# Patient Record
Sex: FEMALE | Race: OTHER | NOT HISPANIC OR LATINO | Employment: OTHER | URBAN - METROPOLITAN AREA
[De-identification: names, ages, dates, MRNs, and addresses within clinical notes are randomized per-mention and may not be internally consistent; named-entity substitution may affect disease eponyms.]

---

## 2017-01-06 ENCOUNTER — HOSPITAL ENCOUNTER (INPATIENT)
Facility: MEDICAL CENTER | Age: 62
LOS: 2 days | DRG: 811 | End: 2017-01-08
Attending: EMERGENCY MEDICINE | Admitting: INTERNAL MEDICINE
Payer: MEDICARE

## 2017-01-06 ENCOUNTER — RESOLUTE PROFESSIONAL BILLING HOSPITAL PROF FEE (OUTPATIENT)
Dept: HOSPITALIST | Facility: MEDICAL CENTER | Age: 62
End: 2017-01-06
Payer: MEDICARE

## 2017-01-06 DIAGNOSIS — R07.9 CHEST PAIN, UNSPECIFIED TYPE: ICD-10-CM

## 2017-01-06 DIAGNOSIS — D64.9 SEVERE ANEMIA: ICD-10-CM

## 2017-01-06 LAB
ABO GROUP BLD: NORMAL
ALBUMIN SERPL BCP-MCNC: 3.9 G/DL (ref 3.2–4.9)
ALBUMIN/GLOB SERPL: 1.2 G/DL
ALP SERPL-CCNC: 71 U/L (ref 30–99)
ALT SERPL-CCNC: 8 U/L (ref 2–50)
ANION GAP SERPL CALC-SCNC: 14 MMOL/L (ref 0–11.9)
AST SERPL-CCNC: 13 U/L (ref 12–45)
BARCODED ABORH UBTYP: 8400
BARCODED PRD CODE UBPRD: NORMAL
BARCODED UNIT NUM UBUNT: NORMAL
BASOPHILS # BLD AUTO: 0.5 % (ref 0–1.8)
BASOPHILS # BLD: 0.03 K/UL (ref 0–0.12)
BILIRUB SERPL-MCNC: 0.3 MG/DL (ref 0.1–1.5)
BLD GP AB SCN SERPL QL: NORMAL
BUN SERPL-MCNC: 68 MG/DL (ref 8–22)
CALCIUM SERPL-MCNC: 8.2 MG/DL (ref 8.5–10.5)
CHLORIDE SERPL-SCNC: 101 MMOL/L (ref 96–112)
CO2 SERPL-SCNC: 24 MMOL/L (ref 20–33)
COMPONENT R 8504R: NORMAL
CREAT SERPL-MCNC: 5.96 MG/DL (ref 0.5–1.4)
EOSINOPHIL # BLD AUTO: 0.33 K/UL (ref 0–0.51)
EOSINOPHIL NFR BLD: 5.3 % (ref 0–6.9)
ERYTHROCYTE [DISTWIDTH] IN BLOOD BY AUTOMATED COUNT: 58.4 FL (ref 35.9–50)
GFR SERPL CREATININE-BSD FRML MDRD: 7 ML/MIN/1.73 M 2
GLOBULIN SER CALC-MCNC: 3.3 G/DL (ref 1.9–3.5)
GLUCOSE SERPL-MCNC: 122 MG/DL (ref 65–99)
HCT VFR BLD AUTO: 19.5 % (ref 37–47)
HGB BLD-MCNC: 6.3 G/DL (ref 12–16)
IMM GRANULOCYTES # BLD AUTO: 0.03 K/UL (ref 0–0.11)
IMM GRANULOCYTES NFR BLD AUTO: 0.5 % (ref 0–0.9)
LYMPHOCYTES # BLD AUTO: 2.21 K/UL (ref 1–4.8)
LYMPHOCYTES NFR BLD: 35.3 % (ref 22–41)
MCH RBC QN AUTO: 31.2 PG (ref 27–33)
MCHC RBC AUTO-ENTMCNC: 32.3 G/DL (ref 33.6–35)
MCV RBC AUTO: 96.5 FL (ref 81.4–97.8)
MONOCYTES # BLD AUTO: 0.45 K/UL (ref 0–0.85)
MONOCYTES NFR BLD AUTO: 7.2 % (ref 0–13.4)
NEUTROPHILS # BLD AUTO: 3.21 K/UL (ref 2–7.15)
NEUTROPHILS NFR BLD: 51.2 % (ref 44–72)
NRBC # BLD AUTO: 0 K/UL
NRBC BLD AUTO-RTO: 0 /100 WBC
PLATELET # BLD AUTO: 430 K/UL (ref 164–446)
PMV BLD AUTO: 12.2 FL (ref 9–12.9)
POTASSIUM SERPL-SCNC: 5 MMOL/L (ref 3.6–5.5)
PRODUCT TYPE UPROD: NORMAL
PROT SERPL-MCNC: 7.2 G/DL (ref 6–8.2)
RBC # BLD AUTO: 2.02 M/UL (ref 4.2–5.4)
RH BLD: NORMAL
SODIUM SERPL-SCNC: 139 MMOL/L (ref 135–145)
UNIT STATUS USTAT: NORMAL
WBC # BLD AUTO: 6.3 K/UL (ref 4.8–10.8)

## 2017-01-06 PROCEDURE — 700105 HCHG RX REV CODE 258: Performed by: EMERGENCY MEDICINE

## 2017-01-06 PROCEDURE — 770006 HCHG ROOM/CARE - MED/SURG/GYN SEMI*

## 2017-01-06 PROCEDURE — 85025 COMPLETE CBC W/AUTO DIFF WBC: CPT

## 2017-01-06 PROCEDURE — G0378 HOSPITAL OBSERVATION PER HR: HCPCS

## 2017-01-06 PROCEDURE — 86901 BLOOD TYPING SEROLOGIC RH(D): CPT

## 2017-01-06 PROCEDURE — 30233N1 TRANSFUSION OF NONAUTOLOGOUS RED BLOOD CELLS INTO PERIPHERAL VEIN, PERCUTANEOUS APPROACH: ICD-10-PCS | Performed by: INTERNAL MEDICINE

## 2017-01-06 PROCEDURE — 96361 HYDRATE IV INFUSION ADD-ON: CPT

## 2017-01-06 PROCEDURE — 99222 1ST HOSP IP/OBS MODERATE 55: CPT | Mod: AI | Performed by: INTERNAL MEDICINE

## 2017-01-06 PROCEDURE — 96360 HYDRATION IV INFUSION INIT: CPT

## 2017-01-06 PROCEDURE — P9016 RBC LEUKOCYTES REDUCED: HCPCS

## 2017-01-06 PROCEDURE — 36415 COLL VENOUS BLD VENIPUNCTURE: CPT

## 2017-01-06 PROCEDURE — 80053 COMPREHEN METABOLIC PANEL: CPT

## 2017-01-06 PROCEDURE — 86923 COMPATIBILITY TEST ELECTRIC: CPT

## 2017-01-06 PROCEDURE — 99285 EMERGENCY DEPT VISIT HI MDM: CPT

## 2017-01-06 PROCEDURE — 36430 TRANSFUSION BLD/BLD COMPNT: CPT

## 2017-01-06 PROCEDURE — 86900 BLOOD TYPING SEROLOGIC ABO: CPT

## 2017-01-06 PROCEDURE — 86850 RBC ANTIBODY SCREEN: CPT

## 2017-01-06 PROCEDURE — 94760 N-INVAS EAR/PLS OXIMETRY 1: CPT

## 2017-01-06 RX ORDER — PREGABALIN 25 MG/1
50 CAPSULE ORAL 2 TIMES DAILY
Status: DISCONTINUED | OUTPATIENT
Start: 2017-01-06 | End: 2017-01-07

## 2017-01-06 RX ORDER — POLYETHYLENE GLYCOL 3350 17 G/17G
1 POWDER, FOR SOLUTION ORAL DAILY
Status: DISCONTINUED | OUTPATIENT
Start: 2017-01-07 | End: 2017-01-08 | Stop reason: HOSPADM

## 2017-01-06 RX ORDER — SODIUM CHLORIDE 9 MG/ML
INJECTION, SOLUTION INTRAVENOUS CONTINUOUS
Status: DISCONTINUED | OUTPATIENT
Start: 2017-01-06 | End: 2017-01-07

## 2017-01-06 RX ORDER — AMLODIPINE BESYLATE 10 MG/1
10 TABLET ORAL DAILY
Status: DISCONTINUED | OUTPATIENT
Start: 2017-01-07 | End: 2017-01-08 | Stop reason: HOSPADM

## 2017-01-06 RX ORDER — ACETAMINOPHEN 325 MG/1
650 TABLET ORAL EVERY 6 HOURS PRN
Status: DISCONTINUED | OUTPATIENT
Start: 2017-01-06 | End: 2017-01-07

## 2017-01-06 RX ORDER — ONDANSETRON 2 MG/ML
4 INJECTION INTRAMUSCULAR; INTRAVENOUS EVERY 4 HOURS PRN
Status: DISCONTINUED | OUTPATIENT
Start: 2017-01-06 | End: 2017-01-08 | Stop reason: HOSPADM

## 2017-01-06 RX ORDER — DEXTROSE MONOHYDRATE 25 G/50ML
25 INJECTION, SOLUTION INTRAVENOUS
Status: DISCONTINUED | OUTPATIENT
Start: 2017-01-06 | End: 2017-01-08 | Stop reason: HOSPADM

## 2017-01-06 RX ORDER — LATANOPROST 50 UG/ML
1 SOLUTION/ DROPS OPHTHALMIC EVERY EVENING
Status: DISCONTINUED | OUTPATIENT
Start: 2017-01-06 | End: 2017-01-08 | Stop reason: HOSPADM

## 2017-01-06 RX ORDER — HEPARIN SODIUM 5000 [USP'U]/ML
5000 INJECTION, SOLUTION INTRAVENOUS; SUBCUTANEOUS EVERY 8 HOURS
Status: DISCONTINUED | OUTPATIENT
Start: 2017-01-07 | End: 2017-01-08 | Stop reason: HOSPADM

## 2017-01-06 RX ORDER — ONDANSETRON 4 MG/1
4 TABLET, ORALLY DISINTEGRATING ORAL EVERY 4 HOURS PRN
Status: DISCONTINUED | OUTPATIENT
Start: 2017-01-06 | End: 2017-01-08 | Stop reason: HOSPADM

## 2017-01-06 RX ORDER — CARVEDILOL 25 MG/1
25 TABLET ORAL 2 TIMES DAILY WITH MEALS
Status: DISCONTINUED | OUTPATIENT
Start: 2017-01-07 | End: 2017-01-08 | Stop reason: HOSPADM

## 2017-01-06 RX ORDER — BIMATOPROST 0.3 MG/ML
1 SOLUTION/ DROPS OPHTHALMIC EVERY EVENING
Status: DISCONTINUED | OUTPATIENT
Start: 2017-01-06 | End: 2017-01-06

## 2017-01-06 RX ADMIN — SODIUM CHLORIDE 1000 ML: 9 INJECTION, SOLUTION INTRAVENOUS at 22:13

## 2017-01-06 ASSESSMENT — ENCOUNTER SYMPTOMS
SENSORY CHANGE: 1
BACK PAIN: 1
DIARRHEA: 0
NAUSEA: 1
TINGLING: 1
BLOOD IN STOOL: 0
FLANK PAIN: 1

## 2017-01-06 ASSESSMENT — PAIN SCALES - GENERAL: PAINLEVEL_OUTOF10: 7

## 2017-01-06 ASSESSMENT — LIFESTYLE VARIABLES: DO YOU DRINK ALCOHOL: NO

## 2017-01-06 NOTE — IP AVS SNAPSHOT
" After Visit Summary                                                                                                                  Name:Vielka Briscoe  Medical Record Number:[id number]  CSN: [id number]    YOB: 1955   Age: 61 y.o.  Sex: female  HT:1.473 m (4' 10\") WT: 61.8 kg (136 lb 3.9 oz)          Admit Date: 1/6/2017     Discharge Date:   Today's Date: 1/8/2017  Attending Doctor:  Justin Willis M.D.                  Allergies:  Actos; Darvocet; Demerol; Glucophage; Morphine; Oxycodone; Pcn; Requip; Simvastatin; Sulfa drugs; Tramadol; Trazodone; Dilaudid; Diphenhydramine; Iron; Multivitamin; and Other drug            Discharge Instructions       Discharge Instructions    Discharged to home by taxi with self. Discharged via wheelchair, hospital escort: Refused.  Special equipment needed: Not Applicable    Be sure to schedule a follow-up appointment with your primary care doctor or any specialists as instructed.     Discharge Plan:   Diet Plan: Discussed  Activity Level: Discussed  Confirmed Follow up Appointment: Patient to Call and Schedule Appointment  Confirmed Symptoms Management: Discussed  Medication Reconciliation Updated: Yes  Influenza Vaccine Indication: Patient Refuses    I understand that a diet low in cholesterol, fat, and sodium is recommended for good health. Unless I have been given specific instructions below for another diet, I accept this instruction as my diet prescription.   Other diet: renal/diabetic    Special Instructions: None    · Is patient discharged on Warfarin / Coumadin?   No     · Is patient Post Blood Transfusion?  No    Depression / Suicide Risk    As you are discharged from this [facility] facility, it is important to learn how to keep safe from harming yourself.    Recognize the warning signs:  · Abrupt changes in personality, positive or negative- including increase in energy   · Giving away possessions  · Change in eating patterns- significant weight " changes-  positive or negative  · Change in sleeping patterns- unable to sleep or sleeping all the time   · Unwillingness or inability to communicate  · Depression  · Unusual sadness, discouragement and loneliness  · Talk of wanting to die  · Neglect of personal appearance   · Rebelliousness- reckless behavior  · Withdrawal from people/activities they love  · Confusion- inability to concentrate     If you or a loved one observes any of these behaviors or has concerns about self-harm, here's what you can do:  · Talk about it- your feelings and reasons for harming yourself  · Remove any means that you might use to hurt yourself (examples: pills, rope, extension cords, firearm)  · Get professional help from the community (Mental Health, Substance Abuse, psychological counseling)  · Do not be alone:Call your Safe Contact- someone whom you trust who will be there for you.  · Call your local CRISIS HOTLINE 420-2091 or 156-781-5351  · Call your local Children's Mobile Crisis Response Team [place] (266) 644-9247 or www.[employer]  · Call the toll free National Suicide Prevention Hotlines   · National Suicide Prevention Lifeline 088-463-INXU (3955)  · National Hope Line Network 800-SUICIDE (481-0746)           Discharge Medication Instructions:    Below are the medications your physician expects you to take upon discharge:    Review all your home medications and newly ordered medications with your doctor and/or pharmacist. Follow medication instructions as directed by your doctor and/or pharmacist.    Please keep your medication list with you and share with your physician.               Medication List      START taking these medications        Instructions    acetaminophen 325 MG Tabs   Last time this was given:  650 mg on 1/7/2017 10:26 PM   Commonly known as:  TYLENOL    Take 2 Tabs by mouth every 6 hours as needed for Fever or Moderate Pain (Temp >101.5F).   Dose:  650 mg       hydrALAZINE 25 MG Tabs   Last  time this was given:  37.5 mg on 1/8/2017  3:01 PM   Commonly known as:  APRESOLINE    Take 1.5 Tabs by mouth every 8 hours.   Dose:  37.5 mg       insulin lispro 100 UNIT/ML Soln   Last time this was given:  1 Units on 1/8/2017 10:56 AM   Commonly known as:  HUMALOG    Inject 1-6 Units as instructed 4 Times a Day,Before Meals and at Bedtime.   Dose:  1-6 Units       isosorbide dinitrate 20 MG Tabs   Last time this was given:  20 mg on 1/8/2017  3:02 PM   Commonly known as:  ISORDIL    Take 1 Tab by mouth 3 times a day.   Dose:  20 mg         CHANGE how you take these medications        Instructions    hydrocodone-acetaminophen 5-325 MG Tabs per tablet   What changed:    - when to take this  - reasons to take this   Commonly known as:  NORCO    Take 1 Tab by mouth every 8 hours as needed (severe pain).   Dose:  1 Tab         CONTINUE taking these medications        Instructions    amlodipine 10 MG Tabs   Last time this was given:  10 mg on 1/8/2017  9:36 AM   Commonly known as:  NORVASC    Take 10 mg by mouth every day.   Dose:  10 mg       carvedilol 25 MG Tabs   Last time this was given:  25 mg on 1/8/2017  9:36 AM   Commonly known as:  COREG    Take 25 mg by mouth 2 times a day, with meals.   Dose:  25 mg       famotidine 20 MG Tabs   Commonly known as:  PEPCID    Take 1 Tab by mouth 2 times a day.   Dose:  20 mg       LUMIGAN 0.03 % Soln   Generic drug:  bimatoprost    Place 1 Drop in both eyes every evening.   Dose:  1 Drop       pregabalin 50 MG capsule   Last time this was given:  50 mg on 1/8/2017  9:36 AM   Commonly known as:  LYRICA    Take 1 Cap by mouth 2 times a day.   Dose:  50 mg       VOLTAREN 1 % Gel   Generic drug:  Diclofenac Sodium    Apply 1 Application to skin as directed 2 times a day as needed. Legs and back   Dose:  1 Application               Instructions           Diet / Nutrition:    Follow any diet instructions given to you by your doctor or the dietician, including how much salt  (sodium) you are allowed each day.    If you are overweight, talk to your doctor about a weight reduction plan.    Activity:    Remain physically active following your doctor's instructions about exercise and activity.    Rest often.     Any time you become even a little tired or short of breath, SIT DOWN and rest.    Worsening Symptoms:    Report any of the following signs and symptoms to the doctor's office immediately:    *Pain of jaw, arm, or neck  *Chest pain not relieved by medication                               *Dizziness or loss of consciousness  *Difficulty breathing even when at rest   *More tired than usual                                       *Bleeding drainage or swelling of surgical site  *Swelling of feet, ankles, legs or stomach                 *Fever (>100ºF)  *Pink or blood tinged sputum  *Weight gain (3lbs/day or 5lbs /week)           *Shock from internal defibrillator (if applicable)  *Palpitations or irregular heartbeats                *Cool and/or numb extremities    Stroke Awareness    Common Risk Factors for Stroke include:    Age  Atrial Fibrillation  Carotid Artery Stenosis  Diabetes Mellitus  Excessive alcohol consumption  High blood pressure  Overweight   Physical inactivity  Smoking    Warning signs and symptoms of a stroke include:    *Sudden numbness or weakness of the face, arm or leg (especially on one side of the body).  *Sudden confusion, trouble speaking or understanding.  *Sudden trouble seeing in one or both eyes.  *Sudden trouble walking, dizziness, loss of balance or coordination.Sudden severe headache with no known cause.    It is very important to get treatment quickly when a stroke occurs. If you experience any of the above warning signs, call 911 immediately.                   Disclaimer         Quit Smoking / Tobacco Use:    I understand the use of any tobacco products increases my chance of suffering from future heart disease or stroke and could cause other illnesses  which may shorten my life. Quitting the use of tobacco products is the single most important thing I can do to improve my health. For further information on smoking / tobacco cessation call a Toll Free Quit Line at 1-502.377.8758 (*National Cancer [place]) or 1-804.479.4306 (American Lung Association) or you can access the web based program at www.lungusa.org.    [place] Tobacco Users Help Line:  (621) 951-9350       Toll Free: 1-594.474.9187  Quit Tobacco Program [facility] (375)875-7338    Crisis Hotline:    [place] Crisis Hotline:  4-262-XDHTJSX or 1-675.829.5065    [place] Crisis Hotline:    1-118.608.5054 or 626-113-9466    Discharge Survey:   Thank you for choosing [facility]. We hope we did everything we could to make your hospital stay a pleasant one. You may be receiving a phone survey and we would appreciate your time and participation in answering the questions. Your input is very valuable to us in our efforts to improve our service to our patients and their families.        My signature on this form indicates that:    1. I have reviewed and understand the above information.  2. My questions regarding this information have been answered to my satisfaction.  3. I have formulated a plan with my discharge nurse to obtain my prescribed medications for home.                  Disclaimer         __________________________________                     __________       ________                       Patient Signature                                                 Date                    Time

## 2017-01-06 NOTE — IP AVS SNAPSHOT
1/8/2017          Vielka [facility]io [facility]  [address]    [place] [address]    Dear Vielka:    [facility] wants to ensure your discharge home is safe and you or your loved ones have had all your questions answered regarding your care after you leave the hospital.    You may receive a telephone call within two days of your discharge.  This call is to make certain you understand your discharge instructions as well as ensure we provided you with the best care possible during your stay with us.     The call will only last approximately 3-5 minutes and will be done by a nurse.    Once again, we want to ensure your discharge home is safe and that you have a clear understanding of any next steps in your care.  If you have any questions or concerns, please do not hesitate to contact us, we are here for you.  Thank you for choosing [facility] for your healthcare needs.    Sincerely,    Amol Bunn    [facility]

## 2017-01-06 NOTE — ED NOTES
Pt amb to triage.  Chief Complaint   Patient presents with   • Abnormal Labs     Low HGB   • Sent by MD     for blood transfusion, missed dialysis today     Pt asked to wait in lobby. Advised of wait time and triage process. Advised to alert RN w/ any changes in condition. Thanked for patience.

## 2017-01-06 NOTE — IP AVS SNAPSHOT
HumanAPI Access Code: [email or web address]  Expires: 1/9/2017  8:07 AM    HumanAPI  A secure, online tool to manage your health information     [employer]’s HumanAPI® is a secure, online tool that connects you to your personalized health information from the privacy of your home -- day or night - making it very easy for you to manage your healthcare. Once the activation process is completed, you can even access your medical information using the HumanAPI sundar, which is available for free in the Apple Sundar store or Google Play store.     HumanAPI provides the following levels of access (as shown below):   My Chart Features   [facility] Primary Care Doctor [facility]  Specialists [facility]  Urgent  Care Non-[facility]  Primary Care  Doctor   Email your healthcare team securely and privately 24/7 X X X X   Manage appointments: schedule your next appointment; view details of past/upcoming appointments X      Request prescription refills. X      View recent personal medical records, including lab and immunizations X X X X   View health record, including health history, allergies, medications X X X X   Read reports about your outpatient visits, procedures, consult and ER notes X X X X   See your discharge summary, which is a recap of your hospital and/or ER visit that includes your diagnosis, lab results, and care plan. X X       How to register for HumanAPI:  1. Go to  [email or web address].  2. Click on the Sign Up Now box, which takes you to the New Member Sign Up page. You will need to provide the following information:  a. Enter your HumanAPI Access Code exactly as it appears at the top of this page. (You will not need to use this code after you’ve completed the sign-up process. If you do not sign up before the expiration date, you must request a new code.)   b. Enter your date of birth.   c. Enter your home email address.   d. Click Submit, and follow the next screen’s instructions.  3. Create a HumanAPI ID. This will be your HumanAPI  login ID and cannot be changed, so think of one that is secure and easy to remember.  4. Create a [employer] password. You can change your password at any time.  5. Enter your Password Reset Question and Answer. This can be used at a later time if you forget your password.   6. Enter your e-mail address. This allows you to receive e-mail notifications when new information is available in Sh[employer]able.  7. Click Sign Up. You can now view your health information.    For assistance activating your [employer] account, call [phone]

## 2017-01-07 ENCOUNTER — APPOINTMENT (OUTPATIENT)
Dept: RADIOLOGY | Facility: MEDICAL CENTER | Age: 62
DRG: 811 | End: 2017-01-07
Attending: HOSPITALIST
Payer: MEDICARE

## 2017-01-07 LAB
EKG IMPRESSION: NORMAL
ERYTHROCYTE [DISTWIDTH] IN BLOOD BY AUTOMATED COUNT: 50.4 FL (ref 35.9–50)
GLUCOSE BLD-MCNC: 130 MG/DL (ref 65–99)
GLUCOSE BLD-MCNC: 143 MG/DL (ref 65–99)
GLUCOSE BLD-MCNC: 185 MG/DL (ref 65–99)
GLUCOSE BLD-MCNC: 258 MG/DL (ref 65–99)
HCT VFR BLD AUTO: 22.4 % (ref 37–47)
HGB BLD-MCNC: 7.2 G/DL (ref 12–16)
MCH RBC QN AUTO: 30.4 PG (ref 27–33)
MCHC RBC AUTO-ENTMCNC: 32.1 G/DL (ref 33.6–35)
MCV RBC AUTO: 94.5 FL (ref 81.4–97.8)
PLATELET # BLD AUTO: 372 K/UL (ref 164–446)
PMV BLD AUTO: 12.5 FL (ref 9–12.9)
RBC # BLD AUTO: 2.37 M/UL (ref 4.2–5.4)
TROPONIN I SERPL-MCNC: <0.01 NG/ML (ref 0–0.04)
WBC # BLD AUTO: 5.6 K/UL (ref 4.8–10.8)

## 2017-01-07 PROCEDURE — 700102 HCHG RX REV CODE 250 W/ 637 OVERRIDE(OP): Performed by: PHARMACIST

## 2017-01-07 PROCEDURE — 700111 HCHG RX REV CODE 636 W/ 250 OVERRIDE (IP): Performed by: INTERNAL MEDICINE

## 2017-01-07 PROCEDURE — 93005 ELECTROCARDIOGRAM TRACING: CPT | Performed by: INTERNAL MEDICINE

## 2017-01-07 PROCEDURE — 700102 HCHG RX REV CODE 250 W/ 637 OVERRIDE(OP): Performed by: INTERNAL MEDICINE

## 2017-01-07 PROCEDURE — 700102 HCHG RX REV CODE 250 W/ 637 OVERRIDE(OP): Performed by: HOSPITALIST

## 2017-01-07 PROCEDURE — A9270 NON-COVERED ITEM OR SERVICE: HCPCS | Performed by: INTERNAL MEDICINE

## 2017-01-07 PROCEDURE — 700111 HCHG RX REV CODE 636 W/ 250 OVERRIDE (IP): Performed by: NURSE PRACTITIONER

## 2017-01-07 PROCEDURE — 99233 SBSQ HOSP IP/OBS HIGH 50: CPT | Performed by: HOSPITALIST

## 2017-01-07 PROCEDURE — 84484 ASSAY OF TROPONIN QUANT: CPT

## 2017-01-07 PROCEDURE — P9016 RBC LEUKOCYTES REDUCED: HCPCS

## 2017-01-07 PROCEDURE — A9270 NON-COVERED ITEM OR SERVICE: HCPCS | Performed by: HOSPITALIST

## 2017-01-07 PROCEDURE — 82962 GLUCOSE BLOOD TEST: CPT

## 2017-01-07 PROCEDURE — 36415 COLL VENOUS BLD VENIPUNCTURE: CPT

## 2017-01-07 PROCEDURE — 93010 ELECTROCARDIOGRAM REPORT: CPT | Performed by: INTERNAL MEDICINE

## 2017-01-07 PROCEDURE — 85027 COMPLETE CBC AUTOMATED: CPT

## 2017-01-07 PROCEDURE — 5A1D00Z PERFORMANCE OF URINARY FILTRATION, SINGLE: ICD-10-PCS | Performed by: INTERNAL MEDICINE

## 2017-01-07 PROCEDURE — 86923 COMPATIBILITY TEST ELECTRIC: CPT | Mod: 91

## 2017-01-07 PROCEDURE — 36430 TRANSFUSION BLD/BLD COMPNT: CPT

## 2017-01-07 PROCEDURE — A9270 NON-COVERED ITEM OR SERVICE: HCPCS | Performed by: PHARMACIST

## 2017-01-07 PROCEDURE — 90935 HEMODIALYSIS ONE EVALUATION: CPT

## 2017-01-07 PROCEDURE — 71010 DX-CHEST-PORTABLE (1 VIEW): CPT

## 2017-01-07 PROCEDURE — 770006 HCHG ROOM/CARE - MED/SURG/GYN SEMI*

## 2017-01-07 RX ORDER — ISOSORBIDE DINITRATE 20 MG/1
10 TABLET ORAL 3 TIMES DAILY
Status: DISCONTINUED | OUTPATIENT
Start: 2017-01-07 | End: 2017-01-08

## 2017-01-07 RX ORDER — HYDROCODONE BITARTRATE AND ACETAMINOPHEN 5; 325 MG/1; MG/1
1 TABLET ORAL EVERY 6 HOURS PRN
Status: ON HOLD | COMMUNITY
End: 2017-01-08

## 2017-01-07 RX ORDER — PREGABALIN 25 MG/1
50 CAPSULE ORAL DAILY
Status: DISCONTINUED | OUTPATIENT
Start: 2017-01-08 | End: 2017-01-08 | Stop reason: HOSPADM

## 2017-01-07 RX ORDER — HYDRALAZINE HYDROCHLORIDE 50 MG/1
25 TABLET, FILM COATED ORAL EVERY 8 HOURS
Status: DISCONTINUED | OUTPATIENT
Start: 2017-01-07 | End: 2017-01-08

## 2017-01-07 RX ORDER — ENALAPRILAT 1.25 MG/ML
1.25 INJECTION INTRAVENOUS EVERY 6 HOURS PRN
Status: DISCONTINUED | OUTPATIENT
Start: 2017-01-07 | End: 2017-01-08 | Stop reason: HOSPADM

## 2017-01-07 RX ORDER — ACETAMINOPHEN 325 MG/1
650 TABLET ORAL EVERY 6 HOURS PRN
Status: DISCONTINUED | OUTPATIENT
Start: 2017-01-07 | End: 2017-01-08 | Stop reason: HOSPADM

## 2017-01-07 RX ADMIN — HEPARIN SODIUM 5000 UNITS: 5000 INJECTION INTRAVENOUS; SUBCUTANEOUS at 13:56

## 2017-01-07 RX ADMIN — HYDRALAZINE HYDROCHLORIDE 25 MG: 50 TABLET ORAL at 13:56

## 2017-01-07 RX ADMIN — CARVEDILOL 25 MG: 25 TABLET, FILM COATED ORAL at 17:47

## 2017-01-07 RX ADMIN — ISOSORBIDE DINITRATE 10 MG: 20 TABLET ORAL at 22:16

## 2017-01-07 RX ADMIN — ACETAMINOPHEN 650 MG: 325 TABLET, FILM COATED ORAL at 22:26

## 2017-01-07 RX ADMIN — PREGABALIN 50 MG: 25 CAPSULE ORAL at 01:13

## 2017-01-07 RX ADMIN — HEPARIN SODIUM 5000 UNITS: 5000 INJECTION INTRAVENOUS; SUBCUTANEOUS at 05:52

## 2017-01-07 RX ADMIN — HEPARIN SODIUM 5000 UNITS: 5000 INJECTION INTRAVENOUS; SUBCUTANEOUS at 22:16

## 2017-01-07 RX ADMIN — ISOSORBIDE DINITRATE 10 MG: 20 TABLET ORAL at 09:31

## 2017-01-07 RX ADMIN — INSULIN LISPRO 3 UNITS: 100 INJECTION, SOLUTION INTRAVENOUS; SUBCUTANEOUS at 17:47

## 2017-01-07 RX ADMIN — INSULIN LISPRO 1 UNITS: 100 INJECTION, SOLUTION INTRAVENOUS; SUBCUTANEOUS at 05:53

## 2017-01-07 RX ADMIN — ENALAPRILAT 1.25 MG: 1.25 INJECTION INTRAVENOUS at 03:39

## 2017-01-07 RX ADMIN — ENALAPRILAT 1.25 MG: 1.25 INJECTION INTRAVENOUS at 14:08

## 2017-01-07 RX ADMIN — HYDRALAZINE HYDROCHLORIDE 25 MG: 50 TABLET ORAL at 22:17

## 2017-01-07 RX ADMIN — LATANOPROST 1 DROP: 50 SOLUTION OPHTHALMIC at 01:47

## 2017-01-07 RX ADMIN — LATANOPROST 1 DROP: 50 SOLUTION OPHTHALMIC at 22:17

## 2017-01-07 RX ADMIN — ISOSORBIDE DINITRATE 10 MG: 20 TABLET ORAL at 13:56

## 2017-01-07 RX ADMIN — PREGABALIN 50 MG: 25 CAPSULE ORAL at 09:31

## 2017-01-07 ASSESSMENT — ENCOUNTER SYMPTOMS
PALPITATIONS: 0
VOMITING: 0
NAUSEA: 0
FEVER: 0
COUGH: 0
WEAKNESS: 1
SHORTNESS OF BREATH: 0

## 2017-01-07 ASSESSMENT — PAIN SCALES - GENERAL
PAINLEVEL_OUTOF10: 0
PAINLEVEL_OUTOF10: 4
PAINLEVEL_OUTOF10: 2

## 2017-01-07 ASSESSMENT — LIFESTYLE VARIABLES
ALCOHOL_USE: NO
EVER_SMOKED: NEVER

## 2017-01-07 NOTE — PROGRESS NOTES
Two RN head to toe skin assessment performed. Skin is dry throughout, no redness or open areas noted. Scar over inner right thigh and left forearm noted.

## 2017-01-07 NOTE — CONSULTS
[facility] Nephrology Consult Note    Patient seen and examined, please see my dictated consult note for full details  61yoF with PMH significant for ESRD on HD MWF via L upper arm AVF, HTN, DM II, anemia secondary to myelodysplastic syndrome and CKD, PVD, Atrial Fibrillation, renal osteodystrophy, admitted with generalized weakness and symptomatic anemia with Hgb 6.4.   Assessment/Plan:  1. ESRD--HD MWF via L arm AVF, missed HD yesterday  --HD today and will transfuse 2 units PRBC's with HD today  --Fluid removal with HD as tolerated  --Dose adjust all meds/abx for decreased GFR  2. Anemia--secondary to combination of myelodysplastic syndrome and CKD, goal Hgb 8.5-9.0 per heme/onc recs   --Transfused 1 unit PRBCs last night with Hgb 6.3--->7.2 after transfusion   --Will transfuse another 2 units PRBC's with HD today  --Follow up with heme/onc as outpatient  3. HTN  4. DM II--management per primary svc  5. Renal Osteodystrophy  6. Atrial Fibrillation  7. PVD  8. Chest Pain--atypical and intermittent, Trop negative and had negative stress test 2 weeks ago  --Monitor    Report Confirmation #[id number]

## 2017-01-07 NOTE — ED NOTES
PRBC started with 2 RN verify. /63/ HR 68/ TEMP 97.8F/ RR 20/ O2 100 on 2L O2. Will continue to monitor.

## 2017-01-07 NOTE — ED NOTES
Dialysis pt here referred here by MD because hgb was 6.4 per pt. Pt missed dialysis on Monday and today. Pt on monitor. ERP to see.

## 2017-01-07 NOTE — ED PROVIDER NOTES
ED Provider Note    Scribed for Chester Avila M.D. by Tatum Ann. 1/6/2017, 8:32 PM.    Primary care provider: Nyla Nava M.D.  Means of arrival: walk-in  History obtained from: patient  History limited by: None    CHIEF COMPLAINT  Chief Complaint   Patient presents with   • Abnormal Labs     Low HGB   • Sent by MD     for blood transfusion, missed dialysis today       MARQUEZ Briscoe is a 61 y.o. female who presented to the Emergency Department after being sent by an MD for a low hemoglobin count of 6.4 two days ago. The patient reports that she missed her dialysis sessions today and four days ago on Monday due to a time conflict with her blood transfusion. She also complains of associated chest pain in the waiting room today, and tingling and change of sensation in her upper extremities as well. She states that she currently feels nauseated and vomited on two days ago on Wednesday. She also says that she has been experiencing general aching in her back and flank. The patient denies melena or diarrhea.    REVIEW OF SYSTEMS  Review of Systems   Cardiovascular: Positive for chest pain.   Gastrointestinal: Positive for nausea. Negative for diarrhea, blood in stool and melena.   Genitourinary: Positive for flank pain.   Musculoskeletal: Positive for back pain.   Neurological: Positive for tingling (upper extremities.) and sensory change.   All other systems reviewed and are negative.      PAST MEDICAL HISTORY   has a past medical history of Hypertension; Chronic anemia; Chronic obstructive pulmonary disease (HCC); Blood transfusion without reported diagnosis; Anemia; Breath shortness; Glaucoma; Diabetes; Dental disorder; Supplemental oxygen dependent; Cataract; Renal disorder; Pain; Chronic kidney disease; Arthritis; Dialysis patient; MDS (myelodysplastic syndrome) (10/2016); Congestive heart failure (HCC); Atrial fibrillation (HCC); and Stroke (HCC) (03/2015).    SURGICAL HISTORY   has past surgical  history that includes gyn surgery; gyn surgery; gyn surgery; other cardiac surgery; other; retinal detachment repair (Right); av fistula creation (Left, 2/8/2016); other abdominal surgery; gastroscopy (12/17/2015); colonoscopy (12/17/2015); and vein ligation (Left, 11/10/2016).    SOCIAL HISTORY  Social History   Substance Use Topics   • Smoking status: Never Smoker    • Smokeless tobacco: Never Used   • Alcohol Use: No      History   Drug Use No       FAMILY HISTORY  Family History   Problem Relation Age of Onset   • Hypertension Father    • Hypertension Mother        CURRENT MEDICATIONS    No current facility-administered medications on file prior to encounter.     Current Outpatient Prescriptions on File Prior to Encounter   Medication Sig Dispense Refill   • amlodipine (NORVASC) 10 MG Tab Take 10 mg by mouth every day.     • bimatoprost (LUMIGAN) 0.03 % Solution Place 1 Drop in both eyes every evening.     • Diclofenac Sodium (VOLTAREN) 1 % Gel Apply 1 Application to skin as directed 2 times a day as needed. Legs and back     • pregabalin (LYRICA) 50 MG capsule Take 1 Cap by mouth 2 times a day. 30 Cap 0   • carvedilol (COREG) 25 MG Tab Take 25 mg by mouth 2 times a day, with meals.           ALLERGIES  Allergies   Allergen Reactions   • Actos [Pioglitazone Hydrochloride] Unspecified     Cause blindness   WTJ=7597   • Darvocet [Propoxyphene N-Apap] Vomiting     GSU=6554   • Demerol Vomiting     KQQ=4968   • Glucophage [Metformin Hydrochloride] Vomiting     PMW=9088   • Morphine Vomiting     ACX=8120   • Oxycodone Vomiting     RXN=1/2016   • Pcn [Penicillins] Vomiting     MCX=7078     • Requip Vomiting     RXN=12/2015   • Simvastatin Unspecified     Leg cramps  YMQ=8112   • Sulfa Drugs Rash     RXN=>10 years   • Tramadol Vomiting     KIV=9771   • Trazodone Vomiting     DSX=9912   • Dilaudid [Hydromorphone] Vomiting     Unknown    • Diphenhydramine Vomiting   • Iron      vomiting   • Multivitamin      itching   •  "Other Drug      Any binders that remove phosphorus from the body such as tums       PHYSICAL EXAM  VITAL SIGNS: /52 mmHg  Pulse 86  Temp(Src) 36.1 °C (97 °F) (Temporal)  Resp 18  Ht 1.473 m (4' 10\")  Wt 61 kg (134 lb 7.7 oz)  BMI 28.11 kg/m2  SpO2 96%  LMP 01/01/1995    Constitutional:   No acute distress  HENT:  Moist mucous membranes  Eyes:  No conjunctivitis or icterus  Neck:  trachea is midline, no palpable thyroid  Lymphatic:  No cervical lymphadenopathy  Cardiovascular:  Regular rate and rhythm, no murmurs  Thorax & Lungs:  Normal breath sounds, no rhonchi  Abdomen:  Soft, Non-tender  Skin:.  no rash  Back:  Non-tender, no CVA tenderness  Extremities:   no edema  Vascular:  symmetric radial pulse  Neurologic:  Normal gross motor  Fistula left uppre extremity, good thrill    LABS  Labs Reviewed   CBC WITH DIFFERENTIAL - Abnormal; Notable for the following:     RBC 2.02 (*)     Hemoglobin 6.3 (*)     Hematocrit 19.5 (*)     MCHC 32.3 (*)     RDW 58.4 (*)     All other components within normal limits   COMP METABOLIC PANEL - Abnormal; Notable for the following:     Anion Gap 14.0 (*)     Glucose 122 (*)     Bun 68 (*)     Creatinine 5.96 (*)     Calcium 8.2 (*)     All other components within normal limits   ESTIMATED GFR - Abnormal; Notable for the following:     GFR If  9 (*)     GFR If Non  7 (*)     All other components within normal limits   COD (ADULT)    Narrative:     Does Physician's order indicate patient to be  transfused?->Yes   RELEASE RED BLOOD CELLS   TRANSFUSE RED BLOOD CELLS-NURSING COMMUNICATION     All labs reviewed by me.      I (Chester Avila) certify that I have explained to the patient or the patient’s legal representative, the following:    (i)     Explained the Blood Transfusion procedure to be undertaken;  (ii)    Explained alternative treatments and their general nature and risks; and  (iii)   Explained the risks, and extent of risk, to " the Blood Transfusion procedure.  Risks included, but are not limited to the following:  mild allergic reactions, hemolytic reaction, and possible exposure to infectious agents such as hepatitis, cytomegalovirus, infectious mononucleosis, and acquired immune deficiency syndrome (AIDS)    I have explained all of the above and have answered all patient questions arising regarding the procedure to be taken, and I believe that the patient understands what I have explained.    Date 1/6/2017  Time 9:30 PM  This form is electronically signed by Chester Avila      COURSE & MEDICAL DECISION MAKING  Pertinent Labs & Imaging studies reviewed. (See chart for details)    8:32 PM - Patient seen and examined at bedside. Ordered CBC, CMP, COD to evaluate her symptoms. The differential diagnoses include but are not limited to: anemia requiring tansfusion     9:30 PM - Consult with Dr. Le for [place] .     9:44 PM - I discussed the patient's case and the above findings with Dr. Holman (hospitalist) who will see and admit the patient. Care is transferred at this time.    DISPOSITION:  Patient will be admitted to  in critical condition.    CRITICAL CARE  The very real possibilty of a deterioration of this patient's condition required the highest level of my preparedness for sudden, emergent intervention.  I provided critical care services, which included medication orders, frequent reevaluations of the patient's condition and response to treatment, ordering and reviewing test results, and discussing the case with various consultants.  The critical care time associated with the care of the patient was 35 minutes. Review chart for interventions. This time is exclusive of any other billable procedures.       Medical Decision Making:  Patient has low hemoglobin. Chest symptoms today of chest pain consistent with symptomatic severe anemia. She's missed dialysis. Contacted her nephrologist. Also discussed the case with the  hospitalist. Patient is being transfused 1 unit of blood. She is admitted in critical condition      FINAL IMPRESSION  1. Chest pain, unspecified type    2. Severe anemia          Tatum KENNY (Ervin), am scribing for, and in the presence of, Chester Avila M.D..    Electronically signed by: Tatum Ann (Ervin), 1/6/2017    IChester M.D. personally performed the services described in this documentation, as scribed by Tatum Ann in my presence, and it is both accurate and complete.    The note accurately reflects work and decisions made by me.  Chester Avila  1/6/2017  10:35 PM

## 2017-01-07 NOTE — CONSULTS
DATE OF SERVICE:  01/07/2017    REQUESTING PHYSICIAN:  ER physician.    REASON FOR CONSULTATION:  Evaluation and management of end-stage renal disease   and anemia.    HISTORY OF PRESENT ILLNESS:  This is a 61-year-old  female with past   medical history significant for end-stage renal disease, on chronic   hemodialysis Monday, Wednesday, Fridays via a left upper arm AV fistula under   the care of [facility], hypertension, diabetes mellitus type 2,   anemia secondary to myelodysplastic syndrome and anemia of chronic kidney   disease, peripheral vascular disease, atrial fibrillation, renal   osteodystrophy, who is admitted with complaints of generalized weakness and   symptomatic anemia with hemoglobin of 6.4.  The patient reports that she was   heading to her outpatient dialysis appointment yesterday; however, her   outpatient clinic called and notified her that her hemoglobin was 6.4 and that   she should go to the emergency room for transfusion, so she presented to the   emergency room for evaluation.  Patient has a history of myelodysplastic   syndrome and follows with Dr Avila, hematology/oncology.  She reports that   she recently saw him and that she is to start a new medication for this.    However, she has not yet started it as she is waiting for authorization from   her insurance.  She has periodic admissions for blood transfusions and per   hematology/oncology's recommendations they prefer to transfuse to a goal   hemoglobin of about 8.5 to 9.0.  In the emergency room last night, patient was   given 1 unit of packed red blood cells and she tolerated this fine.  Her   hemoglobin improved from 6.3-7.2 today after the transfusion.  She reports   that when she is anemic, she feels very fatigued and tired.  She does have   some shortness of breath associated with this, she did present with those   symptoms in the emergency room and these have improved now.  She also reports   some chest pain  off and on intermittently over the past few weeks.  She did   have a recent negative cardiac stress test about 2 weeks ago on 2016.    Her troponin in the emergency room was negative.  Her chest pain is felt to be   atypical.    REVIEW OF SYSTEMS:  Significant for weakness and fatigue.  Patient denies any   fevers or chills.  She does have some shortness of breath, that is now   improved.  She denies any lower extremity edema.  No nausea, vomiting or   diarrhea.  She denies any bloody stools or hemetemesis.  She has been having   some atypical chest pain intermittently over the past few weeks and the rest   of the 12-point review of systems is negative.    PAST MEDICAL HISTORY:  1.  End-stage renal disease.  The patient is on chronic hemodialysis Monday,   Wednesday,  via a left upper arm AV fistula under the care of [facility].  2.  Hypertension.  3.  Diabetes mellitus type 2.  4.  Anemia secondary to combination of chronic kidney disease as well as   myelodysplastic syndrome and she is seen by hematology/oncology as an   outpatient.  She does require periodic outpatient transfusions.  5.  Peripheral vascular disease.  The patient has undergone revascularization   procedure in the past.  6.  Renal osteodystrophy.  7.  Atrial fibrillation.    PAST SURGICAL HISTORY:  1.  Left upper arm AV fistula creation and revision by Dr. Ranulfo Jolly.  2.  Dialysis catheter placement and removal.  3.  Hysterectomy.  4.   x2.  5.  D and C x2.  6.  Retinal detachment repair.  7.  Possible vascular stent placement in the left leg.    ALLERGIES:  THE PATIENT IS ALLERGIC TO ACTOS.  SHE IS ALSO ALLERGIC TO   DARVOCET, DEMEROL, GLUCOPHAGE, MORPHINE, OXYCODONE, PENICILLIN, REQUIP,   SIMVASTATIN, TRAMADOL, DILAUDID, BENADRYL, ALL PHOSPHORUS BINDERS AND IRON AND   TRAZODONE, AND SULFA DRUGS.    SOCIAL HISTORY:  The patient denies any smoking, alcohol or illicit drug use.    FAMILY HISTORY:  There is no  family history of kidney disease or relatives on   dialysis.    OUTPATIENT MEDICATIONS:  1.  Norvasc 10 mg daily.  2.  Lumigan eyedrops nightly.  3.  Carvedilol 25 mg twice daily.  4.  Diclofenac topical gel.  5.  Lyrica 50 mg twice daily.    PHYSICAL EXAMINATION:  VITAL SIGNS:  Temperature is 36.2 degrees Celsius, pulse 64, respirations 18,   blood pressure 154/66, satting 100% on 2 liters nasal cannula.  GENERAL:  The patient is alert and oriented x3 and in no acute distress.  She   does appear chronically ill and older than her stated age.  HEENT:  Pupils are equal, round and reactive to light.  Extraocular muscles   are intact.  Mucous membranes appear moist.  NECK:  Exam reveals no JVD.  Trachea is midline.  There is no thyromegaly.    CARDIOVASCULAR:  Heart is regular rate and rhythm.  No murmurs, rubs or   gallops.  RESPIRATORY:  Lungs are generally clear to auscultation bilaterally.  No   wheezes, rales or rhonchi.  There is no tachypnea and there is no increased   work of breathing.  GASTROINTESTINAL:  Soft, nontender, nondistended.  Bowel sounds are present.  EXTREMITIES:  Reveals no clubbing, cyanosis or edema.  There is a left upper   arm AV fistula in place with a palpable thrill and audible bruit.  SKIN:  Reveals no rashes or ulcerations.  There is normal skin turgor.  NEUROLOGIC:  Reveals no focal motor deficits.  Sensation is grossly intact to   light touch.  PSYCHIATRIC:  The patient is alert and oriented x3.  She has appropriate mood   and affect.  LYMPHATIC:  Reveals no cervical lymphadenopathy, no axillary lymphadenopathy.    LABORATORY DATA:  Labs reveal a white blood cell count of 5.6, hemoglobin 7.2,   platelet count is 372, and differential reveals 51% neutrophils, 35%   lymphocytes.  Chemistries reveal a sodium of 139, potassium of 5.0,  chloride   is 101, bicarbonate is 24, BUN is 68, creatinine is 5.96, calcium is 8.2, AST   is 13, ALT is 8, alkaline phosphatase is 71, albumin is 3.9.   Troponin is   negative.    IMAGING:  Nuclear medicine stress test on 12/22/2016 reveals a small infarct   distal to the inferolateral LV wall extending to the apex.  There is better   perfusion at rest.  There is no ischemia, normal LV function and wall motion   abnormality.    ASSESSMENT AND PLAN:  1.  End-stage renal disease, hemodialysis on Monday, Wednesday, and Fridays as   an outpatient via a left arm AV fistula.  She did miss her hemodialysis   yesterday.  We will plan on hemodialysis today and will transfuse 2 units of   packed red blood cells with hemodialysis today.  We will remove fluid with   hemodialysis as tolerated and recommend dose adjusting all of her medications   for her decreased glomerular filtration rate.  2.  Anemia.  This is secondary to combination of myelodysplastic syndrome as   well as CKD related anemia.  Goal hemoglobin is 8.5-9.0 per   hematology/oncology recommendations.  She was transfused 1 unit of packed red   blood cells last night with improvement in her hemoglobin of 6.3-7.2.    However, we will transfuse another 2 units of packed red blood cells with   hemodialysis today to bring her up to hematology/oncology's goal, recommend   following up with hematology/oncology as an outpatient.  3.  Hypertension.  Blood pressure is elevated this morning.  We will plan on   fluid removal with hemodialysis.  Continue home blood pressure medications and   monitor.  4.  Diabetes mellitus type 2.  Management per the primary service.  5.  Renal osteodystrophy.  The patient reports that she is intolerant of has   been intolerant of fall, phosphorus binders due to GI side effects.  Recommend   placing this patient on a low phosphorus diet.  6.  Atrial fibrillation.  Continue medical management.  7.  Peripheral vascular disease.  The patient is status post revascularization   procedure in the past.  Continue supportive care.  8.  Chest pain, which is atypical and intermittent.  Her troponin is  negative.    Her stress test from 2 weeks ago showed a distal small inferolateral infarct   of the LV wall extending to the apex, but there was no ischemia noted and she   had a normal EF and wall motion.  Recommend continuing to monitor and   continue supportive care.    Thank you for this very interesting consult and thank you for involving me in   the care of this very pleasant patient.  If you have any questions or need any   further information, please do not hesitate to contact me.       ____________________________________     MD ABY MYLES / RAMONE    DD:  01/07/2017 10:59:38  DT:  01/07/2017 13:39:08    D#:  [id number]  Job#:  [id number]

## 2017-01-07 NOTE — H&P
CHIEF COMPLAINT:  Generalized weakness and chest pain.    HISTORY OF PRESENT ILLNESS:  This is a 61-year-old female with history of   end-stage renal disease on hemodialysis Monday, Wednesday and Friday, diabetes   type 2, vitamin D deficiency, myelodysplastic syndrome, where she is   requiring a blood transfusion at least every month.  She was here about a   month ago and was transfused with 2 units of packed RBCs.  She also had a   chest pain at that time where she had a stress test, which came back normal.    She came to the hospital today after she missed her dialysis because she was   told by Dr. Le who is the patient's nephrologist, that her hemoglobin   was down to 6.4.  She was told about 3 days ago that her hemoglobin was   down to 7.7.  There were no signs of GI bleeding but she has been feeling weak   over the past 3 days, but got worse today.  Along with that, she had some   chest pain, which flared up yesterday, which lasted for about 5 minutes.    Today, while in the waiting room, she had another chest pain, which was more   substernal type of pain, but when I saw the patient she has tenderness on the   right chest area.  She will be admitted for symptomatic anemia and blood transfusion.    PAST MEDICAL HISTORY: End-stage renal disease on hemodialysis, diabetes type   2, vitamin D deficiency, iron overload, diabetic neuropathy, myelodysplastic   syndrome and hypertension.    PAST SURGICAL HISTORY:   x2, appendectomy and fistula over the left   arm.    SOCIAL HISTORY:  Denies smoking, alcohol or illicit drug use.    FAMILY HISTORY:  No cancer and no diabetes in the family.    ALLERGIES:  TO ACTOS, DARVOCET, DEMEROL, GLUCOPHAGE, MORPHINE, OXYCODONE,   PENICILLINS, SIMVASTATIN, SULFA DRUGS, REQUIP, TRAMADOL, TRAZODONE, DILAUDID,   DIPHENHYDRAMINE, MULTIVITAMIN AND IRON.    HOME MEDICATIONS:  She takes amlodipine 10 mg daily, Lumigan 0.03% one drop in   both eyes every evening, carvedilol  25 mg b.i.d., Voltaren 1% gel application   to the skin directed twice a day as needed and Lyrica 50 mg b.i.d.    REVIEW OF SYSTEMS:  Patient denies any nausea.  No vomiting or diarrhea.  No   fever.  Denies any liver problem.  All other systems reviewed were negative.    PHYSICAL EXAMINATION:  VITAL SIGNS:  Blood pressure is 171/50, heart rate of 67, oxygen is 100%,   respiratory rate 20 and temperature of 36.6.  GENERAL:  Patient is lying in bed comfortably, not in distress.  HEENT:  Head, normocephalic and atraumatic.  Eyes, pupils are reactive to   light.  Anicteric sclerae.  Slightly pale palpebral conjunctivae.  Oral   mucosa, no oral lesions noted, dry mucosa.  NECK:  No JVD, no lymphadenopathy and no thyromegaly.  CHEST AND LUNGS:  Equal chest expansion.  Clear to auscultation bilaterally.    No crackles and no wheezing.  Positive tenderness on the right side of chest   on palpation.  CARDIOVASCUALR:  Regular rate and rhythm.  S1, S2 heard.  No murmurs noted.  GASTROINTESTINAL:  Positive bowel sounds.  No tenderness.  No   hepatosplenomegaly.  EXTREMITIES:  Pulses palpable in both upper and lower extremities.  No edema   noted.  NEUROLOGIC:  Cranial nerves II-XII intact.  Alert and oriented x3.  Motor exam   is 5/5 in both upper and lower extremities.  SKIN:  No cyanosis.  Capillary refill time normal.  No rash.    LABORATORY DATA:  WBC is 6.3, hemoglobin of 6.3, hematocrit 19.5 and platelet   count of 430.  Sodium 139, potassium 5, chloride 101, CO2 of 24, anion gap of   14, glucose of 122, BUN of 60 and creatinine 5.96.    ASSESSMENT AND PLAN:  1.  Normocytic anemia.  Patient is symptomatic, this could be secondary to her   myelodysplastic syndrome.  One unit of packed RBCs has been ordered.  Dr. Le was already consulted.  Possible dialysis in the morning.  We will   recheck CBC in the morning.  2.  As far as chest pain is concerned, this could be secondary to   costochondritis.  I will trend her  troponin, but I doubt that she needs   another stress test as she had one last month, which came back normal.  3.  Diabetes type 2.  We will put her on low sliding scale insulin.    Accu-Cheks q.a.c. and at bedtime.  4.  Hypertension.  Continue carvedilol and Norvasc.  5.  Deep vein thrombosis prophylaxis.  We will put her on heparin 5000 units   subcutaneously q. 8 hours.  6.  Diabetic neuropathy.  Continue Lyrica 50 mg b.i.d.  7.  Generalized weakness.  We will consult physical therapy and occupational   therapy.    MEDICAL DECISION MAKING:  More than 2 midnights.       ____________________________________     MD JOSE LUIS Esteban / RAMONE    DD:  01/06/2017 22:33:47  DT:  01/07/2017 00:28:56    D#:  [id number]  Job#:  [id number]

## 2017-01-07 NOTE — PROGRESS NOTES
[facility] Progress Note    Patient seen and examined on hemodialysis  VSS--see dialysis treatment sheet for full details  Labs reviewed  Will transfuse 2 units PRBC's with dialysis today

## 2017-01-07 NOTE — PROGRESS NOTES
Hemodialysis ordered by Dr. Le. Treatment started at 0952 and ended at 1302 d/t clotted dialyzer, unable to return blood. New setting place. Dr. Le notified and aware. Treatment restarted. Pt stable, vss, no c/o post tx. See flow sheets for details. Net UF 2.5 liters. Report to AKIKO Avila RN.

## 2017-01-07 NOTE — PROGRESS NOTES
Pt to S612-2 via [employer]. Pt A&Ox4, ambulated to bed with a cane and standby assist. C/o 4/10 BLE pain, states this is her baseline, scheduled lyrica administered. No other s/s of discomfort at this time. Oriented to room, call light system and policies. Admit profile complete. POC discussed, all questions/concerns addressed. Bed in low position, call light within reach, hourly rounding in place.

## 2017-01-07 NOTE — PROGRESS NOTES
Hospital Medicine Progress Note, Adult, Complex               Author: Justin Willis Date & Time created: 1/7/2017  3:46 PM     Interval History:  Admitted for weakness and CP.  Found to have sig anemia.  Denies SOB.    Review of Systems:  Review of Systems   Constitutional: Negative for fever.   Respiratory: Negative for cough and shortness of breath.    Cardiovascular: Negative for palpitations.   Gastrointestinal: Negative for nausea and vomiting.   Neurological: Positive for weakness.   All other systems reviewed and are negative.      Physical Exam:  Physical Exam  Nursing note and vitals reviewed.  Constitutional: She is oriented to person, place, and time. She appears well-developed and well-nourished.   HENT:   Head: Normocephalic and atraumatic.   Right Ear: External ear normal.   Left Ear: External ear normal.   Nose: Nose normal.   Mouth/Throat: Oropharynx is small with Mallanpati score of 2-3.  Mucosa is clear and moist.   Eyes: Conjunctivae and extraocular motions are normal. Pupils are equal, round, and reactive to light.   Neck: Normal range of motion. Neck supple.   Cardiovascular: Normal rate, regular rhythm, normal heart sounds and intact distal pulses.    Pulmonary/Chest: Effort normal and breath sounds normal.   Abdominal: Soft. Bowel sounds are normal.   Musculoskeletal: Normal range of motion.   Neurological: She is alert and oriented to person, place, and time.   Skin: Skin is warm and dry.       Labs:        Invalid input(s): [id number]  Recent Labs      01/07/17   0627   TROPONINI  <0.01     Recent Labs      01/06/17 2032   SODIUM  139   POTASSIUM  5.0   CHLORIDE  101   CO2  24   BUN  68*   CREATININE  5.96*   CALCIUM  8.2*     Recent Labs      01/06/17 2032   ALTSGPT  8   ASTSGOT  13   ALKPHOSPHAT  71   TBILIRUBIN  0.3   GLUCOSE  122*     Recent Labs      01/06/17 2032 01/07/17   0248   RBC  2.02*  2.37*   HEMOGLOBIN  6.3*  7.2*   HEMATOCRIT  19.5*  22.4*   PLATELETCT  430  372      Recent Labs      17   0248   WBC  6.3  5.6   NEUTSPOLYS  51.20   --    LYMPHOCYTES  35.30   --    MONOCYTES  7.20   --    EOSINOPHILS  5.30   --    BASOPHILS  0.50   --    ASTSGOT  13   --    ALTSGPT  8   --    ALKPHOSPHAT  71   --    TBILIRUBIN  0.3   --            Hemodynamics:  Temp (24hrs), Av.4 °C (97.6 °F), Min:35.6 °C (96.1 °F), Max:36.9 °C (98.5 °F)  Temperature: 35.9 °C (96.6 °F)  Pulse  Av  Min: 64  Max: 86Heart Rate (Monitored): 67  Blood Pressure: (!) 178/74 mmHg, NIBP: (!) 167/64 mmHg     Respiratory:    Respiration: 18, Pulse Oximetry: 100 %           Fluids:    Intake/Output Summary (Last 24 hours) at 17 1546  Last data filed at 17 1302   Gross per 24 hour   Intake   1160 ml   Output   3328 ml   Net  -2168 ml     Weight: 61.8 kg (136 lb 3.9 oz)  GI/Nutrition:  Orders Placed This Encounter   Procedures   • Diet Order     Standing Status: Standing      Number of Occurrences: 1      Standing Expiration Date:      Order Specific Question:  Diet:     Answer:  Renal [8]     Order Specific Question:  Diet:     Answer:  Diabetic [3]     Medical Decision Making, by Problem:  Active Hospital Problems    Diagnosis   • *Anemia [D64.9] - has h/o RBC xfusions.  Transfuse if Hgb <7.  Check Fe, B12, folate and FOB.   • Chest pain [R07.9] - troponin neg x 1.  Recent P-thal neg for ischemia  Hold off on ASA in light anemia req RBC xfusions.  Check FLP.    ESRD/HD - neph consult.  Fe excess - maybe due to multiple RBC xfusions.  Follow.  MDS - outpatient oncology follow up.  HTN - start hydralazine and Isordil.  DM 2 - uncontrolled.  Home regimen and cover c ISS.  Stable issues - med hx (chronic resp failure, recent CP),  Preventives - IS, Vax, stool soft, DVTP.  Dispo - complex/guarded.          EKG reviewed, Medications reviewed and Labs reviewed  Bauer catheter: No Bauer      DVT Prophylaxis: Heparin    Ulcer prophylaxis: Not indicated    Assessed for rehab: Patient was  assess for and/or received rehabilitation services during this hospitalization

## 2017-01-07 NOTE — CARE PLAN
Problem: Communication  Goal: The ability to communicate needs accurately and effectively will improve  Outcome: PROGRESSING AS EXPECTED  Pt oriented to room, call light system and hospital policies. POC discussed, questions/concerns addressed.    Problem: Safety  Goal: Will remain free from injury  Outcome: PROGRESSING AS EXPECTED  Treaded socks in use. Bed in low position, alarm on. Call light and belongings within reach. Hourly rounding in place.

## 2017-01-08 VITALS
HEIGHT: 58 IN | RESPIRATION RATE: 18 BRPM | WEIGHT: 136.24 LBS | TEMPERATURE: 98.8 F | OXYGEN SATURATION: 95 % | DIASTOLIC BLOOD PRESSURE: 52 MMHG | BODY MASS INDEX: 28.6 KG/M2 | HEART RATE: 63 BPM | SYSTOLIC BLOOD PRESSURE: 133 MMHG

## 2017-01-08 LAB
CHOLEST SERPL-MCNC: 163 MG/DL (ref 100–199)
GLUCOSE BLD-MCNC: 120 MG/DL (ref 65–99)
GLUCOSE BLD-MCNC: 193 MG/DL (ref 65–99)
HDLC SERPL-MCNC: 41 MG/DL
LDLC SERPL CALC-MCNC: 93 MG/DL
TRIGL SERPL-MCNC: 143 MG/DL (ref 0–149)
TROPONIN I SERPL-MCNC: <0.01 NG/ML (ref 0–0.04)

## 2017-01-08 PROCEDURE — 82962 GLUCOSE BLOOD TEST: CPT | Mod: 91

## 2017-01-08 PROCEDURE — 700111 HCHG RX REV CODE 636 W/ 250 OVERRIDE (IP): Performed by: INTERNAL MEDICINE

## 2017-01-08 PROCEDURE — 36415 COLL VENOUS BLD VENIPUNCTURE: CPT

## 2017-01-08 PROCEDURE — 700102 HCHG RX REV CODE 250 W/ 637 OVERRIDE(OP): Performed by: INTERNAL MEDICINE

## 2017-01-08 PROCEDURE — 80061 LIPID PANEL: CPT

## 2017-01-08 PROCEDURE — A9270 NON-COVERED ITEM OR SERVICE: HCPCS | Performed by: HOSPITALIST

## 2017-01-08 PROCEDURE — A9270 NON-COVERED ITEM OR SERVICE: HCPCS | Performed by: INTERNAL MEDICINE

## 2017-01-08 PROCEDURE — 700102 HCHG RX REV CODE 250 W/ 637 OVERRIDE(OP): Performed by: HOSPITALIST

## 2017-01-08 PROCEDURE — 99239 HOSP IP/OBS DSCHRG MGMT >30: CPT | Performed by: HOSPITALIST

## 2017-01-08 PROCEDURE — 84484 ASSAY OF TROPONIN QUANT: CPT

## 2017-01-08 RX ORDER — FAMOTIDINE 20 MG/1
20 TABLET, FILM COATED ORAL 2 TIMES DAILY
Qty: 60 TAB | COMMUNITY
Start: 2017-01-08 | End: 2017-03-19

## 2017-01-08 RX ORDER — ISOSORBIDE DINITRATE 20 MG/1
20 TABLET ORAL 3 TIMES DAILY
Qty: 90 TAB | Refills: 1 | Status: SHIPPED | OUTPATIENT
Start: 2017-01-08 | End: 2017-01-30

## 2017-01-08 RX ORDER — HYDRALAZINE HYDROCHLORIDE 25 MG/1
37.5 TABLET, FILM COATED ORAL EVERY 8 HOURS
Qty: 135 TAB | Refills: 1 | Status: SHIPPED | OUTPATIENT
Start: 2017-01-08 | End: 2017-01-30

## 2017-01-08 RX ORDER — ACETAMINOPHEN 325 MG/1
650 TABLET ORAL EVERY 6 HOURS PRN
Qty: 30 TAB | Refills: 0 | COMMUNITY
Start: 2017-01-08 | End: 2017-03-19

## 2017-01-08 RX ORDER — HYDRALAZINE HYDROCHLORIDE 25 MG/1
37.5 TABLET, FILM COATED ORAL EVERY 8 HOURS
Status: DISCONTINUED | OUTPATIENT
Start: 2017-01-08 | End: 2017-01-08 | Stop reason: HOSPADM

## 2017-01-08 RX ORDER — ISOSORBIDE DINITRATE 20 MG/1
20 TABLET ORAL 3 TIMES DAILY
Status: DISCONTINUED | OUTPATIENT
Start: 2017-01-08 | End: 2017-01-08 | Stop reason: HOSPADM

## 2017-01-08 RX ORDER — HYDROCODONE BITARTRATE AND ACETAMINOPHEN 5; 325 MG/1; MG/1
1 TABLET ORAL EVERY 8 HOURS PRN
Qty: 1 TAB | Refills: 0
Start: 2017-01-08 | End: 2017-03-19

## 2017-01-08 RX ADMIN — HEPARIN SODIUM 5000 UNITS: 5000 INJECTION INTRAVENOUS; SUBCUTANEOUS at 05:59

## 2017-01-08 RX ADMIN — INSULIN LISPRO 1 UNITS: 100 INJECTION, SOLUTION INTRAVENOUS; SUBCUTANEOUS at 10:56

## 2017-01-08 RX ADMIN — ISOSORBIDE DINITRATE 20 MG: 20 TABLET ORAL at 09:35

## 2017-01-08 RX ADMIN — HEPARIN SODIUM 5000 UNITS: 5000 INJECTION INTRAVENOUS; SUBCUTANEOUS at 15:02

## 2017-01-08 RX ADMIN — AMLODIPINE BESYLATE 10 MG: 10 TABLET ORAL at 09:36

## 2017-01-08 RX ADMIN — CARVEDILOL 25 MG: 25 TABLET, FILM COATED ORAL at 09:36

## 2017-01-08 RX ADMIN — PREGABALIN 50 MG: 25 CAPSULE ORAL at 09:36

## 2017-01-08 RX ADMIN — HYDRALAZINE HYDROCHLORIDE 37.5 MG: 25 TABLET ORAL at 15:01

## 2017-01-08 RX ADMIN — HYDRALAZINE HYDROCHLORIDE 25 MG: 50 TABLET ORAL at 06:00

## 2017-01-08 RX ADMIN — ISOSORBIDE DINITRATE 20 MG: 20 TABLET ORAL at 15:02

## 2017-01-08 ASSESSMENT — LIFESTYLE VARIABLES: EVER_SMOKED: NEVER

## 2017-01-08 ASSESSMENT — ENCOUNTER SYMPTOMS
DIZZINESS: 0
FOCAL WEAKNESS: 0
CONSTITUTIONAL NEGATIVE: 1
LOSS OF CONSCIOUSNESS: 0
PSYCHIATRIC NEGATIVE: 1
NEUROLOGICAL NEGATIVE: 1
EYES NEGATIVE: 1
MUSCULOSKELETAL NEGATIVE: 1
GASTROINTESTINAL NEGATIVE: 1
ORTHOPNEA: 0
RESPIRATORY NEGATIVE: 1

## 2017-01-08 ASSESSMENT — PAIN SCALES - GENERAL: PAINLEVEL_OUTOF10: 0

## 2017-01-08 NOTE — CARE PLAN
Problem: Safety  Goal: Will remain free from injury  Discussed with Pt the need to use the call light for any needs.  Pt up with cane and legally blind.  The Pt verbalized understanding.

## 2017-01-08 NOTE — PROGRESS NOTES
Medicated per MAR except Pt did not want BP meds prior to dialysis. Accessed PIV which is patent. Will continue hourly rounding throughout the day shift.

## 2017-01-08 NOTE — PROGRESS NOTES
Pt AOx4, denies pain/discomfort. Pt fatigued, resting quietly in bed, needs met. No s/s of dyspnea or respiratory distress. Call light within reach, personal belongings available, bed in lowest position, treaded socks on, and bed alarm on. Hourly rounding in place.

## 2017-01-08 NOTE — PROGRESS NOTES
Assumed pt care at Shift change, received bedside report from NOC shift RN.  Pt in bed, AOx4.  Pt has no C/O pain, at this time.  POC discussed for Pt to go to dialysis and Pt verbalized understanding. Bed in low position and locked, pt belongings and call light within reach. Bed alarm is on. Pt instructed to call with any needs and before getting out of bed.  Pt verbalized understanding.

## 2017-01-08 NOTE — PROGRESS NOTES
[facility] Progress Note               Author: Renetta Oroscojoseph Date & Time created: 1/8/2017  8:33 AM     Interval History:  This is a 61-year-old  female with past medical history significant for end-stage renal disease, on chronic hemodialysis Monday, Wednesday, Fridays via a left upper arm AV fistula under the care of [facility], hypertension, diabetes mellitus type 2, anemia secondary to myelodysplastic syndrome and anemia of chronic kidney disease, peripheral vascular disease, atrial fibrillation, renal osteodystrophy, who is admitted with complaints of generalized weakness and    symptomatic anemia with hemoglobin of 6.4.  The patient reports that she was heading to her outpatient dialysis appointment yesterday; however, her outpatient clinic called and notified her that her hemoglobin was 6.4 and that she should go to the emergency room for transfusion, so she presented to the emergency room for evaluation.  Patient has a history of myelodysplastic syndrome and follows with Dr Avila, hematology/oncology.  She reports that she recently saw him and that she is to start a new medication for this. However, she has not yet started it as she is waiting for authorization from    her insurance.  She has periodic admissions for blood transfusions and per hematology/oncology's recommendations they prefer to transfuse to a goal hemoglobin of about 8.5 to 9.0.  In the emergency room last night, patient was given 1 unit of packed red blood cells and she tolerated this fine.  Her    hemoglobin improved from 6.3-7.2 today after the transfusion.  She reports that when she is anemic, she feels very fatigued and tired.  She does have some shortness of breath associated with this, she did present with those symptoms in the emergency room and these have improved now.  She also reports some chest pain off and on intermittently over the past few weeks.  She did have a recent negative cardiac  stress test about 2 weeks ago on 12/22/2016. Her troponin in the emergency room was negative.  Her chest pain is felt to be atypical.    DAILY NEPHROLOGY SUMMARY  1/08/17--No events, transfused 2 units PRBC's with HD yesterday, no new labs today, BP elevated this am, feels ok, still with intermittent chest tightness, no new complaints.     Review of Systems:  Review of Systems   Constitutional: Negative.    HENT: Negative.    Eyes: Negative.    Respiratory: Negative.    Cardiovascular: Positive for chest pain (intermittent). Negative for orthopnea and leg swelling.   Gastrointestinal: Negative.    Genitourinary: Negative.    Musculoskeletal: Negative.    Skin: Negative.    Neurological: Negative.  Negative for dizziness, focal weakness and loss of consciousness.   Endo/Heme/Allergies: Negative.    Psychiatric/Behavioral: Negative.        Physical Exam:  Physical Exam   Constitutional: She is oriented to person, place, and time. She appears well-developed and well-nourished. No distress.   HENT:   Head: Normocephalic and atraumatic.   Mouth/Throat: No oropharyngeal exudate.   Eyes: Conjunctivae and EOM are normal. Pupils are equal, round, and reactive to light. No scleral icterus.   Neck: Normal range of motion. Neck supple. No tracheal deviation present. No thyromegaly present.   Cardiovascular: Normal rate and regular rhythm.    No murmur heard.  Pulmonary/Chest: Effort normal and breath sounds normal. No respiratory distress. She has no wheezes.   Abdominal: Soft. Bowel sounds are normal. She exhibits no distension. There is no tenderness.   Musculoskeletal: Normal range of motion. She exhibits no edema or tenderness.   +L FA AVF with thrill/bruit   Neurological: She is alert and oriented to person, place, and time. She exhibits normal muscle tone.   Skin: Skin is warm and dry. No erythema.   Psychiatric: She has a normal mood and affect. Her behavior is normal.   Nursing note and vitals reviewed.      Labs:         Invalid input(s): [id number]  Recent Labs      17   0627  17   0259   TROPONINI  <0.01  <0.01     Recent Labs      17   SODIUM  139   POTASSIUM  5.0   CHLORIDE  101   CO2  24   BUN  68*   CREATININE  5.96*   CALCIUM  8.2*     Recent Labs      17   ALTSGPT  8   ASTSGOT  13   ALKPHOSPHAT  71   TBILIRUBIN  0.3   GLUCOSE  122*     Recent Labs      17   0248   RBC  2.02*  2.37*   HEMOGLOBIN  6.3*  7.2*   HEMATOCRIT  19.5*  22.4*   PLATELETCT  430  372     Recent Labs      17   0248   WBC  6.3  5.6   NEUTSPOLYS  51.20   --    LYMPHOCYTES  35.30   --    MONOCYTES  7.20   --    EOSINOPHILS  5.30   --    BASOPHILS  0.50   --    ASTSGOT  13   --    ALTSGPT  8   --    ALKPHOSPHAT  71   --    TBILIRUBIN  0.3   --            Hemodynamics:  Temp (24hrs), Av.3 °C (97.3 °F), Min:35.6 °C (96.1 °F), Max:36.9 °C (98.4 °F)  Temperature: 36.9 °C (98.4 °F)  Pulse  Av.7  Min: 60  Max: 86   Blood Pressure: (!) 171/64 mmHg (nurse aware)     Respiratory:    Respiration: 18, Pulse Oximetry: 100 %           Fluids:    Intake/Output Summary (Last 24 hours) at 17 0833  Last data filed at 17 0831   Gross per 24 hour   Intake   1480 ml   Output   3328 ml   Net  -1848 ml        GI/Nutrition:  Orders Placed This Encounter   Procedures   • Diet Order     Standing Status: Standing      Number of Occurrences: 1      Standing Expiration Date:      Order Specific Question:  Diet:     Answer:  Renal [8]     Order Specific Question:  Diet:     Answer:  Diabetic [3]     Medical Decision Making, by Problem:  Active Hospital Problems    Diagnosis   • *Anemia [D64.9]   • Chest pain [R07.9]     Assessment/Plan:  1. ESRD--HD MWF via L arm AVF, missed HD , dialyzed yesterday  --No HD today, continue qMWF dialysis schedule  --Fluid removal with HD as tolerated  --Dose adjust all meds/abx for decreased GFR  2. Anemia--secondary to combination of  myelodysplastic syndrome and CKD, goal Hgb 8.5-9.0 per heme/onc recs    --Transfused 1 unit PRBCs 1/6 and given 2 additional units PRBC's with HD yesterday 1/7  --Monitor  --Follow up with heme/onc as outpatient  3. HTN--sub-optimal control  --Continue home BP meds  --Hydralazine increased this am  --Can increase hydralazine further if BP remains elevated  4. DM II--management per primary svc  5. Renal Osteodystrophy--intolerant to all phos binders  --Low phos diet  6. Atrial Fibrillation--medical management  7. PVD  8. Chest Pain--atypical and intermittent, Trop negative and had negative stress test 2 weeks ago that was negative for ischemia  --Monitor    **OK to discharge to home from renal standpoint if primary svc feels this is appropriate, pt to continue qMWF outpatient dialysis schedule    Medications reviewed, Labs reviewed and Radiology images reviewed

## 2017-01-08 NOTE — CARE PLAN
Problem: Safety  Goal: Will remain free from falls  Pt is legally blind. Safety precautions in place. Bed in low position, treaded socks on, personal possessions in reach, call light in reach and pt using it to call for assistance. Bed alarm on.    Problem: Venous Thromboembolism (VTW)/Deep Vein Thrombosis (DVT) Prevention:  Goal: Patient will participate in Venous Thrombosis (VTE)/Deep Vein Thrombosis (DVT)Prevention Measures  Pt receiving heparin injections.

## 2017-01-08 NOTE — DISCHARGE PLANNING
Medical Social Work    Cab voucher #[id number] given to RN Hospitalist. Pt to d/c to [facility], weather barrier for pts family.

## 2017-01-08 NOTE — PROGRESS NOTES
Patient discharged home today via taxi. Patient was given discharge instructions, written and verbal.  Patient verbalized an understanding of the instructions. Patient was taken to exit by escort via wheelchair.

## 2017-01-08 NOTE — CARE PLAN
Problem: Infection  Goal: Will remain free from infection  Intervention: Assess signs and symptoms of infection  Discussed infection control tactics for in and out of the hospital.  Pt verbalized understanding.

## 2017-01-08 NOTE — DISCHARGE INSTRUCTIONS
Discharge Instructions    Discharged to home by taxi with self. Discharged via wheelchair, hospital escort: Refused.  Special equipment needed: Not Applicable    Be sure to schedule a follow-up appointment with your primary care doctor or any specialists as instructed.     Discharge Plan:   Diet Plan: Discussed  Activity Level: Discussed  Confirmed Follow up Appointment: Patient to Call and Schedule Appointment  Confirmed Symptoms Management: Discussed  Medication Reconciliation Updated: Yes  Influenza Vaccine Indication: Patient Refuses    I understand that a diet low in cholesterol, fat, and sodium is recommended for good health. Unless I have been given specific instructions below for another diet, I accept this instruction as my diet prescription.   Other diet: renal/diabetic    Special Instructions: None    · Is patient discharged on Warfarin / Coumadin?   No     · Is patient Post Blood Transfusion?  No    Depression / Suicide Risk    As you are discharged from this [facility] facility, it is important to learn how to keep safe from harming yourself.    Recognize the warning signs:  · Abrupt changes in personality, positive or negative- including increase in energy   · Giving away possessions  · Change in eating patterns- significant weight changes-  positive or negative  · Change in sleeping patterns- unable to sleep or sleeping all the time   · Unwillingness or inability to communicate  · Depression  · Unusual sadness, discouragement and loneliness  · Talk of wanting to die  · Neglect of personal appearance   · Rebelliousness- reckless behavior  · Withdrawal from people/activities they love  · Confusion- inability to concentrate     If you or a loved one observes any of these behaviors or has concerns about self-harm, here's what you can do:  · Talk about it- your feelings and reasons for harming yourself  · Remove any means that you might use to hurt yourself (examples: pills, rope, extension cords,  firearm)  · Get professional help from the community (Mental Health, Substance Abuse, psychological counseling)  · Do not be alone:Call your Safe Contact- someone whom you trust who will be there for you.  · Call your local CRISIS HOTLINE 491-0296 or 822-562-7243  · Call your local Children's Mobile Crisis Response Team [place] (577) 279-4535 or www.momondo  · Call the toll free National Suicide Prevention Hotlines   · National Suicide Prevention Lifeline 855-058-XHTC (8154)  · National Hope Line Network 800-SUICIDE (560-7339)

## 2017-01-09 NOTE — DISCHARGE SUMMARY
DISCHARGE DIAGNOSES:  1.  Weakness and recurrent chest pain.  2.  Anemia requiring blood transfusion.  3.  Uncontrolled diabetes.    OTHER DIAGNOSES:  1.  Recent hospitalization for chest pain with negative workup.  2.  End-stage renal disease, on hemodialysis.  3.  Myelodysplastic syndrome.  4.  Diabetes type 2 with neuropathy.  5.  Iron excess.  6.  Chronic respiratory failure.  7.  Anemia of chronic disease requiring blood transfusion.    ADMISSION HISTORY:  Please refer to the admission note of 01/06/2017 for   details.    HOSPITAL COURSE:  The patient is a 61-year-old female with history of multiple   medical problems including end-stage renal disease on hemodialysis,   myelodysplastic syndrome requiring frequent blood transfusions.  Patient again   presents with generalized weakness and chest pain.  Patient was recently   worked up for chest pain with negative findings.  Upon admission, the patient   was evaluated with EKG with negative findings.  Chest x-ray showed vascular   congestion, but no acute process otherwise.  Patient received blood   transfusion with hemodialysis.  Nephrology consultation was obtained for the   continued management of the hemodialysis.  For the uncontrolled diabetes,   patient was continued on a home regimen and covered with insulin sliding   scale.  With these measures, the patient's hemoglobin remains stable and   patient was feeling better.  At the time of this dictation, patient was   feeling better, eating and drinking well, having good bowel movements, and was   hemodynamically stable.    CONSULTATIONS OBTAINED:  Dr. Renetta Le of nephrology.    PROCEDURES:  Chest x-ray 01/07/2017.    DISCHARGE MEDICATIONS:  1.  Acetaminophen 650 mg q. 6 hours p.r.n. for moderate pain.  2.  Amlodipine 10 mg daily.  3.  Bimatoprost ophthalmic 1 drop to both eyes at bedtime.  4.  Carvedilol 25 mg b.i.d.  5.  Diclofenac sodium 1 application to skin t.i.d. as needed.  6.  Famotidine 20 mg  b.i.d.  7.  Hydralazine 37.5 mg q. 8 hours.  8.  Norco 5/325 one tab q. 8 hours p.r.n. for severe pain.  9.  Insulin sliding scale.  10.  Isosorbide dinitrate 20 mg t.i.d.  11.  Pregabalin 50 mg b.i.d.    DISCHARGE DIET:  Renal diet.    DISCHARGE ACTIVITY:  Gradual increase in activity.    DISCHARGE FOLLOWUP:  1.  With DrRobbie ____, the patient's primary care provider in approximately 2-3   weeks.  2.  With Dr. Renetta Le of nephrology and hemodialysis as scheduled.    TOTAL DISCHARGE TIME PROCESS:  Thirty-three minutes.       ____________________________________     MD ALEXUS CHRISTIE / RAMONE    DD:  01/08/2017 14:25:17  DT:  01/08/2017 19:16:39    D#:  [id number]  Job#:  [id number]    cc: _____ _____

## 2017-01-30 ENCOUNTER — RESOLUTE PROFESSIONAL BILLING HOSPITAL PROF FEE (OUTPATIENT)
Dept: HOSPITALIST | Facility: MEDICAL CENTER | Age: 62
End: 2017-01-30
Payer: MEDICARE

## 2017-01-30 ENCOUNTER — APPOINTMENT (OUTPATIENT)
Dept: RADIOLOGY | Facility: MEDICAL CENTER | Age: 62
End: 2017-01-30
Payer: MEDICARE

## 2017-01-30 ENCOUNTER — HOSPITAL ENCOUNTER (OUTPATIENT)
Facility: MEDICAL CENTER | Age: 62
End: 2017-02-01
Attending: EMERGENCY MEDICINE | Admitting: INTERNAL MEDICINE
Payer: MEDICARE

## 2017-01-30 DIAGNOSIS — R73.9 HYPERGLYCEMIA: ICD-10-CM

## 2017-01-30 DIAGNOSIS — N18.5 CRF (CHRONIC RENAL FAILURE), STAGE 5 (HCC): ICD-10-CM

## 2017-01-30 DIAGNOSIS — R07.9 CHEST PAIN AT REST: ICD-10-CM

## 2017-01-30 DIAGNOSIS — E83.39 HYPERPHOSPHATEMIA: ICD-10-CM

## 2017-01-30 DIAGNOSIS — D64.9 ANEMIA, UNSPECIFIED TYPE: ICD-10-CM

## 2017-01-30 LAB
ABO GROUP BLD: NORMAL
ABO GROUP BLD: NORMAL
ALBUMIN SERPL BCP-MCNC: 3.5 G/DL (ref 3.2–4.9)
ALBUMIN/GLOB SERPL: 1.1 G/DL
ALP SERPL-CCNC: 62 U/L (ref 30–99)
ALT SERPL-CCNC: 9 U/L (ref 2–50)
ANION GAP SERPL CALC-SCNC: 16 MMOL/L (ref 0–11.9)
APTT PPP: 30.2 SEC (ref 24.7–36)
AST SERPL-CCNC: 10 U/L (ref 12–45)
BASOPHILS # BLD AUTO: 0.7 % (ref 0–1.8)
BASOPHILS # BLD: 0.04 K/UL (ref 0–0.12)
BILIRUB SERPL-MCNC: 0.2 MG/DL (ref 0.1–1.5)
BLD GP AB SCN SERPL QL: NORMAL
BNP SERPL-MCNC: 247 PG/ML (ref 0–100)
BUN SERPL-MCNC: 101 MG/DL (ref 8–22)
CALCIUM SERPL-MCNC: 7.8 MG/DL (ref 8.5–10.5)
CHLORIDE SERPL-SCNC: 102 MMOL/L (ref 96–112)
CO2 SERPL-SCNC: 16 MMOL/L (ref 20–33)
CREAT SERPL-MCNC: 7.61 MG/DL (ref 0.5–1.4)
EKG IMPRESSION: NORMAL
EOSINOPHIL # BLD AUTO: 0.4 K/UL (ref 0–0.51)
EOSINOPHIL NFR BLD: 7.2 % (ref 0–6.9)
ERYTHROCYTE [DISTWIDTH] IN BLOOD BY AUTOMATED COUNT: 55.2 FL (ref 35.9–50)
GFR SERPL CREATININE-BSD FRML MDRD: 5 ML/MIN/1.73 M 2
GLOBULIN SER CALC-MCNC: 3.3 G/DL (ref 1.9–3.5)
GLUCOSE BLD-MCNC: 173 MG/DL (ref 65–99)
GLUCOSE BLD-MCNC: 247 MG/DL (ref 65–99)
GLUCOSE BLD-MCNC: 89 MG/DL (ref 65–99)
GLUCOSE SERPL-MCNC: 250 MG/DL (ref 65–99)
HCT VFR BLD AUTO: 21 % (ref 37–47)
HGB BLD-MCNC: 6.6 G/DL (ref 12–16)
IMM GRANULOCYTES # BLD AUTO: 0.02 K/UL (ref 0–0.11)
IMM GRANULOCYTES NFR BLD AUTO: 0.4 % (ref 0–0.9)
INR PPP: 0.96 (ref 0.87–1.13)
LIPASE SERPL-CCNC: 124 U/L (ref 11–82)
LYMPHOCYTES # BLD AUTO: 1.74 K/UL (ref 1–4.8)
LYMPHOCYTES NFR BLD: 31.3 % (ref 22–41)
MAGNESIUM SERPL-MCNC: 2.2 MG/DL (ref 1.5–2.5)
MCH RBC QN AUTO: 30 PG (ref 27–33)
MCHC RBC AUTO-ENTMCNC: 31.4 G/DL (ref 33.6–35)
MCV RBC AUTO: 95.5 FL (ref 81.4–97.8)
MONOCYTES # BLD AUTO: 0.6 K/UL (ref 0–0.85)
MONOCYTES NFR BLD AUTO: 10.8 % (ref 0–13.4)
NEUTROPHILS # BLD AUTO: 2.76 K/UL (ref 2–7.15)
NEUTROPHILS NFR BLD: 49.6 % (ref 44–72)
NRBC # BLD AUTO: 0.02 K/UL
NRBC BLD AUTO-RTO: 0.4 /100 WBC
PHOSPHATE SERPL-MCNC: 6.8 MG/DL (ref 2.5–4.5)
PLATELET # BLD AUTO: 372 K/UL (ref 164–446)
PMV BLD AUTO: 12.7 FL (ref 9–12.9)
POTASSIUM SERPL-SCNC: 4.8 MMOL/L (ref 3.6–5.5)
PROT SERPL-MCNC: 6.8 G/DL (ref 6–8.2)
PROTHROMBIN TIME: 13.1 SEC (ref 12–14.6)
RBC # BLD AUTO: 2.2 M/UL (ref 4.2–5.4)
RH BLD: NORMAL
RH BLD: NORMAL
SODIUM SERPL-SCNC: 134 MMOL/L (ref 135–145)
TROPONIN I SERPL-MCNC: 0.04 NG/ML (ref 0–0.04)
TROPONIN I SERPL-MCNC: <0.01 NG/ML (ref 0–0.04)
WBC # BLD AUTO: 5.6 K/UL (ref 4.8–10.8)

## 2017-01-30 PROCEDURE — P9016 RBC LEUKOCYTES REDUCED: HCPCS

## 2017-01-30 PROCEDURE — 90935 HEMODIALYSIS ONE EVALUATION: CPT

## 2017-01-30 PROCEDURE — 83735 ASSAY OF MAGNESIUM: CPT

## 2017-01-30 PROCEDURE — 86850 RBC ANTIBODY SCREEN: CPT

## 2017-01-30 PROCEDURE — 86901 BLOOD TYPING SEROLOGIC RH(D): CPT

## 2017-01-30 PROCEDURE — 84100 ASSAY OF PHOSPHORUS: CPT

## 2017-01-30 PROCEDURE — 80053 COMPREHEN METABOLIC PANEL: CPT

## 2017-01-30 PROCEDURE — 71010 DX-CHEST-LIMITED (1 VIEW): CPT

## 2017-01-30 PROCEDURE — 36430 TRANSFUSION BLD/BLD COMPNT: CPT | Mod: XU

## 2017-01-30 PROCEDURE — 94760 N-INVAS EAR/PLS OXIMETRY 1: CPT

## 2017-01-30 PROCEDURE — 36415 COLL VENOUS BLD VENIPUNCTURE: CPT

## 2017-01-30 PROCEDURE — 99285 EMERGENCY DEPT VISIT HI MDM: CPT

## 2017-01-30 PROCEDURE — 83880 ASSAY OF NATRIURETIC PEPTIDE: CPT

## 2017-01-30 PROCEDURE — 99220 PR INITIAL OBSERVATION CARE,LEVL III: CPT | Performed by: INTERNAL MEDICINE

## 2017-01-30 PROCEDURE — 85730 THROMBOPLASTIN TIME PARTIAL: CPT

## 2017-01-30 PROCEDURE — 85025 COMPLETE CBC W/AUTO DIFF WBC: CPT

## 2017-01-30 PROCEDURE — 700102 HCHG RX REV CODE 250 W/ 637 OVERRIDE(OP): Performed by: INTERNAL MEDICINE

## 2017-01-30 PROCEDURE — 84484 ASSAY OF TROPONIN QUANT: CPT

## 2017-01-30 PROCEDURE — G0378 HOSPITAL OBSERVATION PER HR: HCPCS

## 2017-01-30 PROCEDURE — 86923 COMPATIBILITY TEST ELECTRIC: CPT

## 2017-01-30 PROCEDURE — 85610 PROTHROMBIN TIME: CPT

## 2017-01-30 PROCEDURE — 82962 GLUCOSE BLOOD TEST: CPT | Mod: 91

## 2017-01-30 PROCEDURE — A9270 NON-COVERED ITEM OR SERVICE: HCPCS

## 2017-01-30 PROCEDURE — A9270 NON-COVERED ITEM OR SERVICE: HCPCS | Performed by: INTERNAL MEDICINE

## 2017-01-30 PROCEDURE — 86900 BLOOD TYPING SEROLOGIC ABO: CPT

## 2017-01-30 PROCEDURE — 93005 ELECTROCARDIOGRAM TRACING: CPT

## 2017-01-30 PROCEDURE — 700102 HCHG RX REV CODE 250 W/ 637 OVERRIDE(OP)

## 2017-01-30 PROCEDURE — 83690 ASSAY OF LIPASE: CPT

## 2017-01-30 PROCEDURE — 700111 HCHG RX REV CODE 636 W/ 250 OVERRIDE (IP)

## 2017-01-30 RX ORDER — PROMETHAZINE HYDROCHLORIDE 25 MG/1
12.5-25 SUPPOSITORY RECTAL EVERY 4 HOURS PRN
Status: DISCONTINUED | OUTPATIENT
Start: 2017-01-30 | End: 2017-02-01 | Stop reason: HOSPADM

## 2017-01-30 RX ORDER — AMLODIPINE BESYLATE 10 MG/1
10 TABLET ORAL DAILY
Status: DISCONTINUED | OUTPATIENT
Start: 2017-01-30 | End: 2017-02-01 | Stop reason: HOSPADM

## 2017-01-30 RX ORDER — LATANOPROST 50 UG/ML
1 SOLUTION/ DROPS OPHTHALMIC EVERY EVENING
Status: DISCONTINUED | OUTPATIENT
Start: 2017-01-30 | End: 2017-02-01 | Stop reason: HOSPADM

## 2017-01-30 RX ORDER — PREGABALIN 25 MG/1
50 CAPSULE ORAL 2 TIMES DAILY
Status: DISCONTINUED | OUTPATIENT
Start: 2017-01-30 | End: 2017-02-01 | Stop reason: HOSPADM

## 2017-01-30 RX ORDER — ONDANSETRON 4 MG/1
4 TABLET, ORALLY DISINTEGRATING ORAL EVERY 4 HOURS PRN
Status: DISCONTINUED | OUTPATIENT
Start: 2017-01-30 | End: 2017-02-01 | Stop reason: HOSPADM

## 2017-01-30 RX ORDER — DEXTROSE MONOHYDRATE 25 G/50ML
25 INJECTION, SOLUTION INTRAVENOUS
Status: DISCONTINUED | OUTPATIENT
Start: 2017-01-30 | End: 2017-02-01 | Stop reason: HOSPADM

## 2017-01-30 RX ORDER — BIMATOPROST 0.3 MG/ML
1 SOLUTION/ DROPS OPHTHALMIC EVERY EVENING
Status: DISCONTINUED | OUTPATIENT
Start: 2017-01-30 | End: 2017-01-30

## 2017-01-30 RX ORDER — FAMOTIDINE 20 MG/1
20 TABLET, FILM COATED ORAL DAILY
Status: DISCONTINUED | OUTPATIENT
Start: 2017-01-30 | End: 2017-02-01 | Stop reason: HOSPADM

## 2017-01-30 RX ORDER — PROMETHAZINE HYDROCHLORIDE 25 MG/1
12.5-25 TABLET ORAL EVERY 4 HOURS PRN
Status: DISCONTINUED | OUTPATIENT
Start: 2017-01-30 | End: 2017-02-01 | Stop reason: HOSPADM

## 2017-01-30 RX ORDER — ACETAMINOPHEN 325 MG/1
650 TABLET ORAL EVERY 6 HOURS PRN
Status: DISCONTINUED | OUTPATIENT
Start: 2017-01-30 | End: 2017-02-01 | Stop reason: HOSPADM

## 2017-01-30 RX ORDER — ONDANSETRON 2 MG/ML
4 INJECTION INTRAMUSCULAR; INTRAVENOUS EVERY 4 HOURS PRN
Status: DISCONTINUED | OUTPATIENT
Start: 2017-01-30 | End: 2017-02-01 | Stop reason: HOSPADM

## 2017-01-30 RX ORDER — HYDROCODONE BITARTRATE AND ACETAMINOPHEN 5; 325 MG/1; MG/1
1 TABLET ORAL EVERY 8 HOURS PRN
Status: DISCONTINUED | OUTPATIENT
Start: 2017-01-30 | End: 2017-02-01 | Stop reason: HOSPADM

## 2017-01-30 RX ORDER — LENALIDOMIDE 5 MG/1
1 CAPSULE ORAL DAILY
COMMUNITY
Start: 2017-01-22 | End: 2017-03-19

## 2017-01-30 RX ORDER — CARVEDILOL 25 MG/1
25 TABLET ORAL 2 TIMES DAILY WITH MEALS
Status: DISCONTINUED | OUTPATIENT
Start: 2017-01-30 | End: 2017-02-01 | Stop reason: HOSPADM

## 2017-01-30 RX ADMIN — FAMOTIDINE 20 MG: 20 TABLET, FILM COATED ORAL at 12:02

## 2017-01-30 RX ADMIN — CARVEDILOL 25 MG: 25 TABLET, FILM COATED ORAL at 17:48

## 2017-01-30 RX ADMIN — ERYTHROPOIETIN 10000 UNITS: 10000 INJECTION, SOLUTION INTRAVENOUS; SUBCUTANEOUS at 16:45

## 2017-01-30 RX ADMIN — INSULIN LISPRO 2 UNITS: 100 INJECTION, SOLUTION INTRAVENOUS; SUBCUTANEOUS at 20:19

## 2017-01-30 RX ADMIN — HYDROCODONE BITARTRATE AND ACETAMINOPHEN 1 TABLET: 5; 325 TABLET ORAL at 21:41

## 2017-01-30 RX ADMIN — PREGABALIN 50 MG: 25 CAPSULE ORAL at 20:18

## 2017-01-30 RX ADMIN — LATANOPROST 1 DROP: 50 SOLUTION OPHTHALMIC at 20:18

## 2017-01-30 RX ADMIN — INSULIN LISPRO 1 UNITS: 100 INJECTION, SOLUTION INTRAVENOUS; SUBCUTANEOUS at 13:20

## 2017-01-30 RX ADMIN — PREGABALIN 50 MG: 25 CAPSULE ORAL at 12:02

## 2017-01-30 ASSESSMENT — PAIN SCALES - GENERAL
PAINLEVEL_OUTOF10: 8
PAINLEVEL_OUTOF10: 7
PAINLEVEL_OUTOF10: 0
PAINLEVEL_OUTOF10: 0

## 2017-01-30 ASSESSMENT — COPD QUESTIONNAIRES
COPD SCREENING SCORE: 5
DO YOU EVER COUGH UP ANY MUCUS OR PHLEGM?: NO/ONLY WITH OCCASIONAL COLDS OR INFECTIONS
HAVE YOU SMOKED AT LEAST 100 CIGARETTES IN YOUR ENTIRE LIFE: NO/DON'T KNOW
DURING THE PAST 4 WEEKS HOW MUCH DID YOU FEEL SHORT OF BREATH: MOST  OR ALL OF THE TIME

## 2017-01-30 ASSESSMENT — LIFESTYLE VARIABLES
ALCOHOL_USE: NO
EVER_SMOKED: NEVER
DO YOU DRINK ALCOHOL: NO
EVER_SMOKED: NEVER

## 2017-01-30 NOTE — IP AVS SNAPSHOT
" <p align=\"LEFT\"><IMG SRC=\"//[email or web address]\" alt=\"Image\" WIDTH=\"50%\" HEIGHT=\"200\" BORDER=\"\"></p>                   Name:Vielka Briscoe  Medical Record Number:[id number]  CSN: [id number]    YOB: 1955   Age: 61 y.o.  Sex: female  HT:1.473 m (4' 9.99\") WT: 59.7 kg (131 lb 9.8 oz)          Admit Date: 1/30/2017     Discharge Date:   Today's Date: 2/1/2017  Attending Doctor:  Sang Barajas D.O.                  Allergies:  Actos; Darvocet; Demerol; Glucophage; Morphine; Oxycodone; Pcn; Requip; Simvastatin; Sulfa drugs; Tramadol; Trazodone; Dilaudid; Diphenhydramine; Iron; Multivitamin; and Other drug          Follow-up Information     1. Follow up with Ranulfo Avila M.D. In 1 week.    Specialty:  Oncology    Contact information    [address] [place] [address] [address]  [facility] [address] [phone]          2. Follow up In 1 week.    Contact information    follow up with Dr. Otoole to discuss diabetic treatment.  Patient needs a blood glucose meter for a patient who is visually impaired.         Medication List      Take these Medications        Instructions    acetaminophen 325 MG Tabs   Commonly known as:  TYLENOL    Take 2 Tabs by mouth every 6 hours as needed for Fever or Moderate Pain (Temp >101.5F).   Dose:  650 mg       amlodipine 10 MG Tabs   Commonly known as:  NORVASC    Take 10 mg by mouth every day.   Dose:  10 mg       carvedilol 25 MG Tabs   Commonly known as:  COREG    Take 25 mg by mouth 2 times a day, with meals.   Dose:  25 mg       famotidine 20 MG Tabs   Commonly known as:  PEPCID    Take 1 Tab by mouth 2 times a day.   Dose:  20 mg       hydrocodone-acetaminophen 5-325 MG Tabs per tablet   Commonly known as:  NORCO    Take 1 Tab by mouth every 8 hours as needed (severe pain).   Dose:  1 Tab       LUMIGAN 0.03 % Soln   Generic drug:  bimatoprost    Place 1 Drop in both eyes every evening.   Dose:  1 Drop       pregabalin 50 MG capsule   Commonly known as:  LYRICA    Take 1 Cap by " mouth 2 times a day.   Dose:  50 mg       REVLIMID 5 MG Caps   Generic drug:  Lenalidomide    Take 1 Cap by mouth every day.   Dose:  1 Cap

## 2017-01-30 NOTE — DISCHARGE PLANNING
Pt has OP hemodialysis at [facility] MWF 1000.  Spoke to RODGER Domínguez to notify of hospitalization.

## 2017-01-30 NOTE — H&P
CHIEF COMPLAINT:  Generalized weakness and chest pain.    HISTORY OF PRESENT ILLNESS:  The patient is a 61-year-old female with history   of multiple medical problems including end-stage renal disease, recurrent   hospitalizations for chest pain rule out as well as MDS.  She was just at this   hospital from 2017 from 2017 for chest pain and generalized   weakness.  At that time, she required 1 unit of packed red blood cells and we   just trended on her troponins.  She had a negative nuclear medicine cardiac   stress test on 2016.  She also sees Dr. Avila for underlying MDS and   was started on Revlimid, but she did not tolerate after a few doses as the   patient had severe itching.  She denies any bleeding in her stool, no bleeding   in her urine, no bloody vomit.  She denies any bleeding in her gums.  She   says that she went to bed feeling okay last night and woke up this morning at   2:00 a.m. with chest pain that woke her up.  She said it was over the left   breast and has radiated to her back, but not radiated anywhere else.  No   associated nausea, vomiting, diaphoresis.  She describes this as a sharp,   pressure like pain.  She says she has had this pain before.  She says that she   did not take anything for the pain, was therefore brought 2 hours, called   911, they came here, now she has no chest pain at all.  She just generally   feels weak over the last week ago though.  No diarrhea.  She does produce   urination with no blood in urine.    REVIEW OF SYSTEMS:  All other systems reviewed and negative.    In ED, no intervention was done.    PAST MEDICAL HISTORY:  1.  End-stage renal disease, on dialysis.  2.  Diabetes mellitus type 2.  3.  Vitamin D deficiency.  4.  Iron overload.  5.  Diabetic neuropathy.  6.  Myelodysplastic syndrome that is transfusion dependent.  7.  Hypertension.  8.  Recurrent chest pain.    PAST SURGICAL HISTORY:  1.   x2.  2.  Appendectomy.  3.  Fistula  at the left arm.    SOCIAL HISTORY:  No alcohol, tobacco, or drugs.    FAMILY HISTORY:  None.    ALLERGIES:  ACTOS, DARVOCET, DEMEROL, GLUCOPHAGE, MORPHINE, OXYCODONE,   PENICILLIN, REQUIP, SIMVASTATIN, SULFA, TRAMADOL, TRAZODONE, DILAUDID,   DIPHENHYDRAMINE, IRON, MULTIVITAMIN, AND ANY BINDERS WITH PHOSPHORUS.    MEDICATIONS PRIOR TO ADMISSION:  1.  Tylenol 325 mg every 6 hours as needed for fever , moderate pain.  2.  Amlodipine 10 mg tablets a day.  3.  Lumigan 0.03% solution 1 drop in both eyes every day.  4.  Coreg 25 mg b.i.d. with meals.  5.  Voltaren gel as needed.  6.  Famotidine 20 mg b.i.d.  7.  Norco 5/325 mg tabs 1 tablet every 8 hours as needed for severe pain.  8.  Lenalidomide 5 mg capsule every day, not taking because it makes her   itchy.  9.  Lyrica 50 mg b.i.d.    PHYSICAL EXAMINATION:  VITAL SIGNS:  Temperature is 37.2, heart rate 63, respiratory rate 18, oxygen   saturation 100% on 2 L nasal cannula, blood pressure 146/63.  GENERAL:  No acute distress, speaking in full sentences, A and O x4, pleasant,   cooperative.  HEENT:  Normocephalic, atraumatic.  Pupils are equal and reactive to light.    Extraocular muscles intact.  NECK:  Flat JVD.  CARDIOVASCULAR:  Regular rate and rhythm.  No murmurs, rubs, or gallops.    Nondisplaced PMI.  LUNGS:  Clear to auscultation bilaterally.  No use of accessory respiratory   muscles.  ABDOMEN:  Soft, nontender, nondistended.  Normoactive bowel sounds.  No   hepatosplenomegaly.  EXTREMITIES:  She has upper extremity fistula with good thrill.  No clubbing,   cyanosis, or edema.  Warm peripherally.  NEUROLOGIC:  Intact sensation to soft touch throughout, nonfocal.  MUSCULOSKELETAL:  Full range of motion of all extremities, 5/5 muscle strength   throughout.  SKIN:  No obvious rashes, lesions, or excoriations.  PSYCHOLOGICAL:  Appropriating affect, alert and oriented x4.    LABORATORY DATA AND IMAGING:  CBC remarkable for white blood cell count of   5.6, H and H  6.6 and 21.0, platelet count is 372.  CMP:  Sodium 134, potassium   4.8, anion gap 16.0, glucose 250, BUN and creatinine 101 and 7.61, AST and   ALT 10 and 9, phosphorus 6.8, magnesium 2.2, lipase 24.  Troponin less than   0.01.  .  PT/INR 32.1/1.96, PTT 30.2.    IMAGING:  Portable chest x-ray, no acute cardiopulmonary disease, stable   cardiomegaly.  EKG personally reviewed by me, normal sinus rhythm, heart rate   68, no evidence of acute ST segment change.    ASSESSMENT:  1.  Transfusion dependent myelodysplastic syndrome.  2.  Anemia, requiring transfusion.  3.  Recurrent chest pain.  4.  End-stage renal disease, on dialysis.  5.  Diabetes mellitus type 2.  6.  Chronic respiratory failure with hypoxia.    PLAN:  The patient will be admitted to the medical unit.  At this time, she   has what appears to be transfusion dependent MDS.  She follows up with Dr. Avila and cannot tolerate her lenalidomide, we will hold this.  We will do 1   unit packed red blood cells.  No evidence of obvious bleeding at this time.    Also, we consulted Dr. Bennett, we will provide dialysis today.  For her   chest pain, it appears to be either related to her anemia or is   musculoskeletal.  She had an extensive workup in the past with negative   nuclear medicine cardiac stress test in December 2016, we will not repeat   this.  We will continue to trend troponins and watch her on tele.    CODE STATUS:  Full code, full care.    DIET:  Cardiac, diabetic.    DEEP VENOUS THROMBOSIS PROPHYLAXIS:  Heparin 5000 units every 8 hours.       ____________________________________     MD NELL CORRAL / RAMONE    DD:  01/30/2017 10:02:47  DT:  01/30/2017 11:46:46    D#:  [id number]  Job#:  [id number]

## 2017-01-30 NOTE — IP AVS SNAPSHOT
Farehelper Access Code: [email or web address]  Expires: 2/10/2017  3:06 PM    Farehelper  A secure, online tool to manage your health information     [employer]’s Farehelper® is a secure, online tool that connects you to your personalized health information from the privacy of your home -- day or night - making it very easy for you to manage your healthcare. Once the activation process is completed, you can even access your medical information using the Farehelper sundar, which is available for free in the Apple Sundar store or Google Play store.     Farehelper provides the following levels of access (as shown below):   My Chart Features   [facility] Primary Care Doctor [facility]  Specialists [facility]  Urgent  Care Non-[facility]  Primary Care  Doctor   Email your healthcare team securely and privately 24/7 X X X X   Manage appointments: schedule your next appointment; view details of past/upcoming appointments X      Request prescription refills. X      View recent personal medical records, including lab and immunizations X X X X   View health record, including health history, allergies, medications X X X X   Read reports about your outpatient visits, procedures, consult and ER notes X X X X   See your discharge summary, which is a recap of your hospital and/or ER visit that includes your diagnosis, lab results, and care plan. X X       How to register for Farehelper:  1. Go to  [email or web address].  2. Click on the Sign Up Now box, which takes you to the New Member Sign Up page. You will need to provide the following information:  a. Enter your Farehelper Access Code exactly as it appears at the top of this page. (You will not need to use this code after you’ve completed the sign-up process. If you do not sign up before the expiration date, you must request a new code.)   b. Enter your date of birth.   c. Enter your home email address.   d. Click Submit, and follow the next screen’s instructions.  3. Create a Farehelper ID. This will be your [employer]  login ID and cannot be changed, so think of one that is secure and easy to remember.  4. Create a [employer] password. You can change your password at any time.  5. Enter your Password Reset Question and Answer. This can be used at a later time if you forget your password.   6. Enter your e-mail address. This allows you to receive e-mail notifications when new information is available in [employer].  7. Click Sign Up. You can now view your health information.    For assistance activating your [employer] account, call [phone]

## 2017-01-30 NOTE — IP AVS SNAPSHOT
" After Visit Summary                                                                                                                  Name:Vielka Briscoe  Medical Record Number:[id number]  CSN: [id number]    YOB: 1955   Age: 61 y.o.  Sex: female  HT:1.473 m (4' 9.99\") WT: 59.7 kg (131 lb 9.8 oz)          Admit Date: 1/30/2017     Discharge Date:   Today's Date: 2/1/2017  Attending Doctor:  Sang Barajas D.O.                  Allergies:  Actos; Darvocet; Demerol; Glucophage; Morphine; Oxycodone; Pcn; Requip; Simvastatin; Sulfa drugs; Tramadol; Trazodone; Dilaudid; Diphenhydramine; Iron; Multivitamin; and Other drug            Discharge Instructions       Discharge Instructions    Discharged to home by bus with self. Discharged via walking, hospital escort: Refused.  Special equipment needed: Cane (has personal cane with her)    Be sure to schedule a follow-up appointment with your primary care doctor or any specialists as instructed.     Discharge Plan:   Influenza Vaccine Indication: Patient Refuses    I understand that a diet low in cholesterol, fat, and sodium is recommended for good health. Unless I have been given specific instructions below for another diet, I accept this instruction as my diet prescription.   Other diet: diabetic    Special Instructions: None    · Is patient discharged on Warfarin / Coumadin?   No     · Is patient Post Blood Transfusion?  Yes  POST BLOOD TRANSFUSION INFORMATION (ADULT)    The purpose of blood transfusion is to correct anemia, low levels of blood clotting factors or to correct acute blood loss.   Blood transfusion is very safe but occasionally unexpected adverse reactions do occur. Most adverse reactions occur during transfusion, within one to two days following transfusion or one to two weeks following transfusion. Some adverse reactions can occur one to six months after transfusion and some even years later.             If any of the symptoms listed below " happen to you during your transfusion,                                 please notify your nurse immediately.   · Fever or Chills  · Flushing of the Face  · Hives, rash or itching  · Difficulty in breathing or shortness of breath  · Pain or oozing of blood from the IV needle site  · Low back pain  · Nausea or vomiting  · Weakness or fainting  · Chest pain  · Blood in the urine  · Decreased frequency of urination    The above symptoms may also occur within 24 to 48 after transfusion; if so, notify your physician.     · Yellowing of the skin    This can happen one to six months after transfusion; if so, notify your physician    Depression / Suicide Risk    As you are discharged from this [facility] facility, it is important to learn how to keep safe from harming yourself.    Recognize the warning signs:  · Abrupt changes in personality, positive or negative- including increase in energy   · Giving away possessions  · Change in eating patterns- significant weight changes-  positive or negative  · Change in sleeping patterns- unable to sleep or sleeping all the time   · Unwillingness or inability to communicate  · Depression  · Unusual sadness, discouragement and loneliness  · Talk of wanting to die  · Neglect of personal appearance   · Rebelliousness- reckless behavior  · Withdrawal from people/activities they love  · Confusion- inability to concentrate     If you or a loved one observes any of these behaviors or has concerns about self-harm, here's what you can do:  · Talk about it- your feelings and reasons for harming yourself  · Remove any means that you might use to hurt yourself (examples: pills, rope, extension cords, firearm)  · Get professional help from the community (Mental Health, Substance Abuse, psychological counseling)  · Do not be alone:Call your Safe Contact- someone whom you trust who will be there for you.  · Call your local CRISIS HOTLINE 513-4516 or 362-925-6676  · Call your local Children's  Mobile Crisis Response Team [place] (494) 747-3077 or www.Spreedly  · Call the toll free National Suicide Prevention Hotlines   · National Suicide Prevention Lifeline 206-471-OSWQ (7247)  · National Hope Line Network 800-SUICIDE (566-3514)        Follow-up Information     1. Follow up with Ranulfo Avila M.D. In 1 week.    Specialty:  Oncology    Contact information    [address]  [address]  [place] [address] [phone]          2. Follow up In 1 week.    Contact information    follow up with Dr. Otoole to discuss diabetic treatment.  Patient needs a blood glucose meter for a patient who is visually impaired.         Discharge Medication Instructions:    Below are the medications your physician expects you to take upon discharge:    Review all your home medications and newly ordered medications with your doctor and/or pharmacist. Follow medication instructions as directed by your doctor and/or pharmacist.    Please keep your medication list with you and share with your physician.               Medication List      CONTINUE taking these medications        Instructions    acetaminophen 325 MG Tabs   Last time this was given:  650 mg on 1/31/2017  5:20 AM   Commonly known as:  TYLENOL    Take 2 Tabs by mouth every 6 hours as needed for Fever or Moderate Pain (Temp >101.5F).   Dose:  650 mg       amlodipine 10 MG Tabs   Last time this was given:  10 mg on 1/31/2017  9:20 AM   Commonly known as:  NORVASC    Take 10 mg by mouth every day.   Dose:  10 mg       carvedilol 25 MG Tabs   Last time this was given:  25 mg on 1/31/2017  4:43 PM   Commonly known as:  COREG    Take 25 mg by mouth 2 times a day, with meals.   Dose:  25 mg       famotidine 20 MG Tabs   Last time this was given:  20 mg on 1/31/2017  9:19 AM   Commonly known as:  PEPCID    Take 1 Tab by mouth 2 times a day.   Dose:  20 mg       hydrocodone-acetaminophen 5-325 MG Tabs per tablet   Last time this was given:  1 Tab on 1/31/2017  8:01 PM     Commonly known as:  NORCO    Take 1 Tab by mouth every 8 hours as needed (severe pain).   Dose:  1 Tab       LUMIGAN 0.03 % Soln   Generic drug:  bimatoprost    Place 1 Drop in both eyes every evening.   Dose:  1 Drop       pregabalin 50 MG capsule   Last time this was given:  50 mg on 2/1/2017 12:14 PM   Commonly known as:  LYRICA    Take 1 Cap by mouth 2 times a day.   Dose:  50 mg       REVLIMID 5 MG Caps   Generic drug:  Lenalidomide    Take 1 Cap by mouth every day.   Dose:  1 Cap         STOP taking these medications     VOLTAREN 1 % Gel   Generic drug:  Diclofenac Sodium               Instructions           Diet / Nutrition:    Follow any diet instructions given to you by your doctor or the dietician, including how much salt (sodium) you are allowed each day.    If you are overweight, talk to your doctor about a weight reduction plan.    Activity:    Remain physically active following your doctor's instructions about exercise and activity.    Rest often.     Any time you become even a little tired or short of breath, SIT DOWN and rest.    Worsening Symptoms:    Report any of the following signs and symptoms to the doctor's office immediately:    *Pain of jaw, arm, or neck  *Chest pain not relieved by medication                               *Dizziness or loss of consciousness  *Difficulty breathing even when at rest   *More tired than usual                                       *Bleeding drainage or swelling of surgical site  *Swelling of feet, ankles, legs or stomach                 *Fever (>100ºF)  *Pink or blood tinged sputum  *Weight gain (3lbs/day or 5lbs /week)           *Shock from internal defibrillator (if applicable)  *Palpitations or irregular heartbeats                *Cool and/or numb extremities    Stroke Awareness    Common Risk Factors for Stroke include:    Age  Atrial Fibrillation  Carotid Artery Stenosis  Diabetes Mellitus  Excessive alcohol consumption  High blood pressure  Overweight    Physical inactivity  Smoking    Warning signs and symptoms of a stroke include:    *Sudden numbness or weakness of the face, arm or leg (especially on one side of the body).  *Sudden confusion, trouble speaking or understanding.  *Sudden trouble seeing in one or both eyes.  *Sudden trouble walking, dizziness, loss of balance or coordination.Sudden severe headache with no known cause.    It is very important to get treatment quickly when a stroke occurs. If you experience any of the above warning signs, call 911 immediately.                   Disclaimer         Quit Smoking / Tobacco Use:    I understand the use of any tobacco products increases my chance of suffering from future heart disease or stroke and could cause other illnesses which may shorten my life. Quitting the use of tobacco products is the single most important thing I can do to improve my health. For further information on smoking / tobacco cessation call a Toll Free Quit Line at 1-945.472.8228 (*National Cancer [place]) or 1-100.630.7092 (American Lung Association) or you can access the web based program at www.[employer].org.    [place] Tobacco Users Help Line:  (743) 403-2591       Toll Free: 1-575.189.9616  Quit Tobacco Program [facility] (652)043-9588    Crisis Hotline:    [place] Crisis Hotline:  9-244-FWWZHVJ or 1-792.625.4304    [place] Crisis Hotline:    1-508.950.9703 or 429-578-7255    Discharge Survey:   Thank you for choosing [facility]. We hope we did everything we could to make your hospital stay a pleasant one. You may be receiving a phone survey and we would appreciate your time and participation in answering the questions. Your input is very valuable to us in our efforts to improve our service to our patients and their families.        My signature on this form indicates that:    1. I have reviewed and understand the above information.  2. My questions regarding this information have been answered to my  satisfaction.  3. I have formulated a plan with my discharge nurse to obtain my prescribed medications for home.                  Disclaimer         __________________________________                     __________       ________                       Patient Signature                                                 Date                    Time

## 2017-01-30 NOTE — IP AVS SNAPSHOT
2/1/2017          Vielka Lucio [facility]  [address]    [place] [address]    Dear Vielka:    [facility] wants to ensure your discharge home is safe and you or your loved ones have had all your questions answered regarding your care after you leave the hospital.    You may receive a telephone call within two days of your discharge.  This call is to make certain you understand your discharge instructions as well as ensure we provided you with the best care possible during your stay with us.     The call will only last approximately 3-5 minutes and will be done by a nurse.    Once again, we want to ensure your discharge home is safe and that you have a clear understanding of any next steps in your care.  If you have any questions or concerns, please do not hesitate to contact us, we are here for you.  Thank you for choosing [facility] for your healthcare needs.    Sincerely,    Amol Bunn    [facility]

## 2017-01-30 NOTE — ED NOTES
"Patient bib EMS from home:  Chief Complaint   Patient presents with   • Chest Pain     woke up suddenly at 0200 with CP.   • Weakness     generalized x1 week     PTA PIV placed, FSBS 332.    Patient report hx of DBM, HTN, anemia, recently admitted to [facility] [place] [facility] and received\" blood transfusion\".   On 2 L home O2.    Pt A&O, continuous monitoring, blood drawn, patient changed into gown.  Chart up for ERP.  "

## 2017-01-30 NOTE — ED NOTES
"Med rec updated and complete  Allergies reviewed  Pt states \"I have not taking my HUMALOG, ISOSORBIDE DINITRATE 20MG or HYDRALAZINE 25MG, pt takes 1.5tab 37.5MG every 8 hours\".  \"Not able to pick some of them up\".  Pt was on LEVLIMID 2.5MG for 4 day made her itch, pt stopped this medications on 1/25/2017.  Pt states \"No antibiotics in the last 30 days\".  Pt states \"No vitamins\".    "

## 2017-01-30 NOTE — ED PROVIDER NOTES
ED Provider Note    CHIEF COMPLAINT  Chief Complaint   Patient presents with   • Chest Pain     woke up suddenly at 0200 with CP.   • Weakness     generalized x1 week       HPI  Vielka Briscoe is a 61 y.o. female who presents for evaluation of chest pain and weakness. The patient has a history of anemia and renal failure. She was admitted approximately 2 weeks ago for these exact same symptoms and required transfusion at that time. She receives dialysis Mondays , Wednesdays, and Fridays. 3 days ago, Friday, she missed dialysis because she did not have transportation. At 2 AM this morning she awoke with chest pain. She's been having generalized weakness over the past week. She has no chest pain currently. She denies any hematemesis or melenic stool. She's had no fevers.    REVIEW OF SYSTEMS  See HPI for further details. All other systems are negative.     PAST MEDICAL HISTORY  Past Medical History   Diagnosis Date   • Hypertension    • Chronic anemia    • Chronic obstructive pulmonary disease (CMS-HCC)    • Blood transfusion without reported diagnosis    • Anemia    • Breath shortness    • Glaucoma    • Diabetes      Diet controlled   • Dental disorder      full dentures   • Supplemental oxygen dependent    • Cataract      bilat IOL   • Renal disorder      CKF   • Pain      General pain 8/10   • Chronic kidney disease      Chronic renal failure   • Arthritis      hands    • Dialysis patient      [facility] F ,   [facility] [place]   • MDS (myelodysplastic syndrome) 10/2016     bone marrow biopsy   • Congestive heart failure (CMS-[facility])    • Atrial fibrillation (CMS-[facility])      HX   • Stroke (CMS-[facility]) 03/2015     No residual weakness/problems       FAMILY HISTORY  Family History   Problem Relation Age of Onset   • Hypertension Father    • Hypertension Mother        SOCIAL HISTORY  Social History     Social History   • Marital Status:      Spouse Name: N/A   • Number of Children: N/A   • Years of Education: N/A      Social History Main Topics   • Smoking status: Never Smoker    • Smokeless tobacco: Never Used   • Alcohol Use: No   • Drug Use: No   • Sexual Activity: Not Asked     Other Topics Concern   • None     Social History Narrative       SURGICAL HISTORY  Past Surgical History   Procedure Laterality Date   • Gyn surgery       hysterectomy   • Gyn surgery        x 2   • Gyn surgery       d&C x2   • Other cardiac surgery       cardiac angiogram, angioplasty   • Other       angioplasty/ stents bilat LE   • Retinal detachment repair Right    • Av fistula creation Left 2016     Procedure: AV FISTULA CREATION UPPER EXTREMITY;  Surgeon: Ranulfo Jolly M.D.;  Location: SURGERY [facility];  Service:    • Other abdominal surgery       appendectomy   • Gastroscopy  2015     Procedure: ESOPHAGOGASTRODUODENOSCOPY WITH BIOPSY;  Surgeon: Wing Álvarez M.D.;  Location: SURGERY [facility];  Service:    • Colonoscopy  2015     Procedure: COLONOSCOPY;  Surgeon: Wing Álvarez M.D.;  Location: SURGERY [facility];  Service:    • Vein ligation Left 11/10/2016     Procedure: VEIN LIGATION FOR DISTAL REVASCULARIZATION AND INTERVAL LIGATION OF LEFT ARM DIALYISIS ACCESS (DRIL PROCEDURE);  Surgeon: Ranulfo oJlly M.D.;  Location: SURGERY [facility];  Service:        CURRENT MEDICATIONS  Home Medications     **Home medications have not yet been reviewed for this encounter**          ALLERGIES  Allergies   Allergen Reactions   • Actos [Pioglitazone Hydrochloride] Unspecified     Cause blindness   MPD=4959   • Darvocet [Propoxyphene N-Apap] Vomiting     ZZH=4217   • Demerol Vomiting     TAK=3923   • Glucophage [Metformin Hydrochloride] Vomiting     VKI=0286   • Morphine Vomiting     DWC=9759   • Oxycodone Vomiting     RXN=2016   • Pcn [Penicillins] Vomiting     SBS=7259     • Requip Vomiting     RXN=2015   • Simvastatin Unspecified     Leg cramps  NBT=1680   • Sulfa Drugs Rash     RXN=>10 years  "  • Tramadol Vomiting     FCC=2073   • Trazodone Vomiting     VJD=5508   • Dilaudid [Hydromorphone] Vomiting     Unknown    • Diphenhydramine Vomiting   • Iron      vomiting   • Multivitamin      itching   • Other Drug      Any binders that remove phosphorus from the body such as tums       PHYSICAL EXAM  VITAL SIGNS: /60 mmHg  Pulse 68  Temp(Src) 37.2 °C (98.9 °F)  Resp 17  Ht 1.473 m (4' 9.99\")  Wt 59.7 kg (131 lb 9.8 oz)  BMI 27.51 kg/m2  SpO2 100%  LMP 01/01/1995    Constitutional: Well developed, Well nourished, No acute distress, Non-toxic appearance.   HENT: Normocephalic, Atraumatic.   Eyes:  EOMI, PERRL conjunctiva.   Cardiovascular: Normal heart rate, Normal rhythm, No murmurs, No rubs, No gallops.   Thorax & Lungs: Lungs clear to auscultation bilaterally without wheezes, rales or rhonchi. No respiratory distress.    Abdomen: Bowel sounds normal, Soft, No tenderness, No masses, No pulsatile masses. No guarding and no rebound.  Skin: Warm, Dry.   Musculoskeletal: Good range of motion in all major joints.  Neurologic: Awake alert.         RADIOLOGY/PROCEDURES  DX-CHEST-LIMITED (1 VIEW)   Final Result      1.  No acute cardiopulmonary disease.   2.  Stable cardiomegaly.            COURSE & MEDICAL DECISION MAKING  Pertinent Labs & Imaging studies reviewed. (See chart for details)  This is a 61-year-old here for evaluation of chest pain and weakness. Reviewing her medical records she was admitted approximately 2 weeks ago for similar symptoms and required transfusion at that time. She missed her dialysis 3 days ago and is scheduled for dialysis today. An IV is established and laboratories are obtained. These included chemistries significant for a glucose of 215 and known diabetic. She is slightly acidemic with a bicarb of 16. BUN of 101 and a creatinine of 7.61. Phosphorus is elevated at 6.8. Potassium is normal. BNP is elevated at 247. Most significantly she has a CBC that shows a hemoglobin of " 6.6. Chest x-ray shows no acute cardiopulmonary processes. EKG shows no acute ischemic changes. I discussed results of the tests with the patient. She understands the plan for admission. I discussed the case with Dr. Bennett her nephrologist. He knows the patient well and he will schedule dialysis. She will be transfused during dialysis. I discussed the case with Dr. aCrter and he will be the primary admitting physician.    FINAL IMPRESSION  1. Chest pain  2. Anemia requiring acute transfusion  3. Chronic renal failure stage V  4. Hyperglycemia in a nondiabetic  5. Patient required 35 minutes of critical care time      Electronically signed by: Hany Catherine, 1/30/2017 6:28 AM

## 2017-01-31 ENCOUNTER — APPOINTMENT (OUTPATIENT)
Dept: RADIOLOGY | Facility: MEDICAL CENTER | Age: 62
End: 2017-01-31
Attending: INTERNAL MEDICINE
Payer: MEDICARE

## 2017-01-31 LAB
ALBUMIN SERPL BCP-MCNC: 3.3 G/DL (ref 3.2–4.9)
ALP SERPL-CCNC: 62 U/L (ref 30–99)
ALT SERPL-CCNC: 8 U/L (ref 2–50)
ANION GAP SERPL CALC-SCNC: 8 MMOL/L (ref 0–11.9)
AST SERPL-CCNC: 13 U/L (ref 12–45)
BASOPHILS # BLD AUTO: 0.9 % (ref 0–1.8)
BASOPHILS # BLD: 0.04 K/UL (ref 0–0.12)
BILIRUB CONJ SERPL-MCNC: <0.1 MG/DL (ref 0.1–0.5)
BILIRUB INDIRECT SERPL-MCNC: NORMAL MG/DL (ref 0–1)
BILIRUB SERPL-MCNC: 0.3 MG/DL (ref 0.1–1.5)
BUN SERPL-MCNC: 39 MG/DL (ref 8–22)
CALCIUM SERPL-MCNC: 8.2 MG/DL (ref 8.5–10.5)
CHLORIDE SERPL-SCNC: 99 MMOL/L (ref 96–112)
CO2 SERPL-SCNC: 28 MMOL/L (ref 20–33)
CREAT SERPL-MCNC: 4.41 MG/DL (ref 0.5–1.4)
EOSINOPHIL # BLD AUTO: 0.32 K/UL (ref 0–0.51)
EOSINOPHIL NFR BLD: 7.1 % (ref 0–6.9)
ERYTHROCYTE [DISTWIDTH] IN BLOOD BY AUTOMATED COUNT: 49.1 FL (ref 35.9–50)
ERYTHROCYTE [DISTWIDTH] IN BLOOD BY AUTOMATED COUNT: 49.8 FL (ref 35.9–50)
GFR SERPL CREATININE-BSD FRML MDRD: 10 ML/MIN/1.73 M 2
GLUCOSE BLD-MCNC: 114 MG/DL (ref 65–99)
GLUCOSE BLD-MCNC: 183 MG/DL (ref 65–99)
GLUCOSE BLD-MCNC: 195 MG/DL (ref 65–99)
GLUCOSE BLD-MCNC: 237 MG/DL (ref 65–99)
GLUCOSE BLD-MCNC: 249 MG/DL (ref 65–99)
GLUCOSE SERPL-MCNC: 145 MG/DL (ref 65–99)
HCT VFR BLD AUTO: 22.6 % (ref 37–47)
HCT VFR BLD AUTO: 23.7 % (ref 37–47)
HGB BLD-MCNC: 7.5 G/DL (ref 12–16)
HGB BLD-MCNC: 7.6 G/DL (ref 12–16)
IMM GRANULOCYTES # BLD AUTO: 0.03 K/UL (ref 0–0.11)
IMM GRANULOCYTES NFR BLD AUTO: 0.7 % (ref 0–0.9)
LIPASE SERPL-CCNC: 56 U/L (ref 11–82)
LYMPHOCYTES # BLD AUTO: 1.52 K/UL (ref 1–4.8)
LYMPHOCYTES NFR BLD: 33.6 % (ref 22–41)
MCH RBC QN AUTO: 30.2 PG (ref 27–33)
MCH RBC QN AUTO: 30.6 PG (ref 27–33)
MCHC RBC AUTO-ENTMCNC: 32.1 G/DL (ref 33.6–35)
MCHC RBC AUTO-ENTMCNC: 33.2 G/DL (ref 33.6–35)
MCV RBC AUTO: 92.2 FL (ref 81.4–97.8)
MCV RBC AUTO: 94 FL (ref 81.4–97.8)
MONOCYTES # BLD AUTO: 0.57 K/UL (ref 0–0.85)
MONOCYTES NFR BLD AUTO: 12.6 % (ref 0–13.4)
NEUTROPHILS # BLD AUTO: 2.04 K/UL (ref 2–7.15)
NEUTROPHILS NFR BLD: 45.1 % (ref 44–72)
NRBC # BLD AUTO: 0 K/UL
NRBC BLD AUTO-RTO: 0 /100 WBC
PLATELET # BLD AUTO: 291 K/UL (ref 164–446)
PLATELET # BLD AUTO: 298 K/UL (ref 164–446)
PMV BLD AUTO: 12.5 FL (ref 9–12.9)
PMV BLD AUTO: 12.8 FL (ref 9–12.9)
POTASSIUM SERPL-SCNC: 4.2 MMOL/L (ref 3.6–5.5)
PROT SERPL-MCNC: 6.3 G/DL (ref 6–8.2)
RBC # BLD AUTO: 2.45 M/UL (ref 4.2–5.4)
RBC # BLD AUTO: 2.52 M/UL (ref 4.2–5.4)
SODIUM SERPL-SCNC: 135 MMOL/L (ref 135–145)
TROPONIN I SERPL-MCNC: 0.03 NG/ML (ref 0–0.04)
TROPONIN I SERPL-MCNC: 0.03 NG/ML (ref 0–0.04)
WBC # BLD AUTO: 4.3 K/UL (ref 4.8–10.8)
WBC # BLD AUTO: 4.5 K/UL (ref 4.8–10.8)

## 2017-01-31 PROCEDURE — G0378 HOSPITAL OBSERVATION PER HR: HCPCS

## 2017-01-31 PROCEDURE — 99225 PR SUBSEQUENT OBSERVATION CARE,LEVEL II: CPT | Performed by: INTERNAL MEDICINE

## 2017-01-31 PROCEDURE — 83690 ASSAY OF LIPASE: CPT

## 2017-01-31 PROCEDURE — 36415 COLL VENOUS BLD VENIPUNCTURE: CPT

## 2017-01-31 PROCEDURE — 80076 HEPATIC FUNCTION PANEL: CPT

## 2017-01-31 PROCEDURE — 84484 ASSAY OF TROPONIN QUANT: CPT

## 2017-01-31 PROCEDURE — 80048 BASIC METABOLIC PNL TOTAL CA: CPT

## 2017-01-31 PROCEDURE — A9270 NON-COVERED ITEM OR SERVICE: HCPCS | Performed by: INTERNAL MEDICINE

## 2017-01-31 PROCEDURE — 700102 HCHG RX REV CODE 250 W/ 637 OVERRIDE(OP): Performed by: INTERNAL MEDICINE

## 2017-01-31 PROCEDURE — 85025 COMPLETE CBC W/AUTO DIFF WBC: CPT

## 2017-01-31 PROCEDURE — 82962 GLUCOSE BLOOD TEST: CPT

## 2017-01-31 PROCEDURE — 85027 COMPLETE CBC AUTOMATED: CPT

## 2017-01-31 RX ADMIN — AMLODIPINE BESYLATE 10 MG: 10 TABLET ORAL at 09:20

## 2017-01-31 RX ADMIN — FAMOTIDINE 20 MG: 20 TABLET, FILM COATED ORAL at 09:19

## 2017-01-31 RX ADMIN — INSULIN LISPRO 1 UNITS: 100 INJECTION, SOLUTION INTRAVENOUS; SUBCUTANEOUS at 16:44

## 2017-01-31 RX ADMIN — CARVEDILOL 25 MG: 25 TABLET, FILM COATED ORAL at 16:43

## 2017-01-31 RX ADMIN — INSULIN LISPRO 2 UNITS: 100 INJECTION, SOLUTION INTRAVENOUS; SUBCUTANEOUS at 20:06

## 2017-01-31 RX ADMIN — LATANOPROST 1 DROP: 50 SOLUTION OPHTHALMIC at 20:02

## 2017-01-31 RX ADMIN — INSULIN LISPRO 1 UNITS: 100 INJECTION, SOLUTION INTRAVENOUS; SUBCUTANEOUS at 11:54

## 2017-01-31 RX ADMIN — HYDROCODONE BITARTRATE AND ACETAMINOPHEN 1 TABLET: 5; 325 TABLET ORAL at 20:01

## 2017-01-31 RX ADMIN — PREGABALIN 50 MG: 25 CAPSULE ORAL at 20:01

## 2017-01-31 RX ADMIN — CARVEDILOL 25 MG: 25 TABLET, FILM COATED ORAL at 09:20

## 2017-01-31 RX ADMIN — ACETAMINOPHEN 650 MG: 325 TABLET, FILM COATED ORAL at 05:20

## 2017-01-31 RX ADMIN — PREGABALIN 50 MG: 25 CAPSULE ORAL at 09:19

## 2017-01-31 ASSESSMENT — ENCOUNTER SYMPTOMS
FOCAL WEAKNESS: 0
COUGH: 0
ORTHOPNEA: 0
NAUSEA: 0
BACK PAIN: 0
SPUTUM PRODUCTION: 0
CONSTIPATION: 0
NECK PAIN: 0
DIARRHEA: 0
SPEECH CHANGE: 0
ABDOMINAL PAIN: 0
CLAUDICATION: 0
PALPITATIONS: 0
WEIGHT LOSS: 0
HALLUCINATIONS: 0
DIAPHORESIS: 0
NERVOUS/ANXIOUS: 0
HEMOPTYSIS: 0
DEPRESSION: 0
EYE PAIN: 0
SHORTNESS OF BREATH: 0
SENSORY CHANGE: 0
CHILLS: 0
FEVER: 0
STRIDOR: 0
HEARTBURN: 0
ABDOMINAL PAIN: 1
VOMITING: 0
WEAKNESS: 1
PND: 0
WHEEZING: 0

## 2017-01-31 ASSESSMENT — PAIN SCALES - GENERAL
PAINLEVEL_OUTOF10: 7
PAINLEVEL_OUTOF10: 6
PAINLEVEL_OUTOF10: 4
PAINLEVEL_OUTOF10: 7
PAINLEVEL_OUTOF10: 3

## 2017-01-31 NOTE — PROGRESS NOTES
Report received from ED RN.  Assumed Care.  Pt in bed AOx4, responds appropriately.  Denies pain, SOB. Pts only complaint is that she is very tired.  Plan of care discussed for Pt to receive dialysis and 1 unit of Blood during.  Explained importance of calling before getting OOB and pt verbalizes understanding.  Call light and belongings within reach, treaded slipper socks on, bed alarm in use, bed in lowest position.  Will monitor frequently.

## 2017-01-31 NOTE — PROGRESS NOTES
Medicated per MAR except waiting on Insulin from Pharmacy. PIV in Rt hand is not patent.  Will place another. Will continue hourly rounding throughout the day shift.

## 2017-01-31 NOTE — DIETARY
Nutrition Services:    60y/o F admit with generalized weakness and chest pain with known past medical hx significant for ESRD on HD, DM-type II, vit D deficiency, iron overload, and diabetic neuropathy per H&P. Pt with inadequate diet prior to admit, noted on diabetic diet with consistent adequate PO of >75% consistently. Otherwise, pt appears well nourished at this time with good PO, skin intact, BMI and albumin WNL (27.5 & 3.3 respectively). No nutrition intervention warranted at this time. RD available PRN.

## 2017-01-31 NOTE — PROGRESS NOTES
Hemodialysis ordered by Dr Bennett for 3hrs on 3k acid bath.  Treatment initiated at 1356 and ended at 1657.  1unit PRBCwas given with tx.  During tx, pt c/o nausea and cramping; vomited; supported with NS bolus. Please see flowsheet for details.  Report given to staff RNAKIKO.    ALIVIA AVF positive for bruit and thrill. Manual pressure held for 10mins, after needles were removed; then covered with dressing, cdi.

## 2017-01-31 NOTE — CARE PLAN
Problem: Safety  Goal: Will remain free from injury  Discussed with Pt the need to use the call light for any needs and for assistance with ambulation.  Pt uses cane.  The Pt verbalized understanding.    Problem: Infection  Goal: Will remain free from infection  Discussed infection control tactics for in and out of the hospital.  Pt verbalized understanding.

## 2017-01-31 NOTE — PROGRESS NOTES
Lab came to draw scheduled Trop.  Pt in dialysis for another 1 or 2.  Will have lab take when returns to the floor.  First Trop was in the normal range.  Spoke with Hospitalist RN.

## 2017-01-31 NOTE — CARE PLAN
Problem: Safety  Goal: Will remain free from injury  Built in bed alarm in place    Problem: Knowledge Deficit  Goal: Knowledge of disease process/condition, treatment plan, diagnostic tests, and medications will improve  Education provided about blood levels and blood transfusions if needed based on labs

## 2017-01-31 NOTE — PROGRESS NOTES
[facility] Nephrology Progress Note, Adult, Complex               Author: Lion Bennett Date & Time created: 1/31/2017  11:59 AM     Interval History:  61-year-old lady with end-stage renal disease,    on chronic hemodialysis Monday, Wednesday, and Friday.  The patient has a    myelodysplastic syndrome followed by Dr. Avila and she requires frequent    transfusions because of severe symptomatic anemia.  She presented to the    emergency room on January 30th complaining of chest pain that woke her up in    the middle of the night.  The pain was felt at the left breast that radiated    to the back and was sharp.  She felt weak, but had no worsening shortness of    breath.  She had some nausea, but no vomiting.  She had  a    hemoglobin of 6.6 and she is admitted for inpatient evaluation and therapy.    DAILY NEPHROLOGY SUMMARY:  01/30/17 - Consult done.Dialysis ordered.  01/31/17 - Had HD yesterday.Transfused on dislysis.Still weak.RUQ discomfort.    Review of Systems:  Review of Systems   Constitutional: Positive for malaise/fatigue. Negative for fever, chills, weight loss and diaphoresis.   HENT: Negative for congestion.    Eyes: Negative for pain.   Respiratory: Negative for cough, hemoptysis, sputum production, shortness of breath, wheezing and stridor.         Home O2   Cardiovascular: Negative for chest pain, palpitations, orthopnea, claudication, leg swelling and PND.   Gastrointestinal: Negative for heartburn, nausea, vomiting, abdominal pain, diarrhea and constipation.   Genitourinary: Negative for dysuria and urgency.   Musculoskeletal: Negative for back pain and neck pain.   Skin: Negative for itching and rash.   Neurological: Positive for weakness. Negative for sensory change, speech change and focal weakness.   Psychiatric/Behavioral: Negative for depression and hallucinations. The patient is not nervous/anxious.        Physical Exam:  Physical Exam   Constitutional: She is oriented to person,  place, and time. She appears well-developed and well-nourished. No distress.   HENT:   Head: Normocephalic and atraumatic.   Nose: Nose normal.   Eyes: EOM are normal. Pupils are equal, round, and reactive to light. No scleral icterus.   Neck: Normal range of motion. Neck supple. No thyromegaly present.   Cardiovascular: Normal rate and regular rhythm.  Exam reveals no friction rub.    No murmur heard.  Pulmonary/Chest: Effort normal and breath sounds normal. She has no wheezes. She has no rales.   Abdominal: Soft. Bowel sounds are normal. She exhibits no distension. There is no tenderness. There is no rebound.   RUQ discomfort ? muskuloskeletal   Musculoskeletal: She exhibits no edema.   LUE AVF   Lymphadenopathy:     She has no cervical adenopathy.   Neurological: She is alert and oriented to person, place, and time.   Skin: Skin is warm and dry. No rash noted. No erythema.   Psychiatric: She has a normal mood and affect. Her behavior is normal. Judgment and thought content normal.   Nursing note and vitals reviewed.      Labs:        Invalid input(s): [id number]  Recent Labs      01/30/17   0545  01/30/17   1755  01/31/17   0943   TROPONINI  <0.01  0.04  0.03   BNPBTYPENAT  247*   --    --      Recent Labs      01/30/17   0545  01/31/17   0130   SODIUM  134*  135   POTASSIUM  4.8  4.2   CHLORIDE  102  99   CO2  16*  28   BUN  101*  39*   CREATININE  7.61*  4.41*   MAGNESIUM  2.2   --    PHOSPHORUS  6.8*   --    CALCIUM  7.8*  8.2*     Recent Labs      01/30/17   0545  01/31/17   0130  01/31/17   1103   ALTSGPT  9   --   8   ASTSGOT  10*   --   13   ALKPHOSPHAT  62   --   62   TBILIRUBIN  0.2   --   0.3   DBILIRUBIN   --    --   <0.1   LIPASE  124*   --   56   GLUCOSE  250*  145*   --      Recent Labs      01/30/17   0545  01/31/17   0130  01/31/17   1102   RBC  2.20*  2.45*  2.52*   HEMOGLOBIN  6.6*  7.5*  7.6*   HEMATOCRIT  21.0*  22.6*  23.7*   PLATELETCT  372  291  298   PROTHROMBTM  13.1   --    --     APTT  30.2   --    --    INR  0.96   --    --      Recent Labs      17   0545  17   0130  17   1102  17   1103   WBC  5.6  4.3*  4.5*   --    NEUTSPOLYS  49.60   --   45.10   --    LYMPHOCYTES  31.30   --   33.60   --    MONOCYTES  10.80   --   12.60   --    EOSINOPHILS  7.20*   --   7.10*   --    BASOPHILS  0.70   --   0.90   --    ASTSGOT  10*   --    --   13   ALTSGPT  9   --    --   8   ALKPHOSPHAT  62   --    --   62   TBILIRUBIN  0.2   --    --   0.3           Hemodynamics:  Temp (24hrs), Av.5 °C (97.7 °F), Min:36.2 °C (97.1 °F), Max:36.8 °C (98.2 °F)  Temperature: 36.7 °C (98 °F)  Pulse  Av  Min: 60  Max: 86   Blood Pressure: 143/51 mmHg     Respiratory:    Respiration: 16, Pulse Oximetry: 100 %           Fluids:    Intake/Output Summary (Last 24 hours) at 17 1159  Last data filed at 17 0900   Gross per 24 hour   Intake   1200 ml   Output   2500 ml   Net  -1300 ml        GI/Nutrition:  Orders Placed This Encounter   Procedures   • Diet Order     Standing Status: Standing      Number of Occurrences: 1      Standing Expiration Date:      Order Specific Question:  Diet:     Answer:  Diabetic [3]     Medical Decision Making, by Problem:  Active Hospital Problems    Diagnosis   • ESRD (end stage renal disease) on dialysis (CMS-HCC) [N18.6, Z99.2]   • MDS (myelodysplastic syndrome) (CMS-HCC) [D46.9]   • Anemia requiring transfusions [D64.9]       PM//SH reviewed    IMPRESSION:  1.  End-stage renal disease, on chronic hemodialysis Monday, Wednesday, and    Friday.  2.  Severe symptomatic anemia secondary to myelodysplastic syndrome.  3.  Chest pain secondary to anemia.  She had a recent negative myocardial    perfusion study.  4.  Diabetes mellitus, currently on no medicines.  5.  Hypertension, control suboptimal.  6.  Vitamin D deficiency, on replacement.  7.  History of atrial fibrillation.  8.  Peripheral vascular disease with history of stent placements of the  lower    extremities.  9.  Peripheral neuropathy.  10.  Chronic obstructive pulmonary disease.  11.  Diastolic congestive heart failure.Stable  12.  Home oxygen dependency.  13.  History of cerebrovascular accident with no residual.  14.  Legally blind.  15.  Functioning left arm arteriovenous fistula.    PLAN:  - HD MWF  - ? Transfuse one more unit of PRBC's on HD tomorrow and then D/C to home.  - She should be continued on her home medicines.  - We will continue her on Epogen.  - Transfuse PRN  - Follow labs  - She should be on no dietary protein restrictions.  - All of her medicines should be dosed for GFR less than 10.    Labs reviewed, Medications reviewed and Radiology images reviewed

## 2017-01-31 NOTE — PROGRESS NOTES
Alert and able to let her needs known,. C/o back pain-medicated as requested. Cooperative and compliant with care.cont plan of care , call light within reach and visual checks through the night

## 2017-01-31 NOTE — CONSULTS
DATE OF SERVICE:  2017    REQUESTING PHYSICIAN:  Dr. Carter.    CONSULTING PHYSICIAN:  Lion Bennett MD    REASON FOR CONSULTATION:  Patient with end-stage renal disease, severe anemia,   and chest pain.    HISTORY OF PRESENT ILLNESS:  A 61-year-old lady with end-stage renal disease,   on chronic hemodialysis Monday, Wednesday, and Friday.  The patient has a   myelodysplastic syndrome followed by Dr. Avila and she requires frequent   transfusions because of severe symptomatic anemia.  She presented to the   emergency room on  complaining of chest pain that woke her up in   the middle of the night.  The pain was felt at the left breast that radiated   to the back and was sharp.  She felt weak, but had no worsening shortness of   breath.  She had some nausea, but no vomiting.  She had  a   hemoglobin of 6.6 and she is admitted for inpatient evaluation and therapy.    PAST SURGICAL HISTORY:  1.   twice.  2.  Hysterectomy.  3.  D and C twice.  4.  Appendectomy.  5.  Left arm AV fistula by Dr. Jolly in 2016.  Apparently, she   required revision because of steal.  6.  History of stent placement to the lower extremities.  7.  Hemodialysis catheters.  8.  Retinal detachment.  9.  Bilateral cataracts.  10.  Coronary angiogram.  11.  Upper endoscopy.    MEDICAL ILLNESSES:  1.  End-stage renal disease, on chronic hemodialysis Monday, Wednesday, and   Friday.  2.  Myelodysplastic syndrome with refractory anemia.  She required   transfusions.  She is followed by Dr. Avila, but did not tolerate therapy.  3.  Recurrent chest pain associated with anemia.  She had a negative   myocardial perfusion study.  4.  Diabetes mellitus.  5.  Hypertension, on pharmacologic therapy.  6.  Vitamin D deficiency, on replacement.  7.  History of atrial fibrillation.  8.  Peripheral vascular disease.  9.  Peripheral neuropathy.  10.  Chronic obstructive pulmonary disease.  11.  Diastolic congestive  heart failure.  12.  Home oxygen dependency.  13.  History of cerebrovascular accident with no residual.  14.  Legally blind.  15.  History of steal syndrome secondary to AV fistula.    ALLERGIES:  ACTOS, DARVOCET, DEMEROL, GLUCOPHAGE, MORPHINE, OXYCODONE,   PENICILLIN, REQUIP, SIMVASTATIN, SULFA, TRAMADOL, TRAZODONE, DILAUDID,   BENADRYL, IRON, MULTIVITAMINS AND PHOSPHORUS BINDERS.    OUTPATIENT MEDICINES:  Tylenol p.r.n., amlodipine 10 mg a day, Lumigan 0.03%   solution to both eyes daily, Coreg 25 mg twice a day, Voltaren gel p.r.n.,   famotidine 20 mg a day, Norco p.r.n., lenalidomide 5 mg a day that she has not   taken because of itching and Lyrica 50 mg twice a day.    FAMILY HISTORY:  Negative for kidney disease.  Positive for hypertension.    SOCIAL HISTORY:  The patient is a .  She lives with her daughter.  She   has 2 children.  She never smoked.  She does not drink alcohol.  She is   disabled.  She used to work as a CNA.    REVIEW OF SYSTEMS:  Remarkable for the chest pain as above.  She has no   worsening shortness of breath.  She is on chronic oxygen.  She has had no   cough or sputum production, peripheral edema.  No fever or chills.  No   abdominal pain.  She had nausea last night, but none now.  She has had no   anorexia, change in bowel habit, diarrhea or constipation.  No skin rash or   itching, no focal neurologic symptoms.  She does have urine output with no   voiding dysfunction.    PHYSICAL EXAMINATION:  GENERAL:  Chronic ill-appearing lady who is in nonacute distress.  She is seen   on dialysis.  VITAL SIGNS:  Blood pressure predialysis of 160/53.  This has not changed on   dialysis.  SKIN:  With no generalized rash.  LYMPH NODES:  No cervical adenopathies.  HEENT:  Pupils are round.  Oral mucosa with no lesions.  Patient is   edentulous.  NECK:  With no bruits or masses.  LUNGS:  With decreased breath sounds in lower fields.  No crackles.  No   wheezes.  HEART:  Regular rhythm with no  pericardial rub present, no murmurs.  ABDOMEN:  Bowel sounds present, soft with mild diffuse tenderness.  No   palpable hepatosplenomegaly and no masses.  EXTREMITIES:  With full range of motion and no edema.  There is a functioning   left arm AV fistula.    LABORATORY DATA:  Sodium 134, potassium 4.8, chloride 102, CO2 of 16, ,   creatinine 7.61, glucose 250, SGOT 10, lipase 124.  Magnesium 2.2, phosphorus   6.8.  White count 5.6 thousand, hemoglobin 6.6, platelets 372,000.    Chest x-ray, no acute cardiopulmonary disease, stable cardiomegaly.    IMPRESSION:  1.  End-stage renal disease, on chronic hemodialysis Monday, Wednesday, and   Friday.  2.  Severe symptomatic anemia secondary to myelodysplastic syndrome.  3.  Chest pain secondary to anemia.  She had a recent negative myocardial   perfusion study.  4.  Diabetes mellitus, currently on no medicines.  5.  Hypertension, controlled suboptimal.  6.  Vitamin D deficiency, on replacement.  7.  History of atrial fibrillation.  8.  Peripheral vascular disease with history of stent placements of the lower   extremities.  9.  Peripheral neuropathy.  10.  Chronic obstructive pulmonary disease.  11.  Diastolic congestive heart failure.  12.  Home oxygen dependency.  13.  History of cerebrovascular accident with no residual.  14.  Legally blind.  15.  History of bilateral cataract surgery.  16.  History of retinal surgery.  17.  History of steal syndrome.  18.  History of , hysterectomy and D and C.  19.  History of appendectomy.  20.  Functioning left arm arteriovenous fistula.  21.  History of coronary angiogram.    PLAN:  1.  We are proceeding with hemodialysis today.  2.  She is getting transfusion on dialysis.  3.  She should be continued on her home medicines.  4.  We will continue her on Epogen.  5.  She should be on no dietary protein restrictions.  6.  All of her medicines should be dosed for GFR less than 10.    Thank you for this consultation.   We will follow with you.       ____________________________________     MD LUI COOK / RAMONE    DD:  01/30/2017 16:38:08  DT:  01/30/2017 21:15:03    D#:  [id number]  Job#:  [id number]

## 2017-01-31 NOTE — RESPIRATORY CARE
COPD EDUCATION by COPD CLINICAL EDUCATOR  1/31/2017 at 7:42 AM by Dilcia Guajardo     Patient reviewed by COPD education team. Patient does not qualify for COPD program.

## 2017-01-31 NOTE — PROGRESS NOTES
Two RN skin assessment completed with ARIEL Waldron.     Bilateral ears, elbows, shoulders, heals, knees are intact, blanching and pink.    Coccyx is blanching and intact.    No noted open areas, bruises or scabs.    Pt AOx4.

## 2017-02-01 ENCOUNTER — APPOINTMENT (OUTPATIENT)
Dept: RADIOLOGY | Facility: MEDICAL CENTER | Age: 62
End: 2017-02-01
Attending: INTERNAL MEDICINE
Payer: MEDICARE

## 2017-02-01 VITALS
BODY MASS INDEX: 27.63 KG/M2 | HEIGHT: 58 IN | HEART RATE: 73 BPM | TEMPERATURE: 98 F | DIASTOLIC BLOOD PRESSURE: 52 MMHG | WEIGHT: 131.61 LBS | RESPIRATION RATE: 18 BRPM | OXYGEN SATURATION: 92 % | SYSTOLIC BLOOD PRESSURE: 113 MMHG

## 2017-02-01 LAB
ALBUMIN SERPL BCP-MCNC: 3.1 G/DL (ref 3.2–4.9)
BARCODED ABORH UBTYP: 8400
BARCODED ABORH UBTYP: 8400
BARCODED PRD CODE UBPRD: NORMAL
BARCODED PRD CODE UBPRD: NORMAL
BARCODED UNIT NUM UBUNT: NORMAL
BARCODED UNIT NUM UBUNT: NORMAL
BASOPHILS # BLD AUTO: 0.7 % (ref 0–1.8)
BASOPHILS # BLD: 0.03 K/UL (ref 0–0.12)
BUN SERPL-MCNC: 73 MG/DL (ref 8–22)
CALCIUM SERPL-MCNC: 7.7 MG/DL (ref 8.5–10.5)
CHLORIDE SERPL-SCNC: 100 MMOL/L (ref 96–112)
CO2 SERPL-SCNC: 24 MMOL/L (ref 20–33)
COMPONENT R 8504R: NORMAL
CREAT SERPL-MCNC: 5.83 MG/DL (ref 0.5–1.4)
EOSINOPHIL # BLD AUTO: 0.37 K/UL (ref 0–0.51)
EOSINOPHIL NFR BLD: 8.3 % (ref 0–6.9)
ERYTHROCYTE [DISTWIDTH] IN BLOOD BY AUTOMATED COUNT: 49.5 FL (ref 35.9–50)
GFR SERPL CREATININE-BSD FRML MDRD: 7 ML/MIN/1.73 M 2
GLUCOSE BLD-MCNC: 125 MG/DL (ref 65–99)
GLUCOSE BLD-MCNC: 181 MG/DL (ref 65–99)
GLUCOSE SERPL-MCNC: 139 MG/DL (ref 65–99)
HCT VFR BLD AUTO: 21.4 % (ref 37–47)
HGB BLD-MCNC: 7 G/DL (ref 12–16)
IMM GRANULOCYTES # BLD AUTO: 0.02 K/UL (ref 0–0.11)
IMM GRANULOCYTES NFR BLD AUTO: 0.4 % (ref 0–0.9)
LYMPHOCYTES # BLD AUTO: 1.89 K/UL (ref 1–4.8)
LYMPHOCYTES NFR BLD: 42.4 % (ref 22–41)
MCH RBC QN AUTO: 30.7 PG (ref 27–33)
MCHC RBC AUTO-ENTMCNC: 32.7 G/DL (ref 33.6–35)
MCV RBC AUTO: 93.9 FL (ref 81.4–97.8)
MONOCYTES # BLD AUTO: 0.52 K/UL (ref 0–0.85)
MONOCYTES NFR BLD AUTO: 11.7 % (ref 0–13.4)
NEUTROPHILS # BLD AUTO: 1.63 K/UL (ref 2–7.15)
NEUTROPHILS NFR BLD: 36.5 % (ref 44–72)
NRBC # BLD AUTO: 0 K/UL
NRBC BLD AUTO-RTO: 0 /100 WBC
PHOSPHATE SERPL-MCNC: 7.7 MG/DL (ref 2.5–4.5)
PLATELET # BLD AUTO: 259 K/UL (ref 164–446)
PMV BLD AUTO: 12.1 FL (ref 9–12.9)
POTASSIUM SERPL-SCNC: 5.6 MMOL/L (ref 3.6–5.5)
PRODUCT TYPE UPROD: NORMAL
PRODUCT TYPE UPROD: NORMAL
RBC # BLD AUTO: 2.28 M/UL (ref 4.2–5.4)
SODIUM SERPL-SCNC: 135 MMOL/L (ref 135–145)
UNIT STATUS USTAT: NORMAL
UNIT STATUS USTAT: NORMAL
WBC # BLD AUTO: 4.5 K/UL (ref 4.8–10.8)

## 2017-02-01 PROCEDURE — 86923 COMPATIBILITY TEST ELECTRIC: CPT

## 2017-02-01 PROCEDURE — 99217 PR OBSERVATION CARE DISCHARGE: CPT | Performed by: INTERNAL MEDICINE

## 2017-02-01 PROCEDURE — 700102 HCHG RX REV CODE 250 W/ 637 OVERRIDE(OP): Performed by: INTERNAL MEDICINE

## 2017-02-01 PROCEDURE — G8978 MOBILITY CURRENT STATUS: HCPCS | Mod: CI

## 2017-02-01 PROCEDURE — 90935 HEMODIALYSIS ONE EVALUATION: CPT

## 2017-02-01 PROCEDURE — 85025 COMPLETE CBC W/AUTO DIFF WBC: CPT

## 2017-02-01 PROCEDURE — A9270 NON-COVERED ITEM OR SERVICE: HCPCS | Performed by: INTERNAL MEDICINE

## 2017-02-01 PROCEDURE — 36415 COLL VENOUS BLD VENIPUNCTURE: CPT

## 2017-02-01 PROCEDURE — 82962 GLUCOSE BLOOD TEST: CPT | Mod: 91

## 2017-02-01 PROCEDURE — P9016 RBC LEUKOCYTES REDUCED: HCPCS

## 2017-02-01 PROCEDURE — G8980 MOBILITY D/C STATUS: HCPCS | Mod: CI

## 2017-02-01 PROCEDURE — 97161 PT EVAL LOW COMPLEX 20 MIN: CPT

## 2017-02-01 PROCEDURE — 700111 HCHG RX REV CODE 636 W/ 250 OVERRIDE (IP): Performed by: INTERNAL MEDICINE

## 2017-02-01 PROCEDURE — 76700 US EXAM ABDOM COMPLETE: CPT

## 2017-02-01 PROCEDURE — 36430 TRANSFUSION BLD/BLD COMPNT: CPT | Mod: XU

## 2017-02-01 PROCEDURE — G8979 MOBILITY GOAL STATUS: HCPCS | Mod: CI

## 2017-02-01 PROCEDURE — G0378 HOSPITAL OBSERVATION PER HR: HCPCS

## 2017-02-01 PROCEDURE — 80069 RENAL FUNCTION PANEL: CPT

## 2017-02-01 RX ADMIN — INSULIN LISPRO 1 UNITS: 100 INJECTION, SOLUTION INTRAVENOUS; SUBCUTANEOUS at 12:15

## 2017-02-01 RX ADMIN — PREGABALIN 50 MG: 25 CAPSULE ORAL at 12:14

## 2017-02-01 RX ADMIN — ERYTHROPOIETIN 10000 UNITS: 10000 INJECTION, SOLUTION INTRAVENOUS; SUBCUTANEOUS at 10:09

## 2017-02-01 ASSESSMENT — PAIN SCALES - GENERAL: PAINLEVEL_OUTOF10: 6

## 2017-02-01 ASSESSMENT — ENCOUNTER SYMPTOMS
HEARTBURN: 0
DIARRHEA: 0
CONSTIPATION: 0
SENSORY CHANGE: 0
NERVOUS/ANXIOUS: 0
VOMITING: 0
DIAPHORESIS: 0
WEAKNESS: 1
ORTHOPNEA: 0
BACK PAIN: 0
FEVER: 0
ABDOMINAL PAIN: 0
PHOTOPHOBIA: 0
FOCAL WEAKNESS: 0
WHEEZING: 0
COUGH: 0
SHORTNESS OF BREATH: 0
CLAUDICATION: 0
CHILLS: 0
NAUSEA: 0
DEPRESSION: 0
PND: 0
HALLUCINATIONS: 0
SPEECH CHANGE: 0
NECK PAIN: 0
WEIGHT LOSS: 0
PALPITATIONS: 0
SPUTUM PRODUCTION: 0
STRIDOR: 0
EYE PAIN: 0
HEMOPTYSIS: 0

## 2017-02-01 ASSESSMENT — LIFESTYLE VARIABLES: EVER_SMOKED: NEVER

## 2017-02-01 ASSESSMENT — GAIT ASSESSMENTS
DEVIATION: DECREASED BASE OF SUPPORT
GAIT LEVEL OF ASSIST: CONTACT GUARD ASSIST
DISTANCE (FEET): 100
ASSISTIVE DEVICE: HAND HELD ASSIST;SINGLE POINT CANE

## 2017-02-01 NOTE — PROGRESS NOTES
Pt. Left for dialysis. A/ox4, placed on 2L NC for comfort. Sent Epogen wit pt. Report given to facundo, RN.

## 2017-02-01 NOTE — PROGRESS NOTES
Alert and able to let her needs known. Up self with a steady gait and uses her cane. C/o abd pain; medicate as requested and anticipation of abd US in the morning.cont plan of care, call light within reach and visual checks through the night

## 2017-02-01 NOTE — CARE PLAN
Problem: Safety  Goal: Will remain free from injury  Bed alarm on, assistance needed out of bed    Problem: Pain Management  Goal: Pain level will decrease to patient’s comfort goal  Medicate as requested for pain to his legs

## 2017-02-01 NOTE — PROGRESS NOTES
[facility] Nephrology Progress Note, Adult, Complex               Author: Lion Bennett Date & Time created: 2/1/2017  10:46 AM     Interval History:  61-year-old lady with end-stage renal disease,    on chronic hemodialysis Monday, Wednesday, and Friday.  The patient has a    myelodysplastic syndrome followed by Dr. Avila and she requires frequent    transfusions because of severe symptomatic anemia.  She presented to the    emergency room on January 30th complaining of chest pain that woke her up in    the middle of the night.  The pain was felt at the left breast that radiated    to the back and was sharp.  She felt weak, but had no worsening shortness of    breath.  She had some nausea, but no vomiting.  She had  a    hemoglobin of 6.6 and she is admitted for inpatient evaluation and therapy.    DAILY NEPHROLOGY SUMMARY:  01/30/17 - Consult done.Dialysis ordered.  01/31/17 - Had HD yesterday.Transfused on dislysis.Still weak.RUQ discomfort.  02/01/17 - Feels OK.Seen on dialysis.Abdominal US revealed fatty liver.    Review of Systems:  Review of Systems   Constitutional: Positive for malaise/fatigue. Negative for fever, chills, weight loss and diaphoresis.   HENT: Negative for congestion.    Eyes: Negative for photophobia and pain.   Respiratory: Negative for cough, hemoptysis, sputum production, shortness of breath, wheezing and stridor.         Home O2   Cardiovascular: Negative for chest pain, palpitations, orthopnea, claudication, leg swelling and PND.   Gastrointestinal: Negative for heartburn, nausea, vomiting, abdominal pain, diarrhea and constipation.   Genitourinary: Negative for dysuria and urgency.   Musculoskeletal: Negative for back pain and neck pain.   Skin: Negative for itching and rash.   Neurological: Positive for weakness. Negative for sensory change, speech change and focal weakness.   Psychiatric/Behavioral: Negative for depression and hallucinations. The patient is not  nervous/anxious.        Physical Exam:  Physical Exam   Constitutional: She is oriented to person, place, and time. She appears well-developed and well-nourished. No distress.   HENT:   Head: Normocephalic and atraumatic.   Nose: Nose normal.   Eyes: EOM are normal. Pupils are equal, round, and reactive to light. No scleral icterus.   Neck: Normal range of motion. Neck supple. No thyromegaly present.   Cardiovascular: Normal rate and regular rhythm.  Exam reveals no friction rub.    No murmur heard.  Pulmonary/Chest: Effort normal and breath sounds normal. She has no wheezes. She has no rales.   Abdominal: Soft. Bowel sounds are normal. She exhibits no distension. There is no tenderness. There is no rebound.   RUQ discomfort ? muskuloskeletal   Musculoskeletal: She exhibits no edema.   LUE AVF   Lymphadenopathy:     She has no cervical adenopathy.   Neurological: She is alert and oriented to person, place, and time.   Skin: Skin is warm and dry. No rash noted. No erythema.   Psychiatric: She has a normal mood and affect. Her behavior is normal. Judgment and thought content normal.   Nursing note and vitals reviewed.      Labs:        Invalid input(s): [id number]  Recent Labs      01/30/17   0545  01/30/17   1755  01/31/17   0943  01/31/17   1510   TROPONINI  <0.01  0.04  0.03  0.03   BNPBTYPENAT  247*   --    --    --      Recent Labs      01/30/17   0545  01/31/17   0130  02/01/17   0329   SODIUM  134*  135  135   POTASSIUM  4.8  4.2  5.6*   CHLORIDE  102  99  100   CO2  16*  28  24   BUN  101*  39*  73*   CREATININE  7.61*  4.41*  5.83*   MAGNESIUM  2.2   --    --    PHOSPHORUS  6.8*   --   7.7*   CALCIUM  7.8*  8.2*  7.7*     Recent Labs      01/30/17   0545  01/31/17   0130  01/31/17   1103  02/01/17   0329   ALTSGPT  9   --   8   --    ASTSGOT  10*   --   13   --    ALKPHOSPHAT  62   --   62   --    TBILIRUBIN  0.2   --   0.3   --    DBILIRUBIN   --    --   <0.1   --    LIPASE  124*   --   56   --     GLUCOSE  250*  145*   --   139*     Recent Labs      17   0545  17   0130  17   1102  17   0329   RBC  2.20*  2.45*  2.52*  2.28*   HEMOGLOBIN  6.6*  7.5*  7.6*  7.0*   HEMATOCRIT  21.0*  22.6*  23.7*  21.4*   PLATELETCT  372  291  298  259   PROTHROMBTM  13.1   --    --    --    APTT  30.2   --    --    --    INR  0.96   --    --    --      Recent Labs      17   0545  17   0130  17   1102  17   1103  02/01/17   0329   WBC  5.6  4.3*  4.5*   --   4.5*   NEUTSPOLYS  49.60   --   45.10   --   36.50*   LYMPHOCYTES  31.30   --   33.60   --   42.40*   MONOCYTES  10.80   --   12.60   --   11.70   EOSINOPHILS  7.20*   --   7.10*   --   8.30*   BASOPHILS  0.70   --   0.90   --   0.70   ASTSGOT  10*   --    --   13   --    ALTSGPT  9   --    --   8   --    ALKPHOSPHAT  62   --    --   62   --    TBILIRUBIN  0.2   --    --   0.3   --            Hemodynamics:  Temp (24hrs), Av.6 °C (97.8 °F), Min:36.2 °C (97.1 °F), Max:36.7 °C (98.1 °F)  Temperature: 36.7 °C (98 °F)  Pulse  Av.8  Min: 60  Max: 86   Blood Pressure: 113/52 mmHg     Respiratory:    Respiration: 18, Pulse Oximetry: 92 %        RUL Breath Sounds: Clear, RML Breath Sounds: Clear, RLL Breath Sounds: Clear, BLANK Breath Sounds: Clear, LLL Breath Sounds: Clear  Fluids:    Intake/Output Summary (Last 24 hours) at 17 1046  Last data filed at 17 1008   Gross per 24 hour   Intake   1040 ml   Output      0 ml   Net   1040 ml        GI/Nutrition:  Orders Placed This Encounter   Procedures   • Diet Order     Standing Status: Standing      Number of Occurrences: 1      Standing Expiration Date:      Order Specific Question:  Diet:     Answer:  Diabetic [3]     Medical Decision Making, by Problem:  Active Hospital Problems    Diagnosis   • ESRD (end stage renal disease) on dialysis (CMS-HCC) [N18.6, Z99.2]   • MDS (myelodysplastic syndrome) (CMS-HCC) [D46.9]   • Anemia requiring transfusions [D64.9]        PMH/FH/SH reviewed    IMPRESSION:  1.  End-stage renal disease, on chronic hemodialysis Monday, Wednesday, and    Friday.  2.  Severe symptomatic anemia secondary to myelodysplastic syndrome.  3.  Chest pain secondary to anemia.  She had a recent negative myocardial    perfusion study.  4.  Diabetes mellitus, currently on no medicines.  5.  Hypertension, control suboptimal.  6.  Vitamin D deficiency, on replacement.  7.  History of atrial fibrillation.  8.  Peripheral vascular disease with history of stent placements of the lower    extremities.  9.  Peripheral neuropathy.  10.  Chronic obstructive pulmonary disease.  11.  Diastolic congestive heart failure.Stable  12.  Home oxygen dependency.  13.  History of cerebrovascular accident with no residual.  14.  Legally blind.  15.  Functioning left arm arteriovenous fistula.  16.  Fatty liver    PLAN:  - HD MWF  - Transfuse one more unit of PRBC's on HD today and then D/C to home.  - Continue  her home medicines.  - Epogen.  - Follow labs  - She should be on no dietary protein restrictions.  - All of her medicines should be dosed for GFR less than 10.    Labs reviewed, Medications reviewed and Radiology images reviewed

## 2017-02-01 NOTE — PROGRESS NOTES
Pt is being discharged home. Pt. Has been escorted downstairs via wheelchair with RN. She is independent with her cane and walked to the bus from the curb. She wants to catch the bus home and declined a ride arrangement from us. Discharge instructions given to pt. PIV d/c'd. A/ox4, vss. Questions answered. Her caregiver has been notified of her status.

## 2017-02-01 NOTE — PROGRESS NOTES
3hr HD started @ 0739 and ended @ 1039,tolerated 2000ml net UF with 1 episode of leg cramps,drecreased UF and warm blanket provided with relief.1 unit RBC transfused w/o adversed rxn noted.VSS post HD,LUAAVF + bruit/thrill,cannulation sites covered w/ DD,CDI,report given to Daphnie Lau RN.

## 2017-02-01 NOTE — PROGRESS NOTES
Hospital Medicine Progress Note, Adult, Complex               Author: Sang Barajas Date & Time created: 1/31/2017  6:40 PM     Interval History:  61-year-old female with history of multiple medical problems including end-stage renal disease, recurrent hospitalizations for chest pain rule out as well as MDS.  Patient presents to hospital because of generalized weakness and chest pain.    1/31/17: chest pain resolved.  Patient complaints of right upper quadrant abdominal pain and lower abdominal pain     Review of Systems:  Review of Systems   Constitutional: Negative for fever and chills.   Respiratory: Negative for cough.    Cardiovascular: Negative for chest pain and palpitations.   Gastrointestinal: Positive for abdominal pain. Negative for heartburn, nausea and vomiting.   Neurological: Positive for weakness.       Physical Exam:  Physical Exam   Constitutional: She is oriented to person, place, and time. She appears well-developed. No distress.   HENT:   Head: Normocephalic.   Eyes: Conjunctivae are normal. Pupils are equal, round, and reactive to light. Right eye exhibits no discharge. Left eye exhibits discharge.   Neck: Normal range of motion. Neck supple. No JVD present.   Cardiovascular: Normal rate and regular rhythm.    No murmur heard.  Pulmonary/Chest: No respiratory distress. She has no wheezes.   Abdominal: Soft. Bowel sounds are normal. She exhibits no distension. There is tenderness.   Tenderness to palpation of right upper quadrant and lower abdomen.   Musculoskeletal: Normal range of motion. She exhibits no edema.   Neurological: She is alert and oriented to person, place, and time.   Skin: Skin is warm and dry. She is not diaphoretic.   Psychiatric: She has a normal mood and affect.       Labs:        Invalid input(s): [id number]  Recent Labs      01/30/17   0545  01/30/17   1755  01/31/17   0943  01/31/17   1510   TROPONINI  <0.01  0.04  0.03  0.03   BNPBTYPENAT  247*   --    --    --       Recent Labs      17   0130   SODIUM  134*  135   POTASSIUM  4.8  4.2   CHLORIDE  102  99   CO2  16*  28   BUN  101*  39*   CREATININE  7.61*  4.41*   MAGNESIUM  2.2   --    PHOSPHORUS  6.8*   --    CALCIUM  7.8*  8.2*     Recent Labs      17   0545  17   0130  17   1103   ALTSGPT  9   --   8   ASTSGOT  10*   --   13   ALKPHOSPHAT  62   --   62   TBILIRUBIN  0.2   --   0.3   DBILIRUBIN   --    --   <0.1   LIPASE  124*   --   56   GLUCOSE  250*  145*   --      Recent Labs      17   1102   RBC  2.20*  2.45*  2.52*   HEMOGLOBIN  6.6*  7.5*  7.6*   HEMATOCRIT  21.0*  22.6*  23.7*   PLATELETCT  372  291  298   PROTHROMBTM  13.1   --    --    APTT  30.2   --    --    INR  0.96   --    --      Recent Labs      17   1102  17   1103   WBC  5.6  4.3*  4.5*   --    NEUTSPOLYS  49.60   --   45.10   --    LYMPHOCYTES  31.30   --   33.60   --    MONOCYTES  10.80   --   12.60   --    EOSINOPHILS  7.20*   --   7.10*   --    BASOPHILS  0.70   --   0.90   --    ASTSGOT  10*   --    --   13   ALTSGPT  9   --    --   8   ALKPHOSPHAT  62   --    --   62   TBILIRUBIN  0.2   --    --   0.3           Hemodynamics:  Temp (24hrs), Av.6 °C (97.8 °F), Min:36.2 °C (97.1 °F), Max:36.8 °C (98.2 °F)  Temperature: 36.7 °C (98.1 °F)  Pulse  Av.8  Min: 60  Max: 86   Blood Pressure: 103/50 mmHg     Respiratory:    Respiration: 16, Pulse Oximetry: 96 %           Fluids:    Intake/Output Summary (Last 24 hours) at 17 1840  Last data filed at 17 1800   Gross per 24 hour   Intake   1100 ml   Output      0 ml   Net   1100 ml        GI/Nutrition:  Orders Placed This Encounter   Procedures   • Diet Order     Standing Status: Standing      Number of Occurrences: 1      Standing Expiration Date:      Order Specific Question:  Diet:     Answer:  Diabetic [3]     Medical Decision Making, by Problem:  Active  Hospital Problems    Diagnosis   • Chest pain [R07.9]  --- troponin 3 sets are within normal range.  Chest pain resolved.  Recent stress test shows no ischemia.   • Abdominal pain [R10.9]  -- no vomiting but patient still has gallbladder.  Thus, will check abdominal ultrasound.  Repeat liver function shows no elevation in liver enzyme   • ESRD (end stage renal disease) on dialysis (CMS-HCC) [N18.6, Z99.2]  -- continue hemodialysis per nephrologist recommendation.   • MDS (myelodysplastic syndrome) (CMS-HCC) [D46.9]   • Diabetes (CMS-HCC) [E11.9]  -- continue insulin therapy   • Diabetic neuropathy (CMS-HCC) [E11.40]  -- continue with lyrica.   • Anemia requiring transfusions [D64.9]  -- likely due to combination of ESRD and MDS  -- status post 1 unit of PRBC transfusion.  Per nephrologist's recommendation, will continue to transfuse one additional unit of PRBC tomorrow along with hemodialysis.  -- continue epoetin   • Chronic respiratory failure with hypoxia, 2L [J96.11]  -- continue oxygen therapy   MDS -- follow up with outpatient hematologist.            DVT Prophylaxis: Contraindicated - Anemia requiring blood transfusion  DVT prophylaxis - mechanical: SCDs

## 2017-02-01 NOTE — CARE PLAN
Problem: Communication  Goal: The ability to communicate needs accurately and effectively will improve  Outcome: PROGRESSING AS EXPECTED  Pt. Aware of plan of care at this time. Able to communicate needs affectively as needed. Questions answered and understanding/learning verified by teach back method. Pt. Is now resting comfortably in bed and understands to use bell to voice concerns/needs as they arise. Will continue to monitor closely.     Problem: Safety  Goal: Will remain free from injury  Outcome: PROGRESSING AS EXPECTED  Pt. Has not had a fall this shift, oriented to unit/surroundings, calls for assistance prior to ambulation, call bell/belongings with in reach. VSS. Will continue to monitor closely.

## 2017-02-01 NOTE — DISCHARGE INSTRUCTIONS
Discharge Instructions    Discharged to home by bus with self. Discharged via walking, hospital escort: Refused.  Special equipment needed: Cane (has personal cane with her)    Be sure to schedule a follow-up appointment with your primary care doctor or any specialists as instructed.     Discharge Plan:   Influenza Vaccine Indication: Patient Refuses    I understand that a diet low in cholesterol, fat, and sodium is recommended for good health. Unless I have been given specific instructions below for another diet, I accept this instruction as my diet prescription.   Other diet: diabetic    Special Instructions: None    · Is patient discharged on Warfarin / Coumadin?   No     · Is patient Post Blood Transfusion?  Yes  POST BLOOD TRANSFUSION INFORMATION (ADULT)    The purpose of blood transfusion is to correct anemia, low levels of blood clotting factors or to correct acute blood loss.   Blood transfusion is very safe but occasionally unexpected adverse reactions do occur. Most adverse reactions occur during transfusion, within one to two days following transfusion or one to two weeks following transfusion. Some adverse reactions can occur one to six months after transfusion and some even years later.             If any of the symptoms listed below happen to you during your transfusion,                                 please notify your nurse immediately.   · Fever or Chills  · Flushing of the Face  · Hives, rash or itching  · Difficulty in breathing or shortness of breath  · Pain or oozing of blood from the IV needle site  · Low back pain  · Nausea or vomiting  · Weakness or fainting  · Chest pain  · Blood in the urine  · Decreased frequency of urination    The above symptoms may also occur within 24 to 48 after transfusion; if so, notify your physician.     · Yellowing of the skin    This can happen one to six months after transfusion; if so, notify your physician    Depression / Suicide Risk    As you are  discharged from this [facility] facility, it is important to learn how to keep safe from harming yourself.    Recognize the warning signs:  · Abrupt changes in personality, positive or negative- including increase in energy   · Giving away possessions  · Change in eating patterns- significant weight changes-  positive or negative  · Change in sleeping patterns- unable to sleep or sleeping all the time   · Unwillingness or inability to communicate  · Depression  · Unusual sadness, discouragement and loneliness  · Talk of wanting to die  · Neglect of personal appearance   · Rebelliousness- reckless behavior  · Withdrawal from people/activities they love  · Confusion- inability to concentrate     If you or a loved one observes any of these behaviors or has concerns about self-harm, here's what you can do:  · Talk about it- your feelings and reasons for harming yourself  · Remove any means that you might use to hurt yourself (examples: pills, rope, extension cords, firearm)  · Get professional help from the community (Mental Health, Substance Abuse, psychological counseling)  · Do not be alone:Call your Safe Contact- someone whom you trust who will be there for you.  · Call your local CRISIS HOTLINE 262-0376 or 874-069-7146  · Call your local Children's Mobile Crisis Response Team [place] (100) 465-5319 or www.Stockpile  · Call the toll free National Suicide Prevention Hotlines   · National Suicide Prevention Lifeline 512-629-FJWV (2459)  · National Hope Line Network 800-SUICIDE (595-3569)

## 2017-02-01 NOTE — THERAPY
"Physical Therapy Evaluation completed.   Bed Mobility:  Supine to Sit: Supervised  Transfers: Sit to Stand: Supervised  Gait: Level Of Assist: Contact Guard Assist with Single Point Cane       Plan of Care: Pt is at her baseline.  Pt has no further acute skilled PT needs at this time.  Recommend home health PT/OT  Discharge Recommendations: Equipment: No Equipment Needed. Post-acute therapy recommended after discharged home.    Pt is a pleasant 62 yo F who has ESRD and MDS who had an episode of chest pain resulting in coming to [facility].  Pt presents upon PT assessment at her baseline for funcitonal mobility.  Pt does have a Hx of falls.  Pt normally uses SPC and furniture surfs.  PT edcuated Pt on using FWW in the home for improved balance.  Pt is fearful of using FWW on ramp as she has fallen before.  Recommended Pt use rail on ramp as well as SPC when descending ramp.  Pt has no further acute skilled PT needs at this time as Pt is at her baseline level of funcitonal mobility.    See \"Rehab Therapy-Acute\" Patient Summary Report for complete documentation.     "

## 2017-02-02 ENCOUNTER — PATIENT OUTREACH (OUTPATIENT)
Dept: HEALTH INFORMATION MANAGEMENT | Facility: OTHER | Age: 62
End: 2017-02-02

## 2017-02-02 NOTE — PROGRESS NOTES
02/02/2017 1127 - Discharge Outreach attempt -   02/02/2017 1241 - Discharge Outreach second attempt - received VM from patient, but did not answer when I called back. LM

## 2017-02-03 NOTE — DISCHARGE SUMMARY
DATE OF ADMISSION:  2017    DATE OF DISCHARGE:  2017    DISCHARGE DIAGNOSES:  At the time of discharge, the patient had the following   diagnoses includin.  Chest pain, resolved.  2.  Abdominal pain, resolved.  3.  End-stage renal disease, on hemodialysis.  4.  Myelodysplastic syndrome.  5.  Chronic anemia requiring packed red blood cell transfusions.  6.  Chronic respiratory failure with hypoxemia on 2 L per minute home oxygen   via nasal cannula at home.  7.   history of diabetes mellitus type 2 and diabetic neuropathy.    CONSULTANTS ON THE CASE:  Including nephrologist, Dr. Lion Bennett.    PERTINENT IMAGING AND PROCEDURES:  Here in hospital, patient had the following   done:  1.  The patient has a chest x-ray, 1-view, that was done on 2017 and the   chest x-ray findings were cardiac contours prominent stable, no focal   pulmonary consolidation, no pleural fluid collection or pneumothorax.  The   patient had degenerative changes of the thoracic spine, no acute   cardiopulmonary disease, stable cardiomegaly.  2.  The patient also had abdominal ultrasound that was performed and done   because the patient was complaining of right upper quadrant abdominal pain   with also lower abdominal pain.  Patient also reports some nauseous feeling.    This was performed on 2017, showing no evidence of any gallstone or   biliary ductal dilatation.  Liver is echogenic and consistent with fatty liver   changes,  and patient also had bilateral renal cysts.    SUMMARY OF HOSPITAL COURSE:  The patient is a 61-year-old female with a known   history of anemia with myelodysplastic syndrome as well as end-stage renal   disease.  The patient presented to the hospital because of patient's complain   is generalized weakness and chest pain and patient was found to be anemic and   actual hemoglobin is below 7.  Patient was given packed red blood cell   transfusion x1 unit which brought patient's hemoglobin  above 7.  The patient   also received hemodialysis; however, patient still complained of generalized   weakness.  Per nephrologist recommendation, the patient recommended additional   packed red blood cell transfusion the next day with allowing hemodialysis.    After 2 units of packed red blood cell transfusion, patient feels stronger and   ready to go home.  Please note that during hospital stay, patient also   reported some right upper quadrant abdominal pain as well as lower abdominal   pain, and patient does report some nausea.  Given the patient's history of   gallbladder, abdominal ultrasound was done, findings as mentioned above.    Patient also had liver function panel done that does not indicate any biliary   issues abnormality and does not have any elevation of liver enzymes, so   patient was deemed stable for discharge home.  Patient of note has   hyperglycemia, and patient does state that she is not on any medication for   her diabetes, and I have advised the patient that she needs to follow up with   her primary care provider within 1 week.  As the patient has visual   impairment, I have recommended to obtain a glucometer that reads the number   out loud.    DISCHARGE CONDITION:  Stable.    DISPOSITION:  Home.    ACTIVITY:  As tolerated.    DIET:  Cardiac diabetic diet.    DISCHARGE MEDICATIONS:  Include the following:  The patient is to resume her   home medications including carvedilol 25 mg 1 tablet by mouth twice daily,   Lyrica 50 mg 1 capsule by mouth twice daily.  Patient takes Lumigan eye drops   0.03% solution 1 drop to both eyes for her glaucoma, amlodipine 10 mg 1 tablet   by mouth q. daily, famotidine 20 mg 1 tablet by mouth b.i.d., and Norco 5/325 mg  1 tablet by mouth every 8 hours as needed for severe pain.   The patient was on Tylenol 325 mg 2 tablets by mouth as needed for   moderate pain, but recommended patient minimize this as the patient does have   findings of fatty liver.  Patient  also has a medication at home that she says   she is not currently taking, which is Revlimid 5 mg 1 capsule by mouth q.   daily.  I have recommended patient follow up with her hematologist to discuss   altered medications and follow up instructions.  The patient to follow up with   her primary care provider, Dr. Nyla Nava, in 1 week to discuss diabetic   treatments, and patient should also follow up with her hematologist for   further treatment and patient should continue her dialysis as scheduled.    Total discharge time was 32 minutes.       ____________________________________     DO DIANNE Galvan / RAMONE    DD:  02/01/2017 19:36:05  DT:  02/02/2017 21:07:16    D#:  [id number]  Job#:  [id number]    cc: Nyla Nava MD

## 2017-02-20 ENCOUNTER — RESOLUTE PROFESSIONAL BILLING HOSPITAL PROF FEE (OUTPATIENT)
Dept: HOSPITALIST | Facility: MEDICAL CENTER | Age: 62
End: 2017-02-20
Payer: MEDICARE

## 2017-02-20 ENCOUNTER — APPOINTMENT (OUTPATIENT)
Dept: RADIOLOGY | Facility: MEDICAL CENTER | Age: 62
End: 2017-02-20
Attending: EMERGENCY MEDICINE
Payer: MEDICARE

## 2017-02-20 ENCOUNTER — HOSPITAL ENCOUNTER (OUTPATIENT)
Facility: MEDICAL CENTER | Age: 62
End: 2017-02-22
Attending: EMERGENCY MEDICINE | Admitting: INTERNAL MEDICINE
Payer: MEDICARE

## 2017-02-20 DIAGNOSIS — D64.9 SYMPTOMATIC ANEMIA: ICD-10-CM

## 2017-02-20 PROBLEM — D46.9 MDS (MYELODYSPLASTIC SYNDROME) (HCC): Status: ACTIVE | Noted: 2017-02-20

## 2017-02-20 PROBLEM — I48.91 ATRIAL FIBRILLATION (HCC): Status: ACTIVE | Noted: 2017-02-20

## 2017-02-20 PROBLEM — I10 HTN (HYPERTENSION): Status: ACTIVE | Noted: 2017-02-20

## 2017-02-20 PROBLEM — R07.9 CHEST PAIN: Status: ACTIVE | Noted: 2017-02-20

## 2017-02-20 PROBLEM — R51.9 HEADACHE: Status: ACTIVE | Noted: 2017-02-20

## 2017-02-20 PROBLEM — E11.9 TYPE 2 DIABETES MELLITUS (HCC): Status: ACTIVE | Noted: 2017-02-20

## 2017-02-20 LAB
ABO GROUP BLD: NORMAL
ABO GROUP BLD: NORMAL
ALBUMIN SERPL BCP-MCNC: 3.3 G/DL (ref 3.2–4.9)
ALBUMIN/GLOB SERPL: 0.9 G/DL
ALP SERPL-CCNC: 70 U/L (ref 30–99)
ALT SERPL-CCNC: 10 U/L (ref 2–50)
ANION GAP SERPL CALC-SCNC: 15 MMOL/L (ref 0–11.9)
AST SERPL-CCNC: 12 U/L (ref 12–45)
BASOPHILS # BLD AUTO: 0.4 % (ref 0–1.8)
BASOPHILS # BLD: 0.02 K/UL (ref 0–0.12)
BILIRUB SERPL-MCNC: 0.3 MG/DL (ref 0.1–1.5)
BLD GP AB SCN SERPL QL: NORMAL
BNP SERPL-MCNC: 72 PG/ML (ref 0–100)
BUN SERPL-MCNC: 96 MG/DL (ref 8–22)
CALCIUM SERPL-MCNC: 7.2 MG/DL (ref 8.5–10.5)
CHLORIDE SERPL-SCNC: 101 MMOL/L (ref 96–112)
CO2 SERPL-SCNC: 20 MMOL/L (ref 20–33)
CREAT SERPL-MCNC: 7.36 MG/DL (ref 0.5–1.4)
EKG IMPRESSION: NORMAL
EOSINOPHIL # BLD AUTO: 0.18 K/UL (ref 0–0.51)
EOSINOPHIL NFR BLD: 3.3 % (ref 0–6.9)
ERYTHROCYTE [DISTWIDTH] IN BLOOD BY AUTOMATED COUNT: 49.3 FL (ref 35.9–50)
ERYTHROCYTE [DISTWIDTH] IN BLOOD BY AUTOMATED COUNT: 61 FL (ref 35.9–50)
ERYTHROCYTE [DISTWIDTH] IN BLOOD BY AUTOMATED COUNT: 62 FL (ref 35.9–50)
EST. AVERAGE GLUCOSE BLD GHB EST-MCNC: 194 MG/DL
FERRITIN SERPL-MCNC: 1236.3 NG/ML (ref 10–291)
GFR SERPL CREATININE-BSD FRML MDRD: 6 ML/MIN/1.73 M 2
GLOBULIN SER CALC-MCNC: 3.7 G/DL (ref 1.9–3.5)
GLUCOSE BLD-MCNC: 127 MG/DL (ref 65–99)
GLUCOSE BLD-MCNC: 307 MG/DL (ref 65–99)
GLUCOSE BLD-MCNC: 341 MG/DL (ref 65–99)
GLUCOSE SERPL-MCNC: 233 MG/DL (ref 65–99)
HBA1C MFR BLD: 8.4 % (ref 0–5.6)
HCT VFR BLD AUTO: 18.2 % (ref 37–47)
HCT VFR BLD AUTO: 19.4 % (ref 37–47)
HCT VFR BLD AUTO: 24.9 % (ref 37–47)
HGB BLD-MCNC: 6 G/DL (ref 12–16)
HGB BLD-MCNC: 6.6 G/DL (ref 12–16)
HGB BLD-MCNC: 8.7 G/DL (ref 12–16)
IMM GRANULOCYTES # BLD AUTO: 0.02 K/UL (ref 0–0.11)
IMM GRANULOCYTES NFR BLD AUTO: 0.4 % (ref 0–0.9)
IRON SATN MFR SERPL: 73 % (ref 15–55)
IRON SERPL-MCNC: 168 UG/DL (ref 40–170)
LYMPHOCYTES # BLD AUTO: 2.01 K/UL (ref 1–4.8)
LYMPHOCYTES NFR BLD: 36.5 % (ref 22–41)
MCH RBC QN AUTO: 32.1 PG (ref 27–33)
MCH RBC QN AUTO: 32.4 PG (ref 27–33)
MCH RBC QN AUTO: 33 PG (ref 27–33)
MCHC RBC AUTO-ENTMCNC: 33 G/DL (ref 33.6–35)
MCHC RBC AUTO-ENTMCNC: 34 G/DL (ref 33.6–35)
MCHC RBC AUTO-ENTMCNC: 34.9 G/DL (ref 33.6–35)
MCV RBC AUTO: 91.9 FL (ref 81.4–97.8)
MCV RBC AUTO: 97 FL (ref 81.4–97.8)
MCV RBC AUTO: 98.4 FL (ref 81.4–97.8)
MONOCYTES # BLD AUTO: 0.45 K/UL (ref 0–0.85)
MONOCYTES NFR BLD AUTO: 8.2 % (ref 0–13.4)
NEUTROPHILS # BLD AUTO: 2.83 K/UL (ref 2–7.15)
NEUTROPHILS NFR BLD: 51.2 % (ref 44–72)
NRBC # BLD AUTO: 0 K/UL
NRBC BLD AUTO-RTO: 0 /100 WBC
PLATELET # BLD AUTO: 296 K/UL (ref 164–446)
PLATELET # BLD AUTO: 301 K/UL (ref 164–446)
PLATELET # BLD AUTO: 322 K/UL (ref 164–446)
PMV BLD AUTO: 12.2 FL (ref 9–12.9)
PMV BLD AUTO: 12.9 FL (ref 9–12.9)
PMV BLD AUTO: 13 FL (ref 9–12.9)
POTASSIUM SERPL-SCNC: 5.3 MMOL/L (ref 3.6–5.5)
PROT SERPL-MCNC: 7 G/DL (ref 6–8.2)
RBC # BLD AUTO: 1.85 M/UL (ref 4.2–5.4)
RBC # BLD AUTO: 2 M/UL (ref 4.2–5.4)
RBC # BLD AUTO: 2.71 M/UL (ref 4.2–5.4)
RH BLD: NORMAL
SODIUM SERPL-SCNC: 136 MMOL/L (ref 135–145)
T4 FREE SERPL-MCNC: 0.79 NG/DL (ref 0.53–1.43)
TIBC SERPL-MCNC: 231 UG/DL (ref 250–450)
TROPONIN I SERPL-MCNC: <0.01 NG/ML (ref 0–0.04)
TSH SERPL DL<=0.005 MIU/L-ACNC: 4.05 UIU/ML (ref 0.3–3.7)
WBC # BLD AUTO: 3.9 K/UL (ref 4.8–10.8)
WBC # BLD AUTO: 5.2 K/UL (ref 4.8–10.8)
WBC # BLD AUTO: 5.5 K/UL (ref 4.8–10.8)

## 2017-02-20 PROCEDURE — 90935 HEMODIALYSIS ONE EVALUATION: CPT

## 2017-02-20 PROCEDURE — 700102 HCHG RX REV CODE 250 W/ 637 OVERRIDE(OP): Performed by: INTERNAL MEDICINE

## 2017-02-20 PROCEDURE — 304562 HCHG STAT O2 MASK/CANNULA

## 2017-02-20 PROCEDURE — 86850 RBC ANTIBODY SCREEN: CPT

## 2017-02-20 PROCEDURE — G0378 HOSPITAL OBSERVATION PER HR: HCPCS

## 2017-02-20 PROCEDURE — 83540 ASSAY OF IRON: CPT

## 2017-02-20 PROCEDURE — 36430 TRANSFUSION BLD/BLD COMPNT: CPT

## 2017-02-20 PROCEDURE — 83036 HEMOGLOBIN GLYCOSYLATED A1C: CPT

## 2017-02-20 PROCEDURE — A9270 NON-COVERED ITEM OR SERVICE: HCPCS | Performed by: INTERNAL MEDICINE

## 2017-02-20 PROCEDURE — 80053 COMPREHEN METABOLIC PANEL: CPT

## 2017-02-20 PROCEDURE — 85025 COMPLETE CBC W/AUTO DIFF WBC: CPT | Mod: 59

## 2017-02-20 PROCEDURE — 83550 IRON BINDING TEST: CPT

## 2017-02-20 PROCEDURE — P9016 RBC LEUKOCYTES REDUCED: HCPCS

## 2017-02-20 PROCEDURE — 84439 ASSAY OF FREE THYROXINE: CPT

## 2017-02-20 PROCEDURE — 86901 BLOOD TYPING SEROLOGIC RH(D): CPT

## 2017-02-20 PROCEDURE — 84484 ASSAY OF TROPONIN QUANT: CPT

## 2017-02-20 PROCEDURE — 93010 ELECTROCARDIOGRAM REPORT: CPT | Performed by: INTERNAL MEDICINE

## 2017-02-20 PROCEDURE — 82962 GLUCOSE BLOOD TEST: CPT

## 2017-02-20 PROCEDURE — 82728 ASSAY OF FERRITIN: CPT

## 2017-02-20 PROCEDURE — 93005 ELECTROCARDIOGRAM TRACING: CPT | Performed by: INTERNAL MEDICINE

## 2017-02-20 PROCEDURE — A9270 NON-COVERED ITEM OR SERVICE: HCPCS | Performed by: EMERGENCY MEDICINE

## 2017-02-20 PROCEDURE — 83880 ASSAY OF NATRIURETIC PEPTIDE: CPT

## 2017-02-20 PROCEDURE — 84443 ASSAY THYROID STIM HORMONE: CPT

## 2017-02-20 PROCEDURE — 86900 BLOOD TYPING SEROLOGIC ABO: CPT

## 2017-02-20 PROCEDURE — 36415 COLL VENOUS BLD VENIPUNCTURE: CPT

## 2017-02-20 PROCEDURE — 304561 HCHG STAT O2

## 2017-02-20 PROCEDURE — 85027 COMPLETE CBC AUTOMATED: CPT | Mod: 91

## 2017-02-20 PROCEDURE — 700102 HCHG RX REV CODE 250 W/ 637 OVERRIDE(OP): Performed by: EMERGENCY MEDICINE

## 2017-02-20 PROCEDURE — 70450 CT HEAD/BRAIN W/O DYE: CPT

## 2017-02-20 PROCEDURE — 99285 EMERGENCY DEPT VISIT HI MDM: CPT

## 2017-02-20 PROCEDURE — 86923 COMPATIBILITY TEST ELECTRIC: CPT

## 2017-02-20 RX ORDER — ONDANSETRON 2 MG/ML
4 INJECTION INTRAMUSCULAR; INTRAVENOUS EVERY 4 HOURS PRN
Status: DISCONTINUED | OUTPATIENT
Start: 2017-02-20 | End: 2017-02-22 | Stop reason: HOSPADM

## 2017-02-20 RX ORDER — ACETAMINOPHEN 650 MG/1
650 SUPPOSITORY RECTAL ONCE
Status: COMPLETED | OUTPATIENT
Start: 2017-02-20 | End: 2017-02-20

## 2017-02-20 RX ORDER — HEPARIN SODIUM 5000 [USP'U]/ML
5000 INJECTION, SOLUTION INTRAVENOUS; SUBCUTANEOUS EVERY 8 HOURS
Status: DISCONTINUED | OUTPATIENT
Start: 2017-02-20 | End: 2017-02-22 | Stop reason: HOSPADM

## 2017-02-20 RX ORDER — ACETAMINOPHEN 325 MG/1
650 TABLET ORAL EVERY 6 HOURS PRN
Status: DISCONTINUED | OUTPATIENT
Start: 2017-02-20 | End: 2017-02-22 | Stop reason: HOSPADM

## 2017-02-20 RX ORDER — ONDANSETRON 4 MG/1
4 TABLET, ORALLY DISINTEGRATING ORAL EVERY 4 HOURS PRN
Status: DISCONTINUED | OUTPATIENT
Start: 2017-02-20 | End: 2017-02-22 | Stop reason: HOSPADM

## 2017-02-20 RX ORDER — HYDROCODONE BITARTRATE AND ACETAMINOPHEN 7.5; 325 MG/1; MG/1
1-2 TABLET ORAL EVERY 6 HOURS PRN
Status: ON HOLD | COMMUNITY
End: 2017-03-20

## 2017-02-20 RX ORDER — CARVEDILOL 12.5 MG/1
12.5 TABLET ORAL 2 TIMES DAILY
Status: ON HOLD | COMMUNITY
End: 2017-11-16

## 2017-02-20 RX ORDER — PROMETHAZINE HYDROCHLORIDE 25 MG/1
12.5-25 SUPPOSITORY RECTAL EVERY 4 HOURS PRN
Status: DISCONTINUED | OUTPATIENT
Start: 2017-02-20 | End: 2017-02-22 | Stop reason: HOSPADM

## 2017-02-20 RX ORDER — SODIUM CHLORIDE 9 MG/ML
INJECTION, SOLUTION INTRAVENOUS
Status: ACTIVE
Start: 2017-02-20 | End: 2017-02-21

## 2017-02-20 RX ORDER — AMOXICILLIN 250 MG
1 CAPSULE ORAL
Status: DISCONTINUED | OUTPATIENT
Start: 2017-02-20 | End: 2017-02-22 | Stop reason: HOSPADM

## 2017-02-20 RX ORDER — AMOXICILLIN 250 MG
1 CAPSULE ORAL NIGHTLY
Status: DISCONTINUED | OUTPATIENT
Start: 2017-02-20 | End: 2017-02-22 | Stop reason: HOSPADM

## 2017-02-20 RX ORDER — PREGABALIN 150 MG/1
150 CAPSULE ORAL 4 TIMES DAILY
COMMUNITY

## 2017-02-20 RX ORDER — BIMATOPROST 0.3 MG/ML
1 SOLUTION/ DROPS OPHTHALMIC EVERY EVENING
COMMUNITY
End: 2017-03-19

## 2017-02-20 RX ORDER — DEXTROSE MONOHYDRATE 25 G/50ML
25 INJECTION, SOLUTION INTRAVENOUS
Status: DISCONTINUED | OUTPATIENT
Start: 2017-02-20 | End: 2017-02-22 | Stop reason: HOSPADM

## 2017-02-20 RX ORDER — PREGABALIN 100 MG/1
100 CAPSULE ORAL 2 TIMES DAILY
Status: DISCONTINUED | OUTPATIENT
Start: 2017-02-20 | End: 2017-02-22 | Stop reason: HOSPADM

## 2017-02-20 RX ORDER — AMLODIPINE BESYLATE 10 MG/1
10 TABLET ORAL DAILY
COMMUNITY
End: 2017-03-19

## 2017-02-20 RX ORDER — DOCUSATE SODIUM 100 MG/1
100 CAPSULE, LIQUID FILLED ORAL 2 TIMES DAILY
Status: DISCONTINUED | OUTPATIENT
Start: 2017-02-20 | End: 2017-02-22 | Stop reason: HOSPADM

## 2017-02-20 RX ORDER — BISACODYL 10 MG
10 SUPPOSITORY, RECTAL RECTAL
Status: DISCONTINUED | OUTPATIENT
Start: 2017-02-20 | End: 2017-02-22 | Stop reason: HOSPADM

## 2017-02-20 RX ORDER — PREGABALIN 100 MG/1
100 CAPSULE ORAL 2 TIMES DAILY
COMMUNITY
End: 2017-02-20

## 2017-02-20 RX ORDER — LACTULOSE 20 G/30ML
30 SOLUTION ORAL
Status: DISCONTINUED | OUTPATIENT
Start: 2017-02-20 | End: 2017-02-22 | Stop reason: HOSPADM

## 2017-02-20 RX ORDER — PROMETHAZINE HYDROCHLORIDE 25 MG/1
12.5-25 TABLET ORAL EVERY 4 HOURS PRN
Status: DISCONTINUED | OUTPATIENT
Start: 2017-02-20 | End: 2017-02-22 | Stop reason: HOSPADM

## 2017-02-20 RX ORDER — HYDROCODONE BITARTRATE AND ACETAMINOPHEN 7.5; 325 MG/1; MG/1
1-2 TABLET ORAL EVERY 6 HOURS PRN
Status: DISCONTINUED | OUTPATIENT
Start: 2017-02-20 | End: 2017-02-22 | Stop reason: HOSPADM

## 2017-02-20 RX ORDER — ENEMA 19; 7 G/133ML; G/133ML
1 ENEMA RECTAL
Status: CANCELLED | OUTPATIENT
Start: 2017-02-20

## 2017-02-20 RX ADMIN — PREGABALIN 100 MG: 100 CAPSULE ORAL at 10:07

## 2017-02-20 RX ADMIN — INSULIN LISPRO 4 UNITS: 100 INJECTION, SOLUTION INTRAVENOUS; SUBCUTANEOUS at 21:24

## 2017-02-20 RX ADMIN — ACETAMINOPHEN 650 MG: 650 SUPPOSITORY RECTAL at 03:55

## 2017-02-20 RX ADMIN — PREGABALIN 100 MG: 100 CAPSULE ORAL at 21:24

## 2017-02-20 ASSESSMENT — ENCOUNTER SYMPTOMS
LOSS OF CONSCIOUSNESS: 0
VOMITING: 0
SPEECH CHANGE: 0
WHEEZING: 0
BLOOD IN STOOL: 0
SHORTNESS OF BREATH: 1
PHOTOPHOBIA: 0
CONSTIPATION: 0
WEAKNESS: 1
WEIGHT LOSS: 0
FALLS: 1
FEVER: 0
HEADACHES: 1
HEARTBURN: 0
BLURRED VISION: 1
PALPITATIONS: 1
COUGH: 0
FALLS: 0
ABDOMINAL PAIN: 0
NERVOUS/ANXIOUS: 0
SEIZURES: 0
TINGLING: 1
DIZZINESS: 1
SORE THROAT: 0
SENSORY CHANGE: 1
CHILLS: 0
DEPRESSION: 0
EYE PAIN: 0
FOCAL WEAKNESS: 0
NAUSEA: 1
DIARRHEA: 0
TREMORS: 1
DIAPHORESIS: 0
PALPITATIONS: 0

## 2017-02-20 ASSESSMENT — LIFESTYLE VARIABLES
EVER_SMOKED: NEVER
ALCOHOL_USE: NO

## 2017-02-20 ASSESSMENT — PAIN SCALES - GENERAL
PAINLEVEL_OUTOF10: 0
PAINLEVEL_OUTOF10: 8
PAINLEVEL_OUTOF10: 5
PAINLEVEL_OUTOF10: 0
PAINLEVEL_OUTOF10: 0

## 2017-02-20 NOTE — ED NOTES
Pt sleeping in bed on lt side, resp are even and unlabored, no distress noted, vss, waiting on ct results

## 2017-02-20 NOTE — DISCHARGE PLANNING
Patient didn't sign up for health insurance at work. Was not eligible for medicaid last vist.  Told about [facility] and [facility].Care Transition Team Assessment    Information Source  Orientation : Oriented x 4  Information Given By: Patient  Who is responsible for making decisions for patient? : Patient    Readmission Evaluation  Is this a readmission?: Yes - unplanned readmission  Why do you think you were readmitted?: didnt have medicine  Was an appointment arranged for you prior to discharge?: Yes, did not attend appointment  Why didn't you go to your appointment?: Other (comment)  Were there new prescriptions you were supposed to fill after you were discharged?: Yes, prescriptions filled  Did you understand your discharge instructions?: Yes  Did you have enough support after your last discharge?: Yes    Elopement Risk  Legal Hold: No  Ambulatory or Self Mobile in Wheelchair: No-Not an Elopement Risk    Interdisciplinary Discharge Planning  Does Admitting Nurse Feel This Could be a Complex Discharge?: No  Primary Care Physician: none  Lives with - Patient's Self Care Capacity: Parents  Support Systems: Family Member(s)  Housing / Facility: 1 Story Apartment / Condo  Do You Take your Prescribed Medications Regularly: No  Reasons Why Not Taking Medications : Didn't Refill Prescriptions   Able to Return to Previous ADL's: Yes  Mobility Issues: No  Prior Services: None  Patient Expects to be Discharged to::  (home)  Assistance Needed: Unknown at this Time  Durable Medical Equipment: Not Applicable    Discharge Preparedness  What is your plan after discharge?: Uncertain - pending medical team collaboration  What are your discharge supports?: Parent  Prior Functional Level: Ambulatory  Difficulity with ADLs: None  Difficulity with IADLs: None    Functional Assesment  Prior Functional Level: Ambulatory    Finances  Financial Barriers to Discharge: Yes  Prescription Coverage: No  Prescription Coverage Comments: out  of all meds    Vision / Hearing Impairment  Vision Impairment : No  Hearing Impairment : No    Values / Beliefs / Concerns  Values / Beliefs Concerns : No    Advance Directive  Advance Directive?: None  Advance Directive offered?: AD Booklet refused    Domestic Abuse  Have you ever been the victim of abuse or violence?: No         Discharge Risks or Barriers  Discharge risks or barriers?: No PCP, Uninsured / underinsured    Anticipated Discharge Information  Anticipated discharge disposition: Home  Discharge Address: face sheet

## 2017-02-20 NOTE — IP AVS SNAPSHOT
[employer] Access Code: [email or web address]  Expires: 3/24/2017  7:54 PM    Your email address is not on file at [facility].  Email Addresses are required for you to sign up for [employer], please contact [phone] to verify your personal information and to provide your email address prior to attempting to register for [employer].    Vielka Rachna  [address] [place] [address]    [employer]  A secure, online tool to manage your health information     [facility]’s [employer]® is a secure, online tool that connects you to your personalized health information from the privacy of your home -- day or night - making it very easy for you to manage your healthcare. Once the activation process is completed, you can even access your medical information using the [employer] sundar, which is available for free in the Apple Sundar store or Google Play store.     To learn more about [employer], visit [email or web address]    There are two levels of access available (as shown below):   My Chart Features  [facility] Primary Care Doctor [facility]  Specialists [facility]  Urgent  Care Non-[facility] Primary Care Doctor   Email your healthcare team securely and privately 24/7 X X X    Manage appointments: schedule your next appointment; view details of past/upcoming appointments X      Request prescription refills. X      View recent personal medical records, including lab and immunizations X X X X   View health record, including health history, allergies, medications X X X X   Read reports about your outpatient visits, procedures, consult and ER notes X X X X   See your discharge summary, which is a recap of your hospital and/or ER visit that includes your diagnosis, lab results, and care plan X X  X     How to register for [employer]:  Once your e-mail address has been verified, follow the following steps to sign up for [employer].     1. Go to  [email or web address]  2. Click on the Sign Up Now box, which takes you to the New Member Sign Up page. You will  need to provide the following information:  a. Enter your [employer] Access Code exactly as it appears at the top of this page. (You will not need to use this code after you’ve completed the sign-up process. If you do not sign up before the expiration date, you must request a new code.)   b. Enter your date of birth.   c. Enter your home email address.   d. Click Submit, and follow the next screen’s instructions.  3. Create a Sur[employer] ID. This will be your [employer] login ID and cannot be changed, so think of one that is secure and easy to remember.  4. Create a [employer] password. You can change your password at any time.  5. Enter your Password Reset Question and Answer. This can be used at a later time if you forget your password.   6. Enter your e-mail address. This allows you to receive e-mail notifications when new information is available in [employer].  7. Click Sign Up. You can now view your health information.    For assistance activating your [employer] account, call [phone]

## 2017-02-20 NOTE — IP AVS SNAPSHOT
" Home Care Instructions                                                                                                                  Name:Vielka Briscoe  Medical Record Number:[id number]  CSN: [id number]    YOB: 1955   Age: 61 y.o.  Sex: female  HT:1.473 m (4' 9.99\") WT: 65.6 kg (144 lb 10 oz)          Admit Date: 2/20/2017     Discharge Date:   Today's Date: 2/22/2017  Attending Doctor:  Frieda Carr                  Allergies:  Demerol; Morphine; Naprosyn; and Sulfa drugs            Discharge Instructions       Discharge Instructions    Discharged to home by taxi with self. Discharged via wheelchair, hospital escort: Yes.  Special equipment needed: Not Applicable    Be sure to schedule a follow-up appointment with your primary care doctor or any specialists as instructed.     Discharge Plan:   Diet Plan: Discussed  Activity Level: Discussed  Confirmed Follow up Appointment: Appointment Scheduled  Confirmed Symptoms Management: Discussed  Medication Reconciliation Updated: Yes  Influenza Vaccine Indication: Not indicated: Previously immunized this influenza season and > 8 years of age    I understand that a diet low in cholesterol, fat, and sodium is recommended for good health. Unless I have been given specific instructions below for another diet, I accept this instruction as my diet prescription.   Other diet: Renal    Special Instructions: None    · Is patient discharged on Warfarin / Coumadin?   No     · Is patient Post Blood Transfusion?  Yes  POST BLOOD TRANSFUSION INFORMATION (ADULT)    The purpose of blood transfusion is to correct anemia, low levels of blood clotting factors or to correct acute blood loss.   Blood transfusion is very safe but occasionally unexpected adverse reactions do occur. Most adverse reactions occur during transfusion, within one to two days following transfusion or one to two weeks following transfusion. Some adverse reactions can occur one to six months " after transfusion and some even years later.             If any of the symptoms listed below happen to you during your transfusion,                                 please notify your nurse immediately.   · Fever or Chills  · Flushing of the Face  · Hives, rash or itching  · Difficulty in breathing or shortness of breath  · Pain or oozing of blood from the IV needle site  · Low back pain  · Nausea or vomiting  · Weakness or fainting  · Chest pain  · Blood in the urine  · Decreased frequency of urination    The above symptoms may also occur within 24 to 48 after transfusion; if so, notify your physician.     · Yellowing of the skin    This can happen one to six months after transfusion; if so, notify your physician    Depression / Suicide Risk    As you are discharged from this [facility] facility, it is important to learn how to keep safe from harming yourself.    Recognize the warning signs:  · Abrupt changes in personality, positive or negative- including increase in energy   · Giving away possessions  · Change in eating patterns- significant weight changes-  positive or negative  · Change in sleeping patterns- unable to sleep or sleeping all the time   · Unwillingness or inability to communicate  · Depression  · Unusual sadness, discouragement and loneliness  · Talk of wanting to die  · Neglect of personal appearance   · Rebelliousness- reckless behavior  · Withdrawal from people/activities they love  · Confusion- inability to concentrate     If you or a loved one observes any of these behaviors or has concerns about self-harm, here's what you can do:  · Talk about it- your feelings and reasons for harming yourself  · Remove any means that you might use to hurt yourself (examples: pills, rope, extension cords, firearm)  · Get professional help from the community (Mental Health, Substance Abuse, psychological counseling)  · Do not be alone:Call your Safe Contact- someone whom you trust who will be there for  you.  · Call your local CRISIS HOTLINE 949-6083 or 772-978-3262  · Call your local Children's Mobile Crisis Response Team [place] (882) 214-7740 or www.[employer]  · Call the toll free National Suicide Prevention Hotlines   · National Suicide Prevention Lifeline 235-914-UANZ (4746)  · [place] Hope Line Network 800-SUICIDE (562-0463)           Discharge Medication Instructions:    Below are the medications your physician expects you to take upon discharge:    Review all your home medications and newly ordered medications with your doctor and/or pharmacist. Follow medication instructions as directed by your doctor and/or pharmacist.    Please keep your medication list with you and share with your physician.               Medication List      CONTINUE taking these medications        Instructions    amlodipine 10 MG Tabs   Commonly known as:  NORVASC    Take 10 mg by mouth every day. Indications: High Blood Pressure   Dose:  10 mg       bimatoprost 0.03 % Soln   Commonly known as:  LUMIGAN    Place 1 Drop in both eyes every evening.   Dose:  1 Drop       carvedilol 12.5 MG Tabs   Last time this was given:  12.5 mg on 2/22/2017  5:32 PM   Commonly known as:  COREG    Take 12.5 mg by mouth 2 times a day, with meals.   Dose:  12.5 mg       hydrocodone-acetaminophen 7.5-325 MG per tablet   Last time this was given:  1 Tab on 2/21/2017  1:41 AM   Commonly known as:  NORCO    Take 1-2 Tabs by mouth every 6 hours as needed.   Dose:  1-2 Tab       LYRICA 50 MG capsule   Last time this was given:  100 mg on 2/22/2017  8:30 AM   Generic drug:  pregabalin    Take 50 mg by mouth 2 times a day.   Dose:  50 mg               Instructions           Diet / Nutrition:    Follow any diet instructions given to you by your doctor or the dietician, including how much salt (sodium) you are allowed each day.    If you are overweight, talk to your doctor about a weight reduction plan.    Activity:    Remain physically active  following your doctor's instructions about exercise and activity.    Rest often.     Any time you become even a little tired or short of breath, SIT DOWN and rest.    Worsening Symptoms:    Report any of the following signs and symptoms to the doctor's office immediately:    *Pain of jaw, arm, or neck  *Chest pain not relieved by medication                               *Dizziness or loss of consciousness  *Difficulty breathing even when at rest   *More tired than usual                                       *Bleeding drainage or swelling of surgical site  *Swelling of feet, ankles, legs or stomach                 *Fever (>100ºF)  *Pink or blood tinged sputum  *Weight gain (3lbs/day or 5lbs /week)           *Shock from internal defibrillator (if applicable)  *Palpitations or irregular heartbeats                *Cool and/or numb extremities    Stroke Awareness    Common Risk Factors for Stroke include:    Age  Atrial Fibrillation  Carotid Artery Stenosis  Diabetes Mellitus  Excessive alcohol consumption  High blood pressure  Overweight   Physical inactivity  Smoking    Warning signs and symptoms of a stroke include:    *Sudden numbness or weakness of the face, arm or leg (especially on one side of the body).  *Sudden confusion, trouble speaking or understanding.  *Sudden trouble seeing in one or both eyes.  *Sudden trouble walking, dizziness, loss of balance or coordination.Sudden severe headache with no known cause.    It is very important to get treatment quickly when a stroke occurs. If you experience any of the above warning signs, call 911 immediately.                   Disclaimer         Quit Smoking / Tobacco Use:    I understand the use of any tobacco products increases my chance of suffering from future heart disease or stroke and could cause other illnesses which may shorten my life. Quitting the use of tobacco products is the single most important thing I can do to improve my health. For further information  on smoking / tobacco cessation call a Toll Free Quit Line at 1-859.283.2305 (*[employer]) or 1-242.528.5916 (American [employer] Association) or you can access the web based program at www.lungusa.org.    [place] Tobacco Users Help Line:  (107) 611-3255       Toll Free: 1-232.427.1818  Quit Tobacco Program [facility] (482)[phone]    Crisis Hotline:    [place] Crisis Hotline:  4-013-IWEMVSF or 1-670.222.3891    [place] Crisis Hotline:    1-938.179.8623 or 172-966-8234    Discharge Survey:   Thank you for choosing [facility]. We hope we did everything we could to make your hospital stay a pleasant one. You may be receiving a phone survey and we would appreciate your time and participation in answering the questions. Your input is very valuable to us in our efforts to improve our service to our patients and their families.        My signature on this form indicates that:    1. I have reviewed and understand the above information.  2. My questions regarding this information have been answered to my satisfaction.  3. I have formulated a plan with my discharge nurse to obtain my prescribed medications for home.                  Disclaimer         __________________________________                     __________       ________                       Patient Signature                                                 Date                    Time

## 2017-02-20 NOTE — LETTER
[facility] ([facility]) - [id number]  EHR eReferral Notification Requirements    To be sent by secure email to [email or web address] or   by fax to [phone]       FIELDS ARE REQUIRED TO BE COMPLETED     Patient Name:  Vielka Briscoe  MRN:  [id number]   Account Number: [id number]                YOB: 1955    Date Roomed in ED:    2/20/2017   2:42 AM  Date First Observation Order Placed: 2/20/2017   7:41 AM  Date First Inpatient Order Placed:        Date of Previous Admission (Needed For Readmission Reviews Only):     Discharge Date and Time (if applicable): No discharge date for patient encounter.     PLEASE CHECK OFF TYPE OF REVIEW & CURRENT ADMISSION STATUS FROM LISTS BELOW  Type of Review:  Admission review    Dates to be Reviewed: 2/20/17        Current Admission Status:  Observation-Outpatient    / Contact Number for EHR outcome/recommendation call:    Soha Del Real RN  [phone]     Attending Physician/ Contact Number (if not the same as in electronic record):  Dr. Carr [phone]     Comments:  Chart to be faxed      Additional Information being Emailed or Faxed:  yes       Fax Handwritten Supporting Documents to EHR at [phone]      [address] [address]  [phone]  [email or web address]    Updated December 17, 2014

## 2017-02-20 NOTE — SENIOR ADMIT NOTE
Senior Note:    Patient is a 61 y.o woman with pmhx ESRD(MWF), COPD presented to the ED for generalize fatigue.   Symptoms started since Friday(4 days ago) after dialysis. Weakness. Lightheadedness . She been having headache since last night. She noticed some blurry vision as well. Feeling cold.  Feel symptoms are gradually worsening.    Left sided chest pain, sharp. She was resting at the time. Occur every 30 minutes. Lasted 4 hours. Intermittent. No change with exertion or breathing. Associated some dspnea. No episodes of vomiting of blood, no blood stools.     Nephrology: Dr. Berger   Oncology: Revlimid    MPI:  NUCLEAR IMAGING INTERPRETATION   Small infarct distal inferolateral LV wall extending to apex.  Better     perfusion at stress.   No ischemia.   LVE.   Normal left ventricular ejection fraction, and wall motion.    A/P  Symptomatic anemia. Most likely anemia of chronic disease from ESRD. Hx of MDS. Her hgb goal is 8.5-9 per oncology and nephrology. Last hgb feb 1st 2017 was 6.3-7.2. s/p 1 units prbc.   Myelodysplastic syndrome: transfusion dependent.  Atypical chest pain. Initial trop negative. Recent stress test 12/2016, which was negative.   ESRD. M/W/F. Left AVF.  Renal osteodystrophy.  Atrial fibrillation.   Peripheral vascular disease.    Hx HTN. BP low today  DMII.  -f/u tsh, cpk  -hold bp meds  -nephrology consult, needs dialysis today   -trend trop  -iron, b12, folate, tsh, normal last admission.

## 2017-02-20 NOTE — CONSULTS
DATE OF SERVICE:  02/20/2017    REASON FOR CONSULTATION:  Inpatient dialysis management.    CHIEF COMPLAINT:  The patient has been feeling shortness of breath and weak   for the past weeks.    HISTORY OF PRESENT ILLNESS:  The patient is a 61-year-old female PMH ERSD MWF   iHD, hypertension, type 2 diabetes mellitus, hyperlipidemia, anemia of CKD,   renal osteodystrophy, MDS, and history of stroke, who presents to the   emergency room with a 4-5 day history of worsening shortness of breath,   dizziness and generalized weakness.  She describes the dizziness as more of a   lightheadedness with sensation that she is loosing her balance with the   sensation that she is about to fall, however she has sustained any actual   falls today.  She states her symptoms significantly worsened overnight, when   she developed a right-sided headaches at 6 p.m. along with nausea and weakness   to the point where she was having difficulty ambulating just to the restroom.    She also had any episode of paresthesia on the right hand, which resolved by   the time she came to the ED.  She also has chronic right-sided chest pain and   neck pain from prior cardiac surgeries that have activated because of that.    She is on 2 liters of O2 at baseline at home and had to increase it recently   because of acute worsening.  She came to the ED for further evaluation.  She   states that she had to have a blood transfusion at the end of the January and   first week of February.  For similar type of symptoms were her hemoglobin was   found to be low.  In the ED, she was found to have a hemoglobin of 6 and so   she was admitted to the hospital for transfusion and further evaluation and   management.  She otherwise denies any fevers, chills, abdominal pain, melena,   hematochezia or hematemesis.  No change in the appetite or vision.    REVIEW OF SYSTEMS:  As per HPI, otherwise negative for AMA/CMS criteria.    PAST MEDICAL HISTORY:  1.  End stage renal  disease MWF iHD.  2.  Hypertension.  3.  Type 2 diabetes mellitus.  4.  Anemia of CKD.  5.  Renal osteodystrophy.  6.  Chronic obstructive pulmonary disease.  7.  Chronic right sided chest pain.  8.  CAD.  9.  Myelodysplastic syndrome.  10.  History of stroke.  11.  Hyperlipidemia.    PAST SURGICAL HISTORY:  1.  Cardiac surgery.  2.  Abdominal surgery.  3.  AV fistula placement.    SOCIAL HISTORY:  1.  Denies current or past tobacco use.  2.  Denies current Etoh use.  3.  Denies current illicit drug use.    FAMILY HISTORY:  Reviewed and noncontributory to her current illness.    HOME MEDICATIONS:  Reviewed and documented in chart.    ALLERGIES:  1.  DEMEROL, REACTION UNKNOWN.  2.  MORPHINE, REACTION UNKNOWN.  3.  NAPROSYN, REACTION UNKNOWN.  4.  SULFA, REACTION UNKNOWN.    PHYSICAL EXAMINATION:  VITAL SIGNS:  Blood pressure 141/54, pulse 67, respiratory rate 20,   temperature 98.2, pulse oximetry 100% on 2 liters nasal cannulae.  Height   147.3 cm, weight 60.4 kilograms.  BMI 27.84.  GENERAL:  The patient is a thin female, in no apparent distress.  HEENT:  Normocephalic, atraumatic.  No sclerae icterus.  Conjunctiva normal.    OP clear.  NECK:  Supple and nontender.  CARDIOVASCULAR:  Regular rate and rhythm.  No rubs or gallops.  LUNGS:  Clear to auscultation bilaterally.  No wheezes or rales.  ABDOMEN:  Soft and nontender.  Nondistended.  Positive bowel sounds.  EXTREMITIES:  No clubbing, cyanosis or edema.  SKIN:  Warm and dry.  NEUROLOGIC:  Alert and oriented, no focal deficits.  PSYCHIATRIC:  The patient is appropriate and cooperative.    DIAGNOSTIC IMAGING:  CT of the head without contrast showed no acute   abnormalities.    DIAGNOSTIC LABORATORY DATA:  WBC 5.2, hemoglobin 6.0, hematocrit 18.2,   platelet 296, sodium 136, potassium 5.3, chloride 101, CO2 20, BUN 96,   creatinine 7.4, glucose 233, calcium 7.2, total protein 7.0, albumin 3.3,   total bilirubin 0.3, AST 12, ALT 10, ALP 70, iron 168, TIBC 231,  T-sat 73%,   troponin I negative x2, BMP of 72, ferritin 1236, TSH 4.1, free T4 is 0.79.    IMPRESSION:  1.  End stage renal disease, etiology likely secondary to HTN/DM.  2.  Acute on chronic anemia.  3.  Hypertension.  4.  Type 2 diabetes mellitus.  5.  Renal osteodystrophy.  6.  Hyperlipidemia.  7.  CAD.  8.  COPD.    ASSESSMENT:  1.  GFR:  HP dependent.  2.  Urine:  oliguria.  3.  Volume:  Appears euvolemic.  4.  BP:  Goal less than 140/90, currently at goal.  5.  No significant acid based disturbance noted.  6.  Electrolytes:  Stable.  7.  Anemia:  Goal hemoglobin 10 to 11.  8.  MBD:  Calcium normal, PO4 unavailable.  9.  Dialysis access:  AV fistula, positive thrill/bruit.    PLAN:  1.  We will continue her hemodialysis today as part of her routine scheduled.  2.  We will continue MWF IHD during her inpatient stay.  3.  Agree with transfusion of at least 2 units PRBCs with the dialysis today.  4.  Iron panel.  5.  Obtain PTH, vitamin D, phosphorus, in the a.m.  6.  Dose all medications per ERSD guideline.  7.  We will follow the patient with you closely.    Thank you for this consultation.       ____________________________________     MD AZUL Rao / RAMONE    DD:  02/20/2017 11:50:59  DT:  02/20/2017 12:52:59    D#:  [id number]  Job#:  [id number]

## 2017-02-20 NOTE — PROGRESS NOTES
Patient up from ED, report received. Patient ambulated from [facility] to bed tolerated well. Patient placed on tele monitor, monitor room notified. Vital signs obtained, stable. Physical assessment performed. Patient is A&O X 4, declines pain at this time. Bed alarm on.  Patient oriented to room and unit routine. Admit profile complete. Patient declines further needs at this time. Will continue to monitor for safety and comfort.

## 2017-02-20 NOTE — PROGRESS NOTES
Patient to dialysis. Sent with 1 unit PRBCs at dialysis RN's request. Consent sent with patient. Monitor room notified.

## 2017-02-20 NOTE — ED PROVIDER NOTES
ED Provider Note    Scribed for Dori Corado M.D. by Radha Vail. 2/20/2017, 2:58 AM.    Primary care provider: Pcp Pt States None  Means of arrival: Ambulance  History obtained from: Patient  History limited by: None    CHIEF COMPLAINT  Chief Complaint   Patient presents with   • Weakness     pt with history of renal failure and states she feels weak and tired like when she has to get blood transfusions generalized weakness noted   • Headache     rt sided headache started at 6pm tonight   • Neck Pain       HPI  Vielka Merchant is a 61 y.o. female who presents to the Emergency Department after being brought in by ambulance for dizziness and weakness onset 4 days ago. The patient describes the dizziness as lightheaded and states she lost her balance several times with near ground level falls due to the weakness. She states symptoms significantly worsened last night when she suddenly developed right-sided headache at approximately 6PM with worsened weakness, nausea, and difficulty ambulating to the restroom. Patient states she had an episode of paraesthesia of right hand earlier tonight. She denies any fevers or vomiting. Patient reports chronic right-sided chest pains and right neck pain due to prior cardiac surgery and no relief from Tylenol. She also reports of chronic shortness of breath due to history of COPD and is on daily oxygen. She states she took her oxygen levels today due to worsened shortness of breath, which was 80%. The patient has past history of diabetes.     REVIEW OF SYSTEMS  Pertinent positives include dizziness, weakness, right hand paraesthesia, right-sided headache, nausea, chronic right-sided chest pains, and chronic shortness of breath. Pertinent negatives include no fever or vomiting.  All other systems reviewed and negative. C.     PAST MEDICAL HISTORY   has a past medical history of Heart Abnormalities; Angina; and Diabetes. COPD. Myelodysplastic syndrome    SURGICAL HISTORY   has past  "surgical history that includes other cardiac surgery and other abdominal surgery.    SOCIAL HISTORY  Social History   Substance Use Topics   • Smoking status: Never Smoker    • Alcohol Use: No      History   Drug Use No     FAMILY HISTORY  No family history noted    CURRENT MEDICATIONS  Home Medications     Reviewed by Eneida Hassan R.N. (Registered Nurse) on 02/20/17 at 0252  Med List Status: Partial    Medication Last Dose Status    amlodipine (NORVASC) 10 MG Tab 2/19/2017 Active    carvedilol (COREG) 12.5 MG Tab 2/19/2017 Active    hydrocodone-acetaminophen (NORCO) 7.5-325 MG per tablet 2/19/2017 Active    pregabalin (LYRICA) 100 MG Cap 2/19/2017 Active                ALLERGIES  Allergies   Allergen Reactions   • Demerol    • Morphine    • Naprosyn [Naproxen]    • Sulfa Drugs        PHYSICAL EXAM  Vital Signs: /52 mmHg  Pulse 77  Temp(Src) 37 °C (98.6 °F)  Resp 18  Ht 1.473 m (4' 9.99\")  Wt 60.4 kg (133 lb 2.5 oz)  BMI 27.84 kg/m2  Constitutional: Alert, no acute distress  HENT: Normocephalic, atraumatic, moist mucus membranes  Eyes: Pupils equal and reactive, normal conjunctiva, non-icteric  Neck: Supple, normal range of motion, no stridor  Cardiovascular: Regular rhythm, Normal peripheral perfusion, no cyanosis, Normal cardiac auscultation  Pulmonary: No respiratory distress, normal work of breathing, no accessory muscle usage, Clear to auscultation bilaterally, oxygenation 99% on 2 L nasal cannula  Abdomen: Soft, non tender, no peritoneal signs, bowel sounds are present.   Skin: Warm, dry, no rashes or lesions  Back: No pain with active range of motion  Musculoskeletal: Normal range of motion in all extremities, no swelling or deformity noted  Neurologic: Mild tremor to hands, CN II-XII intact, speech normal, muscle strength 5/5 in all four extremities, normal  strength bilaterally, sensation grossly intact, normal finger-to-nose, no pronator drift, 2+ brachial tendon reflexes present " and equal bilaterally.  Psychiatric: Normal and appropriate mood and affect    DIAGNOSTIC STUDIES/PROCEDURES:    LABS  Labs Reviewed   CBC WITH DIFFERENTIAL - Abnormal; Notable for the following:     RBC 2.00 (*)     Hemoglobin 6.6 (*)     Hematocrit 19.4 (*)     RDW 62.0 (*)     MPV 13.0 (*)     All other components within normal limits   COMP METABOLIC PANEL - Abnormal; Notable for the following:     Anion Gap 15.0 (*)     Glucose 233 (*)     Bun 96 (*)     Creatinine 7.36 (*)     Calcium 7.2 (*)     Globulin 3.7 (*)     All other components within normal limits   ESTIMATED GFR - Abnormal; Notable for the following:     GFR If  7 (*)     GFR If Non  6 (*)     All other components within normal limits   TROPONIN   BTYPE NATRIURETIC PEPTIDE   COD (ADULT)   TSH WITH REFLEX TO FT4   IRON/TOTAL IRON BIND   FERRITIN   TRANSFUSE RED BLOOD CELLS-NURSING COMMUNICATION   All labs reviewed by me.    Radiology results revealed:   CT-HEAD W/O   Final Result         NO ACUTE ABNORMALITIES ARE NOTED ON CT SCAN OF THE HEAD.      Findings are consistent with atrophy.  Decreased attenuation in the periventricular white matter likely indicates microvascular ischemic disease.         COURSE & MEDICAL DECISION MAKING  Pertinent Labs & Imaging studies reviewed. (See chart for details)    Review of old medical records for continuity of care. Patient does have medical records under different MRN. Records can also be found under [id number]. Patient admitted 1/30/17, discharged 2/1/17. Presented with generalized weakness and chest pain. Found to be anemic with hemoglobin below 7. Transfused 1 unit which brought her hemoglobin above 7. Received hemodialysis, still complained of generalized weakness. Per nephrologist recommendation additional transfusion of 2 units packed red blood cells were given causing the patient to feel improved and stable for discharge home.    Patient's nephrologist is Dr. Berger. She  received dialysis Monday, Wednesday, Friday, last dialysis was Friday    Differential diagnoses include but are not limited to: Symptomatic anemia, fluid overload, underlying infection, heart failure, pulmonary edema, electrolyte abnormality    3:29 AM - Patient seen and examined at bedside. Patient will be treated with Tylenol 650mg suppository. Ordered CT-head, BNP, CBC, CMP, and troponin to evaluate her symptoms.     Decision Making:  This is a 61 y.o. year old female who presents with history and physical exam as documented above. She does have a normal neurologic exam excepting a very mild tremor. No focal deficits. Normal cardiopulmonary and abdominal exam.    On laboratory evaluation she does have anemia with hemoglobin of 6.6. Potassium is within normal limits, no significant electrolyte abnormalities. Glucose is mildly elevated at 233 with anion gap of 15. Troponin undetectable, BNP is normal without evidence of heart failure. CT head ordered as the patient did say she had a headache with paresthesias, this was negative for acute process.    Plan at this time is for admission to hospitalist service for transfusion. She did require recent transfusion approximately one month ago. Case discussed with [facility] who kindly agreed to admit the patient. Additionally I did discuss the case with Dr. Perez, on call for Dr. Berger, who kindly agrees to evaluate the patient today for routine dialysis.    Admit to [facility] in guarded condition    FINAL IMPRESSION  1. Symptomatic anemia          Radha KENNY (Ervin), am scribing for, and in the presence of, Dori Corado M.D..    Electronically signed by: Radha Vail (Ervin), 2/20/2017    Dori KENNY M.D. personally performed the services described in this documentation, as scribed by Radha Vail in my presence, and it is both accurate and complete.    The note accurately reflects work and decisions made by me.  Dori Corado   2/20/2017  7:42 AM

## 2017-02-20 NOTE — H&P
[facility] Admitting History and Physical    Name Vielka Briscoe 1955   Age/Sex 61 y.o. female   MRN [id number]   Code Status Full     After 5PM or if no immediate response to page, please call for cross-coverage  Attending/Team: Dr. Carr/RAUDEL Blue Call [phone] to page   1st Call - Day Intern (R1):   Dr. Gillespie  2nd Call - Day Sr. Resident (R2/R3):   Dr. Rojo       Chief Complaint:  Increased weakness     HPI:  Pt is a 61 year old woman with past medical history of ESRD on MWF dialysis, MDS, COPD, DM type II, HTN, CAD, and anemia of CKD, who presented to the ER for symptoms that began on Friday () after her last dialysis session. She states that she has felt weak, dizzy, had increased cold intolerance, increased SOB, and CP since Friday, and has since been worsening. She describes the CP as intermittent, sharp and left sided. She has been admitted and had a cardio work up many times in the past for similar pain. She had a negative nuclear medicine cardiac stress test on 16. She also noticed a headache and vision changes which started last night. She states that she feels very similar to previous times when she has needed a transfusion. She was last admitted on  for similar symptoms. During the hospitalization she received 2 units of PRBCs due to a hgb of 6.6. She was discharged on . She denies any hematochezia, melena, hematochezia, or hematuria.     Review of Systems   Constitutional: Negative for fever and chills.   HENT: Negative for congestion and sore throat.    Eyes: Positive for blurred vision.   Respiratory: Positive for shortness of breath. Negative for cough.    Cardiovascular: Positive for chest pain and palpitations. Negative for leg swelling.   Gastrointestinal: Positive for nausea. Negative for vomiting, abdominal pain, diarrhea, constipation, blood in stool and melena.   Genitourinary: Negative for dysuria, frequency and hematuria.    Musculoskeletal: Positive for falls.   Neurological: Positive for dizziness, weakness and headaches. Negative for focal weakness and loss of consciousness.             Past Medical History:   Past Medical History   Diagnosis Date   • Heart Abnormalities    • Angina    • Diabetes        Past Surgical History:  Past Surgical History   Procedure Laterality Date   • Other cardiac surgery       Angioplasty  and    • Other abdominal surgery       appendectomy,  x 2, hysterectomy, D & C       Current Outpatient Medications:  Home Medications     Reviewed by Sun Pearson (Pharmacy Tech) on 17 at 0757  Med List Status: Complete    Medication Last Dose Status    amlodipine (NORVASC) 10 MG Tab 2017 Active    bimatoprost (LUMIGAN) 0.03 % Solution 2017 Active    carvedilol (COREG) 12.5 MG Tab 2017 Active    hydrocodone-acetaminophen (NORCO) 7.5-325 MG per tablet 2017 Active    pregabalin (LYRICA) 50 MG capsule 2017 Active                Medication Allergy/Sensitivities:  Allergies   Allergen Reactions   • Demerol    • Morphine    • Naprosyn [Naproxen]    • Sulfa Drugs          Family History:  No family history on file.    Social History:  Social History     Social History   • Marital Status:      Spouse Name: N/A   • Number of Children: N/A   • Years of Education: N/A     Occupational History   • Not on file.     Social History Main Topics   • Smoking status: Never Smoker    • Smokeless tobacco: Not on file   • Alcohol Use: No   • Drug Use: No   • Sexual Activity: Not on file     Other Topics Concern   • Not on file     Social History Narrative   • No narrative on file     Living situation: Lives with daughter and son in law      Physical Exam     Filed Vitals:    17 0946 17 1137 17 1320 17 1335   BP: 141/54 111/46 136/64 122/55   Pulse: 67 65 66 66   Temp: 36.8 °C (98.2 °F) 36.6 °C (97.8 °F) 36.6 °C (97.9 °F) 36.4 °C (97.5 °F)   Resp:  "20 20 20 18   Height:       Weight:       SpO2: 100% 100% 98% 97%     Body mass index is 27.84 kg/(m^2).  /55 mmHg  Pulse 66  Temp(Src) 36.4 °C (97.5 °F)  Resp 18  Ht 1.473 m (4' 9.99\")  Wt 60.4 kg (133 lb 2.5 oz)  BMI 27.84 kg/m2  SpO2 97%  Breastfeeding? No  O2 therapy: Pulse Oximetry: 97 %, O2 (LPM): 2, O2 Delivery: Nasal Cannula    Physical Exam   Constitutional: She is oriented to person, place, and time and well-developed, well-nourished, and in no distress. She does not have a sickly appearance.   HENT:   Head: Normocephalic and atraumatic.   Eyes: EOM are normal. Pupils are equal, round, and reactive to light. No scleral icterus.   Conjunctival pallor   Neck: No JVD present.   Cardiovascular: Normal rate, regular rhythm, S1 normal, S2 normal and intact distal pulses.  Exam reveals no gallop.    No murmur heard.  Pulmonary/Chest: Effort normal and breath sounds normal.   Abdominal: Soft. Bowel sounds are normal. She exhibits no distension. There is no tenderness. There is no rebound and no guarding.   Musculoskeletal: Normal range of motion. She exhibits no edema or tenderness.   UE fistula with good thrill   Neurological: She is alert and oriented to person, place, and time. No cranial nerve deficit.             Data Review       Old Records Request:   Completed  Current Records review and summary: Completed    Lab Data Review:  Recent Results (from the past 24 hour(s))   CBC WITH DIFFERENTIAL    Collection Time: 02/20/17  3:00 AM   Result Value Ref Range    WBC 5.5 4.8 - 10.8 K/uL    RBC 2.00 (L) 4.20 - 5.40 M/uL    Hemoglobin 6.6 (L) 12.0 - 16.0 g/dL    Hematocrit 19.4 (L) 37.0 - 47.0 %    MCV 97.0 81.4 - 97.8 fL    MCH 33.0 27.0 - 33.0 pg    MCHC 34.0 33.6 - 35.0 g/dL    RDW 62.0 (H) 35.9 - 50.0 fL    Platelet Count 322 164 - 446 K/uL    MPV 13.0 (H) 9.0 - 12.9 fL    Neutrophils-Polys 51.20 44.00 - 72.00 %    Lymphocytes 36.50 22.00 - 41.00 %    Monocytes 8.20 0.00 - 13.40 %    Eosinophils " 3.30 0.00 - 6.90 %    Basophils 0.40 0.00 - 1.80 %    Immature Granulocytes 0.40 0.00 - 0.90 %    Nucleated RBC 0.00 /100 WBC    Neutrophils (Absolute) 2.83 2.00 - 7.15 K/uL    Lymphs (Absolute) 2.01 1.00 - 4.80 K/uL    Monos (Absolute) 0.45 0.00 - 0.85 K/uL    Eos (Absolute) 0.18 0.00 - 0.51 K/uL    Baso (Absolute) 0.02 0.00 - 0.12 K/uL    Immature Granulocytes (abs) 0.02 0.00 - 0.11 K/uL    NRBC (Absolute) 0.00 K/uL   COMP METABOLIC PANEL    Collection Time: 02/20/17  3:00 AM   Result Value Ref Range    Sodium 136 135 - 145 mmol/L    Potassium 5.3 3.6 - 5.5 mmol/L    Chloride 101 96 - 112 mmol/L    Co2 20 20 - 33 mmol/L    Anion Gap 15.0 (H) 0.0 - 11.9    Glucose 233 (H) 65 - 99 mg/dL    Bun 96 (HH) 8 - 22 mg/dL    Creatinine 7.36 (HH) 0.50 - 1.40 mg/dL    Calcium 7.2 (L) 8.5 - 10.5 mg/dL    AST(SGOT) 12 12 - 45 U/L    ALT(SGPT) 10 2 - 50 U/L    Alkaline Phosphatase 70 30 - 99 U/L    Total Bilirubin 0.3 0.1 - 1.5 mg/dL    Albumin 3.3 3.2 - 4.9 g/dL    Total Protein 7.0 6.0 - 8.2 g/dL    Globulin 3.7 (H) 1.9 - 3.5 g/dL    A-G Ratio 0.9 g/dL   TROPONIN    Collection Time: 02/20/17  3:00 AM   Result Value Ref Range    Troponin I <0.01 0.00 - 0.04 ng/mL   BTYPE NATRIURETIC PEPTIDE    Collection Time: 02/20/17  3:00 AM   Result Value Ref Range    B Natriuretic Peptide 72 0 - 100 pg/mL   ESTIMATED GFR    Collection Time: 02/20/17  3:00 AM   Result Value Ref Range    GFR If  7 (A) >60 mL/min/1.73 m 2    GFR If Non African American 6 (A) >60 mL/min/1.73 m 2   TSH WITH REFLEX TO FT4    Collection Time: 02/20/17  3:00 AM   Result Value Ref Range    TSH 4.050 (H) 0.300 - 3.700 uIU/mL   FERRITIN    Collection Time: 02/20/17  3:00 AM   Result Value Ref Range    Ferritin 1236.3 (H) 10.0 - 291.0 ng/mL   FREE THYROXINE    Collection Time: 02/20/17  3:00 AM   Result Value Ref Range    Free T-4 0.79 0.53 - 1.43 ng/dL   ABO CONFIRMATION    Collection Time: 02/20/17  3:10 AM   Result Value Ref Range    Second ABO  Typing With Cod AB    HEMOGLOBIN A1C    Collection Time: 17  8:36 AM   Result Value Ref Range    Glycohemoglobin 8.4 (H) 0.0 - 5.6 %    Est Avg Glucose 194 mg/dL   TROPONIN    Collection Time: 17  8:36 AM   Result Value Ref Range    Troponin I <0.01 0.00 - 0.04 ng/mL   IRON/TOTAL IRON BIND    Collection Time: 17  8:36 AM   Result Value Ref Range    Iron 168 40 - 170 ug/dL    Total Iron Binding 231 (L) 250 - 450 ug/dL    % Saturation 73 (H) 15 - 55 %   CBC WITHOUT DIFFERENTIAL    Collection Time: 17  8:36 AM   Result Value Ref Range    WBC 5.2 4.8 - 10.8 K/uL    RBC 1.85 (L) 4.20 - 5.40 M/uL    Hemoglobin 6.0 (L) 12.0 - 16.0 g/dL    Hematocrit 18.2 (L) 37.0 - 47.0 %    MCV 98.4 (H) 81.4 - 97.8 fL    MCH 32.4 27.0 - 33.0 pg    MCHC 33.0 (L) 33.6 - 35.0 g/dL    RDW 61.0 (H) 35.9 - 50.0 fL    Platelet Count 296 164 - 446 K/uL    MPV 12.9 9.0 - 12.9 fL   EKG    Collection Time: 17  9:52 AM   Result Value Ref Range    Report       [facility]    Test Date:  2017  Pt Name:    JESUS SILVEIRA              Department: ER  MRN:        [id number]                      Room:       [facility]  Gender:     F                            Technician: OPHELIA  :        1955                   Requested By:ADDIE ROSALES  Order #:    [id number]                    Reading MD: Dana Beltran MD    Measurements  Intervals                                Axis  Rate:       66                           P:          3  NM:         176                          QRS:        -13  QRSD:       80                           T:          53  QT:         472  QTc:        495    Interpretive Statements  SINUS RHYTHM  No previous ECG available for comparison    Electronically Signed On 2017 10:18:28 PST by Dana Beltran MD     COD (ADULT)    Collection Time: 17 11:21 AM   Result Value Ref Range    ABO Grouping Only AB     Rh Grouping Only POS     Antibody Screen-Cod NEG    COMPONENT CELLULAR    Collection  Time: 02/20/17 11:21 AM   Result Value Ref Range    Component R       R                   Red Blood Cells     [id number]   issued       02/20/17   12:36      Product Type Red Blood Cells LR     Dispense Status Issued     Unit Number (Barcoded) [id number]     Product Code (Barcoded) [id number]     Blood Type (Barcoded) 8400    TROPONIN    Collection Time: 02/20/17 11:32 AM   Result Value Ref Range    Troponin I <0.01 0.00 - 0.04 ng/mL   ACCU-CHEK GLUCOSE    Collection Time: 02/20/17 12:11 PM   Result Value Ref Range    Glucose - Accu-Ck 307 (H) 65 - 99 mg/dL       Imaging/Procedures Review:    ndependant Imaging Review: Completed  CT-HEAD W/O   Final Result         NO ACUTE ABNORMALITIES ARE NOTED ON CT SCAN OF THE HEAD.      Findings are consistent with atrophy.  Decreased attenuation in the periventricular white matter likely indicates microvascular ischemic disease.           EKG:   EKG Independant Review: Completed  QTc:495, HR: 66, Normal Sinus Rhythm, no ST/T changes     Records reviewed and summarized in current documentation             Assessment/Plan     Atypical Chest pain   - frequent admission for similar pain   - trops <0.01 x2  - EKG: no acute changes   - 12/22/16: negative nuclear medicine cardiac stress test   PLAN  - trend trops and EKG   - CTM    Symptomatic normocytic anemia  Most likely anemia of chronic disease from ESRD  - symptoms of weakness, dizziness, cold intolerance  - On admission: /54, HR 67  - Hgb on admission 6.6 -> 6.0  - (2/1/17): hgb 6.3 -> 7.2 s/p 1 unit prbcs  - Hx of MDS   - Hgb goal of 8.5-9 per nephrology and oncology  - Head CT: no acute bleeding   - Stool guiac negative  - Iron 168, TIBC 231, %sat 73, Ferritin 1236.3  PLAN  - Transfuse 1 unit prbc  - Recheck H and H   - Hold BP meds   - Contact nephrology - needs dialysis today  - Check vitamin B12 and folate     HTN  - hold BP meds   - BP on admission 126/54    Type II diabetes   - ISS  - CTM    Diabetic  neuropathy   - continue home pregabalin 100mg BID     Elevated TSH   - TSH 4.05  - (12/21/16): 2.38  - No history of hypothyroidism   PLAN  - Order free T4    Anticipated Hospital stay:  >2 midnights        Quality Measures  EKG reviewed, Labs reviewed, Medications reviewed and Radiology images reviewed  Bauer catheter: No Bauer      DVT Prophylaxis: Heparin    Ulcer prophylaxis: No

## 2017-02-20 NOTE — IP AVS SNAPSHOT
" <p align=\"LEFT\"><IMG SRC=\"[email or web address]\" alt=\"Image\" WIDTH=\"50%\" HEIGHT=\"200\" BORDER=\"\"></p>                   Name:Vielka Briscoe  Medical Record Number:[id number]  CSN: [id number]    YOB: 1955   Age: 61 y.o.  Sex: female  HT:1.473 m (4' 9.99\") WT: 65.6 kg (144 lb 10 oz)          Admit Date: 2/20/2017     Discharge Date:   Today's Date: 2/22/2017  Attending Doctor:  Frieda Carr                  Allergies:  Demerol; Morphine; Naprosyn; and Sulfa drugs             Medication List      Take these Medications        Instructions    amlodipine 10 MG Tabs   Commonly known as:  NORVASC    Take 10 mg by mouth every day. Indications: High Blood Pressure   Dose:  10 mg       bimatoprost 0.03 % Soln   Commonly known as:  LUMIGAN    Place 1 Drop in both eyes every evening.   Dose:  1 Drop       carvedilol 12.5 MG Tabs   Commonly known as:  COREG    Take 12.5 mg by mouth 2 times a day, with meals.   Dose:  12.5 mg       hydrocodone-acetaminophen 7.5-325 MG per tablet   Commonly known as:  NORCO    Take 1-2 Tabs by mouth every 6 hours as needed.   Dose:  1-2 Tab       LYRICA 50 MG capsule   Generic drug:  pregabalin    Take 50 mg by mouth 2 times a day.   Dose:  50 mg         "

## 2017-02-20 NOTE — IP AVS SNAPSHOT
2/22/2017          Vielka Briscoe  [address]   [place] [address]    Dear Vielka:    [facility] wants to ensure your discharge home is safe and you or your loved ones have had all your questions answered regarding your care after you leave the hospital.    You may receive a telephone call within two days of your discharge.  This call is to make certain you understand your discharge instructions as well as ensure we provided you with the best care possible during your stay with us.     The call will only last approximately 3-5 minutes and will be done by a nurse.    Once again, we want to ensure your discharge home is safe and that you have a clear understanding of any next steps in your care.  If you have any questions or concerns, please do not hesitate to contact us, we are here for you.  Thank you for choosing [facility] for your healthcare needs.    Sincerely,    Amol Bunn    [facility]

## 2017-02-20 NOTE — ED NOTES
.  Chief Complaint   Patient presents with   • Weakness     pt with history of renal failure and states she feels weak and tired like when she has to get blood transfusions generalized weakness noted   • Headache     rt sided headache started at 6pm tonight   • Neck Pain

## 2017-02-21 LAB
25(OH)D3 SERPL-MCNC: 25 NG/ML (ref 30–100)
ALBUMIN SERPL BCP-MCNC: 3.2 G/DL (ref 3.2–4.9)
ALBUMIN/GLOB SERPL: 1 G/DL
ALP SERPL-CCNC: 68 U/L (ref 30–99)
ALT SERPL-CCNC: 10 U/L (ref 2–50)
ANION GAP SERPL CALC-SCNC: 11 MMOL/L (ref 0–11.9)
AST SERPL-CCNC: 13 U/L (ref 12–45)
BASOPHILS # BLD AUTO: 0.5 % (ref 0–1.8)
BASOPHILS # BLD: 0.02 K/UL (ref 0–0.12)
BILIRUB SERPL-MCNC: 0.4 MG/DL (ref 0.1–1.5)
BUN SERPL-MCNC: 48 MG/DL (ref 8–22)
CALCIUM SERPL-MCNC: 7.8 MG/DL (ref 8.5–10.5)
CHLORIDE SERPL-SCNC: 97 MMOL/L (ref 96–112)
CO2 SERPL-SCNC: 27 MMOL/L (ref 20–33)
CREAT SERPL-MCNC: 4.42 MG/DL (ref 0.5–1.4)
EKG IMPRESSION: NORMAL
EOSINOPHIL # BLD AUTO: 0.14 K/UL (ref 0–0.51)
EOSINOPHIL NFR BLD: 3.3 % (ref 0–6.9)
ERYTHROCYTE [DISTWIDTH] IN BLOOD BY AUTOMATED COUNT: 52.1 FL (ref 35.9–50)
ERYTHROCYTE [DISTWIDTH] IN BLOOD BY AUTOMATED COUNT: 52.3 FL (ref 35.9–50)
GFR SERPL CREATININE-BSD FRML MDRD: 10 ML/MIN/1.73 M 2
GLOBULIN SER CALC-MCNC: 3.2 G/DL (ref 1.9–3.5)
GLUCOSE BLD-MCNC: 114 MG/DL (ref 65–99)
GLUCOSE BLD-MCNC: 203 MG/DL (ref 65–99)
GLUCOSE BLD-MCNC: 205 MG/DL (ref 65–99)
GLUCOSE BLD-MCNC: 286 MG/DL (ref 65–99)
GLUCOSE SERPL-MCNC: 110 MG/DL (ref 65–99)
HCT VFR BLD AUTO: 23.4 % (ref 37–47)
HCT VFR BLD AUTO: 25 % (ref 37–47)
HGB BLD-MCNC: 8 G/DL (ref 12–16)
HGB BLD-MCNC: 8.3 G/DL (ref 12–16)
IMM GRANULOCYTES # BLD AUTO: 0.01 K/UL (ref 0–0.11)
IMM GRANULOCYTES NFR BLD AUTO: 0.2 % (ref 0–0.9)
LYMPHOCYTES # BLD AUTO: 1.7 K/UL (ref 1–4.8)
LYMPHOCYTES NFR BLD: 39.9 % (ref 22–41)
MAGNESIUM SERPL-MCNC: 1.9 MG/DL (ref 1.5–2.5)
MCH RBC QN AUTO: 31.3 PG (ref 27–33)
MCH RBC QN AUTO: 31.6 PG (ref 27–33)
MCHC RBC AUTO-ENTMCNC: 33.2 G/DL (ref 33.6–35)
MCHC RBC AUTO-ENTMCNC: 34.2 G/DL (ref 33.6–35)
MCV RBC AUTO: 92.5 FL (ref 81.4–97.8)
MCV RBC AUTO: 94.3 FL (ref 81.4–97.8)
MONOCYTES # BLD AUTO: 0.41 K/UL (ref 0–0.85)
MONOCYTES NFR BLD AUTO: 9.6 % (ref 0–13.4)
NEUTROPHILS # BLD AUTO: 1.98 K/UL (ref 2–7.15)
NEUTROPHILS NFR BLD: 46.5 % (ref 44–72)
NRBC # BLD AUTO: 0 K/UL
NRBC BLD AUTO-RTO: 0 /100 WBC
PHOSPHATE SERPL-MCNC: 6.2 MG/DL (ref 2.5–4.5)
PLATELET # BLD AUTO: 300 K/UL (ref 164–446)
PLATELET # BLD AUTO: 324 K/UL (ref 164–446)
PMV BLD AUTO: 12.5 FL (ref 9–12.9)
PMV BLD AUTO: 12.9 FL (ref 9–12.9)
POTASSIUM SERPL-SCNC: 4.2 MMOL/L (ref 3.6–5.5)
PROT SERPL-MCNC: 6.4 G/DL (ref 6–8.2)
PTH-INTACT SERPL-MCNC: 414.6 PG/ML (ref 14–72)
RBC # BLD AUTO: 2.53 M/UL (ref 4.2–5.4)
RBC # BLD AUTO: 2.65 M/UL (ref 4.2–5.4)
SODIUM SERPL-SCNC: 135 MMOL/L (ref 135–145)
WBC # BLD AUTO: 4.3 K/UL (ref 4.8–10.8)
WBC # BLD AUTO: 4.3 K/UL (ref 4.8–10.8)

## 2017-02-21 PROCEDURE — 82962 GLUCOSE BLOOD TEST: CPT

## 2017-02-21 PROCEDURE — 99220 PR INITIAL OBSERVATION CARE,LEVL III: CPT | Mod: GC | Performed by: INTERNAL MEDICINE

## 2017-02-21 PROCEDURE — A9270 NON-COVERED ITEM OR SERVICE: HCPCS | Performed by: INTERNAL MEDICINE

## 2017-02-21 PROCEDURE — 82306 VITAMIN D 25 HYDROXY: CPT

## 2017-02-21 PROCEDURE — 96374 THER/PROPH/DIAG INJ IV PUSH: CPT

## 2017-02-21 PROCEDURE — 83970 ASSAY OF PARATHORMONE: CPT

## 2017-02-21 PROCEDURE — 93010 ELECTROCARDIOGRAM REPORT: CPT | Performed by: INTERNAL MEDICINE

## 2017-02-21 PROCEDURE — 93005 ELECTROCARDIOGRAM TRACING: CPT | Performed by: HOSPITALIST

## 2017-02-21 PROCEDURE — 36415 COLL VENOUS BLD VENIPUNCTURE: CPT

## 2017-02-21 PROCEDURE — 85025 COMPLETE CBC W/AUTO DIFF WBC: CPT | Mod: 59

## 2017-02-21 PROCEDURE — 84100 ASSAY OF PHOSPHORUS: CPT

## 2017-02-21 PROCEDURE — 700111 HCHG RX REV CODE 636 W/ 250 OVERRIDE (IP): Performed by: INTERNAL MEDICINE

## 2017-02-21 PROCEDURE — A9270 NON-COVERED ITEM OR SERVICE: HCPCS | Performed by: HOSPITALIST

## 2017-02-21 PROCEDURE — 80053 COMPREHEN METABOLIC PANEL: CPT

## 2017-02-21 PROCEDURE — 700101 HCHG RX REV CODE 250: Performed by: HOSPITALIST

## 2017-02-21 PROCEDURE — 85027 COMPLETE CBC AUTOMATED: CPT | Mod: 59

## 2017-02-21 PROCEDURE — G0378 HOSPITAL OBSERVATION PER HR: HCPCS

## 2017-02-21 PROCEDURE — 700102 HCHG RX REV CODE 250 W/ 637 OVERRIDE(OP): Performed by: HOSPITALIST

## 2017-02-21 PROCEDURE — 83735 ASSAY OF MAGNESIUM: CPT

## 2017-02-21 PROCEDURE — 700102 HCHG RX REV CODE 250 W/ 637 OVERRIDE(OP): Performed by: INTERNAL MEDICINE

## 2017-02-21 RX ORDER — CARVEDILOL 12.5 MG/1
12.5 TABLET ORAL 2 TIMES DAILY WITH MEALS
Status: DISCONTINUED | OUTPATIENT
Start: 2017-02-21 | End: 2017-02-22 | Stop reason: HOSPADM

## 2017-02-21 RX ORDER — LABETALOL HYDROCHLORIDE 5 MG/ML
5 INJECTION, SOLUTION INTRAVENOUS
Status: COMPLETED | OUTPATIENT
Start: 2017-02-21 | End: 2017-02-21

## 2017-02-21 RX ORDER — AMLODIPINE BESYLATE 10 MG/1
10 TABLET ORAL
Status: DISCONTINUED | OUTPATIENT
Start: 2017-02-21 | End: 2017-02-22 | Stop reason: HOSPADM

## 2017-02-21 RX ADMIN — HEPARIN SODIUM 5000 UNITS: 5000 INJECTION, SOLUTION INTRAVENOUS; SUBCUTANEOUS at 21:01

## 2017-02-21 RX ADMIN — HYDROCODONE BITARTRATE AND ACETAMINOPHEN 1 TABLET: 7.5; 325 TABLET ORAL at 01:41

## 2017-02-21 RX ADMIN — LABETALOL HYDROCHLORIDE 5 MG: 5 INJECTION, SOLUTION INTRAVENOUS at 07:35

## 2017-02-21 RX ADMIN — PREGABALIN 100 MG: 100 CAPSULE ORAL at 07:37

## 2017-02-21 RX ADMIN — PREGABALIN 100 MG: 100 CAPSULE ORAL at 21:02

## 2017-02-21 RX ADMIN — INSULIN LISPRO 2 UNITS: 100 INJECTION, SOLUTION INTRAVENOUS; SUBCUTANEOUS at 17:51

## 2017-02-21 RX ADMIN — INSULIN LISPRO 2 UNITS: 100 INJECTION, SOLUTION INTRAVENOUS; SUBCUTANEOUS at 12:47

## 2017-02-21 RX ADMIN — HEPARIN SODIUM 5000 UNITS: 5000 INJECTION, SOLUTION INTRAVENOUS; SUBCUTANEOUS at 12:47

## 2017-02-21 RX ADMIN — CARVEDILOL 12.5 MG: 12.5 TABLET, FILM COATED ORAL at 17:52

## 2017-02-21 RX ADMIN — INSULIN LISPRO 3 UNITS: 100 INJECTION, SOLUTION INTRAVENOUS; SUBCUTANEOUS at 21:06

## 2017-02-21 ASSESSMENT — ENCOUNTER SYMPTOMS
HEADACHES: 0
DIZZINESS: 1
SHORTNESS OF BREATH: 1
ABDOMINAL PAIN: 0
FOCAL WEAKNESS: 0
SPEECH CHANGE: 0
CONSTIPATION: 0
SHORTNESS OF BREATH: 0
NAUSEA: 1
FEVER: 0
CARDIOVASCULAR NEGATIVE: 1
PSYCHIATRIC NEGATIVE: 1
CHILLS: 0
GASTROINTESTINAL NEGATIVE: 1
SEIZURES: 0
VOMITING: 1
SENSORY CHANGE: 0
PHOTOPHOBIA: 0
BLURRED VISION: 0
COUGH: 1
DIARRHEA: 0
LOSS OF CONSCIOUSNESS: 0
BLOOD IN STOOL: 0
EYES NEGATIVE: 1
SORE THROAT: 0
RESPIRATORY NEGATIVE: 1
MUSCULOSKELETAL NEGATIVE: 1
WEAKNESS: 1

## 2017-02-21 ASSESSMENT — PAIN SCALES - GENERAL
PAINLEVEL_OUTOF10: 10
PAINLEVEL_OUTOF10: 0
PAINLEVEL_OUTOF10: 0

## 2017-02-21 NOTE — ASSESSMENT & PLAN NOTE
- Expected drop in BP after dialysis  - Hold BP meds, and re-evaluate need after dialysis   - Amlodipine 10mg QDay   - Carvediolo 12.5mg BID   - Meds started next day

## 2017-02-21 NOTE — H&P
"       [facility] Admitting History and Physical    Name Vielka Briscoe       1955   Age/Sex 61 y.o. female   MRN [id number]   Code Status Full     After 5PM or if no immediate response to page, please call for cross-coverage  Attending/Team: Dr. Carr/Michi Call [phone] to page   1st Call - Day Intern (R1):   Dr. Gillespie 2nd Call - Day Sr. Resident (R2/R3):   Dr. Rojo       Chief Complaint:  Generalized weakness secondary to CKD complicated by anemia requiring transfusion    HPI:  60 y/o F w/ PMHx of ESRD on dialysis MWF (last dialysis 17), MDS, COPD on home O2 2LNC, DM type II, HTN, CAD, and anemia, comes to the ED secondary to CC stated above and was brought in by FRANCOISSA. Pt states she feels this way when she needs a blood transfusion. Her symptoms started 4 days ago with weakness to the point she can't use a knife to cut her food, progressively worsening dizziness, SOB. Pt also reports CP, but this is her baseline since her surgery on 2016.  Pain is intermittent, sharp on her left chest. Has had a cardio work up many times in the past for similar pain. She had a negative nuclear medicine cardiac stress test on 16. She also reports a HA which started last night located frontal and R side of head, and mild L lat neck pain. She denies any hematochezia, melena, or hematuria.  Pt has chronic leg weakness. No f/c/n/v  - Last admitted on  for similar symptoms. During the hospitalization she received 2 units of PRBCs due to a hgb of 6.6. She was discharged on .     Review of Systems   Constitutional: Negative for fever, chills, weight loss, malaise/fatigue and diaphoresis.   HENT: Negative for sore throat.         Frontal and R side of face.    Eyes: Negative for photophobia and pain.        Legally blind \"I can't even see your face right now\"   Respiratory: Positive for shortness of breath. Negative for cough and wheezing.    Cardiovascular: Positive for chest " pain. Negative for palpitations and leg swelling.   Gastrointestinal: Positive for nausea. Negative for heartburn, vomiting, diarrhea, constipation, blood in stool and melena.        Mild suprapubic abd pain   Genitourinary: Negative for dysuria, urgency and frequency.   Musculoskeletal: Negative for falls.        L lat neck pain   Skin: Negative for rash.   Neurological: Positive for dizziness, tingling, tremors, sensory change, weakness and headaches. Negative for speech change, focal weakness, seizures and loss of consciousness.   Psychiatric/Behavioral: Negative for depression and suicidal ideas. The patient is not nervous/anxious.              Past Medical History:   Past Medical History   Diagnosis Date   • Heart Abnormalities    • Angina    • Diabetes        Past Surgical History:  Past Surgical History   Procedure Laterality Date   • Other cardiac surgery       Angioplasty  and    • Other abdominal surgery       appendectomy,  x 2, hysterectomy, D & C       Current Outpatient Medications:  Home Medications     Reviewed by Sun Pearson (Pharmacy Tech) on 17 at 7097  Med List Status: Complete    Medication Last Dose Status    amlodipine (NORVASC) 10 MG Tab 2017 Active    bimatoprost (LUMIGAN) 0.03 % Solution 2017 Active    carvedilol (COREG) 12.5 MG Tab 2017 Active    hydrocodone-acetaminophen (NORCO) 7.5-325 MG per tablet 2017 Active    pregabalin (LYRICA) 50 MG capsule 2017 Active                Medication Allergy/Sensitivities:  Allergies   Allergen Reactions   • Demerol    • Morphine    • Naprosyn [Naproxen]    • Sulfa Drugs          Family History:  No family history on file.    Social History:  Social History     Social History   • Marital Status:      Spouse Name: N/A   • Number of Children: N/A   • Years of Education: N/A     Occupational History   • Not on file.     Social History Main Topics   • Smoking status: Never Smoker    •  "Smokeless tobacco: Not on file   • Alcohol Use: No   • Drug Use: No   • Sexual Activity: Not on file     Other Topics Concern   • Not on file     Social History Narrative   • No narrative on file     Living situation: unknown  PCP : Pcp Pt States None  Nephrology: Dr. Berger    Oncology: Revlimid      Physical Exam     Filed Vitals:    02/20/17 1137 02/20/17 1320 02/20/17 1335 02/20/17 1430   BP: 111/46 136/64 122/55 130/67   Pulse: 65 66 66 67   Temp: 36.6 °C (97.8 °F) 36.6 °C (97.9 °F) 36.4 °C (97.5 °F) 36.5 °C (97.7 °F)   Resp: 20 20 18 17   Height:       Weight:       SpO2: 100% 98% 97% 98%     Body mass index is 27.84 kg/(m^2).  /67 mmHg  Pulse 67  Temp(Src) 36.5 °C (97.7 °F)  Resp 17  Ht 1.473 m (4' 9.99\")  Wt 60.4 kg (133 lb 2.5 oz)  BMI 27.84 kg/m2  SpO2 98%  Breastfeeding? No  O2 therapy: Pulse Oximetry: 98 %, O2 (LPM): 2, O2 Delivery: Nasal Cannula    Physical Exam   Constitutional: She is oriented to person, place, and time and well-developed, well-nourished, and in no distress.   HENT:   Head: Normocephalic and atraumatic.   Eyes: Conjunctivae and EOM are normal. Pupils are equal, round, and reactive to light. No scleral icterus.   Neck: No JVD present. No tracheal deviation present.   Cardiovascular: Normal rate, regular rhythm and intact distal pulses.  Exam reveals no gallop and no friction rub.    No murmur heard.  Pulmonary/Chest: Effort normal and breath sounds normal. No respiratory distress. She has no wheezes. She has no rales.   Abdominal: Soft. Bowel sounds are normal. She exhibits no distension and no mass. There is tenderness. There is no rebound and no guarding.   Mild suprapubic tenderness   Musculoskeletal: She exhibits no edema or tenderness.   Neurological: She is alert and oriented to person, place, and time. She displays facial symmetry. She exhibits normal muscle tone. GCS score is 15.   CN II-XII grossly intact with the exception of mild sensation decrease over V1 R. "   Strength 4/5 bilat arms and legs.  4/5 b/l.   Gait not tested.    Skin: Skin is warm and dry. No rash noted. She is not diaphoretic. No erythema.             Data Review       Old Records Request:   Deferred  Current Records review and summary: Completed    Lab Data Review:  Recent Results (from the past 24 hour(s))   CBC WITH DIFFERENTIAL    Collection Time: 02/20/17  3:00 AM   Result Value Ref Range    WBC 5.5 4.8 - 10.8 K/uL    RBC 2.00 (L) 4.20 - 5.40 M/uL    Hemoglobin 6.6 (L) 12.0 - 16.0 g/dL    Hematocrit 19.4 (L) 37.0 - 47.0 %    MCV 97.0 81.4 - 97.8 fL    MCH 33.0 27.0 - 33.0 pg    MCHC 34.0 33.6 - 35.0 g/dL    RDW 62.0 (H) 35.9 - 50.0 fL    Platelet Count 322 164 - 446 K/uL    MPV 13.0 (H) 9.0 - 12.9 fL    Neutrophils-Polys 51.20 44.00 - 72.00 %    Lymphocytes 36.50 22.00 - 41.00 %    Monocytes 8.20 0.00 - 13.40 %    Eosinophils 3.30 0.00 - 6.90 %    Basophils 0.40 0.00 - 1.80 %    Immature Granulocytes 0.40 0.00 - 0.90 %    Nucleated RBC 0.00 /100 WBC    Neutrophils (Absolute) 2.83 2.00 - 7.15 K/uL    Lymphs (Absolute) 2.01 1.00 - 4.80 K/uL    Monos (Absolute) 0.45 0.00 - 0.85 K/uL    Eos (Absolute) 0.18 0.00 - 0.51 K/uL    Baso (Absolute) 0.02 0.00 - 0.12 K/uL    Immature Granulocytes (abs) 0.02 0.00 - 0.11 K/uL    NRBC (Absolute) 0.00 K/uL   COMP METABOLIC PANEL    Collection Time: 02/20/17  3:00 AM   Result Value Ref Range    Sodium 136 135 - 145 mmol/L    Potassium 5.3 3.6 - 5.5 mmol/L    Chloride 101 96 - 112 mmol/L    Co2 20 20 - 33 mmol/L    Anion Gap 15.0 (H) 0.0 - 11.9    Glucose 233 (H) 65 - 99 mg/dL    Bun 96 (HH) 8 - 22 mg/dL    Creatinine 7.36 (HH) 0.50 - 1.40 mg/dL    Calcium 7.2 (L) 8.5 - 10.5 mg/dL    AST(SGOT) 12 12 - 45 U/L    ALT(SGPT) 10 2 - 50 U/L    Alkaline Phosphatase 70 30 - 99 U/L    Total Bilirubin 0.3 0.1 - 1.5 mg/dL    Albumin 3.3 3.2 - 4.9 g/dL    Total Protein 7.0 6.0 - 8.2 g/dL    Globulin 3.7 (H) 1.9 - 3.5 g/dL    A-G Ratio 0.9 g/dL   TROPONIN    Collection Time:  02/20/17  3:00 AM   Result Value Ref Range    Troponin I <0.01 0.00 - 0.04 ng/mL   BTYPE NATRIURETIC PEPTIDE    Collection Time: 02/20/17  3:00 AM   Result Value Ref Range    B Natriuretic Peptide 72 0 - 100 pg/mL   ESTIMATED GFR    Collection Time: 02/20/17  3:00 AM   Result Value Ref Range    GFR If  7 (A) >60 mL/min/1.73 m 2    GFR If Non African American 6 (A) >60 mL/min/1.73 m 2   TSH WITH REFLEX TO FT4    Collection Time: 02/20/17  3:00 AM   Result Value Ref Range    TSH 4.050 (H) 0.300 - 3.700 uIU/mL   FERRITIN    Collection Time: 02/20/17  3:00 AM   Result Value Ref Range    Ferritin 1236.3 (H) 10.0 - 291.0 ng/mL   FREE THYROXINE    Collection Time: 02/20/17  3:00 AM   Result Value Ref Range    Free T-4 0.79 0.53 - 1.43 ng/dL   ABO CONFIRMATION    Collection Time: 02/20/17  3:10 AM   Result Value Ref Range    Second ABO Typing With Cod AB    HEMOGLOBIN A1C    Collection Time: 02/20/17  8:36 AM   Result Value Ref Range    Glycohemoglobin 8.4 (H) 0.0 - 5.6 %    Est Avg Glucose 194 mg/dL   TROPONIN    Collection Time: 02/20/17  8:36 AM   Result Value Ref Range    Troponin I <0.01 0.00 - 0.04 ng/mL   IRON/TOTAL IRON BIND    Collection Time: 02/20/17  8:36 AM   Result Value Ref Range    Iron 168 40 - 170 ug/dL    Total Iron Binding 231 (L) 250 - 450 ug/dL    % Saturation 73 (H) 15 - 55 %   CBC WITHOUT DIFFERENTIAL    Collection Time: 02/20/17  8:36 AM   Result Value Ref Range    WBC 5.2 4.8 - 10.8 K/uL    RBC 1.85 (L) 4.20 - 5.40 M/uL    Hemoglobin 6.0 (L) 12.0 - 16.0 g/dL    Hematocrit 18.2 (L) 37.0 - 47.0 %    MCV 98.4 (H) 81.4 - 97.8 fL    MCH 32.4 27.0 - 33.0 pg    MCHC 33.0 (L) 33.6 - 35.0 g/dL    RDW 61.0 (H) 35.9 - 50.0 fL    Platelet Count 296 164 - 446 K/uL    MPV 12.9 9.0 - 12.9 fL   EKG    Collection Time: 02/20/17  9:52 AM   Result Value Ref Range    Report       [facility]    Test Date:  2017-02-20  Pt Name:    JESUSSOLEDAD SILVEIRA              Department: ER  MRN:        [id number]                       Room:       [facility]  Gender:     F                            Technician: OPHELIA  :        1955                   Requested By:ADDIE ROSALES  Order #:    [id number]                    Reading MD: Dana Beltran MD    Measurements  Intervals                                Axis  Rate:       66                           P:          3  RI:         176                          QRS:        -13  QRSD:       80                           T:          53  QT:         472  QTc:        495    Interpretive Statements  SINUS RHYTHM  No previous ECG available for comparison    Electronically Signed On 2017 10:18:28 PST by Dana Beltran MD     COD (ADULT)    Collection Time: 17 11:21 AM   Result Value Ref Range    ABO Grouping Only AB     Rh Grouping Only POS     Antibody Screen-Cod NEG    COMPONENT CELLULAR    Collection Time: 17 11:21 AM   Result Value Ref Range    Component R       R                   Red Blood Cells     [id number]   transfused   17   12:36      Product Type Red Blood Cells LR     Dispense Status Transfused     Unit Number (Barcoded) [id number]     Product Code (Barcoded) [id number]     Blood Type (Barcoded) 8400    TROPONIN    Collection Time: 17 11:32 AM   Result Value Ref Range    Troponin I <0.01 0.00 - 0.04 ng/mL   ACCU-CHEK GLUCOSE    Collection Time: 17 12:11 PM   Result Value Ref Range    Glucose - Accu-Ck 307 (H) 65 - 99 mg/dL   CBC WITHOUT DIFFERENTIAL    Collection Time: 17  4:39 PM   Result Value Ref Range    WBC 3.9 (L) 4.8 - 10.8 K/uL    RBC 2.71 (L) 4.20 - 5.40 M/uL    Hemoglobin 8.7 (L) 12.0 - 16.0 g/dL    Hematocrit 24.9 (L) 37.0 - 47.0 %    MCV 91.9 81.4 - 97.8 fL    MCH 32.1 27.0 - 33.0 pg    MCHC 34.9 33.6 - 35.0 g/dL    RDW 49.3 35.9 - 50.0 fL    Platelet Count 301 164 - 446 K/uL    MPV 12.2 9.0 - 12.9 fL   ACCU-CHEK GLUCOSE    Collection Time: 17  4:39 PM   Result Value Ref Range    Glucose - Accu-Ck 127 (H) 65 -  99 mg/dL       Imaging/Procedures Review:    ndependant Imaging Review: Completed  CT-HEAD W/O   Final Result         NO ACUTE ABNORMALITIES ARE NOTED ON CT SCAN OF THE HEAD.      Findings are consistent with atrophy.  Decreased attenuation in the periventricular white matter likely indicates microvascular ischemic disease.          EKG:   EKG Independant Review: Completed  QTc:472, HR: 66, Normal Sinus Rhythm, no ST/T changes    (x) Records reviewed and summarized in current documentation              Imaging and other studies:    MPI:  NUCLEAR IMAGING INTERPRETATION   Small infarct distal inferolateral LV wall extending to apex.  Better     perfusion at stress.   No ischemia.   LVE.   Normal left ventricular ejection fraction, and wall motion.    Assessment/Plan     Anemia  Overview  Most likely secondary to ESRD  - Hgb on admission 6.6 -> 6.0  - 2/1/17: hgb 6.3 -> 7.2 s/p 1 unit prbcs  - Hgb goal of 8.5-9 per nephrology and oncology  - Iron 168, TIBC 231, %sat 73, Ferritin 1236.3      Assessment & Plan  - Stool guiac - neg  - 1u pRBC  - H&H repeat  - Hold BP meds (BP on admission 126/54)  - Consult nephrology for dialysis today      MDS (myelodysplastic syndrome) (CMS-HCC)  Overview  - Transfusion dependent    Headache  Overview  - Frontal, R face/head, and L neck pain  - legally blind  - nausea without vomiting  - weakness b/l leg - chronic  - R hand weakness - acute    Assessment & Plan  - currently pain free, does have V1 right side sensation distortion (not as sharp as L side).   - CT head - neg  - ctm      Chest pain  Overview  - Similar pain in the past with neg workup.  - trops <0.01 x2  - EKG: no acute changes    - 12/22/16: negative nuclear medicine cardiac stress test      Assessment & Plan  - Trend trop and ECG.  - ctm    HTN (hypertension)  Overview  - BP on admission 126/54      Assessment & Plan  - Expected drop in BP after dialysis  - Hold BP meds, and re-evaluate need after dialysis   - Amlodipine  10mg QDay   - Carvediolo 12.5mg BID   - ctm    Atrial fibrillation (CMS-HCC)  Overview  [id number] score - 4 for gender, DM, HTN, PVD    Assessment & Plan  - Not on anticoagulation. Re-evaluate risk/benefit.  - TSH 4.05 increased from 2.38 on 12/21/16  - FT4- pending    DM  2 complicated by peripheral neuropathy  Assessment & Plan  - Continue home pregabalin 100mg BID   - ISS  - hypoglycemia protocol  - ctm        Anticipated Hospital stay:  >2 midnights      Quality Measures  Labs reviewed, EKG reviewed, Medications reviewed and Radiology images reviewed  Bauer catheter: No Bauer      DVT Prophylaxis: Heparin  DVT prophylaxis - mechanical: SCDs

## 2017-02-21 NOTE — PROGRESS NOTES
[facility] [place] [facility] Interval Note    Name Vielka Briscoe 1955   Age/Sex 61 y.o. female   MRN [id number]   Code Status Full      After 5PM or if no immediate response to page, please call for cross-coverage  Attending/Team: Dr. Carr/RAUDEL Blue  Call [phone] to page   1st Call - Day Intern (R1):   Dr. Gillespie  2nd Call - Day Sr. Resident (R2/R3):   Dr. Rojo          Chief complaint/ reason for interval visit (Primary Diagnosis)   Generalized weakness secondary to CKD complicated by anemia requiring transfusion     Interval Problem Daily Status Update      Patient states that she feels much better today. Her weakness, dizziness, CP and HA have improved. She still feels a little weak and dizzy but it is much improved from yesterday. Her CP is still present but is more dull today. It is midsternal, does not radiate and she describes it as pressure. She had some nausea this morning with one episode of emesis which was clear with no blood.     Active Hospital Problems    Diagnosis   • Anemia [D64.9]   • MDS (myelodysplastic syndrome) (CMS-HCC) [D46.9]   • Headache [R51]   • Chest pain [R07.9]   • HTN (hypertension) [I10]   • Atrial fibrillation (CMS-[facility]) [I48.91]   • DM  2 complicated by peripheral neuropathy [E11.9]       Review of Systems   Constitutional: Negative for fever and chills.   Eyes: Negative for blurred vision.   Respiratory: Positive for cough and shortness of breath.    Cardiovascular: Positive for chest pain. Negative for leg swelling.   Gastrointestinal: Positive for nausea and vomiting. Negative for abdominal pain, diarrhea, constipation and blood in stool.   Genitourinary: Negative for dysuria and frequency.   Skin: Negative for rash.   Neurological: Positive for dizziness and weakness. Negative for speech change, focal weakness, loss of consciousness and headaches.       Consultants/Specialty  None     Disposition  Inpatient     Quality  Measures  Labs reviewed, EKG reviewed and Medications reviewed  Bauer catheter: No Bauer      DVT Prophylaxis: Heparin    Ulcer prophylaxis: No              Physical Exam       Filed Vitals:    02/21/17 0000 02/21/17 0400 02/21/17 0716 02/21/17 0822   BP: 127/66 137/69 183/85 120/57   Pulse: 69 66 71    Temp: 36.4 °C (97.5 °F) 36.4 °C (97.5 °F) 36 °C (96.8 °F)    Resp: 16 16 16    Height:       Weight:       SpO2: 94% 98% 100%      Body mass index is 27.84 kg/(m^2).    Oxygen Therapy:  Pulse Oximetry: 100 %, O2 (LPM): 0, O2 Delivery: None (Room Air)    Physical Exam   Constitutional: She is oriented to person, place, and time and well-developed, well-nourished, and in no distress.   HENT:   Head: Normocephalic and atraumatic.   Eyes: Pupils are equal, round, and reactive to light. No scleral icterus.   Cardiovascular: Normal rate, regular rhythm, normal heart sounds and intact distal pulses.  Exam reveals no gallop and no friction rub.    No murmur heard.  Pulmonary/Chest: Effort normal and breath sounds normal.   Abdominal: Soft. Bowel sounds are normal. She exhibits no distension. There is no tenderness. There is no rebound and no guarding.   Musculoskeletal: She exhibits no edema or tenderness.   Neurological: She is alert and oriented to person, place, and time. No cranial nerve deficit.         Lab Data Review:      2/21/2017  12:39 PM    Recent Labs      02/20/17   0300  02/21/17   0215   SODIUM  136  135   POTASSIUM  5.3  4.2   CHLORIDE  101  97   CO2  20  27   BUN  96*  48*   CREATININE  7.36*  4.42*   MAGNESIUM   --   1.9   PHOSPHORUS   --   6.2*   CALCIUM  7.2*  7.8*       Recent Labs      02/20/17   0300  02/21/17   0215   ALTSGPT  10  10   ASTSGOT  12  13   ALKPHOSPHAT  70  68   TBILIRUBIN  0.3  0.4   GLUCOSE  233*  110*       Recent Labs      02/20/17   0300  02/20/17   0836  02/20/17   1639  02/21/17   0215   RBC  2.00*  1.85*  2.71*  2.53*   HEMOGLOBIN  6.6*  6.0*  8.7*  8.0*   HEMATOCRIT  19.4*   18.2*  24.9*  23.4*   PLATELETCT  322  296  301  300   IRON   --   168   --    --    FERRITIN  1236.3*   --    --    --    TOTIRONBC   --   231*   --    --        Recent Labs      02/20/17   0300  02/20/17   0836  02/20/17   1639  02/21/17   0215   WBC  5.5  5.2  3.9*  4.3*   NEUTSPOLYS  51.20   --    --   46.50   LYMPHOCYTES  36.50   --    --   39.90   MONOCYTES  8.20   --    --   9.60   EOSINOPHILS  3.30   --    --   3.30   BASOPHILS  0.40   --    --   0.50   ASTSGOT  12   --    --   13   ALTSGPT  10   --    --   10   ALKPHOSPHAT  70   --    --   68   TBILIRUBIN  0.3   --    --   0.4         Assessment/Plan     Neruo/psych:   - History of diabetic neuropathy - takes pregabalin 100mg BID   - Presented with HA   - Headache has resolved.   - Head CT: no acute changes    Msk:   - Presented with generalized weakness   - weak bilateral legs - chronic problem   - She states that she is still having generalized weakness but it is improved from yesterday    CVS:   - Pmhx: HTN, CAD   - Presented with sharp left sided chest pain   - Trops: <0.01 x2   - EKG: no ischemic changes   - BNP 72  - (12/22/16) negative nuclear medicine cardiac stress test   - BP medications held on admission due to low BP   - BP up to 160/75 last night   - consider restarting meds today (amlodipine and carvedilol)    Pulm:   - Pmhx: COPD   - denies any smoking   - Not on home O2   - Lungs are CTAB     GI:   - some nausea with one episode of clear emesis this morning   - exam: soft, nontender, nondistended, +bs   - stool guiac negative   - antiemetic PRN     Renal:  - Pmhx: ESRD on MWF dialysis,   - Na 135, K 4.2, Cl 97, CO2 27, BUN 48, Cr 4.42  - controlled by nephrology   - determine if patient is on EPO    Heme/onc:   - Pmhx: Anemia of chronic disease, transfusion dependent MDS  - Received 2 units prbcs on 2/1  - Sees Dr. Avila   - Iron 168, TIBC 231, %sat 73  - vitamin D 25  - Hgb: 6.6 -> 6 -> 8.7 -> 8  - received 1 unit prbcs yesterday   -  transfer to oncology unit today    Endo:   - Pmhx: type II DM  - TSH: 4.05  - 12/21/16 TSH: 2.38  - Free T4: 0.79    ID:   - No acute problems   - Afebrile, not tachycardic, WBC 4.3

## 2017-02-21 NOTE — ASSESSMENT & PLAN NOTE
- Stool guiac - neg  - 1u pRBC  - H&H repeat  - Hold BP meds (BP on admission 126/54)  - Consult nephrology - during hospitalization received two dialysis treatments.   -  2u blood given after second treatment as recommended by her oncologist, Dr. Avila.

## 2017-02-21 NOTE — CARE PLAN
Problem: Communication  Goal: The ability to communicate needs accurately and effectively will improve  Intervention: Use communication aids and/or /Language Line as appropriate  White board updated with POC and care team information during bedside report.      Problem: Safety  Goal: Will remain free from falls  Fall precautions in place. Bed in lowest position. Non-skid socks in place. Personal possessions within reach. Mobility sign on door. Bed-alarm on. Call light within reach. Pt educated regarding fall prevention and states understanding.

## 2017-02-21 NOTE — PROGRESS NOTES
Bedside report received from ARIEL Wang. POC discussed with pt; all questions answered at this time. White board updated. Appropriate functioning of tele monitor confirmed. All needs met at this time.

## 2017-02-21 NOTE — ASSESSMENT & PLAN NOTE
- currently pain free, does have V1 right side sensation distortion (not as sharp as L side).   - CT head - neg  - Symptoms returned back to baseline during hospitalization

## 2017-02-21 NOTE — PROGRESS NOTES
2 RN skin check with Lynn GEORGE. Bilateral ears red, blanching, will place pads to off load. Old, healed scar to right inner thigh. Skin is otherwise intact.

## 2017-02-21 NOTE — PROGRESS NOTES
Dialysis Tx completed at 1608.  Net UF  1.76 L.   Pt had cramping during tx which was resolved w/ UF turned off. Asymptomatic after NS rinseback.  Hemostasis achieved 10 mins after needles d/yogi.  See dialysis flow sheet for details.  Report called to DEVYN Hines RN.

## 2017-02-21 NOTE — PROGRESS NOTES
Received report from nightshift RN, assumed care of patient. Patient is A&O x 4, bed alarm on. Patient educated on importance of calling if in need of assistance. Verbalizes understanding. Patient declines pain at this time, but does have nausea. Blood pressure elevated, MD aware, new orders received. Patient updated on plan of care, voices no concerns at this time. Will continue to monitor for safety and comfort.

## 2017-02-21 NOTE — PROGRESS NOTES
[facility] -  PROGRESS NOTE               Author: Chao Perez M.D. Date & Time: 2/21/2017  10:42 AM     HPI:  The patient is a 61-year-old female PMH ERSD MWF iHD, hypertension, type 2 diabetes mellitus, hyperlipidemia, anemia of CKD, renal osteodystrophy, MDS, and history of stroke, who presents to the emergency room with a 4-5 day history of worsening shortness of breath, dizziness and generalized weakness.  She describes the dizziness as more of a lightheadedness with sensation that she is loosing her balance with the sensation that she is about to fall, however she has sustained any actual falls today.  She states her symptoms significantly worsened overnight, when she developed a right-sided headaches at 6 p.m. along with nausea and weakness to the point where she was having difficulty ambulating just to the restroom.  She also had any episode of paresthesia on the right hand, which resolved by the time she came to the ED.  She also has chronic right-sided chest pain and neck pain from prior cardiac surgeries that have activated because of that.  She is on 2 liters of O2 at baseline at home and had to increase it recently because of acute worsening.  She came to the ED for further evaluation.  She states that she had to have a blood transfusion at the end of the January and first week of February.  For similar type of symptoms were her hemoglobin was found to be low.  In the ED, she was found to have a hemoglobin of 6 and so she was admitted to the hospital for transfusion and further evaluation and management.  She otherwise denies any fevers, chills, abdominal pain, melena, hematochezia or hematemesis.  No change in the appetite or vision.      DAILY NEPHROLOGY SUMMARY:  2/20/17 - Consult done  2/21/17 - EMMANUEL, no complaints, states she feels better but may need an extra unit of blood so that she doesn't need another transfusion anytime soon      Review of Systems    Constitutional: Positive for malaise/fatigue.   HENT: Negative.    Eyes: Negative.    Respiratory: Negative.    Cardiovascular: Negative.    Gastrointestinal: Negative.    Musculoskeletal: Negative.    Skin: Negative.    Neurological: Positive for weakness.   Psychiatric/Behavioral: Negative.    All other systems reviewed and are negative.      Physical Exam   Constitutional: She is oriented to person, place, and time. She appears well-developed and well-nourished.   HENT:   Head: Normocephalic and atraumatic.   Eyes: Conjunctivae are normal. No scleral icterus.   Neck: Neck supple. No muscular tenderness present.   Cardiovascular: Normal rate and regular rhythm.    Pulmonary/Chest: Effort normal and breath sounds normal.   Abdominal: Soft. Bowel sounds are normal.   Musculoskeletal: She exhibits no edema or tenderness.   Neurological: She is alert and oriented to person, place, and time.   Skin: Skin is warm and dry.   Psychiatric: She has a normal mood and affect. Her behavior is normal.       PAST FAMILY HISTORY: Reviewed and Unchanged  SOCIAL HISTORY: Reviewed and Unchanged  CURRENT MEDICATIONS: Reviewed  LABORATORY RESULTS: Reviewed  IMAGING STUDIES: Reviewed      Fluids:       IMPRESSION:  - ESRD    * Etiology likely 2/2 HTN/DM  - Acute on chronic anemia  - HTN  - type 2 DM  - Renal Osteodystrophy  - HLD  - CAD  - COPD    ASSESSMENT:  - GFR: HD dependent  - Urine: oligoanuric  - BP: Goal < 140/90  - Volume: euvolemic  - Acid/Base: no sig. acid base disturbance  - Electrolytes: stable  - Anemia: Goal Hgb 10-11  - MBD: Ca normal, PO4 elevated  - HD Access: LAVF (+thrill/bruit)    PLAN:  - Continue MWF iHD during stay  - Epo likely to not be beneficial until she can get therapy for her MDS by Heme if there is an option for her  - FU Heme as outpatient  - Dose all meds per ESRD guidelines  - Will follow patient closely with you

## 2017-02-22 VITALS
WEIGHT: 150.35 LBS | OXYGEN SATURATION: 93 % | HEIGHT: 58 IN | SYSTOLIC BLOOD PRESSURE: 125 MMHG | RESPIRATION RATE: 16 BRPM | TEMPERATURE: 97 F | HEART RATE: 72 BPM | DIASTOLIC BLOOD PRESSURE: 62 MMHG | BODY MASS INDEX: 31.56 KG/M2

## 2017-02-22 LAB
ALBUMIN SERPL BCP-MCNC: 3 G/DL (ref 3.2–4.9)
ALBUMIN/GLOB SERPL: 1 G/DL
ALP SERPL-CCNC: 67 U/L (ref 30–99)
ALT SERPL-CCNC: 8 U/L (ref 2–50)
ANION GAP SERPL CALC-SCNC: 13 MMOL/L (ref 0–11.9)
AST SERPL-CCNC: 11 U/L (ref 12–45)
BARCODED ABORH UBTYP: 6200
BARCODED ABORH UBTYP: 8400
BARCODED ABORH UBTYP: 8400
BARCODED PRD CODE UBPRD: NORMAL
BARCODED UNIT NUM UBUNT: NORMAL
BASOPHILS # BLD AUTO: 0.4 % (ref 0–1.8)
BASOPHILS # BLD: 0.02 K/UL (ref 0–0.12)
BILIRUB SERPL-MCNC: 0.2 MG/DL (ref 0.1–1.5)
BUN SERPL-MCNC: 77 MG/DL (ref 8–22)
CALCIUM SERPL-MCNC: 7.4 MG/DL (ref 8.5–10.5)
CHLORIDE SERPL-SCNC: 92 MMOL/L (ref 96–112)
CO2 SERPL-SCNC: 24 MMOL/L (ref 20–33)
COMPONENT R 8504R: NORMAL
CREAT SERPL-MCNC: 6.34 MG/DL (ref 0.5–1.4)
EOSINOPHIL # BLD AUTO: 0.18 K/UL (ref 0–0.51)
EOSINOPHIL NFR BLD: 3.6 % (ref 0–6.9)
ERYTHROCYTE [DISTWIDTH] IN BLOOD BY AUTOMATED COUNT: 56.5 FL (ref 35.9–50)
FOLATE SERPL-MCNC: 9.9 NG/ML
GFR SERPL CREATININE-BSD FRML MDRD: 7 ML/MIN/1.73 M 2
GLOBULIN SER CALC-MCNC: 3 G/DL (ref 1.9–3.5)
GLUCOSE BLD-MCNC: 147 MG/DL (ref 65–99)
GLUCOSE BLD-MCNC: 324 MG/DL (ref 65–99)
GLUCOSE BLD-MCNC: 352 MG/DL (ref 65–99)
GLUCOSE SERPL-MCNC: 173 MG/DL (ref 65–99)
HCT VFR BLD AUTO: 23.3 % (ref 37–47)
HGB BLD-MCNC: 7.6 G/DL (ref 12–16)
IMM GRANULOCYTES # BLD AUTO: 0.01 K/UL (ref 0–0.11)
IMM GRANULOCYTES NFR BLD AUTO: 0.2 % (ref 0–0.9)
LYMPHOCYTES # BLD AUTO: 2.01 K/UL (ref 1–4.8)
LYMPHOCYTES NFR BLD: 40.7 % (ref 22–41)
MAGNESIUM SERPL-MCNC: 2 MG/DL (ref 1.5–2.5)
MCH RBC QN AUTO: 31.8 PG (ref 27–33)
MCHC RBC AUTO-ENTMCNC: 32.6 G/DL (ref 33.6–35)
MCV RBC AUTO: 97.5 FL (ref 81.4–97.8)
MONOCYTES # BLD AUTO: 0.57 K/UL (ref 0–0.85)
MONOCYTES NFR BLD AUTO: 11.5 % (ref 0–13.4)
NEUTROPHILS # BLD AUTO: 2.15 K/UL (ref 2–7.15)
NEUTROPHILS NFR BLD: 43.6 % (ref 44–72)
NRBC # BLD AUTO: 0 K/UL
NRBC BLD AUTO-RTO: 0 /100 WBC
PLATELET # BLD AUTO: 285 K/UL (ref 164–446)
PMV BLD AUTO: 12.7 FL (ref 9–12.9)
POTASSIUM SERPL-SCNC: 4.7 MMOL/L (ref 3.6–5.5)
PRODUCT TYPE UPROD: NORMAL
PROT SERPL-MCNC: 6 G/DL (ref 6–8.2)
RBC # BLD AUTO: 2.39 M/UL (ref 4.2–5.4)
SODIUM SERPL-SCNC: 129 MMOL/L (ref 135–145)
UNIT STATUS USTAT: NORMAL
VIT B12 SERPL-MCNC: 671 PG/ML (ref 211–911)
WBC # BLD AUTO: 4.9 K/UL (ref 4.8–10.8)

## 2017-02-22 PROCEDURE — 83735 ASSAY OF MAGNESIUM: CPT

## 2017-02-22 PROCEDURE — 36415 COLL VENOUS BLD VENIPUNCTURE: CPT

## 2017-02-22 PROCEDURE — A9270 NON-COVERED ITEM OR SERVICE: HCPCS | Performed by: HOSPITALIST

## 2017-02-22 PROCEDURE — 700102 HCHG RX REV CODE 250 W/ 637 OVERRIDE(OP): Performed by: HOSPITALIST

## 2017-02-22 PROCEDURE — 85025 COMPLETE CBC W/AUTO DIFF WBC: CPT

## 2017-02-22 PROCEDURE — 80053 COMPREHEN METABOLIC PANEL: CPT

## 2017-02-22 PROCEDURE — 82607 VITAMIN B-12: CPT

## 2017-02-22 PROCEDURE — 700105 HCHG RX REV CODE 258

## 2017-02-22 PROCEDURE — 82962 GLUCOSE BLOOD TEST: CPT | Mod: 91

## 2017-02-22 PROCEDURE — G0378 HOSPITAL OBSERVATION PER HR: HCPCS

## 2017-02-22 PROCEDURE — 90935 HEMODIALYSIS ONE EVALUATION: CPT

## 2017-02-22 PROCEDURE — A9270 NON-COVERED ITEM OR SERVICE: HCPCS | Performed by: INTERNAL MEDICINE

## 2017-02-22 PROCEDURE — 82746 ASSAY OF FOLIC ACID SERUM: CPT

## 2017-02-22 PROCEDURE — 700102 HCHG RX REV CODE 250 W/ 637 OVERRIDE(OP): Performed by: INTERNAL MEDICINE

## 2017-02-22 PROCEDURE — P9016 RBC LEUKOCYTES REDUCED: HCPCS

## 2017-02-22 PROCEDURE — 86923 COMPATIBILITY TEST ELECTRIC: CPT

## 2017-02-22 PROCEDURE — 36430 TRANSFUSION BLD/BLD COMPNT: CPT

## 2017-02-22 PROCEDURE — 700111 HCHG RX REV CODE 636 W/ 250 OVERRIDE (IP): Performed by: INTERNAL MEDICINE

## 2017-02-22 PROCEDURE — 99217 PR OBSERVATION CARE DISCHARGE: CPT | Mod: GC | Performed by: INTERNAL MEDICINE

## 2017-02-22 RX ORDER — SODIUM CHLORIDE 9 MG/ML
INJECTION, SOLUTION INTRAVENOUS
Status: COMPLETED
Start: 2017-02-22 | End: 2017-02-22

## 2017-02-22 RX ADMIN — PREGABALIN 100 MG: 100 CAPSULE ORAL at 20:13

## 2017-02-22 RX ADMIN — CARVEDILOL 12.5 MG: 12.5 TABLET, FILM COATED ORAL at 17:32

## 2017-02-22 RX ADMIN — SODIUM CHLORIDE 500 ML: 9 INJECTION, SOLUTION INTRAVENOUS at 14:26

## 2017-02-22 RX ADMIN — INSULIN LISPRO 4 UNITS: 100 INJECTION, SOLUTION INTRAVENOUS; SUBCUTANEOUS at 17:32

## 2017-02-22 RX ADMIN — HEPARIN SODIUM 5000 UNITS: 5000 INJECTION, SOLUTION INTRAVENOUS; SUBCUTANEOUS at 14:56

## 2017-02-22 RX ADMIN — PREGABALIN 100 MG: 100 CAPSULE ORAL at 08:30

## 2017-02-22 RX ADMIN — HEPARIN SODIUM 5000 UNITS: 5000 INJECTION, SOLUTION INTRAVENOUS; SUBCUTANEOUS at 06:34

## 2017-02-22 RX ADMIN — INSULIN LISPRO 5 UNITS: 100 INJECTION, SOLUTION INTRAVENOUS; SUBCUTANEOUS at 20:13

## 2017-02-22 ASSESSMENT — ENCOUNTER SYMPTOMS
DIZZINESS: 0
GASTROINTESTINAL NEGATIVE: 1
PSYCHIATRIC NEGATIVE: 1
CHILLS: 0
COUGH: 0
CARDIOVASCULAR NEGATIVE: 1
HEARTBURN: 0
WEAKNESS: 1
ORTHOPNEA: 0
EYES NEGATIVE: 1
HEADACHES: 0
FEVER: 0
MUSCULOSKELETAL NEGATIVE: 1
BLURRED VISION: 0
SHORTNESS OF BREATH: 0
DIARRHEA: 0
PALPITATIONS: 0
NAUSEA: 0
CONSTIPATION: 0
VOMITING: 0
RESPIRATORY NEGATIVE: 1
SENSORY CHANGE: 0
BLOOD IN STOOL: 0
ABDOMINAL PAIN: 0

## 2017-02-22 ASSESSMENT — PAIN SCALES - GENERAL
PAINLEVEL_OUTOF10: 0

## 2017-02-22 NOTE — PROGRESS NOTES
[facility] Medicine Interval Note    Name Vielak Briscoe       1955   Age/Sex 61 y.o. female   MRN [id number]   Code Status Full      After 5PM or if no immediate response to page, please call for cross-coverage  Attending/Team: Dr. Carr/RAUDEL Blue  Call [phone] to page   1st Call - Day Intern (R1):   Dr. Gillespie  2nd Call - Day Sr. Resident (R2/R3):   Dr. Rojo          Chief complaint/ reason for interval visit (Primary Diagnosis)   Generalized weakness secondary to CKD complicated by anemia requiring transfusion     Interval Problem Daily Status Update      Developed n/v x1 non-bloody this am. Felt better after. BP at the time 183/85. BP meds have been held post dialysis.  ECG stat ordered - stable    Active Hospital Problems    Diagnosis   • Anemia [D64.9] Anemia of chronic disease   • MDS (myelodysplastic syndrome) (CMS-HCC) [D46.9]   • Headache [R51]   • Chest pain [R07.9]    • HTN (hypertension) [I10] started on home meds- stable   • Atrial fibrillation (CMS-HCC) [I48.91]   • DM  2 complicated by peripheral neuropathy [E11.9]       Review of Systems   Constitutional: Negative for fever and chills.   HENT: Negative for sore throat.    Eyes: Negative for blurred vision and photophobia.   Respiratory: Positive for cough. Negative for shortness of breath.    Cardiovascular: Positive for chest pain. Negative for leg swelling.   Gastrointestinal: Positive for nausea and vomiting. Negative for abdominal pain, diarrhea, constipation and blood in stool.   Genitourinary: Negative for dysuria and frequency.   Skin: Negative for rash.   Neurological: Positive for dizziness and weakness. Negative for sensory change, speech change, focal weakness, seizures, loss of consciousness and headaches.       Consultants/Specialty  Cardiology (Dr. Daigle)    Disposition  Inpatient     Quality Measures  Labs reviewed, Medications reviewed and EKG reviewed  Bauer catheter: No  Juancarlos      DVT Prophylaxis: Heparin    Ulcer prophylaxis: No              Physical Exam       Filed Vitals:    02/21/17 0400 02/21/17 0716 02/21/17 0822 02/21/17 1538   BP: 137/69 183/85 120/57 117/63   Pulse: 66 71  66   Temp: 36.4 °C (97.5 °F) 36 °C (96.8 °F)  36.8 °C (98.2 °F)   Resp: 16 16  20   Height:       Weight:       SpO2: 98% 100%  100%     Body mass index is 27.84 kg/(m^2).    Oxygen Therapy:  Pulse Oximetry: 100 %, O2 (LPM): 0, O2 Delivery: None (Room Air)    Physical Exam   Constitutional: She is oriented to person, place, and time. No distress.   HENT:   Head: Normocephalic and atraumatic.   Eyes: Pupils are equal, round, and reactive to light. No scleral icterus.   Neck: No tracheal deviation present.   Cardiovascular: Normal rate, regular rhythm, normal heart sounds and intact distal pulses.  Exam reveals no gallop and no friction rub.    No murmur heard.  Pulmonary/Chest: Effort normal and breath sounds normal.   Abdominal: Soft. Bowel sounds are normal. She exhibits no distension. There is no tenderness. There is no rebound and no guarding.   Musculoskeletal: She exhibits no edema or tenderness.   Neurological: She is alert and oriented to person, place, and time. No cranial nerve deficit. GCS score is 15.   Skin: Skin is warm and dry. She is not diaphoretic. No erythema.         Lab Data Review:      2/21/2017  12:39 PM    Recent Labs      02/20/17   0300  02/21/17   0215   SODIUM  136  135   POTASSIUM  5.3  4.2   CHLORIDE  101  97   CO2  20  27   BUN  96*  48*   CREATININE  7.36*  4.42*   MAGNESIUM   --   1.9   PHOSPHORUS   --   6.2*   CALCIUM  7.2*  7.8*       Recent Labs      02/20/17   0300  02/21/17   0215   ALTSGPT  10  10   ASTSGOT  12  13   ALKPHOSPHAT  70  68   TBILIRUBIN  0.3  0.4   GLUCOSE  233*  110*       Recent Labs      02/20/17   0300  02/20/17   0836  02/20/17   1639  02/21/17   0215  02/21/17   1841   RBC  2.00*  1.85*  2.71*  2.53*  2.65*   HEMOGLOBIN  6.6*  6.0*  8.7*  8.0*   8.3*   HEMATOCRIT  19.4*  18.2*  24.9*  23.4*  25.0*   PLATELETCT  322  296  301  300  324   IRON   --   168   --    --    --    FERRITIN  1236.3*   --    --    --    --    TOTIRONBC   --   231*   --    --    --        Recent Labs      02/20/17   0300   02/20/17   1639  02/21/17   0215  02/21/17   1841   WBC  5.5   < >  3.9*  4.3*  4.3*   NEUTSPOLYS  51.20   --    --   46.50   --    LYMPHOCYTES  36.50   --    --   39.90   --    MONOCYTES  8.20   --    --   9.60   --    EOSINOPHILS  3.30   --    --   3.30   --    BASOPHILS  0.40   --    --   0.50   --    ASTSGOT  12   --    --   13   --    ALTSGPT  10   --    --   10   --    ALKPHOSPHAT  70   --    --   68   --    TBILIRUBIN  0.3   --    --   0.4   --     < > = values in this interval not displayed.         Assessment/Plan     # Anemia  - Most likely secondary to ESRD  - Hgb on admission 6.6 -> 6.0  - 2/1/17: hgb 6.3 -> 7.2 s/p 1 unit prbcs  - Hgb goal of 8.5-9 per nephrology and oncology  - Iron 168, TIBC 231, %sat 73, Ferritin 1236.3    ASSESSMENT AND PLAN  - Stool guiac - neg  - 1u pRBC  - H&H repeat with plan to transfuse if Hgb <8  - Review nephrology and oncology notes for rec's on epo use    # MDS (Myelodysplastic syndrome)  - Transfusion dependent  - Supplement substrates    # Headache  - Frontal, R face/head, and L neck pain  - legally blind  - nausea without vomiting  - weakness b/l leg - chronic  - R hand weakness - acute    ASSESSMENT AND PLAN  - Pain free, does have V1 right side sensation distortion (not as sharp as L side).   - CT head - neg  - ctm    # Chest pain  - On admission - similar pain in the past with neg workup.  - trops <0.01 x2  - EKG: no acute changes    - 12/22/16: negative nuclear medicine cardiac stress test      ASSESSMENT AND PLAN  - Trop <0.01   - ECG stable without ST-T wave shantel  - d/c tele  - transfer to onc floor  - ctm    # Atrial fibrilation  [id number] score - 4 for gender, DM, HTN, PVD    ASSESSMENT AND PLAN  - Not on  anticoagulation. Re-evaluate risk/benefit.  - TSH 4.05 increased from 2.38 on 12/21/16  - FT4- pending    # DM2 complicated by peripheral neuropathy  ASSESSMENT AND PLAN  - Continue home pregabalin 100mg BID   - ISS  - hypoglycemia protocol  - ctm    # HTN  - Home meds held on admission secondary to /54 prior to dialysis  - This am /85  - Labetalol one time dose 5mg IV   - restart home meds later today   - Amlodipine 10mg Qday   - Carvedilol 12.5mg BID  - BP back to baseline 116/54

## 2017-02-22 NOTE — PROGRESS NOTES
Spoke with deborah Martinez to hold blood transfusion until dialysis today due to pt risk for fluid overload. Pt asymptomatic with hgb 7.6

## 2017-02-22 NOTE — PROGRESS NOTES
Bedside report received from ARIEL Wang. POC discussed with pt; all questions answered at this time. White board updated. No telemetry required. Pt has medical orders. All needs met at this time.

## 2017-02-22 NOTE — PROGRESS NOTES
Hemodialysis ordered by Dr Perez for today. Treatment started at 1016 and ended at 1346. UF net - 2000  ml.  Left upper arm AVF (+) for thrill and bruit pre and post dialysis.  Accessed with gauge 15 sharp needles without difficulty. Access site bleeding controlled in 10 minutes. Gauze dressing applied. RN instructed to monitor site for bleeding and to apply pressure until bleeding stops if breakthrough bleeding occurs. 1 units PRBC given during dialysis. No transfusion reaction observed. Report given to primary RN rick Sheehan.

## 2017-02-22 NOTE — PROGRESS NOTES
[facility] -  PROGRESS NOTE               Author: Chao Perez M.D. Date & Time: 2/22/2017  11:51 AM     HPI:  The patient is a 61-year-old female PMH ERSD MWF iHD, hypertension, type 2 diabetes mellitus, hyperlipidemia, anemia of CKD, renal osteodystrophy, MDS, and history of stroke, who presents to the emergency room with a 4-5 day history of worsening shortness of breath, dizziness and generalized weakness.  She describes the dizziness as more of a lightheadedness with sensation that she is loosing her balance with the sensation that she is about to fall, however she has sustained any actual falls today.  She states her symptoms significantly worsened overnight, when she developed a right-sided headaches at 6 p.m. along with nausea and weakness to the point where she was having difficulty ambulating just to the restroom.  She also had any episode of paresthesia on the right hand, which resolved by the time she came to the ED.  She also has chronic right-sided chest pain and neck pain from prior cardiac surgeries that have activated because of that.  She is on 2 liters of O2 at baseline at home and had to increase it recently because of acute worsening.  She came to the ED for further evaluation.  She states that she had to have a blood transfusion at the end of the January and first week of February.  For similar type of symptoms were her hemoglobin was found to be low.  In the ED, she was found to have a hemoglobin of 6 and so she was admitted to the hospital for transfusion and further evaluation and management.  She otherwise denies any fevers, chills, abdominal pain, melena, hematochezia or hematemesis.  No change in the appetite or vision.      DAILY NEPHROLOGY SUMMARY:  2/20/17 - Consult done  2/21/17 - EMMANUEL, no complaints, states she feels better but may need an extra unit of blood so that she doesn't need another transfusion anytime soon  2/22/17 - EMMANUEL, feels about same as  yesterday, but better than admit, sleepy this AM      Review of Systems   Constitutional: Positive for malaise/fatigue.   HENT: Negative.    Eyes: Negative.    Respiratory: Negative.    Cardiovascular: Negative.    Gastrointestinal: Negative.    Musculoskeletal: Negative.    Skin: Negative.    Neurological: Positive for weakness.   Psychiatric/Behavioral: Negative.    All other systems reviewed and are negative.      Physical Exam   Constitutional: She is oriented to person, place, and time. She appears well-developed and well-nourished.   HENT:   Head: Normocephalic and atraumatic.   Eyes: Conjunctivae are normal. No scleral icterus.   Neck: Neck supple. No muscular tenderness present.   Cardiovascular: Normal rate and regular rhythm.    Pulmonary/Chest: Effort normal and breath sounds normal.   Abdominal: Soft. Bowel sounds are normal.   Musculoskeletal: She exhibits no edema or tenderness.   Neurological: She is alert and oriented to person, place, and time.   Skin: Skin is warm and dry.   Psychiatric: She has a normal mood and affect. Her behavior is normal.   Nursing note and vitals reviewed.      PAST FAMILY HISTORY: Reviewed and Unchanged  SOCIAL HISTORY: Reviewed and Unchanged  CURRENT MEDICATIONS: Reviewed  LABORATORY RESULTS: Reviewed  IMAGING STUDIES: Reviewed      Fluids:       IMPRESSION:  - ESRD    * Etiology likely 2/2 HTN/DM  - Acute on chronic anemia  - HTN  - type 2 DM  - Renal Osteodystrophy  - HLD  - CAD  - COPD    ASSESSMENT:  - GFR: HD dependent  - Urine: oligoanuric  - BP: Goal < 140/90  - Volume: euvolemic  - Acid/Base: no sig. acid base disturbance  - Electrolytes: stable  - Anemia: Goal Hgb 10-11  - MBD: Ca normal, PO4 elevated  - HD Access: LAVF (+thrill/bruit)    PLAN:  - Continue MWF iHD during stay  - Transfuse with iHD today as H/H mildly dropped    - Epo likely to not be beneficial until she can get therapy for her MDS by Heme if there is an option for her  - FU Heme as outpatient  -  Dose all meds per ESRD guidelines  - Will follow patient closely with you

## 2017-02-22 NOTE — PROGRESS NOTES
[facility] [place]    Name Vielka Briscoe 1955   Age/Sex 61 y.o. female   MRN [id number]   Code Status Full      After 5PM or if no immediate response to page, please call for cross-coverage  Attending/Team: Dr. Carr/RAUDEL Blue  Call [phone] to page   1st Call - Day Intern (R1):   Dr. Gillespie  2nd Call - Day Sr. Resident (R2/R3):   Dr. Rojo          Chief complaint/ reason for interval visit (Primary Diagnosis)   Generalized weakness secondary to CKD complicated by anemia requiring transfusion     Interval Problem Daily Status Update      No acute overnight events. The patient states that she feel better today. She still feels week, but is improved from yesterday. She is also still having some dull chest pain, but it is less and more of a chronic problem for her. She also states that she has been having some cramping in the morning which is also a chronic problem. She denies any other symptoms. She states that she is ready to go home.     Active Hospital Problems    Diagnosis   • Anemia [D64.9]   • MDS (myelodysplastic syndrome) (CMS-HCC) [D46.9]   • Headache [R51]   • Chest pain [R07.9]   • HTN (hypertension) [I10]   • Atrial fibrillation (CMS-[facility]) [I48.91]   • DM  2 complicated by peripheral neuropathy [E11.9]       Review of Systems   Constitutional: Negative for fever and chills.   Eyes: Negative for blurred vision.   Respiratory: Negative for cough and shortness of breath.    Cardiovascular: Positive for chest pain. Negative for palpitations, orthopnea and leg swelling.   Gastrointestinal: Negative for heartburn, nausea, vomiting, abdominal pain, diarrhea, constipation and blood in stool.   Genitourinary: Negative for dysuria and frequency.   Skin: Negative for rash.   Neurological: Positive for weakness. Negative for dizziness, sensory change and headaches.       Consultants/Specialty  Cardiology     Disposition  Inpatient     Quality  Measures  Labs reviewed and Medications reviewed  Bauer catheter: No Bauer      DVT Prophylaxis: Heparin    Ulcer prophylaxis: No              Physical Exam       Filed Vitals:    02/22/17 0400 02/22/17 0803 02/22/17 1100 02/22/17 1115   BP: 118/54 120/58 132/63 110/52   Pulse: 65 61 69 73   Temp: 36.2 °C (97.2 °F) 36.3 °C (97.3 °F) 36.2 °C (97.1 °F) 36.2 °C (97.1 °F)   Resp: 16 16 17 17   Height:       Weight:       SpO2: 98% 99% 98% 98%     Body mass index is 30.23 kg/(m^2). Weight: 65.6 kg (144 lb 10 oz)  Oxygen Therapy:  Pulse Oximetry: 98 %, O2 (LPM): 0, O2 Delivery: Silicone Nasal Cannula    Physical Exam   Constitutional: She is oriented to person, place, and time and well-developed, well-nourished, and in no distress.   HENT:   Head: Normocephalic and atraumatic.   Eyes: Pupils are equal, round, and reactive to light. No scleral icterus.   Cardiovascular: Normal rate, regular rhythm and intact distal pulses.  Exam reveals no gallop and no friction rub.    No murmur heard.  Pulmonary/Chest: Effort normal and breath sounds normal.   Abdominal: Soft. Bowel sounds are normal. She exhibits no distension. There is no tenderness. There is no rebound and no guarding.   Musculoskeletal: Normal range of motion. She exhibits no edema or tenderness.   Neurological: She is alert and oriented to person, place, and time. No cranial nerve deficit.         Lab Data Review:      2/22/2017  11:55 AM    Recent Labs      02/20/17   0300  02/21/17 0215 02/22/17   0204   SODIUM  136  135  129*   POTASSIUM  5.3  4.2  4.7   CHLORIDE  101  97  92*   CO2  20  27  24   BUN  96*  48*  77*   CREATININE  7.36*  4.42*  6.34*   MAGNESIUM   --   1.9  2.0   PHOSPHORUS   --   6.2*   --    CALCIUM  7.2*  7.8*  7.4*       Recent Labs      02/20/17   0300  02/21/17   0215  02/22/17   0204   ALTSGPT  10  10  8   ASTSGOT  12  13  11*   ALKPHOSPHAT  70  68  67   TBILIRUBIN  0.3  0.4  0.2   GLUCOSE  233*  110*  173*       Recent Labs       02/20/17 0300 02/20/17   0836   02/21/17 0215 02/21/17 1841 02/22/17   0204   RBC  2.00*  1.85*   < >  2.53*  2.65*  2.39*   HEMOGLOBIN  6.6*  6.0*   < >  8.0*  8.3*  7.6*   HEMATOCRIT  19.4*  18.2*   < >  23.4*  25.0*  23.3*   PLATELETCT  322  296   < >  300  324  285   IRON   --   168   --    --    --    --    FERRITIN  1236.3*   --    --    --    --    --    TOTIRONBC   --   231*   --    --    --    --     < > = values in this interval not displayed.       Recent Labs      02/20/17 0300 02/21/17 0215 02/21/17 1841 02/22/17   0204   WBC  5.5   < >  4.3*  4.3*  4.9   NEUTSPOLYS  51.20   --   46.50   --   43.60*   LYMPHOCYTES  36.50   --   39.90   --   40.70   MONOCYTES  8.20   --   9.60   --   11.50   EOSINOPHILS  3.30   --   3.30   --   3.60   BASOPHILS  0.40   --   0.50   --   0.40   ASTSGOT  12   --   13   --   11*   ALTSGPT  10   --   10   --   8   ALKPHOSPHAT  70   --   68   --   67   TBILIRUBIN  0.3   --   0.4   --   0.2    < > = values in this interval not displayed.           Assessment/Plan     Neruo/psych:    - History of diabetic neuropathy - takes pregabalin 100mg BID    - Presented with headache - resolved  - Head CT: no acute changes    Msk:    - Presented with generalized weakness    - weak bilateral legs - chronic problem   - She states that she is still having generalized weakness but it is improved from yesterday    CVS:    - Pmhx: HTN, CAD    - Presented with sharp left sided chest pain    - Trops: <0.01 x2    - EKG: no ischemic changes    - BNP 72  - (12/22/16) negative nuclear medicine cardiac stress test    - BP medications held on admission due to low BP - restarted yesterday (amlodipine and carvedilol)    Pulm:    - Pmhx: COPD    - denies any smoking    - uses 2L O2 at home    - Lungs are CTAB     GI:    - denies any n/v, abdominal pain   - exam: soft, nontender, nondistended, +bs    - stool guiac negative    - antiemetic PRN     Renal:  - Pmhx: ESRD on MWF dialysis,    -  Na 129, K 4.7, Cl 92, CO2 24, BUN 77, Cr 6.34  - controlled by nephrology    - determine if patient is on EPO    Heme/onc:    - Pmhx: Anemia of chronic disease, transfusion dependent MDS  - Received 2 units prbcs on 2/1  - Sees Dr. Avila - contact to determine plan for treatment of MDS  - Iron 168, TIBC 231, %sat 73  - vitamin D 25  - Hgb: 6.6 -> 6 -> 8.7 -> 8 -> 8.3 -> 7.6  - received 1 unit prbcs on admission      Endo:    - Pmhx: type II DM - ISS  - TSH: 4.05  - 12/21/16 TSH: 2.38  - Free T4: 0.79  - PTH: 414.6  - Ca 7.4    ID:    - No acute problems    - Afebrile, not tachycardic, WBC 4.3

## 2017-02-22 NOTE — CARE PLAN
Problem: Communication  Goal: The ability to communicate needs accurately and effectively will improve  Intervention: Use communication aids and/or /Language Line as appropriate  White board updated with POC and care team information during bedside report.      Problem: Pain Management  Goal: Pain level will decrease to patient’s comfort goal  Heat applied for comfort. Pt declining medicinal intervention

## 2017-02-23 NOTE — DISCHARGE PLANNING
Medical Social Work    This  received report that pt is ready to d/c but has no ride.  This  placed a call to [facility] and pt had no transportation benefits.  This  then tried to call emergency contact however the person that answered informed this  that it was the wrong number.  Per RN pt has no way to get back to [place] as her car has broken down.  This  provided pt a cab voucher (#[id number]) to get pt to her residence at [address] [place] [address].  Cab voucher was tubed up to tube station #[id number] at RN's request.

## 2017-02-23 NOTE — DISCHARGE INSTRUCTIONS
Discharge Instructions    Discharged to home by taxi with self. Discharged via wheelchair, hospital escort: Yes.  Special equipment needed: Not Applicable    Be sure to schedule a follow-up appointment with your primary care doctor or any specialists as instructed.     Discharge Plan:   Diet Plan: Discussed  Activity Level: Discussed  Confirmed Follow up Appointment: Appointment Scheduled  Confirmed Symptoms Management: Discussed  Medication Reconciliation Updated: Yes  Influenza Vaccine Indication: Not indicated: Previously immunized this influenza season and > 8 years of age    I understand that a diet low in cholesterol, fat, and sodium is recommended for good health. Unless I have been given specific instructions below for another diet, I accept this instruction as my diet prescription.   Other diet: Renal    Special Instructions: None    · Is patient discharged on Warfarin / Coumadin?   No     · Is patient Post Blood Transfusion?  Yes  POST BLOOD TRANSFUSION INFORMATION (ADULT)    The purpose of blood transfusion is to correct anemia, low levels of blood clotting factors or to correct acute blood loss.   Blood transfusion is very safe but occasionally unexpected adverse reactions do occur. Most adverse reactions occur during transfusion, within one to two days following transfusion or one to two weeks following transfusion. Some adverse reactions can occur one to six months after transfusion and some even years later.             If any of the symptoms listed below happen to you during your transfusion,                                 please notify your nurse immediately.   · Fever or Chills  · Flushing of the Face  · Hives, rash or itching  · Difficulty in breathing or shortness of breath  · Pain or oozing of blood from the IV needle site  · Low back pain  · Nausea or vomiting  · Weakness or fainting  · Chest pain  · Blood in the urine  · Decreased frequency of urination    The above symptoms may also occur  within 24 to 48 after transfusion; if so, notify your physician.     · Yellowing of the skin    This can happen one to six months after transfusion; if so, notify your physician    Depression / Suicide Risk    As you are discharged from this [facility] facility, it is important to learn how to keep safe from harming yourself.    Recognize the warning signs:  · Abrupt changes in personality, positive or negative- including increase in energy   · Giving away possessions  · Change in eating patterns- significant weight changes-  positive or negative  · Change in sleeping patterns- unable to sleep or sleeping all the time   · Unwillingness or inability to communicate  · Depression  · Unusual sadness, discouragement and loneliness  · Talk of wanting to die  · Neglect of personal appearance   · Rebelliousness- reckless behavior  · Withdrawal from people/activities they love  · Confusion- inability to concentrate     If you or a loved one observes any of these behaviors or has concerns about self-harm, here's what you can do:  · Talk about it- your feelings and reasons for harming yourself  · Remove any means that you might use to hurt yourself (examples: pills, rope, extension cords, firearm)  · Get professional help from the community (Mental Health, Substance Abuse, psychological counseling)  · Do not be alone:Call your Safe Contact- someone whom you trust who will be there for you.  · Call your local CRISIS HOTLINE 379-0441 or 460-982-8618  · Call your local Children's Mobile Crisis Response Team [place] (473) 035-7570 or www.[employer]  · Call the toll free National Suicide Prevention Hotlines   · National Suicide Prevention Lifeline 468-324-ECPI (0975)  · National Hope Line Network 800-SUICIDE (779-6876)

## 2017-02-23 NOTE — PROGRESS NOTES
Report received from ARIEL Flynn at 1900. Pt notified of pending discharge. Social work contacted for cab voucher.

## 2017-02-23 NOTE — PROGRESS NOTES
Pt dc'd home via cab. IV and armband removed. Pt left unit via wheelchair with RN. Personal belongings with pt when leaving unit. Pt given discharge instructions prior to leaving unit including when to visit with physician. Verbalizes understanding. Copy of discharge instructions with pt and in the chart. No new prescriptions given by physician.

## 2017-02-23 NOTE — DISCHARGE SUMMARY
[facility] Discharge Summary      Admit Date:  2/20/2017       Discharge Date:   02/22/17    Service:   [facility]  Attending Physician(s):   Dr. Carr  Senior Resident(s):   Dr. Rojo  Gal Resident(s):   Dr. Gillespie      Primary Diagnosis:   Anemia of chronic disease secondary to ESRD complicated by MDS    Secondary Diagnoses:                Active Hospital Problems    Diagnosis   • Anemia [D64.9]   • MDS (myelodysplastic syndrome) (CMS-HCC) [D46.9]   • Headache [R51]   • Chest pain [R07.9]   • HTN (hypertension) [I10]   • Atrial fibrillation (CMS-HCC) [I48.91]   • DM  2 complicated by peripheral neuropathy [E11.9]       Hospital Summary (Brief Narrative):       Pleaseant 60 y/o F comes to the hospital secondary generalized weakness that she experiences when her Hgb drops. Her hgb goal is 8.5-9 per oncology and nephrology. Pt reported experiencing some neurological symptoms such as facial sensation distortion (V1 area) as well as R arm weakness. CT head - neg. Pt is on MWF dialysis.  On admission Hgb 6.0. Received 1u PRBc in the ED. Dialysis scheduled with an additional unit of blood. Pt had one episode of vomiting in the am after first dialysis. States she normally takes coffee first thing in the morning and that keeps her from vomiting, and today she didn't get her coffee yet.  Her home BP meds were held initially secondary to expected drop after her dialysis. Meds started next day. Remained stable.  Her oncologist, Dr. Ranulfo Avila MD, was contacted, and kindly explained that pt has an appointment with him on 03/02/17.   She is scheduled to start tx w/ Vidaza for MDS in hopes of improving her response to post dialysis Epo and therefor improving her anemia. Dr. Avila recommended to give her two units of blood and discharge her. Recommendations followed, post dialysis pt received 2u pRBCs. Pt was monitored for 2 hrs after infusion for any signs of volume  overload or allergic reactions. Pt remained stable, stating she is ready to go home.    Patient /Hospital Summary (Details -- Problem Oriented) :          Anemia  Overview  Most likely secondary to ESRD  - Hgb on admission 6.6 -> 6.0  - 2/1/17: hgb 6.3 -> 7.2 s/p 1 unit prbcs  - Hgb goal of 8.5-9 per nephrology and oncology  - Iron 168, TIBC 231, %sat 73, Ferritin 1236.3      Assessment & Plan  - Stool guiac - neg  - 1u pRBC  - H&H repeat  - Hold BP meds (BP on admission 126/54)  - Consult nephrology - during hospitalization received two dialysis treatments.   -  2u blood given after second treatment as recommended by her oncologist, Dr. Avila.      MDS (myelodysplastic syndrome) (CMS-HCC)  Overview  - Transfusion dependent    Headache  Overview  - Frontal, R face/head, and L neck pain  - legally blind  - nausea without vomiting  - weakness b/l leg - chronic  - R hand weakness - acute    Assessment & Plan  - currently pain free, does have V1 right side sensation distortion (not as sharp as L side).   - CT head - neg  - Symptoms returned back to baseline during hospitalization    Chest pain  Overview  - Similar pain in the past with neg workup.  - trops <0.01 x2  - EKG: no acute changes    - 12/22/16: negative nuclear medicine cardiac stress test      Assessment & Plan  - Trend trop and ECG.  - remained stable     HTN (hypertension)  Overview  - BP on admission 126/54      Assessment & Plan  - Expected drop in BP after dialysis  - Hold BP meds, and re-evaluate need after dialysis   - Amlodipine 10mg QDay   - Carvediolo 12.5mg BID   - Meds started next day    Atrial fibrillation (CMS-HCC)  Overview  [id number] score - 4 for gender, DM, HTN, PVD    Assessment & Plan  - Not on anticoagulation.   - TSH 4.05 increased from 2.38 on 12/21/16  - FT4- pending    DM  2 complicated by peripheral neuropathy  Assessment & Plan  - Continue home pregabalin 100mg BID   - discharge her on her home meds only        Consultants:      Phone call with Ranulfo Avila M.D., pt's oncologist  Inpatient dialysis management with Chao Perez MD    Procedures:        HD    Imaging/ Testing:      CT- head    Discharge Medications:         Medication Reconciliation:   Completed      Medication List      CONTINUE taking these medications       Instructions    amlodipine 10 MG Tabs   Commonly known as:  NORVASC    Take 10 mg by mouth every day. Indications: High Blood Pressure   Dose:  10 mg       bimatoprost 0.03 % Soln   Commonly known as:  LUMIGAN    Place 1 Drop in both eyes every evening.   Dose:  1 Drop       carvedilol 12.5 MG Tabs   Last time this was given:  12.5 mg on 2/22/2017  5:32 PM   Commonly known as:  COREG    Take 12.5 mg by mouth 2 times a day, with meals.   Dose:  12.5 mg       hydrocodone-acetaminophen 7.5-325 MG per tablet   Last time this was given:  1 Tab on 2/21/2017  1:41 AM   Commonly known as:  NORCO    Take 1-2 Tabs by mouth every 6 hours as needed.   Dose:  1-2 Tab       LYRICA 50 MG capsule   Last time this was given:  100 mg on 2/22/2017  8:30 AM   Generic drug:  pregabalin    Take 50 mg by mouth 2 times a day.   Dose:  50 mg             Disposition: Stable for discharge    Diet: Renal, as tolerated. Pt should follow the guidance of her PCP and Oncologist    Activity:  As tolerated    Instructions:      If you experience any weakness, severe/intractable HA, CP/palpitations/SOB, weakness, numbness or any other symptoms please call 911 or come to the ED.    The patient was instructed to return to the ER in the event of worsening symptoms. I have counseled the patient on the importance of compliance and the patient has agreed to proceed with all medical recommendations and follow up plan indicated above.   The patient understands that all medications come with benefits and risks. Risks may include permanent injury or death and these risks can be minimized with close reassessment and monitoring.        Primary Care Provider:    Pcp Pt States None    Discharge summary faxed to primary care provider:  Deferred  Copy of discharge summary given to the patient: Deferred    Follow up appointment details :      Oncology Dr. Avila 03/02/17    Pending Studies:        None    Time spent on discharge day patient visit, preparing discharge paperwork and arranging for patient follow up.    Summary of follow up issues:   None    Discharge Time (Minutes) :    45      Condition on Discharge    ______________________________________________________________________    Interval history/exam for day of discharge:        Filed Vitals:    02/22/17 1348 02/22/17 1442 02/22/17 1457 02/22/17 1820   BP: 119/56 154/66 129/75 127/73   Pulse: 63 65 70 73   Temp: 36.3 °C (97.4 °F) 35.2 °C (95.4 °F) 35.4 °C (95.7 °F) 35.8 °C (96.4 °F)   Resp: 17 18 18 18   Height:       Weight:       SpO2:  99% 97% 99%     Weight/BMI: Body mass index is 30.23 kg/(m^2).  Pulse Oximetry: 99 %, O2 (LPM): 0, O2 Delivery: Silicone Nasal Cannula    General: Pleasant 62 y/o F in no acute distress  CVS: nl S1/s2, rrr, no r/g,  1/6 ejection systolic murmur heard on aortic and pulmonic area  PULM: CTAB, equal chest rise and fall. Good effort. No rales, rhonchi, or wheezing.  LE: No edema.   NEURO: no focal deficit, back to baseline per pt. Stable gate.  PSYCH: stable in a good mood. States she is ready to go home    Most Recent Labs:    Lab Results   Component Value Date/Time    WBC 4.9 02/22/2017 02:04 AM    RBC 2.39* 02/22/2017 02:04 AM    HEMOGLOBIN 7.6* 02/22/2017 02:04 AM    HEMATOCRIT 23.3* 02/22/2017 02:04 AM    MCV 97.5 02/22/2017 02:04 AM    MCH 31.8 02/22/2017 02:04 AM    MCHC 32.6* 02/22/2017 02:04 AM    MPV 12.7 02/22/2017 02:04 AM    NEUTROPHILS-POLYS 43.60* 02/22/2017 02:04 AM    LYMPHOCYTES 40.70 02/22/2017 02:04 AM    MONOCYTES 11.50 02/22/2017 02:04 AM    EOSINOPHILS 3.60 02/22/2017 02:04 AM    BASOPHILS 0.40 02/22/2017 02:04 AM      Lab Results   Component Value Date/Time     SODIUM 129* 02/22/2017 02:04 AM    POTASSIUM 4.7 02/22/2017 02:04 AM    CHLORIDE 92* 02/22/2017 02:04 AM    CO2 24 02/22/2017 02:04 AM    GLUCOSE 173* 02/22/2017 02:04 AM    BUN 77* 02/22/2017 02:04 AM    CREATININE 6.34* 02/22/2017 02:04 AM      Lab Results   Component Value Date/Time    ALT(SGPT) 8 02/22/2017 02:04 AM    AST(SGOT) 11* 02/22/2017 02:04 AM    ALKALINE PHOSPHATASE 67 02/22/2017 02:04 AM    TOTAL BILIRUBIN 0.2 02/22/2017 02:04 AM    ALBUMIN 3.0* 02/22/2017 02:04 AM    GLOBULIN 3.0 02/22/2017 02:04 AM     No results found for: PROTHROMBTM, INR

## 2017-02-25 NOTE — PROGRESS NOTES
"Referred to patient by CCS-N, Dr. Avila, as well as the CM.  Patient is to start 5 consecutive days of Vidaza beginning 3/2/17 for newly diagnosed MDS.  This has been scheduled at [facility].  Patient lives in [place] and does not have a way to get to treatment.  She has a car, but it recently broke down and she had to have it towed to her home.  She tells me her alternator is broken, although she has not had a  look at this.  She is leagaly blind and can only really only see \"shapes\" due to hx of diabetes and glaucoma. She cannot complete any paperwork without assistance.  She has a hx of CRF and gets dialysis M, W, F thru [facility].  She takes the [facility] bus service for this, with last  time at 2 pm.  Her treatment is from 10-1 pm.  She tells me the bus is not available for trips to [place] Tuesday and Thursday.  She reports that the SW at [facility] has submitted an application for senior housing, but has not been told what is happening with that process.  She receives $931.00 a month in SS death benefits ( spouse).  She gets $16.00 a month in food stamps, which she spends on bottled water as tap water gives her stomach problems.  Her daughter, disabled son-in-law, and their 3 children live with her.  They do not have an income.  They receive $274.00 in food stamps.  Her son-in-law has been disabled for 7 years, but does not receive SSD; application is maximilianlty under review.  Her daughter has been employed in the past, but does not work as she is the primary care giver for the entire family. Her name is Shannon Mccollum and her number is [phone].  Patient's mortgage is $344, electricity $280, gas $300, phone $30, car payment $400, insurance $172 and copay for medication between $7.75-$14.00.  She tells me she cannot afford food and eats whatever the other family members bring home and cook.  She has a grown son in [place].  Today completed a RCF application.  Will meet with the  SW to explore " resources available to help patient get to and from her treatments.  Provided patient with my contact information.  She will let her daughter know that I am her ONN.  Will call patient next week with update regarding assistance.  Continue to follow.

## 2017-02-27 ENCOUNTER — TELEPHONE (OUTPATIENT)
Dept: OTHER | Facility: MEDICAL CENTER | Age: 62
End: 2017-02-27

## 2017-02-27 NOTE — PROGRESS NOTES
On February 27, 2017,  Citlalli Arce met with Oncology Nurse Navigator Piedad Henderson to discuss pt. and referral for assistance from MARYELLEN Arce as pt. lives in [place], [place] and needs transportation Thursday through Monday for treatment.  Pt.'s car broke down and they do not have the money to get it fixed.      MARYELLEN Arce contacted [employer] at [phone] and spoke with [place] who informed MARYELLEN Arce they were unable to come to home to inspect and possibly fix car but was given telephone number to contact [employer].  MARYELLEN Arce contacted [employer] at [phone] and was informed they only worked on big rigs and tractors and were unable to come to the home to inspect and fix the car.  MARYELLEN Arce was referred to [facility] or [employer] [facility] for  work once the car is able to get towed to either of these two places.      MARYELLEN Arce contacted [facility]  Renea Stanford to inquire as to whether [facility] could assist.  RCF Director Hien instructed MARYELLEN Arce to find out quote on cost to tow car to  and also once car has been looked at to get an estimate of the cost to fix car.

## 2017-02-28 ENCOUNTER — TELEPHONE (OUTPATIENT)
Dept: OTHER | Facility: MEDICAL CENTER | Age: 62
End: 2017-02-28

## 2017-02-28 NOTE — PROGRESS NOTES
[facility] has agreed to pay for tow to [facility], [address], [place]  [address], [phone], to obtain an estimate in  Hopes of getting the patient's car fixed. MARYELLEN Arce has found a [employer] company that will take the car from [facility] to [facility] for $105.00;  [facility] [employer], [phone]. Called patient and spoke with her and her son-in-law, gave them all this information.  She will call the [employer] company today.  Await estimate to fix the car.  If within reason (?$1,000 or less) will move forward with getting it fixed. Will also need to reschedule treatment start date if able to get car fixed.  If not able to get fixed, will have to have it towed back to her house.  Explained all to patient, who verbalized understanding.

## 2017-03-01 ENCOUNTER — TELEPHONE (OUTPATIENT)
Dept: OTHER | Facility: MEDICAL CENTER | Age: 62
End: 2017-03-01

## 2017-03-01 NOTE — PROGRESS NOTES
Called daughter Vielka Briscoe at [phone] who lives with patient.  The car was towed today at noon and delivered to [employer] in [place], [phone].  She was told they will not be able to look at the car until Friday.  Will cancel patient's appointments in  as she has no transportation at this point to get there.  Continue to follow.

## 2017-03-02 ENCOUNTER — APPOINTMENT (OUTPATIENT)
Dept: ONCOLOGY | Facility: MEDICAL CENTER | Age: 62
End: 2017-03-02
Attending: INTERNAL MEDICINE
Payer: MEDICARE

## 2017-03-09 ENCOUNTER — APPOINTMENT (OUTPATIENT)
Dept: ONCOLOGY | Facility: MEDICAL CENTER | Age: 62
End: 2017-03-09
Attending: NURSE PRACTITIONER
Payer: MEDICARE

## 2017-03-10 ENCOUNTER — APPOINTMENT (OUTPATIENT)
Dept: ONCOLOGY | Facility: MEDICAL CENTER | Age: 62
End: 2017-03-10
Attending: INTERNAL MEDICINE
Payer: MEDICARE

## 2017-03-11 ENCOUNTER — APPOINTMENT (OUTPATIENT)
Dept: ONCOLOGY | Facility: MEDICAL CENTER | Age: 62
End: 2017-03-11
Attending: INTERNAL MEDICINE
Payer: MEDICARE

## 2017-03-12 ENCOUNTER — APPOINTMENT (OUTPATIENT)
Dept: ONCOLOGY | Facility: MEDICAL CENTER | Age: 62
End: 2017-03-12
Attending: INTERNAL MEDICINE
Payer: MEDICARE

## 2017-03-13 ENCOUNTER — APPOINTMENT (OUTPATIENT)
Dept: ONCOLOGY | Facility: MEDICAL CENTER | Age: 62
End: 2017-03-13
Attending: INTERNAL MEDICINE
Payer: MEDICARE

## 2017-03-19 ENCOUNTER — HOSPITAL ENCOUNTER (OUTPATIENT)
Facility: MEDICAL CENTER | Age: 62
End: 2017-03-21
Attending: EMERGENCY MEDICINE
Payer: MEDICARE

## 2017-03-19 ENCOUNTER — RESOLUTE PROFESSIONAL BILLING HOSPITAL PROF FEE (OUTPATIENT)
Dept: HOSPITALIST | Facility: MEDICAL CENTER | Age: 62
End: 2017-03-19
Payer: MEDICARE

## 2017-03-19 ENCOUNTER — APPOINTMENT (OUTPATIENT)
Dept: RADIOLOGY | Facility: MEDICAL CENTER | Age: 62
End: 2017-03-19
Attending: EMERGENCY MEDICINE
Payer: MEDICARE

## 2017-03-19 DIAGNOSIS — N18.6 ESRD (END STAGE RENAL DISEASE) ON DIALYSIS (HCC): ICD-10-CM

## 2017-03-19 DIAGNOSIS — Z99.2 ESRD (END STAGE RENAL DISEASE) ON DIALYSIS (HCC): ICD-10-CM

## 2017-03-19 DIAGNOSIS — D46.9 MDS (MYELODYSPLASTIC SYNDROME) (HCC): ICD-10-CM

## 2017-03-19 DIAGNOSIS — D64.9 ANEMIA, UNSPECIFIED TYPE: ICD-10-CM

## 2017-03-19 LAB
ABO GROUP BLD: NORMAL
ALBUMIN SERPL BCP-MCNC: 3.5 G/DL (ref 3.2–4.9)
ALBUMIN/GLOB SERPL: 1.1 G/DL
ALP SERPL-CCNC: 65 U/L (ref 30–99)
ALT SERPL-CCNC: 13 U/L (ref 2–50)
ANION GAP SERPL CALC-SCNC: 14 MMOL/L (ref 0–11.9)
ANISOCYTOSIS BLD QL SMEAR: ABNORMAL
AST SERPL-CCNC: 11 U/L (ref 12–45)
BARCODED ABORH UBTYP: 8400
BARCODED ABORH UBTYP: 8400
BARCODED PRD CODE UBPRD: NORMAL
BARCODED PRD CODE UBPRD: NORMAL
BARCODED UNIT NUM UBUNT: NORMAL
BARCODED UNIT NUM UBUNT: NORMAL
BASOPHILS # BLD AUTO: 3.5 % (ref 0–1.8)
BASOPHILS # BLD: 0.2 K/UL (ref 0–0.12)
BILIRUB SERPL-MCNC: 0.4 MG/DL (ref 0.1–1.5)
BLD GP AB SCN SERPL QL: NORMAL
BNP SERPL-MCNC: 107 PG/ML (ref 0–100)
BUN SERPL-MCNC: 67 MG/DL (ref 8–22)
CALCIUM SERPL-MCNC: 7.8 MG/DL (ref 8.5–10.5)
CHLORIDE SERPL-SCNC: 101 MMOL/L (ref 96–112)
CO2 SERPL-SCNC: 23 MMOL/L (ref 20–33)
COMPONENT R 8504R: NORMAL
COMPONENT R 8504R: NORMAL
CREAT SERPL-MCNC: 6.35 MG/DL (ref 0.5–1.4)
EOSINOPHIL # BLD AUTO: 0.26 K/UL (ref 0–0.51)
EOSINOPHIL NFR BLD: 4.4 % (ref 0–6.9)
ERYTHROCYTE [DISTWIDTH] IN BLOOD BY AUTOMATED COUNT: 66.8 FL (ref 35.9–50)
GFR SERPL CREATININE-BSD FRML MDRD: 7 ML/MIN/1.73 M 2
GLOBULIN SER CALC-MCNC: 3.2 G/DL (ref 1.9–3.5)
GLUCOSE SERPL-MCNC: 156 MG/DL (ref 65–99)
HCT VFR BLD AUTO: 21.4 % (ref 37–47)
HGB BLD-MCNC: 7 G/DL (ref 12–16)
LG PLATELETS BLD QL SMEAR: NORMAL
LYMPHOCYTES # BLD AUTO: 1.17 K/UL (ref 1–4.8)
LYMPHOCYTES NFR BLD: 20.2 % (ref 22–41)
MACROCYTES BLD QL SMEAR: ABNORMAL
MAGNESIUM SERPL-MCNC: 2.4 MG/DL (ref 1.5–2.5)
MANUAL DIFF BLD: NORMAL
MCH RBC QN AUTO: 30.8 PG (ref 27–33)
MCHC RBC AUTO-ENTMCNC: 32.7 G/DL (ref 33.6–35)
MCV RBC AUTO: 94.3 FL (ref 81.4–97.8)
MICROCYTES BLD QL SMEAR: ABNORMAL
MONOCYTES # BLD AUTO: 0.26 K/UL (ref 0–0.85)
MONOCYTES NFR BLD AUTO: 4.4 % (ref 0–13.4)
MORPHOLOGY BLD-IMP: NORMAL
NEUTROPHILS # BLD AUTO: 3.92 K/UL (ref 2–7.15)
NEUTROPHILS NFR BLD: 67.5 % (ref 44–72)
NRBC # BLD AUTO: 0 K/UL
NRBC BLD AUTO-RTO: 0 /100 WBC
PHOSPHATE SERPL-MCNC: 7.9 MG/DL (ref 2.5–4.5)
PLATELET # BLD AUTO: 406 K/UL (ref 164–446)
PLATELET BLD QL SMEAR: NORMAL
PMV BLD AUTO: 12.6 FL (ref 9–12.9)
POIKILOCYTOSIS BLD QL SMEAR: NORMAL
POTASSIUM SERPL-SCNC: 4.2 MMOL/L (ref 3.6–5.5)
PRODUCT TYPE UPROD: NORMAL
PRODUCT TYPE UPROD: NORMAL
PROT SERPL-MCNC: 6.7 G/DL (ref 6–8.2)
RBC # BLD AUTO: 2.27 M/UL (ref 4.2–5.4)
RBC BLD AUTO: PRESENT
RH BLD: NORMAL
SCHISTOCYTES BLD QL SMEAR: NORMAL
SODIUM SERPL-SCNC: 138 MMOL/L (ref 135–145)
TOXIC GRANULES BLD QL SMEAR: SLIGHT
TROPONIN I SERPL-MCNC: <0.01 NG/ML (ref 0–0.04)
UNIT STATUS USTAT: NORMAL
UNIT STATUS USTAT: NORMAL
WBC # BLD AUTO: 5.8 K/UL (ref 4.8–10.8)

## 2017-03-19 PROCEDURE — 84484 ASSAY OF TROPONIN QUANT: CPT

## 2017-03-19 PROCEDURE — 99285 EMERGENCY DEPT VISIT HI MDM: CPT

## 2017-03-19 PROCEDURE — 85007 BL SMEAR W/DIFF WBC COUNT: CPT

## 2017-03-19 PROCEDURE — 83735 ASSAY OF MAGNESIUM: CPT

## 2017-03-19 PROCEDURE — 93005 ELECTROCARDIOGRAM TRACING: CPT | Performed by: EMERGENCY MEDICINE

## 2017-03-19 PROCEDURE — 83880 ASSAY OF NATRIURETIC PEPTIDE: CPT

## 2017-03-19 PROCEDURE — 36430 TRANSFUSION BLD/BLD COMPNT: CPT

## 2017-03-19 PROCEDURE — 86850 RBC ANTIBODY SCREEN: CPT

## 2017-03-19 PROCEDURE — P9016 RBC LEUKOCYTES REDUCED: HCPCS

## 2017-03-19 PROCEDURE — 99219 PR INITIAL OBSERVATION CARE,LEVL II: CPT | Mod: GC | Performed by: HOSPITALIST

## 2017-03-19 PROCEDURE — 36415 COLL VENOUS BLD VENIPUNCTURE: CPT

## 2017-03-19 PROCEDURE — 86923 COMPATIBILITY TEST ELECTRIC: CPT

## 2017-03-19 PROCEDURE — 71010 DX-CHEST-PORTABLE (1 VIEW): CPT

## 2017-03-19 PROCEDURE — 86901 BLOOD TYPING SEROLOGIC RH(D): CPT

## 2017-03-19 PROCEDURE — 86900 BLOOD TYPING SEROLOGIC ABO: CPT

## 2017-03-19 PROCEDURE — 85027 COMPLETE CBC AUTOMATED: CPT

## 2017-03-19 PROCEDURE — 84100 ASSAY OF PHOSPHORUS: CPT

## 2017-03-19 PROCEDURE — 80053 COMPREHEN METABOLIC PANEL: CPT

## 2017-03-19 RX ORDER — SODIUM CHLORIDE 9 MG/ML
INJECTION, SOLUTION INTRAVENOUS CONTINUOUS
Status: DISCONTINUED | OUTPATIENT
Start: 2017-03-19 | End: 2017-03-20

## 2017-03-19 ASSESSMENT — PAIN SCALES - GENERAL: PAINLEVEL_OUTOF10: 0

## 2017-03-19 NOTE — IP AVS SNAPSHOT
[employer] Access Code: [email or web address]  Expires: 3/24/2017  8:54 PM    Your email address is not on file at [facility].  Email Addresses are required for you to sign up for [employer], please contact [phone] to verify your personal information and to provide your email address prior to attempting to register for [employer].    [facility]  [address] [place] [address]    [employer]  A secure, online tool to manage your health information     [facility] [facility]’s [employer]® is a secure, online tool that connects you to your personalized health information from the privacy of your home -- day or night - making it very easy for you to manage your healthcare. Once the activation process is completed, you can even access your medical information using the [employer] sundar, which is available for free in the Apple Sundar store or Google Play store.     To learn more about [employer], visit [email or web address]    There are two levels of access available (as shown below):   My Chart Features  [facility] Primary Care Doctor [facility]  Specialists [facility]  Urgent  Care Non-[facility] Primary Care Doctor   Email your healthcare team securely and privately 24/7 X X X    Manage appointments: schedule your next appointment; view details of past/upcoming appointments X      Request prescription refills. X      View recent personal medical records, including lab and immunizations X X X X   View health record, including health history, allergies, medications X X X X   Read reports about your outpatient visits, procedures, consult and ER notes X X X X   See your discharge summary, which is a recap of your hospital and/or ER visit that includes your diagnosis, lab results, and care plan X X  X     How to register for [employer]:  Once your e-mail address has been verified, follow the following steps to sign up for [employer].     1. Go to  [email or web address]  2. Click on the Sign Up Now box, which takes you to the New Member Sign Up page.  You will need to provide the following information:  a. Enter your [employer] Access Code exactly as it appears at the top of this page. (You will not need to use this code after you’ve completed the sign-up process. If you do not sign up before the expiration date, you must request a new code.)   b. Enter your date of birth.   c. Enter your home email address.   d. Click Submit, and follow the next screen’s instructions.  3. Create a Ansible[employer] ID. This will be your [employer] login ID and cannot be changed, so think of one that is secure and easy to remember.  4. Create a [employer] password. You can change your password at any time.  5. Enter your Password Reset Question and Answer. This can be used at a later time if you forget your password.   6. Enter your e-mail address. This allows you to receive e-mail notifications when new information is available in [employer].  7. Click Sign Up. You can now view your health information.    For assistance activating your [employer] account, call [phone]

## 2017-03-19 NOTE — IP AVS SNAPSHOT
" Home Care Instructions                                                                                                                  Name:Vielka Briscoe  Medical Record Number:[id number]  CSN: [id number]    YOB: 1955   Age: 61 y.o.  Sex: female  HT:1.473 m (4' 10\") WT: 63 kg (138 lb 14.2 oz)          Admit Date: 3/19/2017     Discharge Date:   Today's Date: 3/21/2017  Attending Doctor:  Unr [facility] [place] Calliste*                  Allergies:  Actos; Darvocet; Demerol; Glucophage; Morphine; Oxycodone; Pcn; Requip; Simvastatin; Sulfa drugs; Tramadol; Trazodone; Demerol; Dilaudid; Diphenhydramine; Iron; Lenalidomide; Morphine; Multivitamin; Naprosyn; Other drug; and Sulfa drugs            Discharge Instructions       Discharge Instructions    Discharged to home by [facility] with escort. Discharged via wheelchair, hospital escort: Yes.  Special equipment needed: Oxygen    Be sure to schedule a follow-up appointment with your primary care doctor or any specialists as instructed.     Discharge Plan:   Influenza Vaccine Indication: Patient Refuses    I understand that a diet low in cholesterol, fat, and sodium is recommended for good health. Unless I have been given specific instructions below for another diet, I accept this instruction as my diet prescription.   Other diet: renal, diabetic    Special Instructions: None    · Is patient discharged on Warfarin / Coumadin?   No     · Is patient Post Blood Transfusion?  No    Depression / Suicide Risk    As you are discharged from this [facility], it is important to learn how to keep safe from harming yourself.    Recognize the warning signs:  · Abrupt changes in personality, positive or negative- including increase in energy   · Giving away possessions  · Change in eating patterns- significant weight changes-  positive or negative  · Change in sleeping patterns- unable to sleep or sleeping all the time   · Unwillingness or inability to " communicate  · Depression  · Unusual sadness, discouragement and loneliness  · Talk of wanting to die  · Neglect of personal appearance   · Rebelliousness- reckless behavior  · Withdrawal from people/activities they love  · Confusion- inability to concentrate     If you or a loved one observes any of these behaviors or has concerns about self-harm, here's what you can do:  · Talk about it- your feelings and reasons for harming yourself  · Remove any means that you might use to hurt yourself (examples: pills, rope, extension cords, firearm)  · Get professional help from the community (Mental Health, Substance Abuse, psychological counseling)  · Do not be alone:Call your Safe Contact- someone whom you trust who will be there for you.  · Call your local CRISIS HOTLINE 868-4631 or 860-091-9656  · Call your local Children's Mobile Crisis Response Team [place] (714) 748-7585 or www.[employer]  · Call the toll free National Suicide Prevention Hotlines   · National Suicide Prevention Lifeline 801-134-BIPG (7631)  · [employer] 800-SUICIDE (269-7603)    Anemia, Frequently Asked Questions  WHAT ARE THE SYMPTOMS OF ANEMIA?  · Headache.   · Difficulty thinking.   · Fatigue.   · Shortness of breath.   · Weakness.   · Rapid heartbeat.   AT WHAT POINT ARE PEOPLE CONSIDERED ANEMIC?   This varies with gender and age.   · Both hemoglobin (Hgb) and hematocrit values are used to define anemia. These lab values are obtained from a complete blood count (CBC) test. This is performed at a caregiver's office.   · The normal range of hemoglobin values for adult men is 14.0 g/dL to 17.4 g/dL. For nonpregnant women, values are 12.3 g/dL to 15.3 g/dL.   · The World Health Organization defines anemia as less than 12 g/dL for nonpregnant women and less than 13 g/dL for men.   · For adult males, the average normal hematocrit is 46%, and the range is 40% to 52%.   · For adult females, the average normal hematocrit is 41%,  and the range is 35% to 47%.   · Values that fall below the lower limits can be a sign of anemia and should have further checking (evaluation).   GROUPS OF PEOPLE WHO ARE AT RISK FOR DEVELOPING ANEMIA INCLUDE:   · Infants who are  or taking a formula that is not fortified with iron.   · Children going through a rapid growth spurt. The iron available can not keep up with the needs for a red cell mass which must grow with the child.   · Women in childbearing years. They need iron because of blood loss during menstruation.   · Pregnant women. The growing fetus creates a high demand for iron.   · People with ongoing gastrointestinal blood loss are at risk of developing iron deficiency.   · Individuals with leukemia or cancer who must receive chemotherapy or radiation to treat their disease. The drugs or radiation used to treat these diseases often decreases the bone marrow's ability to make cells of all classes. This includes red blood cells, white blood cells, and platelets.   · Individuals with chronic inflammatory conditions such as rheumatoid arthritis or chronic infections.   · The elderly.   ARE SOME TYPES OF ANEMIA INHERITED?   · Yes, some types of anemia are due to inherited or genetic defects.   · Sickle cell anemia. This occurs most often in people of , , and Mediterranean descent.   · Thalassemia (or Groves's anemia). This type is found in people of Mediterranean and Southeast  descent. These types of anemia are common.   · Fanconi. This is rare.   CAN CERTAIN MEDICATIONS CAUSE A PERSON TO BECOME ANEMIC?   Yes. For example, drugs to fight cancer (chemotherapeutic agents) often cause anemia. These drugs can slow the bone marrow's ability to make red blood cells. If there are not enough red blood cells, the body does not get enough oxygen.  WHAT HEMATOCRIT LEVEL IS REQUIRED TO DONATE BLOOD?   The lower limit of an acceptable hematocrit for blood donors is 38%. If you have a  low hematocrit value, you should schedule an appointment with your caregiver.  ARE BLOOD TRANSFUSIONS COMMONLY USED TO CORRECT ANEMIA, AND ARE THEY DANGEROUS?   They are used to treat anemia as a last resort. Your caregiver will find the cause of the anemia and correct it if possible. Most blood transfusions are given because of excessive bleeding at the time of surgery, with trauma, or because of bone marrow suppression in patients with cancer or leukemia on chemotherapy. Blood transfusions are safer than ever before. We also know that blood transfusions affect the immune system and may increase certain risks. There is also a concern for human error. In 1/16,000 transfusions, a patient receives a transfusion of blood that is not matched with his or her blood type.   WHAT IS IRON DEFICIENCY ANEMIA AND CAN I CORRECT IT BY CHANGING MY DIET?   Iron is an essential part of hemoglobin. Without enough hemoglobin, anemia develops and the body does not get the right amount of oxygen. Iron deficiency anemia develops after the body has had a low level of iron for a long time. This is either caused by blood loss, not taking in or absorbing enough iron, or increased demands for iron (like pregnancy or rapid growth).   Foods from animal origin such as beef, chicken, and pork, are good sources of iron. Be sure to have one of these foods at each meal. Vitamin C helps your body absorb iron. Foods rich in Vitamin C include citrus, bell pepper, strawberries, spinach and cantaloupe. In some cases, iron supplements may be needed in order to correct the iron deficiency. In the case of poor absorption, extra iron may have to be given directly into the vein through a needle (intravenously).  I HAVE BEEN DIAGNOSED WITH IRON DEFICIENCY ANEMIA AND MY CAREGIVER PRESCRIBED IRON SUPPLEMENTS. HOW LONG WILL IT TAKE FOR MY BLOOD TO BECOME NORMAL?   It depends on the degree of anemia at the beginning of treatment. Most people with mild to moderate  iron deficiency, anemia will correct the anemia over a period of 2 to 3 months. But after the anemia is corrected, the iron stored by the body is still low. Caregivers often suggest an additional 6 months of oral iron therapy once the anemia has been reversed. This will help prevent the iron deficiency anemia from quickly happening again. Non-anemic adult males should take iron supplements only under the direction of a doctor, too much iron can cause liver damage.   MY HEMOGLOBIN IS 9 G/DL AND I AM SCHEDULED FOR SURGERY. SHOULD I POSTPONE THE SURGERY?   If you have Hgb of 9, you should discuss this with your caregiver right away. Many patients with similar hemoglobin levels have had surgery without problems. If minimal blood loss is expected for a minor procedure, no treatment may be necessary.   If a greater blood loss is expected for more extensive procedures, you should ask your caregiver about being treated with erythropoietin and iron. This is to accelerate the recovery of your hemoglobin to a normal level before surgery. An anemic patient who undergoes high-blood-loss surgery has a greater risk of surgical complications and need for a blood transfusion, which also carries some risk.   I HAVE BEEN TOLD THAT HEAVY MENSTRUAL PERIODS CAUSE ANEMIA. IS THERE ANYTHING I CAN DO TO PREVENT THE ANEMIA?   Anemia that results from heavy periods is usually due to iron deficiency. You can try to meet the increased demands for iron caused by the heavy monthly blood loss by increasing the intake of iron-rich foods. Iron supplements may be required. Discuss your concerns with your caregiver.  WHAT CAUSES ANEMIA DURING PREGNANCY?   Pregnancy places major demands on the body. The mother must meet the needs of both her body and her growing baby. The body needs enough iron and folate to make the right amount of red blood cells. To prevent anemia while pregnant, the mother should stay in close contact with her caregiver.   Be sure  to eat a diet that has foods rich in iron and folate like liver and dark green leafy vegetables. Folate plays an important role in the normal development of a baby's spinal cord. Folate can help prevent serious disorders like spina bifida. If your diet does not provide adequate nutrients, you may want to talk with your caregiver about nutritional supplements.   WHAT IS THE RELATIONSHIP BETWEEN FIBROID TUMORS AND ANEMIA IN WOMEN?   The relationship is usually caused by the increased menstrual blood loss caused by fibroids. Good iron intake may be required to prevent iron deficiency anemia from developing.   Document Released: 07/26/2005 Document Revised: 03/11/2013 Document Reviewed: 01/10/2012  [employer]® Patient Information ©2013 [employer].    Kidney Disease, Adult  The kidneys are two organs that lie on either side of the spine between the middle of the back and the front of the abdomen. The kidneys:   · Remove wastes and extra water from the blood.    · Produce important hormones. These regulate blood pressure, help keep bones strong, and help create red blood cells.    · Balance the fluids and chemicals in the blood and tissues.  Kidney disease occurs when the kidneys are damaged. Kidney damage may be sudden (acute) or develop over a long period (chronic). A small amount of damage may not cause problems, but a large amount of damage may make it difficult or impossible for the kidneys to work the way they should. Early detection and treatment of kidney disease may prevent kidney damage from becoming permanent or getting worse. Some kidney diseases are curable, but most are not. Many people with kidney disease are able to control the disease and live a normal life.   TYPES OF KIDNEY DISEASE  · Acute kidney injury. Acute kidney injury occurs when there is sudden damage to the kidneys.  · Chronic kidney disease. Chronic kidney disease occurs when the kidneys are damaged over a long period.  · End-stage kidney  disease. End-stage kidney disease occurs when the kidneys are so damaged that they stop working. In end-stage kidney disease, the kidneys cannot get better.  CAUSES  Any condition, disease, or event that damages the kidneys may cause kidney disease.  Acute kidney injury.  · A problem with blood flow to the kidneys. This may be caused by:    · Blood loss.    · Heart disease.    · Severe burns.    · Liver disease.  · Direct damage to the kidneys. This may be caused by:  · Some medicines.    · A kidney infection.    · Poisoning or consuming toxic substances.    · A surgical wound.    · A blow to the kidney area.    · A problem with urine flow. This may be caused by:    · Cancer.    · Kidney stones.    · An enlarged prostate.  Chronic kidney disease. The most common causes of chronic kidney disease are diabetes and high blood pressure (hypertension). Chronic kidney disease may also be caused by:   · Diseases that cause the filtering units of the kidneys to become inflamed.    · Diseases that affect the immune system.    · Genetic diseases.    · Medicines that damage the kidneys, such as anti-inflammatory medicines.    · Poisoning or exposure to toxic substances.    · A reoccurring kidney or urinary infection.    · A problem with urine flow. This may be caused by:  · Cancer.    · Kidney stones.    · An enlarged prostate in males.  End-stage kidney disease. This kidney disease usually occurs when a chronic kidney disease gets worse. It may also occur after acute kidney injury.   SYMPTOMS   · Swelling (edema) of the legs, ankles, or feet.    · Tiredness (lethargy).    · Nausea or vomiting.    · Confusion.    · Problems with urination, such as:    · Painful or burning feeling during urination.    · Decreased urine production.  · Bloody urine.    · Frequent urination, especially at night.  · Hypertension.   · Muscle twitches and cramps.    · Shortness of breath.    · Persistent itchiness.    · Loss of appetite.  · Metallic  taste in the mouth.    · Weakness.    · Seizures.    · Chest pain or pressure.    · Trouble sleeping.    · Headaches.    · Abnormally dark or light skin.    · Numbness in the hands or feet.    · Easy bruising.    · Frequent hiccups.    · Menstruation stops.  Sometimes, no symptoms are present.    DIAGNOSIS   Kidney disease may be detected and diagnosed by tests, including blood, urine, imaging, or kidney biopsy tests.   TREATMENT   Acute kidney injury. Treatment of acute kidney injury varies depending on the cause and severity of the kidney damage. In mild cases, no treatment may be needed. The kidneys may heal on their own. If acute kidney injury is more severe, your caregiver will treat the cause of the kidney damage, help the kidneys heal, and prevent complications from occurring. Severe cases may require a procedure to remove toxic wastes from the body (dialysis) or surgery to repair kidney damage. Surgery may involve:   · Repair of a torn kidney.    · Removal of an obstruction.    Most of the time, you will need to stay overnight at the hospital.   Chronic kidney disease. Most chronic kidney diseases cannot be cured. Treatment usually involves relieving symptoms and preventing or slowing the progression of the disease. Treatment may include:   · A special diet. You may need to avoid alcohol and foods that:    · Have added salt.    · Are high in potassium.    · Are high in protein.    · Medicines. These may:    · Lower blood pressure.    · Relieve anemia.    · Relieve swelling.    · Protect the bones.    End-stage kidney disease. End-stage kidney disease is life-threatening and must be treated immediately. There are two treatments for end-stage kidney disease:   · Dialysis.    · Receiving a new kidney (kidney transplant).  Both of these treatments have serious risks and consequences. In addition to having dialysis or a kidney transplant, you may need to take medicines to control hypertension and cholesterol and  to decrease phosphorus levels in your blood.  LENGTH OF ILLNESS  · Acute kidney injury. The length of this disease varies greatly from person to person. Exactly how long it lasts depends on the cause of the kidney damage. Acute kidney injury may develop into chronic kidney disease or end-stage kidney disease.  · Chronic kidney disease. This disease usually lasts a lifetime. Chronic kidney disease may worsen over time to become end-stage kidney disease. The time it takes for end-stage kidney disease to develop varies from person to person.  · End-stage kidney disease. This disease lasts until a kidney transplant is performed.  PREVENTION   Kidney disease can sometimes be prevented. If you have diabetes, hypertension, or any other condition that may lead to kidney disease, you should try to prevent kidney disease with:   · An appropriate diet.  · Medicine.  · Lifestyle changes.  FOR MORE INFORMATION   American Association of Kidney Patients: www.aakp.org   National Kidney Foundation: www.kidney.org   American Kidney Fund: www.akfinc.org   Life Options Rehabilitation Program: www.lifeoptions.org and www.kidneyschool.org   Document Released: 12/18/2006 Document Revised: 12/04/2013 Document Reviewed: 08/16/2013  ExitCare® Patient Information ©2014 [facility].         Discharge Medication Instructions:    Below are the medications your physician expects you to take upon discharge:    Review all your home medications and newly ordered medications with your doctor and/or pharmacist. Follow medication instructions as directed by your doctor and/or pharmacist.    Please keep your medication list with you and share with your physician.               Medication List      CONTINUE taking these medications        Instructions    amlodipine 10 MG Tabs   Last time this was given:  10 mg on 3/21/2017  9:08 AM   Commonly known as:  NORVASC    Take 10 mg by mouth every day.   Dose:  10 mg       carvedilol 12.5 MG Tabs   Last time  this was given:  12.5 mg on 3/21/2017  7:22 AM   Commonly known as:  COREG    Take 12.5 mg by mouth 2 times a day, with meals.   Dose:  12.5 mg       hydrocodone-acetaminophen 5-325 MG Tabs per tablet   Last time this was given:  1 Tab on 3/21/2017  1:11 AM   Commonly known as:  NORCO    Take 1-2 Tabs by mouth every 6 hours as needed.   Dose:  1-2 Tab       LUMIGAN 0.03 % Soln   Generic drug:  bimatoprost    Place 1 Drop in both eyes every evening.   Dose:  1 Drop       LYRICA 50 MG capsule   Last time this was given:  50 mg on 3/21/2017  9:07 AM   Generic drug:  pregabalin    Take 50 mg by mouth 2 times a day.   Dose:  50 mg               Instructions           Diet / Nutrition:    Follow any diet instructions given to you by your doctor or the dietician, including how much salt (sodium) you are allowed each day.    If you are overweight, talk to your doctor about a weight reduction plan.    Activity:    Remain physically active following your doctor's instructions about exercise and activity.    Rest often.     Any time you become even a little tired or short of breath, SIT DOWN and rest.    Worsening Symptoms:    Report any of the following signs and symptoms to the doctor's office immediately:    *Pain of jaw, arm, or neck  *Chest pain not relieved by medication                               *Dizziness or loss of consciousness  *Difficulty breathing even when at rest   *More tired than usual                                       *Bleeding drainage or swelling of surgical site  *Swelling of feet, ankles, legs or stomach                 *Fever (>100ºF)  *Pink or blood tinged sputum  *Weight gain (3lbs/day or 5lbs /week)           *Shock from internal defibrillator (if applicable)  *Palpitations or irregular heartbeats                *Cool and/or numb extremities    Stroke Awareness    Common Risk Factors for Stroke include:    Age  Atrial Fibrillation  Carotid Artery Stenosis  Diabetes Mellitus  Excessive alcohol  consumption  High blood pressure  Overweight   Physical inactivity  Smoking    Warning signs and symptoms of a stroke include:    *Sudden numbness or weakness of the face, arm or leg (especially on one side of the body).  *Sudden confusion, trouble speaking or understanding.  *Sudden trouble seeing in one or both eyes.  *Sudden trouble walking, dizziness, loss of balance or coordination.Sudden severe headache with no known cause.    It is very important to get treatment quickly when a stroke occurs. If you experience any of the above warning signs, call 911 immediately.                   Disclaimer         Quit Smoking / Tobacco Use:    I understand the use of any tobacco products increases my chance of suffering from future heart disease or stroke and could cause other illnesses which may shorten my life. Quitting the use of tobacco products is the single most important thing I can do to improve my health. For further information on smoking / tobacco cessation call a Toll Free Quit Line at 1-275.370.4887 (*National Cancer [place]) or 1-150.570.6324 (American Lung Association) or you can access the web based program at www.lungIngenuity Systems.org.    [place] Tobacco Users Help Line:  (789) 113-6813       Toll Free: 1-186.882.2314  Quit Tobacco Program [facility] (519)455-5644    Crisis Hotline:    [employer] Crisis Hotline:  8-432-BVYNPNR or 1-430.679.6968    [place] Crisis Hotline:    1-252.155.1883 or 163-767-4229    Discharge Survey:   Thank you for choosing [facility]. We hope we did everything we could to make your hospital stay a pleasant one. You may be receiving a phone survey and we would appreciate your time and participation in answering the questions. Your input is very valuable to us in our efforts to improve our service to our patients and their families.        My signature on this form indicates that:    1. I have reviewed and understand the above information.  2. My questions regarding  this information have been answered to my satisfaction.  3. I have formulated a plan with my discharge nurse to obtain my prescribed medications for home.                  Disclaimer         __________________________________                     __________       ________                       Patient Signature                                                 Date                    Time

## 2017-03-19 NOTE — IP AVS SNAPSHOT
3/21/2017          Vielka Lucio [facility]  [address]   [place] [address]    Dear Vielka:    [facility] wants to ensure your discharge home is safe and you or your loved ones have had all your questions answered regarding your care after you leave the hospital.    You may receive a telephone call within two days of your discharge.  This call is to make certain you understand your discharge instructions as well as ensure we provided you with the best care possible during your stay with us.     The call will only last approximately 3-5 minutes and will be done by a nurse.    Once again, we want to ensure your discharge home is safe and that you have a clear understanding of any next steps in your care.  If you have any questions or concerns, please do not hesitate to contact us, we are here for you.  Thank you for choosing [facility] [place] [facility] for your healthcare needs.    Sincerely,    Amol Bunn    [facility]

## 2017-03-20 PROBLEM — J44.9 COPD (CHRONIC OBSTRUCTIVE PULMONARY DISEASE) (HCC): Status: ACTIVE | Noted: 2017-03-20

## 2017-03-20 PROBLEM — E11.8 DIABETES MELLITUS WITH COMPLICATION (HCC): Status: ACTIVE | Noted: 2017-03-20

## 2017-03-20 PROBLEM — D46.9 MYELODYSPLASTIC SYNDROME (HCC): Status: ACTIVE | Noted: 2017-03-20

## 2017-03-20 PROBLEM — E23.2 DIABETES INSIPIDUS (HCC): Status: ACTIVE | Noted: 2017-03-20

## 2017-03-20 LAB
ALBUMIN SERPL BCP-MCNC: 3.2 G/DL (ref 3.2–4.9)
ALBUMIN/GLOB SERPL: 1.1 G/DL
ALP SERPL-CCNC: 60 U/L (ref 30–99)
ALT SERPL-CCNC: 9 U/L (ref 2–50)
ANION GAP SERPL CALC-SCNC: 14 MMOL/L (ref 0–11.9)
APPEARANCE UR: CLEAR
AST SERPL-CCNC: 9 U/L (ref 12–45)
BACTERIA #/AREA URNS HPF: ABNORMAL /HPF
BASOPHILS # BLD AUTO: 0.6 % (ref 0–1.8)
BASOPHILS # BLD: 0.03 K/UL (ref 0–0.12)
BILIRUB SERPL-MCNC: 0.5 MG/DL (ref 0.1–1.5)
BILIRUB UR QL STRIP.AUTO: NEGATIVE
BUN SERPL-MCNC: 72 MG/DL (ref 8–22)
CALCIUM SERPL-MCNC: 7.3 MG/DL (ref 8.5–10.5)
CHLORIDE SERPL-SCNC: 102 MMOL/L (ref 96–112)
CO2 SERPL-SCNC: 21 MMOL/L (ref 20–33)
COLOR UR: ABNORMAL
CREAT SERPL-MCNC: 6.63 MG/DL (ref 0.5–1.4)
EKG IMPRESSION: NORMAL
EKG IMPRESSION: NORMAL
EOSINOPHIL # BLD AUTO: 0.23 K/UL (ref 0–0.51)
EOSINOPHIL NFR BLD: 4.3 % (ref 0–6.9)
EPI CELLS #/AREA URNS HPF: ABNORMAL /HPF
ERYTHROCYTE [DISTWIDTH] IN BLOOD BY AUTOMATED COUNT: 58.8 FL (ref 35.9–50)
FERRITIN SERPL-MCNC: 1032.4 NG/ML (ref 10–291)
FOLATE SERPL-MCNC: 12.1 NG/ML
GFR SERPL CREATININE-BSD FRML MDRD: 6 ML/MIN/1.73 M 2
GLOBULIN SER CALC-MCNC: 2.9 G/DL (ref 1.9–3.5)
GLUCOSE BLD-MCNC: 175 MG/DL (ref 65–99)
GLUCOSE BLD-MCNC: 191 MG/DL (ref 65–99)
GLUCOSE BLD-MCNC: 218 MG/DL (ref 65–99)
GLUCOSE BLD-MCNC: 224 MG/DL (ref 65–99)
GLUCOSE SERPL-MCNC: 212 MG/DL (ref 65–99)
GLUCOSE UR STRIP.AUTO-MCNC: 300 MG/DL
HCT VFR BLD AUTO: 24.6 % (ref 37–47)
HGB BLD-MCNC: 8.2 G/DL (ref 12–16)
HYALINE CASTS #/AREA URNS LPF: ABNORMAL /LPF
IMM GRANULOCYTES # BLD AUTO: 0.02 K/UL (ref 0–0.11)
IMM GRANULOCYTES NFR BLD AUTO: 0.4 % (ref 0–0.9)
IRON SATN MFR SERPL: 73 % (ref 15–55)
IRON SERPL-MCNC: 163 UG/DL (ref 40–170)
KETONES UR STRIP.AUTO-MCNC: NEGATIVE MG/DL
LEUKOCYTE ESTERASE UR QL STRIP.AUTO: ABNORMAL
LV EJECT FRACT  99904: 60
LV EJECT FRACT MOD 2C 99903: 60.81
LV EJECT FRACT MOD 4C 99902: 66.44
LV EJECT FRACT MOD BP 99901: 64.23
LYMPHOCYTES # BLD AUTO: 1.87 K/UL (ref 1–4.8)
LYMPHOCYTES NFR BLD: 35 % (ref 22–41)
MCH RBC QN AUTO: 30.7 PG (ref 27–33)
MCHC RBC AUTO-ENTMCNC: 33.3 G/DL (ref 33.6–35)
MCV RBC AUTO: 92.1 FL (ref 81.4–97.8)
MICRO URNS: ABNORMAL
MONOCYTES # BLD AUTO: 0.4 K/UL (ref 0–0.85)
MONOCYTES NFR BLD AUTO: 7.5 % (ref 0–13.4)
NEUTROPHILS # BLD AUTO: 2.8 K/UL (ref 2–7.15)
NEUTROPHILS NFR BLD: 52.2 % (ref 44–72)
NITRITE UR QL STRIP.AUTO: NEGATIVE
NRBC # BLD AUTO: 0 K/UL
NRBC BLD AUTO-RTO: 0 /100 WBC
PH UR STRIP.AUTO: 7 [PH]
PLATELET # BLD AUTO: 361 K/UL (ref 164–446)
PMV BLD AUTO: 12.6 FL (ref 9–12.9)
POTASSIUM SERPL-SCNC: 3.9 MMOL/L (ref 3.6–5.5)
PROT SERPL-MCNC: 6.1 G/DL (ref 6–8.2)
PROT UR QL STRIP: 200 MG/DL
RBC # BLD AUTO: 2.67 M/UL (ref 4.2–5.4)
RBC # URNS HPF: ABNORMAL /HPF
RBC UR QL AUTO: ABNORMAL
SODIUM SERPL-SCNC: 137 MMOL/L (ref 135–145)
SP GR UR STRIP.AUTO: 1.01
TIBC SERPL-MCNC: 223 UG/DL (ref 250–450)
TROPONIN I SERPL-MCNC: <0.01 NG/ML (ref 0–0.04)
VIT B12 SERPL-MCNC: 672 PG/ML (ref 211–911)
WBC # BLD AUTO: 5.4 K/UL (ref 4.8–10.8)
WBC #/AREA URNS HPF: ABNORMAL /HPF

## 2017-03-20 PROCEDURE — 36415 COLL VENOUS BLD VENIPUNCTURE: CPT

## 2017-03-20 PROCEDURE — G0378 HOSPITAL OBSERVATION PER HR: HCPCS

## 2017-03-20 PROCEDURE — 82607 VITAMIN B-12: CPT

## 2017-03-20 PROCEDURE — 82728 ASSAY OF FERRITIN: CPT

## 2017-03-20 PROCEDURE — P9016 RBC LEUKOCYTES REDUCED: HCPCS

## 2017-03-20 PROCEDURE — 83550 IRON BINDING TEST: CPT

## 2017-03-20 PROCEDURE — 94760 N-INVAS EAR/PLS OXIMETRY 1: CPT

## 2017-03-20 PROCEDURE — A9270 NON-COVERED ITEM OR SERVICE: HCPCS | Performed by: INTERNAL MEDICINE

## 2017-03-20 PROCEDURE — 81001 URINALYSIS AUTO W/SCOPE: CPT

## 2017-03-20 PROCEDURE — 93010 ELECTROCARDIOGRAM REPORT: CPT | Performed by: INTERNAL MEDICINE

## 2017-03-20 PROCEDURE — 99225 PR SUBSEQUENT OBSERVATION CARE,LEVEL II: CPT | Mod: GC | Performed by: HOSPITALIST

## 2017-03-20 PROCEDURE — 700102 HCHG RX REV CODE 250 W/ 637 OVERRIDE(OP): Performed by: INTERNAL MEDICINE

## 2017-03-20 PROCEDURE — 93306 TTE W/DOPPLER COMPLETE: CPT | Mod: 26 | Performed by: INTERNAL MEDICINE

## 2017-03-20 PROCEDURE — 82746 ASSAY OF FOLIC ACID SERUM: CPT

## 2017-03-20 PROCEDURE — 700111 HCHG RX REV CODE 636 W/ 250 OVERRIDE (IP)

## 2017-03-20 PROCEDURE — 85025 COMPLETE CBC W/AUTO DIFF WBC: CPT

## 2017-03-20 PROCEDURE — 700102 HCHG RX REV CODE 250 W/ 637 OVERRIDE(OP): Performed by: STUDENT IN AN ORGANIZED HEALTH CARE EDUCATION/TRAINING PROGRAM

## 2017-03-20 PROCEDURE — 93005 ELECTROCARDIOGRAM TRACING: CPT | Performed by: INTERNAL MEDICINE

## 2017-03-20 PROCEDURE — A9270 NON-COVERED ITEM OR SERVICE: HCPCS | Performed by: STUDENT IN AN ORGANIZED HEALTH CARE EDUCATION/TRAINING PROGRAM

## 2017-03-20 PROCEDURE — 86923 COMPATIBILITY TEST ELECTRIC: CPT

## 2017-03-20 PROCEDURE — 90935 HEMODIALYSIS ONE EVALUATION: CPT

## 2017-03-20 PROCEDURE — 82962 GLUCOSE BLOOD TEST: CPT

## 2017-03-20 PROCEDURE — 84484 ASSAY OF TROPONIN QUANT: CPT

## 2017-03-20 PROCEDURE — 83540 ASSAY OF IRON: CPT

## 2017-03-20 PROCEDURE — 36430 TRANSFUSION BLD/BLD COMPNT: CPT | Mod: XU

## 2017-03-20 PROCEDURE — 80053 COMPREHEN METABOLIC PANEL: CPT

## 2017-03-20 PROCEDURE — 93306 TTE W/DOPPLER COMPLETE: CPT

## 2017-03-20 RX ORDER — POLYETHYLENE GLYCOL 3350 17 G/17G
1 POWDER, FOR SOLUTION ORAL
Status: DISCONTINUED | OUTPATIENT
Start: 2017-03-20 | End: 2017-03-21 | Stop reason: HOSPADM

## 2017-03-20 RX ORDER — AMLODIPINE BESYLATE 10 MG/1
10 TABLET ORAL DAILY
Status: DISCONTINUED | OUTPATIENT
Start: 2017-03-20 | End: 2017-03-21 | Stop reason: HOSPADM

## 2017-03-20 RX ORDER — HYDROCODONE BITARTRATE AND ACETAMINOPHEN 5; 325 MG/1; MG/1
1 TABLET ORAL
Status: DISCONTINUED | OUTPATIENT
Start: 2017-03-20 | End: 2017-03-21 | Stop reason: HOSPADM

## 2017-03-20 RX ORDER — LATANOPROST 50 UG/ML
1 SOLUTION/ DROPS OPHTHALMIC EVERY EVENING
Status: DISCONTINUED | OUTPATIENT
Start: 2017-03-20 | End: 2017-03-21 | Stop reason: HOSPADM

## 2017-03-20 RX ORDER — CALCIUM ACETATE 667 MG/1
667 TABLET ORAL
Status: DISCONTINUED | OUTPATIENT
Start: 2017-03-20 | End: 2017-03-21 | Stop reason: HOSPADM

## 2017-03-20 RX ORDER — CARVEDILOL 12.5 MG/1
12.5 TABLET ORAL 2 TIMES DAILY WITH MEALS
Status: DISCONTINUED | OUTPATIENT
Start: 2017-03-20 | End: 2017-03-21 | Stop reason: HOSPADM

## 2017-03-20 RX ORDER — AMOXICILLIN 250 MG
2 CAPSULE ORAL 2 TIMES DAILY
Status: DISCONTINUED | OUTPATIENT
Start: 2017-03-20 | End: 2017-03-21 | Stop reason: HOSPADM

## 2017-03-20 RX ORDER — DEXTROSE MONOHYDRATE 25 G/50ML
25 INJECTION, SOLUTION INTRAVENOUS
Status: DISCONTINUED | OUTPATIENT
Start: 2017-03-20 | End: 2017-03-21 | Stop reason: HOSPADM

## 2017-03-20 RX ORDER — HYDROCODONE BITARTRATE AND ACETAMINOPHEN 5; 325 MG/1; MG/1
1-2 TABLET ORAL EVERY 6 HOURS PRN
COMMUNITY
End: 2017-09-14

## 2017-03-20 RX ORDER — BIMATOPROST 0.3 MG/ML
1 SOLUTION/ DROPS OPHTHALMIC EVERY EVENING
Status: DISCONTINUED | OUTPATIENT
Start: 2017-03-20 | End: 2017-03-20

## 2017-03-20 RX ORDER — BISACODYL 10 MG
10 SUPPOSITORY, RECTAL RECTAL
Status: DISCONTINUED | OUTPATIENT
Start: 2017-03-20 | End: 2017-03-21 | Stop reason: HOSPADM

## 2017-03-20 RX ORDER — HEPARIN SODIUM 1000 [USP'U]/ML
1700 INJECTION, SOLUTION INTRAVENOUS; SUBCUTANEOUS PRN
Status: DISCONTINUED | OUTPATIENT
Start: 2017-03-20 | End: 2017-03-21 | Stop reason: HOSPADM

## 2017-03-20 RX ORDER — PREGABALIN 25 MG/1
50 CAPSULE ORAL 2 TIMES DAILY
Status: DISCONTINUED | OUTPATIENT
Start: 2017-03-20 | End: 2017-03-21 | Stop reason: HOSPADM

## 2017-03-20 RX ORDER — HEPARIN SODIUM 1000 [USP'U]/ML
INJECTION, SOLUTION INTRAVENOUS; SUBCUTANEOUS
Status: COMPLETED
Start: 2017-03-20 | End: 2017-03-20

## 2017-03-20 RX ORDER — LABETALOL HYDROCHLORIDE 5 MG/ML
10 INJECTION, SOLUTION INTRAVENOUS EVERY 4 HOURS PRN
Status: DISCONTINUED | OUTPATIENT
Start: 2017-03-20 | End: 2017-03-21 | Stop reason: HOSPADM

## 2017-03-20 RX ADMIN — INSULIN LISPRO 2 UNITS: 100 INJECTION, SOLUTION INTRAVENOUS; SUBCUTANEOUS at 17:25

## 2017-03-20 RX ADMIN — CARVEDILOL 12.5 MG: 12.5 TABLET, FILM COATED ORAL at 17:27

## 2017-03-20 RX ADMIN — PREGABALIN 50 MG: 25 CAPSULE ORAL at 21:35

## 2017-03-20 RX ADMIN — INSULIN LISPRO 2 UNITS: 100 INJECTION, SOLUTION INTRAVENOUS; SUBCUTANEOUS at 06:20

## 2017-03-20 RX ADMIN — PREGABALIN 50 MG: 25 CAPSULE ORAL at 07:50

## 2017-03-20 RX ADMIN — HEPARIN SODIUM 1700 UNITS: 1000 INJECTION, SOLUTION INTRAVENOUS; SUBCUTANEOUS at 12:12

## 2017-03-20 RX ADMIN — HYDROCODONE BITARTRATE AND ACETAMINOPHEN 1 TABLET: 5; 325 TABLET ORAL at 11:47

## 2017-03-20 RX ADMIN — INSULIN LISPRO 1 UNITS: 100 INJECTION, SOLUTION INTRAVENOUS; SUBCUTANEOUS at 21:40

## 2017-03-20 RX ADMIN — INSULIN LISPRO 1 UNITS: 100 INJECTION, SOLUTION INTRAVENOUS; SUBCUTANEOUS at 11:47

## 2017-03-20 RX ADMIN — LATANOPROST 1 DROP: 50 SOLUTION OPHTHALMIC at 21:34

## 2017-03-20 RX ADMIN — LATANOPROST 1 DROP: 50 SOLUTION OPHTHALMIC at 03:21

## 2017-03-20 ASSESSMENT — ENCOUNTER SYMPTOMS
SEIZURES: 0
SHORTNESS OF BREATH: 1
BACK PAIN: 0
COUGH: 0
CONSTIPATION: 0
HEARTBURN: 0
DIARRHEA: 0
TINGLING: 1
BLURRED VISION: 0
NAUSEA: 0
FOCAL WEAKNESS: 0
DIAPHORESIS: 0
ORTHOPNEA: 0
SPUTUM PRODUCTION: 0
TREMORS: 0
DIARRHEA: 1
STRIDOR: 0
CHILLS: 0
DEPRESSION: 0
NAUSEA: 1
BRUISES/BLEEDS EASILY: 0
ORTHOPNEA: 1
WHEEZING: 0
DOUBLE VISION: 0
PALPITATIONS: 0
VOMITING: 0
FEVER: 0
DIZZINESS: 1
PND: 1
HEADACHES: 0
NERVOUS/ANXIOUS: 0
CLAUDICATION: 0
HALLUCINATIONS: 0
WEAKNESS: 1
FALLS: 0
SORE THROAT: 0
HEMOPTYSIS: 0
LOSS OF CONSCIOUSNESS: 0
BLOOD IN STOOL: 0
TINGLING: 0
ABDOMINAL PAIN: 0
MYALGIAS: 0
SENSORY CHANGE: 1

## 2017-03-20 ASSESSMENT — COPD QUESTIONNAIRES
DURING THE PAST 4 WEEKS HOW MUCH DID YOU FEEL SHORT OF BREATH: SOME OF THE TIME
DO YOU EVER COUGH UP ANY MUCUS OR PHLEGM?: YES, A FEW DAYS A WEEK OR MONTH
HAVE YOU SMOKED AT LEAST 100 CIGARETTES IN YOUR ENTIRE LIFE: NO/DON'T KNOW
COPD SCREENING SCORE: 4

## 2017-03-20 ASSESSMENT — PAIN SCALES - GENERAL
PAINLEVEL_OUTOF10: 7
PAINLEVEL_OUTOF10: 5
PAINLEVEL_OUTOF10: 7
PAINLEVEL_OUTOF10: 5
PAINLEVEL_OUTOF10: 0
PAINLEVEL_OUTOF10: 4
PAINLEVEL_OUTOF10: 7

## 2017-03-20 ASSESSMENT — LIFESTYLE VARIABLES
EVER_SMOKED: NEVER
EVER_SMOKED: NEVER
ALCOHOL_USE: NO

## 2017-03-20 NOTE — RESPIRATORY CARE
COPD EDUCATION by COPD CLINICAL EDUCATOR  3/20/2017 at 6:01 AM by Dilcia Guajardo     Patient reviewed by COPD education team. Patient does not qualify for COPD program.

## 2017-03-20 NOTE — ED NOTES
Pt to ER per squad. Pt c/o gen weakness since dialysis on Friday. Pt is MWF hemodialysis with fistula in L upper arm.

## 2017-03-20 NOTE — H&P
[facility] Admitting History and Physical    Name Vielka Briscoe     1955   Age/Sex 61 y.o. female   MRN [id number]   Code Status Full code     After 5PM or if no immediate response to page, please call for cross-coverage  Attending/Team: Dr. Luana WISE Call [phone] to page   1st Call - Day Intern (R1):    Dr. Prudence WISE 2nd Call - Day Sr. Resident (R2/R3):   Dr. Joseph WISE       Chief Complaint:    Dizziness and generalized weakness for one week    HPI:    61 Y old lady with pmhx of ESRD on regular HD 3 times/w, hx of myelodysplastic syndrome, hx of anemia, hx of COPD, hx of Afib, presented with hx of generalized weakness, fatigue and dizziness for the last one week. Today she felt dizzy while she was in the shower. She denies any lightheadedness or presyncope, no loss of consciousness, no fall. she has hx of anemia and she receives epoietin during dialysis. Last HD session was last Friday.    She reports SOB that occurs at rest and during walking, reports also orthopnea and PND. In the last week she got increase in his SOB above her baseline. She has hx of COPD due to second hand smoking and she is on O2, 2 LPM , does not need to increase O2.   Her appetite is poor and she has nausea. Denies any cough, no palpitation, no vomiting, no change in bowel habits, no abdominal pain, no bleeding, no blood in her stool.    She reports hx of chest pain which is chronic for many months as she said, comes and goes, like pressure, central and peripheral, all over the chest.    She was admitted in [facility] in 2017 for the same reason related to her anemia, received blood transfusion and discharged. she was supposed to have an appointment with Dr. Ranulfo Avila on 3/2/2017 to start her on Vidaza (Azacitidine), she couldn't come to her appointment due to transportation issue    Admitted to ED and found that she has anemia, hb 7, received one unit of blood.    Review of Systems    Constitutional: Positive for malaise/fatigue. Negative for fever, chills and diaphoresis.   HENT: Negative for congestion, hearing loss, nosebleeds and sore throat.    Eyes: Negative for blurred vision and double vision.   Respiratory: Positive for shortness of breath. Negative for cough, hemoptysis, sputum production, wheezing and stridor.    Cardiovascular: Positive for chest pain, orthopnea and PND. Negative for palpitations, claudication and leg swelling.   Gastrointestinal: Positive for nausea. Negative for heartburn, vomiting, abdominal pain, diarrhea, constipation and blood in stool.   Genitourinary: Negative for dysuria, urgency, frequency and hematuria.   Musculoskeletal: Negative for myalgias, back pain and falls.   Skin: Negative for itching and rash.   Neurological: Positive for dizziness, sensory change and weakness. Negative for tingling, tremors, focal weakness, seizures, loss of consciousness and headaches.        She has pain in her feet, diagnosed as peripheral neuropathy, the pain responds to pregabalin   Endo/Heme/Allergies: Does not bruise/bleed easily.   Psychiatric/Behavioral: Negative for depression and hallucinations. The patient is not nervous/anxious.              Past Medical History:   Past Medical History   Diagnosis Date   • Heart abnormalities    • Angina    • Diabetes    • Hypertension    • Chronic anemia    • Chronic obstructive pulmonary disease (CMS-HCC)    • Blood transfusion without reported diagnosis    • Anemia    • Breath shortness    • Glaucoma    • Diabetes      Diet controlled   • Dental disorder      full dentures   • Supplemental oxygen dependent    • Cataract      bilat IOL   • Renal disorder      CKF   • Pain      General pain 8/10   • Chronic kidney disease      Chronic renal failure   • Arthritis      hands    • Dialysis patient      [facility] in [place]   • MDS (myelodysplastic syndrome) 10/2016     bone marrow biopsy   • Congestive heart failure (CMS-HCC)     • Atrial fibrillation (CMS-[facility])      HX   • Stroke (CMS-HCC) 2015     No residual weakness/problems       Past Surgical History:  Past Surgical History   Procedure Laterality Date   • Other cardiac surgery       Angioplasty  and    • Other abdominal surgery       appendectomy,  x 2, hysterectomy, D & C   • Gyn surgery       hysterectomy   • Gyn surgery        x 2   • Gyn surgery       d&C x2   • Other cardiac surgery       cardiac angiogram, angioplasty   • Other       angioplasty/ stents bilat LE   • Retinal detachment repair Right    • Av fistula creation Left 2016     Procedure: AV FISTULA CREATION UPPER EXTREMITY;  Surgeon: Ranulfo Jolly M.D.;  Location: SURGERY [facility];  Service:    • Other abdominal surgery       appendectomy   • Gastroscopy  2015     Procedure: ESOPHAGOGASTRODUODENOSCOPY WITH BIOPSY;  Surgeon: Wing Álvarez M.D.;  Location: SURGERY [facility];  Service:    • Colonoscopy  2015     Procedure: COLONOSCOPY;  Surgeon: Wing Álvarez M.D.;  Location: SURGERY [facility];  Service:    • Vein ligation Left 11/10/2016     Procedure: VEIN LIGATION FOR DISTAL REVASCULARIZATION AND INTERVAL LIGATION OF LEFT ARM DIALYISIS ACCESS (DRIL PROCEDURE);  Surgeon: Ranulfo Jolyl M.D.;  Location: SURGERY [facility];  Service:        Current Outpatient Medications:  Home Medications     Reviewed by Sun Lanier (Pharmacy Tech) on 17 at 2227  Med List Status: Complete    Medication Last Dose Status    amlodipine (NORVASC) 10 MG Tab 2017 Active    bimatoprost (LUMIGAN) 0.03 % Solution 3/18/2017 Active    carvedilol (COREG) 12.5 MG Tab 3/19/2017 Active    hydrocodone-acetaminophen (NORCO) 7.5-325 MG per tablet 3/18/2017 Active    pregabalin (LYRICA) 50 MG capsule 3/19/2017 Active                Medication Allergy/Sensitivities:  Allergies   Allergen Reactions   • Actos [Pioglitazone Hydrochloride] Unspecified     Cause blindness  "  OGI=8849   • Darvocet [Propoxyphene N-Apap] Vomiting     TAX=9462   • Demerol Vomiting     FVA=8527   • Glucophage [Metformin Hydrochloride] Vomiting     VPX=3826   • Morphine Vomiting     QGP=9639   • Oxycodone Vomiting     RXN=1/2016   • Pcn [Penicillins] Vomiting     YCT=1044     • Requip Vomiting     RXN=12/2015   • Simvastatin Unspecified     Leg cramps  IIQ=6765   • Sulfa Drugs Rash     RXN=>10 years   • Tramadol Vomiting     NOB=4824   • Trazodone Vomiting     SHO=0017   • Demerol    • Dilaudid [Hydromorphone] Vomiting     Unknown    • Diphenhydramine Vomiting   • Iron      vomiting   • Lenalidomide Vomiting   • Morphine    • Multivitamin      itching   • Naprosyn [Naproxen]    • Other Drug      Any binders that remove phosphorus from the body such as tums   • Sulfa Drugs          Family History:  Family History   Problem Relation Age of Onset   • Hypertension Father    • Hypertension Mother        Social History:  Social History     Social History   • Marital Status:      Spouse Name: N/A   • Number of Children: N/A   • Years of Education: N/A     Occupational History   • Not on file.     Social History Main Topics   • Smoking status: Never Smoker    • Smokeless tobacco: Never Used   • Alcohol Use: No   • Drug Use: No   • Sexual Activity: Not on file     Other Topics Concern   • Not on file     Social History Narrative    ** Merged History Encounter **          Lives with her daughter and grandchildren, no smoking , no ETOH, no illicit drugs.      Physical Exam     Filed Vitals:    03/19/17 2346 03/20/17 0000 03/20/17 0016 03/20/17 0045   BP:       Pulse: 68 76 72 72   Temp: 36.3 °C (97.4 °F)  36.7 °C (98.1 °F) 36.4 °C (97.5 °F)   Resp: 12 20 18 18   Height:       Weight:       SpO2: 98% 100% 100% 100%     Body mass index is 30.31 kg/(m^2).  /47 mmHg  Pulse 72  Temp(Src) 36.4 °C (97.5 °F)  Resp 18  Ht 1.473 m (4' 10\")  Wt 65.772 kg (145 lb)  BMI 30.31 kg/m2  SpO2 100%  LMP " 01/01/1995  O2 therapy: Pulse Oximetry: 100 %    Physical Exam   Constitutional: She is oriented to person, place, and time. No distress.   Lying comfortable in bed, not in acute distress, earthy color   HENT:   Head: Normocephalic and atraumatic.   Mouth/Throat: Oropharynx is clear and moist. No oropharyngeal exudate.   Eyes: Conjunctivae and EOM are normal. Pupils are equal, round, and reactive to light. No scleral icterus.   Neck: Normal range of motion. Neck supple. JVD present. No tracheal deviation present. No thyromegaly present.   Cardiovascular: Normal rate, regular rhythm and intact distal pulses.  Exam reveals no gallop and no friction rub.    Murmur heard.  Ejection systolic murmur with maximal intensity at tricuspid area. No radiation   Pulmonary/Chest: Effort normal and breath sounds normal. No stridor. No respiratory distress. She has no wheezes. She has no rales. She exhibits no tenderness.   Abdominal: Soft. Bowel sounds are normal. She exhibits no distension and no mass. There is no tenderness. There is no rebound and no guarding.   Musculoskeletal: She exhibits no edema or tenderness.   Lymphadenopathy:     She has no cervical adenopathy.   Neurological: She is alert and oriented to person, place, and time. A cranial nerve deficit is present. She exhibits normal muscle tone. GCS score is 15.   Skin: No rash noted. She is not diaphoretic.   Psychiatric: Affect and judgment normal.             Data Review       Old Records Request:   Deferred  Current Records review and summary: Completed    Lab Data Review:  Recent Results (from the past 24 hour(s))   CBC WITH DIFFERENTIAL    Collection Time: 03/19/17  8:10 PM   Result Value Ref Range    WBC 5.8 4.8 - 10.8 K/uL    RBC 2.27 (L) 4.20 - 5.40 M/uL    Hemoglobin 7.0 (L) 12.0 - 16.0 g/dL    Hematocrit 21.4 (L) 37.0 - 47.0 %    MCV 94.3 81.4 - 97.8 fL    MCH 30.8 27.0 - 33.0 pg    MCHC 32.7 (L) 33.6 - 35.0 g/dL    RDW 66.8 (H) 35.9 - 50.0 fL    Platelet  Count 406 164 - 446 K/uL    MPV 12.6 9.0 - 12.9 fL    Nucleated RBC 0.00 /100 WBC    NRBC (Absolute) 0.00 K/uL    Neutrophils-Polys 67.50 44.00 - 72.00 %    Lymphocytes 20.20 (L) 22.00 - 41.00 %    Monocytes 4.40 0.00 - 13.40 %    Eosinophils 4.40 0.00 - 6.90 %    Basophils 3.50 (H) 0.00 - 1.80 %    Neutrophils (Absolute) 3.92 2.00 - 7.15 K/uL    Lymphs (Absolute) 1.17 1.00 - 4.80 K/uL    Monos (Absolute) 0.26 0.00 - 0.85 K/uL    Eos (Absolute) 0.26 0.00 - 0.51 K/uL    Baso (Absolute) 0.20 (H) 0.00 - 0.12 K/uL    Anisocytosis 2+     Macrocytosis 1+     Microcytosis 1+    COMP METABOLIC PANEL    Collection Time: 03/19/17  8:10 PM   Result Value Ref Range    Sodium 138 135 - 145 mmol/L    Potassium 4.2 3.6 - 5.5 mmol/L    Chloride 101 96 - 112 mmol/L    Co2 23 20 - 33 mmol/L    Anion Gap 14.0 (H) 0.0 - 11.9    Glucose 156 (H) 65 - 99 mg/dL    Bun 67 (H) 8 - 22 mg/dL    Creatinine 6.35 (HH) 0.50 - 1.40 mg/dL    Calcium 7.8 (L) 8.5 - 10.5 mg/dL    AST(SGOT) 11 (L) 12 - 45 U/L    ALT(SGPT) 13 2 - 50 U/L    Alkaline Phosphatase 65 30 - 99 U/L    Total Bilirubin 0.4 0.1 - 1.5 mg/dL    Albumin 3.5 3.2 - 4.9 g/dL    Total Protein 6.7 6.0 - 8.2 g/dL    Globulin 3.2 1.9 - 3.5 g/dL    A-G Ratio 1.1 g/dL   MAGNESIUM    Collection Time: 03/19/17  8:10 PM   Result Value Ref Range    Magnesium 2.4 1.5 - 2.5 mg/dL   PHOSPHORUS    Collection Time: 03/19/17  8:10 PM   Result Value Ref Range    Phosphorus 7.9 (H) 2.5 - 4.5 mg/dL   TROPONIN    Collection Time: 03/19/17  8:10 PM   Result Value Ref Range    Troponin I <0.01 0.00 - 0.04 ng/mL   BTYPE NATRIURETIC PEPTIDE    Collection Time: 03/19/17  8:10 PM   Result Value Ref Range    B Natriuretic Peptide 107 (H) 0 - 100 pg/mL   ESTIMATED GFR    Collection Time: 03/19/17  8:10 PM   Result Value Ref Range    GFR If  8 (A) >60 mL/min/1.73 m 2    GFR If Non African American 7 (A) >60 mL/min/1.73 m 2   DIFFERENTIAL MANUAL    Collection Time: 03/19/17  8:10 PM   Result Value  Ref Range    Manual Diff Status PERFORMED    PERIPHERAL SMEAR REVIEW    Collection Time: 17  8:10 PM   Result Value Ref Range    Peripheral Smear Review see below    PLATELET ESTIMATE    Collection Time: 17  8:10 PM   Result Value Ref Range    Plt Estimation Increased    MORPHOLOGY    Collection Time: 17  8:10 PM   Result Value Ref Range    RBC Morphology Present     Large Platelets 3+     Poikilocytosis 1+     Schistocytes 1+     Toxic Gran Slight    COD (Adult) - Type and Crossmatch only order if transfusing RBC'S    Collection Time: 17  8:10 PM   Result Value Ref Range    ABO Grouping Only AB     Rh Grouping Only POS     Antibody Screen-Cod NEG     Component R       R3                  Red Blood Cells3    [id number]   transfused   17   23:07      Product Type Red Blood Cells LR Pheresis     Dispense Status Transfused     Unit Number (Barcoded) [id number]     Product Code (Barcoded) M0386P87     Blood Type (Barcoded) 8400    EKG (NOW)    Collection Time: 17  8:58 PM   Result Value Ref Range    Report       [facility]    Test Date:  2017  Pt Name:    JESUS SILVEIRA              Department: ER  MRN:        [id number]                      Room:       [facility]  Gender:     F                            Technician: [id number]  :        1955                   Requested By:JADE VILCHIS  Order #:    [id number]                    Reading MD:    Measurements  Intervals                                Axis  Rate:       72                           P:          23  IN:         180                          QRS:        -9  QRSD:       90                           T:          42  QT:         452  QTc:        495    Interpretive Statements  SINUS RHYTHM  ABNRM R PROG, CONSIDER ASMI OR LEAD PLACEMENT  BORDERLINE PROLONGED QT INTERVAL  BASELINE WANDER IN LEAD(S) V6  Compared to ECG 2017 07:38:56  Myocardial infarct finding now present  T-wave  abnormality no longer present         Imaging/Procedures Review:    ndependant Imaging Review: Completed  DX-CHEST-PORTABLE (1 VIEW)   Final Result      Stable cardiomegaly.      ECHOCARDIOGRAM COMP W/O CONT    (Results Pending)            EKG:   EKG Independant Review: Completed      Borderline QT interval, 495 ms, sinus rythm         Assessment/Plan     No new assessment & plan notes have been filed under this hospital service since the last note was generated.  Service: MEDICAL    # symptomatic anemia    - HB 7, normochromic normocytic  - presented with dizziness and SOB  - iron saturation 73 % one month ago  - received one unit of packed RBCs in ED  - possibly due anemia of chronic disease, ESRD combined with MDS .    Plan  - follow up her HB tomorrow   - sent for new iron panel, ferritin and B12 / folate level  - to contact the oncology specialist about starting treatment for MDS  - to continue on her epoeitin during HD    # ESRD on HD    -  Creatinine 7.36  - she is on HD, 3 times/w  - no indication for urgent HD today  - to contact nephrology at morning, he HD session is supposed to be tomorrow.    # MDS  - she is transfusion dependent  - for follow up with oncology    # chest pain  - atypical chest pain, chronic  - troponin negative  - EKG negative, only showed borderline QT interval, 495 ms  - unlikely coronary in origin    Plan :  - to follow up with another troponin and EKG  - to continue on her carvidelol         # DM2 complicated by peripheral neuropathy  - hx of DM on diet only  - B.sugar 156  - hba1c 8.4 one month ago, poorly controlled  - bl feet pain     ASSESSMENT AND PLAN    - to continue pregabalin 50 mg BID   - ISS  - hypoglycemia protocol      # HTN  - /56  - to continue on BP medication, amlodipine 10 mg and carvedilol 12.5 mg BID      # copd  - to continue on O2  - RT according to protocol.    # CHF    - her echocardiogram in 2015 showed   Left  ventricular ejection fraction is  55%.  Moderate eccentric mitral regurgitation.  Right ventricular systolic pressure is estimated to be 55 mmHg.  Small pericardial effusion .   - Presented with increase in her SOB, no leg edema, mildly elevated JVP  - possibly right sided HF  - sent for new echocardiogram  - to consult cardiology at morning    Anticipated Hospital stay:  >2 midnights        Quality Measures  EKG reviewed, Labs reviewed, Medications reviewed and Radiology images reviewed  Bauer catheter: No Bauer        DVT prophylaxis - mechanical: SCDs

## 2017-03-20 NOTE — ED NOTES
Pt to floor at this time. PRBC continue to infuse to floor. Pt shows no signs of transfusion rxn. Pt resp even unlabored, skin pink warm dry. Denies any symptoms. Denies further needs.

## 2017-03-20 NOTE — ED NOTES
PRBC started at this time. Pt educated on transfusion rxn and instructed to report any s/s. Pt verbalizes she understands
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
## 2017-03-20 NOTE — ED PROVIDER NOTES
"ED Provider Note    Scribed for Nyla Whipple M.D. by Radha Garzon. 3/19/2017, 8:41 PM.    Primary care provider: Nyla Nava M.D.  Means of arrival: ambulance  History obtained from: patient  History limited by: none    CHIEF COMPLAINT  Chief Complaint   Patient presents with   • Weakness       HPI  Vielka Briscoe is a 61 y.o. female with a history of anemia who presents to the Emergency Department for generalized weakness onset earlier tonight with associated dizziness. Patient reports she always feels weak, however, tonight it worsened significantly. She attempted to take a shower tonight and almost collapsed secondary to the weakness and sudden onset dizziness. Patient associates her current symptoms to her symptoms in February described below. As for the new medication her Oncologist was supposed to prescribe her for the anemia, it was never prescribed because she missed her appointment. Patient's next appointment is the 29th of this month.   Patient is currently investigating assisted adult living    Patient is also describing right sided chest pain that has been ongoing for some time and an over all chest pressure described as \"and elephant on my chest\" that has been ongoing for several weeks.  No complaints of trouble breathing.    REVIEW OF SYSTEMS  Pertinent positives include weakness, dizziness. Pertinent negatives include no trouble breathing.  All other systems reviewed and negative.    PAST MEDICAL HISTORY   has a past medical history of Heart abnormalities; Angina; Diabetes; Hypertension; Chronic anemia; Chronic obstructive pulmonary disease (CMS-[facility]); Blood transfusion without reported diagnosis; Anemia; Breath shortness; Glaucoma; Diabetes; Dental disorder; Supplemental oxygen dependent; Cataract; Renal disorder; Pain; Chronic kidney disease; Arthritis; Dialysis patient; MDS (myelodysplastic syndrome) (10/2016); Congestive heart failure (CMS-[facility]); Atrial fibrillation (CMS-HCC); and " Stroke (CMS-HCC) (03/2015).    SURGICAL HISTORY   has past surgical history that includes other cardiac surgery; other abdominal surgery; gyn surgery; gyn surgery; gyn surgery; other cardiac surgery; other; retinal detachment repair (Right); av fistula creation (Left, 2/8/2016); other abdominal surgery; gastroscopy (12/17/2015); colonoscopy (12/17/2015); and vein ligation (Left, 11/10/2016).    SOCIAL HISTORY  Social History   Substance Use Topics   • Smoking status: Never Smoker    • Smokeless tobacco: Never Used   • Alcohol Use: No      History   Drug Use No       FAMILY HISTORY  Family History   Problem Relation Age of Onset   • Hypertension Father    • Hypertension Mother        CURRENT MEDICATIONS  Home Medications     Reviewed by Sun Lanier (Pharmacy Tech) on 03/19/17 at 2227  Med List Status: Complete    Medication Last Dose Status    amlodipine (NORVASC) 10 MG Tab 1/29/2017 Active    bimatoprost (LUMIGAN) 0.03 % Solution 3/18/2017 Active    carvedilol (COREG) 12.5 MG Tab 3/19/2017 Active    hydrocodone-acetaminophen (NORCO) 7.5-325 MG per tablet 3/18/2017 Active    pregabalin (LYRICA) 50 MG capsule 3/19/2017 Active                ALLERGIES  Allergies   Allergen Reactions   • Actos [Pioglitazone Hydrochloride] Unspecified     Cause blindness   AUK=0450   • Darvocet [Propoxyphene N-Apap] Vomiting     GSQ=7690   • Demerol Vomiting     YHU=2069   • Glucophage [Metformin Hydrochloride] Vomiting     DZF=0544   • Morphine Vomiting     MCD=4745   • Oxycodone Vomiting     RXN=1/2016   • Pcn [Penicillins] Vomiting     GAI=6768     • Requip Vomiting     RXN=12/2015   • Simvastatin Unspecified     Leg cramps  ILM=4633   • Sulfa Drugs Rash     RXN=>10 years   • Tramadol Vomiting     GTW=7410   • Trazodone Vomiting     ONT=5029   • Demerol    • Dilaudid [Hydromorphone] Vomiting     Unknown    • Diphenhydramine Vomiting   • Iron      vomiting   • Lenalidomide Vomiting   • Morphine    • Multivitamin      itching   •  "Naprosyn [Naproxen]    • Other Drug      Any binders that remove phosphorus from the body such as tums   • Sulfa Drugs        PHYSICAL EXAM  VITAL SIGNS: /55 mmHg  Pulse 76  Temp(Src) 36.5 °C (97.7 °F)  Resp 19  Ht 1.473 m (4' 10\")  Wt 65.772 kg (145 lb)  BMI 30.31 kg/m2  SpO2 98%  LMP 01/01/1995    Constitutional: Alert in no apparent distress.  HENT: No signs of trauma, Bilateral external ears normal, Nose normal.   Eyes: Pupils are equal and reactive, Conjunctiva normal, Non-icteric.   Neck: Normal range of motion, No tenderness, Supple, No stridor.   Cardiovascular: Regular rate and rhythm, no murmurs.   Thorax & Lungs: Normal breath sounds, No respiratory distress, No wheezing, No chest tenderness.   Abdomen: Bowel sounds normal, Soft, No tenderness, No masses, No peritoneal signs.  Skin: Warm, Dry, No erythema, No rash.   Back: No bony tenderness, No CVA tenderness.   Musculoskeletal:  No major deformities noted.   Neurologic: Alert, moving all extremities without difficulty, no focal deficits.    LABS  Results for orders placed or performed during the hospital encounter of 03/19/17   CBC WITH DIFFERENTIAL   Result Value Ref Range    WBC 5.8 4.8 - 10.8 K/uL    RBC 2.27 (L) 4.20 - 5.40 M/uL    Hemoglobin 7.0 (L) 12.0 - 16.0 g/dL    Hematocrit 21.4 (L) 37.0 - 47.0 %    MCV 94.3 81.4 - 97.8 fL    MCH 30.8 27.0 - 33.0 pg    MCHC 32.7 (L) 33.6 - 35.0 g/dL    RDW 66.8 (H) 35.9 - 50.0 fL    Platelet Count 406 164 - 446 K/uL    MPV 12.6 9.0 - 12.9 fL    Nucleated RBC 0.00 /100 WBC    NRBC (Absolute) 0.00 K/uL    Neutrophils-Polys 67.50 44.00 - 72.00 %    Lymphocytes 20.20 (L) 22.00 - 41.00 %    Monocytes 4.40 0.00 - 13.40 %    Eosinophils 4.40 0.00 - 6.90 %    Basophils 3.50 (H) 0.00 - 1.80 %    Neutrophils (Absolute) 3.92 2.00 - 7.15 K/uL    Lymphs (Absolute) 1.17 1.00 - 4.80 K/uL    Monos (Absolute) 0.26 0.00 - 0.85 K/uL    Eos (Absolute) 0.26 0.00 - 0.51 K/uL    Baso (Absolute) 0.20 (H) 0.00 - 0.12 " K/uL    Anisocytosis 2+     Macrocytosis 1+     Microcytosis 1+    COMP METABOLIC PANEL   Result Value Ref Range    Sodium 138 135 - 145 mmol/L    Potassium 4.2 3.6 - 5.5 mmol/L    Chloride 101 96 - 112 mmol/L    Co2 23 20 - 33 mmol/L    Anion Gap 14.0 (H) 0.0 - 11.9    Glucose 156 (H) 65 - 99 mg/dL    Bun 67 (H) 8 - 22 mg/dL    Creatinine 6.35 (HH) 0.50 - 1.40 mg/dL    Calcium 7.8 (L) 8.5 - 10.5 mg/dL    AST(SGOT) 11 (L) 12 - 45 U/L    ALT(SGPT) 13 2 - 50 U/L    Alkaline Phosphatase 65 30 - 99 U/L    Total Bilirubin 0.4 0.1 - 1.5 mg/dL    Albumin 3.5 3.2 - 4.9 g/dL    Total Protein 6.7 6.0 - 8.2 g/dL    Globulin 3.2 1.9 - 3.5 g/dL    A-G Ratio 1.1 g/dL   MAGNESIUM   Result Value Ref Range    Magnesium 2.4 1.5 - 2.5 mg/dL   PHOSPHORUS   Result Value Ref Range    Phosphorus 7.9 (H) 2.5 - 4.5 mg/dL   TROPONIN   Result Value Ref Range    Troponin I <0.01 0.00 - 0.04 ng/mL   BTYPE NATRIURETIC PEPTIDE   Result Value Ref Range    B Natriuretic Peptide 107 (H) 0 - 100 pg/mL   ESTIMATED GFR   Result Value Ref Range    GFR If  8 (A) >60 mL/min/1.73 m 2    GFR If Non African American 7 (A) >60 mL/min/1.73 m 2   DIFFERENTIAL MANUAL   Result Value Ref Range    Manual Diff Status PERFORMED    PERIPHERAL SMEAR REVIEW   Result Value Ref Range    Peripheral Smear Review see below    PLATELET ESTIMATE   Result Value Ref Range    Plt Estimation Increased    MORPHOLOGY   Result Value Ref Range    RBC Morphology Present     Large Platelets 3+     Poikilocytosis 1+     Schistocytes 1+     Toxic Gran Slight    EKG (NOW)   Result Value Ref Range    Report       [facility] Emergency Dept.    Test Date:  2017  Pt Name:    JESUS SILVEIRA              Department: ER  MRN:        [id number]                      Room:       [facility]  Gender:     F                            Technician: [id number]  :        [id number]                   Requested By:JADE VILCHIS  Order #:    [id number]                     Reading MD:    Measurements  Intervals                                Axis  Rate:       72                           P:          23  WY:         180                          QRS:        -9  QRSD:       90                           T:          42  QT:         452  QTc:        495    Interpretive Statements  SINUS RHYTHM  ABNRM R PROG, CONSIDER ASMI OR LEAD PLACEMENT  BORDERLINE PROLONGED QT INTERVAL  BASELINE WANDER IN LEAD(S) V6  Compared to ECG 02/21/2017 07:38:56  Myocardial infarct finding now present  T-wave abnormality no longer present         All labs reviewed by me.    EKG  12 Lead EKG interpreted by me to show:  Normal sinus rhythm  Rate 72  Axis: Normal  Intervals: Normal  Normal T waves  Normal ST segments  Poor R wave progression across precordium  My impression of this EKG: Does not indicate ischemia or arrhythmia at this time.    RADIOLOGY  DX-CHEST-PORTABLE (1 VIEW)   Final Result      Stable cardiomegaly.        The radiologist's interpretation of all radiological studies have been reviewed by me.    COURSE & MEDICAL DECISION MAKING  Pertinent Labs & Imaging studies reviewed. (See chart for details)    I reviewed the patient's past medical history which shows DC on 2/22/17 after a hospital stay from 2/20-2/22 at [facility] for treatment of anemia. Patient had presented with weakness and anemia was diagnosed. Patient has a history of renal failure and mild dysplastic syndrome. She was supposed to follow up with her Oncologist for a new medication to help treat the anemia.    Differential diagnoses include but are not limited to: anemia, electrolyte abnormality.    8:41 PM - Patient seen and examined at bedside. Patient will be treated with IV NS. Ordered chest xray, COD, estimated GFR, differential manual, peripheral smear review, platelet estimate, morphology, CBC, CMP, Magnesium, Phosphorus, Troponin, BTYPE Natriuretic Peptide, UA, and EKG to evaluate her symptoms. I informed the patient that I would  evaluate her anemia to determine if the is the origin for her symptoms.    9:46 PM Paged [facility]    9:58 PM I re-evaluated patient at bedside and informed her that she will receive a blood transfusion here in the ER and admitted to [facility] for further evaluation and treatment. Patient agrees with treatment plan.    10:06 PM Spoke with [facility] about the patient's condition. Agrees to admit. Care is transferred at this time.    Decision Making:  This is a 61 y.o. year old female who presents with generalized weakness. The patient has a history of myelodysplastic syndrome as well as renal failure and recurrent anemia. She was unable to make her most recent oncology appointment for the new medication that will hopefully help her respond to  Patient's prior admission and/or primary care physician's referral pattern was reviewed and the patient was referred to and therefore improve her anemia. Labs show hemoglobin of 7. Given her orthostasis and chest pain she will be transfused and admitted to the internal medicine service. She will need dialysis tomorrow.    DISPOSITION:  Patient will be admitted to [facility] in guarded condition.      FINAL IMPRESSION  1. Anemia, unspecified type    2. ESRD (end stage renal disease) on dialysis (CMS-[facility])    3. MDS (myelodysplastic syndrome) (CMS-HCC)          This dictation has been created using voice recognition software and/or scribes. The accuracy of the dictation is limited by the abilities of the software and the expertise of the scribes. I expect there may be some errors of grammar and possibly content. I made every attempt to manually correct the errors within my dictation. However, errors related to voice recognition software and/or scribes may still exist and should be interpreted within the appropriate context.     I, Radha Garzon (Scribe), am scribing for, and in the presence of, Nyla Whipple M.D..    Electronically signed by: Radha  SOL Garzon (Scribe), 3/19/2017    INyla M.D. personally performed the services described in this documentation, as scribed by Radha Garzon in my presence, and it is both accurate and complete.    The note accurately reflects work and decisions made by me.  Nyla Whipple  3/19/2017  10:47 PM

## 2017-03-20 NOTE — PROGRESS NOTES
Pt arrived to unit at 0230 via gurney.  VSS.  Assessment complete. Pt still has blood running.  No signs of distress noted at this time.  Pt reports 7/10 bilateral shoulder and back pain, pt denies pain medication at this time. 2 RN skin check done. Pt provided snacks, water and a warm blanket. Fall precautions and appropriate signs in place.  Pt educated on fall precautions, aware of risks,bed alarm in place.  Pt educated on call light use, phone use and ext numbers provided to pt.  Pt denies any additional needs at this time.  Call light within reach. Will continue to monitor.

## 2017-03-20 NOTE — PROGRESS NOTES
Assumed care at 0700. Patient assessment as charted. Labs reviewed. Discussed plan of care including need for bed alarm. Hourly rounding in place. Patient alert and orientedx4. Calls appropriately. WIll continue to monitor.

## 2017-03-20 NOTE — ED NOTES
Pt resting on cart, shows no signs of transfusion reaction. Pt resp even unlabored, skin pink warm dry.

## 2017-03-20 NOTE — ED NOTES
Med rec updated and complete.  Allergies reviewed.  Pt denies taking antibiotics in last 30 days.  Pt took all am doses.

## 2017-03-20 NOTE — CARE PLAN
Problem: Safety  Goal: Will remain free from falls  Intervention: Implement fall precautions    03/20/17 0250   OTHER   Environmental Precautions Treaded Slipper Socks on Patient;Personal Belongings, Wastebasket, Call Bell etc. in Easy Reach;Transferred to Stronger Side;Report Given to Other Health Care Providers Regarding Fall Risk;Bed in Low Position;Communication Sign for Patients & Families;Mobility Assessed & Appropriate Sign Placed   IV Pole on Same Side of Bed as Bathroom (SL)   Bedrails Bedrails Closest to Bathroom Down   Chair/Bed Strip Alarm Yes - Alarm On   Bed Alarm (Built in - for ICU ONLY) Alarm Not On           Problem: Psychosocial needs  Goal: Anxiety reduction  Intervention: Stimuli reduction, calming techniques  Lights turned low, noise minimized to relieve pt anxiety and promote rest. Pt able to rest comfortably.

## 2017-03-20 NOTE — CARE PLAN
Problem: Bowel/Gastric:  Goal: Will not experience complications related to bowel motility  Outcome: PROGRESSING SLOWER THAN EXPECTED  Patient having some loose stools. MD aware.     Problem: Fluid Volume:  Goal: Will maintain balanced intake and output  Outcome: PROGRESSING AS EXPECTED  Patient anuric due to dialysis MWF. In dialysis currently.

## 2017-03-20 NOTE — ED NOTES
PT sleeping on cart, no signs of distress. Resp even unlabored, skin pink warm dry. Pt provided warm blanket

## 2017-03-20 NOTE — ED NOTES
Pt resting on cart, aware of blood transfusion. Pt resp even unlabored, skin pink warm dry. Denies needs.

## 2017-03-20 NOTE — ED NOTES
IV started, labs drawn and sent. Pt resting on cart showing no signs of distress. Resp even unlabored, skin pink warm dry.

## 2017-03-21 ENCOUNTER — TELEPHONE (OUTPATIENT)
Dept: OTHER | Facility: MEDICAL CENTER | Age: 62
End: 2017-03-21

## 2017-03-21 VITALS
SYSTOLIC BLOOD PRESSURE: 124 MMHG | TEMPERATURE: 96.9 F | OXYGEN SATURATION: 92 % | HEIGHT: 58 IN | DIASTOLIC BLOOD PRESSURE: 60 MMHG | BODY MASS INDEX: 29.15 KG/M2 | WEIGHT: 138.89 LBS | HEART RATE: 66 BPM | RESPIRATION RATE: 18 BRPM

## 2017-03-21 LAB
ALBUMIN SERPL BCP-MCNC: 3.8 G/DL (ref 3.2–4.9)
ANION GAP SERPL CALC-SCNC: 12 MMOL/L (ref 0–11.9)
BASOPHILS # BLD AUTO: 0.8 % (ref 0–1.8)
BASOPHILS # BLD: 0.04 K/UL (ref 0–0.12)
BUN SERPL-MCNC: 42 MG/DL (ref 8–22)
CALCIUM SERPL-MCNC: 8.6 MG/DL (ref 8.5–10.5)
CHLORIDE SERPL-SCNC: 96 MMOL/L (ref 96–112)
CO2 SERPL-SCNC: 27 MMOL/L (ref 20–33)
CREAT SERPL-MCNC: 4.03 MG/DL (ref 0.5–1.4)
EOSINOPHIL # BLD AUTO: 0.26 K/UL (ref 0–0.51)
EOSINOPHIL NFR BLD: 5 % (ref 0–6.9)
ERYTHROCYTE [DISTWIDTH] IN BLOOD BY AUTOMATED COUNT: 50.3 FL (ref 35.9–50)
GFR SERPL CREATININE-BSD FRML MDRD: 11 ML/MIN/1.73 M 2
GLUCOSE BLD-MCNC: 118 MG/DL (ref 65–99)
GLUCOSE BLD-MCNC: 245 MG/DL (ref 65–99)
GLUCOSE SERPL-MCNC: 126 MG/DL (ref 65–99)
HCT VFR BLD AUTO: 34 % (ref 37–47)
HGB BLD-MCNC: 11.5 G/DL (ref 12–16)
IMM GRANULOCYTES # BLD AUTO: 0.02 K/UL (ref 0–0.11)
IMM GRANULOCYTES NFR BLD AUTO: 0.4 % (ref 0–0.9)
LYMPHOCYTES # BLD AUTO: 1.84 K/UL (ref 1–4.8)
LYMPHOCYTES NFR BLD: 35.2 % (ref 22–41)
MCH RBC QN AUTO: 30.2 PG (ref 27–33)
MCHC RBC AUTO-ENTMCNC: 33.8 G/DL (ref 33.6–35)
MCV RBC AUTO: 89.2 FL (ref 81.4–97.8)
MONOCYTES # BLD AUTO: 0.47 K/UL (ref 0–0.85)
MONOCYTES NFR BLD AUTO: 9 % (ref 0–13.4)
NEUTROPHILS # BLD AUTO: 2.59 K/UL (ref 2–7.15)
NEUTROPHILS NFR BLD: 49.6 % (ref 44–72)
NRBC # BLD AUTO: 0 K/UL
NRBC BLD AUTO-RTO: 0 /100 WBC
PHOSPHATE SERPL-MCNC: 5 MG/DL (ref 2.5–4.5)
PLATELET # BLD AUTO: 416 K/UL (ref 164–446)
PMV BLD AUTO: 11.8 FL (ref 9–12.9)
POTASSIUM SERPL-SCNC: 4 MMOL/L (ref 3.6–5.5)
RBC # BLD AUTO: 3.81 M/UL (ref 4.2–5.4)
SODIUM SERPL-SCNC: 135 MMOL/L (ref 135–145)
WBC # BLD AUTO: 5.2 K/UL (ref 4.8–10.8)

## 2017-03-21 PROCEDURE — 85025 COMPLETE CBC W/AUTO DIFF WBC: CPT

## 2017-03-21 PROCEDURE — A9270 NON-COVERED ITEM OR SERVICE: HCPCS | Performed by: INTERNAL MEDICINE

## 2017-03-21 PROCEDURE — 82962 GLUCOSE BLOOD TEST: CPT

## 2017-03-21 PROCEDURE — 84100 ASSAY OF PHOSPHORUS: CPT

## 2017-03-21 PROCEDURE — 80048 BASIC METABOLIC PNL TOTAL CA: CPT

## 2017-03-21 PROCEDURE — G0378 HOSPITAL OBSERVATION PER HR: HCPCS

## 2017-03-21 PROCEDURE — 700102 HCHG RX REV CODE 250 W/ 637 OVERRIDE(OP): Performed by: INTERNAL MEDICINE

## 2017-03-21 PROCEDURE — 82040 ASSAY OF SERUM ALBUMIN: CPT

## 2017-03-21 PROCEDURE — 36415 COLL VENOUS BLD VENIPUNCTURE: CPT

## 2017-03-21 PROCEDURE — 99217 PR OBSERVATION CARE DISCHARGE: CPT | Mod: GC | Performed by: HOSPITALIST

## 2017-03-21 RX ORDER — HYDROCODONE BITARTRATE AND ACETAMINOPHEN 5; 325 MG/1; MG/1
1 TABLET ORAL ONCE
Status: COMPLETED | OUTPATIENT
Start: 2017-03-21 | End: 2017-03-21

## 2017-03-21 RX ADMIN — HYDROCODONE BITARTRATE AND ACETAMINOPHEN 1 TABLET: 5; 325 TABLET ORAL at 01:11

## 2017-03-21 RX ADMIN — AMLODIPINE BESYLATE 10 MG: 10 TABLET ORAL at 09:08

## 2017-03-21 RX ADMIN — Medication 667 MG: at 11:35

## 2017-03-21 RX ADMIN — CARVEDILOL 12.5 MG: 12.5 TABLET, FILM COATED ORAL at 07:22

## 2017-03-21 RX ADMIN — Medication 667 MG: at 06:01

## 2017-03-21 RX ADMIN — PREGABALIN 50 MG: 25 CAPSULE ORAL at 09:07

## 2017-03-21 RX ADMIN — INSULIN LISPRO 2 UNITS: 100 INJECTION, SOLUTION INTRAVENOUS; SUBCUTANEOUS at 11:05

## 2017-03-21 ASSESSMENT — PAIN SCALES - GENERAL
PAINLEVEL_OUTOF10: 6
PAINLEVEL_OUTOF10: 6
PAINLEVEL_OUTOF10: 10

## 2017-03-21 NOTE — CONSULTS
"DATE OF SERVICE:  03/20/2017    [facility] NEPHROLOGY CONSULTATION    REASON FOR CONSULTATION:  Inpatient dialysis management.    CHIEF COMPLAINT:  \"I am short of breath and dizzy.\"    HISTORY OF PRESENT ILLNESS:  A 61-year-old female with PMH, ESRD, MWF - IHD;   hypertension; type 2 diabetes mellitus; hyperlipidemia; anemia; CKD; renal   osteodystrophy and MDS, who presents to the ED with a chief complaint as   described above.  She states that symptoms have been ongoing for approximately   a week and that while she was in the shower yesterday she felt very dizzy,   but denied any lightheadedness, presyncope or loss of consciousness and   decided to come into the ED for further evaluation.  She denies any missed   dialysis sessions recently and has been receiving her Epogen in a timely   manner.  She describes the shortness of breath that occurs both at rest and   during walking and also reports some orthopnea and PND.  She states she is   chronically on O2 at home and has not had to increase that from her baseline   of 2 liters.  Her appetite is poor.  She does admit to some nausea; however,   denies any cough, fevers, chills, abdominal pain, hematochezia, melena, or   hematemesis.  She also complained of some intermittent chest pain that comes   and goes.  She feels like a pressure all over her chest, but this is a chronic   issue for her.  She has been worked up in the past multiple times with   negative results.  She was supposed to have seen Dr. Avila with hematology   to start on Vidaza for her myelodysplastic syndrome; however, she had   transportation issues and was unable to make that and has not been able to see   him since then.  She felt that her hemoglobin may have dropped again and that   she gets symptomatic, even had a hemoglobin of 7 and that was another reason   why she wanted to get checked out in the ED.  Upon arrival to the ED, her   hemoglobin was at 7, which is a drop from her usual, " which is somewhere   between 8 and 9.  She was recently hospitalized last month for similar   complaints and issues, was transfused 2 units and felt better.  She states she   does have an appointment with Dr. Avila at the end of this month, but was   asking if she could get some help with transportation issues as that is the   main reason why missed her most of her outpatient physician appointments.    REVIEW OF SYSTEMS:  As per HPI, otherwise negative per AMA/CMS criteria.    PAST MEDICAL HISTORY:  1.  ESRD, MWF - IHD.  2.  Hypertension.  3.  Type 2 diabetes mellitus.  4.  Anemia.  5.  CKD.  6.  Renal osteodystrophy.  7.  MDS.  8.  COPD.  9.  Chronic chest pain.  10.  CAD.  11.  Hyperlipidemia.  12.  History of CVA.    PAST SURGICAL HISTORY:  1.  Cardiac surgery.  2.  Abdominal surgery.  3.  AV fistula placement.    FAMILY HISTORY:  Reviewed and noncontributory to her current illness.    SOCIAL HISTORY:  1.  Denies current or past tobacco use.  2.  Denies current or past ETOH use.  3.  Denies current or past illicit drug use.    HOME MEDICATIONS:  Reviewed and documented in chart.    ALLERGIES:  1.  DEMEROL, reaction unknown.  2.  MORPHINE, reaction unknown.  3.  NAPROSYN, reaction unknown.  4.  SULFA, reaction unknown.    PHYSICAL EXAMINATION:  VITAL SIGNS:  Blood pressure 151/49, pulse of 72, respiratory rate 16,   temperature 96.8, pulse ox 97% on 2 liters nasal cannula.  Weight 65.77   kilograms, height 147.3 cm, BMI 30.31.  GENERAL:  Well-developed and well-nourished thin female, in no apparent   distress.  HEENT:  Normocephalic, atraumatic.  No scleral icterus.  NECK:  Supple, nontender.  CARDIOVASCULAR:  Regular rate and rhythm.  No rubs or gallops.  Positive II/VI   systolic murmur audible throughout the precordium.  LUNGS:  Clear to auscultation bilaterally.  No wheezes or rales.  ABDOMEN:  Soft, nontender, nondistended.  No rebound or guarding.  EXTREMITIES:  No clubbing, cyanosis or edema.  NEUROLOGIC:   Alert and oriented.  No focal deficits.  SKIN:  Warm and dry.  PSYCHIATRIC:  The patient is cooperative and pleasant.    LABORATORY DATA:  Labs on arrival, WBC 5.8, hemoglobin 7.0, hematocrit 21.4,   platelets 406.  Differential within normal limits.  Labs after 1 unit of PRBC   in the ED showed a hemoglobin of 8.2, hematocrit of 24.6.  Sodium was 137,   potassium 3.9, chloride 102, CO2 of 21, BUN 72, creatinine 6.6, glucose 212,   calcium 7.3, total protein 6.1, albumin 3.2, total bilirubin 0.5, AST 9, ALT   9, ALP 60, phosphorus 7.9, magnesium 2.4, iron 163, TIBC 223.  T sat 73%.    Troponins x2 negative.  Ferritin of 10,000.    IMPRESSION:  1.  End-stage renal disease.  Etiology likely secondary to   hypertension/diabetes mellitus.  2.  Acute on chronic anemia.  Etiology secondary to myelodysplastic syndrome   superimposed with chronic kidney disease.  3.  Hypertension.  4.  Type 2 diabetes mellitus.  5.  Renal osteodystrophy.  6.  Hyperlipidemia.  7.  Nonadherence with outpatient medical followup.  8.  Coronary artery disease.  9.  Chronic obstructive pulmonary disease.    ASSESSMENT:  1.  Glomerular filtration rate, hemodialysis-dependent.  2.  Urine, oligoanuric.  3.  Volume, euvolemic.  4.  BP goal less than 140/90.  5.  Acid base.  No significant acid base disturbance noted.  6.  Electrolytes, stable.  7.  Anemia.  Goal hemoglobin 10-11, T sat greater than 25%.  8.  Metaboilic bone disease.  Calcium normal.  PO4 elevated.  9.  Dialysis access, AV fistula with positive thrill/bruit.    PLAN:  1.  Continue MWF - IHD during her inpatient stay.  2.  We will UF as tolerated by hemodynamics.  3.  We will transfuse 1 more unit with her dialysis today as she had some   bleeding early today and lost a good amount of blood.  4.  Advise the patient to discuss with the patient coordinator and social   worker to help her with arrangements for transportation to help her with her   outpatient appointments.  5.  Advised  her to continue to keep her appointment with Dr. Avila to get   started on the chemotherapy for her myelodysplastic syndrome.  6.  PhosLo 667 mg p.o. t.i.d. with meals for phosphate binder.  7.  Dose all medications per ESRD guidelines.  8.  Transfuse as needed to maintain hemoglobin greater than 7 along with any   time, she is symptomatic.  9.  We will follow the patient with you very closely.    Thank you for this consultation.       ____________________________________     MD AZUL Rao / NTS    DD:  03/20/2017 16:00:43  DT:  03/20/2017 20:10:18    D#:  [id number]  Job#:  [id number]

## 2017-03-21 NOTE — DISCHARGE SUMMARY
[facility] Discharge Summary      Admit Date:  3/19/2017       Discharge Date:   3/21/2017    Service:   [facility] [place] Team  Attending Physician(s):   Luana       Senior Resident(s):   Joseph  Gal Resident(s):   Prudence      Primary Diagnosis:   Symptomatic anemia secondary to end stage renal disease and myelodysplastic syndrome    Secondary Diagnoses:                Active Hospital Problems    Diagnosis   • Anemia [D64.9]   • ESRD (end stage renal disease) on dialysis (CMS-HCC) [N18.6, Z99.2]   • Myelodysplastic syndrome (CMS-HCC) [D46.9]   • Diabetes mellitus with complication ([facility]) [E11.8]   • COPD (chronic obstructive pulmonary disease) (CMS-HCC) [J44.9]       Hospital Summary (Brief Narrative):       Ms. Briscoe is a 60 yo female who presented with complaint of increasing fatigue, weakness, and dizziness. Found to have hgb of 7.0 and admitted for symptomatic anemia.  Has ESRD and MDS. Transfused 2 units and dialyzed today. MDS to be managed as outpatient by hem/onc (has appointment in 9 days).    Consultants:     Nephrology (Chris)    Procedures:        Dialysis    Imaging/ Testing:      CXR 3/19/17  Stable cardiomegaly.    Echo 3/20/17  Normal left ventricular size and systolic function. Mild concentric left ventricular hypertrophy. Left ventricular ejection fraction is visually estimated to be 60%. Normal regional wall motion. Grade I   diastolic dysfunction. Mild mitral regurgitation. Compared to the report of the study done 12/2015 - there has been a slight reduction in MR.     Discharge Medications:         Medication Reconciliation: Completed     Medication List      CONTINUE taking these medications       Instructions    amlodipine 10 MG Tabs   Commonly known as:  NORVASC    Take 10 mg by mouth every day.   Dose:  10 mg       carvedilol 12.5 MG Tabs   Last time this was given:  12.5 mg on 3/21/2017  7:22 AM   Commonly known as:  COREG    Take 12.5 mg by mouth 2  times a day, with meals.   Dose:  12.5 mg       hydrocodone-acetaminophen 5-325 MG Tabs per tablet   Last time this was given:  1 Tab on 3/21/2017  1:11 AM   Commonly known as:  NORCO    Take 1-2 Tabs by mouth every 6 hours as needed.   Dose:  1-2 Tab       LUMIGAN 0.03 % Soln   Generic drug:  bimatoprost    Place 1 Drop in both eyes every evening.   Dose:  1 Drop       LYRICA 50 MG capsule   Last time this was given:  50 mg on 3/20/2017  9:35 PM   Generic drug:  pregabalin    Take 50 mg by mouth 2 times a day.   Dose:  50 mg           Disposition:  Discharge to home    Diet:   Diabetic    Activity:   As tolerated    Instructions:      Follow up with Dr. Avila of hem/onc on 3/29/17   The patient was instructed to return to the ER in the event of worsening symptoms. I have counseled the patient on the importance of compliance and the patient has agreed to proceed with all medical recommendations and follow up plan indicated above.   The patient understands that all medications come with benefits and risks. Risks may include permanent injury or death and these risks can be minimized with close reassessment and monitoring.        Primary Care Provider:     Discharge summary faxed to primary care provider:  Deferred  Copy of discharge summary given to the patient: Deferred    Follow up appointment details :      Hem/Onc ([facility]) on 3/29/17    Pending Studies:        none    Time spent on discharge day patient visit, preparing discharge paperwork and arranging for patient follow up.    Summary of follow up issues:   Needs management of MDS    Discharge Time (Minutes) :    ***    Condition on Discharge    ______________________________________________________________________    Interval history/exam for day of discharge:    Examined on day of discharge and found to be in stable condition    Filed Vitals:    03/20/17 1545 03/20/17 2000 03/21/17 0400 03/21/17 0801   BP: 156/57 145/48 156/65 124/60   Pulse: 71 74 70 66      Temp: 36.1 °C (97 °F) 36.4 °C (97.6 °F) 35.3 °C (95.6 °F) 36.1 °C (96.9 °F)   Resp: 18 18 18 18   Height:       Weight:    63 kg (138 lb 14.2 oz)   SpO2:  100% 100% 92%     Weight/BMI: Body mass index is 29.04 kg/(m^2).  Pulse Oximetry: 92 %, O2 (LPM): 0, O2 Delivery: None (Room Air)    General: alert, orientedx4, in no distress  CVS: RRR, systolic murmur  PULM: CTAB  Ext: nonpitting LE edema    Most Recent Labs:    Lab Results   Component Value Date/Time    WBC 5.2 03/21/2017 06:35 AM    RBC 3.81* 03/21/2017 06:35 AM    HEMOGLOBIN 11.5* 03/21/2017 06:35 AM    HEMATOCRIT 34.0* 03/21/2017 06:35 AM    MCV 89.2 03/21/2017 06:35 AM    MCH 30.2 03/21/2017 06:35 AM    MCHC 33.8 03/21/2017 06:35 AM    MPV 11.8 03/21/2017 06:35 AM    NEUTROPHILS-POLYS 49.60 03/21/2017 06:35 AM    LYMPHOCYTES 35.20 03/21/2017 06:35 AM    MONOCYTES 9.00 03/21/2017 06:35 AM    EOSINOPHILS 5.00 03/21/2017 06:35 AM    BASOPHILS 0.80 03/21/2017 06:35 AM    HYPOCHROMIA 1+ 07/11/2016 11:55 AM    ANISOCYTOSIS 2+ 03/19/2017 08:10 PM      Lab Results   Component Value Date/Time    SODIUM 135 03/21/2017 06:35 AM    POTASSIUM 4.0 03/21/2017 06:35 AM    CHLORIDE 96 03/21/2017 06:35 AM    CO2 27 03/21/2017 06:35 AM    GLUCOSE 126* 03/21/2017 06:35 AM    BUN 42* 03/21/2017 06:35 AM    CREATININE 4.03* 03/21/2017 06:35 AM      Lab Results   Component Value Date/Time    ALT(SGPT) 9 03/20/2017 06:52 AM    AST(SGOT) 9* 03/20/2017 06:52 AM    ALKALINE PHOSPHATASE 60 03/20/2017 06:52 AM    TOTAL BILIRUBIN 0.5 03/20/2017 06:52 AM    DIRECT BILIRUBIN <0.1 01/31/2017 11:03 AM    LIPASE 56 01/31/2017 11:03 AM    ALBUMIN 3.8 03/21/2017 06:35 AM    GLOBULIN 2.9 03/20/2017 06:52 AM    INR 0.96 01/30/2017 05:45 AM    MACROCYTOSIS 1+ 03/19/2017 08:10 PM     Lab Results   Component Value Date/Time    PT 13.1 01/30/2017 05:45 AM    INR 0.96 01/30/2017 05:45 AM

## 2017-03-21 NOTE — PROGRESS NOTES
[facility] Services Progress Note    Nephrologist ordered hemodialysis 3.5 hour tx at 1212 and terminated at 1545. 60 min HD RN wait time charged for delay of transport back to pt room post HD.    Net UF 2000 mL.    Positive bruit/thrill post tx. No bleeding noted at access site. Staff RN instructed to monitor AVF for breakthrough bleeding post AVF needle removal. Should breakthrough bleeding occur, staff RN instructed to apply pressure to access sites until bleeding resolved. Notify Dialysis and Nephrologist for follow up.    Report given to primary RN,    See flow sheet for details.

## 2017-03-21 NOTE — DISCHARGE INSTRUCTIONS
Discharge Instructions    Discharged to home by [facility] with escort. Discharged via wheelchair, hospital escort: Yes.  Special equipment needed: Oxygen    Be sure to schedule a follow-up appointment with your primary care doctor or any specialists as instructed.     Discharge Plan:   Influenza Vaccine Indication: Patient Refuses    I understand that a diet low in cholesterol, fat, and sodium is recommended for good health. Unless I have been given specific instructions below for another diet, I accept this instruction as my diet prescription.   Other diet: renal, diabetic    Special Instructions: None    · Is patient discharged on Warfarin / Coumadin?   No     · Is patient Post Blood Transfusion?  No    Depression / Suicide Risk    As you are discharged from this [facility], it is important to learn how to keep safe from harming yourself.    Recognize the warning signs:  · Abrupt changes in personality, positive or negative- including increase in energy   · Giving away possessions  · Change in eating patterns- significant weight changes-  positive or negative  · Change in sleeping patterns- unable to sleep or sleeping all the time   · Unwillingness or inability to communicate  · Depression  · Unusual sadness, discouragement and loneliness  · Talk of wanting to die  · Neglect of personal appearance   · Rebelliousness- reckless behavior  · Withdrawal from people/activities they love  · Confusion- inability to concentrate     If you or a loved one observes any of these behaviors or has concerns about self-harm, here's what you can do:  · Talk about it- your feelings and reasons for harming yourself  · Remove any means that you might use to hurt yourself (examples: pills, rope, extension cords, firearm)  · Get professional help from the community (Mental Health, Substance Abuse, psychological counseling)  · Do not be alone:Call your Safe Contact- someone whom you trust who will be there for you.  · Call  your local CRISIS HOTLINE 435-3238 or 232-088-0987  · Call your local Children's Mobile Crisis Response Team [place] (982) 357-6930 or www.Banksnob  · Call the toll free National Suicide Prevention Hotlines   · National Suicide Prevention Lifeline 465-093-BHHV (3777)  · National Hope Line Network 800-SUICIDE (859-2796)    Anemia, Frequently Asked Questions  WHAT ARE THE SYMPTOMS OF ANEMIA?  · Headache.   · Difficulty thinking.   · Fatigue.   · Shortness of breath.   · Weakness.   · Rapid heartbeat.   AT WHAT POINT ARE PEOPLE CONSIDERED ANEMIC?   This varies with gender and age.   · Both hemoglobin (Hgb) and hematocrit values are used to define anemia. These lab values are obtained from a complete blood count (CBC) test. This is performed at a caregiver's office.   · The normal range of hemoglobin values for adult men is 14.0 g/dL to 17.4 g/dL. For nonpregnant women, values are 12.3 g/dL to 15.3 g/dL.   · The World Health Organization defines anemia as less than 12 g/dL for nonpregnant women and less than 13 g/dL for men.   · For adult males, the average normal hematocrit is 46%, and the range is 40% to 52%.   · For adult females, the average normal hematocrit is 41%, and the range is 35% to 47%.   · Values that fall below the lower limits can be a sign of anemia and should have further checking (evaluation).   GROUPS OF PEOPLE WHO ARE AT RISK FOR DEVELOPING ANEMIA INCLUDE:   · Infants who are  or taking a formula that is not fortified with iron.   · Children going through a rapid growth spurt. The iron available can not keep up with the needs for a red cell mass which must grow with the child.   · Women in childbearing years. They need iron because of blood loss during menstruation.   · Pregnant women. The growing fetus creates a high demand for iron.   · People with ongoing gastrointestinal blood loss are at risk of developing iron deficiency.   · Individuals with leukemia or cancer who must  receive chemotherapy or radiation to treat their disease. The drugs or radiation used to treat these diseases often decreases the bone marrow's ability to make cells of all classes. This includes red blood cells, white blood cells, and platelets.   · Individuals with chronic inflammatory conditions such as rheumatoid arthritis or chronic infections.   · The elderly.   ARE SOME TYPES OF ANEMIA INHERITED?   · Yes, some types of anemia are due to inherited or genetic defects.   · Sickle cell anemia. This occurs most often in people of , , and Mediterranean descent.   · Thalassemia (or Groves's anemia). This type is found in people of Mediterranean and Southeast  descent. These types of anemia are common.   · Fanconi. This is rare.   CAN CERTAIN MEDICATIONS CAUSE A PERSON TO BECOME ANEMIC?   Yes. For example, drugs to fight cancer (chemotherapeutic agents) often cause anemia. These drugs can slow the bone marrow's ability to make red blood cells. If there are not enough red blood cells, the body does not get enough oxygen.  WHAT HEMATOCRIT LEVEL IS REQUIRED TO DONATE BLOOD?   The lower limit of an acceptable hematocrit for blood donors is 38%. If you have a low hematocrit value, you should schedule an appointment with your caregiver.  ARE BLOOD TRANSFUSIONS COMMONLY USED TO CORRECT ANEMIA, AND ARE THEY DANGEROUS?   They are used to treat anemia as a last resort. Your caregiver will find the cause of the anemia and correct it if possible. Most blood transfusions are given because of excessive bleeding at the time of surgery, with trauma, or because of bone marrow suppression in patients with cancer or leukemia on chemotherapy. Blood transfusions are safer than ever before. We also know that blood transfusions affect the immune system and may increase certain risks. There is also a concern for human error. In 1/16,000 transfusions, a patient receives a transfusion of blood that is not matched  with his or her blood type.   WHAT IS IRON DEFICIENCY ANEMIA AND CAN I CORRECT IT BY CHANGING MY DIET?   Iron is an essential part of hemoglobin. Without enough hemoglobin, anemia develops and the body does not get the right amount of oxygen. Iron deficiency anemia develops after the body has had a low level of iron for a long time. This is either caused by blood loss, not taking in or absorbing enough iron, or increased demands for iron (like pregnancy or rapid growth).   Foods from animal origin such as beef, chicken, and pork, are good sources of iron. Be sure to have one of these foods at each meal. Vitamin C helps your body absorb iron. Foods rich in Vitamin C include citrus, bell pepper, strawberries, spinach and cantaloupe. In some cases, iron supplements may be needed in order to correct the iron deficiency. In the case of poor absorption, extra iron may have to be given directly into the vein through a needle (intravenously).  I HAVE BEEN DIAGNOSED WITH IRON DEFICIENCY ANEMIA AND MY CAREGIVER PRESCRIBED IRON SUPPLEMENTS. HOW LONG WILL IT TAKE FOR MY BLOOD TO BECOME NORMAL?   It depends on the degree of anemia at the beginning of treatment. Most people with mild to moderate iron deficiency, anemia will correct the anemia over a period of 2 to 3 months. But after the anemia is corrected, the iron stored by the body is still low. Caregivers often suggest an additional 6 months of oral iron therapy once the anemia has been reversed. This will help prevent the iron deficiency anemia from quickly happening again. Non-anemic adult males should take iron supplements only under the direction of a doctor, too much iron can cause liver damage.   MY HEMOGLOBIN IS 9 G/DL AND I AM SCHEDULED FOR SURGERY. SHOULD I POSTPONE THE SURGERY?   If you have Hgb of 9, you should discuss this with your caregiver right away. Many patients with similar hemoglobin levels have had surgery without problems. If minimal blood loss is  expected for a minor procedure, no treatment may be necessary.   If a greater blood loss is expected for more extensive procedures, you should ask your caregiver about being treated with erythropoietin and iron. This is to accelerate the recovery of your hemoglobin to a normal level before surgery. An anemic patient who undergoes high-blood-loss surgery has a greater risk of surgical complications and need for a blood transfusion, which also carries some risk.   I HAVE BEEN TOLD THAT HEAVY MENSTRUAL PERIODS CAUSE ANEMIA. IS THERE ANYTHING I CAN DO TO PREVENT THE ANEMIA?   Anemia that results from heavy periods is usually due to iron deficiency. You can try to meet the increased demands for iron caused by the heavy monthly blood loss by increasing the intake of iron-rich foods. Iron supplements may be required. Discuss your concerns with your caregiver.  WHAT CAUSES ANEMIA DURING PREGNANCY?   Pregnancy places major demands on the body. The mother must meet the needs of both her body and her growing baby. The body needs enough iron and folate to make the right amount of red blood cells. To prevent anemia while pregnant, the mother should stay in close contact with her caregiver.   Be sure to eat a diet that has foods rich in iron and folate like liver and dark green leafy vegetables. Folate plays an important role in the normal development of a baby's spinal cord. Folate can help prevent serious disorders like spina bifida. If your diet does not provide adequate nutrients, you may want to talk with your caregiver about nutritional supplements.   WHAT IS THE RELATIONSHIP BETWEEN FIBROID TUMORS AND ANEMIA IN WOMEN?   The relationship is usually caused by the increased menstrual blood loss caused by fibroids. Good iron intake may be required to prevent iron deficiency anemia from developing.   Document Released: 07/26/2005 Document Revised: 03/11/2013 Document Reviewed: 01/10/2012  ExitCare® Patient Information ©2013  [employer].    Kidney Disease, Adult  The kidneys are two organs that lie on either side of the spine between the middle of the back and the front of the abdomen. The kidneys:   · Remove wastes and extra water from the blood.    · Produce important hormones. These regulate blood pressure, help keep bones strong, and help create red blood cells.    · Balance the fluids and chemicals in the blood and tissues.  Kidney disease occurs when the kidneys are damaged. Kidney damage may be sudden (acute) or develop over a long period (chronic). A small amount of damage may not cause problems, but a large amount of damage may make it difficult or impossible for the kidneys to work the way they should. Early detection and treatment of kidney disease may prevent kidney damage from becoming permanent or getting worse. Some kidney diseases are curable, but most are not. Many people with kidney disease are able to control the disease and live a normal life.   TYPES OF KIDNEY DISEASE  · Acute kidney injury. Acute kidney injury occurs when there is sudden damage to the kidneys.  · Chronic kidney disease. Chronic kidney disease occurs when the kidneys are damaged over a long period.  · End-stage kidney disease. End-stage kidney disease occurs when the kidneys are so damaged that they stop working. In end-stage kidney disease, the kidneys cannot get better.  CAUSES  Any condition, disease, or event that damages the kidneys may cause kidney disease.  Acute kidney injury.  · A problem with blood flow to the kidneys. This may be caused by:    · Blood loss.    · Heart disease.    · Severe burns.    · Liver disease.  · Direct damage to the kidneys. This may be caused by:  · Some medicines.    · A kidney infection.    · Poisoning or consuming toxic substances.    · A surgical wound.    · A blow to the kidney area.    · A problem with urine flow. This may be caused by:    · Cancer.    · Kidney stones.    · An enlarged prostate.  Chronic  kidney disease. The most common causes of chronic kidney disease are diabetes and high blood pressure (hypertension). Chronic kidney disease may also be caused by:   · Diseases that cause the filtering units of the kidneys to become inflamed.    · Diseases that affect the immune system.    · Genetic diseases.    · Medicines that damage the kidneys, such as anti-inflammatory medicines.    · Poisoning or exposure to toxic substances.    · A reoccurring kidney or urinary infection.    · A problem with urine flow. This may be caused by:  · Cancer.    · Kidney stones.    · An enlarged prostate in males.  End-stage kidney disease. This kidney disease usually occurs when a chronic kidney disease gets worse. It may also occur after acute kidney injury.   SYMPTOMS   · Swelling (edema) of the legs, ankles, or feet.    · Tiredness (lethargy).    · Nausea or vomiting.    · Confusion.    · Problems with urination, such as:    · Painful or burning feeling during urination.    · Decreased urine production.  · Bloody urine.    · Frequent urination, especially at night.  · Hypertension.   · Muscle twitches and cramps.    · Shortness of breath.    · Persistent itchiness.    · Loss of appetite.  · Metallic taste in the mouth.    · Weakness.    · Seizures.    · Chest pain or pressure.    · Trouble sleeping.    · Headaches.    · Abnormally dark or light skin.    · Numbness in the hands or feet.    · Easy bruising.    · Frequent hiccups.    · Menstruation stops.  Sometimes, no symptoms are present.    DIAGNOSIS   Kidney disease may be detected and diagnosed by tests, including blood, urine, imaging, or kidney biopsy tests.   TREATMENT   Acute kidney injury. Treatment of acute kidney injury varies depending on the cause and severity of the kidney damage. In mild cases, no treatment may be needed. The kidneys may heal on their own. If acute kidney injury is more severe, your caregiver will treat the cause of the kidney damage, help the  kidneys heal, and prevent complications from occurring. Severe cases may require a procedure to remove toxic wastes from the body (dialysis) or surgery to repair kidney damage. Surgery may involve:   · Repair of a torn kidney.    · Removal of an obstruction.    Most of the time, you will need to stay overnight at the hospital.   Chronic kidney disease. Most chronic kidney diseases cannot be cured. Treatment usually involves relieving symptoms and preventing or slowing the progression of the disease. Treatment may include:   · A special diet. You may need to avoid alcohol and foods that:    · Have added salt.    · Are high in potassium.    · Are high in protein.    · Medicines. These may:    · Lower blood pressure.    · Relieve anemia.    · Relieve swelling.    · Protect the bones.    End-stage kidney disease. End-stage kidney disease is life-threatening and must be treated immediately. There are two treatments for end-stage kidney disease:   · Dialysis.    · Receiving a new kidney (kidney transplant).  Both of these treatments have serious risks and consequences. In addition to having dialysis or a kidney transplant, you may need to take medicines to control hypertension and cholesterol and to decrease phosphorus levels in your blood.  LENGTH OF ILLNESS  · Acute kidney injury. The length of this disease varies greatly from person to person. Exactly how long it lasts depends on the cause of the kidney damage. Acute kidney injury may develop into chronic kidney disease or end-stage kidney disease.  · Chronic kidney disease. This disease usually lasts a lifetime. Chronic kidney disease may worsen over time to become end-stage kidney disease. The time it takes for end-stage kidney disease to develop varies from person to person.  · End-stage kidney disease. This disease lasts until a kidney transplant is performed.  PREVENTION   Kidney disease can sometimes be prevented. If you have diabetes, hypertension, or any other  condition that may lead to kidney disease, you should try to prevent kidney disease with:   · An appropriate diet.  · Medicine.  · Lifestyle changes.  FOR MORE INFORMATION   American Association of Kidney Patients: www.aakp.org   National Kidney Foundation: www.kidney.org   American Kidney Fund: www.akfinc.org   [employer] [facility]: www.lifeoptions.org and www.kidneyschool.org   Document Released: 12/18/2006 Document Revised: 12/04/2013 Document Reviewed: 08/16/2013  ExitCare® Patient Information ©2014 [employer].

## 2017-03-21 NOTE — DISCHARGE SUMMARY
[facility] Medicine Discharge Summary      Admit Date:  3/19/2017       Discharge Date:   3/21/2017     Service:   R Internal Medicine [place] Team  Attending Physician(s):   Dr Craft       Senior Resident(s):   Dr Ruiz  Gal Resident(s):   Dr Watts      Primary Diagnosis:   Anemia due to End stage renal disease and Myelodysplastic syndrome      Secondary Diagnoses:                Active Hospital Problems    Diagnosis   • Anemia [D64.9]   • ESRD (end stage renal disease) on dialysis (CMS-HCC) [N18.6, Z99.2]   • Myelodysplastic syndrome (CMS-HCC) [D46.9]   • Diabetes mellitus with complication ([facility]) [E11.8]   • COPD (chronic obstructive pulmonary disease) (CMS-HCC) [J44.9]       Hospital Summary (Brief Narrative):       61-year-old female with PMH of myelodysplastic syndrome, ESRD on HD MWF, DM 2, HTN, CHF, CVA, atrial fibrillation, COPD On 2L, who presents to ED with generalized weakness, lightheadedness and chronic chest pain. Her hemoglobin in the ED was 7 and she was given one unit of blood transfusion in the ED. She was admitted on 2/20/2016 for same exact symptoms, she was transfused 2 units of blood and discharged with follow-up with oncologist  Dr. Ranulfo Avila on 3/2/2017 to start her on Vidaza (Azacitidine) for MDS (+Bone marrow biopsy), however she did not make it to her appointment due to transportation related issues. Her labs showed troponin 0.01, , EKG showed prolonged , NSR, Q wave in lead III and V1, no acute ischemia. CXR showed cardiomegaly and aortic calcification. Patient did not have any evidence of obvious GI bleeding. Her underlying anemia attributed to ESRD and MDS. Was transfused additional one unit during HD. H/H trended up to 11.5 on the day of discharge. Informed patient of the importance of following up with Oncology after discharge and continued compliace with hemodialysis. Please refer to H&P and daily progress notes for complete  details.    Patient /Hospital Summary (Details -- Problem Oriented) :          Symptomatic anemia  Myelodysplastic syndrome  - Presented with complaints of weakness, dizziness, and SOB  - Likely multifactorial from ESRD, MDS, and anemia of chronic disease; on epo during dialysis  - Hgb 7.0 on admit, received 1 unit PRBCs and hgb increased to 8.2; additional unit transfused during dialysis today. HB 11.5 at time of discharge.  - Iron studies: iron 163, TIBC 223 (H), % sat 73 (H), ferritin 1032.4 (H)  - Had appointment with hem/onc ([facility]) but missed it, has upcoming appt on 3/29; will defer management of MDS to hematology    ESRD, dialysis dependent  Renal osteodystrophy  - Secondary to long-standing HTN and DM  - On admit Cr/BUN 6.35/67  - On MWF dialysis, been on dialysis since 09/2015  - Nephrology (Chris) consulted and ordered dialysis    MDS  - Appears to becoming transfusion dependent  - Follow up with hem/onc as outpatient at scheduled appointment    Chronic chest pain - chronic  - Atypical chest pain, chronic; has been worked up in past  - Troponin <0.01  - EKG showed only borderline prolonged QTc at 495 ms    Pulmonary hypertension  - Old echo showed RVSP of 55 mmHg with normal RV and RA  - Repeating echo    Type 2 DM with peripheral neuropathy  - Managed by diet  - Poorly controlled with A1c of 8.4 in 02/2017    - Neuropathy with bilateral feet pain  - Continue pregabalin 50 mg BID    - Start ISS, hypoglycemia protocol    HTN  - Continue home amlodipine 10 mg and carvedilol 12.5 mg BID      COPD  - On 2L NC at baseline    Glaucoma  - Continue latanoprost    Consultants:     Nephrology    Procedures:        None    Imaging/ Testing:        ECHO:  Normal left ventricular size and systolic function. Mild concentric   left ventricular hypertrophy. Left ventricular ejection fraction is   visually estimated to be 60%. Normal regional wall motion. Grade I   diastolic dysfunction.  Mild mitral  regurgitation.  Compared to the report of the study done 12/2015 - there has been a   slight reduction in MR.    ECHOCARDIOGRAM COMP W/O CONT   Final Result      DX-CHEST-PORTABLE (1 VIEW)   Final Result      Stable cardiomegaly.            Discharge Medications:         Medication Reconciliation: Completed     Medication List      CONTINUE taking these medications       Instructions    amlodipine 10 MG Tabs   Last time this was given:  10 mg on 3/21/2017  9:08 AM   Commonly known as:  NORVASC    Take 10 mg by mouth every day.   Dose:  10 mg       carvedilol 12.5 MG Tabs   Last time this was given:  12.5 mg on 3/21/2017  7:22 AM   Commonly known as:  COREG    Take 12.5 mg by mouth 2 times a day, with meals.   Dose:  12.5 mg       hydrocodone-acetaminophen 5-325 MG Tabs per tablet   Last time this was given:  1 Tab on 3/21/2017  1:11 AM   Commonly known as:  NORCO    Take 1-2 Tabs by mouth every 6 hours as needed.   Dose:  1-2 Tab       LUMIGAN 0.03 % Soln   Generic drug:  bimatoprost    Place 1 Drop in both eyes every evening.   Dose:  1 Drop       LYRICA 50 MG capsule   Last time this was given:  50 mg on 3/21/2017  9:07 AM   Generic drug:  pregabalin    Take 50 mg by mouth 2 times a day.   Dose:  50 mg                 Disposition:   DC to home    Diet:   As tolerated    Activity:   As tolerated    Instructions:      F/u Heme/Onc   F/u Nephrology for HD   The patient was instructed to return to the ER in the event of worsening symptoms. I have counseled the patient on the importance of compliance and the patient has agreed to proceed with all medical recommendations and follow up plan indicated above.   The patient understands that all medications come with benefits and risks. Risks may include permanent injury or death and these risks can be minimized with close reassessment and monitoring.        Primary Care Provider:    Nyla Nava M.D.    Discharge summary faxed to primary care provider:  Deferred  Copy of  discharge summary given to the patient: Deferred    Follow up appointment details :      F/u Heme/Onc for mgmt of MDS  F/u Nephrology for HD     Pending Studies:        None    Time spent on discharge day patient visit, preparing discharge paperwork and arranging for patient follow up.    Discharge Time (Minutes) :    50mins      Condition on Discharge    ______________________________________________________________________    Interval history/exam for day of discharge:    No acute events overnight. Tolerated HD/transfusion yesterday. Reports feeling much better, stronger when compared to time of admission. In good spirits. Voiced understanding with plan of care, need to f/u Heme/onc for treatment of MDS. Denies any HA, CP, Sob, Abd pain, Bloody or black stools, fevers/chills.    Filed Vitals:    03/20/17 1545 03/20/17 2000 03/21/17 0400 03/21/17 0801   BP: 156/57 145/48 156/65 124/60   Pulse: 71 74 70 66   Temp: 36.1 °C (97 °F) 36.4 °C (97.6 °F) 35.3 °C (95.6 °F) 36.1 °C (96.9 °F)   Resp: 18 18 18 18   Height:       Weight:    63 kg (138 lb 14.2 oz)   SpO2:  100% 100% 92%     Weight/BMI: Body mass index is 29.04 kg/(m^2).  Pulse Oximetry: 92 %, O2 (LPM): 0, O2 Delivery: None (Room Air)    Physical Exam   Constitutional: She is oriented to person, place, and time and well-developed, well-nourished, and in no distress.   HENT:    Head: Normocephalic and atraumatic.    Mouth/Throat: No oropharyngeal exudate.   Eyes: Pupils are equal, round, and reactive to light. No scleral icterus.   Neck: No JVD present.   Cardiovascular: Normal rate and regular rhythm.  Exam reveals gallop and S4.     Murmur heard. Systolic murmur is present with a grade of 3/6   Pulmonary/Chest: Effort normal and breath sounds normal.   Abdominal: Soft. Bowel sounds are normal. She exhibits no distension. There is no tenderness. There is no guarding.   Musculoskeletal: She exhibits edema. She exhibits no tenderness.   Lymphadenopathy:     She has  no cervical adenopathy.   Neurological: She is alert and oriented to person, place, and time. GCS score is 15.   Skin: Skin is warm and dry.   Psychiatric: Affect normal.     Most Recent Labs:    Lab Results   Component Value Date/Time    WBC 5.2 03/21/2017 06:35 AM    RBC 3.81* 03/21/2017 06:35 AM    HEMOGLOBIN 11.5* 03/21/2017 06:35 AM    HEMATOCRIT 34.0* 03/21/2017 06:35 AM    MCV 89.2 03/21/2017 06:35 AM    MCH 30.2 03/21/2017 06:35 AM    MCHC 33.8 03/21/2017 06:35 AM    MPV 11.8 03/21/2017 06:35 AM    NEUTROPHILS-POLYS 49.60 03/21/2017 06:35 AM    LYMPHOCYTES 35.20 03/21/2017 06:35 AM    MONOCYTES 9.00 03/21/2017 06:35 AM    EOSINOPHILS 5.00 03/21/2017 06:35 AM    BASOPHILS 0.80 03/21/2017 06:35 AM    HYPOCHROMIA 1+ 07/11/2016 11:55 AM    ANISOCYTOSIS 2+ 03/19/2017 08:10 PM      Lab Results   Component Value Date/Time    SODIUM 135 03/21/2017 06:35 AM    POTASSIUM 4.0 03/21/2017 06:35 AM    CHLORIDE 96 03/21/2017 06:35 AM    CO2 27 03/21/2017 06:35 AM    GLUCOSE 126* 03/21/2017 06:35 AM    BUN 42* 03/21/2017 06:35 AM    CREATININE 4.03* 03/21/2017 06:35 AM      Lab Results   Component Value Date/Time    ALT(SGPT) 9 03/20/2017 06:52 AM    AST(SGOT) 9* 03/20/2017 06:52 AM    ALKALINE PHOSPHATASE 60 03/20/2017 06:52 AM    TOTAL BILIRUBIN 0.5 03/20/2017 06:52 AM    DIRECT BILIRUBIN <0.1 01/31/2017 11:03 AM    LIPASE 56 01/31/2017 11:03 AM    ALBUMIN 3.8 03/21/2017 06:35 AM    GLOBULIN 2.9 03/20/2017 06:52 AM    INR 0.96 01/30/2017 05:45 AM    MACROCYTOSIS 1+ 03/19/2017 08:10 PM     Lab Results   Component Value Date/Time    PT 13.1 01/30/2017 05:45 AM    INR 0.96 01/30/2017 05:45 AM

## 2017-03-21 NOTE — PROGRESS NOTES
Assumed pt care after receiving bedside report, pt resting in bed AAOx4, pt medicated per MAR, pt is standby assist w/ cane. Fall measures in place. Call light within reach, personal belongings close-by, bed in lowest position, IV pole on same side of bathroom, upper bed rails up, hourly rounding in place. Will continue to monitor pt for safety.

## 2017-03-21 NOTE — PROGRESS NOTES
Spoke with patient at discharge today.  Confirmed she has an appointment with Dr. Avila on 3/29 at 11 am to discuss her chemotherapy treatment and POC.  Informed her again that there are no resources for transportation to [place] 5 days consecutively for her treatments and she may need to be admitted to the hospital for treatment.  Did reenforce that she will have local transportation from [facility] for rides to the store and local appointments.  Pt. Verbalized understanding.

## 2017-03-21 NOTE — PROGRESS NOTES
Assumed patient care at 0700. Received report from night shift. Assessment completed. POC discussed with pt, verbalizes understanding. A&Ox 4. Pt states generalized pain 6/10, numbness and tingling in both lower and upper extremities, bilaterally. Pt is stand by assist with cane. Fall precautions in place, Pt intructed to call for assistance before getting out of bed. Personal possessions and call light placed within reach.

## 2017-03-21 NOTE — CARE PLAN
Problem: Infection  Goal: Will remain free from infection  Intervention: Assess signs and symptoms of infection  Labs and vitals reviewed. Free of signs and symptoms of infection.       Problem: Mobility  Goal: Risk for activity intolerance will decrease  Intervention: Assess and monitor signs of activity intolerance  Pt ambulates with cane.   Intervention: Encourage patient to increase activity level in collaboration with Interdisciplinary Team  Pt utilizes cane for ambulation. Up to edge of bed for meals.

## 2017-03-21 NOTE — CARE PLAN
Problem: Safety  Goal: Will remain free from falls  Intervention: Assess risk factors for falls  Fall measures in place. Call light within reach, personal belongings close-by, bed in lowest position, IV pole on same side of bathroom, upper bed rails up, hourly rounding in place. Will continue to monitor pt for safety.       Problem: Pain Management  Goal: Pain level will decrease to patient’s comfort goal  Intervention: Follow pain managment plan developed in collaboration with patient and Interdisciplinary Team  Pt medicated for pain per MAR.

## 2017-03-21 NOTE — PROGRESS NOTES
[facility] Interval Note    Name Vielka Briscoe     1955   Age/Sex 61 y.o. female   MRN [id number]   Code Status FULL     After 5PM or if no immediate response to page, please call for cross-coverage  Attending/Team: Luana/William Call [phone] to page   1st Call - Day Intern (R1):   Prudence 2nd Call - Day Sr. Resident (R2/R3):   Joseph         Chief complaint/ reason for interval visit (Primary Diagnosis)   Dizzy/weak/SOB    Interval Problem Daily Status Update    Active Hospital Problems    Diagnosis   • Anemia [D64.9]   • ESRD (end stage renal disease) on dialysis (CMS-HCC) [N18.6, Z99.2]   • Myelodysplastic syndrome (CMS-HCC) [D46.9]   • Diabetes mellitus with complication ([facility]) [E11.8]   • COPD (chronic obstructive pulmonary disease) (CMS-HCC) [J44.9]     Admitted for symptomatic anemia. Presented with complaint of progressive dizziness, weakness, and SOB; found to have hgb of 7.0. Has ESRD and MDS. Transfused 2 units and dialyzed today. MDS to be managed as outpatient by hem/onc (has appointment in 9 days).    Evaluated at bedside. She is feeling much improved after transfusion. No new complaints. Would like norco before going to dialysis as she tends to get painful cramps and typically takes norco before dialysis.    Review of Systems   Constitutional: Negative for fever and chills.   HENT: Negative for hearing loss.    Eyes:        Poor vision from glaucoma  No vision changes   Respiratory: Positive for shortness of breath. Negative for cough, sputum production and wheezing.    Cardiovascular: Positive for chest pain and leg swelling. Negative for palpitations and orthopnea.   Gastrointestinal: Positive for diarrhea. Negative for nausea, vomiting, abdominal pain and constipation.   Genitourinary: Negative for dysuria and hematuria.   Musculoskeletal: Negative for myalgias and joint pain.        Bilateral foot pain   Skin: Negative for rash.    Neurological: Positive for dizziness, tingling, sensory change and weakness. Negative for focal weakness, loss of consciousness and headaches.   Endo/Heme/Allergies: Does not bruise/bleed easily.   Psychiatric/Behavioral: Negative for depression. The patient is not nervous/anxious.        Consultants/Specialty  Nephrology (Chris)    Disposition  Inpatient for symptomatic anemia requiring transfusion, anticipate discharge to home    Quality Measures  EKG reviewed, Labs reviewed and Medications reviewed  Bauer catheter: No Bauer      DVT Prophylaxis: Contraindicated - Anemia requiring blood transfusion  DVT prophylaxis - mechanical: SCDs  Ulcer prophylaxis: Not indicated        Physical Exam       Filed Vitals:    03/20/17 1350 03/20/17 1405 03/20/17 1420 03/20/17 1435   BP: 158/80 146/69 159/83 151/49   Pulse: 72 70 84 72   Temp: 35.8 °C (96.5 °F) 35.9 °C (96.7 °F) 36.1 °C (97 °F) 36 °C (96.8 °F)   Resp: 16 16 16 16   Height:       Weight:       SpO2: 97% 97% 97% 97%     Body mass index is 30.31 kg/(m^2). Weight: 65.772 kg (145 lb)  Oxygen Therapy:  Pulse Oximetry: 97 %, O2 (LPM): 2, O2 Delivery: Silicone Nasal Cannula    Physical Exam   Constitutional: She is oriented to person, place, and time and well-developed, well-nourished, and in no distress.   HENT:   Head: Normocephalic and atraumatic.   Mouth/Throat: No oropharyngeal exudate.   Eyes: Pupils are equal, round, and reactive to light. No scleral icterus.   Neck: No JVD present.   Cardiovascular: Normal rate and regular rhythm.  Exam reveals gallop and S4.    Murmur heard.   Systolic murmur is present with a grade of 3/6   Pulmonary/Chest: Effort normal and breath sounds normal.   Abdominal: Soft. Bowel sounds are normal. She exhibits no distension. There is no tenderness. There is no guarding.   Musculoskeletal: She exhibits edema. She exhibits no tenderness.   Lymphadenopathy:     She has no cervical adenopathy.   Neurological: She is alert and oriented to  person, place, and time. GCS score is 15.   Skin: Skin is warm and dry.   Psychiatric: Affect normal.         Lab Data Review:      3/20/2017  5:10 PM    Recent Labs      03/19/17 2010 03/20/17   0652   SODIUM  138  137   POTASSIUM  4.2  3.9   CHLORIDE  101  102   CO2  23  21   BUN  67*  72*   CREATININE  6.35*  6.63*   MAGNESIUM  2.4   --    PHOSPHORUS  7.9*   --    CALCIUM  7.8*  7.3*       Recent Labs      03/19/17 2010 03/20/17   0652   ALTSGPT  13  9   ASTSGOT  11*  9*   ALKPHOSPHAT  65  60   TBILIRUBIN  0.4  0.5   GLUCOSE  156*  212*       Recent Labs      03/19/17 2010 03/20/17   0456  03/20/17   0652   RBC  2.27*   --   2.67*   HEMOGLOBIN  7.0*   --   8.2*   HEMATOCRIT  21.4*   --   24.6*   PLATELETCT  406   --   361   IRON   --   163   --    FERRITIN   --   1032.4*   --    TOTIRONBC   --   223*   --        Recent Labs      03/19/17 2010 03/20/17   0652   WBC  5.8  5.4   NEUTSPOLYS  67.50  52.20   LYMPHOCYTES  20.20*  35.00   MONOCYTES  4.40  7.50   EOSINOPHILS  4.40  4.30   BASOPHILS  3.50*  0.60   ASTSGOT  11*  9*   ALTSGPT  13  9   ALKPHOSPHAT  65  60   TBILIRUBIN  0.4  0.5           Assessment/Plan   Symptomatic anemia  Myelodysplastic syndrome  - Presented with complaints of weakness, dizziness, and SOB  - Likely multifactorial from ESRD, MDS, and anemia of chronic disease; on epo during dialysis  - Hgb 7.0 on admit, received 1 unit PRBCs and hgb increased to 8.2; additional unit transfused during dialysis today  - Iron studies: iron 163, TIBC 223 (H), % sat 73 (H), ferritin 1032.4 (H)  - Had appointment with hem/onc ([facility]) but missed it, has upcoming appt on 3/29; will defer management of MDS to hematology    ESRD, dialysis dependent  Renal osteodystrophy  - Secondary to long-standing HTN and DM  - On admit Cr/BUN 6.35/67  - On MWF dialysis, been on dialysis since 09/2015  - Nephrology (Chris) consulted and ordered dialysis  - Continue pregabalin for nephropathy    MDS  - Appears to  becoming transfusion dependent  - Follow up with hem/onc as outpatient at scheduled appointment    Chronic chest pain - chronic  - Atypical chest pain, chronic; has been worked up in past  - Troponin <0.01  - EKG showed only borderline prolonged QTc at 495 ms    Pulmonary hypertension  - Old echo showed RVSP of 55 mmHg with normal RV and RA  - Repeating echo    Type 2 DM with peripheral neuropathy  - Managed by diet  - Poorly controlled with A1c of 8.4 in 02/2017   - Neuropathy with bilateral feet pain  - Continue pregabalin 50 mg BID    - Start ISS, hypoglycemia protocol    HTN  - Continue home amlodipine 10 mg and carvedilol 12.5 mg BID      COPD  - On 2L NC at baseline  - RT per protocol    Glaucoma  - Continue latanoprost

## 2017-03-21 NOTE — PROGRESS NOTES
PT discharged home via Taxi service. PIV DC'ed. Prescriptions given. Discharge instructions given specifically involving kidney disease, diabetes management. Patient verbalized understanding, 2nd copy of AVS signed and placed in patient chart.

## 2017-03-22 ENCOUNTER — PATIENT OUTREACH (OUTPATIENT)
Dept: HEALTH INFORMATION MANAGEMENT | Facility: OTHER | Age: 62
End: 2017-03-22

## 2017-03-22 NOTE — PROGRESS NOTES
03/22/2017 1527 - Discharge Outreach attempt - LM  03/23/2017 1456 - Discharge Outreach attempt - LM

## 2017-04-17 ENCOUNTER — OUTPATIENT INFUSION SERVICES (OUTPATIENT)
Dept: ONCOLOGY | Facility: MEDICAL CENTER | Age: 62
End: 2017-04-17
Attending: INTERNAL MEDICINE
Payer: MEDICARE

## 2017-04-17 VITALS
TEMPERATURE: 97.2 F | RESPIRATION RATE: 18 BRPM | SYSTOLIC BLOOD PRESSURE: 101 MMHG | HEART RATE: 86 BPM | WEIGHT: 137.13 LBS | BODY MASS INDEX: 29.58 KG/M2 | DIASTOLIC BLOOD PRESSURE: 67 MMHG | OXYGEN SATURATION: 99 % | HEIGHT: 57 IN

## 2017-04-17 DIAGNOSIS — D46.9 MDS (MYELODYSPLASTIC SYNDROME) (HCC): ICD-10-CM

## 2017-04-17 LAB
ABO GROUP BLD: NORMAL
ALBUMIN SERPL BCP-MCNC: 3.5 G/DL (ref 3.2–4.9)
ALBUMIN/GLOB SERPL: 1 G/DL
ALP SERPL-CCNC: 85 U/L (ref 30–99)
ALT SERPL-CCNC: 12 U/L (ref 2–50)
ANION GAP SERPL CALC-SCNC: 8 MMOL/L (ref 0–11.9)
AST SERPL-CCNC: 13 U/L (ref 12–45)
BARCODED ABORH UBTYP: 8400
BARCODED ABORH UBTYP: 8400
BARCODED PRD CODE UBPRD: NORMAL
BARCODED PRD CODE UBPRD: NORMAL
BARCODED UNIT NUM UBUNT: NORMAL
BARCODED UNIT NUM UBUNT: NORMAL
BASOPHILS # BLD AUTO: 0.6 % (ref 0–1.8)
BASOPHILS # BLD: 0.04 K/UL (ref 0–0.12)
BILIRUB SERPL-MCNC: 0.3 MG/DL (ref 0.1–1.5)
BLD GP AB SCN SERPL QL: NORMAL
BUN SERPL-MCNC: 24 MG/DL (ref 8–22)
CALCIUM SERPL-MCNC: 8.3 MG/DL (ref 8.5–10.5)
CHLORIDE SERPL-SCNC: 96 MMOL/L (ref 96–112)
CO2 SERPL-SCNC: 31 MMOL/L (ref 20–33)
COMPONENT R 8504R: NORMAL
COMPONENT R 8504R: NORMAL
CREAT SERPL-MCNC: 3.1 MG/DL (ref 0.5–1.4)
EOSINOPHIL # BLD AUTO: 0.22 K/UL (ref 0–0.51)
EOSINOPHIL NFR BLD: 3.4 % (ref 0–6.9)
ERYTHROCYTE [DISTWIDTH] IN BLOOD BY AUTOMATED COUNT: 64.4 FL (ref 35.9–50)
GFR SERPL CREATININE-BSD FRML MDRD: 15 ML/MIN/1.73 M 2
GLOBULIN SER CALC-MCNC: 3.4 G/DL (ref 1.9–3.5)
GLUCOSE SERPL-MCNC: 272 MG/DL (ref 65–99)
HCT VFR BLD AUTO: 21.9 % (ref 37–47)
HGB BLD-MCNC: 7.5 G/DL (ref 12–16)
IMM GRANULOCYTES # BLD AUTO: 0.02 K/UL (ref 0–0.11)
IMM GRANULOCYTES NFR BLD AUTO: 0.3 % (ref 0–0.9)
LYMPHOCYTES # BLD AUTO: 1.18 K/UL (ref 1–4.8)
LYMPHOCYTES NFR BLD: 18.5 % (ref 22–41)
MCH RBC QN AUTO: 32.1 PG (ref 27–33)
MCHC RBC AUTO-ENTMCNC: 34.2 G/DL (ref 33.6–35)
MCV RBC AUTO: 93.6 FL (ref 81.4–97.8)
MONOCYTES # BLD AUTO: 0.68 K/UL (ref 0–0.85)
MONOCYTES NFR BLD AUTO: 10.6 % (ref 0–13.4)
NEUTROPHILS # BLD AUTO: 4.25 K/UL (ref 2–7.15)
NEUTROPHILS NFR BLD: 66.6 % (ref 44–72)
NRBC # BLD AUTO: 0 K/UL
NRBC BLD AUTO-RTO: 0 /100 WBC
PLATELET # BLD AUTO: 335 K/UL (ref 164–446)
PMV BLD AUTO: 12.8 FL (ref 9–12.9)
POTASSIUM SERPL-SCNC: 3.5 MMOL/L (ref 3.6–5.5)
PRODUCT TYPE UPROD: NORMAL
PRODUCT TYPE UPROD: NORMAL
PROT SERPL-MCNC: 6.9 G/DL (ref 6–8.2)
RBC # BLD AUTO: 2.34 M/UL (ref 4.2–5.4)
RH BLD: NORMAL
SODIUM SERPL-SCNC: 135 MMOL/L (ref 135–145)
UNIT STATUS USTAT: NORMAL
UNIT STATUS USTAT: NORMAL
WBC # BLD AUTO: 6.4 K/UL (ref 4.8–10.8)

## 2017-04-17 PROCEDURE — 80053 COMPREHEN METABOLIC PANEL: CPT

## 2017-04-17 PROCEDURE — 85025 COMPLETE CBC W/AUTO DIFF WBC: CPT

## 2017-04-17 PROCEDURE — 86850 RBC ANTIBODY SCREEN: CPT

## 2017-04-17 PROCEDURE — 86900 BLOOD TYPING SEROLOGIC ABO: CPT

## 2017-04-17 PROCEDURE — 86901 BLOOD TYPING SEROLOGIC RH(D): CPT

## 2017-04-17 PROCEDURE — 36592 COLLECT BLOOD FROM PICC: CPT

## 2017-04-17 ASSESSMENT — PAIN SCALES - GENERAL: PAINLEVEL: 3=SLIGHT PAIN

## 2017-04-17 NOTE — PROGRESS NOTES
"Pharmacy Chemotherapy Verification    **CHEMOTHERAPY DEFERRED DUE TO TRANSPORTATION ISSUES**    Patient Name: Vielka Briscoe  Dx: MDS   (Previous treatment = NA)  Regimen and Dosing Reference  Azacitadine 75 mg/m2 IV over 10 minutes or Subcutaneously daily on days 1-7 - MD dosing on days 1-5  28 day cycle until disease progression or unacceptable toxicity for a minimum of 4-6 cycles  NCCN Guidelines for Myelodysplastic Syndromes V.2.2017  Fennick P, et al. Lancet Oncol. 2009;10(3):223-32  Yokasta RM, et al. J Clin Oncol. 2009;27(11)-8    Allergies:Actos; Darvocet; Demerol; Glucophage; Morphine; Oxycodone; Pcn; Requip; Simvastatin; Sulfa drugs;  Tramadol; Trazodone; Demerol; Dilaudid; Diphenhydramine; Iron; Lenalidomide; Morphine; Multivitamin; Naprosyn; Other drug; and Sulfa drugs  /67 mmHg  Pulse 86  Temp(Src) 36.2 °C (97.2 °F)  Resp 18  Ht 1.448 m (4' 9\")  Wt 62.2 kg (137 lb 2 oz)  BMI 29.67 kg/m2  SpO2 99%  LMP 01/01/1995  Body surface area is 1.58 meters squared.    Labs 4/17/17  ANC ~ 4250     Plt = 335 k     Hgb = 7.5  SCr = 3.1      CrCl ~18.3 ml/min    AST/ALT/AP/TBili = 13/12/85/0.3          Minerva Arcos, PharmD    "

## 2017-04-18 ENCOUNTER — OUTPATIENT INFUSION SERVICES (OUTPATIENT)
Dept: ONCOLOGY | Facility: MEDICAL CENTER | Age: 62
End: 2017-04-18
Attending: INTERNAL MEDICINE
Payer: MEDICARE

## 2017-04-18 VITALS
WEIGHT: 138.67 LBS | OXYGEN SATURATION: 100 % | HEART RATE: 69 BPM | SYSTOLIC BLOOD PRESSURE: 111 MMHG | RESPIRATION RATE: 18 BRPM | BODY MASS INDEX: 29.11 KG/M2 | HEIGHT: 58 IN | DIASTOLIC BLOOD PRESSURE: 52 MMHG | TEMPERATURE: 98 F

## 2017-04-18 DIAGNOSIS — D46.9 MDS (MYELODYSPLASTIC SYNDROME) (HCC): ICD-10-CM

## 2017-04-18 PROCEDURE — 86923 COMPATIBILITY TEST ELECTRIC: CPT

## 2017-04-18 PROCEDURE — 306780 HCHG STAT FOR TRANSFUSION PER CASE

## 2017-04-18 PROCEDURE — P9016 RBC LEUKOCYTES REDUCED: HCPCS

## 2017-04-18 PROCEDURE — 36430 TRANSFUSION BLD/BLD COMPNT: CPT

## 2017-04-18 RX ORDER — ACETAMINOPHEN 325 MG/1
650 TABLET ORAL ONCE
Status: DISCONTINUED | OUTPATIENT
Start: 2017-04-18 | End: 2017-04-18 | Stop reason: HOSPADM

## 2017-04-18 ASSESSMENT — PAIN SCALES - GENERAL: PAINLEVEL: 3=SLIGHT PAIN

## 2017-04-18 NOTE — PROGRESS NOTES
Pt arrived to infusion for 2 units of blood. Pt refusing premeds. PIV access est, brisk blood return. Blood infused per order. Pt tolerated well. Pt hypotensive throughout stay, took bp meds this am. Pt bp 100/52 post infusion. PIV flushed and removed. Pt dc'd home self care in no acute distress. Future appointment confirmed.

## 2017-04-18 NOTE — PROGRESS NOTES
Pt arrived to infusion for C1D1 Vidaza. Labs drawn, nurse percy in to see pt. Pt expressed that she may not be able to make it to future appointments as she does not have a ride. Pt provided outpt social workers number, NN called to arrange ride. Lab results populated and pt hgb 7.5. Call placed to MD. Md will place order in treatment plan for 2 units of blood tomorrow. Ok per MD to change date on treatment plan for 5/1. Discussed POC with pt, pt verbalized understanding. Pt dc'd to hotel with friend in no acute distress. Future appointment confirmed.

## 2017-04-19 ENCOUNTER — APPOINTMENT (OUTPATIENT)
Dept: ONCOLOGY | Facility: MEDICAL CENTER | Age: 62
End: 2017-04-19
Attending: INTERNAL MEDICINE
Payer: MEDICARE

## 2017-04-19 ENCOUNTER — AMB ONCOLOGY NURSE NAVIGATOR ENCOUNTER (OUTPATIENT)
Dept: OTHER | Facility: MEDICAL CENTER | Age: 62
End: 2017-04-19

## 2017-04-19 NOTE — PROGRESS NOTES
"Met with patient is IS.  States she took a cab from [facility] to her appointment because \"Jacques could not get up to take me\".  She also reports calling her daughter for a ride, but her daughter did not come either and per patient her daughter \"did not understand what I needed\".  Her daughter did come later to visit with her as she was in town, but this writer missed her.  She tells me her daughter will take her to her chemo appointments if Vielka \"pays for the gas\" (her daughter is not currently employed).  I questioned if she is a reliable ride, as she did not pick her up this AM.  Will follow-up with her SW manager Terry and call patient with outcome. Encouraged to call [facility] for rides in local community.  Patient has my contact information.    "
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
## 2017-04-20 ENCOUNTER — APPOINTMENT (OUTPATIENT)
Dept: ONCOLOGY | Facility: MEDICAL CENTER | Age: 62
End: 2017-04-20
Attending: INTERNAL MEDICINE
Payer: MEDICARE

## 2017-04-21 ENCOUNTER — APPOINTMENT (OUTPATIENT)
Dept: ONCOLOGY | Facility: MEDICAL CENTER | Age: 62
End: 2017-04-21
Attending: INTERNAL MEDICINE
Payer: MEDICARE

## 2017-04-27 ENCOUNTER — TELEPHONE (OUTPATIENT)
Dept: HEALTH INFORMATION MANAGEMENT | Facility: OTHER | Age: 62
End: 2017-04-27

## 2017-05-01 ENCOUNTER — OUTPATIENT INFUSION SERVICES (OUTPATIENT)
Dept: ONCOLOGY | Facility: MEDICAL CENTER | Age: 62
End: 2017-05-01
Attending: INTERNAL MEDICINE
Payer: MEDICARE

## 2017-05-01 VITALS
HEIGHT: 57 IN | TEMPERATURE: 97.5 F | OXYGEN SATURATION: 100 % | WEIGHT: 138.45 LBS | HEART RATE: 76 BPM | DIASTOLIC BLOOD PRESSURE: 68 MMHG | BODY MASS INDEX: 29.87 KG/M2 | RESPIRATION RATE: 18 BRPM | SYSTOLIC BLOOD PRESSURE: 104 MMHG

## 2017-05-01 DIAGNOSIS — D46.9 MDS (MYELODYSPLASTIC SYNDROME) (HCC): ICD-10-CM

## 2017-05-01 LAB
ALBUMIN SERPL BCP-MCNC: 3.6 G/DL (ref 3.2–4.9)
ALBUMIN/GLOB SERPL: 1.1 G/DL
ALP SERPL-CCNC: 86 U/L (ref 30–99)
ALT SERPL-CCNC: 12 U/L (ref 2–50)
ANION GAP SERPL CALC-SCNC: 8 MMOL/L (ref 0–11.9)
AST SERPL-CCNC: 15 U/L (ref 12–45)
BASOPHILS # BLD AUTO: 0.3 % (ref 0–1.8)
BASOPHILS # BLD: 0.02 K/UL (ref 0–0.12)
BILIRUB SERPL-MCNC: 0.4 MG/DL (ref 0.1–1.5)
BUN SERPL-MCNC: 22 MG/DL (ref 8–22)
CALCIUM SERPL-MCNC: 8.4 MG/DL (ref 8.5–10.5)
CHLORIDE SERPL-SCNC: 96 MMOL/L (ref 96–112)
CO2 SERPL-SCNC: 30 MMOL/L (ref 20–33)
CREAT SERPL-MCNC: 2.62 MG/DL (ref 0.5–1.4)
EOSINOPHIL # BLD AUTO: 0.25 K/UL (ref 0–0.51)
EOSINOPHIL NFR BLD: 4.2 % (ref 0–6.9)
ERYTHROCYTE [DISTWIDTH] IN BLOOD BY AUTOMATED COUNT: 61.1 FL (ref 35.9–50)
GFR SERPL CREATININE-BSD FRML MDRD: 18 ML/MIN/1.73 M 2
GLOBULIN SER CALC-MCNC: 3.2 G/DL (ref 1.9–3.5)
GLUCOSE SERPL-MCNC: 251 MG/DL (ref 65–99)
HCT VFR BLD AUTO: 23.5 % (ref 37–47)
HGB BLD-MCNC: 8 G/DL (ref 12–16)
IMM GRANULOCYTES # BLD AUTO: 0.04 K/UL (ref 0–0.11)
IMM GRANULOCYTES NFR BLD AUTO: 0.7 % (ref 0–0.9)
LYMPHOCYTES # BLD AUTO: 1.16 K/UL (ref 1–4.8)
LYMPHOCYTES NFR BLD: 19.6 % (ref 22–41)
MCH RBC QN AUTO: 31.5 PG (ref 27–33)
MCHC RBC AUTO-ENTMCNC: 34 G/DL (ref 33.6–35)
MCV RBC AUTO: 92.5 FL (ref 81.4–97.8)
MONOCYTES # BLD AUTO: 0.48 K/UL (ref 0–0.85)
MONOCYTES NFR BLD AUTO: 8.1 % (ref 0–13.4)
NEUTROPHILS # BLD AUTO: 3.97 K/UL (ref 2–7.15)
NEUTROPHILS NFR BLD: 67.1 % (ref 44–72)
NRBC # BLD AUTO: 0 K/UL
NRBC BLD AUTO-RTO: 0 /100 WBC
PLATELET # BLD AUTO: 392 K/UL (ref 164–446)
PMV BLD AUTO: 12.5 FL (ref 9–12.9)
POTASSIUM SERPL-SCNC: 3.6 MMOL/L (ref 3.6–5.5)
PROT SERPL-MCNC: 6.8 G/DL (ref 6–8.2)
RBC # BLD AUTO: 2.54 M/UL (ref 4.2–5.4)
SODIUM SERPL-SCNC: 134 MMOL/L (ref 135–145)
WBC # BLD AUTO: 5.9 K/UL (ref 4.8–10.8)

## 2017-05-01 PROCEDURE — 96401 CHEMO ANTI-NEOPL SQ/IM: CPT

## 2017-05-01 PROCEDURE — 85025 COMPLETE CBC W/AUTO DIFF WBC: CPT

## 2017-05-01 PROCEDURE — 700111 HCHG RX REV CODE 636 W/ 250 OVERRIDE (IP): Mod: JW | Performed by: INTERNAL MEDICINE

## 2017-05-01 PROCEDURE — 80053 COMPREHEN METABOLIC PANEL: CPT

## 2017-05-01 PROCEDURE — 306780 HCHG STAT FOR TRANSFUSION PER CASE

## 2017-05-01 RX ORDER — ONDANSETRON 8 MG/1
8 TABLET, ORALLY DISINTEGRATING ORAL ONCE
Status: COMPLETED | OUTPATIENT
Start: 2017-05-01 | End: 2017-05-01

## 2017-05-01 RX ADMIN — AZACITIDINE 60 MG: 100 INJECTION, POWDER, LYOPHILIZED, FOR SOLUTION INTRAVENOUS; SUBCUTANEOUS at 16:41

## 2017-05-01 ASSESSMENT — PAIN SCALES - GENERAL: PAINLEVEL: NO PAIN

## 2017-05-01 NOTE — PROGRESS NOTES
"Pharmacy Chemotherapy Verification    Patient Name: Vielka Briscoe   Dx: MDS  Protocol: Azacitadine     *Dosing Reference*  Azacitadine 75 mg/m2 IV over 10 minutes or Subcutaneously daily on days 1-7 - MD dosing on days 1-5  28 day cycle until disease progression or unacceptable toxicity for a minimum of 4-6 cycles  NCCN Guidelines for Myelodysplastic Syndromes V.2.2017  Lacy P, et al. Lancet Oncol. 2009;10(3):223-32  Yokasta BOLTON, et al. J Clin Oncol. 2009;27(11)-3    Allergies:  Actos; Darvocet; Demerol; Glucophage; Morphine; Oxycodone; Pcn; Requip; Simvastatin; Sulfa drugs; Tramadol; Trazodone; Demerol; Dilaudid; Diphenhydramine; Iron; Lenalidomide; Morphine; Multivitamin; Naprosyn; Other drug; and Sulfa drugs     /68 mmHg  Pulse 76  Temp(Src) 36.4 °C (97.5 °F)  Resp 18  Ht 1.46 m (4' 9.48\")  Wt 62.8 kg (138 lb 7.2 oz)  BMI 29.46 kg/m2  SpO2 100%  LMP 01/01/1995 Body surface area is 1.60 meters squared.    Labs 5/1/17  ANC ~ 3970     Plt = 392 k     Hgb = 8  SCr = 2.62      CrCl ~21.8 ml/min    AST/ALT/AP/TBili = 15/12/86/0.4    Drug Order   (Drug name, dose, route, IV Fluid & volume, frequency, number of doses) Cycle: 1      Previous treatment: NA     Medication = Azacitadine  Base Dose= 75 mg/m2  Calc Dose: Base Dose x 1.6 m2 = 120 mg  Final Dose = 120 mg  Route = IV  Fluid & Volume = 25 mg/mL;  4.8 mL in 2 syringes, 2.4 mL (60 mg) each  Admin Duration = Subcutaneous injection           <5% difference, OK to treat with final dose         By my signature below, I confirm this process was performed independently with the BSA and all final chemotherapy dosing calculations congruent. I have reviewed the above chemotherapy order and that my calculation of the final dose and BSA (when applicable) corroborate those calculations of the  pharmacist. Discrepancies of 5% or greater in the written dose have been addressed and documented within the EPIC Progress notes.    Signature: " Minerva Arcos, PharmD

## 2017-05-01 NOTE — PROGRESS NOTES
"Pharmacy Chemotherapy Verification    Patient Name: Vielka Briscoe  Dx: MDS  Cycle: 1, Days 1 to 5 (Previous treatment = NA)  Regimen and Dosing Reference  Azacitadine 75 mg/m2 IV over 10 minutes or Subcutaneously daily on days 1-7 - MD dosing on days 1 to 5  28 day cycle until disease progression or unacceptable toxicity for a minimum of 4-6 cycles  NCCN Guidelines for Myelodysplastic Syndromes V.2.2017  Fennick P, et al. Lancet Oncol. 2009;10(3):223-32  Yokasta BOLTON, et al. J Clin Oncol. 2009;27(00)1567-6    Allergies:Actos; Darvocet; Demerol; Glucophage; Morphine; Oxycodone; Pcn; Requip; Simvastatin; Sulfa drugs;  Tramadol; Trazodone; Demerol; Dilaudid; Diphenhydramine; Iron; Lenalidomide; Morphine; Multivitamin; Naprosyn; Other drug; and Sulfa drugs  /68 mmHg  Pulse 76  Temp(Src) 36.4 °C (97.5 °F)  Resp 18  Ht 1.46 m (4' 9.48\")  Wt 62.8 kg (138 lb 7.2 oz)  BMI 29.46 kg/m2  SpO2 100%  LMP 01/01/1995  Body surface area is 1.60 meters squared.    Labs 5/1/17  ANC ~ 3970     Plt = 392 k     Hgb = 8.0  SCr = 2.62      CrCl = HD       AST/ALT/AP/TBili = WNL    Azacitadine 75 mg/m2 x 1.60 m2 = 120 mg   <5% difference, OK to treat with final dose = 120 mg SC    split between 2 syringes of 60 mg each     Michael GlynnD    "

## 2017-05-01 NOTE — PROGRESS NOTES
Chemotherapy Verification - PRIMARY RN      Height = 146 cm  Weight = 62.8 kg  BSA = 1.6 m2       Medication: Vidaza  Dose: 37.5 mg/m2  Calculated Dose: 60 mg                              (In mg/m2, AUC, mg/kg)     Medication: Vidaza  Dose: 37.5 mg/m2  Calculated Dose: 60 mg                             (In mg/m2, AUC, mg/kg)      I confirm this process was performed independently with the BSA and all final chemotherapy dosing calculations congruent.  Any discrepancies of 5% or greater have been addressed with the chemotherapy pharmacist. The resolution of the discrepancy has been documented in the EPIC progress notes.

## 2017-05-01 NOTE — PROGRESS NOTES
Pt here for first dose Vidaza.  Lab drawn. Confirmed with MD office regarding pt creatinine levels.  Pt is getting dialysis M,W,F.  Handout on Vidaza given, medication reviewed.  Shots given to R arm, more than 2 inches apart (cannot use L arm d/t dialysis site).  bandaids applied.  Pt left IC, no s/s of distress.  Tomorrow's apt confirmed.

## 2017-05-01 NOTE — PROGRESS NOTES
Chemotherapy Verification - SECONDARY RN       Height = 146 cm  Weight = 62.8 kg  BSA = 1.6 m2       Medication: Azacitadine (Vidaza)  Dose: 37.5 mg/m2  Calculated Dose: 60 mg                                 Medication: Azacitidine (Vidaza)  Dose: 37.5 mg/m2  Calculated Dose: 60 mg      I confirm that this process was performed independently.

## 2017-05-02 ENCOUNTER — OUTPATIENT INFUSION SERVICES (OUTPATIENT)
Dept: ONCOLOGY | Facility: MEDICAL CENTER | Age: 62
End: 2017-05-02
Attending: INTERNAL MEDICINE
Payer: MEDICARE

## 2017-05-02 VITALS
SYSTOLIC BLOOD PRESSURE: 134 MMHG | BODY MASS INDEX: 29.2 KG/M2 | TEMPERATURE: 98.7 F | HEART RATE: 84 BPM | RESPIRATION RATE: 18 BRPM | HEIGHT: 57 IN | DIASTOLIC BLOOD PRESSURE: 64 MMHG | OXYGEN SATURATION: 96 % | WEIGHT: 135.36 LBS

## 2017-05-02 DIAGNOSIS — D46.9 MDS (MYELODYSPLASTIC SYNDROME) (HCC): ICD-10-CM

## 2017-05-02 PROCEDURE — A9270 NON-COVERED ITEM OR SERVICE: HCPCS | Performed by: INTERNAL MEDICINE

## 2017-05-02 PROCEDURE — 700111 HCHG RX REV CODE 636 W/ 250 OVERRIDE (IP): Performed by: INTERNAL MEDICINE

## 2017-05-02 PROCEDURE — 700102 HCHG RX REV CODE 250 W/ 637 OVERRIDE(OP): Performed by: INTERNAL MEDICINE

## 2017-05-02 PROCEDURE — 96401 CHEMO ANTI-NEOPL SQ/IM: CPT

## 2017-05-02 PROCEDURE — 306780 HCHG STAT FOR TRANSFUSION PER CASE

## 2017-05-02 RX ORDER — LORAZEPAM 1 MG/1
0.5 TABLET ORAL ONCE
Status: COMPLETED | OUTPATIENT
Start: 2017-05-02 | End: 2017-05-02

## 2017-05-02 RX ADMIN — LORAZEPAM 0.5 MG: 1 TABLET ORAL at 15:35

## 2017-05-02 RX ADMIN — AZACITIDINE 59.3 MG: 100 INJECTION, POWDER, LYOPHILIZED, FOR SOLUTION INTRAVENOUS; SUBCUTANEOUS at 15:53

## 2017-05-02 ASSESSMENT — PAIN SCALES - GENERAL: PAINLEVEL: NO PAIN

## 2017-05-02 NOTE — PROGRESS NOTES
Pt here for C1 D2 Vidaza.  Pt reports vomiting and diarrhea throughout the night last night.  Notified Dr Avila of update.  Ok to treat but to try Lorazepam as a premed (pt does not tolerate PO zofran).  Shots given to abdomen.  Pt left IC, no s/s of distress.  Next apt confirmed.

## 2017-05-02 NOTE — PROGRESS NOTES
Chemotherapy Verification - SECONDARY RN       Height = 146 cm  Weight = 61.4 kg  BSA = 1.57 m2       Medication: Vidaza   Dose: 75 mg/m2  Calculated Dose: 117.75 mg (in 2 injections)                            (In mg/m2, AUC, mg/kg)     I confirm that this process was performed independently.

## 2017-05-02 NOTE — PROGRESS NOTES
Chemotherapy Verification - PRIMARY RN      Height = 146 cm  Weight = 61.4 kg  BSA = 1.58 m2       Medication: Vidaza  Dose: 75 mg/m2  Calculated Dose: 118.5 mg in 2 doses 59.25 mg each                             (In mg/m2, AUC, mg/kg)         I confirm this process was performed independently with the BSA and all final chemotherapy dosing calculations congruent.  Any discrepancies of 5% or greater have been addressed with the chemotherapy pharmacist. The resolution of the discrepancy has been documented in the EPIC progress notes.

## 2017-05-02 NOTE — PROGRESS NOTES
"Pharmacy Chemotherapy Verification    Patient Name: Vielka Briscoe   Dx: MDS  Protocol: Azacitadine     *Dosing Reference*  Azacitadine 75 mg/m2 IV over 10 minutes or Subcutaneously daily on days 1-7 - MD dosing on days 1-5  28 day cycle until disease progression or unacceptable toxicity for a minimum of 4-6 cycles  NCCN Guidelines for Myelodysplastic Syndromes V.2.2017  Lacy P, et al. Lancet Oncol. 2009;10(3):223-32  Yokasta BOLTON, et al. J Clin Oncol. 2009;27(11)-9    Allergies:  Actos; Darvocet; Demerol; Glucophage; Morphine; Oxycodone; Pcn; Requip; Simvastatin; Sulfa drugs; Tramadol; Trazodone; Demerol; Dilaudid; Diphenhydramine; Iron; Lenalidomide; Morphine; Multivitamin; Naprosyn; Other drug; and Sulfa drugs     /64 mmHg  Pulse 84  Temp(Src) 37.1 °C (98.7 °F)  Resp 18  Ht 1.46 m (4' 9.48\")  Wt 61.4 kg (135 lb 5.8 oz)  BMI 28.80 kg/m2  SpO2 96%  LMP 01/01/1995 Body surface area is 1.58 meters squared.    Labs 5/1/17  ANC ~ 3970     Plt = 392 k     Hgb = 8  SCr = 2.62      CrCl ~21.8 ml/min    AST/ALT/AP/TBili = 15/12/86/0.4  **Ok to treat today with reported emesis and diarrhea last night.  MD is aware. Lorazepam 0.5mg po x1 ordered for today.     Drug Order   (Drug name, dose, route, IV Fluid & volume, frequency, number of doses) Cycle: 1 Day 2 of 5  Previous treatment: NA     Medication = Azacitadine  Base Dose= 75 mg/m2  Calc Dose: Base Dose x 1.58 m2 = 118.5 mg  Final Dose = 118.6 mg  Route = IV  Fluid & Volume = 25 mg/mL;  4.74 mL in 2 syringes, 2.37 mL (59.3 mg) each  Admin Duration = Subcutaneous injection           <5% difference, OK to treat with final dose         By my signature below, I confirm this process was performed independently with the BSA and all final chemotherapy dosing calculations congruent. I have reviewed the above chemotherapy order and that my calculation of the final dose and BSA (when applicable) corroborate those calculations of the  " pharmacist. Discrepancies of 5% or greater in the written dose have been addressed and documented within the EPIC Progress notes.    Signature: Lili Roe, MichaelD

## 2017-05-03 ENCOUNTER — OUTPATIENT INFUSION SERVICES (OUTPATIENT)
Dept: ONCOLOGY | Facility: MEDICAL CENTER | Age: 62
End: 2017-05-03
Attending: INTERNAL MEDICINE
Payer: MEDICARE

## 2017-05-03 VITALS
RESPIRATION RATE: 18 BRPM | DIASTOLIC BLOOD PRESSURE: 55 MMHG | TEMPERATURE: 98.2 F | OXYGEN SATURATION: 95 % | SYSTOLIC BLOOD PRESSURE: 154 MMHG | WEIGHT: 138.67 LBS | BODY MASS INDEX: 29.92 KG/M2 | HEART RATE: 91 BPM | HEIGHT: 57 IN

## 2017-05-03 DIAGNOSIS — D46.9 MDS (MYELODYSPLASTIC SYNDROME) (HCC): ICD-10-CM

## 2017-05-03 PROCEDURE — 96401 CHEMO ANTI-NEOPL SQ/IM: CPT

## 2017-05-03 PROCEDURE — 700111 HCHG RX REV CODE 636 W/ 250 OVERRIDE (IP): Performed by: INTERNAL MEDICINE

## 2017-05-03 RX ORDER — ONDANSETRON 8 MG/1
8 TABLET, ORALLY DISINTEGRATING ORAL ONCE
Status: DISCONTINUED | OUTPATIENT
Start: 2017-05-03 | End: 2017-05-03 | Stop reason: HOSPADM

## 2017-05-03 RX ORDER — PROCHLORPERAZINE MALEATE 10 MG
10 TABLET ORAL EVERY 6 HOURS PRN
Status: ON HOLD | COMMUNITY
End: 2017-09-20

## 2017-05-03 RX ADMIN — AZACITIDINE 60 MG: 100 INJECTION, POWDER, LYOPHILIZED, FOR SOLUTION INTRAVENOUS; SUBCUTANEOUS at 15:29

## 2017-05-03 RX ADMIN — AZACITIDINE 60 MG: 100 INJECTION, POWDER, LYOPHILIZED, FOR SOLUTION INTRAVENOUS; SUBCUTANEOUS at 15:28

## 2017-05-03 ASSESSMENT — PAIN SCALES - GENERAL: PAINLEVEL: NO PAIN

## 2017-05-03 NOTE — PROGRESS NOTES
Chemotherapy Verification - SECONDARY RN       Height = 146 cm  Weight = 62.9 kg  BSA = 1.597 m2       Medication: Vidaza  Dose: 75 mg/m2  Calculated Dose: 119.7 mg                             (In mg/m2, AUC, mg/kg)     I confirm that this process was performed independently.

## 2017-05-03 NOTE — PROGRESS NOTES
Chemotherapy Verification - PRIMARY RN      Height = 146cm  Weight = 62.9kg  BSA = 1.6m2       Medication: Vidaza  Dose: 75mg/m2  Calculated Dose: 120mg  (divided into two doses of 60mg injections)                            (In mg/m2, AUC, mg/kg)       I confirm this process was performed independently with the BSA and all final chemotherapy dosing calculations congruent.  Any discrepancies of 5% or greater have been addressed with the chemotherapy pharmacist. The resolution of the discrepancy has been documented in the EPIC progress notes.

## 2017-05-03 NOTE — PROGRESS NOTES
Patient arrived to Infusion for Day 3 of 5 Vidaza injection; pt reported feeling much better after treatment, still had an episode of vomiting last night around 9pm, and reported 3 times she had diarrhea, but has since resolved.  Pt reported getting little sleep last night and was feeling tired this morning so cancelled dialysis for today.  Pt took 10mg compazine as ordered by Dr. Avila to take as a pre-medication prior to chemo.  Pt refused other pre-meds at this time. Vidaza injected into patients abdomen per pt request.  Confirmed tomorrows appt; pt left in good spirits under no apparent distress.

## 2017-05-03 NOTE — PROGRESS NOTES
"Pharmacy Chemotherapy Verification    Patient Name: Vielka Briscoe   Dx: MDS  Protocol: Azacitadine     *Dosing Reference*  Azacitadine 75 mg/m2 IV over 10 minutes or Subcutaneously daily on days 1-7 - MD dosing on days 1-5  28 day cycle until disease progression or unacceptable toxicity for a minimum of 4-6 cycles  NCCN Guidelines for Myelodysplastic Syndromes V.2.2017  Lacy P, et al. Lancet Oncol. 2009;10(3):223-32  Yokasta BOLTON, et al. J Clin Oncol. 2009;27(11)-8    Allergies:  Actos; Darvocet; Demerol; Glucophage; Morphine; Oxycodone; Pcn; Requip; Simvastatin; Sulfa drugs; Tramadol; Trazodone; Demerol; Dilaudid; Diphenhydramine; Iron; Lenalidomide; Morphine; Multivitamin; Naprosyn; Other drug; and Sulfa drugs     /55 mmHg  Pulse 91  Temp(Src) 36.8 °C (98.2 °F)  Resp 18  Ht 1.46 m (4' 9.48\")  Wt 62.9 kg (138 lb 10.7 oz)  BMI 29.51 kg/m2  SpO2 95%  LMP 01/01/1995 Body surface area is 1.60 meters squared.    Labs 5/1/17  ANC ~ 3970     Plt = 392 k     Hgb = 8  SCr = 2.62      CrCl ~21.8 ml/min    AST/ALT/AP/TBili = 15/12/86/0.4  **Pt took Compazine 10 mg po prior to arrival as premedication today as Zofran makes patient vomit. Pt refused ondansetron.     Drug Order   (Drug name, dose, route, IV Fluid & volume, frequency, number of doses) Cycle: 1 Day 3 of 5  Previous treatment: NA     Medication = Azacitadine  Base Dose= 75 mg/m2  Calc Dose: Base Dose x 1.6 m2 = 120 mg  Final Dose = 120 mg  Route = IV  Fluid & Volume = 25 mg/mL;  4.8 mL in 2 syringes, 2.4 mL (60 mg) each  Admin Duration = Subcutaneous injection           <5% difference, OK to treat with final dose         By my signature below, I confirm this process was performed independently with the BSA and all final chemotherapy dosing calculations congruent. I have reviewed the above chemotherapy order and that my calculation of the final dose and BSA (when applicable) corroborate those calculations of the  pharmacist. " Discrepancies of 5% or greater in the written dose have been addressed and documented within the EPIC Progress notes.    Signature: Lili Roe, MichaelD

## 2017-05-04 ENCOUNTER — TELEPHONE (OUTPATIENT)
Dept: HEMATOLOGY ONCOLOGY | Facility: MEDICAL CENTER | Age: 62
End: 2017-05-04

## 2017-05-04 ENCOUNTER — OUTPATIENT INFUSION SERVICES (OUTPATIENT)
Dept: ONCOLOGY | Facility: MEDICAL CENTER | Age: 62
End: 2017-05-04
Attending: INTERNAL MEDICINE
Payer: MEDICARE

## 2017-05-04 VITALS
WEIGHT: 139.11 LBS | RESPIRATION RATE: 18 BRPM | BODY MASS INDEX: 30.01 KG/M2 | OXYGEN SATURATION: 92 % | HEART RATE: 91 BPM | TEMPERATURE: 97.6 F | HEIGHT: 57 IN | DIASTOLIC BLOOD PRESSURE: 70 MMHG | SYSTOLIC BLOOD PRESSURE: 143 MMHG

## 2017-05-04 DIAGNOSIS — D46.9 MDS (MYELODYSPLASTIC SYNDROME) (HCC): ICD-10-CM

## 2017-05-04 PROCEDURE — 306780 HCHG STAT FOR TRANSFUSION PER CASE

## 2017-05-04 RX ORDER — ONDANSETRON 8 MG/1
8 TABLET, ORALLY DISINTEGRATING ORAL ONCE
Status: DISCONTINUED | OUTPATIENT
Start: 2017-05-04 | End: 2017-05-04 | Stop reason: HOSPADM

## 2017-05-04 ASSESSMENT — PAIN SCALES - GENERAL: PAINLEVEL: 2=MINIMAL-SLIGHT

## 2017-05-04 NOTE — PROGRESS NOTES
"Pharmacy Chemotherapy Verification    Patient Name: Vielka Briscoe   Dx: MDS    HOLD Vidaza today.  Patient missed her dialysis session yesterday, presented with more fatigue and nausea.  Dr. Avila notified, hold Vidaza today as drug active metabolite is excreted renally.  Patient will have dialysis tomorrow, repeat CBC and may proceed with dose #4 (day 5/5), cancel dose #5.       Azacitadine 75 mg/m2 IV over 10 minutes or Subcutaneously daily on days 1-7 - MD dosing on days 1-5  Q28 day cycle until disease progression or unacceptable toxicity for a minimum of 4-6 cycles  NCCN Guidelines for Myelodysplastic Syndromes V.2.2017  Lacy P, et al. Lancet Oncol. 2009;10(3):223-32  Yokasta BOLTON, et al. J Clin Oncol. 2009;27(11)-4    Allergies:  Actos; Darvocet; Demerol; Glucophage; Morphine; Oxycodone; Pcn; Requip; Simvastatin; Sulfa drugs; Tramadol; Trazodone; Demerol; Dilaudid; Diphenhydramine; Iron; Lenalidomide; Morphine; Multivitamin; Naprosyn; Other drug; and Sulfa drugs       /70 mmHg  Pulse 91  Temp(Src) 36.4 °C (97.6 °F)  Resp 18  Ht 1.44 m (4' 8.69\")  Wt 63.1 kg (139 lb 1.8 oz)  BMI 30.43 kg/m2  SpO2 92%  LMP 01/01/1995 Body surface area is 1.59 meters squared.    Labs 5/1/17  ANC ~ 4000          Plt = 392 k     Hgb = 8 SCr = 2.62 mg/dL      CrCl ~22 ml/min    AST/ALT/AP/TBili = 15/12/86/0.4    **Pt took Compazine 10 mg po prior to arrival as premedication today as Zofran makes patient vomit. Pt refused ondansetron **    Drug Order   (Drug name, dose, route, IV Fluid & volume, frequency, number of doses)   Previous treatment: NA     Medication =   Base Dose= 75 mg/m2  Calc Dose: Base Dose x 1.6 m2 = 120 mg  Final Dose = 120 mg  Route = IV  Fluid & Volume = 25 mg/mL;  4.8 mL in 2 syringes, 2.4 mL (60 mg) each  Admin Duration = Subcutaneous injection           <5% difference, OK to treat with final dose         By my signature below, I confirm this process was performed independently " with the BSA and all final chemotherapy dosing calculations congruent. I have reviewed the above chemotherapy order and that my calculation of the final dose and BSA (when applicable) corroborate those calculations of the  pharmacist. Discrepancies of 5% or greater in the written dose have been addressed and documented within the EPIC Progress notes.    Mackenzie Apodaca, PharmD

## 2017-05-04 NOTE — TELEPHONE ENCOUNTER
RD attempted to call patient via telephone to set up nutrition consult.  Patient is currently unavailable - voice mailbox is currently full, unable to leave message.  RD will re-attempt to call patient again on Monday 5/8.  Please have patient call dietitian at [phone].

## 2017-05-04 NOTE — PROGRESS NOTES
Chemotherapy Verification - SECONDARY RN   NOT TREATED TODAY     Height = 56.69in  Weight = 63.1kg  BSA = 1.59m2       Medication: Vidaza  Dose: 37.5mg/m2 x2   Calculated Dose: 119.25mg                             (In mg/m2, AUC, mg/kg)                 I confirm that this process was performed independently.

## 2017-05-05 ENCOUNTER — OUTPATIENT INFUSION SERVICES (OUTPATIENT)
Dept: ONCOLOGY | Facility: MEDICAL CENTER | Age: 62
End: 2017-05-05
Attending: INTERNAL MEDICINE
Payer: MEDICARE

## 2017-05-05 VITALS
WEIGHT: 134.04 LBS | DIASTOLIC BLOOD PRESSURE: 77 MMHG | SYSTOLIC BLOOD PRESSURE: 150 MMHG | HEART RATE: 90 BPM | HEIGHT: 57 IN | RESPIRATION RATE: 18 BRPM | OXYGEN SATURATION: 90 % | TEMPERATURE: 97.5 F | BODY MASS INDEX: 28.92 KG/M2

## 2017-05-05 DIAGNOSIS — D46.9 MDS (MYELODYSPLASTIC SYNDROME) (HCC): ICD-10-CM

## 2017-05-05 LAB
ANISOCYTOSIS BLD QL SMEAR: ABNORMAL
BASOPHILS # BLD AUTO: 0.5 % (ref 0–1.8)
BASOPHILS # BLD: 0.03 K/UL (ref 0–0.12)
COMMENT 1642: NORMAL
EOSINOPHIL # BLD AUTO: 0.31 K/UL (ref 0–0.51)
EOSINOPHIL NFR BLD: 5.3 % (ref 0–6.9)
ERYTHROCYTE [DISTWIDTH] IN BLOOD BY AUTOMATED COUNT: 65.4 FL (ref 35.9–50)
HCT VFR BLD AUTO: 24.1 % (ref 37–47)
HGB BLD-MCNC: 8 G/DL (ref 12–16)
IMM GRANULOCYTES # BLD AUTO: 0.04 K/UL (ref 0–0.11)
IMM GRANULOCYTES NFR BLD AUTO: 0.7 % (ref 0–0.9)
LG PLATELETS BLD QL SMEAR: NORMAL
LYMPHOCYTES # BLD AUTO: 0.97 K/UL (ref 1–4.8)
LYMPHOCYTES NFR BLD: 16.5 % (ref 22–41)
MACROCYTES BLD QL SMEAR: ABNORMAL
MCH RBC QN AUTO: 30.9 PG (ref 27–33)
MCHC RBC AUTO-ENTMCNC: 33.2 G/DL (ref 33.6–35)
MCV RBC AUTO: 93.1 FL (ref 81.4–97.8)
MICROCYTES BLD QL SMEAR: ABNORMAL
MONOCYTES # BLD AUTO: 0.35 K/UL (ref 0–0.85)
MONOCYTES NFR BLD AUTO: 6 % (ref 0–13.4)
MORPHOLOGY BLD-IMP: NORMAL
NEUTROPHILS # BLD AUTO: 4.17 K/UL (ref 2–7.15)
NEUTROPHILS NFR BLD: 71 % (ref 44–72)
NRBC # BLD AUTO: 0 K/UL
NRBC BLD AUTO-RTO: 0 /100 WBC
PLATELET # BLD AUTO: 417 K/UL (ref 164–446)
PLATELET BLD QL SMEAR: NORMAL
PMV BLD AUTO: 12.2 FL (ref 9–12.9)
RBC # BLD AUTO: 2.59 M/UL (ref 4.2–5.4)
RBC BLD AUTO: PRESENT
WBC # BLD AUTO: 5.9 K/UL (ref 4.8–10.8)

## 2017-05-05 PROCEDURE — 96401 CHEMO ANTI-NEOPL SQ/IM: CPT

## 2017-05-05 PROCEDURE — 85025 COMPLETE CBC W/AUTO DIFF WBC: CPT

## 2017-05-05 PROCEDURE — 700111 HCHG RX REV CODE 636 W/ 250 OVERRIDE (IP): Performed by: INTERNAL MEDICINE

## 2017-05-05 RX ORDER — ONDANSETRON 8 MG/1
8 TABLET, ORALLY DISINTEGRATING ORAL ONCE
Status: DISCONTINUED | OUTPATIENT
Start: 2017-05-05 | End: 2017-05-05 | Stop reason: HOSPADM

## 2017-05-05 RX ORDER — ONDANSETRON 2 MG/ML
4 INJECTION INTRAMUSCULAR; INTRAVENOUS
Status: DISCONTINUED | OUTPATIENT
Start: 2017-05-05 | End: 2017-05-05 | Stop reason: HOSPADM

## 2017-05-05 RX ADMIN — AZACITIDINE 58.5 MG: 100 INJECTION, POWDER, LYOPHILIZED, FOR SOLUTION INTRAVENOUS; SUBCUTANEOUS at 16:58

## 2017-05-05 ASSESSMENT — PAIN SCALES - GENERAL: PAINLEVEL: 5=MODERATE PAIN

## 2017-05-05 NOTE — PROGRESS NOTES
Chemotherapy Verification - SECONDARY RN       Height = 144 cm  Weight = 60.8 kg  BSA = 1.559 m2       Medication: Vidaza  Dose: 75 mg/m2  Calculated Dose: 116.96 mg                             (In mg/m2, AUC, mg/kg)     I confirm that this process was performed independently.

## 2017-05-05 NOTE — PROGRESS NOTES
"Pharmacy Chemotherapy Verification    Patient Name: Vielka Briscoe   Dx: MDS    Protocol: Azacitadine     Azacitadine 75 mg/m2 IV over 10 minutes or Subcutaneously daily on days 1-7 - MD dosing on days 1-5  Q28 day cycle until disease progression or unacceptable toxicity for a minimum of 4-6 cycles  NCCN Guidelines for Myelodysplastic Syndromes V.2.2017  Lacy P, et al. Lancet Oncol. 2009;10(3):223-32  Yokasta BOLTON, et al. J Clin Oncol. 2009;27(11)-9    Allergies:  Actos; Darvocet; Demerol; Glucophage; Morphine; Oxycodone; Pcn; Requip; Simvastatin; Sulfa drugs; Tramadol; Trazodone; Demerol; Dilaudid; Diphenhydramine; Iron; Lenalidomide; Morphine; Multivitamin; Naprosyn; Other drug; and Sulfa drugs       /77 mmHg  Pulse 90  Temp(Src) 36.4 °C (97.5 °F)  Resp 18  Ht 1.44 m (4' 8.69\")  Wt 60.8 kg (134 lb 0.6 oz)  BMI 29.32 kg/m2  SpO2 90%  LMP 01/01/1995 Body surface area is 1.56 meters squared.    05/05/17 ANC ~4100           Plt = 417k         Hgb = 8   05/01/17 SCr = 2.62 mg/dL    CrCl ~22 ml/min (renal impairment at baseline: no dosage adjustment necessary for cycle 1; azacitidine and its metabolites are excreted renally, monitor closely for toxicity) AST/ALT/AP/TBili = 15/12/86/0.4      Drug Order   (Drug name, dose, route, IV Fluid & volume, frequency, number of doses) Cycle 1, Day 5 of 5 (day 4 held d/t severe fatigue and nausea 2/2 missed dialysis)  Previous treatment: N/A     Medication = Azacitadine (Vidaza)  Base Dose= 75 mg/m2  Calc Dose: Base Dose x 1.56 m2 = 117 mg  Final Dose = 117 mg  Route = IV  Fluid & Volume = 25 mg/mL;  4.68 mL in 2 syringes, 2.34 mL (58.5mg) each  Admin Duration = Subcutaneous injection           <5% difference, OK to treat with final dose         By my signature below, I confirm this process was performed independently with the BSA and all final chemotherapy dosing calculations congruent. I have reviewed the above chemotherapy order and that my calculation " of the final dose and BSA (when applicable) corroborate those calculations of the  pharmacist. Discrepancies of 5% or greater in the written dose have been addressed and documented within the EPIC Progress notes.    Mackenzie Apodaca, PharmD

## 2017-05-05 NOTE — PROGRESS NOTES
Chemotherapy Verification - PRIMARY RN      Height = 144 cm  Weight = 60.8 kg  BSA = 1.56 m2      Medication: Vidaza Dose: 37.5 mg/m2 Calculated Dose: 58.5 mg    Medication: Vidaza Dose: 37.5 mg/m2  Calculated Dose: 58.5 mg                             Hb.0; Plt: 417; ANC: 4,187    I confirm this process was performed independently with the BSA and all final chemotherapy dosing calculations congruent.  Any discrepancies of 5% or greater have been addressed with the chemotherapy pharmacist. The resolution of the discrepancy has been documented in the EPIC progress notes.

## 2017-05-05 NOTE — PROGRESS NOTES
"LATE ENTRY:    Pt ambulated into department by self using cane for Vidaza Day 4/Cycle 1 for MDS. Pt told RN that she was fatigued, continued to have a lack of appetite and nausea, and had 4 bouts of diarrhea today. Pt attributed diarrhea to her dinner last night, but then told RN she had been having intermittent diarrhea all week. RN asked Pt if she had actually missed dialysis on Wednesday, and Pt confirmed that she had because she was \"too tired.\" RN called Dr. Avila and discussed situation with MD. Dr. Avila ordered for Pt's Vidaza to be held today, and that Pt needs to have dialysis before she gets her chemo tomorrow. MD also wanted Pt to have a repeat CBC before treatment, and wanted Day 5 to be cancelled. POC explained to Pt, who acknowledged understanding. RN discussed anti-emetic and anti-diarrheal management, and when to call MD's office. RN called dietician, and asked her to come and speak Pt. Dietician Maricarmen was unable to come to Pt's appointment today, and said she will call Pt tomorrow to discuss dietary recommendations. Future appointments confirmed with Pt prior to leaving, Pt left department by self appearing in NAD.  "

## 2017-05-06 NOTE — PROGRESS NOTES
Pt ambulated into department using cane for Cycle 1/ Day 4 of Vidaza for MDS. Pt appeared very fatigued, reported that she had just come from dialysis. Stated that her diarrhea self resolved, but she continues to feel nauseous. RN asked Pt if she took her PO Compazine today, Pt declined and took home dose as prescribed. 25 G butterfly needle used to draw CBC, specimen sent to lab for analysis. Pt's labs w/n parameters to treat, pharmacy made aware. Pt given Vidaza as prescribed in his left and right lower quadrant as prescribed, tolerated well. Future appointments confirmed with Pt prior to leaving, left department with daughter appearing in NAD.

## 2017-05-08 ENCOUNTER — TELEPHONE (OUTPATIENT)
Dept: HEMATOLOGY ONCOLOGY | Facility: MEDICAL CENTER | Age: 62
End: 2017-05-08

## 2017-05-08 NOTE — TELEPHONE ENCOUNTER
Nutrition Services:     Followed-up with pt to set up nutrition consultation; pt would like to meet on the 29th of May when she has her infusion appointment. RD to meet with pt then.

## 2017-05-09 ENCOUNTER — TELEPHONE (OUTPATIENT)
Dept: OTHER | Facility: MEDICAL CENTER | Age: 62
End: 2017-05-09

## 2017-05-09 NOTE — PROGRESS NOTES
Called patient to check on how she is doing after her chemotherapy last week.  She reported  nausea, vomiting and diarrhea and her MD was called.  She took Compazine with good results.  She was told to start OTC Imodium, but she states she cannot take this as it makes her vomit.  She has a very sensitive stomach and cannot tolerate many foods and is also restricted by her renal diet.  She tried to keep rice down, but reports best results were crackers.  She states she cannot tolerate boost or any type similar drink due to renal diet. She states symptoms have resolved now, but no appetite. She has had her next cycle of chemotherapy moved to week of June 5 as she did not have transportation thru [facility] on 5/29.I will fax her appointment calendar to MARYELLEN Logan with the [facility]  ([phone], ext. [phone]) who is now following Vielka and helping to arrange transportation.  I will call her oncology office and make sure there is an appointment for her before her next cycle, and perhaps get an Rx for lomotil.  She reports today that she feels she will need a blood transfusion as she is tired and weak.  I will make a referral to the dietician for help with recommended foods now and during her next treatment.  Continue to follow as needed.

## 2017-05-10 ENCOUNTER — HOSPITAL ENCOUNTER (INPATIENT)
Facility: MEDICAL CENTER | Age: 62
LOS: 1 days | DRG: 811 | End: 2017-05-11
Attending: EMERGENCY MEDICINE | Admitting: INTERNAL MEDICINE
Payer: MEDICARE

## 2017-05-10 ENCOUNTER — RESOLUTE PROFESSIONAL BILLING HOSPITAL PROF FEE (OUTPATIENT)
Dept: HOSPITALIST | Facility: MEDICAL CENTER | Age: 62
End: 2017-05-10
Payer: MEDICARE

## 2017-05-10 ENCOUNTER — APPOINTMENT (OUTPATIENT)
Dept: RADIOLOGY | Facility: MEDICAL CENTER | Age: 62
DRG: 811 | End: 2017-05-10
Attending: EMERGENCY MEDICINE
Payer: MEDICARE

## 2017-05-10 DIAGNOSIS — N18.9 CHRONIC KIDNEY DISEASE, UNSPECIFIED STAGE: ICD-10-CM

## 2017-05-10 DIAGNOSIS — D64.9 SYMPTOMATIC ANEMIA: ICD-10-CM

## 2017-05-10 DIAGNOSIS — D46.9 MYELODYSPLASTIC SYNDROME (HCC): ICD-10-CM

## 2017-05-10 DIAGNOSIS — R79.89 ELEVATED TROPONIN: ICD-10-CM

## 2017-05-10 LAB
ABO GROUP BLD: NORMAL
ALBUMIN SERPL BCP-MCNC: 3.4 G/DL (ref 3.2–4.9)
ALBUMIN/GLOB SERPL: 1.2 G/DL
ALP SERPL-CCNC: 79 U/L (ref 30–99)
ALT SERPL-CCNC: 25 U/L (ref 2–50)
ANION GAP SERPL CALC-SCNC: 11 MMOL/L (ref 0–11.9)
AST SERPL-CCNC: 21 U/L (ref 12–45)
BARCODED ABORH UBTYP: 7300
BARCODED ABORH UBTYP: 8400
BARCODED ABORH UBTYP: 8400
BARCODED PRD CODE UBPRD: NORMAL
BARCODED UNIT NUM UBUNT: NORMAL
BASOPHILS # BLD AUTO: 0.5 % (ref 0–1.8)
BASOPHILS # BLD: 0.03 K/UL (ref 0–0.12)
BILIRUB SERPL-MCNC: 0.3 MG/DL (ref 0.1–1.5)
BLD GP AB SCN SERPL QL: NORMAL
BUN SERPL-MCNC: 49 MG/DL (ref 8–22)
CALCIUM SERPL-MCNC: 7.1 MG/DL (ref 8.5–10.5)
CHLORIDE SERPL-SCNC: 96 MMOL/L (ref 96–112)
CO2 SERPL-SCNC: 26 MMOL/L (ref 20–33)
COMPONENT R 8504R: NORMAL
CREAT SERPL-MCNC: 5.94 MG/DL (ref 0.5–1.4)
EKG IMPRESSION: NORMAL
EOSINOPHIL # BLD AUTO: 0.28 K/UL (ref 0–0.51)
EOSINOPHIL NFR BLD: 4.9 % (ref 0–6.9)
ERYTHROCYTE [DISTWIDTH] IN BLOOD BY AUTOMATED COUNT: 48.3 FL (ref 35.9–50)
ERYTHROCYTE [DISTWIDTH] IN BLOOD BY AUTOMATED COUNT: 58.6 FL (ref 35.9–50)
ERYTHROCYTE [DISTWIDTH] IN BLOOD BY AUTOMATED COUNT: 61.2 FL (ref 35.9–50)
EST. AVERAGE GLUCOSE BLD GHB EST-MCNC: 235 MG/DL
GFR SERPL CREATININE-BSD FRML MDRD: 7 ML/MIN/1.73 M 2
GLOBULIN SER CALC-MCNC: 2.8 G/DL (ref 1.9–3.5)
GLUCOSE BLD-MCNC: 196 MG/DL (ref 65–99)
GLUCOSE BLD-MCNC: 215 MG/DL (ref 65–99)
GLUCOSE SERPL-MCNC: 370 MG/DL (ref 65–99)
HBA1C MFR BLD: 9.8 % (ref 0–5.6)
HCT VFR BLD AUTO: 18.3 % (ref 37–47)
HCT VFR BLD AUTO: 19.5 % (ref 37–47)
HCT VFR BLD AUTO: 30.9 % (ref 37–47)
HGB BLD-MCNC: 10.6 G/DL (ref 12–16)
HGB BLD-MCNC: 6.1 G/DL (ref 12–16)
HGB BLD-MCNC: 6.4 G/DL (ref 12–16)
IMM GRANULOCYTES # BLD AUTO: 0.05 K/UL (ref 0–0.11)
IMM GRANULOCYTES NFR BLD AUTO: 0.9 % (ref 0–0.9)
LIPASE SERPL-CCNC: 44 U/L (ref 11–82)
LYMPHOCYTES # BLD AUTO: 1.99 K/UL (ref 1–4.8)
LYMPHOCYTES NFR BLD: 34.5 % (ref 22–41)
MAGNESIUM SERPL-MCNC: 1.9 MG/DL (ref 1.5–2.5)
MCH RBC QN AUTO: 30 PG (ref 27–33)
MCH RBC QN AUTO: 30.6 PG (ref 27–33)
MCH RBC QN AUTO: 31 PG (ref 27–33)
MCHC RBC AUTO-ENTMCNC: 32.4 G/DL (ref 33.6–35)
MCHC RBC AUTO-ENTMCNC: 32.8 G/DL (ref 33.6–35)
MCHC RBC AUTO-ENTMCNC: 34.3 G/DL (ref 33.6–35)
MCV RBC AUTO: 90.4 FL (ref 81.4–97.8)
MCV RBC AUTO: 92.5 FL (ref 81.4–97.8)
MCV RBC AUTO: 93.3 FL (ref 81.4–97.8)
MONOCYTES # BLD AUTO: 0.43 K/UL (ref 0–0.85)
MONOCYTES NFR BLD AUTO: 7.5 % (ref 0–13.4)
NEUTROPHILS # BLD AUTO: 2.99 K/UL (ref 2–7.15)
NEUTROPHILS NFR BLD: 51.7 % (ref 44–72)
NRBC # BLD AUTO: 0 K/UL
NRBC BLD AUTO-RTO: 0 /100 WBC
PHOSPHATE SERPL-MCNC: 5.3 MG/DL (ref 2.5–4.5)
PLATELET # BLD AUTO: 196 K/UL (ref 164–446)
PLATELET # BLD AUTO: 211 K/UL (ref 164–446)
PLATELET # BLD AUTO: 251 K/UL (ref 164–446)
PMV BLD AUTO: 12.7 FL (ref 9–12.9)
PMV BLD AUTO: 13.2 FL (ref 9–12.9)
PMV BLD AUTO: 13.2 FL (ref 9–12.9)
POTASSIUM SERPL-SCNC: 3.6 MMOL/L (ref 3.6–5.5)
PRODUCT TYPE UPROD: NORMAL
PROT SERPL-MCNC: 6.2 G/DL (ref 6–8.2)
RBC # BLD AUTO: 2 M/UL (ref 4.2–5.4)
RBC # BLD AUTO: 2.09 M/UL (ref 4.2–5.4)
RBC # BLD AUTO: 3.42 M/UL (ref 4.2–5.4)
RH BLD: NORMAL
SODIUM SERPL-SCNC: 133 MMOL/L (ref 135–145)
TROPONIN I SERPL-MCNC: 0.07 NG/ML (ref 0–0.04)
TROPONIN I SERPL-MCNC: 0.07 NG/ML (ref 0–0.04)
UNIT STATUS USTAT: NORMAL
WBC # BLD AUTO: 4.5 K/UL (ref 4.8–10.8)
WBC # BLD AUTO: 4.9 K/UL (ref 4.8–10.8)
WBC # BLD AUTO: 5.8 K/UL (ref 4.8–10.8)

## 2017-05-10 PROCEDURE — 700102 HCHG RX REV CODE 250 W/ 637 OVERRIDE(OP): Performed by: INTERNAL MEDICINE

## 2017-05-10 PROCEDURE — 36415 COLL VENOUS BLD VENIPUNCTURE: CPT

## 2017-05-10 PROCEDURE — 80053 COMPREHEN METABOLIC PANEL: CPT

## 2017-05-10 PROCEDURE — 99285 EMERGENCY DEPT VISIT HI MDM: CPT

## 2017-05-10 PROCEDURE — 84484 ASSAY OF TROPONIN QUANT: CPT

## 2017-05-10 PROCEDURE — 96372 THER/PROPH/DIAG INJ SC/IM: CPT

## 2017-05-10 PROCEDURE — 86850 RBC ANTIBODY SCREEN: CPT

## 2017-05-10 PROCEDURE — 85027 COMPLETE CBC AUTOMATED: CPT

## 2017-05-10 PROCEDURE — 82962 GLUCOSE BLOOD TEST: CPT

## 2017-05-10 PROCEDURE — A9270 NON-COVERED ITEM OR SERVICE: HCPCS | Performed by: INTERNAL MEDICINE

## 2017-05-10 PROCEDURE — 86900 BLOOD TYPING SEROLOGIC ABO: CPT

## 2017-05-10 PROCEDURE — 90935 HEMODIALYSIS ONE EVALUATION: CPT

## 2017-05-10 PROCEDURE — 99222 1ST HOSP IP/OBS MODERATE 55: CPT | Mod: AI,GC | Performed by: INTERNAL MEDICINE

## 2017-05-10 PROCEDURE — 85025 COMPLETE CBC W/AUTO DIFF WBC: CPT

## 2017-05-10 PROCEDURE — P9016 RBC LEUKOCYTES REDUCED: HCPCS

## 2017-05-10 PROCEDURE — 86901 BLOOD TYPING SEROLOGIC RH(D): CPT

## 2017-05-10 PROCEDURE — 5A1D00Z PERFORMANCE OF URINARY FILTRATION, SINGLE: ICD-10-PCS | Performed by: INTERNAL MEDICINE

## 2017-05-10 PROCEDURE — 770020 HCHG ROOM/CARE - TELE (206)

## 2017-05-10 PROCEDURE — 83690 ASSAY OF LIPASE: CPT

## 2017-05-10 PROCEDURE — 83036 HEMOGLOBIN GLYCOSYLATED A1C: CPT

## 2017-05-10 PROCEDURE — 30233N1 TRANSFUSION OF NONAUTOLOGOUS RED BLOOD CELLS INTO PERIPHERAL VEIN, PERCUTANEOUS APPROACH: ICD-10-PCS | Performed by: INTERNAL MEDICINE

## 2017-05-10 PROCEDURE — 83735 ASSAY OF MAGNESIUM: CPT

## 2017-05-10 PROCEDURE — 93005 ELECTROCARDIOGRAM TRACING: CPT

## 2017-05-10 PROCEDURE — 700111 HCHG RX REV CODE 636 W/ 250 OVERRIDE (IP): Performed by: INTERNAL MEDICINE

## 2017-05-10 PROCEDURE — 71010 DX-CHEST-PORTABLE (1 VIEW): CPT

## 2017-05-10 PROCEDURE — 86923 COMPATIBILITY TEST ELECTRIC: CPT | Mod: 91

## 2017-05-10 PROCEDURE — 84100 ASSAY OF PHOSPHORUS: CPT

## 2017-05-10 PROCEDURE — 700111 HCHG RX REV CODE 636 W/ 250 OVERRIDE (IP)

## 2017-05-10 PROCEDURE — 36430 TRANSFUSION BLD/BLD COMPNT: CPT

## 2017-05-10 RX ORDER — LABETALOL HYDROCHLORIDE 5 MG/ML
10 INJECTION, SOLUTION INTRAVENOUS EVERY 4 HOURS PRN
Status: DISCONTINUED | OUTPATIENT
Start: 2017-05-10 | End: 2017-05-11 | Stop reason: HOSPADM

## 2017-05-10 RX ORDER — HEPARIN SODIUM 5000 [USP'U]/ML
5000 INJECTION, SOLUTION INTRAVENOUS; SUBCUTANEOUS EVERY 8 HOURS
Status: DISCONTINUED | OUTPATIENT
Start: 2017-05-10 | End: 2017-05-11 | Stop reason: HOSPADM

## 2017-05-10 RX ORDER — ACETAMINOPHEN 325 MG/1
650 TABLET ORAL EVERY 6 HOURS PRN
Status: DISCONTINUED | OUTPATIENT
Start: 2017-05-10 | End: 2017-05-11 | Stop reason: HOSPADM

## 2017-05-10 RX ORDER — AMOXICILLIN 250 MG
2 CAPSULE ORAL 2 TIMES DAILY
Status: DISCONTINUED | OUTPATIENT
Start: 2017-05-10 | End: 2017-05-11 | Stop reason: HOSPADM

## 2017-05-10 RX ORDER — HYDROCODONE BITARTRATE AND ACETAMINOPHEN 5; 325 MG/1; MG/1
1-2 TABLET ORAL EVERY 6 HOURS PRN
Status: DISCONTINUED | OUTPATIENT
Start: 2017-05-10 | End: 2017-05-11 | Stop reason: HOSPADM

## 2017-05-10 RX ORDER — PREGABALIN 25 MG/1
50 CAPSULE ORAL 2 TIMES DAILY
Status: DISCONTINUED | OUTPATIENT
Start: 2017-05-10 | End: 2017-05-11 | Stop reason: HOSPADM

## 2017-05-10 RX ORDER — AMLODIPINE BESYLATE 10 MG/1
10 TABLET ORAL DAILY
Status: DISCONTINUED | OUTPATIENT
Start: 2017-05-10 | End: 2017-05-11 | Stop reason: HOSPADM

## 2017-05-10 RX ORDER — PROCHLORPERAZINE MALEATE 10 MG
10 TABLET ORAL EVERY 6 HOURS PRN
Status: DISCONTINUED | OUTPATIENT
Start: 2017-05-10 | End: 2017-05-11 | Stop reason: HOSPADM

## 2017-05-10 RX ORDER — POLYETHYLENE GLYCOL 3350 17 G/17G
1 POWDER, FOR SOLUTION ORAL
Status: DISCONTINUED | OUTPATIENT
Start: 2017-05-10 | End: 2017-05-11 | Stop reason: HOSPADM

## 2017-05-10 RX ORDER — CARVEDILOL 12.5 MG/1
12.5 TABLET ORAL 2 TIMES DAILY WITH MEALS
Status: DISCONTINUED | OUTPATIENT
Start: 2017-05-10 | End: 2017-05-11 | Stop reason: HOSPADM

## 2017-05-10 RX ORDER — LATANOPROST 50 UG/ML
1 SOLUTION/ DROPS OPHTHALMIC EVERY EVENING
Status: DISCONTINUED | OUTPATIENT
Start: 2017-05-10 | End: 2017-05-11 | Stop reason: HOSPADM

## 2017-05-10 RX ORDER — DEXTROSE MONOHYDRATE 25 G/50ML
25 INJECTION, SOLUTION INTRAVENOUS
Status: DISCONTINUED | OUTPATIENT
Start: 2017-05-10 | End: 2017-05-11 | Stop reason: HOSPADM

## 2017-05-10 RX ORDER — BISACODYL 10 MG
10 SUPPOSITORY, RECTAL RECTAL
Status: DISCONTINUED | OUTPATIENT
Start: 2017-05-10 | End: 2017-05-11 | Stop reason: HOSPADM

## 2017-05-10 RX ORDER — BIMATOPROST 0.3 MG/ML
1 SOLUTION/ DROPS OPHTHALMIC EVERY EVENING
Status: DISCONTINUED | OUTPATIENT
Start: 2017-05-10 | End: 2017-05-10

## 2017-05-10 RX ORDER — LIDOCAINE HYDROCHLORIDE 10 MG/ML
1 INJECTION, SOLUTION INFILTRATION; PERINEURAL PRN
Status: DISCONTINUED | OUTPATIENT
Start: 2017-05-10 | End: 2017-05-11 | Stop reason: HOSPADM

## 2017-05-10 RX ORDER — SODIUM CHLORIDE 9 MG/ML
250 INJECTION, SOLUTION INTRAVENOUS
Status: DISCONTINUED | OUTPATIENT
Start: 2017-05-10 | End: 2017-05-11 | Stop reason: HOSPADM

## 2017-05-10 RX ADMIN — CARVEDILOL 12.5 MG: 12.5 TABLET, FILM COATED ORAL at 20:29

## 2017-05-10 RX ADMIN — AMLODIPINE BESYLATE 10 MG: 10 TABLET ORAL at 08:31

## 2017-05-10 RX ADMIN — ERYTHROPOIETIN: 10000 INJECTION, SOLUTION INTRAVENOUS; SUBCUTANEOUS at 18:45

## 2017-05-10 RX ADMIN — PREGABALIN 50 MG: 25 CAPSULE ORAL at 08:31

## 2017-05-10 RX ADMIN — CARVEDILOL 12.5 MG: 12.5 TABLET, FILM COATED ORAL at 08:32

## 2017-05-10 RX ADMIN — LATANOPROST 1 DROP: 50 SOLUTION OPHTHALMIC at 21:00

## 2017-05-10 RX ADMIN — INSULIN LISPRO 1 UNITS: 100 INJECTION, SOLUTION INTRAVENOUS; SUBCUTANEOUS at 12:30

## 2017-05-10 RX ADMIN — HEPARIN SODIUM 5000 UNITS: 5000 INJECTION, SOLUTION INTRAVENOUS; SUBCUTANEOUS at 20:30

## 2017-05-10 RX ADMIN — STANDARDIZED SENNA CONCENTRATE AND DOCUSATE SODIUM 2 TABLET: 8.6; 5 TABLET, FILM COATED ORAL at 20:28

## 2017-05-10 RX ADMIN — ACETAMINOPHEN 650 MG: 325 TABLET, FILM COATED ORAL at 18:43

## 2017-05-10 RX ADMIN — HEPARIN SODIUM 5000 UNITS: 5000 INJECTION, SOLUTION INTRAVENOUS; SUBCUTANEOUS at 06:38

## 2017-05-10 RX ADMIN — PREGABALIN 50 MG: 25 CAPSULE ORAL at 20:28

## 2017-05-10 ASSESSMENT — ENCOUNTER SYMPTOMS
PHOTOPHOBIA: 0
ABDOMINAL PAIN: 1
ORTHOPNEA: 0
MYALGIAS: 1
SORE THROAT: 0
DIZZINESS: 0
BLOOD IN STOOL: 0
FOCAL WEAKNESS: 0
PALPITATIONS: 0
BACK PAIN: 1
SHORTNESS OF BREATH: 1
FLANK PAIN: 0
BRUISES/BLEEDS EASILY: 0
CHILLS: 0
HEADACHES: 0
VOMITING: 0
WHEEZING: 0
ABDOMINAL PAIN: 0
DOUBLE VISION: 0
HEADACHES: 1
DIAPHORESIS: 0
PSYCHIATRIC NEGATIVE: 1
FEVER: 0
BLURRED VISION: 1
CONSTIPATION: 0
SHORTNESS OF BREATH: 0
SENSORY CHANGE: 0
WEAKNESS: 1
DIZZINESS: 1
EYE PAIN: 0
DIARRHEA: 0
COUGH: 0
HEARTBURN: 0
HEMOPTYSIS: 0
NECK PAIN: 0
LOSS OF CONSCIOUSNESS: 0
FALLS: 0
SEIZURES: 0
POLYDIPSIA: 0
NAUSEA: 1
PALPITATIONS: 1

## 2017-05-10 ASSESSMENT — COPD QUESTIONNAIRES
COPD SCREENING SCORE: 4
DURING THE PAST 4 WEEKS HOW MUCH DID YOU FEEL SHORT OF BREATH: SOME OF THE TIME
DO YOU EVER COUGH UP ANY MUCUS OR PHLEGM?: NO/ONLY WITH OCCASIONAL COLDS OR INFECTIONS
HAVE YOU SMOKED AT LEAST 100 CIGARETTES IN YOUR ENTIRE LIFE: NO/DON'T KNOW

## 2017-05-10 ASSESSMENT — PAIN SCALES - GENERAL
PAINLEVEL_OUTOF10: 0
PAINLEVEL_OUTOF10: 0

## 2017-05-10 ASSESSMENT — PATIENT HEALTH QUESTIONNAIRE - PHQ9
SUM OF ALL RESPONSES TO PHQ QUESTIONS 1-9: 0
1. LITTLE INTEREST OR PLEASURE IN DOING THINGS: NOT AT ALL
2. FEELING DOWN, DEPRESSED, IRRITABLE, OR HOPELESS: NOT AT ALL
SUM OF ALL RESPONSES TO PHQ9 QUESTIONS 1 AND 2: 0

## 2017-05-10 ASSESSMENT — LIFESTYLE VARIABLES
ALCOHOL_USE: NO
DO YOU DRINK ALCOHOL: NO
EVER_SMOKED: NEVER
EVER_SMOKED: NEVER

## 2017-05-10 NOTE — PROGRESS NOTES
[facility] [place]    Name Vielka Briscoe     1955   Age/Sex 61 y.o. female   MRN [id number]   Code Status FULL     After 5PM or if no immediate response to page, please call for cross-coverage  Attending/Team: [place] Call [phone] to page   1st Call - Day Intern (R1):   Ashwini 2nd Call - Day Sr. Resident (R2/R3):   Xiang         Chief complaint/ reason for interval visit (Primary Diagnosis)   Generalized fatigue/malaise    Interval Problem Daily Status Update      Subjective: Patient seen and examined at bedside with no acute events overnight. Resting, in mild generalized discomfort, though feeling much better after her 1U PRBC transfusion. Denies any new/worsening symptoms. Reports her current presentation is not a new occurrence, has been admitted multiple times for blood transfusions. She denies any overt bleeding per rectum/urine as well as dark tarry stools.    - Anemia >> Hgb 6.1 on admission, s/p 1U pRBC >> 6.4, will transfuse another 1U  - Myelodysplastic syndrome  >> per patient was diagnosed 2016, has required monthly RBC transfusions, will need to find records in chart  - End stage renal disease >> likely 2/2 to uncontrolled HTN and DMII, Nephro consulted for HD arrangements >> will transfuse 3rd unit during HD  - DM  2 >> Glucose on admission 370, A1c 8.4, patient has NOT been using insulin for 2 years, order DM education  - Neuropathy 2/2 DM >> cont Lyrica  - HTN >> elevated on admission 181/71 >> normalized now >> cont current Amlodipine and Coreg    Review of Systems   Constitutional: Positive for malaise/fatigue. Negative for fever, chills and diaphoresis.   HENT: Negative for congestion, ear pain and sore throat.    Eyes: Positive for blurred vision. Negative for double vision, photophobia and pain.   Respiratory: Negative for cough, shortness of breath and wheezing.    Cardiovascular: Negative for chest pain,  palpitations, orthopnea and leg swelling.   Gastrointestinal: Positive for nausea and abdominal pain. Negative for heartburn, vomiting, diarrhea, blood in stool and melena.   Genitourinary: Negative for dysuria, hematuria and flank pain.   Musculoskeletal: Positive for myalgias and back pain. Negative for joint pain, falls and neck pain.   Skin: Negative for itching and rash.   Neurological: Positive for weakness. Negative for dizziness, sensory change, seizures, loss of consciousness and headaches.   Endo/Heme/Allergies: Negative for environmental allergies and polydipsia. Does not bruise/bleed easily.   Psychiatric/Behavioral: Negative.        Consultants/Specialty  [facility] Nephrology    Disposition  Inpatient for anemia likely 2/2 myelodysplastic syndrome    Quality Measures  Core Measures  EKG reviewed: Yes  Bauer: No  DVT prophylaxis: Heparin  GI prophylaxis: None  Lines: PIV  Tubes: None      Physical Exam       Filed Vitals:    05/10/17 1216 05/10/17 1227 05/10/17 1230 05/10/17 1245   BP:  114/51     Pulse: 77 66 67 66   Temp:  36.5 °C (97.7 °F)     Resp:  16     Height:       Weight:       SpO2: 90% 100% 100% 100%     Body mass index is 28.39 kg/(m^2). Weight: 61.6 kg (135 lb 12.9 oz)  Oxygen Therapy:  Pulse Oximetry: 100 %, O2 (LPM): 2, O2 Delivery: Nasal Cannula    Physical Exam   Constitutional: She is oriented to person, place, and time and well-developed, well-nourished, and in no distress. No distress.   HENT:   Head: Normocephalic and atraumatic.   Eyes: EOM are normal. Pupils are equal, round, and reactive to light.   Neck: Normal range of motion.   Cardiovascular: Normal rate, regular rhythm and normal heart sounds.    No murmur heard.  Pulmonary/Chest: Effort normal and breath sounds normal. No respiratory distress. She has no wheezes. She exhibits tenderness.   Tenderness to palpation of chest wall   Abdominal: Soft. She exhibits no distension. There is no rebound and no guarding.   Tenderness to  palpation of upper quadrants   Musculoskeletal: Normal range of motion. She exhibits no edema or tenderness.   Neurological: She is alert and oriented to person, place, and time. She displays normal reflexes. No cranial nerve deficit.   Skin: Skin is warm and dry. No rash noted. No erythema.         Lab Data Review:      5/10/2017  1:34 PM    Recent Labs      05/10/17   0210   SODIUM  133*   POTASSIUM  3.6   CHLORIDE  96   CO2  26   BUN  49*   CREATININE  5.94*   MAGNESIUM  1.9   PHOSPHORUS  5.3*   CALCIUM  7.1*       Recent Labs      05/10/17   0210   ALTSGPT  25   ASTSGOT  21   ALKPHOSPHAT  79   TBILIRUBIN  0.3   LIPASE  44   GLUCOSE  370*       Recent Labs      05/10/17   0210  05/10/17   0922   RBC  2.00*  2.09*   HEMOGLOBIN  6.1*  6.4*   HEMATOCRIT  18.3*  19.5*   PLATELETCT  251  211       Recent Labs      05/10/17   0210  05/10/17   0922   WBC  5.8  4.5*   NEUTSPOLYS  51.70   --    LYMPHOCYTES  34.50   --    MONOCYTES  7.50   --    EOSINOPHILS  4.90   --    BASOPHILS  0.50   --    ASTSGOT  21   --    ALTSGPT  25   --    ALKPHOSPHAT  79   --    TBILIRUBIN  0.3   --            Assessment/Plan     Anemia  - Hgb 6.1 on admission, s/p 1U pRBC >> 6.4  - In setting of Myelodysplastic syndrome and ESRD, these are likely etiologies  - Denies any overt GI//Pulm bleeding >> normal bowel/urinary habits  - Per nephro, pt not responsive to Epgen 2/2 MDS >> will transfuse 3rd unit during HD  - Continue to monitor H/H and transfuse until Hgb >/= 9    Myelodysplastic syndrome    - Follows Dr. Nicholson; per pt was diagnosed 11/2016, has required monthly RBC transfusions  - Will need to find records in chart; no need for consulting Oncology at this time    End stage renal disease  - BUN/Crt on admission 49/5.94  - Likely 2/2 to uncontrolled HTN and DMII  - Nephro consulted for HD arrangements    HTN  - BP on admission 181/71  - Cont Malodipine 10mg QD, Coreg 12.5mg BID    DM  2 complicated by peripheral neuropathy  - Glucose  on admission 370, A1c 8.4, patient has NOT been using insulin for 2 years  - ISS + Hypoglycemic protocol, order DM education   - Cont with sliding scale for next 24hrs to calculate loing acting insulin needs  - Cont Lyrica

## 2017-05-10 NOTE — SENIOR ADMIT NOTE
"HPI:  61-year-old female with PMH of myelodysplastic syndrome, ESRD on HD MWF, DM 2, HTN, CHF, CVA, atrial fibrillation, COPD On 2L,  presents to ED with dizziness, weakness, fatigue and CP.Her hemoglobin in the ED was 6.1 and she was given one unit of blood transfusion in the ED today.   Pt has been admitted every mth since January for similar complaints gets transfused and is sent home.  On dialysis for ESRD MWF    Exam:  Genneral AAOx3  HEENT: grossly normal   Cardiovascular: Normal heart rate, Normal rhythm   Lungs: Respiratory effort is normal. Normal breath sounds  Abdomen: Bowel sounds normal, Soft, No tenderness  Skin: No erythema, No rash  Lower limbs: normal, no pitting edema   Neurologic: Alert & oriented x 3, Normal motor function, Normal sensory function, No focal deficits noted, cranial nerves II through XII are normal  PSY: stable mood  VS: ./71 mmHg  Pulse 78  Temp(Src) 36.7 °C (98.1 °F)  Resp 20  Ht 1.473 m (4' 10\")  Wt 61.6 kg (135 lb 12.9 oz)  BMI 28.39 kg/m2  SpO2 100%  LMP 01/01/1995  O2 therapy: Pulse Oximetry: 100 %, O2 (LPM): 2, O2 Delivery: Nasal Cannula    Labs: h/h 6.1/18.3, BUN/Cr 49/5.94, , ca 7.1  Imaging:CXR cardiomegaly    Assessment and plan:  Impression    Chronic anemia    secondary to MDS and ESRD  Transfusion dependent  F/u with oncologist  Transfused 1U, Hgb goal of 8.5-9 per nephrology and oncology  Trend H/H and transfuse if needed    ESRD  On MWF  Consulted ,please call nephrology    recurrent chest pain 2/2 demand ischemia due to anemia and probably chronic uremia  Elevated troponin likely ESRD, demand, will trend trop    DM 2 - last HbA1c 8.4 in 2/2017, on diet control, allergic to oral meds,  Pt legally blind can't check her sugars     COPD: RT, O2,   Hypertension  History of atrial fibrillation and a stroke    For further information please refer to intern H&P        "

## 2017-05-10 NOTE — PROGRESS NOTES
Transport taking pt to dialysis where she will receive another unit of blood. Monitor room notified.

## 2017-05-10 NOTE — IP AVS SNAPSHOT
[employer] Access Code: [email or web address]  Expires: 6/6/2017 11:42 AM    Your email address is not on file at [facility].  Email Addresses are required for you to sign up for [employer], please contact [phone] to verify your personal information and to provide your email address prior to attempting to register for [employer].    Vielka Lucio [facility]  [address] [place] [address]    [employer]  A secure, online tool to manage your health information     [facility]’s [employer]® is a secure, online tool that connects you to your personalized health information from the privacy of your home -- day or night - making it very easy for you to manage your healthcare. Once the activation process is completed, you can even access your medical information using the [employer] sundar, which is available for free in the Apple Sundar store or Google Play store.     To learn more about [employer], visit [email or web address]    There are two levels of access available (as shown below):   My Chart Features  [facility] Primary Care Doctor [facility]  Specialists [facility]  Urgent  Care Non-[facility] Primary Care Doctor   Email your healthcare team securely and privately 24/7 X X X    Manage appointments: schedule your next appointment; view details of past/upcoming appointments X      Request prescription refills. X      View recent personal medical records, including lab and immunizations X X X X   View health record, including health history, allergies, medications X X X X   Read reports about your outpatient visits, procedures, consult and ER notes X X X X   See your discharge summary, which is a recap of your hospital and/or ER visit that includes your diagnosis, lab results, and care plan X X  X     How to register for [employer]:  Once your e-mail address has been verified, follow the following steps to sign up for [employer].     1. Go to  [email or web address]  2. Click on the Sign Up Now box, which takes you to the New Member Sign Up page.  You will need to provide the following information:  a. Enter your [employer] Access Code exactly as it appears at the top of this page. (You will not need to use this code after you’ve completed the sign-up process. If you do not sign up before the expiration date, you must request a new code.)   b. Enter your date of birth.   c. Enter your home email address.   d. Click Submit, and follow the next screen’s instructions.  3. Create a [employer] ID. This will be your [employer] login ID and cannot be changed, so think of one that is secure and easy to remember.  4. Create a [employer] password. You can change your password at any time.  5. Enter your Password Reset Question and Answer. This can be used at a later time if you forget your password.   6. Enter your e-mail address. This allows you to receive e-mail notifications when new information is available in [employer].  7. Click Sign Up. You can now view your health information.    For assistance activating your [employer] account, call [phone]

## 2017-05-10 NOTE — IP AVS SNAPSHOT
" <p align=\"LEFT\"><IMG SRC=\"//[email or web address]\" alt=\"Image\" WIDTH=\"50%\" HEIGHT=\"200\" BORDER=\"\"></p>                   Name:Vielka Briscoe  Medical Record Number:[id number]  CSN: [id number]    YOB: 1955   Age: 61 y.o.  Sex: female  HT:1.473 m (4' 10\") WT: 67 kg (147 lb 11.3 oz)          Admit Date: 5/10/2017     Discharge Date:   Today's Date: 5/11/2017  Attending Doctor:  Frieda Downing,*                  Allergies:  Actos; Darvocet; Demerol; Glucophage; Morphine; Oxycodone; Pcn; Requip; Simvastatin; Sulfa drugs; Tramadol; Trazodone; Demerol; Dilaudid; Diphenhydramine; Iron; Lenalidomide; Morphine; Multivitamin; Naprosyn; Other drug; and Sulfa drugs          Your appointments     Jun 05, 2017  3:30 PM   Est Chemo 1 with RN 2   [facility] ([facility])    [address] [place] [id number] [phone]            Jun 06, 2017  2:30 PM   Est Chemo 1 with RN 5   [facility] ([facility])    [address] [place] [id number]   [phone]            Jun 07, 2017  2:30 PM   Est Chemo 1 with RN 5   [facility] ([facility])    [address] [place] [id number]   [phone]            Jun 08, 2017  2:30 PM   Est Chemo 1 with RN 5   [facility] ([facility])    [address] [id number]   [phone]            Jun 09, 2017  2:30 PM   Est Chemo 1 with RN 5   [facility] ([facility])    [address] [place] [id number]   [phone]                 Medication List      Take these Medications        Instructions    amlodipine 10 MG Tabs   Commonly known as:  NORVASC    Take 10 mg by mouth every day.   Dose:  10 mg       carvedilol 12.5 MG Tabs   Commonly known as:  COREG    Take 12.5 mg by mouth 2 times a day, with meals.   Dose:  12.5 mg       hydrocodone-acetaminophen 5-325 MG Tabs per tablet   Commonly known as:  NORCO    Take 1-2 Tabs by mouth every 6 hours as needed.   Dose:  1-2 Tab       LUMIGAN 0.03 " % Soln   Generic drug:  bimatoprost    Place 1 Drop in both eyes every evening.   Dose:  1 Drop       LYRICA 50 MG capsule   Generic drug:  pregabalin    Take 50 mg by mouth 2 times a day.   Dose:  50 mg       prochlorperazine 10 MG Tabs   Commonly known as:  COMPAZINE    Take 10 mg by mouth every 6 hours as needed.   Dose:  10 mg       sitagliptin 25 MG Tabs   Commonly known as:  JANUVIA    Take 1 Tab by mouth every morning.   Dose:  25 mg       vitamin D (Ergocalciferol) 65454 UNITS Caps capsule   Commonly known as:  DRISDOL    Take 1 Cap by mouth every 7 days.   Dose:  82954 Units       VOLTAREN 1 % Gel   Generic drug:  Diclofenac Sodium    APPLY 2 GRAMS TOPICALLY QID

## 2017-05-10 NOTE — ED NOTES
"Patient to ED states that she started feeling weak, dizzy, nauseated tonight approx 2 hours PTA and had some left chest tightness. States the chest tightness has subsided but continues with nausea and \"room spinning\". States that she dialyzed yesterday. Has history of renal failure and MDS and is on chemotherapy. Denies fever or cough or shortness of breath. No LOC. A/o x 4 on arrival. Calm. Ambulatory. Beathing normally.  "

## 2017-05-10 NOTE — H&P
[facility] Admitting History and Physical    Name Vielka Briscoe     1955   Age/Sex 61 y.o. female   MRN [id number]   Code Status Full Code     After 5PM or if no immediate response to page, please call for cross-coverage  Attending/Team: Dr. Downing/Aurelio Call [phone] to page   1st Call - Day Intern (R1):   Dr. Maradiaga 2nd Call - Day Sr. Resident (R2/R3):   Dr. Otoole       Chief Complaint:  Dizziness, weakness, fatigue    HPI:  61 year old female with a PMH of myelodysplastic syndrome with transfusion dependent anemia, ESRD on HD MWF, type II DM, HTN, A. Fib, COPD on 2L home 02 all day, and previous CVA presents with dizziness, weakness, fatigue and CP. Patient was admitted from -, -, - and 3/19-3/21 with similar complaints and on her most recent hospitalization received 2 units PRBC's before being discharged. She is transfusion dependent and has received multiple transfusions in the past. Patient complains of chronic chest pain, weakness, dizziness and fatigue. She experiencing symptoms like this often and this episode has occurred progressively over months, but became worse at 10pm last night so she called EMS to transport her to the ED. She states that her chest pain feels the same as it always does with no changes and has not missed any recent dialysis sessions. She has been on dialysis for the past year and is unable to explain why her kidney's failed. Patient is followed outpatient by hematologist Dr. Avila for myelodysplastic syndrome and was initiated on Vidaza and  and received a second dose on . She states that the plan is a 6 month course of chemo.    In the ED a Hgb came back at 6.1 and 1 U PRBC was ordered.    Review of Systems   Constitutional: Positive for malaise/fatigue. Negative for fever and chills.   HENT: Positive for ear pain and hearing loss. Negative for ear discharge and sore throat.    Eyes: Positive for blurred  vision.   Respiratory: Positive for shortness of breath. Negative for cough, hemoptysis and wheezing.    Cardiovascular: Positive for chest pain and palpitations. Negative for leg swelling.   Gastrointestinal: Positive for nausea. Negative for vomiting, abdominal pain, diarrhea, constipation and blood in stool.   Genitourinary: Negative for dysuria, urgency and flank pain.   Musculoskeletal: Positive for back pain.   Skin: Positive for itching. Negative for rash.   Neurological: Positive for dizziness, weakness and headaches. Negative for focal weakness and loss of consciousness.             Past Medical History:   Past Medical History   Diagnosis Date   • Heart abnormalities    • Angina    • Diabetes    • Hypertension    • Chronic anemia    • Chronic obstructive pulmonary disease (CMS-HCC)    • Blood transfusion without reported diagnosis    • Anemia    • Breath shortness    • Glaucoma    • Diabetes      Diet controlled   • Dental disorder      full dentures   • Supplemental oxygen dependent    • Cataract      bilat IOL   • Renal disorder      CKF   • Pain      General pain 8/10   • Chronic kidney disease      Chronic renal failure   • Arthritis      hands    • Dialysis patient      [facility] ,   [facility] [place]   • MDS (myelodysplastic syndrome) 10/2016     bone marrow biopsy   • Congestive heart failure (CMS-[facility])    • Atrial fibrillation (CMS-[facility])      HX   • Stroke (CMS-[facility]) 2015     No residual weakness/problems       Past Surgical History:  Past Surgical History   Procedure Laterality Date   • Other cardiac surgery       Angioplasty  and    • Other abdominal surgery       appendectomy,  x 2, hysterectomy, D & C   • Gyn surgery       hysterectomy   • Gyn surgery        x 2   • Gyn surgery       d&C x2   • Other cardiac surgery       cardiac angiogram, angioplasty   • Other       angioplasty/ stents bilat LE   • Retinal detachment repair Right    • Av fistula creation Left 2016      Procedure: AV FISTULA CREATION UPPER EXTREMITY;  Surgeon: Ranulfo Jolly M.D.;  Location: SURGERY [facility];  Service:    • Other abdominal surgery       appendectomy   • Gastroscopy  12/17/2015     Procedure: ESOPHAGOGASTRODUODENOSCOPY WITH BIOPSY;  Surgeon: Wing Álvarez M.D.;  Location: SURGERY [facility];  Service:    • Colonoscopy  12/17/2015     Procedure: COLONOSCOPY;  Surgeon: Wing Álvarez M.D.;  Location: SURGERY [facility];  Service:    • Vein ligation Left 11/10/2016     Procedure: VEIN LIGATION FOR DISTAL REVASCULARIZATION AND INTERVAL LIGATION OF LEFT ARM DIALYISIS ACCESS (DRIL PROCEDURE);  Surgeon: Ranulfo Jolly M.D.;  Location: SURGERY [facility];  Service:        Current Outpatient Medications:  Home Medications     **Home medications have not yet been reviewed for this encounter**          Medication Allergy/Sensitivities:  Allergies   Allergen Reactions   • Actos [Pioglitazone Hydrochloride] Unspecified     Cause blindness   FYJ=5160   • Darvocet [Propoxyphene N-Apap] Vomiting     KBH=0035   • Demerol Vomiting     QVR=5148   • Glucophage [Metformin Hydrochloride] Vomiting     EEX=5055   • Morphine Vomiting     VUZ=4132   • Oxycodone Vomiting     RXN=1/2016   • Pcn [Penicillins] Vomiting     ECM=7923     • Requip Vomiting     RXN=12/2015   • Simvastatin Unspecified     Leg cramps  TYH=2743   • Sulfa Drugs Rash     RXN=>10 years   • Tramadol Vomiting     CXY=4852   • Trazodone Vomiting     EHJ=7368   • Demerol    • Dilaudid [Hydromorphone] Vomiting     Unknown    • Diphenhydramine Vomiting   • Iron      vomiting   • Lenalidomide Vomiting   • Morphine    • Multivitamin      itching   • Naprosyn [Naproxen]    • Other Drug      Any binders that remove phosphorus from the body such as tums   • Sulfa Drugs          Family History:  Family History   Problem Relation Age of Onset   • Hypertension Father    • Hypertension Mother        Social History:  Social History     Social  "History   • Marital Status:      Spouse Name: N/A   • Number of Children: N/A   • Years of Education: N/A     Occupational History   • Not on file.     Social History Main Topics   • Smoking status: Never Smoker    • Smokeless tobacco: Never Used   • Alcohol Use: No   • Drug Use: No   • Sexual Activity: Not on file     Other Topics Concern   • Not on file     Social History Narrative    ** Merged History Encounter **          Living situation: Lives in [place] with her daughter  PCP : Nyla Nava M.D.      Physical Exam     Filed Vitals:    05/10/17 0131 05/10/17 0132 05/10/17 0230 05/10/17 0330   BP:  181/71     Pulse:  82 78 78   Temp:  36.7 °C (98.1 °F)     Resp:  20     Height: 1.473 m (4' 10\")      Weight: 61.6 kg (135 lb 12.9 oz)      SpO2:  100% 100% 100%     Body mass index is 28.39 kg/(m^2).  /71 mmHg  Pulse 78  Temp(Src) 36.7 °C (98.1 °F)  Resp 20  Ht 1.473 m (4' 10\")  Wt 61.6 kg (135 lb 12.9 oz)  BMI 28.39 kg/m2  SpO2 100%  LMP 01/01/1995  O2 therapy: Pulse Oximetry: 100 %, O2 (LPM): 2, O2 Delivery: Nasal Cannula    Physical Exam   Constitutional: She is oriented to person, place, and time and well-developed, well-nourished, and in no distress. No distress.   HENT:   Head: Normocephalic and atraumatic.   Right Ear: External ear normal.   Left Ear: External ear normal.   Nose: Nose normal.   Mouth/Throat: Oropharynx is clear and moist. No oropharyngeal exudate.   Eyes: Conjunctivae and EOM are normal. Pupils are equal, round, and reactive to light. Right eye exhibits no discharge. Left eye exhibits no discharge. No scleral icterus.   Neck: Normal range of motion. No JVD present.   Cardiovascular: Normal rate, regular rhythm and normal heart sounds.    No murmur heard.  Pulmonary/Chest: Effort normal. No respiratory distress. She has no wheezes.   Abdominal: Soft. She exhibits no distension. There is no tenderness. There is no rebound and no guarding.   Musculoskeletal: She exhibits no " edema or tenderness.   Lymphadenopathy:     She has no cervical adenopathy.   Neurological: She is alert and oriented to person, place, and time.   Skin: Skin is warm and dry. No rash noted. She is not diaphoretic. No erythema. No pallor.   Psychiatric: Mood, memory, affect and judgment normal.             Data Review       Old Records Request:   Completed  Current Records review and summary: Completed    Lab Data Review:  Recent Results (from the past 24 hour(s))   EKG (ER)    Collection Time: 05/10/17  1:29 AM   Result Value Ref Range    Report       [facility] Emergency Dept.    Test Date:  2017-05-10  Pt Name:    JESUS SILVEIRA              Department: ER  MRN:        [id number]                      Room:        19  Gender:     F                            Technician: [id number]  :        1955                   Requested By:ER TRIAGE PROTOCOL  Order #:    [id number]                    Reading MD:    Measurements  Intervals                                Axis  Rate:       81                           P:          -11  OK:         164                          QRS:        -5  QRSD:       86                           T:          65  QT:         436  QTc:        506    Interpretive Statements  SINUS RHYTHM  CONSIDER LEFT VENTRICULAR HYPERTROPHY  BORDERLINE PROLONGED QT INTERVAL  Compared to ECG 2017 06:29:11  Myocardial infarct finding no longer present     CBC WITH DIFFERENTIAL    Collection Time: 05/10/17  2:10 AM   Result Value Ref Range    WBC 5.8 4.8 - 10.8 K/uL    RBC 2.00 (L) 4.20 - 5.40 M/uL    Hemoglobin 6.1 (L) 12.0 - 16.0 g/dL    Hematocrit 18.3 (L) 37.0 - 47.0 %    MCV 92.5 81.4 - 97.8 fL    MCH 30.0 27.0 - 33.0 pg    MCHC 32.4 (L) 33.6 - 35.0 g/dL    RDW 61.2 (H) 35.9 - 50.0 fL    Platelet Count 251 164 - 446 K/uL    MPV 13.2 (H) 9.0 - 12.9 fL    Neutrophils-Polys 51.70 44.00 - 72.00 %    Lymphocytes 34.50 22.00 - 41.00 %    Monocytes 7.50 0.00 - 13.40 %    Eosinophils 4.90  0.00 - 6.90 %    Basophils 0.50 0.00 - 1.80 %    Immature Granulocytes 0.90 0.00 - 0.90 %    Nucleated RBC 0.00 /100 WBC    Neutrophils (Absolute) 2.99 2.00 - 7.15 K/uL    Lymphs (Absolute) 1.99 1.00 - 4.80 K/uL    Monos (Absolute) 0.43 0.00 - 0.85 K/uL    Eos (Absolute) 0.28 0.00 - 0.51 K/uL    Baso (Absolute) 0.03 0.00 - 0.12 K/uL    Immature Granulocytes (abs) 0.05 0.00 - 0.11 K/uL    NRBC (Absolute) 0.00 K/uL   COMP METABOLIC PANEL    Collection Time: 05/10/17  2:10 AM   Result Value Ref Range    Sodium 133 (L) 135 - 145 mmol/L    Potassium 3.6 3.6 - 5.5 mmol/L    Chloride 96 96 - 112 mmol/L    Co2 26 20 - 33 mmol/L    Anion Gap 11.0 0.0 - 11.9    Glucose 370 (H) 65 - 99 mg/dL    Bun 49 (H) 8 - 22 mg/dL    Creatinine 5.94 (HH) 0.50 - 1.40 mg/dL    Calcium 7.1 (L) 8.5 - 10.5 mg/dL    AST(SGOT) 21 12 - 45 U/L    ALT(SGPT) 25 2 - 50 U/L    Alkaline Phosphatase 79 30 - 99 U/L    Total Bilirubin 0.3 0.1 - 1.5 mg/dL    Albumin 3.4 3.2 - 4.9 g/dL    Total Protein 6.2 6.0 - 8.2 g/dL    Globulin 2.8 1.9 - 3.5 g/dL    A-G Ratio 1.2 g/dL   LIPASE    Collection Time: 05/10/17  2:10 AM   Result Value Ref Range    Lipase 44 11 - 82 U/L   MAGNESIUM    Collection Time: 05/10/17  2:10 AM   Result Value Ref Range    Magnesium 1.9 1.5 - 2.5 mg/dL   PHOSPHORUS    Collection Time: 05/10/17  2:10 AM   Result Value Ref Range    Phosphorus 5.3 (H) 2.5 - 4.5 mg/dL   TROPONIN    Collection Time: 05/10/17  2:10 AM   Result Value Ref Range    Troponin I 0.07 (H) 0.00 - 0.04 ng/mL   ESTIMATED GFR    Collection Time: 05/10/17  2:10 AM   Result Value Ref Range    GFR If  9 (A) >60 mL/min/1.73 m 2    GFR If Non African American 7 (A) >60 mL/min/1.73 m 2   COD (ADULT)    Collection Time: 05/10/17  2:10 AM   Result Value Ref Range    ABO Grouping Only AB     Rh Grouping Only POS     Antibody Screen-Cod NEG     Component R       R3                  Red Blood Cells3    [id number]   selected     05/10/17   04:04      Product Type  Red Blood Cells LR Pheresis     Dispense Status Selected     Unit Number (Barcoded) [id number]     Product Code (Barcoded) I5907S03     Blood Type (Barcoded) 8400        Imaging/Procedures Review:    ndependant Imaging Review: Completed  DX-CHEST-PORTABLE (1 VIEW)   Final Result      Cardiomegaly.               EKG:   Pending             Assessment/Plan     Assessment & plan notes cannot be loaded without a specified hospital service.    1) Chronic anemia  - 2/2 ESRD, myelodysplastic syndrome  - Hgb 6.1 on admission  - Hgb goal of 8.5-9 per nephrology and oncology  - On 3/20/17 Fe 163, TIBC 223, %sat 73, Ferritin 1032.4  - Transfuse 1 U PRBC  - Trend H+H    2) Myelodysplastic syndrome  - Transfusion dependent  - Initiated on Vidaza on 5/1 and has received 2 doses of a 6 month course  - Followed by Dr. Avila    3) ESRD on HD MWF  - Creatinine 5.94 on admission  - Last received dialysis on 5/8  - Will need dialysis today  - Consult nephrology per day team    4) Chronic chest pain  - Similar pain in the past with neg workup  - Trops <0.07, but patient on HD. Trend once more at 6am.  - EKG NSR, rate 81, QTc 506. No signs of acute ischemia  - 12/22/16: negative nuclear medicine cardiac stress test    - Tele  - Monitor    5) HTN (hypertension)  - /71 on admission  - Expected drop in BP after dialysis  - Continue home BP medications amlodipine, carvedilol  - IV labetalol PRN  - Monitor    6) Hx Atrial fibrillation  - Not currently in a fib   - EKG NSR, rate 81, QTc 506. No signs of acute ischemia  - [id number] score - 4 for gender, DM, HTN, PVD  - Not on anticoagulation due to bleeding risk  - Monitor on tele    7) DM II with peripheral neuropathy  -  in ED  - HbA1c on 2/20 8.4%. Redraw.  - Previously failed metformin and pioglitazone  - States she cannot see (legally blind) so she cant perform fingerstick glucose at home  - Continue home pregabalin 100mg BID    - ISS  - hypoglycemia protocol  -  Monitor    8) COPD with chronic respiratory failure  - On 2L home 02 all the time  - States she had a reaction to albuterol  - Continue 02 - Sp02 100% on 2L NC      Anticipated Hospital stay:  >2 midnights        Quality Measures          DVT Prophylaxis: Heparin

## 2017-05-10 NOTE — IP AVS SNAPSHOT
" Home Care Instructions                                                                                                                  Name:Vielka Briscoe  Medical Record Number:[id number]  CSN: [id number]    YOB: 1955   Age: 61 y.o.  Sex: female  HT:1.473 m (4' 10\") WT: 67 kg (147 lb 11.3 oz)          Admit Date: 5/10/2017     Discharge Date:   Today's Date: 5/11/2017  Attending Doctor:  Frieda Downing,*                  Allergies:  Actos; Darvocet; Demerol; Glucophage; Morphine; Oxycodone; Pcn; Requip; Simvastatin; Sulfa drugs; Tramadol; Trazodone; Demerol; Dilaudid; Diphenhydramine; Iron; Lenalidomide; Morphine; Multivitamin; Naprosyn; Other drug; and Sulfa drugs            Discharge Instructions       Discharge Instructions    Discharged to home by car with relative. Discharged via wheelchair, hospital escort: Yes.  Special equipment needed: Not Applicable    Be sure to schedule a follow-up appointment with your primary care doctor or any specialists as instructed.     Discharge Plan:   Diet Plan: Discussed  Activity Level: Discussed  Confirmed Follow up Appointment: Appointment Scheduled  Confirmed Symptoms Management: Discussed  Medication Reconciliation Updated: Yes  Influenza Vaccine Indication: Not indicated: Previously immunized this influenza season and > 8 years of age    I understand that a diet low in cholesterol, fat, and sodium is recommended for good health. Unless I have been given specific instructions below for another diet, I accept this instruction as my diet prescription.   Other diet: renal    Special Instructions: None    · Is patient discharged on Warfarin / Coumadin?   No     · Is patient Post Blood Transfusion?  No    Depression / Suicide Risk    As you are discharged from this [facility] facility, it is important to learn how to keep safe from harming yourself.    Recognize the warning signs:  · Abrupt changes in personality, positive or negative- " including increase in energy   · Giving away possessions  · Change in eating patterns- significant weight changes-  positive or negative  · Change in sleeping patterns- unable to sleep or sleeping all the time   · Unwillingness or inability to communicate  · Depression  · Unusual sadness, discouragement and loneliness  · Talk of wanting to die  · Neglect of personal appearance   · Rebelliousness- reckless behavior  · Withdrawal from people/activities they love  · Confusion- inability to concentrate     If you or a loved one observes any of these behaviors or has concerns about self-harm, here's what you can do:  · Talk about it- your feelings and reasons for harming yourself  · Remove any means that you might use to hurt yourself (examples: pills, rope, extension cords, firearm)  · Get professional help from the community (Mental Health, Substance Abuse, psychological counseling)  · Do not be alone:Call your Safe Contact- someone whom you trust who will be there for you.  · Call your local CRISIS HOTLINE 785-2260 or 615-405-3129  · Call your local Children's Mobile Crisis Response Team [place] (002) 512-2121 or [employer]  · Call the toll free National Suicide Prevention Hotlines   · National Suicide Prevention Lifeline 740-265-QBHF (8421)  · National Hope Line Network 800-SUICIDE (553-4195)      Anemia, Nonspecific  Anemia is a condition in which the concentration of red blood cells or hemoglobin in the blood is below normal. Hemoglobin is a substance in red blood cells that carries oxygen to the tissues of the body. Anemia results in not enough oxygen reaching these tissues.   CAUSES   Common causes of anemia include:   · Excessive bleeding. Bleeding may be internal or external. This includes excessive bleeding from periods (in women) or from the intestine.    · Poor nutrition.    · Chronic kidney, thyroid, and liver disease.   · Bone marrow disorders that decrease red blood cell  production.  · Cancer and treatments for cancer.  · HIV, AIDS, and their treatments.  · Spleen problems that increase red blood cell destruction.  · Blood disorders.  · Excess destruction of red blood cells due to infection, medicines, and autoimmune disorders.  SIGNS AND SYMPTOMS   · Minor weakness.    · Dizziness.    · Headache.  · Palpitations.    · Shortness of breath, especially with exercise.    · Paleness.  · Cold sensitivity.  · Indigestion.  · Nausea.  · Difficulty sleeping.  · Difficulty concentrating.  Symptoms may occur suddenly or they may develop slowly.   DIAGNOSIS   Additional blood tests are often needed. These help your health care provider determine the best treatment. Your health care provider will check your stool for blood and look for other causes of blood loss.   TREATMENT   Treatment varies depending on the cause of the anemia. Treatment can include:   · Supplements of iron, vitamin B12, or folic acid.    · Hormone medicines.    · A blood transfusion. This may be needed if blood loss is severe.    · Hospitalization. This may be needed if there is significant continual blood loss.    · Dietary changes.  · Spleen removal.  HOME CARE INSTRUCTIONS  Keep all follow-up appointments. It often takes many weeks to correct anemia, and having your health care provider check on your condition and your response to treatment is very important.  SEEK IMMEDIATE MEDICAL CARE IF:   · You develop extreme weakness, shortness of breath, or chest pain.    · You become dizzy or have trouble concentrating.  · You develop heavy vaginal bleeding.    · You develop a rash.    · You have bloody or black, tarry stools.    · You faint.    · You vomit up blood.    · You vomit repeatedly.    · You have abdominal pain.  · You have a fever or persistent symptoms for more than 2-3 days.    · You have a fever and your symptoms suddenly get worse.    · You are dehydrated.    MAKE SURE YOU:  · Understand these  instructions.  · Will watch your condition.  · Will get help right away if you are not doing well or get worse.     This information is not intended to replace advice given to you by your health care provider. Make sure you discuss any questions you have with your health care provider.     Document Released: 01/25/2006 Document Revised: 08/20/2014 Document Reviewed: 06/13/2014  [employer] Interactive Patient Education ©2016 [employer]          Your appointments     Jun 05, 2017  3:30 PM   Est Chemo 1 with RN 2   Infusion Services ([facility])    [address] [address]  [place] [address]   [phone]            Jun 06, 2017  2:30 PM   Est Chemo 1 with RN 5   Infusion [facility] ([facility])    [address] [address]  [place] [address]   [phone]            Jun 07, 2017  2:30 PM   Est Chemo 1 with RN 5   In[facility] ([facility])    [address] [place] [address]  [place] [address]   [phone]            Jun 08, 2017  2:30 PM   Est Chemo 1 with RN 5   In[facility] ([facility])    [address] [address]  [place] [address]   [phone]            Jun 09, 2017  2:30 PM   Est Chemo 1 with RN 5   In[facility] ([facility])    [address] [address]  [place] [address]   [phone]                 Discharge Medication Instructions:    Below are the medications your physician expects you to take upon discharge:    Review all your home medications and newly ordered medications with your doctor and/or pharmacist. Follow medication instructions as directed by your doctor and/or pharmacist.    Please keep your medication list with you and share with your physician.               Medication List      START taking these medications        Instructions    Morning Afternoon Evening Bedtime    sitagliptin 25 MG Tabs   Last time this was given:  25 mg on 5/11/2017  9:00 AM   Commonly known as:  JANUVIA        Take 1 Tab by mouth every morning.   Dose:  25 mg                        vitamin D (Ergocalciferol) 24993  UNITS Caps capsule   Last time this was given:  50,000 Units on 5/11/2017 11:47 AM   Commonly known as:  DRISDOL        Take 1 Cap by mouth every 7 days.   Dose:  79968 Units                          CONTINUE taking these medications        Instructions    Morning Afternoon Evening Bedtime    amlodipine 10 MG Tabs   Last time this was given:  10 mg on 5/11/2017  7:31 AM   Commonly known as:  NORVASC        Take 10 mg by mouth every day.   Dose:  10 mg                        carvedilol 12.5 MG Tabs   Last time this was given:  12.5 mg on 5/11/2017  7:32 AM   Commonly known as:  COREG        Take 12.5 mg by mouth 2 times a day, with meals.   Dose:  12.5 mg                        hydrocodone-acetaminophen 5-325 MG Tabs per tablet   Commonly known as:  NORCO        Take 1-2 Tabs by mouth every 6 hours as needed.   Dose:  1-2 Tab                        LUMIGAN 0.03 % Soln   Generic drug:  bimatoprost        Place 1 Drop in both eyes every evening.   Dose:  1 Drop                        LYRICA 50 MG capsule   Last time this was given:  50 mg on 5/11/2017  7:31 AM   Generic drug:  pregabalin        Take 50 mg by mouth 2 times a day.   Dose:  50 mg                        prochlorperazine 10 MG Tabs   Commonly known as:  COMPAZINE        Take 10 mg by mouth every 6 hours as needed.   Dose:  10 mg                        VOLTAREN 1 % Gel   Generic drug:  Diclofenac Sodium        APPLY 2 GRAMS TOPICALLY QID                             Where to Get Your Medications      Information about where to get these medications is not yet available     ! Ask your nurse or doctor about these medications    - sitagliptin 25 MG Tabs  - vitamin D (Ergocalciferol) 10544 UNITS Caps capsule            Instructions           Diet / Nutrition:    Follow any diet instructions given to you by your doctor or the dietician, including how much salt (sodium) you are allowed each day.    If you are overweight, talk to your doctor about a weight reduction  plan.    Activity:    Remain physically active following your doctor's instructions about exercise and activity.    Rest often.     Any time you become even a little tired or short of breath, SIT DOWN and rest.    Worsening Symptoms:    Report any of the following signs and symptoms to the doctor's office immediately:    *Pain of jaw, arm, or neck  *Chest pain not relieved by medication                               *Dizziness or loss of consciousness  *Difficulty breathing even when at rest   *More tired than usual                                       *Bleeding drainage or swelling of surgical site  *Swelling of feet, ankles, legs or stomach                 *Fever (>100ºF)  *Pink or blood tinged sputum  *Weight gain (3lbs/day or 5lbs /week)           *Shock from internal defibrillator (if applicable)  *Palpitations or irregular heartbeats                *Cool and/or numb extremities    Stroke Awareness    Common Risk Factors for Stroke include:    Age  Atrial Fibrillation  Carotid Artery Stenosis  Diabetes Mellitus  Excessive alcohol consumption  High blood pressure  Overweight   Physical inactivity  Smoking    Warning signs and symptoms of a stroke include:    *Sudden numbness or weakness of the face, arm or leg (especially on one side of the body).  *Sudden confusion, trouble speaking or understanding.  *Sudden trouble seeing in one or both eyes.  *Sudden trouble walking, dizziness, loss of balance or coordination.Sudden severe headache with no known cause.    It is very important to get treatment quickly when a stroke occurs. If you experience any of the above warning signs, call 911 immediately.                   Disclaimer         Quit Smoking / Tobacco Use:    I understand the use of any tobacco products increases my chance of suffering from future heart disease or stroke and could cause other illnesses which may shorten my life. Quitting the use of tobacco products is the single most important thing I can  do to improve my health. For further information on smoking / tobacco cessation call a Toll Free Quit Line at 1-697.753.5627 (*National Cancer [place]) or 1-329.935.9930 (American Lung Association) or you can access the web based program at www.lungusa.org.    [place] Tobacco Users Help Line:  (494) 162-5485       Toll Free: 1-234.561.4092  Quit Tobacco Program [facility] (605)[phone]    Crisis Hotline:    [place] Crisis Hotline:  4-564-KBNKUGL or 1-974.456.8507    [place] Crisis Hotline:    1-721.547.3360 or 462-693-4116    Discharge Survey:   [facility] We hope we did everything we could to make your hospital stay a pleasant one. You may be receiving a phone survey and we would appreciate your time and participation in answering the questions. Your input is very valuable to us in our efforts to improve our service to our patients and their families.        My signature on this form indicates that:    1. I have reviewed and understand the above information.  2. My questions regarding this information have been answered to my satisfaction.  3. I have formulated a plan with my discharge nurse to obtain my prescribed medications for home.                  Disclaimer         __________________________________                     __________       ________                       Patient Signature                                                 Date                    Time

## 2017-05-10 NOTE — ED PROVIDER NOTES
ED Provider Note    ER PROVIDER NOTE    Scribed for Ranulfo Beltran M.D.  by Raunlfo Beltran. 5/10/2017 at 2:04 AM.    Primary Care Provider: Nyla Nava M.D.  Means of Arrival:EMS  History obtained from: pt   History limited by: self     CHIEF COMPLAINT  Chief Complaint   Patient presents with   • Nausea   • Dizziness   • Chest Pain       HPI  Vielka Briscoe is a 61 y.o. female who presents to the emergency department complaining of lightheadedness as well as palpitations. Patient reports that over the past day has had some increased generalized weakness, this evening felt lightheaded and some palpitations as well. She denies any chest pain. She denies any difficulty breathing, cough, fever. Has had some nausea but no vomiting. No abdominal pain. She does have chronic anemia although occasionally requires transfusion as well. Denies any blood in her stool, hematemesis or dark tarry stool    History of myelodysplastic syndrome, chronic kidney disease on dialysis, last dialyzed yesterday. Sees Dr. Bennett  REVIEW OF SYSTEMS  Pertinent positives include lightheadedness. Pertinent negatives include no chest pain. See HPI for details. All other systems reviewed and are negative.    PAST MEDICAL HISTORY   has a past medical history of Heart abnormalities; Angina; Diabetes; Hypertension; Chronic anemia; Chronic obstructive pulmonary disease (CMS-[facility]); Blood transfusion without reported diagnosis; Anemia; Breath shortness; Glaucoma; Diabetes; Dental disorder; Supplemental oxygen dependent; Cataract; Renal disorder; Pain; Chronic kidney disease; Arthritis; Dialysis patient; MDS (myelodysplastic syndrome) (10/2016); Congestive heart failure (CMS-HCC); Atrial fibrillation (CMS-HCC); and Stroke (CMS-HCC) (03/2015).    SURGICAL HISTORY   has past surgical history that includes other cardiac surgery; other abdominal surgery; gyn surgery; gyn surgery; gyn surgery; other cardiac surgery; other; retinal detachment repair  "(Right); av fistula creation (Left, 2/8/2016); other abdominal surgery; gastroscopy (12/17/2015); colonoscopy (12/17/2015); and vein ligation (Left, 11/10/2016).    FAMILY HISTORY  Family History   Problem Relation Age of Onset   • Hypertension Father    • Hypertension Mother        SOCIAL HISTORY  Social History     Social History   • Marital Status:      Spouse Name: N/A   • Number of Children: N/A   • Years of Education: N/A     Social History Main Topics   • Smoking status: Never Smoker    • Smokeless tobacco: Never Used   • Alcohol Use: No   • Drug Use: No   • Sexual Activity: Not on file     Other Topics Concern   • Not on file     Social History Narrative    ** Merged History Encounter **           History   Drug Use No       CURRENT MEDICATIONS  Home Medications     **Home medications have not yet been reviewed for this encounter**          ALLERGIES  Allergies   Allergen Reactions   • Actos [Pioglitazone Hydrochloride] Unspecified     Cause blindness   MVZ=5013   • Darvocet [Propoxyphene N-Apap] Vomiting     UCJ=8791   • Demerol Vomiting     SYI=7038   • Glucophage [Metformin Hydrochloride] Vomiting     LHD=6014   • Morphine Vomiting     QZQ=3739   • Oxycodone Vomiting     RXN=1/2016   • Pcn [Penicillins] Vomiting     LFI=0361     • Requip Vomiting     RXN=12/2015   • Simvastatin Unspecified     Leg cramps  HMG=4968   • Sulfa Drugs Rash     RXN=>10 years   • Tramadol Vomiting     IQK=3394   • Trazodone Vomiting     BUF=9197   • Demerol    • Dilaudid [Hydromorphone] Vomiting     Unknown    • Diphenhydramine Vomiting   • Iron      vomiting   • Lenalidomide Vomiting   • Morphine    • Multivitamin      itching   • Naprosyn [Naproxen]    • Other Drug      Any binders that remove phosphorus from the body such as tums   • Sulfa Drugs        PHYSICAL EXAM  VITAL SIGNS: /71 mmHg  Pulse 78  Temp(Src) 36.7 °C (98.1 °F)  Resp 20  Ht 1.473 m (4' 10\")  Wt 61.6 kg (135 lb 12.9 oz)  BMI 28.39 kg/m2  " SpO2 100%  LMP 01/01/1995  Pulse ox interpretation: I interpret this pulse ox as normal.    Constitutional: Alert in no apparent distress.  HENT: No signs of trauma, Bilateral external ears normal, Nose normal.   Eyes: Pupils are equal and reactive, Conjunctiva normal, Non-icteric.   Neck: Normal range of motion, No tenderness, Supple, No stridor.   Lymphatic: No lymphadenopathy noted.   Cardiovascular: Regular rate and rhythm, no murmurs.   Thorax & Lungs: Normal breath sounds, No respiratory distress, No wheezing, No chest tenderness.   Abdomen: Bowel sounds normal, Soft, No tenderness, No masses, No pulsatile masses. No peritoneal signs.  Skin: Warm, Dry, No erythema, No rash.   Back: No bony tenderness, No CVA tenderness.   Extremities: Fistula left upper extremity, No edema, No tenderness, No cyanosis, Musculoskeletal: Good range of motion in all major joints. No tenderness to palpation or major deformities noted.   Neurologic: Alert , Normal motor function, Normal sensory function, No focal deficits noted.   Psychiatric: Affect normal, Judgment normal, Mood normal.     DIAGNOSTIC STUDIES / PROCEDURES    EKG  Interpreted by me    Rhythm:  Normal sinus rhythm   Rate: 81  Axis: normal  Intervals: QT 50 6  Ectopy: none  Conduction: normal  ST Segments: no acute change  T Waves: no acute change  Q Waves: none  Old EKG from 3/2017shows no significant changes    LABS  Results for orders placed or performed during the hospital encounter of 05/10/17   CBC WITH DIFFERENTIAL   Result Value Ref Range    WBC 5.8 4.8 - 10.8 K/uL    RBC 2.00 (L) 4.20 - 5.40 M/uL    Hemoglobin 6.1 (L) 12.0 - 16.0 g/dL    Hematocrit 18.3 (L) 37.0 - 47.0 %    MCV 92.5 81.4 - 97.8 fL    MCH 30.0 27.0 - 33.0 pg    MCHC 32.4 (L) 33.6 - 35.0 g/dL    RDW 61.2 (H) 35.9 - 50.0 fL    Platelet Count 251 164 - 446 K/uL    MPV 13.2 (H) 9.0 - 12.9 fL    Neutrophils-Polys 51.70 44.00 - 72.00 %    Lymphocytes 34.50 22.00 - 41.00 %    Monocytes 7.50 0.00 -  13.40 %    Eosinophils 4.90 0.00 - 6.90 %    Basophils 0.50 0.00 - 1.80 %    Immature Granulocytes 0.90 0.00 - 0.90 %    Nucleated RBC 0.00 /100 WBC    Neutrophils (Absolute) 2.99 2.00 - 7.15 K/uL    Lymphs (Absolute) 1.99 1.00 - 4.80 K/uL    Monos (Absolute) 0.43 0.00 - 0.85 K/uL    Eos (Absolute) 0.28 0.00 - 0.51 K/uL    Baso (Absolute) 0.03 0.00 - 0.12 K/uL    Immature Granulocytes (abs) 0.05 0.00 - 0.11 K/uL    NRBC (Absolute) 0.00 K/uL   COMP METABOLIC PANEL   Result Value Ref Range    Sodium 133 (L) 135 - 145 mmol/L    Potassium 3.6 3.6 - 5.5 mmol/L    Chloride 96 96 - 112 mmol/L    Co2 26 20 - 33 mmol/L    Anion Gap 11.0 0.0 - 11.9    Glucose 370 (H) 65 - 99 mg/dL    Bun 49 (H) 8 - 22 mg/dL    Creatinine 5.94 (HH) 0.50 - 1.40 mg/dL    Calcium 7.1 (L) 8.5 - 10.5 mg/dL    AST(SGOT) 21 12 - 45 U/L    ALT(SGPT) 25 2 - 50 U/L    Alkaline Phosphatase 79 30 - 99 U/L    Total Bilirubin 0.3 0.1 - 1.5 mg/dL    Albumin 3.4 3.2 - 4.9 g/dL    Total Protein 6.2 6.0 - 8.2 g/dL    Globulin 2.8 1.9 - 3.5 g/dL    A-G Ratio 1.2 g/dL   LIPASE   Result Value Ref Range    Lipase 44 11 - 82 U/L   MAGNESIUM   Result Value Ref Range    Magnesium 1.9 1.5 - 2.5 mg/dL   PHOSPHORUS   Result Value Ref Range    Phosphorus 5.3 (H) 2.5 - 4.5 mg/dL   TROPONIN   Result Value Ref Range    Troponin I 0.07 (H) 0.00 - 0.04 ng/mL   ESTIMATED GFR   Result Value Ref Range    GFR If  9 (A) >60 mL/min/1.73 m 2    GFR If Non African American 7 (A) >60 mL/min/1.73 m 2   EKG (ER)   Result Value Ref Range    Report       [facility] Emergency Dept.    Test Date:  2017-05-10  Pt Name:    JESUS SILVEIRA              Department: ER  MRN:        [id number]                      Room:        19  Gender:     F                            Technician: [id number]  :        1955                   Requested By:ER TRIAGE PROTOCOL  Order #:    [id number]                    Reading MD:    Measurements  Intervals                                 Axis  Rate:       81                           P:          -11  GA:         164                          QRS:        -5  QRSD:       86                           T:          65  QT:         436  QTc:        506    Interpretive Statements  SINUS RHYTHM  CONSIDER LEFT VENTRICULAR HYPERTROPHY  BORDERLINE PROLONGED QT INTERVAL  Compared to ECG 03/20/2017 06:29:11  Myocardial infarct finding no longer present         All labs reviewed by me.    RADIOLOGY  DX-CHEST-PORTABLE (1 VIEW)   Final Result      Cardiomegaly.        The radiologist's interpretation of all radiological studies have been reviewed by me.    COURSE & MEDICAL DECISION MAKING  Nursing notes, VS, PMSFHx reviewed in chart.    2:04 AM Patient seen and examined at bedside.     Patient reevaluated, updated result plan for admission and transfusion    Discussed case with [facility] who will admit the patient  Decision Making:  This is a 61 y.o. female presented with some generalized weakness and lightheadedness. She does have significant anemia with a hemoglobin of 6. This does appear to be secondary to her chronic disease both her chronic kidney disease and myelodysplastic syndrome. However at this time she is certainly symptomatic from we'll admit for transfusion. Slight elevation troponin I do think this is demand as she has no actual chest pain or ischemic findings are ECG but will need to follow this.  She has no significant emergent electrolyte disturbance at this time but will need her regular dialysis as an inpatient as well    Patient admitted in stable condition    FINAL IMPRESSION  1. Symptomatic anemia    2. Elevated troponin    3. Chronic kidney disease, unspecified stage    4. Myelodysplastic syndrome (CMS-HCC)             The note accurately reflects work and decisions made by me.  Ranulfo Beltran  5/10/2017  3:43 AM

## 2017-05-10 NOTE — IP AVS SNAPSHOT
5/11/2017    Vielka Lucio [facility]  [address]   [place] [address]    Dear Vielka:    [facility] wants to ensure your discharge home is safe and you or your loved ones have had all of your questions answered regarding your care after you leave the hospital.    Below is a list of resources and contact information should you have any questions regarding your hospital stay, follow-up instructions, or active medical symptoms.    Questions or Concerns Regarding… Contact   Medical Questions Related to Your Discharge  (7 days a week, 8am-5pm) Contact a Nurse Care Coordinator   [phone]   Medical Questions Not Related to Your Discharge  (24 hours a day / 7 days a week)  Contact the Nurse Health Line   [phone]    Medications or Discharge Instructions Refer to your discharge packet   or contact your [facility] Primary Care Provider   [phone]   Follow-up Appointment(s) Schedule your appointment via [employer]   or contact Scheduling [phone]   Billing Review your statement via [employer]  or contact Billing [phone]   Medical Records Review your records via [employer]   or contact Medical Records [phone]     You may receive a telephone call within two days of discharge. This call is to make certain you understand your discharge instructions and have the opportunity to have any questions answered. You can also easily access your medical information, test results and upcoming appointments via the [employer] free online health management tool. You can learn more and sign up at [employer]. For assistance setting up your [employer] account, please call [phone].    Once again, we want to ensure your discharge home is safe and that you have a clear understanding of any next steps in your care. If you have any questions or concerns, please do not hesitate to contact us, we are here for you. Thank you for choosing [facility] for your healthcare needs.    Sincerely,    Your [facility] Healthcare Team

## 2017-05-10 NOTE — ED NOTES
Talked to [facility] green team, hgb 6.4 after 1st unit rbcs. New orders for another unit RBCs and pt able to have food

## 2017-05-11 VITALS
TEMPERATURE: 97.5 F | SYSTOLIC BLOOD PRESSURE: 129 MMHG | RESPIRATION RATE: 18 BRPM | OXYGEN SATURATION: 97 % | WEIGHT: 147.71 LBS | BODY MASS INDEX: 31.01 KG/M2 | HEIGHT: 58 IN | HEART RATE: 69 BPM | DIASTOLIC BLOOD PRESSURE: 68 MMHG

## 2017-05-11 LAB
ALBUMIN SERPL BCP-MCNC: 3.3 G/DL (ref 3.2–4.9)
ALBUMIN/GLOB SERPL: 1 G/DL
ALP SERPL-CCNC: 71 U/L (ref 30–99)
ALT SERPL-CCNC: 23 U/L (ref 2–50)
ANION GAP SERPL CALC-SCNC: 9 MMOL/L (ref 0–11.9)
AST SERPL-CCNC: 18 U/L (ref 12–45)
BILIRUB SERPL-MCNC: 0.8 MG/DL (ref 0.1–1.5)
BUN SERPL-MCNC: 31 MG/DL (ref 8–22)
CALCIUM SERPL-MCNC: 8 MG/DL (ref 8.5–10.5)
CHLORIDE SERPL-SCNC: 100 MMOL/L (ref 96–112)
CO2 SERPL-SCNC: 26 MMOL/L (ref 20–33)
CREAT SERPL-MCNC: 3.13 MG/DL (ref 0.5–1.4)
ERYTHROCYTE [DISTWIDTH] IN BLOOD BY AUTOMATED COUNT: 49.5 FL (ref 35.9–50)
GFR SERPL CREATININE-BSD FRML MDRD: 15 ML/MIN/1.73 M 2
GLOBULIN SER CALC-MCNC: 3.2 G/DL (ref 1.9–3.5)
GLUCOSE BLD-MCNC: 133 MG/DL (ref 65–99)
GLUCOSE BLD-MCNC: 256 MG/DL (ref 65–99)
GLUCOSE SERPL-MCNC: 197 MG/DL (ref 65–99)
HCT VFR BLD AUTO: 29.8 % (ref 37–47)
HGB BLD-MCNC: 10.1 G/DL (ref 12–16)
MAGNESIUM SERPL-MCNC: 1.9 MG/DL (ref 1.5–2.5)
MCH RBC QN AUTO: 30.9 PG (ref 27–33)
MCHC RBC AUTO-ENTMCNC: 33.9 G/DL (ref 33.6–35)
MCV RBC AUTO: 91.1 FL (ref 81.4–97.8)
PLATELET # BLD AUTO: 200 K/UL (ref 164–446)
PMV BLD AUTO: 13.3 FL (ref 9–12.9)
POTASSIUM SERPL-SCNC: 4.1 MMOL/L (ref 3.6–5.5)
PROT SERPL-MCNC: 6.5 G/DL (ref 6–8.2)
RBC # BLD AUTO: 3.27 M/UL (ref 4.2–5.4)
SODIUM SERPL-SCNC: 135 MMOL/L (ref 135–145)
WBC # BLD AUTO: 4.5 K/UL (ref 4.8–10.8)

## 2017-05-11 PROCEDURE — 700102 HCHG RX REV CODE 250 W/ 637 OVERRIDE(OP): Performed by: INTERNAL MEDICINE

## 2017-05-11 PROCEDURE — 700111 HCHG RX REV CODE 636 W/ 250 OVERRIDE (IP): Performed by: INTERNAL MEDICINE

## 2017-05-11 PROCEDURE — 85027 COMPLETE CBC AUTOMATED: CPT

## 2017-05-11 PROCEDURE — 700102 HCHG RX REV CODE 250 W/ 637 OVERRIDE(OP): Performed by: STUDENT IN AN ORGANIZED HEALTH CARE EDUCATION/TRAINING PROGRAM

## 2017-05-11 PROCEDURE — A9270 NON-COVERED ITEM OR SERVICE: HCPCS | Performed by: INTERNAL MEDICINE

## 2017-05-11 PROCEDURE — 99239 HOSP IP/OBS DSCHRG MGMT >30: CPT | Mod: GC | Performed by: INTERNAL MEDICINE

## 2017-05-11 PROCEDURE — 36415 COLL VENOUS BLD VENIPUNCTURE: CPT

## 2017-05-11 PROCEDURE — 83735 ASSAY OF MAGNESIUM: CPT

## 2017-05-11 PROCEDURE — 80053 COMPREHEN METABOLIC PANEL: CPT

## 2017-05-11 PROCEDURE — 82962 GLUCOSE BLOOD TEST: CPT

## 2017-05-11 PROCEDURE — A9270 NON-COVERED ITEM OR SERVICE: HCPCS | Performed by: STUDENT IN AN ORGANIZED HEALTH CARE EDUCATION/TRAINING PROGRAM

## 2017-05-11 RX ORDER — ERGOCALCIFEROL 1.25 MG/1
50000 CAPSULE ORAL
Qty: 30 CAP | Refills: 3 | Status: SHIPPED | OUTPATIENT
Start: 2017-05-11 | End: 2017-06-05

## 2017-05-11 RX ORDER — ERGOCALCIFEROL 1.25 MG/1
50000 CAPSULE ORAL
Status: DISCONTINUED | OUTPATIENT
Start: 2017-05-11 | End: 2017-05-11 | Stop reason: HOSPADM

## 2017-05-11 RX ADMIN — HEPARIN SODIUM 5000 UNITS: 5000 INJECTION, SOLUTION INTRAVENOUS; SUBCUTANEOUS at 05:32

## 2017-05-11 RX ADMIN — INSULIN LISPRO 3 UNITS: 100 INJECTION, SOLUTION INTRAVENOUS; SUBCUTANEOUS at 11:13

## 2017-05-11 RX ADMIN — CARVEDILOL 12.5 MG: 12.5 TABLET, FILM COATED ORAL at 07:32

## 2017-05-11 RX ADMIN — INSULIN LISPRO 1 UNITS: 100 INJECTION, SOLUTION INTRAVENOUS; SUBCUTANEOUS at 05:33

## 2017-05-11 RX ADMIN — ERGOCALCIFEROL 50000 UNITS: 1.25 CAPSULE ORAL at 11:47

## 2017-05-11 RX ADMIN — PREGABALIN 50 MG: 25 CAPSULE ORAL at 07:31

## 2017-05-11 RX ADMIN — SITAGLIPTIN 25 MG: 25 TABLET, FILM COATED ORAL at 09:00

## 2017-05-11 RX ADMIN — AMLODIPINE BESYLATE 10 MG: 10 TABLET ORAL at 07:31

## 2017-05-11 ASSESSMENT — ENCOUNTER SYMPTOMS
DOUBLE VISION: 0
WHEEZING: 0
PALPITATIONS: 0
DIZZINESS: 0
HEMOPTYSIS: 0
VOMITING: 0
TINGLING: 0
NAUSEA: 0
HEADACHES: 0
BLURRED VISION: 1
CHILLS: 0
MYALGIAS: 0
SPUTUM PRODUCTION: 0
SORE THROAT: 0
FOCAL WEAKNESS: 0
HEARTBURN: 0
FEVER: 0
COUGH: 0
EYE PAIN: 0

## 2017-05-11 ASSESSMENT — PAIN SCALES - GENERAL: PAINLEVEL_OUTOF10: 0

## 2017-05-11 NOTE — CONSULTS
DATE OF SERVICE:  05/10/2017    DATE OF ADMISSION:  05/10/2017      DATE OF CONSULTATION:  05/10/2017      REQUESTING PHYSICIAN:  [facility] resident service.      REASON FOR CONSULTATION:  Evaluation and management of end-stage renal   disease.      HISTORY OF PRESENT ILLNESS:  This is a 61-year-old female with past medical   history significant for end-stage renal disease, on chronic hemodialysis   Monday, Wednesday, and Fridays via a left upper arm AV fistula, hypertension,   diabetes mellitus type 2, anemia secondary to myelodysplastic syndrome and   chronic kidney disease, peripheral vascular disease, atrial fibrillation,   renal osteodystrophy, who is admitted with complaints of generalized weakness,   lightheadedness and symptomatic anemia as well as chest pain and was found to   have a hemoglobin of 6.1 on admission.  The patient has a history of   myelodysplastic syndrome as well as anemia of chronic kidney disease, both of   which contribute to symptomatic anemia.  She has been requiring blood   transfusions pretty much on a monthly basis as an outpatient and she has   hyporesponsiveness to Epogen therapy.  The patient is followed by hematology,   Dr. Avila, as an outpatient for her myelodysplastic syndrome and she was   recently started on chemotherapy with Vidaza and she completed her first cycle   on 05/05/2017.  The patient reports that a few days ago, she started to   develop some generalized weakness and felt that her blood count was dropping.    She called the office to arrange for outpatient blood transfusion as she has   done in the past; however, the soon as they were able to schedule her for   outpatient blood transfusion at the infusion center was on Saturday,   05/14/2017.  The patient reports that her weakness and lightheadedness and   dizziness progressed.  She started to develop chest pain earlier today and   felt that her anemia was to the point where she would not be able to wait   until  Saturday to have an outpatient transfusion, so she presented to the   emergency room for further evaluation.  Labs in the emergency room revealed a   hemoglobin of 6.1.  She was transfused 2 units of packed red blood cells in   the ER, her chest pain has improved now, her hemoglobin after the first unit   of packed red blood cells improved to 6.4.  Patient has been seen on dialysis   now and she is receiving a third unit now with dialysis.  Goal hemoglobin per   hematology/oncology recommendations is between 8.5-9.0.  Patient denies any   lower extremity edema.  She denies any nausea or vomiting or any bloody   stools.  She reports compliance with her outpatient dialysis treatments and   she has been receiving Epogen with her outpatient dialysis.      REVIEW OF SYSTEMS:  Patient reports some generalized weakness as well as some   dizziness and extreme fatigue.  She denies any shortness of breath, although   she has been having some chest pain and some dyspnea with exertion.  She   denies any lower extremity edema.  She denies any nausea or vomiting or   hemetemesis.  Denies any bloody stools.  The rest of the 12-point review of   systems is negative.      PAST MEDICAL HISTORY:  1.  End-stage renal disease, on chronic hemodialysis Monday, Wednesday, and   Fridays.    2.  Anemia secondary to myelodysplastic syndrome as well as anemia secondary   to chronic kidney disease.  Patient follows with Dr. Avila,   hematology/oncology and has recently started Vidaza chemotherapy for her   myelodysplastic syndrome.  Her first cycle is completed on 05/05/2017.    3.  Chest pain in the past has been related to anemia.  She has had a recent   negative myocardial perfusion study.    4.  Hypertension.    5.  Diabetes mellitus type 2.    6.  History of atrial fibrillation.    7.  Vitamin D deficiency.    8.  Peripheral vascular disease.  Patient has a history of stent placements in   the lower extremities.    9.  COPD.  Patient is  on home oxygen.    10.  Diastolic CHF.    11.  History of CVA with no residual deficits.    12.  Legally blind.    13.  History of steal syndrome, required revision of her left arm AV fistula   by Dr. Ranulfo Jolly in 2016.      PAST SURGICAL HISTORY:    1.   x2.    2.  Hysterectomy.    3.  D and C x2.    4.  Appendectomy.    5.  Left arm AV fistula creation by Dr. Ranulfo Jolly.    6.  Revision of the left arm AV fistula by Dr. Ranulfo Jolly in 2016   secondary to steal syndrome.     7.  History of stent placement to the bilateral lower extremities.    8.  Hemodialysis catheter placements and removal.    9.  Retinal detachment surgery.    10.  Bilateral cataract surgery.    11.  Coronary angiogram.    12.  Upper EGD.      ALLERGIES:  PATIENT IS ALLERGIC TO ACTOS, DARVOCET, DEMEROL, GLUCOPHAGE,   MORPHINE, OXYCODONE, PENICILLIN, REQUIP, SIMVASTATIN, SULFA DRUGS, TRAMADOL,   TRAZODONE, DILAUDID, BENADRYL, IRON, MULTIVITAMINS AND PHOSPHORUS BINDERS.      CURRENT MEDICATIONS:    1.  Norvasc 10 mg daily.    2.  Coreg 12.5 mg twice daily.    3.  Heparin 5000 units subQ every 8 hours.    4.  Humalog insulin sliding scale.    5.  Xalatan eye drops.    6.  Lyrica 50 mg twice daily.    7.  Januvia 25 mg daily.    8.  Tylenol p.r.n.    9.  Norco p.r.n.    10.  Labetalol p.r.n.    11.  Compazine p.r.n.      SOCIAL HISTORY:  Patient lives with her daughter.  She denies any smoking,   alcohol or illicit drug use.  She is currently disabled.  She used to work as   a CNA.      FAMILY HISTORY:  There is no family history of kidney disease or relatives on   dialysis.      PHYSICAL EXAMINATION:    VITAL SIGNS:  Temperature is 36.2 degrees Celsius, pulse 71, respirations 18,   blood pressure 179/79 and satting 99% on 2 liters nasal cannula.    GENERAL:  The patient is alert and oriented x3 and in no acute distress.  She   does appear chronically ill and older than her stated age.    HEENT:  Pupils are equal, round  and reactive to light.  Extraocular muscles   are intact.  Mucous membranes appear dry.    NECK:  Exam reveals no JVD.  Trachea is midline.  There is no thyromegaly.    CARDIOVASCULAR:  Heart is regular rate and rhythm.  No murmurs, rubs or   gallops.    RESPIRATORY:  Lungs are generally clear to auscultation bilaterally.  No   wheezes, rales or rhonchi.  There is no tachypnea.  There is no increased work   of breathing.    GASTROINTESTINAL:  Abdomen is soft, nontender, nondistended.  Bowel sounds are   present.    EXTREMITIES:  Reveals no clubbing, cyanosis or edema.  There is a left arm AV   fistula in place with a palpable thrill and audible bruit.    SKIN:  Reveals no rashes or ulcerations.  There is normal skin turgor.    NEUROLOGIC:  Reveals no focal motor deficits.  Sensation is grossly intact to   light touch.    LYMPHATIC:  Exam reveals no cervical lymphadenopathy, no axillary   lymphadenopathy.    PSYCHIATRIC:  Patient is alert and oriented x3.  She has an appropriate mood   and affect.      LABORATORY DATA:  Labs reveal a white blood cell count of 5.8, hemoglobin 6.1   and platelet count is 251.  Differential reveals 52% neutrophils, 34%   lymphocytes.  Chemistries reveal a sodium of 133, potassium of 3.6, chloride   is 96, bicarbonate is 26, BUN is 49, creatinine is 5.94 and calcium is 7.1.    AST is 21, ALT is 25, alkaline phosphatase is 79, albumin is 3.4, phosphorus   is 5.3, magnesium is 1.9.  Lipase is 44.  Troponins x2 are negative.      IMAGING:  Chest x-ray on 05/10/2017 reveals no evidence of focal consolidation   or evidence of pulmonary edema.  There are no pleural effusions, heart is   enlarged.      ASSESSMENT AND PLAN:    1.  End-stage renal disease.  Patient dialyzes normally Monday, Wednesday, and   Fridays via a left arm AV fistula as an outpatient.  She did not have   hemodialysis today.  We will plan on hemodialysis today and we will transfuse   an additional 1 unit of packed red blood  cells with hemodialysis.  We will   remove fluid with hemodialysis as tolerated.  Recommend dose adjusting all of   her medications and antibiotics for decreased GFR.    2.  Anemia secondary to combination of myelodysplastic syndrome and chronic   kidney disease.  Goal hemoglobin is 8.5-9.0 per hematology/oncology   recommendations, patient was transfused 2 units of packed red blood cells in   the emergency room.  We will transfuse another 1 unit of packed red blood   cells with hemodialysis now.  Patient is to follow up with hematology/oncology   as an outpatient, as she is receiving chemotherapy Vidaza for treatment of   her myelodysplastic syndrome and her last dose was on 05/05/2017.    3.  Hypertension.  Blood pressure was elevated on admission, now improved a   little bit; however, elevated again right before dialysis was started.  We   will plan on fluid removal with hemodialysis.  Some of this may be volume   mediated.  Continue home blood pressure medications.    4.  Diabetes mellitus type 2.  Management per the primary service.    5.  Renal osteodystrophy.  Phosphorus levels within acceptable range.    Recommend a low phosphorus diet and continue to monitor.    6.  Chronic obstructive pulmonary disease.  Continue home oxygen.    7.  Peripheral vascular disease.  Continue supportive care.    8.  Atrial fibrillation.  Continue medical management.    9.  Chest pain.  This is now resolved, possibly related to her anemia, monitor   cardiac enzymes and transfusions as above.    10.  Hyponatremia, this is mild.  We will plan on hemodialysis today.    11.  Mild protein calorie malnutrition, recommend no dietary protein   restrictions at this time.      Thank you for this very interesting consult and thank you for involving me in   the care of this very pleasant patient.  If you have any questions or need any   further information, please do not hesitate to contact me.       ____________________________________      MD ABY MYLES / RAMONE    DD:  05/10/2017 18:51:54  DT:  05/10/2017 21:17:54    D#:  [id number]  Job#:  [id number]

## 2017-05-11 NOTE — PROGRESS NOTES
[facility] Nephrology Progress Note    Patient seen and examined on hemodialysis  VSS--see dialysis treatment sheet for full details  Labs reviewed  Pt received 2 units PRBC's while in ED  Transfused 3rd unit PRBC while on HD today

## 2017-05-11 NOTE — ASSESSMENT & PLAN NOTE
- Follows Dr. Nicholson; per pt was diagnosed 11/2016, has required monthly RBC transfusions  - On Vidaza, last dose 5/5/2017; Wll need to follow up with Dr. Avila for continued care

## 2017-05-11 NOTE — PROGRESS NOTES
[facility] - PROGRESS NOTE               Author: Za Liu Date & Time created: 5/11/2017  10:16 AM     HPI: 61 year old female with a PMH of myelodysplastic syndrome with transfusion dependent anemia, ESRD on HD MWF, type II DM, HTN, A. Fib, COPD on 2L home 02 all day, and previous CVA presents with dizziness, weakness, fatigue and CP. Patient was admitted from 1/6-1/8, 1/30-2/1, 2/20-2/22 and 3/19-3/21 with similar complaints and on her most recent hospitalization received 2 units PRBC's before being discharged. She is transfusion dependent and has received multiple transfusions in the past.The patient reports that a few days ago, she started to  develop some generalized weakness. She called the office to arrange for outpatient blood transfusion as she has  done in the past; however, the soon as they were able to schedule her for  outpatient blood transfusion at the infusion center was on Saturday,  05/14/2017.  The patient reports that her weakness and lightheadedness and  dizziness progressed.  She started to develop chest pain and felt that her anemia was to the point where she would not be able to wait until Saturday to have an outpatient transfusion, so she presented to the  emergency room for further evaluation. Patient is followed outpatient by hematologist Dr. Avila for myelodysplastic syndrome and was initiated on Vidaza and 5/1 and received a second dose on 5/5. She states that the plan is a 6 month course of chemo. In the emergency room, Patient received 2 units of pRBCs. Nephrology consulted for her ESRD and she was seen by Dr. Le who resumed dialysis at the regular schedule ans also gave 1 unit of PRBCs with dialysis.    DAILY NEPHROLOGY SUMMARY:  5/10/17 - Consult done  5/11/17 -  NAEO, patient feeling better. Weakness resolved. Had dialysis yesterday with 3 L out.       Review of Systems:  Review of Systems   Constitutional: Negative for fever and chills.   HENT: Negative for congestion and sore throat.    Eyes: Positive for blurred vision (legaly blind). Negative for double vision and pain.   Respiratory: Negative for cough, hemoptysis, sputum production and wheezing.    Cardiovascular: Negative for chest pain, palpitations and leg swelling.   Gastrointestinal: Negative for heartburn, nausea and vomiting.   Genitourinary: Negative for dysuria and urgency.   Musculoskeletal: Negative for myalgias.   Skin: Negative for rash.   Neurological: Negative for dizziness, tingling, focal weakness and headaches.       PAST FAMILY HISTORY: Reviewed and Unchanged  SOCIAL HISTORY: Reviewed and Unchanged  CURRENT MEDICATIONS: Reviewed  LABORATORY RESULTS: Reviewed  IMAGING STUDIES: Reviewed    Physical Exam:  Temp (24hrs), Av.2 °C (97.1 °F), Min:35.6 °C (96.1 °F), Max:36.8 °C (98.3 °F)  Temperature: 36.2 °C (97.2 °F)  Pulse  Av.6  Min: 61  Max: 82Heart Rate (Monitored): 71  Blood Pressure: 160/76 mmHg, NIBP: (!) 161/77 mmHg     Respiratory:    Respiration: 18, Pulse Oximetry: 100 %    Physical Exam   Constitutional: She is oriented to person, place, and time. She appears well-developed and well-nourished. No distress.   HENT:   Head: Normocephalic and atraumatic.   Eyes: Conjunctivae are normal.   Neck: Neck supple. No thyromegaly present.   Cardiovascular: Normal rate and regular rhythm.    No murmur heard.  Left arm fistula with palpable thrill and audible bruit   Pulmonary/Chest: Effort normal and breath sounds normal. No respiratory distress. She has no wheezes. She has no rales.   Abdominal: She exhibits no distension. There is no tenderness.   Musculoskeletal: She exhibits no edema or tenderness.   Neurological: She is alert and oriented to person, place, and time.          Fluids:    Intake/Output Summary (Last 24 hours) at 05/11/17 1016  Last data filed at 05/10/17 2100   Gross per 24 hour   Intake   1700 ml   Output   3916 ml   Net  -2216 ml     IMPRESSION:  1.  End-stage renal disease.  Etiology likely secondary to    hypertension/diabetes mellitus.  2.  Acute on chronic anemia.  Etiology secondary to myelodysplastic syndrome    superimposed with chronic kidney disease.  3.  Hypertension.  4.  Type 2 diabetes mellitus.  5.  Renal osteodystrophy.  6.  Hyperlipidemia.  7. PVD  8.  Coronary artery disease.  9.  Chronic obstructive pulmonary disease.  10. Protein calorie malnutrition.   11. Vit D def    ASSESSMENT:  1.  Glomerular filtration rate, hemodialysis-dependent.  2.  Urine, oligoanuric.  3.  Volume, euvolemic.  4.  BP goal less than 140/90.  5.  Acid base.  No significant acid base disturbance noted.  6.  Electrolytes, stable.  7.  Anemia.  Goal hemoglobin 10-11  8.  Metaboilic bone disease.  Calcium normal.  PO4 elevated, PTH at goal, Vit D low.  9.  Dialysis access, AV fistula with positive thrill/bruit.    PLAN:  1.  Continue MWF - IHD during her inpatient stay.  2.  UF as BP tolerates  3.  Dietery phosphate restriction.  4.  Recommend no dietary protein restrictions at this time.     5.  Dose all medications per GFR <10  6.  Transfuse as needed to maintain hemoglobin greater than 7 along with any    time, she is symptomatic. Watch for iron overload.  7.  Vitamin D supplements.  8.  We will follow the patient with you very closely while inpatient.  9.  Patient stable to be discharged from nephrology stand point to continue her outpatient dialysis as scheduled      Thank you for allowing us to participate in this patient's care        Core Measures

## 2017-05-11 NOTE — CONSULTS
[facility] Nephrology Consult Note    Patient seen and examined please see my dictated consult note for full details  61yoF with PMH significant for ESRD on HD MWF via L upper arm AVF, HTN, DM II, anemia secondary to myelodysplastic syndrome and CKD, PVD, Atrial Fibrillation, renal osteodystrophy, admitted with generalized weakness and symptomatic anemia with Hgb 6.1 and chest pain.    Assessment/Plan:  1. ESRD--HD MWF as outpatient, did not have HD today  --HD today and will transfuse additional 1 units PRBC with HD today  --Fluid removal with HD as tolerated  --Dose adjust all meds/abx for decreased GFR  2. Anemia--secondary to combination of myelodysplastic syndrome and CKD, goal Hgb 8.5-9.0 per heme/onc recs    --Transfused 2 units PRBCs in the ER    --Will transfuse another 1 units PRBC's with HD today  --Follow up with heme/onc as outpatient  --Pt receiving chemotherapy vidaza for treatment of MDS and last dose was 5/5/17  3. HTN--BP elevated on admission, now improved  --Fluid removal with HD  4. DM II--management per primary svc  5. Renal Osteodystrophy--low phos diet, monitor  6. COPD--on home O2  7. PVD--supportive care  8. Atrial Fibrillation--medical management  9. Chest Pain--now resolved, possibly related to anemia  --Monitor cardiac enxymes  --Transfuse as above  10. Hyponatremia--mild, HD today  11. Mild Protein-Calorie Malnutrition--no dietary protein restrictions    Report Confirmation #[id number]

## 2017-05-11 NOTE — PROGRESS NOTES
report performed with Kin. Pt is currently at dialysis. POC and doctors orders addressed as needed.

## 2017-05-11 NOTE — CARE PLAN
Problem: Safety  Goal: Will remain free from injury  Intervention: Provide assistance with mobility  Call light within reach, treaded socks in place, bed in lowest position and locked.  Hourly rounding in progress.       Problem: Respiratory:  Goal: Respiratory status will improve  No s/sx of respiratory distress.

## 2017-05-11 NOTE — RESPIRATORY CARE
COPD EDUCATION by COPD CLINICAL EDUCATOR  5/11/2017 at 7:25 AM by Ada Tamez     Patient reviewed by COPD education team. Patient does not qualify for COPD program.

## 2017-05-11 NOTE — ASSESSMENT & PLAN NOTE
- Glucose on admission 370, A1c 8.4, patient has NOT been using insulin for 2 years  - ISS + Hypoglycemic protocol, order DM education    - Cont with sliding scale for next 24hrs to calculate loing acting insulin needs  - Start Januvia 25mg QAM and Lyrica  - Patient counseled on risk for hypoglycemia and to check her blood sugar every morning prior to taking her new oral medication

## 2017-05-11 NOTE — ASSESSMENT & PLAN NOTE
- 2/2 frequent PRBC transfusions; per overview, Dr. Avila was notified  - Pt instructed to discuss initiation of iron chelator with her Oncologist

## 2017-05-11 NOTE — PROGRESS NOTES
Assumed pt care at 0715.  Received report from ludwig RN.  Assessment completed.  Pt AAOx4.  Pt has no comlaints of pain at this time.  No other s/s of discomfort or distress. Pt ambulates up self.  Bed in lowest position and locked.  Pt calls appropriately.  Treaded socks in place, call light within reach and staff numbers provided.  Pt needs met at this time.

## 2017-05-11 NOTE — PROGRESS NOTES
D/C instructions and upcoming appointments discussed with pt.  PIV removed. Pt discharged via wheelchair with escort to [facility] for taxi transportation.

## 2017-05-11 NOTE — DISCHARGE INSTRUCTIONS
Discharge Instructions    Discharged to home by car with relative. Discharged via wheelchair, hospital escort: Yes.  Special equipment needed: Not Applicable    Be sure to schedule a follow-up appointment with your primary care doctor or any specialists as instructed.     Discharge Plan:   Diet Plan: Discussed  Activity Level: Discussed  Confirmed Follow up Appointment: Appointment Scheduled  Confirmed Symptoms Management: Discussed  Medication Reconciliation Updated: Yes  Influenza Vaccine Indication: Not indicated: Previously immunized this influenza season and > 8 years of age    I understand that a diet low in cholesterol, fat, and sodium is recommended for good health. Unless I have been given specific instructions below for another diet, I accept this instruction as my diet prescription.   Other diet: renal    Special Instructions: None    · Is patient discharged on Warfarin / Coumadin?   No     · Is patient Post Blood Transfusion?  No    Depression / Suicide Risk    As you are discharged from this [facility] facility, it is important to learn how to keep safe from harming yourself.    Recognize the warning signs:  · Abrupt changes in personality, positive or negative- including increase in energy   · Giving away possessions  · Change in eating patterns- significant weight changes-  positive or negative  · Change in sleeping patterns- unable to sleep or sleeping all the time   · Unwillingness or inability to communicate  · Depression  · Unusual sadness, discouragement and loneliness  · Talk of wanting to die  · Neglect of personal appearance   · Rebelliousness- reckless behavior  · Withdrawal from people/activities they love  · Confusion- inability to concentrate     If you or a loved one observes any of these behaviors or has concerns about self-harm, here's what you can do:  · Talk about it- your feelings and reasons for harming yourself  · Remove any means that you might use to hurt yourself (examples:  pills, rope, extension cords, firearm)  · Get professional help from the community (Mental Health, Substance Abuse, psychological counseling)  · Do not be alone:Call your Safe Contact- someone whom you trust who will be there for you.  · Call your local CRISIS HOTLINE 565-2641 or 073-410-6876  · Call your local Children's Mobile Crisis Response Team [place] (882) 797-3872 or www.SeniorLiving.Net  · Call the toll free National Suicide Prevention Hotlines   · National Suicide Prevention Lifeline 705-937-WERO (1697)  · [place] Hope Line Network 800-SUICIDE (609-0156)      Anemia, Nonspecific  Anemia is a condition in which the concentration of red blood cells or hemoglobin in the blood is below normal. Hemoglobin is a substance in red blood cells that carries oxygen to the tissues of the body. Anemia results in not enough oxygen reaching these tissues.   CAUSES   Common causes of anemia include:   · Excessive bleeding. Bleeding may be internal or external. This includes excessive bleeding from periods (in women) or from the intestine.    · Poor nutrition.    · Chronic kidney, thyroid, and liver disease.   · Bone marrow disorders that decrease red blood cell production.  · Cancer and treatments for cancer.  · HIV, AIDS, and their treatments.  · Spleen problems that increase red blood cell destruction.  · Blood disorders.  · Excess destruction of red blood cells due to infection, medicines, and autoimmune disorders.  SIGNS AND SYMPTOMS   · Minor weakness.    · Dizziness.    · Headache.  · Palpitations.    · Shortness of breath, especially with exercise.    · Paleness.  · Cold sensitivity.  · Indigestion.  · Nausea.  · Difficulty sleeping.  · Difficulty concentrating.  Symptoms may occur suddenly or they may develop slowly.   DIAGNOSIS   Additional blood tests are often needed. These help your health care provider determine the best treatment. Your health care provider will check your stool for blood and look for  other causes of blood loss.   TREATMENT   Treatment varies depending on the cause of the anemia. Treatment can include:   · Supplements of iron, vitamin B12, or folic acid.    · Hormone medicines.    · A blood transfusion. This may be needed if blood loss is severe.    · Hospitalization. This may be needed if there is significant continual blood loss.    · Dietary changes.  · Spleen removal.  HOME CARE INSTRUCTIONS  Keep all follow-up appointments. It often takes many weeks to correct anemia, and having your health care provider check on your condition and your response to treatment is very important.  SEEK IMMEDIATE MEDICAL CARE IF:   · You develop extreme weakness, shortness of breath, or chest pain.    · You become dizzy or have trouble concentrating.  · You develop heavy vaginal bleeding.    · You develop a rash.    · You have bloody or black, tarry stools.    · You faint.    · You vomit up blood.    · You vomit repeatedly.    · You have abdominal pain.  · You have a fever or persistent symptoms for more than 2-3 days.    · You have a fever and your symptoms suddenly get worse.    · You are dehydrated.    MAKE SURE YOU:  · Understand these instructions.  · Will watch your condition.  · Will get help right away if you are not doing well or get worse.     This information is not intended to replace advice given to you by your health care provider. Make sure you discuss any questions you have with your health care provider.     Document Released: 01/25/2006 Document Revised: 08/20/2014 Document Reviewed: 06/13/2014  [employer] Interactive Patient Education ©2016 [employer]

## 2017-05-11 NOTE — DISCHARGE SUMMARY
[facility] [place]      Admit Date:  5/10/2017       Discharge Date:   5/11/2017    Service:   [facility] Internal Medicine Green Team  Attending Physician(s):   Dr. Downing       Senior Resident(s):   Dr. Otoole  Gal Resident(s):   Dr. Maradiaga      Primary Diagnosis:   Anemia secondary to myelodysplastic syndrome in setting of end stage renal disease on hemodialysis    Secondary Diagnoses:                  - Anemia >> likely 2/2 myelodysplastic syndrome >> has received multiple RBC transfusion >> Iron panel indicative of mild iron overload   - Myelodysplastic syndrome  >> per patient was diagnosed 11/2016, started on Vidaza last dose was on 05/05/2017 >> f/u with Dr. Avila  - End stage renal disease >> likely 2/2 to uncontrolled HTN and DMII, Nephro following >> continue MWF HD   - Diabetes Mellitus type II >> Glucose on admission 370, A1c 8.4, patient has NOT been using insulin for 2 years, order DM education, start Januvia 25mg QAM  - Neuropathy 2/2 DM >> cont Lyrica  - HTN >> elevated on admission 181/71 >> normalized now >> cont current Amlodipine 10mg QD and Coreg 12.5mg BID    Hospital Summary (Brief Narrative):       In brief, Ms. Briscoe was admitted on 5/10 for anemia after presenting to the ED with generalized malaise and weakness. This was not a new presentation and routinely visits the ED 2/2 anemia requiring PRBC infusions. She was found to have a  Hgb of 6.1, na 133, K 3.6, BUN 49, Crt 5.94, Phos 5.4, Glucose 370 on admission and VS of T 98.1, /71, RHR 78, RR 20, O2 98 on 2L. She received 2U of blood in the ED, Nephrology was consulted (Dr. eL) and she was scheduled to have her usual hemodialysis (receieved 3rd unit of blood with HD) upon admission. The patient was started on Vit D3 supplementation as well as Epogen. Concerning her uncontrolled blood sugars, the patient was started on Januvia 25mg QAM (she is unable to use insulin 2/2 poor vision and lack of  help from daughter). She had no acute events overnight. On hospital day #2, the patient reported feeling significantly better and back to her usual baseline. Hgb improved to 10.1 and BUn/Crt 31/3.13, and POC glucose 133-215. She was counseled on the importance of following up with her PCP regarding her future diabetes control and to monitor her blood sugar daily for evidence of hypoglycemia. Additionally, her labs were evident for iron overload 2/2 frequent transfusion for which she was instructed to bring to the attention of her Oncologist (Dr. Avila). She understood these concerns. She was discharged on Januvia 25mg QD as well as Vitamin D3 in a stable state, ambulatory, without complaints, at her usual baseline, AAOx4.    Patient /Hospital Summary (Details -- Problem Oriented) :          Anemia  - Hgb 6.1 on admission, s/p 1U pRBC >> 6.4  - In setting of Myelodysplastic syndrome and ESRD, these are likely etiologies  - Denies any overt GI//Pulm bleeding >> normal bowel/urinary habits  - Per nephro, pt not responsive to Epgen 2/2 MDS >> will transfuse 3rd unit during HD  - Started on Epogen with HD    Iron overload  - 2/2 frequent PRBC transfusions; per overview, Dr. Avila was notified  - Pt instructed to discuss initiation of iron chelator with her Oncologist      Myelodysplastic syndrome     - Follows Dr. Nicholson; per pt was diagnosed 11/2016, has required monthly RBC transfusions  - On Vidaza, last dose 5/5/2017; Wll need to follow up with Dr. Avila for continued care    End stage renal disease  - BUN/Crt on admission 49/5.94  - Likely 2/2 to uncontrolled HTN and DMII  - Nephro following >> continue MWF HD  - Started VitD replacement    DM  2 complicated by peripheral neuropathy  - Glucose on admission 370, A1c 8.4, patient has NOT been using insulin for 2 years  - ISS + Hypoglycemic protocol, order DM education    - Cont with sliding scale for next 24hrs to calculate loing acting insulin needs  -  Start Januvia 25mg QAM and Lyrica  - Patient counseled on risk for hypoglycemia and to check her blood sugar every morning prior to taking her new oral medication  - Will need to follow-up with PCP for closer monitoring    HTN  - BP on admission 181/71 >> normalized with home meds  - Cont Amlodipine 10mg QD, Coreg 12.5mg BID      Consultants:     [facility]    Procedures:        Hemodialysis    Imaging/ Testing:      DX-CHEST-PORTABLE (1 VIEW)   Final Result      Cardiomegaly.            Discharge Medications:         Medication Reconciliation: Completed     Medication List      START taking these medications       Instructions    sitagliptin 25 MG Tabs   Last time this was given:  25 mg on 5/11/2017  9:00 AM   Commonly known as:  JANUVIA    Take 1 Tab by mouth every morning.   Dose:  25 mg       vitamin D (Ergocalciferol) 83422 UNITS Caps capsule   Last time this was given:  50,000 Units on 5/11/2017 11:47 AM   Commonly known as:  DRISDOL    Take 1 Cap by mouth every 7 days.   Dose:  68756 Units         CONTINUE taking these medications       Instructions    amlodipine 10 MG Tabs   Last time this was given:  10 mg on 5/11/2017  7:31 AM   Commonly known as:  NORVASC    Take 10 mg by mouth every day.   Dose:  10 mg       carvedilol 12.5 MG Tabs   Last time this was given:  12.5 mg on 5/11/2017  7:32 AM   Commonly known as:  COREG    Take 12.5 mg by mouth 2 times a day, with meals.   Dose:  12.5 mg       hydrocodone-acetaminophen 5-325 MG Tabs per tablet   Commonly known as:  NORCO    Take 1-2 Tabs by mouth every 6 hours as needed.   Dose:  1-2 Tab       LUMIGAN 0.03 % Soln   Generic drug:  bimatoprost    Place 1 Drop in both eyes every evening.   Dose:  1 Drop       LYRICA 50 MG capsule   Last time this was given:  50 mg on 5/11/2017  7:31 AM   Generic drug:  pregabalin    Take 50 mg by mouth 2 times a day.   Dose:  50 mg       prochlorperazine 10 MG Tabs   Commonly known as:  COMPAZINE    Take 10 mg by  mouth every 6 hours as needed.   Dose:  10 mg       VOLTAREN 1 % Gel   Generic drug:  Diclofenac Sodium    APPLY 2 GRAMS TOPICALLY QID                 Disposition:   Home    Diet:   Consistent carb    Activity:   As tolerated    Instructions:         The patient was instructed to return to the ER in the event of worsening symptoms. I have counseled the patient on the importance of compliance and the patient has agreed to proceed with all medical recommendations and follow up plan indicated above.   The patient understands that all medications come with benefits and risks. Risks may include permanent injury or death and these risks can be minimized with close reassessment and monitoring.        Primary Care Provider:    Dr. Nyla Nava  Discharge summary faxed to primary care provider:  Deferred  Copy of discharge summary given to the patient: Completed    Follow up appointment details :      - Patient anticipated to follow-up with PCP on 5/15/2017    Pending Studies:        None    Time spent on discharge day patient visit, preparing discharge paperwork and arranging for patient follow up.    Summary of follow up issues:   - Patient needs to closely follow with her PCP regarding her diabetes control  - Patient needs to follow with her Oncologist concerning her frequent transfusion as she is in mild iron overload    Discharge Time (Minutes) :    30min      Condition on Discharge    ______________________________________________________________________    Interval history/exam for day of discharge:    Patient seen and examined at bedside with no acute events overnight. Patient reports significantly better this AM after her transfusions. She reports feeling ready for discharge. Denies fever/chills, SOB/CP, abd pain, n/v/d. Persistent chronic back pain, but otherwise no major complaints. She had a good night's sleep.    Filed Vitals:    05/11/17 0000 05/11/17 0400 05/11/17 0722 05/11/17 0754   BP: 136/69 117/65 160/76  160/76   Pulse: 65 61 64 64   Temp: 36.8 °C (98.3 °F) 36.1 °C (97 °F)  36.2 °C (97.2 °F)   Resp: 18 20  18   Height:       Weight:       SpO2: 96% 99%  100%     Weight/BMI: Body mass index is 30.88 kg/(m^2).  Pulse Oximetry: 100 %, O2 (LPM): 2, O2 Delivery: Silicone Nasal Cannula      Physical Exam   Constitutional: She is oriented to person, place, and time and well-developed, well-nourished, and in no distress. No distress.   Head: Normocephalic and atraumatic.   Eyes: EOM are normal. Pupils are equal, round, and reactive to light.   Neck: Normal range of motion.   Cardiovascular: Normal rate, regular rhythm and normal heart sounds.     No murmur heard.  Pulmonary/Chest: Effort normal and breath sounds normal. No respiratory distress. She has no wheezes. She exhibits tenderness.   Tenderness to palpation of chest wall   Abdominal: Soft. She exhibits no distension. There is no rebound and no guarding.   Musculoskeletal: Normal range of motion. She exhibits no edema or tenderness.   Neurological: She is alert and oriented to person, place, and time. She displays normal reflexes. No cranial nerve deficit.   Skin: Skin is warm and dry. No rash noted. No erythema.     Most Recent Labs:    Lab Results   Component Value Date/Time    WBC 4.5* 05/11/2017 02:30 AM    RBC 3.27* 05/11/2017 02:30 AM    HEMOGLOBIN 10.1* 05/11/2017 02:30 AM    HEMATOCRIT 29.8* 05/11/2017 02:30 AM    MCV 91.1 05/11/2017 02:30 AM    MCH 30.9 05/11/2017 02:30 AM    MCHC 33.9 05/11/2017 02:30 AM    MPV 13.3* 05/11/2017 02:30 AM    NEUTROPHILS-POLYS 51.70 05/10/2017 02:10 AM    LYMPHOCYTES 34.50 05/10/2017 02:10 AM    MONOCYTES 7.50 05/10/2017 02:10 AM    EOSINOPHILS 4.90 05/10/2017 02:10 AM    BASOPHILS 0.50 05/10/2017 02:10 AM    HYPOCHROMIA 1+ 07/11/2016 11:55 AM    ANISOCYTOSIS 1+ 05/05/2017 03:07 PM      Lab Results   Component Value Date/Time    SODIUM 135 05/11/2017 02:30 AM    POTASSIUM 4.1 05/11/2017 02:30 AM    CHLORIDE 100 05/11/2017 02:30  AM    CO2 26 05/11/2017 02:30 AM    GLUCOSE 197* 05/11/2017 02:30 AM    BUN 31* 05/11/2017 02:30 AM    CREATININE 3.13* 05/11/2017 02:30 AM      Lab Results   Component Value Date/Time    ALT(SGPT) 23 05/11/2017 02:30 AM    AST(SGOT) 18 05/11/2017 02:30 AM    ALKALINE PHOSPHATASE 71 05/11/2017 02:30 AM    TOTAL BILIRUBIN 0.8 05/11/2017 02:30 AM    DIRECT BILIRUBIN <0.1 01/31/2017 11:03 AM    LIPASE 44 05/10/2017 02:10 AM    ALBUMIN 3.3 05/11/2017 02:30 AM    GLOBULIN 3.2 05/11/2017 02:30 AM    INR 0.96 01/30/2017 05:45 AM    MACROCYTOSIS 1+ 05/05/2017 03:07 PM     Lab Results   Component Value Date/Time    PT 13.1 01/30/2017 05:45 AM    INR 0.96 01/30/2017 05:45 AM

## 2017-05-11 NOTE — ASSESSMENT & PLAN NOTE
- Hgb 6.1 on admission, s/p 1U pRBC >> 6.4  - In setting of Myelodysplastic syndrome and ESRD, these are likely etiologies  - Denies any overt GI//Pulm bleeding >> normal bowel/urinary habits  - Per nephro, pt not responsive to Epgen 2/2 MDS >> will transfuse 3rd unit during HD  - Started on Epogen

## 2017-05-11 NOTE — ASSESSMENT & PLAN NOTE
- BUN/Crt on admission 49/5.94  - Likely 2/2 to uncontrolled HTN and DMII  - Nephro following >> continue MWF HD  - Started VitD replacement

## 2017-05-11 NOTE — PROGRESS NOTES
Patient transferred from Dialysis via bed. Report received from Ruth.   Assumed care of patient.   Pt is a/o, safety check performed, all possessions and call bell within reach.   POC and doctors orders addressed as needed.

## 2017-05-12 NOTE — CONSULTS
"Diabetes education: Met with Vielka, before discharge. Pt has a hx of diabetes with multiple allergies to medications. Pt is also legally blind.  Pt was/is unable to take insulin secondary to not able to give insulin, nor do finger sticks because of sight limitations. Pt asked about a \"talking meter\" which does exist but will need PO from PCP ( per nephrology it is the PCP managing her diabetes even though she is having a tough time getting an appointment). Pt was shown a One touch Verio with a large screen which is covered by her secondary. Pt can see to use this machine and was taught to use ( will need assistance changing the needle) and meter set up with 30 strips.  Pt talked at length about her home situation to include \"some one called [employer] \". Pt states her daughter, her daughter's  (\"who is disabled\"), and grand children live with her but she pays all the bills \"because they don't have any money, not even for gas\". Daughter is at home, but cannot help her mother because she is \"afraid of needles and does not like blood\".  Pt will need a prescription for One touch verio test strips and delica lancets to test once a day rotating ac and hs. Pt can get this when she meets with her pcp at the end of the month as she has enough strips for now. Pt is only on Januvia for diabetes.  "

## 2017-05-13 ENCOUNTER — APPOINTMENT (OUTPATIENT)
Dept: ONCOLOGY | Facility: MEDICAL CENTER | Age: 62
End: 2017-05-13
Attending: INTERNAL MEDICINE
Payer: MEDICARE

## 2017-05-24 ENCOUNTER — HOSPITAL ENCOUNTER (OUTPATIENT)
Facility: MEDICAL CENTER | Age: 62
End: 2017-05-24
Attending: INTERNAL MEDICINE
Payer: MEDICARE

## 2017-05-24 LAB — IRON SERPL-MCNC: 188 UG/DL (ref 40–170)

## 2017-05-24 PROCEDURE — 83540 ASSAY OF IRON: CPT

## 2017-06-02 ENCOUNTER — APPOINTMENT (OUTPATIENT)
Dept: ONCOLOGY | Facility: MEDICAL CENTER | Age: 62
End: 2017-06-02
Attending: INTERNAL MEDICINE
Payer: MEDICARE

## 2017-06-05 ENCOUNTER — APPOINTMENT (OUTPATIENT)
Dept: ONCOLOGY | Facility: MEDICAL CENTER | Age: 62
End: 2017-06-05
Attending: INTERNAL MEDICINE
Payer: MEDICARE

## 2017-06-05 ENCOUNTER — APPOINTMENT (OUTPATIENT)
Dept: RADIOLOGY | Facility: MEDICAL CENTER | Age: 62
DRG: 811 | End: 2017-06-05
Attending: EMERGENCY MEDICINE
Payer: MEDICARE

## 2017-06-05 ENCOUNTER — RESOLUTE PROFESSIONAL BILLING HOSPITAL PROF FEE (OUTPATIENT)
Dept: HOSPITALIST | Facility: MEDICAL CENTER | Age: 62
End: 2017-06-05
Payer: MEDICARE

## 2017-06-05 ENCOUNTER — HOSPITAL ENCOUNTER (INPATIENT)
Facility: MEDICAL CENTER | Age: 62
LOS: 5 days | DRG: 811 | End: 2017-06-10
Attending: EMERGENCY MEDICINE | Admitting: INTERNAL MEDICINE
Payer: MEDICARE

## 2017-06-05 DIAGNOSIS — N18.6 ESRD (END STAGE RENAL DISEASE) ON DIALYSIS (HCC): ICD-10-CM

## 2017-06-05 DIAGNOSIS — Z99.2 ESRD (END STAGE RENAL DISEASE) ON DIALYSIS (HCC): ICD-10-CM

## 2017-06-05 DIAGNOSIS — D46.9 MYELODYSPLASTIC SYNDROME (HCC): ICD-10-CM

## 2017-06-05 DIAGNOSIS — N18.9 RENAL FAILURE (ARF), ACUTE ON CHRONIC (HCC): ICD-10-CM

## 2017-06-05 DIAGNOSIS — N17.9 RENAL FAILURE (ARF), ACUTE ON CHRONIC (HCC): ICD-10-CM

## 2017-06-05 DIAGNOSIS — D64.9 ANEMIA, UNSPECIFIED TYPE: ICD-10-CM

## 2017-06-05 DIAGNOSIS — D46.9 MDS (MYELODYSPLASTIC SYNDROME) (HCC): ICD-10-CM

## 2017-06-05 LAB
ABO GROUP BLD: NORMAL
ALBUMIN SERPL BCP-MCNC: 3.2 G/DL (ref 3.2–4.9)
ALBUMIN/GLOB SERPL: 1 G/DL
ALP SERPL-CCNC: 65 U/L (ref 30–99)
ALT SERPL-CCNC: 13 U/L (ref 2–50)
ANION GAP SERPL CALC-SCNC: 15 MMOL/L (ref 0–11.9)
APTT PPP: 28.9 SEC (ref 24.7–36)
AST SERPL-CCNC: 11 U/L (ref 12–45)
BARCODED ABORH UBTYP: 7300
BARCODED PRD CODE UBPRD: NORMAL
BARCODED UNIT NUM UBUNT: NORMAL
BASOPHILS # BLD AUTO: 0.4 % (ref 0–1.8)
BASOPHILS # BLD: 0.02 K/UL (ref 0–0.12)
BILIRUB SERPL-MCNC: 0.4 MG/DL (ref 0.1–1.5)
BLD GP AB SCN SERPL QL: NORMAL
BLOOD CULTURE HOLD CXBCH: NORMAL
BNP SERPL-MCNC: 110 PG/ML (ref 0–100)
BUN SERPL-MCNC: 88 MG/DL (ref 8–22)
CALCIUM SERPL-MCNC: 7.1 MG/DL (ref 8.5–10.5)
CHLORIDE SERPL-SCNC: 99 MMOL/L (ref 96–112)
CO2 SERPL-SCNC: 20 MMOL/L (ref 20–33)
COMPONENT R 8504R: NORMAL
CREAT SERPL-MCNC: 8.44 MG/DL (ref 0.5–1.4)
EOSINOPHIL # BLD AUTO: 0.24 K/UL (ref 0–0.51)
EOSINOPHIL NFR BLD: 5.2 % (ref 0–6.9)
ERYTHROCYTE [DISTWIDTH] IN BLOOD BY AUTOMATED COUNT: 64.8 FL (ref 35.9–50)
GFR SERPL CREATININE-BSD FRML MDRD: 5 ML/MIN/1.73 M 2
GLOBULIN SER CALC-MCNC: 3.2 G/DL (ref 1.9–3.5)
GLUCOSE SERPL-MCNC: 123 MG/DL (ref 65–99)
HCT VFR BLD AUTO: 20.1 % (ref 37–47)
HGB BLD-MCNC: 6.7 G/DL (ref 12–16)
IMM GRANULOCYTES # BLD AUTO: 0.01 K/UL (ref 0–0.11)
IMM GRANULOCYTES NFR BLD AUTO: 0.2 % (ref 0–0.9)
INR PPP: 0.92 (ref 0.87–1.13)
LYMPHOCYTES # BLD AUTO: 1.31 K/UL (ref 1–4.8)
LYMPHOCYTES NFR BLD: 28.5 % (ref 22–41)
MCH RBC QN AUTO: 32.5 PG (ref 27–33)
MCHC RBC AUTO-ENTMCNC: 33.3 G/DL (ref 33.6–35)
MCV RBC AUTO: 97.6 FL (ref 81.4–97.8)
MONOCYTES # BLD AUTO: 0.26 K/UL (ref 0–0.85)
MONOCYTES NFR BLD AUTO: 5.7 % (ref 0–13.4)
NEUTROPHILS # BLD AUTO: 2.76 K/UL (ref 2–7.15)
NEUTROPHILS NFR BLD: 60 % (ref 44–72)
NRBC # BLD AUTO: 0 K/UL
NRBC BLD AUTO-RTO: 0 /100 WBC
PLATELET # BLD AUTO: 445 K/UL (ref 164–446)
PMV BLD AUTO: 12.4 FL (ref 9–12.9)
POTASSIUM SERPL-SCNC: 6.4 MMOL/L (ref 3.6–5.5)
PRODUCT TYPE UPROD: NORMAL
PROT SERPL-MCNC: 6.4 G/DL (ref 6–8.2)
PROTHROMBIN TIME: 12.6 SEC (ref 12–14.6)
RBC # BLD AUTO: 2.06 M/UL (ref 4.2–5.4)
RH BLD: NORMAL
SODIUM SERPL-SCNC: 134 MMOL/L (ref 135–145)
TROPONIN I SERPL-MCNC: <0.01 NG/ML (ref 0–0.04)
UNIT STATUS USTAT: NORMAL
WBC # BLD AUTO: 4.6 K/UL (ref 4.8–10.8)

## 2017-06-05 PROCEDURE — 90935 HEMODIALYSIS ONE EVALUATION: CPT

## 2017-06-05 PROCEDURE — 80053 COMPREHEN METABOLIC PANEL: CPT

## 2017-06-05 PROCEDURE — P9016 RBC LEUKOCYTES REDUCED: HCPCS

## 2017-06-05 PROCEDURE — 83880 ASSAY OF NATRIURETIC PEPTIDE: CPT

## 2017-06-05 PROCEDURE — 770006 HCHG ROOM/CARE - MED/SURG/GYN SEMI*

## 2017-06-05 PROCEDURE — 99223 1ST HOSP IP/OBS HIGH 75: CPT | Mod: AI,GC | Performed by: INTERNAL MEDICINE

## 2017-06-05 PROCEDURE — 85610 PROTHROMBIN TIME: CPT

## 2017-06-05 PROCEDURE — 94760 N-INVAS EAR/PLS OXIMETRY 1: CPT

## 2017-06-05 PROCEDURE — 36415 COLL VENOUS BLD VENIPUNCTURE: CPT

## 2017-06-05 PROCEDURE — 86923 COMPATIBILITY TEST ELECTRIC: CPT

## 2017-06-05 PROCEDURE — 96374 THER/PROPH/DIAG INJ IV PUSH: CPT

## 2017-06-05 PROCEDURE — 71010 DX-CHEST-PORTABLE (1 VIEW): CPT

## 2017-06-05 PROCEDURE — 86850 RBC ANTIBODY SCREEN: CPT

## 2017-06-05 PROCEDURE — 93005 ELECTROCARDIOGRAM TRACING: CPT | Performed by: EMERGENCY MEDICINE

## 2017-06-05 PROCEDURE — 30233J1 TRANSFUSION OF NONAUTOLOGOUS SERUM ALBUMIN INTO PERIPHERAL VEIN, PERCUTANEOUS APPROACH: ICD-10-PCS | Performed by: EMERGENCY MEDICINE

## 2017-06-05 PROCEDURE — 86900 BLOOD TYPING SEROLOGIC ABO: CPT

## 2017-06-05 PROCEDURE — 99285 EMERGENCY DEPT VISIT HI MDM: CPT

## 2017-06-05 PROCEDURE — 85730 THROMBOPLASTIN TIME PARTIAL: CPT

## 2017-06-05 PROCEDURE — 700111 HCHG RX REV CODE 636 W/ 250 OVERRIDE (IP): Performed by: INTERNAL MEDICINE

## 2017-06-05 PROCEDURE — 36430 TRANSFUSION BLD/BLD COMPNT: CPT

## 2017-06-05 PROCEDURE — 84484 ASSAY OF TROPONIN QUANT: CPT

## 2017-06-05 PROCEDURE — 700102 HCHG RX REV CODE 250 W/ 637 OVERRIDE(OP): Performed by: INTERNAL MEDICINE

## 2017-06-05 PROCEDURE — 85025 COMPLETE CBC W/AUTO DIFF WBC: CPT

## 2017-06-05 PROCEDURE — 5A1D60Z PERFORMANCE OF URINARY FILTRATION, MULTIPLE: ICD-10-PCS | Performed by: INTERNAL MEDICINE

## 2017-06-05 PROCEDURE — 86901 BLOOD TYPING SEROLOGIC RH(D): CPT

## 2017-06-05 PROCEDURE — A9270 NON-COVERED ITEM OR SERVICE: HCPCS | Performed by: INTERNAL MEDICINE

## 2017-06-05 RX ORDER — ACETAMINOPHEN 325 MG/1
650 TABLET ORAL EVERY 6 HOURS PRN
Status: DISCONTINUED | OUTPATIENT
Start: 2017-06-05 | End: 2017-06-10 | Stop reason: HOSPADM

## 2017-06-05 RX ORDER — ONDANSETRON 2 MG/ML
4 INJECTION INTRAMUSCULAR; INTRAVENOUS EVERY 4 HOURS PRN
Status: DISCONTINUED | OUTPATIENT
Start: 2017-06-05 | End: 2017-06-10 | Stop reason: HOSPADM

## 2017-06-05 RX ORDER — PREGABALIN 25 MG/1
50 CAPSULE ORAL 2 TIMES DAILY
Status: DISCONTINUED | OUTPATIENT
Start: 2017-06-05 | End: 2017-06-10 | Stop reason: HOSPADM

## 2017-06-05 RX ORDER — AMOXICILLIN 250 MG
2 CAPSULE ORAL 2 TIMES DAILY
Status: DISCONTINUED | OUTPATIENT
Start: 2017-06-05 | End: 2017-06-10 | Stop reason: HOSPADM

## 2017-06-05 RX ORDER — PROMETHAZINE HYDROCHLORIDE 25 MG/1
12.5-25 SUPPOSITORY RECTAL EVERY 4 HOURS PRN
Status: DISCONTINUED | OUTPATIENT
Start: 2017-06-05 | End: 2017-06-10 | Stop reason: HOSPADM

## 2017-06-05 RX ORDER — HEPARIN SODIUM 1000 [USP'U]/ML
500 INJECTION, SOLUTION INTRAVENOUS; SUBCUTANEOUS
Status: DISCONTINUED | OUTPATIENT
Start: 2017-06-05 | End: 2017-06-10 | Stop reason: HOSPADM

## 2017-06-05 RX ORDER — BISACODYL 10 MG
10 SUPPOSITORY, RECTAL RECTAL
Status: DISCONTINUED | OUTPATIENT
Start: 2017-06-05 | End: 2017-06-10 | Stop reason: HOSPADM

## 2017-06-05 RX ORDER — POLYETHYLENE GLYCOL 3350 17 G/17G
1 POWDER, FOR SOLUTION ORAL
Status: DISCONTINUED | OUTPATIENT
Start: 2017-06-05 | End: 2017-06-10 | Stop reason: HOSPADM

## 2017-06-05 RX ORDER — CARVEDILOL 12.5 MG/1
12.5 TABLET ORAL 2 TIMES DAILY WITH MEALS
Status: DISCONTINUED | OUTPATIENT
Start: 2017-06-05 | End: 2017-06-06

## 2017-06-05 RX ORDER — HEPARIN SODIUM 5000 [USP'U]/ML
5000 INJECTION, SOLUTION INTRAVENOUS; SUBCUTANEOUS EVERY 8 HOURS
Status: DISCONTINUED | OUTPATIENT
Start: 2017-06-05 | End: 2017-06-05

## 2017-06-05 RX ORDER — AMLODIPINE BESYLATE 10 MG/1
10 TABLET ORAL DAILY
Status: DISCONTINUED | OUTPATIENT
Start: 2017-06-05 | End: 2017-06-10 | Stop reason: HOSPADM

## 2017-06-05 RX ORDER — DEXTROSE MONOHYDRATE 25 G/50ML
25 INJECTION, SOLUTION INTRAVENOUS
Status: DISCONTINUED | OUTPATIENT
Start: 2017-06-05 | End: 2017-06-10 | Stop reason: HOSPADM

## 2017-06-05 RX ADMIN — AMLODIPINE BESYLATE 10 MG: 10 TABLET ORAL at 22:23

## 2017-06-05 RX ADMIN — HEPARIN SODIUM 500 UNITS: 1000 INJECTION, SOLUTION INTRAVENOUS; SUBCUTANEOUS at 16:39

## 2017-06-05 RX ADMIN — PREGABALIN 50 MG: 25 CAPSULE ORAL at 22:23

## 2017-06-05 RX ADMIN — SENNOSIDES AND DOCUSATE SODIUM 2 TABLET: 8.6; 5 TABLET ORAL at 22:23

## 2017-06-05 ASSESSMENT — ENCOUNTER SYMPTOMS
HEADACHES: 0
VOMITING: 1
BLURRED VISION: 1
BACK PAIN: 1
SHORTNESS OF BREATH: 1
CONSTIPATION: 0
NAUSEA: 1
MYALGIAS: 1
ABDOMINAL PAIN: 1
DIZZINESS: 1
WHEEZING: 0
COUGH: 0
NECK PAIN: 1
CHILLS: 0
DIARRHEA: 1
WEAKNESS: 1
DIAPHORESIS: 1
ORTHOPNEA: 1
BLOOD IN STOOL: 0
FEVER: 0
PALPITATIONS: 0
DEPRESSION: 0

## 2017-06-05 ASSESSMENT — COGNITIVE AND FUNCTIONAL STATUS - GENERAL
SUGGESTED CMS G CODE MODIFIER MOBILITY: CH
MOBILITY SCORE: 24
DAILY ACTIVITIY SCORE: 24
SUGGESTED CMS G CODE MODIFIER DAILY ACTIVITY: CH

## 2017-06-05 ASSESSMENT — PATIENT HEALTH QUESTIONNAIRE - PHQ9
1. LITTLE INTEREST OR PLEASURE IN DOING THINGS: NOT AT ALL
SUM OF ALL RESPONSES TO PHQ9 QUESTIONS 1 AND 2: 0
SUM OF ALL RESPONSES TO PHQ QUESTIONS 1-9: 0
2. FEELING DOWN, DEPRESSED, IRRITABLE, OR HOPELESS: NOT AT ALL

## 2017-06-05 ASSESSMENT — LIFESTYLE VARIABLES
HAVE PEOPLE ANNOYED YOU BY CRITICIZING YOUR DRINKING: NO
ON A TYPICAL DAY WHEN YOU DRINK ALCOHOL HOW MANY DRINKS DO YOU HAVE: 0
TOTAL SCORE: 0
AVERAGE NUMBER OF DAYS PER WEEK YOU HAVE A DRINK CONTAINING ALCOHOL: 0
CONSUMPTION TOTAL: NEGATIVE
HOW MANY TIMES IN THE PAST YEAR HAVE YOU HAD 5 OR MORE DRINKS IN A DAY: 0
EVER_SMOKED: NEVER
EVER FELT BAD OR GUILTY ABOUT YOUR DRINKING: NO
TOTAL SCORE: 0
DO YOU DRINK ALCOHOL: NO
HAVE YOU EVER FELT YOU SHOULD CUT DOWN ON YOUR DRINKING: NO
TOTAL SCORE: 0
EVER HAD A DRINK FIRST THING IN THE MORNING TO STEADY YOUR NERVES TO GET RID OF A HANGOVER: NO

## 2017-06-05 ASSESSMENT — PAIN SCALES - GENERAL
PAINLEVEL_OUTOF10: 7
PAINLEVEL_OUTOF10: 0

## 2017-06-05 NOTE — ED PROVIDER NOTES
ED Provider Note    Scribed for Carline Ramos M.D. by Nahun River. 6/5/2017  12:08 PM    Primary care provider: Nyla Nava M.D.  Means of arrival: ambulance  History obtained from: patient  History limited by: none     CHIEF COMPLAINT  Chief Complaint   Patient presents with   • Weakness   • Abnormal Labs       HPI  Vielka Briscoe is a 61 y.o. Female, with a history of mild dysplastic syndrome, who presents to the Emergency Department complaining of worsening weakness, onset approximately 7 days ago. She is followed by Dr. Avila for her cancer and is scheduled to have dialysis today. She denies chest pain and bilateral leg swelling. She confirms intermittent headaches, nausea and vomiting in the last several days. She has not been eating secondary to her nausea and has also been coughing due to her vomiting. She denies hematuria,  Melena and hematochezia. She adds that she does not use alcohol or smoke. No fever, cough or sore throat. History of anemia.     REVIEW OF SYSTEMS  CARDIAC: no chest pain  PULMONARY: Positive cough   GI:  Positive nausea and vomiting, diarrhea, melena, hematochezia.    : no hematuria   Musculoskeletal: no bilateral leg swelling  Endocrine: no fevers  See history of present illness. All other systems are negative  C    PAST MEDICAL HISTORY   has a past medical history of Heart abnormalities; Angina; Diabetes; Hypertension; Chronic anemia; Chronic obstructive pulmonary disease (CMS-[facility]); Blood transfusion without reported diagnosis; Anemia; Breath shortness; Glaucoma; Diabetes; Dental disorder; Supplemental oxygen dependent; Cataract; Renal disorder; Pain; Chronic kidney disease; Arthritis; Dialysis patient; MDS (myelodysplastic syndrome) (10/2016); Congestive heart failure (CMS-HCC); Atrial fibrillation (CMS-HCC); Stroke (CMS-HCC) (03/2015); and Cancer ([facility]).    SURGICAL HISTORY   has past surgical history that includes other cardiac surgery; other abdominal surgery;  gyn surgery; gyn surgery; gyn surgery; other cardiac surgery; other; retinal detachment repair (Right); av fistula creation (Left, 2/8/2016); other abdominal surgery; gastroscopy (12/17/2015); colonoscopy (12/17/2015); and vein ligation (Left, 11/10/2016).    SOCIAL HISTORY  Social History   Substance Use Topics   • Smoking status: Never Smoker    • Smokeless tobacco: Never Used   • Alcohol Use: No      History   Drug Use No       FAMILY HISTORY  Family History   Problem Relation Age of Onset   • Hypertension Father    • Hypertension Mother        CURRENT MEDICATIONS  Home Medications     Reviewed by Sun Pearson (Pharmacy Tech) on 06/05/17 at 1427  Med List Status: Complete    Medication Last Dose Status    amlodipine (NORVASC) 10 MG Tab 6/4/2017 Active    bimatoprost (LUMIGAN) 0.03 % Solution 6/4/2017 Active    carvedilol (COREG) 12.5 MG Tab 6/4/2017 Active    hydrocodone-acetaminophen (NORCO) 5-325 MG Tab per tablet 6/4/2017 Active    pregabalin (LYRICA) 50 MG capsule 6/4/2017 Active    prochlorperazine (COMPAZINE) 10 MG Tab 6/4/2017 Active    sitagliptin (JANUVIA) 25 MG Tab 6/4/2017 Active    VOLTAREN 1 % Gel 6/4/2017 Active                ALLERGIES  Allergies   Allergen Reactions   • Actos [Pioglitazone Hydrochloride] Unspecified     Cause blindness   TAK=7546   • Darvocet [Propoxyphene N-Apap] Vomiting     OWI=3823   • Demerol Vomiting     OBT=5626   • Glucophage [Metformin Hydrochloride] Vomiting     RXP=7887   • Morphine Vomiting     KQB=6259   • Oxycodone Vomiting     RXN=1/2016   • Pcn [Penicillins] Vomiting     MKN=0778     • Requip Vomiting     RXN=12/2015   • Simvastatin Unspecified     Leg cramps  CMY=7593   • Sulfa Drugs Rash     RXN=>10 years   • Tramadol Vomiting     GEJ=5876   • Trazodone Vomiting     SXB=5543   • Demerol    • Dilaudid [Hydromorphone] Vomiting     Unknown    • Diphenhydramine Vomiting   • Iron      vomiting   • Lenalidomide Vomiting   • Morphine    • Multivitamin       "itching   • Naprosyn [Naproxen]    • Other Drug      Any binders that remove phosphorus from the body such as tums   • Sulfa Drugs        PHYSICAL EXAM  VITAL SIGNS: /57 mmHg  Pulse 66  Temp(Src) 36.8 °C (98.2 °F)  Resp 18  Ht 1.473 m (4' 9.99\")  Wt 61.2 kg (134 lb 14.7 oz)  BMI 28.21 kg/m2  SpO2 100%  LMP 01/01/1995    Constitutional: Weak.    HEENT: Normocephalic, Atraumatic,  external ears normal, pharynx pink,  Mucous  Membranes moist, No rhinorrhea or mucosal edema  Eyes: PERRL, EOMI, Conjunctiva normal, No discharge.   Neck: Normal range of motion, No tenderness, Supple, No stridor.   Lymphatic: No lymphadenopathy    Cardiovascular: Regular Rate and Rhythm, 2/6 systolic murmur, split S-2. No rubs, or gallops.   Thorax & Lungs: Lungs clear to auscultation bilaterally, No respiratory distress, No wheezes, rhales or rhonchi, No chest wall tenderness.   Abdomen: Mild epigastric tenderness. Bowel sounds normal, Soft, non distended,  No pulsatile masses., no rebound guarding or peritoneal signs.   Skin: Anemic. Warm, Dry, No erythema, No rash,   Back:  No CVA tenderness,  No spinal tenderness, bony crepitance, step offs, or instability.   Neurologic: Alert & oriented x 3, Normal motor function, Normal sensory function, No focal deficits noted. Normal reflexes. Normal Cranial Nerves.  Extremities: Equal, intact distal pulses, No cyanosis, clubbing or edema,  No tenderness.   Musculoskeletal: Good range of motion in all major joints. No tenderness to palpation or major deformities noted.     DIAGNOSTIC STUDIES / PROCEDURES    LABS  Results for orders placed or performed during the hospital encounter of 06/05/17   CBC WITH DIFFERENTIAL   Result Value Ref Range    WBC 4.6 (L) 4.8 - 10.8 K/uL    RBC 2.06 (L) 4.20 - 5.40 M/uL    Hemoglobin 6.7 (L) 12.0 - 16.0 g/dL    Hematocrit 20.1 (L) 37.0 - 47.0 %    MCV 97.6 81.4 - 97.8 fL    MCH 32.5 27.0 - 33.0 pg    MCHC 33.3 (L) 33.6 - 35.0 g/dL    RDW 64.8 (H) 35.9 " - 50.0 fL    Platelet Count 445 164 - 446 K/uL    MPV 12.4 9.0 - 12.9 fL    Neutrophils-Polys 60.00 44.00 - 72.00 %    Lymphocytes 28.50 22.00 - 41.00 %    Monocytes 5.70 0.00 - 13.40 %    Eosinophils 5.20 0.00 - 6.90 %    Basophils 0.40 0.00 - 1.80 %    Immature Granulocytes 0.20 0.00 - 0.90 %    Nucleated RBC 0.00 /100 WBC    Neutrophils (Absolute) 2.76 2.00 - 7.15 K/uL    Lymphs (Absolute) 1.31 1.00 - 4.80 K/uL    Monos (Absolute) 0.26 0.00 - 0.85 K/uL    Eos (Absolute) 0.24 0.00 - 0.51 K/uL    Baso (Absolute) 0.02 0.00 - 0.12 K/uL    Immature Granulocytes (abs) 0.01 0.00 - 0.11 K/uL    NRBC (Absolute) 0.00 K/uL   COMP METABOLIC PANEL   Result Value Ref Range    Sodium 134 (L) 135 - 145 mmol/L    Potassium 6.4 (H) 3.6 - 5.5 mmol/L    Chloride 99 96 - 112 mmol/L    Co2 20 20 - 33 mmol/L    Anion Gap 15.0 (H) 0.0 - 11.9    Glucose 123 (H) 65 - 99 mg/dL    Bun 88 (HH) 8 - 22 mg/dL    Creatinine 8.44 (HH) 0.50 - 1.40 mg/dL    Calcium 7.1 (L) 8.5 - 10.5 mg/dL    AST(SGOT) 11 (L) 12 - 45 U/L    ALT(SGPT) 13 2 - 50 U/L    Alkaline Phosphatase 65 30 - 99 U/L    Total Bilirubin 0.4 0.1 - 1.5 mg/dL    Albumin 3.2 3.2 - 4.9 g/dL    Total Protein 6.4 6.0 - 8.2 g/dL    Globulin 3.2 1.9 - 3.5 g/dL    A-G Ratio 1.0 g/dL   PROTHROMBIN TIME   Result Value Ref Range    PT 12.6 12.0 - 14.6 sec    INR 0.92 0.87 - 1.13   APTT   Result Value Ref Range    APTT 28.9 24.7 - 36.0 sec   TROPONIN   Result Value Ref Range    Troponin I <0.01 0.00 - 0.04 ng/mL   BTYPE NATRIURETIC PEPTIDE   Result Value Ref Range    B Natriuretic Peptide 110 (H) 0 - 100 pg/mL   COD (ADULT)   Result Value Ref Range    ABO Grouping Only AB     Rh Grouping Only POS     Antibody Screen-Cod NEG     Component R       R3                  Red Blood Cells3    [id number]   issued       06/05/17   15:14      Product Type Red Blood Cells LR Pheresis     Dispense Status Issued     Unit Number (Barcoded) [id number]     Product Code (Barcoded) V4862X77     Blood Type  (Barcoded) 7300    ESTIMATED GFR   Result Value Ref Range    GFR If  6 (A) >60 mL/min/1.73 m 2    GFR If Non African American 5 (A) >60 mL/min/1.73 m 2   EKG (NOW)   Result Value Ref Range    Report       [facility] Emergency Dept.    Test Date:  2017  Pt Name:    JESUS SILVEIRA              Department: ER  MRN:        [id number]                      Room:       [facility]  Gender:     F                            Technician: [id number]  :        1955                   Requested By:KIZZY SEARS  Order #:    [id number]                    Reading MD:    Measurements  Intervals                                Axis  Rate:       62                           P:          2  CO:         184                          QRS:        -27  QRSD:       94                           T:          52  QT:         488  QTc:        496    Interpretive Statements  SINUS RHYTHM  BORDERLINE LEFT AXIS DEVIATION  BORDERLINE R WAVE PROGRESSION, ANTERIOR LEADS  BORDERLINE PROLONGED QT INTERVAL  Compared to ECG 05/10/2017 01:29:45  No significant changes     All labs reviewed by me.    EKG  12 lead EKG; Interpreted by emergency department physician    Rhythm: Normal Sinus Rhythm   Rate: 62  Axis: Normal  R Wave: Normal R wave progression  Normal ST-T segments  ST Segments: Non specific ST changes  T waves: Normal  Q waves: None    Clinical Impression: Unchanged from 5/10/2017.     RADIOLOGY  DX-CHEST-PORTABLE (1 VIEW)   Final Result      No consolidation.      The radiologist's interpretation of all radiological studies have been reviewed by me.    COURSE & MEDICAL DECISION MAKING  Nursing notes, VS, PMSFHx reviewed in chart.    12:00 PM - Obtained and reviewed past medical records from May 10th, 2017 which indicated hemoglobin was 10.1 on May 11th.     12:08 PM - Patient seen and examined at bedside. Patient will be treated with blood. Ordered DX chest, COD, CBC with differential, CMP, PTT, APTT, Troponin,  BNP, EKG to evaluate her symptoms. The differential diagnoses include but are not limited to: Cardiac ischemia, electrolyte disturbance.     1:44 PM - Labs results indicate patient's hemoglobin is 6.7    1:58 PM Paged [facility].    2:12 PM - I discussed the patient's case and the above findings with [facility]  who agree to admit the patient.       I (Carline Ramos) certify that I have explained to the patient or the patient’s legal representative, the following:    (i)     Explained the Blood Transfusion procedure to be undertaken;  (ii)    Explained alternative treatments and their general nature and risks; and  (iii)   Explained the risks, and extent of risk, to the Blood Transfusion procedure.  Risks included, but are not limited to the following:  mild allergic reactions, hemolytic reaction, and possible exposure to infectious agents such as hepatitis, cytomegalovirus, infectious mononucleosis, and acquired immune deficiency syndrome (AIDS)    I have explained all of the above and have answered all patient questions arising regarding the procedure to be taken, and I believe that the patient understands what I have explained.    Date 6/5/2017  Time of Consent 12:39 PM  This form is electronically signed by Dr. Carline Ramos    DISPOSITION:  Patient will be admitted to [facility] in guarded condition.      FINAL IMPRESSION  1. Anemia, unspecified type    2. Myelodysplastic syndrome (CMS-HCC)    3. Renal failure (ARF), acute on chronic (CMS-HCC)    4. ESRD (end stage renal disease) on dialysis (CMS-[facility])          Nauhn KENNY (Ervin), am scribing for, and in the presence of, Carline Ramos M.D..    Electronically signed by: Nahun River (Ervin), 6/5/2017    Carline KENNY M.D. personally performed the services described in this documentation, as scribed by Nahun River in my presence, and it is both accurate and complete.    The note accurately reflects work and  decisions made by me.  Carline Ramos  6/5/2017  4:36 PM

## 2017-06-05 NOTE — IP AVS SNAPSHOT
Fracture Access Code: [email or web address]  Expires: 7/5/2017  3:57 AM    Fracture  A secure, online tool to manage your health information     [employer]’s Fracture® is a secure, online tool that connects you to your personalized health information from the privacy of your home -- day or night - making it very easy for you to manage your healthcare. Once the activation process is completed, you can even access your medical information using the Fracture sundar, which is available for free in the Apple Sundar store or Google Play store.     Fracture provides the following levels of access (as shown below):   My Chart Features   [facility] Primary Care Doctor [facility]  Specialists [facility]  Urgent  Care Non-[facility]  Primary Care  Doctor   Email your healthcare team securely and privately 24/7 X X X X   Manage appointments: schedule your next appointment; view details of past/upcoming appointments X      Request prescription refills. X      View recent personal medical records, including lab and immunizations X X X X   View health record, including health history, allergies, medications X X X X   Read reports about your outpatient visits, procedures, consult and ER notes X X X X   See your discharge summary, which is a recap of your hospital and/or ER visit that includes your diagnosis, lab results, and care plan. X X       How to register for Fracture:  1. Go to  [email or web address].  2. Click on the Sign Up Now box, which takes you to the New Member Sign Up page. You will need to provide the following information:  a. Enter your Fracture Access Code exactly as it appears at the top of this page. (You will not need to use this code after you’ve completed the sign-up process. If you do not sign up before the expiration date, you must request a new code.)   b. Enter your date of birth.   c. Enter your home email address.   d. Click Submit, and follow the next screen’s instructions.  3. Create a Fracture ID. This will be your Fracture  login ID and cannot be changed, so think of one that is secure and easy to remember.  4. Create a [employer] password. You can change your password at any time.  5. Enter your Password Reset Question and Answer. This can be used at a later time if you forget your password.   6. Enter your e-mail address. This allows you to receive e-mail notifications when new information is available in [employer].  7. Click Sign Up. You can now view your health information.    For assistance activating your [employer] account, call [phone]

## 2017-06-05 NOTE — IP AVS SNAPSHOT
" <p align=\"LEFT\"><IMG SRC=\"//[email or web address]$/[email or web address]\" alt=\"Image\" WIDTH=\"50%\" HEIGHT=\"200\" BORDER=\"\"></p>                   Name:Vielka Briscoe  Medical Record Number:[id number]  CSN: [id number]    YOB: 1955   Age: 61 y.o.  Sex: female  HT:1.473 m (4' 9.99\") WT: 60.4 kg (133 lb 2.5 oz)          Admit Date: 6/5/2017     Discharge Date:   Today's Date: 6/10/2017  Attending Doctor:  Shashank Luciano M.D.                  Allergies:  Actos; Darvocet; Demerol; Glucophage; Morphine; Oxycodone; Pcn; Requip; Simvastatin; Sulfa drugs; Tramadol; Trazodone; Demerol; Dilaudid; Diphenhydramine; Iron; Lenalidomide; Morphine; Multivitamin; Naprosyn; Other drug; and Sulfa drugs          Your appointments     Jun 20, 2017 11:00 AM   NEW PATIENT with [facility]lea MD,[facility]    [address]  [address]  [facility] [address] [phone]              Follow-up Information     1. Schedule an appointment as soon as possible for a visit with Ranulfo Avila M.D..    Specialty:  Oncology    Why:  for follow up    Contact information    [address]  [address]  [facility] [address] [phone]          2. Schedule an appointment as soon as possible for a visit with Chao Perez M.D..    Specialty:  Nephrology    Why:  for follow up    Contact information    [address]  [facility] [address] [phone]           Medication List      Take these Medications        Instructions    amlodipine 10 MG Tabs   Commonly known as:  NORVASC    Take 10 mg by mouth every day.   Dose:  10 mg       carvedilol 12.5 MG Tabs   Commonly known as:  COREG    Take 12.5 mg by mouth 2 times a day, with meals.   Dose:  12.5 mg       hydrocodone-acetaminophen 5-325 MG Tabs per tablet   Commonly known as:  NORCO    Take 1-2 Tabs by mouth every 6 hours as needed.   Dose:  1-2 Tab       LUMIGAN 0.03 % Soln   Generic drug:  bimatoprost    Place 1 Drop in both eyes " every evening.   Dose:  1 Drop       LYRICA 50 MG capsule   Generic drug:  pregabalin    Take 50 mg by mouth 2 times a day.   Dose:  50 mg       prochlorperazine 10 MG Tabs   Commonly known as:  COMPAZINE    Take 10 mg by mouth every 6 hours as needed.   Dose:  10 mg       sitagliptin 25 MG Tabs   Commonly known as:  JANUVIA    Take 1 Tab by mouth every morning.   Dose:  25 mg       VOLTAREN 1 % Gel   Generic drug:  Diclofenac Sodium    APPLY 2 GRAMS TOPICALLY QID

## 2017-06-05 NOTE — IP AVS SNAPSHOT
6/10/2017    Vielka Lucio [facility]  [address]   [place] [address]    Dear Vielka:    [facility] wants to ensure your discharge home is safe and you or your loved ones have had all of your questions answered regarding your care after you leave the hospital.    Below is a list of resources and contact information should you have any questions regarding your hospital stay, follow-up instructions, or active medical symptoms.    Questions or Concerns Regarding… Contact   Medical Questions Related to Your Discharge  (7 days a week, 8am-5pm) Contact a Nurse Care Coordinator   [phone]   Medical Questions Not Related to Your Discharge  (24 hours a day / 7 days a week)  Contact the Nurse Health Line   [phone]    Medications or Discharge Instructions Refer to your discharge packet   or contact your [facility] Primary Care Provider   [phone]   Follow-up Appointment(s) Schedule your appointment via Publicate   or contact Scheduling [phone]   Billing Review your statement via Publicate  or contact Billing [phone]   Medical Records Review your records via Public[employer]   or contact Medical Records [phone]     You may receive a telephone call within two days of discharge. This call is to make certain you understand your discharge instructions and have the opportunity to have any questions answered. You can also easily access your medical information, test results and upcoming appointments via the Publicate free online health management tool. You can learn more and sign up at Nextt/Publicate. For assistance setting up your Publicate account, please call [phone].    Once again, we want to ensure your discharge home is safe and that you have a clear understanding of any next steps in your care. If you have any questions or concerns, please do not hesitate to contact us, we are here for you. Thank you for choosing [facility] for your healthcare needs.    Sincerely,    Your [facility] Healthcare Team

## 2017-06-05 NOTE — H&P
[facility] [place] [facility] Admitting History and Physical    Name Vielka Briscoe     1955   Age/Sex 61 y.o. female   MRN [id number]   Code Status FULL     After 5PM or if no immediate response to page, please call for cross-coverage  Attending/Team: Mustapha/Michi Call [phone] to page   1st Call - Day Intern (R1):   Ashwini  2nd Call - Day Sr. Resident (R2/R3):   Herb       Chief Complaint:  Abnormal labs, weakness, nausea/vomiting      HPI:  Ms. Briscoe is a 62 y/o female with PMHx of ESRD on HD MWF, MDS, transfusion dependent anemia, HTN, and type 2 DM that presents after being told by her dialysis nurse to present to the ED due to abnormal labs. She also is complaining of a week long of history of nausea, vomiting and weakness.     She has admitted from -, -, -, 3/19-3/21 and 5/10- with similar complaints and on her most recent hospitalization received 3 units PRBC's before being discharged. Pt states that the above symptoms started about a week ago after she accidentally took oxycodone, which she is allergic to. She subsquently developed symptoms of nausea, vomiting, severe weakness and diarrhea. She missed her Wednesday dialysis appointment due to the severity of symptoms. She was able to make her Friday scheduled dialysis appointment, but missed her dialysis appointment today. She does not recall any blood in her vomit or diarrhea. She also does not endorse any bleeding from any other sites. She also complains of chronic chest pain, SOB with exertion, dizziness and fatigue.     Pt is followed outpatient by hematologist Dr. Avila for myelodysplastic syndrome and was started on Vidaza on . She is scheduled to have a 6 month course of chemo. Her next scheduled chemo was today.     In the ED, she had a Hb of 6.7 and was transfused 1 unit of PRBC. Nephrology was consulted and she will get dialysis today.     Review of Systems   Constitutional: Positive for  malaise/fatigue and diaphoresis. Negative for fever and chills.   Eyes: Positive for blurred vision.   Respiratory: Positive for shortness of breath. Negative for cough and wheezing.    Cardiovascular: Positive for chest pain and orthopnea. Negative for palpitations and leg swelling.   Gastrointestinal: Positive for nausea, vomiting, abdominal pain and diarrhea. Negative for constipation, blood in stool and melena.   Genitourinary: Negative for dysuria, urgency, frequency and hematuria.   Musculoskeletal: Positive for myalgias, back pain and neck pain.   Skin: Negative for rash.   Neurological: Positive for dizziness and weakness. Negative for headaches.   Psychiatric/Behavioral: Negative for depression.             Past Medical History:   Past Medical History   Diagnosis Date   • Heart abnormalities    • Angina    • Diabetes    • Hypertension    • Chronic anemia    • Chronic obstructive pulmonary disease (CMS-HCC)    • Blood transfusion without reported diagnosis    • Anemia    • Breath shortness    • Glaucoma    • Diabetes      Diet controlled   • Dental disorder      full dentures   • Supplemental oxygen dependent    • Cataract      bilat IOL   • Renal disorder      CKF   • Pain      General pain 8/10   • Chronic kidney disease      Chronic renal failure   • Arthritis      hands    • Dialysis patient      [facility] ,   [facility] in [place]   • MDS (myelodysplastic syndrome) 10/2016     bone marrow biopsy   • Congestive heart failure (CMS-HCC)    • Atrial fibrillation (CMS-HCC)      HX   • Stroke (CMS-HCC) 2015     No residual weakness/problems   • Cancer (CMS-HCC)      MDS in bones       Past Surgical History:  Past Surgical History   Procedure Laterality Date   • Other cardiac surgery       Angioplasty  and    • Other abdominal surgery       appendectomy,  x 2, hysterectomy, D & C   • Gyn surgery       hysterectomy   • Gyn surgery        x 2   • Gyn surgery       d&C x2   • Other cardiac  surgery       cardiac angiogram, angioplasty   • Other       angioplasty/ stents bilat LE   • Retinal detachment repair Right    • Av fistula creation Left 2/8/2016     Procedure: AV FISTULA CREATION UPPER EXTREMITY;  Surgeon: Ranulfo Jolly M.D.;  Location: SURGERY [facility];  Service:    • Other abdominal surgery       appendectomy   • Gastroscopy  12/17/2015     Procedure: ESOPHAGOGASTRODUODENOSCOPY WITH BIOPSY;  Surgeon: Wing Álvarez M.D.;  Location: SURGERY [facility];  Service:    • Colonoscopy  12/17/2015     Procedure: COLONOSCOPY;  Surgeon: Wing Álvarez M.D.;  Location: SURGERY [facility];  Service:    • Vein ligation Left 11/10/2016     Procedure: VEIN LIGATION FOR DISTAL REVASCULARIZATION AND INTERVAL LIGATION OF LEFT ARM DIALYISIS ACCESS (DRIL PROCEDURE);  Surgeon: Ranulfo Jolly M.D.;  Location: SURGERY [facility];  Service:        Current Outpatient Medications:  Home Medications     Reviewed by Sun Pearson (Pharmacy Tech) on 06/05/17 at 1427  Med List Status: Complete    Medication Last Dose Status    amlodipine (NORVASC) 10 MG Tab 6/4/2017 Active    bimatoprost (LUMIGAN) 0.03 % Solution 6/4/2017 Active    carvedilol (COREG) 12.5 MG Tab 6/4/2017 Active    hydrocodone-acetaminophen (NORCO) 5-325 MG Tab per tablet 6/4/2017 Active    pregabalin (LYRICA) 50 MG capsule 6/4/2017 Active    prochlorperazine (COMPAZINE) 10 MG Tab 6/4/2017 Active    sitagliptin (JANUVIA) 25 MG Tab 6/4/2017 Active    VOLTAREN 1 % Gel 6/4/2017 Active                Medication Allergy/Sensitivities:  Allergies   Allergen Reactions   • Actos [Pioglitazone Hydrochloride] Unspecified     Cause blindness   FYJ=2568   • Darvocet [Propoxyphene N-Apap] Vomiting     VID=8790   • Demerol Vomiting     TCL=9254   • Glucophage [Metformin Hydrochloride] Vomiting     XMT=7171   • Morphine Vomiting     YTS=0395   • Oxycodone Vomiting     RXN=1/2016   • Pcn [Penicillins] Vomiting     LHM=5866     • Requip  "Vomiting     RXN=12/2015   • Simvastatin Unspecified     Leg cramps  LRN=5731   • Sulfa Drugs Rash     RXN=>10 years   • Tramadol Vomiting     LNL=8568   • Trazodone Vomiting     ITW=8273   • Demerol    • Dilaudid [Hydromorphone] Vomiting     Unknown    • Diphenhydramine Vomiting   • Iron      vomiting   • Lenalidomide Vomiting   • Morphine    • Multivitamin      itching   • Naprosyn [Naproxen]    • Other Drug      Any binders that remove phosphorus from the body such as tums   • Sulfa Drugs          Family History:  Family History   Problem Relation Age of Onset   • Hypertension Father    • Hypertension Mother        Social History:  Social History     Social History   • Marital Status:      Spouse Name: N/A   • Number of Children: N/A   • Years of Education: N/A     Occupational History   • Not on file.     Social History Main Topics   • Smoking status: Never Smoker    • Smokeless tobacco: Never Used   • Alcohol Use: No   • Drug Use: No   • Sexual Activity: Not on file     Other Topics Concern   • Not on file     Social History Narrative    ** Merged History Encounter **          Living situation: Lives at home alone, does not have any assistance   PCP : Nyla Nava M.D.      Physical Exam     Filed Vitals:    06/05/17 1227 06/05/17 1315 06/05/17 1530 06/05/17 1545   BP:  145/57 160/56 176/45   Pulse:  66 69 70   Temp:   36.7 °C (98.1 °F) 36.4 °C (97.5 °F)   Resp:  18 20 20   Height: 1.473 m (4' 9.99\")      Weight: 61.2 kg (134 lb 14.7 oz)      SpO2:  100% 100% 100%     Body mass index is 28.21 kg/(m^2).  /45 mmHg  Pulse 70  Temp(Src) 36.4 °C (97.5 °F)  Resp 20  Ht 1.473 m (4' 9.99\")  Wt 61.2 kg (134 lb 14.7 oz)  BMI 28.21 kg/m2  SpO2 100%  LMP 01/01/1995  O2 therapy: Pulse Oximetry: 100 %, O2 (LPM): 2    Physical Exam   Constitutional: She is oriented to person, place, and time and well-developed, well-nourished, and in no distress.   HENT:   Head: Normocephalic and atraumatic.   Mouth/Throat: " Oropharynx is clear and moist.   Eyes: EOM are normal. Pupils are equal, round, and reactive to light. No scleral icterus.   Conjunctival pallor   Neck: Neck supple.   Cardiovascular: Normal rate and regular rhythm.  Exam reveals no gallop and no friction rub.    Murmur (1/6 systolic murmur) heard.  Pulmonary/Chest: Breath sounds normal. No respiratory distress. She has no wheezes. She has no rales.   Decreased effort   Abdominal: Soft. Bowel sounds are normal. She exhibits no distension. There is tenderness. There is no rebound and no guarding.   Musculoskeletal: She exhibits no edema.   Neurological: She is alert and oriented to person, place, and time.   Skin: Skin is warm and dry. She is not diaphoretic. There is pallor.   Psychiatric: Mood and affect normal.             Data Review       Old Records Request:   Deferred  Current Records review and summary: Completed    Lab Data Review:  Recent Results (from the past 24 hour(s))   COD (ADULT)    Collection Time: 06/05/17  1:20 PM   Result Value Ref Range    ABO Grouping Only AB     Rh Grouping Only POS     Antibody Screen-Cod NEG     Component R       R3                  Red Blood Cells3    [id number]   issued       06/05/17   15:14      Product Type Red Blood Cells LR Pheresis     Dispense Status Issued     Unit Number (Barcoded) [id number]     Product Code (Barcoded) Z9920B85     Blood Type (Barcoded) 7300    CBC WITH DIFFERENTIAL    Collection Time: 06/05/17  1:26 PM   Result Value Ref Range    WBC 4.6 (L) 4.8 - 10.8 K/uL    RBC 2.06 (L) 4.20 - 5.40 M/uL    Hemoglobin 6.7 (L) 12.0 - 16.0 g/dL    Hematocrit 20.1 (L) 37.0 - 47.0 %    MCV 97.6 81.4 - 97.8 fL    MCH 32.5 27.0 - 33.0 pg    MCHC 33.3 (L) 33.6 - 35.0 g/dL    RDW 64.8 (H) 35.9 - 50.0 fL    Platelet Count 445 164 - 446 K/uL    MPV 12.4 9.0 - 12.9 fL    Neutrophils-Polys 60.00 44.00 - 72.00 %    Lymphocytes 28.50 22.00 - 41.00 %    Monocytes 5.70 0.00 - 13.40 %    Eosinophils 5.20 0.00 - 6.90 %     Basophils 0.40 0.00 - 1.80 %    Immature Granulocytes 0.20 0.00 - 0.90 %    Nucleated RBC 0.00 /100 WBC    Neutrophils (Absolute) 2.76 2.00 - 7.15 K/uL    Lymphs (Absolute) 1.31 1.00 - 4.80 K/uL    Monos (Absolute) 0.26 0.00 - 0.85 K/uL    Eos (Absolute) 0.24 0.00 - 0.51 K/uL    Baso (Absolute) 0.02 0.00 - 0.12 K/uL    Immature Granulocytes (abs) 0.01 0.00 - 0.11 K/uL    NRBC (Absolute) 0.00 K/uL   COMP METABOLIC PANEL    Collection Time: 06/05/17  1:26 PM   Result Value Ref Range    Sodium 134 (L) 135 - 145 mmol/L    Potassium 6.4 (H) 3.6 - 5.5 mmol/L    Chloride 99 96 - 112 mmol/L    Co2 20 20 - 33 mmol/L    Anion Gap 15.0 (H) 0.0 - 11.9    Glucose 123 (H) 65 - 99 mg/dL    Bun 88 (HH) 8 - 22 mg/dL    Creatinine 8.44 (HH) 0.50 - 1.40 mg/dL    Calcium 7.1 (L) 8.5 - 10.5 mg/dL    AST(SGOT) 11 (L) 12 - 45 U/L    ALT(SGPT) 13 2 - 50 U/L    Alkaline Phosphatase 65 30 - 99 U/L    Total Bilirubin 0.4 0.1 - 1.5 mg/dL    Albumin 3.2 3.2 - 4.9 g/dL    Total Protein 6.4 6.0 - 8.2 g/dL    Globulin 3.2 1.9 - 3.5 g/dL    A-G Ratio 1.0 g/dL   PROTHROMBIN TIME    Collection Time: 06/05/17  1:26 PM   Result Value Ref Range    PT 12.6 12.0 - 14.6 sec    INR 0.92 0.87 - 1.13   APTT    Collection Time: 06/05/17  1:26 PM   Result Value Ref Range    APTT 28.9 24.7 - 36.0 sec   TROPONIN    Collection Time: 06/05/17  1:26 PM   Result Value Ref Range    Troponin I <0.01 0.00 - 0.04 ng/mL   BTYPE NATRIURETIC PEPTIDE    Collection Time: 06/05/17  1:26 PM   Result Value Ref Range    B Natriuretic Peptide 110 (H) 0 - 100 pg/mL   ESTIMATED GFR    Collection Time: 06/05/17  1:26 PM   Result Value Ref Range    GFR If  6 (A) >60 mL/min/1.73 m 2    GFR If Non African American 5 (A) >60 mL/min/1.73 m 2   EKG (NOW)    Collection Time: 06/05/17  1:28 PM   Result Value Ref Range    Report       [facility]    Test Date:  2017-06-05  Pt Name:    JESUS SILVEIRA              Department: ER  MRN:         [id number]                      Room:       [facility]  Gender:     F                            Technician: [id number]  :        1955                   Requested By:KIZZY SEARS  Order #:    [id number]                    Reading MD:    Measurements  Intervals                                Axis  Rate:       62                           P:          2  OK:         184                          QRS:        -27  QRSD:       94                           T:          52  QT:         488  QTc:        496    Interpretive Statements  SINUS RHYTHM  BORDERLINE LEFT AXIS DEVIATION  BORDERLINE R WAVE PROGRESSION, ANTERIOR LEADS  BORDERLINE PROLONGED QT INTERVAL  Compared to ECG 05/10/2017 01:29:45  No significant changes         Imaging/Procedures Review:    ndependant Imaging Review: Completed  DX-CHEST-PORTABLE (1 VIEW)   Final Result      No consolidation.               EKG:   EKG Independant Review: Completed     QTc: 496 , HR: 62, Normal Sinus Rhythm, no ST/T changes     ( Records reviewed and summarized in current documentation             Assessment/Plan     #Chronic anemia  - Pt has a history of transfusion dependent anemia and has had a number of hospitalizations in the past for symptoms secondary to it. Each time she was transfused 2-3 units and discharged home.   - Chronic anemia most likely 2/2 to MDS vs ESRD vs anemia of chronic disease or most likely a combination of all three.  - No signs of acute bleed  - Received 1 unit in ED. Will receive 2 other units with dialysis.    Plan:   -> Transfuse as needed; goal 8.5-9 per nephro and onc  -> repeat Fe studies as pt has history and is at risk of iron overload from frequent transfusion; consider possible chelation depending on results       #ESRD on HD MWF  Pt presents to ED with severe electrolyte abnormalities including hyperkalemia and BUN and Cr of 88 and 8.44 respectively, all of which are elevated from before. Last session was , however she missed 2 dialysis  sessions in the past week.     Plan:   Consulted  nephro, will have dialysis today     #Electrolyte abnormalities  Most likely 2/2 to missed dialysis session and prolonged vomiting and diarrhea. Could be contributing to her fatigue. Pt at high risk for arrythmias but asymptomatic though.    Plan:   Nephrology on consult   Dialysis today  Monitor electrolytes.       #Myelodysplastic syndrome  Stable, followed by Dr. Roberts outpatient. Currently treated with 6 month course of Vidaza. Scheduled for next dose of chemo today.     Plan:   per oncology will start vidaza tomorrow.  Monitor blood counts.     #Nausea/Vomitting   Pt has documented history of oxycodone allergy which causes her to vomit. It is likely that her accidental ingestion of oxycodone caused her initial n/v and then this was prolonged due to her missing numerous dialysis sessions.   Plan:  -> monitor for now, symptoms should resolve once dialysis resumes   -> zofran prn     #Chronic chest pain  Described as a pressure pain that has not gotten worse. Associated with SOB with exertion. Unclear etiology, possibly due to chronic anemia  - EKG negative, troponins negative, CXR unremarkable  - negative nuclear medicine cardiac stress test on 12/22/16  - Pt has prior admissions with similar symptoms with negative workup  - Pt has cardiology appointment on 6/20/17.      #HTN  Stable  Plan:   continue home meds  labetolol prn for severe HTN    #Type 2 DM  Blood glucose of 123 on admission, HbA1C of 9.8 on 5/10  Currently on januvia 25mg   Plan:   continue januvia 25mg   continue lyrica for neuropathy     #Chronic Respiratory Failure  On 2L at home 24/7  -> continue supplemental O2     #legal blindness  Stable  -> fall precautions  -> continue bimatoprost for glaucoma       Anticipated Hospital stay:  >2 midnights        Quality Measures  EKG reviewed, Labs reviewed, Medications reviewed and Radiology images reviewed  Bauer catheter: No Bauer  Central line in  place: Chemotherapy    DVT Prophylaxis: Heparin

## 2017-06-05 NOTE — ED NOTES
Patient transported to dialysis.  Blood hooked up to dialysis machine.  Continued infusion to dialysis

## 2017-06-05 NOTE — IP AVS SNAPSHOT
" Home Care Instructions                                                                                                                  Name:Vielka Briscoe  Medical Record Number:[id number]  CSN: [id number]    YOB: 1955   Age: 61 y.o.  Sex: female  HT:1.473 m (4' 9.99\") WT: 60.4 kg (133 lb 2.5 oz)          Admit Date: 6/5/2017     Discharge Date:   Today's Date: 6/10/2017  Attending Doctor:  Shashank Luciano M.D.                  Allergies:  Actos; Darvocet; Demerol; Glucophage; Morphine; Oxycodone; Pcn; Requip; Simvastatin; Sulfa drugs; Tramadol; Trazodone; Demerol; Dilaudid; Diphenhydramine; Iron; Lenalidomide; Morphine; Multivitamin; Naprosyn; Other drug; and Sulfa drugs            Discharge Instructions       Discharge Instructions    Discharged to home by car with relative. Discharged via wheelchair, hospital escort: Yes.  Special equipment needed: Oxygen    Be sure to schedule a follow-up appointment with your primary care doctor or any specialists as instructed.     Discharge Plan:   Diet Plan: Discussed  Activity Level: Discussed  Confirmed Follow up Appointment: Patient to Call and Schedule Appointment  Confirmed Symptoms Management: Discussed  Medication Reconciliation Updated: Yes    I understand that a diet low in cholesterol, fat, and sodium is recommended for good health. Unless I have been given specific instructions below for another diet, I accept this instruction as my diet prescription.   Other diet: none    Special Instructions: None    · Is patient discharged on Warfarin / Coumadin?   No     · Is patient Post Blood Transfusion?  No    Depression / Suicide Risk    As you are discharged from this [facility] facility, it is important to learn how to keep safe from harming yourself.    Recognize the warning signs:  · Abrupt changes in personality, positive or negative- including increase in energy   · Giving away possessions  · Change in eating patterns- significant weight " changes-  positive or negative  · Change in sleeping patterns- unable to sleep or sleeping all the time   · Unwillingness or inability to communicate  · Depression  · Unusual sadness, discouragement and loneliness  · Talk of wanting to die  · Neglect of personal appearance   · Rebelliousness- reckless behavior  · Withdrawal from people/activities they love  · Confusion- inability to concentrate     If you or a loved one observes any of these behaviors or has concerns about self-harm, here's what you can do:  · Talk about it- your feelings and reasons for harming yourself  · Remove any means that you might use to hurt yourself (examples: pills, rope, extension cords, firearm)  · Get professional help from the community (Mental Health, Substance Abuse, psychological counseling)  · Do not be alone:Call your Safe Contact- someone whom you trust who will be there for you.  · Call your local CRISIS HOTLINE 754-5237 or 675-346-0475  · Call your local Children's Mobile Crisis Response Team [place] (397) 863-1692 or www.NanoInk  · Call the toll free National Suicide Prevention Hotlines   · National Suicide Prevention Lifeline 836-934-KLGT (3383)  · National Hope Line Network 800-SUICIDE (626-0331)        Your appointments     Jun 20, 2017 11:00 AM   NEW PATIENT with Joselo [facility]arela MD,[facility]--[facility]    [address]  [address]  [facility] [address] [phone]              Follow-up Information     1. Schedule an appointment as soon as possible for a visit with Ranulfo Avila M.D..    Specialty:  Oncology    Why:  for follow up    Contact information    [address]  [address]  [place] [address] [phone]          2. Schedule an appointment as soon as possible for a visit with Chao Perez M.D..    Specialty:  Nephrology    Why:  for follow up    Contact information    [address] [place] [address] [phone]              Discharge Medication Instructions:    Below are the medications your physician expects you to take upon discharge:    Review all your home medications and newly ordered medications with your doctor and/or pharmacist. Follow medication instructions as directed by your doctor and/or pharmacist.    Please keep your medication list with you and share with your physician.               Medication List      CONTINUE taking these medications        Instructions    Morning Afternoon Evening Bedtime    amlodipine 10 MG Tabs   Last time this was given:  10 mg on 6/9/2017  9:53 PM   Commonly known as:  NORVASC        Take 10 mg by mouth every day.   Dose:  10 mg                        carvedilol 12.5 MG Tabs   Last time this was given:  12.5 mg on 6/10/2017  8:19 AM   Commonly known as:  COREG        Take 12.5 mg by mouth 2 times a day, with meals.   Dose:  12.5 mg                        hydrocodone-acetaminophen 5-325 MG Tabs per tablet   Last time this was given:  1 Tab on 6/10/2017  8:19 AM   Commonly known as:  NORCO        Take 1-2 Tabs by mouth every 6 hours as needed.   Dose:  1-2 Tab                        LUMIGAN 0.03 % Soln   Generic drug:  bimatoprost        Place 1 Drop in both eyes every evening.   Dose:  1 Drop                        LYRICA 50 MG capsule   Last time this was given:  50 mg on 6/10/2017  8:19 AM   Generic drug:  pregabalin        Take 50 mg by mouth 2 times a day.   Dose:  50 mg                        prochlorperazine 10 MG Tabs   Commonly known as:  COMPAZINE        Take 10 mg by mouth every 6 hours as needed.   Dose:  10 mg                        sitagliptin 25 MG Tabs   Last time this was given:  25 mg on 6/10/2017  8:23 AM   Commonly known as:  JANUVIA        Take 1 Tab by mouth every morning.   Dose:  25 mg                        VOLTAREN 1 % Gel   Generic drug:  Diclofenac Sodium        APPLY 2 GRAMS TOPICALLY QID                                Instructions           Diet /  Nutrition:    Follow any diet instructions given to you by your doctor or the dietician, including how much salt (sodium) you are allowed each day.    If you are overweight, talk to your doctor about a weight reduction plan.    Activity:    Remain physically active following your doctor's instructions about exercise and activity.    Rest often.     Any time you become even a little tired or short of breath, SIT DOWN and rest.    Worsening Symptoms:    Report any of the following signs and symptoms to the doctor's office immediately:    *Pain of jaw, arm, or neck  *Chest pain not relieved by medication                               *Dizziness or loss of consciousness  *Difficulty breathing even when at rest   *More tired than usual                                       *Bleeding drainage or swelling of surgical site  *Swelling of feet, ankles, legs or stomach                 *Fever (>100ºF)  *Pink or blood tinged sputum  *Weight gain (3lbs/day or 5lbs /week)           *Shock from internal defibrillator (if applicable)  *Palpitations or irregular heartbeats                *Cool and/or numb extremities    Stroke Awareness    Common Risk Factors for Stroke include:    Age  Atrial Fibrillation  Carotid Artery Stenosis  Diabetes Mellitus  Excessive alcohol consumption  High blood pressure  Overweight   Physical inactivity  Smoking    Warning signs and symptoms of a stroke include:    *Sudden numbness or weakness of the face, arm or leg (especially on one side of the body).  *Sudden confusion, trouble speaking or understanding.  *Sudden trouble seeing in one or both eyes.  *Sudden trouble walking, dizziness, loss of balance or coordination.Sudden severe headache with no known cause.    It is very important to get treatment quickly when a stroke occurs. If you experience any of the above warning signs, call 911 immediately.                   Disclaimer         Quit Smoking / Tobacco Use:    I understand the use of any  tobacco products increases my chance of suffering from future heart disease or stroke and could cause other illnesses which may shorten my life. Quitting the use of tobacco products is the single most important thing I can do to improve my health. For further information on smoking / tobacco cessation call a Toll Free Quit Line at 1-470.871.8064 (*National Cancer [place]) or 1-457.558.3595 (American Lung Association) or you can access the web based program at www.lungusa.org.    [place] Tobacco Users Help Line:  (526) 539-2840       Toll Free: 1-854.699.1457  Quit Tobacco Program [facility] [phone]    Crisis Hotline:    [place] Crisis Hotline:  2-368-DCKXORA or 1-367.894.3129    [place] Crisis Hotline:    1-643.445.5981 or 039-905-5406    Discharge Survey:   Thank you for choosing [facility]. We hope we did everything we could to make your hospital stay a pleasant one. You may be receiving a phone survey and we would appreciate your time and participation in answering the questions. Your input is very valuable to us in our efforts to improve our service to our patients and their families.        My signature on this form indicates that:    1. I have reviewed and understand the above information.  2. My questions regarding this information have been answered to my satisfaction.  3. I have formulated a plan with my discharge nurse to obtain my prescribed medications for home.                  Disclaimer         __________________________________                     __________       ________                       Patient Signature                                                 Date                    Time

## 2017-06-05 NOTE — ED NOTES
Patient to rm 35 with c/o weakness for the last week.  Patient had blood drawn at dialysis Friday and got results today and was told to come to ED with hgb 7.1.  States had vomiting a few days ago after oxycodone but has resolved since then.

## 2017-06-06 ENCOUNTER — APPOINTMENT (OUTPATIENT)
Dept: ONCOLOGY | Facility: MEDICAL CENTER | Age: 62
End: 2017-06-06
Attending: INTERNAL MEDICINE
Payer: MEDICARE

## 2017-06-06 LAB
ANION GAP SERPL CALC-SCNC: 12 MMOL/L (ref 0–11.9)
ANION GAP SERPL CALC-SCNC: 14 MMOL/L (ref 0–11.9)
BASOPHILS # BLD AUTO: 0.4 % (ref 0–1.8)
BASOPHILS # BLD: 0.02 K/UL (ref 0–0.12)
BUN SERPL-MCNC: 47 MG/DL (ref 8–22)
BUN SERPL-MCNC: 78 MG/DL (ref 8–22)
CALCIUM SERPL-MCNC: 7.5 MG/DL (ref 8.5–10.5)
CALCIUM SERPL-MCNC: 7.5 MG/DL (ref 8.5–10.5)
CHLORIDE SERPL-SCNC: 93 MMOL/L (ref 96–112)
CHLORIDE SERPL-SCNC: 93 MMOL/L (ref 96–112)
CO2 SERPL-SCNC: 23 MMOL/L (ref 20–33)
CO2 SERPL-SCNC: 28 MMOL/L (ref 20–33)
CREAT SERPL-MCNC: 4.66 MG/DL (ref 0.5–1.4)
CREAT SERPL-MCNC: 6.86 MG/DL (ref 0.5–1.4)
EOSINOPHIL # BLD AUTO: 0.2 K/UL (ref 0–0.51)
EOSINOPHIL NFR BLD: 3.9 % (ref 0–6.9)
ERYTHROCYTE [DISTWIDTH] IN BLOOD BY AUTOMATED COUNT: 46.3 FL (ref 35.9–50)
ERYTHROCYTE [DISTWIDTH] IN BLOOD BY AUTOMATED COUNT: 47.7 FL (ref 35.9–50)
FOLATE SERPL-MCNC: 7.5 NG/ML
GFR SERPL CREATININE-BSD FRML MDRD: 6 ML/MIN/1.73 M 2
GFR SERPL CREATININE-BSD FRML MDRD: 9 ML/MIN/1.73 M 2
GLUCOSE BLD-MCNC: 235 MG/DL (ref 65–99)
GLUCOSE SERPL-MCNC: 145 MG/DL (ref 65–99)
GLUCOSE SERPL-MCNC: 222 MG/DL (ref 65–99)
HCT VFR BLD AUTO: 33.2 % (ref 37–47)
HCT VFR BLD AUTO: 33.7 % (ref 37–47)
HGB BLD-MCNC: 11.8 G/DL (ref 12–16)
HGB BLD-MCNC: 11.9 G/DL (ref 12–16)
IMM GRANULOCYTES # BLD AUTO: 0.02 K/UL (ref 0–0.11)
IMM GRANULOCYTES NFR BLD AUTO: 0.4 % (ref 0–0.9)
IRON SATN MFR SERPL: 92 % (ref 15–55)
IRON SERPL-MCNC: 239 UG/DL (ref 40–170)
LYMPHOCYTES # BLD AUTO: 1.47 K/UL (ref 1–4.8)
LYMPHOCYTES NFR BLD: 28.8 % (ref 22–41)
MCH RBC QN AUTO: 31.4 PG (ref 27–33)
MCH RBC QN AUTO: 31.9 PG (ref 27–33)
MCHC RBC AUTO-ENTMCNC: 35 G/DL (ref 33.6–35)
MCHC RBC AUTO-ENTMCNC: 35.8 G/DL (ref 33.6–35)
MCV RBC AUTO: 89 FL (ref 81.4–97.8)
MCV RBC AUTO: 89.6 FL (ref 81.4–97.8)
MONOCYTES # BLD AUTO: 0.42 K/UL (ref 0–0.85)
MONOCYTES NFR BLD AUTO: 8.2 % (ref 0–13.4)
NEUTROPHILS # BLD AUTO: 2.97 K/UL (ref 2–7.15)
NEUTROPHILS NFR BLD: 58.3 % (ref 44–72)
NRBC # BLD AUTO: 0.02 K/UL
NRBC BLD AUTO-RTO: 0.4 /100 WBC
PLATELET # BLD AUTO: 351 K/UL (ref 164–446)
PLATELET # BLD AUTO: 362 K/UL (ref 164–446)
PMV BLD AUTO: 12.2 FL (ref 9–12.9)
PMV BLD AUTO: 12.3 FL (ref 9–12.9)
POTASSIUM SERPL-SCNC: 3.5 MMOL/L (ref 3.6–5.5)
POTASSIUM SERPL-SCNC: 5 MMOL/L (ref 3.6–5.5)
RBC # BLD AUTO: 3.73 M/UL (ref 4.2–5.4)
RBC # BLD AUTO: 3.76 M/UL (ref 4.2–5.4)
SODIUM SERPL-SCNC: 130 MMOL/L (ref 135–145)
SODIUM SERPL-SCNC: 133 MMOL/L (ref 135–145)
TIBC SERPL-MCNC: 260 UG/DL (ref 250–450)
VIT B12 SERPL-MCNC: 866 PG/ML (ref 211–911)
WBC # BLD AUTO: 5.1 K/UL (ref 4.8–10.8)
WBC # BLD AUTO: 5.1 K/UL (ref 4.8–10.8)

## 2017-06-06 PROCEDURE — A9270 NON-COVERED ITEM OR SERVICE: HCPCS | Performed by: STUDENT IN AN ORGANIZED HEALTH CARE EDUCATION/TRAINING PROGRAM

## 2017-06-06 PROCEDURE — 700102 HCHG RX REV CODE 250 W/ 637 OVERRIDE(OP): Performed by: STUDENT IN AN ORGANIZED HEALTH CARE EDUCATION/TRAINING PROGRAM

## 2017-06-06 PROCEDURE — 83540 ASSAY OF IRON: CPT

## 2017-06-06 PROCEDURE — 82607 VITAMIN B-12: CPT

## 2017-06-06 PROCEDURE — 83550 IRON BINDING TEST: CPT

## 2017-06-06 PROCEDURE — 83735 ASSAY OF MAGNESIUM: CPT

## 2017-06-06 PROCEDURE — 700102 HCHG RX REV CODE 250 W/ 637 OVERRIDE(OP): Performed by: INTERNAL MEDICINE

## 2017-06-06 PROCEDURE — 700111 HCHG RX REV CODE 636 W/ 250 OVERRIDE (IP): Performed by: INTERNAL MEDICINE

## 2017-06-06 PROCEDURE — 80048 BASIC METABOLIC PNL TOTAL CA: CPT | Mod: 91

## 2017-06-06 PROCEDURE — 82962 GLUCOSE BLOOD TEST: CPT

## 2017-06-06 PROCEDURE — 36415 COLL VENOUS BLD VENIPUNCTURE: CPT

## 2017-06-06 PROCEDURE — 85025 COMPLETE CBC W/AUTO DIFF WBC: CPT | Mod: 91

## 2017-06-06 PROCEDURE — 82746 ASSAY OF FOLIC ACID SERUM: CPT

## 2017-06-06 PROCEDURE — 85027 COMPLETE CBC AUTOMATED: CPT

## 2017-06-06 PROCEDURE — 99232 SBSQ HOSP IP/OBS MODERATE 35: CPT | Mod: GC | Performed by: INTERNAL MEDICINE

## 2017-06-06 PROCEDURE — 770009 HCHG ROOM/CARE - ONCOLOGY SEMI PRI*

## 2017-06-06 PROCEDURE — A9270 NON-COVERED ITEM OR SERVICE: HCPCS | Performed by: INTERNAL MEDICINE

## 2017-06-06 RX ORDER — CARVEDILOL 12.5 MG/1
12.5 TABLET ORAL 2 TIMES DAILY WITH MEALS
Status: DISCONTINUED | OUTPATIENT
Start: 2017-06-06 | End: 2017-06-10 | Stop reason: HOSPADM

## 2017-06-06 RX ORDER — HYDROCODONE BITARTRATE AND ACETAMINOPHEN 5; 325 MG/1; MG/1
1-2 TABLET ORAL EVERY 6 HOURS PRN
Status: DISCONTINUED | OUTPATIENT
Start: 2017-06-06 | End: 2017-06-10 | Stop reason: HOSPADM

## 2017-06-06 RX ORDER — ONDANSETRON 2 MG/ML
8 INJECTION INTRAMUSCULAR; INTRAVENOUS ONCE
Status: COMPLETED | OUTPATIENT
Start: 2017-06-06 | End: 2017-06-06

## 2017-06-06 RX ORDER — ONDANSETRON 8 MG/1
8 TABLET, ORALLY DISINTEGRATING ORAL ONCE
Status: DISCONTINUED | OUTPATIENT
Start: 2017-06-06 | End: 2017-06-06

## 2017-06-06 RX ADMIN — CARVEDILOL 12.5 MG: 12.5 TABLET, FILM COATED ORAL at 00:45

## 2017-06-06 RX ADMIN — PREGABALIN 50 MG: 25 CAPSULE ORAL at 20:44

## 2017-06-06 RX ADMIN — HYDROCODONE BITARTRATE AND ACETAMINOPHEN 1 TABLET: 5; 325 TABLET ORAL at 23:50

## 2017-06-06 RX ADMIN — SENNOSIDES AND DOCUSATE SODIUM 2 TABLET: 8.6; 5 TABLET ORAL at 08:07

## 2017-06-06 RX ADMIN — HYDROCODONE BITARTRATE AND ACETAMINOPHEN 1 TABLET: 5; 325 TABLET ORAL at 15:17

## 2017-06-06 RX ADMIN — CARVEDILOL 12.5 MG: 12.5 TABLET, FILM COATED ORAL at 08:07

## 2017-06-06 RX ADMIN — AMLODIPINE BESYLATE 10 MG: 10 TABLET ORAL at 20:44

## 2017-06-06 RX ADMIN — ONDANSETRON 8 MG: 2 INJECTION INTRAMUSCULAR; INTRAVENOUS at 17:04

## 2017-06-06 RX ADMIN — AZACITIDINE 59 MG: 100 INJECTION, POWDER, LYOPHILIZED, FOR SOLUTION INTRAVENOUS; SUBCUTANEOUS at 18:04

## 2017-06-06 RX ADMIN — SITAGLIPTIN 25 MG: 25 TABLET, FILM COATED ORAL at 09:07

## 2017-06-06 RX ADMIN — AZACITIDINE 59 MG: 100 INJECTION, POWDER, LYOPHILIZED, FOR SOLUTION INTRAVENOUS; SUBCUTANEOUS at 18:03

## 2017-06-06 RX ADMIN — PREGABALIN 50 MG: 25 CAPSULE ORAL at 08:07

## 2017-06-06 ASSESSMENT — ENCOUNTER SYMPTOMS
CARDIOVASCULAR NEGATIVE: 1
DIARRHEA: 0
COUGH: 0
GASTROINTESTINAL NEGATIVE: 1
CHILLS: 0
HEADACHES: 0
EYES NEGATIVE: 1
BACK PAIN: 1
WEAKNESS: 1
WEIGHT LOSS: 0
DIZZINESS: 1
SHORTNESS OF BREATH: 1
CONSTIPATION: 0
RESPIRATORY NEGATIVE: 1
BLURRED VISION: 1
PALPITATIONS: 0
EYE PAIN: 0
ABDOMINAL PAIN: 1
FEVER: 0

## 2017-06-06 ASSESSMENT — PAIN SCALES - GENERAL
PAINLEVEL_OUTOF10: 8
PAINLEVEL_OUTOF10: 0
PAINLEVEL_OUTOF10: 0
PAINLEVEL_OUTOF10: 8
PAINLEVEL_OUTOF10: 6
PAINLEVEL_OUTOF10: 7

## 2017-06-06 NOTE — PROGRESS NOTES
"Pharmacy Chemotherapy Verification    Patient Name: Vielka Briscoe  Dx: MDS  Cycle: 2, Days 1 to 5   Previous treatment = May 1-5, 2017    Regimen and Dosing Reference  Azacitadine 75 mg/m2 IV over 10 minutes or Subcutaneously daily on days 1-7 - MD dosing on days 1 to 5  28 day cycle until disease progression or unacceptable toxicity for a minimum of 4-6 cycles  NCCN Guidelines for Myelodysplastic Syndromes V.2.2017  Lacy P, et al. Lancet Oncol. 2009;10(3):223-32  Yokasta BOLTON, et al. J Clin Oncol. 2009;27(10)3682-    Allergies:Actos; Darvocet; Demerol; Glucophage; Morphine; Oxycodone; Pcn; Requip; Simvastatin; Sulfa drugs;  Tramadol; Trazodone; Demerol; Dilaudid; Diphenhydramine; Iron; Lenalidomide; Morphine; Multivitamin; Naprosyn; Other drug; and Sulfa drugs  /56 mmHg  Pulse 63  Temp(Src) 36.6 °C (97.8 °F)  Resp 18  Ht 1.473 m (4' 9.99\")  Wt 60.4 kg (133 lb 2.5 oz)  BMI 27.84 kg/m2  SpO2 100%  LMP 01/01/1995  Body surface area is 1.57 meters squared.    6/6/17:  ANC~ 2970 Plt = 362k   Hgb = 11.8     SCr = 4.66mg/dL CrCl ~ HD MWF     6/5/17:  LFT's = WNL TBili = 0.4       Azacitadine 75 mg/m2 x 1.57m2 = 117.8mg   <5% difference, OK to treat with final dose = 118mg SubCutaneous   split between 2 syringes of 59mg each     KOKO Delgado Pharm.D.      "

## 2017-06-06 NOTE — PROGRESS NOTES
Pt arrived to unit via wheelchair. Alert and oriented x 4. Complains of left sided abdominal pain of a 7/10. Complains also of dizziness. VSS upon arrival. L AV fistula noted as well as L EJ Iv. Pt up with SBA and tolerated well. States she is legally blind and needs assistance with ADLs. Showed her the call light and she is able to identify the assist button to call for her needs. Denies any other needs at this time. Bed frame alarm is on and sounding. Call light within reach. Hourly rounding in place.

## 2017-06-06 NOTE — RESPIRATORY CARE
COPD EDUCATION by COPD CLINICAL EDUCATOR  6/6/2017 at 6:41 AM by Ada Tamez     Patient reviewed by COPD education team. Patient does not qualify for COPD program.

## 2017-06-06 NOTE — PROGRESS NOTES
[facility] Internal Medicine Interval Note    Name Vielka Briscoe     1955   Age/Sex 61 y.o. female   MRN [id number]   Code Status FULL     After 5PM or if no immediate response to page, please call for cross-coverage  Attending/Team: Mustapha/Michi Call [phone] to page   1st Call - Day Intern (R1):   Ashwini 2nd Call - Day Sr. Resident (R2/R3):   Herb         Chief complaint/ reason for interval visit (Primary Diagnosis)   Abnormal labs, nausea/vomiting     Interval Problem Daily Status Update  JOSE ANTONIO overnight. Pt received 3 units of blood through dialysis yesterday. Pt states that she feels better and that her nausea and vomiting resolved. She also reports resolution of her diarrhea. She does complain of some dizziness which is unchanged from before.    Active Problems:  - Anemia: Hgb 6.7 on admission, s/p 3U PRBC, now 11.8, transfusion dependent 2/2 MDS and ESRD, f/u B12/Folate  - ESRD: Likely 2/2 uncontrolled DMII/HTN, followed by nephrology, HD MWF, will cont while inpatient  - Iron overload: Iron panel Fe 239, %sat 92, Ferritin 1000+. Will need Fe chelation, per Dr. Matamoros (Onc) to be started as outpt by Dr. Avila  - MDS: transfusion dependent, diagnosed 2016, followed by Dr. Avila, will need outpatient iron chelation, Onc following  - DMII with b/l LE neuropathy: Poorly controlled, HbA1C of 9.8, 2/2 poor social support and inability to use insulin, POC ~low 200's, cont Januvia and Lyrica  - HTN: Stable, cont Coreg 12.5mg BID, Amlodipine 10mg QD  - Chronic CP: Seem musculoskeletal, negative cardiac work-up, will be seen by cards   - Chronic pain: Stable, cont Norco      Review of Systems   Constitutional: Positive for malaise/fatigue. Negative for fever, chills and weight loss.   Eyes: Positive for blurred vision. Negative for pain.   Respiratory: Positive for shortness of breath. Negative for cough.    Cardiovascular: Positive for chest pain. Negative  for palpitations and leg swelling.   Gastrointestinal: Positive for abdominal pain. Negative for diarrhea and constipation.   Genitourinary: Negative for dysuria, urgency and frequency.   Musculoskeletal: Positive for back pain and joint pain.   Skin: Negative for itching and rash.   Neurological: Positive for dizziness and weakness. Negative for headaches.       Consultants/Specialty  Nephrology, Heme/onc     Disposition  Inpatient for 5 day course of chemotherapy (Vidaza) and continued HD    Quality Measures  Labs reviewed and Medications reviewed  Bauer catheter: No Bauer  Central line in place: Chemotherapy    DVT Prophylaxis: Heparin              Physical Exam       Filed Vitals:    06/06/17 0254 06/06/17 0733 06/06/17 1004 06/06/17 1103   BP: 149/66 110/58 102/54 101/56   Pulse: 63 60 62 63   Temp: 36.8 °C (98.3 °F) 36.1 °C (96.9 °F) 36.3 °C (97.4 °F) 36.6 °C (97.8 °F)   Resp: 18 16 18 18   Height:       Weight:       SpO2: 98% 100% 100% 100%     Body mass index is 27.84 kg/(m^2). Weight: 60.4 kg (133 lb 2.5 oz)  Oxygen Therapy:  Pulse Oximetry: 100 %, O2 (LPM): 3, O2 Delivery: Silicone Nasal Cannula    Physical Exam   Constitutional: She is oriented to person, place, and time and well-developed, well-nourished, and in no distress.   HENT:   Head: Normocephalic and atraumatic.   Eyes: EOM are normal.   Neck: Normal range of motion.   Cardiovascular: Normal rate, regular rhythm and intact distal pulses.  Exam reveals no gallop and no friction rub.    Murmur (1/6 systolic) heard.  Pulmonary/Chest: Effort normal and breath sounds normal. No respiratory distress. She has no wheezes. She has no rales.   Abdominal: Soft. Bowel sounds are normal. She exhibits no distension. There is tenderness (mild diffuse ). There is no rebound and no guarding.   Musculoskeletal: Normal range of motion. She exhibits tenderness. She exhibits no edema.   Neurological: She is alert and oriented to person, place, and time.   Skin: Skin  is warm and dry. She is not diaphoretic.   Psychiatric: Affect normal.         Lab Data Review:      6/6/2017  12:52 PM    Recent Labs      06/05/17   1326  06/06/17   0141   SODIUM  134*  133*   POTASSIUM  6.4*  3.5*   CHLORIDE  99  93*   CO2  20  28   BUN  88*  47*   CREATININE  8.44*  4.66*   CALCIUM  7.1*  7.5*       Recent Labs      06/05/17   1326  06/06/17   0141   ALTSGPT  13   --    ASTSGOT  11*   --    ALKPHOSPHAT  65   --    TBILIRUBIN  0.4   --    GLUCOSE  123*  222*       Recent Labs      06/05/17   1326  06/06/17   0141   RBC  2.06*  3.76*   HEMOGLOBIN  6.7*  11.8*   HEMATOCRIT  20.1*  33.7*   PLATELETCT  445  362   PROTHROMBTM  12.6   --    APTT  28.9   --    INR  0.92   --    IRON   --   239*   TOTIRONBC   --   260       Recent Labs      06/05/17   1326  06/06/17   0141   WBC  4.6*  5.1   NEUTSPOLYS  60.00  58.30   LYMPHOCYTES  28.50  28.80   MONOCYTES  5.70  8.20   EOSINOPHILS  5.20  3.90   BASOPHILS  0.40  0.40   ASTSGOT  11*   --    ALTSGPT  13   --    ALKPHOSPHAT  65   --    TBILIRUBIN  0.4   --            Assessment/Plan     #Chronic anemia  - Pt has a history of transfusion dependent anemia and has had a number of hospitalizations in the past for symptoms secondary to it. Each time she was transfused 2-3 units and discharged home.    - Chronic anemia most likely 2/2 to MDS vs ESRD vs anemia of chronic disease or most likely a combination of all three. No signs of acute bleed.   - Received total of 3 units via dialysis, H/H responded appropriately   Plan:    -> Transfuse as needed; goal 8.5-9 per nephro and onc  -> check B12 and folate levels, replace as necessary     #Iron overload  Likely 2/2 to frequent transfusions  Iron studies indicate an iron overload state, discussed with heme/onc and they stated that iron chelation will be done outpatient after her inpatient chemo therapy.   Plan:  -> f/u with heme/onc outpatient for iron chelation  -> monitor for now       #ESRD on HD MWF (likely 2/2  uncontrolled HTN/DMII)  Pt presents to ED with severe electrolyte abnormalities including hyperkalemia and BUN and Cr of 88 and 8.44 respectively, all of which are elevated from before. Last session was 6/2, however she missed 2 dialysis sessions in the past week.   - Received dialysis yesterday, electrolyte levels normalized.   Plan:    -> Consulted  nephro, appreciate recs     #Electrolyte abnormalities - improving  Most likely 2/2 to missed dialysis session and prolonged vomiting and diarrhea. Could be contributing to her fatigue. Pt at high risk for arrythmias but asymptomatic though.  - normalized after receiving dialysis   Plan:    Monitor electrolytes.       #Myelodysplastic syndrome  Stable, followed by Dr. Roberts outpatient. Currently treated with 6 month course of Vidaza.   - Pt to receive chemo while inpatient   Plan:  -> appreciate recs from heme/ onc  -> Monitor blood counts      #Nausea/Vomitting  - resolved   Pt has documented history of oxycodone allergy which causes her to vomit. It is likely that her accidental ingestion of oxycodone caused her initial n/v and then this was prolonged due to her missing numerous dialysis sessions.    Plan:  -> monitor for now   -> zofran prn     #Chronic chest pain  Described as a pressure pain that has not gotten worse. Associated with SOB with exertion. Unclear etiology, possibly due to chronic anemia  - EKG negative, troponins negative, CXR unremarkable  - negative nuclear medicine cardiac stress test on 12/22/16  - Pt has prior admissions with similar symptoms with negative workup  - Pt has cardiology appointment on 6/20/17.      #HTN  Stable  Plan:  -> continue home meds  -> labetolol prn for severe HTN    #Type 2 DM  Blood glucose of 123 on admission, HbA1C of 9.8 on 5/10. Currently on januvia 25mg    - On further questioning, pt stated that she is not able to check blood sugars at home due to her blindness.   - Blood glucose ranged from  222-256  Plan:   continue januvia 25mg   continue lyrica for neuropathy     #Chronic Respiratory Failure  On 2L at home 24/7  -> continue supplemental O2     #legal blindness  Stable  -> fall precautions  -> continue bimatoprost for glaucoma     #Social issues  Pt expressed concerns over transportation issues and care at home. She stated that is hard for her to make it to dialysis and the infusion center for her chemo therapy. She has missed dialysis sessions in the past due to transportation issues. She also expressed concern over her care at home. Though she does live with her daughter and son in law, they do not help care for her.   Plan:  -> consult social work

## 2017-06-06 NOTE — PROGRESS NOTES
HD treatment were ordered by Dr. Perez, pt was able to tolerate HD treatment without any difficulty and no reaction were noted during blood transfusion. Pt was able to pull 4.0 liters of fluids. Gave report to Jessica GEORGE, held pt AVF site for 10 min each. Jessica GEORGE verified that pt AVF site were clean dry and intact, no swelling or redness were noted post tx, +B/T pre and post tx. PT was stable post tx, denies any complaint of pain and SOB, no fever or any distress were noted post tx. Treatment started at 1644 and ended at 2016, see flow sheets for further details.

## 2017-06-06 NOTE — PROGRESS NOTES
[facility] Services Progress Note    1st unit of pRBCs given with HD now complete. Call placed to ER RN to input second transfuse order. Once transfuse order is in, Dialysis RNs will release and administer blood product.

## 2017-06-06 NOTE — PROGRESS NOTES
Admitted to  631-1 via bed from dialysis.  4L off during dialysis.  Skin surfaces intact.  Denies pain.  Bed alarm placed for safety

## 2017-06-06 NOTE — PROGRESS NOTES
Chemotherapy Verification - SECONDARY RN       Height = 147.3 cm  Weight = 60.4 kg  BSA = 1.57 m2       Medication: vidaza  Dose: 37.5 mg/m2  Calculated Dose: 58.87 mg ordered= 59 mg                             (In mg/m2, AUC, mg/kg)     Medication: vidaza  Dose: 37.5 mg/m2  Calculated Dose: 58.87 mg ordered= 59 mg                             (In mg/m2, AUC, mg/kg)       I confirm that this process was performed independently.

## 2017-06-06 NOTE — PROGRESS NOTES
[facility] Nephrology Progress Note               Author: Fabian Rico MD Date & Time created: 6/6/2017  11:09 AM     Interval History:  61-year-old female with PMH ESRD, MWF IHD, hypertension, type 2 diabetes mellitus, anemia, CKD, renal osteodystrophy, hyperlipidemia, and MDS, who presents to the ED with a chief complaint of shortness of breath.    She states that symptoms have been ongoing since the weekend and states that she had issues with her transportation and missed her dialysis on Friday.  Today, she woke up and was not feeling well, more tired  than usual and more short of breath than usual and so she came to the ED and missed her dialysis session on the date of admission as well.  She is well known to us due to her frequent hospitalizations for transfusions due to anemia from her MDS as well   as missed dialysis sessions in past as well.      She denies being noncompliant with her diet and fluid restriction and also denies any orthopnea or PND at this time.  She is lying comfortably in her bed during my encounter and lab  work done in the ED showed a hemoglobin of 6.7 and a potassium of 6.4 with a  BUN of 88.  She otherwise denies any recent fevers, chills, nausea, vomiting,  chest pain, abdominal pain, melena, hematochezia, or hematemesis.     Daily Nephrology Summary  6/5/17 - seen in consultation by Dr Perez.  Hd ordered.  6/6/17 - patient c/o abdominal pain.  Hgb better s/p PRBCs.  HD planned.    Review of Systems   Constitutional: Positive for malaise/fatigue.   Eyes: Negative.         Legally blind   Respiratory: Negative.    Cardiovascular: Negative.    Gastrointestinal: Negative.    Skin: Negative.    Neurological: Positive for weakness.       Physical Exam   Constitutional: She is oriented to person, place, and time. She appears well-developed and well-nourished.   HENT:   Head: Normocephalic and atraumatic.   Eyes: Conjunctivae are normal. No scleral icterus.   Neck: No tracheal deviation  present.   Cardiovascular: Normal rate.  Exam reveals no friction rub.    Pulmonary/Chest: Effort normal. No respiratory distress.   Abdominal: Soft. There is tenderness.   Musculoskeletal: She exhibits no edema.   Neurological: She is alert and oriented to person, place, and time.       Labs:        Invalid input(s): AAGEUD1INNOUBY  Recent Labs      17   1326   TROPONINI  <0.01   BNPBTYPENAT  110*     Recent Labs      17   1326  17   0141   SODIUM  134*  133*   POTASSIUM  6.4*  3.5*   CHLORIDE  99  93*   CO2  20  28   BUN  88*  47*   CREATININE  8.44*  4.66*   CALCIUM  7.1*  7.5*     Recent Labs      17   1326  17   0141   ALTSGPT  13   --    ASTSGOT  11*   --    ALKPHOSPHAT  65   --    TBILIRUBIN  0.4   --    GLUCOSE  123*  222*     Recent Labs      17   1326  17   0141   RBC  2.06*  3.76*   HEMOGLOBIN  6.7*  11.8*   HEMATOCRIT  20.1*  33.7*   PLATELETCT  445  362   PROTHROMBTM  12.6   --    APTT  28.9   --    INR  0.92   --    IRON   --   239*   TOTIRONBC   --   260     Recent Labs      17   1326  17   0141   WBC  4.6*  5.1   NEUTSPOLYS  60.00  58.30   LYMPHOCYTES  28.50  28.80   MONOCYTES  5.70  8.20   EOSINOPHILS  5.20  3.90   BASOPHILS  0.40  0.40   ASTSGOT  11*   --    ALTSGPT  13   --    ALKPHOSPHAT  65   --    TBILIRUBIN  0.4   --            Hemodynamics:  Temp (24hrs), Av.4 °C (97.5 °F), Min:36 °C (96.8 °F), Max:36.8 °C (98.3 °F)  Temperature: 36.6 °C (97.8 °F)  Pulse  Av.4  Min: 60  Max: 75Heart Rate (Monitored): 71  Blood Pressure: 101/56 mmHg, NIBP: (!) 163/62 mmHg     Respiratory:    Respiration: 18, Pulse Oximetry: 100 %           Fluids:    Intake/Output Summary (Last 24 hours) at 17 1109  Last data filed at 17   Gross per 24 hour   Intake   2000 ml   Output   4000 ml   Net  -2000 ml     Weight: 60.4 kg (133 lb 2.5 oz)  GI/Nutrition:  Orders Placed This Encounter   Procedures   • Diet Order     Standing Status: Standing       Number of Occurrences: 1      Standing Expiration Date:      Order Specific Question:  Diet:     Answer:  Diabetic [3]     Medical Decision Making, by Problem:  Active Hospital Problems    Diagnosis   • Myelodysplasia (myelodysplastic syndrome) (CMS-HCC) [D46.9]       IMPRESSION:  1.  End-stage renal disease, etiology likely secondary to HTN/DM.  2.  Acute on chronic anemia, etiology secondary to MDS, superimposed on  chronic kidney disease.  3.  Nonadherence to outpatient dialysis care.  4.  Hypertension.  5.  Type 2 diabetes mellitus.  6.  Renal osteodystrophy.  7.  Hyperlipidemia.  8.  Coronary artery disease.  9.  Peripheral vascular disease.     ASSESSMENT:  1.  GFR:  HD dependent.  2.  Urine, oligoanuric.  3.  Volume, euvolemic.  4.  Blood pressure, goal less than 140/90.  5.  Acid base, no significant acid base disturbance noted.  6.  Electrolytes, stable.  7.  Hyperkalemia with no ECG changes.  8.  Anemia, goal hemoglobin 10-11.  9.  MBD, calcium normal, p.o. for elevated.  10.  Dialysis access, left arteriovenous fistula with positive thrill and bruit.     PLAN:  1.  MWF IHD during her inpatient stay.  2.   A 3-1/2 hour session tomorrow.  3.  We will transfuse the remainder of her PRBCs once she is hooked up to dialysis as we can give it to her faster than through a peripheral line.  4.  Advised her to attempt to stay of compliant and adherent as possible to outpatient dialysis to help prevent electrolyte abnormalities such as today.  5.  Follow up in heme/onc for MDS.  6.  Continue PhosLo 667 mg t.i.d. with meals.  7.  Dose all medications per ESRD guidelines.                Core Measures

## 2017-06-06 NOTE — CARE PLAN
Problem: Safety  Goal: Will remain free from injury  Bed alarm placed, pt safety teaching completed

## 2017-06-06 NOTE — PROGRESS NOTES
"Pharmacy Chemotherapy Verification    Patient Name: Vielka Briscoe   Dx: MDS  Protocol: Azacitadine     *Dosing Reference*  Azacitadine 75 mg/m2 IV over 10 minutes or Subcutaneously daily on days 1-7 - MD dosing on days 1-5  28 day cycle until disease progression or unacceptable toxicity for a minimum of 4-6 cycles  NCCN Guidelines for Myelodysplastic Syndromes V.2.2017  Lacy P, et al. Lancet Oncol. 2009;10(3):223-32  Yokasta BOLTON, et al. J Clin Oncol. 2009;27(11)-2    Allergies:  Actos; Darvocet; Demerol; Glucophage; Morphine; Oxycodone; Pcn; Requip; Simvastatin; Sulfa drugs; Tramadol; Trazodone; Demerol; Dilaudid; Diphenhydramine; Iron; Lenalidomide; Morphine; Multivitamin; Naprosyn; Other drug; and Sulfa drugs     /56 mmHg  Pulse 63  Temp(Src) 36.6 °C (97.8 °F)  Resp 18  Ht 1.473 m (4' 9.99\")  Wt 60.4 kg (133 lb 2.5 oz)  BMI 27.84 kg/m2  SpO2 100%  LMP 01/01/1995 Body surface area is 1.57 meters squared.    Labs 5/6/17  ANC ~ 2970     Plt = 362 k     Hgb = 11.8  SCr = 4.66      CrCl ~11.8 ml/min      Labs 6/5/17  AST/ALT/AP/TBili = 11/13/65/0.4    Drug Order   (Drug name, dose, route, IV Fluid & volume, frequency, number of doses) Cycle: 2      Previous treatment: 5/1-5/5/17     Medication = Azacitadine  Base Dose= 75 mg/m2  Calc Dose: Base Dose x 1.57 m2 = 117.75 mg  Final Dose = 118 mg  Route = IV  Fluid & Volume = 25 mg/mL;  4.72 mL in 2 syringes, 2.36 mL (59 mg) each  Admin Duration = Subcutaneous injection           <5% difference, OK to treat with final dose         By my signature below, I confirm this process was performed independently with the BSA and all final chemotherapy dosing calculations congruent. I have reviewed the above chemotherapy order and that my calculation of the final dose and BSA (when applicable) corroborate those calculations of the  pharmacist. Discrepancies of 5% or greater in the written dose have been addressed and documented within the EPIC " Progress notes.    Signature: Michael OttoD

## 2017-06-06 NOTE — CONSULTS
DATE OF SERVICE:  06/06/2017    Consult requested by [facility] team.    REASON FOR CONSULTATION:  History of myelodysplastic syndrome, currently on   Vidaza.    HISTORY OF PRESENT ILLNESS:  The patient is a 61-year-old [place] lady with a   history of MDS, 5q deletion.  The patient was initially on a low-dose   Revlimid given she is on hemodialysis at 2.5 mg daily, but apparently, she did   not have a response.  Subsequently, the patient was started on Vidaza 75   mg/m2 for 5 days on 04/17/2017.    The patient this time is being admitted with a chief complaint of short of   breath and weakness.  She was found to have hemoglobin of 6.7, also potassium   of 6.4.  She was admitted to the hospital and she was started on hemodialysis   on 06/05/2017.  She does have a past medical history of end-stage renal   disease, on hemodialysis Monday, Wednesday, and Friday, hypertension, diabetes   type 2, anemia, CKD, renal osteodystrophy, hyperlipidemia, MDS.  She does   have a lot of transportation issues, which makes her noncompliant.  She also   has missed a dialysis session on Monday and on Wednesday of last week.  She is   known to have frequent hospitalizations for blood transfusions as well as for   the MDS.  During her hospital stay, she so far received 3 units of packed red   blood cells.  Currently, her hemoglobin had improved at 11.8.  Electrolytes   are better controlled after hemodialysis and given the fact that she has a lot   of transportation issues and she is on Vidaza for 5 days in a row, we were   consulted for consideration of resuming the Vidaza while inpatient.  At the   present time, she feels better.  She finished with hemodialysis yesterday.    She is scheduled again on Wednesday.  She continues to complain of dizziness,   the shortness of breath is gone, there is no chest pain.  She is ambulating.    She does not have any fever, chills.  She is under the care of [facility] team.    Otherwise, WBC is 5.1 and platelet  count 362.    REVIEW OF SYSTEMS:  A 14-point review of systems is negative except stated   above.    PAST MEDICAL HISTORY:  End-stage renal disease, Monday, Wednesday, Friday IHD,   hypertension, diabetes type 2, anemia of chronic disease, myelodysplastic   syndrome, renal osteodystrophy, chronic obstructive pulmonary disease, chronic   chest pain, coronary artery disease, hyperlipidemia, history of   cerebrovascular accident, history of steal syndrome, chronic diastolic heart   failure, peripheral vascular disease.    PAST SURGICAL HISTORY:  Cardiac surgery, abdominal pain, AV fistula placement,   bilateral cataract repair, retinal detachment surgery, coronary angiogram,   upper EGD, revision of left AV fistula for steal syndrome, appendectomy, D and   C x2,  x2, hysterectomy, multiple stents to bilateral lower   extremity, Perm-A-Cath placement and removal.    FAMILY HISTORY:  Noncontributory.    SOCIAL HISTORY:  She lives with her daughter.  She does have a lot of   transportation issues.  She denies tobacco, alcohol, or illicit drugs.    MEDICATIONS:  Per medication reconciliation note.    ALLERGIES:  Please refer to medication reconciliation note, multiple   allergies.    PHYSICAL EXAMINATION:  VITAL SIGNS:  Temperature 36.3, heart rate 62, respiratory rate 18, blood   pressure 102/54, 100% on 3 L of nasal cannula.  GENERAL:  The patient is in no distress, resting comfortably.  HEENT:  Mucous membranes are moist.  No lymphadenopathy.  LUNGS:  Clear to auscultation bilaterally.  No wheezing or crackles.  CARDIOVASCULAR:  _____ rate.  ABDOMEN:  Soft, nontender, nondistended.  EXTREMITIES:  There is no lower extremity swelling.  NEUROLOGIC:  A and O x3.  Cranial nerves II through XII grossly intact.    LABORATORY:  WBC 5.1, hemoglobin 11.8 after 6.7 during admission, hematocrit   33.7, platelets 362.    ASSESSMENT AND PLAN:  1.  She does have myelodysplastic syndrome with deletion of 5q.  The patient    did not respond well to low-dose Revlimid at 2.5 mg daily.  She was started on   Vidaza on 04/17/2017 at 75 mg/m2 for 5 days.  She is having    transportation issues.  Reason why we decided to continue on Vidaza subQ 75   mg/m2 for 5 days while inpatient.  The patient agreed.  2.  Anemia due to chronic kidney disease, myelodysplastic syndrome.  The   patient was transfused with 3 units of packed red blood cells during this   admission.  Currently, hemoglobin is 11.8.  3.  End-stage renal disease, on hemodialysis.  She is known to have missed   dialysis sessions due to noncompliance given transportation reasons.  She   already had hemodialysis yesterday.  She will continue on Wednesday and so on.    She is on Monday, Wednesday, Friday.  All other medical management to be   managed per primary team.  Check CBC daily.    I spent 30 minutes with patient.  All questions were answered for   satisfaction.       ____________________________________     CRISTEL Lindsay / RAMONE    DD:  06/06/2017 10:43:07  DT:  06/06/2017 12:18:26    D#:  [id number]  Job#:  [id number]

## 2017-06-06 NOTE — CONSULTS
DATE OF SERVICE:  06/05/2017    REASON FOR CONSULTATION:  Inpatient dialysis management and hyperkalemia.    CHIEF COMPLAINT:  I am short of breath and weak.    HISTORY OF PRESENT ILLNESS:  A 61-year-old female with PMH ESRD, MWF IHD,   hypertension, type 2 diabetes mellitus, anemia, CKD, renal osteodystrophy,   hyperlipidemia, and MDS, who presents to the ED with a chief complaint as   described above.  She states that symptoms have been ongoing since the weekend   and states that she had issues with her transportation and missed her   dialysis on Friday.  Today, she woke up and was not feeling well, more tired   than usual and more short of breath than usual and so she came to the ED and   missed her dialysis session today as well.  She is well known to us due to her   frequent hospitalizations for transfusions due to anemia from her MDS as well   as missed dialysis sessions in past as well.  She denies being noncompliant   with her diet and fluid restriction and also denies any orthopnea or PND at   this time.  She is lying comfortably in her bed during my encounter and lab   work done in the ED showed a hemoglobin of 6.7 and a potassium of 6.4 with a   BUN of 88.  She otherwise denies any recent fevers, chills, nausea, vomiting,   chest pain, abdominal pain, melena, hematochezia, or hematemesis.    REVIEW OF SYSTEMS:  As per HPI, otherwise negative per AMA/CMS criteria.    PAST MEDICAL HISTORY:  1.  ESRD MWF IHD.  2.  Hypertension.  3.  Type 2 diabetes mellitus.  4.  Anemia of chronic kidney disease.  5.  Renal osteodystrophy.  6.  MDS.  7.  COPD.  8.  Chronic chest pain.  9.  CAD.  10.  Hyperlipidemia.  11.  History of CVA.  12.  History of steal syndrome.  13.  Chronic diastolic heart failure.  14.  PVD.    PAST SURGICAL HISTORY:  1.  Cardiac surgery.  2.  Abdominal surgery.  3.  AV fistula placement.  4.  Bilateral cataract repair.  5.  Retinal detachment surgery.  6.  Coronary angiogram.  7.  Upper EGD.  8.   Revision of left AV fistula for steal syndrome.  9.  Appendectomy.  10.  D and C x2.  11.   x2.  12.  Hysterectomy.  13.  Multiple stents to bilateral lower extremities.  14.  PermCath placement and removal.    FAMILY HISTORY:  Reviewed and noncontributory to her current illness.    SOCIAL HISTORY:  1.  She lives with her daughter.  2.  She denies tobacco use.  3.  She denies ETOH use.  4.  She denies any illicit drug use.  5.  She used to work as a CNA.    HOME MEDICATIONS:  Reviewed and documented in chart.    ALLERGIES:  1.  ACTOS, REACTION UNKNOWN.  2.  DARVOCET, REACTION VOMITING.  3.  DEMEROL, REACTION VOMITING.  4.  GLUCOPHAGE, REACTION VOMITING.  5.  MORPHINE, REACTION VOMITING.  6.  OXYCODONE, REACTION VOMITING.  7.  PENICILLIN, REACTION VOMITING.  8.  REQUIP, REACTION VOMITING.  9.  SIMVASTATIN, REACTION UNKNOWN.  10.  SULFA, REACTION RASH.  11.  TRAMADOL, REACTION VOMITING.  12.  TRAZODONE, REACTION VOMITING.  13.  DILAUDID, REACTION VOMITING.  14.  BENADRYL, REACTION VOMITING.  15.  IRON, REACTION VOMITING.  16.  LENALIDOMIDE, REACTION VOMITING.  17.  NAPROXEN, REACTION UNKNOWN.    PHYSICAL EXAMINATION:  VITAL SIGNS:  /56, pulse of 69, respiratory rate 20, temperature 98.1,   and pulse ox 100% on 2 liters O2 nasal cannula.  Height 147.3 cm and weight   61.2 kg.  BMI 28.2.  GENERAL:  Chronically ill-appearing female who appears older than her stated   age, in no acute distress.  HEENT:  Normocephalic, atraumatic.  Mucous membranes are moist.  No scleral   icterus.  Conjunctivae normal.  NECK:  Supple, nontender.  Trachea is midline.  CARDIOVASCULAR:  Regular rate and rhythm.  No murmurs or rubs.  LUNGS:  Clear to auscultation bilaterally.  No wheezes or rales.  ABDOMEN:  Soft, nontender, and nondistended.  Positive bowel sounds.  EXTREMITIES:  No clubbing, cyanosis or edema.  SKIN:  Warm and dry.  No new rashes or lesions.  NEUROLOGIC:  Alert and oriented.  No gross focal  deficits.  PSYCHIATRIC:  She has appropriate mood and affect.    DIAGNOSTIC LABORATORY DATA:  WBC 4.6, hemoglobin 6.7, hematocrit 20.1,   platelets 445, neutrophils 60%, and lymphocytes 28.5%.  Sodium 134, potassium   6.4, chloride 99, CO2 20, BUN 88, creatinine 8.4, glucose 123, and calcium   7.1.  Total protein 6.4, albumin 3.2, total bilirubin 0.4, AST 11, ALT 13, and   ALP 65.  Troponin less than 0.01.  .  PT 12.6, INR 0.9, and PTT 28.9.    IMPRESSION:  1.  End-stage renal disease, etiology likely secondary to HTN/DM.  2.  Acute on chronic anemia, etiology secondary to MDS, superimposed on   chronic kidney disease.  3.  Nonadherence to outpatient dialysis care.  4.  Hypertension.  5.  Type 2 diabetes mellitus.  6.  Renal osteodystrophy.  7.  Hyperlipidemia.  8.  Coronary artery disease.  9.  Peripheral vascular disease.    ASSESSMENT:  1.  GFR:  HD dependent.  2.  Urine, oligoanuric.  3.  Volume, euvolemic.  4.  Blood pressure, goal less than 140/90.  5.  Acid base, no significant acid base disturbance noted.  6.  Electrolytes, stable.  7.  Hyperkalemia with no ECG changes.  8.  Anemia, goal hemoglobin 10-11.  9.  MBD, calcium normal, p.o. for elevated.  10.  Dialysis access, left arteriovenous fistula with positive thrill and   bruit.    PLAN:  1.  MWF IHD during her inpatient stay.  2.   A 3-1/2 hour session today on a 2 K bath with UF as tolerated.  3.  We will transfuse the remainder of her PRBCs once she is hooked up to   dialysis as we can give it to her faster than through a peripheral line.  4.  Advised her to attempt to stay of compliant and adherent as possible to   outpatient dialysis to help prevent electrolyte abnormalities such as today.  5.  Follow up in heme/onc for MDS.  6.  Continue PhosLo 667 mg t.i.d. with meals.  7.  Dose all medications per ESRD guidelines.    Thank you for this consultation.       ____________________________________     Chao Perez MD    JRP / NTS    DD:   06/05/2017 15:49:52  DT:  06/05/2017 19:55:07    D#:  [id number]  Job#:  [id number]

## 2017-06-06 NOTE — PROGRESS NOTES
Chemotherapy Verification - PRIMARY RN  Cycle #2 Day #1      Height = 147.3cm  Weight = 60.4kg  BSA = 1.57 m2       Medication: Azacitidine  Dose: 37.5mg/m2  Calculated Dose: 58.9 mg (ordered=59 mg)                             (In mg/m2, AUC, mg/kg)     Medication: Azacitidine  Dose: 37.5 mg/m2  Calculated Dose: 58.9 mg (ordered= 59 mg)                             (In mg/m2, AUC, mg/kg)    I confirm this process was performed independently with the BSA and all final chemotherapy dosing calculations congruent.  Any discrepancies of 5% or greater have been addressed with the chemotherapy pharmacist. The resolution of the discrepancy has been documented in the EPIC progress notes.

## 2017-06-07 ENCOUNTER — APPOINTMENT (OUTPATIENT)
Dept: ONCOLOGY | Facility: MEDICAL CENTER | Age: 62
End: 2017-06-07
Attending: INTERNAL MEDICINE
Payer: MEDICARE

## 2017-06-07 LAB
ANION GAP SERPL CALC-SCNC: 16 MMOL/L (ref 0–11.9)
BASOPHILS # BLD AUTO: 0.6 % (ref 0–1.8)
BASOPHILS # BLD: 0.03 K/UL (ref 0–0.12)
BUN SERPL-MCNC: 85 MG/DL (ref 8–22)
CALCIUM SERPL-MCNC: 7.4 MG/DL (ref 8.5–10.5)
CHLORIDE SERPL-SCNC: 92 MMOL/L (ref 96–112)
CO2 SERPL-SCNC: 23 MMOL/L (ref 20–33)
CREAT SERPL-MCNC: 6.89 MG/DL (ref 0.5–1.4)
EOSINOPHIL # BLD AUTO: 0.25 K/UL (ref 0–0.51)
EOSINOPHIL NFR BLD: 4.6 % (ref 0–6.9)
ERYTHROCYTE [DISTWIDTH] IN BLOOD BY AUTOMATED COUNT: 49.1 FL (ref 35.9–50)
GFR SERPL CREATININE-BSD FRML MDRD: 6 ML/MIN/1.73 M 2
GLUCOSE SERPL-MCNC: 135 MG/DL (ref 65–99)
HCT VFR BLD AUTO: 33.6 % (ref 37–47)
HGB BLD-MCNC: 11.8 G/DL (ref 12–16)
IMM GRANULOCYTES # BLD AUTO: 0.02 K/UL (ref 0–0.11)
IMM GRANULOCYTES NFR BLD AUTO: 0.4 % (ref 0–0.9)
LYMPHOCYTES # BLD AUTO: 1.87 K/UL (ref 1–4.8)
LYMPHOCYTES NFR BLD: 34.7 % (ref 22–41)
MAGNESIUM SERPL-MCNC: 2.1 MG/DL (ref 1.5–2.5)
MCH RBC QN AUTO: 31.9 PG (ref 27–33)
MCHC RBC AUTO-ENTMCNC: 35.1 G/DL (ref 33.6–35)
MCV RBC AUTO: 90.8 FL (ref 81.4–97.8)
MONOCYTES # BLD AUTO: 0.48 K/UL (ref 0–0.85)
MONOCYTES NFR BLD AUTO: 8.9 % (ref 0–13.4)
NEUTROPHILS # BLD AUTO: 2.74 K/UL (ref 2–7.15)
NEUTROPHILS NFR BLD: 50.8 % (ref 44–72)
NRBC # BLD AUTO: 0 K/UL
NRBC BLD AUTO-RTO: 0 /100 WBC
PLATELET # BLD AUTO: 363 K/UL (ref 164–446)
PMV BLD AUTO: 12.5 FL (ref 9–12.9)
POTASSIUM SERPL-SCNC: 4.2 MMOL/L (ref 3.6–5.5)
RBC # BLD AUTO: 3.7 M/UL (ref 4.2–5.4)
SODIUM SERPL-SCNC: 131 MMOL/L (ref 135–145)
WBC # BLD AUTO: 5.4 K/UL (ref 4.8–10.8)

## 2017-06-07 PROCEDURE — 99232 SBSQ HOSP IP/OBS MODERATE 35: CPT | Mod: GC | Performed by: INTERNAL MEDICINE

## 2017-06-07 PROCEDURE — A9270 NON-COVERED ITEM OR SERVICE: HCPCS | Performed by: INTERNAL MEDICINE

## 2017-06-07 PROCEDURE — 700102 HCHG RX REV CODE 250 W/ 637 OVERRIDE(OP): Performed by: STUDENT IN AN ORGANIZED HEALTH CARE EDUCATION/TRAINING PROGRAM

## 2017-06-07 PROCEDURE — 700111 HCHG RX REV CODE 636 W/ 250 OVERRIDE (IP)

## 2017-06-07 PROCEDURE — 700111 HCHG RX REV CODE 636 W/ 250 OVERRIDE (IP): Performed by: INTERNAL MEDICINE

## 2017-06-07 PROCEDURE — 700102 HCHG RX REV CODE 250 W/ 637 OVERRIDE(OP): Performed by: INTERNAL MEDICINE

## 2017-06-07 PROCEDURE — A9270 NON-COVERED ITEM OR SERVICE: HCPCS | Performed by: STUDENT IN AN ORGANIZED HEALTH CARE EDUCATION/TRAINING PROGRAM

## 2017-06-07 PROCEDURE — 90935 HEMODIALYSIS ONE EVALUATION: CPT

## 2017-06-07 PROCEDURE — 770009 HCHG ROOM/CARE - ONCOLOGY SEMI PRI*

## 2017-06-07 RX ORDER — ONDANSETRON 2 MG/ML
8 INJECTION INTRAMUSCULAR; INTRAVENOUS ONCE
Status: COMPLETED | OUTPATIENT
Start: 2017-06-07 | End: 2017-06-07

## 2017-06-07 RX ORDER — HEPARIN SODIUM 1000 [USP'U]/ML
INJECTION, SOLUTION INTRAVENOUS; SUBCUTANEOUS
Status: COMPLETED
Start: 2017-06-07 | End: 2017-06-07

## 2017-06-07 RX ADMIN — PREGABALIN 50 MG: 25 CAPSULE ORAL at 14:41

## 2017-06-07 RX ADMIN — HEPARIN SODIUM 500 UNITS: 1000 INJECTION, SOLUTION INTRAVENOUS; SUBCUTANEOUS at 10:46

## 2017-06-07 RX ADMIN — AZACITIDINE 59 MG: 100 INJECTION, POWDER, LYOPHILIZED, FOR SOLUTION INTRAVENOUS; SUBCUTANEOUS at 18:13

## 2017-06-07 RX ADMIN — ACETAMINOPHEN 325 MG: 325 TABLET, FILM COATED ORAL at 03:22

## 2017-06-07 RX ADMIN — HYDROCODONE BITARTRATE AND ACETAMINOPHEN 1 TABLET: 5; 325 TABLET ORAL at 20:58

## 2017-06-07 RX ADMIN — SITAGLIPTIN 25 MG: 25 TABLET, FILM COATED ORAL at 14:41

## 2017-06-07 RX ADMIN — AMLODIPINE BESYLATE 10 MG: 10 TABLET ORAL at 20:54

## 2017-06-07 RX ADMIN — CARVEDILOL 12.5 MG: 12.5 TABLET, FILM COATED ORAL at 17:20

## 2017-06-07 RX ADMIN — ONDANSETRON 8 MG: 2 INJECTION INTRAMUSCULAR; INTRAVENOUS at 17:20

## 2017-06-07 RX ADMIN — PREGABALIN 50 MG: 25 CAPSULE ORAL at 20:54

## 2017-06-07 ASSESSMENT — ENCOUNTER SYMPTOMS
CHILLS: 0
MYALGIAS: 1
WEAKNESS: 1
DIARRHEA: 0
SHORTNESS OF BREATH: 1
CARDIOVASCULAR NEGATIVE: 1
GASTROINTESTINAL NEGATIVE: 1
DIAPHORESIS: 0
BACK PAIN: 1
VOMITING: 0
NAUSEA: 0
HEADACHES: 1
ABDOMINAL PAIN: 1
DIZZINESS: 1
ORTHOPNEA: 0
CONSTIPATION: 0
EYES NEGATIVE: 1
PALPITATIONS: 0
BLURRED VISION: 1
FEVER: 0
RESPIRATORY NEGATIVE: 1
MUSCULOSKELETAL NEGATIVE: 1

## 2017-06-07 ASSESSMENT — PAIN SCALES - GENERAL
PAINLEVEL_OUTOF10: 7
PAINLEVEL_OUTOF10: 1

## 2017-06-07 NOTE — PROGRESS NOTES
[facility] [place] [facility] Medicine Interval Note    Name Vielka Briscoe     1955   Age/Sex 61 y.o. female   MRN [id number]   Code Status FULL     After 5PM or if no immediate response to page, please call for cross-coverage  Attending/Team: Mustapha/Michi Call [phone] to page   1st Call - Day Intern (R1):   Ashwini 2nd Call - Day Sr. Resident (R2/R3):   Herb         Chief complaint/ reason for interval visit (Primary Diagnosis)   Abnormal Labs, nausea/vomitting     Interval Problem Daily Status Update    JOSE ANTONIO overnight. Pt started 5 day course of Vidaza for myelodysplastic syndrome. Pt reports improved chest pain and SOB. She does complain of some headache and baseline dizziness.     Active Problems:  Anemia - Hb 6.7 on admission, s/p 3U PRBC, now 11.8, stable; B12 and folate wnl  ESRD - likely 2/2 to uncontrolled DM2/HTN, followed by nephro, on HD MWF  Iron overload - likely 2/2 to frequent transfusions; Iron panel Fe 239, %sat 92, Ferritin 1000+; will need chelation, will f/u outpatient with Dr. Avila  MDS - followed by Dr. Avila, currently treated with 6 month course of Vidaza  DM2 with bilateral neuropathy - poorly controlled, HbA1C of 9.8, POC glucose 200s; on januvia and lyrica  HTN - stable on coreg and amlodipine  Chronic chest pain - improving, unclear etiology, negative cardiac work up, will be seen by cardiology   Chronic pain - stable on norco and Tylenol PRN. Recently started complaining of tension like headache     Review of Systems   Constitutional: Positive for malaise/fatigue. Negative for fever, chills and diaphoresis.   Eyes: Positive for blurred vision.   Respiratory: Positive for shortness of breath.    Cardiovascular: Positive for chest pain. Negative for palpitations, orthopnea and leg swelling.   Gastrointestinal: Positive for abdominal pain. Negative for nausea, vomiting, diarrhea and constipation.   Musculoskeletal: Positive for myalgias and  back pain.   Skin: Positive for itching. Negative for rash.   Neurological: Positive for dizziness, weakness and headaches.       Consultants/Specialty  Nephrology  Heme/onc    Disposition  Inpatient    Quality Measures  EKG reviewed, Labs reviewed and Medications reviewed    Central line in place: Chemotherapy    DVT Prophylaxis: Heparin                  Physical Exam       Filed Vitals:    06/06/17 1103 06/06/17 1506 06/06/17 1944 06/07/17 0326   BP: 101/56 90/51 109/55 122/54   Pulse: 63 70 63 64   Temp: 36.6 °C (97.8 °F) 36.7 °C (98.1 °F) 36.4 °C (97.6 °F) 36.2 °C (97.2 °F)   Resp: 18 18 18 16   Height:       Weight:       SpO2: 100% 100% 100%      Body mass index is 27.84 kg/(m^2).    Oxygen Therapy:  Pulse Oximetry: 100 %, O2 (LPM): 97, FIO2%: 3, O2 Delivery: Silicone Nasal Cannula    Physical Exam   Constitutional: She is oriented to person, place, and time and well-developed, well-nourished, and in no distress.   HENT:   Head: Normocephalic and atraumatic.   Eyes: EOM are normal.   Neck: Normal range of motion.   Cardiovascular: Normal rate and regular rhythm.  Exam reveals no gallop and no friction rub.    Murmur (1/6 systolic) heard.  Pulmonary/Chest: Effort normal and breath sounds normal. No respiratory distress. She has no wheezes. She has no rales.   Abdominal: Soft. Bowel sounds are normal. She exhibits no distension. There is tenderness. There is no rebound and no guarding.   Musculoskeletal: Normal range of motion. She exhibits tenderness. She exhibits no edema.   Neurological: She is alert and oriented to person, place, and time.   Skin: Skin is warm and dry. No rash noted. No erythema.   Psychiatric: Affect normal.         Lab Data Review:      6/7/2017  1:04 PM    Recent Labs      06/06/17   0141  06/06/17   2033  06/06/17   2347   SODIUM  133*  130*  131*   POTASSIUM  3.5*  5.0  4.2   CHLORIDE  93*  93*  92*   CO2  28  23  23   BUN  47*  78*  85*   CREATININE  4.66*  6.86*  6.89*   MAGNESIUM    --    --   2.1   CALCIUM  7.5*  7.5*  7.4*       Recent Labs      06/05/17 1326 06/06/17 0141 06/06/17 2033 06/06/17   2347   ALTSGPT  13   --    --    --    ASTSGOT  11*   --    --    --    ALKPHOSPHAT  65   --    --    --    TBILIRUBIN  0.4   --    --    --    GLUCOSE  123*  222*  145*  135*       Recent Labs      06/05/17 1326 06/06/17 0141 06/06/17 2033 06/06/17   2347   RBC  2.06*  3.76*  3.73*  3.70*   HEMOGLOBIN  6.7*  11.8*  11.9*  11.8*   HEMATOCRIT  20.1*  33.7*  33.2*  33.6*   PLATELETCT  445  362  351  363   PROTHROMBTM  12.6   --    --    --    APTT  28.9   --    --    --    INR  0.92   --    --    --    IRON   --   239*   --    --    TOTIRONBC   --   260   --    --        Recent Labs      06/05/17 1326 06/06/17 0141 06/06/17 2033 06/06/17   2347   WBC  4.6*  5.1  5.1  5.4   NEUTSPOLYS  60.00  58.30   --   50.80   LYMPHOCYTES  28.50  28.80   --   34.70   MONOCYTES  5.70  8.20   --   8.90   EOSINOPHILS  5.20  3.90   --   4.60   BASOPHILS  0.40  0.40   --   0.60   ASTSGOT  11*   --    --    --    ALTSGPT  13   --    --    --    ALKPHOSPHAT  65   --    --    --    TBILIRUBIN  0.4   --    --    --            Assessment/Plan     #Chronic anemia  - Pt has a history of transfusion dependent anemia and has had a number of hospitalizations in the past for symptoms secondary to it. Each time she was transfused 2-3 units and discharged home.    - Chronic anemia most likely 2/2 to MDS vs ESRD vs anemia of chronic disease or most likely a combination of all three. No signs of acute bleed.    - Received total of 3 units via dialysis, H/H responded appropriately and is currently stable  - B12/folate wnl   Plan:    -> Transfuse as needed; goal 8.5-9 per nephro and onc  -> will monitor CBC closely, as side effect of chemo is anemia       #ESRD on HD MWF (likely 2/2 uncontrolled HTN/DMII)  Pt presents to ED with severe electrolyte abnormalities including hyperkalemia and BUN and Cr of 88 and  8.44 respectively, all of which are elevated from before. Last session was 6/2, however she missed 2 dialysis sessions in the past week.    - Received dialysis today.      Plan:    -> dialysis MWF       #Iron overload  Likely 2/2 to frequent transfusions  Iron studies indicate an iron overload state, discussed with heme/onc and they stated that iron chelation will be done outpatient after her inpatient chemo therapy.    Plan:  -> f/u with heme/onc outpatient for iron chelation  -> monitor for now       #Electrolyte abnormalities - improving  Most likely 2/2 to missed dialysis session and prolonged vomiting and diarrhea. Could be contributing to her fatigue. Pt at high risk for arrythmias but currently asymptomatic  - normalized after receiving dialysis    Plan:    Monitor electrolytes       #Myelodysplastic syndrome  Stable, followed by Dr. Roberts outpatient. Currently treated with 6 month course of Vidaza.    - Pt to receive chemo while inpatient    Plan:  -> appreciate recs from heme/ onc  -> Monitor CBC daily, as chemo can cause thrombocytopenia and anemia       #Nausea/Vomitting  - resolved    Pt has documented history of oxycodone allergy which causes her to vomit. It is likely that her accidental ingestion of oxycodone caused her initial n/v and then this was prolonged due to her missing numerous dialysis sessions.    Plan:  -> monitor for now    -> zofran prn       #Headache  Pt complaining of tension type headache. She denies any photophobia or phonophobia, and reports no auras.  Plan:  -> Tylenol prn  -> monitor for now      #Chronic chest pain  Described as a pressure pain that has not gotten worse. Associated with SOB with exertion. Unclear etiology, possibly due to chronic anemia  - EKG negative, troponins negative, CXR unremarkable  - negative nuclear medicine cardiac stress test on 12/22/16  - Pt has prior admissions with similar symptoms with negative workup  - Pt has cardiology appointment on  6/20/17.      #HTN  Stable  Plan:  -> continue home meds  -> labetolol prn for severe HTN      #Type 2 DM  Blood glucose of 123 on admission, HbA1C of 9.8 on 5/10. Currently on januvia 25mg    - On further questioning, pt stated that she is not able to check blood sugars at home due to her blindness.    - Blood glucose ranged from 222-256  Plan:  -> continue januvia 25mg  -> continue lyrica for neuropathy       #Chronic Respiratory Failure  On 2L at home 24/7  -> continue supplemental O2       #legal blindness   Stable  -> fall precautions  -> continue bimatoprost for glaucoma       #Social issues  Pt expressed concerns over transportation issues and care at home. She stated that is hard for her to make it to dialysis and the infusion center for her chemo therapy. She has missed dialysis sessions in the past due to transportation issues. She also expressed concern over her care at home. Though she does live with her daughter and son in law, they do not help care for her.    Plan:  -> consult social work

## 2017-06-07 NOTE — PROGRESS NOTES
Oncology/Hematology Progress Note               Author: Mehul Matamoros III Date & Time created: 6/7/2017  11:56 AM   -Patient with MDS, del 5q Receiving [place] vidaza 75mg/m2 for  5 days, in hospital mainly for transportation issues  Interval History:  No events overnight   On Hemodialysis MWF  Received 3 units of PRBC on 6/5  Vidaza day 2/5.  Review of Systems:  Review of Systems   Constitutional: Positive for malaise/fatigue.   HENT: Negative.    Eyes: Negative.    Respiratory: Negative.    Cardiovascular: Negative.    Gastrointestinal: Negative.    Genitourinary: Negative.    Musculoskeletal: Negative.    Skin: Negative.    Neurological: Positive for dizziness and weakness.       Physical Exam:  Physical Exam   Constitutional: She is oriented to person, place, and time. She appears well-developed and well-nourished.   HENT:   Head: Normocephalic.   Eyes: Conjunctivae are normal. Pupils are equal, round, and reactive to light.   Neck: Normal range of motion. Neck supple.   Cardiovascular: Normal rate and regular rhythm.    Pulmonary/Chest: Effort normal and breath sounds normal.   Abdominal: Soft. Bowel sounds are normal.   Musculoskeletal: Normal range of motion.   Neurological: She is alert and oriented to person, place, and time.       Labs:        Invalid input(s): [id number]  Recent Labs      06/05/17   1326   TROPONINI  <0.01   BNPBTYPENAT  110*     Recent Labs      06/06/17   0141  06/06/17 2033 06/06/17   2347   SODIUM  133*  130*  131*   POTASSIUM  3.5*  5.0  4.2   CHLORIDE  93*  93*  92*   CO2  28  23  23   BUN  47*  78*  85*   CREATININE  4.66*  6.86*  6.89*   MAGNESIUM   --    --   2.1   CALCIUM  7.5*  7.5*  7.4*     Recent Labs      06/05/17   1326  06/06/17   0141  06/06/17 2033 06/06/17   2347   ALTSGPT  13   --    --    --    ASTSGOT  11*   --    --    --    ALKPHOSPHAT  65   --    --    --    TBILIRUBIN  0.4   --    --    --    GLUCOSE  123*  222*  145*  135*     Recent Labs      06/05/17    17   2347   RBC  2.06*  3.76*  3.73*  3.70*   HEMOGLOBIN  6.7*  11.8*  11.9*  11.8*   HEMATOCRIT  20.1*  33.7*  33.2*  33.6*   PLATELETCT  445  362  351  363   PROTHROMBTM  12.6   --    --    --    APTT  28.9   --    --    --    INR  0.92   --    --    --    IRON   --   239*   --    --    TOTIRONBC   --   260   --    --      Recent Labs      17   2347   WBC  4.6*  5.1  5.1  5.4   NEUTSPOLYS  60.00  58.30   --   50.80   LYMPHOCYTES  28.50  28.80   --   34.70   MONOCYTES  5.70  8.20   --   8.90   EOSINOPHILS  5.20  3.90   --   4.60   BASOPHILS  0.40  0.40   --   0.60   ASTSGOT  11*   --    --    --    ALTSGPT  13   --    --    --    ALKPHOSPHAT  65   --    --    --    TBILIRUBIN  0.4   --    --    --      Recent Labs      17   2347   SODIUM  133*  130*  131*   POTASSIUM  3.5*  5.0  4.2   CHLORIDE  93*  93*  92*   CO2  28  23  23   GLUCOSE  222*  145*  135*   BUN  47*  78*  85*   CREATININE  4.66*  6.86*  6.89*   CALCIUM  7.5*  7.5*  7.4*     Hemodynamics:  Temp (24hrs), Av.4 °C (97.6 °F), Min:36.2 °C (97.2 °F), Max:36.7 °C (98.1 °F)  Temperature:  (Went to dialysis couldnt get vitals)  Pulse  Av.4  Min: 60  Max: 75   Blood Pressure: 122/54 mmHg     Respiratory:    Respiration: 16, Pulse Oximetry: 100 %        RLL Breath Sounds: Diminished, LLL Breath Sounds: Diminished  Fluids:    Intake/Output Summary (Last 24 hours) at 17 1156  Last data filed at 17 2270   Gross per 24 hour   Intake      0 ml   Output      0 ml   Net      0 ml        GI/Nutrition:  Orders Placed This Encounter   Procedures   • Diet Order     Standing Status: Standing      Number of Occurrences: 1      Standing Expiration Date:      Order Specific Question:  Diet:     Answer:  Diabetic [3]     Medical Decision Making, by Problem:  Active Hospital Problems    Diagnosis   • Anemia [D64.9]    • Myelodysplasia (myelodysplastic syndrome) (CMS-[facility]) [D46.9]   • Iron overload [E83.19]   • ESRD (end stage renal disease) (CMS-[facility]) [N18.6]       Plan:  1.  Myelodysplastic syndrome with deletion of 5q.  The patient    did not respond well to low-dose Revlimid at 2.5 mg daily.  -started on Vidaza on 04/17/2017 at 75 mg/m2 for 5 days.   -started on Vidaza subQ 75 x5 days, day 2 today. Was started here due to transportation issues  -s/p  3 units of packed red blood cells during this   admission.  Currently, hemoglobin is 11.6    2. Anemia secondary to MDS, ESRD  -On procrit weekly  -Vidaza    3.  End-stage renal disease, on hemodialysis.   -On HD MWF  Core Measures

## 2017-06-07 NOTE — PROGRESS NOTES
Received shift report from day shift RN. Pt is AO4 and reports mild cramping in her legs. Rest, repositioned, and medicated per MAR. Discussed POC. Assessed and administered evening medications. Bed alarm on, bed in lowest position, call light and personal belongings within reach, educated to call if in need of assistance, non skid socks. All needs met at this time.

## 2017-06-07 NOTE — CARE PLAN
Problem: Communication  Goal: The ability to communicate needs accurately and effectively will improve  Outcome: PROGRESSING AS EXPECTED  Pt involved in POC. Addressed questions/concerns    Problem: Pain Management  Goal: Pain level will decrease to patient’s comfort goal  Outcome: PROGRESSING AS EXPECTED  Rest, repositioned, and medicated per MAR

## 2017-06-07 NOTE — PROGRESS NOTES
Chemotherapy Verification - PRIMARY RN      Height = 147.3 cm  Weight = 60.4 kg  BSA = 1.57 m2       Medication: Azacitidine  Dose: 75 mg/m2 split in 2 syringes  Calculated Dose: 118 mg Ordered dose: 118 mg split in 2 syringes 59 mg each                             (In mg/m2, AUC, mg/kg)       I confirm this process was performed independently with the BSA and all final chemotherapy dosing calculations congruent.  Any discrepancies of 5% or greater have been addressed with the chemotherapy pharmacist. The resolution of the discrepancy has been documented in the EPIC progress notes.

## 2017-06-07 NOTE — PROGRESS NOTES
Hemodialysis ordered by Dr. Rico. Treatment started at 0756 and ended at 1136 d/t clotting dialyzer and lines. New setting placed. Treatment restarted. Dr. Rico notified and aware. Hypotension during tx. Lower UFG and NS bolus once, provided good result. Pt stable, vss, no c/o post tx. See flow sheets for details. Net  ml. Report to ANA Palacios RN. Lt ua avf dressing clean, dry, intact.

## 2017-06-07 NOTE — PROGRESS NOTES
Chemotherapy Verification - SECONDARY RN       Height = 147.3 cm  Weight = 60.4 kg  BSA = 1.57 m2       Medication: azacitidine  Dose: 75 mg/m2  Calculated Dose: 117 mg ordered 118 mg                             (In mg/m2, AUC, mg/kg)     I confirm that this process was performed independently.

## 2017-06-07 NOTE — PROGRESS NOTES
"Pharmacy Chemotherapy Verification    Patient Name: Vielka Briscoe   Dx: MDS  Protocol: Azacitadine     *Dosing Reference*  Azacitadine 75 mg/m2 IV over 10 minutes or Subcutaneously daily on days 1-7 - MD dosing on days 1-5  28 day cycle until disease progression or unacceptable toxicity for a minimum of 4-6 cycles  NCCN Guidelines for Myelodysplastic Syndromes V.2.2017  Lacy P, et al. Lancet Oncol. 2009;10(3):223-32  Yokasta BOLTON, et al. J Clin Oncol. 2009;27(11)-0    Allergies:  Actos; Darvocet; Demerol; Glucophage; Morphine; Oxycodone; Pcn; Requip; Simvastatin; Sulfa drugs; Tramadol; Trazodone; Demerol; Dilaudid; Diphenhydramine; Iron; Lenalidomide; Morphine; Multivitamin; Naprosyn; Other drug; and Sulfa drugs     /54 mmHg  Pulse 64  Temp(Src) 36.2 °C (97.2 °F)  Resp 16  Ht 1.473 m (4' 9.99\")  Wt 60.4 kg (133 lb 2.5 oz)  BMI 27.84 kg/m2  SpO2 100%  LMP 01/01/1995 Body surface area is 1.57 meters squared.    Labs 6/7/17  ANC ~ 2740     Plt = 363k     Hgb = 11.8  SCr = 6.89      CrCl = HD- MWF     Labs 6/5/17  AST/ALT/AP/TBili = 11/13/65/0.4    Drug Order   (Drug name, dose, route, IV Fluid & volume, frequency, number of doses) Cycle: 2  Day 2 of 5     Previous treatment: May 1-5, 2017     Medication = Azacitadine  Base Dose= 75 mg/m2  Calc Dose: Base Dose x 1.57 m2 = 117.75 mg  Final Dose = 118 mg  Route = IV  Fluid & Volume = 25 mg/mL;  4.72 mL in 2 syringes, 2.36 mL = 59 mg each  Admin Duration = Subcutaneous injection           <5% difference, OK to treat with final dose         By my signature below, I confirm this process was performed independently with the BSA and all final chemotherapy dosing calculations congruent. I have reviewed the above chemotherapy order and that my calculation of the final dose and BSA (when applicable) corroborate those calculations of the  pharmacist. Discrepancies of 5% or greater in the written dose have been addressed and documented within " the EPIC Progress notes.    KOKO Delgado Pharm.D.

## 2017-06-07 NOTE — PROGRESS NOTES
Pt back from dialysis. AA&o x4, upc sba to the bathroom. Upon VS assessment, pt noted HOTN, but asymptomatic otherwise. Medical team paged with updates. Awaiting orders

## 2017-06-07 NOTE — PROGRESS NOTES
Pt to dialysis with transport. Denies pain, needs met at this time. Breakfast tray to be delivered to dialysis room per pt request. Monitoring for pt return

## 2017-06-07 NOTE — PROGRESS NOTES
[facility] Nephrology Progress Note               Author: Tone Astorga, DSc, APN, NP-C  Collaborating Physician:  Fabian Rico MD Date & Time created: 6/7/2017  12:24 PM     Interval History:  61-year-old female with PMH ESRD, MWF IHD, hypertension, type 2 diabetes mellitus, anemia, CKD, renal osteodystrophy, hyperlipidemia, and MDS, who presents to the ED with a chief complaint of shortness of breath.    She states that symptoms have been ongoing since the weekend and states that she had issues with her transportation and missed her dialysis on Friday.  Today, she woke up and was not feeling well, more tired  than usual and more short of breath than usual and so she came to the ED and missed her dialysis session on the date of admission as well.  She is well known to us due to her frequent hospitalizations for transfusions due to anemia from her MDS as well   as missed dialysis sessions in past as well.      She denies being noncompliant with her diet and fluid restriction and also denies any orthopnea or PND at this time.  She is lying comfortably in her bed during my encounter and lab  work done in the ED showed a hemoglobin of 6.7 and a potassium of 6.4 with a  BUN of 88.  She otherwise denies any recent fevers, chills, nausea, vomiting,  chest pain, abdominal pain, melena, hematochezia, or hematemesis.     Daily Nephrology Summary  6/5/17 - seen in consultation by Dr Perez.  Hd ordered.  6/6/17 - patient c/o abdominal pain.  Hgb better s/p PRBCs.  HD planned.  6/07/17 - Seen during HD today; reports feeling better; no acute concerns. Net UF [id number].    Review of Systems   Constitutional: Positive for malaise/fatigue.   Eyes: Negative.         Legally blind   Respiratory: Negative.    Cardiovascular: Negative.    Gastrointestinal: Negative.    Skin: Negative.    Neurological: Positive for weakness.       Physical Exam   Constitutional: She is oriented to person, place, and time. She appears well-developed  and well-nourished.   HENT:   Head: Normocephalic and atraumatic.   Eyes: Conjunctivae are normal. No scleral icterus.   Neck: No tracheal deviation present.   Cardiovascular: Normal rate.  Exam reveals no friction rub.    Pulmonary/Chest: Effort normal. No respiratory distress.   Abdominal: Soft. There is tenderness.   Musculoskeletal: She exhibits no edema.   Neurological: She is alert and oriented to person, place, and time.       Labs:        Invalid input(s): [id number]  Recent Labs      06/05/17 1326   TROPONINI  <0.01   BNPBTYPENAT  110*     Recent Labs      06/06/17 0141 06/06/17 2033 06/06/17   2347   SODIUM  133*  130*  131*   POTASSIUM  3.5*  5.0  4.2   CHLORIDE  93*  93*  92*   CO2  28  23  23   BUN  47*  78*  85*   CREATININE  4.66*  6.86*  6.89*   MAGNESIUM   --    --   2.1   CALCIUM  7.5*  7.5*  7.4*     Recent Labs      06/05/17 1326 06/06/17 0141 06/06/17 2033 06/06/17   2347   ALTSGPT  13   --    --    --    ASTSGOT  11*   --    --    --    ALKPHOSPHAT  65   --    --    --    TBILIRUBIN  0.4   --    --    --    GLUCOSE  123*  222*  145*  135*     Recent Labs      06/05/17 1326 06/06/17 0141 06/06/17 2033 06/06/17   2347   RBC  2.06*  3.76*  3.73*  3.70*   HEMOGLOBIN  6.7*  11.8*  11.9*  11.8*   HEMATOCRIT  20.1*  33.7*  33.2*  33.6*   PLATELETCT  445  362  351  363   PROTHROMBTM  12.6   --    --    --    APTT  28.9   --    --    --    INR  0.92   --    --    --    IRON   --   239*   --    --    TOTIRONBC   --   260   --    --      Recent Labs      06/05/17 1326 06/06/17 0141 06/06/17 2033 06/06/17   2347   WBC  4.6*  5.1  5.1  5.4   NEUTSPOLYS  60.00  58.30   --   50.80   LYMPHOCYTES  28.50  28.80   --   34.70   MONOCYTES  5.70  8.20   --   8.90   EOSINOPHILS  5.20  3.90   --   4.60   BASOPHILS  0.40  0.40   --   0.60   ASTSGOT  11*   --    --    --    ALTSGPT  13   --    --    --    ALKPHOSPHAT  65   --    --    --    TBILIRUBIN  0.4   --    --    --             Hemodynamics:  Temp (24hrs), Av.4 °C (97.6 °F), Min:36.2 °C (97.2 °F), Max:36.7 °C (98.1 °F)  Temperature:  (Went to dialysis couldnt get vitals)  Pulse  Av.4  Min: 60  Max: 75   Blood Pressure: 122/54 mmHg     Respiratory:    Respiration: 16, Pulse Oximetry: 100 %        RLL Breath Sounds: Diminished, LLL Breath Sounds: Diminished  Fluids:    Intake/Output Summary (Last 24 hours) at 17 1224  Last data filed at 17 2345   Gross per 24 hour   Intake      0 ml   Output      0 ml   Net      0 ml        GI/Nutrition:  Orders Placed This Encounter   Procedures   • Diet Order     Standing Status: Standing      Number of Occurrences: 1      Standing Expiration Date:      Order Specific Question:  Diet:     Answer:  Diabetic [3]     Medical Decision Making, by Problem:  Active Hospital Problems    Diagnosis   • Myelodysplasia (myelodysplastic syndrome) (CMS-H[facility]) [D46.9]       IMPRESSION:  1.  End-stage renal disease, etiology likely secondary to HTN/DM.  2.  Acute on chronic anemia, etiology secondary to MDS, superimposed on  chronic kidney disease.  3.  Nonadherence to outpatient dialysis care.  4.  Hypertension.  5.  Type 2 diabetes mellitus.  6.  Renal osteodystrophy.  7.  Hyperlipidemia.  8.  Coronary artery disease.  9.  Peripheral vascular disease.     ASSESSMENT:  1.  GFR:  HD dependent.  2.  Urine, oligoanuric.  3.  Volume, euvolemic.  4.  Blood pressure, goal less than 140/90.  5.  Acid base, no significant acid base disturbance noted.  6.  Electrolytes, stable.  7.  Hyperkalemia with no ECG changes.  8.  Anemia, goal hemoglobin 10-11.  9.  MBD, calcium normal, p.o. for elevated.  10.  Dialysis access, left arteriovenous fistula with positive thrill and bruit.     PLAN:  1.  MWF IHD during her inpatient stay.  2.  A 3-1/2 hour session today (WED)  3.  Advised her to attempt to stay of compliant and adherent as possible to outpatient dialysis to help prevent electrolyte abnormalities  such as today.  4.  Follow up in heme/onc for MDS.  5.  Continue PhosLo 667 mg t.i.d. with meals.  6.  Dose all medications per ESRD guidelines.                Core Measures

## 2017-06-08 ENCOUNTER — APPOINTMENT (OUTPATIENT)
Dept: ONCOLOGY | Facility: MEDICAL CENTER | Age: 62
End: 2017-06-08
Attending: INTERNAL MEDICINE
Payer: MEDICARE

## 2017-06-08 PROBLEM — G89.29 CHRONIC PAIN: Status: ACTIVE | Noted: 2017-06-08

## 2017-06-08 LAB
ALBUMIN SERPL BCP-MCNC: 3 G/DL (ref 3.2–4.9)
ALBUMIN/GLOB SERPL: 1 G/DL
ALP SERPL-CCNC: 63 U/L (ref 30–99)
ALT SERPL-CCNC: 12 U/L (ref 2–50)
ANION GAP SERPL CALC-SCNC: 10 MMOL/L (ref 0–11.9)
AST SERPL-CCNC: 11 U/L (ref 12–45)
BASOPHILS # BLD AUTO: 0.5 % (ref 0–1.8)
BASOPHILS # BLD: 0.02 K/UL (ref 0–0.12)
BILIRUB SERPL-MCNC: 0.5 MG/DL (ref 0.1–1.5)
BUN SERPL-MCNC: 47 MG/DL (ref 8–22)
CALCIUM SERPL-MCNC: 7.8 MG/DL (ref 8.5–10.5)
CHLORIDE SERPL-SCNC: 94 MMOL/L (ref 96–112)
CO2 SERPL-SCNC: 26 MMOL/L (ref 20–33)
CREAT SERPL-MCNC: 4.33 MG/DL (ref 0.5–1.4)
EOSINOPHIL # BLD AUTO: 0.17 K/UL (ref 0–0.51)
EOSINOPHIL NFR BLD: 4.1 % (ref 0–6.9)
ERYTHROCYTE [DISTWIDTH] IN BLOOD BY AUTOMATED COUNT: 49.4 FL (ref 35.9–50)
GFR SERPL CREATININE-BSD FRML MDRD: 10 ML/MIN/1.73 M 2
GLOBULIN SER CALC-MCNC: 3 G/DL (ref 1.9–3.5)
GLUCOSE SERPL-MCNC: 167 MG/DL (ref 65–99)
HCT VFR BLD AUTO: 31.5 % (ref 37–47)
HGB BLD-MCNC: 10.7 G/DL (ref 12–16)
IMM GRANULOCYTES # BLD AUTO: 0.01 K/UL (ref 0–0.11)
IMM GRANULOCYTES NFR BLD AUTO: 0.2 % (ref 0–0.9)
LYMPHOCYTES # BLD AUTO: 1.06 K/UL (ref 1–4.8)
LYMPHOCYTES NFR BLD: 25.6 % (ref 22–41)
MCH RBC QN AUTO: 31.6 PG (ref 27–33)
MCHC RBC AUTO-ENTMCNC: 34 G/DL (ref 33.6–35)
MCV RBC AUTO: 92.9 FL (ref 81.4–97.8)
MONOCYTES # BLD AUTO: 0.41 K/UL (ref 0–0.85)
MONOCYTES NFR BLD AUTO: 9.9 % (ref 0–13.4)
NEUTROPHILS # BLD AUTO: 2.47 K/UL (ref 2–7.15)
NEUTROPHILS NFR BLD: 59.7 % (ref 44–72)
NRBC # BLD AUTO: 0 K/UL
NRBC BLD AUTO-RTO: 0 /100 WBC
PLATELET # BLD AUTO: 273 K/UL (ref 164–446)
PMV BLD AUTO: 12.3 FL (ref 9–12.9)
POTASSIUM SERPL-SCNC: 4.5 MMOL/L (ref 3.6–5.5)
PROT SERPL-MCNC: 6 G/DL (ref 6–8.2)
RBC # BLD AUTO: 3.39 M/UL (ref 4.2–5.4)
SODIUM SERPL-SCNC: 130 MMOL/L (ref 135–145)
WBC # BLD AUTO: 4.1 K/UL (ref 4.8–10.8)

## 2017-06-08 PROCEDURE — 80053 COMPREHEN METABOLIC PANEL: CPT

## 2017-06-08 PROCEDURE — A9270 NON-COVERED ITEM OR SERVICE: HCPCS | Performed by: STUDENT IN AN ORGANIZED HEALTH CARE EDUCATION/TRAINING PROGRAM

## 2017-06-08 PROCEDURE — 770009 HCHG ROOM/CARE - ONCOLOGY SEMI PRI*

## 2017-06-08 PROCEDURE — 99232 SBSQ HOSP IP/OBS MODERATE 35: CPT | Mod: GC | Performed by: INTERNAL MEDICINE

## 2017-06-08 PROCEDURE — 700102 HCHG RX REV CODE 250 W/ 637 OVERRIDE(OP): Performed by: STUDENT IN AN ORGANIZED HEALTH CARE EDUCATION/TRAINING PROGRAM

## 2017-06-08 PROCEDURE — 700111 HCHG RX REV CODE 636 W/ 250 OVERRIDE (IP): Performed by: INTERNAL MEDICINE

## 2017-06-08 PROCEDURE — 700102 HCHG RX REV CODE 250 W/ 637 OVERRIDE(OP): Performed by: INTERNAL MEDICINE

## 2017-06-08 PROCEDURE — 85025 COMPLETE CBC W/AUTO DIFF WBC: CPT | Mod: 91

## 2017-06-08 PROCEDURE — 82962 GLUCOSE BLOOD TEST: CPT

## 2017-06-08 PROCEDURE — 36415 COLL VENOUS BLD VENIPUNCTURE: CPT

## 2017-06-08 PROCEDURE — 80048 BASIC METABOLIC PNL TOTAL CA: CPT

## 2017-06-08 PROCEDURE — 83735 ASSAY OF MAGNESIUM: CPT

## 2017-06-08 PROCEDURE — A9270 NON-COVERED ITEM OR SERVICE: HCPCS | Performed by: INTERNAL MEDICINE

## 2017-06-08 RX ORDER — ONDANSETRON 2 MG/ML
8 INJECTION INTRAMUSCULAR; INTRAVENOUS ONCE
Status: COMPLETED | OUTPATIENT
Start: 2017-06-08 | End: 2017-06-08

## 2017-06-08 RX ADMIN — CARVEDILOL 12.5 MG: 12.5 TABLET, FILM COATED ORAL at 17:06

## 2017-06-08 RX ADMIN — SENNOSIDES AND DOCUSATE SODIUM 2 TABLET: 8.6; 5 TABLET ORAL at 08:45

## 2017-06-08 RX ADMIN — ONDANSETRON 8 MG: 2 INJECTION INTRAMUSCULAR; INTRAVENOUS at 17:06

## 2017-06-08 RX ADMIN — AZACITIDINE 59 MG: 100 INJECTION, POWDER, LYOPHILIZED, FOR SOLUTION INTRAVENOUS; SUBCUTANEOUS at 18:09

## 2017-06-08 RX ADMIN — SITAGLIPTIN 25 MG: 25 TABLET, FILM COATED ORAL at 08:48

## 2017-06-08 RX ADMIN — HYDROCODONE BITARTRATE AND ACETAMINOPHEN 1 TABLET: 5; 325 TABLET ORAL at 13:00

## 2017-06-08 RX ADMIN — ONDANSETRON 4 MG: 2 INJECTION INTRAMUSCULAR; INTRAVENOUS at 09:50

## 2017-06-08 RX ADMIN — PREGABALIN 50 MG: 25 CAPSULE ORAL at 20:45

## 2017-06-08 RX ADMIN — ONDANSETRON 4 MG: 2 INJECTION INTRAMUSCULAR; INTRAVENOUS at 23:33

## 2017-06-08 RX ADMIN — PREGABALIN 50 MG: 25 CAPSULE ORAL at 08:45

## 2017-06-08 RX ADMIN — CARVEDILOL 12.5 MG: 12.5 TABLET, FILM COATED ORAL at 08:45

## 2017-06-08 RX ADMIN — AMLODIPINE BESYLATE 10 MG: 10 TABLET ORAL at 20:45

## 2017-06-08 RX ADMIN — AZACITIDINE 59 MG: 100 INJECTION, POWDER, LYOPHILIZED, FOR SOLUTION INTRAVENOUS; SUBCUTANEOUS at 18:08

## 2017-06-08 ASSESSMENT — ENCOUNTER SYMPTOMS
EYE PAIN: 0
WEAKNESS: 0
PALPITATIONS: 0
CARDIOVASCULAR NEGATIVE: 1
DIZZINESS: 0
BRUISES/BLEEDS EASILY: 0
RESPIRATORY NEGATIVE: 1
HEARTBURN: 0
SEIZURES: 0
CLAUDICATION: 0
FEVER: 0
MYALGIAS: 1
BACK PAIN: 1
NAUSEA: 0
DIZZINESS: 1
SHORTNESS OF BREATH: 0
WHEEZING: 0
BLURRED VISION: 1
LOSS OF CONSCIOUSNESS: 0
TINGLING: 0
DIARRHEA: 0
MUSCULOSKELETAL NEGATIVE: 1
ABDOMINAL PAIN: 0
FALLS: 0
COUGH: 0
SORE THROAT: 0
WEAKNESS: 1
VOMITING: 0
PSYCHIATRIC NEGATIVE: 1
EYES NEGATIVE: 1
HEADACHES: 0
GASTROINTESTINAL NEGATIVE: 1
SPEECH CHANGE: 0

## 2017-06-08 ASSESSMENT — PAIN SCALES - GENERAL
PAINLEVEL_OUTOF10: 2
PAINLEVEL_OUTOF10: 10
PAINLEVEL_OUTOF10: 8

## 2017-06-08 NOTE — ASSESSMENT & PLAN NOTE
- Stable, followed by Dr. Roberts outpatient. Currently treated with 6 month course of Vidaza.    - Pt to receive x5 days of inpatient Vidaza chemo started 6/6, ending 6/10  - Heme/Onc following (Dr. Matamoros) >> follow recs  - Monitor CBC and clinical symptoms  - F/u with Dr. Avila 6/15/2017

## 2017-06-08 NOTE — PROGRESS NOTES
"Pharmacy Chemotherapy Verification    Patient Name: Vielka Briscoe   Dx: MDS    Protocol: Azacitadine       *Dosing Reference*  Azacitadine 75 mg/m2 IV over 10 minutes or Subcutaneously daily on days 1-7 - MD dosing on days 1-5  28 day cycle until disease progression or unacceptable toxicity for a minimum of 4-6 cycles  NCCN Guidelines for Myelodysplastic Syndromes V.2.2017  Lacy P, et al. Lancet Oncol. 2009;10(3):223-32  Yokasta BOLTON, et al. J Clin Oncol. 2009;27(73)-5    Allergies:  Actos; Darvocet; Demerol; Glucophage; Morphine; Oxycodone; Pcn; Requip; Simvastatin; Sulfa drugs; Tramadol; Trazodone; Demerol; Dilaudid; Diphenhydramine; Iron; Lenalidomide; Morphine; Multivitamin; Naprosyn; Other drug; and Sulfa drugs     /65 mmHg  Pulse 70  Temp(Src) 36.2 °C (97.2 °F)  Resp 16  Ht 1.473 m (4' 9.99\")  Wt 60.4 kg (133 lb 2.5 oz)  BMI 27.84 kg/m2  SpO2 96%  LMP 01/01/1995 Body surface area is 1.57 meters squared.    Labs 6/8/17  ANC ~ 2500     Plt = 273k     Hgb = 10.7 SCr = 4.33 mg/dL      CrCl = HD- MWF     AST/ALT/AP/TBili = 11/12/63/0.5    Drug Order   (Drug name, dose, route, IV Fluid & volume, frequency, number of doses) Cycle: 2  Day 3 of 5     Previous treatment: May 1-5, 2017     Medication = Azacitadine (Vidaza)  Base Dose= 75 mg/m2  Calc Dose: Base Dose x 1.57 m2 = 117.75 mg  Final Dose = 118 mg  Route = IV  Fluid & Volume = 25 mg/mL;  4.72 mL in 2 syringes, 2.36 mL = 59 mg each  Admin Duration = Subcutaneous injection           <5% difference, OK to treat with final dose         By my signature below, I confirm this process was performed independently with the BSA and all final chemotherapy dosing calculations congruent. I have reviewed the above chemotherapy order and that my calculation of the final dose and BSA (when applicable) corroborate those calculations of the  pharmacist. Discrepancies of 5% or greater in the written dose have been addressed and documented " within the EPIC Progress notes.    Mackenzie Apodaca, PharmD

## 2017-06-08 NOTE — ASSESSMENT & PLAN NOTE
- Pt has a hx of transfusion dependent anemia 2/2 known Myelodysplastic Syndrome  - Received total of 3 units via dialysis, H/H responded appropriately and is currently stable, B12/Folate WNL  - Transfuse if Hgb < 7; goal 8.5-9 per nephro and onc

## 2017-06-08 NOTE — ASSESSMENT & PLAN NOTE
- Iron panel Fe 239, %sat 92, Ferritin 1000+; 2/2 rapid PRBC transfusions  - Will need Fe chelation, per Dr. Matamoros (Onc) to be started as outpt by Dr. Avila

## 2017-06-08 NOTE — PROGRESS NOTES
Oncology/Hematology Progress Note               Author: Mehul Matamoros III Date & Time created: 6/8/2017  9:46 AM   -Patient with MDS, del 5q Receiving SC vidaza 75mg/m2 for  5 days, in hospital mainly for transportation issues  Interval History:  No events overnight   On Hemodialysis MWF  Received 3 units of PRBC on 6/5  Vidaza day 3/5.  Review of Systems:  Review of Systems   Constitutional: Positive for malaise/fatigue.   HENT: Negative.    Eyes: Negative.    Respiratory: Negative.    Cardiovascular: Negative.    Gastrointestinal: Negative.    Genitourinary: Negative.    Musculoskeletal: Negative.    Skin: Negative.    Neurological: Positive for dizziness and weakness.       Physical Exam:  Physical Exam   Constitutional: She is oriented to person, place, and time. She appears well-developed and well-nourished.   HENT:   Head: Normocephalic.   Eyes: Conjunctivae are normal. Pupils are equal, round, and reactive to light.   Neck: Normal range of motion. Neck supple.   Cardiovascular: Normal rate and regular rhythm.    Pulmonary/Chest: Effort normal and breath sounds normal.   Abdominal: Soft. Bowel sounds are normal.   Musculoskeletal: Normal range of motion.   Neurological: She is alert and oriented to person, place, and time.       Labs:        Invalid input(s): [id number]  Recent Labs      06/05/17   1326   TROPONINI  <0.01   BNPBTYPENAT  110*     Recent Labs      06/06/17 2033 06/06/17 2347 06/08/17   SODIUM  130*  131*  130*   POTASSIUM  5.0  4.2  4.5   CHLORIDE  93*  92*  94*   CO2  23  23  26   BUN  78*  85*  47*   CREATININE  6.86*  6.89*  4.33*   MAGNESIUM   --   2.1   --    CALCIUM  7.5*  7.4*  7.8*     Recent Labs      06/05/17   1326   06/06/17 2033 06/06/17 2347 06/08/17   ALTSGPT  13   --    --    --   12   ASTSGOT  11*   --    --    --   11*   ALKPHOSPHAT  65   --    --    --   63   TBILIRUBIN  0.4   --    --    --   0.5   GLUCOSE  123*   < >  145*  135*  167*    < > = values in this  interval not displayed.     Recent Labs      17   RBC  2.06*  3.76*  3.73*  3.70*  3.39*   HEMOGLOBIN  6.7*  11.8*  11.9*  11.8*  10.7*   HEMATOCRIT  20.1*  33.7*  33.2*  33.6*  31.5*   PLATELETCT  445  362  351  363  273   PROTHROMBTM  12.6   --    --    --    --    APTT  28.9   --    --    --    --    INR  0.92   --    --    --    --    IRON   --   239*   --    --    --    TOTIRONBC   --   260   --    --    --      Recent Labs      17   WBC  4.6*  5.1  5.1  5.4  4.1*   NEUTSPOLYS  60.00  58.30   --   50.80  59.70   LYMPHOCYTES  28.50  28.80   --   34.70  25.60   MONOCYTES  5.70  8.20   --   8.90  9.90   EOSINOPHILS  5.20  3.90   --   4.60  4.10   BASOPHILS  0.40  0.40   --   0.60  0.50   ASTSGOT  11*   --    --    --   11*   ALTSGPT  13   --    --    --   12   ALKPHOSPHAT  65   --    --    --   63   TBILIRUBIN  0.4   --    --    --   0.5     Recent Labs      17   SODIUM  130*  131*  130*   POTASSIUM  5.0  4.2  4.5   CHLORIDE  93*  92*  94*   CO2  23  23  26   GLUCOSE  145*  135*  167*   BUN  78*  85*  47*   CREATININE  6.86*  6.89*  4.33*   CALCIUM  7.5*  7.4*  7.8*     Hemodynamics:  Temp (24hrs), Av.4 °C (97.6 °F), Min:35.8 °C (96.4 °F), Max:37.4 °C (99.4 °F)  Temperature: 36.2 °C (97.2 °F)  Pulse  Av.9  Min: 60  Max: 75   Blood Pressure: 125/65 mmHg     Respiratory:    Respiration: 16, Pulse Oximetry: 96 %        RLL Breath Sounds: Diminished, LLL Breath Sounds: Diminished  Fluids:    Intake/Output Summary (Last 24 hours) at 17 1156  Last data filed at 17 2345   Gross per 24 hour   Intake      0 ml   Output      0 ml   Net      0 ml        GI/Nutrition:  Orders Placed This Encounter   Procedures   • Diet Order     Standing Status: Standing      Number of Occurrences: 1      Standing Expiration Date:       Order Specific Question:  Diet:     Answer:  Diabetic [3]     Order Specific Question:  Texture/Fiber modifications:     Answer:  Dysphagia 3(Mechanical Soft)specify fluid consistency(question 6) [3]      Comments:  ill fittung dentures     Order Specific Question:  Consistency/Fluid modifications:     Answer:  Thin Liquids [3]     Medical Decision Making, by Problem:  Active Hospital Problems    Diagnosis   • Anemia [D64.9]   • Myelodysplasia (myelodysplastic syndrome) (CMS-[facility]) [D46.9]   • Iron overload [E83.19]   • ESRD (end stage renal disease) (CMS-[facility]) [N18.6]       Plan:  1.  Myelodysplastic syndrome with deletion of 5q.  The patient    did not respond well to low-dose Revlimid at 2.5 mg daily.  -started on Vidaza on 04/17/2017 at 75 mg/m2 for 5 days.   -started on Vidaza subQ 75 x5 days, day 3 today. Was started here due to transportation issues  -s/p  3 units of packed red blood cells during this   admission.   -transfuse if hemoglobin <8 g/dl  -encourage to ambulate with assistance    2. Anemia secondary to MDS, ESRD  -On procrit weekly  -Vidaza    3.  End-stage renal disease, on hemodialysis.   -On HD MWF  Core Measures

## 2017-06-08 NOTE — PROGRESS NOTES
Chemotherapy Verification - SECONDARY RN   Cycle 2 Day 3    Height = 147.3 cm  Weight = 60.4 kg  BSA = 1.57 m2       Medication: Vidaza  Dose: 37.5 mg/m2  Calculated Dose: 58.875 mg (59 mg ordered)                            (In mg/m2, AUC, mg/kg)     Medication: Vidaza  Dose: 37.5 mg/m2  Calculated Dose: 58.875 mg (59 mg ordered)                             (In mg/m2, AUC, mg/kg)        I confirm that this process was performed independently.

## 2017-06-08 NOTE — PROGRESS NOTES
Chemotherapy Vidaza 2/5 administered as ordered. Labs, orders, calculation and medication reviewed with 2 RNs. Appropriate precautions in place. Continuing hourly rounding and assessing for additional needs

## 2017-06-08 NOTE — PROGRESS NOTES
[facility] Internal Medicine Interval Note    Name Vielka Briscoe     1955   Age/Sex 61 y.o. female   MRN [id number]   Code Status FULL     After 5PM or if no immediate response to page, please call for cross-coverage  Attending/Team: Dr. Luciano/Michi Call [phone] to page   1st Call - Day Intern (R1):   Dr. Maradiaga 2nd Call - Day Sr. Resident (R2/R3):   Dr. Estevez         Chief complaint/ reason for interval visit (Primary Diagnosis)   Significant anemia requiring transfusion in setting of Myelodysplastic Syndrome and HD-dependent ESRD    Interval Problem Daily Status Update    - Anemia: Hgb 6.7 on admission, s/p 3U PRBC, now 11.8, transfusion dependent 2/2 MDS and ESRD, B12/Folate WNL  - ESRD: Likely 2/2 uncontrolled DMII/HTN, followed by nephrology, HD MWF, will cont while inpatient  - Iron overload: Iron panel Fe 239, %sat 92, Ferritin 1000+. Will need Fe chelation, per Dr. Matamoros (Onc) to be started as outpt by Dr. Avila  - MDS: transfusion dependent, diagnosed 2016, followed by Dr. Avila, will need outpatient iron chelation, Onc following, started Vidaza 6/6 x5 days  - DMII with b/l LE neuropathy: Poorly controlled, HbA1C of 9.8, 2/2 poor social support and inability to use insulin, POC ~low 200's, cont Januvia and Lyrica  - HTN: Stable, cont Coreg 12.5mg BID, Amlodipine 10mg QD  - Chronic CP: Seem musculoskeletal, negative cardiac work-up, will be seen by cards   - Chronic pain: Stable, cont Norco    Review of Systems   Constitutional: Positive for malaise/fatigue. Negative for fever.   HENT: Negative for congestion and sore throat.    Eyes: Positive for blurred vision. Negative for pain.        Chronic poor vision   Respiratory: Negative for cough, shortness of breath and wheezing.    Cardiovascular: Positive for chest pain. Negative for palpitations and claudication.        Chronic non-cardiac CP   Gastrointestinal: Negative for heartburn, nausea,  vomiting, abdominal pain and diarrhea.   Genitourinary: Negative for dysuria, frequency and hematuria.   Musculoskeletal: Positive for myalgias and back pain. Negative for joint pain and falls.   Skin: Negative for rash.   Neurological: Negative for dizziness, tingling, speech change, seizures, loss of consciousness, weakness and headaches.   Endo/Heme/Allergies: Does not bruise/bleed easily.   Psychiatric/Behavioral: Negative.        Consultants/Specialty  Nephro (Dr. Rico)  Heme/Onc (Dr. Matamoros)    Disposition  Inpatient for x5 days of Vidaza (chemo) for known Myelodysplastic Syndrome    Quality Measures  Core Measures  Labs/EKG: reviewed  DVT prophylaxis: SCD  GI prophylaxis: None  Tubes: None  Lines: PIV  Bauer: None        Physical Exam       Filed Vitals:    06/07/17 2000 06/08/17 0400 06/08/17 0817 06/08/17 1600   BP: 122/55 122/61 125/65 128/67   Pulse: 74 66 70 68   Temp: 36.3 °C (97.3 °F) 36.3 °C (97.4 °F) 36.2 °C (97.2 °F) 36.8 °C (98.2 °F)   Resp: 16 16 16 16   Height:       Weight:       SpO2: 99% 98% 96% 96%     Body mass index is 27.84 kg/(m^2).    Oxygen Therapy:  Pulse Oximetry: 96 %, O2 (LPM): 2, O2 Delivery: Silicone Nasal Cannula    Physical Exam   Constitutional: She is oriented to person, place, and time and well-developed, well-nourished, and in no distress. No distress.   HENT:   Head: Normocephalic and atraumatic.   Eyes: EOM are normal.   Cardiovascular: Normal rate, regular rhythm and normal heart sounds.    No murmur heard.  Pulmonary/Chest: Effort normal and breath sounds normal. No respiratory distress. She has no wheezes. She exhibits no tenderness.   Abdominal: Soft. Bowel sounds are normal. She exhibits no distension. There is no tenderness. There is no rebound.   Musculoskeletal: Normal range of motion. She exhibits no edema.   Neurological: She is alert and oriented to person, place, and time. GCS score is 15.   Skin: Skin is warm and dry. She is not diaphoretic.         Lab Data  Review:      6/8/2017  2:35 PM    Recent Labs      06/06/17 2033 06/06/17 2347 06/08/17   SODIUM  130*  131*  130*   POTASSIUM  5.0  4.2  4.5   CHLORIDE  93*  92*  94*   CO2  23  23  26   BUN  78*  85*  47*   CREATININE  6.86*  6.89*  4.33*   MAGNESIUM   --   2.1   --    CALCIUM  7.5*  7.4*  7.8*       Recent Labs      06/06/17 2033 06/06/17 2347 06/08/17   ALTSGPT   --    --   12   ASTSGOT   --    --   11*   ALKPHOSPHAT   --    --   63   TBILIRUBIN   --    --   0.5   GLUCOSE  145*  135*  167*       Recent Labs      06/06/17 0141 06/06/17 2033 06/06/17 2347 06/08/17   RBC  3.76*  3.73*  3.70*  3.39*   HEMOGLOBIN  11.8*  11.9*  11.8*  10.7*   HEMATOCRIT  33.7*  33.2*  33.6*  31.5*   PLATELETCT  362  351  363  273   IRON  239*   --    --    --    TOTIRONBC  260   --    --    --        Recent Labs      06/06/17 0141 06/06/17 2033 06/06/17 2347 06/08/17   WBC  5.1  5.1  5.4  4.1*   NEUTSPOLYS  58.30   --   50.80  59.70   LYMPHOCYTES  28.80   --   34.70  25.60   MONOCYTES  8.20   --   8.90  9.90   EOSINOPHILS  3.90   --   4.60  4.10   BASOPHILS  0.40   --   0.60  0.50   ASTSGOT   --    --    --   11*   ALTSGPT   --    --    --   12   ALKPHOSPHAT   --    --    --   63   TBILIRUBIN   --    --    --   0.5           Assessment/Plan     * Myelodysplasia (myelodysplastic syndrome) (CMS-HCC) (present on admission)  Assessment & Plan  - Stable, followed by Dr. Roberts outpatient. Currently treated with 6 month course of Vidaza.    - Pt to receive x5 days of inpatient Vidaza chemo started 6/6, ending 6/10  - Heme/Onc following (Dr. Matamoros) >> follow recs  - Monitor CBC and clinical symptoms    Anemia (present on admission)  Assessment & Plan  - Pt has a hx of transfusion dependent anemia 2/2 known Myelodysplastic Syndrome  - Received total of 3 units via dialysis, H/H responded appropriately and is currently stable, B12/Folate WNL  - Transfuse if Hgb < 7; goal 8.5-9 per nephro and onc    DM  2  complicated by peripheral neuropathy (present on admission)  Assessment & Plan  - Poorly controlled, HbA1C of 9.8, 2/2 poor social support and inability to use insulin, POC ~low 200's  - Cont Januvia and Lyrica    ESRD (end stage renal disease) (CMS-HCC) (present on admission)  Assessment & Plan  - Likely 2/2 to uncontrolled HTN and DMII, on HD MWF  - Nephro consulted (Dr. Rico) for HD arrangements    HTN (hypertension) (present on admission)  Assessment & Plan  - BP on admission /45 mmHg   - Stable now, cont Coreg 12.5mg BID, Amlodipine 10mg QD    Iron overload (present on admission)  Assessment & Plan  - Iron panel Fe 239, %sat 92, Ferritin 1000+; 2/2 rapid PRBC transfusions  - Will need Fe chelation, per Dr. Matamoros (Onc) to be started as outpt by Dr. Avila    Chronic pain  Assessment & Plan  - Diffuse, but also chronic CP >> Seem musculoskeletal, negative cardiac work-up, will be seen by cards 6/20  - Stable, cont Norco

## 2017-06-08 NOTE — ASSESSMENT & PLAN NOTE
- Likely 2/2 to uncontrolled HTN and DMII, on HD MWF  - Nephro consulted (Dr. Rico) for HD arrangements  - Cont HD MWF

## 2017-06-08 NOTE — CARE PLAN
Problem: Safety  Goal: Will remain free from injury  Outcome: PROGRESSING AS EXPECTED  Bed locked and in lowest position. Call light and belongings within reach. Safety strip in place and sounding    Problem: Urinary Elimination:  Goal: Ability to reestablish a normal urinary elimination pattern will improve  Intervention: Assess and monitor for signs and symptoms of urinary retention  Pt up to bathroom and voiding without difficulty

## 2017-06-08 NOTE — ASSESSMENT & PLAN NOTE
- Diffuse, but also chronic CP >> Seem musculoskeletal, negative cardiac work-up, will be seen by cards 6/20  - Stable, cont Norco

## 2017-06-08 NOTE — PROGRESS NOTES
Received shift report from day shift RN. Pt is AO4 and reports generalized pain, rest, repositioned, and medicated per MAR. Discussed POC. Assessed and administered evening medications. Bed alarm on, bed in lowest position, call light and personal belongings within reach, educated to call if in need of assistance, non skid socks, all needs met at this time.

## 2017-06-08 NOTE — PROGRESS NOTES
[facility] Nephrology Progress Note               Author:   Fabian Rico MD Date & Time created: 6/8/2017  2:16 PM     Interval History:  61-year-old female with PMH ESRD, MWF IHD, hypertension, type 2 diabetes mellitus, anemia, CKD, renal osteodystrophy, hyperlipidemia, and MDS, who presents to the ED with a chief complaint of shortness of breath.    She states that symptoms have been ongoing since the weekend and states that she had issues with her transportation and missed her dialysis on Friday.  Today, she woke up and was not feeling well, more tired  than usual and more short of breath than usual and so she came to the ED and missed her dialysis session on the date of admission as well.  She is well known to us due to her frequent hospitalizations for transfusions due to anemia from her MDS as well   as missed dialysis sessions in past as well.      She denies being noncompliant with her diet and fluid restriction and also denies any orthopnea or PND at this time.  She is lying comfortably in her bed during my encounter and lab  work done in the ED showed a hemoglobin of 6.7 and a potassium of 6.4 with a  BUN of 88.  She otherwise denies any recent fevers, chills, nausea, vomiting,  chest pain, abdominal pain, melena, hematochezia, or hematemesis.     Daily Nephrology Summary  6/5/17 - seen in consultation by Dr Perez.  Hd ordered.  6/6/17 - patient c/o abdominal pain.  Hgb better s/p PRBCs.  HD planned.  6/07/17 - Seen during HD today; reports feeling better; no acute concerns. Net UF drry=1102.  6/8/17 - No new issues.  HD tomorrow planned    Review of Systems   Constitutional: Positive for malaise/fatigue.   Eyes: Negative.         Legally blind   Respiratory: Negative.    Cardiovascular: Negative.    Gastrointestinal: Negative.    Skin: Negative.    Neurological: Positive for weakness.       Physical Exam   Constitutional: She is oriented to person, place, and time. She appears well-developed and  well-nourished.   HENT:   Head: Normocephalic and atraumatic.   Eyes: Conjunctivae are normal. No scleral icterus.   Neck: No tracheal deviation present.   Cardiovascular: Normal rate.  Exam reveals no friction rub.    Pulmonary/Chest: Effort normal. No respiratory distress.   Abdominal: Soft. There is tenderness.   Musculoskeletal: She exhibits no edema.   Neurological: She is alert and oriented to person, place, and time.       Labs:        Invalid input(s): [id number]      Recent Labs      17   SODIUM  130*  131*  130*   POTASSIUM  5.0  4.2  4.5   CHLORIDE  93*  92*  94*   CO2  23  23  26   BUN  78*  85*  47*   CREATININE  6.86*  6.89*  4.33*   MAGNESIUM   --   2.1   --    CALCIUM  7.5*  7.4*  7.8*     Recent Labs      17   ALTSGPT   --    --   12   ASTSGOT   --    --   11*   ALKPHOSPHAT   --    --   63   TBILIRUBIN   --    --   0.5   GLUCOSE  145*  135*  167*     Recent Labs      17   RBC  3.76*  3.73*  3.70*  3.39*   HEMOGLOBIN  11.8*  11.9*  11.8*  10.7*   HEMATOCRIT  33.7*  33.2*  33.6*  31.5*   PLATELETCT  362  351  363  273   IRON  239*   --    --    --    TOTIRONBC  260   --    --    --      Recent Labs      17   WBC  5.1  5.1  5.4  4.1*   NEUTSPOLYS  58.30   --   50.80  59.70   LYMPHOCYTES  28.80   --   34.70  25.60   MONOCYTES  8.20   --   8.90  9.90   EOSINOPHILS  3.90   --   4.60  4.10   BASOPHILS  0.40   --   0.60  0.50   ASTSGOT   --    --    --   11*   ALTSGPT   --    --    --   12   ALKPHOSPHAT   --    --    --   63   TBILIRUBIN   --    --    --   0.5           Hemodynamics:  Temp (24hrs), Av.6 °C (97.8 °F), Min:36.2 °C (97.2 °F), Max:37.4 °C (99.4 °F)  Temperature: 36.2 °C (97.2 °F)  Pulse  Av.9  Min: 60  Max: 75   Blood Pressure: 125/65 mmHg     Respiratory:    Respiration: 16, Pulse Oximetry: 96  %        RLL Breath Sounds: Diminished, LLL Breath Sounds: Diminished  Fluids:    Intake/Output Summary (Last 24 hours) at 06/08/17 1416  Last data filed at 06/07/17 1600   Gross per 24 hour   Intake    500 ml   Output      0 ml   Net    500 ml        GI/Nutrition:  Orders Placed This Encounter   Procedures   • Diet Order     Standing Status: Standing      Number of Occurrences: 1      Standing Expiration Date:      Order Specific Question:  Diet:     Answer:  Diabetic [3]     Order Specific Question:  Texture/Fiber modifications:     Answer:  Dysphagia 3(Mechanical Soft)specify fluid consistency(question 6) [3]      Comments:  ill fittung dentures     Order Specific Question:  Consistency/Fluid modifications:     Answer:  Thin Liquids [3]     Medical Decision Making, by Problem:  Active Hospital Problems    Diagnosis   • Myelodysplasia (myelodysplastic syndrome) (CMS-HCC) [D46.9]       IMPRESSION:  1.  End-stage renal disease, etiology likely secondary to HTN/DM.  2.  Acute on chronic anemia, etiology secondary to MDS, superimposed on  chronic kidney disease.  3.  Nonadherence to outpatient dialysis care.  4.  Hypertension.  5.  Type 2 diabetes mellitus.  6.  Renal osteodystrophy.  7.  Hyperlipidemia.  8.  Coronary artery disease.  9.  Peripheral vascular disease.     ASSESSMENT:  1.  GFR:  HD dependent.  2.  Urine, oligoanuric.  3.  Volume, euvolemic.  4.  Blood pressure, goal less than 140/90.  5.  Acid base, no significant acid base disturbance noted.  6.  Electrolytes, stable.  7.  Hyperkalemia with no ECG changes.  8.  Anemia, goal hemoglobin 10-11.  9.  MBD, calcium normal, p.o. for elevated.  10.  Dialysis access, left arteriovenous fistula with positive thrill and bruit.     PLAN:  1.  MWF IHD during her inpatient stay.  2.  A 3-1/2 hour session today (WED)  3.  Advised her to attempt to stay of compliant and adherent as possible to outpatient dialysis to help prevent electrolyte abnormalities such as  today.  4.  Follow up in heme/onc for MDS.  5.  Continue PhosLo 667 mg t.i.d. with meals.  6.  Dose all medications per ESRD guidelines.                Core Measures

## 2017-06-08 NOTE — ASSESSMENT & PLAN NOTE
- Poorly controlled, HbA1C of 9.8, 2/2 poor social support and inability to use insulin, POC ~low 200's  - Cont Januvia and Lyrica

## 2017-06-08 NOTE — PROGRESS NOTES
Chemotherapy Verification - PRIMARY RN  Day 3/5     Height = 147.3 cm  Weight = 60.4 kg  BSA = 1.57 m2       Medication: Azacitadine  Dose: 75 mg/m2  Calculated Dose: 118 mg Ordered dose: 118 mg split in 2 x 59 mg syringes                            (In mg/m2, AUC, mg/kg)         I confirm this process was performed independently with the BSA and all final chemotherapy dosing calculations congruent.  Any discrepancies of 5% or greater have been addressed with the chemotherapy pharmacist. The resolution of the discrepancy has been documented in the EPIC progress notes.

## 2017-06-08 NOTE — CARE PLAN
Problem: Safety  Goal: Will remain free from injury  Outcome: PROGRESSING AS EXPECTED  Bed alarm on, bed in lowest position, call light and personal belongings within reach, educated to call if in need of assistance, non skid socks    Problem: Pain Management  Goal: Pain level will decrease to patient’s comfort goal  Outcome: PROGRESSING AS EXPECTED  Rest, repositioned, and medicated per MAR

## 2017-06-09 ENCOUNTER — APPOINTMENT (OUTPATIENT)
Dept: ONCOLOGY | Facility: MEDICAL CENTER | Age: 62
End: 2017-06-09
Attending: INTERNAL MEDICINE
Payer: MEDICARE

## 2017-06-09 LAB
ANION GAP SERPL CALC-SCNC: 15 MMOL/L (ref 0–11.9)
BASOPHILS # BLD AUTO: 0.5 % (ref 0–1.8)
BASOPHILS # BLD: 0.02 K/UL (ref 0–0.12)
BUN SERPL-MCNC: 74 MG/DL (ref 8–22)
CALCIUM SERPL-MCNC: 7.7 MG/DL (ref 8.5–10.5)
CHLORIDE SERPL-SCNC: 93 MMOL/L (ref 96–112)
CO2 SERPL-SCNC: 24 MMOL/L (ref 20–33)
CREAT SERPL-MCNC: 6.06 MG/DL (ref 0.5–1.4)
EOSINOPHIL # BLD AUTO: 0.19 K/UL (ref 0–0.51)
EOSINOPHIL NFR BLD: 4.4 % (ref 0–6.9)
ERYTHROCYTE [DISTWIDTH] IN BLOOD BY AUTOMATED COUNT: 48.5 FL (ref 35.9–50)
GFR SERPL CREATININE-BSD FRML MDRD: 7 ML/MIN/1.73 M 2
GLUCOSE BLD-MCNC: 156 MG/DL (ref 65–99)
GLUCOSE SERPL-MCNC: 171 MG/DL (ref 65–99)
HCT VFR BLD AUTO: 31.1 % (ref 37–47)
HGB BLD-MCNC: 10.4 G/DL (ref 12–16)
IMM GRANULOCYTES # BLD AUTO: 0.01 K/UL (ref 0–0.11)
IMM GRANULOCYTES NFR BLD AUTO: 0.2 % (ref 0–0.9)
LYMPHOCYTES # BLD AUTO: 1.52 K/UL (ref 1–4.8)
LYMPHOCYTES NFR BLD: 35.3 % (ref 22–41)
MAGNESIUM SERPL-MCNC: 2 MG/DL (ref 1.5–2.5)
MCH RBC QN AUTO: 31.4 PG (ref 27–33)
MCHC RBC AUTO-ENTMCNC: 33.4 G/DL (ref 33.6–35)
MCV RBC AUTO: 94 FL (ref 81.4–97.8)
MONOCYTES # BLD AUTO: 0.33 K/UL (ref 0–0.85)
MONOCYTES NFR BLD AUTO: 7.7 % (ref 0–13.4)
NEUTROPHILS # BLD AUTO: 2.23 K/UL (ref 2–7.15)
NEUTROPHILS NFR BLD: 51.9 % (ref 44–72)
NRBC # BLD AUTO: 0 K/UL
NRBC BLD AUTO-RTO: 0 /100 WBC
PLATELET # BLD AUTO: 257 K/UL (ref 164–446)
PMV BLD AUTO: 12.6 FL (ref 9–12.9)
POTASSIUM SERPL-SCNC: 4.5 MMOL/L (ref 3.6–5.5)
RBC # BLD AUTO: 3.31 M/UL (ref 4.2–5.4)
SODIUM SERPL-SCNC: 132 MMOL/L (ref 135–145)
WBC # BLD AUTO: 4.3 K/UL (ref 4.8–10.8)

## 2017-06-09 PROCEDURE — 700111 HCHG RX REV CODE 636 W/ 250 OVERRIDE (IP): Performed by: INTERNAL MEDICINE

## 2017-06-09 PROCEDURE — A9270 NON-COVERED ITEM OR SERVICE: HCPCS | Performed by: INTERNAL MEDICINE

## 2017-06-09 PROCEDURE — A9270 NON-COVERED ITEM OR SERVICE: HCPCS | Performed by: STUDENT IN AN ORGANIZED HEALTH CARE EDUCATION/TRAINING PROGRAM

## 2017-06-09 PROCEDURE — 99231 SBSQ HOSP IP/OBS SF/LOW 25: CPT | Mod: GC | Performed by: INTERNAL MEDICINE

## 2017-06-09 PROCEDURE — 90935 HEMODIALYSIS ONE EVALUATION: CPT

## 2017-06-09 PROCEDURE — 700102 HCHG RX REV CODE 250 W/ 637 OVERRIDE(OP): Performed by: STUDENT IN AN ORGANIZED HEALTH CARE EDUCATION/TRAINING PROGRAM

## 2017-06-09 PROCEDURE — 770009 HCHG ROOM/CARE - ONCOLOGY SEMI PRI*

## 2017-06-09 PROCEDURE — 700102 HCHG RX REV CODE 250 W/ 637 OVERRIDE(OP): Performed by: INTERNAL MEDICINE

## 2017-06-09 RX ORDER — ONDANSETRON 2 MG/ML
8 INJECTION INTRAMUSCULAR; INTRAVENOUS ONCE
Status: COMPLETED | OUTPATIENT
Start: 2017-06-09 | End: 2017-06-09

## 2017-06-09 RX ADMIN — AMLODIPINE BESYLATE 10 MG: 10 TABLET ORAL at 21:53

## 2017-06-09 RX ADMIN — ONDANSETRON 8 MG: 2 INJECTION INTRAMUSCULAR; INTRAVENOUS at 17:30

## 2017-06-09 RX ADMIN — AZACITIDINE 59 MG: 100 INJECTION, POWDER, LYOPHILIZED, FOR SOLUTION INTRAVENOUS; SUBCUTANEOUS at 18:11

## 2017-06-09 RX ADMIN — ONDANSETRON 4 MG: 2 INJECTION INTRAMUSCULAR; INTRAVENOUS at 21:52

## 2017-06-09 RX ADMIN — SITAGLIPTIN 25 MG: 25 TABLET, FILM COATED ORAL at 08:26

## 2017-06-09 RX ADMIN — HYDROCODONE BITARTRATE AND ACETAMINOPHEN 1 TABLET: 5; 325 TABLET ORAL at 23:51

## 2017-06-09 RX ADMIN — PREGABALIN 50 MG: 25 CAPSULE ORAL at 08:13

## 2017-06-09 RX ADMIN — PREGABALIN 50 MG: 25 CAPSULE ORAL at 21:53

## 2017-06-09 RX ADMIN — HYDROCODONE BITARTRATE AND ACETAMINOPHEN 1 TABLET: 5; 325 TABLET ORAL at 17:30

## 2017-06-09 RX ADMIN — CARVEDILOL 12.5 MG: 12.5 TABLET, FILM COATED ORAL at 17:30

## 2017-06-09 ASSESSMENT — ENCOUNTER SYMPTOMS
DIZZINESS: 1
BRUISES/BLEEDS EASILY: 0
EYE PAIN: 0
VOMITING: 0
SHORTNESS OF BREATH: 0
WHEEZING: 0
SEIZURES: 0
MYALGIAS: 1
BACK PAIN: 1
TINGLING: 0
LOSS OF CONSCIOUSNESS: 0
RESPIRATORY NEGATIVE: 1
NAUSEA: 0
HEARTBURN: 0
COUGH: 0
FEVER: 0
CARDIOVASCULAR NEGATIVE: 1
FALLS: 0
PALPITATIONS: 0
WEAKNESS: 1
WEAKNESS: 0
DIARRHEA: 0
SPEECH CHANGE: 0
PSYCHIATRIC NEGATIVE: 1
CLAUDICATION: 0
EYES NEGATIVE: 1
GASTROINTESTINAL NEGATIVE: 1
ABDOMINAL PAIN: 0
HEADACHES: 0
BLURRED VISION: 1
SORE THROAT: 0
DIZZINESS: 0
MUSCULOSKELETAL NEGATIVE: 1

## 2017-06-09 ASSESSMENT — PAIN SCALES - GENERAL
PAINLEVEL_OUTOF10: 5
PAINLEVEL_OUTOF10: 8
PAINLEVEL_OUTOF10: 0
PAINLEVEL_OUTOF10: 0
PAINLEVEL_OUTOF10: 8

## 2017-06-09 NOTE — PROGRESS NOTES
"Pharmacy Chemotherapy Verification    Patient Name: Vielka Briscoe   Dx: MDS    Protocol: Azacitadine       *Dosing Reference*  Azacitadine 75 mg/m2 IV over 10 minutes or Subcutaneously daily on days 1-7 - MD dosing on days 1-5  28 day cycle until disease progression or unacceptable toxicity for a minimum of 4-6 cycles  NCCN Guidelines for Myelodysplastic Syndromes V.2.2017  Lacy P, et al. Lancet Oncol. 2009;10(3):223-32  Yokasta BOLTON, et al. J Clin Oncol. 2009;27(11)-5    Allergies:  Actos; Darvocet; Demerol; Glucophage; Morphine; Oxycodone; Pcn; Requip; Simvastatin; Sulfa drugs; Tramadol; Trazodone; Demerol; Dilaudid; Diphenhydramine; Iron; Lenalidomide; Morphine; Multivitamin; Naprosyn; Other drug; and Sulfa drugs     /53 mmHg  Pulse 69  Temp(Src) 36.4 °C (97.5 °F)  Resp 16  Ht 1.473 m (4' 9.99\")  Wt 60.4 kg (133 lb 2.5 oz)  BMI 27.84 kg/m2  SpO2 99%  LMP 01/01/1995 Body surface area is 1.57 meters squared.    Labs 6/8/17  ANC ~ 2500     Plt = 273k     Hgb = 10.7 SCr = 4.33 mg/dL      CrCl = HD- MWF     AST/ALT/AP/TBili = 11/12/63/0.5  No new labs today, s/p dialysis today     Drug Order   (Drug name, dose, route, IV Fluid & volume, frequency, number of doses) Cycle: 2  Day 4 of 5     Previous treatment: May 1-5, 2017     Medication = Azacitadine (Vidaza)  Base Dose= 75 mg/m2  Calc Dose: Base Dose x 1.57 m2 = 117.75 mg  Final Dose = 118 mg  Route = IV  Fluid & Volume = 25 mg/mL;  4.72 mL in 2 syringes, 2.36 mL = 59 mg each  Admin Duration = Subcutaneous injection           <5% difference, OK to treat with final dose         By my signature below, I confirm this process was performed independently with the BSA and all final chemotherapy dosing calculations congruent. I have reviewed the above chemotherapy order and that my calculation of the final dose and BSA (when applicable) corroborate those calculations of the  pharmacist. Discrepancies of 5% or greater in the written " dose have been addressed and documented within the EPIC Progress notes.    Mackenzie Apodaca, PharmD

## 2017-06-09 NOTE — PROGRESS NOTES
Pt to Rtx with transport. All needs met. Anti- hypertensive held -per dialysis. Monitoring for pt return

## 2017-06-09 NOTE — PROGRESS NOTES
[facility] Internal Medicine Interval Note    Name Vielka Briscoe     1955   Age/Sex 61 y.o. female   MRN [id number]   Code Status FULL     After 5PM or if no immediate response to page, please call for cross-coverage  Attending/Team: Dr. Luciano/Michi Call [phone] to page   1st Call - Day Intern (R1):   Dr. Maradiaga 2nd Call - Day Sr. Resident (R2/R3):   Dr. Estevez         Chief complaint/ reason for interval visit (Primary Diagnosis)   Significant anemia requiring transfusion in setting of Myelodysplastic Syndrome and HD-dependent ESRD    Interval Problem Daily Status Update    - Anemia: Hgb 6.7 on admission, s/p 3U PRBC, B12/Folate WNL, Hgb remains stable @ ~10,  - ESRD: Likely 2/2 uncontrolled DMII/HTN, followed by nephrology, HD MWF, will cont while inpatient  - Iron overload: Iron panel Fe 239, %sat 92, Ferritin 1000+. Will need Fe chelation, per Dr. Matamoros (Onc) to be started as outpt by Dr. Avila  - MDS: Dx'd 2016, followed by Dr. Avila, will need outpatient iron chelation, Onc following, Vidaza started 6/6 x5 days ending 6/10  - DMII with b/l LE neuropathy: Poorly controlled, HbA1C of 9.8, 2/2 poor social support and inability to use insulin, POC ~low 200's, cont Januvia and Lyrica  - HTN: Stable, cont Coreg 12.5mg BID, Amlodipine 10mg QD  - Chronic CP: Seem musculoskeletal, negative cardiac work-up, will be seen by cards   - Chronic pain: Stable, cont Norco    Review of Systems   Constitutional: Positive for malaise/fatigue. Negative for fever.   HENT: Negative for congestion and sore throat.    Eyes: Positive for blurred vision. Negative for pain.        Chronic poor vision   Respiratory: Negative for cough, shortness of breath and wheezing.    Cardiovascular: Positive for chest pain. Negative for palpitations and claudication.        Chronic non-cardiac CP   Gastrointestinal: Negative for heartburn, nausea, vomiting, abdominal pain and diarrhea.    Genitourinary: Negative for dysuria, frequency and hematuria.   Musculoskeletal: Positive for myalgias and back pain. Negative for joint pain and falls.   Skin: Negative for rash.   Neurological: Negative for dizziness, tingling, speech change, seizures, loss of consciousness, weakness and headaches.   Endo/Heme/Allergies: Does not bruise/bleed easily.   Psychiatric/Behavioral: Negative.        Consultants/Specialty  Nephro (Dr. Rico)  Heme/Onc (Dr. Matamoros)    Disposition  Inpatient for x5 days of Vidaza (chemo) for known Myelodysplastic Syndrome    Quality Measures  Core Measures  Labs/EKG: reviewed  DVT prophylaxis: SCD  GI prophylaxis: None  Tubes: None  Lines: PIV  Bauer: None        Physical Exam       Filed Vitals:    06/08/17 2000 06/08/17 2338 06/09/17 0400 06/09/17 0740   BP: 121/49 131/45 126/49 128/51   Pulse: 67 71 69 65   Temp: 37 °C (98.6 °F) 37.1 °C (98.8 °F) 36.4 °C (97.6 °F) 36.2 °C (97.2 °F)   Resp: 20 20 20 18   Height:       Weight:       SpO2: 100% 99% 100% 99%     Body mass index is 27.84 kg/(m^2).    Oxygen Therapy:  Pulse Oximetry: 99 %, O2 (LPM): 2, O2 Delivery: Nasal Cannula    Physical Exam   Constitutional: She is oriented to person, place, and time and well-developed, well-nourished, and in no distress. No distress.   HENT:   Head: Normocephalic and atraumatic.   Eyes: EOM are normal.   Cardiovascular: Normal rate, regular rhythm and normal heart sounds.    No murmur heard.  Pulmonary/Chest: Effort normal and breath sounds normal. No respiratory distress. She has no wheezes. She exhibits no tenderness.   Abdominal: Soft. Bowel sounds are normal. She exhibits no distension. There is no tenderness. There is no rebound.   Musculoskeletal: Normal range of motion. She exhibits no edema.   Neurological: She is alert and oriented to person, place, and time. GCS score is 15.   Skin: Skin is warm and dry. She is not diaphoretic.         Lab Data Review:      6/8/2017  2:35 PM    Recent Labs       06/06/17 2347 06/08/17 06/08/17 2345   SODIUM  131*  130*  132*   POTASSIUM  4.2  4.5  4.5   CHLORIDE  92*  94*  93*   CO2  23  26  24   BUN  85*  47*  74*   CREATININE  6.89*  4.33*  6.06*   MAGNESIUM  2.1   --   2.0   CALCIUM  7.4*  7.8*  7.7*       Recent Labs      06/06/17 2347 06/08/17 06/08/17 2345   ALTSGPT   --   12   --    ASTSGOT   --   11*   --    ALKPHOSPHAT   --   63   --    TBILIRUBIN   --   0.5   --    GLUCOSE  135*  167*  171*       Recent Labs      06/06/17 2347 06/08/17 06/08/17 2345   RBC  3.70*  3.39*  3.31*   HEMOGLOBIN  11.8*  10.7*  10.4*   HEMATOCRIT  33.6*  31.5*  31.1*   PLATELETCT  363  273  257       Recent Labs      06/06/17 2347 06/08/17 06/08/17 2345   WBC  5.4  4.1*  4.3*   NEUTSPOLYS  50.80  59.70  51.90   LYMPHOCYTES  34.70  25.60  35.30   MONOCYTES  8.90  9.90  7.70   EOSINOPHILS  4.60  4.10  4.40   BASOPHILS  0.60  0.50  0.50   ASTSGOT   --   11*   --    ALTSGPT   --   12   --    ALKPHOSPHAT   --   63   --    TBILIRUBIN   --   0.5   --            Assessment/Plan     * Myelodysplasia (myelodysplastic syndrome) (CMS-HCC) (present on admission)  Assessment & Plan  - Stable, followed by Dr. Roberts outpatient. Currently treated with 6 month course of Vidaza.    - Pt to receive x5 days of inpatient Vidaza chemo started 6/6, ending 6/10  - Heme/Onc following (Dr. Matamoros) >> follow recs  - Monitor CBC and clinical symptoms    Anemia (present on admission)  Assessment & Plan  - Pt has a hx of transfusion dependent anemia 2/2 known Myelodysplastic Syndrome  - Received total of 3 units via dialysis, H/H responded appropriately and is currently stable, B12/Folate WNL  - Transfuse if Hgb < 7; goal 8.5-9 per nephro and onc    DM  2 complicated by peripheral neuropathy (present on admission)  Assessment & Plan  - Poorly controlled, HbA1C of 9.8, 2/2 poor social support and inability to use insulin, POC ~low 200's  - Cont Januvia and Lyrica    ESRD (end stage renal  disease) (CMS-HCC) (present on admission)  Assessment & Plan  - Likely 2/2 to uncontrolled HTN and DMII, on HD MWF  - Nephro consulted (Dr. Rico) for HD arrangements    HTN (hypertension) (present on admission)  Assessment & Plan  - BP on admission /45 mmHg   - Stable now, cont Coreg 12.5mg BID, Amlodipine 10mg QD    Iron overload (present on admission)  Assessment & Plan  - Iron panel Fe 239, %sat 92, Ferritin 1000+; 2/2 rapid PRBC transfusions  - Will need Fe chelation, per Dr. Matamoros (Onc) to be started as outpt by Dr. Avila    Chronic pain  Assessment & Plan  - Diffuse, but also chronic CP >> Seem musculoskeletal, negative cardiac work-up, will be seen by cards 6/20  - Stable, cont Norco

## 2017-06-09 NOTE — PROGRESS NOTES
Oncology/Hematology Progress Note               Author: Mehul Matamoros III Date & Time created: 6/9/2017  11:19 AM   -Patient with MDS, del 5q Receiving SC vidaza 75mg/m2 for  5 days, in hospital mainly for transportation issues  Interval History:  No events overnight   On Hemodialysis MWF  Received 3 units of PRBC on 6/5  Vidaza day 4/5.  No events overnight  Review of Systems:  Review of Systems   Constitutional: Positive for malaise/fatigue.   HENT: Negative.    Eyes: Negative.    Respiratory: Negative.    Cardiovascular: Negative.    Gastrointestinal: Negative.    Genitourinary: Negative.    Musculoskeletal: Negative.    Skin: Negative.    Neurological: Positive for dizziness and weakness.       Physical Exam:  Physical Exam   Constitutional: She is oriented to person, place, and time. She appears well-developed and well-nourished.   HENT:   Head: Normocephalic.   Eyes: Conjunctivae are normal. Pupils are equal, round, and reactive to light.   Neck: Normal range of motion. Neck supple.   Cardiovascular: Normal rate and regular rhythm.    Pulmonary/Chest: Effort normal and breath sounds normal.   Abdominal: Soft. Bowel sounds are normal.   Musculoskeletal: Normal range of motion.   Neurological: She is alert and oriented to person, place, and time.       Labs:        Invalid input(s): [id number]      Recent Labs      06/06/17 2347 06/08/17 06/08/17 2345   SODIUM  131*  130*  132*   POTASSIUM  4.2  4.5  4.5   CHLORIDE  92*  94*  93*   CO2  23  26  24   BUN  85*  47*  74*   CREATININE  6.89*  4.33*  6.06*   MAGNESIUM  2.1   --   2.0   CALCIUM  7.4*  7.8*  7.7*     Recent Labs      06/06/17 2347 06/08/17 06/08/17 2345   ALTSGPT   --   12   --    ASTSGOT   --   11*   --    ALKPHOSPHAT   --   63   --    TBILIRUBIN   --   0.5   --    GLUCOSE  135*  167*  171*     Recent Labs      06/06/17 2347 06/08/17 06/08/17 2345   RBC  3.70*  3.39*  3.31*   HEMOGLOBIN  11.8*  10.7*  10.4*   HEMATOCRIT  33.6*   31.5*  31.1*   PLATELETCT  363  273  257     Recent Labs      17   2347 17   2345   WBC  5.4  4.1*  4.3*   NEUTSPOLYS  50.80  59.70  51.90   LYMPHOCYTES  34.70  25.60  35.30   MONOCYTES  8.90  9.90  7.70   EOSINOPHILS  4.60  4.10  4.40   BASOPHILS  0.60  0.50  0.50   ASTSGOT   --   11*   --    ALTSGPT   --   12   --    ALKPHOSPHAT   --   63   --    TBILIRUBIN   --   0.5   --      Recent Labs      17   2347 17   2345   SODIUM  131*  130*  132*   POTASSIUM  4.2  4.5  4.5   CHLORIDE  92*  94*  93*   CO2  23  26  24   GLUCOSE  135*  167*  171*   BUN  85*  47*  74*   CREATININE  6.89*  4.33*  6.06*   CALCIUM  7.4*  7.8*  7.7*     Hemodynamics:  Temp (24hrs), Av.7 °C (98.1 °F), Min:36.2 °C (97.2 °F), Max:37.1 °C (98.8 °F)  Temperature: 36.2 °C (97.2 °F)  Pulse  Av  Min: 60  Max: 75   Blood Pressure: 128/51 mmHg     Respiratory:    Respiration: 18, Pulse Oximetry: 99 %        RUL Breath Sounds: Clear, RML Breath Sounds: Clear, RLL Breath Sounds: Diminished, BLANK Breath Sounds: Clear, LLL Breath Sounds: Diminished  Fluids:    Intake/Output Summary (Last 24 hours) at 17 1156  Last data filed at 17 2345   Gross per 24 hour   Intake      0 ml   Output      0 ml   Net      0 ml        GI/Nutrition:  Orders Placed This Encounter   Procedures   • Diet Order     Standing Status: Standing      Number of Occurrences: 1      Standing Expiration Date:      Order Specific Question:  Diet:     Answer:  Diabetic [3]     Order Specific Question:  Texture/Fiber modifications:     Answer:  Dysphagia 3(Mechanical Soft)specify fluid consistency(question 6) [3]      Comments:  ill fittung dentures     Order Specific Question:  Consistency/Fluid modifications:     Answer:  Thin Liquids [3]     Medical Decision Making, by Problem:  Active Hospital Problems    Diagnosis   • Anemia [D64.9]   • Myelodysplasia (myelodysplastic syndrome) (CMS-HCC) [D46.9]   • Iron overload [E83.19]   •  ESRD (end stage renal disease) (CMS-HCC) [N18.6]       Plan:  1.  Myelodysplastic syndrome with deletion of 5q.  The patient    did not respond well to low-dose Revlimid at 2.5 mg daily.  -started on Vidaza on 04/17/2017 at 75 mg/m2 for 5 days.   -started on Vidaza subQ 75 x5 days, day 4 today. Was started here due to transportation issues  -s/p  3 units of packed red blood cells during this   admission.   -transfuse if hemoglobin <8 g/dl  -encourage to ambulate with assistance  -Upon finishing 5 days of vidaza she can be discharged home and follow up with Dr Avila as outpatient    2. Anemia secondary to MDS, ESRD  -On procrit weekly  -Vidaza    3.  End-stage renal disease, on hemodialysis.   -On HD MWF  Core Measures

## 2017-06-09 NOTE — PROGRESS NOTES
Pt is A&Ox4, fatigued. Up to bathroom with standby assist using cane; shuffling but steady gait. Pain and Nausea addressed per MAR. Pt back to bed. Bed rails up, bed in lowest position. Call light within reach. Hourly rounding in place.

## 2017-06-09 NOTE — PROGRESS NOTES
[facility] Nephrology Progress Note               Author:   Fabian Rico MD Date & Time created: 6/9/2017  11:29 AM     Interval History:  61-year-old female with PMH ESRD, MWF IHD, hypertension, type 2 diabetes mellitus, anemia, CKD, renal osteodystrophy, hyperlipidemia, and MDS, who presents to the ED with a chief complaint of shortness of breath.    She states that symptoms have been ongoing since the weekend and states that she had issues with her transportation and missed her dialysis on Friday.  Today, she woke up and was not feeling well, more tired  than usual and more short of breath than usual and so she came to the ED and missed her dialysis session on the date of admission as well.  She is well known to us due to her frequent hospitalizations for transfusions due to anemia from her MDS as well   as missed dialysis sessions in past as well.      She denies being noncompliant with her diet and fluid restriction and also denies any orthopnea or PND at this time.  She is lying comfortably in her bed during my encounter and lab  work done in the ED showed a hemoglobin of 6.7 and a potassium of 6.4 with a  BUN of 88.  She otherwise denies any recent fevers, chills, nausea, vomiting,  chest pain, abdominal pain, melena, hematochezia, or hematemesis.     Daily Nephrology Summary  6/5/17 - seen in consultation by Dr Perez.  Hd ordered.  6/6/17 - patient c/o abdominal pain.  Hgb better s/p PRBCs.  HD planned.  6/07/17 - Seen during HD today; reports feeling better; no acute concerns. Net UF [id number].  6/8/17 - No new issues.  HD tomorrow planned  6/9/17 - HD today - no new renal problems, VSS.    Review of Systems   Constitutional: Positive for malaise/fatigue.   Eyes: Negative.         Legally blind   Respiratory: Negative.    Cardiovascular: Negative.    Gastrointestinal: Negative.    Skin: Negative.    Neurological: Positive for weakness.       Physical Exam   Constitutional: She is oriented to person,  place, and time. She appears well-developed and well-nourished.   HENT:   Head: Normocephalic and atraumatic.   Eyes: Conjunctivae are normal. No scleral icterus.   Neck: No tracheal deviation present.   Cardiovascular: Normal rate.  Exam reveals no friction rub.    Pulmonary/Chest: Effort normal. No respiratory distress.   Abdominal: Soft. There is tenderness.   Musculoskeletal: She exhibits no edema.   Neurological: She is alert and oriented to person, place, and time.       Labs:        Invalid input(s): [id number]      Recent Labs      17   SODIUM  131*  130*  132*   POTASSIUM  4.2  4.5  4.5   CHLORIDE  92*  94*  93*   CO2  23  26  24   BUN  85*  47*  74*   CREATININE  6.89*  4.33*  6.06*   MAGNESIUM  2.1   --   2.0   CALCIUM  7.4*  7.8*  7.7*     Recent Labs      17   ALTSGPT   --   12   --    ASTSGOT   --   11*   --    ALKPHOSPHAT   --   63   --    TBILIRUBIN   --   0.5   --    GLUCOSE  135*  167*  171*     Recent Labs      17   RBC  3.70*  3.39*  3.31*   HEMOGLOBIN  11.8*  10.7*  10.4*   HEMATOCRIT  33.6*  31.5*  31.1*   PLATELETCT  363  273  257     Recent Labs      17   WBC  5.4  4.1*  4.3*   NEUTSPOLYS  50.80  59.70  51.90   LYMPHOCYTES  34.70  25.60  35.30   MONOCYTES  8.90  9.90  7.70   EOSINOPHILS  4.60  4.10  4.40   BASOPHILS  0.60  0.50  0.50   ASTSGOT   --   11*   --    ALTSGPT   --   12   --    ALKPHOSPHAT   --   63   --    TBILIRUBIN   --   0.5   --            Hemodynamics:  Temp (24hrs), Av.7 °C (98.1 °F), Min:36.2 °C (97.2 °F), Max:37.1 °C (98.8 °F)  Temperature: 36.2 °C (97.2 °F)  Pulse  Av  Min: 60  Max: 75   Blood Pressure: 128/51 mmHg     Respiratory:    Respiration: 18, Pulse Oximetry: 99 %        RUL Breath Sounds: Clear, RML Breath Sounds: Clear, RLL Breath Sounds: Diminished, BLANK Breath Sounds: Clear, LLL Breath Sounds:  Diminished  Fluids:    Intake/Output Summary (Last 24 hours) at 06/09/17 1129  Last data filed at 06/09/17 0600   Gross per 24 hour   Intake    320 ml   Output      0 ml   Net    320 ml        GI/Nutrition:  Orders Placed This Encounter   Procedures   • Diet Order     Standing Status: Standing      Number of Occurrences: 1      Standing Expiration Date:      Order Specific Question:  Diet:     Answer:  Diabetic [3]     Order Specific Question:  Texture/Fiber modifications:     Answer:  Dysphagia 3(Mechanical Soft)specify fluid consistency(question 6) [3]      Comments:  ill fittung dentures     Order Specific Question:  Consistency/Fluid modifications:     Answer:  Thin Liquids [3]     Medical Decision Making, by Problem:  Active Hospital Problems    Diagnosis   • Myelodysplasia (myelodysplastic syndrome) (CMS-HCC) [D46.9]       IMPRESSION:  1.  End-stage renal disease, etiology likely secondary to HTN/DM.  2.  Acute on chronic anemia, etiology secondary to MDS, superimposed on  chronic kidney disease.  3.  Nonadherence to outpatient dialysis care.  4.  Hypertension.  5.  Type 2 diabetes mellitus.  6.  Renal osteodystrophy.  7.  Hyperlipidemia.  8.  Coronary artery disease.  9.  Peripheral vascular disease.     ASSESSMENT:  1.  GFR:  HD dependent.  2.  Urine, oligoanuric.  3.  Volume, euvolemic.  4.  Blood pressure, goal less than 140/90.  5.  Acid base, no significant acid base disturbance noted.  6.  Electrolytes, stable.  7.  Hyperkalemia resolved  8.  Anemia, goal hemoglobin 10-11.  9.  MBD, calcium normal, p.o. for elevated.  10.  Dialysis access, left arteriovenous fistula with positive thrill and bruit.     PLAN:  1.  MWF IHD during her inpatient stay.  2.  A 3-1/2 hour session today   3.  Advised her to attempt to stay of compliant and adherent as possible to outpatient dialysis to help prevent electrolyte abnormalities such as today.  4.  Follow up in heme/onc for MDS.  5.  Continue PhosLo 667 mg t.i.d.  with meals.  6.  Dose all medications per ESRD guidelines.                Core Measures

## 2017-06-09 NOTE — CARE PLAN
Problem: Bowel/Gastric:  Goal: Will not experience complications related to bowel motility  Outcome: PROGRESSING AS EXPECTED  Will have controlled nausea with current medication regimen        Problem: Pain Management  Goal: Pain level will decrease to patient’s comfort goal  Outcome: PROGRESSING AS EXPECTED  Will have controlled pain levels and increased comfort with current POC

## 2017-06-09 NOTE — PROGRESS NOTES
[facility] Services Progress Note    Hemodialysis treatment ordered today per Dr. Fabian Rico x 3.30 hours. Treatment initiated at 0945, ended at 1315.     Patient tolerated tx; see paper flow sheet for details.     Net UF 1150 mL.     Needles removed from access site. Dressings applied and sites held x 10 minutes; verified no bleeding. Positive bruit/thrill post tx. Staff RN to monitor AVF for breakthrough bleeding. Should breakthrough bleeding occur, staff RN to apply pressure to access sites until bleeding resolved. Notify Dialysis and Nephrologist for follow-up.    Report given to Primary RN.

## 2017-06-09 NOTE — PROGRESS NOTES
Chemotherapy Verification - SECONDARY RN       Height = 147.3cm  Weight = 60.4kg  BSA = 1.57m2      Medication: Vidaza  Dose: 75 mg/m2  Calculated Dose: 117.75 =118 divided into two doses = 58.875=59mg                             (In mg/m2, AUC, mg/kg)   Ordered: 2-- 59 mg syringes.            I confirm that this process was performed independently.

## 2017-06-09 NOTE — PROGRESS NOTES
Pt back from dialysis. Fistula with gauze dressing CDI. Pt denies pain; all needs met. Requesting to rest

## 2017-06-10 VITALS
HEIGHT: 58 IN | OXYGEN SATURATION: 95 % | TEMPERATURE: 97.9 F | HEART RATE: 70 BPM | WEIGHT: 133.16 LBS | SYSTOLIC BLOOD PRESSURE: 156 MMHG | DIASTOLIC BLOOD PRESSURE: 42 MMHG | RESPIRATION RATE: 18 BRPM | BODY MASS INDEX: 27.95 KG/M2

## 2017-06-10 LAB
ANION GAP SERPL CALC-SCNC: 11 MMOL/L (ref 0–11.9)
BASOPHILS # BLD AUTO: 0.5 % (ref 0–1.8)
BASOPHILS # BLD: 0.02 K/UL (ref 0–0.12)
BUN SERPL-MCNC: 43 MG/DL (ref 8–22)
CALCIUM SERPL-MCNC: 7.7 MG/DL (ref 8.5–10.5)
CHLORIDE SERPL-SCNC: 98 MMOL/L (ref 96–112)
CO2 SERPL-SCNC: 28 MMOL/L (ref 20–33)
CREAT SERPL-MCNC: 4.33 MG/DL (ref 0.5–1.4)
EOSINOPHIL # BLD AUTO: 0.15 K/UL (ref 0–0.51)
EOSINOPHIL NFR BLD: 3.8 % (ref 0–6.9)
ERYTHROCYTE [DISTWIDTH] IN BLOOD BY AUTOMATED COUNT: 47.7 FL (ref 35.9–50)
GFR SERPL CREATININE-BSD FRML MDRD: 10 ML/MIN/1.73 M 2
GLUCOSE BLD-MCNC: 232 MG/DL (ref 65–99)
GLUCOSE SERPL-MCNC: 199 MG/DL (ref 65–99)
HCT VFR BLD AUTO: 28.3 % (ref 37–47)
HGB BLD-MCNC: 9.6 G/DL (ref 12–16)
IMM GRANULOCYTES # BLD AUTO: 0.02 K/UL (ref 0–0.11)
IMM GRANULOCYTES NFR BLD AUTO: 0.5 % (ref 0–0.9)
LYMPHOCYTES # BLD AUTO: 1.36 K/UL (ref 1–4.8)
LYMPHOCYTES NFR BLD: 34.2 % (ref 22–41)
MAGNESIUM SERPL-MCNC: 1.9 MG/DL (ref 1.5–2.5)
MCH RBC QN AUTO: 32 PG (ref 27–33)
MCHC RBC AUTO-ENTMCNC: 33.9 G/DL (ref 33.6–35)
MCV RBC AUTO: 94.3 FL (ref 81.4–97.8)
MONOCYTES # BLD AUTO: 0.3 K/UL (ref 0–0.85)
MONOCYTES NFR BLD AUTO: 7.5 % (ref 0–13.4)
NEUTROPHILS # BLD AUTO: 2.13 K/UL (ref 2–7.15)
NEUTROPHILS NFR BLD: 53.5 % (ref 44–72)
NRBC # BLD AUTO: 0 K/UL
NRBC BLD AUTO-RTO: 0 /100 WBC
PLATELET # BLD AUTO: 218 K/UL (ref 164–446)
PMV BLD AUTO: 12.6 FL (ref 9–12.9)
POTASSIUM SERPL-SCNC: 4.7 MMOL/L (ref 3.6–5.5)
RBC # BLD AUTO: 3 M/UL (ref 4.2–5.4)
SODIUM SERPL-SCNC: 137 MMOL/L (ref 135–145)
WBC # BLD AUTO: 4 K/UL (ref 4.8–10.8)

## 2017-06-10 PROCEDURE — 80048 BASIC METABOLIC PNL TOTAL CA: CPT

## 2017-06-10 PROCEDURE — 82962 GLUCOSE BLOOD TEST: CPT

## 2017-06-10 PROCEDURE — 36415 COLL VENOUS BLD VENIPUNCTURE: CPT

## 2017-06-10 PROCEDURE — 700111 HCHG RX REV CODE 636 W/ 250 OVERRIDE (IP): Performed by: INTERNAL MEDICINE

## 2017-06-10 PROCEDURE — 700102 HCHG RX REV CODE 250 W/ 637 OVERRIDE(OP): Performed by: STUDENT IN AN ORGANIZED HEALTH CARE EDUCATION/TRAINING PROGRAM

## 2017-06-10 PROCEDURE — A9270 NON-COVERED ITEM OR SERVICE: HCPCS | Performed by: STUDENT IN AN ORGANIZED HEALTH CARE EDUCATION/TRAINING PROGRAM

## 2017-06-10 PROCEDURE — 94760 N-INVAS EAR/PLS OXIMETRY 1: CPT

## 2017-06-10 PROCEDURE — 85025 COMPLETE CBC W/AUTO DIFF WBC: CPT

## 2017-06-10 PROCEDURE — 700102 HCHG RX REV CODE 250 W/ 637 OVERRIDE(OP): Performed by: INTERNAL MEDICINE

## 2017-06-10 PROCEDURE — A9270 NON-COVERED ITEM OR SERVICE: HCPCS | Performed by: INTERNAL MEDICINE

## 2017-06-10 PROCEDURE — 83735 ASSAY OF MAGNESIUM: CPT

## 2017-06-10 PROCEDURE — 99239 HOSP IP/OBS DSCHRG MGMT >30: CPT | Mod: GC | Performed by: INTERNAL MEDICINE

## 2017-06-10 RX ORDER — ONDANSETRON 2 MG/ML
8 INJECTION INTRAMUSCULAR; INTRAVENOUS ONCE
Status: COMPLETED | OUTPATIENT
Start: 2017-06-10 | End: 2017-06-10

## 2017-06-10 RX ADMIN — PREGABALIN 50 MG: 25 CAPSULE ORAL at 08:19

## 2017-06-10 RX ADMIN — HYDROCODONE BITARTRATE AND ACETAMINOPHEN 1 TABLET: 5; 325 TABLET ORAL at 08:19

## 2017-06-10 RX ADMIN — SITAGLIPTIN 25 MG: 25 TABLET, FILM COATED ORAL at 08:23

## 2017-06-10 RX ADMIN — ONDANSETRON 8 MG: 2 INJECTION INTRAMUSCULAR; INTRAVENOUS at 14:40

## 2017-06-10 RX ADMIN — AZACITIDINE 59 MG: 100 INJECTION, POWDER, LYOPHILIZED, FOR SOLUTION INTRAVENOUS; SUBCUTANEOUS at 15:18

## 2017-06-10 RX ADMIN — ACETAMINOPHEN 650 MG: 325 TABLET, FILM COATED ORAL at 03:49

## 2017-06-10 RX ADMIN — CARVEDILOL 12.5 MG: 12.5 TABLET, FILM COATED ORAL at 08:19

## 2017-06-10 RX ADMIN — AZACITIDINE 59 MG: 100 INJECTION, POWDER, LYOPHILIZED, FOR SOLUTION INTRAVENOUS; SUBCUTANEOUS at 15:17

## 2017-06-10 ASSESSMENT — ENCOUNTER SYMPTOMS
EYES NEGATIVE: 1
WEAKNESS: 1
MUSCULOSKELETAL NEGATIVE: 1
RESPIRATORY NEGATIVE: 1
CARDIOVASCULAR NEGATIVE: 1
DIZZINESS: 1
GASTROINTESTINAL NEGATIVE: 1

## 2017-06-10 ASSESSMENT — PAIN SCALES - GENERAL
PAINLEVEL_OUTOF10: 10
PAINLEVEL_OUTOF10: 2
PAINLEVEL_OUTOF10: 3
PAINLEVEL_OUTOF10: 3
PAINLEVEL_OUTOF10: 8

## 2017-06-10 NOTE — DISCHARGE SUMMARY
[facility] [place] [facility] Discharge Summary      Admit Date:  6/5/2017       Discharge Date:   6/10/2017    Service:   [facility] Blue Team  Attending Physician(s):   Dr. Luciano       Senior Resident(s):   Dr. Estevez  Gal Resident(s):   Dr. 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 Will need Fe chelation, per Dr. Matamoros (Onc) to be started as outpt by Dr. Avila  - MDS: Dx'd 11/2016, followed by Dr. Avila, will need outpatient iron chelation, Onc following, Vidaza started 6/6 x5 days ending 6/10  - DMII with b/l LE neuropathy: Poorly controlled, HbA1C of 9.8, 2/2 poor social support and inability to use insulin, POC ~low 200's, cont Januvia and Lyrica  - HTN: Stable, cont Coreg 12.5mg BID, Amlodipine 10mg QD  - Chronic CP: Seem musculoskeletal, negative cardiac work-up, will be seen by cards 6/20  - Chronic pain: Stable, cont [facility] Summary (Brief Narrative):       In brief, Ms. Briscoe, with a known hx of transfusion dependent anemia 2/2 myelodysplastic syndrome was admitted on 6/5/2017 - 6/10/2017 for recurrent anemia needing PRBC transfusion. She was similarly admitted on 5/10 - 5/11 discharged after 3U PRBC and HD. On this admission, the patient complained of generalized malaise/fatigue, body aches, chronic chest pain, and abd discomfort with n/v/d. She was found to have a Hgb of 6.7, Na 134, K 6.4, BUN 88, Crt 8.44, Glucose 123. VS of T 97.5, /45, HR 70, RR 20, O2 98 on 2L. Chest pain was chronic and non-cardiac with trop <.01 x2 without concerning EKG changes. CXR without evidence of acute  infiltrate/consolidation. The patient receieved 1U PRBC in the ED and consults were placed for Nephro and Heme/Onc for further management. She was continued on hemodialysis and received another 2U of PRBC which increased her Hgb to 11.8 and remained ~10-11 through her hospitalization. Heme/Onc evaluated the patient and decided to initiate a 5 day course of Vidaza 2/2 outpatient transportation issues. She had no acute events during her hospital stay and did not require any further blood transfusions. She continued w/ HD and showed significant symptmatic improvement. There was concern over iron overload which was brought up during her prior hospitalization 2/2 her frequent transfusions, but per Heme/Onc (Dr. Matamoros), the patient would begin iron chelation as an outpatient with her primary Oncologist (Dr. Avila). She completed #5 days of Vidaza ending 6/10, and was discharged in a stable state at her usual baseline. She was ambulatory, without major complaints, AAOx4. She would follow-up with Dr. Avila on 6/15/2017.    Patient /Hospital Summary (Details -- Problem Oriented) :          * Myelodysplasia (myelodysplastic syndrome) (CMS-HCC)  Assessment & Plan  - Stable, followed by Dr. Roberts outpatient. Currently treated with 6 month course of Vidaza.    - Pt to receive x5 days of inpatient Vidaza chemo started 6/6, ending 6/10  - Heme/Onc following (Dr. Matamoros) >> follow recs  - Monitor CBC and clinical symptoms  - F/u with Dr. Avila 6/15/2017    Anemia  Assessment & Plan  - Pt has a hx of transfusion dependent anemia 2/2 known Myelodysplastic Syndrome  - Received total of 3 units via dialysis, H/H responded appropriately and is currently stable, B12/Folate WNL  - Transfuse if Hgb < 7; goal 8.5-9 per nephro and onc    DM  2 complicated by peripheral neuropathy  Assessment & Plan  - Poorly controlled, HbA1C of 9.8, 2/2 poor social support and inability to use insulin, POC ~low 200's  - Cont Januvia and  Lyrica    ESRD (end stage renal disease) (CMS-HCC)  Assessment & Plan  - Likely 2/2 to uncontrolled HTN and DMII, on HD MWF  - Nephro consulted (Dr. Rico) for HD arrangements  - Cont HD MWF    HTN (hypertension)  Assessment & Plan  - BP on admission /45 mmHg   - Stable now, cont Coreg 12.5mg BID, Amlodipine 10mg QD    Iron overload  Assessment & Plan  - Iron panel Fe 239, %sat 92, Ferritin 1000+; 2/2 rapid PRBC transfusions  - Will need Fe chelation, per Dr. Matamoros (Onc) to be started as outpt by Dr. Avila    Chronic pain  Assessment & Plan  - Diffuse, but also chronic CP >> Seem musculoskeletal, negative cardiac work-up, will be seen by cards 6/20  - Stable, cont [place]        Consultants:     Nephrology (Dr. Fabian Rico)  Hematology/Oncology (Dr. Mehul Matamoros III)      Procedures:        PRBC transfusion  Hemodialysis    Imaging/ Testing:      DX-CHEST-PORTABLE (1 VIEW)   Final Result      No consolidation.            Discharge Medications:         Medication Reconciliation: Completed     Medication List      CONTINUE taking these medications       Instructions    amlodipine 10 MG Tabs   Last time this was given:  10 mg on 6/9/2017  9:53 PM   Commonly known as:  NORVASC    Take 10 mg by mouth every day.   Dose:  10 mg       carvedilol 12.5 MG Tabs   Last time this was given:  12.5 mg on 6/10/2017  8:19 AM   Commonly known as:  COREG    Take 12.5 mg by mouth 2 times a day, with meals.   Dose:  12.5 mg       hydrocodone-acetaminophen 5-325 MG Tabs per tablet   Last time this was given:  1 Tab on 6/10/2017  8:19 AM   Commonly known as:  NORCO    Take 1-2 Tabs by mouth every 6 hours as needed.   Dose:  1-2 Tab       LUMIGAN 0.03 % Soln   Generic drug:  bimatoprost    Place 1 Drop in both eyes every evening.   Dose:  1 Drop       LYRICA 50 MG capsule   Last time this was given:  50 mg on 6/10/2017  8:19 AM   Generic drug:  pregabalin    Take 50 mg by mouth 2 times a day.   Dose:  50 mg        prochlorperazine 10 MG Tabs   Commonly known as:  COMPAZINE    Take 10 mg by mouth every 6 hours as needed.   Dose:  10 mg       sitagliptin 25 MG Tabs   Last time this was given:  25 mg on 6/10/2017  8:23 AM   Commonly known as:  JANUVIA    Take 1 Tab by mouth every morning.   Dose:  25 mg       VOLTAREN 1 % Gel   Generic drug:  Diclofenac Sodium    APPLY 2 GRAMS TOPICALLY QID               Disposition:   Home    Diet:   Diabetic    Activity:   As tolerated    Instructions:         The patient was instructed to return to the ER in the event of worsening symptoms. I have counseled the patient on the importance of compliance and the patient has agreed to proceed with all medical recommendations and follow up plan indicated above.   The patient understands that all medications come with benefits and risks. Risks may include permanent injury or death and these risks can be minimized with close reassessment and monitoring.        Primary Care Provider:    Dr. Nyla Nava  Discharge summary faxed to primary care provider:  Deferred  Copy of discharge summary given to the patient: Completed    Follow up appointment details :      - Dr. Avila (Oncology) Deidre 15, 2017    Pending Studies:        None    Time spent on discharge day patient visit, preparing discharge paperwork and arranging for patient follow up.    Summary of follow up issues:   - Iron overload: Will need Fe chelation; per Dr. Matamoros (Oncology) to be started as outpt by Dr. Avila    Discharge Time (Minutes) :    30min      Condition on Discharge    ______________________________________________________________________    Interval history/exam for day of discharge:    Patient seen and examined at bedside with no acute overnight events. Patient feels ready for discharge and denies any new/worsening symptoms. No fever/chills, CP/SOB/Cough, abd pain, n/v/d, or focal neuro deficits.     Filed Vitals:    06/09/17 1600 06/09/17 2000 06/10/17 0400 06/10/17 0800    BP: 115/37 121/39 133/50 156/42   Pulse: 77 70 68 69   Temp: 36.9 °C (98.4 °F) 36.7 °C (98.1 °F) 36.2 °C (97.1 °F) 36.6 °C (97.9 °F)   Resp: 16 18 18 18   Height:       Weight:       SpO2: 99% 100% 100% 99%     Weight/BMI: Body mass index is 27.84 kg/(m^2).  Pulse Oximetry: 99 %, O2 (LPM): 2, O2 Delivery: Silicone Nasal Cannula    Constitutional: She is oriented to person, place, and time and well-developed, well-nourished, and in no distress. No distress.    Head: Normocephalic and atraumatic.   Eyes: EOM are normal.   Cardiovascular: Normal rate, regular rhythm and normal heart sounds.     No murmur heard.  Pulmonary/Chest: Effort normal and breath sounds normal. No respiratory distress. She has no wheezes. She exhibits no tenderness.   Abdominal: Soft. Bowel sounds are normal. She exhibits no distension. There is no tenderness. There is no rebound.   Musculoskeletal: Normal range of motion. She exhibits no edema.   Neurological: She is alert and oriented to person, place, and time. GCS score is 15.   Skin: Skin is warm and dry. She is not diaphoretic.       Most Recent Labs:    Lab Results   Component Value Date/Time    WBC 4.0* 06/10/2017 12:47 AM    RBC 3.00* 06/10/2017 12:47 AM    HEMOGLOBIN 9.6* 06/10/2017 12:47 AM    HEMATOCRIT 28.3* 06/10/2017 12:47 AM    MCV 94.3 06/10/2017 12:47 AM    MCH 32.0 06/10/2017 12:47 AM    MCHC 33.9 06/10/2017 12:47 AM    MPV 12.6 06/10/2017 12:47 AM    NEUTROPHILS-POLYS 53.50 06/10/2017 12:47 AM    LYMPHOCYTES 34.20 06/10/2017 12:47 AM    MONOCYTES 7.50 06/10/2017 12:47 AM    EOSINOPHILS 3.80 06/10/2017 12:47 AM    BASOPHILS 0.50 06/10/2017 12:47 AM    HYPOCHROMIA 1+ 07/11/2016 11:55 AM    ANISOCYTOSIS 1+ 05/05/2017 03:07 PM      Lab Results   Component Value Date/Time    SODIUM 137 06/10/2017 12:47 AM    POTASSIUM 4.7 06/10/2017 12:47 AM    CHLORIDE 98 06/10/2017 12:47 AM    CO2 28 06/10/2017 12:47 AM    GLUCOSE 199* 06/10/2017 12:47 AM    BUN 43* 06/10/2017 12:47 AM     CREATININE 4.33* 06/10/2017 12:47 AM      Lab Results   Component Value Date/Time    ALT(SGPT) 12 06/08/2017 12:00 AM    AST(SGOT) 11* 06/08/2017 12:00 AM    ALKALINE PHOSPHATASE 63 06/08/2017 12:00 AM    TOTAL BILIRUBIN 0.5 06/08/2017 12:00 AM    DIRECT BILIRUBIN <0.1 01/31/2017 11:03 AM    LIPASE 44 05/10/2017 02:10 AM    ALBUMIN 3.0* 06/08/2017 12:00 AM    GLOBULIN 3.0 06/08/2017 12:00 AM    INR 0.92 06/05/2017 01:26 PM    MACROCYTOSIS 1+ 05/05/2017 03:07 PM     Lab Results   Component Value Date/Time    PT 12.6 06/05/2017 01:26 PM    INR 0.92 06/05/2017 01:26 PM

## 2017-06-10 NOTE — CARE PLAN
Problem: Safety  Goal: Will remain free from falls  Outcome: PROGRESSING AS EXPECTED  Fall precautions in place including pt wearing treaded slipper socks, personal possessions and call light w/n reach, bed in lowest position, ambulation assessed and assistance posted.  Pt educated on increased risk of falls in the hospital, pt states his/her understanding.        Problem: Pain Management  Goal: Pain level will decrease to patient’s comfort goal  Outcome: PROGRESSING AS EXPECTED  Pt pain assessed and meds given as ordered on the pt MAR.  Pt pain reassessed w/n 2 hours of intervention.  If pain meds unavailable, repositioning, emotional support, and rest are used to help manage pain.

## 2017-06-10 NOTE — PROGRESS NOTES
Assumed care of pt @ 0700.  POC discussed w/ night nurse and pt, pain control, finish chemotherapy this afternoon, d/c home after chemo completed; pt in agreement w/ goals.  Pt AAOx4, VSS, c/o 8/10 pain from generalized, pt given Norco for pain.  Pt's skin assessed LUE av-fistula, otherwise CDI.  Pt educated re: increased fall risk, use of call light, hourly rounding, and pain scale; pt verbalizes her understanding.  Personal possessions and call light w/n reach, bed in lowest position.  Bed strip alarm on, pt up SBA w/ her cane, calls appropriately.

## 2017-06-10 NOTE — PROGRESS NOTES
Pt calm and relaxed and in good spirits, claims moderate pain and nausea at this time. Pt tolerating diet and medications. Fistula positive for bruit and thrill in L arm. Bed alarm on for safety, bed in low/locked position, 2 side rails up, call light in reach and pt uses appropriately.

## 2017-06-10 NOTE — DISCHARGE INSTRUCTIONS
Discharge Instructions    Discharged to home by car with relative. Discharged via wheelchair, hospital escort: Yes.  Special equipment needed: Oxygen    Be sure to schedule a follow-up appointment with your primary care doctor or any specialists as instructed.     Discharge Plan:   Diet Plan: Discussed  Activity Level: Discussed  Confirmed Follow up Appointment: Patient to Call and Schedule Appointment  Confirmed Symptoms Management: Discussed  Medication Reconciliation Updated: Yes    I understand that a diet low in cholesterol, fat, and sodium is recommended for good health. Unless I have been given specific instructions below for another diet, I accept this instruction as my diet prescription.   Other diet: none    Special Instructions: None    · Is patient discharged on Warfarin / Coumadin?   No     · Is patient Post Blood Transfusion?  No    Depression / Suicide Risk    As you are discharged from this [facility] facility, it is important to learn how to keep safe from harming yourself.    Recognize the warning signs:  · Abrupt changes in personality, positive or negative- including increase in energy   · Giving away possessions  · Change in eating patterns- significant weight changes-  positive or negative  · Change in sleeping patterns- unable to sleep or sleeping all the time   · Unwillingness or inability to communicate  · Depression  · Unusual sadness, discouragement and loneliness  · Talk of wanting to die  · Neglect of personal appearance   · Rebelliousness- reckless behavior  · Withdrawal from people/activities they love  · Confusion- inability to concentrate     If you or a loved one observes any of these behaviors or has concerns about self-harm, here's what you can do:  · Talk about it- your feelings and reasons for harming yourself  · Remove any means that you might use to hurt yourself (examples: pills, rope, extension cords, firearm)  · Get professional help from the community (Mental Health,  Substance Abuse, psychological counseling)  · Do not be alone:Call your Safe Contact- someone whom you trust who will be there for you.  · Call your local CRISIS HOTLINE 027-9947 or 802-830-7909  · Call your local Children's Mobile Crisis Response Team [place] (941) 466-0164 or www.Beisen  · Call the toll free National Suicide Prevention Hotlines   · National Suicide Prevention Lifeline 656-242-WBPH (8323)  · National Hope Line Network 800-SUICIDE (457-6911)

## 2017-06-10 NOTE — PROGRESS NOTES
Chemotherapy Verification - PRIMARY RN      Height = 1.473 m  Weight = 60.4 kg  BSA = 1.57 m2       Medication: azacitidine (Vidaza)  Dose: 75 mg/m2  Calculated Dose: (117.75 m2)/2 = 58.875 mg    Ordered dose: 59mg * 2 injection                            (In mg/m2, AUC, mg/kg)         I confirm this process was performed independently with the BSA and all final chemotherapy dosing calculations congruent.  Any discrepancies of 5% or greater have been addressed with the chemotherapy pharmacist. The resolution of the discrepancy has been documented in the EPIC progress notes.

## 2017-06-10 NOTE — PROGRESS NOTES
Pt educated re: d/c instructions including f/u appts, medications, diet, activity.  Pt given copy of d/c instructions to take home.  Pt PIV EJ de-accessed before d/c and pt offered bathroom facilities prior to departure.  Pt going home, transport accepted.

## 2017-06-10 NOTE — PROGRESS NOTES
Chemotherapy Verification - SECONDARY RN       Height = 147.3 cm Weight = 60.4 kg  BSA = 1.6 m2      Medication: Azacutidine  Dose: 75 mg/m2,  (37.5 mg/m2 per each injection)   Calculated Dose: 60 mg Ordered Dose: 59mg x2 injections                            (In mg/m2, AUC, mg/kg)     I confirm that this process was performed independently.

## 2017-06-10 NOTE — PROGRESS NOTES
Oncology/Hematology Progress Note               Author: Mehul Matamoros III Date & Time created: 6/10/2017  11:36 AM   -Patient with MDS, del 5q Receiving SC vidaza 75mg/m2 for  5 days, in hospital mainly for transportation issues  Interval History:  No events overnight   On Hemodialysis MWF  Received 3 units of PRBC on 6/5  Vidaza day 5/5.  No events overnight  Review of Systems:  Review of Systems   Constitutional: Positive for malaise/fatigue.   HENT: Negative.    Eyes: Negative.    Respiratory: Negative.    Cardiovascular: Negative.    Gastrointestinal: Negative.    Genitourinary: Negative.    Musculoskeletal: Negative.    Skin: Negative.    Neurological: Positive for dizziness and weakness.       Physical Exam:  Physical Exam   Constitutional: She is oriented to person, place, and time. She appears well-developed and well-nourished.   HENT:   Head: Normocephalic.   Eyes: Conjunctivae are normal. Pupils are equal, round, and reactive to light.   Neck: Normal range of motion. Neck supple.   Cardiovascular: Normal rate and regular rhythm.    Pulmonary/Chest: Effort normal and breath sounds normal.   Abdominal: Soft. Bowel sounds are normal.   Musculoskeletal: Normal range of motion.   Neurological: She is alert and oriented to person, place, and time.       Labs:        Invalid input(s): [id number]      Recent Labs     06/08/17  06/08/17   2345  06/10/17   0047   SODIUM  130*  132*  137   POTASSIUM  4.5  4.5  4.7   CHLORIDE  94*  93*  98   CO2  26  24  28   BUN  47*  74*  43*   CREATININE  4.33*  6.06*  4.33*   MAGNESIUM   --   2.0  1.9   CALCIUM  7.8*  7.7*  7.7*     Recent Labs     06/08/17  06/08/17   2345  06/10/17   0047   ALTSGPT  12   --    --    ASTSGOT  11*   --    --    ALKPHOSPHAT  63   --    --    TBILIRUBIN  0.5   --    --    GLUCOSE  167*  171*  199*     Recent Labs     06/08/17  06/08/17   2345  06/10/17   0047   RBC  3.39*  3.31*  3.00*   HEMOGLOBIN  10.7*  10.4*  9.6*   HEMATOCRIT  31.5*  31.1*   28.3*   PLATELETCT  273  257  218     Recent Labs     17   2345  06/10/17   0047   WBC  4.1*  4.3*  4.0*   NEUTSPOLYS  59.70  51.90  53.50   LYMPHOCYTES  25.60  35.30  34.20   MONOCYTES  9.90  7.70  7.50   EOSINOPHILS  4.10  4.40  3.80   BASOPHILS  0.50  0.50  0.50   ASTSGOT  11*   --    --    ALTSGPT  12   --    --    ALKPHOSPHAT  63   --    --    TBILIRUBIN  0.5   --    --      Recent Labs     17   2345  06/10/17   0047   SODIUM  130*  132*  137   POTASSIUM  4.5  4.5  4.7   CHLORIDE  94*  93*  98   CO2  26  24  28   GLUCOSE  167*  171*  199*   BUN  47*  74*  43*   CREATININE  4.33*  6.06*  4.33*   CALCIUM  7.8*  7.7*  7.7*     Hemodynamics:  Temp (24hrs), Av.6 °C (97.8 °F), Min:36.2 °C (97.1 °F), Max:36.9 °C (98.4 °F)  Temperature: 36.6 °C (97.9 °F)  Pulse  Av.4  Min: 60  Max: 77   Blood Pressure: 156/42 mmHg     Respiratory:    Respiration: 18, Pulse Oximetry: 99 %        RUL Breath Sounds: Clear, RML Breath Sounds: Clear, RLL Breath Sounds: Diminished, BLANK Breath Sounds: Clear, LLL Breath Sounds: Diminished  Fluids:    Intake/Output Summary (Last 24 hours) at 17 1156  Last data filed at 17 2345   Gross per 24 hour   Intake      0 ml   Output      0 ml   Net      0 ml        GI/Nutrition:  Orders Placed This Encounter   Procedures   • Diet Order     Standing Status: Standing      Number of Occurrences: 1      Standing Expiration Date:      Order Specific Question:  Diet:     Answer:  Diabetic [3]     Order Specific Question:  Texture/Fiber modifications:     Answer:  Dysphagia 3(Mechanical Soft)specify fluid consistency(question 6) [3]      Comments:  ill fittung dentures     Order Specific Question:  Consistency/Fluid modifications:     Answer:  Thin Liquids [3]     Medical Decision Making, by Problem:  Active Hospital Problems    Diagnosis   • Anemia [D64.9]   • Myelodysplasia (myelodysplastic syndrome) (CMS-HCC) [D46.9]   • Iron overload [E83.19]   • ESRD  (end stage renal disease) (CMS-HCC) [N18.6]       Plan:  1.  Myelodysplastic syndrome with deletion of 5q.  The patient    did not respond well to low-dose Revlimid at 2.5 mg daily.  -started on Vidaza on 04/17/2017 at 75 mg/m2 for 5 days.   -started on Vidaza subQ 75 x5 days, day 5 today. Was started here due to transportation issues  -s/p  3 units of packed red blood cells during this   admission.   -encourage to ambulate with assistance  -will go home today and follow up with Dr Avila as outpatient on 6/15    2. Anemia secondary to MDS, ESRD  -On procrit weekly  -Vidaza    3.  End-stage renal disease, on hemodialysis.   -On HD MWF  Core Measures

## 2017-06-10 NOTE — PROGRESS NOTES
Chemotherapy Verification - PRIMARY RN      Height = 147.3 cm  Weight = 60.4 kg  BSA = 1.57 m2       Medication: Azacitidine  Dose: 75 mg/m2  Calculated Dose: 117 mg Ordered dose 118 mg divided in 2 syringes 59 mg each                            (In mg/m2, AUC, mg/kg)     < 5% difference    I confirm this process was performed independently with the BSA and all final chemotherapy dosing calculations congruent.  Any discrepancies of 5% or greater have been addressed with the chemotherapy pharmacist. The resolution of the discrepancy has been documented in the EPIC progress notes.

## 2017-06-10 NOTE — PROGRESS NOTES
"Pharmacy Chemotherapy Verification    Patient Name: Viekla Briscoe   Dx: MDS    Protocol: Azacitadine       *Dosing Reference*  Azacitadine 75 mg/m2 IV over 10 minutes or Subcutaneously daily on days 1-7 - MD dosing on days 1-5  28 day cycle until disease progression or unacceptable toxicity for a minimum of 4-6 cycles  NCCN Guidelines for Myelodysplastic Syndromes V.2.2017  Lacy P, et al. Lancet Oncol. 2009;10(3):223-32  Yokasta BOLTON, et al. J Clin Oncol. 2009;27(11)-1    Allergies:  Actos; Darvocet; Demerol; Glucophage; Morphine; Oxycodone; Pcn; Requip; Simvastatin; Sulfa drugs; Tramadol; Trazodone; Demerol; Dilaudid; Diphenhydramine; Iron; Lenalidomide; Morphine; Multivitamin; Naprosyn; Other drug; and Sulfa drugs     /42 mmHg  Pulse 69  Temp(Src) 36.6 °C (97.9 °F)  Resp 18  Ht 1.473 m (4' 9.99\")  Wt 60.4 kg (133 lb 2.5 oz)  BMI 27.84 kg/m2  SpO2 99%  LMP 01/01/1995 Body surface area is 1.57 meters squared.    06/10/17 ANC ~ 2100     Plt = 218k     Hgb = 9.7 SCr = 4.33 mg/dL      CrCl = HD- MWF     06/08/17 AST/ALT/AP/TBili = 11/12/63/0.5      Drug Order   (Drug name, dose, route, IV Fluid & volume, frequency, number of doses) Cycle: 2  Day 5 of 5     Previous treatment: May 1-5, 2017     Medication = Azacitadine (Vidaza)  Base Dose= 75 mg/m2  Calc Dose: Base Dose x 1.57 m2 = 117.75 mg  Final Dose = 118 mg  Route = IV  Fluid & Volume = 25 mg/mL;  4.72 mL in 2 syringes, 2.36 mL = 59 mg each  Admin Duration = Subcutaneous injection          <5% difference, OK to treat with final dose         By my signature below, I confirm this process was performed independently with the BSA and all final chemotherapy dosing calculations congruent. I have reviewed the above chemotherapy order and that my calculation of the final dose and BSA (when applicable) corroborate those calculations of the  pharmacist. Discrepancies of 5% or greater in the written dose have been addressed and " documented within the EPIC Progress notes.      Mackenzie Apodaca, PharmD

## 2017-06-10 NOTE — PROGRESS NOTES
[facility] Nephrology Progress Note               Author:   Jose A Starks MD Date & Time created: 6/10/2017  8:48 AM     Interval History:  61-year-old female with PMH ESRD, MWF IHD, hypertension, type 2 diabetes mellitus, anemia, CKD, renal osteodystrophy, hyperlipidemia, and MDS, who presents to the ED with a chief complaint of shortness of breath.    She states that symptoms have been ongoing since the weekend and states that she had issues with her transportation and missed her dialysis on Friday.  Today, she woke up and was not feeling well, more tired  than usual and more short of breath than usual and so she came to the ED and missed her dialysis session on the date of admission as well.  She is well known to us due to her frequent hospitalizations for transfusions due to anemia from her MDS as well   as missed dialysis sessions in past as well.      She denies being noncompliant with her diet and fluid restriction and also denies any orthopnea or PND at this time.  She is lying comfortably in her bed during my encounter and lab  work done in the ED showed a hemoglobin of 6.7 and a potassium of 6.4 with a  BUN of 88.  She otherwise denies any recent fevers, chills, nausea, vomiting,  chest pain, abdominal pain, melena, hematochezia, or hematemesis.     Daily Nephrology Summary  6/5/17 - seen in consultation by Dr Perez.  Hd ordered.  6/6/17 - patient c/o abdominal pain.  Hgb better s/p PRBCs.  HD planned.  6/07/17 - Seen during HD today; reports feeling better; no acute concerns. Net UF [id number]=[id number].  6/8/17 - No new issues.  HD tomorrow planned  6/9/17 - HD today - no new renal problems, VSS.  6/10/17 - No new overnight renal events. S/p HD yesterday with 1150 mL UF and tolerated well.    Review of Systems   Constitutional: Positive for malaise/fatigue.   Eyes: Negative.         Legally blind   Respiratory: Negative.    Cardiovascular: Negative.    Gastrointestinal: Negative.    Skin: Negative.     Neurological: Positive for weakness.       Physical Exam   Constitutional: She is oriented to person, place, and time. She appears well-developed and well-nourished.   HENT:   Head: Normocephalic and atraumatic.   Eyes: Conjunctivae are normal. No scleral icterus.   Neck: No tracheal deviation present.   Cardiovascular: Normal rate.  Exam reveals no friction rub.    Pulmonary/Chest: Effort normal. No respiratory distress.   Abdominal: Soft. There is tenderness.   Musculoskeletal: She exhibits no edema.   Neurological: She is alert and oriented to person, place, and time.       Labs:        Invalid input(s): [id number]      Recent Labs     06/08/17  06/08/17   2345  06/10/17   0047   SODIUM  130*  132*  137   POTASSIUM  4.5  4.5  4.7   CHLORIDE  94*  93*  98   CO2  26  24  28   BUN  47*  74*  43*   CREATININE  4.33*  6.06*  4.33*   MAGNESIUM   --   2.0  1.9   CALCIUM  7.8*  7.7*  7.7*     Recent Labs     06/08/17  06/08/17   2345  06/10/17   0047   ALTSGPT  12   --    --    ASTSGOT  11*   --    --    ALKPHOSPHAT  63   --    --    TBILIRUBIN  0.5   --    --    GLUCOSE  167*  171*  199*     Recent Labs     06/08/17  06/08/17   2345  06/10/17   0047   RBC  3.39*  3.31*  3.00*   HEMOGLOBIN  10.7*  10.4*  9.6*   HEMATOCRIT  31.5*  31.1*  28.3*   PLATELETCT  273  257  218     Recent Labs     06/08/17  06/08/17   2345  06/10/17   0047   WBC  4.1*  4.3*  4.0*   NEUTSPOLYS  59.70  51.90  53.50   LYMPHOCYTES  25.60  35.30  34.20   MONOCYTES  9.90  7.70  7.50   EOSINOPHILS  4.10  4.40  3.80   BASOPHILS  0.50  0.50  0.50   ASTSGOT  11*   --    --    ALTSGPT  12   --    --    ALKPHOSPHAT  63   --    --    TBILIRUBIN  0.5   --    --            Hemodynamics:  Temp (24hrs), Av.6 °C (97.8 °F), Min:36.2 °C (97.1 °F), Max:36.9 °C (98.4 °F)  Temperature: 36.6 °C (97.9 °F)  Pulse  Av.4  Min: 60  Max: 77   Blood Pressure: 156/42 mmHg     Respiratory:    Respiration: 18, Pulse Oximetry: 99 %        RUL Breath Sounds: Clear,  RML Breath Sounds: Clear, RLL Breath Sounds: Diminished, BLANK Breath Sounds: Clear, LLL Breath Sounds: Diminished  Fluids:    Intake/Output Summary (Last 24 hours) at 06/10/17 0848  Last data filed at 06/09/17 2145   Gross per 24 hour   Intake    700 ml   Output   2200 ml   Net  -1500 ml        GI/Nutrition:  Orders Placed This Encounter   Procedures   • Diet Order     Standing Status: Standing      Number of Occurrences: 1      Standing Expiration Date:      Order Specific Question:  Diet:     Answer:  Diabetic [3]     Order Specific Question:  Texture/Fiber modifications:     Answer:  Dysphagia 3(Mechanical Soft)specify fluid consistency(question 6) [3]      Comments:  ill fittung dentures     Order Specific Question:  Consistency/Fluid modifications:     Answer:  Thin Liquids [3]     Medical Decision Making, by Problem:  Active Hospital Problems    Diagnosis   • Myelodysplasia (myelodysplastic syndrome) (CMS-HCC) [D46.9]       IMPRESSION:  1.  End-stage renal disease, etiology likely secondary to HTN/DM.  2.  Acute on chronic anemia, etiology secondary to MDS, superimposed on  chronic kidney disease.  3.  Nonadherence to outpatient dialysis care.  4.  Hypertension.  5.  Type 2 diabetes mellitus.  6.  Renal osteodystrophy.  7.  Hyperlipidemia.  8.  Coronary artery disease.  9.  Peripheral vascular disease.     ASSESSMENT:  1.  GFR:  HD dependent.  2.  Urine, oligoanuric.  3.  Volume, euvolemic.  4.  Blood pressure, goal less than 140/90.  5.  Acid base, no significant acid base disturbance noted.  6.  Electrolytes, stable.  7.  Hyperkalemia resolved  8.  Anemia, goal hemoglobin 10-11.  9.  MBD, calcium normal, p.o. for elevated.  10.  Dialysis access, left arteriovenous fistula with positive thrill and bruit.     PLAN:  1.  MWF IHD during her inpatient stay.  2.  No plans for HD today  3.  Follow up in heme/onc for MDS.  4  Dose all medications per ESRD guidelines.                Core Measures

## 2017-06-11 ENCOUNTER — PATIENT OUTREACH (OUTPATIENT)
Dept: HEALTH INFORMATION MANAGEMENT | Facility: OTHER | Age: 62
End: 2017-06-11

## 2017-06-12 LAB — EKG IMPRESSION: NORMAL

## 2017-06-26 ENCOUNTER — RESOLUTE PROFESSIONAL BILLING HOSPITAL PROF FEE (OUTPATIENT)
Dept: HOSPITALIST | Facility: MEDICAL CENTER | Age: 62
End: 2017-06-26
Payer: MEDICARE

## 2017-06-27 ENCOUNTER — HOSPITAL ENCOUNTER (OUTPATIENT)
Facility: MEDICAL CENTER | Age: 62
End: 2017-06-27
Attending: EMERGENCY MEDICINE | Admitting: HOSPITALIST
Payer: MEDICARE

## 2017-06-27 ENCOUNTER — APPOINTMENT (OUTPATIENT)
Dept: ONCOLOGY | Facility: MEDICAL CENTER | Age: 62
End: 2017-06-27
Attending: NURSE PRACTITIONER
Payer: MEDICARE

## 2017-06-27 ENCOUNTER — APPOINTMENT (OUTPATIENT)
Dept: RADIOLOGY | Facility: MEDICAL CENTER | Age: 62
End: 2017-06-27
Attending: EMERGENCY MEDICINE
Payer: MEDICARE

## 2017-06-27 VITALS
SYSTOLIC BLOOD PRESSURE: 127 MMHG | RESPIRATION RATE: 18 BRPM | OXYGEN SATURATION: 100 % | WEIGHT: 137.35 LBS | HEIGHT: 58 IN | DIASTOLIC BLOOD PRESSURE: 62 MMHG | HEART RATE: 68 BPM | BODY MASS INDEX: 28.83 KG/M2 | TEMPERATURE: 98.1 F

## 2017-06-27 DIAGNOSIS — R42 LIGHTHEADEDNESS: ICD-10-CM

## 2017-06-27 DIAGNOSIS — R53.1 WEAKNESS: ICD-10-CM

## 2017-06-27 PROBLEM — D64.9 SEVERE ANEMIA: Status: RESOLVED | Noted: 2017-06-27 | Resolved: 2017-06-27

## 2017-06-27 PROBLEM — D64.9 SEVERE ANEMIA: Status: ACTIVE | Noted: 2017-06-27

## 2017-06-27 LAB
ABO GROUP BLD: NORMAL
ALBUMIN SERPL BCP-MCNC: 3.4 G/DL (ref 3.2–4.9)
ALBUMIN/GLOB SERPL: 1.2 G/DL
ALP SERPL-CCNC: 55 U/L (ref 30–99)
ALT SERPL-CCNC: 12 U/L (ref 2–50)
ANION GAP SERPL CALC-SCNC: 10 MMOL/L (ref 0–11.9)
ANISOCYTOSIS BLD QL SMEAR: ABNORMAL
AST SERPL-CCNC: 12 U/L (ref 12–45)
BARCODED ABORH UBTYP: 6200
BARCODED ABORH UBTYP: 8400
BARCODED PRD CODE UBPRD: NORMAL
BARCODED PRD CODE UBPRD: NORMAL
BARCODED UNIT NUM UBUNT: NORMAL
BARCODED UNIT NUM UBUNT: NORMAL
BASOPHILS # BLD AUTO: 0.4 % (ref 0–1.8)
BASOPHILS # BLD AUTO: 0.8 % (ref 0–1.8)
BASOPHILS # BLD AUTO: 2.3 % (ref 0–1.8)
BASOPHILS # BLD: 0.01 K/UL (ref 0–0.12)
BASOPHILS # BLD: 0.02 K/UL (ref 0–0.12)
BASOPHILS # BLD: 0.06 K/UL (ref 0–0.12)
BILIRUB SERPL-MCNC: 0.3 MG/DL (ref 0.1–1.5)
BLD GP AB SCN SERPL QL: NORMAL
BUN SERPL-MCNC: 42 MG/DL (ref 8–22)
CALCIUM SERPL-MCNC: 7.5 MG/DL (ref 8.5–10.5)
CHLORIDE SERPL-SCNC: 93 MMOL/L (ref 96–112)
CO2 SERPL-SCNC: 32 MMOL/L (ref 20–33)
COMPONENT R 8504R: NORMAL
COMPONENT R 8504R: NORMAL
CREAT SERPL-MCNC: 5.29 MG/DL (ref 0.5–1.4)
EKG IMPRESSION: NORMAL
EOSINOPHIL # BLD AUTO: 0 K/UL (ref 0–0.51)
EOSINOPHIL # BLD AUTO: 0.04 K/UL (ref 0–0.51)
EOSINOPHIL # BLD AUTO: 0.05 K/UL (ref 0–0.51)
EOSINOPHIL NFR BLD: 0 % (ref 0–6.9)
EOSINOPHIL NFR BLD: 1.6 % (ref 0–6.9)
EOSINOPHIL NFR BLD: 1.9 % (ref 0–6.9)
ERYTHROCYTE [DISTWIDTH] IN BLOOD BY AUTOMATED COUNT: 48.1 FL (ref 35.9–50)
ERYTHROCYTE [DISTWIDTH] IN BLOOD BY AUTOMATED COUNT: 48.7 FL (ref 35.9–50)
ERYTHROCYTE [DISTWIDTH] IN BLOOD BY AUTOMATED COUNT: 51.9 FL (ref 35.9–50)
GFR SERPL CREATININE-BSD FRML MDRD: 8 ML/MIN/1.73 M 2
GLOBULIN SER CALC-MCNC: 2.9 G/DL (ref 1.9–3.5)
GLUCOSE SERPL-MCNC: 214 MG/DL (ref 65–99)
HCT VFR BLD AUTO: 17.2 % (ref 37–47)
HCT VFR BLD AUTO: 22 % (ref 37–47)
HCT VFR BLD AUTO: 26.1 % (ref 37–47)
HGB BLD-MCNC: 5.8 G/DL (ref 12–16)
HGB BLD-MCNC: 7.6 G/DL (ref 12–16)
HGB BLD-MCNC: 9 G/DL (ref 12–16)
HYPOCHROMIA BLD QL SMEAR: ABNORMAL
IMM GRANULOCYTES # BLD AUTO: 0.02 K/UL (ref 0–0.11)
IMM GRANULOCYTES # BLD AUTO: 0.02 K/UL (ref 0–0.11)
IMM GRANULOCYTES NFR BLD AUTO: 0.8 % (ref 0–0.9)
IMM GRANULOCYTES NFR BLD AUTO: 0.8 % (ref 0–0.9)
INR PPP: 0.92 (ref 0.87–1.13)
LYMPHOCYTES # BLD AUTO: 0.99 K/UL (ref 1–4.8)
LYMPHOCYTES # BLD AUTO: 1.16 K/UL (ref 1–4.8)
LYMPHOCYTES # BLD AUTO: 1.25 K/UL (ref 1–4.8)
LYMPHOCYTES NFR BLD: 37.6 % (ref 22–41)
LYMPHOCYTES NFR BLD: 43 % (ref 22–41)
LYMPHOCYTES NFR BLD: 48.4 % (ref 22–41)
MANUAL DIFF BLD: NORMAL
MCH RBC QN AUTO: 31.7 PG (ref 27–33)
MCH RBC QN AUTO: 31.8 PG (ref 27–33)
MCH RBC QN AUTO: 32.8 PG (ref 27–33)
MCHC RBC AUTO-ENTMCNC: 34.3 G/DL (ref 33.6–35)
MCHC RBC AUTO-ENTMCNC: 34.5 G/DL (ref 33.6–35)
MCHC RBC AUTO-ENTMCNC: 34.7 G/DL (ref 33.6–35)
MCV RBC AUTO: 91.6 FL (ref 81.4–97.8)
MCV RBC AUTO: 91.9 FL (ref 81.4–97.8)
MCV RBC AUTO: 95.5 FL (ref 81.4–97.8)
MICROCYTES BLD QL SMEAR: ABNORMAL
MONOCYTES # BLD AUTO: 0.1 K/UL (ref 0–0.85)
MONOCYTES # BLD AUTO: 0.13 K/UL (ref 0–0.85)
MONOCYTES # BLD AUTO: 0.16 K/UL (ref 0–0.85)
MONOCYTES NFR BLD AUTO: 3.9 % (ref 0–13.4)
MONOCYTES NFR BLD AUTO: 4.7 % (ref 0–13.4)
MONOCYTES NFR BLD AUTO: 6.1 % (ref 0–13.4)
MORPHOLOGY BLD-IMP: NORMAL
NEUTROPHILS # BLD AUTO: 1.15 K/UL (ref 2–7.15)
NEUTROPHILS # BLD AUTO: 1.35 K/UL (ref 2–7.15)
NEUTROPHILS # BLD AUTO: 1.4 K/UL (ref 2–7.15)
NEUTROPHILS NFR BLD: 44.5 % (ref 44–72)
NEUTROPHILS NFR BLD: 50 % (ref 44–72)
NEUTROPHILS NFR BLD: 53.2 % (ref 44–72)
NRBC # BLD AUTO: 0 K/UL
NRBC # BLD AUTO: 0.02 K/UL
NRBC # BLD AUTO: 0.03 K/UL
NRBC BLD AUTO-RTO: 0 /100 WBC
NRBC BLD AUTO-RTO: 0.8 /100 WBC
NRBC BLD AUTO-RTO: 1.1 /100 WBC
PLATELET # BLD AUTO: 215 K/UL (ref 164–446)
PLATELET # BLD AUTO: 217 K/UL (ref 164–446)
PLATELET # BLD AUTO: 225 K/UL (ref 164–446)
PLATELET BLD QL SMEAR: NORMAL
PMV BLD AUTO: 12.2 FL (ref 9–12.9)
PMV BLD AUTO: 12.3 FL (ref 9–12.9)
PMV BLD AUTO: 12.6 FL (ref 9–12.9)
POTASSIUM SERPL-SCNC: 3.7 MMOL/L (ref 3.6–5.5)
PRODUCT TYPE UPROD: NORMAL
PRODUCT TYPE UPROD: NORMAL
PROT SERPL-MCNC: 6.3 G/DL (ref 6–8.2)
PROTHROMBIN TIME: 12.6 SEC (ref 12–14.6)
RBC # BLD AUTO: 1.77 M/UL (ref 4.2–5.4)
RBC # BLD AUTO: 2.39 M/UL (ref 4.2–5.4)
RBC # BLD AUTO: 2.84 M/UL (ref 4.2–5.4)
RBC BLD AUTO: PRESENT
RH BLD: NORMAL
SODIUM SERPL-SCNC: 135 MMOL/L (ref 135–145)
TROPONIN I SERPL-MCNC: <0.01 NG/ML (ref 0–0.04)
UNIT STATUS USTAT: NORMAL
UNIT STATUS USTAT: NORMAL
WBC # BLD AUTO: 2.6 K/UL (ref 4.8–10.8)
WBC # BLD AUTO: 2.6 K/UL (ref 4.8–10.8)
WBC # BLD AUTO: 2.7 K/UL (ref 4.8–10.8)

## 2017-06-27 PROCEDURE — 86850 RBC ANTIBODY SCREEN: CPT

## 2017-06-27 PROCEDURE — 99220 PR INITIAL OBSERVATION CARE,LEVL III: CPT | Performed by: INTERNAL MEDICINE

## 2017-06-27 PROCEDURE — 80053 COMPREHEN METABOLIC PANEL: CPT

## 2017-06-27 PROCEDURE — 86923 COMPATIBILITY TEST ELECTRIC: CPT

## 2017-06-27 PROCEDURE — 36430 TRANSFUSION BLD/BLD COMPNT: CPT

## 2017-06-27 PROCEDURE — 86901 BLOOD TYPING SEROLOGIC RH(D): CPT

## 2017-06-27 PROCEDURE — 99285 EMERGENCY DEPT VISIT HI MDM: CPT

## 2017-06-27 PROCEDURE — G0378 HOSPITAL OBSERVATION PER HR: HCPCS

## 2017-06-27 PROCEDURE — 85025 COMPLETE CBC W/AUTO DIFF WBC: CPT

## 2017-06-27 PROCEDURE — 85610 PROTHROMBIN TIME: CPT

## 2017-06-27 PROCEDURE — 71010 DX-CHEST-PORTABLE (1 VIEW): CPT

## 2017-06-27 PROCEDURE — 85027 COMPLETE CBC AUTOMATED: CPT | Mod: 91

## 2017-06-27 PROCEDURE — A9270 NON-COVERED ITEM OR SERVICE: HCPCS | Performed by: HOSPITALIST

## 2017-06-27 PROCEDURE — 84484 ASSAY OF TROPONIN QUANT: CPT

## 2017-06-27 PROCEDURE — 700102 HCHG RX REV CODE 250 W/ 637 OVERRIDE(OP): Performed by: HOSPITALIST

## 2017-06-27 PROCEDURE — P9016 RBC LEUKOCYTES REDUCED: HCPCS

## 2017-06-27 PROCEDURE — 93005 ELECTROCARDIOGRAM TRACING: CPT | Performed by: EMERGENCY MEDICINE

## 2017-06-27 PROCEDURE — 85007 BL SMEAR W/DIFF WBC COUNT: CPT

## 2017-06-27 PROCEDURE — 86900 BLOOD TYPING SEROLOGIC ABO: CPT

## 2017-06-27 RX ORDER — HYDROCODONE BITARTRATE AND ACETAMINOPHEN 5; 325 MG/1; MG/1
1-2 TABLET ORAL EVERY 6 HOURS PRN
Status: DISCONTINUED | OUTPATIENT
Start: 2017-06-27 | End: 2017-06-27 | Stop reason: HOSPADM

## 2017-06-27 RX ORDER — CARVEDILOL 6.25 MG/1
12.5 TABLET ORAL 2 TIMES DAILY WITH MEALS
Status: DISCONTINUED | OUTPATIENT
Start: 2017-06-27 | End: 2017-06-27 | Stop reason: HOSPADM

## 2017-06-27 RX ORDER — PREGABALIN 25 MG/1
50 CAPSULE ORAL 2 TIMES DAILY
Status: DISCONTINUED | OUTPATIENT
Start: 2017-06-27 | End: 2017-06-27 | Stop reason: HOSPADM

## 2017-06-27 RX ORDER — BIMATOPROST 0.3 MG/ML
1 SOLUTION/ DROPS OPHTHALMIC EVERY EVENING
Status: DISCONTINUED | OUTPATIENT
Start: 2017-06-27 | End: 2017-06-27

## 2017-06-27 RX ORDER — LATANOPROST 50 UG/ML
1 SOLUTION/ DROPS OPHTHALMIC EVERY EVENING
Status: DISCONTINUED | OUTPATIENT
Start: 2017-06-27 | End: 2017-06-27 | Stop reason: HOSPADM

## 2017-06-27 RX ORDER — AMOXICILLIN 250 MG
2 CAPSULE ORAL 2 TIMES DAILY
Status: DISCONTINUED | OUTPATIENT
Start: 2017-06-27 | End: 2017-06-27 | Stop reason: HOSPADM

## 2017-06-27 RX ORDER — AMLODIPINE BESYLATE 10 MG/1
10 TABLET ORAL DAILY
Status: DISCONTINUED | OUTPATIENT
Start: 2017-06-27 | End: 2017-06-27 | Stop reason: HOSPADM

## 2017-06-27 RX ORDER — POLYETHYLENE GLYCOL 3350 17 G/17G
1 POWDER, FOR SOLUTION ORAL
Status: DISCONTINUED | OUTPATIENT
Start: 2017-06-27 | End: 2017-06-27 | Stop reason: HOSPADM

## 2017-06-27 RX ORDER — BISACODYL 10 MG
10 SUPPOSITORY, RECTAL RECTAL
Status: DISCONTINUED | OUTPATIENT
Start: 2017-06-27 | End: 2017-06-27 | Stop reason: HOSPADM

## 2017-06-27 RX ADMIN — CARVEDILOL 12.5 MG: 6.25 TABLET, FILM COATED ORAL at 08:24

## 2017-06-27 RX ADMIN — SITAGLIPTIN 25 MG: 25 TABLET, FILM COATED ORAL at 11:53

## 2017-06-27 RX ADMIN — AMLODIPINE BESYLATE 10 MG: 10 TABLET ORAL at 08:24

## 2017-06-27 RX ADMIN — PREGABALIN 50 MG: 25 CAPSULE ORAL at 08:24

## 2017-06-27 ASSESSMENT — PAIN SCALES - GENERAL
PAINLEVEL_OUTOF10: 0

## 2017-06-27 ASSESSMENT — COPD QUESTIONNAIRES
COPD SCREENING SCORE: 2
DO YOU EVER COUGH UP ANY MUCUS OR PHLEGM?: NO/ONLY WITH OCCASIONAL COLDS OR INFECTIONS
DURING THE PAST 4 WEEKS HOW MUCH DID YOU FEEL SHORT OF BREATH: NONE/LITTLE OF THE TIME
DURING THE PAST 4 WEEKS HOW MUCH DID YOU FEEL SHORT OF BREATH: NONE/LITTLE OF THE TIME
DO YOU EVER COUGH UP ANY MUCUS OR PHLEGM?: NO/ONLY WITH OCCASIONAL COLDS OR INFECTIONS
HAVE YOU SMOKED AT LEAST 100 CIGARETTES IN YOUR ENTIRE LIFE: NO/DON'T KNOW
COPD SCREENING SCORE: 2
HAVE YOU SMOKED AT LEAST 100 CIGARETTES IN YOUR ENTIRE LIFE: NO/DON'T KNOW

## 2017-06-27 ASSESSMENT — LIFESTYLE VARIABLES
ALCOHOL_USE: NO
EVER_SMOKED: NEVER

## 2017-06-27 NOTE — PROGRESS NOTES
Just finished with blood transfusion with out adverse reaction. Patient sleepy right now, since she didn't sleep all night in the ER.

## 2017-06-27 NOTE — H&P
HOSPITAL MEDICINE HISTORY/ PHYSICAL    Date & Time note created:    2017   4:09 PM       Date & Time Patient was seen:   2017  5:45 AM    Patient ID:   Name: Vielka Briscoe. YOB: 1955. Age: 61 y.o. female. MRN: [id number]    Admitting Attending:  Juwan Lane     PCP : Nyla Nava M.D.        Chief Complaint:       weakness    History of Present Illness:    Rachna is a 61 y.o. female w/h/o ESRD on dialysis who presents with severe weakness and fatigue> her workup in ER shows anemia with hb 5.8 . No chest pain or sob. No fever or chills. No vomiting . No bloody stool or hematuria. No history of trauma    Review of Systems:      per HPI otherwise 14 points reviewed 12 systems neg per AMA/CMS criteria.        Past Medical/ Family / Social history (PFSH):   Past Medical History   Diagnosis Date   • Heart abnormalities    • Angina    • Diabetes    • Hypertension    • Chronic anemia    • Chronic obstructive pulmonary disease (CMS-HCC)    • Blood transfusion without reported diagnosis    • Anemia    • Breath shortness    • Glaucoma    • Diabetes      Diet controlled   • Dental disorder      full dentures   • Supplemental oxygen dependent    • Cataract      bilat IOL   • Renal disorder      CKF   • Pain      General pain 8/10   • Chronic kidney disease      Chronic renal failure   • Arthritis      hands    • Dialysis patient      [facility] ,   [facility] [place]   • MDS (myelodysplastic syndrome) 10/2016     bone marrow biopsy   • Congestive heart failure (CMS-HCC)    • Atrial fibrillation (CMS-HCC)      HX   • Stroke (CMS-HCC) 2015     No residual weakness/problems   • Cancer (CMS-HCC)      MDS in bones     Past Surgical History   Procedure Laterality Date   • Other cardiac surgery       Angioplasty  and    • Other abdominal surgery       appendectomy,  x 2, hysterectomy, D & C   • Gyn surgery       hysterectomy   • Gyn surgery        x 2   • Gyn surgery       d&C  x2   • Other cardiac surgery       cardiac angiogram, angioplasty   • Other       angioplasty/ stents bilat LE   • Retinal detachment repair Right    • Av fistula creation Left 2/8/2016     Procedure: AV FISTULA CREATION UPPER EXTREMITY;  Surgeon: Ranulfo Jolly M.D.;  Location: SURGERY [facility];  Service:    • Other abdominal surgery       appendectomy   • Gastroscopy  12/17/2015     Procedure: ESOPHAGOGASTRODUODENOSCOPY WITH BIOPSY;  Surgeon: Wing Álvarez M.D.;  Location: SURGERY [facility];  Service:    • Colonoscopy  12/17/2015     Procedure: COLONOSCOPY;  Surgeon: Wing Álvarez M.D.;  Location: SURGERY [facility];  Service:    • Vein ligation Left 11/10/2016     Procedure: VEIN LIGATION FOR DISTAL REVASCULARIZATION AND INTERVAL LIGATION OF LEFT ARM DIALYISIS ACCESS (DRIL PROCEDURE);  Surgeon: Ranulfo Jolly M.D.;  Location: [facility];  Service:      Current Outpatient Medications:  No current facility-administered medications on file prior to encounter.     Current Outpatient Prescriptions on File Prior to Encounter   Medication Sig Dispense Refill   • sitagliptin (JANUVIA) 25 MG Tab Take 1 Tab by mouth every morning. 60 Tab 3   • prochlorperazine (COMPAZINE) 10 MG Tab Take 10 mg by mouth every 6 hours as needed.     • VOLTAREN 1 % Gel APPLY 2 GRAMS TOPICALLY QID  2   • hydrocodone-acetaminophen (NORCO) 5-325 MG Tab per tablet Take 1-2 Tabs by mouth every 6 hours as needed.     • carvedilol (COREG) 12.5 MG Tab Take 12.5 mg by mouth 2 times a day, with meals.     • pregabalin (LYRICA) 50 MG capsule Take 50 mg by mouth 2 times a day.     • amlodipine (NORVASC) 10 MG Tab Take 10 mg by mouth every day.     • bimatoprost (LUMIGAN) 0.03 % Solution Place 1 Drop in both eyes every evening.       Medication Allergy/Sensitivities:  Allergies   Allergen Reactions   • Actos [Pioglitazone Hydrochloride] Unspecified     Cause blindness   NFI=8290   • Darvocet [Propoxyphene N-Apap] Vomiting  "    ACA=5962   • Demerol Vomiting     CZE=9905   • Glucophage [Metformin Hydrochloride] Vomiting     MTN=6745   • Morphine Vomiting     MXK=4928   • Oxycodone Vomiting     RXN=1/2016   • Pcn [Penicillins] Vomiting     KGQ=1322     • Requip Vomiting     RXN=12/2015   • Simvastatin Unspecified     Leg cramps  FRO=2014   • Sulfa Drugs Rash     RXN=>10 years   • Tramadol Vomiting     CYO=5127   • Trazodone Vomiting     MAN=6103   • Demerol    • Dilaudid [Hydromorphone] Vomiting     Unknown    • Diphenhydramine Vomiting   • Iron      vomiting   • Lenalidomide Vomiting   • Morphine    • Multivitamin      itching   • Naprosyn [Naproxen]    • Other Drug      Any binders that remove phosphorus from the body such as tums   • Sulfa Drugs      Family History:  Family History   Problem Relation Age of Onset   • Hypertension Father    • Hypertension Mother       reviewed and felt non pertinent to this encounter     Social History:  Social History   Substance Use Topics   • Smoking status: Never Smoker    • Smokeless tobacco: Never Used   • Alcohol Use: No     #################################################################  Physical Exam:   Vitals/ General Appearance:   Weight/BMI: Body mass index is 28.71 kg/(m^2).  Blood pressure 127/62, pulse 68, temperature 36.7 °C (98.1 °F), resp. rate 18, height 1.473 m (4' 10\"), weight 62.3 kg (137 lb 5.6 oz), last menstrual period 01/01/1995, SpO2 100 %, not currently breastfeeding.   Filed Vitals:    06/27/17 1114 06/27/17 1144 06/27/17 1224 06/27/17 1344   BP: 113/62  119/61 127/62   Pulse: 66 66 68    Temp:   36.2 °C (97.2 °F) 36.7 °C (98.1 °F)   Resp: 20  16 18   Height:       Weight:       SpO2: 100% 100% 100%     Oxygen Therapy:  Pulse Oximetry: 100 %, O2 (LPM): 2, O2 Delivery: Nasal Cannula    Constitutional:  Well developed  HENMT: Normocephalic, atraumatic, b/l ears normal, nose normal  Eyes:  EOMI, conjunctiva normal, no discharge  Neck: no tracheal deviation, " supple  Cardiovascular:  normal rhythm, no murmurs, no rubs or gallops; no cyanosis, clubbing or edema  Lungs: Respiratory effort is normal, normal breath sounds, breath sounds clear to auscultation b/l, no rales, rhonchi or wheezing  Abdomen: soft, non-tender, no guarding or rebound, active BS, no mass  Skin: warm, dry, no erythema, no rash  Neurologic: Alert and oriented, strength 5/5 IN ALL EXTREMITIES, no focal deficits, CN II-XII normal  Psychiatric: appropriate  Lymph node: No lymphadenopathy appreciated in the neck groin and axillary area.   Extremities: Bilateral lower extremities no pitting edema, bilateral pulses symmetric  #################################################################  Lab Data Review:    Objective  Recent Results (from the past 24 hour(s))   CBC WITH DIFFERENTIAL    Collection Time: 06/27/17  5:05 AM   Result Value Ref Range    WBC 2.6 (L) 4.8 - 10.8 K/uL    RBC 1.77 (L) 4.20 - 5.40 M/uL    Hemoglobin 5.8 (LL) 12.0 - 16.0 g/dL    Hematocrit 17.2 (LL) 37.0 - 47.0 %    MCV 95.5 81.4 - 97.8 fL    MCH 32.8 27.0 - 33.0 pg    MCHC 34.3 33.6 - 35.0 g/dL    RDW 51.9 (H) 35.9 - 50.0 fL    Platelet Count 225 164 - 446 K/uL    MPV 12.6 9.0 - 12.9 fL    Neutrophils-Polys 53.20 44.00 - 72.00 %    Lymphocytes 37.60 22.00 - 41.00 %    Monocytes 6.10 0.00 - 13.40 %    Eosinophils 1.90 0.00 - 6.90 %    Basophils 0.40 0.00 - 1.80 %    Immature Granulocytes 0.80 0.00 - 0.90 %    Nucleated RBC 1.10 /100 WBC    Neutrophils (Absolute) 1.40 (L) 2.00 - 7.15 K/uL    Lymphs (Absolute) 0.99 (L) 1.00 - 4.80 K/uL    Monos (Absolute) 0.16 0.00 - 0.85 K/uL    Eos (Absolute) 0.05 0.00 - 0.51 K/uL    Baso (Absolute) 0.01 0.00 - 0.12 K/uL    Immature Granulocytes (abs) 0.02 0.00 - 0.11 K/uL    NRBC (Absolute) 0.03 K/uL   COMP METABOLIC PANEL    Collection Time: 06/27/17  5:05 AM   Result Value Ref Range    Sodium 135 135 - 145 mmol/L    Potassium 3.7 3.6 - 5.5 mmol/L    Chloride 93 (L) 96 - 112 mmol/L    Co2 32 20 -  33 mmol/L    Anion Gap 10.0 0.0 - 11.9    Glucose 214 (H) 65 - 99 mg/dL    Bun 42 (H) 8 - 22 mg/dL    Creatinine 5.29 (HH) 0.50 - 1.40 mg/dL    Calcium 7.5 (L) 8.5 - 10.5 mg/dL    AST(SGOT) 12 12 - 45 U/L    ALT(SGPT) 12 2 - 50 U/L    Alkaline Phosphatase 55 30 - 99 U/L    Total Bilirubin 0.3 0.1 - 1.5 mg/dL    Albumin 3.4 3.2 - 4.9 g/dL    Total Protein 6.3 6.0 - 8.2 g/dL    Globulin 2.9 1.9 - 3.5 g/dL    A-G Ratio 1.2 g/dL   PROTHROMBIN TIME (INR)    Collection Time: 06/27/17  5:05 AM   Result Value Ref Range    PT 12.6 12.0 - 14.6 sec    INR 0.92 0.87 - 1.13   TROPONIN    Collection Time: 06/27/17  5:05 AM   Result Value Ref Range    Troponin I <0.01 0.00 - 0.04 ng/mL   ESTIMATED GFR    Collection Time: 06/27/17  5:05 AM   Result Value Ref Range    GFR If African American 10 (A) >60 mL/min/1.73 m 2    GFR If Non  8 (A) >60 mL/min/1.73 m 2   COD (ADULT)    Collection Time: 06/27/17  5:08 AM   Result Value Ref Range    Rh Grouping Only POS     Antibody Screen-Cod NEG     ABO Grouping Only AB     Component R       R3                  Red Blood Cells3    [id number]   issued       06/27/17   06:14      Product Type Red Blood Cells LR Pheresis     Dispense Status Issued     Unit Number (Barcoded) [id number]     Product Code (Barcoded) I3178D10     Blood Type (Barcoded) 8400     Component R       R3                  Red Blood Cells3    [id number]   issued       06/27/17   10:26      Product Type Red Blood Cells LR Pheresis     Dispense Status Issued     Unit Number (Barcoded) [id number]     Product Code (Barcoded) J0399D36     Blood Type (Barcoded) 6200    EKG (NOW)    Collection Time: 06/27/17  5:55 AM   Result Value Ref Range    Report       [facility] Emergency Dept.    Test Date:  2017-06-27  Pt Name:    JESUS SILVEIRA              Department:   MRN:        [id number]                      Room:       01  Gender:     F                            Technician:  [id number]  :        1955                   Requested By:VICENTE IVORY  Order #:    [id number]                    Reading MD: VICENTE IVORY MD    Measurements  Intervals                                Axis  Rate:       70                           P:          14  NH:         172                          QRS:        2  QRSD:       80                           T:          52  QT:         460  QTc:        497    Interpretive Statements  SINUS RHYTHM  BORDERLINE PROLONGED QT INTERVAL  Compared to ECG 2017 13:28:51  No significant changes    Electronically Signed On 2017 11:24:02 PDT by VICENTE IVORY MD     CBC WITH DIFFERENTIAL    Collection Time: 17  8:40 AM   Result Value Ref Range    WBC 2.6 (L) 4.8 - 10.8 K/uL    RBC 2.39 (L) 4.20 - 5.40 M/uL    Hemoglobin 7.6 (L) 12.0 - 16.0 g/dL    Hematocrit 22.0 (L) 37.0 - 47.0 %    MCV 91.6 81.4 - 97.8 fL    MCH 31.8 27.0 - 33.0 pg    MCHC 34.7 33.6 - 35.0 g/dL    RDW 48.1 35.9 - 50.0 fL    Platelet Count 215 164 - 446 K/uL    MPV 12.2 9.0 - 12.9 fL    Neutrophils-Polys 44.50 44.00 - 72.00 %    Lymphocytes 48.40 (H) 22.00 - 41.00 %    Monocytes 3.90 0.00 - 13.40 %    Eosinophils 1.60 0.00 - 6.90 %    Basophils 0.80 0.00 - 1.80 %    Immature Granulocytes 0.80 0.00 - 0.90 %    Nucleated RBC 0.80 /100 WBC    Neutrophils (Absolute) 1.15 (L) 2.00 - 7.15 K/uL    Lymphs (Absolute) 1.25 1.00 - 4.80 K/uL    Monos (Absolute) 0.10 0.00 - 0.85 K/uL    Eos (Absolute) 0.04 0.00 - 0.51 K/uL    Baso (Absolute) 0.02 0.00 - 0.12 K/uL    Immature Granulocytes (abs) 0.02 0.00 - 0.11 K/uL    NRBC (Absolute) 0.02 K/uL   CBC WITH DIFFERENTIAL    Collection Time: 17  2:10 PM   Result Value Ref Range    WBC 2.7 (L) 4.8 - 10.8 K/uL    RBC 2.84 (L) 4.20 - 5.40 M/uL    Hemoglobin 9.0 (L) 12.0 - 16.0 g/dL    Hematocrit 26.1 (L) 37.0 - 47.0 %    MCV 91.9 81.4 - 97.8 fL    MCH 31.7 27.0 - 33.0 pg    MCHC 34.5 33.6 - 35.0 g/dL    RDW 48.7 35.9 - 50.0 fL    Platelet Count  217 164 - 446 K/uL    MPV 12.3 9.0 - 12.9 fL    Nucleated RBC 0.00 /100 WBC    NRBC (Absolute) 0.00 K/uL    Neutrophils-Polys 50.00 44.00 - 72.00 %    Lymphocytes 43.00 (H) 22.00 - 41.00 %    Monocytes 4.70 0.00 - 13.40 %    Eosinophils 0.00 0.00 - 6.90 %    Basophils 2.30 (H) 0.00 - 1.80 %    Neutrophils (Absolute) 1.35 (L) 2.00 - 7.15 K/uL    Lymphs (Absolute) 1.16 1.00 - 4.80 K/uL    Monos (Absolute) 0.13 0.00 - 0.85 K/uL    Eos (Absolute) 0.00 0.00 - 0.51 K/uL    Baso (Absolute) 0.06 0.00 - 0.12 K/uL    Hypochromia 1+     Anisocytosis 1+     Microcytosis 1+    DIFFERENTIAL MANUAL    Collection Time: 06/27/17  2:10 PM   Result Value Ref Range    Manual Diff Status PERFORMED    PERIPHERAL SMEAR REVIEW    Collection Time: 06/27/17  2:10 PM   Result Value Ref Range    Peripheral Smear Review see below    PLATELET ESTIMATE    Collection Time: 06/27/17  2:10 PM   Result Value Ref Range    Plt Estimation Normal    MORPHOLOGY    Collection Time: 06/27/17  2:10 PM   Result Value Ref Range    RBC Morphology Present        (click the triangle to expand results)    Imaging/Procedures Review:    DX-CHEST-PORTABLE (1 VIEW)   Final Result         1.  No acute cardiopulmonary disease.   2.  Cardiomegaly   3.  Atherosclerosis        EKG:   per my independant read:  HR:70 Normal Sinus Rhythm, no ST/T changes    Assessment and Plan:      1. Severe ANEMIA    PLAN TO TRANSFUSE ONE UNIT prbc. CHECK HB AND IF STILL < 7 , WILL TRANSFUSE ANOTHER UNIT OF BLOOD    2. ESRD    RENAL md HAS BEEN CONSULTED FOR DIALYSIS , IF NEEDED    3. DIABETES TYPE  II WITH PERIPHERAL NEUROPATHY AND NEPHROPATHY    CONT JANUVIA. FS WITH SSI    CONT LYRICA    4. HTN    CONT NORVASC AND COREG     Prophylaxis: NOT INDICATED      1. Code: FULL per patient   2. Dispo: PATIENT will be admitted to cdu for management that is expected to take LESS than 2 midnights   I have discussed patient admission status with  in the ER.

## 2017-06-27 NOTE — DISCHARGE INSTRUCTIONS
Discharge Instructions    Discharged to home by car with escort. Discharged via ambulance, hospital escort: Yes.  Special equipment needed: Oxygen    Be sure to schedule a follow-up appointment with your primary care doctor or any specialists as instructed.     Discharge Plan:   Diet Plan: Discussed  Activity Level: Discussed  Confirmed Follow up Appointment: Appointment Scheduled  Confirmed Symptoms Management: Discussed  Medication Reconciliation Updated: Yes  Influenza Vaccine Indication: Not indicated: Previously immunized this influenza season and > 8 years of age    I understand that a diet low in cholesterol, fat, and sodium is recommended for good health. Unless I have been given specific instructions below for another diet, I accept this instruction as my diet prescription.   Other diet: Renal Diet    Special Instructions: None    · Is patient discharged on Warfarin / Coumadin?   No     · Is patient Post Blood Transfusion?  Yes  POST BLOOD TRANSFUSION INFORMATION (ADULT)    The purpose of blood transfusion is to correct anemia, low levels of blood clotting factors or to correct acute blood loss.   Blood transfusion is very safe but occasionally unexpected adverse reactions do occur. Most adverse reactions occur during transfusion, within one to two days following transfusion or one to two weeks following transfusion. Some adverse reactions can occur one to six months after transfusion and some even years later.             If any of the symptoms listed below happen to you during your transfusion,                                 please notify your nurse immediately.   · Fever or Chills  · Flushing of the Face  · Hives, rash or itching  · Difficulty in breathing or shortness of breath  · Pain or oozing of blood from the IV needle site  · Low back pain  · Nausea or vomiting  · Weakness or fainting  · Chest pain  · Blood in the urine  · Decreased frequency of urination    The above symptoms may also occur  within 24 to 48 after transfusion; if so, notify your physician.     · Yellowing of the skin    This can happen one to six months after transfusion; if so, notify your physician    Depression / Suicide Risk    As you are discharged from this [facility] facility, it is important to learn how to keep safe from harming yourself.    Recognize the warning signs:  · Abrupt changes in personality, positive or negative- including increase in energy   · Giving away possessions  · Change in eating patterns- significant weight changes-  positive or negative  · Change in sleeping patterns- unable to sleep or sleeping all the time   · Unwillingness or inability to communicate  · Depression  · Unusual sadness, discouragement and loneliness  · Talk of wanting to die  · Neglect of personal appearance   · Rebelliousness- reckless behavior  · Withdrawal from people/activities they love  · Confusion- inability to concentrate     If you or a loved one observes any of these behaviors or has concerns about self-harm, here's what you can do:  · Talk about it- your feelings and reasons for harming yourself  · Remove any means that you might use to hurt yourself (examples: pills, rope, extension cords, firearm)  · Get professional help from the community (Mental Health, Substance Abuse, psychological counseling)  · Do not be alone:Call your Safe Contact- someone whom you trust who will be there for you.  · Call your local CRISIS HOTLINE 807-0218 or 576-134-5838  · Call your local Children's Mobile Crisis Response Team [place] (682) 292-9077 or www.eReceipts  · Call the toll free National Suicide Prevention Hotlines   · National Suicide Prevention Lifeline 811-123-BBRU (4744)  · National Hope Line Network 800-SUICIDE (732-7841)

## 2017-06-27 NOTE — IP AVS SNAPSHOT
" Home Care Instructions                                                                                                                  Name:Vielka Briscoe  Medical Record Number:[id number]  CSN: [id number]    YOB: 1955   Age: 61 y.o.  Sex: female  HT:1.473 m (4' 10\") WT: 62.3 kg (137 lb 5.6 oz)          Admit Date: 6/27/2017     Discharge Date:   Today's Date: 6/27/2017  Attending Doctor:  Shola Gilman D.O.                  Allergies:  Actos; Darvocet; Demerol; Glucophage; Morphine; Oxycodone; Pcn; Requip; Simvastatin; Sulfa drugs; Tramadol; Trazodone; Demerol; Dilaudid; Diphenhydramine; Iron; Lenalidomide; Morphine; Multivitamin; Naprosyn; Other drug; and Sulfa drugs            Discharge Instructions       Discharge Instructions    Discharged to home by car with escort. Discharged via ambulance, hospital escort: Yes.  Special equipment needed: Oxygen    Be sure to schedule a follow-up appointment with your primary care doctor or any specialists as instructed.     Discharge Plan:   Diet Plan: Discussed  Activity Level: Discussed  Confirmed Follow up Appointment: Appointment Scheduled  Confirmed Symptoms Management: Discussed  Medication Reconciliation Updated: Yes  Influenza Vaccine Indication: Not indicated: Previously immunized this influenza season and > 8 years of age    I understand that a diet low in cholesterol, fat, and sodium is recommended for good health. Unless I have been given specific instructions below for another diet, I accept this instruction as my diet prescription.   Other diet: Renal Diet    Special Instructions: None    · Is patient discharged on Warfarin / Coumadin?   No     · Is patient Post Blood Transfusion?  Yes  POST BLOOD TRANSFUSION INFORMATION (ADULT)    The purpose of blood transfusion is to correct anemia, low levels of blood clotting factors or to correct acute blood loss.   Blood transfusion is very safe but occasionally unexpected adverse reactions do " occur. Most adverse reactions occur during transfusion, within one to two days following transfusion or one to two weeks following transfusion. Some adverse reactions can occur one to six months after transfusion and some even years later.             If any of the symptoms listed below happen to you during your transfusion,                                 please notify your nurse immediately.   · Fever or Chills  · Flushing of the Face  · Hives, rash or itching  · Difficulty in breathing or shortness of breath  · Pain or oozing of blood from the IV needle site  · Low back pain  · Nausea or vomiting  · Weakness or fainting  · Chest pain  · Blood in the urine  · Decreased frequency of urination    The above symptoms may also occur within 24 to 48 after transfusion; if so, notify your physician.     · Yellowing of the skin    This can happen one to six months after transfusion; if so, notify your physician    Depression / Suicide Risk    As you are discharged from this [facility] facility, it is important to learn how to keep safe from harming yourself.    Recognize the warning signs:  · Abrupt changes in personality, positive or negative- including increase in energy   · Giving away possessions  · Change in eating patterns- significant weight changes-  positive or negative  · Change in sleeping patterns- unable to sleep or sleeping all the time   · Unwillingness or inability to communicate  · Depression  · Unusual sadness, discouragement and loneliness  · Talk of wanting to die  · Neglect of personal appearance   · Rebelliousness- reckless behavior  · Withdrawal from people/activities they love  · Confusion- inability to concentrate     If you or a loved one observes any of these behaviors or has concerns about self-harm, here's what you can do:  · Talk about it- your feelings and reasons for harming yourself  · Remove any means that you might use to hurt yourself (examples: pills, rope, extension cords,  firearm)  · Get professional help from the community (Mental Health, Substance Abuse, psychological counseling)  · Do not be alone:Call your Safe Contact- someone whom you trust who will be there for you.  · Call your local CRISIS HOTLINE 712-6444 or 030-011-9020  · Call your local Children's Mobile Crisis Response Team [place] (404) 767-9481 or www.YourSports  · Call the toll free National Suicide Prevention Hotlines   · National Suicide Prevention Lifeline 523-309-PPLG (1179)  · [employer] Line Network 800-SUICIDE (435-9183)        Your appointments     Jul 10, 2017  2:30 PM   Est Chemo 1 with RN 5   Infusion Services ([facility])    [address] [id number] [phone]            Jul 11, 2017  2:30 PM   Est Chemo 1 with RN 5   Infusion Services ([facility])    [address] [id number] [phone]            Jul 12, 2017  2:30 PM   Est Chemo 1 with RN 5   Infusion Services ([facility])    [address] [address] [phone]            Jul 13, 2017  2:30 PM   Est Chemo 1 with RN 2   Infusion Services ([facility])    [address] [id number] [phone]            Jul 14, 2017  2:30 PM   Est Chemo 1 with RN 2   Infusion Services ([facility])    [address] [id number] [phone]              Follow-up Information     1. Schedule an appointment as soon as possible for a visit with Nyla Naav M.D..    Specialty:  Family Medicine    Contact information    [address] [facility] [address] [phone]           Discharge Medication Instructions:    Below are the medications your physician expects you to take upon discharge:    Review all your home medications and newly ordered medications with your doctor and/or pharmacist. Follow medication instructions as directed by your doctor and/or pharmacist.    Please keep your medication list with you and share with your physician.                Medication List      CONTINUE taking these medications        Instructions    Morning Afternoon Evening Bedtime    amlodipine 10 MG Tabs   Last time this was given:  10 mg on 6/27/2017  8:24 AM   Commonly known as:  NORVASC        Take 10 mg by mouth every day.   Dose:  10 mg                        carvedilol 12.5 MG Tabs   Last time this was given:  12.5 mg on 6/27/2017  8:24 AM   Commonly known as:  COREG        Take 12.5 mg by mouth 2 times a day, with meals.   Dose:  12.5 mg                        hydrocodone-acetaminophen 5-325 MG Tabs per tablet   Commonly known as:  NORCO        Take 1-2 Tabs by mouth every 6 hours as needed.   Dose:  1-2 Tab                        LUMIGAN 0.03 % Soln   Generic drug:  bimatoprost        Place 1 Drop in both eyes every evening.   Dose:  1 Drop                        LYRICA 50 MG capsule   Last time this was given:  50 mg on 6/27/2017  8:24 AM   Generic drug:  pregabalin        Take 50 mg by mouth 2 times a day.   Dose:  50 mg                        prochlorperazine 10 MG Tabs   Commonly known as:  COMPAZINE        Take 10 mg by mouth every 6 hours as needed.   Dose:  10 mg                        sitagliptin 25 MG Tabs   Last time this was given:  25 mg on 6/27/2017 11:53 AM   Commonly known as:  JANUVIA        Take 1 Tab by mouth every morning.   Dose:  25 mg                        VOLTAREN 1 % Gel   Generic drug:  Diclofenac Sodium        APPLY 2 GRAMS TOPICALLY QID                                Instructions           Diet / Nutrition:    Follow any diet instructions given to you by your doctor or the dietician, including how much salt (sodium) you are allowed each day.    If you are overweight, talk to your doctor about a weight reduction plan.    Activity:    Remain physically active following your doctor's instructions about exercise and activity.    Rest often.     Any time you become even a little tired or short of breath, SIT DOWN and rest.    Worsening  Symptoms:    Report any of the following signs and symptoms to the doctor's office immediately:    *Pain of jaw, arm, or neck  *Chest pain not relieved by medication                               *Dizziness or loss of consciousness  *Difficulty breathing even when at rest   *More tired than usual                                       *Bleeding drainage or swelling of surgical site  *Swelling of feet, ankles, legs or stomach                 *Fever (>100ºF)  *Pink or blood tinged sputum  *Weight gain (3lbs/day or 5lbs /week)           *Shock from internal defibrillator (if applicable)  *Palpitations or irregular heartbeats                *Cool and/or numb extremities    Stroke Awareness    Common Risk Factors for Stroke include:    Age  Atrial Fibrillation  Carotid Artery Stenosis  Diabetes Mellitus  Excessive alcohol consumption  High blood pressure  Overweight   Physical inactivity  Smoking    Warning signs and symptoms of a stroke include:    *Sudden numbness or weakness of the face, arm or leg (especially on one side of the body).  *Sudden confusion, trouble speaking or understanding.  *Sudden trouble seeing in one or both eyes.  *Sudden trouble walking, dizziness, loss of balance or coordination.Sudden severe headache with no known cause.    It is very important to get treatment quickly when a stroke occurs. If you experience any of the above warning signs, call 911 immediately.                   Disclaimer         Quit Smoking / Tobacco Use:    I understand the use of any tobacco products increases my chance of suffering from future heart disease or stroke and could cause other illnesses which may shorten my life. Quitting the use of tobacco products is the single most important thing I can do to improve my health. For further information on smoking / tobacco cessation call a Toll Free Quit Line at 1-174.554.6596 (*National Cancer [place]) or 1-487.801.6941 (American Lung Association) or you can access the web  based program at www.lungusa.org.    [place] Tobacco Users Help Line:  (422) 448-2869       Toll Free: 1-695.237.8388  Quit Tobacco Program [facility] (069)381-3974    Crisis Hotline:    [place] Crisis Hotline:  3-291-YMGRUNQ or 1-296.484.1403    [place] Crisis Hotline:    1-476.720.9911 or 786-412-2974    Discharge Survey:   Thank you for choosing [facility]. We hope we did everything we could to make your hospital stay a pleasant one. You may be receiving a phone survey and we would appreciate your time and participation in answering the questions. Your input is very valuable to us in our efforts to improve our service to our patients and their families.        My signature on this form indicates that:    1. I have reviewed and understand the above information.  2. My questions regarding this information have been answered to my satisfaction.  3. I have formulated a plan with my discharge nurse to obtain my prescribed medications for home.                  Disclaimer         __________________________________                     __________       ________                       Patient Signature                                                 Date                    Time

## 2017-06-27 NOTE — PROGRESS NOTES
Blood sample to Lab. For discharged at 3 pm. Transport is set up. Patient is aware, CW for dialysis talking with patient about her schedule for tomorrow is set up with a transportation.

## 2017-06-27 NOTE — ED NOTES
Vielka Angulomon  61 y.o.  female  Chief Complaint   Patient presents with   • Weakness   • Dizziness     Brought in by [facility] from home. Patient c/o weakness and dizziness tonight. Per patient off and on x 1 week. Last dialysis yesterday. On 2 liters of oxygen 24/7.

## 2017-06-27 NOTE — DISCHARGE SUMMARY
CHIEF COMPLAINT ON ADMISSION  Chief Complaint   Patient presents with   • Weakness   • Dizziness       CODE STATUS  Full Code    HPI & HOSPITAL COURSE  This is a 61 y.o. female here with history of ESRD (on HD M-W-F), angina, DM, HTN, chronic anemia, COPD requiring multiple transfusions admitted with weakness and dizziness. Hgb 5.8. After 1U PRBCs symptoms improved and Hgb 7.6. Will give another unit and discharge. Iron levels 6/6/17 239.    Therefore, she is discharged in good and stable condition with close outpatient follow-up.    SPECIFIC OUTPATIENT FOLLOW-UP  To continue HD and transfusions as scheduled    DISCHARGE PROBLEM LIST  Active Problems:    * No active hospital problems. *  Resolved Problems:    Severe anemia POA: Unknown      FOLLOW UP  Future Appointments  Date Time Provider Department Center   7/10/2017 2:30 PM RN 5 [facility]   7/11/2017 2:30 PM RN 5 [facility]   7/12/2017 2:30 PM RN 5 [facility]   7/13/2017 2:30 PM RN 2 [facility]   7/14/2017 2:30 PM RN 2 [facility]     No follow-up provider specified.    MEDICATIONS ON DISCHARGE   Vielka Briscoe   Home Medication Instructions [place]ENTON:[id number]    Printed on:06/27/17 1055   Medication Information                      amlodipine (NORVASC) 10 MG Tab  Take 10 mg by mouth every day.             bimatoprost (LUMIGAN) 0.03 % Solution  Place 1 Drop in both eyes every evening.             carvedilol (COREG) 12.5 MG Tab  Take 12.5 mg by mouth 2 times a day, with meals.             hydrocodone-acetaminophen (NORCO) 5-325 MG Tab per tablet  Take 1-2 Tabs by mouth every 6 hours as needed.             pregabalin (LYRICA) 50 MG capsule  Take 50 mg by mouth 2 times a day.             prochlorperazine (COMPAZINE) 10 MG Tab  Take 10 mg by mouth every 6 hours as needed.             sitagliptin (JANUVIA) 25 MG Tab  Take 1 Tab by mouth every morning.             VOLTAREN 1 % Gel  APPLY 2 GRAMS TOPICALLY QID                 DIET  Orders  Placed This Encounter   Procedures   • Diet Order     Standing Status: Standing      Number of Occurrences: 1      Standing Expiration Date:      Order Specific Question:  Diet:     Answer:  Renal [8]       ACTIVITY  As tolerated.    CONSULTATIONS  Nephrology    PROCEDURES  none    LABORATORY  Lab Results   Component Value Date/Time    SODIUM 135 06/27/2017 05:05 AM    POTASSIUM 3.7 06/27/2017 05:05 AM    CHLORIDE 93* 06/27/2017 05:05 AM    CO2 32 06/27/2017 05:05 AM    GLUCOSE 214* 06/27/2017 05:05 AM    BUN 42* 06/27/2017 05:05 AM    CREATININE 5.29* 06/27/2017 05:05 AM        Lab Results   Component Value Date/Time    WBC 2.6* 06/27/2017 08:40 AM    HEMOGLOBIN 7.6* 06/27/2017 08:40 AM    HEMATOCRIT 22.0* 06/27/2017 08:40 AM    PLATELET COUNT 215 06/27/2017 08:40 AM        Total time of the discharge process exceeds 32 minutes

## 2017-06-27 NOTE — PROGRESS NOTES
Patient up on the floor, finishing one PRBC . Patient is alert and oriented, pleasant. Familiar to patient with her previous admit on the floor.

## 2017-06-27 NOTE — CONSULTS
[facility] NEPHROLOGY CONSULTATION    Consulting MD: Fabian Rico     Reason for Consultation: ESRD management    HPI:  61-year-old female well known to us who has ESRD, currently on outpatient hemodialysis on a MWF schedule.  She had her last dialysis yesterday.    She came to [facility] ER feeling weak and lightheaded the last 4 days.  History of chronic anemia, feels like she may be anemic again.  She has a history of Myelodysplastic Syndrome and is fairly EPOGEN resistant.     No chest pain no abdominal pain no shortness of breath.  No nausea/vomiting or melena.  She did also complain of dyspnea with exertion.      In the ER, she was quite anemic, and was admitted to the hospital for evaluation and treatment of same.      Nephrology Consulted to manage ESRD    PAST MEDICAL HISTORY:  1.  ESRD - DM/HTN - on MWF dialysis  2.  Hypertension.  3.  Type 2 diabetes mellitus.  4.  Anemia of chronic kidney disease.  5.  Renal osteodystrophy.  6.  MDS.  7.  COPD.  8.  Chronic chest pain.  9.  CAD.  10.  Hyperlipidemia.  11.  History of CVA.  12.  History of steal syndrome.  13.  Chronic diastolic heart failure.  14.  PVD.    PAST SURGICAL HISTORY:  1.  Cardiac surgery.  2.  Abdominal surgery.  3.  AV fistula placement.  4.  Bilateral cataract repair.  5.  Retinal detachment surgery.  6.  Coronary angiogram.  7.  Upper EGD.  8.  Revision of left AV fistula for steal syndrome.  9.  Appendectomy.  10.  D and C x2.  11.   x2.  12.  Hysterectomy.  13.  Multiple stents to bilateral lower extremities.  14.  PermCath placement and removal.    REVIEW OF SYSTEMS:  As per HPI, otherwise negative per AMA/CMS criteria.    FAMILY HISTORY:  Reviewed and noncontributory to her current illness.    SOCIAL HISTORY:   She lives with her daughter. She denies tobacco/ETOH/illicit drug use. She used to work as a CNA.    HOME MEDICATIONS:  Reviewed and documented in chart.    ALLERGIES:  1.  ACTOS, REACTION UNKNOWN.  2.  DARVOCET,  REACTION VOMITING.  3.  DEMEROL, REACTION VOMITING.  4.  GLUCOPHAGE, REACTION VOMITING.  5.  MORPHINE, REACTION VOMITING.  6.  OXYCODONE, REACTION VOMITING.  7.  PENICILLIN, REACTION VOMITING.  8.  REQUIP, REACTION VOMITING.  9.  SIMVASTATIN, REACTION UNKNOWN.  10.  SULFA, REACTION RASH.  11.  TRAMADOL, REACTION VOMITING.  12.  TRAZODONE, REACTION VOMITING.  13.  DILAUDID, REACTION VOMITING.  14.  BENADRYL, REACTION VOMITING.  15.  IRON, REACTION VOMITING.  16.  LENALIDOMIDE, REACTION VOMITING.  17.  NAPROXEN, REACTION UNKNOWN.    PHYSICAL EXAMINATION:  VITAL SIGNS:  /80, P 70s  GENERAL:  Chronically ill-appearing female who appears older than her stated age, in no acute distress.  HEENT:  Normocephalic, atraumatic.  Mucous membranes are moist.  No scleral  icterus.  Conjunctivae normal.  NECK:  Supple, nontender.  Trachea is midline.  CARDIOVASCULAR:  Regular rate and rhythm.  No murmurs or rubs.  LUNGS:  Clear to auscultation bilaterally.  No wheezes or rales.  ABDOMEN:  Soft, nontender, and nondistended.  Positive bowel sounds.  EXTREMITIES:  No clubbing, cyanosis or edema. Dialysis access, left arteriovenous fistula with positive thrill and  bruit  SKIN:  Warm and dry.  No new rashes or lesions.  NEUROLOGIC:  Alert and oriented.  No gross focal deficits.      DIAGNOSTIC LABORATORY DATA:    See CPU    IMPRESSION:  1.  End-stage renal disease   - secondary to HTN/DM  -- azotemia reasonably controlled  -- no current fluid overload  -- next HD planned for tomorrow    2.  Acute on chronic anemia  -- etiology secondary to MDS, superimposed on chronic kidney disease.\    3.  HTN  -- chronic essential  -- resume preadmit medications      PLAN:  # Next HD tomorrow  # Renal diet w no protein restrictions  # Monitor CBC, CMP daily  # No BPs/IVs or labs on dialysis arm  # Resume preadmit meds  # Transfuse to Hgb <9

## 2017-06-27 NOTE — PROGRESS NOTES
CBC results is out, update APRN and got order one unit of PRBC. Patient did have a schedule today at Transfusion center for 2 units of PRBC.

## 2017-06-27 NOTE — PROGRESS NOTES
Called Family member Daughter and left a message. Patient for discharged today after transfusion.

## 2017-06-27 NOTE — ED PROVIDER NOTES
ED Provider Note    CHIEF COMPLAINT  Chief Complaint   Patient presents with   • Weakness   • Dizziness       HPI  Vielka Briscoe is a 61 y.o. female who presents with feeling weak and lightheaded, the last 4 days. No fever no chills nausea but no vomiting. History of chronic anemia, feels like she may be anemic again. No chest pain no abdominal pain no shortness of breath    REVIEW OF SYSTEMS  See HPI for further details. History of angina diabetes hypertension chronic anemia COPD anemia requiring blood transfusions, diabetes renal failure and arthritis dialysis patients, Monday Wednesday and Friday history of stroke and atrial fibrillation Denies other G.I., G.U.. endrocine, cardiovascular, respriatory or neurological problems. All other systems are negative.     PAST MEDICAL HISTORY  Past Medical History   Diagnosis Date   • Heart abnormalities    • Angina    • Diabetes    • Hypertension    • Chronic anemia    • Chronic obstructive pulmonary disease (CMS-HCC)    • Blood transfusion without reported diagnosis    • Anemia    • Breath shortness    • Glaucoma    • Diabetes      Diet controlled   • Dental disorder      full dentures   • Supplemental oxygen dependent    • Cataract      bilat IOL   • Renal disorder      CKF   • Pain      General pain 8/10   • Chronic kidney disease      Chronic renal failure   • Arthritis      hands    • Dialysis patient      [facility] ,   [place] in [place]   • MDS (myelodysplastic syndrome) 10/2016     bone marrow biopsy   • Congestive heart failure (CMS-HCC)    • Atrial fibrillation (CMS-HCC)      HX   • Stroke (CMS-HCC) 03/2015     No residual weakness/problems   • Cancer (CMS-HCC)      MDS in bones       FAMILY HISTORY  Family History   Problem Relation Age of Onset   • Hypertension Father    • Hypertension Mother        SOCIAL HISTORY  Social History     Social History   • Marital Status:      Spouse Name: N/A   • Number of Children: N/A   • Years of Education: N/A      Social History Main Topics   • Smoking status: Never Smoker    • Smokeless tobacco: Never Used   • Alcohol Use: No   • Drug Use: No   • Sexual Activity: Not on file     Other Topics Concern   • Not on file     Social History Narrative    ** Merged History Encounter **            SURGICAL HISTORY  Past Surgical History   Procedure Laterality Date   • Other cardiac surgery       Angioplasty  and    • Other abdominal surgery       appendectomy,  x 2, hysterectomy, D & C   • Gyn surgery       hysterectomy   • Gyn surgery        x 2   • Gyn surgery       d&C x2   • Other cardiac surgery       cardiac angiogram, angioplasty   • Other       angioplasty/ stents bilat LE   • Retinal detachment repair Right    • Av fistula creation Left 2016     Procedure: AV FISTULA CREATION UPPER EXTREMITY;  Surgeon: Ranulfo Jolly M.D.;  Location: SURGERY [facility];  Service:    • Other abdominal surgery       appendectomy   • Gastroscopy  2015     Procedure: ESOPHAGOGASTRODUODENOSCOPY WITH BIOPSY;  Surgeon: Wing Álvarez M.D.;  Location: SURGERY [facility];  Service:    • Colonoscopy  2015     Procedure: COLONOSCOPY;  Surgeon: Wing Álvarez M.D.;  Location: SURGERY [facility];  Service:    • Vein ligation Left 11/10/2016     Procedure: VEIN LIGATION FOR DISTAL REVASCULARIZATION AND INTERVAL LIGATION OF LEFT ARM DIALYISIS ACCESS (DRIL PROCEDURE);  Surgeon: Ranulfo Jolly M.D.;  Location: SURGERY [facility];  Service:        CURRENT MEDICATIONS  Home Medications     **Home medications have not yet been reviewed for this encounter**          ALLERGIES  Allergies   Allergen Reactions   • Actos [Pioglitazone Hydrochloride] Unspecified     Cause blindness   ZLR=8889   • Darvocet [Propoxyphene N-Apap] Vomiting     EPW=6071   • Demerol Vomiting     OAC=4734   • Glucophage [Metformin Hydrochloride] Vomiting     IOZ=8739   • Morphine Vomiting     QXW=9444   • Oxycodone Vomiting      RXN=1/2016   • Pcn [Penicillins] Vomiting     YLY=7666     • Requip Vomiting     RXN=12/2015   • Simvastatin Unspecified     Leg cramps  JKS=8036   • Sulfa Drugs Rash     RXN=>10 years   • Tramadol Vomiting     ZUU=5672   • Trazodone Vomiting     BYI=0812   • Demerol    • Dilaudid [Hydromorphone] Vomiting     Unknown    • Diphenhydramine Vomiting   • Iron      vomiting   • Lenalidomide Vomiting   • Morphine    • Multivitamin      itching   • Naprosyn [Naproxen]    • Other Drug      Any binders that remove phosphorus from the body such as tums   • Sulfa Drugs        PHYSICAL EXAM  VITAL SIGNS: Wt 64.4 kg (141 lb 15.6 oz)  LMP 01/01/1995  Constitutional: Appears to be chronically debilitated, No acute distress, Non-toxic appearance.   HENT: Normocephalic, Atraumatic, Bilateral external ears normal, Oropharynx moist, No oral exudates, Nose normal.   Eyes: PERRL, EOMI, Conjunctiva normal, No discharge.   Neck: Normal range of motion, No tenderness, Supple, No stridor.   Lymphatic: No lymphadenopathy noted.   Cardiovascular: Normal heart rate, Normal rhythm, No murmurs, No rubs, No gallops.   Thorax & Lungs: Normal breath sounds, No respiratory distress, No wheezing, No chest tenderness.   Abdomen:  No tenderness, no guarding no rigidity and the abdomen is soft.  No masses, No pulsatile masses.  Skin: Warm, Dry, No erythema, No rash.   Back: No tenderness, No CVA tenderness.   Extremities: Intact distal pulses, No edema, No tenderness, No cyanosis, No clubbing. Shunt, left arm  Musculoskeletal: Good range of motion in all major joints. No tenderness to palpation or major deformities noted.   Neurologic: Alert & oriented x 3, Normal motor function, Normal sensory function, No focal deficits noted.   Psychiatric: Affect normal, Judgment normal, Mood normal.   Rectal exam: Appeared stool is brown, Hemoccult negative    EKG Interpretation    Interpreted by me    Rhythm: normal sinus   Rate: normal  Axis: normal  Ectopy:  none  Conduction: normal  ST Segments: no acute change  T Waves: no acute change  Q Waves: none    Clinical Impression: I do not have an old EKG to compare to      RADIOLOGY/PROCEDURES  DX-CHEST-PORTABLE (1 VIEW)   Final Result         1.  No acute cardiopulmonary disease.   2.  Cardiomegaly   3.  Atherosclerosis            COURSE & MEDICAL DECISION MAKING  Pertinent Labs & Imaging studies reviewed. (See chart for details) electrolytes, unremarkable, BUN and creatinine both elevated secondary to renal failure, troponin normal white count low at 2.6 hematocrit 17.2 hemoglobin 5.8.      She has a history of renal failure, anemia, has required transfusions in the past, feels like she needs another transfusion tonight.  She is profoundly anemic, hemoglobin is 5.8 hematocrit 17. I have ordered transfusion for her. I will talk with her nephrologist, Dr. Meyers today. Hospitalist to admit.    FINAL IMPRESSION  1.   1. Weakness    2. Lightheadedness        2. , Anemia      3.     Disposition  She is given transfusion packed red cells. I have talked with the hospitalist about admission [facility] consulting. Last dialysis yesterday  Electronically signed by: Juancho Agarwal, 6/27/2017 4:59 AM

## 2017-06-27 NOTE — DISCHARGE PLANNING
Care Transition Team Assessment    Information Source  Information Given By: Patient         Elopement Risk  Legal Hold: No  Ambulatory or Self Mobile in Wheelchair: No-Not an Elopement Risk  Elopement Risk: Not at Risk for Elopement    Interdisciplinary Discharge Planning  Does Admitting Nurse Feel This Could be a Complex Discharge?: No  Primary Care Physician: EUSEBIA RICHARDSON  Lives with - Patient's Self Care Capacity: Adult Children  Support Systems: Family Member(s)  Housing / Facility: [address]  Do You Take your Prescribed Medications Regularly: Yes  Able to Return to Previous ADL's: Yes  Mobility Issues: No  Prior Services: None  Patient Expects to be Discharged to:: home  Assistance Needed: Unknown at this Time  Durable Medical Equipment: Home Oxygen  DME Provider / Phone: [employer]                   Vision / Hearing Impairment  Vision Impairment : Yes  Right Eye Vision: Impaired, Wears Glasses  Left Eye Vision: Impaired, Wears Glasses (glocoma)  Hearing Impairment : No    Values / Beliefs / Concerns  Values / Beliefs Concerns : No  Special Hospitalization Concerns: none         Domestic Abuse  Have you ever been the victim of abuse or violence?: No  Physical Abuse or Sexual Abuse: No  Verbal Abuse or Emotional Abuse: No  Possible Abuse Reported to:: Not Applicable

## 2017-07-10 ENCOUNTER — OUTPATIENT INFUSION SERVICES (OUTPATIENT)
Dept: ONCOLOGY | Facility: MEDICAL CENTER | Age: 62
End: 2017-07-10
Attending: NURSE PRACTITIONER
Payer: MEDICARE

## 2017-07-10 VITALS
RESPIRATION RATE: 18 BRPM | BODY MASS INDEX: 29.11 KG/M2 | WEIGHT: 134.92 LBS | HEIGHT: 57 IN | HEART RATE: 76 BPM | SYSTOLIC BLOOD PRESSURE: 124 MMHG | OXYGEN SATURATION: 98 % | TEMPERATURE: 97.9 F | DIASTOLIC BLOOD PRESSURE: 57 MMHG

## 2017-07-10 DIAGNOSIS — D46.9 MDS (MYELODYSPLASTIC SYNDROME) (HCC): ICD-10-CM

## 2017-07-10 LAB
ALBUMIN SERPL BCP-MCNC: 3.7 G/DL (ref 3.2–4.9)
ALBUMIN/GLOB SERPL: 1.1 G/DL
ALP SERPL-CCNC: 72 U/L (ref 30–99)
ALT SERPL-CCNC: 22 U/L (ref 2–50)
ANION GAP SERPL CALC-SCNC: 10 MMOL/L (ref 0–11.9)
AST SERPL-CCNC: 16 U/L (ref 12–45)
BASOPHILS # BLD AUTO: 0.7 % (ref 0–1.8)
BASOPHILS # BLD: 0.03 K/UL (ref 0–0.12)
BILIRUB SERPL-MCNC: 0.4 MG/DL (ref 0.1–1.5)
BUN SERPL-MCNC: 27 MG/DL (ref 8–22)
CALCIUM SERPL-MCNC: 8.4 MG/DL (ref 8.5–10.5)
CHLORIDE SERPL-SCNC: 93 MMOL/L (ref 96–112)
CO2 SERPL-SCNC: 33 MMOL/L (ref 20–33)
CREAT SERPL-MCNC: 3.37 MG/DL (ref 0.5–1.4)
EOSINOPHIL # BLD AUTO: 0.18 K/UL (ref 0–0.51)
EOSINOPHIL NFR BLD: 4.3 % (ref 0–6.9)
ERYTHROCYTE [DISTWIDTH] IN BLOOD BY AUTOMATED COUNT: 58.4 FL (ref 35.9–50)
GFR SERPL CREATININE-BSD FRML MDRD: 14 ML/MIN/1.73 M 2
GLOBULIN SER CALC-MCNC: 3.4 G/DL (ref 1.9–3.5)
GLUCOSE SERPL-MCNC: 238 MG/DL (ref 65–99)
HCT VFR BLD AUTO: 29.2 % (ref 37–47)
HGB BLD-MCNC: 9.9 G/DL (ref 12–16)
IMM GRANULOCYTES # BLD AUTO: 0.02 K/UL (ref 0–0.11)
IMM GRANULOCYTES NFR BLD AUTO: 0.5 % (ref 0–0.9)
LYMPHOCYTES # BLD AUTO: 0.94 K/UL (ref 1–4.8)
LYMPHOCYTES NFR BLD: 22.4 % (ref 22–41)
MCH RBC QN AUTO: 33 PG (ref 27–33)
MCHC RBC AUTO-ENTMCNC: 33.9 G/DL (ref 33.6–35)
MCV RBC AUTO: 97.3 FL (ref 81.4–97.8)
MONOCYTES # BLD AUTO: 0.32 K/UL (ref 0–0.85)
MONOCYTES NFR BLD AUTO: 7.6 % (ref 0–13.4)
NEUTROPHILS # BLD AUTO: 2.71 K/UL (ref 2–7.15)
NEUTROPHILS NFR BLD: 64.5 % (ref 44–72)
NRBC # BLD AUTO: 0.02 K/UL
NRBC BLD AUTO-RTO: 0.5 /100 WBC
PLATELET # BLD AUTO: 379 K/UL (ref 164–446)
PMV BLD AUTO: 12.4 FL (ref 9–12.9)
POTASSIUM SERPL-SCNC: 3.5 MMOL/L (ref 3.6–5.5)
PROT SERPL-MCNC: 7.1 G/DL (ref 6–8.2)
RBC # BLD AUTO: 3 M/UL (ref 4.2–5.4)
SODIUM SERPL-SCNC: 136 MMOL/L (ref 135–145)
WBC # BLD AUTO: 4.2 K/UL (ref 4.8–10.8)

## 2017-07-10 PROCEDURE — 306780 HCHG STAT FOR TRANSFUSION PER CASE

## 2017-07-10 PROCEDURE — 80053 COMPREHEN METABOLIC PANEL: CPT

## 2017-07-10 PROCEDURE — 96375 TX/PRO/DX INJ NEW DRUG ADDON: CPT

## 2017-07-10 PROCEDURE — 96401 CHEMO ANTI-NEOPL SQ/IM: CPT

## 2017-07-10 PROCEDURE — 96374 THER/PROPH/DIAG INJ IV PUSH: CPT

## 2017-07-10 PROCEDURE — 700111 HCHG RX REV CODE 636 W/ 250 OVERRIDE (IP): Performed by: INTERNAL MEDICINE

## 2017-07-10 PROCEDURE — 85025 COMPLETE CBC W/AUTO DIFF WBC: CPT

## 2017-07-10 RX ORDER — ONDANSETRON 2 MG/ML
8 INJECTION INTRAMUSCULAR; INTRAVENOUS ONCE
Status: COMPLETED | OUTPATIENT
Start: 2017-07-10 | End: 2017-07-10

## 2017-07-10 RX ADMIN — ONDANSETRON 8 MG: 2 INJECTION INTRAMUSCULAR; INTRAVENOUS at 15:29

## 2017-07-10 RX ADMIN — AZACITIDINE 58.5 MG: 100 INJECTION, POWDER, LYOPHILIZED, FOR SOLUTION INTRAVENOUS; SUBCUTANEOUS at 15:59

## 2017-07-10 RX ADMIN — AZACITIDINE 58.5 MG: 100 INJECTION, POWDER, LYOPHILIZED, FOR SOLUTION INTRAVENOUS; SUBCUTANEOUS at 16:00

## 2017-07-10 ASSESSMENT — PAIN SCALES - GENERAL: PAINLEVEL: NO PAIN

## 2017-07-10 NOTE — PROGRESS NOTES
"Pharmacy Chemotherapy Verification    Patient Name: Vielka Briscoe   Dx: MDS  Protocol: Azacitadine     *Dosing Reference*  Azacitadine 75 mg/m2 IV over 10 minutes or Subcutaneously daily on days 1-7 - MD dosing on days 1-5  28 day cycle until disease progression or unacceptable toxicity for a minimum of 4-6 cycles  NCCN Guidelines for Myelodysplastic Syndromes V.2.2017  Lacy P, et al. Lancet Oncol. 2009;10(3):223-32  Yokasta BOLTON, et al. J Clin Oncol. 2009;27(11)-5    Allergies:  Actos; Darvocet; Demerol; Glucophage; Morphine; Oxycodone; Pcn; Requip; Simvastatin; Sulfa drugs; Tramadol; Trazodone; Demerol; Dilaudid; Diphenhydramine; Iron; Lenalidomide; Morphine; Multivitamin; Naprosyn; Other drug; and Sulfa drugs     /57 mmHg  Pulse 76  Temp(Src) 36.6 °C (97.9 °F)  Resp 18  Ht 1.44 m (4' 8.69\")  Wt 61.2 kg (134 lb 14.7 oz)  BMI 29.51 kg/m2  SpO2 98%  LMP 01/01/1995 Body surface area is 1.56 meters squared.    Labs 7/10/17  ANC ~ 2710     Plt = 379 k     Hgb = 9.9  SCr = 3.37      on HD   -[facility] MD aware of chronic renal failure   AST/ALT/AP/TBili = WNL/0.4    Drug Order   (Drug name, dose, route, IV Fluid & volume, frequency, number of doses) Cycle: 3 Day 1 of 5      Previous treatment: June 6 to 10, 2017   Medication = Azacitadine  Base Dose= 75 mg/m2  Calc Dose: Base Dose x 1.56 m2 = 117 mg  Final Dose = 117 mg  Route = IV  Fluid & Volume = 25 mg/mL;  4.68 mL in 2 syringes, 2.34 mL (58.5 mg) each  Admin Duration = Subcutaneous injection          <5% difference, OK to treat with final dose         By my signature below, I confirm this process was performed independently with the BSA and all final chemotherapy dosing calculations congruent. I have reviewed the above chemotherapy order and that my calculation of the final dose and BSA (when applicable) corroborate those calculations of the  pharmacist. Discrepancies of 5% or greater in the written dose have been addressed and " documented within the EPIC Progress notes.    Shola Schulte PharmD

## 2017-07-10 NOTE — PROGRESS NOTES
"Pharmacy Chemotherapy Verification    Patient Name: Vielka Briscoe  Dx: MDS    Cycle: 3, Days 1 to 5  Previous treatment = [place] 6-10, 2017    Regimen: Vidaza  Azacitadine 75 mg/m2 IV over 10 minutes or Subcutaneously daily on days 1-7 - MD dosing on days 1 to 5  28 day cycle until disease progression or unacceptable toxicity for a minimum of 4-6 cycles  NCCN Guidelines for Myelodysplastic Syndromes V.2.2017  Lacy P, et al. Lancet Oncol. 2009;10(3):223-32  Yokasta BOLTON, et al. J Clin Oncol. 2009;27(68)1851-6    Allergies:Actos; Darvocet; Demerol; Glucophage; Morphine; Oxycodone; Pcn; Requip; Simvastatin; Sulfa drugs; Tramadol; Trazodone; Demerol; Dilaudid; Diphenhydramine; Iron; Lenalidomide; Morphine; Multivitamin; Naprosyn; Other drug; and Sulfa drugs  /57 mmHg  Pulse 76  Temp(Src) 36.6 °C (97.9 °F)  Resp 18  Ht 1.44 m (4' 8.69\")  Wt 61.2 kg (134 lb 14.7 oz)  BMI 29.51 kg/m2  SpO2 98%  LMP 01/01/1995  Body surface area is 1.56 meters squared.    ANC~ 2710 Plt = 379k   Hgb = 9.9     SCr = 3.37mg/dL CrCl ~ 17mL/min (on HD)  LFT's = WNL TBili = 0.4       Azacitadine (Vidaza) 75 mg/m2 x 1.56 m2 = 117 mg   <5% difference, OK to treat with final dose = 117 mg SubQ, divided        Alva Rod, PharmD         "

## 2017-07-10 NOTE — PROGRESS NOTES
Chemotherapy Verification - SECONDARY RN       Height = 144cm  Weight = 61.2kg  BSA = 1.56       Medication: vidaza  Dose: 2mg/m2  Calculated Dose: 117mg in two syringes=58.67mg each syringe    I confirm that this process was performed independently.

## 2017-07-10 NOTE — PROGRESS NOTES
Chemotherapy Verification - PRIMARY RN      Height = 144 cm  Weight = 61.2 kg  BSA = 1.56 m2       Medication: Vidaza  Dose: 75 mg/m2  Calculated Dose: 117 mg Divided into 2 syringes 58.5 mg per syringe                             (In mg/m2, AUC, mg/kg)     I confirm this process was performed independently with the BSA and all final chemotherapy dosing calculations congruent.  Any discrepancies of 5% or greater have been addressed with the chemotherapy pharmacist. The resolution of the discrepancy has been documented in the EPIC progress notes.

## 2017-07-11 ENCOUNTER — OUTPATIENT INFUSION SERVICES (OUTPATIENT)
Dept: ONCOLOGY | Facility: MEDICAL CENTER | Age: 62
End: 2017-07-11
Attending: NURSE PRACTITIONER
Payer: MEDICARE

## 2017-07-11 VITALS
WEIGHT: 134.92 LBS | HEART RATE: 81 BPM | HEIGHT: 57 IN | BODY MASS INDEX: 29.11 KG/M2 | OXYGEN SATURATION: 93 % | TEMPERATURE: 97.6 F | DIASTOLIC BLOOD PRESSURE: 50 MMHG | RESPIRATION RATE: 18 BRPM | SYSTOLIC BLOOD PRESSURE: 121 MMHG

## 2017-07-11 DIAGNOSIS — D46.9 MDS (MYELODYSPLASTIC SYNDROME) (HCC): ICD-10-CM

## 2017-07-11 PROCEDURE — 96375 TX/PRO/DX INJ NEW DRUG ADDON: CPT

## 2017-07-11 PROCEDURE — 700111 HCHG RX REV CODE 636 W/ 250 OVERRIDE (IP): Performed by: INTERNAL MEDICINE

## 2017-07-11 PROCEDURE — 96401 CHEMO ANTI-NEOPL SQ/IM: CPT

## 2017-07-11 RX ORDER — ONDANSETRON 2 MG/ML
8 INJECTION INTRAMUSCULAR; INTRAVENOUS ONCE
Status: COMPLETED | OUTPATIENT
Start: 2017-07-11 | End: 2017-07-11

## 2017-07-11 RX ADMIN — AZACITIDINE 58.5 MG: 100 INJECTION, POWDER, LYOPHILIZED, FOR SOLUTION INTRAVENOUS; SUBCUTANEOUS at 15:27

## 2017-07-11 RX ADMIN — AZACITIDINE 58.5 MG: 100 INJECTION, POWDER, LYOPHILIZED, FOR SOLUTION INTRAVENOUS; SUBCUTANEOUS at 15:31

## 2017-07-11 RX ADMIN — ONDANSETRON 8 MG: 2 INJECTION INTRAMUSCULAR; INTRAVENOUS at 14:44

## 2017-07-11 ASSESSMENT — PAIN SCALES - GENERAL: PAINLEVEL: 7=MODERATE-SEVERE PAIN

## 2017-07-11 NOTE — PROGRESS NOTES
Pt here for day 1/5 Vidaza.  PIV started, lab drawn.  Per pt she cannot tolerate PO zofran, but can take it as an IV premed (as ordered).  Labs drawn, pt within parameters to treat.  Vidaza shots given to abdomen, R and L.  PIV flushed and remains in place for tomorrow's premed.  Pt left IC, no s/s of distress.  Tomorrow's apt confirmed.

## 2017-07-11 NOTE — PROGRESS NOTES
Chemotherapy Verification - PRIMARY RN      Height = 144cm  Weight = 61.2kg  BSA = 1.56m2      Medication: Vidaza  Dose: 75mg/m2  Calculated Dose: 117mg divided into two syringes, 58.5mg per syringe                            (In mg/m2, AUC, mg/kg)       I confirm this process was performed independently with the BSA and all final chemotherapy dosing calculations congruent.  Any discrepancies of 5% or greater have been addressed with the chemotherapy pharmacist. The resolution of the discrepancy has been documented in the EPIC progress notes.

## 2017-07-11 NOTE — PROGRESS NOTES
"Pharmacy Chemotherapy Verification    Patient Name: Vielka Briscoe   Dx: MDS    Protocol: Azacitadine     Azacitadine 75 mg/m2 IV over 10 minutes or Subcutaneously daily on days 1-7 - MD dosing on days 1-5  28 day cycle until disease progression or unacceptable toxicity for a minimum of 4-6 cycles  NCCN Guidelines for Myelodysplastic Syndromes V.2.2017  Lacy P, et al. Lancet Oncol. 2009;10(3):223-32  Yokasta BOLTON, et al. J Clin Oncol. 2009;27(11)-3    Allergies:  Actos; Darvocet; Demerol; Glucophage; Morphine; Oxycodone; Pcn; Requip; Simvastatin; Sulfa drugs; Tramadol; Trazodone; Demerol; Dilaudid; Diphenhydramine; Iron; Lenalidomide; Morphine; Multivitamin; Naprosyn; Other drug; and Sulfa drugs     /50 mmHg  Pulse 81  Temp(Src) 36.4 °C (97.6 °F)  Resp 18  Ht 1.44 m (4' 8.69\")  Wt 61.2 kg (134 lb 14.7 oz)  BMI 29.51 kg/m2  SpO2 93%  LMP 01/01/1995 Body surface area is 1.56 meters squared.    Labs 7/10/17  ANC ~ 2710     Plt = 379 k     Hgb = 9.9  SCr = 3.37      on HD   -[facility] MD aware of chronic renal failure   AST/ALT/AP/TBili = WNL/0.4    Drug Order   (Drug name, dose, route, IV Fluid & volume, frequency, number of doses) Cycle: 3 Day 2 of 5      Previous treatment: June 6 to 10, 2017   Medication = Azacitadine  Base Dose= 75 mg/m2  Calc Dose: Base Dose x 1.56 m2 = 117 mg  Final Dose = 117 mg  Route = IV  Fluid & Volume = 25 mg/mL;  4.68 mL in 2 syringes, 2.34 mL (58.5 mg) each  Admin Duration = Subcutaneous injection          <5% difference, OK to treat with final dose         By my signature below, I confirm this process was performed independently with the BSA and all final chemotherapy dosing calculations congruent. I have reviewed the above chemotherapy order and that my calculation of the final dose and BSA (when applicable) corroborate those calculations of the  pharmacist. Discrepancies of 5% or greater in the written dose have been addressed and documented within " the EPIC Progress notes.    Alva Rod, PharmD

## 2017-07-11 NOTE — PROGRESS NOTES
Chemotherapy Verification - SECONDARY RN       Height = 56.69 inches Weight = 61.2 kg  BSA = 1.56 m2       Medication:  Azacitidine (Vidaza)  Dose: 37.5 mg/m2  Calculated Dose: 58.5 mg /syringe                            (In mg/m2, AUC, mg/kg)     Medication: Azacitidine (Vidaza)  Dose: 37.5 mg/m2  Calculated Dose: 58.5 mg /syringe                            (In mg/m2, AUC, mg/kg)      I confirm that this process was performed independently.

## 2017-07-12 ENCOUNTER — AMB ONCOLOGY NURSE NAVIGATOR ENCOUNTER (OUTPATIENT)
Dept: OTHER | Facility: MEDICAL CENTER | Age: 62
End: 2017-07-12

## 2017-07-12 ENCOUNTER — OUTPATIENT INFUSION SERVICES (OUTPATIENT)
Dept: ONCOLOGY | Facility: MEDICAL CENTER | Age: 62
End: 2017-07-12
Attending: NURSE PRACTITIONER
Payer: MEDICARE

## 2017-07-12 VITALS
DIASTOLIC BLOOD PRESSURE: 63 MMHG | HEIGHT: 57 IN | RESPIRATION RATE: 20 BRPM | WEIGHT: 134.26 LBS | SYSTOLIC BLOOD PRESSURE: 122 MMHG | HEART RATE: 86 BPM | BODY MASS INDEX: 28.97 KG/M2 | TEMPERATURE: 97.9 F | OXYGEN SATURATION: 100 %

## 2017-07-12 DIAGNOSIS — D46.9 MDS (MYELODYSPLASTIC SYNDROME) (HCC): ICD-10-CM

## 2017-07-12 PROCEDURE — 700111 HCHG RX REV CODE 636 W/ 250 OVERRIDE (IP): Performed by: INTERNAL MEDICINE

## 2017-07-12 PROCEDURE — 96401 CHEMO ANTI-NEOPL SQ/IM: CPT

## 2017-07-12 PROCEDURE — 96375 TX/PRO/DX INJ NEW DRUG ADDON: CPT

## 2017-07-12 RX ORDER — ONDANSETRON 2 MG/ML
8 INJECTION INTRAMUSCULAR; INTRAVENOUS ONCE
Status: COMPLETED | OUTPATIENT
Start: 2017-07-12 | End: 2017-07-12

## 2017-07-12 RX ADMIN — AZACITIDINE 58.5 MG: 100 INJECTION, POWDER, LYOPHILIZED, FOR SOLUTION INTRAVENOUS; SUBCUTANEOUS at 15:17

## 2017-07-12 RX ADMIN — AZACITIDINE 58.5 MG: 100 INJECTION, POWDER, LYOPHILIZED, FOR SOLUTION INTRAVENOUS; SUBCUTANEOUS at 15:20

## 2017-07-12 RX ADMIN — ONDANSETRON 8 MG: 2 INJECTION, SOLUTION INTRAMUSCULAR; INTRAVENOUS at 14:33

## 2017-07-12 ASSESSMENT — PAIN SCALES - GENERAL: PAINLEVEL: NO PAIN

## 2017-07-12 NOTE — PROGRESS NOTES
Chemotherapy Verification - SECONDARY RN       Height = 144 cm  Weight = 60.9 kg  BSA = 1.56 m2       Medication: Vidaza  Dose: 37.5 mg/m2  Calculated Dose: 58.5 mg                                 Medication: Vidaza  Dose: 37.5 mg/m2  Calculated Dose: 58.5 mg                                   I confirm that this process was performed independently.

## 2017-07-12 NOTE — PROGRESS NOTES
"Pharmacy Chemotherapy Verification    Patient Name: Vielka Briscoe   Dx: MDS    Protocol: Vidaza     Azacitadine (Vidaza) 75 mg/m2 IV over 10 minutes or Subcutaneously daily on days 1-7 - MD dosing on days 1-5  28 day cycle until disease progression or unacceptable toxicity for a minimum of 4-6 cycles  NCCN Guidelines for Myelodysplastic Syndromes V.2.2017  Lacy P, et al. Lancet Oncol. 2009;10(3):223-32  Yokasta BOLTON, et al. J Clin Oncol. 2009;27(11)-8    Allergies:  Actos; Darvocet; Demerol; Glucophage; Morphine; Oxycodone; Pcn; Requip; Simvastatin; Sulfa drugs; Tramadol; Trazodone; Demerol; Dilaudid; Diphenhydramine; Iron; Lenalidomide; Morphine; Multivitamin; Naprosyn; Other drug; and Sulfa drugs     /63 mmHg  Pulse 86  Temp(Src) 36.6 °C (97.9 °F)  Resp 20  Ht 1.44 m (4' 8.69\")  Wt 60.9 kg (134 lb 4.2 oz)  BMI 29.37 kg/m2  SpO2 100%  LMP 01/01/1995 Body surface area is 1.56 meters squared.    Labs 7/10/17  ANC ~ 2710     Plt = 379 k     Hgb = 9.9  SCr = 3.37      on HD   -[facility] MD aware of chronic renal failure   AST/ALT/AP/TBili = WNL/0.4    Drug Order   (Drug name, dose, route, IV Fluid & volume, frequency, number of doses) Cycle: 3 Day 3 of 5      Previous treatment: June 6 to 10, 2017   Medication = Azacitadine  Base Dose= 75 mg/m2  Calc Dose: Base Dose x 1.56 m2 = 117 mg  Final Dose = 117 mg  Route = IV  Fluid & Volume = 25 mg/mL;  4.68 mL in 2 syringes, 2.34 mL (58.5 mg) each  Admin Duration = Subcutaneous injection          <5% difference, OK to treat with final dose         By my signature below, I confirm this process was performed independently with the BSA and all final chemotherapy dosing calculations congruent. I have reviewed the above chemotherapy order and that my calculation of the final dose and BSA (when applicable) corroborate those calculations of the  pharmacist. Discrepancies of 5% or greater in the written dose have been addressed and documented " within the EPIC Progress notes.    Alva Rod, PharmD

## 2017-07-12 NOTE — PROGRESS NOTES
Chemotherapy Verification - PRIMARY RN      Height = 144 cm  Weight = 60.9 kg  BSA = 1.56 m2       Medication: Vidaza  Dose: 37.5 mg/m2  Calculated Dose: 58.5 mg                             (In mg/m2, AUC, mg/kg)     Medication: Vidaza  Dose: 37.5 mg/m2  Calculated Dose: 58.5 mg                             (In mg/m2, AUC, mg/kg)      I confirm this process was performed independently with the BSA and all final chemotherapy dosing calculations congruent.  Any discrepancies of 5% or greater have been addressed with the chemotherapy pharmacist. The resolution of the discrepancy has been documented in the EPIC progress notes.

## 2017-07-12 NOTE — PROGRESS NOTES
Late entry for 7/10/17, 1430:  Saw patient in [facility].  Provided her with a check for $300 from [facility] to help with cab fare to and from [facility], where she is staying for the week with a friend, as well as help with food.  Has 2 rides arranged thru the access to healthcare Free Rides program;  As she is staying as a guest at a casino and has a medical need she qualifies for rides to appointment, even though she does not live in [facility].  Called her  Rajani in [facility] with this information as well so her rides for August an be made in advance as she is on the schedule for her next cycle of chemotherapy beginning August 7.  Also faxed her schedule to her.  Continue to follow.

## 2017-07-12 NOTE — PROGRESS NOTES
Pt arrived to [facility], ambulatory, for Vidaza injections. Upon arrival, pts O2 saturation was noted to be 68%. Per pt, she was supposed to have O2 delivered to her hotel but has not received it. Call placed to O2 company, will deliver for patient tonight. Pt placed on 2L O2, O2 saturation noted at 100%. Pt states she is having nausea today. PIV (placed 7/10) flushed per policy, positive blood return noted. Pre-medication given with no s/sx of adverse reaction. Vidaza administered to the LLQ and RLQ abdomen with no s/sx of adverse reaction. Pt left IS with no s/sx of distress. Follow up appointment confirmed for tomorrow.

## 2017-07-12 NOTE — PROGRESS NOTES
Late entry for 7/11/17 due to system downtime:    Pt arrived for D2C3 Vidaza therapy. Reports mild nausea that is managed w/ home antiemetics. She is able to eat and drink without difficulty. Assessment complete. POC reviewed. Lab results reviewed. R PIV in place upon arrival is patent w/ brisk blood return. IV Zofran administered as premed. PIV flushed per policy, new swab caps applied, & secured w/ gauze & coban. Vidaza administered as ordered, bandaids applied. Future appt confirmed. Pt d/c'd in great spirits no distress observed.

## 2017-07-13 ENCOUNTER — OUTPATIENT INFUSION SERVICES (OUTPATIENT)
Dept: ONCOLOGY | Facility: MEDICAL CENTER | Age: 62
End: 2017-07-13
Attending: NURSE PRACTITIONER
Payer: MEDICARE

## 2017-07-13 VITALS
SYSTOLIC BLOOD PRESSURE: 126 MMHG | RESPIRATION RATE: 18 BRPM | TEMPERATURE: 97.6 F | OXYGEN SATURATION: 100 % | WEIGHT: 135.58 LBS | HEIGHT: 57 IN | DIASTOLIC BLOOD PRESSURE: 64 MMHG | BODY MASS INDEX: 29.25 KG/M2 | HEART RATE: 88 BPM

## 2017-07-13 DIAGNOSIS — D46.9 MDS (MYELODYSPLASTIC SYNDROME) (HCC): ICD-10-CM

## 2017-07-13 PROCEDURE — 96375 TX/PRO/DX INJ NEW DRUG ADDON: CPT

## 2017-07-13 PROCEDURE — 700111 HCHG RX REV CODE 636 W/ 250 OVERRIDE (IP): Performed by: INTERNAL MEDICINE

## 2017-07-13 PROCEDURE — 96401 CHEMO ANTI-NEOPL SQ/IM: CPT

## 2017-07-13 RX ORDER — ONDANSETRON 2 MG/ML
8 INJECTION INTRAMUSCULAR; INTRAVENOUS ONCE
Status: COMPLETED | OUTPATIENT
Start: 2017-07-13 | End: 2017-07-13

## 2017-07-13 RX ADMIN — AZACITIDINE 59 MG: 100 INJECTION, POWDER, LYOPHILIZED, FOR SOLUTION INTRAVENOUS; SUBCUTANEOUS at 15:10

## 2017-07-13 RX ADMIN — AZACITIDINE 59 MG: 100 INJECTION, POWDER, LYOPHILIZED, FOR SOLUTION INTRAVENOUS; SUBCUTANEOUS at 15:08

## 2017-07-13 RX ADMIN — ONDANSETRON 8 MG: 2 INJECTION, SOLUTION INTRAMUSCULAR; INTRAVENOUS at 14:40

## 2017-07-13 ASSESSMENT — PAIN SCALES - GENERAL: PAINLEVEL: NO PAIN

## 2017-07-13 NOTE — PROGRESS NOTES
Pt arrived to IS, ambulatory, for Vidaza injections. Pt voices no complaints. PIV (placed 7/10) flushed per policy, positive blood return noted. Pre-medication given with no s/sx of adverse reaction. Vidaza administered to the LLQ and RLQ abdomen with no s/sx of adverse reaction. Pt left IS with no s/sx of distress. Follow up appointment confirmed for tomorrow.

## 2017-07-13 NOTE — PROGRESS NOTES
"Pharmacy Chemotherapy Verification    Patient Name: Vielka Briscoe   Dx: MDS    Protocol: Vidaza     Azacitadine (Vidaza) 75 mg/m2 IV over 10 minutes or Subcutaneously daily on days 1-7 - MD dosing on days 1-5  28 day cycle until disease progression or unacceptable toxicity for a minimum of 4-6 cycles  NCCN Guidelines for Myelodysplastic Syndromes V.2.2017  Lacy P, et al. Lancet Oncol. 2009;10(3):223-32  Yokasta BOLTON, et al. J Clin Oncol. 2009;27(11)-7    Allergies:  Actos; Darvocet; Demerol; Glucophage; Morphine; Oxycodone; Pcn; Requip; Simvastatin; Sulfa drugs; Tramadol; Trazodone; Demerol; Dilaudid; Diphenhydramine; Iron; Lenalidomide; Morphine; Multivitamin; Naprosyn; Other drug; and Sulfa drugs     /64 mmHg  Pulse 88  Temp(Src) 36.4 °C (97.6 °F)  Resp 18  Ht 1.45 m (4' 9.09\")  Wt 61.5 kg (135 lb 9.3 oz)  BMI 29.25 kg/m2  SpO2 100%  LMP 01/01/1995 Body surface area is 1.57 meters squared.    Labs 7/10/17  ANC ~ 2710     Plt = 379 k     Hgb = 9.9 SCr = 3.37 mg/dL      on HD   -[facility] MD aware of chronic renal failure  AST/ALT/AP/TBili = WNL/0.4    Drug Order   (Drug name, dose, route, IV Fluid & volume, frequency, number of doses) Cycle 3, Day 4 of 5      Previous treatment: June 6 to 10, 2017   Medication = Azacitadine (Vidaza)  Base Dose= 75 mg/m2  Calc Dose: Base Dose x 1.57 m2 = 117.75 mg  Final Dose = 118 mg  Route = IV  Fluid & Volume = 25 mg/mL;  4.72 mL in 2 syringes, 2.36 mL (59 mg) each  Admin Duration = Subcutaneous injection          <5% difference, OK to treat with final dose         By my signature below, I confirm this process was performed independently with the BSA and all final chemotherapy dosing calculations congruent. I have reviewed the above chemotherapy order and that my calculation of the final dose and BSA (when applicable) corroborate those calculations of the  pharmacist. Discrepancies of 5% or greater in the written dose have been addressed and " documented within the EPIC Progress notes.    Mackenzie Apodaca, PharmD

## 2017-07-13 NOTE — PROGRESS NOTES
Chemotherapy Verification - PRIMARY RN      Height = 145 cm  Weight = 61.5 kg  BSA = 1.57 m2       Medication: Vidaza  Dose: 37.5 mg/m2  Calculated Dose: 58.9 mg                             (In mg/m2, AUC, mg/kg)     Medication: Vidaza  Dose: 37.5 mg/m2  Calculated Dose: 58.9 mg                             (In mg/m2, AUC, mg/kg)      I confirm this process was performed independently with the BSA and all final chemotherapy dosing calculations congruent.  Any discrepancies of 5% or greater have been addressed with the chemotherapy pharmacist. The resolution of the discrepancy has been documented in the EPIC progress notes.

## 2017-07-13 NOTE — PROGRESS NOTES
Chemotherapy Verification - SECONDARY RN       Height = 145 cm  Weight = 61.5 kg  BSA = 1.57 m2       Medication: Vidaza  Dose: 37.5 mg/m2  Calculated Dose: 58.9 mg                             (In mg/m2, AUC, mg/kg)     Medication: Vidaza  Dose: 3.5 mg/m2  Calculated Dose: 58.9 mg                             (In mg/m2, AUC, mg/kg)    I confirm that this process was performed independently.

## 2017-07-14 ENCOUNTER — OUTPATIENT INFUSION SERVICES (OUTPATIENT)
Dept: ONCOLOGY | Facility: MEDICAL CENTER | Age: 62
End: 2017-07-14
Attending: INTERNAL MEDICINE
Payer: MEDICARE

## 2017-07-14 VITALS
OXYGEN SATURATION: 100 % | RESPIRATION RATE: 18 BRPM | BODY MASS INDEX: 29.06 KG/M2 | WEIGHT: 134.7 LBS | TEMPERATURE: 97.4 F | DIASTOLIC BLOOD PRESSURE: 56 MMHG | SYSTOLIC BLOOD PRESSURE: 128 MMHG | HEART RATE: 98 BPM | HEIGHT: 57 IN

## 2017-07-14 DIAGNOSIS — D46.9 MDS (MYELODYSPLASTIC SYNDROME) (HCC): ICD-10-CM

## 2017-07-14 PROCEDURE — 96401 CHEMO ANTI-NEOPL SQ/IM: CPT

## 2017-07-14 PROCEDURE — 96375 TX/PRO/DX INJ NEW DRUG ADDON: CPT

## 2017-07-14 PROCEDURE — 700111 HCHG RX REV CODE 636 W/ 250 OVERRIDE (IP): Mod: JW | Performed by: INTERNAL MEDICINE

## 2017-07-14 RX ORDER — ONDANSETRON 2 MG/ML
8 INJECTION INTRAMUSCULAR; INTRAVENOUS ONCE
Status: COMPLETED | OUTPATIENT
Start: 2017-07-14 | End: 2017-07-14

## 2017-07-14 RX ADMIN — AZACITIDINE 59 MG: 100 INJECTION, POWDER, LYOPHILIZED, FOR SOLUTION INTRAVENOUS; SUBCUTANEOUS at 16:43

## 2017-07-14 RX ADMIN — AZACITIDINE 59 MG: 100 INJECTION, POWDER, LYOPHILIZED, FOR SOLUTION INTRAVENOUS; SUBCUTANEOUS at 16:50

## 2017-07-14 RX ADMIN — ONDANSETRON 8 MG: 2 INJECTION, SOLUTION INTRAMUSCULAR; INTRAVENOUS at 16:16

## 2017-07-14 ASSESSMENT — PAIN SCALES - GENERAL: PAINLEVEL: NO PAIN

## 2017-07-14 NOTE — PROGRESS NOTES
"Pharmacy Chemotherapy Verification    Patient Name: Vielka Briscoe   Dx: MDS    Protocol: Vidaza     Azacitadine (Vidaza) 75 mg/m2 IV over 10 minutes or Subcutaneously daily on days 1-7 - MD dosing on days 1-5  28 day cycle until disease progression or unacceptable toxicity for a minimum of 4-6 cycles  NCCN Guidelines for Myelodysplastic Syndromes V.2.2017  Lacy P, et al. Lancet Oncol. 2009;10(3):223-32  Yokasta BOLTON, et al. J Clin Oncol. 2009;27(11)-1    Allergies:  Actos; Darvocet; Demerol; Glucophage; Morphine; Oxycodone; Pcn; Requip; Simvastatin; Sulfa drugs; Tramadol; Trazodone; Demerol; Dilaudid; Diphenhydramine; Iron; Lenalidomide; Morphine; Multivitamin; Naprosyn; Other drug; and Sulfa drugs       /56 mmHg  Pulse 98  Temp(Src) 36.3 °C (97.4 °F)  Resp 18  Ht 1.45 m (4' 9.09\")  Wt 61.1 kg (134 lb 11.2 oz)  BMI 29.06 kg/m2  SpO2 100%  LMP 01/01/1995 Body surface area is 1.57 meters squared.    Labs 7/10/17  ANC ~ 2710     Plt = 379 k     Hgb = 9.9 SCr = 3.37 mg/dL      on HD   -[facility] MD aware of chronic renal failure  AST/ALT/AP/TBili = WNL/0.4    Drug Order   (Drug name, dose, route, IV Fluid & volume, frequency, number of doses) Cycle 3, Day 5 of 5      Previous treatment: June 6 to 10, 2017   Medication = Azacitadine (Vidaza)  Base Dose= 75 mg/m2  Calc Dose: Base Dose x 1.57 m2 = 117.75 mg  Final Dose = 118 mg  Route = IV  Fluid & Volume = 25 mg/mL;  4.72 mL in 2 syringes, 2.36 mL (59 mg) each  Admin Duration = Subcutaneous injection          <5% difference, OK to treat with final dose         By my signature below, I confirm this process was performed independently with the BSA and all final chemotherapy dosing calculations congruent. I have reviewed the above chemotherapy order and that my calculation of the final dose and BSA (when applicable) corroborate those calculations of the  pharmacist. Discrepancies of 5% or greater in the written dose have been addressed " and documented within the EPIC Progress notes.    Mackenzie Apodaca, PharmD

## 2017-07-14 NOTE — PROGRESS NOTES
Chemotherapy Verification - PRIMARY RN      Height = 145 cm  Weight = 61.1 kg  BSA = 1.57 m2       Medication: Vidaza  Dose: 37.5 mg/m2  Calculated Dose: 58.9 mg                             (In mg/m2, AUC, mg/kg)     Medication: Vidaza  Dose: 37.5 mg/m2  Calculated Dose: 58.9 mg                             (In mg/m2, AUC, mg/kg)      I confirm this process was performed independently with the BSA and all final chemotherapy dosing calculations congruent.  Any discrepancies of 5% or greater have been addressed with the chemotherapy pharmacist. The resolution of the discrepancy has been documented in the EPIC progress notes.

## 2017-07-14 NOTE — PROGRESS NOTES
"Chemotherapy Verification - SECONDARY RN       Height = 57.09\"  Weight = 61.1 kg  BSA = 1.57 m2       Medication: Vidaza  Dose: 37.5 mg/m2  Calculated Dose: 58.87 mg per each syringe                   Medication: Vidaza  Dose: 37.5 mg/m2  Calculated Dose: 58.87 mg per each syringe    Total 75 mg/m2 = 117.75 mg total                              I confirm that this process was performed independently.   "

## 2017-07-15 NOTE — PROGRESS NOTES
Pt arrived to [facility], ambulatory, for Vidaza injections. Pt voices no complaints. PIV (placed 7/10) flushed per policy, positive blood return noted. Pre-medication given with no s/sx of adverse reaction. Vidaza administered to the LLQ and RLQ abdomen with no s/sx of adverse reaction. PIV flushed and removed. Pt left IS with no s/sx of distress. Follow up appointment confirmed.

## 2017-07-20 ENCOUNTER — APPOINTMENT (OUTPATIENT)
Dept: RADIOLOGY | Facility: MEDICAL CENTER | Age: 62
DRG: 811 | End: 2017-07-20
Payer: MEDICARE

## 2017-07-20 ENCOUNTER — RESOLUTE PROFESSIONAL BILLING HOSPITAL PROF FEE (OUTPATIENT)
Dept: HOSPITALIST | Facility: MEDICAL CENTER | Age: 62
End: 2017-07-20
Payer: MEDICARE

## 2017-07-20 ENCOUNTER — APPOINTMENT (OUTPATIENT)
Dept: RADIOLOGY | Facility: MEDICAL CENTER | Age: 62
DRG: 811 | End: 2017-07-20
Attending: EMERGENCY MEDICINE
Payer: MEDICARE

## 2017-07-20 ENCOUNTER — HOSPITAL ENCOUNTER (INPATIENT)
Facility: MEDICAL CENTER | Age: 62
LOS: 3 days | DRG: 811 | End: 2017-07-23
Attending: EMERGENCY MEDICINE | Admitting: INTERNAL MEDICINE
Payer: MEDICARE

## 2017-07-20 DIAGNOSIS — R07.9 CHEST PAIN, UNSPECIFIED TYPE: ICD-10-CM

## 2017-07-20 PROBLEM — N18.9 ACUTE ON CHRONIC RENAL INSUFFICIENCY: Status: ACTIVE | Noted: 2017-07-20

## 2017-07-20 PROBLEM — N28.9 ACUTE ON CHRONIC RENAL INSUFFICIENCY: Status: ACTIVE | Noted: 2017-07-20

## 2017-07-20 PROBLEM — D61.818 PANCYTOPENIA (HCC): Status: ACTIVE | Noted: 2017-07-20

## 2017-07-20 LAB
ABO GROUP BLD: NORMAL
ALBUMIN SERPL BCP-MCNC: 3.3 G/DL (ref 3.2–4.9)
ALBUMIN/GLOB SERPL: 1.2 G/DL
ALP SERPL-CCNC: 52 U/L (ref 30–99)
ALT SERPL-CCNC: 28 U/L (ref 2–50)
ANION GAP SERPL CALC-SCNC: 7 MMOL/L (ref 0–11.9)
APTT PPP: 28.7 SEC (ref 24.7–36)
AST SERPL-CCNC: 17 U/L (ref 12–45)
BARCODED ABORH UBTYP: 8400
BARCODED ABORH UBTYP: 8400
BARCODED PRD CODE UBPRD: NORMAL
BARCODED PRD CODE UBPRD: NORMAL
BARCODED UNIT NUM UBUNT: NORMAL
BARCODED UNIT NUM UBUNT: NORMAL
BASOPHILS # BLD AUTO: 0.3 % (ref 0–1.8)
BASOPHILS # BLD: 0.01 K/UL (ref 0–0.12)
BILIRUB SERPL-MCNC: 0.3 MG/DL (ref 0.1–1.5)
BLD GP AB SCN SERPL QL: NORMAL
BNP SERPL-MCNC: 40 PG/ML (ref 0–100)
BUN SERPL-MCNC: 44 MG/DL (ref 8–22)
CALCIUM SERPL-MCNC: 7.6 MG/DL (ref 8.5–10.5)
CHLORIDE SERPL-SCNC: 90 MMOL/L (ref 96–112)
CO2 SERPL-SCNC: 33 MMOL/L (ref 20–33)
COMPONENT R 8504R: NORMAL
COMPONENT R 8504R: NORMAL
CREAT SERPL-MCNC: 4.54 MG/DL (ref 0.5–1.4)
EKG IMPRESSION: NORMAL
EKG IMPRESSION: NORMAL
EOSINOPHIL # BLD AUTO: 0.1 K/UL (ref 0–0.51)
EOSINOPHIL NFR BLD: 2.7 % (ref 0–6.9)
ERYTHROCYTE [DISTWIDTH] IN BLOOD BY AUTOMATED COUNT: 60.8 FL (ref 35.9–50)
GFR SERPL CREATININE-BSD FRML MDRD: 10 ML/MIN/1.73 M 2
GLOBULIN SER CALC-MCNC: 2.8 G/DL (ref 1.9–3.5)
GLUCOSE BLD-MCNC: 172 MG/DL (ref 65–99)
GLUCOSE BLD-MCNC: 179 MG/DL (ref 65–99)
GLUCOSE BLD-MCNC: 213 MG/DL (ref 65–99)
GLUCOSE BLD-MCNC: 216 MG/DL (ref 65–99)
GLUCOSE SERPL-MCNC: 209 MG/DL (ref 65–99)
HCT VFR BLD AUTO: 16.7 % (ref 37–47)
HCT VFR BLD AUTO: 21.9 % (ref 37–47)
HGB BLD-MCNC: 5.7 G/DL (ref 12–16)
HGB BLD-MCNC: 5.9 G/DL (ref 12–16)
HGB BLD-MCNC: 6.3 G/DL (ref 12–16)
HGB BLD-MCNC: 7.6 G/DL (ref 12–16)
IMM GRANULOCYTES # BLD AUTO: 0.03 K/UL (ref 0–0.11)
IMM GRANULOCYTES NFR BLD AUTO: 0.8 % (ref 0–0.9)
INR PPP: 0.86 (ref 0.87–1.13)
LIPASE SERPL-CCNC: 53 U/L (ref 11–82)
LYMPHOCYTES # BLD AUTO: 1.69 K/UL (ref 1–4.8)
LYMPHOCYTES NFR BLD: 46.4 % (ref 22–41)
MCH RBC QN AUTO: 33.1 PG (ref 27–33)
MCHC RBC AUTO-ENTMCNC: 34.1 G/DL (ref 33.6–35)
MCV RBC AUTO: 97.1 FL (ref 81.4–97.8)
MONOCYTES # BLD AUTO: 0.28 K/UL (ref 0–0.85)
MONOCYTES NFR BLD AUTO: 7.7 % (ref 0–13.4)
NEUTROPHILS # BLD AUTO: 1.53 K/UL (ref 2–7.15)
NEUTROPHILS NFR BLD: 42.1 % (ref 44–72)
NRBC # BLD AUTO: 0 K/UL
NRBC BLD AUTO-RTO: 0 /100 WBC
PLATELET # BLD AUTO: 130 K/UL (ref 164–446)
PMV BLD AUTO: 12.8 FL (ref 9–12.9)
POTASSIUM SERPL-SCNC: 3.6 MMOL/L (ref 3.6–5.5)
PRODUCT TYPE UPROD: NORMAL
PRODUCT TYPE UPROD: NORMAL
PROT SERPL-MCNC: 6.1 G/DL (ref 6–8.2)
PROTHROMBIN TIME: 12 SEC (ref 12–14.6)
RBC # BLD AUTO: 1.72 M/UL (ref 4.2–5.4)
RH BLD: NORMAL
SODIUM SERPL-SCNC: 130 MMOL/L (ref 135–145)
TROPONIN I SERPL-MCNC: <0.01 NG/ML (ref 0–0.04)
TROPONIN I SERPL-MCNC: <0.01 NG/ML (ref 0–0.04)
UNIT STATUS USTAT: NORMAL
UNIT STATUS USTAT: NORMAL
WBC # BLD AUTO: 3.6 K/UL (ref 4.8–10.8)

## 2017-07-20 PROCEDURE — 71010 DX-CHEST-LIMITED (1 VIEW): CPT

## 2017-07-20 PROCEDURE — 80053 COMPREHEN METABOLIC PANEL: CPT

## 2017-07-20 PROCEDURE — 700102 HCHG RX REV CODE 250 W/ 637 OVERRIDE(OP): Performed by: INTERNAL MEDICINE

## 2017-07-20 PROCEDURE — 96361 HYDRATE IV INFUSION ADD-ON: CPT

## 2017-07-20 PROCEDURE — 86923 COMPATIBILITY TEST ELECTRIC: CPT

## 2017-07-20 PROCEDURE — 30233N1 TRANSFUSION OF NONAUTOLOGOUS RED BLOOD CELLS INTO PERIPHERAL VEIN, PERCUTANEOUS APPROACH: ICD-10-PCS | Performed by: HOSPITALIST

## 2017-07-20 PROCEDURE — 700102 HCHG RX REV CODE 250 W/ 637 OVERRIDE(OP): Performed by: NURSE PRACTITIONER

## 2017-07-20 PROCEDURE — A9270 NON-COVERED ITEM OR SERVICE: HCPCS | Performed by: HOSPITALIST

## 2017-07-20 PROCEDURE — 83880 ASSAY OF NATRIURETIC PEPTIDE: CPT

## 2017-07-20 PROCEDURE — 99223 1ST HOSP IP/OBS HIGH 75: CPT | Mod: AI | Performed by: HOSPITALIST

## 2017-07-20 PROCEDURE — 85025 COMPLETE CBC W/AUTO DIFF WBC: CPT

## 2017-07-20 PROCEDURE — A9270 NON-COVERED ITEM OR SERVICE: HCPCS | Performed by: NURSE PRACTITIONER

## 2017-07-20 PROCEDURE — 93005 ELECTROCARDIOGRAM TRACING: CPT | Performed by: EMERGENCY MEDICINE

## 2017-07-20 PROCEDURE — A9270 NON-COVERED ITEM OR SERVICE: HCPCS | Performed by: INTERNAL MEDICINE

## 2017-07-20 PROCEDURE — 83690 ASSAY OF LIPASE: CPT

## 2017-07-20 PROCEDURE — 85018 HEMOGLOBIN: CPT | Mod: 91

## 2017-07-20 PROCEDURE — 36430 TRANSFUSION BLD/BLD COMPNT: CPT

## 2017-07-20 PROCEDURE — 36415 COLL VENOUS BLD VENIPUNCTURE: CPT

## 2017-07-20 PROCEDURE — 82962 GLUCOSE BLOOD TEST: CPT

## 2017-07-20 PROCEDURE — 770006 HCHG ROOM/CARE - MED/SURG/GYN SEMI*

## 2017-07-20 PROCEDURE — A9270 NON-COVERED ITEM OR SERVICE: HCPCS | Performed by: EMERGENCY MEDICINE

## 2017-07-20 PROCEDURE — 86900 BLOOD TYPING SEROLOGIC ABO: CPT

## 2017-07-20 PROCEDURE — 99285 EMERGENCY DEPT VISIT HI MDM: CPT

## 2017-07-20 PROCEDURE — 93005 ELECTROCARDIOGRAM TRACING: CPT | Performed by: HOSPITALIST

## 2017-07-20 PROCEDURE — 96360 HYDRATION IV INFUSION INIT: CPT

## 2017-07-20 PROCEDURE — 85610 PROTHROMBIN TIME: CPT

## 2017-07-20 PROCEDURE — 84484 ASSAY OF TROPONIN QUANT: CPT

## 2017-07-20 PROCEDURE — 700102 HCHG RX REV CODE 250 W/ 637 OVERRIDE(OP): Performed by: HOSPITALIST

## 2017-07-20 PROCEDURE — 85014 HEMATOCRIT: CPT

## 2017-07-20 PROCEDURE — 96372 THER/PROPH/DIAG INJ SC/IM: CPT

## 2017-07-20 PROCEDURE — 93010 ELECTROCARDIOGRAM REPORT: CPT | Performed by: INTERNAL MEDICINE

## 2017-07-20 PROCEDURE — 700105 HCHG RX REV CODE 258: Performed by: HOSPITALIST

## 2017-07-20 PROCEDURE — 86850 RBC ANTIBODY SCREEN: CPT

## 2017-07-20 PROCEDURE — G0378 HOSPITAL OBSERVATION PER HR: HCPCS

## 2017-07-20 PROCEDURE — 85730 THROMBOPLASTIN TIME PARTIAL: CPT

## 2017-07-20 PROCEDURE — 86901 BLOOD TYPING SEROLOGIC RH(D): CPT

## 2017-07-20 PROCEDURE — 700102 HCHG RX REV CODE 250 W/ 637 OVERRIDE(OP): Performed by: EMERGENCY MEDICINE

## 2017-07-20 PROCEDURE — P9016 RBC LEUKOCYTES REDUCED: HCPCS

## 2017-07-20 RX ORDER — POLYETHYLENE GLYCOL 3350 17 G/17G
1 POWDER, FOR SOLUTION ORAL
Status: DISCONTINUED | OUTPATIENT
Start: 2017-07-20 | End: 2017-07-23 | Stop reason: HOSPADM

## 2017-07-20 RX ORDER — AMLODIPINE BESYLATE 10 MG/1
10 TABLET ORAL DAILY
Status: DISCONTINUED | OUTPATIENT
Start: 2017-07-20 | End: 2017-07-23 | Stop reason: HOSPADM

## 2017-07-20 RX ORDER — ASPIRIN 81 MG/1
324 TABLET, CHEWABLE ORAL ONCE
Status: COMPLETED | OUTPATIENT
Start: 2017-07-20 | End: 2017-07-20

## 2017-07-20 RX ORDER — BISACODYL 10 MG
10 SUPPOSITORY, RECTAL RECTAL
Status: DISCONTINUED | OUTPATIENT
Start: 2017-07-20 | End: 2017-07-23 | Stop reason: HOSPADM

## 2017-07-20 RX ORDER — NITROGLYCERIN 0.4 MG/1
0.4 TABLET SUBLINGUAL
Status: DISCONTINUED | OUTPATIENT
Start: 2017-07-20 | End: 2017-07-23 | Stop reason: HOSPADM

## 2017-07-20 RX ORDER — ONDANSETRON 4 MG/1
4 TABLET, ORALLY DISINTEGRATING ORAL EVERY 4 HOURS PRN
Status: DISCONTINUED | OUTPATIENT
Start: 2017-07-20 | End: 2017-07-23 | Stop reason: HOSPADM

## 2017-07-20 RX ORDER — AMOXICILLIN 250 MG
2 CAPSULE ORAL 2 TIMES DAILY
Status: DISCONTINUED | OUTPATIENT
Start: 2017-07-20 | End: 2017-07-23 | Stop reason: HOSPADM

## 2017-07-20 RX ORDER — ONDANSETRON 2 MG/ML
4 INJECTION INTRAMUSCULAR; INTRAVENOUS EVERY 4 HOURS PRN
Status: DISCONTINUED | OUTPATIENT
Start: 2017-07-20 | End: 2017-07-23 | Stop reason: HOSPADM

## 2017-07-20 RX ORDER — PROMETHAZINE HYDROCHLORIDE 25 MG/1
12.5-25 SUPPOSITORY RECTAL EVERY 4 HOURS PRN
Status: DISCONTINUED | OUTPATIENT
Start: 2017-07-20 | End: 2017-07-23 | Stop reason: HOSPADM

## 2017-07-20 RX ORDER — CARVEDILOL 12.5 MG/1
12.5 TABLET ORAL 2 TIMES DAILY WITH MEALS
Status: DISCONTINUED | OUTPATIENT
Start: 2017-07-20 | End: 2017-07-23 | Stop reason: HOSPADM

## 2017-07-20 RX ORDER — PREGABALIN 25 MG/1
50 CAPSULE ORAL 2 TIMES DAILY
Status: DISCONTINUED | OUTPATIENT
Start: 2017-07-20 | End: 2017-07-23 | Stop reason: HOSPADM

## 2017-07-20 RX ORDER — DEXTROSE MONOHYDRATE 25 G/50ML
25 INJECTION, SOLUTION INTRAVENOUS
Status: DISCONTINUED | OUTPATIENT
Start: 2017-07-20 | End: 2017-07-23 | Stop reason: HOSPADM

## 2017-07-20 RX ORDER — CYCLOBENZAPRINE HCL 10 MG
10 TABLET ORAL 3 TIMES DAILY PRN
Status: DISCONTINUED | OUTPATIENT
Start: 2017-07-20 | End: 2017-07-23 | Stop reason: HOSPADM

## 2017-07-20 RX ORDER — ASPIRIN 300 MG/1
300 SUPPOSITORY RECTAL ONCE
Status: COMPLETED | OUTPATIENT
Start: 2017-07-20 | End: 2017-07-20

## 2017-07-20 RX ORDER — SODIUM CHLORIDE 9 MG/ML
2000 INJECTION, SOLUTION INTRAVENOUS ONCE
Status: COMPLETED | OUTPATIENT
Start: 2017-07-20 | End: 2017-07-20

## 2017-07-20 RX ORDER — PROMETHAZINE HYDROCHLORIDE 25 MG/1
12.5-25 TABLET ORAL EVERY 4 HOURS PRN
Status: DISCONTINUED | OUTPATIENT
Start: 2017-07-20 | End: 2017-07-23 | Stop reason: HOSPADM

## 2017-07-20 RX ADMIN — CARVEDILOL 12.5 MG: 12.5 TABLET, FILM COATED ORAL at 09:40

## 2017-07-20 RX ADMIN — AMLODIPINE BESYLATE 10 MG: 10 TABLET ORAL at 09:40

## 2017-07-20 RX ADMIN — CYCLOBENZAPRINE 10 MG: 10 TABLET, FILM COATED ORAL at 18:05

## 2017-07-20 RX ADMIN — PREGABALIN 50 MG: 25 CAPSULE ORAL at 20:28

## 2017-07-20 RX ADMIN — INSULIN LISPRO 2 UNITS: 100 INJECTION, SOLUTION INTRAVENOUS; SUBCUTANEOUS at 18:14

## 2017-07-20 RX ADMIN — CARVEDILOL 12.5 MG: 12.5 TABLET, FILM COATED ORAL at 18:19

## 2017-07-20 RX ADMIN — INSULIN LISPRO 3 UNITS: 100 INJECTION, SOLUTION INTRAVENOUS; SUBCUTANEOUS at 20:28

## 2017-07-20 RX ADMIN — ASPIRIN 324 MG: 81 TABLET, CHEWABLE ORAL at 02:45

## 2017-07-20 RX ADMIN — SODIUM CHLORIDE 2000 ML: 9 INJECTION, SOLUTION INTRAVENOUS at 07:58

## 2017-07-20 RX ADMIN — INSULIN LISPRO 3 UNITS: 100 INJECTION, SOLUTION INTRAVENOUS; SUBCUTANEOUS at 13:57

## 2017-07-20 ASSESSMENT — PAIN SCALES - GENERAL
PAINLEVEL_OUTOF10: 0
PAINLEVEL_OUTOF10: 0
PAINLEVEL_OUTOF10: 2

## 2017-07-20 ASSESSMENT — ENCOUNTER SYMPTOMS
PHOTOPHOBIA: 0
DIARRHEA: 0
WHEEZING: 0
COUGH: 0
SHORTNESS OF BREATH: 0
FOCAL WEAKNESS: 0
VOMITING: 0
PALPITATIONS: 0
MYALGIAS: 0
FEVER: 0
TINGLING: 0
HEADACHES: 0
DEPRESSION: 0
ABDOMINAL PAIN: 0
CHILLS: 0
DIZZINESS: 0
NAUSEA: 0
SORE THROAT: 0

## 2017-07-20 ASSESSMENT — PATIENT HEALTH QUESTIONNAIRE - PHQ9
SUM OF ALL RESPONSES TO PHQ QUESTIONS 1-9: 0
2. FEELING DOWN, DEPRESSED, IRRITABLE, OR HOPELESS: NOT AT ALL
SUM OF ALL RESPONSES TO PHQ9 QUESTIONS 1 AND 2: 0
1. LITTLE INTEREST OR PLEASURE IN DOING THINGS: NOT AT ALL

## 2017-07-20 ASSESSMENT — COPD QUESTIONNAIRES
COPD SCREENING SCORE: 7
HAVE YOU SMOKED AT LEAST 100 CIGARETTES IN YOUR ENTIRE LIFE: NO/DON'T KNOW
DURING THE PAST 4 WEEKS HOW MUCH DID YOU FEEL SHORT OF BREATH: MOST  OR ALL OF THE TIME
DO YOU EVER COUGH UP ANY MUCUS OR PHLEGM?: YES, A FEW DAYS A WEEK OR MONTH

## 2017-07-20 ASSESSMENT — LIFESTYLE VARIABLES
EVER_SMOKED: NEVER
EVER_SMOKED: NEVER
DO YOU DRINK ALCOHOL: NO
ALCOHOL_USE: NO

## 2017-07-20 NOTE — PROGRESS NOTES
Patient admitted after midnight by Dr Osman, please see H&P for full details.    Pt seen and examined by myself, today     Hospital Course:  Vielka Briscoe is a 62 y.o. female w/ h/o myelodysplastic syndrome with last chemo on this last Friday that presented with chest pain and found to have anemia with Hgb at 5.7  admitted 7/20/2017 for PRBC transfusion and Hgb trending. We are also trending troponin and monitoring telemetry as she just had a neg stress test in December.    Assessment and plan:  Anemia requiring transfusions  Known history of myelodysplastic syndrome and just completed her last chemo session on Friday.  History of anemia after the chemotherapy sessions requiring transfusion. Suspect that this is related.  1 unit packed red blood cell transfusion now and then trend hemoglobin levels every 8 hours today with plans to repeat transfusion if hemoglobin remains below 7.    Chest pain  May be secondary to her significant anemia. No EKG or tele changes.  Stress test in December which was neg  Cont to monitor her closely on telemetry, correct her anemia,  trend serial troponin levels and obtain serial EKGs  Holding off on a repeat stress test at this time.    Acute on chronic renal insufficiency (HCC)  Not far from her previous baseline  Gentle IV fluids  Repeat BMP in AM    Diabetes (CMS-HCC)  Monitor with Accu-Cheks and cover with insulin sliding scale    HTN (hypertension)  Continue home Coreg and Norvasc            Dispo:cont to trend trop and Hgb, transfuse PRBC 1 unit now then prn, follow CBC and BMP in AM, gentle IV fluids, tele monitor, expect over 2 midnights stay will transfer to medical, inpt  Expect home when more stable and improved    Patient was discussed with bedside RN/SW\     Summer Garcia NP

## 2017-07-20 NOTE — ED PROVIDER NOTES
ED Provider Note    CHIEF COMPLAINT  Chief Complaint   Patient presents with   • Chest Pain       HPI  Vielka Briscoe is a 62 y.o. female who presents to the emergency department with chest discomfort. The patient states that approximate 6 PM she developed substernal chest pressure that started while at rest. She states she continues to have discomfort. She is unaware of any exacerbating or relieving factors. She does have some associated nausea as well as dyspnea but no diaphoresis. She says she does have a history of heart disease but does not remember the last time she had a stress test. She does not take anticoagulants. Currently her pain is moderate in intensity. The patient states she does not have a pleuritic component nor does she have any pain or swelling to her lower extremities.    REVIEW OF SYSTEMS  See HPI for further details. All other systems are negative.     PAST MEDICAL HISTORY  Past Medical History   Diagnosis Date   • Heart abnormalities    • Angina    • Diabetes    • Hypertension    • Chronic anemia    • Chronic obstructive pulmonary disease (CMS-HCC)    • Blood transfusion without reported diagnosis    • Anemia    • Breath shortness    • Glaucoma    • Diabetes      Diet controlled   • Dental disorder      full dentures   • Supplemental oxygen dependent    • Cataract      bilat IOL   • Renal disorder      CKF   • Pain      General pain 8/10   • Chronic kidney disease      Chronic renal failure   • Arthritis      hands    • Dialysis patient      [facility] ,   [facility] [place]   • MDS (myelodysplastic syndrome) 10/2016     bone marrow biopsy   • Congestive heart failure (CMS-[facility])    • Atrial fibrillation (CMS-[facility])      HX   • Stroke (CMS-HCC) 03/2015     No residual weakness/problems   • Cancer (CMS-[facility])      MDS in bones       SOCIAL HISTORY  Social History     Social History   • Marital Status:      Spouse Name: N/A   • Number of Children: N/A   • Years of Education: N/A     Social  "History Main Topics   • Smoking status: Never Smoker    • Smokeless tobacco: Never Used   • Alcohol Use: No   • Drug Use: No   • Sexual Activity: Not Asked     Other Topics Concern   • None     Social History Narrative    ** Merged History Encounter **                PHYSICAL EXAM  VITAL SIGNS: /64 mmHg  Pulse 82  Temp(Src) 36.8 °C (98.2 °F)  Resp 20  Ht 1.473 m (4' 9.99\")  Wt 61.3 kg (135 lb 2.3 oz)  BMI 28.25 kg/m2  LMP 01/01/1995  Constitutional: Mild acute distress, Non-toxic appearance.   HENT: Normocephalic, Atraumatic, tympanic membranes are intact and nonerythematous bilaterally, Oropharynx moist without exudates or erythema, Nose normal.   Eyes: PERRLA, EOMI, Conjunctiva normal.  Neck: Supple without meningismus  Lymphatic: No lymphadenopathy noted.   Cardiovascular: Normal heart rate, Normal rhythm, No murmurs, No rubs, No gallops.   Thorax & Lungs: Normal breath sounds, No respiratory distress, No wheezing, No chest tenderness.   Abdomen: Bowel sounds normal, Soft, No tenderness, no rebound, no guarding, no distention, No masses, No pulsatile masses.   Skin: Warm, Dry, No erythema, No rash.   Back: No tenderness, No CVA tenderness.   Extremities: Atraumatic with symmetric distal pulses, No edema, No tenderness, No cyanosis, No clubbing.   Neurologic: Alert & oriented x 3, cranial nerves II through XII are intact, Normal motor function, Normal sensory function, No focal deficits noted.   Psychiatric: Affect normal, Judgment normal, Mood normal.     EKG  Twelve-lead EKG interpreted by myself shows a normal sinus rhythm with ventricular 83, QRS is poor R-wave progression, no ST segment elevation or depression, normal T waves. Overall no dynamic changes from prior    RADIOLOGY/PROCEDURES  DX-CHEST-LIMITED (1 VIEW)   Final Result      1.  No acute cardiopulmonary disease.   2.  Stable mild cardiomegaly.            COURSE & MEDICAL DECISION MAKING  Pertinent Labs & Imaging studies reviewed. (See " chart for details)  This a 62-year-old female who presents emergency department with chest discomfort. The EKG and cardiac markers do not support ischemia. I cannot rule out angina and the patient admitted to the hospital for further workup. She also has a very concerning anemia most likely secondary to her myelodysplastic syndrome. This could also be causing strain on her heart. A COD has been ordered and I spoke with the hospitalist regarding the case we will admit her for further treatment.    FINAL IMPRESSION  1. Chest pain   2. Anemia  3. Renal insufficiency  4. Uncontrolled diabetes mellitus   Disposition  The patient will be admitted in stable condition    Electronically signed by: Jono Alberts, 7/20/2017 2:23 AM

## 2017-07-20 NOTE — ASSESSMENT & PLAN NOTE
Louis Keller Jr.   3/16/2017 11:00 AM   Anticoagulation Therapy Visit    Description:  83 year old male   Provider:  [facility]   Department:  [facility]           INR as of 3/16/2017     Today's INR 2.6      Anticoagulation Summary as of 3/16/2017     INR goal 2.0-3.0   Today's INR 2.6   Full instructions 5 mg on Sun, Tue, Thu; 2.5 mg all other days   Next INR check 4/13/2017    Indications   Long-term (current) use of anticoagulants [Z79.01] [Z79.01]  Atrial fibrillation (H) [I48.91]         Your next Anticoagulation Clinic appointment(s)     Apr 13, 2017  1:45 PM CDT   Anticoagulation Visit with [facility]   [facility]    [address] [facility] [address]  [facility] [address] [phone]              Contact Numbers     [facility] Phone Numbers:  Anticoagulation Clinic Appointments : [phone]  Anticoagulation Nurse: [phone]         March 2017 Details    Sun Mon Tue Wed Thu Fri Sat        1               2               3               4                 5               6               7               8               9               10               11                 12               13               14               15               16      5 mg   See details      17      2.5 mg         18      2.5 mg           19      5 mg         20      2.5 mg         21      5 mg         22      2.5 mg         23      5 mg         24      2.5 mg         25      2.5 mg           26      5 mg         27      2.5 mg         28      5 mg         29      2.5 mg         30      5 mg         31      2.5 mg           Date Details   03/16 This INR check               How to take your warfarin dose     To take:  2.5 mg Take 0.5 of a 5 mg tablet.    To take:  5 mg Take 1 of the 5 mg tablets.           April 2017 Details    Sun Mon Tue Wed Thu Fri Sat           1      2.5 mg           2      5 mg         3      2.5 mg         4     .     5 mg         5      2.5 mg         6      5 mg         7      2.5 mg         8      2.5 mg           9      5 mg         10      2.5 mg         11      5 mg         12      2.5 mg         13            14               15                 16               17               18               19               20               21               22                 23               24               25               26               27               28               29                 30                      Date Details   No additional details    Date of next INR:  4/13/2017         How to take your warfarin dose     To take:  2.5 mg Take 0.5 of a 5 mg tablet.    To take:  5 mg Take 1 of the 5 mg tablets.

## 2017-07-20 NOTE — DISCHARGE PLANNING
Care Transition Team Assessment    Information Source  Information Given By: Patient         Elopement Risk  Legal Hold: No  Ambulatory or Self Mobile in Wheelchair: No-Not an Elopement Risk    Interdisciplinary Discharge Planning  Does Admitting Nurse Feel This Could be a Complex Discharge?: No  Lives with - Patient's Self Care Capacity: Adult Children  Patient or legal guardian wants to designate a caregiver (see row info): No  Support Systems: Family Member(s)  Housing / Facility: [address]  Do You Take your Prescribed Medications Regularly: Yes  Able to Return to Previous ADL's: Yes  Mobility Issues: Yes  Patient Expects to be Discharged to:: home  Durable Medical Equipment: Home Oxygen                   Vision / Hearing Impairment  Vision Impairment : Yes (wears glasses)  Hearing Impairment : No    Values / Beliefs / Concerns  Values / Beliefs Concerns : No         Domestic Abuse  Have you ever been the victim of abuse or violence?: No  Physical Abuse or Sexual Abuse: No

## 2017-07-20 NOTE — ED NOTES
Patient presents with EMS for chest pain that started last night at 1800. Pt states her only other symptom is fatigue. Pt has hx of bone ca, last chemo this past Friday. Pt has hx of renal failure, +MWF dialysis. Pt presents 98% on RA.

## 2017-07-20 NOTE — IP AVS SNAPSHOT
7/23/2017    Vielka Lucio [facility]  [address]   [place] [address]    Dear Vielka:    [facility] wants to ensure your discharge home is safe and you or your loved ones have had all of your questions answered regarding your care after you leave the hospital.    Below is a list of resources and contact information should you have any questions regarding your hospital stay, follow-up instructions, or active medical symptoms.    Questions or Concerns Regarding… Contact   Medical Questions Related to Your Discharge  (7 days a week, 8am-5pm) Contact a Nurse Care Coordinator   [phone]   Medical Questions Not Related to Your Discharge  (24 hours a day / 7 days a week)  Contact the Nurse Health Line   [phone]    Medications or Discharge Instructions Refer to your discharge packet   or contact your [facility] Primary Care Provider   [phone]   Follow-up Appointment(s) Schedule your appointment via [employer]   or contact Scheduling [phone]   Billing Review your statement via [employer]  or contact Billing [phone]   Medical Records Review your records via [employer]   or contact Medical Records [phone]     You may receive a telephone call within two days of discharge. This call is to make certain you understand your discharge instructions and have the opportunity to have any questions answered. You can also easily access your medical information, test results and upcoming appointments via the [employer] free online health management tool. You can learn more and sign up at [email or web address]. For assistance setting up your [employer] account, please call [phone].    Once again, we want to ensure your discharge home is safe and that you have a clear understanding of any next steps in your care. If you have any questions or concerns, please do not hesitate to contact us, we are here for you. Thank you for choosing [facility] for your healthcare needs.    Sincerely,    Your [facility] Healthcare Team

## 2017-07-20 NOTE — PROGRESS NOTES
Pt up to RR and back with standby assist; tolerated well.  Pt denies chest pain, needs at this time.

## 2017-07-20 NOTE — IP AVS SNAPSHOT
[employer] Access Code: [email or web address]  Expires: 8/3/2017  4:13 AM    [employer]igo [employer]  A secure, online tool to manage your health information     [employer]’s Indigo Identityware® is a secure, online tool that connects you to your personalized health information from the privacy of your home -- day or night - making it very easy for you to manage your healthcare. Once the activation process is completed, you can even access your medical information using the [employer]igo Identityware sundar, which is available for free in the Apple Sundar store or Google Play store.     [employer] provides the following levels of access (as shown below):   My Chart Features   [facility] Primary Care Doctor [facility]  Specialists [facility]  Urgent  Care Non-[facility]  Primary Care  Doctor   Email your healthcare team securely and privately 24/7 X X X X   Manage appointments: schedule your next appointment; view details of past/upcoming appointments X      Request prescription refills. X      View recent personal medical records, including lab and immunizations X X X X   View health record, including health history, allergies, medications X X X X   Read reports about your outpatient visits, procedures, consult and ER notes X X X X   See your discharge summary, which is a recap of your hospital and/or ER visit that includes your diagnosis, lab results, and care plan. X X       How to register for [employer]igo [employer]:  1. Go to  [email or web address].  2. Click on the Sign Up Now box, which takes you to the New Member Sign Up page. You will need to provide the following information:  a. Enter your [employer] Access Code exactly as it appears at the top of this page. (You will not need to use this code after you’ve completed the sign-up process. If you do not sign up before the expiration date, you must request a new code.)   b. Enter your date of birth.   c. Enter your home email address.   d. Click Submit, and follow the next screen’s instructions.  3. Create a [employer]igo [employer] ID. This will be your [employer]  login ID and cannot be changed, so think of one that is secure and easy to remember.  4. Create a [employer] password. You can change your password at any time.  5. Enter your Password Reset Question and Answer. This can be used at a later time if you forget your password.   6. Enter your e-mail address. This allows you to receive e-mail notifications when new information is available in [employer].  7. Click Sign Up. You can now view your health information.    For assistance activating your [employer] account, call [phone]

## 2017-07-20 NOTE — H&P
Hospital Medicine History and Physical    Date of Service  7/20/2017    Chief Complaint  Chief Complaint   Patient presents with   • Chest Pain       History of Presenting Illness  62 y.o. Female with HTN, T2DM, COPD,  ESRD on HD MWF, and MDS on active chemotherapy who was doing well until 6PM last night as she was on her bed when she suddenly had left sided CP, described as dull, rated 7/10, no radiation, lasted for a few minutes. The pain was associated with some SOB, described as inability to take deep breaths, with sweating, and nausea. She also felt cramping on both her legs. She denied any vomiting, abd pain or diarrhea.     ED COURSE: The patient was initially evaluated in the emergency department, was maintaining good hemodynamics, saturating well on 2L O2 NC, and was afebrile. Troponin was negative, with EKG (my read) showing NSR, without ischemic changes. However, her Hgb was 5.7, down from 9.9 10 days ago. No leukocytosis. She was ordered for 1 unit pRBC and admitted to the hospitalist service.       Primary Care Physician  Nyla Nava M.D.    Consultants  nephrology    Code Status  Full     Review of Systems  ROS    Pertinent positives/negatives as mentioned above.     A complete review of systems was done. All other systems were negative.      Past Medical History  Past Medical History   Diagnosis Date   • Heart abnormalities    • Angina    • Diabetes    • Hypertension    • Chronic anemia    • Chronic obstructive pulmonary disease (CMS-HCC)    • Blood transfusion without reported diagnosis    • Anemia    • Breath shortness    • Glaucoma    • Diabetes      Diet controlled   • Dental disorder      full dentures   • Supplemental oxygen dependent    • Cataract      bilat IOL   • Renal disorder      CKF   • Pain      General pain 8/10   • Chronic kidney disease      Chronic renal failure   • Arthritis      hands    • Dialysis patient      NASRE[facility] DORADO   [facility] [place]   • MDS (myelodysplastic syndrome) 10/2016      bone marrow biopsy   • Congestive heart failure (CMS-[facility])    • Atrial fibrillation (CMS-[facility])      HX   • Stroke (CMS-[facility]) 2015     No residual weakness/problems   • Cancer (CMS-[facility])      MDS in bones       Surgical History  Past Surgical History   Procedure Laterality Date   • Other cardiac surgery       Angioplasty  and    • Other abdominal surgery       appendectomy,  x 2, hysterectomy, D & C   • Gyn surgery       hysterectomy   • Gyn surgery        x 2   • Gyn surgery       d&C x2   • Other cardiac surgery       cardiac angiogram, angioplasty   • Other       angioplasty/ stents bilat LE   • Retinal detachment repair Right    • Av fistula creation Left 2016     Procedure: AV FISTULA CREATION UPPER EXTREMITY;  Surgeon: Ranulfo Jolly M.D.;  Location: SURGERY [facility];  Service:    • Other abdominal surgery       appendectomy   • Gastroscopy  2015     Procedure: ESOPHAGOGASTRODUODENOSCOPY WITH BIOPSY;  Surgeon: Wing Álvarez M.D.;  Location: SURGERY [facility];  Service:    • Colonoscopy  2015     Procedure: COLONOSCOPY;  Surgeon: Wing Álvarez M.D.;  Location: SURGERY [facility];  Service:    • Vein ligation Left 11/10/2016     Procedure: VEIN LIGATION FOR DISTAL REVASCULARIZATION AND INTERVAL LIGATION OF LEFT ARM DIALYISIS ACCESS (DRIL PROCEDURE);  Surgeon: Ranulfo Jolly M.D.;  Location: SURGERY [facility];  Service:        Medications  No current facility-administered medications on file prior to encounter.     Current Outpatient Prescriptions on File Prior to Encounter   Medication Sig Dispense Refill   • sitagliptin (JANUVIA) 25 MG Tab Take 1 Tab by mouth every morning. 60 Tab 3   • prochlorperazine (COMPAZINE) 10 MG Tab Take 10 mg by mouth every 6 hours as needed.     • VOLTAREN 1 % Gel APPLY 2 GRAMS TOPICALLY QID  2   • hydrocodone-acetaminophen (NORCO) 5-325 MG Tab per tablet Take 1-2 Tabs by mouth every 6 hours as needed.     •  carvedilol (COREG) 12.5 MG Tab Take 12.5 mg by mouth 2 times a day, with meals.     • pregabalin (LYRICA) 50 MG capsule Take 50 mg by mouth 2 times a day.     • amlodipine (NORVASC) 10 MG Tab Take 10 mg by mouth every day.     • bimatoprost (LUMIGAN) 0.03 % Solution Place 1 Drop in both eyes every evening.         Family History  Family History   Problem Relation Age of Onset   • Hypertension Father    • Hypertension Mother        Social History  Social History   Substance Use Topics   • Smoking status: Never Smoker    • Smokeless tobacco: Never Used   • Alcohol Use: No       Allergies  Allergies   Allergen Reactions   • Actos [Pioglitazone Hydrochloride] Unspecified     Cause blindness   NGZ=5757   • Darvocet [Propoxyphene N-Apap] Vomiting     AJI=9668   • Demerol Vomiting     AKG=2819   • Glucophage [Metformin Hydrochloride] Vomiting     PAH=7875   • Morphine Vomiting     YPA=8495   • Oxycodone Vomiting     RXN=2016   • Pcn [Penicillins] Vomiting     JFI=9916     • Requip Vomiting     RXN=2015   • Simvastatin Unspecified     Leg cramps  WER=0361   • Sulfa Drugs Rash     RXN=>10 years   • Tramadol Vomiting     SQL=5381   • Trazodone Vomiting     AWJ=3240   • Demerol    • Dilaudid [Hydromorphone] Vomiting     Unknown    • Diphenhydramine Vomiting   • Iron      vomiting   • Lenalidomide Vomiting   • Morphine    • Multivitamin      itching   • Naprosyn [Naproxen]    • Other Drug      Any binders that remove phosphorus from the body such as tums   • Sulfa Drugs         Physical Exam  Laboratory   Hemodynamics  Temp (24hrs), Av.6 °C (97.9 °F), Min:36.3 °C (97.3 °F), Max:36.8 °C (98.2 °F)   Temperature: 36.7 °C (98.1 °F)  Pulse  Av.1  Min: 74  Max: 89 Heart Rate (Monitored): 77  Blood Pressure: 116/60 mmHg, NIBP: 117/63 mmHg      Respiratory      Respiration: 17, Pulse Oximetry: 100 %     Work Of Breathing / Effort: Mild       Physical Exam   Constitutional: She appears well-developed and well-nourished. No  distress.   HENT:   Head: Normocephalic and atraumatic.   Mouth/Throat: No oropharyngeal exudate.   (+) dry lips   Eyes: Conjunctivae are normal. Pupils are equal, round, and reactive to light. No scleral icterus.   (+) pale conjuctivae   Neck: Normal range of motion. Neck supple.   Cardiovascular: Exam reveals no gallop and no friction rub.    No murmur heard.  Pulmonary/Chest: Effort normal and breath sounds normal. No respiratory distress. She has no wheezes. She has no rales. She exhibits no tenderness.   Abdominal: Soft. Bowel sounds are normal. She exhibits no distension. There is no tenderness. There is no rebound and no guarding.   Musculoskeletal: She exhibits no tenderness.   (+) left arm AV fistula, palpable thrill   Lymphadenopathy:     She has no cervical adenopathy.   Neurological: She is alert. No cranial nerve deficit.   Skin: Skin is warm and dry. No rash noted. No erythema. No pallor.   Psychiatric: She has a normal mood and affect.   Nursing note and vitals reviewed.      Recent Labs      07/20/17 0422   WBC  3.6*   RBC  1.72*   HEMOGLOBIN  5.7*   HEMATOCRIT  16.7*   MCV  97.1   MCH  33.1*   MCHC  34.1   RDW  60.8*   PLATELETCT  130*   MPV  12.8     Recent Labs      07/20/17   0230   SODIUM  130*   POTASSIUM  3.6   CHLORIDE  90*   CO2  33   GLUCOSE  209*   BUN  44*   CREATININE  4.54*   CALCIUM  7.6*     Recent Labs      07/20/17   0230   ALTSGPT  28   ASTSGOT  17   ALKPHOSPHAT  52   TBILIRUBIN  0.3   LIPASE  53   GLUCOSE  209*     Recent Labs      07/20/17   0230   APTT  28.7   INR  0.86*     Recent Labs      07/20/17 0422   BNPBTYPENAT  40         Lab Results   Component Value Date    TROPONINI <0.01 07/20/2017       Imaging  Reviewed.    Assessment/Plan     I anticipate this patient will require at least two midnights for appropriate medical management, necessitating inpatient admission.    Chest pain (present on admission)  Assessment & Plan  - due to significant anemia. No ischemic  changes on EKG. Stress test in December which was negative. Troponins remain negative.   - transfuse pRBC as above. Continue to monitor closely on telemetry. Monitor for further recurrence of CP.     Anemia requiring transfusions (present on admission)  Assessment & Plan  - in setting of MDS. Symptomatic, given chest pain.  - pt getting 1 unit pRBC. Will repeat H&H 1 hr post transfusion, and if remains <7 will transfuse further.   - continue to trend Hgb.     Myelodysplasia (myelodysplastic syndrome) (CMS-[facility]) (present on admission)  Assessment & Plan  - transfuse as above. Follow-up with hematology as outpatient for continued chemotherapy.     Pancytopenia (CMS-[facility]) (present on admission)  Assessment & Plan  - due to MDS.  - transfuse pRBC as above. Continue to trend WBC and platelets.     HTN (hypertension) (present on admission)  Assessment & Plan  - Continue home Coreg and Norvasc. Monitor BP trend.     Atrial fibrillation (CMS-HCC) (present on admission)  Assessment & Plan  - currently NSR. Continue coreg. Not on AC given MDS and anemia requiring transfusions.     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 ([facility]) (present on admission)  Assessment & Plan  - continue SSI while in house. Hold Januvia for now.     Legally blind (present on admission)  Assessment & Plan  .    Chronic respiratory failure with hypoxia, 2L (present on admission)  Assessment & Plan  - stable. Continue O2, RT protocol.     End stage renal disease (CMS-HCC) (present on admission)  Assessment & Plan  - will inform nephrology of patient's admission for resumption of her HD.     Diabetes (CMS-HCC)  Assessment & Plan  Monitor with Accu-Cheks and cover with insulin sliding scale    COPD (chronic obstructive pulmonary disease) (CMS-HCC) (present on admission)  Assessment & Plan  - stable, not in acute exacerbation. Continue RT protocol, O2.         VTE prophylaxis: SCD.

## 2017-07-20 NOTE — IP AVS SNAPSHOT
" <p align=\"LEFT\"><IMG SRC=\"//[email or web address]\" alt=\"Image\" WIDTH=\"50%\" HEIGHT=\"200\" BORDER=\"\"></p>                   Name:Vielka Briscoe  Medical Record Number:[id number]  CSN: [id number]    YOB: 1955   Age: 62 y.o.  Sex: female  HT:1.473 m (4' 9.99\") WT: 62.2 kg (137 lb 2 oz)          Admit Date: 7/20/2017     Discharge Date:   Today's Date: 7/23/2017  Attending Doctor:  Quique Keys M.D.                  Allergies:  Actos; Darvocet; Demerol; Glucophage; Morphine; Oxycodone; Pcn; Requip; Simvastatin; Sulfa drugs; Tramadol; Trazodone; Demerol; Dilaudid; Diphenhydramine; Iron; Lenalidomide; Morphine; Multivitamin; Naprosyn; Other drug; and Sulfa drugs          Your appointments     Aug 07, 2017  2:30 PM   Est Chemo 1 with RN 5   Infusion Services ([facility])    [address] [id number] [phone]            Aug 08, 2017  2:30 PM   Est Chemo 1 with RN 5   Infusion Services ([facility])    [address] [address]   [phone]            Aug 09, 2017  2:30 PM   Est Chemo 1 with RN 5   Infusion Services ([facility])    [address] [id number] [phone]            Aug 10, 2017  2:30 PM   Est Chemo 1 with RN 5   Infusion [facility] ([facility])    [address] [id number] [phone]            Aug 11, 2017  2:30 PM   Est Chemo 1 with RN 5   Infusion Services ([facility])    [address] [place] [id number]   [phone]              Follow-up Information     1. Follow up with Nyla Nava M.D. In 1 week.    Specialty:  Family Medicine    Contact information    [address] [facility] [id number] [phone]           Medication List      Take these Medications        Instructions    amlodipine 10 MG Tabs   Commonly known as:  NORVASC    Take 10 mg by mouth every day.   Dose:  10 mg       carvedilol 12.5 MG Tabs   Commonly known as:  COREG    Take 12.5 mg by mouth 2 times a day, " with meals.   Dose:  12.5 mg       hydrocodone-acetaminophen 5-325 MG Tabs per tablet   Commonly known as:  NORCO    Take 1-2 Tabs by mouth every 6 hours as needed.   Dose:  1-2 Tab       LUMIGAN 0.03 % Soln   Generic drug:  bimatoprost    Place 1 Drop in both eyes every evening.   Dose:  1 Drop       LYRICA 50 MG capsule   Generic drug:  pregabalin    Take 50 mg by mouth 2 times a day.   Dose:  50 mg       prochlorperazine 10 MG Tabs   Commonly known as:  COMPAZINE    Take 10 mg by mouth every 6 hours as needed.   Dose:  10 mg       sitagliptin 25 MG Tabs   Commonly known as:  JANUVIA    Take 1 Tab by mouth every morning.   Dose:  25 mg       VOLTAREN 1 % Gel   Generic drug:  Diclofenac Sodium    APPLY 2 GRAMS TOPICALLY QID

## 2017-07-20 NOTE — IP AVS SNAPSHOT
" Home Care Instructions                                                                                                                  Name:Vielka Briscoe  Medical Record Number:[id number]  CSN: [id number]    YOB: 1955   Age: 62 y.o.  Sex: female  HT:1.473 m (4' 9.99\") WT: 62.2 kg (137 lb 2 oz)          Admit Date: 7/20/2017     Discharge Date:   Today's Date: 7/23/2017  Attending Doctor:  Quique Keys M.D.                  Allergies:  Actos; Darvocet; Demerol; Glucophage; Morphine; Oxycodone; Pcn; Requip; Simvastatin; Sulfa drugs; Tramadol; Trazodone; Demerol; Dilaudid; Diphenhydramine; Iron; Lenalidomide; Morphine; Multivitamin; Naprosyn; Other drug; and Sulfa drugs            Discharge Instructions       Discharge Instructions    Discharged to home by taxi with self. Discharged via wheelchair, hospital escort: Yes.  Special equipment needed: Not Applicable    Be sure to schedule a follow-up appointment with your primary care doctor or any specialists as instructed.     Discharge Plan:   Influenza Vaccine Indication: Patient Refuses    I understand that a diet low in cholesterol, fat, and sodium is recommended for good health. Unless I have been given specific instructions below for another diet, I accept this instruction as my diet prescription.   Other diet: diabetic    Special Instructions: None    · Is patient discharged on Warfarin / Coumadin?   No     · Is patient Post Blood Transfusion?  Yes  POST BLOOD TRANSFUSION INFORMATION (ADULT)    The purpose of blood transfusion is to correct anemia, low levels of blood clotting factors or to correct acute blood loss.   Blood transfusion is very safe but occasionally unexpected adverse reactions do occur. Most adverse reactions occur during transfusion, within one to two days following transfusion or one to two weeks following transfusion. Some adverse reactions can occur one to six months after transfusion and some even years later.     "        If any of the symptoms listed below happen to you during your transfusion,                                 please notify your nurse immediately.   · Fever or Chills  · Flushing of the Face  · Hives, rash or itching  · Difficulty in breathing or shortness of breath  · Pain or oozing of blood from the IV needle site  · Low back pain  · Nausea or vomiting  · Weakness or fainting  · Chest pain  · Blood in the urine  · Decreased frequency of urination    The above symptoms may also occur within 24 to 48 after transfusion; if so, notify your physician.     · Yellowing of the skin    This can happen one to six months after transfusion; if so, notify your physician    Depression / Suicide Risk    As you are discharged from this [facility] facility, it is important to learn how to keep safe from harming yourself.    Recognize the warning signs:  · Abrupt changes in personality, positive or negative- including increase in energy   · Giving away possessions  · Change in eating patterns- significant weight changes-  positive or negative  · Change in sleeping patterns- unable to sleep or sleeping all the time   · Unwillingness or inability to communicate  · Depression  · Unusual sadness, discouragement and loneliness  · Talk of wanting to die  · Neglect of personal appearance   · Rebelliousness- reckless behavior  · Withdrawal from people/activities they love  · Confusion- inability to concentrate     If you or a loved one observes any of these behaviors or has concerns about self-harm, here's what you can do:  · Talk about it- your feelings and reasons for harming yourself  · Remove any means that you might use to hurt yourself (examples: pills, rope, extension cords, firearm)  · Get professional help from the community (Mental Health, Substance Abuse, psychological counseling)  · Do not be alone:Call your Safe Contact- someone whom you trust who will be there for you.  · Call your local CRISIS HOTLINE 430-9586 or  707.132.1353  · Call your local Children's Mobile Crisis Response Team [place] (825) 979-4105 or www.[employer]  · Call the toll free National Suicide Prevention Hotlines   · National Suicide Prevention Lifeline 020-890-EZLH (6895)  · [place] Risk Management Line Network 800-SUICIDE (708-8133)    Anemia, Nonspecific  Anemia is a condition in which the concentration of red blood cells or hemoglobin in the blood is below normal. Hemoglobin is a substance in red blood cells that carries oxygen to the tissues of the body. Anemia results in not enough oxygen reaching these tissues.   CAUSES   Common causes of anemia include:   · Excessive bleeding. Bleeding may be internal or external. This includes excessive bleeding from periods (in women) or from the intestine.    · Poor nutrition.    · Chronic kidney, thyroid, and liver disease.   · Bone marrow disorders that decrease red blood cell production.  · Cancer and treatments for cancer.  · HIV, AIDS, and their treatments.  · Spleen problems that increase red blood cell destruction.  · Blood disorders.  · Excess destruction of red blood cells due to infection, medicines, and autoimmune disorders.  SIGNS AND SYMPTOMS   2. Minor weakness.    3. Dizziness.    4. Headache.  5. Palpitations.    6. Shortness of breath, especially with exercise.    7. Paleness.  8. Cold sensitivity.  9. Indigestion.  10. Nausea.  11. Difficulty sleeping.  12. Difficulty concentrating.  Symptoms may occur suddenly or they may develop slowly.   DIAGNOSIS   Additional blood tests are often needed. These help your health care provider determine the best treatment. Your health care provider will check your stool for blood and look for other causes of blood loss.   TREATMENT   Treatment varies depending on the cause of the anemia. Treatment can include:   2. Supplements of iron, vitamin B12, or folic acid.    3. Hormone medicines.    4. A blood transfusion. This may be needed if blood loss is severe.     5. Hospitalization. This may be needed if there is significant continual blood loss.    6. Dietary changes.  7. Spleen removal.  HOME CARE INSTRUCTIONS  Keep all follow-up appointments. It often takes many weeks to correct anemia, and having your health care provider check on your condition and your response to treatment is very important.  SEEK IMMEDIATE MEDICAL CARE IF:   2. You develop extreme weakness, shortness of breath, or chest pain.    3. You become dizzy or have trouble concentrating.  4. You develop heavy vaginal bleeding.    5. You develop a rash.    6. You have bloody or black, tarry stools.    7. You faint.    8. You vomit up blood.    9. You vomit repeatedly.    10. You have abdominal pain.  11. You have a fever or persistent symptoms for more than 2-3 days.    12. You have a fever and your symptoms suddenly get worse.    13. You are dehydrated.    MAKE SURE YOU:  2. Understand these instructions.  3. Will watch your condition.  4. Will get help right away if you are not doing well or get worse.     This information is not intended to replace advice given to you by your health care provider. Make sure you discuss any questions you have with your health care provider.     Document Released: 01/25/2006 Document Revised: 08/20/2014 Document Reviewed: 06/13/2014  [employer] Interactive Patient Education ©2016 [employer]      Your appointments     Aug 07, 2017  2:30 PM   Est Chemo 1 with RN 5   Infusion Services ([facility])    [address] [place] [id number]   [phone]            Aug 08, 2017  2:30 PM   Est Chemo 1 with RN 5   Infusion [facility] ([facility])    [address] [place] [id number]   [phone]            Aug 09, 2017  2:30 PM   Est Chemo 1 with RN 5   In[facility] ([facility])    [address] [place] [id number]   [phone]            Aug 10, 2017  2:30 PM   Est Chemo 1 with RN 5   In[facility] ([facility])    [address] [place] [address]  [id number]   [phone]            Aug 11, 2017  2:30 PM   Est Chemo 1 with RN 5   Infusion [facility] ([facility])    [address]  [place] [address]   [phone]              Follow-up Information     1. Follow up with Nyla Nava M.D. In 1 week.    Specialty:  Family Medicine    Contact information    [address]  [place] [address] [phone]           Discharge Medication Instructions:    Below are the medications your physician expects you to take upon discharge:    Review all your home medications and newly ordered medications with your doctor and/or pharmacist. Follow medication instructions as directed by your doctor and/or pharmacist.    Please keep your medication list with you and share with your physician.               Medication List      CONTINUE taking these medications        Instructions    Morning Afternoon Evening Bedtime    amlodipine 10 MG Tabs   Last time this was given:  10 mg on 7/23/2017  8:27 AM   Commonly known as:  NORVASC   Next Dose Due:  Take tomorrow morning.        Take 10 mg by mouth every day.   Dose:  10 mg                        carvedilol 12.5 MG Tabs   Last time this was given:  12.5 mg on 7/23/2017  8:27 AM   Commonly known as:  COREG   Next Dose Due:  Take today at supper.        Take 12.5 mg by mouth 2 times a day, with meals.   Dose:  12.5 mg                        hydrocodone-acetaminophen 5-325 MG Tabs per tablet   Commonly known as:  NORCO        Take 1-2 Tabs by mouth every 6 hours as needed.   Dose:  1-2 Tab                        LUMIGAN 0.03 % Soln   Generic drug:  bimatoprost   Next Dose Due:  Take today at bedtime.        Place 1 Drop in both eyes every evening.   Dose:  1 Drop                        LYRICA 50 MG capsule   Last time this was given:  50 mg on 7/23/2017  8:26 AM   Generic drug:  pregabalin   Next Dose Due:  Take today at bedtime.        Take 50 mg by mouth 2 times a day.   Dose:  50 mg                         prochlorperazine 10 MG Tabs   Commonly known as:  COMPAZINE        Take 10 mg by mouth every 6 hours as needed.   Dose:  10 mg                        sitagliptin 25 MG Tabs   Commonly known as:  JANUVIA   Next Dose Due:  Take tomorrow morning.        Take 1 Tab by mouth every morning.   Dose:  25 mg                        VOLTAREN 1 % Gel   Generic drug:  Diclofenac Sodium        APPLY 2 GRAMS TOPICALLY QID                                Instructions           Diet / Nutrition:    Follow any diet instructions given to you by your doctor or the dietician, including how much salt (sodium) you are allowed each day.    If you are overweight, talk to your doctor about a weight reduction plan.    Activity:    Remain physically active following your doctor's instructions about exercise and activity.    Rest often.     Any time you become even a little tired or short of breath, SIT DOWN and rest.    Worsening Symptoms:    Report any of the following signs and symptoms to the doctor's office immediately:    *Pain of jaw, arm, or neck  *Chest pain not relieved by medication                               *Dizziness or loss of consciousness  *Difficulty breathing even when at rest   *More tired than usual                                       *Bleeding drainage or swelling of surgical site  *Swelling of feet, ankles, legs or stomach                 *Fever (>100ºF)  *Pink or blood tinged sputum  *Weight gain (3lbs/day or 5lbs /week)           *Shock from internal defibrillator (if applicable)  *Palpitations or irregular heartbeats                *Cool and/or numb extremities    Stroke Awareness    Common Risk Factors for Stroke include:    Age  Atrial Fibrillation  Carotid Artery Stenosis  Diabetes Mellitus  Excessive alcohol consumption  High blood pressure  Overweight   Physical inactivity  Smoking    Warning signs and symptoms of a stroke include:    *Sudden numbness or weakness of the face, arm or leg (especially on one side  of the body).  *Sudden confusion, trouble speaking or understanding.  *Sudden trouble seeing in one or both eyes.  *Sudden trouble walking, dizziness, loss of balance or coordination.Sudden severe headache with no known cause.    It is very important to get treatment quickly when a stroke occurs. If you experience any of the above warning signs, call 911 immediately.                   Disclaimer         Quit Smoking / Tobacco Use:    I understand the use of any tobacco products increases my chance of suffering from future heart disease or stroke and could cause other illnesses which may shorten my life. Quitting the use of tobacco products is the single most important thing I can do to improve my health. For further information on smoking / tobacco cessation call a Toll Free Quit Line at 1-497.241.7075 (*[employer] [place]) or 1-416.131.9183 (American Lung Association) or you can access the web based program at www.lungCompBlue.org.    [place] Tobacco Users Help Line:  (708) 168-2179       Toll Free: 1-213.253.6606  Quit Tobacco Program [facility] [phone]    Crisis Hotline:    [place] Crisis Hotline:  5-041-JGSLOYA or 1-838.568.6662    [place] Crisis Hotline:    1-964.376.5688 or 732-939-5244    Discharge Survey:   Thank you for choosing [facility]. We hope we did everything we could to make your hospital stay a pleasant one. You may be receiving a phone survey and we would appreciate your time and participation in answering the questions. Your input is very valuable to us in our efforts to improve our service to our patients and their families.        My signature on this form indicates that:    1. I have reviewed and understand the above information.  2. My questions regarding this information have been answered to my satisfaction.  3. I have formulated a plan with my discharge nurse to obtain my prescribed medications for home.                  Disclaimer              __________________________________                     __________       ________                       Patient Signature                                                 Date                    Time

## 2017-07-20 NOTE — PROGRESS NOTES
Received from red  pod, aox4, sr on monitor, unsteady on her  feet. Denies pain or sob. Call light within reach. Needs attended. Plan of care discussed and understood.

## 2017-07-20 NOTE — ASSESSMENT & PLAN NOTE
- due to significant anemia. No ischemic changes on EKG. Stress test in December which was negative. Troponins remain negative.   - transfuse pRBC as above. Continue to monitor closely on telemetry. Monitor for further recurrence of CP.   WILL TRANSFUSE 1 MORE UNIT OF BLOOD  NO CHEST PAIN TODAY

## 2017-07-20 NOTE — ED NOTES
Pt resting with eyes closed. Respirations equal and unlabored. Pt does not display any signs of discomfort or distress. Continue to monitor.

## 2017-07-20 NOTE — H&P
Hospital Medicine History and Physical    Date of Service  7/20/2017    Chief Complaint  Chief Complaint   Patient presents with   • Chest Pain       History of Presenting Illness  62 y.o. female who presented 7/20/2017 with chest discomfort. The patient states that her chest discomfort began around 6:00 this evening while she was sitting down. It is central/substernal with no radiation. She denies any alleviating or aggravating factors. She does report associated nausea, stress of breath, but no palpitations or diaphoresis. The patient carries a history of myelodysplastic syndrome and is on active chemotherapy. Her last treatment was last Friday. Upon assessment in the ER, the patient is noted to have a significant anemia with hemoglobin level in the fives. Prior to this, the patient was in her usual state of health, no fevers, no chills, no nausea, no vomiting. She's had no abdominal pain, diarrhea or dysuria. At the time of my visit at her bedside, the patient was chest pain-free.    Primary Care Physician  Nyla Nava M.D.    Consultants  None    Code Status  Full    Review of Systems  Review of Systems   Constitutional: Positive for malaise/fatigue. Negative for fever and chills.   HENT: Negative for congestion and sore throat.    Eyes: Negative for photophobia.   Respiratory: Negative for cough, shortness of breath and wheezing.    Cardiovascular: Negative for chest pain and palpitations.   Gastrointestinal: Negative for nausea, vomiting, abdominal pain and diarrhea.   Genitourinary: Negative for dysuria.   Musculoskeletal: Negative for myalgias.   Skin: Negative.    Neurological: Negative for dizziness, tingling, focal weakness and headaches.   Psychiatric/Behavioral: Negative for depression and suicidal ideas.        Past Medical History  Past Medical History   Diagnosis Date   • Heart abnormalities    • Angina    • Diabetes    • Hypertension    • Chronic anemia    • Chronic obstructive pulmonary disease  (CMS-HCC)    • Blood transfusion without reported diagnosis    • Anemia    • Breath shortness    • Glaucoma    • Diabetes      Diet controlled   • Dental disorder      full dentures   • Supplemental oxygen dependent    • Cataract      bilat IOL   • Renal disorder      CKF   • Pain      General pain 8/10   • Chronic kidney disease      Chronic renal failure   • Arthritis      hands    • Dialysis patient      [facility] ,   [facility] [place]   • MDS (myelodysplastic syndrome) 10/2016     bone marrow biopsy   • Congestive heart failure (CMS-HCC)    • Atrial fibrillation (CMS-HCC)      HX   • Stroke (CMS-[facility]) 2015     No residual weakness/problems   • Cancer (CMS-HCC)      MDS in bones       Surgical History  Past Surgical History   Procedure Laterality Date   • Other cardiac surgery       Angioplasty  and    • Other abdominal surgery       appendectomy,  x 2, hysterectomy, D & C   • Gyn surgery       hysterectomy   • Gyn surgery        x 2   • Gyn surgery       d&C x2   • Other cardiac surgery       cardiac angiogram, angioplasty   • Other       angioplasty/ stents bilat LE   • Retinal detachment repair Right    • Av fistula creation Left 2016     Procedure: AV FISTULA CREATION UPPER EXTREMITY;  Surgeon: Ranulfo Jolly M.D.;  Location: SURGERY [facility];  Service:    • Other abdominal surgery       appendectomy   • Gastroscopy  2015     Procedure: ESOPHAGOGASTRODUODENOSCOPY WITH BIOPSY;  Surgeon: Wing Álvarez M.D.;  Location: [facility];  Service:    • Colonoscopy  2015     Procedure: COLONOSCOPY;  Surgeon: Wing Álvarez M.D.;  Location: [facility];  Service:    • Vein ligation Left 11/10/2016     Procedure: VEIN LIGATION FOR DISTAL REVASCULARIZATION AND INTERVAL LIGATION OF LEFT ARM DIALYISIS ACCESS (DRIL PROCEDURE);  Surgeon: Ranulfo Jolly M.D.;  Location: SURGERY [facility];  Service:        Medications  No current  facility-administered medications on file prior to encounter.     Current Outpatient Prescriptions on File Prior to Encounter   Medication Sig Dispense Refill   • sitagliptin (JANUVIA) 25 MG Tab Take 1 Tab by mouth every morning. 60 Tab 3   • prochlorperazine (COMPAZINE) 10 MG Tab Take 10 mg by mouth every 6 hours as needed.     • VOLTAREN 1 % Gel APPLY 2 GRAMS TOPICALLY QID  2   • hydrocodone-acetaminophen (NORCO) 5-325 MG Tab per tablet Take 1-2 Tabs by mouth every 6 hours as needed.     • carvedilol (COREG) 12.5 MG Tab Take 12.5 mg by mouth 2 times a day, with meals.     • pregabalin (LYRICA) 50 MG capsule Take 50 mg by mouth 2 times a day.     • amlodipine (NORVASC) 10 MG Tab Take 10 mg by mouth every day.     • bimatoprost (LUMIGAN) 0.03 % Solution Place 1 Drop in both eyes every evening.         Family History  Family History   Problem Relation Age of Onset   • Hypertension Father    • Hypertension Mother        Social History  Social History   Substance Use Topics   • Smoking status: Never Smoker    • Smokeless tobacco: Never Used   • Alcohol Use: No       Allergies  Allergies   Allergen Reactions   • Actos [Pioglitazone Hydrochloride] Unspecified     Cause blindness   IWF=0482   • Darvocet [Propoxyphene N-Apap] Vomiting     SGX=5707   • Demerol Vomiting     QCW=6324   • Glucophage [Metformin Hydrochloride] Vomiting     AXO=5396   • Morphine Vomiting     LHN=6430   • Oxycodone Vomiting     RXN=1/2016   • Pcn [Penicillins] Vomiting     YQR=3098     • Requip Vomiting     RXN=12/2015   • Simvastatin Unspecified     Leg cramps  DIW=5802   • Sulfa Drugs Rash     RXN=>10 years   • Tramadol Vomiting     JZY=1506   • Trazodone Vomiting     LDS=4240   • Demerol    • Dilaudid [Hydromorphone] Vomiting     Unknown    • Diphenhydramine Vomiting   • Iron      vomiting   • Lenalidomide Vomiting   • Morphine    • Multivitamin      itching   • Naprosyn [Naproxen]    • Other Drug      Any binders that remove phosphorus from  the body such as tums   • Sulfa Drugs         Physical Exam  Laboratory   Hemodynamics  Temp (24hrs), Av.8 °C (98.2 °F), Min:36.8 °C (98.2 °F), Max:36.8 °C (98.2 °F)   Temperature: 36.8 °C (98.2 °F)  Pulse  Av.5  Min: 77  Max: 89 Heart Rate (Monitored): 77  Blood Pressure: 152/64 mmHg, NIBP: 117/63 mmHg      Respiratory      Respiration: 15, Pulse Oximetry: 100 %             Physical Exam   Constitutional: She is oriented to person, place, and time. No distress.   HENT:   Head: Normocephalic and atraumatic.   Right Ear: External ear normal.   Left Ear: External ear normal.   Eyes: EOM are normal. Right eye exhibits no discharge. Left eye exhibits no discharge.   Neck: Neck supple. No JVD present.   Cardiovascular: Normal rate, regular rhythm and normal heart sounds.    Pulmonary/Chest: Effort normal and breath sounds normal. No respiratory distress. She exhibits no tenderness.   Abdominal: Soft. Bowel sounds are normal. She exhibits no distension. There is no tenderness.   Musculoskeletal: Normal range of motion. She exhibits no edema.   Neurological: She is alert and oriented to person, place, and time. No cranial nerve deficit.   Skin: Skin is warm and dry. She is not diaphoretic. No erythema.   Psychiatric: She has a normal mood and affect. Her behavior is normal.   Nursing note and vitals reviewed.      Recent Labs      17   0422   WBC  3.6*   RBC  1.72*   HEMOGLOBIN  5.7*   HEMATOCRIT  16.7*   MCV  97.1   MCH  33.1*   MCHC  34.1   RDW  60.8*   PLATELETCT  130*   MPV  12.8     Recent Labs      17   0230   SODIUM  130*   POTASSIUM  3.6   CHLORIDE  90*   CO2  33   GLUCOSE  209*   BUN  44*   CREATININE  4.54*   CALCIUM  7.6*     Recent Labs      17   0230   ALTSGPT  28   ASTSGOT  17   ALKPHOSPHAT  52   TBILIRUBIN  0.3   LIPASE  53   GLUCOSE  209*     Recent Labs      17   0230   APTT  28.7   INR  0.86*             Lab Results   Component Value Date    TROPONINI <0.01 2017        Imaging  Dx-chest-limited (1 View)    7/20/2017 7/20/2017 2:32 AM HISTORY/REASON FOR EXAM:  Chest Pain TECHNIQUE/EXAM DESCRIPTION AND NUMBER OF VIEWS: Single portable view of the chest. COMPARISON: 6/27/2017 FINDINGS: Cardiac mediastinal contour is unchanged. Atherosclerotic calcification of thoracic aorta. No focal pulmonary consolidation. Right hemidiaphragm is elevated. No pleural fluid collection or pneumothorax. No major bony abnormality is seen.     7/20/2017  1.  No acute cardiopulmonary disease. 2.  Stable mild cardiomegaly.    Dx-chest-portable (1 View)    6/27/2017 6/27/2017 5:10 AM HISTORY/REASON FOR EXAM:  Weakness TECHNIQUE/EXAM DESCRIPTION:  Single AP view of the chest. COMPARISON: June 5, 2017 FINDINGS: Cardiomegaly is observed. Atherosclerotic calcification of the aorta is noted.  The central pulmonary vasculature appears normal. The lungs appear well expanded bilaterally.  Bilateral lungs are clear. No significant pleural effusions are identified. The bony structures appear age-appropriate.     6/27/2017  1.  No acute cardiopulmonary disease. 2.  Cardiomegaly 3.  Atherosclerosis     Assessment/Plan     Anticipate that patient will need less than 2 midnights for management of the above discussed medical issues.    This dictation was created using voice recognition software. The accuracy of the dictation is limited to the abilities of the software. I expect there may be some errors of grammar and possibly content.    Chest pain  Assessment & Plan  I suspect this may be secondary to her significant anemia. She has no changes. Additionally, she also has acute on chronic renal insufficiency and there may be an element of poor renal clearance. She last had a stress test in December which was negative at that time. I will monitor her closely on telemetry, I will correct her anemia, I will trend serial troponin levels and obtain serial EKGs. I'm holding off on a repeat stress test at this  time.    Anemia requiring transfusions  Assessment & Plan  Patient has a known history of myelodysplastic syndrome and just completed her last chemo session on Friday. She has a history of anemia after the chemotherapy sessions requiring transfusion. Suspect that this is related. I will give her a 1 unit packed red blood cell transfusion now and then trend hemoglobin levels every 12 hours with plans to repeat transfusion if hemoglobin remains below 7.    Acute on chronic renal insufficiency (HCC)  Assessment & Plan  The patient is not far from her previous baseline, I will give her gentle IV fluids and we will repeat renal function in the morning    HTN (hypertension) (present on admission)  Assessment & Plan  Continue home Coreg and Norvasc    Diabetes (CMS-HCC) (present on admission)  Assessment & Plan  Monitor with Accu-Cheks and cover with insulin sliding scale        Prophylaxis: Sequential compression devices for DVT prophylaxis, no PPI indicated, stool softeners ordered

## 2017-07-20 NOTE — PROGRESS NOTES
0945  1 unit of PRBC running; Pt tolerating well - will continue to monitor.    1045  Pt 2 person assist to RR and back; unsteady on feet, c/o feeling generalized weakness.  Scheduled H&H, Trop drawn by .  Pt denies needs at this time.

## 2017-07-20 NOTE — PROGRESS NOTES
Marlene from Lab called with critical result of 5.9 HGB at 1206. Critical lab result read back to Marlene.   Dr. Godoy notified of critical lab result at 1208.  Critical lab result read back by Dr. Godoy.

## 2017-07-20 NOTE — ED NOTES
Critical value called to this RN. Pt's Hgb is 5.7, Hematocrit is 16.7, Platelets is 130. Dr. Alberts notified. Awaiting orders.

## 2017-07-20 NOTE — ASSESSMENT & PLAN NOTE
- in setting of MDS. Symptomatic, given chest pain.  - pt getting 1 unit pRBC. Will repeat H&H 1 hr post transfusion, and if remains <7 will transfuse further.   - continue to trend Hgb.  S/P  PRBC  TRANSFUSION  WILL CHECK CBC IN AM  NO CHEST PAIN TODAY

## 2017-07-20 NOTE — PROGRESS NOTES
Assumed care of Pt at 0700 after report from ARIEL Ramos, assessment complete.  Pt sleeping, but easily awakened for AM assessment; A & O X 4, VSS, nsr; Pt denies pain, but complains of fatigue, generalized weakness at this time; updated on and understands POC; oriented to call light and unit routine; medicated and IVF running per MAR, breakfast tray provided.  Pt denies needs at this time; lights out for comfort, bed in low position, call light within reach - will continue to monitor.

## 2017-07-20 NOTE — CONSULTS
"[facility] Nephrology Consult Note    Consulting Physician: Hospitalist    Reason For Consultation: Evaluation and Management of ESRD    HPI: 62yoF with PMH significant for ESRD on HD MWF via L arm AVF, HTN, DM II, Atrial Fibrillation, PVD, COPD, Anemia secondary to Myelodysplastic Syndrome, h/o CVA, COPD on home O2 admitted with complaints of chest discomfort and heaviness for the past couple of days but worse this am and associated with some \"heaviness\" to her breathing and noted to be significantly anemic with Hgb 5.7 in ER. Pt has a history of myelodysplastic syndrome which has caused her to have significant anemia requiring frequent blood transfusions (at least once a month and sometimes more frequently). She follows with Dr. Avila and was started on chemotherapy Vidaza since 5/17. Her last hospital admission was 6/27/17. She has had chest pain in the past related to her anemia. She has been admitted and is currently receiving PRBC transfusion. Trop is so far negative. She still feels weakness and chest heaviness. She reports that she has been compliant with her outpatient dialysis treatments and her last treatment was yesterday but she did terminate the treatment a little early due to cramping. Her CXR does not show fluid overload, she has no LE edema, and her BNP is not elevated. She is due for dialysis tomorrow.     ROS: +Chest Discomfort still present but improved, +SOB, no dizziness, +malaise and extreme fatigue, no nausea or anorexia or diarrhea or constipation, no LE edema. Rest of the 12 point review of systems is negative.    PMH:  1.  ESRD--HD MWF as outpatient      2.  Anemia secondary to myelodysplastic syndrome as well as secondary to CKD, follows with Dr. Avila and is on chemotherapy with Vidaza since 5/17, but still requires frequent blood transfusions.     3.  Chest pain in the past has been related to anemia--nuclear medicine scan 12/16 showed no ischemia but did show small area of " infarction in distal inferolateral wall extending to apex  4.  HTN    5.  DM II  6.  Atrial Fibrillatoin     7.  Vitamin D deficiency     8.  PVD--history of stent placements in the lower extremities    9.  COPD--on home O2    10.  Diastolic CHF    11.  H/O CVA with no residual deficits     12.  Legally blind  13.  H/O Steal Syndrome--required revision of her left arm AV fistula by Dr. Ranulfo Jolly     PSH:  1.   x2    2.  Hysterectomy  3.  D+C x2  4.  Appendectomy    5.  Left arm AV fistula creation by Dr. Jolly     6.  Revision of the left arm AV fistula by Dr. Jolly  secondary to steal syndrome   7.  H/O Stent placement to the bilateral lower extremities   8.  Permcath placement and removal    9.  Retinal detachment surgery     10.  Bilateral cataract surgery     11.  Coronary angiogram     12.  Upper EGD       Allergies: Actos, Darvocet, Demerol, Glucophage, Morphine, Oxycodone, Penicillin, Requip, Simvastatin, Sulfa Drugs, Tramadol, Trazodone, Dilaudid, Benadryl, Iron, Multivitamins, all Phosphorous Binders      FH: No family history of kidney disease or relatives on dialysis     SH: Denies any smoking, EtOH, or illicit drug use. Lives with daughter. 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     Outpatient Meds:  1. Norvasc 10mg daily  2. Lumigan Eye Drops  3. Carvedilol 12.5mg BID  4. Norco PRN  5. Lyrica 50mg BID  6. Compazine 10mg PRN  7. Januvia 25mg daily  8. Voltaren Gel PRN    Physical Exam  Vitals: T 98.1 F     P 75     R 16     /60    O2 100% on 2L NC  General: AOx3, NAD, appears chronically ill  HEENT: PERRL, EOMI, moist mucous membranes  Neck: Trachea midline, no thyromegaly, no JVD  Cardiovascular: RRR, no murmurs/rubs/gallops  Respiratory: Decreased BS bilateral bases, no crackles/rales, no tachypnea, no increased work of breathing  Gastrointestinal: Abd soft, non-tender, non-distended, BS+  Extremities: No clubbing/cyanosis, No LE edema, +L arm AVF with  thrill/bruit  Skin: No rashes, no ulcerations, normal skin turgor  Neurologic: No focal motor deficits, sensation grossly intact to light touch  Lymphatic: No cervical lymphadenopathy and no axillary lymphadenopathy  Psychiatric: AOx3, calm and cooperative    Labs: WBC 3.6, Hgb 5.7, platelets 130, 42% neutrophils, 46% lymphocytes, Na 130, K 3.6, Chloride 90, CO2 33, BUN 44, Cr 4.54, Ca 7.6, AST 17, ALT 28, Alk Phos 52, Albumin 3.3, Lipase 53, Trop x2 negative, BNP 40, INR 0.86    Imaging:  CXR 7/20/17: 1.  No acute cardiopulmonary disease. 2.  Stable mild cardiomegaly.    Assessment/Plan:  1. ESRD--HD MWF as outpatient   --HD tomorrow  --Dose adjust all meds for decreased GFR  2. Anemia--symptomatic, secondary to myelodysplastic syndrome  --Transfuse PRN  --Monitor  3. Chest Pain--possibly related to anemia, Trop x2 negative  --Transfuse  --Monitor  4. HTN--BP stable  --Continue home BP meds  5. DM II  6. Atrial Fibrillation--medical management  7. PVD--supportive care  8. Pancytopenia--secondary to myelodysplastic syndrome, follow up with heme/onc as outpatient  9. Hyponatremia--mild, HD tomorrow  10. Renal Osteodystrophy--pt reports intolerance to all phosphorous binders  --Low Phos diet  11. Mild Protein-Calorie Malnutrition--no dietary protein restrictions

## 2017-07-21 LAB
ALBUMIN SERPL BCP-MCNC: 2.9 G/DL (ref 3.2–4.9)
ALBUMIN/GLOB SERPL: 1.1 G/DL
ALP SERPL-CCNC: 51 U/L (ref 30–99)
ALT SERPL-CCNC: 25 U/L (ref 2–50)
ANION GAP SERPL CALC-SCNC: 14 MMOL/L (ref 0–11.9)
ANION GAP SERPL CALC-SCNC: 14 MMOL/L (ref 0–11.9)
AST SERPL-CCNC: 17 U/L (ref 12–45)
BILIRUB SERPL-MCNC: 0.3 MG/DL (ref 0.1–1.5)
BUN SERPL-MCNC: 63 MG/DL (ref 8–22)
BUN SERPL-MCNC: 63 MG/DL (ref 8–22)
CALCIUM SERPL-MCNC: 6.8 MG/DL (ref 8.5–10.5)
CALCIUM SERPL-MCNC: 6.8 MG/DL (ref 8.5–10.5)
CHLORIDE SERPL-SCNC: 99 MMOL/L (ref 96–112)
CHLORIDE SERPL-SCNC: 99 MMOL/L (ref 96–112)
CO2 SERPL-SCNC: 26 MMOL/L (ref 20–33)
CO2 SERPL-SCNC: 26 MMOL/L (ref 20–33)
CREAT SERPL-MCNC: 5.88 MG/DL (ref 0.5–1.4)
CREAT SERPL-MCNC: 5.88 MG/DL (ref 0.5–1.4)
ERYTHROCYTE [DISTWIDTH] IN BLOOD BY AUTOMATED COUNT: 55.6 FL (ref 35.9–50)
GFR SERPL CREATININE-BSD FRML MDRD: 7 ML/MIN/1.73 M 2
GLOBULIN SER CALC-MCNC: 2.7 G/DL (ref 1.9–3.5)
GLUCOSE BLD-MCNC: 119 MG/DL (ref 65–99)
GLUCOSE BLD-MCNC: 193 MG/DL (ref 65–99)
GLUCOSE BLD-MCNC: 208 MG/DL (ref 65–99)
GLUCOSE BLD-MCNC: 248 MG/DL (ref 65–99)
GLUCOSE SERPL-MCNC: 122 MG/DL (ref 65–99)
GLUCOSE SERPL-MCNC: 122 MG/DL (ref 65–99)
HCT VFR BLD AUTO: 19.8 % (ref 37–47)
HGB BLD-MCNC: 6.7 G/DL (ref 12–16)
HGB BLD-MCNC: 7.1 G/DL (ref 12–16)
HGB BLD-MCNC: 8.9 G/DL (ref 12–16)
MCH RBC QN AUTO: 32.2 PG (ref 27–33)
MCHC RBC AUTO-ENTMCNC: 33.8 G/DL (ref 33.6–35)
MCV RBC AUTO: 95.2 FL (ref 81.4–97.8)
PLATELET # BLD AUTO: 116 K/UL (ref 164–446)
PMV BLD AUTO: 12.9 FL (ref 9–12.9)
POTASSIUM SERPL-SCNC: 4.4 MMOL/L (ref 3.6–5.5)
POTASSIUM SERPL-SCNC: 4.4 MMOL/L (ref 3.6–5.5)
PROT SERPL-MCNC: 5.6 G/DL (ref 6–8.2)
RBC # BLD AUTO: 2.08 M/UL (ref 4.2–5.4)
SODIUM SERPL-SCNC: 139 MMOL/L (ref 135–145)
SODIUM SERPL-SCNC: 139 MMOL/L (ref 135–145)
WBC # BLD AUTO: 3.1 K/UL (ref 4.8–10.8)

## 2017-07-21 PROCEDURE — A9270 NON-COVERED ITEM OR SERVICE: HCPCS | Performed by: HOSPITALIST

## 2017-07-21 PROCEDURE — 36430 TRANSFUSION BLD/BLD COMPNT: CPT

## 2017-07-21 PROCEDURE — 80053 COMPREHEN METABOLIC PANEL: CPT

## 2017-07-21 PROCEDURE — 85018 HEMOGLOBIN: CPT

## 2017-07-21 PROCEDURE — 5A1D00Z PERFORMANCE OF URINARY FILTRATION, SINGLE: ICD-10-PCS | Performed by: INTERNAL MEDICINE

## 2017-07-21 PROCEDURE — 99232 SBSQ HOSP IP/OBS MODERATE 35: CPT | Performed by: FAMILY MEDICINE

## 2017-07-21 PROCEDURE — P9016 RBC LEUKOCYTES REDUCED: HCPCS

## 2017-07-21 PROCEDURE — 90935 HEMODIALYSIS ONE EVALUATION: CPT

## 2017-07-21 PROCEDURE — 82962 GLUCOSE BLOOD TEST: CPT

## 2017-07-21 PROCEDURE — A9270 NON-COVERED ITEM OR SERVICE: HCPCS | Performed by: INTERNAL MEDICINE

## 2017-07-21 PROCEDURE — 86923 COMPATIBILITY TEST ELECTRIC: CPT

## 2017-07-21 PROCEDURE — 85027 COMPLETE CBC AUTOMATED: CPT

## 2017-07-21 PROCEDURE — A9270 NON-COVERED ITEM OR SERVICE: HCPCS | Performed by: NURSE PRACTITIONER

## 2017-07-21 PROCEDURE — 700102 HCHG RX REV CODE 250 W/ 637 OVERRIDE(OP): Performed by: NURSE PRACTITIONER

## 2017-07-21 PROCEDURE — 700111 HCHG RX REV CODE 636 W/ 250 OVERRIDE (IP)

## 2017-07-21 PROCEDURE — 770006 HCHG ROOM/CARE - MED/SURG/GYN SEMI*

## 2017-07-21 PROCEDURE — 700102 HCHG RX REV CODE 250 W/ 637 OVERRIDE(OP): Performed by: HOSPITALIST

## 2017-07-21 PROCEDURE — 36415 COLL VENOUS BLD VENIPUNCTURE: CPT

## 2017-07-21 PROCEDURE — 700102 HCHG RX REV CODE 250 W/ 637 OVERRIDE(OP): Performed by: INTERNAL MEDICINE

## 2017-07-21 RX ORDER — HEPARIN SODIUM 1000 [USP'U]/ML
INJECTION, SOLUTION INTRAVENOUS; SUBCUTANEOUS
Status: COMPLETED
Start: 2017-07-21 | End: 2017-07-21

## 2017-07-21 RX ORDER — HEPARIN SODIUM 1000 [USP'U]/ML
1750 INJECTION, SOLUTION INTRAVENOUS; SUBCUTANEOUS
Status: DISCONTINUED | OUTPATIENT
Start: 2017-07-21 | End: 2017-07-23 | Stop reason: HOSPADM

## 2017-07-21 RX ADMIN — PREGABALIN 50 MG: 25 CAPSULE ORAL at 21:23

## 2017-07-21 RX ADMIN — INSULIN LISPRO 3 UNITS: 100 INJECTION, SOLUTION INTRAVENOUS; SUBCUTANEOUS at 21:23

## 2017-07-21 RX ADMIN — INSULIN LISPRO 2 UNITS: 100 INJECTION, SOLUTION INTRAVENOUS; SUBCUTANEOUS at 17:37

## 2017-07-21 RX ADMIN — CARVEDILOL 12.5 MG: 12.5 TABLET, FILM COATED ORAL at 17:13

## 2017-07-21 RX ADMIN — CYCLOBENZAPRINE 10 MG: 10 TABLET, FILM COATED ORAL at 17:47

## 2017-07-21 RX ADMIN — HEPARIN SODIUM 1750 UNITS: 1000 INJECTION, SOLUTION INTRAVENOUS; SUBCUTANEOUS at 11:46

## 2017-07-21 RX ADMIN — CYCLOBENZAPRINE 10 MG: 10 TABLET, FILM COATED ORAL at 08:30

## 2017-07-21 RX ADMIN — STANDARDIZED SENNA CONCENTRATE AND DOCUSATE SODIUM 2 TABLET: 8.6; 5 TABLET, FILM COATED ORAL at 08:31

## 2017-07-21 RX ADMIN — PREGABALIN 50 MG: 25 CAPSULE ORAL at 08:31

## 2017-07-21 ASSESSMENT — ENCOUNTER SYMPTOMS
HEARTBURN: 0
FEVER: 0
WEAKNESS: 1
LOSS OF CONSCIOUSNESS: 0
EYES NEGATIVE: 1
EYE DISCHARGE: 0
DIZZINESS: 0
SPUTUM PRODUCTION: 0
PSYCHIATRIC NEGATIVE: 1
MUSCULOSKELETAL NEGATIVE: 1
VOMITING: 0
TINGLING: 0
COUGH: 0
CLAUDICATION: 0
CHILLS: 0
HEADACHES: 0
RESPIRATORY NEGATIVE: 1
NAUSEA: 0
GASTROINTESTINAL NEGATIVE: 1
PALPITATIONS: 0
EYE PAIN: 0
TREMORS: 0
BACK PAIN: 0
HEMOPTYSIS: 0
FOCAL WEAKNESS: 0
NECK PAIN: 0
ORTHOPNEA: 0

## 2017-07-21 ASSESSMENT — PAIN SCALES - GENERAL: PAINLEVEL_OUTOF10: 8

## 2017-07-21 NOTE — PROGRESS NOTES
This RN placed call to blood bank to verify who should be releasing patient's RBC's. Per blood bank, since patient is receiving blood in dialysis and the dialysis RN will hang the blood, the dialysis RN should be the one to release the blood. This RN updated dialysis RN and let her know above info, also provided her with blood hernandez extension if she has any questions.

## 2017-07-21 NOTE — PROGRESS NOTES
Melissa from Lab called with critical result of Ca 6.8 at 0414. Critical lab result read back to Melissa.   Dr. Osman notified of critical lab result at 0422.  Critical lab result read back by Dr. Osman. Dr. Osman requesting lab run full CMP panel on pt, but no orders for replacement at this time.    Dr. Osman also notified of HGB of 6.7 at this time, pt due for dialysis today, therefore no orders to transfuse at this time either.

## 2017-07-21 NOTE — PROGRESS NOTES
A/o,assessment completed per CDU,poc discussed,verbalizedunderstanding,denies CP,dizziness,sob, SR ON TELE HR=71,no ectopy,call button within reach,will continue to monitor.

## 2017-07-21 NOTE — RESPIRATORY CARE
COPD EDUCATION by COPD CLINICAL EDUCATOR  7/21/2017 at 6:27 AM by Ada Tamez     Patient reviewed by COPD education team. Patient does not qualify for COPD program.

## 2017-07-21 NOTE — PROGRESS NOTES
HEMODIALYSIS NOTES:     HD today x 3.5 hours per Dr. Le. Initiated at 1143 and ended at 1514. Patient stable , denies any pain, no SOB. One unit pRBC transfused without any difficulty. No BT reactions noted. tolerated treatment. Please see paper flowsheet for details.    UF Net: 1,650 mL    Blood returned. Applied gauze and held L AVF site for 10 minutes. Verified no bleeding. Bruit and thrill present post dialysis. Instructions given to Primary RN that if bleeding occurs on the AVF site, change dressing and held the site with pressure. Report given to SOL Germain RN.

## 2017-07-21 NOTE — PROGRESS NOTES
[facility]ist Progress Note    Date of Service: 2017    Chief Complaint  62 y.o. female admitted 2017 with CHEST PAIN AND NORMOCYTIC ANEMIA      Interval Problem Update  NONE    Consultants/Specialty  NEPHROLOGY    Disposition  PENDING        Review of Systems   Constitutional: Positive for malaise/fatigue. Negative for fever and chills.   HENT: Negative for hearing loss and tinnitus.    Eyes: Negative for pain and discharge.   Respiratory: Negative for cough, hemoptysis and sputum production.    Cardiovascular: Negative for palpitations, orthopnea and claudication.   Gastrointestinal: Negative for heartburn, nausea and vomiting.   Genitourinary: Negative for dysuria, urgency and frequency.   Musculoskeletal: Negative for back pain and neck pain.   Skin: Negative for itching and rash.   Neurological: Positive for weakness. Negative for dizziness, tingling, tremors and headaches.      Physical Exam  Laboratory/Imaging   Hemodynamics  Temp (24hrs), Av.4 °C (97.5 °F), Min:35.9 °C (96.7 °F), Max:36.8 °C (98.3 °F)   Temperature: 36.8 °C (98.3 °F)  Pulse  Av.4  Min: 68  Max: 89    Blood Pressure: 128/56 mmHg      Respiratory      Respiration: 16, Pulse Oximetry: 100 %, O2 Daily Delivery Respiratory : Silicone Nasal Cannula     Work Of Breathing / Effort: Mild  RUL Breath Sounds: Clear, RML Breath Sounds: Clear, RLL Breath Sounds: Diminished, BLANK Breath Sounds: Clear, LLL Breath Sounds: Clear    Fluids    Intake/Output Summary (Last 24 hours) at 17 1033  Last data filed at 17 0600   Gross per 24 hour   Intake    500 ml   Output      0 ml   Net    500 ml       Nutrition  Orders Placed This Encounter   Procedures   • Diet Order     Standing Status: Standing      Number of Occurrences: 1      Standing Expiration Date:      Order Specific Question:  Diet:     Answer:  Diabetic [3]     Physical Exam   Constitutional: She is oriented to person, place, and time. No distress.   HENT:   Head:  Normocephalic and atraumatic.   Eyes: Right eye exhibits no discharge. Left eye exhibits no discharge.   Neck: Neck supple. No JVD present.   Cardiovascular: Normal rate and regular rhythm.    Pulmonary/Chest: No stridor. No respiratory distress. She has no wheezes. She has no rales.   Abdominal: Soft. She exhibits no distension. There is no tenderness. There is no rebound.   Musculoskeletal: She exhibits no tenderness.   Neurological: She is alert and oriented to person, place, and time.   Skin: Skin is warm and dry. She is not diaphoretic.       Recent Labs      07/20/17   0422   07/20/17   1505  07/20/17   2205  07/21/17   0240  07/21/17   1001   WBC  3.6*   --    --    --   3.1*   --    RBC  1.72*   --    --    --   2.08*   --    HEMOGLOBIN  5.7*   < >  7.6*  6.3*  6.7*  7.1*   HEMATOCRIT  16.7*   --   21.9*   --   19.8*   --    MCV  97.1   --    --    --   95.2   --    MCH  33.1*   --    --    --   32.2   --    MCHC  34.1   --    --    --   33.8   --    RDW  60.8*   --    --    --   55.6*   --    PLATELETCT  130*   --    --    --   116*   --    MPV  12.8   --    --    --   12.9   --     < > = values in this interval not displayed.     Recent Labs      07/20/17   0230  07/21/17   0240   SODIUM  130*  139  139   POTASSIUM  3.6  4.4  4.4   CHLORIDE  90*  99  99   CO2  33  26  26   GLUCOSE  209*  122*  122*   BUN  44*  63*  63*   CREATININE  4.54*  5.88*  5.88*   CALCIUM  7.6*  6.8*  6.8*     Recent Labs      07/20/17 0230   APTT  28.7   INR  0.86*     Recent Labs      07/20/17   0422   BNPBTYPENAT  40              Assessment/Plan     Chest pain (present on admission)  Assessment & Plan  - due to significant anemia. No ischemic changes on EKG. Stress test in December which was negative. Troponins remain negative.   - transfuse pRBC as above. Continue to monitor closely on telemetry. Monitor for further recurrence of CP.   WILL TRANSFUSE 1 MORE UNIT OF BLOOD    Anemia requiring transfusions (present on  admission)  Assessment & Plan  - in setting of MDS. Symptomatic, given chest pain.  - pt getting 1 unit pRBC. Will repeat H&H 1 hr post transfusion, and if remains <7 will transfuse further.   - continue to trend Hgb.  WILL TRANSFUSE 1 MORE UNIT OF PRBC     Myelodysplasia (myelodysplastic syndrome) (CMS-HCC) (present on admission)  Assessment & Plan  - transfuse as above. Follow-up with hematology as outpatient for continued chemotherapy.     Pancytopenia (CMS-HCC) (present on admission)  Assessment & Plan  - due to MDS.  - transfuse pRBC as above. Continue to trend WBC and platelets.     HTN (hypertension) (present on admission)  Assessment & Plan  - Continue home Coreg and Norvasc. Monitor BP trend.     Atrial fibrillation (CMS-[facility]) (present on admission)  Assessment & Plan  - currently NSR. Continue coreg. Not on AC given MDS and anemia requiring transfusions.     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 ([facility]) (present on admission)  Assessment & Plan  - continue SSI while in house. Hold Januvia for now.     Legally blind (present on admission)  Assessment & Plan  .    Chronic respiratory failure with hypoxia, 2L (present on admission)  Assessment & Plan  - stable. Continue O2, RT protocol.     End stage renal disease (CMS-HCC) (present on admission)  Assessment & Plan  - will inform nephrology of patient's admission for resumption of her HD.     Diabetes (CMS-HCC)  Assessment & Plan  Monitor with Accu-Cheks and cover with insulin sliding scale    COPD (chronic obstructive pulmonary disease) (CMS-HCC) (present on admission)  Assessment & Plan  - stable, not in acute exacerbation. Continue RT protocol, O2.         Bauer catheter: No Bauer        DVT prophylaxis - mechanical: SCDs

## 2017-07-21 NOTE — PROGRESS NOTES
Two RN skin assessment done w/ ARIEL Saenz. All bony prominences pink and blanching, dialysis fistula to L arm and L EJ present as well, removed PIV to R AC as it was infiltrated. No skin issues noted otherwise.

## 2017-07-21 NOTE — CARE PLAN
Problem: Safety  Goal: Will remain free from falls  Outcome: PROGRESSING AS EXPECTED  Pt will be free from falls w/ hourly rounding, appropriate signs placed and call lights remaining within reach.    Problem: Discharge Barriers/Planning  Goal: Patient’s continuum of care needs will be met  Pt may need one more blood transfusion during dialysis today, Friday July 21st before discharge

## 2017-07-21 NOTE — PROGRESS NOTES
"[facility] Nephrology Progress Note               Author: Renetta Le M.D. Date & Time created: 7/21/2017  11:55 AM     Interval History:  62yoF with PMH significant for ESRD on HD MWF via L arm AVF, HTN, DM II, Atrial Fibrillation, PVD, COPD, Anemia secondary to Myelodysplastic Syndrome, h/o CVA, COPD on home O2 admitted with complaints of chest discomfort and heaviness for the past couple of days but worse this am and associated with some \"heaviness\" to her breathing and noted to be significantly anemic with Hgb 5.7 in ER. Pt has a history of myelodysplastic syndrome which has caused her to have significant anemia requiring frequent blood transfusions (at least once a month and sometimes more frequently). She follows with Dr. Avila and was started on chemotherapy Vidaza since 5/17. Her last hospital admission was 6/27/17. She has had chest pain in the past related to her anemia. She has been admitted and is currently receiving PRBC transfusion. Trop is so far negative. She still feels weakness and chest heaviness. She reports that she has been compliant with her outpatient dialysis treatments and her last treatment was yesterday but she did terminate the treatment a little early due to cramping. Her CXR does not show fluid overload, she has no LE edema, and her BNP is not elevated. She is due for dialysis tomorrow.     DAILY NEPHROLOGY SUMMARY  7/21/17--No events, transfused 1 unit PRBC yesterday with improvement in Hgb to 7.1 today, another unit ordered by primary c for transfusion today, pt seen on dialysis this am, feels better but still having chest heaviness although it has improved, Trop negative    Review of Systems:  Review of Systems   Constitutional: Negative for fever, chills and malaise/fatigue.   HENT: Negative.    Eyes: Negative.    Respiratory: Negative.    Cardiovascular: Positive for chest pain (heaviness). Negative for orthopnea and leg swelling.   Gastrointestinal: Negative.  "   Genitourinary: Negative.    Musculoskeletal: Negative.    Skin: Negative.    Neurological: Positive for weakness. Negative for dizziness, focal weakness and loss of consciousness.   Endo/Heme/Allergies: Negative.    Psychiatric/Behavioral: Negative.        Physical Exam:  Physical Exam   Constitutional: She is oriented to person, place, and time. She appears well-developed and well-nourished. No distress.   HENT:   Head: Normocephalic and atraumatic.   Mouth/Throat: No oropharyngeal exudate.   Eyes: Conjunctivae and EOM are normal. Pupils are equal, round, and reactive to light. No scleral icterus.   Neck: Normal range of motion. Neck supple. No thyromegaly present.   Cardiovascular: Normal rate and regular rhythm.    No murmur heard.  Pulmonary/Chest: Effort normal and breath sounds normal. No respiratory distress. She has no wheezes.   Abdominal: Soft. Bowel sounds are normal. She exhibits no distension. There is no tenderness.   Musculoskeletal: Normal range of motion. She exhibits no edema or tenderness.   +L Arm AVF with thrill/bruit   Neurological: She is alert and oriented to person, place, and time. She exhibits normal muscle tone.   Skin: Skin is warm and dry. No erythema.   Psychiatric: She has a normal mood and affect. Her behavior is normal.   Nursing note and vitals reviewed.      Labs:        Invalid input(s): [id number]  Recent Labs      07/20/17   0230  07/20/17   0422  07/20/17   1052   TROPONINI  <0.01   --   <0.01   BNPBTYPENAT   --   40   --      Recent Labs      07/20/17   0230  07/21/17   0240   SODIUM  130*  139  139   POTASSIUM  3.6  4.4  4.4   CHLORIDE  90*  99  99   CO2  33  26  26   BUN  44*  63*  63*   CREATININE  4.54*  5.88*  5.88*   CALCIUM  7.6*  6.8*  6.8*     Recent Labs      07/20/17   0230  07/21/17   0240   ALTSGPT  28  25   ASTSGOT  17  17   ALKPHOSPHAT  52  51   TBILIRUBIN  0.3  0.3   LIPASE  53   --    GLUCOSE  209*  122*  122*     Recent Labs      07/20/17    0230  17   0422   17   1505  17   2205  17   0240  17   1001   RBC   --   1.72*   --    --    --   2.08*   --    HEMOGLOBIN   --   5.7*   < >  7.6*  6.3*  6.7*  7.1*   HEMATOCRIT   --   16.7*   --   21.9*   --   19.8*   --    PLATELETCT   --   130*   --    --    --   116*   --    PROTHROMBTM  12.0   --    --    --    --    --    --    APTT  28.7   --    --    --    --    --    --    INR  0.86*   --    --    --    --    --    --     < > = values in this interval not displayed.     Recent Labs      17   0230  17   0422  17   0240   WBC   --   3.6*  3.1*   NEUTSPOLYS   --   42.10*   --    LYMPHOCYTES   --   46.40*   --    MONOCYTES   --   7.70   --    EOSINOPHILS   --   2.70   --    BASOPHILS   --   0.30   --    ASTSGOT  17   --   17   ALTSGPT  28   --   25   ALKPHOSPHAT  52   --   51   TBILIRUBIN  0.3   --   0.3           Hemodynamics:  Temp (24hrs), Av.4 °C (97.5 °F), Min:35.9 °C (96.7 °F), Max:36.8 °C (98.3 °F)  Temperature: 36.8 °C (98.3 °F)  Pulse  Av.4  Min: 68  Max: 89   Blood Pressure: 128/56 mmHg     Respiratory:    Respiration: 16, Pulse Oximetry: 100 %, O2 Daily Delivery Respiratory : Silicone Nasal Cannula     Work Of Breathing / Effort: Mild  RUL Breath Sounds: Clear, RML Breath Sounds: Clear, RLL Breath Sounds: Diminished, BLANK Breath Sounds: Clear, LLL Breath Sounds: Clear  Fluids:    Intake/Output Summary (Last 24 hours) at 17 1155  Last data filed at 17 0600   Gross per 24 hour   Intake    500 ml   Output      0 ml   Net    500 ml     Weight: 62.2 kg (137 lb 2 oz)  GI/Nutrition:  Orders Placed This Encounter   Procedures   • Diet Order     Standing Status: Standing      Number of Occurrences: 1      Standing Expiration Date:      Order Specific Question:  Diet:     Answer:  Diabetic [3]     Medical Decision Making, by Problem:  Active Hospital Problems    Diagnosis   • Chest pain [R07.9]   • Anemia requiring transfusions [D64.9]   •  Pancytopenia (CMS-[facility]) [D61.818]   • Myelodysplasia (myelodysplastic syndrome) (CMS-HCC) [D46.9]   • COPD (chronic obstructive pulmonary disease) (CMS-[facility]) [J44.9]   • HTN (hypertension) [I10]   • Atrial fibrillation (CMS-[facility]) [I48.91]   • End stage renal disease (CMS-[facility]) [N18.6]   • Chronic respiratory failure with hypoxia, 2L [J96.11]   • Legally blind [H54.8]   • 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 ([facility]) [E08.21]     Assessment/Plan:  1. ESRD--HD MWF as outpatient    --HD today and qMWF  --Dose adjust all meds for decreased GFR  2. Anemia--symptomatic, secondary to myelodysplastic syndrome  --Transfuse PRN  --1 unit PRBC ordered today, will transfuse while patient is on dialysis  --Monitor  3. Chest Pain--possibly related to anemia, Trop x2 negative  --Transfuse  --Monitor  4. HTN--BP elevated   --Continue home BP meds  5. DM II  6. Atrial Fibrillation--medical management  7. PVD--supportive care  8. Pancytopenia--secondary to myelodysplastic syndrome, follow up with heme/onc as outpatient  9. Hyponatremia--resolved, monitor  10. Renal Osteodystrophy--pt reports intolerance to all phosphorous binders  --Low Phos diet  11. Hypocalcemia--HD today  12. Mild Protein-Calorie Malnutrition--no dietary protein restrictions      Medications reviewed, Labs reviewed and Radiology images reviewed

## 2017-07-21 NOTE — PROGRESS NOTES
Transfer order to Medical floor clarified with Dr. Camargo, d/c tele, also notified Hgb result.pt asymptomatic.hgb 6.3 ,no new orders received.

## 2017-07-22 LAB
ANION GAP SERPL CALC-SCNC: 8 MMOL/L (ref 0–11.9)
BUN SERPL-MCNC: 47 MG/DL (ref 8–22)
CALCIUM SERPL-MCNC: 7.7 MG/DL (ref 8.5–10.5)
CHLORIDE SERPL-SCNC: 98 MMOL/L (ref 96–112)
CO2 SERPL-SCNC: 29 MMOL/L (ref 20–33)
CREAT SERPL-MCNC: 3.8 MG/DL (ref 0.5–1.4)
GFR SERPL CREATININE-BSD FRML MDRD: 12 ML/MIN/1.73 M 2
GLUCOSE BLD-MCNC: 149 MG/DL (ref 65–99)
GLUCOSE BLD-MCNC: 155 MG/DL (ref 65–99)
GLUCOSE BLD-MCNC: 217 MG/DL (ref 65–99)
GLUCOSE BLD-MCNC: 306 MG/DL (ref 65–99)
GLUCOSE SERPL-MCNC: 149 MG/DL (ref 65–99)
HGB BLD-MCNC: 8.4 G/DL (ref 12–16)
HGB BLD-MCNC: 8.7 G/DL (ref 12–16)
POTASSIUM SERPL-SCNC: 4.7 MMOL/L (ref 3.6–5.5)
SODIUM SERPL-SCNC: 135 MMOL/L (ref 135–145)

## 2017-07-22 PROCEDURE — 700102 HCHG RX REV CODE 250 W/ 637 OVERRIDE(OP): Performed by: NURSE PRACTITIONER

## 2017-07-22 PROCEDURE — 36415 COLL VENOUS BLD VENIPUNCTURE: CPT

## 2017-07-22 PROCEDURE — 80048 BASIC METABOLIC PNL TOTAL CA: CPT

## 2017-07-22 PROCEDURE — 700102 HCHG RX REV CODE 250 W/ 637 OVERRIDE(OP): Performed by: HOSPITALIST

## 2017-07-22 PROCEDURE — A9270 NON-COVERED ITEM OR SERVICE: HCPCS | Performed by: INTERNAL MEDICINE

## 2017-07-22 PROCEDURE — 99232 SBSQ HOSP IP/OBS MODERATE 35: CPT | Performed by: FAMILY MEDICINE

## 2017-07-22 PROCEDURE — A9270 NON-COVERED ITEM OR SERVICE: HCPCS | Performed by: NURSE PRACTITIONER

## 2017-07-22 PROCEDURE — A9270 NON-COVERED ITEM OR SERVICE: HCPCS | Performed by: HOSPITALIST

## 2017-07-22 PROCEDURE — 85018 HEMOGLOBIN: CPT

## 2017-07-22 PROCEDURE — 82962 GLUCOSE BLOOD TEST: CPT

## 2017-07-22 PROCEDURE — 700102 HCHG RX REV CODE 250 W/ 637 OVERRIDE(OP): Performed by: INTERNAL MEDICINE

## 2017-07-22 PROCEDURE — 770006 HCHG ROOM/CARE - MED/SURG/GYN SEMI*

## 2017-07-22 RX ADMIN — INSULIN LISPRO 6 UNITS: 100 INJECTION, SOLUTION INTRAVENOUS; SUBCUTANEOUS at 13:13

## 2017-07-22 RX ADMIN — PREGABALIN 50 MG: 25 CAPSULE ORAL at 08:52

## 2017-07-22 RX ADMIN — INSULIN LISPRO 2 UNITS: 100 INJECTION, SOLUTION INTRAVENOUS; SUBCUTANEOUS at 06:48

## 2017-07-22 RX ADMIN — INSULIN LISPRO 3 UNITS: 100 INJECTION, SOLUTION INTRAVENOUS; SUBCUTANEOUS at 21:00

## 2017-07-22 RX ADMIN — CARVEDILOL 12.5 MG: 12.5 TABLET, FILM COATED ORAL at 08:52

## 2017-07-22 RX ADMIN — AMLODIPINE BESYLATE 10 MG: 10 TABLET ORAL at 08:51

## 2017-07-22 RX ADMIN — CYCLOBENZAPRINE 10 MG: 10 TABLET, FILM COATED ORAL at 16:13

## 2017-07-22 RX ADMIN — PREGABALIN 50 MG: 25 CAPSULE ORAL at 20:56

## 2017-07-22 ASSESSMENT — PAIN SCALES - GENERAL
PAINLEVEL_OUTOF10: 3
PAINLEVEL_OUTOF10: 3

## 2017-07-22 ASSESSMENT — ENCOUNTER SYMPTOMS
EYE PAIN: 0
TINGLING: 0
EYES NEGATIVE: 1
FEVER: 0
GASTROINTESTINAL NEGATIVE: 1
CHILLS: 0
RESPIRATORY NEGATIVE: 1
VOMITING: 0
CLAUDICATION: 0
HEARTBURN: 0
WEAKNESS: 1
COUGH: 0
NECK PAIN: 0
SPUTUM PRODUCTION: 0
EYE DISCHARGE: 0
DIZZINESS: 0
HEADACHES: 0
LOSS OF CONSCIOUSNESS: 0
PSYCHIATRIC NEGATIVE: 1
ORTHOPNEA: 0
HEMOPTYSIS: 0
BACK PAIN: 0
MUSCULOSKELETAL NEGATIVE: 1
TREMORS: 0
PALPITATIONS: 0
FOCAL WEAKNESS: 0
NAUSEA: 0

## 2017-07-22 NOTE — CARE PLAN
Problem: Safety  Goal: Will remain free from injury  Outcome: PROGRESSING AS EXPECTED  Fall precautions in place with bed alarm, bed in lowest position with wheels locked, and yellow socks on.     Problem: Pain Management  Goal: Pain level will decrease to patient’s comfort goal  Outcome: PROGRESSING AS EXPECTED  Pt denies any pain.

## 2017-07-22 NOTE — PROGRESS NOTES
Pt is a/o x 4, o2 per nc  No c/o pain this shift   Resting comfortable in bed at this time  Bed alarm on for safety  Call light within reach and pt uses appropriately   Will monitor

## 2017-07-22 NOTE — PROGRESS NOTES
Pt sitting up in bed, eating breakfast. AOX4. Morning meds given per MAR. C/O pain to leg, denies needs for pain meds at this time. Fall precautions in place with bed alarm and bed in lowest position with wheels locked. Denies any needs at this time, will continue to monitor.

## 2017-07-22 NOTE — PROGRESS NOTES
"[facility] Nephrology Progress Note               Author: Tone Astorga, DSc, APN, NP-C  Collaborating Physician: Dr. Berger Date & Time created: 7/22/2017  9:01 AM     Interval History:  62yoF with PMH significant for ESRD on HD MWF via L arm AVF, HTN, DM II, Atrial Fibrillation, PVD, COPD, Anemia secondary to Myelodysplastic Syndrome, h/o CVA, COPD on home O2 admitted with complaints of chest discomfort and heaviness for the past couple of days but worse this am and associated with some \"heaviness\" to her breathing and noted to be significantly anemic with Hgb 5.7 in ER. Pt has a history of myelodysplastic syndrome which has caused her to have significant anemia requiring frequent blood transfusions (at least once a month and sometimes more frequently). She follows with Dr. Avila and was started on chemotherapy Vidaza since 5/17. Her last hospital admission was 6/27/17. She has had chest pain in the past related to her anemia. She has been admitted and is currently receiving PRBC transfusion. Trop is so far negative. She still feels weakness and chest heaviness. She reports that she has been compliant with her outpatient dialysis treatments and her last treatment was yesterday but she did terminate the treatment a little early due to cramping. Her CXR does not show fluid overload, she has no LE edema, and her BNP is not elevated. She is due for dialysis tomorrow.     DAILY NEPHROLOGY SUMMARY  7/21/17--No events, transfused 1 unit PRBC yesterday with improvement in Hgb to 7.1 today, another unit ordered by primary [facility] for transfusion today, pt seen on dialysis this am, feels better but still having chest heaviness although it has improved, Trop negative.  7/22/17--Up in bed; eating breakfast. Feels better; no chest heaviness or SOB. Received 1U additional PRBC yesterday; Hgb now >7.0.    Review of Systems:  Review of Systems   Constitutional: Negative for fever, chills and malaise/fatigue.   HENT: Negative.  "   Eyes: Negative.    Respiratory: Negative.    Cardiovascular: Positive for chest pain (heaviness). Negative for orthopnea and leg swelling.   Gastrointestinal: Negative.    Genitourinary: Negative.    Musculoskeletal: Negative.    Skin: Negative.    Neurological: Positive for weakness. Negative for dizziness, focal weakness and loss of consciousness.   Endo/Heme/Allergies: Negative.    Psychiatric/Behavioral: Negative.        Physical Exam:  Physical Exam   Constitutional: She is oriented to person, place, and time. She appears well-developed and well-nourished. No distress.   HENT:   Head: Normocephalic and atraumatic.   Mouth/Throat: No oropharyngeal exudate.   Eyes: Conjunctivae and EOM are normal. Pupils are equal, round, and reactive to light. No scleral icterus.   Neck: Normal range of motion. Neck supple. No thyromegaly present.   Cardiovascular: Normal rate and regular rhythm.    No murmur heard.  Pulmonary/Chest: Effort normal and breath sounds normal. No respiratory distress. She has no wheezes.   Abdominal: Soft. Bowel sounds are normal. She exhibits no distension. There is no tenderness.   Musculoskeletal: Normal range of motion. She exhibits no edema or tenderness.   +L Arm AVF with thrill/bruit   Neurological: She is alert and oriented to person, place, and time. She exhibits normal muscle tone.   Skin: Skin is warm and dry. No erythema.   Psychiatric: She has a normal mood and affect. Her behavior is normal.   Nursing note and vitals reviewed.      Labs:        Invalid input(s): [id number]  Recent Labs      07/20/17   0230  07/20/17   0422  07/20/17   1052   TROPONINI  <0.01   --   <0.01   BNPBTYPENAT   --   40   --      Recent Labs      07/20/17   0230  07/21/17   0240  07/22/17   0202   SODIUM  130*  139  139  135   POTASSIUM  3.6  4.4  4.4  4.7   CHLORIDE  90*  99  99  98   CO2  33  26  26  29   BUN  44*  63*  63*  47*   CREATININE  4.54*  5.88*  5.88*  3.80*   CALCIUM  7.6*  6.8*  6.8*   7.7*     Recent Labs      17   02317   0240  17   0202   ALTSGPT  28  25   --    ASTSGOT     --    ALKPHOSPHAT  52  51   --    TBILIRUBIN  0.3  0.3   --    LIPASE  53   --    --    GLUCOSE  209*  122*  122*  149*     Recent Labs      17   0230  17   0422   17   1505   17   0240  17   1001  17   1757  17   0202   RBC   --   1.72*   --    --    --   2.08*   --    --    --    HEMOGLOBIN   --   5.7*   < >  7.6*   < >  6.7*  7.1*  8.9*  8.4*   HEMATOCRIT   --   16.7*   --   21.9*   --   19.8*   --    --    --    PLATELETCT   --   130*   --    --    --   116*   --    --    --    PROTHROMBTM  12.0   --    --    --    --    --    --    --    --    APTT  28.7   --    --    --    --    --    --    --    --    INR  0.86*   --    --    --    --    --    --    --    --     < > = values in this interval not displayed.     Recent Labs      17   024   WBC   --   3.6*  3.1*   NEUTSPOLYS   --   42.10*   --    LYMPHOCYTES   --   46.40*   --    MONOCYTES   --   7.70   --    EOSINOPHILS   --   2.70   --    BASOPHILS   --   0.30   --    ASTSGOT  17   --      ALTSGPT  28   --   25   ALKPHOSPHAT  52   --   51   TBILIRUBIN  0.3   --   0.3           Hemodynamics:  Temp (24hrs), Av.3 °C (97.4 °F), Min:36.1 °C (97 °F), Max:36.7 °C (98 °F)  Temperature: 36.7 °C (98 °F)  Pulse  Av.1  Min: 64  Max: 89   Blood Pressure: 124/55 mmHg     Respiratory:    Respiration: 18, Pulse Oximetry: 95 %     Work Of Breathing / Effort: Mild  RUL Breath Sounds: Clear, RML Breath Sounds: Clear, RLL Breath Sounds: Diminished, BLANK Breath Sounds: Clear, LLL Breath Sounds: Diminished  Fluids:    Intake/Output Summary (Last 24 hours) at 17 0901  Last data filed at 17 1530   Gross per 24 hour   Intake    850 ml   Output   2500 ml   Net  -1650 ml        GI/Nutrition:  Orders Placed This Encounter   Procedures   • Diet Order     Standing  Status: Standing      Number of Occurrences: 1      Standing Expiration Date:      Order Specific Question:  Diet:     Answer:  Diabetic [3]     Medical Decision Making, by Problem:  Active Hospital Problems    Diagnosis   • Chest pain [R07.9]   • Anemia requiring transfusions [D64.9]   • Pancytopenia (CMS-HCC) [D61.818]   • Myelodysplasia (myelodysplastic syndrome) (CMS-HCC) [D46.9]   • COPD (chronic obstructive pulmonary disease) (CMS-HCC) [J44.9]   • HTN (hypertension) [I10]   • Atrial fibrillation (CMS-HCC) [I48.91]   • End stage renal disease (CMS-HCC) [N18.6]   • Chronic respiratory failure with hypoxia, 2L [J96.11]   • Legally blind [H54.8]   • 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 ([facility]) [E08.21]     Assessment/Plan:  1. ESRD--HD MWF as outpatient    --HD Monday and qMWF  --Dose adjust all meds for decreased GFR  2. Anemia--symptomatic, secondary to myelodysplastic syndrome  --Transfuse PRN  --1 unit PRBC ordered today, will transfuse while patient is on dialysis  --Monitor  3. Chest Pain--possibly related to anemia, Trop x2 negative  --Transfuse  --Monitor  4. HTN--BP elevated   --Continue home BP meds  5. DM II  6. Atrial Fibrillation--medical management  7. PVD--supportive care  8. Pancytopenia--secondary to myelodysplastic syndrome, follow up with heme/onc as outpatient  9. Hyponatremia--resolved, monitor  10. Renal Osteodystrophy--pt reports intolerance to all phosphorous binders  --Low Phos diet  11. Hypocalcemia--HD today  12. Mild Protein-Calorie Malnutrition--no dietary protein restrictions      Medications reviewed, Labs reviewed and Radiology images reviewed

## 2017-07-22 NOTE — PROGRESS NOTES
[facility]ist Progress Note    Date of Service: 2017    Chief Complaint  62 y.o. female admitted 2017 with CHEST PAIN AND NORMOCYTIC ANEMIA      Interval Problem Update  NONE    Consultants/Specialty  NEPHROLOGY    Disposition  PENDING        Review of Systems   Constitutional: Positive for malaise/fatigue. Negative for fever and chills.   HENT: Negative for hearing loss and tinnitus.    Eyes: Negative for pain and discharge.   Respiratory: Negative for cough, hemoptysis and sputum production.    Cardiovascular: Negative for palpitations, orthopnea and claudication.   Gastrointestinal: Negative for heartburn, nausea and vomiting.   Genitourinary: Negative for dysuria, urgency and frequency.   Musculoskeletal: Negative for back pain and neck pain.   Skin: Negative for itching and rash.   Neurological: Positive for weakness. Negative for dizziness, tingling, tremors and headaches.      Physical Exam  Laboratory/Imaging   Hemodynamics  Temp (24hrs), Av.3 °C (97.4 °F), Min:36.1 °C (97 °F), Max:36.7 °C (98 °F)   Temperature: 36.7 °C (98 °F)  Pulse  Av.1  Min: 64  Max: 89    Blood Pressure: 124/55 mmHg      Respiratory      Respiration: 18, Pulse Oximetry: 95 %     Work Of Breathing / Effort: Mild  RUL Breath Sounds: Clear, RML Breath Sounds: Clear, RLL Breath Sounds: Diminished, BLANK Breath Sounds: Clear, LLL Breath Sounds: Diminished    Fluids    Intake/Output Summary (Last 24 hours) at 17 1049  Last data filed at 17 1530   Gross per 24 hour   Intake    850 ml   Output   2500 ml   Net  -1650 ml       Nutrition  Orders Placed This Encounter   Procedures   • Diet Order     Standing Status: Standing      Number of Occurrences: 1      Standing Expiration Date:      Order Specific Question:  Diet:     Answer:  Diabetic [3]     Physical Exam   Constitutional: She is oriented to person, place, and time. No distress.   HENT:   Head: Normocephalic and atraumatic.   Eyes: Right eye exhibits no  discharge. Left eye exhibits no discharge.   Neck: Neck supple. No JVD present.   Cardiovascular: Normal rate and regular rhythm.    Pulmonary/Chest: No stridor. No respiratory distress. She has no wheezes. She has no rales.   Abdominal: Soft. She exhibits no distension. There is no tenderness. There is no rebound.   Musculoskeletal: She exhibits no tenderness.   Neurological: She is alert and oriented to person, place, and time.   Skin: Skin is warm and dry. She is not diaphoretic.       Recent Labs      07/20/17   0422   07/20/17   1505   07/21/17   0240  07/21/17   1001  07/21/17   1757  07/22/17   0202   WBC  3.6*   --    --    --   3.1*   --    --    --    RBC  1.72*   --    --    --   2.08*   --    --    --    HEMOGLOBIN  5.7*   < >  7.6*   < >  6.7*  7.1*  8.9*  8.4*   HEMATOCRIT  16.7*   --   21.9*   --   19.8*   --    --    --    MCV  97.1   --    --    --   95.2   --    --    --    MCH  33.1*   --    --    --   32.2   --    --    --    MCHC  34.1   --    --    --   33.8   --    --    --    RDW  60.8*   --    --    --   55.6*   --    --    --    PLATELETCT  130*   --    --    --   116*   --    --    --    MPV  12.8   --    --    --   12.9   --    --    --     < > = values in this interval not displayed.     Recent Labs      07/20/17   0230  07/21/17   0240  07/22/17   0202   SODIUM  130*  139  139  135   POTASSIUM  3.6  4.4  4.4  4.7   CHLORIDE  90*  99  99  98   CO2  33  26  26  29   GLUCOSE  209*  122*  122*  149*   BUN  44*  63*  63*  47*   CREATININE  4.54*  5.88*  5.88*  3.80*   CALCIUM  7.6*  6.8*  6.8*  7.7*     Recent Labs      07/20/17   0230   APTT  28.7   INR  0.86*     Recent Labs      07/20/17   0422   BNPBTYPENAT  40              Assessment/Plan     Chest pain (present on admission)  Assessment & Plan  - due to significant anemia. No ischemic changes on EKG. Stress test in December which was negative. Troponins remain negative.   - transfuse pRBC as above. Continue to monitor closely  on telemetry. Monitor for further recurrence of CP.   WILL TRANSFUSE 1 MORE UNIT OF BLOOD  NO CHEST PAIN TODAY    Anemia requiring transfusions (present on admission)  Assessment & Plan  - in setting of MDS. Symptomatic, given chest pain.  - pt getting 1 unit pRBC. Will repeat H&H 1 hr post transfusion, and if remains <7 will transfuse further.   - continue to trend Hgb.  S/P  PRBC  TRANSFUSION  WILL CHECK CBC IN AM  NO CHEST PAIN TODAY    Myelodysplasia (myelodysplastic syndrome) (CMS-[facility]) (present on admission)  Assessment & Plan  - transfuse as above. Follow-up with hematology as outpatient for continued chemotherapy.     Pancytopenia (CMS-[facility]) (present on admission)  Assessment & Plan  - due to MDS.  - transfuse pRBC as above. Continue to trend WBC and platelets.     HTN (hypertension) (present on admission)  Assessment & Plan  - Continue home Coreg and Norvasc. Monitor BP trend.     Atrial fibrillation (CMS-HCC) (present on admission)  Assessment & Plan  - currently NSR. Continue coreg. Not on AC given MDS and anemia requiring transfusions.     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 ([facility]) (present on admission)  Assessment & Plan  - continue SSI while in house. Hold Januvia for now.     Legally blind (present on admission)  Assessment & Plan  .    Chronic respiratory failure with hypoxia, 2L (present on admission)  Assessment & Plan  - stable. Continue O2, RT protocol.     End stage renal disease (CMS-HCC) (present on admission)  Assessment & Plan  - will inform nephrology of patient's admission for resumption of her HD.     Diabetes (CMS-HCC)  Assessment & Plan  Monitor with Accu-Cheks and cover with insulin sliding scale    COPD (chronic obstructive pulmonary disease) (CMS-HCC) (present on admission)  Assessment & Plan  - stable, not in acute exacerbation. Continue RT protocol, O2.         Bauer catheter: No Bauer        DVT prophylaxis -  mechanical: SCDs

## 2017-07-23 VITALS
WEIGHT: 137.13 LBS | SYSTOLIC BLOOD PRESSURE: 131 MMHG | TEMPERATURE: 97.3 F | DIASTOLIC BLOOD PRESSURE: 55 MMHG | HEART RATE: 71 BPM | OXYGEN SATURATION: 100 % | HEIGHT: 58 IN | BODY MASS INDEX: 28.78 KG/M2 | RESPIRATION RATE: 16 BRPM

## 2017-07-23 LAB
ALBUMIN SERPL BCP-MCNC: 3 G/DL (ref 3.2–4.9)
ALBUMIN/GLOB SERPL: 1.1 G/DL
ALP SERPL-CCNC: 54 U/L (ref 30–99)
ALT SERPL-CCNC: 25 U/L (ref 2–50)
ANION GAP SERPL CALC-SCNC: 12 MMOL/L (ref 0–11.9)
AST SERPL-CCNC: 14 U/L (ref 12–45)
BASOPHILS # BLD AUTO: 0.4 % (ref 0–1.8)
BASOPHILS # BLD: 0.01 K/UL (ref 0–0.12)
BILIRUB SERPL-MCNC: 0.4 MG/DL (ref 0.1–1.5)
BUN SERPL-MCNC: 76 MG/DL (ref 8–22)
CALCIUM SERPL-MCNC: 7.6 MG/DL (ref 8.5–10.5)
CHLORIDE SERPL-SCNC: 100 MMOL/L (ref 96–112)
CO2 SERPL-SCNC: 23 MMOL/L (ref 20–33)
CREAT SERPL-MCNC: 5.77 MG/DL (ref 0.5–1.4)
EOSINOPHIL # BLD AUTO: 0.13 K/UL (ref 0–0.51)
EOSINOPHIL NFR BLD: 4.7 % (ref 0–6.9)
ERYTHROCYTE [DISTWIDTH] IN BLOOD BY AUTOMATED COUNT: 49.3 FL (ref 35.9–50)
GFR SERPL CREATININE-BSD FRML MDRD: 7 ML/MIN/1.73 M 2
GLOBULIN SER CALC-MCNC: 2.7 G/DL (ref 1.9–3.5)
GLUCOSE BLD-MCNC: 195 MG/DL (ref 65–99)
GLUCOSE SERPL-MCNC: 112 MG/DL (ref 65–99)
HCT VFR BLD AUTO: 24.4 % (ref 37–47)
HGB BLD-MCNC: 8.3 G/DL (ref 12–16)
IMM GRANULOCYTES # BLD AUTO: 0.01 K/UL (ref 0–0.11)
IMM GRANULOCYTES NFR BLD AUTO: 0.4 % (ref 0–0.9)
LYMPHOCYTES # BLD AUTO: 1.22 K/UL (ref 1–4.8)
LYMPHOCYTES NFR BLD: 44 % (ref 22–41)
MCH RBC QN AUTO: 32.2 PG (ref 27–33)
MCHC RBC AUTO-ENTMCNC: 34 G/DL (ref 33.6–35)
MCV RBC AUTO: 94.6 FL (ref 81.4–97.8)
MONOCYTES # BLD AUTO: 0.23 K/UL (ref 0–0.85)
MONOCYTES NFR BLD AUTO: 8.3 % (ref 0–13.4)
NEUTROPHILS # BLD AUTO: 1.17 K/UL (ref 2–7.15)
NEUTROPHILS NFR BLD: 42.2 % (ref 44–72)
NRBC # BLD AUTO: 0 K/UL
NRBC BLD AUTO-RTO: 0 /100 WBC
PLATELET # BLD AUTO: 109 K/UL (ref 164–446)
PMV BLD AUTO: 12 FL (ref 9–12.9)
POTASSIUM SERPL-SCNC: 5.3 MMOL/L (ref 3.6–5.5)
PROT SERPL-MCNC: 5.7 G/DL (ref 6–8.2)
RBC # BLD AUTO: 2.58 M/UL (ref 4.2–5.4)
SODIUM SERPL-SCNC: 135 MMOL/L (ref 135–145)
WBC # BLD AUTO: 2.8 K/UL (ref 4.8–10.8)

## 2017-07-23 PROCEDURE — 99239 HOSP IP/OBS DSCHRG MGMT >30: CPT | Performed by: FAMILY MEDICINE

## 2017-07-23 PROCEDURE — 36415 COLL VENOUS BLD VENIPUNCTURE: CPT

## 2017-07-23 PROCEDURE — 700102 HCHG RX REV CODE 250 W/ 637 OVERRIDE(OP): Performed by: INTERNAL MEDICINE

## 2017-07-23 PROCEDURE — A9270 NON-COVERED ITEM OR SERVICE: HCPCS | Performed by: HOSPITALIST

## 2017-07-23 PROCEDURE — 80053 COMPREHEN METABOLIC PANEL: CPT

## 2017-07-23 PROCEDURE — 82962 GLUCOSE BLOOD TEST: CPT

## 2017-07-23 PROCEDURE — A9270 NON-COVERED ITEM OR SERVICE: HCPCS | Performed by: INTERNAL MEDICINE

## 2017-07-23 PROCEDURE — A9270 NON-COVERED ITEM OR SERVICE: HCPCS | Performed by: NURSE PRACTITIONER

## 2017-07-23 PROCEDURE — 700102 HCHG RX REV CODE 250 W/ 637 OVERRIDE(OP): Performed by: NURSE PRACTITIONER

## 2017-07-23 PROCEDURE — 85025 COMPLETE CBC W/AUTO DIFF WBC: CPT

## 2017-07-23 PROCEDURE — 700102 HCHG RX REV CODE 250 W/ 637 OVERRIDE(OP): Performed by: HOSPITALIST

## 2017-07-23 RX ORDER — CYCLOBENZAPRINE HCL 10 MG
10 TABLET ORAL 3 TIMES DAILY PRN
Qty: 20 TAB | Refills: 0 | Status: SHIPPED | OUTPATIENT
Start: 2017-07-23 | End: 2017-11-03

## 2017-07-23 RX ADMIN — AMLODIPINE BESYLATE 10 MG: 10 TABLET ORAL at 08:27

## 2017-07-23 RX ADMIN — INSULIN LISPRO 2 UNITS: 100 INJECTION, SOLUTION INTRAVENOUS; SUBCUTANEOUS at 11:32

## 2017-07-23 RX ADMIN — CYCLOBENZAPRINE 10 MG: 10 TABLET, FILM COATED ORAL at 02:16

## 2017-07-23 RX ADMIN — PREGABALIN 50 MG: 25 CAPSULE ORAL at 08:26

## 2017-07-23 RX ADMIN — CARVEDILOL 12.5 MG: 12.5 TABLET, FILM COATED ORAL at 08:27

## 2017-07-23 ASSESSMENT — ENCOUNTER SYMPTOMS
WEAKNESS: 1
LOSS OF CONSCIOUSNESS: 0
GASTROINTESTINAL NEGATIVE: 1
DIZZINESS: 0
EYES NEGATIVE: 1
FEVER: 0
PSYCHIATRIC NEGATIVE: 1
CHILLS: 0
RESPIRATORY NEGATIVE: 1
FOCAL WEAKNESS: 0
MUSCULOSKELETAL NEGATIVE: 1
ORTHOPNEA: 0

## 2017-07-23 ASSESSMENT — PAIN SCALES - GENERAL: PAINLEVEL_OUTOF10: 2

## 2017-07-23 NOTE — PROGRESS NOTES
"[facility] Nephrology Progress Note               Author: Tone Astorga, DSc, APN, NP-C  Collaborating Physician: Dr. Berger Date & Time created: 7/23/2017  11:44 AM     Interval History:  62yoF with PMH significant for ESRD on HD MWF via L arm AVF, HTN, DM II, Atrial Fibrillation, PVD, COPD, Anemia secondary to Myelodysplastic Syndrome, h/o CVA, COPD on home O2 admitted with complaints of chest discomfort and heaviness for the past couple of days but worse this am and associated with some \"heaviness\" to her breathing and noted to be significantly anemic with Hgb 5.7 in ER. Pt has a history of myelodysplastic syndrome which has caused her to have significant anemia requiring frequent blood transfusions (at least once a month and sometimes more frequently). She follows with Dr. Avila and was started on chemotherapy Vidaza since 5/17. Her last hospital admission was 6/27/17. She has had chest pain in the past related to her anemia. She has been admitted and is currently receiving PRBC transfusion. Trop is so far negative. She still feels weakness and chest heaviness. She reports that she has been compliant with her outpatient dialysis treatments and her last treatment was yesterday but she did terminate the treatment a little early due to cramping. Her CXR does not show fluid overload, she has no LE edema, and her BNP is not elevated. She is due for dialysis tomorrow.     DAILY NEPHROLOGY SUMMARY  7/21/17--No events, transfused 1 unit PRBC yesterday with improvement in Hgb to 7.1 today, another unit ordered by primary [facility] for transfusion today, pt seen on dialysis this am, feels better but still having chest heaviness although it has improved, Trop negative.  7/22/17--Up in bed; eating breakfast. Feels better; no chest heaviness or SOB. Received 1U additional PRBC yesterday; Hgb now >7.0.  7/23/17 - Pt resting in bed comfortably. No SOB or chest pain. Eating well.    Review of Systems:  Review of Systems "   Constitutional: Negative for fever, chills and malaise/fatigue.   HENT: Negative.    Eyes: Negative.    Respiratory: Negative.    Cardiovascular: Positive for chest pain (heaviness). Negative for orthopnea and leg swelling.   Gastrointestinal: Negative.    Genitourinary: Negative.    Musculoskeletal: Negative.    Skin: Negative.    Neurological: Positive for weakness. Negative for dizziness, focal weakness and loss of consciousness.   Endo/Heme/Allergies: Negative.    Psychiatric/Behavioral: Negative.        Physical Exam:  Physical Exam   Constitutional: She is oriented to person, place, and time. She appears well-developed and well-nourished. No distress.   HENT:   Head: Normocephalic and atraumatic.   Mouth/Throat: No oropharyngeal exudate.   Eyes: Conjunctivae and EOM are normal. Pupils are equal, round, and reactive to light. No scleral icterus.   Neck: Normal range of motion. Neck supple. No thyromegaly present.   Cardiovascular: Normal rate and regular rhythm.    No murmur heard.  Pulmonary/Chest: Effort normal and breath sounds normal. No respiratory distress. She has no wheezes.   Abdominal: Soft. Bowel sounds are normal. She exhibits no distension. There is no tenderness.   Musculoskeletal: Normal range of motion. She exhibits no edema or tenderness.   +L Arm AVF with thrill/bruit   Neurological: She is alert and oriented to person, place, and time. She exhibits normal muscle tone.   Skin: Skin is warm and dry. No erythema.   Psychiatric: She has a normal mood and affect. Her behavior is normal.   Nursing note and vitals reviewed.      Labs:        Invalid input(s): [id number]      Recent Labs      07/21/17   0240  07/22/17   0202  07/23/17   0350   SODIUM  139  139  135  135   POTASSIUM  4.4  4.4  4.7  5.3   CHLORIDE  99  99  98  100   CO2  26  26  29  23   BUN  63*  63*  47*  76*   CREATININE  5.88*  5.88*  3.80*  5.77*   CALCIUM  6.8*  6.8*  7.7*  7.6*     Recent Labs      07/21/17   0240   17   0202  17   0350   ALTSGPT  25   --   25   ASTSGOT  17   --   14   ALKPHOSPHAT  51   --   54   TBILIRUBIN  0.3   --   0.4   GLUCOSE  122*  122*  149*  112*     Recent Labs      17   1505   17   0240   17   0202  17   1037  17   0350   RBC   --    --   2.08*   --    --    --   2.58*   HEMOGLOBIN  7.6*   < >  6.7*   < >  8.4*  8.7*  8.3*   HEMATOCRIT  21.9*   --   19.8*   --    --    --   24.4*   PLATELETCT   --    --   116*   --    --    --   109*    < > = values in this interval not displayed.     Recent Labs      17   0240  17   0350   WBC  3.1*  2.8*   NEUTSPOLYS   --   42.20*   LYMPHOCYTES   --   44.00*   MONOCYTES   --   8.30   EOSINOPHILS   --   4.70   BASOPHILS   --   0.40   ASTSGOT  17  14   ALTSGPT  25  25   ALKPHOSPHAT  51  54   TBILIRUBIN  0.3  0.4           Hemodynamics:  Temp (24hrs), Av.4 °C (97.6 °F), Min:36.3 °C (97.3 °F), Max:36.7 °C (98.1 °F)  Temperature: 36.3 °C (97.3 °F)  Pulse  Av.4  Min: 64  Max: 89   Blood Pressure: 131/55 mmHg     Respiratory:    Respiration: 16, Pulse Oximetry: 100 %           Fluids:    Intake/Output Summary (Last 24 hours) at 17 1144  Last data filed at 17 1000   Gross per 24 hour   Intake    360 ml   Output      0 ml   Net    360 ml        GI/Nutrition:  Orders Placed This Encounter   Procedures   • Diet Order     Standing Status: Standing      Number of Occurrences: 1      Standing Expiration Date:      Order Specific Question:  Diet:     Answer:  Diabetic [3]     Medical Decision Making, by Problem:  Active Hospital Problems    Diagnosis   • Chest pain [R07.9]   • Anemia requiring transfusions [D64.9]   • Pancytopenia (CMS-HCC) [D61.818]   • Myelodysplasia (myelodysplastic syndrome) (CMS-HCC) [D46.9]   • COPD (chronic obstructive pulmonary disease) (CMS-HCC) [J44.9]   • HTN (hypertension) [I10]   • Atrial fibrillation (CMS-HCC) [I48.91]   • End stage renal disease (CMS-HCC) [N18.6]   •  Chronic respiratory failure with hypoxia, 2L [J96.11]   • Legally blind [H54.8]   • 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 (HCC) [E08.21]     Assessment/Plan:  1. ESRD--HD MWF as outpatient    --HD Monday and qMWF  --Dose adjust all meds for decreased GFR  --Schedule for hemodialysis tomorrow  2. Anemia--symptomatic, secondary to myelodysplastic syndrome  --Transfuse PRN  --1 unit PRBC ordered today, will transfuse while patient is on dialysis  -- Continue VANCE at HD   3. Chest Pain--possibly related to anemia, Trop x2 negative  --Transfuse  --Monitor  4. HTN--BP elevated   --Continue home BP meds  5. DM II  6. Atrial Fibrillation--medical management  7. PVD--supportive care  8. Pancytopenia--secondary to myelodysplastic syndrome, follow up with heme/onc as outpatient  9. Hyponatremia--resolved, monitor  10. Renal Osteodystrophy--pt reports intolerance to all phosphorous binders  --Low Phos diet  11. Hypocalcemia--HD today  12. Mild Protein-Calorie Malnutrition--no dietary protein restrictions      Medications reviewed, Labs reviewed and Radiology images reviewed

## 2017-07-23 NOTE — PROGRESS NOTES
Pt resting quietly in bed. AOX4. Morning meds given per MAR. 2L on via NC. C/O mild pain to leg, denies needs for pain meds. Fall precautions on per bed alarm and bed in lowest position with wheels locked. Denies any needs at this time, will continue to monitor.

## 2017-07-23 NOTE — DISCHARGE SUMMARY
CHIEF COMPLAINT ON ADMISSION  Chief Complaint   Patient presents with   • Chest Pain       CODE STATUS  Full Code    HPI & HOSPITAL COURSE  This is a 62 y.o. female here with CHEST PAIN AND NORMOCYTIC ANEMIA.    Chest pain (present on admission)  - due to significant anemia. No ischemic changes on EKG. Stress test in December which was negative. Troponins remain negative.    -S/P PRBC TRANSFUSION  H/H ARE STABLE  NO CHEST PAIN TODAY    Myelodysplasia (myelodysplastic syndrome) (CMS-HCC) (present on admission)  THIS IS CAUSING THE NORMOCYTIC ANEMIA WITH PANCYTOPENIA   Follow-up with hematology as outpatient for continued chemotherapy    Therefore, she is discharged in good and stable condition with close outpatient follow-up.    HTN (hypertension) (present on admission)  - Continue home Coreg and Norvasc. Monitor BP trend.     Atrial fibrillation (CMS-HCC) (present on admission)  - currently NSR. Continue coreg. Not on AC given MDS and anemia requiring transfusions.     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 (HCC) (present on admission)  - continue WITH  Januvia for now.     SPECIFIC OUTPATIENT FOLLOW-UP  PCP THIS COMING WEEK  ONCOLOGY     ESRD  ON DIALYSIS  AS PER NEPHROLOGY    DISCHARGE PROBLEM LIST  Active Problems:    Anemia requiring transfusions POA: Yes    Chest pain POA: Yes      Overview: - Similar pain in the past with neg workup.      - trops <0.01 x2      - EKG: no acute changes        - 12/22/16: negative nuclear medicine cardiac stress test      Myelodysplasia (myelodysplastic syndrome) (CMS-HCC) POA: Yes    Pancytopenia (CMS-HCC) POA: Yes    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 (HCC) (Chronic) POA: Yes    Legally blind (Chronic) POA: Yes    Chronic respiratory failure with hypoxia, 2L (Chronic) POA: Yes    End stage renal disease (CMS-HCC) POA: Yes    HTN (hypertension)  POA: Yes      Overview:           Atrial fibrillation (CMS-HCC) POA: Yes      Overview: [id number] score - 4 for gender, DM, HTN, PVD    COPD (chronic obstructive pulmonary disease) (CMS-HCC) POA: Yes  Resolved Problems:    * No resolved hospital problems. *      FOLLOW UP  Future Appointments  Date Time Provider Department [facility]   [facility]   [facility]   [facility]2017 [facility]   [facility]10[facility]2017 2[facility]   [facility]11[facility]2017 2[facility]     No follow-up provider specified.    MEDICATIONS ON DISCHARGE   Vielka Briscoe   Home Medication Instructions [place]:[id number]    Printed on:07/23/17 0819   Medication Information                      amlodipine (NORVASC) 10 MG Tab  Take 10 mg by mouth every day.             bimatoprost (LUMIGAN) 0.03 % Solution  Place 1 Drop in both eyes every evening.             carvedilol (COREG) 12.5 MG Tab  Take 12.5 mg by mouth 2 times a day, with meals.             hydrocodone-acetaminophen (NORCO) 5-325 MG Tab per tablet  Take 1-2 Tabs by mouth every 6 hours as needed.             pregabalin (LYRICA) 50 MG capsule  Take 50 mg by mouth 2 times a day.             prochlorperazine (COMPAZINE) 10 MG Tab  Take 10 mg by mouth every 6 hours as needed.             sitagliptin (JANUVIA) 25 MG Tab  Take 1 Tab by mouth every morning.             VOLTAREN 1 % Gel  APPLY 2 GRAMS TOPICALLY QID                 DIET  Orders Placed This Encounter   Procedures   • Diet Order     Standing Status: Standing      Number of Occurrences: 1      Standing Expiration Date:      Order Specific Question:  Diet:     Answer:  Diabetic [3]       ACTIVITY  As tolerated.  Weight bearing as tolerated      CONSULTATIONS  NEPHROLOGY    PROCEDURES  NONE    LABORATORY  Lab Results   Component Value Date/Time    SODIUM 135 07/23/2017 03:50 AM    POTASSIUM 5.3 07/23/2017 03:50 AM    CHLORIDE 100 07/23/2017 03:50 AM    CO2 23  07/23/2017 03:50 AM    GLUCOSE 112* 07/23/2017 03:50 AM    BUN 76* 07/23/2017 03:50 AM    CREATININE 5.77* 07/23/2017 03:50 AM        Lab Results   Component Value Date/Time    WBC 2.8* 07/23/2017 03:50 AM    HEMOGLOBIN 8.3* 07/23/2017 03:50 AM    HEMATOCRIT 24.4* 07/23/2017 03:50 AM    PLATELET COUNT 109* 07/23/2017 03:50 AM        Total time of the discharge process exceeds 36 minutes

## 2017-07-23 NOTE — PROGRESS NOTES
Refused to take Senna since she claimed that she had BM x 3.  Up to bathroom independently.  Left AVF +bruit and thrill.

## 2017-07-23 NOTE — CARE PLAN
Problem: Discharge Barriers/Planning  Goal: Patient’s continuum of care needs will be met  Outcome: PROGRESSING AS EXPECTED  Pt being d/c to home.    Problem: Pain Management  Goal: Pain level will decrease to patient’s comfort goal  Outcome: PROGRESSING AS EXPECTED  Pt c/o leg pain, RX given for flexeril.

## 2017-07-23 NOTE — CARE PLAN
Problem: Safety  Goal: Will remain free from falls  Intervention: Assess risk factors for falls    07/23/17 0155   OTHER   Mobility Status Assessment 0-Ambulates & Transfers Independently. No Assistance Required   Pt Calls for Assistance No assistance required   Pt has no bed alarm.  Goes to bathroom independently.  Steady gait noted.        Problem: Pain Management  Goal: Pain level will decrease to patient’s comfort goal  Intervention: Follow pain managment plan developed in collaboration with patient and Interdisciplinary Team  C/o BLE pain.  Scheduled Lyrica given

## 2017-07-23 NOTE — PROGRESS NOTES
Pt d/c to home. D/C instructions and 1 RX given to pt, questions answered. IV d/c, catheter intact. Pt waiting for daughter for ride.

## 2017-07-23 NOTE — DISCHARGE INSTRUCTIONS
Discharge Instructions    Discharged to home by taxi with self. Discharged via wheelchair, hospital escort: Yes.  Special equipment needed: Not Applicable    Be sure to schedule a follow-up appointment with your primary care doctor or any specialists as instructed.     Discharge Plan:   Influenza Vaccine Indication: Patient Refuses    I understand that a diet low in cholesterol, fat, and sodium is recommended for good health. Unless I have been given specific instructions below for another diet, I accept this instruction as my diet prescription.   Other diet: diabetic    Special Instructions: None    · Is patient discharged on Warfarin / Coumadin?   No     · Is patient Post Blood Transfusion?  Yes  POST BLOOD TRANSFUSION INFORMATION (ADULT)    The purpose of blood transfusion is to correct anemia, low levels of blood clotting factors or to correct acute blood loss.   Blood transfusion is very safe but occasionally unexpected adverse reactions do occur. Most adverse reactions occur during transfusion, within one to two days following transfusion or one to two weeks following transfusion. Some adverse reactions can occur one to six months after transfusion and some even years later.             If any of the symptoms listed below happen to you during your transfusion,                                 please notify your nurse immediately.   · Fever or Chills  · Flushing of the Face  · Hives, rash or itching  · Difficulty in breathing or shortness of breath  · Pain or oozing of blood from the IV needle site  · Low back pain  · Nausea or vomiting  · Weakness or fainting  · Chest pain  · Blood in the urine  · Decreased frequency of urination    The above symptoms may also occur within 24 to 48 after transfusion; if so, notify your physician.     · Yellowing of the skin    This can happen one to six months after transfusion; if so, notify your physician    Depression / Suicide Risk    As you are discharged from this [facility] facility, it is important to learn how to keep safe from harming yourself.    Recognize the warning signs:  · Abrupt changes in personality, positive or negative- including increase in energy   · Giving away possessions  · Change in eating patterns- significant weight changes-  positive or negative  · Change in sleeping patterns- unable to sleep or sleeping all the time   · Unwillingness or inability to communicate  · Depression  · Unusual sadness, discouragement and loneliness  · Talk of wanting to die  · Neglect of personal appearance   · Rebelliousness- reckless behavior  · Withdrawal from people/activities they love  · Confusion- inability to concentrate     If you or a loved one observes any of these behaviors or has concerns about self-harm, here's what you can do:  · Talk about it- your feelings and reasons for harming yourself  · Remove any means that you might use to hurt yourself (examples: pills, rope, extension cords, firearm)  · Get professional help from the community (Mental Health, Substance Abuse, psychological counseling)  · Do not be alone:Call your Safe Contact- someone whom you trust who will be there for you.  · Call your local CRISIS HOTLINE 679-0453 or 718-900-0877  · Call your local Children's Mobile Crisis Response Team [place] (598) 757-7531 or www.[employer]  · Call the toll free National Suicide Prevention Hotlines   · National Suicide Prevention Lifeline 478-976-XYZU (1534)  · National Hope Line Network 800-SUICIDE (460-2519)    Anemia, Nonspecific  Anemia is a condition in which the concentration of red blood cells or hemoglobin in the blood is below normal. Hemoglobin is a substance in red blood cells that carries oxygen to the tissues of the body. Anemia results in not enough oxygen reaching these tissues.   CAUSES   Common causes of anemia include:   · Excessive bleeding. Bleeding may be internal or external. This includes excessive bleeding from periods (in  women) or from the intestine.    · Poor nutrition.    · Chronic kidney, thyroid, and liver disease.   · Bone marrow disorders that decrease red blood cell production.  · Cancer and treatments for cancer.  · HIV, AIDS, and their treatments.  · Spleen problems that increase red blood cell destruction.  · Blood disorders.  · Excess destruction of red blood cells due to infection, medicines, and autoimmune disorders.  SIGNS AND SYMPTOMS   2. Minor weakness.    3. Dizziness.    4. Headache.  5. Palpitations.    6. Shortness of breath, especially with exercise.    7. Paleness.  8. Cold sensitivity.  9. Indigestion.  10. Nausea.  11. Difficulty sleeping.  12. Difficulty concentrating.  Symptoms may occur suddenly or they may develop slowly.   DIAGNOSIS   Additional blood tests are often needed. These help your health care provider determine the best treatment. Your health care provider will check your stool for blood and look for other causes of blood loss.   TREATMENT   Treatment varies depending on the cause of the anemia. Treatment can include:   2. Supplements of iron, vitamin B12, or folic acid.    3. Hormone medicines.    4. A blood transfusion. This may be needed if blood loss is severe.    5. Hospitalization. This may be needed if there is significant continual blood loss.    6. Dietary changes.  7. Spleen removal.  HOME CARE INSTRUCTIONS  Keep all follow-up appointments. It often takes many weeks to correct anemia, and having your health care provider check on your condition and your response to treatment is very important.  SEEK IMMEDIATE MEDICAL CARE IF:   2. You develop extreme weakness, shortness of breath, or chest pain.    3. You become dizzy or have trouble concentrating.  4. You develop heavy vaginal bleeding.    5. You develop a rash.    6. You have bloody or black, tarry stools.    7. You faint.    8. You vomit up blood.    9. You vomit repeatedly.    10. You have abdominal pain.  11. You have a fever or  persistent symptoms for more than 2-3 days.    12. You have a fever and your symptoms suddenly get worse.    13. You are dehydrated.    MAKE SURE YOU:  2. Understand these instructions.  3. Will watch your condition.  4. Will get help right away if you are not doing well or get worse.     This information is not intended to replace advice given to you by your health care provider. Make sure you discuss any questions you have with your health care provider.     Document Released: 01/25/2006 Document Revised: 08/20/2014 Document Reviewed: 06/13/2014  [employer] Interactive Patient Education ©2016 [employer]

## 2017-08-07 ENCOUNTER — OUTPATIENT INFUSION SERVICES (OUTPATIENT)
Dept: ONCOLOGY | Facility: MEDICAL CENTER | Age: 62
End: 2017-08-07
Attending: INTERNAL MEDICINE
Payer: MEDICARE

## 2017-08-07 ENCOUNTER — TELEPHONE (OUTPATIENT)
Dept: ONCOLOGY | Facility: MEDICAL CENTER | Age: 62
End: 2017-08-07

## 2017-08-07 VITALS
DIASTOLIC BLOOD PRESSURE: 50 MMHG | BODY MASS INDEX: 28.92 KG/M2 | RESPIRATION RATE: 20 BRPM | OXYGEN SATURATION: 100 % | HEART RATE: 97 BPM | SYSTOLIC BLOOD PRESSURE: 127 MMHG | HEIGHT: 57 IN | TEMPERATURE: 97.7 F | WEIGHT: 134.04 LBS

## 2017-08-07 DIAGNOSIS — D46.9 MDS (MYELODYSPLASTIC SYNDROME) (HCC): ICD-10-CM

## 2017-08-07 LAB
ALBUMIN SERPL BCP-MCNC: 3.8 G/DL (ref 3.2–4.9)
ALBUMIN/GLOB SERPL: 1.1 G/DL
ALP SERPL-CCNC: 82 U/L (ref 30–99)
ALT SERPL-CCNC: 26 U/L (ref 2–50)
ANION GAP SERPL CALC-SCNC: 11 MMOL/L (ref 0–11.9)
AST SERPL-CCNC: 24 U/L (ref 12–45)
BASOPHILS # BLD AUTO: 1.4 % (ref 0–1.8)
BASOPHILS # BLD: 0.04 K/UL (ref 0–0.12)
BILIRUB SERPL-MCNC: 0.7 MG/DL (ref 0.1–1.5)
BUN SERPL-MCNC: 23 MG/DL (ref 8–22)
CALCIUM SERPL-MCNC: 8.8 MG/DL (ref 8.5–10.5)
CHLORIDE SERPL-SCNC: 93 MMOL/L (ref 96–112)
CO2 SERPL-SCNC: 31 MMOL/L (ref 20–33)
CREAT SERPL-MCNC: 3.29 MG/DL (ref 0.5–1.4)
EOSINOPHIL # BLD AUTO: 0.22 K/UL (ref 0–0.51)
EOSINOPHIL NFR BLD: 7.7 % (ref 0–6.9)
ERYTHROCYTE [DISTWIDTH] IN BLOOD BY AUTOMATED COUNT: 53.8 FL (ref 35.9–50)
GFR SERPL CREATININE-BSD FRML MDRD: 14 ML/MIN/1.73 M 2
GLOBULIN SER CALC-MCNC: 3.6 G/DL (ref 1.9–3.5)
GLUCOSE SERPL-MCNC: 204 MG/DL (ref 65–99)
HCT VFR BLD AUTO: 23.5 % (ref 37–47)
HGB BLD-MCNC: 8.1 G/DL (ref 12–16)
IMM GRANULOCYTES # BLD AUTO: 0.01 K/UL (ref 0–0.11)
IMM GRANULOCYTES NFR BLD AUTO: 0.3 % (ref 0–0.9)
LYMPHOCYTES # BLD AUTO: 0.95 K/UL (ref 1–4.8)
LYMPHOCYTES NFR BLD: 33.1 % (ref 22–41)
MCH RBC QN AUTO: 33.1 PG (ref 27–33)
MCHC RBC AUTO-ENTMCNC: 34.5 G/DL (ref 33.6–35)
MCV RBC AUTO: 95.9 FL (ref 81.4–97.8)
MONOCYTES # BLD AUTO: 0.23 K/UL (ref 0–0.85)
MONOCYTES NFR BLD AUTO: 8 % (ref 0–13.4)
NEUTROPHILS # BLD AUTO: 1.42 K/UL (ref 2–7.15)
NEUTROPHILS NFR BLD: 49.5 % (ref 44–72)
NRBC # BLD AUTO: 0.02 K/UL
NRBC BLD AUTO-RTO: 0.7 /100 WBC
PLATELET # BLD AUTO: 488 K/UL (ref 164–446)
PMV BLD AUTO: 11.3 FL (ref 9–12.9)
POTASSIUM SERPL-SCNC: 3.7 MMOL/L (ref 3.6–5.5)
PROT SERPL-MCNC: 7.4 G/DL (ref 6–8.2)
RBC # BLD AUTO: 2.45 M/UL (ref 4.2–5.4)
SODIUM SERPL-SCNC: 135 MMOL/L (ref 135–145)
WBC # BLD AUTO: 2.9 K/UL (ref 4.8–10.8)

## 2017-08-07 PROCEDURE — 96374 THER/PROPH/DIAG INJ IV PUSH: CPT

## 2017-08-07 PROCEDURE — 85025 COMPLETE CBC W/AUTO DIFF WBC: CPT

## 2017-08-07 PROCEDURE — 80053 COMPREHEN METABOLIC PANEL: CPT

## 2017-08-07 PROCEDURE — 96401 CHEMO ANTI-NEOPL SQ/IM: CPT

## 2017-08-07 PROCEDURE — 700111 HCHG RX REV CODE 636 W/ 250 OVERRIDE (IP): Performed by: INTERNAL MEDICINE

## 2017-08-07 PROCEDURE — 96375 TX/PRO/DX INJ NEW DRUG ADDON: CPT

## 2017-08-07 PROCEDURE — 306780 HCHG STAT FOR TRANSFUSION PER CASE

## 2017-08-07 RX ORDER — ONDANSETRON 2 MG/ML
8 INJECTION INTRAMUSCULAR; INTRAVENOUS ONCE
Status: COMPLETED | OUTPATIENT
Start: 2017-08-07 | End: 2017-08-07

## 2017-08-07 RX ADMIN — AZACITIDINE 58.5 MG: 100 INJECTION, POWDER, LYOPHILIZED, FOR SOLUTION INTRAVENOUS; SUBCUTANEOUS at 16:02

## 2017-08-07 RX ADMIN — ONDANSETRON 8 MG: 2 INJECTION, SOLUTION INTRAMUSCULAR; INTRAVENOUS at 15:53

## 2017-08-07 ASSESSMENT — PAIN SCALES - GENERAL: PAINLEVEL: 5=MODERATE PAIN

## 2017-08-07 NOTE — PROGRESS NOTES
Chemotherapy Verification - SECONDARY RN       Height = 145 cm  Weight = 60.8 kg  BSA = 1.56 m2       Medication: Vidaza  Dose: 75 mg/m2  Calculated Dose: 117.3 mg                             (In mg/m2, AUC, mg/kg)       I confirm that this process was performed independently.

## 2017-08-07 NOTE — PROGRESS NOTES
Chemotherapy Verification - PRIMARY RN      Height = 145 cm  Weight = 60.8 kg  BSA = 1.56 m2       Medication: Vidaza  Dose: 75 mg/m2  Calculated Dose: 117 mg 58.5 mg per syringe                             (In mg/m2, AUC, mg/kg)       I confirm this process was performed independently with the BSA and all final chemotherapy dosing calculations congruent.  Any discrepancies of 5% or greater have been addressed with the chemotherapy pharmacist. The resolution of the discrepancy has been documented in the EPIC progress notes.

## 2017-08-07 NOTE — PROGRESS NOTES
"Pharmacy Chemotherapy Verification    Patient Name: Vielka Briscoe   Dx: MDS    Protocol: Vidaza     Azacitadine (Vidaza) 75 mg/m2 IV over 10 minutes or Subcutaneously daily on days 1-7 - MD dosing on days 1-5  28 day cycle until disease progression or unacceptable toxicity for a minimum of 4-6 cycles  NCCN Guidelines for Myelodysplastic Syndromes V.2.2017  Lacy P, et al. Lancet Oncol. 2009;10(3):223-32  Yokasta BOLTON, et al. J Clin Oncol. 2009;27(11)-6    Allergies:  Actos; Darvocet; Demerol; Glucophage; Morphine; Oxycodone; Pcn; Requip; Simvastatin; Sulfa drugs; Tramadol; Trazodone; Demerol; Dilaudid; Diphenhydramine; Iron; Lenalidomide; Morphine; Multivitamin; Naprosyn; Other drug; and Sulfa drugs       /50 mmHg  Pulse 97  Temp(Src) 36.5 °C (97.7 °F)  Resp 20  Ht 1.45 m (4' 9.09\")  Wt 60.8 kg (134 lb 0.6 oz)  BMI 28.92 kg/m2  SpO2 100%  LMP 01/01/1995 Body surface area is 1.56 meters squared.    Labs 8/7/17  ANC ~1420     Plt = 488k     Hgb = 8.1 SCr = 3.29 mg/dL      on HD   -[facility] MD aware of chronic renal failure  AST/ALT/AP = WNL   Tbili = 0.7    Drug Order   (Drug name, dose, route, IV Fluid & volume, frequency, number of doses) Cycle 4, Day 1 of 5      Previous treatment: July 10 to 14, 2017   Medication = Azacitadine (Vidaza)  Base Dose= 75 mg/m2  Calc Dose: Base Dose x 1.56 m2 = 117 mg  Final Dose = 117 mg  Route = IV  Fluid & Volume = 25 mg/mL;  4.68 mL in 2 syringes, 2.34 mL (58.5 mg) each  Admin Duration = Subcutaneous injection            <5% difference, OK to treat with final dose     By my signature below, I confirm this process was performed independently with the BSA and all final chemotherapy dosing calculations congruent. I have reviewed the above chemotherapy order and that my calculation of the final dose and BSA (when applicable) corroborate those calculations of the  pharmacist. Discrepancies of 5% or greater in the written dose have been addressed and " documented within the EPIC Progress notes.    Michael TreadwellD

## 2017-08-07 NOTE — PROGRESS NOTES
"Pharmacy Chemotherapy Verification    Patient Name: Vielka Briscoe  Dx: MDS    Cycle: 4, Days 1 to 5  Previous treatment = July 10-14, 2017    Regimen: Vidaza  Azacitadine 75 mg/m2 IV over 10 minutes or Subcutaneously daily on days 1-7 - MD dosing on days 1 to 5  28 day cycle until disease progression or unacceptable toxicity for a minimum of 4-6 cycles  NCCN Guidelines for Myelodysplastic Syndromes V.2.2017  Lacy P, et al. Lancet Oncol. 2009;10(3):223-32  Yokasta BOLTON, et al. J Clin Oncol. 2009;27(52)9393-6    Allergies:Actos; Darvocet; Demerol; Glucophage; Morphine; Oxycodone; Pcn; Requip; Simvastatin; Sulfa drugs; Tramadol; Trazodone; Demerol; Dilaudid; Diphenhydramine; Iron; Lenalidomide; Morphine; Multivitamin; Naprosyn; Other drug; and Sulfa drugs  /50 mmHg  Pulse 97  Temp(Src) 36.5 °C (97.7 °F)  Resp 20  Ht 1.45 m (4' 9.09\")  Wt 60.8 kg (134 lb 0.6 oz)  BMI 28.92 kg/m2  SpO2 100%  LMP 01/01/1995  Body surface area is 1.56 meters squared.    ANC~ 1420 (OK if > 1000) Plt = 488k   Hgb = 8.1 (MD notified)   SCr = 3.29 mg/dL CrCl ~ on HD; MD aware  LFT's = WNL TBili = 0.7    Azacitadine (Vidaza) 75 mg/m2 x 1.56 m2 = 117 mg   <5% difference, OK to treat with final dose = 117 mg SubQ, divided     Shola Schulte, PharmD    "

## 2017-08-07 NOTE — PROGRESS NOTES
Pt here for C4 D1  Vidaza.  PIV started to R FA, labs drawn.  Pt requests IV remain in place, for IV zofran, per pt, po zofran makes her nauseated.  Pt had long discussion with HIEN Herbert.  vidaza given to L and R LQ abdomen.  Pt left IC, no s/s of distress.  Pt to return tomorrow.

## 2017-08-08 ENCOUNTER — OUTPATIENT INFUSION SERVICES (OUTPATIENT)
Dept: ONCOLOGY | Facility: MEDICAL CENTER | Age: 62
End: 2017-08-08
Attending: INTERNAL MEDICINE
Payer: MEDICARE

## 2017-08-08 VITALS
BODY MASS INDEX: 28.92 KG/M2 | HEIGHT: 57 IN | HEART RATE: 100 BPM | RESPIRATION RATE: 20 BRPM | WEIGHT: 134.04 LBS | TEMPERATURE: 97.9 F | SYSTOLIC BLOOD PRESSURE: 110 MMHG | DIASTOLIC BLOOD PRESSURE: 53 MMHG

## 2017-08-08 DIAGNOSIS — D46.9 MDS (MYELODYSPLASTIC SYNDROME) (HCC): ICD-10-CM

## 2017-08-08 PROCEDURE — 96375 TX/PRO/DX INJ NEW DRUG ADDON: CPT

## 2017-08-08 PROCEDURE — 96401 CHEMO ANTI-NEOPL SQ/IM: CPT

## 2017-08-08 PROCEDURE — 700111 HCHG RX REV CODE 636 W/ 250 OVERRIDE (IP): Performed by: INTERNAL MEDICINE

## 2017-08-08 RX ORDER — ONDANSETRON 2 MG/ML
8 INJECTION INTRAMUSCULAR; INTRAVENOUS ONCE
Status: COMPLETED | OUTPATIENT
Start: 2017-08-08 | End: 2017-08-08

## 2017-08-08 RX ADMIN — AZACITIDINE 58.5 MG: 100 INJECTION, POWDER, LYOPHILIZED, FOR SOLUTION INTRAVENOUS; SUBCUTANEOUS at 15:12

## 2017-08-08 RX ADMIN — ONDANSETRON 8 MG: 2 INJECTION, SOLUTION INTRAMUSCULAR; INTRAVENOUS at 14:55

## 2017-08-08 ASSESSMENT — PAIN SCALES - GENERAL: PAINLEVEL: 4=SLIGHT-MODERATE PAIN

## 2017-08-08 NOTE — PROGRESS NOTES
Chemotherapy Verification - PRIMARY RN      Height = 145 cm  Weight = 60.8 kg  BSA = 1.56 m2       Medication: Vidaza  Dose: 75 mg/m2  Calculated Dose: 177 mg 58.5 mg per syringe                             (In mg/m2, AUC, mg/kg)       I confirm this process was performed independently with the BSA and all final chemotherapy dosing calculations congruent.  Any discrepancies of 5% or greater have been addressed with the chemotherapy pharmacist. The resolution of the discrepancy has been documented in the EPIC progress notes.

## 2017-08-08 NOTE — PROGRESS NOTES
Pt here for day 2 Velcade.  PIV patent from yesterday.  Pt states no changes from yesterday, other than improved nausea, but now loose stool (per pt is normal when on Vidaza). Chemo given to R and L LQ abdomen.  bandaid to site.  Tomorrow's apt confirmed.  Pt left IC, no s/s of distress.  Confirmed with ARIEL Byrnes no additional CBC this treatment.  If pt feels unwell, then need to call [facility].

## 2017-08-08 NOTE — PROGRESS NOTES
Chemotherapy Verification - SECONDARY RN       Height = 145 cm  Weight = 60.8 kg  BSA = 1.564 m2       Medication: Vidaza  Dose: 75 mg/m2  Calculated Dose: 117.36 mg                             (In mg/m2, AUC, mg/kg)     I confirm that this process was performed independently.

## 2017-08-08 NOTE — PROGRESS NOTES
"Pharmacy Chemotherapy Verification    Patient Name: Vielka Briscoe   Dx: MDS    Protocol: Vidaza     Azacitadine (Vidaza) 75 mg/m2 IV over 10 minutes or Subcutaneously daily on days 1-7 - MD dosing on days 1-5  28 day cycle until disease progression or unacceptable toxicity for a minimum of 4-6 cycles  NCCN Guidelines for Myelodysplastic Syndromes V.2.2017  Lacy P, et al. Lancet Oncol. 2009;10(3):223-32  Yokasta BOLTON, et al. J Clin Oncol. 2009;27(11)-2    Allergies:  Actos; Darvocet; Demerol; Glucophage; Morphine; Oxycodone; Pcn; Requip; Simvastatin; Sulfa drugs; Tramadol; Trazodone; Demerol; Dilaudid; Diphenhydramine; Iron; Lenalidomide; Morphine; Multivitamin; Naprosyn; Other drug; and Sulfa drugs       /53 mmHg  Pulse 100  Temp(Src) 36.6 °C (97.9 °F)  Resp 20  Ht 1.45 m (4' 9.09\")  Wt 60.8 kg (134 lb 0.6 oz)  BMI 28.92 kg/m2  LMP 01/01/1995 Body surface area is 1.56 meters squared.    Labs 8/7/17  ANC ~1420     Plt = 488k     Hgb = 8.1   SCr = 3.29 mg/dL - HD , Margarita [facility] aware of chronic renal failure    AST/ALT/AP = WNL   Tbili = 0.7    Drug Order   (Drug name, dose, route, IV Fluid & volume, frequency, number of doses) Cycle 4, Day 2 of 5      Previous treatment: July 10 to 14, 2017   Medication = Azacitadine (Vidaza)  Base Dose= 75 mg/m2  Calc Dose: Base Dose x 1.56 m2 = 117 mg  Final Dose = 117 mg  Route = IV  Fluid & Volume = 25 mg/mL= 4.68 mL in 2 syringes, 2.34 mL (58.5 mg) each  Admin Duration = Subcutaneous injection            <5% difference, OK to treat with final dose     By my signature below, I confirm this process was performed independently with the BSA and all final chemotherapy dosing calculations congruent. I have reviewed the above chemotherapy order and that my calculation of the final dose and BSA (when applicable) corroborate those calculations of the  pharmacist. Discrepancies of 5% or greater in the written dose have been addressed and documented " within the EPIC Progress notes.    [place].D.

## 2017-08-09 ENCOUNTER — AMB ONCOLOGY NURSE NAVIGATOR ENCOUNTER (OUTPATIENT)
Dept: OTHER | Facility: MEDICAL CENTER | Age: 62
End: 2017-08-09

## 2017-08-09 ENCOUNTER — OUTPATIENT INFUSION SERVICES (OUTPATIENT)
Dept: ONCOLOGY | Facility: MEDICAL CENTER | Age: 62
End: 2017-08-09
Attending: INTERNAL MEDICINE
Payer: MEDICARE

## 2017-08-09 VITALS
BODY MASS INDEX: 29.54 KG/M2 | SYSTOLIC BLOOD PRESSURE: 141 MMHG | WEIGHT: 136.91 LBS | HEART RATE: 101 BPM | HEIGHT: 57 IN | OXYGEN SATURATION: 100 % | TEMPERATURE: 98 F | DIASTOLIC BLOOD PRESSURE: 54 MMHG | RESPIRATION RATE: 20 BRPM

## 2017-08-09 DIAGNOSIS — D46.9 MDS (MYELODYSPLASTIC SYNDROME) (HCC): ICD-10-CM

## 2017-08-09 PROCEDURE — 700111 HCHG RX REV CODE 636 W/ 250 OVERRIDE (IP): Performed by: INTERNAL MEDICINE

## 2017-08-09 PROCEDURE — 96401 CHEMO ANTI-NEOPL SQ/IM: CPT

## 2017-08-09 PROCEDURE — 96374 THER/PROPH/DIAG INJ IV PUSH: CPT

## 2017-08-09 PROCEDURE — 96375 TX/PRO/DX INJ NEW DRUG ADDON: CPT

## 2017-08-09 RX ORDER — ONDANSETRON 2 MG/ML
8 INJECTION INTRAMUSCULAR; INTRAVENOUS ONCE
Status: COMPLETED | OUTPATIENT
Start: 2017-08-09 | End: 2017-08-09

## 2017-08-09 RX ORDER — BRIMONIDINE TARTRATE AND TIMOLOL MALEATE 2; 5 MG/ML; MG/ML
SOLUTION OPHTHALMIC
COMMUNITY
End: 2017-08-12

## 2017-08-09 RX ADMIN — AZACITIDINE 59.3 MG: 100 INJECTION, POWDER, LYOPHILIZED, FOR SOLUTION INTRAVENOUS; SUBCUTANEOUS at 15:20

## 2017-08-09 RX ADMIN — AZACITIDINE 59.3 MG: 100 INJECTION, POWDER, LYOPHILIZED, FOR SOLUTION INTRAVENOUS; SUBCUTANEOUS at 15:25

## 2017-08-09 RX ADMIN — ONDANSETRON 8 MG: 2 INJECTION INTRAMUSCULAR; INTRAVENOUS at 14:39

## 2017-08-09 ASSESSMENT — PAIN SCALES - GENERAL: PAINLEVEL: 3=SLIGHT PAIN

## 2017-08-09 NOTE — PROGRESS NOTES
"Late entry 8/7/17, 1500:  Met with patient in [place].  She is staying at the [facility] in a room provided by her friend Venkata for \"4 free nights\". She has a ride provided by [facility] van for Tuesday only.  She tells me that her daughter will pick her up for dialysis on Wednesday and Friday am as the [facility]  told her they are booked and he cannot pick her up (substitute  for Ray per patient). I asked Vielka why all her rides were not arranged prior to her appointments as she had a month to book and she tells me that the [facility] van has a change in policy and the [facility] is now out of their zone;  However, this writer confirmed that they are still providing service to [place] and will pick her up Tuesday.  Patient made aware of same.  She states she still has $55.00 left from [facility] from last month treatment and I asked her to use for food or taxi.  She forgot her receipts at home and will bring them next month or mail them.  I asked her what the progress was with SW at [facility] in terms of helping to find housing in [place].  She stated \"nothing\", upon further questioning it was discovered she is on the senior waiting list for housing, which she tells me is currently one year.  She tells me that her daughter and her family are still living with her despite being served with an eviction notice.  She states they were given 2,  30 day extensions, 1 in July and 1 in August.  It is unclear to me if this was really done, as she tells me \"they don't go thru the courts in [facility]\".  Have asked her  in [facility] to look into the housing application and f/u on continued family situation.  She tells me that she is in control of all her bills now and her daughter is not taking them anymore.  She saw Dr. Avila 8/3 and he wants to do \"3 more months of treatment\", but after that she does not know the POC.  I will request last progress note to help her understand what the POC is.  " "She tells me she \"wants God to take me sometimes\", and she is very tired of treatment and dialysis and feeling unwell.  I began conversation about stopping treatment and considering palliative care/hospice. She began very tearful and started crying.  I provided active listening, and validation of her thoughts and feelings.  Provided emotional support.  Will continue to follow.    "

## 2017-08-09 NOTE — PROGRESS NOTES
Chemotherapy Verification - PRIMARY RN      Height = 57.09 in  Weight = 136 lb  BSA = 1.58 m2       Medication: Vidaza  Dose: 75 mg/m2  Calculated Dose: 118.5 mg                             (In mg/m2, AUC, mg/kg)       I confirm this process was performed independently with the BSA and all final chemotherapy dosing calculations congruent.  Any discrepancies of 5% or greater have been addressed with the chemotherapy pharmacist. The resolution of the discrepancy has been documented in the EPIC progress notes.

## 2017-08-09 NOTE — PROGRESS NOTES
Chemotherapy Verification - PRIMARY RN      Height = 145cm  Weight = 62.1kg  BSA = 1.58m2       Medication: Vidaza  Dose: 75mg/m2  Calculated Dose: 118.5mg total; 59.25mg per syringe                             (In mg/m2, AUC, mg/kg)     I confirm this process was performed independently with the BSA and all final chemotherapy dosing calculations congruent.  Any discrepancies of 5% or greater have been addressed with the chemotherapy pharmacist. The resolution of the discrepancy has been documented in the EPIC progress notes.

## 2017-08-09 NOTE — PROGRESS NOTES
"Pharmacy Chemotherapy Verification    Patient Name: Vielka Briscoe   Dx: MDS    Protocol: Vidaza     Azacitadine (Vidaza) 75 mg/m2 IV over 10 minutes or Subcutaneously daily on days 1-7 - MD dosing on days 1-5  28 day cycle until disease progression or unacceptable toxicity for a minimum of 4-6 cycles  NCCN Guidelines for Myelodysplastic Syndromes V.2.2017  Lacy P, et al. Lancet Oncol. 2009;10(3):223-32  Yokasta BOLTON, et al. J Clin Oncol. 2009;27(11)-0    Allergies:  Actos; Darvocet; Demerol; Glucophage; Morphine; Oxycodone; Pcn; Requip; Simvastatin; Sulfa drugs; Tramadol; Trazodone; Demerol; Dilaudid; Diphenhydramine; Iron; Lenalidomide; Morphine; Multivitamin; Naprosyn; Other drug; and Sulfa drugs       /54 mmHg  Pulse 101  Temp(Src) 36.7 °C (98 °F)  Resp 20  Ht 1.45 m (4' 9.09\")  Wt 62.1 kg (136 lb 14.5 oz)  BMI 29.54 kg/m2  SpO2 100%  LMP 01/01/1995 Body surface area is 1.58 meters squared.    Labs 8/7/17  ANC ~1420     Plt = 488k     Hgb = 8.1   SCr = 3.29 mg/dL - HD , Margartia [facility] aware of chronic renal failure    AST/ALT/AP = WNL   Tbili = 0.7    Drug Order   (Drug name, dose, route, IV Fluid & volume, frequency, number of doses) Cycle 4, Day 3 of 5      Previous treatment: July 10 to 14, 2017   Medication = Azacitadine (Vidaza)  Base Dose= 75 mg/m2  Calc Dose: Base Dose x 1.58m2 = 118.5mg  Final Dose = 118.6mg  Route = IV  Fluid & Volume = 25 mg/mL= 4.74mL in 2 syringes, 2.37mL (59.3mg) each  Admin Duration = Subcutaneous injection            <5% difference, OK to treat with final dose     By my signature below, I confirm this process was performed independently with the BSA and all final chemotherapy dosing calculations congruent. I have reviewed the above chemotherapy order and that my calculation of the final dose and BSA (when applicable) corroborate those calculations of the  pharmacist. Discrepancies of 5% or greater in the written dose have been addressed and " documented within the EPIC Progress notes.    Michael [place].VERONICA.

## 2017-08-10 ENCOUNTER — OUTPATIENT INFUSION SERVICES (OUTPATIENT)
Dept: ONCOLOGY | Facility: MEDICAL CENTER | Age: 62
End: 2017-08-10
Attending: INTERNAL MEDICINE
Payer: MEDICARE

## 2017-08-10 VITALS
SYSTOLIC BLOOD PRESSURE: 145 MMHG | TEMPERATURE: 98.5 F | DIASTOLIC BLOOD PRESSURE: 75 MMHG | WEIGHT: 138.45 LBS | HEART RATE: 98 BPM | HEIGHT: 57 IN | BODY MASS INDEX: 29.87 KG/M2 | OXYGEN SATURATION: 99 % | RESPIRATION RATE: 18 BRPM

## 2017-08-10 DIAGNOSIS — D46.9 MDS (MYELODYSPLASTIC SYNDROME) (HCC): ICD-10-CM

## 2017-08-10 PROCEDURE — 700111 HCHG RX REV CODE 636 W/ 250 OVERRIDE (IP): Performed by: INTERNAL MEDICINE

## 2017-08-10 PROCEDURE — 96401 CHEMO ANTI-NEOPL SQ/IM: CPT

## 2017-08-10 PROCEDURE — 96375 TX/PRO/DX INJ NEW DRUG ADDON: CPT

## 2017-08-10 RX ORDER — ONDANSETRON 2 MG/ML
8 INJECTION INTRAMUSCULAR; INTRAVENOUS ONCE
Status: COMPLETED | OUTPATIENT
Start: 2017-08-10 | End: 2017-08-10

## 2017-08-10 RX ADMIN — AZACITIDINE 59.8 MG: 100 INJECTION, POWDER, LYOPHILIZED, FOR SOLUTION INTRAVENOUS; SUBCUTANEOUS at 15:40

## 2017-08-10 RX ADMIN — ONDANSETRON 8 MG: 2 INJECTION INTRAMUSCULAR; INTRAVENOUS at 15:30

## 2017-08-10 ASSESSMENT — PAIN SCALES - GENERAL: PAINLEVEL: 3=SLIGHT PAIN

## 2017-08-10 NOTE — PROGRESS NOTES
"Pharmacy Chemotherapy Verification    Patient Name: Vielka Briscoe   Dx: MDS    Protocol: Vidaza     Azacitadine (Vidaza) 75 mg/m2 IV over 10 minutes or Subcutaneously daily on days 1-7 - MD dosing on days 1-5  28 day cycle until disease progression or unacceptable toxicity for a minimum of 4-6 cycles  NCCN Guidelines for Myelodysplastic Syndromes V.2.2017  Lacy P, et al. Lancet Oncol. 2009;10(3):223-32  Yokasta BOLTON, et al. J Clin Oncol. 2009;27(11)-1    Allergies:  Actos; Darvocet; Demerol; Glucophage; Morphine; Oxycodone; Pcn; Requip; Simvastatin; Sulfa drugs; Tramadol; Trazodone; Demerol; Dilaudid; Diphenhydramine; Iron; Lenalidomide; Morphine; Multivitamin; Naprosyn; Other drug; and Sulfa drugs       /75 mmHg  Pulse 98  Temp(Src) 36.9 °C (98.5 °F)  Resp 18  Ht 1.45 m (4' 9.09\")  Wt 62.8 kg (138 lb 7.2 oz)  BMI 29.87 kg/m2  SpO2 99%  LMP 01/01/1995 Body surface area is 1.59 meters squared.    Labs 8/7/17  ANC ~1420     Plt = 488k     Hgb = 8.1   SCr = 3.29 mg/dL - HD , Margarita WISE aware of chronic renal failure    AST/ALT/AP = WNL   Tbili = 0.7    Drug Order   (Drug name, dose, route, IV Fluid & volume, frequency, number of doses) Cycle 4, Day 4 of 5      Previous treatment: July 10 to 14, 2017   Medication = Azacitadine (Vidaza)  Base Dose= 75 mg/m2  Calc Dose: Base Dose x 1.59 m2 = 119.25mg  Final Dose = 119.25 mg  Route = IV  Fluid & Volume = 25 mg/mL= 4.78mL in 2 syringes, 2.39mL (59.8mg) each  Admin Duration = Subcutaneous injection            <5% difference, OK to treat with final dose     By my signature below, I confirm this process was performed independently with the BSA and all final chemotherapy dosing calculations congruent. I have reviewed the above chemotherapy order and that my calculation of the final dose and BSA (when applicable) corroborate those calculations of the  pharmacist. Discrepancies of 5% or greater in the written dose have been addressed and " documented within the EPIC Progress notes.    Mackenzie Apodaca, PharmD

## 2017-08-10 NOTE — PROGRESS NOTES
Here for Cycle 4 / Day # 3 of Vidaza. In good spirits. Reports fatigue and tiredness. Teaching and education reinforcement provided, also emotional support. Treatment given SC, in left and right abdomen x 2 injections, well tolerated, no sign or symptoms of reaction observed or expressed, compression dressing applied. Discharge home to self care. Will return tomorrow for day # 4. Appointment confirmed for next treatment.

## 2017-08-10 NOTE — PROGRESS NOTES
Chemotherapy Verification - SECONDARY RN       Height = 145 cm  Weight = 62.8 kg  BSA = 1.59 m2       Medication: Iniskp57 mg/m2  Dose:   Calculated Dose: 119.28 mg                             (In mg/m2, AUC, mg/kg)       I confirm that this process was performed independently.

## 2017-08-10 NOTE — PROGRESS NOTES
Chemotherapy Verification - PRIMARY RN      Height = 145 cm  Weight = 62.8 kg  BSA = 1.59 m^2       Medication: Vidaza  Dose: 37.5 mg/m^2  Calculated Dose: 59.625 mg                             (In mg/m2, AUC, mg/kg)     Medication: Vidaza  Dose: 37.5 mg/m^2  Calculated Dose: 59.625 mg                             (In mg/m2, AUC, mg/kg)    I confirm this process was performed independently with the BSA and all final chemotherapy dosing calculations congruent.  Any discrepancies of 5% or greater have been addressed with the chemotherapy pharmacist. The resolution of the discrepancy has been documented in the EPIC progress notes.

## 2017-08-11 ENCOUNTER — OUTPATIENT INFUSION SERVICES (OUTPATIENT)
Dept: ONCOLOGY | Facility: MEDICAL CENTER | Age: 62
End: 2017-08-11
Attending: INTERNAL MEDICINE
Payer: MEDICARE

## 2017-08-11 VITALS
HEIGHT: 57 IN | OXYGEN SATURATION: 100 % | RESPIRATION RATE: 16 BRPM | TEMPERATURE: 98 F | WEIGHT: 136.24 LBS | SYSTOLIC BLOOD PRESSURE: 161 MMHG | HEART RATE: 90 BPM | BODY MASS INDEX: 29.39 KG/M2 | DIASTOLIC BLOOD PRESSURE: 74 MMHG

## 2017-08-11 DIAGNOSIS — D46.9 MDS (MYELODYSPLASTIC SYNDROME) (HCC): ICD-10-CM

## 2017-08-11 PROCEDURE — 96401 CHEMO ANTI-NEOPL SQ/IM: CPT

## 2017-08-11 PROCEDURE — 700111 HCHG RX REV CODE 636 W/ 250 OVERRIDE (IP): Performed by: INTERNAL MEDICINE

## 2017-08-11 PROCEDURE — 96375 TX/PRO/DX INJ NEW DRUG ADDON: CPT

## 2017-08-11 RX ORDER — ONDANSETRON 2 MG/ML
8 INJECTION INTRAMUSCULAR; INTRAVENOUS ONCE
Status: COMPLETED | OUTPATIENT
Start: 2017-08-11 | End: 2017-08-11

## 2017-08-11 RX ADMIN — AZACITIDINE 59.3 MG: 100 INJECTION, POWDER, LYOPHILIZED, FOR SOLUTION INTRAVENOUS; SUBCUTANEOUS at 15:24

## 2017-08-11 RX ADMIN — ONDANSETRON 8 MG: 2 INJECTION, SOLUTION INTRAMUSCULAR; INTRAVENOUS at 14:50

## 2017-08-11 ASSESSMENT — PAIN SCALES - GENERAL: PAINLEVEL: NO PAIN

## 2017-08-11 NOTE — PROGRESS NOTES
Chemotherapy Verification - SECONDARY RN       Height = 145 cm  Weight = 61.8 kg  BSA = 1.58 m^2       Medication: Vidaza  Dose: 75 mg/m^2  Calculated Dose: 118.5 mg divided into 2 syringes = 59.25 mg each syringe                             (In mg/m2, AUC, mg/kg)     I confirm that this process was performed independently.

## 2017-08-11 NOTE — PROGRESS NOTES
Chemotherapy Verification - PRIMARY RN      Height = 57in  Weight = 61.8kg  BSA = 1.57m2       Medication: Vidaza  Dose: 75mg/m2  Calculated Dose: 117mg divided into 2 syringes=58.9mg in each syringe                             (In mg/m2, AUC, mg/kg)     I confirm this process was performed independently with the BSA and all final chemotherapy dosing calculations congruent.  Any discrepancies of 5% or greater have been addressed with the chemotherapy pharmacist. The resolution of the discrepancy has been documented in the EPIC progress notes.

## 2017-08-11 NOTE — PROGRESS NOTES
"Pharmacy Chemotherapy Verification    Patient Name: Vielka Briscoe   Dx: MDS    Protocol: Vidaza     Azacitadine (Vidaza) 75 mg/m2 IV over 10 minutes or Subcutaneously daily on days 1-7 - MD dosing on days 1-5  28 day cycle until disease progression or unacceptable toxicity for a minimum of 4-6 cycles  NCCN Guidelines for Myelodysplastic Syndromes V.2.2017  Lacy P, et al. Lancet Oncol. 2009;10(3):223-32  Yokasta BOLTON, et al. J Clin Oncol. 2009;27(11)-4    Allergies:  Actos; Darvocet; Demerol; Glucophage; Morphine; Oxycodone; Pcn; Requip; Simvastatin; Sulfa drugs; Tramadol; Trazodone; Demerol; Dilaudid; Diphenhydramine; Iron; Lenalidomide; Morphine; Multivitamin; Naprosyn; Other drug; and Sulfa drugs       /74 mmHg  Pulse 90  Temp(Src) 36.7 °C (98 °F)  Resp 16  Ht 1.45 m (4' 9.09\")  Wt 61.8 kg (136 lb 3.9 oz)  BMI 29.39 kg/m2  SpO2 100%  LMP 01/01/1995 Body surface area is 1.58 meters squared.    Labs 8/7/17  ANC ~1420     Plt = 488k     Hgb = 8.1   SCr = 3.29 mg/dL - HD , Margarita WISE aware of chronic renal failure    AST/ALT/AP = WNL   Tbili = 0.7    Drug Order   (Drug name, dose, route, IV Fluid & volume, frequency, number of doses) Cycle 4, Day 5 of 5      Previous treatment: July 10 to 14, 2017   Medication = Azacitadine (Vidaza)  Base Dose= 75 mg/m2  Calc Dose: Base Dose x 1.58 m2 = 118.5 mg  Final Dose = 118.5 mg  Route = IV  Fluid & Volume = 25 mg/mL= 4.74 mL in 2 syringes, 2.37mL (59.3 mg) each  Admin Duration = Subcutaneous injection            <5% difference, OK to treat with final dose     By my signature below, I confirm this process was performed independently with the BSA and all final chemotherapy dosing calculations congruent. I have reviewed the above chemotherapy order and that my calculation of the final dose and BSA (when applicable) corroborate those calculations of the  pharmacist. Discrepancies of 5% or greater in the written dose have been addressed and " documented within the EPIC Progress notes.    Mackenzie Apodaca, PharmD

## 2017-08-11 NOTE — PROGRESS NOTES
Patient here for Cycle 4, Day 4 of Vidaza. Zofran given per MAR. Vidaza injections given per MAR. PIV left in place; coban applied over site. Next appointment scheduled. Discharged to self care; no apparent distress noted.

## 2017-08-12 ENCOUNTER — APPOINTMENT (OUTPATIENT)
Dept: RADIOLOGY | Facility: MEDICAL CENTER | Age: 62
DRG: 391 | End: 2017-08-12
Attending: FAMILY MEDICINE
Payer: MEDICARE

## 2017-08-12 ENCOUNTER — APPOINTMENT (OUTPATIENT)
Dept: RADIOLOGY | Facility: MEDICAL CENTER | Age: 62
DRG: 391 | End: 2017-08-12
Attending: EMERGENCY MEDICINE
Payer: MEDICARE

## 2017-08-12 ENCOUNTER — HOSPITAL ENCOUNTER (INPATIENT)
Facility: MEDICAL CENTER | Age: 62
LOS: 10 days | DRG: 391 | End: 2017-08-22
Attending: EMERGENCY MEDICINE | Admitting: FAMILY MEDICINE
Payer: MEDICARE

## 2017-08-12 ENCOUNTER — RESOLUTE PROFESSIONAL BILLING HOSPITAL PROF FEE (OUTPATIENT)
Dept: HOSPITALIST | Facility: MEDICAL CENTER | Age: 62
End: 2017-08-12
Payer: MEDICARE

## 2017-08-12 DIAGNOSIS — D64.9 ANEMIA, UNSPECIFIED TYPE: ICD-10-CM

## 2017-08-12 DIAGNOSIS — R94.31 ABNORMAL EKG: ICD-10-CM

## 2017-08-12 DIAGNOSIS — N18.9 CHRONIC RENAL INSUFFICIENCY, UNSPECIFIED STAGE: ICD-10-CM

## 2017-08-12 DIAGNOSIS — R11.2 INTRACTABLE VOMITING WITH NAUSEA, UNSPECIFIED VOMITING TYPE: ICD-10-CM

## 2017-08-12 DIAGNOSIS — R42 DIZZINESS: ICD-10-CM

## 2017-08-12 DIAGNOSIS — R73.9 HYPERGLYCEMIA: ICD-10-CM

## 2017-08-12 LAB
ALBUMIN SERPL BCP-MCNC: 3.9 G/DL (ref 3.2–4.9)
ALBUMIN/GLOB SERPL: 1.1 G/DL
ALP SERPL-CCNC: 69 U/L (ref 30–99)
ALT SERPL-CCNC: 28 U/L (ref 2–50)
ANION GAP SERPL CALC-SCNC: 15 MMOL/L (ref 0–11.9)
AST SERPL-CCNC: 21 U/L (ref 12–45)
BASOPHILS # BLD AUTO: 0.5 % (ref 0–1.8)
BASOPHILS # BLD: 0.03 K/UL (ref 0–0.12)
BILIRUB SERPL-MCNC: 0.6 MG/DL (ref 0.1–1.5)
BUN SERPL-MCNC: 49 MG/DL (ref 8–22)
CALCIUM SERPL-MCNC: 8.6 MG/DL (ref 8.5–10.5)
CHLORIDE SERPL-SCNC: 91 MMOL/L (ref 96–112)
CO2 SERPL-SCNC: 26 MMOL/L (ref 20–33)
CREAT SERPL-MCNC: 5.69 MG/DL (ref 0.5–1.4)
EKG IMPRESSION: NORMAL
EOSINOPHIL # BLD AUTO: 0.18 K/UL (ref 0–0.51)
EOSINOPHIL NFR BLD: 3.2 % (ref 0–6.9)
ERYTHROCYTE [DISTWIDTH] IN BLOOD BY AUTOMATED COUNT: 61.2 FL (ref 35.9–50)
GFR SERPL CREATININE-BSD FRML MDRD: 8 ML/MIN/1.73 M 2
GLOBULIN SER CALC-MCNC: 3.5 G/DL (ref 1.9–3.5)
GLUCOSE SERPL-MCNC: 267 MG/DL (ref 65–99)
HCT VFR BLD AUTO: 24.2 % (ref 37–47)
HGB BLD-MCNC: 8.2 G/DL (ref 12–16)
IMM GRANULOCYTES # BLD AUTO: 0.07 K/UL (ref 0–0.11)
IMM GRANULOCYTES NFR BLD AUTO: 1.2 % (ref 0–0.9)
LACTATE BLD-SCNC: 2 MMOL/L (ref 0.5–2)
LIPASE SERPL-CCNC: 22 U/L (ref 11–82)
LYMPHOCYTES # BLD AUTO: 1.38 K/UL (ref 1–4.8)
LYMPHOCYTES NFR BLD: 24.5 % (ref 22–41)
MAGNESIUM SERPL-MCNC: 2.2 MG/DL (ref 1.5–2.5)
MCH RBC QN AUTO: 33.5 PG (ref 27–33)
MCHC RBC AUTO-ENTMCNC: 33.9 G/DL (ref 33.6–35)
MCV RBC AUTO: 98.8 FL (ref 81.4–97.8)
MONOCYTES # BLD AUTO: 0.36 K/UL (ref 0–0.85)
MONOCYTES NFR BLD AUTO: 6.4 % (ref 0–13.4)
NEUTROPHILS # BLD AUTO: 3.62 K/UL (ref 2–7.15)
NEUTROPHILS NFR BLD: 64.2 % (ref 44–72)
NRBC # BLD AUTO: 0 K/UL
NRBC BLD AUTO-RTO: 0 /100 WBC
PHOSPHATE SERPL-MCNC: 7.1 MG/DL (ref 2.5–4.5)
PLATELET # BLD AUTO: 406 K/UL (ref 164–446)
PMV BLD AUTO: 11.6 FL (ref 9–12.9)
POTASSIUM SERPL-SCNC: 4.1 MMOL/L (ref 3.6–5.5)
PROT SERPL-MCNC: 7.4 G/DL (ref 6–8.2)
RBC # BLD AUTO: 2.45 M/UL (ref 4.2–5.4)
SODIUM SERPL-SCNC: 132 MMOL/L (ref 135–145)
TROPONIN I SERPL-MCNC: <0.01 NG/ML (ref 0–0.04)
TSH SERPL DL<=0.005 MIU/L-ACNC: 1.93 UIU/ML (ref 0.3–3.7)
WBC # BLD AUTO: 5.6 K/UL (ref 4.8–10.8)

## 2017-08-12 PROCEDURE — 84100 ASSAY OF PHOSPHORUS: CPT | Mod: EDC

## 2017-08-12 PROCEDURE — 80053 COMPREHEN METABOLIC PANEL: CPT | Mod: EDC

## 2017-08-12 PROCEDURE — 71010 DX-CHEST-PORTABLE (1 VIEW): CPT

## 2017-08-12 PROCEDURE — 700111 HCHG RX REV CODE 636 W/ 250 OVERRIDE (IP): Performed by: EMERGENCY MEDICINE

## 2017-08-12 PROCEDURE — 93005 ELECTROCARDIOGRAM TRACING: CPT | Performed by: EMERGENCY MEDICINE

## 2017-08-12 PROCEDURE — 83735 ASSAY OF MAGNESIUM: CPT | Mod: EDC

## 2017-08-12 PROCEDURE — 99223 1ST HOSP IP/OBS HIGH 75: CPT | Mod: AI | Performed by: FAMILY MEDICINE

## 2017-08-12 PROCEDURE — 700105 HCHG RX REV CODE 258: Performed by: EMERGENCY MEDICINE

## 2017-08-12 PROCEDURE — 83605 ASSAY OF LACTIC ACID: CPT | Mod: EDC

## 2017-08-12 PROCEDURE — 96361 HYDRATE IV INFUSION ADD-ON: CPT | Mod: EDC

## 2017-08-12 PROCEDURE — 770004 HCHG ROOM/CARE - ONCOLOGY PRIVATE *: Mod: EDC

## 2017-08-12 PROCEDURE — 84484 ASSAY OF TROPONIN QUANT: CPT | Mod: EDC

## 2017-08-12 PROCEDURE — 99285 EMERGENCY DEPT VISIT HI MDM: CPT | Mod: EDC

## 2017-08-12 PROCEDURE — 74176 CT ABD & PELVIS W/O CONTRAST: CPT

## 2017-08-12 PROCEDURE — 96374 THER/PROPH/DIAG INJ IV PUSH: CPT | Mod: EDC

## 2017-08-12 PROCEDURE — 85025 COMPLETE CBC W/AUTO DIFF WBC: CPT | Mod: EDC

## 2017-08-12 PROCEDURE — 83690 ASSAY OF LIPASE: CPT | Mod: EDC

## 2017-08-12 PROCEDURE — 84443 ASSAY THYROID STIM HORMONE: CPT | Mod: EDC

## 2017-08-12 RX ORDER — BRIMONIDINE TARTRATE AND TIMOLOL MALEATE 2; 5 MG/ML; MG/ML
1 SOLUTION OPHTHALMIC 2 TIMES DAILY
COMMUNITY

## 2017-08-12 RX ORDER — LATANOPROST 50 UG/ML
1 SOLUTION/ DROPS OPHTHALMIC EVERY EVENING
Status: DISCONTINUED | OUTPATIENT
Start: 2017-08-12 | End: 2017-08-22 | Stop reason: HOSPADM

## 2017-08-12 RX ORDER — ONDANSETRON 2 MG/ML
4 INJECTION INTRAMUSCULAR; INTRAVENOUS ONCE
Status: COMPLETED | OUTPATIENT
Start: 2017-08-12 | End: 2017-08-12

## 2017-08-12 RX ORDER — ACETAMINOPHEN 325 MG/1
650 TABLET ORAL EVERY 6 HOURS PRN
Status: DISCONTINUED | OUTPATIENT
Start: 2017-08-12 | End: 2017-08-22 | Stop reason: HOSPADM

## 2017-08-12 RX ORDER — ONDANSETRON 4 MG/1
4 TABLET, ORALLY DISINTEGRATING ORAL EVERY 4 HOURS PRN
Status: DISCONTINUED | OUTPATIENT
Start: 2017-08-12 | End: 2017-08-22 | Stop reason: HOSPADM

## 2017-08-12 RX ORDER — HYDROCODONE BITARTRATE AND ACETAMINOPHEN 5; 325 MG/1; MG/1
1-2 TABLET ORAL EVERY 4 HOURS PRN
Status: DISCONTINUED | OUTPATIENT
Start: 2017-08-12 | End: 2017-08-22 | Stop reason: HOSPADM

## 2017-08-12 RX ORDER — CARVEDILOL 12.5 MG/1
12.5 TABLET ORAL 2 TIMES DAILY WITH MEALS
Status: DISCONTINUED | OUTPATIENT
Start: 2017-08-12 | End: 2017-08-22 | Stop reason: HOSPADM

## 2017-08-12 RX ORDER — CYCLOBENZAPRINE HCL 10 MG
10 TABLET ORAL 3 TIMES DAILY PRN
Status: DISCONTINUED | OUTPATIENT
Start: 2017-08-12 | End: 2017-08-22 | Stop reason: HOSPADM

## 2017-08-12 RX ORDER — PROMETHAZINE HYDROCHLORIDE 25 MG/1
12.5-25 TABLET ORAL EVERY 4 HOURS PRN
Status: DISCONTINUED | OUTPATIENT
Start: 2017-08-12 | End: 2017-08-22 | Stop reason: HOSPADM

## 2017-08-12 RX ORDER — LABETALOL HYDROCHLORIDE 5 MG/ML
10 INJECTION, SOLUTION INTRAVENOUS EVERY 4 HOURS PRN
Status: DISCONTINUED | OUTPATIENT
Start: 2017-08-12 | End: 2017-08-22 | Stop reason: HOSPADM

## 2017-08-12 RX ORDER — AMLODIPINE BESYLATE 10 MG/1
10 TABLET ORAL DAILY
Status: DISCONTINUED | OUTPATIENT
Start: 2017-08-13 | End: 2017-08-22 | Stop reason: HOSPADM

## 2017-08-12 RX ORDER — PROMETHAZINE HYDROCHLORIDE 12.5 MG/1
12.5-25 SUPPOSITORY RECTAL EVERY 4 HOURS PRN
Status: DISCONTINUED | OUTPATIENT
Start: 2017-08-12 | End: 2017-08-22 | Stop reason: HOSPADM

## 2017-08-12 RX ORDER — HYDRALAZINE HYDROCHLORIDE 20 MG/ML
10 INJECTION INTRAMUSCULAR; INTRAVENOUS EVERY 6 HOURS PRN
Status: DISCONTINUED | OUTPATIENT
Start: 2017-08-12 | End: 2017-08-22 | Stop reason: HOSPADM

## 2017-08-12 RX ORDER — SODIUM CHLORIDE 9 MG/ML
1000 INJECTION, SOLUTION INTRAVENOUS ONCE
Status: COMPLETED | OUTPATIENT
Start: 2017-08-12 | End: 2017-08-13

## 2017-08-12 RX ORDER — PREGABALIN 25 MG/1
50 CAPSULE ORAL 2 TIMES DAILY
Status: DISCONTINUED | OUTPATIENT
Start: 2017-08-12 | End: 2017-08-22 | Stop reason: HOSPADM

## 2017-08-12 RX ORDER — ONDANSETRON 2 MG/ML
4 INJECTION INTRAMUSCULAR; INTRAVENOUS EVERY 4 HOURS PRN
Status: DISCONTINUED | OUTPATIENT
Start: 2017-08-12 | End: 2017-08-22 | Stop reason: HOSPADM

## 2017-08-12 RX ADMIN — ONDANSETRON 4 MG: 2 INJECTION INTRAMUSCULAR; INTRAVENOUS at 18:00

## 2017-08-12 RX ADMIN — SODIUM CHLORIDE 1000 ML: 9 INJECTION, SOLUTION INTRAVENOUS at 20:15

## 2017-08-12 NOTE — IP AVS SNAPSHOT
" Home Care Instructions                                                                                                                  Name:Vielka Briscoe  Medical Record Number:[id number]  CSN: [id number]    YOB: 1955   Age: 62 y.o.  Sex: female  HT:1.473 m (4' 9.99\") WT: 62.3 kg (137 lb 5.6 oz)          Admit Date: 8/12/2017     Discharge Date:   Today's Date: 8/22/2017  Attending Doctor:  Remy Hernandez M.D.                  Allergies:  Actos; Darvocet; Demerol; Glucophage; Morphine; Oxycodone; Pcn; Requip; Simvastatin; Sulfa drugs; Tramadol; Trazodone; Demerol; Dilaudid; Diphenhydramine; Iron; Lenalidomide; Morphine; Multivitamin; Naprosyn; Other drug; and Sulfa drugs            Discharge Instructions       Discharge Instructions    Discharged to home by car with relative. Discharged via wheelchair, hospital escort: Yes.  Special equipment needed: Oxygen    Be sure to schedule a follow-up appointment with your primary care doctor or any specialists as instructed.     Discharge Plan:   Influenza Vaccine Indication: Not indicated: Previously immunized this influenza season and > 8 years of age    I understand that a diet low in cholesterol, fat, and sodium is recommended for good health. Unless I have been given specific instructions below for another diet, I accept this instruction as my diet prescription.   Other diet: Return to Regular diet    Special Instructions: None    · Is patient discharged on Warfarin / Coumadin?   No     · Is patient Post Blood Transfusion?  No    Depression / Suicide Risk    As you are discharged from this [facility] facility, it is important to learn how to keep safe from harming yourself.    Recognize the warning signs:  · Abrupt changes in personality, positive or negative- including increase in energy   · Giving away possessions  · Change in eating patterns- significant weight changes-  positive or negative  · Change in sleeping patterns- unable to sleep or " sleeping all the time   · Unwillingness or inability to communicate  · Depression  · Unusual sadness, discouragement and loneliness  · Talk of wanting to die  · Neglect of personal appearance   · Rebelliousness- reckless behavior  · Withdrawal from people/activities they love  · Confusion- inability to concentrate     If you or a loved one observes any of these behaviors or has concerns about self-harm, here's what you can do:  · Talk about it- your feelings and reasons for harming yourself  · Remove any means that you might use to hurt yourself (examples: pills, rope, extension cords, firearm)  · Get professional help from the community (Mental Health, Substance Abuse, psychological counseling)  · Do not be alone:Call your Safe Contact- someone whom you trust who will be there for you.  · Call your local CRISIS HOTLINE 502-8106 or 995-379-4739  · Call your local Children's Mobile Crisis Response Team [place] (917) 261-1314 or www.Helijia  · Call the toll free National Suicide Prevention Hotlines   · National Suicide Prevention Lifeline 926-388-KUGC (1549)  · National Hope Line Network 800-SUICIDE (227-5163)    Neutropenia  Neutropenia is a condition that occurs when the level of a certain type of white blood cell (neutrophil) in your body becomes lower than normal. Neutrophils are made in the bone marrow and fight infections. These cells protect against bacteria and viruses. The fewer neutrophils you have, and the longer your body remains without them, the greater your risk of getting a severe infection becomes.  CAUSES   The cause of neutropenia may be hard to determine. However, it is usually due to 3 main problems:   · Decreased production of neutrophils. This may be due to:  · Certain medicines such as chemotherapy.  · Genetic problems.  · Cancer.  · Radiation treatments.  · Vitamin deficiency.  · Some pesticides.  · Increased destruction of neutrophils. This may be due to:  · Overwhelming  infections.  · Hemolytic anemia. This is when the body destroys its own blood cells.  · Chemotherapy.  · Neutrophils moving to areas of the body where they cannot fight infections. This may be due to:  · Dialysis procedures.  · Conditions where the spleen becomes enlarged. Neutrophils are held in the spleen and are not available to the rest of the body.  · Overwhelming infections. The neutrophils are held in the area of the infection and are not available to the rest of the body.  SYMPTOMS   There are no specific symptoms of neutropenia. The lack of neutrophils can result in an infection, and an infection can cause various problems.  DIAGNOSIS   Diagnosis is made by a blood test. A complete blood count is performed. The normal level of neutrophils in human blood differs with age and race. Infants have lower counts than older children and adults.  Americans have lower counts than Caucasians or Asians. The average adult level is 1500 cells/mm3 of blood. Neutrophil counts are interpreted as follows:  · Greater than 1000 cells/mm3 gives normal protection against infection.  · 500 to 1000 cells/mm3 gives an increased risk for infection.  · 200 to 500 cells/mm3 is a greater risk for severe infection.  · Lower than 200 cells/mm3 is a marked risk of infection. This may require hospitalization and treatment with antibiotic medicines.  TREATMENT   Treatment depends on the underlying cause, severity, and presence of infections or symptoms. It also depends on your health. Your caregiver will discuss the treatment plan with you. Mild cases are often easily treated and have a good outcome. Preventative measures may also be started to limit your risk of infections. Treatment can include:  · Taking antibiotics.  · Stopping medicines that are known to cause neutropenia.  · Correcting nutritional deficiencies by eating green vegetables to supply folic acid and taking vitamin B supplements.  · Stopping exposure to pesticides if  your neutropenia is related to pesticide exposure.  · Taking a blood growth factor called sargramostim, pegfilgrastim, or filgrastim if you are undergoing chemotherapy for cancer. This stimulates white blood cell production.  · Removal of the spleen if you have Felty's syndrome and have repeated infections.  HOME CARE INSTRUCTIONS   · Follow your caregiver's instructions about when you need to have blood work done.  · Wash your hands often. Make sure others who come in contact with you also wash their hands.  · Wash raw fruits and vegetables before eating them. They can carry bacteria and fungi.  · Avoid people with colds or spreadable (contagious) diseases (chickenpox, herpes zoster, influenza).  · Avoid large crowds.  · Avoid construction areas. The dust can release fungus into the air.  · Be cautious around children in  or school environments.  · Take care of your respiratory system by coughing and deep breathing.  · Bathe daily.  · Protect your skin from cuts and burns.  · Do not work in the garden or with flowers and plants.  · Care for the mouth before and after meals by brushing with a soft toothbrush. If you have mucositis, do not use mouthwash. Mouthwash contains alcohol and can dry out the mouth even more.  · Clean the area between the genitals and the anus (perineal area) after urination and bowel movements. Women need to wipe from front to back.  · Use a water soluble lubricant during sexual intercourse and practice good hygiene after. Do not have intercourse if you are severely neutropenic. Check with your caregiver for guidelines.  · Exercise daily as tolerated.  · Avoid people who were vaccinated with a live vaccine in the past 30 days. You should not receive live vaccines (polio, typhoid).  · Do not provide direct care for pets. Avoid animal droppings. Do not clean litter boxes and bird cages.  · Do not share food utensils.  · Do not use tampons, enemas, or rectal suppositories unless directed  by your caregiver.  · Use an electric razor to remove hair.  · Wash your hands after handling magazines, letters, and newspapers.  SEEK IMMEDIATE MEDICAL CARE IF:   · You have a fever.  · You have chills or start to shake.  · You feel nauseous or vomit.  · You develop mouth sores.  · You develop aches and pains.  · You have redness and swelling around open wounds.  · Your skin is warm to the touch.  · You have pus coming from your wounds.  · You develop swollen lymph nodes.  · You feel weak or fatigued.  · You develop red streaks on the skin.  MAKE SURE YOU:  · Understand these instructions.  · Will watch your condition.  · Will get help right away if you are not doing well or get worse.     This information is not intended to replace advice given to you by your health care provider. Make sure you discuss any questions you have with your health care provider.     Document Released: 06/09/2003 Document Revised: 03/11/2013 Document Reviewed: 07/06/2012  [employer] Interactive Patient Education ©2016 [employer] Inc.    Neutropenic Fever  Neutropenic fever is a type of fever that can develop in someone who has a very low number of a certain kind of white blood cells called neutrophils (neutropenia). These blood cells are important for fighting infections caused by bacteria and fungi. When you have neutropenia, you could be in danger of a severe infection. You may need to start taking antibiotic medicines.  CAUSES  Neutrophils are made in the spongy tissue inside your bones (bone marrow). Anything that damages your bone marrow or damages neutrophils after they leave your bone marrow can cause neutropenia. Once you have a dangerously low level of neutrophils, you are at risk for infection and neutropenic fever.  Causes of neutropenia may include:  · Cancer treatments.  · Bone marrow cancer.  · Cancer of the white blood cells (leukemia or myeloma).  · Severe infection.  · Bone marrow failure (aplastic anemia).  · Many types of  medicines.  · Diseases of the body's defense system (autoimmune diseases).  · Inherited genes that cause neutropenia.  · Vitamin B deficiency.  · Spleen enlargement in rheumatoid arthritis (Felty syndrome).  SIGNS AND SYMPTOMS  Fever is the main symptom of neutropenic fever. Other signs and symptoms may include:  · Chills.  · Fatigue.  · Painful mouth ulcers.  · Cough.  · Shortness of breath.  · Swollen glands (lymph nodes).  · Sore throat.  · Sinus and ear infections.  · Gum disease.  · Skin infection.  · Burning and frequent urination.  · Rectal infections.  · Vaginal discharge or itching.  DIAGNOSIS   Your health care provider may diagnose neutropenic fever if your neutrophil count is less than 500 neutrophils per microliter of blood and you have a fever of at least 100.4°F (38.0°C).   · Blood tests and other tests that measure neutrophils will be done. These may include:  ¨ A complete blood count (CBC) and a differential white blood count (WBC).  ¨ Peripheral smear. This test involves checking a blood sample under a microscope.  · Other types of tests may also be done, including:  ¨ Chest X-rays.  ¨ Cultures of blood and body fluids to look for a source of infection.  Your health care provider will also determine if your neutropenic fever is high risk or low risk.  · You may have high-risk neutropenic fever if:  ¨ Your neutrophil count is less than 100 neutrophils per microliter of blood.  ¨ You have also been diagnosed with pneumonia or another serious medical problem.  ¨ Your condition requires you to be treated in the hospital.  · You may have low-risk neutropenic fever if:  ¨ Your neutrophil count is more than 100 neutrophils per microliter of blood.  ¨ Your chest X-ray is normal.  ¨ You do not have an active illness or any other problems that require you to be in the hospital.  TREATMENT   You may start treatment as soon as you get diagnosed with neutropenic fever, even if your health care provider is still  looking for the source of infection.  · Treatment for high-risk neutropenic fever is antibiotic medicine given through an IV access tube. This is done in the hospital. You may be given a single antibiotic or a combination of antibiotics.  · Low-risk neutropenic fever may be treated at home. You may have to take one or two different oral antibiotics. In some cases, you may need to be treated with IV antibiotics that are given by a home health care provider who visits your home.  · If your health care provider finds a specific cause of infection, you may be switched to the antibiotics that work best against those particular bacteria.  · If a fungal infection is found, your medicine will be changed to an antifungal medicine.  · If the fever goes away in 3-5 days, you may have to take medicine for about 7 days. If the fever is not responding, you may have to take medicine longer.  · You may have to stop taking any medicine that could be causing neutropenic fever.  · If you have neutropenic fever from cancer treatment drugs (chemotherapy), you may need to take a type of medicine called white blood cell growth factors. This medicine can help prevent fever.  HOME CARE INSTRUCTIONS  · Only take medicines as directed by your health care provider.  ¨ If you are being treated with oral antibiotics at home, you may need to return to your health care provider every day to have your CBC checked. You may have to do this until your fever responds.  ¨ Take your antibiotics as directed. Make sure you finish them even if you start to feel better.  · Preventing infection is important when you have neutropenia. Here are some ways to prevent infections:  ¨ Avoid sick friends and family members.  ¨ Wash your hands often.  ¨ Do not eat uncooked or undercooked meats.  ¨ Wash all fruits and vegetables.  ¨ Do not eat or drink unpasteurized dairy products.  ¨ Get regular dental care, and maintain good dental hygiene.  ¨ Get a flu shot. Ask  your health care provider whether you need any other vaccines.  ¨ Wear gloves when gardening.  · Follow up with your health care provider as directed.  SEEK MEDICAL CARE IF:  · You have chills.  · You have a fever.  · You have signs or symptoms of infection.  SEEK IMMEDIATE MEDICAL CARE IF:  · You have trouble breathing.  · You have chest pain.     This information is not intended to replace advice given to you by your health care provider. Make sure you discuss any questions you have with your health care provider.     Document Released: 12/23/2014 Document Reviewed: 12/23/2014  [employer] Interactive Patient Education ©2016 Elsevier Inc.      Your appointments     Sep 04, 2017  2:30 PM   Est Chemo 1 with RN 5   [facility] ([facility])    [address] [address]  [place] [address]   [phone]            Sep 05, 2017  2:30 PM   Est Chemo 1 with RN 5   [facility] ([facility])    [address] [address]  [place] [address]   [phone]            Sep 06, 2017  2:30 PM   Est Chemo 1 with RN 2   [facility] ([facility])    [address] [address]  [place] [address]   [phone]            Sep 07, 2017  2:30 PM   Est Chemo 1 with RN 5   [facility] ([facility])    [address] [address]  [place] [address]   [phone]            Sep 08, 2017  2:30 PM   Est Chemo 1 with RN 5   [facility] ([facility])    [address] [address]  [place] [address]   [phone]              Follow-up Information     1. Follow up with Nyla Nava M.D.. Schedule an appointment as soon as possible for a visit in 2 weeks.    Specialty:  Family Medicine    Why:  follow up    Contact information    [address] [facility] [address] [phone]           Discharge Medication Instructions:    Below are the medications your physician expects you to take upon discharge:    Review all your home medications and newly ordered medications with your doctor and/or pharmacist. Follow medication instructions  as directed by your doctor and/or pharmacist.    Please keep your medication list with you and share with your physician.               Medication List      START taking these medications        Instructions    Morning Afternoon Evening Bedtime    meclizine 25 MG Tabs   Last time this was given:  25 mg on 8/22/2017  6:13 AM   Commonly known as:  ANTIVERT        Take 1 Tab by mouth every 8 hours.   Dose:  25 mg                        ondansetron 4 MG Tbdp   Last time this was given:  4 mg on 8/13/2017  7:40 PM   Commonly known as:  ZOFRAN ODT        Take 1 Tab by mouth every four hours as needed for Nausea/Vomiting (give PO if no IV route available).   Dose:  4 mg                          CONTINUE taking these medications        Instructions    Morning Afternoon Evening Bedtime    amlodipine 10 MG Tabs   Last time this was given:  10 mg on 8/22/2017  9:07 AM   Commonly known as:  NORVASC        Take 10 mg by mouth every day.   Dose:  10 mg                        carvedilol 12.5 MG Tabs   Last time this was given:  12.5 mg on 8/22/2017  9:07 AM   Commonly known as:  COREG        Take 12.5 mg by mouth 2 times a day, with meals.   Dose:  12.5 mg                        COMBIGAN 0.2-0.5 % Soln   Last time this was given:  1 Drop on 8/22/2017  9:00 AM   Generic drug:  Brimonidine Tartrate-Timolol        1 Drop by Ophthalmic route 2 Times a Day. BOTH EYES   Dose:  1 Drop                        cyclobenzaprine 10 MG Tabs   Commonly known as:  FLEXERIL        Take 1 Tab by mouth 3 times a day as needed for Muscle Spasms.   Dose:  10 mg                        hydrocodone-acetaminophen 5-325 MG Tabs per tablet   Last time this was given:  1 Tab on 8/19/2017 10:40 PM   Commonly known as:  NORCO        Take 1-2 Tabs by mouth every 6 hours as needed. Indications: Moderate to Moderately Severe Pain   Dose:  1-2 Tab                        LUMIGAN 0.03 % Soln   Generic drug:  bimatoprost        Place 1 Drop in both eyes every  evening.   Dose:  1 Drop                        LYRICA 50 MG capsule   Last time this was given:  50 mg on 8/22/2017  9:07 AM   Generic drug:  pregabalin        Take 50 mg by mouth 2 times a day.   Dose:  50 mg                        prochlorperazine 10 MG Tabs   Commonly known as:  COMPAZINE        Take 10 mg by mouth every 6 hours as needed for Nausea/Vomiting.   Dose:  10 mg                        sitagliptin 25 MG Tabs   Commonly known as:  JANUVIA        Take 1 Tab by mouth every morning.   Dose:  25 mg                             Where to Get Your Medications      These medications were sent to [id number] - [place], [place] - [address] [facility] [address] [facility] [address]  [address], [place] [address]     Phone:  [phone]    - meclizine 25 MG Tabs  - ondansetron 4 MG Tbdp            Orders for after discharge     REFERRAL TO HOME HEALTH    Complete by:  As directed    Home health will create and establish a plan of care             Instructions           Diet / Nutrition:    Follow any diet instructions given to you by your doctor or the dietician, including how much salt (sodium) you are allowed each day.    If you are overweight, talk to your doctor about a weight reduction plan.    Activity:    Remain physically active following your doctor's instructions about exercise and activity.    Rest often.     Any time you become even a little tired or short of breath, SIT DOWN and rest.    Worsening Symptoms:    Report any of the following signs and symptoms to the doctor's office immediately:    *Pain of jaw, arm, or neck  *Chest pain not relieved by medication                               *Dizziness or loss of consciousness  *Difficulty breathing even when at rest   *More tired than usual                                       *Bleeding drainage or swelling of surgical site  *Swelling of feet, ankles, legs or stomach                 *Fever (>100ºF)  *Pink or blood tinged  sputum  *Weight gain (3lbs/day or 5lbs /week)           *Shock from internal defibrillator (if applicable)  *Palpitations or irregular heartbeats                *Cool and/or numb extremities    Stroke Awareness    Common Risk Factors for Stroke include:    Age  Atrial Fibrillation  Carotid Artery Stenosis  Diabetes Mellitus  Excessive alcohol consumption  High blood pressure  Overweight   Physical inactivity  Smoking    Warning signs and symptoms of a stroke include:    *Sudden numbness or weakness of the face, arm or leg (especially on one side of the body).  *Sudden confusion, trouble speaking or understanding.  *Sudden trouble seeing in one or both eyes.  *Sudden trouble walking, dizziness, loss of balance or coordination.Sudden severe headache with no known cause.    It is very important to get treatment quickly when a stroke occurs. If you experience any of the above warning signs, call 911 immediately.                   Disclaimer         Quit Smoking / Tobacco Use:    I understand the use of any tobacco products increases my chance of suffering from future heart disease or stroke and could cause other illnesses which may shorten my life. Quitting the use of tobacco products is the single most important thing I can do to improve my health. For further information on smoking / tobacco cessation call a Toll Free Quit Line at 1-444.882.7965 (*National Cancer [place]) or 1-589.594.8119 (American Lung Association) or you can access the web based program at www.lungusa.org.    [place] Tobacco Users Help Line:  (902) 246-4671       Toll Free: 1-479.934.2677  Quit Tobacco Program [facility] (308)817-6923    Crisis Hotline:    [place] Crisis Hotline:  0-808-BFTNOZU or 1-463.605.3389    [place] Crisis Hotline:    1-729.858.2922 or 931-685-7365    Discharge Survey:   Thank you for choosing [facility]. We hope we did everything we could to make your hospital stay a pleasant one. You may be  receiving a phone survey and we would appreciate your time and participation in answering the questions. Your input is very valuable to us in our efforts to improve our service to our patients and their families.        My signature on this form indicates that:    1. I have reviewed and understand the above information.  2. My questions regarding this information have been answered to my satisfaction.  3. I have formulated a plan with my discharge nurse to obtain my prescribed medications for home.                  Disclaimer         __________________________________                     __________       ________                       Patient Signature                                                 Date                    Time

## 2017-08-12 NOTE — IP AVS SNAPSHOT
" <p align=\"LEFT\"><IMG SRC=\"//EMRWB/blob$/[email or web address]\" alt=\"Image\" WIDTH=\"50%\" HEIGHT=\"200\" BORDER=\"\"></p>                   Name:Vielka Briscoe  Medical Record Number:[id number]  CSN: [id number]    YOB: 1955   Age: 62 y.o.  Sex: female  HT:1.473 m (4' 9.99\") WT: 62.3 kg (137 lb 5.6 oz)          Admit Date: 8/12/2017     Discharge Date:   Today's Date: 8/22/2017  Attending Doctor:  Remy Hernandez M.D.                  Allergies:  Actos; Darvocet; Demerol; Glucophage; Morphine; Oxycodone; Pcn; Requip; Simvastatin; Sulfa drugs; Tramadol; Trazodone; Demerol; Dilaudid; Diphenhydramine; Iron; Lenalidomide; Morphine; Multivitamin; Naprosyn; Other drug; and Sulfa drugs          Your appointments     Sep 04, 2017  2:30 PM   Est Chemo 1 with RN 5   Infusion Services ([facility])    [address] [id number] [phone]            Sep 05, 2017  2:30 PM   Est Chemo 1 with RN 5   Infusion Services ([facility])    [address] [address]   [phone]            Sep 06, 2017  2:30 PM   Est Chemo 1 with RN 2   Infusion Services ([facility])    [address] [id number] [phone]            Sep 07, 2017  2:30 PM   Est Chemo 1 with RN 5   Infusion Services ([facility])    [address] [id number] [phone]            Sep 08, 2017  2:30 PM   Est Chemo 1 with RN 5   Infusion Services ([facility])    [address] [place] [id number]   [phone]              Follow-up Information     1. Follow up with Nyla Nava M.D.. Schedule an appointment as soon as possible for a visit in 2 weeks.    Specialty:  Family Medicine    Why:  follow up    Contact information    [address] [facility] [address] [phone]           Medication List      Take these Medications        Instructions    amlodipine 10 MG Tabs   Commonly known as:  NORVASC    Take 10 mg by mouth every day.   Dose:  10 mg       carvedilol 12.5 MG " Tabs   Commonly known as:  COREG    Take 12.5 mg by mouth 2 times a day, with meals.   Dose:  12.5 mg       COMBIGAN 0.2-0.5 % Soln   Generic drug:  Brimonidine Tartrate-Timolol    1 Drop by Ophthalmic route 2 Times a Day. BOTH EYES   Dose:  1 Drop       cyclobenzaprine 10 MG Tabs   Commonly known as:  FLEXERIL    Take 1 Tab by mouth 3 times a day as needed for Muscle Spasms.   Dose:  10 mg       hydrocodone-acetaminophen 5-325 MG Tabs per tablet   Commonly known as:  NORCO    Take 1-2 Tabs by mouth every 6 hours as needed. Indications: Moderate to Moderately Severe Pain   Dose:  1-2 Tab       LUMIGAN 0.03 % Soln   Generic drug:  bimatoprost    Place 1 Drop in both eyes every evening.   Dose:  1 Drop       LYRICA 50 MG capsule   Generic drug:  pregabalin    Take 50 mg by mouth 2 times a day.   Dose:  50 mg       meclizine 25 MG Tabs   Commonly known as:  ANTIVERT    Take 1 Tab by mouth every 8 hours.   Dose:  25 mg       ondansetron 4 MG Tbdp   Commonly known as:  ZOFRAN ODT    Take 1 Tab by mouth every four hours as needed for Nausea/Vomiting (give PO if no IV route available).   Dose:  4 mg       prochlorperazine 10 MG Tabs   Commonly known as:  COMPAZINE    Take 10 mg by mouth every 6 hours as needed for Nausea/Vomiting.   Dose:  10 mg       sitagliptin 25 MG Tabs   Commonly known as:  JANUVIA    Take 1 Tab by mouth every morning.   Dose:  25 mg

## 2017-08-12 NOTE — IP AVS SNAPSHOT
CourseWeaver Access Code: [email or web address]  Expires: 9/1/2017  4:15 AM    CourseWeaver  A secure, online tool to manage your health information     [employer]’s CourseWeaver® is a secure, online tool that connects you to your personalized health information from the privacy of your home -- day or night - making it very easy for you to manage your healthcare. Once the activation process is completed, you can even access your medical information using the CourseWeaver sundar, which is available for free in the Apple Sundar store or Google Play store.     CourseWeaver provides the following levels of access (as shown below):   My Chart Features   [facility] Primary Care Doctor [facility]  Specialists [facility]  Urgent  Care Non-[facility]  Primary Care  Doctor   Email your healthcare team securely and privately 24/7 X X X X   Manage appointments: schedule your next appointment; view details of past/upcoming appointments X      Request prescription refills. X      View recent personal medical records, including lab and immunizations X X X X   View health record, including health history, allergies, medications X X X X   Read reports about your outpatient visits, procedures, consult and ER notes X X X X   See your discharge summary, which is a recap of your hospital and/or ER visit that includes your diagnosis, lab results, and care plan. X X       How to register for CourseWeaver:  1. Go to  [email or web address].  2. Click on the Sign Up Now box, which takes you to the New Member Sign Up page. You will need to provide the following information:  a. Enter your CourseWeaver Access Code exactly as it appears at the top of this page. (You will not need to use this code after you’ve completed the sign-up process. If you do not sign up before the expiration date, you must request a new code.)   b. Enter your date of birth.   c. Enter your home email address.   d. Click Submit, and follow the next screen’s instructions.  3. Create a CourseWeaver ID. This will be your CourseWeaver  login ID and cannot be changed, so think of one that is secure and easy to remember.  4. Create a fitkit password. You can change your password at any time.  5. Enter your Password Reset Question and Answer. This can be used at a later time if you forget your password.   6. Enter your e-mail address. This allows you to receive e-mail notifications when new information is available in fitkit.  7. Click Sign Up. You can now view your health information.    For assistance activating your fitkit account, call [phone]

## 2017-08-12 NOTE — PROGRESS NOTES
Patient arrived ambulatory to the [facility] with O2 2L/NC in place. Reviewed vital signs, labs, and physician orders. Patient actively vomiting upon arrival. Reports feeling this way since dialysis today where pt reports removal of 1.7L. Patient reports having anti-nausea medication at home, reinforced importance of managing side effects and taking medications as directed, patient verbalized understanding. Patient arrived with PIV in place to right forearm, observed blood return, administered pre-medication. Re-assessed BP, slightly lower than previous reading, vomiting subsided once Zofran given. Chemotherapy administered SQ in divided dose one shot to left lower and one shot to right lower abdomen, band aid dressings placed. Patient able to state date and time of next apt. Contacted pt's family member for , assisted patient to the curb, and into pt's awaiting vehicle, patient left in no signs of distress.

## 2017-08-12 NOTE — IP AVS SNAPSHOT
8/22/2017    Vielka Lucio [facility]  [address]   [place] [address]    Dear Vielka:    [facility] wants to ensure your discharge home is safe and you or your loved ones have had all of your questions answered regarding your care after you leave the hospital.    Below is a list of resources and contact information should you have any questions regarding your hospital stay, follow-up instructions, or active medical symptoms.    Questions or Concerns Regarding… Contact   Medical Questions Related to Your Discharge  (7 days a week, 8am-5pm) Contact a Nurse Care Coordinator   [phone]   Medical Questions Not Related to Your Discharge  (24 hours a day / 7 days a week)  Contact the Nurse Health Line   [phone]    Medications or Discharge Instructions Refer to your discharge packet   or contact your [facility] Primary Care Provider   [phone]   Follow-up Appointment(s) Schedule your appointment via Alter-G   or contact Scheduling [phone]   Billing Review your statement via Alter-G  or contact Billing [phone]   Medical Records Review your records via Alter-G   or contact Medical Records [phone]     You may receive a telephone call within two days of discharge. This call is to make certain you understand your discharge instructions and have the opportunity to have any questions answered. You can also easily access your medical information, test results and upcoming appointments via the Alter-G free online health management tool. You can learn more and sign up at [employer]/Alter-G. For assistance setting up your Alter-G account, please call [phone].    Once again, we want to ensure your discharge home is safe and that you have a clear understanding of any next steps in your care. If you have any questions or concerns, please do not hesitate to contact us, we are here for you. Thank you for choosing [facility] for your healthcare needs.    Sincerely,    Your [facility] Healthcare Team

## 2017-08-13 PROBLEM — D63.8 ANEMIA OF CHRONIC DISEASE: Status: ACTIVE | Noted: 2017-08-13

## 2017-08-13 PROBLEM — I48.0 PAROXYSMAL ATRIAL FIBRILLATION (HCC): Status: ACTIVE | Noted: 2017-08-13

## 2017-08-13 LAB
ABO GROUP BLD: NORMAL
ANION GAP SERPL CALC-SCNC: 10 MMOL/L (ref 0–11.9)
BARCODED ABORH UBTYP: 2800
BARCODED ABORH UBTYP: 8400
BARCODED PRD CODE UBPRD: NORMAL
BARCODED PRD CODE UBPRD: NORMAL
BARCODED UNIT NUM UBUNT: NORMAL
BARCODED UNIT NUM UBUNT: NORMAL
BASOPHILS # BLD AUTO: 0.2 % (ref 0–1.8)
BASOPHILS # BLD: 0.01 K/UL (ref 0–0.12)
BLD GP AB SCN SERPL QL: NORMAL
BUN SERPL-MCNC: 57 MG/DL (ref 8–22)
CALCIUM SERPL-MCNC: 7.8 MG/DL (ref 8.5–10.5)
CHLORIDE SERPL-SCNC: 98 MMOL/L (ref 96–112)
CO2 SERPL-SCNC: 27 MMOL/L (ref 20–33)
COMPONENT R 8504R: NORMAL
COMPONENT R 8504R: NORMAL
CREAT SERPL-MCNC: 6.02 MG/DL (ref 0.5–1.4)
EOSINOPHIL # BLD AUTO: 0.23 K/UL (ref 0–0.51)
EOSINOPHIL NFR BLD: 5.3 % (ref 0–6.9)
ERYTHROCYTE [DISTWIDTH] IN BLOOD BY AUTOMATED COUNT: 61 FL (ref 35.9–50)
GFR SERPL CREATININE-BSD FRML MDRD: 7 ML/MIN/1.73 M 2
GLUCOSE BLD-MCNC: 160 MG/DL (ref 65–99)
GLUCOSE BLD-MCNC: 166 MG/DL (ref 65–99)
GLUCOSE BLD-MCNC: 178 MG/DL (ref 65–99)
GLUCOSE BLD-MCNC: 202 MG/DL (ref 65–99)
GLUCOSE BLD-MCNC: 206 MG/DL (ref 65–99)
GLUCOSE SERPL-MCNC: 160 MG/DL (ref 65–99)
HCT VFR BLD AUTO: 18.8 % (ref 37–47)
HGB BLD-MCNC: 6.3 G/DL (ref 12–16)
IMM GRANULOCYTES # BLD AUTO: 0.03 K/UL (ref 0–0.11)
IMM GRANULOCYTES NFR BLD AUTO: 0.7 % (ref 0–0.9)
LYMPHOCYTES # BLD AUTO: 1.69 K/UL (ref 1–4.8)
LYMPHOCYTES NFR BLD: 38.9 % (ref 22–41)
MCH RBC QN AUTO: 33.5 PG (ref 27–33)
MCHC RBC AUTO-ENTMCNC: 33.9 G/DL (ref 33.6–35)
MCV RBC AUTO: 98.9 FL (ref 81.4–97.8)
MONOCYTES # BLD AUTO: 0.3 K/UL (ref 0–0.85)
MONOCYTES NFR BLD AUTO: 6.9 % (ref 0–13.4)
NEUTROPHILS # BLD AUTO: 2.09 K/UL (ref 2–7.15)
NEUTROPHILS NFR BLD: 48 % (ref 44–72)
NRBC # BLD AUTO: 0 K/UL
NRBC BLD AUTO-RTO: 0 /100 WBC
PLATELET # BLD AUTO: 263 K/UL (ref 164–446)
PMV BLD AUTO: 11 FL (ref 9–12.9)
POTASSIUM SERPL-SCNC: 3.7 MMOL/L (ref 3.6–5.5)
PRODUCT TYPE UPROD: NORMAL
PRODUCT TYPE UPROD: NORMAL
RBC # BLD AUTO: 1.88 M/UL (ref 4.2–5.4)
RH BLD: NORMAL
SODIUM SERPL-SCNC: 135 MMOL/L (ref 135–145)
UNIT STATUS USTAT: NORMAL
UNIT STATUS USTAT: NORMAL
WBC # BLD AUTO: 4.4 K/UL (ref 4.8–10.8)

## 2017-08-13 PROCEDURE — 36415 COLL VENOUS BLD VENIPUNCTURE: CPT | Mod: EDC

## 2017-08-13 PROCEDURE — 80048 BASIC METABOLIC PNL TOTAL CA: CPT | Mod: EDC

## 2017-08-13 PROCEDURE — 86900 BLOOD TYPING SEROLOGIC ABO: CPT | Mod: EDC

## 2017-08-13 PROCEDURE — 700102 HCHG RX REV CODE 250 W/ 637 OVERRIDE(OP): Mod: EDC | Performed by: HOSPITALIST

## 2017-08-13 PROCEDURE — 99233 SBSQ HOSP IP/OBS HIGH 50: CPT | Performed by: HOSPITALIST

## 2017-08-13 PROCEDURE — 86901 BLOOD TYPING SEROLOGIC RH(D): CPT | Mod: EDC

## 2017-08-13 PROCEDURE — A9270 NON-COVERED ITEM OR SERVICE: HCPCS | Mod: EDC | Performed by: HOSPITALIST

## 2017-08-13 PROCEDURE — 85027 COMPLETE CBC AUTOMATED: CPT | Mod: EDC

## 2017-08-13 PROCEDURE — 83690 ASSAY OF LIPASE: CPT | Mod: EDC

## 2017-08-13 PROCEDURE — 700102 HCHG RX REV CODE 250 W/ 637 OVERRIDE(OP): Mod: EDC | Performed by: FAMILY MEDICINE

## 2017-08-13 PROCEDURE — 85007 BL SMEAR W/DIFF WBC COUNT: CPT | Mod: EDC

## 2017-08-13 PROCEDURE — 85025 COMPLETE CBC W/AUTO DIFF WBC: CPT | Mod: EDC

## 2017-08-13 PROCEDURE — 86850 RBC ANTIBODY SCREEN: CPT | Mod: EDC

## 2017-08-13 PROCEDURE — 83735 ASSAY OF MAGNESIUM: CPT | Mod: EDC

## 2017-08-13 PROCEDURE — 770004 HCHG ROOM/CARE - ONCOLOGY PRIVATE *: Mod: EDC

## 2017-08-13 PROCEDURE — A9270 NON-COVERED ITEM OR SERVICE: HCPCS | Mod: EDC | Performed by: FAMILY MEDICINE

## 2017-08-13 PROCEDURE — 700111 HCHG RX REV CODE 636 W/ 250 OVERRIDE (IP): Mod: EDC | Performed by: FAMILY MEDICINE

## 2017-08-13 PROCEDURE — 82962 GLUCOSE BLOOD TEST: CPT | Mod: EDC

## 2017-08-13 PROCEDURE — 30243N1 TRANSFUSION OF NONAUTOLOGOUS RED BLOOD CELLS INTO CENTRAL VEIN, PERCUTANEOUS APPROACH: ICD-10-PCS | Performed by: HOSPITALIST

## 2017-08-13 RX ORDER — ACETAMINOPHEN 325 MG/1
650 TABLET ORAL ONCE
Status: COMPLETED | OUTPATIENT
Start: 2017-08-13 | End: 2017-08-13

## 2017-08-13 RX ADMIN — INSULIN LISPRO 1 UNITS: 100 INJECTION, SOLUTION INTRAVENOUS; SUBCUTANEOUS at 17:24

## 2017-08-13 RX ADMIN — INSULIN LISPRO 1 UNITS: 100 INJECTION, SOLUTION INTRAVENOUS; SUBCUTANEOUS at 09:45

## 2017-08-13 RX ADMIN — PROCHLORPERAZINE EDISYLATE 5 MG: 5 INJECTION INTRAMUSCULAR; INTRAVENOUS at 02:53

## 2017-08-13 RX ADMIN — PREGABALIN 50 MG: 25 CAPSULE ORAL at 08:22

## 2017-08-13 RX ADMIN — PREGABALIN 50 MG: 25 CAPSULE ORAL at 22:12

## 2017-08-13 RX ADMIN — LATANOPROST 1 DROP: 50 SOLUTION OPHTHALMIC at 22:12

## 2017-08-13 RX ADMIN — CARVEDILOL 12.5 MG: 12.5 TABLET, FILM COATED ORAL at 17:16

## 2017-08-13 RX ADMIN — ONDANSETRON 4 MG: 4 TABLET, ORALLY DISINTEGRATING ORAL at 19:40

## 2017-08-13 RX ADMIN — INSULIN LISPRO 1 UNITS: 100 INJECTION, SOLUTION INTRAVENOUS; SUBCUTANEOUS at 22:12

## 2017-08-13 RX ADMIN — HYDROCODONE BITARTRATE AND ACETAMINOPHEN 1 TABLET: 5; 325 TABLET ORAL at 01:31

## 2017-08-13 RX ADMIN — ONDANSETRON 4 MG: 2 INJECTION INTRAMUSCULAR; INTRAVENOUS at 00:19

## 2017-08-13 RX ADMIN — AMLODIPINE BESYLATE 10 MG: 10 TABLET ORAL at 08:22

## 2017-08-13 RX ADMIN — INSULIN LISPRO 2 UNITS: 100 INJECTION, SOLUTION INTRAVENOUS; SUBCUTANEOUS at 12:59

## 2017-08-13 RX ADMIN — ACETAMINOPHEN 650 MG: 325 TABLET, FILM COATED ORAL at 16:02

## 2017-08-13 RX ADMIN — CARVEDILOL 12.5 MG: 12.5 TABLET, FILM COATED ORAL at 08:22

## 2017-08-13 ASSESSMENT — LIFESTYLE VARIABLES
ALCOHOL_USE: NO
EVER_SMOKED: NEVER

## 2017-08-13 ASSESSMENT — PATIENT HEALTH QUESTIONNAIRE - PHQ9
9. THOUGHTS THAT YOU WOULD BE BETTER OFF DEAD, OR OF HURTING YOURSELF: NOT AT ALL
6. FEELING BAD ABOUT YOURSELF - OR THAT YOU ARE A FAILURE OR HAVE LET YOURSELF OR YOUR FAMILY DOWN: SEVERAL DAYS
8. MOVING OR SPEAKING SO SLOWLY THAT OTHER PEOPLE COULD HAVE NOTICED. OR THE OPPOSITE, BEING SO FIGETY OR RESTLESS THAT YOU HAVE BEEN MOVING AROUND A LOT MORE THAN USUAL: NOT AT ALL
SUM OF ALL RESPONSES TO PHQ QUESTIONS 1-9: 7
5. POOR APPETITE OR OVEREATING: SEVERAL DAYS
2. FEELING DOWN, DEPRESSED, IRRITABLE, OR HOPELESS: SEVERAL DAYS
1. LITTLE INTEREST OR PLEASURE IN DOING THINGS: SEVERAL DAYS
7. TROUBLE CONCENTRATING ON THINGS, SUCH AS READING THE NEWSPAPER OR WATCHING TELEVISION: SEVERAL DAYS
3. TROUBLE FALLING OR STAYING ASLEEP OR SLEEPING TOO MUCH: SEVERAL DAYS
4. FEELING TIRED OR HAVING LITTLE ENERGY: SEVERAL DAYS
SUM OF ALL RESPONSES TO PHQ9 QUESTIONS 1 AND 2: 2

## 2017-08-13 ASSESSMENT — ENCOUNTER SYMPTOMS
BACK PAIN: 0
SHORTNESS OF BREATH: 0
EYE PAIN: 0
SEIZURES: 0
ORTHOPNEA: 0
DIARRHEA: 1
MYALGIAS: 1
VOMITING: 1
HALLUCINATIONS: 0
NAUSEA: 1
WEAKNESS: 1
HEADACHES: 0
FOCAL WEAKNESS: 0
DIZZINESS: 1
DOUBLE VISION: 0
SORE THROAT: 0
WEIGHT LOSS: 0
COUGH: 0
DEPRESSION: 0
TREMORS: 0
SPUTUM PRODUCTION: 0
FEVER: 0
BLOOD IN STOOL: 0

## 2017-08-13 ASSESSMENT — PAIN SCALES - GENERAL
PAINLEVEL_OUTOF10: 2
PAINLEVEL_OUTOF10: 2
PAINLEVEL_OUTOF10: 4

## 2017-08-13 NOTE — ED PROVIDER NOTES
ED Provider Note    Scribed for Marianna Rowe D.O. by Arianna Lindsay. 8/12/2017, 6:00 PM.    Primary care provider: Nyla Nava M.D.  Means of arrival: wheel chair  History obtained from: Patient  History limited by: none     CHIEF COMPLAINT  Chief Complaint   Patient presents with   • Nausea/Vomiting/Diarrhea       HPI  Vielka Briscoe is a 62 y.o. female who presents to the Emergency Department for evaluation of nausea and vomiting onset 5 days ago. She reports frequent episodes of nausea and vomiting since onset. Additionally, she reports having multiple episodes of diarrhea for the past 5 days. She denies presence of blood in her stool. Her symptoms began shortly after her 4th chemotherapy treatment was completed secondary to MDS cancer. She confirms having similar reactions to her chemotherapy treatments in the past but not to this severity. She reports having no antinausea medications at home.  Patient has associated generalized abdominal pain and chest tightness. She denies recent fever and recent illness including runny nose, cough and congestion.       REVIEW OF SYSTEMS  See HPI for further details. All other systems are negative. C.       PAST MEDICAL HISTORY   has a past medical history of Heart abnormalities; Angina; Diabetes; Hypertension; Chronic anemia; Chronic obstructive pulmonary disease (CMS-[facility]); Blood transfusion without reported diagnosis; Anemia; Breath shortness; Glaucoma; Diabetes; Dental disorder; Supplemental oxygen dependent; Cataract; Renal disorder; Pain; Chronic kidney disease; Arthritis; Dialysis patient; MDS (myelodysplastic syndrome) (10/2016); Congestive heart failure (CMS-HCC); Atrial fibrillation (CMS-HCC); Stroke (CMS-HCC) (03/2015); and Cancer ([facility]).    SURGICAL HISTORY   has past surgical history that includes other cardiac surgery; other abdominal surgery; gyn surgery; gyn surgery; gyn surgery; other cardiac surgery; other; retinal detachment repair (Right); av  "fistula creation (Left, 2/8/2016); other abdominal surgery; gastroscopy (12/17/2015); colonoscopy (12/17/2015); and vein ligation (Left, 11/10/2016).    SOCIAL HISTORY  Social History   Substance Use Topics   • Smoking status: Never Smoker    • Smokeless tobacco: Never Used   • Alcohol Use: No      History   Drug Use No       FAMILY HISTORY  Family History   Problem Relation Age of Onset   • Hypertension Father    • Hypertension Mother        CURRENT MEDICATIONS  Reviewed.  See Encounter Summary.     ALLERGIES  Allergies   Allergen Reactions   • Actos [Pioglitazone Hydrochloride] Unspecified     Cause blindness   YNS=8349   • Darvocet [Propoxyphene N-Apap] Vomiting     VSD=2938   • Demerol Vomiting     ORU=0408   • Glucophage [Metformin Hydrochloride] Vomiting     MMD=7452   • Morphine Vomiting     FXH=3468   • Oxycodone Vomiting     RXN=1/2016   • Pcn [Penicillins] Vomiting     EKZ=4705     • Requip Vomiting     RXN=12/2015   • Simvastatin Unspecified     Leg cramps  FHP=5523   • Sulfa Drugs Rash     RXN=>10 years   • Tramadol Vomiting     NGD=2987   • Trazodone Vomiting     KNT=4135   • Demerol    • Dilaudid [Hydromorphone] Vomiting     Unknown    • Diphenhydramine Vomiting   • Iron      vomiting   • Lenalidomide Vomiting   • Morphine    • Multivitamin      itching   • Naprosyn [Naproxen]    • Other Drug      Any binders that remove phosphorus from the body such as tums   • Sulfa Drugs        PHYSICAL EXAM  VITAL SIGNS: /70 mmHg  Pulse 94  Temp(Src) 36.2 °C (97.2 °F) (Temporal)  Resp 18  Ht 1.473 m (4' 10\")  Wt 61.689 kg (136 lb)  BMI 28.43 kg/m2  SpO2 100%  LMP 01/01/1995  Constitutional: Alert and in no apparent distress.  HENT: Normocephalic atraumatic. Bilateral external ears normal. Nose normal. Mucous membranes are dry.   Eyes: Pupils are equal and reactive. Conjunctiva normal. Non-icteric sclera.   Neck: Normal range of motion without tenderness. Supple. No meningeal signs.  Cardiovascular: " Regular rate and rhythm. No murmurs, gallops or rubs.  Thorax & Lungs: Breath sounds are clear to auscultation bilaterally. No wheezing, rhonchi or rales.  Abdomen: Soft, mild tenderness to palpation generally throughout the abdomen. nondistended. No peritoneal signs noted.  Skin: Pallor present.  Warm and dry. No rashes are noted.  Back: No bony tenderness, No CVA tenderness.   Extremities: 2+ peripheral pulses. Cap refill is less than 2 seconds. No edema, cyanosis, or clubbing.  Musculoskeletal: Good range of motion in all major joints. No tenderness to palpation or major deformities noted.   Neurologic: Alert and oriented ×3. The patient moves all 4 extremities and follows commands.  Psychiatric: Affect is normal. Judgment appears to be intact.        DIAGNOSTIC STUDIES / PROCEDURES   LABS  Results for orders placed or performed during the hospital encounter of 08/12/17   CBC WITH DIFFERENTIAL   Result Value Ref Range    WBC 5.6 4.8 - 10.8 K/uL    RBC 2.45 (L) 4.20 - 5.40 M/uL    Hemoglobin 8.2 (L) 12.0 - 16.0 g/dL    Hematocrit 24.2 (L) 37.0 - 47.0 %    MCV 98.8 (H) 81.4 - 97.8 fL    MCH 33.5 (H) 27.0 - 33.0 pg    MCHC 33.9 33.6 - 35.0 g/dL    RDW 61.2 (H) 35.9 - 50.0 fL    Platelet Count 406 164 - 446 K/uL    MPV 11.6 9.0 - 12.9 fL    Neutrophils-Polys 64.20 44.00 - 72.00 %    Lymphocytes 24.50 22.00 - 41.00 %    Monocytes 6.40 0.00 - 13.40 %    Eosinophils 3.20 0.00 - 6.90 %    Basophils 0.50 0.00 - 1.80 %    Immature Granulocytes 1.20 (H) 0.00 - 0.90 %    Nucleated RBC 0.00 /100 WBC    Neutrophils (Absolute) 3.62 2.00 - 7.15 K/uL    Lymphs (Absolute) 1.38 1.00 - 4.80 K/uL    Monos (Absolute) 0.36 0.00 - 0.85 K/uL    Eos (Absolute) 0.18 0.00 - 0.51 K/uL    Baso (Absolute) 0.03 0.00 - 0.12 K/uL    Immature Granulocytes (abs) 0.07 0.00 - 0.11 K/uL    NRBC (Absolute) 0.00 K/uL   COMP METABOLIC PANEL   Result Value Ref Range    Sodium 132 (L) 135 - 145 mmol/L    Potassium 4.1 3.6 - 5.5 mmol/L    Chloride 91 (L) 96  - 112 mmol/L    Co2 26 20 - 33 mmol/L    Anion Gap 15.0 (H) 0.0 - 11.9    Glucose 267 (H) 65 - 99 mg/dL    Bun 49 (H) 8 - 22 mg/dL    Creatinine 5.69 (HH) 0.50 - 1.40 mg/dL    Calcium 8.6 8.5 - 10.5 mg/dL    AST(SGOT) 21 12 - 45 U/L    ALT(SGPT) 28 2 - 50 U/L    Alkaline Phosphatase 69 30 - 99 U/L    Total Bilirubin 0.6 0.1 - 1.5 mg/dL    Albumin 3.9 3.2 - 4.9 g/dL    Total Protein 7.4 6.0 - 8.2 g/dL    Globulin 3.5 1.9 - 3.5 g/dL    A-G Ratio 1.1 g/dL   LACTIC ACID   Result Value Ref Range    Lactic Acid 2.0 0.5 - 2.0 mmol/L   TROPONIN   Result Value Ref Range    Troponin I <0.01 0.00 - 0.04 ng/mL   LIPASE   Result Value Ref Range    Lipase 22 11 - 82 U/L   MAGNESIUM   Result Value Ref Range    Magnesium 2.2 1.5 - 2.5 mg/dL   PHOSPHORUS   Result Value Ref Range    Phosphorus 7.1 (H) 2.5 - 4.5 mg/dL   ESTIMATED GFR   Result Value Ref Range    GFR If  9 (A) >60 mL/min/1.73 m 2    GFR If Non  8 (A) >60 mL/min/1.73 m 2   EKG (NOW)   Result Value Ref Range    Report       [facility] Emergency Dept.    Test Date:  2017  Pt Name:    JESUS SILVEIRA              Department: ER  MRN:        [id number]                      Room:       [facility]  Gender:     F                            Technician: [id number]  :        1955                   Requested By:MELA BERNAL  Order #:    [id number]                    Reading MD:    Measurements  Intervals                                Axis  Rate:       88                           P:          5  TN:         180                          QRS:        -13  QRSD:       86                           T:          46  QT:         412  QTc:        499    Interpretive Statements  SINUS RHYTHM  ABNRM R PROG, CONSIDER ASMI OR LEAD PLACEMENT  BORDERLINE PROLONGED QT INTERVAL  Compared to ECG 2017 15:51:09  Myocardial infarct finding now present     All labs were reviewed by me.      EKG  EKG was performed at 18:38 shows normal sinus rhythm  with heart rate of 88. KY interval is 180. QT/QTc are 412/499. Left axis deviation is present. Abnormal R wave progression. Borderline prolonged QT interval.  No acute ST elevations or depressions are noted. Unchanged from EKG performed on July 20th 2017.       RADIOLOGY  DX-CHEST-PORTABLE (1 VIEW)   Final Result      Cardiomegaly with pulmonary vascular congestion.      Atherosclerotic plaque.      CT-ABDOMEN-PELVIS W/O    (Results Pending)   The radiologist's interpretation of all radiological studies have been reviewed by me.      COURSE & MEDICAL DECISION MAKING  Pertinent Labs & Imaging studies reviewed. (See chart for details)    6:00 PM - Patient seen and examined at bedside. Patient will be treated with zofran 4 mg. Ordered DX chest, troponin, lactic acid, troponin, lipase, magnesium, phosphorous, estimated GFR, CBC, CMP, urinalysis and EKG to evaluate her symptoms.     7:51 PM Paged the hospitalist.     7:59 PM Consult with the hospitalist, Dr. Galdamez, who agrees to admit the patient.     8:00 PM Paged nephrology.       Decision Making:  This is a 62 y.o. year old female who presents with nausea and vomiting as well as diarrhea. On initial evaluation, the patient appeared uncomfortable and had dry mucous membrane. She was noted initially to be hypertensive and does have a complicated past medical history including hypertension, end-stage renal disease, and cardiac disease. Repeat blood pressure was improved.    A CBC was obtained and revealed a white count of 5.6. She was also noted to be anemic with a hemoglobin and hematocrit of 8.2 and 24.2 respectively. Platelets were normal. Metabolic panel revealed a sodium of 132 and chloride of 91 with an anion gap of 15. This is likely secondary to her persistent vomiting. Her glucose is elevated at 267 however do not believe she has DKA at this time. Her BUN and creatinine were near her baseline. She initially said that she did not go to her dialysis yesterday  but upon requestioning stated that she had. Her lactate was 2.0.     I did obtain an EKG which was abnormal with a borderline prolonged QT. She did get 1 dose of Zofran; however, caution was exercised in giving her further doses as it does prolonged QT. Mag was normal. Phosphorus is slightly high at 7.1. Lipase is normal at 22.     1st troponin was negative and no obvious evidence of ischemia is noted on EKG. Chest x-ray did reveal cardiomegaly with vascular congestion. She did not demonstrate any hypoxia or respiratory distress here in the emergency department. The patient was treated with IV fluids and Zofran; however, she still looked poorly and continued to feel nauseated. The plan was made to admit the patient for further management including IV fluids and possible dialysis. I do not think she has an obstruction as her abdomen was nondistended and she was having bowel movements. I have less clinical suspicion for infection at this time as well and I think her symptoms may be secondary to her chemo. She did remain stable in the emergency department and was transferred to the floor in stable but serious condition.    DISPOSITION:  Patient will be admitted to Dr. Galdamez in guarded condition.      FINAL IMPRESSION  1. Intractable vomiting with nausea, unspecified vomiting type    2. Chronic renal insufficiency, unspecified stage    3. Hyperglycemia    4. Anemia, unspecified type    5. Abnormal EKG           Arianna KENNY), am scribing for, and in the presence of, Marianna Rowe D.O..  Electronically signed by: Arianna Heller), 8/12/2017  Marianna KENNY D.O. personally performed the services described in this documentation, as scribed by Arianna Lindsay in my presence, and it is both accurate and complete.      The note accurately reflects work and decisions made by me.  Marianna Rowe  8/12/2017  9:02 PM

## 2017-08-13 NOTE — CARE PLAN
Problem: Safety  Goal: Will remain free from falls  Outcome: PROGRESSING AS EXPECTED  Educated PT on use of call light   PT oriented to room, all possessions within reach, call light within reach   Slip resistant socks on PT (yellow if fall risk)

## 2017-08-13 NOTE — PROGRESS NOTES
Received bedside report from night shift RN. Patient denies pain, nausea and vomiting. Call light within reach. Bed in lowest and locked position. Hourly rounding in place.

## 2017-08-13 NOTE — H&P
DOS: 8/12/2017    PATIENT ID:  NAME:  Vielka Briscoe  MRN:               [id number]  YOB: 1955    PMD: Nyla Nava M.D.    CC: Nausea vomiting and diarrhea    HPI: Vielka Briscoe is a 62 y.o. female who presents with nausea, vomiting and diarrhea for the past. 5 days with abdominal pain and fever and chills. Patient has myelodysplastic syndrome, has been on chemotherapy with Vidaza for 5 days, she finished the course yesterday. Patient states that she usually has nausea vomiting and diarrhea when she has chemotherapy however usually she does not have fever and chills and abdominal pain. She denies any recent antibiotic use. She also has end-stage renal disease and is on hemodialysis Monday Wednesday and Friday she did not miss her scheduled days.                 REVIEW OF SYSTEMS  A full review of systems was completed and all pertinent positives and negatives are included in the HPI above by AMA/CMS criteria.    PAST MEDICAL HISTORY  Past Medical History   Diagnosis Date   • Heart abnormalities    • Angina    • Diabetes    • Hypertension    • Chronic anemia    • Chronic obstructive pulmonary disease (CMS-HCC)    • Blood transfusion without reported diagnosis    • Anemia    • Breath shortness    • Glaucoma    • Diabetes      Diet controlled   • Dental disorder      full dentures   • Supplemental oxygen dependent    • Cataract      bilat IOL   • Renal disorder      CKF   • Pain      General pain 8/10   • Chronic kidney disease      Chronic renal failure   • Arthritis      hands    • Dialysis patient      [facility] in [place]   • MDS (myelodysplastic syndrome) 10/2016     bone marrow biopsy   • Congestive heart failure (CMS-HCC)    • Atrial fibrillation (CMS-HCC)      HX   • Stroke (CMS-HCC) 03/2015     No residual weakness/problems   • Cancer (CMS-HCC)      MDS in bones       PAST SURGICAL HISTORY  Past Surgical History   Procedure Laterality Date   • Other cardiac surgery       Angioplasty 1998  and    • Other abdominal surgery       appendectomy,  x 2, hysterectomy, D & C   • Gyn surgery       hysterectomy   • Gyn surgery        x 2   • Gyn surgery       d&C x2   • Other cardiac surgery       cardiac angiogram, angioplasty   • Other       angioplasty/ stents bilat LE   • Retinal detachment repair Right    • Av fistula creation Left 2016     Procedure: AV FISTULA CREATION UPPER EXTREMITY;  Surgeon: Ranulfo Jolly M.D.;  Location: SURGERY [facility];  Service:    • Other abdominal surgery       appendectomy   • Gastroscopy  2015     Procedure: ESOPHAGOGASTRODUODENOSCOPY WITH BIOPSY;  Surgeon: Wing Álvarez M.D.;  Location: SURGERY [facility];  Service:    • Colonoscopy  2015     Procedure: COLONOSCOPY;  Surgeon: Wing Álvarez M.D.;  Location: SURGERY [facility];  Service:    • Vein ligation Left 11/10/2016     Procedure: VEIN LIGATION FOR DISTAL REVASCULARIZATION AND INTERVAL LIGATION OF LEFT ARM DIALYISIS ACCESS (DRIL PROCEDURE);  Surgeon: aRnulfo Jolly M.D.;  Location: SURGERY [facility];  Service:        FAMILY HISTORY  Family History   Problem Relation Age of Onset   • Hypertension Father    • Hypertension Mother        SOCIAL HISTORY  Social History   Substance Use Topics   • Smoking status: Never Smoker    • Smokeless tobacco: Never Used   • Alcohol Use: No       ALLERGIES  Allergies as of 2017 - Rakesh as Reviewed 2017   Allergen Reaction Noted   • Actos [pioglitazone hydrochloride] Unspecified 10/31/2012   • Darvocet [propoxyphene n-apap] Vomiting 10/31/2012   • Demerol Vomiting 10/31/2012   • Glucophage [metformin hydrochloride] Vomiting 10/31/2012   • Morphine Vomiting 10/31/2012   • Oxycodone Vomiting 2016   • Pcn [penicillins] Vomiting 10/31/2012   • Requip Vomiting 2015   • Simvastatin Unspecified 2016   • Sulfa drugs Rash 10/31/2012   • Tramadol Vomiting 2016   • Trazodone Vomiting 2015   •  "Demerol  07/20/2007   • Dilaudid [hydromorphone] Vomiting 12/21/2016   • Diphenhydramine Vomiting 11/08/2016   • Iron  11/10/2016   • Lenalidomide Vomiting 03/19/2017   • Morphine  07/20/2007   • Multivitamin  11/10/2016   • Naprosyn [naproxen]  07/20/2007   • Other drug  11/10/2016   • Sulfa drugs  07/20/2007       OUTPATIENT MEDICATIONS     Medication List      ASK your doctor about these medications       Instructions    amlodipine 10 MG Tabs   Commonly known as:  NORVASC    Take 10 mg by mouth every day.   Dose:  10 mg       carvedilol 12.5 MG Tabs   Commonly known as:  COREG    Take 12.5 mg by mouth 2 times a day, with meals.   Dose:  12.5 mg       COMBIGAN 0.2-0.5 % Soln   Generic drug:  Brimonidine Tartrate-Timolol    by Ophthalmic route.       cyclobenzaprine 10 MG Tabs   Commonly known as:  FLEXERIL    Take 1 Tab by mouth 3 times a day as needed for Muscle Spasms.   Dose:  10 mg       hydrocodone-acetaminophen 5-325 MG Tabs per tablet   Commonly known as:  NORCO    Take 1-2 Tabs by mouth every 6 hours as needed.   Dose:  1-2 Tab       LUMIGAN 0.03 % Soln   Generic drug:  bimatoprost    Place 1 Drop in both eyes every evening.   Dose:  1 Drop       LYRICA 50 MG capsule   Generic drug:  pregabalin    Take 50 mg by mouth 2 times a day.   Dose:  50 mg       prochlorperazine 10 MG Tabs   Commonly known as:  COMPAZINE    Take 10 mg by mouth every 6 hours as needed.   Dose:  10 mg       sitagliptin 25 MG Tabs   Commonly known as:  JANUVIA    Take 1 Tab by mouth every morning.   Dose:  25 mg       VOLTAREN 1 % Gel   Generic drug:  Diclofenac Sodium    APPLY 2 GRAMS TOPICALLY QID             PHYSICAL EXAM:  Blood pressure 182/70, pulse 90, temperature 36.2 °C (97.2 °F), temperature source Temporal, resp. rate 18, height 1.473 m (4' 10\"), weight 61.689 kg (136 lb), last menstrual period 01/01/1995, SpO2 100 %.  Oxygen: Pulse Oximetry: 100 %    Gen: NAD, comfortable  HEENT: NCAT, PERRL, EOMI, Oropharynx moist and " clear, no LAD, no JVD, neck supple  Chest: no accessory muscle use, CTAB  CV: RRR, S1 and S2 distinct, no murmur  GI: +BS, soft, Tender at LLQ and epigastric, ND, no rebound/guarding, no hepatosplenomegaly  Extremities: Warm, well-perfused, no cyanosis/clubbing, trace edema, distal pulses are intact  Neuro: AO x 3, CN II-XII grossly intact, MMT 5/5, no sensory deficits    LAB TESTS and IMAGES:   Recent Labs      08/12/17   1800   WBC  5.6   RBC  2.45*   HEMOGLOBIN  8.2*   HEMATOCRIT  24.2*   MCV  98.8*   MCH  33.5*   RDW  61.2*   PLATELETCT  406   MPV  11.6   NEUTSPOLYS  64.20   LYMPHOCYTES  24.50   MONOCYTES  6.40   EOSINOPHILS  3.20   BASOPHILS  0.50     Recent Labs      08/12/17   1800   TROPONINI  <0.01         Recent Labs      08/12/17   1800   SODIUM  132*   POTASSIUM  4.1   CHLORIDE  91*   CO2  26   BUN  49*   CREATININE  5.69*   CALCIUM  8.6   MAGNESIUM  2.2   PHOSPHORUS  7.1*   ALBUMIN  3.9     Estimated GFR/CRCL = CrCl cannot be calculated (Unknown ideal weight.).  Recent Labs      08/12/17   1800   GLUCOSE  267*     GLYCOHEMOGLOBIN   Date/Time Value Ref Range Status   05/10/2017 03:05 AM 9.8* 0.0 - 5.6 % Final     Comment:     Increased risk for diabetes:  5.7 -6.4%  Diabetes:  >6.4%  Glycemic control for adults with diabetes:  <7.0%  The above interpretations are per ADA guidelines.  Diagnosis  of diabetes mellitus on the basis of elevated Hemoglobin A1c  should be confirmed by repeating the Hb A1c test.     02/20/2017 08:36 AM 8.4* 0.0 - 5.6 % Final     Comment:     Increased risk for diabetes:  5.7 -6.4%  Diabetes:  >6.4%  Glycemic control for adults with diabetes:  <7.0%  The above interpretations are per ADA guidelines.  Diagnosis  of diabetes mellitus on the basis of elevated Hemoglobin A1c  should be confirmed by repeating the Hb A1c test.     12/21/2016 10:20 AM 8.1* 0.0 - 5.6 % Final     Comment:     Increased risk for diabetes:  5.7 -6.4%  Diabetes:  >6.4%  Glycemic control for adults with  diabetes:  <7.0%  The above interpretations are per ADA guidelines.  Diagnosis  of diabetes mellitus on the basis of elevated Hemoglobin A1c  should be confirmed by repeating the Hb A1c test.       FREE T-4   Date/Time Value Ref Range Status   02/20/2017 03:00 AM 0.79 0.53 - 1.43 ng/dL Final     Recent Labs      08/12/17   1800   ASTSGOT  21   ALTSGPT  28   TBILIRUBIN  0.6   ALKPHOSPHAT  69   GLOBULIN  3.5           Invalid input(s): [id number]  VITAMIN B12 -TRUE COBALAMIN   Date/Time Value Ref Range Status   06/06/2017 04:20  211 - 911 pg/mL Final   03/20/2017 04:56  211 - 911 pg/mL Final   02/22/2017 09:57  211 - 911 pg/mL Final     FOLATE -FOLIC ACID   Date/Time Value Ref Range Status   06/06/2017 04:20 PM 7.5 >4.0 ng/mL Final   03/20/2017 04:56 AM 12.1 >4.0 ng/mL Final   02/22/2017 09:57 AM 9.9 >4.0 ng/mL Final     FERRITIN   Date/Time Value Ref Range Status   03/20/2017 04:56 AM 1032.4* 10.0 - 291.0 ng/mL Final   02/20/2017 03:00 AM 1236.3* 10.0 - 291.0 ng/mL Final   11/28/2016 03:39 AM 1442.2* 10.0 - 291.0 ng/mL Final     IRON   Date/Time Value Ref Range Status   06/06/2017 01:41 * 40 - 170 ug/dL Final   05/24/2017 09:10 * 40 - 170 ug/dL Final   03/20/2017 04:56  40 - 170 ug/dL Final     TOTAL IRON BINDING   Date/Time Value Ref Range Status   06/06/2017 01:41  250 - 450 ug/dL Final   03/20/2017 04:56 * 250 - 450 ug/dL Final   02/20/2017 08:36 * 250 - 450 ug/dL Final     Dx-chest-limited (1 View)    7/20/2017 7/20/2017 2:32 AM HISTORY/REASON FOR EXAM:  Chest Pain TECHNIQUE/EXAM DESCRIPTION AND NUMBER OF VIEWS: Single portable view of the chest. COMPARISON: 6/27/2017 FINDINGS: Cardiac mediastinal contour is unchanged. Atherosclerotic calcification of thoracic aorta. No focal pulmonary consolidation. Right hemidiaphragm is elevated. No pleural fluid collection or pneumothorax. No major bony abnormality is seen.     7/20/2017  1.  No acute cardiopulmonary  disease. 2.  Stable mild cardiomegaly.    Dx-chest-portable (1 View)    8/12/2017 8/12/2017 7:10 PM HISTORY/REASON FOR EXAM:  Chest Pain. Cough TECHNIQUE/EXAM DESCRIPTION AND NUMBER OF VIEWS: Single portable view of the chest. COMPARISON: 7/20/2017 FINDINGS: The cardiomediastinal silhouette is enlarged. Atherosclerotic calcification is seen. There is pulmonary vascular congestion. No focal consolidation, pleural effusion or pneumothorax is identified.  Costophrenic angles are sharp.     8/12/2017  Cardiomegaly with pulmonary vascular congestion. Atherosclerotic plaque.      ASSESSMENT/PLAN:     1.  Nausea vomiting and diarrhea, intractable. Symptomatic treatment, with abdominal pain symptoms and possible fever and chills will check CT abdomen and pelvis  2.  Myelodysplastic syndrome. Inform oncology of this patient's admission tomorrow  3. End-stage renal disease on hemodialysis. Monday Wednesday and Friday she did not miss her scheduled inform nephrology for this admission tomorrow  4.  Chronic anemia  5.  Hypertension. Continue Norvasc and Coreg  6.  Diabetes mellitus. SSI for now  7.  Diabetic neuropathy. Continue Lyrica  8.  Paroxysmal atrial fibrillation. Continue Coreg, not on anticoagulation secondary to MDS      PPX: SCD    CODE STATUS: Full    Anticipate that patient will need more than 2 midnights for management of the above discussed medical issues.    This dictation was created using voice recognition software. The accuracy of the dictation is limited to the abilities of the software. I expect there may be some errors of grammar and possibly content.

## 2017-08-13 NOTE — ED NOTES
Med rec updated and complete.  Allergies reviewed.  Pt denies antibiotic use in last 30 days.    All AM doses taken.

## 2017-08-13 NOTE — PROGRESS NOTES
Dr. Whitney notified of Hgb 6.3. Repeat CBC without diff ordered for 1700. Per Dr. Whitney, hold RBC transfusion until tomorrow during dialysis, unless symptomatic then notify MD.

## 2017-08-13 NOTE — PROGRESS NOTES
This pt was evaluated with the precepting RN.  This RN in agreement with preceptingt RN unless otherwise noted. Please refer to this RNs daily note and EPIC charting for further information.  This RN agrees with precepting RN care plan.  Problems are pt specific, goals are achievable, interventions are appropriate.

## 2017-08-13 NOTE — ED NOTES
Report from ARIEL Og.  Pt resting quietly in bed.  NAD noted.  No questions or concerns to address.

## 2017-08-13 NOTE — CARE PLAN
Problem: Pain Management  Goal: Pain level will decrease to patient’s comfort goal  Pain reports some discomfort in the abdomen but declines pain interventions. Able to make needs known. Call light within reach.    Problem: Mobility  Goal: Risk for activity intolerance will decrease  Patient up x1 BSC. Turns self left and right. Calls appropriately. Call light within reach.

## 2017-08-13 NOTE — ED NOTES
Pt to triage with N/V/ diarrhea, vomiting in triage. Pt received chemo for MDS Monday-Friday. Home meds not helping. Denies fever. No blood in stool or emesis  Pt advised to return to triage nurse for any changes or concerns.

## 2017-08-13 NOTE — PROGRESS NOTES
"PT in bed, using emessis bag, vomiting clear fluid. Checked MAR for available meds. PT A/Ox4 and states her \"abdomen hurts\".   "

## 2017-08-13 NOTE — PROGRESS NOTES
Received report from ED nurse on PT. Awaiting PT to return from CT then transport will bring PT to unit.

## 2017-08-14 LAB
ANION GAP SERPL CALC-SCNC: 13 MMOL/L (ref 0–11.9)
ANISOCYTOSIS BLD QL SMEAR: ABNORMAL
BASOPHILS # BLD AUTO: 2.6 % (ref 0–1.8)
BASOPHILS # BLD: 0.1 K/UL (ref 0–0.12)
BUN SERPL-MCNC: 62 MG/DL (ref 8–22)
CALCIUM SERPL-MCNC: 7.8 MG/DL (ref 8.5–10.5)
CHLORIDE SERPL-SCNC: 96 MMOL/L (ref 96–112)
CO2 SERPL-SCNC: 25 MMOL/L (ref 20–33)
CREAT SERPL-MCNC: 7.44 MG/DL (ref 0.5–1.4)
EOSINOPHIL # BLD AUTO: 0.36 K/UL (ref 0–0.51)
EOSINOPHIL NFR BLD: 9.6 % (ref 0–6.9)
ERYTHROCYTE [DISTWIDTH] IN BLOOD BY AUTOMATED COUNT: 58.7 FL (ref 35.9–50)
GFR SERPL CREATININE-BSD FRML MDRD: 6 ML/MIN/1.73 M 2
GLUCOSE BLD-MCNC: 123 MG/DL (ref 65–99)
GLUCOSE BLD-MCNC: 234 MG/DL (ref 65–99)
GLUCOSE BLD-MCNC: 294 MG/DL (ref 65–99)
GLUCOSE SERPL-MCNC: 147 MG/DL (ref 65–99)
HCT VFR BLD AUTO: 19.3 % (ref 37–47)
HGB BLD-MCNC: 6.5 G/DL (ref 12–16)
LIPASE SERPL-CCNC: 16 U/L (ref 11–82)
LYMPHOCYTES # BLD AUTO: 1.22 K/UL (ref 1–4.8)
LYMPHOCYTES NFR BLD: 33 % (ref 22–41)
MAGNESIUM SERPL-MCNC: 2 MG/DL (ref 1.5–2.5)
MANUAL DIFF BLD: NORMAL
MCH RBC QN AUTO: 32.7 PG (ref 27–33)
MCHC RBC AUTO-ENTMCNC: 33.7 G/DL (ref 33.6–35)
MCV RBC AUTO: 97 FL (ref 81.4–97.8)
MICROCYTES BLD QL SMEAR: ABNORMAL
MONOCYTES # BLD AUTO: 0.1 K/UL (ref 0–0.85)
MONOCYTES NFR BLD AUTO: 2.6 % (ref 0–13.4)
MORPHOLOGY BLD-IMP: NORMAL
NEUTROPHILS # BLD AUTO: 1.93 K/UL (ref 2–7.15)
NEUTROPHILS NFR BLD: 52.2 % (ref 44–72)
NRBC # BLD AUTO: 0 K/UL
NRBC BLD AUTO-RTO: 0 /100 WBC
PLATELET # BLD AUTO: 263 K/UL (ref 164–446)
PLATELET BLD QL SMEAR: NORMAL
PMV BLD AUTO: 11.7 FL (ref 9–12.9)
POIKILOCYTOSIS BLD QL SMEAR: NORMAL
POTASSIUM SERPL-SCNC: 4.1 MMOL/L (ref 3.6–5.5)
RBC # BLD AUTO: 1.99 M/UL (ref 4.2–5.4)
RBC BLD AUTO: PRESENT
SCHISTOCYTES BLD QL SMEAR: NORMAL
SODIUM SERPL-SCNC: 134 MMOL/L (ref 135–145)
WBC # BLD AUTO: 3.7 K/UL (ref 4.8–10.8)

## 2017-08-14 PROCEDURE — 90935 HEMODIALYSIS ONE EVALUATION: CPT | Mod: EDC

## 2017-08-14 PROCEDURE — 770004 HCHG ROOM/CARE - ONCOLOGY PRIVATE *: Mod: EDC

## 2017-08-14 PROCEDURE — 5A1D60Z PERFORMANCE OF URINARY FILTRATION, MULTIPLE: ICD-10-PCS | Performed by: INTERNAL MEDICINE

## 2017-08-14 PROCEDURE — A9270 NON-COVERED ITEM OR SERVICE: HCPCS | Mod: EDC | Performed by: FAMILY MEDICINE

## 2017-08-14 PROCEDURE — 700111 HCHG RX REV CODE 636 W/ 250 OVERRIDE (IP): Mod: EDC

## 2017-08-14 PROCEDURE — 86923 COMPATIBILITY TEST ELECTRIC: CPT | Mod: EDC

## 2017-08-14 PROCEDURE — 36430 TRANSFUSION BLD/BLD COMPNT: CPT | Mod: EDC

## 2017-08-14 PROCEDURE — 700102 HCHG RX REV CODE 250 W/ 637 OVERRIDE(OP): Mod: EDC | Performed by: FAMILY MEDICINE

## 2017-08-14 PROCEDURE — 82962 GLUCOSE BLOOD TEST: CPT | Mod: EDC

## 2017-08-14 PROCEDURE — 99233 SBSQ HOSP IP/OBS HIGH 50: CPT | Performed by: HOSPITALIST

## 2017-08-14 PROCEDURE — P9016 RBC LEUKOCYTES REDUCED: HCPCS | Mod: EDC

## 2017-08-14 RX ADMIN — CARVEDILOL 12.5 MG: 12.5 TABLET, FILM COATED ORAL at 08:13

## 2017-08-14 RX ADMIN — ERYTHROPOIETIN 10000 UNITS: 10000 INJECTION, SOLUTION INTRAVENOUS; SUBCUTANEOUS at 10:40

## 2017-08-14 RX ADMIN — PREGABALIN 50 MG: 25 CAPSULE ORAL at 21:36

## 2017-08-14 RX ADMIN — LATANOPROST 1 DROP: 50 SOLUTION OPHTHALMIC at 21:36

## 2017-08-14 RX ADMIN — CARVEDILOL 12.5 MG: 12.5 TABLET, FILM COATED ORAL at 17:33

## 2017-08-14 RX ADMIN — AMLODIPINE BESYLATE 10 MG: 10 TABLET ORAL at 08:13

## 2017-08-14 RX ADMIN — INSULIN LISPRO 2 UNITS: 100 INJECTION, SOLUTION INTRAVENOUS; SUBCUTANEOUS at 17:33

## 2017-08-14 RX ADMIN — INSULIN LISPRO 3 UNITS: 100 INJECTION, SOLUTION INTRAVENOUS; SUBCUTANEOUS at 21:36

## 2017-08-14 RX ADMIN — PREGABALIN 50 MG: 25 CAPSULE ORAL at 08:13

## 2017-08-14 ASSESSMENT — PAIN SCALES - GENERAL
PAINLEVEL_OUTOF10: 0
PAINLEVEL_OUTOF10: 2

## 2017-08-14 ASSESSMENT — ENCOUNTER SYMPTOMS
FEVER: 0
SHORTNESS OF BREATH: 0
HALLUCINATIONS: 0
VOMITING: 0
DIARRHEA: 0
COUGH: 0
DIZZINESS: 1
WEAKNESS: 1
GASTROINTESTINAL NEGATIVE: 1
FOCAL WEAKNESS: 0
DEPRESSION: 0
EYES NEGATIVE: 1
WEIGHT LOSS: 0
HEADACHES: 0
DOUBLE VISION: 0
EYE PAIN: 0
ORTHOPNEA: 0
TREMORS: 0
SHORTNESS OF BREATH: 1
MUSCULOSKELETAL NEGATIVE: 1
CARDIOVASCULAR NEGATIVE: 1
SORE THROAT: 0
MYALGIAS: 1
BLOOD IN STOOL: 0
BACK PAIN: 0
SEIZURES: 0
NAUSEA: 0
SPUTUM PRODUCTION: 0

## 2017-08-14 NOTE — CONSULTS
[facility] Nephrology Consult Note    Reason for consult: assissting with providing HD needs.     HPI:  62yoF with PMH significant for ESRD on HD MWF via L arm AVF, HTN, DM II, Atrial Fibrillation, PVD, COPD, Anemia secondary to Myelodysplastic Syndrome, h/o CVA, COPD on home O2 admitted with complaints of nausea, vomiting and diarrhea for the past. 5 days with abdominal pain and fever and chills. Patient has myelodysplastic syndrome, has been on chemotherapy with Vidaza for 5 days which was completed yesterday. Nephrology was consulted to assisst with providing HD while inpatient.                 REVIEW OF SYSTEMS  A full review of systems was completed and all pertinent positives and negatives are included in the HPI above by AMA/CMS criteria.  PMH:  1.  ESRD--HD MWF as outpatient      2.  Anemia secondary to myelodysplastic syndrome as well as secondary to CKD, follows with Dr. Avila and is on chemotherapy with Vidaza since , but still requires frequent blood transfusions.     3.  Chest pain in the past has been related to anemia--nuclear medicine scan  showed no ischemia but did show small area of infarction in distal inferolateral wall extending to apex  4.  HTN    5.  DM II  6.  Atrial Fibrillatoin     7.  Vitamin D deficiency     8.  PVD--history of stent placements in the lower extremities    9.  COPD--on home O2    10.  Diastolic CHF    11.  H/O CVA with no residual deficits     12.  Legally blind  13.  H/O Steal Syndrome--required revision of her left arm AV fistula by Dr. Ranulfo Jolly     PSH:  1.   x2    2.  Hysterectomy  3.  D+C x2  4.  Appendectomy    5.  Left arm AV fistula creation by Dr. Jolly     6.  Revision of the left arm AV fistula by Dr. Jolly  secondary to steal syndrome   7.  H/O Stent placement to the bilateral lower extremities   8.  Permcath placement and removal    9.  Retinal detachment  "surgery     10.  Bilateral cataract surgery     11.  Coronary angiogram     12.  Upper EGD       Allergies: Actos, Darvocet, Demerol, Glucophage, Morphine, Oxycodone, Penicillin, Requip, Simvastatin, Sulfa Drugs, Tramadol, Trazodone, Dilaudid, Benadryl, Iron, Multivitamins, all Phosphorous Binders      FH: No family history of kidney disease or relatives on dialysis     SH: Denies any smoking, EtOH, or illicit drug use. Lives with daughter. 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     Outpatient Meds:  1. Norvasc 10mg daily  2. Lumigan Eye Drops  3. Carvedilol 12.5mg BID  4. Norco PRN  5. Lyrica 50mg BID  6. Compazine 10mg PRN  7. Januvia 25mg daily  8. Voltaren Gel PRN    Physical Exam  Blood pressure 182/70, pulse 90, temperature 36.2 °C (97.2 °F), temperature source Temporal, resp. rate 18, height 1.473 m (4' 10\"), weight 61.689 kg (136 lb), last menstrual period 01/01/1995, SpO2 100 %.  Oxygen: Pulse Oximetry: 100 %  General: AOx3, NAD, appears chronically ill  HEENT: PERRL, EOMI, moist mucous membranes  Neck: Trachea midline, no thyromegaly, no JVD  Cardiovascular: RRR, no murmurs/rubs/gallops  Respiratory: Decreased BS bilateral bases, no crackles/rales, no tachypnea, no increased work of breathing  Gastrointestinal: Abd soft, non-tender, non-distended, BS+  Extremities: No clubbing/cyanosis, No LE edema, +L arm AVF with thrill/bruit  Skin: No rashes, no ulcerations, normal skin turgor  Neurologic: No focal motor deficits, sensation grossly intact to light touch  Lymphatic: No cervical lymphadenopathy and no axillary lymphadenopathy  Psychiatric: AOx3, calm and cooperative    LAB TESTS and IMAGES:    Recent Labs       08/12/17   1800    WBC   5.6    RBC   2.45*    HEMOGLOBIN   8.2*    HEMATOCRIT   24.2*    MCV   98.8*    MCH   33.5*    RDW   61.2*    PLATELETCT   406    MPV   11.6    NEUTSPOLYS   64.20    LYMPHOCYTES   24.50    MONOCYTES   6.40    EOSINOPHILS   3.20    BASOPHILS   0.50      Recent " Labs       08/12/17   1800    TROPONINI   <0.01          Recent Labs       08/12/17   1800    SODIUM   132*    POTASSIUM   4.1    CHLORIDE   91*    CO2   26    BUN   49*    CREATININE   5.69*    CALCIUM   8.6    MAGNESIUM   2.2    PHOSPHORUS   7.1*    ALBUMIN   3.9      Estimated GFR/CRCL = CrCl cannot be calculated (Unknown ideal weight.).  Recent Labs       08/12/17   1800    GLUCOSE   267*      GLYCOHEMOGLOBIN    Date/Time  Value  Ref Range  Status    05/10/2017 03:05 AM  9.8*  0.0 - 5.6 %  Final        Comment:        Increased risk for diabetes:  5.7 -6.4%  Diabetes:  >6.4%  Glycemic control for adults with diabetes:  <7.0%  The above interpretations are per ADA guidelines.  Diagnosis  of diabetes mellitus on the basis of elevated Hemoglobin A1c  should be confirmed by repeating the Hb A1c test.      02/20/2017 08:36 AM  8.4*  0.0 - 5.6 %  Final        Comment:        Increased risk for diabetes:  5.7 -6.4%  Diabetes:  >6.4%  Glycemic control for adults with diabetes:  <7.0%  The above interpretations are per ADA guidelines.  Diagnosis  of diabetes mellitus on the basis of elevated Hemoglobin A1c  should be confirmed by repeating the Hb A1c test.      12/21/2016 10:20 AM  8.1*  0.0 - 5.6 %  Final        Comment:        Increased risk for diabetes:  5.7 -6.4%  Diabetes:  >6.4%  Glycemic control for adults with diabetes:  <7.0%  The above interpretations are per ADA guidelines.  Diagnosis  of diabetes mellitus on the basis of elevated Hemoglobin A1c  should be confirmed by repeating the Hb A1c test.          FREE T-4    Date/Time  Value  Ref Range  Status    02/20/2017 03:00 AM  0.79  0.53 - 1.43 ng/dL  Final      Recent Labs       08/12/17   1800    ASTSGOT   21    ALTSGPT   28    TBILIRUBIN   0.6    ALKPHOSPHAT   69    GLOBULIN   3.5            Invalid input(s): [id number]  VITAMIN B12 -TRUE COBALAMIN    Date/Time  Value  Ref Range  Status    06/06/2017 04:20 PM  866  211 - 911 pg/mL  Final    03/20/2017  04:56 AM  672  211 - 911 pg/mL  Final    02/22/2017 09:57 AM  671  211 - 911 pg/mL  Final        FOLATE -FOLIC ACID    Date/Time  Value  Ref Range  Status    06/06/2017 04:20 PM  7.5  >4.0 ng/mL  Final    03/20/2017 04:56 AM  12.1  >4.0 ng/mL  Final    02/22/2017 09:57 AM  9.9  >4.0 ng/mL  Final        FERRITIN    Date/Time  Value  Ref Range  Status    03/20/2017 04:56 AM  1032.4*  10.0 - 291.0 ng/mL  Final    02/20/2017 03:00 AM  1236.3*  10.0 - 291.0 ng/mL  Final    11/28/2016 03:39 AM  1442.2*  10.0 - 291.0 ng/mL  Final        IRON    Date/Time  Value  Ref Range  Status    06/06/2017 01:41 AM  239*  40 - 170 ug/dL  Final    05/24/2017 09:10 AM  188*  40 - 170 ug/dL  Final    03/20/2017 04:56 AM  163  40 - 170 ug/dL  Final        TOTAL IRON BINDING    Date/Time  Value  Ref Range  Status    06/06/2017 01:41 AM  260  250 - 450 ug/dL  Final    03/20/2017 04:56 AM  223*  250 - 450 ug/dL  Final    02/20/2017 08:36 AM  231*  250 - 450 ug/dL  Final      Dx-chest-limited (1 View)    7/20/2017 7/20/2017 2:32 AM HISTORY/REASON FOR EXAM:  Chest Pain TECHNIQUE/EXAM DESCRIPTION AND NUMBER OF VIEWS: Single portable view of the chest. COMPARISON: 6/27/2017 FINDINGS: Cardiac mediastinal contour is unchanged. Atherosclerotic calcification of thoracic aorta. No focal pulmonary consolidation. Right hemidiaphragm is elevated. No pleural fluid collection or pneumothorax. No major bony abnormality is seen.     7/20/2017  1.  No acute cardiopulmonary disease. 2.  Stable mild cardiomegaly.    Dx-chest-portable (1 View)    8/12/2017 8/12/2017 7:10 PM HISTORY/REASON FOR EXAM:  Chest Pain. Cough TECHNIQUE/EXAM DESCRIPTION AND NUMBER OF VIEWS: Single portable view of the chest. COMPARISON: 7/20/2017 FINDINGS: The cardiomediastinal silhouette is enlarged. Atherosclerotic calcification is seen. There is pulmonary vascular congestion. No focal consolidation, pleural effusion or pneumothorax is identified.  Costophrenic angles are sharp.      8/12/2017  Cardiomegaly with pulmonary vascular congestion. Atherosclerotic plaque.          Assessment/Plan:  1. ESRD  --HD MWF as outpatient    --HD tomorrow  --Dose adjust all meds for decreased GFR  2. Anemia   -- Asymptomatic, secondary to myelodysplastic syndrome  -- Could not Transfuse today due to access difficulty  -- We will transfuse with HD in Am  3. Nausea vomiting and diarrhea, intractable.   -- Symptomatic treatment, CT abdomen and pelvis pending  4. HTN--BP stable  --Continue home BP meds  5. DM II  6. Atrial Fibrillation  7. PVD  8. Pancytopenia  --secondary to myelodysplastic syndrome, follow up with heme/onc as outpatient  9. Hyponatremia  --mild, HD tomorrow  10. BMD of CKD  --pt reports intolerance to all phosphorous binders  --Low Phos diet  11. Mild Protein-Calorie Malnutrition  --no dietary protein restrictions    Thank you

## 2017-08-14 NOTE — RESPIRATORY CARE
COPD EDUCATION by COPD CLINICAL EDUCATOR  8/14/2017 at 6:57 AM by Dilcia Guajardo     Patient reviewed by COPD education team. Patient does not qualify for COPD program.

## 2017-08-14 NOTE — PROGRESS NOTES
Hemodialysis ordered by Dr. Connor. Treatment started at 0955 and ended at 1305 d/t clotting dialyzer and lines. New setting placed. And tx restarted. Dr. Perez notified and aware. Transfused 2 units PRBC with this HD. Pt stable, vss, no c/o post tx. See flow sheets for details. Net UF 1.3 liters. Report to KOKO Tracy RN. Lt ua avf dressing clean, dry, intact.

## 2017-08-14 NOTE — PROGRESS NOTES
[facility]ist Progress Note    Date of Service: 2017    Chief Complaint  62 y.o. female admitted 2017 with nausea, vomiting and diarrhea for the past few days. She also has abd discomfort, fever or chills. Patient has myelodysplastic syndrome and she is on chemotherapy with Vidaza for 5 days, she finished the course on . She follows Dr. Avila. She also has end-stage renal disease and is on hemodialysis  and Friday. She did not miss her scheduled days.     Interval Problem Update  Pt is feeling better. Nausea improving. Nephrology consulted for dialysis tomorrow. Will transfuse 1 unit of pRBC today. Monitor for fluid overload. Check Lipase. No active bleeding seen. BP stable.     Consultants/Specialty  Nephrology    Disposition  Likely home in 2-3 days.         Review of Systems   Constitutional: Positive for malaise/fatigue. Negative for fever and weight loss.   HENT: Negative for congestion and sore throat.    Eyes: Negative for double vision and pain.   Respiratory: Negative for cough, sputum production and shortness of breath.    Cardiovascular: Negative for chest pain and orthopnea.   Gastrointestinal: Positive for nausea, vomiting and diarrhea. Negative for blood in stool.   Genitourinary: Negative for urgency and frequency.   Musculoskeletal: Positive for myalgias. Negative for back pain.   Neurological: Positive for dizziness and weakness. Negative for tremors, focal weakness, seizures and headaches.   Psychiatric/Behavioral: Negative for depression and hallucinations.      Physical Exam  Laboratory/Imaging   Hemodynamics  Temp (24hrs), Av.6 °C (97.8 °F), Min:36.1 °C (97 °F), Max:36.8 °C (98.3 °F)   Temperature: 36.6 °C (97.9 °F)  Pulse  Av.2  Min: 73  Max: 100 Heart Rate (Monitored): 100  Blood Pressure: 118/61 mmHg, NIBP: (!) 183/64 mmHg      Respiratory      Respiration: 16, Pulse Oximetry: 100 %             Fluids    Intake/Output Summary (Last 24 hours) at  08/13/17 2049  Last data filed at 08/13/17 1700   Gross per 24 hour   Intake   1430 ml   Output    100 ml   Net   1330 ml       Nutrition  Orders Placed This Encounter   Procedures   • Diet Order     Standing Status: Standing      Number of Occurrences: 1      Standing Expiration Date:      Order Specific Question:  Diet:     Answer:  Diabetic [3]     Order Specific Question:  Diet:     Answer:  Full Liquid [11]     Physical Exam   Constitutional: She is oriented to person, place, and time. She appears well-developed.   HENT:   Head: Normocephalic.   Eyes: No scleral icterus.   Neck: Neck supple.   Cardiovascular: Normal rate.    Irregular heart beat.    Pulmonary/Chest: No respiratory distress. She has no wheezes.   Mild crackles and decreased breath sound in bases bilateral.    Abdominal: Soft. Bowel sounds are normal. There is no tenderness.   Musculoskeletal: Normal range of motion.   Neurological: She is alert and oriented to person, place, and time. No cranial nerve deficit.   Skin: Skin is warm and dry.   Psychiatric: She has a normal mood and affect.       Recent Labs      08/12/17   1800  08/13/17   0906   WBC  5.6  4.4*   RBC  2.45*  1.88*   HEMOGLOBIN  8.2*  6.3*   HEMATOCRIT  24.2*  18.8*   MCV  98.8*  98.9*   MCH  33.5*  33.5*   MCHC  33.9  33.9   RDW  61.2*  61.0*   PLATELETCT  406  263   MPV  11.6  11.0     Recent Labs      08/12/17   1800  08/13/17   0906   SODIUM  132*  135   POTASSIUM  4.1  3.7   CHLORIDE  91*  98   CO2  26  27   GLUCOSE  267*  160*   BUN  49*  57*   CREATININE  5.69*  6.02*   CALCIUM  8.6  7.8*                      Assessment/Plan     ESRD (end stage renal disease) on dialysis (CMS-HCC) (present on admission)  Assessment & Plan  Pt dialysis days are MWF.   Nephrology consulted. Pt will get dialysis tomorrow.     Myelodysplasia (myelodysplastic syndrome) (CMS-HCC) (present on admission)  Assessment & Plan  Pt follows Dr. Cooper.   Continue to follow as outpt. No need for inpt  consult at this time.     Intractable nausea and vomiting (present on admission)  Assessment & Plan  Improving.  Continue supportive care with Antiemetics.   Pt also has abd / pelvis CT scan that did not show any acute process. Only fatty infiltration of pancrease. Will check Lipase.     Anemia of chronic disease (present on admission)  Assessment & Plan  Pt hgb generally at 8.0, Current hgb 6.3. Will give 1 unit of pRBC.  Continue to monitor. Transfuse more if needed during dialysis tomorrow.     Diastolic dysfunction (present on admission)  Assessment & Plan  Stable. No exacerbation.     Paroxysmal atrial fibrillation (CMS-HCC) (present on admission)  Assessment & Plan  Continue Coreg, not on anticoagulation secondary to MDS    HTN (hypertension) (present on admission)  Assessment & Plan  Stable. Continue home medications.     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 Continue oxygen support.     Diabetic neuropathy (CMS-HCC) (present on admission)  Assessment & Plan  Pain under control. Continue home medications.     Vitamin D deficiency (present on admission)  Assessment & Plan  Stable. Continue replacement.         Bauer catheter: No Bauer      DVT Prophylaxis: Contraindicated - High bleeding risk

## 2017-08-14 NOTE — DIETARY
Nutrition Services - Poor PO PTA     63 YO F admitted r/t N/V/D, abdominal pain, fever and chills x 5 days. She believes that N/V/D are related to chemotherapy, but not other symptoms. PMH significant for DM, HTN, renal DO, HD (3x/week), CHF, stroke and bone CA.     Medication reviewed. Labs reviewed and significant for Na 134, glucose 147, BUN 62, Cr 7.44 and GFR 6. No pressure areas or edema documented at this time. Per Pt, her GI issues have resolved at this time.     Weight is 61.7kg and BMI is 28.44kg/m2, which is overweight. Pt denies weight loss. Appearance is well-developed and Pt seems calm.      Pt states that she ate very poorly for about a week r/t N/V/D due to chemotherapy. She states that her appetite is back and she is eating most of the food on her plate on her diabetic renal diet.     Plan/Recommendation    Encourage PO intake. Nutrition Rep to see patient daily for meal preferences. Please document PO intake as percentage of meals consumed.     RD following

## 2017-08-14 NOTE — PROGRESS NOTES
Assumed care of patient @ 1900. Bedside report received. Patient AO x4. VSS,  Pain 0/10. Fall precautions in place, no IV. POC discussed with patient, all questions answered, call light within reach, hourly rounding in place.

## 2017-08-14 NOTE — PROGRESS NOTES
[facility] -  PROGRESS NOTE               Author: Chao Perez M.D. Date & Time: 8/14/2017  3:13 PM     HPI:  [id number] with PMH significant for ESRD on HD MWF via L arm AVF, HTN, DM II, Atrial Fibrillation, PVD, COPD, Anemia secondary to Myelodysplastic Syndrome, h/o CVA, COPD on home O2 admitted with complaints of nausea, vomiting and diarrhea for the past. 5 days with abdominal pain and fever and chills. Patient has myelodysplastic syndrome, has been on chemotherapy with Vidaza for 5 days which was completed yesterday. Nephrology was consulted to assisst with providing HD while inpatient.        DAILY NEPHROLOGY SUMMARY:  8/13/17 - Consult done  8/14/17 - NAEO, SOD, Qb400, VSS      Review of Systems   Constitutional: Positive for malaise/fatigue.   HENT: Negative.    Eyes: Negative.    Respiratory: Positive for shortness of breath.    Cardiovascular: Negative.    Gastrointestinal: Negative.    Musculoskeletal: Negative.    Skin: Negative.    Neurological: Positive for weakness.   Endo/Heme/Allergies: Negative.    All other systems reviewed and are negative.        Physical Exam   Constitutional: She is oriented to person, place, and time. She appears well-developed and well-nourished. She appears ill.   HENT:   Head: Normocephalic and atraumatic.   Eyes: Conjunctivae are normal. No scleral icterus.   Neck: Neck supple. No tracheal deviation present.   Cardiovascular: Normal rate and regular rhythm.    Pulmonary/Chest: Effort normal and breath sounds normal.   Abdominal: Soft. Bowel sounds are normal.   Musculoskeletal: She exhibits no edema or tenderness.   Neurological: She is alert and oriented to person, place, and time.   Skin: Skin is warm and dry.         PAST FAMILY HISTORY: Reviewed and Unchanged  SOCIAL HISTORY: Reviewed and Unchanged  CURRENT MEDICATIONS: Reviewed  LABORATORY RESULTS: Reviewed  IMAGING STUDIES: Reviewed      Fluids:    Date 08/14/17 0700 - 08/15/17 0659   Shift  [id number] 5849-4755 3488-5091 24 Hour Total   I  N  T  A  K  E   Blood 700   700      Volume (RELEASE RED BLOOD CELLS) 350   350      Volume (RELEASE RED BLOOD CELLS) 350   350    Dialysis 1000   1000      Dialysis Volume (Dialysis Intake) 1000   1000    Shift Total 1700   1700   O  U  T  P  U  T   Urine          Number of Times Voided 1 x   1 x    Dialysis 3000   3000      Dialysis Output (Dialysis Output) 3000   3000    Shift Total 3000   3000   NET -1300   -1300         IMPRESSION:  - ESRD    * Etiology likely 2/2 HTN/DM  - Acute on chronic anemia  - Nausea/Vomiting  - HTN  - type 2 DM  - Renal Osteodystrophy  - HLD  - CAD  - COPD    ASSESSMENT:  - GFR: HD dependent  - Urine: oligoanuric  - BP: Goal < 140/90  - Volume: euvolemic  - Acid/Base: no sig. acid base disturbance  - Electrolytes: stable  - Anemia: Goal Hgb 10-11  - MBD: Ca normal, PO4 elevated  - HD Access: LAVF (+thrill/bruit)    PLAN:  - MWF iHD  - UF as tolerated  - 2 units pRBCs with iHD today  - Antiemetics as needed  - Dose all meds per ESRD guidelines

## 2017-08-15 PROBLEM — R42 DIZZINESS: Status: ACTIVE | Noted: 2017-08-15

## 2017-08-15 PROBLEM — H40.9 GLAUCOMA: Status: ACTIVE | Noted: 2017-08-15

## 2017-08-15 LAB
ANION GAP SERPL CALC-SCNC: 9 MMOL/L (ref 0–11.9)
ANISOCYTOSIS BLD QL SMEAR: ABNORMAL
BASOPHILS # BLD AUTO: 1.8 % (ref 0–1.8)
BASOPHILS # BLD: 0.06 K/UL (ref 0–0.12)
BUN SERPL-MCNC: 43 MG/DL (ref 8–22)
CALCIUM SERPL-MCNC: 7.9 MG/DL (ref 8.5–10.5)
CHLORIDE SERPL-SCNC: 95 MMOL/L (ref 96–112)
CO2 SERPL-SCNC: 28 MMOL/L (ref 20–33)
CREAT SERPL-MCNC: 4.68 MG/DL (ref 0.5–1.4)
EOSINOPHIL # BLD AUTO: 0.12 K/UL (ref 0–0.51)
EOSINOPHIL NFR BLD: 3.5 % (ref 0–6.9)
ERYTHROCYTE [DISTWIDTH] IN BLOOD BY AUTOMATED COUNT: 51.5 FL (ref 35.9–50)
GFR SERPL CREATININE-BSD FRML MDRD: 9 ML/MIN/1.73 M 2
GLUCOSE BLD-MCNC: 174 MG/DL (ref 65–99)
GLUCOSE BLD-MCNC: 195 MG/DL (ref 65–99)
GLUCOSE BLD-MCNC: 200 MG/DL (ref 65–99)
GLUCOSE BLD-MCNC: 242 MG/DL (ref 65–99)
GLUCOSE SERPL-MCNC: 176 MG/DL (ref 65–99)
HCT VFR BLD AUTO: 29.8 % (ref 37–47)
HGB BLD-MCNC: 10.6 G/DL (ref 12–16)
LG PLATELETS BLD QL SMEAR: NORMAL
LYMPHOCYTES # BLD AUTO: 1.2 K/UL (ref 1–4.8)
LYMPHOCYTES NFR BLD: 36.3 % (ref 22–41)
MANUAL DIFF BLD: NORMAL
MCH RBC QN AUTO: 31.9 PG (ref 27–33)
MCHC RBC AUTO-ENTMCNC: 35.6 G/DL (ref 33.6–35)
MCV RBC AUTO: 89.8 FL (ref 81.4–97.8)
MONOCYTES # BLD AUTO: 0.09 K/UL (ref 0–0.85)
MONOCYTES NFR BLD AUTO: 2.7 % (ref 0–13.4)
MORPHOLOGY BLD-IMP: NORMAL
NEUTROPHILS # BLD AUTO: 1.84 K/UL (ref 2–7.15)
NEUTROPHILS NFR BLD: 55.7 % (ref 44–72)
NRBC # BLD AUTO: 0 K/UL
NRBC BLD AUTO-RTO: 0 /100 WBC
OVALOCYTES BLD QL SMEAR: NORMAL
PLATELET # BLD AUTO: 215 K/UL (ref 164–446)
PLATELET BLD QL SMEAR: NORMAL
PMV BLD AUTO: 11.8 FL (ref 9–12.9)
POIKILOCYTOSIS BLD QL SMEAR: NORMAL
POTASSIUM SERPL-SCNC: 4.2 MMOL/L (ref 3.6–5.5)
RBC # BLD AUTO: 3.32 M/UL (ref 4.2–5.4)
RBC BLD AUTO: PRESENT
SODIUM SERPL-SCNC: 132 MMOL/L (ref 135–145)
WBC # BLD AUTO: 3.3 K/UL (ref 4.8–10.8)

## 2017-08-15 PROCEDURE — A9270 NON-COVERED ITEM OR SERVICE: HCPCS | Mod: EDC | Performed by: FAMILY MEDICINE

## 2017-08-15 PROCEDURE — 85027 COMPLETE CBC AUTOMATED: CPT | Mod: EDC

## 2017-08-15 PROCEDURE — 700102 HCHG RX REV CODE 250 W/ 637 OVERRIDE(OP): Mod: EDC | Performed by: FAMILY MEDICINE

## 2017-08-15 PROCEDURE — 99233 SBSQ HOSP IP/OBS HIGH 50: CPT | Performed by: INTERNAL MEDICINE

## 2017-08-15 PROCEDURE — 82962 GLUCOSE BLOOD TEST: CPT | Mod: 91,EDC

## 2017-08-15 PROCEDURE — 36415 COLL VENOUS BLD VENIPUNCTURE: CPT | Mod: EDC

## 2017-08-15 PROCEDURE — 770004 HCHG ROOM/CARE - ONCOLOGY PRIVATE *: Mod: EDC

## 2017-08-15 PROCEDURE — 85007 BL SMEAR W/DIFF WBC COUNT: CPT | Mod: EDC

## 2017-08-15 PROCEDURE — 80048 BASIC METABOLIC PNL TOTAL CA: CPT | Mod: EDC

## 2017-08-15 RX ORDER — LORAZEPAM 1 MG/1
.5-1 TABLET ORAL
Status: COMPLETED | OUTPATIENT
Start: 2017-08-15 | End: 2017-08-16

## 2017-08-15 RX ADMIN — HYDROCODONE BITARTRATE AND ACETAMINOPHEN 1 TABLET: 5; 325 TABLET ORAL at 10:56

## 2017-08-15 RX ADMIN — INSULIN LISPRO 1 UNITS: 100 INJECTION, SOLUTION INTRAVENOUS; SUBCUTANEOUS at 12:47

## 2017-08-15 RX ADMIN — CARVEDILOL 12.5 MG: 12.5 TABLET, FILM COATED ORAL at 17:33

## 2017-08-15 RX ADMIN — PREGABALIN 50 MG: 25 CAPSULE ORAL at 08:33

## 2017-08-15 RX ADMIN — INSULIN LISPRO 1 UNITS: 100 INJECTION, SOLUTION INTRAVENOUS; SUBCUTANEOUS at 08:38

## 2017-08-15 RX ADMIN — PREGABALIN 50 MG: 25 CAPSULE ORAL at 20:12

## 2017-08-15 RX ADMIN — LATANOPROST 1 DROP: 50 SOLUTION OPHTHALMIC at 20:12

## 2017-08-15 RX ADMIN — INSULIN LISPRO 2 UNITS: 100 INJECTION, SOLUTION INTRAVENOUS; SUBCUTANEOUS at 20:12

## 2017-08-15 RX ADMIN — INSULIN LISPRO 1 UNITS: 100 INJECTION, SOLUTION INTRAVENOUS; SUBCUTANEOUS at 17:39

## 2017-08-15 RX ADMIN — AMLODIPINE BESYLATE 10 MG: 10 TABLET ORAL at 08:33

## 2017-08-15 RX ADMIN — CARVEDILOL 12.5 MG: 12.5 TABLET, FILM COATED ORAL at 08:32

## 2017-08-15 ASSESSMENT — ENCOUNTER SYMPTOMS
HEARTBURN: 0
SHORTNESS OF BREATH: 0
CHILLS: 0
SPEECH CHANGE: 0
DEPRESSION: 0
PHOTOPHOBIA: 0
WEAKNESS: 1
EYES NEGATIVE: 1
VOMITING: 0
NERVOUS/ANXIOUS: 0
ABDOMINAL PAIN: 0
MUSCULOSKELETAL NEGATIVE: 1
MYALGIAS: 0
CARDIOVASCULAR NEGATIVE: 1
SENSORY CHANGE: 0
FEVER: 0
CLAUDICATION: 0
COUGH: 0
CONSTIPATION: 0
SHORTNESS OF BREATH: 1
GASTROINTESTINAL NEGATIVE: 1
DIZZINESS: 1
HEADACHES: 0
BLURRED VISION: 0
DIARRHEA: 0
NAUSEA: 0
INSOMNIA: 0

## 2017-08-15 ASSESSMENT — PAIN SCALES - GENERAL
PAINLEVEL_OUTOF10: 0
PAINLEVEL_OUTOF10: 3
PAINLEVEL_OUTOF10: 0

## 2017-08-15 NOTE — PROGRESS NOTES
[facility] -  PROGRESS NOTE               Author: Chao Perez M.D. Date & Time: 8/15/2017  10:41 AM     HPI:  [id number] with PMH significant for ESRD on HD MWF via L arm AVF, HTN, DM II, Atrial Fibrillation, PVD, COPD, Anemia secondary to Myelodysplastic Syndrome, h/o CVA, COPD on home O2 admitted with complaints of nausea, vomiting and diarrhea for the past. 5 days with abdominal pain and fever and chills. Patient has myelodysplastic syndrome, has been on chemotherapy with Vidaza for 5 days which was completed yesterday. Nephrology was consulted to assisst with providing HD while inpatient.        DAILY NEPHROLOGY SUMMARY:  8/13/17 - Consult done  8/14/17 - NAEO, SOD, Qb400, VSS  8/15/17 - NAEO, doing well, feels good, Hgb 10.6 post transfusion yesterday      Review of Systems   Constitutional: Positive for malaise/fatigue.   HENT: Negative.    Eyes: Negative.    Respiratory: Positive for shortness of breath.    Cardiovascular: Negative.    Gastrointestinal: Negative.    Musculoskeletal: Negative.    Skin: Negative.    Neurological: Positive for weakness.   Endo/Heme/Allergies: Negative.    All other systems reviewed and are negative.        Physical Exam   Constitutional: She is oriented to person, place, and time. She appears well-developed and well-nourished. She appears ill.   HENT:   Head: Normocephalic and atraumatic.   Eyes: Conjunctivae are normal. No scleral icterus.   Neck: Neck supple. No tracheal deviation present.   Cardiovascular: Normal rate and regular rhythm.    Pulmonary/Chest: Effort normal and breath sounds normal.   Abdominal: Soft. Bowel sounds are normal.   Musculoskeletal: She exhibits no edema or tenderness.   Neurological: She is alert and oriented to person, place, and time.   Skin: Skin is warm and dry.         PAST FAMILY HISTORY: Reviewed and Unchanged  SOCIAL HISTORY: Reviewed and Unchanged  CURRENT MEDICATIONS: Reviewed  LABORATORY RESULTS: Reviewed  IMAGING  STUDIES: Reviewed      Fluids:         IMPRESSION:  - ESRD    * Etiology likely 2/2 HTN/DM  - Acute on chronic anemia  - Nausea/Vomiting  - HTN  - type 2 DM  - Renal Osteodystrophy  - HLD  - CAD  - COPD    ASSESSMENT:  - GFR: HD dependent  - Urine: oligoanuric  - BP: Goal < 140/90  - Volume: euvolemic  - Acid/Base: no sig. acid base disturbance  - Electrolytes: stable  - Anemia: Goal Hgb 10-11  - MBD: Ca normal, PO4 elevated  - HD Access: LAVF (+thrill/bruit)    PLAN:  - MWF iHD  - UF as tolerated  - 2 units pRBCs with iHD today  - VANCE with iHD, epogen 10K units qRx  - Antiemetics as needed  - Dose all meds per ESRD guidelines  - Ok to transition to outpatient care when stable from other issues

## 2017-08-15 NOTE — PROGRESS NOTES
Assumed care of patient @ 1900. Bedside report received. Patient AO x4. VSS,  Pain 0/10. Fall precautions in place, no PIV, dialysis cath, av fistula, standby assist. POC discussed with patient, all questions answered, call light within reach, hourly rounding in place.

## 2017-08-15 NOTE — PROGRESS NOTES
[facility]ist Progress Note    Date of Service: 2017    Chief Complaint  62 y.o. female admitted 2017 with nausea, vomiting and diarrhea for the past few days. She also has abd discomfort, fever or chills. Patient has myelodysplastic syndrome and she is on chemotherapy with Vidaza for 5 days, she finished the course on . She follows Dr. Avila. She also has end-stage renal disease and is on hemodialysis  and Friday. She did not miss her scheduled days.     Interval Problem Update  : Pt is feeling better. Nausea improving. Nephrology consulted for dialysis tomorrow. Will transfuse 1 unit of pRBC today. Monitor for fluid overload. Check Lipase. No active bleeding seen. BP stable.   : Patient is still feeling dizzy. However her nausea and vomiting is improving and she is tolerating some liquid diet and we will advance diet to renal. She denies any chest pain or shortness of breath. No fever or chills.    Consultants/Specialty  Nephrology    Disposition  Likely home in 2-3 days.         Review of Systems   Constitutional: Positive for malaise/fatigue. Negative for fever and weight loss.   HENT: Negative for congestion and sore throat.    Eyes: Negative for double vision and pain.   Respiratory: Negative for cough, sputum production and shortness of breath.    Cardiovascular: Negative for chest pain, orthopnea and leg swelling.   Gastrointestinal: Negative for nausea, vomiting, diarrhea and blood in stool.   Genitourinary: Negative for urgency and frequency.   Musculoskeletal: Positive for myalgias. Negative for back pain.   Neurological: Positive for dizziness and weakness. Negative for tremors, focal weakness, seizures and headaches.   Psychiatric/Behavioral: Negative for depression and hallucinations.      Physical Exam  Laboratory/Imaging   Hemodynamics  Temp (24hrs), Av.2 °C (97.1 °F), Min:35.8 °C (96.4 °F), Max:36.9 °C (98.4 °F)   Temperature: 36.6 °C (97.9  °F)  Pulse  Av.9  Min: 67  Max: 100    Blood Pressure: 128/68 mmHg      Respiratory      Respiration: 18, Pulse Oximetry: 100 %             Fluids    Intake/Output Summary (Last 24 hours) at 17 1718  Last data filed at 17 1305   Gross per 24 hour   Intake   1700 ml   Output   3000 ml   Net  -1300 ml       Nutrition  Orders Placed This Encounter   Procedures   • Diet Order     Standing Status: Standing      Number of Occurrences: 1      Standing Expiration Date:      Order Specific Question:  Diet:     Answer:  Renal [8]     Order Specific Question:  Diet:     Answer:  Diabetic [3]     Physical Exam   Constitutional: She is oriented to person, place, and time. She appears well-developed.   HENT:   Head: Normocephalic.   Eyes: No scleral icterus.   Neck: Neck supple.   Cardiovascular: Normal rate.    Irregular heart beat.    Pulmonary/Chest: No respiratory distress. She has no wheezes. She exhibits no tenderness.   Mild crackles and decreased breath sound in bases bilateral.    Abdominal: Soft. Bowel sounds are normal. There is no tenderness. There is no guarding.   Musculoskeletal: Normal range of motion. She exhibits no edema.   Neurological: She is alert and oriented to person, place, and time. No cranial nerve deficit.   Skin: Skin is warm and dry.   Psychiatric: She has a normal mood and affect.       Recent Labs      17   1800  17   0906  17   2346   WBC  5.6  4.4*  3.7*   RBC  2.45*  1.88*  1.99*   HEMOGLOBIN  8.2*  6.3*  6.5*   HEMATOCRIT  24.2*  18.8*  19.3*   MCV  98.8*  98.9*  97.0   MCH  33.5*  33.5*  32.7   MCHC  33.9  33.9  33.7   RDW  61.2*  61.0*  58.7*   PLATELETCT  406  263  263   MPV  11.6  11.0  11.7     Recent Labs      17   1800  17   0906  17   2345   SODIUM  132*  135  134*   POTASSIUM  4.1  3.7  4.1   CHLORIDE  91*  98  96   CO2  26  27  25   GLUCOSE  267*  160*  147*   BUN  49*  57*  62*   CREATININE  5.69*  6.02*  7.44*   CALCIUM  8.6   7.8*  7.8*                      Assessment/Plan     ESRD (end stage renal disease) on dialysis (CMS-[facility]) (present on admission)  Assessment & Plan  Pt dialysis days are MWF.   Nephrology consulted. Pt received dialysis today. Patient also received 2 units of packed red blood cells.    Myelodysplasia (myelodysplastic syndrome) (CMS-HCC) (present on admission)  Assessment & Plan  Pt follows Dr. Cooper.   Continue to follow as outpt. No need for inpt consult at this time.     Intractable nausea and vomiting (present on admission)  Assessment & Plan  Improving.  Continue supportive care with Antiemetics.   Pt also has abd / pelvis CT scan that did not show any acute process. Only fatty infiltration of pancrease. Lipase was normal.    Anemia of chronic disease (present on admission)  Assessment & Plan  Pt hgb generally at 8.0, hemoglobin at presentation was 6.3.   No active bleeding was found.  Patient will receive 2 units of packed red blood cells during the dialysis.  We will continue to check CBC closely.       Paroxysmal atrial fibrillation (CMS-[facility]) (present on admission)  Assessment & Plan  Continue Coreg, not on anticoagulation secondary to MDS    Diastolic dysfunction (present on admission)  Assessment & Plan  Stable. No exacerbation.     HTN (hypertension) (present on admission)  Assessment & Plan  Stable. Continue home medications.     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 Continue oxygen support.     Diabetic neuropathy (CMS-HCC) (present on admission)  Assessment & Plan  Pain under control. Continue home medications.     Vitamin D deficiency (present on admission)  Assessment & Plan  Stable. Continue replacement.       Labs reviewed and Medications reviewed  Bauer catheter: No  Bauer      DVT Prophylaxis: Contraindicated - High bleeding risk and Not indicated at this time, ambulatory  DVT prophylaxis - mechanical: SCDs

## 2017-08-15 NOTE — PROGRESS NOTES
Assumed care at 0700. Received bedside report from day RN. Pt A/Ox4. Pt denies pain or nausea, c/o dizziness. Also, c/o blurred vision, which is baseline. Pt assessed, labs and notes reviewed. Medicated per MAR. POC discussed; pt agreeable to goals. Pt board updated and pt informed of phone ext's, and hourly rounding. Pt is up x1, calls appropriately. Pt needs are met at this time. Call light and phone within reach.

## 2017-08-15 NOTE — PROGRESS NOTES
Pt c/o dizziness, worse when she opens her eyes. No complaints of ear pain. Dizziness occurred when walking previously, but is now occurring while lying down. Let Dr. Tracy know.

## 2017-08-15 NOTE — PROGRESS NOTES
Spoke to Dialysis and MRI to coordinate times. MRI with contrast will more than likely be done around 0830/0900, then HD will have to be done immediately after. MRI will call in the morning and we will have to notify dialysis. Got an order for Ativan 0.5-1 mg tablet prior to MRI, per Dr. Tracy, verbally.

## 2017-08-16 ENCOUNTER — APPOINTMENT (OUTPATIENT)
Dept: RADIOLOGY | Facility: MEDICAL CENTER | Age: 62
DRG: 391 | End: 2017-08-16
Attending: INTERNAL MEDICINE
Payer: MEDICARE

## 2017-08-16 LAB
ANION GAP SERPL CALC-SCNC: 12 MMOL/L (ref 0–11.9)
BASOPHILS # BLD AUTO: 0.3 % (ref 0–1.8)
BASOPHILS # BLD: 0.01 K/UL (ref 0–0.12)
BUN SERPL-MCNC: 63 MG/DL (ref 8–22)
CALCIUM SERPL-MCNC: 7.6 MG/DL (ref 8.5–10.5)
CHLORIDE SERPL-SCNC: 96 MMOL/L (ref 96–112)
CO2 SERPL-SCNC: 23 MMOL/L (ref 20–33)
CREAT SERPL-MCNC: 6.39 MG/DL (ref 0.5–1.4)
EOSINOPHIL # BLD AUTO: 0.13 K/UL (ref 0–0.51)
EOSINOPHIL NFR BLD: 3.7 % (ref 0–6.9)
ERYTHROCYTE [DISTWIDTH] IN BLOOD BY AUTOMATED COUNT: 48.7 FL (ref 35.9–50)
GFR SERPL CREATININE-BSD FRML MDRD: 7 ML/MIN/1.73 M 2
GLUCOSE BLD-MCNC: 146 MG/DL (ref 65–99)
GLUCOSE BLD-MCNC: 191 MG/DL (ref 65–99)
GLUCOSE BLD-MCNC: 225 MG/DL (ref 65–99)
GLUCOSE BLD-MCNC: 307 MG/DL (ref 65–99)
GLUCOSE SERPL-MCNC: 180 MG/DL (ref 65–99)
HCT VFR BLD AUTO: 27.6 % (ref 37–47)
HGB BLD-MCNC: 9.8 G/DL (ref 12–16)
IMM GRANULOCYTES # BLD AUTO: 0.04 K/UL (ref 0–0.11)
IMM GRANULOCYTES NFR BLD AUTO: 1.1 % (ref 0–0.9)
LYMPHOCYTES # BLD AUTO: 1.84 K/UL (ref 1–4.8)
LYMPHOCYTES NFR BLD: 52.9 % (ref 22–41)
MCH RBC QN AUTO: 31.7 PG (ref 27–33)
MCHC RBC AUTO-ENTMCNC: 35.5 G/DL (ref 33.6–35)
MCV RBC AUTO: 89.3 FL (ref 81.4–97.8)
MONOCYTES # BLD AUTO: 0.19 K/UL (ref 0–0.85)
MONOCYTES NFR BLD AUTO: 5.5 % (ref 0–13.4)
NEUTROPHILS # BLD AUTO: 1.27 K/UL (ref 2–7.15)
NEUTROPHILS NFR BLD: 36.5 % (ref 44–72)
NRBC # BLD AUTO: 0 K/UL
NRBC BLD AUTO-RTO: 0 /100 WBC
PLATELET # BLD AUTO: 164 K/UL (ref 164–446)
PMV BLD AUTO: 12.1 FL (ref 9–12.9)
POTASSIUM SERPL-SCNC: 4.4 MMOL/L (ref 3.6–5.5)
RBC # BLD AUTO: 3.09 M/UL (ref 4.2–5.4)
SODIUM SERPL-SCNC: 131 MMOL/L (ref 135–145)
WBC # BLD AUTO: 3.5 K/UL (ref 4.8–10.8)

## 2017-08-16 PROCEDURE — A9270 NON-COVERED ITEM OR SERVICE: HCPCS | Mod: EDC | Performed by: INTERNAL MEDICINE

## 2017-08-16 PROCEDURE — 770004 HCHG ROOM/CARE - ONCOLOGY PRIVATE *: Mod: EDC

## 2017-08-16 PROCEDURE — 36415 COLL VENOUS BLD VENIPUNCTURE: CPT | Mod: EDC

## 2017-08-16 PROCEDURE — 700111 HCHG RX REV CODE 636 W/ 250 OVERRIDE (IP): Mod: EDC

## 2017-08-16 PROCEDURE — A9577 INJ MULTIHANCE: HCPCS | Mod: EDC | Performed by: INTERNAL MEDICINE

## 2017-08-16 PROCEDURE — 99233 SBSQ HOSP IP/OBS HIGH 50: CPT | Performed by: INTERNAL MEDICINE

## 2017-08-16 PROCEDURE — A9270 NON-COVERED ITEM OR SERVICE: HCPCS | Mod: EDC | Performed by: FAMILY MEDICINE

## 2017-08-16 PROCEDURE — 700102 HCHG RX REV CODE 250 W/ 637 OVERRIDE(OP): Mod: EDC | Performed by: FAMILY MEDICINE

## 2017-08-16 PROCEDURE — 90935 HEMODIALYSIS ONE EVALUATION: CPT | Mod: EDC

## 2017-08-16 PROCEDURE — 70553 MRI BRAIN STEM W/O & W/DYE: CPT

## 2017-08-16 PROCEDURE — 85025 COMPLETE CBC W/AUTO DIFF WBC: CPT | Mod: 91,EDC

## 2017-08-16 PROCEDURE — 700117 HCHG RX CONTRAST REV CODE 255: Mod: EDC | Performed by: INTERNAL MEDICINE

## 2017-08-16 PROCEDURE — 700102 HCHG RX REV CODE 250 W/ 637 OVERRIDE(OP): Mod: EDC | Performed by: INTERNAL MEDICINE

## 2017-08-16 PROCEDURE — 82962 GLUCOSE BLOOD TEST: CPT | Mod: 91,EDC

## 2017-08-16 PROCEDURE — 80048 BASIC METABOLIC PNL TOTAL CA: CPT | Mod: EDC

## 2017-08-16 RX ADMIN — PREGABALIN 50 MG: 25 CAPSULE ORAL at 08:02

## 2017-08-16 RX ADMIN — GADOBENATE DIMEGLUMINE 10 ML: 529 INJECTION, SOLUTION INTRAVENOUS at 09:41

## 2017-08-16 RX ADMIN — AMLODIPINE BESYLATE 10 MG: 10 TABLET ORAL at 08:02

## 2017-08-16 RX ADMIN — CARVEDILOL 12.5 MG: 12.5 TABLET, FILM COATED ORAL at 08:02

## 2017-08-16 RX ADMIN — INSULIN LISPRO 1 UNITS: 100 INJECTION, SOLUTION INTRAVENOUS; SUBCUTANEOUS at 21:14

## 2017-08-16 RX ADMIN — LATANOPROST 1 DROP: 50 SOLUTION OPHTHALMIC at 21:17

## 2017-08-16 RX ADMIN — PREGABALIN 50 MG: 25 CAPSULE ORAL at 21:17

## 2017-08-16 RX ADMIN — CARVEDILOL 12.5 MG: 12.5 TABLET, FILM COATED ORAL at 17:09

## 2017-08-16 RX ADMIN — INSULIN LISPRO 2 UNITS: 100 INJECTION, SOLUTION INTRAVENOUS; SUBCUTANEOUS at 13:50

## 2017-08-16 RX ADMIN — LORAZEPAM 0.5 MG: 1 TABLET ORAL at 08:02

## 2017-08-16 RX ADMIN — HYDROCODONE BITARTRATE AND ACETAMINOPHEN 1 TABLET: 5; 325 TABLET ORAL at 19:26

## 2017-08-16 RX ADMIN — ERYTHROPOIETIN 10000 UNITS: 10000 INJECTION, SOLUTION INTRAVENOUS; SUBCUTANEOUS at 11:00

## 2017-08-16 RX ADMIN — INSULIN LISPRO 4 UNITS: 100 INJECTION, SOLUTION INTRAVENOUS; SUBCUTANEOUS at 17:11

## 2017-08-16 ASSESSMENT — PAIN SCALES - GENERAL
PAINLEVEL_OUTOF10: 6
PAINLEVEL_OUTOF10: 0
PAINLEVEL_OUTOF10: 0

## 2017-08-16 ASSESSMENT — ENCOUNTER SYMPTOMS
HEADACHES: 0
CLAUDICATION: 0
NAUSEA: 0
SENSORY CHANGE: 0
CHILLS: 0
DIARRHEA: 0
FEVER: 0
EYES NEGATIVE: 1
COUGH: 0
CARDIOVASCULAR NEGATIVE: 1
SHORTNESS OF BREATH: 0
GASTROINTESTINAL NEGATIVE: 1
VOMITING: 0
SHORTNESS OF BREATH: 1
MUSCULOSKELETAL NEGATIVE: 1
ABDOMINAL PAIN: 0
PHOTOPHOBIA: 0
BLURRED VISION: 0
WEAKNESS: 1
DEPRESSION: 0
DIZZINESS: 1
SPEECH CHANGE: 0
HEARTBURN: 0
NERVOUS/ANXIOUS: 0
MYALGIAS: 0
INSOMNIA: 0
CONSTIPATION: 0

## 2017-08-16 NOTE — DOCUMENTATION QUERY
DOCUMENTATION QUERY    PROVIDERS: Please select “Cosign w/ note”to reply to query.    Dr. Tracy,    To better represent the severity of illness of your patient, please review the following information and exercise your independent professional judgment in responding to this query.     Diastolic dysfunction, no exacerbation, and a past medical history of Diastolic heart failure are documented in the Progress Notes. Based upon the clinical findings, risk factors, and treatment, can a diagnosis be provided for these findings?    • Chronic Diastolic heart failure  • Chronic Systolic and Diastolic heart failure  • Heart failure ruled out  • Other explanation of clinical findings, please explain  • Unable to determine         The medical record reflects the following:   Clinical Findings Documented diastolic HF w/o specificity as to acute or chronic.   Documented diastolic dysfunction, stable, no exacerbation.    BNP not drawn this admit.    CXR 8/12/17 notes cardiomegaly w/pulmonary vascular congestion.    ECHO from 3/30/17 notes an EF of 60%, grade I diastolic dysfunction, mild mitral regurgitation, and mild concentric L ventricular hypertrophy.   Treatment Coreg, norvasc, prn hydralazine & labetalol, dialysis   Risk Factors Htn, esrd, dm 2, paroxysmal afib   Location within medical record Progress Notes     Thank you,   Tiffany Brown RN  Clinical   Phone # [phone]

## 2017-08-16 NOTE — DISCHARGE PLANNING
Medical SW    Referral: Sw met w/ Dr Tracy in afternoon for Sw rounds.    Intervention: Pt out to MRI of brain and dialysis today. Pt had post chemo reaction. Pt's dizziness became worse. Pt has no social needs.      Plan: Sw to assist w/ d/c planning as needed.

## 2017-08-16 NOTE — PROGRESS NOTES
[facility] Progress Note    Hemodialysis treatment ordered today per Dr. Peerz x 3 hours. Treatment initiated at 1004, ended at 1304.     Patient hypotensive at times during tx; see paper flow sheet for details. NS bolus x 1-100 mL, decreased UF goal.     Net  mL, limited by BP.     Needles removed from access site. Dressings applied and sites held x 10 minutes; verified no bleeding. Positive bruit/thrill post tx. Staff RN to monitor AVF for breakthrough bleeding. Should breakthrough bleeding occur, staff RN to apply pressure to access sites until bleeding resolved. Notify Dialysis and Nephrologist for follow-up.    Report given to Primary RN.      Delay x 45 minutes awaiting arrival from MRI.

## 2017-08-16 NOTE — PROGRESS NOTES
[facility] Hospitalist Progress Note    Date of Service: 8/15/2017    Chief Complaint  62 y.o. female admitted 2017 with intractable nausea and vomiting status post chemotherapy for myelodysplastic syndrome.    Interval Problem Update  Patient states that her nausea and vomiting has improved in her energy level has improved however she continues to have dizziness. MRI with contrast ordered for further evaluation of any intercerebral pathology. This will be coordinated tomorrow in conjunction with her hemodialysis. Otherwise patient without any other complaints.    Consultants/Specialty  Tmwbxtvenh-Ersq-Uzyioi    Disposition  Home when appropriate        Review of Systems   Constitutional: Positive for malaise/fatigue (Improving). Negative for fever and chills.   HENT: Negative for congestion.    Eyes: Negative for blurred vision and photophobia.   Respiratory: Negative for cough and shortness of breath.    Cardiovascular: Negative for chest pain, claudication and leg swelling.   Gastrointestinal: Negative for heartburn, nausea, vomiting, abdominal pain, diarrhea and constipation.   Genitourinary: Negative for dysuria and hematuria.   Musculoskeletal: Negative for myalgias and joint pain.   Skin: Negative for itching and rash.   Neurological: Positive for dizziness and weakness. Negative for sensory change, speech change and headaches.   Psychiatric/Behavioral: Negative for depression. The patient is not nervous/anxious and does not have insomnia.       Physical Exam  Laboratory/Imaging   Hemodynamics  Temp (24hrs), Av.6 °C (97.8 °F), Min:36.1 °C (97 °F), Max:37.1 °C (98.8 °F)   Temperature: 36.8 °C (98.2 °F)  Pulse  Av.9  Min: 67  Max: 100    Blood Pressure: 102/61 mmHg      Respiratory      Respiration: 18, Pulse Oximetry: 94 %             Fluids  No intake or output data in the 24 hours ending 08/15/17 2032    Nutrition  Orders Placed This Encounter   Procedures   • Diet Order     Standing Status:  Standing      Number of Occurrences: 1      Standing Expiration Date:      Order Specific Question:  Diet:     Answer:  Renal [8]     Order Specific Question:  Diet:     Answer:  Diabetic [3]     Physical Exam   Constitutional: She is oriented to person, place, and time. She appears well-developed and well-nourished. No distress.   HENT:   Head: Normocephalic and atraumatic.   Eyes: Conjunctivae are normal. No scleral icterus.   Patient squinting as I enter the room, states her vision is blurry at baseline.   Neck: Neck supple. No JVD present.   Cardiovascular: Normal rate, regular rhythm and normal heart sounds.  Exam reveals no gallop and no friction rub.    No murmur heard.  Pulmonary/Chest: Effort normal and breath sounds normal. No respiratory distress. She exhibits no tenderness.   Abdominal: Soft. Bowel sounds are normal. She exhibits no distension. There is no guarding.   Musculoskeletal: She exhibits no edema or tenderness.   Neurological: She is alert and oriented to person, place, and time. No cranial nerve deficit.   Skin: Skin is warm and dry. She is not diaphoretic. No erythema. No pallor.   Psychiatric: She has a normal mood and affect. Her behavior is normal.   Nursing note and vitals reviewed.      Recent Labs      08/13/17   0906  08/13/17   2346  08/15/17   0015   WBC  4.4*  3.7*  3.3*   RBC  1.88*  1.99*  3.32*   HEMOGLOBIN  6.3*  6.5*  10.6*   HEMATOCRIT  18.8*  19.3*  29.8*   MCV  98.9*  97.0  89.8   MCH  33.5*  32.7  31.9   MCHC  33.9  33.7  35.6*   RDW  61.0*  58.7*  51.5*   PLATELETCT  263  263  215   MPV  11.0  11.7  11.8     Recent Labs      08/13/17   0906  08/13/17   2345  08/15/17   0015   SODIUM  135  134*  132*   POTASSIUM  3.7  4.1  4.2   CHLORIDE  98  96  95*   CO2  27  25  28   GLUCOSE  160*  147*  176*   BUN  57*  62*  43*   CREATININE  6.02*  7.44*  4.68*   CALCIUM  7.8*  7.8*  7.9*                      Assessment/Plan     ESRD (end stage renal disease) on dialysis (CMS-[facility])  (present on admission)  Assessment & Plan  Pt dialysis days are MWF.   Nephrology consulted.     Myelodysplasia (myelodysplastic syndrome) (CMS-HCC) (present on admission)  Assessment & Plan  Pt follows Dr. Fish.   Continue to follow as outpt.     Intractable nausea and vomiting (present on admission)  Assessment & Plan  Improving.  Continue supportive care with Antiemetics.   Likely secondary to recent chemotherapy  Has improved and patient able to tolerate by mouth without issue    Anemia of chronic disease (present on admission)  Assessment & Plan  Pt hgb generally at 8.0  Hemoglobin up to 10.6 from 6.5 with 2 units transfusion during hemodialysis  Low hemoglobin compounded with chemotherapy for myelodysplastic syndrome       Paroxysmal atrial fibrillation (CMS-HCC) (present on admission)  Assessment & Plan  Continue Coreg, not on anticoagulation secondary to MDS    Diastolic dysfunction (present on admission)  Assessment & Plan  Stable. No exacerbation.     Glaucoma  Assessment & Plan  Patient states she normally has poor blurred vision secondary to her glaucoma  This could be contributing to her symptom of dizziness.    Dizziness  Assessment & Plan  Patient states that this is a normal symptom for her when her hemoglobin is low  She is continuing to have the symptoms however even after transfusion and without activity. MRI of the brain with contrast ordered and will be coordinated tomorrow in conjunction with hemodialysis.    HTN (hypertension) (present on admission)  Assessment & Plan  Stable. Continue home medications.     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 Continue oxygen support.     Diabetic neuropathy (CMS-HCC) (present on admission)  Assessment & Plan  Pain under control.  Continue home medications.     Vitamin D deficiency (present on admission)  Assessment & Plan  Stable. Continue replacement.     Medications reviewed, Radiology images reviewed and Labs reviewed  Bauer catheter: No Bauer      DVT Prophylaxis: Contraindicated - High bleeding risk  DVT prophylaxis - mechanical: SCDs

## 2017-08-16 NOTE — ASSESSMENT & PLAN NOTE
Patient states she normally has poor blurred vision secondary to her glaucoma  This could be contributing to her symptom of dizziness.  Ordered her combigan gtts that she has at bedside

## 2017-08-16 NOTE — PROGRESS NOTES
No changes from EPIC, A&Ox4, still c/o dizziness, now c/o leg weakness. Pt got up, at shift change, to the bathroom without calling and almost fell. The bed alarm went off and this RN and another RN ran in and was able to prevent it from happening. Educated pt regarding calling and safety. Pt states she will call. Discussed POC for the day, MRI and dialysis. Bed in low, treaded socks on, personal items and call light in place. All needs met at this time.

## 2017-08-16 NOTE — PROGRESS NOTES
[facility] -  PROGRESS NOTE               Author: Chao Perez M.D. Date & Time: 8/16/2017  11:59 AM     HPI:  62yoF with PMH significant for ESRD on HD MWF via L arm AVF, HTN, DM II, Atrial Fibrillation, PVD, COPD, Anemia secondary to Myelodysplastic Syndrome, h/o CVA, COPD on home O2 admitted with complaints of nausea, vomiting and diarrhea for the past. 5 days with abdominal pain and fever and chills. Patient has myelodysplastic syndrome, has been on chemotherapy with Vidaza for 5 days which was completed yesterday. Nephrology was consulted to assisst with providing HD while inpatient.        DAILY NEPHROLOGY SUMMARY:  8/13/17 - Consult done  8/14/17 - NAEO, SOD, Qb400, VSS  8/15/17 - NAEO, doing well, feels good, Hgb 10.6 post transfusion yesterday  8/16/17 - NAEO, MRI with gadolinium today by primary team      Review of Systems   Constitutional: Positive for malaise/fatigue.   HENT: Negative.    Eyes: Negative.    Respiratory: Positive for shortness of breath.    Cardiovascular: Negative.    Gastrointestinal: Negative.    Musculoskeletal: Negative.    Skin: Negative.    Neurological: Positive for weakness.   Endo/Heme/Allergies: Negative.    All other systems reviewed and are negative.        Physical Exam   Constitutional: She is oriented to person, place, and time. She appears well-developed and well-nourished. She appears ill.   HENT:   Head: Normocephalic and atraumatic.   Eyes: Conjunctivae are normal. No scleral icterus.   Neck: Neck supple. No tracheal deviation present.   Cardiovascular: Normal rate and regular rhythm.    Pulmonary/Chest: Effort normal and breath sounds normal.   Abdominal: Soft. Bowel sounds are normal.   Musculoskeletal: She exhibits no edema or tenderness.   Neurological: She is alert and oriented to person, place, and time.   Skin: Skin is warm and dry.         PAST FAMILY HISTORY: Reviewed and Unchanged  SOCIAL HISTORY: Reviewed and Unchanged  CURRENT  MEDICATIONS: Reviewed  LABORATORY RESULTS: Reviewed  IMAGING STUDIES: Reviewed      Fluids:         IMPRESSION:  - ESRD    * Etiology likely 2/2 HTN/DM  - Acute on chronic anemia  - Nausea/Vomiting  - HTN  - type 2 DM  - Renal Osteodystrophy  - HLD  - CAD  - COPD    ASSESSMENT:  - GFR: HD dependent  - Urine: oligoanuric  - BP: Goal < 140/90  - Volume: euvolemic  - Acid/Base: no sig. acid base disturbance  - Electrolytes: stable  - Anemia: Goal Hgb 10-11  - MBD: Ca normal, PO4 elevated  - HD Access: LAVF (+thrill/bruit)    PLAN:  - iHD immediately after MRI today and for Thur/Fri as well due to gadolinium exposure  - UF as tolerated  - VANCE with iHD, epogen 10K units qRx  - Antiemetics as needed  - Dose all meds per ESRD guidelines

## 2017-08-16 NOTE — PROGRESS NOTES
Assumed care of patient @ 1900. Bedside report received. Patient AO x4. VSS,  Pain 0/10. Fall precautions in place,AV fistula, dialysis in AM. POC discussed with patient, all questions answered, call light within reach, hourly rounding in place.

## 2017-08-16 NOTE — CARE PLAN
Problem: Safety  Goal: Will remain free from injury  Outcome: PROGRESSING SLOWER THAN EXPECTED  Pt is free from accidental injury. Fall precautions are in place. Treaded socks in use, bed alarm now on and functioning, appropriate sign on the door, personal possessions and call light within reach, bed in low, and gait assessed. Pt almost fell today.     Problem: Bowel/Gastric:  Goal: Normal bowel function is maintained or improved  Outcome: NOT MET  No BM since 8/12/17, education provided.

## 2017-08-17 LAB
ANION GAP SERPL CALC-SCNC: 9 MMOL/L (ref 0–11.9)
BASOPHILS # BLD AUTO: 0.3 % (ref 0–1.8)
BASOPHILS # BLD: 0.01 K/UL (ref 0–0.12)
BUN SERPL-MCNC: 36 MG/DL (ref 8–22)
CALCIUM SERPL-MCNC: 7.8 MG/DL (ref 8.5–10.5)
CHLORIDE SERPL-SCNC: 97 MMOL/L (ref 96–112)
CO2 SERPL-SCNC: 27 MMOL/L (ref 20–33)
CREAT SERPL-MCNC: 3.99 MG/DL (ref 0.5–1.4)
EOSINOPHIL # BLD AUTO: 0.13 K/UL (ref 0–0.51)
EOSINOPHIL NFR BLD: 4 % (ref 0–6.9)
ERYTHROCYTE [DISTWIDTH] IN BLOOD BY AUTOMATED COUNT: 48.4 FL (ref 35.9–50)
GFR SERPL CREATININE-BSD FRML MDRD: 11 ML/MIN/1.73 M 2
GLUCOSE BLD-MCNC: 113 MG/DL (ref 65–99)
GLUCOSE BLD-MCNC: 217 MG/DL (ref 65–99)
GLUCOSE BLD-MCNC: 358 MG/DL (ref 65–99)
GLUCOSE SERPL-MCNC: 244 MG/DL (ref 65–99)
HCT VFR BLD AUTO: 26.1 % (ref 37–47)
HGB BLD-MCNC: 9.2 G/DL (ref 12–16)
IMM GRANULOCYTES # BLD AUTO: 0.03 K/UL (ref 0–0.11)
IMM GRANULOCYTES NFR BLD AUTO: 0.9 % (ref 0–0.9)
LYMPHOCYTES # BLD AUTO: 1.13 K/UL (ref 1–4.8)
LYMPHOCYTES NFR BLD: 35 % (ref 22–41)
MCH RBC QN AUTO: 31.9 PG (ref 27–33)
MCHC RBC AUTO-ENTMCNC: 35.2 G/DL (ref 33.6–35)
MCV RBC AUTO: 90.6 FL (ref 81.4–97.8)
MONOCYTES # BLD AUTO: 0.2 K/UL (ref 0–0.85)
MONOCYTES NFR BLD AUTO: 6.2 % (ref 0–13.4)
NEUTROPHILS # BLD AUTO: 1.73 K/UL (ref 2–7.15)
NEUTROPHILS NFR BLD: 53.6 % (ref 44–72)
NRBC # BLD AUTO: 0 K/UL
NRBC BLD AUTO-RTO: 0 /100 WBC
PLATELET # BLD AUTO: 137 K/UL (ref 164–446)
PMV BLD AUTO: 12.3 FL (ref 9–12.9)
POTASSIUM SERPL-SCNC: 4.6 MMOL/L (ref 3.6–5.5)
RBC # BLD AUTO: 2.88 M/UL (ref 4.2–5.4)
SODIUM SERPL-SCNC: 133 MMOL/L (ref 135–145)
WBC # BLD AUTO: 3.2 K/UL (ref 4.8–10.8)

## 2017-08-17 PROCEDURE — A9270 NON-COVERED ITEM OR SERVICE: HCPCS | Mod: EDC | Performed by: FAMILY MEDICINE

## 2017-08-17 PROCEDURE — G8988 SELF CARE GOAL STATUS: HCPCS | Mod: CI,EDC

## 2017-08-17 PROCEDURE — 97165 OT EVAL LOW COMPLEX 30 MIN: CPT | Mod: EDC

## 2017-08-17 PROCEDURE — G8987 SELF CARE CURRENT STATUS: HCPCS | Mod: CJ,EDC

## 2017-08-17 PROCEDURE — 99233 SBSQ HOSP IP/OBS HIGH 50: CPT | Performed by: INTERNAL MEDICINE

## 2017-08-17 PROCEDURE — 97162 PT EVAL MOD COMPLEX 30 MIN: CPT | Mod: EDC

## 2017-08-17 PROCEDURE — 700102 HCHG RX REV CODE 250 W/ 637 OVERRIDE(OP): Mod: EDC | Performed by: FAMILY MEDICINE

## 2017-08-17 PROCEDURE — G8978 MOBILITY CURRENT STATUS: HCPCS | Mod: CJ,EDC

## 2017-08-17 PROCEDURE — 90935 HEMODIALYSIS ONE EVALUATION: CPT | Mod: EDC

## 2017-08-17 PROCEDURE — 82962 GLUCOSE BLOOD TEST: CPT | Mod: EDC

## 2017-08-17 PROCEDURE — G8979 MOBILITY GOAL STATUS: HCPCS | Mod: CI,EDC

## 2017-08-17 PROCEDURE — 770004 HCHG ROOM/CARE - ONCOLOGY PRIVATE *: Mod: EDC

## 2017-08-17 RX ORDER — BRIMONIDINE TARTRATE AND TIMOLOL MALEATE 2; 5 MG/ML; MG/ML
1 SOLUTION OPHTHALMIC 2 TIMES DAILY
Status: DISCONTINUED | OUTPATIENT
Start: 2017-08-17 | End: 2017-08-17

## 2017-08-17 RX ORDER — BRIMONIDINE TARTRATE AND TIMOLOL MALEATE 2; 5 MG/ML; MG/ML
1 SOLUTION OPHTHALMIC 2 TIMES DAILY
Status: DISCONTINUED | OUTPATIENT
Start: 2017-08-18 | End: 2017-08-22 | Stop reason: HOSPADM

## 2017-08-17 RX ADMIN — CARVEDILOL 12.5 MG: 12.5 TABLET, FILM COATED ORAL at 18:20

## 2017-08-17 RX ADMIN — HYDROCODONE BITARTRATE AND ACETAMINOPHEN 1 TABLET: 5; 325 TABLET ORAL at 08:17

## 2017-08-17 RX ADMIN — LATANOPROST 1 DROP: 50 SOLUTION OPHTHALMIC at 21:17

## 2017-08-17 RX ADMIN — BRIMONIDINE TARTRATE AND TIMOLOL MALEATE 1 DROP: 2; 5 SOLUTION OPHTHALMIC at 21:15

## 2017-08-17 RX ADMIN — CARVEDILOL 12.5 MG: 12.5 TABLET, FILM COATED ORAL at 08:15

## 2017-08-17 RX ADMIN — INSULIN LISPRO 2 UNITS: 100 INJECTION, SOLUTION INTRAVENOUS; SUBCUTANEOUS at 18:20

## 2017-08-17 RX ADMIN — INSULIN LISPRO 5 UNITS: 100 INJECTION, SOLUTION INTRAVENOUS; SUBCUTANEOUS at 21:18

## 2017-08-17 RX ADMIN — AMLODIPINE BESYLATE 10 MG: 10 TABLET ORAL at 08:15

## 2017-08-17 RX ADMIN — PREGABALIN 50 MG: 25 CAPSULE ORAL at 21:17

## 2017-08-17 RX ADMIN — PREGABALIN 50 MG: 25 CAPSULE ORAL at 08:15

## 2017-08-17 RX ADMIN — HYDROCODONE BITARTRATE AND ACETAMINOPHEN 1 TABLET: 5; 325 TABLET ORAL at 23:57

## 2017-08-17 ASSESSMENT — COGNITIVE AND FUNCTIONAL STATUS - GENERAL
WALKING IN HOSPITAL ROOM: A LITTLE
TOILETING: A LITTLE
MOBILITY SCORE: 19
STANDING UP FROM CHAIR USING ARMS: A LITTLE
CLIMB 3 TO 5 STEPS WITH RAILING: A LITTLE
MOVING TO AND FROM BED TO CHAIR: A LITTLE
SUGGESTED CMS G CODE MODIFIER DAILY ACTIVITY: CJ
HELP NEEDED FOR BATHING: A LITTLE
SUGGESTED CMS G CODE MODIFIER MOBILITY: CK
DRESSING REGULAR LOWER BODY CLOTHING: A LITTLE
MOVING FROM LYING ON BACK TO SITTING ON SIDE OF FLAT BED: A LITTLE
DAILY ACTIVITIY SCORE: 21

## 2017-08-17 ASSESSMENT — PAIN SCALES - GENERAL
PAINLEVEL_OUTOF10: ASSUMED PAIN PRESENT
PAINLEVEL_OUTOF10: 0
PAINLEVEL_OUTOF10: 7
PAINLEVEL_OUTOF10: 8

## 2017-08-17 ASSESSMENT — ENCOUNTER SYMPTOMS
DIZZINESS: 1
WEAKNESS: 1
FEVER: 0
CLAUDICATION: 0
NERVOUS/ANXIOUS: 0
SHORTNESS OF BREATH: 1
GASTROINTESTINAL NEGATIVE: 1
COUGH: 0
PHOTOPHOBIA: 0
EYES NEGATIVE: 1
CHILLS: 0
INSOMNIA: 0
BLURRED VISION: 0
DEPRESSION: 0
MUSCULOSKELETAL NEGATIVE: 1
ABDOMINAL PAIN: 0
NAUSEA: 0
MYALGIAS: 0
CONSTIPATION: 0
SHORTNESS OF BREATH: 0
HEADACHES: 0
DIARRHEA: 0
HEARTBURN: 0
VOMITING: 0
SPEECH CHANGE: 0
CARDIOVASCULAR NEGATIVE: 1
SENSORY CHANGE: 0

## 2017-08-17 ASSESSMENT — GAIT ASSESSMENTS
DEVIATION: DECREASED BASE OF SUPPORT
ASSISTIVE DEVICE: FRONT WHEEL WALKER
DISTANCE (FEET): 10
GAIT LEVEL OF ASSIST: CONTACT GUARD ASSIST

## 2017-08-17 ASSESSMENT — ACTIVITIES OF DAILY LIVING (ADL): TOILETING: INDEPENDENT

## 2017-08-17 NOTE — PROGRESS NOTES
[facility] Hospitalist Progress Note    Date of Service: 8/17/2017    Chief Complaint  62 y.o. female admitted 8/12/2017 with intractable nausea and vomiting status post chemotherapy for myelodysplastic syndrome.    Interval Problem Update  8/15. Patient states that her nausea and vomiting has improved in her energy level has improved however she continues to have dizziness. MRI with contrast ordered for further evaluation of any intercerebral pathology. This will be coordinated tomorrow in conjunction with her hemodialysis. Otherwise patient without any other complaints.  8/16. Patient states she feels about the same today. MRI went well and discuss the results showing no intracranial pathology. Possible light up of one of the cervical vertebrae consistent with myelodysplastic syndrome. However this finding would not explain her dizziness. Will have physical and occupational therapy continue to work with the patient.  Patient also had hemodialysis today without issue.  8/17 patient states she feels about the same today. She did get up with physical and occupational therapy and had increase in dizziness but even with resting had persistent dizziness. There is question as to whether orthostasis could be playing a role in dizziness however if this were the sole cause of it should relieve with resting. Unfortunately he does not at this time. No evidence of source on MRI. She is having worsening vision and worsening glaucoma and her site could certainly be contributing but she states she feels dizzy when the room is dark and her eyes are closed. At this time it is not obvious as to what the source of her dizziness is. She is not significantly anemic for her, she does not have sign of infection, there is no intercerebral pathology identified on MRI.  If this were related to her most recent chemotherapy, likely should have resolved by now.    Consultants/Specialty  [id number]    Disposition  Home when  appropriate        Review of Systems   Constitutional: Positive for malaise/fatigue (Improving). Negative for fever and chills.   HENT: Negative for congestion.    Eyes: Negative for blurred vision and photophobia.   Respiratory: Negative for cough and shortness of breath.    Cardiovascular: Negative for chest pain, claudication and leg swelling.   Gastrointestinal: Negative for heartburn, nausea, vomiting, abdominal pain, diarrhea and constipation.   Genitourinary: Negative for dysuria and hematuria.   Musculoskeletal: Negative for myalgias and joint pain.   Skin: Negative for itching and rash.   Neurological: Positive for dizziness and weakness. Negative for sensory change, speech change and headaches.   Psychiatric/Behavioral: Negative for depression. The patient is not nervous/anxious and does not have insomnia.       Physical Exam  Laboratory/Imaging   Hemodynamics  Temp (24hrs), Av.2 °C (97.2 °F), Min:36.1 °C (97 °F), Max:36.3 °C (97.4 °F)   Temperature: 36.1 °C (97 °F)  Pulse  Av.4  Min: 63  Max: 100    Blood Pressure: 134/66 mmHg      Respiratory      Respiration: 18, Pulse Oximetry: 100 %             Fluids  No intake or output data in the 24 hours ending 17 1510    Nutrition  Orders Placed This Encounter   Procedures   • Diet Order     Standing Status: Standing      Number of Occurrences: 1      Standing Expiration Date:      Order Specific Question:  Diet:     Answer:  Renal [8]     Order Specific Question:  Diet:     Answer:  Diabetic [3]     Physical Exam   Constitutional: She is oriented to person, place, and time. She appears well-developed and well-nourished. No distress.   HENT:   Head: Normocephalic and atraumatic.   Eyes: Conjunctivae are normal. No scleral icterus.   Patient squinting as I enter the room, states her vision is blurry at baseline.   Neck: Neck supple. No JVD present.   Cardiovascular: Normal rate, regular rhythm and normal heart sounds.  Exam reveals no gallop and no  friction rub.    No murmur heard.  Pulmonary/Chest: Effort normal and breath sounds normal. No respiratory distress. She exhibits no tenderness.   Abdominal: Soft. Bowel sounds are normal. She exhibits no distension. There is no guarding.   Musculoskeletal: She exhibits no edema or tenderness.   Neurological: She is alert and oriented to person, place, and time. No cranial nerve deficit.   Skin: Skin is warm and dry. She is not diaphoretic. No erythema. No pallor.   Psychiatric: She has a normal mood and affect. Her behavior is normal.   Nursing note and vitals reviewed.      Recent Labs      08/15/17   0015  08/16/17   0104  08/16/17   2347   WBC  3.3*  3.5*  3.2*   RBC  3.32*  3.09*  2.88*   HEMOGLOBIN  10.6*  9.8*  9.2*   HEMATOCRIT  29.8*  27.6*  26.1*   MCV  89.8  89.3  90.6   MCH  31.9  31.7  31.9   MCHC  35.6*  35.5*  35.2*   RDW  51.5*  48.7  48.4   PLATELETCT  215  164  137*   MPV  11.8  12.1  12.3     Recent Labs      08/15/17   0015  08/16/17   0104  08/16/17   2347   SODIUM  132*  131*  133*   POTASSIUM  4.2  4.4  4.6   CHLORIDE  95*  96  97   CO2  28  23  27   GLUCOSE  176*  180*  244*   BUN  43*  63*  36*   CREATININE  4.68*  6.39*  3.99*   CALCIUM  7.9*  7.6*  7.8*                      Assessment/Plan     ESRD (end stage renal disease) on dialysis (CMS-HCC) (present on admission)  Assessment & Plan  Pt dialysis days are MWF.   Nephrology consulted.   Additional hemodialysis today secondary to contrast exposure with MRI and resume regular dialysis tomorrow.    Myelodysplasia (myelodysplastic syndrome) (CMS-HCC) (present on admission)  Assessment & Plan  Pt follows Dr. Fish.   Continue to follow as outpt.     Intractable nausea and vomiting (present on admission)  Assessment & Plan  Improving.  Continue supportive care with Antiemetics.   Likely secondary to recent chemotherapy  Has improved and patient able to tolerate by mouth without issue    Anemia of chronic disease (present on  admission)  Assessment & Plan  Pt hgb generally at 8.0  Hemoglobin 9.2  Low hemoglobin compounded with chemotherapy for myelodysplastic syndrome       Paroxysmal atrial fibrillation (CMS-HCC) (present on admission)  Assessment & Plan  Continue Coreg, not on anticoagulation secondary to MDS    Diastolic dysfunction (present on admission)  Assessment & Plan  Stable. No exacerbation.     Glaucoma  Assessment & Plan  Patient states she normally has poor blurred vision secondary to her glaucoma  This could be contributing to her symptom of dizziness.  Ordered her combigan gtts that she has at bedside      Dizziness  Assessment & Plan  Patient states that this is a normal symptom for her when her hemoglobin is low  She is continuing to have the symptoms however even after transfusion and without activity. MRI of the brain with contrast without intracranial abnormality seen.  Patient does state that her blurred vision is getting worse and she saw her ophthalmologist last week and he adjusted her glaucoma medicine. She hadn't received her combigan since admission, it is at bedside, I've placed nursing communication that she should receive this medication.      HTN (hypertension) (present on admission)  Assessment & Plan  Stable. Continue home medications.     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 Continue oxygen support.     Diabetic neuropathy (CMS-HCC) (present on admission)  Assessment & Plan  Pain under control. Continue home medications.     Vitamin D deficiency (present on admission)  Assessment & Plan  Stable. Continue replacement.       Medications reviewed, Radiology images reviewed and Labs reviewed  Bauer catheter: No Bauer      DVT Prophylaxis: Contraindicated - High bleeding risk  DVT  prophylaxis - mechanical: SCDs

## 2017-08-17 NOTE — PROGRESS NOTES
[facility] Hospitalist Progress Note    Date of Service: 8/16/2017    Chief Complaint  62 y.o. female admitted 8/12/2017 with intractable nausea and vomiting status post chemotherapy for myelodysplastic syndrome.    Interval Problem Update  8/15. Patient states that her nausea and vomiting has improved in her energy level has improved however she continues to have dizziness. MRI with contrast ordered for further evaluation of any intercerebral pathology. This will be coordinated tomorrow in conjunction with her hemodialysis. Otherwise patient without any other complaints.  8/16. Patient states she feels about the same today. MRI went well and discuss the results showing no intracranial pathology. Possible light up of one of the cervical vertebrae consistent with myelodysplastic syndrome. However this finding would not explain her dizziness. Will have physical and occupational therapy continue to work with the patient.  Patient also had hemodialysis today without issue.    Consultants/Specialty  Nbwlicsvok-Tmfw-Vntjxj    Disposition  Home when appropriate        Review of Systems   Constitutional: Positive for malaise/fatigue (Improving). Negative for fever and chills.   HENT: Negative for congestion.    Eyes: Negative for blurred vision and photophobia.   Respiratory: Negative for cough and shortness of breath.    Cardiovascular: Negative for chest pain, claudication and leg swelling.   Gastrointestinal: Negative for heartburn, nausea, vomiting, abdominal pain, diarrhea and constipation.   Genitourinary: Negative for dysuria and hematuria.   Musculoskeletal: Negative for myalgias and joint pain.   Skin: Negative for itching and rash.   Neurological: Positive for dizziness and weakness. Negative for sensory change, speech change and headaches.   Psychiatric/Behavioral: Negative for depression. The patient is not nervous/anxious and does not have insomnia.       Physical Exam  Laboratory/Imaging   Hemodynamics  Temp  (24hrs), Av.2 °C (97.1 °F), Min:35.6 °C (96.1 °F), Max:36.3 °C (97.4 °F)   Temperature: 36.3 °C (97.4 °F)  Pulse  Av.6  Min: 63  Max: 100    Blood Pressure: 122/61 mmHg      Respiratory      Respiration: 16, Pulse Oximetry: 100 %             Fluids    Intake/Output Summary (Last 24 hours) at 17 1917  Last data filed at 17 1304   Gross per 24 hour   Intake    600 ml   Output   1100 ml   Net   -500 ml       Nutrition  Orders Placed This Encounter   Procedures   • Diet Order     Standing Status: Standing      Number of Occurrences: 1      Standing Expiration Date:      Order Specific Question:  Diet:     Answer:  Renal [8]     Order Specific Question:  Diet:     Answer:  Diabetic [3]     Physical Exam   Constitutional: She is oriented to person, place, and time. She appears well-developed and well-nourished. No distress.   HENT:   Head: Normocephalic and atraumatic.   Eyes: Conjunctivae are normal. No scleral icterus.   Patient squinting as I enter the room, states her vision is blurry at baseline.   Neck: Neck supple. No JVD present.   Cardiovascular: Normal rate, regular rhythm and normal heart sounds.  Exam reveals no gallop and no friction rub.    No murmur heard.  Pulmonary/Chest: Effort normal and breath sounds normal. No respiratory distress. She exhibits no tenderness.   Abdominal: Soft. Bowel sounds are normal. She exhibits no distension. There is no guarding.   Musculoskeletal: She exhibits no edema or tenderness.   Neurological: She is alert and oriented to person, place, and time. No cranial nerve deficit.   Skin: Skin is warm and dry. She is not diaphoretic. No erythema. No pallor.   Psychiatric: She has a normal mood and affect. Her behavior is normal.   Nursing note and vitals reviewed.      Recent Labs      17   2346  08/15/17   0015  17   0104   WBC  3.7*  3.3*  3.5*   RBC  1.99*  3.32*  3.09*   HEMOGLOBIN  6.5*  10.6*  9.8*   HEMATOCRIT  19.3*  29.8*  27.6*   MCV  97.0   89.8  89.3   MCH  32.7  31.9  31.7   MCHC  33.7  35.6*  35.5*   RDW  58.7*  51.5*  48.7   PLATELETCT  263  215  164   MPV  11.7  11.8  12.1     Recent Labs      08/13/17   2345  08/15/17   0015  08/16/17   0104   SODIUM  134*  132*  131*   POTASSIUM  4.1  4.2  4.4   CHLORIDE  96  95*  96   CO2  25  28  23   GLUCOSE  147*  176*  180*   BUN  62*  43*  63*   CREATININE  7.44*  4.68*  6.39*   CALCIUM  7.8*  7.9*  7.6*                      Assessment/Plan     ESRD (end stage renal disease) on dialysis (CMS-[facility]) (present on admission)  Assessment & Plan  Pt dialysis days are MWF.   Nephrology consulted.     Myelodysplasia (myelodysplastic syndrome) (CMS-[facility]) (present on admission)  Assessment & Plan  Pt follows Dr. Fish.   Continue to follow as outpt.     Intractable nausea and vomiting (present on admission)  Assessment & Plan  Improving.  Continue supportive care with Antiemetics.   Likely secondary to recent chemotherapy  Has improved and patient able to tolerate by mouth without issue    Anemia of chronic disease (present on admission)  Assessment & Plan  Pt hgb generally at 8.0  Hemoglobin 9.8   Low hemoglobin compounded with chemotherapy for myelodysplastic syndrome       Paroxysmal atrial fibrillation (CMS-HCC) (present on admission)  Assessment & Plan  Continue Coreg, not on anticoagulation secondary to MDS    Diastolic dysfunction (present on admission)  Assessment & Plan  Stable. No exacerbation.     Glaucoma  Assessment & Plan  Patient states she normally has poor blurred vision secondary to her glaucoma  This could be contributing to her symptom of dizziness.    Dizziness  Assessment & Plan  Patient states that this is a normal symptom for her when her hemoglobin is low  She is continuing to have the symptoms however even after transfusion and without activity. MRI of the brain with contrast without intracranial abnormality seen.  Patient does state that her blurred vision is getting worse and she saw her  ophthalmologist last week and he adjusted her glaucoma medicine. This could be contributing to her current complaint.    HTN (hypertension) (present on admission)  Assessment & Plan  Stable. Continue home medications.     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 Continue oxygen support.     Diabetic neuropathy (CMS-HCC) (present on admission)  Assessment & Plan  Pain under control. Continue home medications.     Vitamin D deficiency (present on admission)  Assessment & Plan  Stable. Continue replacement.       Medications reviewed, Radiology images reviewed and Labs reviewed  Bauer catheter: No Bauer      DVT Prophylaxis: Contraindicated - High bleeding risk  DVT prophylaxis - mechanical: SCDs

## 2017-08-17 NOTE — CARE PLAN
Problem: Safety  Goal: Will remain free from injury  Outcome: PROGRESSING AS EXPECTED  Bed locked and in lowest position, non skid socks in place, bed alarm on, call light in reach    Problem: Knowledge Deficit  Goal: Knowledge of disease process/condition, treatment plan, diagnostic tests, and medications will improve  Outcome: PROGRESSING AS EXPECTED  Updated with plan of care, dialysis today, pt/ot

## 2017-08-17 NOTE — CARE PLAN
Problem: Safety  Goal: Will remain free from injury  Intervention: Provide assistance with mobility  Pt is +1 to the bathroom. Pt educated to call for assistance. Bed alarm active.      Problem: Infection  Goal: Will remain free from infection  Intervention: Implement standard precautions and perform hand washing before and after patient contact  Standard infection prevention implemented. Hand washing performed before and after pt care interactions.

## 2017-08-17 NOTE — THERAPY
"Occupational Therapy Evaluation completed.   Functional Status:  spv w/bed mobility, c/o dizziness, SBA to don socks seated EOB, CGA w/sit>Stand continued c/o dizziness initially reported improvement, walked to bathroom w/CGA and fww, min A w/toilet txf able to complete clothing management, CGA standing at sink, increased c/o dizziness returned to EOB, BP assessed seated was 156/78 then standing after 1 min was 130/60, pt agreeable to txf to chair remained up in chair w/alarm on call light w/in reach and RN aware   Plan of Care: Will benefit from Occupational Therapy 3 times per week  Discharge Recommendations:  Equipment: Will Continue to Assess for Equipment Needs. Post-acute therapy Discharge to a transitional care facility for continued skilled therapy services. and Discharge to home with outpatient or home health for additional skilled therapy services.    62 yr old female admitted for n/v pt has a hx of receiving chemo therapy for MDS cancer, pt is on O2 at home and also has end stage renal failure, pt presents w/generalized weakness and deconditioning pt was inattentive during mobility and c/o dizziness through out activity, all impacting safety participation in ADL's pt reports she lives with her daughter, but that her daughter works during the day currently recommend post acute therapy prior to d/c home, please encourage increasing OOB activity for meals w/fww and w/nsg during hospital stay     See \"Rehab Therapy-Acute\" Patient Summary Report for complete documentation.    "

## 2017-08-17 NOTE — PROGRESS NOTES
[facility] -  PROGRESS NOTE               Author: GENEVIEVE Landa, CNN-NP  Collaborating Physician: Dr. Chao Perez  Date & Time: 8/17/2017  10:19 AM     HPI:  62yoF with PMH significant for ESRD on HD MWF via L arm AVF, HTN, DM II, Atrial Fibrillation, PVD, COPD, Anemia secondary to Myelodysplastic Syndrome, h/o CVA, COPD on home O2 admitted with complaints of nausea, vomiting and diarrhea for the past. 5 days with abdominal pain and fever and chills. Patient has myelodysplastic syndrome, has been on chemotherapy with Vidaza for 5 days which was completed yesterday. Nephrology was consulted to assisst with providing HD while inpatient.        DAILY NEPHROLOGY SUMMARY:  8/13/17 - Consult done  8/14/17 - NAEO, SOD, Qb400, VSS  8/15/17 - NAEO, doing well, feels good, Hgb 10.6 post transfusion yesterday  8/16/17 - NAEO, MRI with gadolinium today by primary team  8/17/17 - Sitting up in chair, feeling better today.  Still feels weak.  Getting HD x 3 days in a row      Review of Systems   Constitutional: Positive for malaise/fatigue.   HENT: Negative.    Eyes: Negative.    Respiratory: Positive for shortness of breath.    Cardiovascular: Negative.    Gastrointestinal: Negative.    Musculoskeletal: Negative.    Skin: Negative.    Neurological: Positive for weakness.   Endo/Heme/Allergies: Negative.    All other systems reviewed and are negative.        Physical Exam   Constitutional: She is oriented to person, place, and time. She appears well-developed and well-nourished. She appears ill.   HENT:   Head: Normocephalic and atraumatic.   Eyes: Conjunctivae are normal. No scleral icterus.   Neck: Neck supple. No tracheal deviation present.   Cardiovascular: Normal rate and regular rhythm.    Pulmonary/Chest: Effort normal and breath sounds normal.   Abdominal: Soft. Bowel sounds are normal.   Musculoskeletal: She exhibits no edema or tenderness.   Neurological: She is alert and oriented to  person, place, and time.   Skin: Skin is warm and dry.         PAST FAMILY HISTORY: Reviewed and Unchanged  SOCIAL HISTORY: Reviewed and Unchanged  CURRENT MEDICATIONS: Reviewed  LABORATORY RESULTS: Reviewed  IMAGING STUDIES: Reviewed      Fluids:         IMPRESSION:  - ESRD    * Etiology likely 2/2 HTN/DM    * HD today   - Acute on chronic anemia    * VANCE with HD    * Transfuse prn for Hgb < 7.0  - Nausea/Vomiting, resolved   - HTN, controlled   - type 2 DM    * per primary team   - Renal Osteodystrophy  - HLD  - CAD  - COPD    ASSESSMENT:  - GFR: HD dependent  - Urine: oligoanuric  - BP: Goal < 140/90  - Volume: euvolemic  - Acid/Base: no sig. acid base disturbance  - Electrolytes: stable  - Anemia: Goal Hgb 10-11  - MBD: Ca normal, PO4 elevated  - HD Access: LAVF (+thrill/bruit)    PLAN:  - iHD today and Fri as well due to gadolinium exposure  - UF as tolerated  - VANCE with iHD, epogen 10K units qRx  - Antiemetics as needed  - Dose all meds per ESRD guidelines

## 2017-08-17 NOTE — PROGRESS NOTES
Assumed care of the pt at 1915. Received report from the day shift RN. Pt is AOx4, with no complaint of pain at this time. The pt has been updated on the POC for the shift: assistance with mobilization, pain mgmt, monitor labs. All questions have been answered at this time. Pt receives dialysis M/W/F. Dialysis fistula present on the left arm. Diabetic/renal diet w/ ACHS fingersticks. O2 via nasal cannula. Pt has been educated to call for assistance. Bed alarm is active, call light within reach and hourly rounding in place.

## 2017-08-17 NOTE — THERAPY
"Physical Therapy Evaluation completed.   Bed Mobility:  Supine to Sit: Stand by Assist  Transfers: Sit to Stand: Contact Guard Assist  Gait: Level Of Assist: Contact Guard Assist with Front-Wheel Walker       Plan of Care: Will benefit from Physical Therapy 3 times per week  Discharge Recommendations: Equipment: Will Continue to Assess for Equipment Needs. Post-acute therapy Discharge to a transitional care facility for continued skilled therapy services or Discharge to home with outpatient or home health for additional skilled therapy services pending mobility improvement/symptom management.    Pt presents most limited by dizziness possibly due to orthostatic changes. She also demo's poor environmental awareness and tends to attempt to sit prior to ensuring surface is available. Pt will benefit from further acute skilled PT services to improve functional mobility.     See \"Rehab Therapy-Acute\" Patient Summary Report for complete documentation.     "

## 2017-08-17 NOTE — CARE PLAN
Problem: Nutritional:  Goal: Achieve adequate nutritional intake  Patient will consume 50% of meals   Outcome: MET Date Met:  08/17/17  Intervention: Monitor PO intake, weights, and laboratory values  Pt is consistently eating % of meals.

## 2017-08-18 LAB
ALBUMIN SERPL BCP-MCNC: 2.9 G/DL (ref 3.2–4.9)
ALBUMIN/GLOB SERPL: 1.1 G/DL
ALP SERPL-CCNC: 59 U/L (ref 30–99)
ALT SERPL-CCNC: 31 U/L (ref 2–50)
ANION GAP SERPL CALC-SCNC: 7 MMOL/L (ref 0–11.9)
AST SERPL-CCNC: 22 U/L (ref 12–45)
BASOPHILS # BLD AUTO: 0.4 % (ref 0–1.8)
BASOPHILS # BLD: 0.01 K/UL (ref 0–0.12)
BILIRUB SERPL-MCNC: 0.3 MG/DL (ref 0.1–1.5)
BUN SERPL-MCNC: 27 MG/DL (ref 8–22)
CALCIUM SERPL-MCNC: 7.5 MG/DL (ref 8.5–10.5)
CHLORIDE SERPL-SCNC: 99 MMOL/L (ref 96–112)
CO2 SERPL-SCNC: 28 MMOL/L (ref 20–33)
CREAT SERPL-MCNC: 3.51 MG/DL (ref 0.5–1.4)
EOSINOPHIL # BLD AUTO: 0.11 K/UL (ref 0–0.51)
EOSINOPHIL NFR BLD: 4.4 % (ref 0–6.9)
ERYTHROCYTE [DISTWIDTH] IN BLOOD BY AUTOMATED COUNT: 49.5 FL (ref 35.9–50)
GFR SERPL CREATININE-BSD FRML MDRD: 13 ML/MIN/1.73 M 2
GLOBULIN SER CALC-MCNC: 2.7 G/DL (ref 1.9–3.5)
GLUCOSE BLD-MCNC: 203 MG/DL (ref 65–99)
GLUCOSE BLD-MCNC: 211 MG/DL (ref 65–99)
GLUCOSE BLD-MCNC: 265 MG/DL (ref 65–99)
GLUCOSE SERPL-MCNC: 312 MG/DL (ref 65–99)
HCT VFR BLD AUTO: 25.6 % (ref 37–47)
HGB BLD-MCNC: 8.8 G/DL (ref 12–16)
IMM GRANULOCYTES # BLD AUTO: 0.01 K/UL (ref 0–0.11)
IMM GRANULOCYTES NFR BLD AUTO: 0.4 % (ref 0–0.9)
LYMPHOCYTES # BLD AUTO: 0.91 K/UL (ref 1–4.8)
LYMPHOCYTES NFR BLD: 36.7 % (ref 22–41)
MCH RBC QN AUTO: 32 PG (ref 27–33)
MCHC RBC AUTO-ENTMCNC: 34.4 G/DL (ref 33.6–35)
MCV RBC AUTO: 93.1 FL (ref 81.4–97.8)
MONOCYTES # BLD AUTO: 0.15 K/UL (ref 0–0.85)
MONOCYTES NFR BLD AUTO: 6 % (ref 0–13.4)
NEUTROPHILS # BLD AUTO: 1.29 K/UL (ref 2–7.15)
NEUTROPHILS NFR BLD: 52.1 % (ref 44–72)
NRBC # BLD AUTO: 0 K/UL
NRBC BLD AUTO-RTO: 0 /100 WBC
PLATELET # BLD AUTO: 111 K/UL (ref 164–446)
PMV BLD AUTO: 12.7 FL (ref 9–12.9)
POTASSIUM SERPL-SCNC: 4.9 MMOL/L (ref 3.6–5.5)
PROT SERPL-MCNC: 5.6 G/DL (ref 6–8.2)
RBC # BLD AUTO: 2.75 M/UL (ref 4.2–5.4)
SODIUM SERPL-SCNC: 134 MMOL/L (ref 135–145)
WBC # BLD AUTO: 2.5 K/UL (ref 4.8–10.8)

## 2017-08-18 PROCEDURE — 85025 COMPLETE CBC W/AUTO DIFF WBC: CPT | Mod: EDC

## 2017-08-18 PROCEDURE — A9270 NON-COVERED ITEM OR SERVICE: HCPCS | Mod: EDC | Performed by: FAMILY MEDICINE

## 2017-08-18 PROCEDURE — 82962 GLUCOSE BLOOD TEST: CPT | Mod: 91,EDC

## 2017-08-18 PROCEDURE — 99233 SBSQ HOSP IP/OBS HIGH 50: CPT | Performed by: INTERNAL MEDICINE

## 2017-08-18 PROCEDURE — 90935 HEMODIALYSIS ONE EVALUATION: CPT | Mod: EDC

## 2017-08-18 PROCEDURE — 700102 HCHG RX REV CODE 250 W/ 637 OVERRIDE(OP): Mod: EDC | Performed by: FAMILY MEDICINE

## 2017-08-18 PROCEDURE — 36415 COLL VENOUS BLD VENIPUNCTURE: CPT | Mod: EDC

## 2017-08-18 PROCEDURE — 80053 COMPREHEN METABOLIC PANEL: CPT | Mod: EDC

## 2017-08-18 PROCEDURE — 700111 HCHG RX REV CODE 636 W/ 250 OVERRIDE (IP): Mod: EDC

## 2017-08-18 PROCEDURE — 770004 HCHG ROOM/CARE - ONCOLOGY PRIVATE *: Mod: EDC

## 2017-08-18 RX ADMIN — CARVEDILOL 12.5 MG: 12.5 TABLET, FILM COATED ORAL at 16:55

## 2017-08-18 RX ADMIN — AMLODIPINE BESYLATE 10 MG: 10 TABLET ORAL at 11:27

## 2017-08-18 RX ADMIN — HYDROCODONE BITARTRATE AND ACETAMINOPHEN 1 TABLET: 5; 325 TABLET ORAL at 21:54

## 2017-08-18 RX ADMIN — BRIMONIDINE TARTRATE AND TIMOLOL MALEATE 1 DROP: 2; 5 SOLUTION OPHTHALMIC at 22:07

## 2017-08-18 RX ADMIN — INSULIN LISPRO 3 UNITS: 100 INJECTION, SOLUTION INTRAVENOUS; SUBCUTANEOUS at 16:59

## 2017-08-18 RX ADMIN — PREGABALIN 50 MG: 25 CAPSULE ORAL at 11:29

## 2017-08-18 RX ADMIN — BRIMONIDINE TARTRATE AND TIMOLOL MALEATE 1 DROP: 2; 5 SOLUTION OPHTHALMIC at 09:00

## 2017-08-18 RX ADMIN — ERYTHROPOIETIN 10000 UNITS: 10000 INJECTION, SOLUTION INTRAVENOUS; SUBCUTANEOUS at 09:55

## 2017-08-18 RX ADMIN — INSULIN LISPRO 2 UNITS: 100 INJECTION, SOLUTION INTRAVENOUS; SUBCUTANEOUS at 22:00

## 2017-08-18 RX ADMIN — INSULIN LISPRO 2 UNITS: 100 INJECTION, SOLUTION INTRAVENOUS; SUBCUTANEOUS at 13:01

## 2017-08-18 RX ADMIN — HYDROCODONE BITARTRATE AND ACETAMINOPHEN 1 TABLET: 5; 325 TABLET ORAL at 16:54

## 2017-08-18 RX ADMIN — LATANOPROST 1 DROP: 50 SOLUTION OPHTHALMIC at 21:56

## 2017-08-18 RX ADMIN — PREGABALIN 50 MG: 25 CAPSULE ORAL at 21:52

## 2017-08-18 RX ADMIN — CARVEDILOL 12.5 MG: 12.5 TABLET, FILM COATED ORAL at 11:28

## 2017-08-18 ASSESSMENT — ENCOUNTER SYMPTOMS
VOMITING: 0
SPEECH CHANGE: 0
SHORTNESS OF BREATH: 0
BLURRED VISION: 1
CONSTIPATION: 0
NAUSEA: 0
PHOTOPHOBIA: 0
GASTROINTESTINAL NEGATIVE: 1
FEVER: 0
DEPRESSION: 0
CHILLS: 0
WEAKNESS: 1
MYALGIAS: 0
EYES NEGATIVE: 1
MUSCULOSKELETAL NEGATIVE: 1
SHORTNESS OF BREATH: 1
ABDOMINAL PAIN: 0
DIZZINESS: 1
HEARTBURN: 0
NERVOUS/ANXIOUS: 0
INSOMNIA: 0
CARDIOVASCULAR NEGATIVE: 1
COUGH: 0
HEADACHES: 0
CLAUDICATION: 0
DIARRHEA: 0
SENSORY CHANGE: 0

## 2017-08-18 ASSESSMENT — PAIN SCALES - GENERAL
PAINLEVEL_OUTOF10: 3
PAINLEVEL_OUTOF10: 7

## 2017-08-18 NOTE — CARE PLAN
Problem: Communication  Goal: The ability to communicate needs accurately and effectively will improve  Patient able to communicate needs effectively. All needs met at this time. Will continue to monitor.

## 2017-08-18 NOTE — CARE PLAN
Problem: Safety  Goal: Will remain free from falls  Intervention: Implement fall precautions  Bed alarm active, treaded socks in place, pt educated to use call light for assistance, call light within reach. Hourly rounding in place.      Problem: Pain Management  Goal: Pain level will decrease to patient’s comfort goal  Intervention: Follow pain managment plan developed in collaboration with patient and Interdisciplinary Team  The patient has been medicated for pain with PO scheduled and prn pain medication. The patient has also been repositioned and extra pillows are in place for comfort. The patient uses a 0 to 10 pain reporting scale and 2 hour pain reassessments have been performed.

## 2017-08-18 NOTE — PROGRESS NOTES
Rounded on patient and gave her an appointment card for upcoming f/u with Dr. Avila on 8/24.  He wants to discuss POC; patient states his office RN told her he is thinking of a break from IV treatment and perhaps changing medication.  Educated that he may want a bone marrow biopsy done to see what is happening with her MDS in regard to response to treatment.  She is still complaining of dizziness with standing, will be seeing PT for assessment.  Emotional support provided. Vielka has my contact information and knows to call me if she needs any help.  Continue to follow.

## 2017-08-18 NOTE — PROGRESS NOTES
"Report received from NOC RN and assumed care. Patient is A&O x4, resting in bed.  Patient is legally blind, and \"states her vision is blurry at times\". Bed alarm on and in place. Will continue to monitor. 2L nasal canula.     Patient off to dialysis at 0730, back at 1130.    Patient reports 7/10 pain in BL LE, declines medication. Patient denies nausea. Patient denies dizziness at this time. Continue to monitor.     AV fistula in left arm; bruit present. No IV access, MD aware.     POC discussed. All needs met at this time. Call light within reach. Bed locked, in lowest position. Hourly rounding in place.     "

## 2017-08-18 NOTE — CARE PLAN
Problem: Safety  Goal: Will remain free from falls  Fall precautions in place. Patient is legally blind. Bed alarm on and in place. Hourly rounding in place.

## 2017-08-18 NOTE — PROGRESS NOTES
Pt back frm dialysis, pt is a&o x4, denies pain and no distress noted.  Dizziness still present today with activity and at rest.  No other complaints otherwise.  Pt is eating well, up in chair for meals.  Updated with plan of care, call light in reach and encourage to call for assistance.

## 2017-08-18 NOTE — PROGRESS NOTES
Luzma from Lab called with critical result of WBC of 2.5 at 0028. Critical lab result read back to Luzma.   Dr. Nyla Garcias notified of critical lab result at 0046.  Critical lab result read back by Dr. Garcias.

## 2017-08-18 NOTE — PROGRESS NOTES
Assumed care of the pt at 1915. Received report from the day shift RN. Pt is AOx4 with complaints of 8/10 leg pain. Pt medicated with PO Hydrocodone/APAP. See MAR for details. Pt updated on the POC of care for the shift and overall treatment plan. All question have been answered at this time. Pt is up +1 with FWW to the bathroom, bed alarm active as the patient does not always call for assistance. No IV, MD is aware. Call light within reach, hourly rounding in place.

## 2017-08-18 NOTE — PROGRESS NOTES
[facility] -  PROGRESS NOTE               Author: Chao Perez M.D. Date & Time: 8/18/2017  11:32 AM     HPI:  62yoF with PMH significant for ESRD on HD MWF via L arm AVF, HTN, DM II, Atrial Fibrillation, PVD, COPD, Anemia secondary to Myelodysplastic Syndrome, h/o CVA, COPD on home O2 admitted with complaints of nausea, vomiting and diarrhea for the past. 5 days with abdominal pain and fever and chills. Patient has myelodysplastic syndrome, has been on chemotherapy with Vidaza for 5 days which was completed yesterday. Nephrology was consulted to assisst with providing HD while inpatient.        DAILY NEPHROLOGY SUMMARY:  8/13/17 - Consult done  8/14/17 - NAEO, SOD, Qb400, VSS  8/15/17 - NAEO, doing well, feels good, Hgb 10.6 post transfusion yesterday  8/16/17 - NAEO, MRI with gadolinium today by primary team  8/17/17 - Sitting up in chair, feeling better today.  Still feels weak.  Getting HD x 3 days in a row  8/18/17 - NAEO, SOD, Qb350, 3L UF goal, feels good      Review of Systems   Constitutional: Positive for malaise/fatigue.   HENT: Negative.    Eyes: Negative.    Respiratory: Positive for shortness of breath.    Cardiovascular: Negative.    Gastrointestinal: Negative.    Musculoskeletal: Negative.    Skin: Negative.    Neurological: Positive for weakness.   Endo/Heme/Allergies: Negative.    All other systems reviewed and are negative.        Physical Exam   Constitutional: She is oriented to person, place, and time. She appears well-developed and well-nourished. She appears ill.   HENT:   Head: Normocephalic and atraumatic.   Eyes: Conjunctivae are normal. No scleral icterus.   Neck: Neck supple. No tracheal deviation present.   Cardiovascular: Normal rate and regular rhythm.    Pulmonary/Chest: Effort normal and breath sounds normal.   Abdominal: Soft. Bowel sounds are normal.   Musculoskeletal: She exhibits no edema or tenderness.   Neurological: She is alert and oriented to  person, place, and time.   Skin: Skin is warm and dry.         PAST FAMILY HISTORY: Reviewed and Unchanged  SOCIAL HISTORY: Reviewed and Unchanged  CURRENT MEDICATIONS: Reviewed  LABORATORY RESULTS: Reviewed  IMAGING STUDIES: Reviewed      Fluids:         IMPRESSION:  - ESRD    * Etiology likely 2/2 HTN/DM    * HD today   - Acute on chronic anemia    * VANCE with HD    * Transfuse prn for Hgb < 7.0  - Nausea/Vomiting, resolved   - HTN, controlled   - type 2 DM    * per primary team   - Renal Osteodystrophy  - HLD  - CAD  - COPD    ASSESSMENT:  - GFR: HD dependent  - Urine: oligoanuric  - BP: Goal < 140/90  - Volume: euvolemic  - Acid/Base: no sig. acid base disturbance  - Electrolytes: stable  - Anemia: Goal Hgb 10-11  - MBD: Ca normal, PO4 elevated  - HD Access: LAVF (+thrill/bruit)    PLAN:  - MWF iHD  - iHD x 3 post gadolinium completed today  - UF as tolerated  - VANCE with iHD, epogen 10K units qRx  - Antiemetics as needed  - Dose all meds per ESRD guidelines

## 2017-08-18 NOTE — PROGRESS NOTES
Hemodialysis ordered by dr. Perez. Treatment started at 1159 and ended at 1510 d/t clotting dialyzer and lines. blood rinsed back. New setting placed. Tx restarted. Pt stable, vss, no c/o post tx. See flow sheets for details. Net  ml. Report to TRENT Woods RN. Lt ua avf dressing clean, dry, intact.

## 2017-08-19 LAB
BASOPHILS # BLD AUTO: 0.4 % (ref 0–1.8)
BASOPHILS # BLD: 0.01 K/UL (ref 0–0.12)
EOSINOPHIL # BLD AUTO: 0.14 K/UL (ref 0–0.51)
EOSINOPHIL NFR BLD: 5.9 % (ref 0–6.9)
ERYTHROCYTE [DISTWIDTH] IN BLOOD BY AUTOMATED COUNT: 48.3 FL (ref 35.9–50)
GLUCOSE BLD-MCNC: 170 MG/DL (ref 65–99)
GLUCOSE BLD-MCNC: 304 MG/DL (ref 65–99)
HCT VFR BLD AUTO: 24.5 % (ref 37–47)
HGB BLD-MCNC: 8.6 G/DL (ref 12–16)
IMM GRANULOCYTES # BLD AUTO: 0.01 K/UL (ref 0–0.11)
IMM GRANULOCYTES NFR BLD AUTO: 0.4 % (ref 0–0.9)
LYMPHOCYTES # BLD AUTO: 1.24 K/UL (ref 1–4.8)
LYMPHOCYTES NFR BLD: 52.1 % (ref 22–41)
MCH RBC QN AUTO: 32 PG (ref 27–33)
MCHC RBC AUTO-ENTMCNC: 35.1 G/DL (ref 33.6–35)
MCV RBC AUTO: 91.1 FL (ref 81.4–97.8)
MONOCYTES # BLD AUTO: 0.15 K/UL (ref 0–0.85)
MONOCYTES NFR BLD AUTO: 6.3 % (ref 0–13.4)
NEUTROPHILS # BLD AUTO: 0.83 K/UL (ref 2–7.15)
NEUTROPHILS NFR BLD: 34.9 % (ref 44–72)
NRBC # BLD AUTO: 0 K/UL
NRBC BLD AUTO-RTO: 0 /100 WBC
PLATELET # BLD AUTO: 96 K/UL (ref 164–446)
PMV BLD AUTO: 13.3 FL (ref 9–12.9)
RBC # BLD AUTO: 2.69 M/UL (ref 4.2–5.4)
WBC # BLD AUTO: 2.4 K/UL (ref 4.8–10.8)

## 2017-08-19 PROCEDURE — 99233 SBSQ HOSP IP/OBS HIGH 50: CPT | Performed by: INTERNAL MEDICINE

## 2017-08-19 PROCEDURE — 700102 HCHG RX REV CODE 250 W/ 637 OVERRIDE(OP): Mod: EDC | Performed by: INTERNAL MEDICINE

## 2017-08-19 PROCEDURE — 770004 HCHG ROOM/CARE - ONCOLOGY PRIVATE *: Mod: EDC

## 2017-08-19 PROCEDURE — A9270 NON-COVERED ITEM OR SERVICE: HCPCS | Mod: EDC | Performed by: FAMILY MEDICINE

## 2017-08-19 PROCEDURE — 85025 COMPLETE CBC W/AUTO DIFF WBC: CPT | Mod: EDC

## 2017-08-19 PROCEDURE — 36415 COLL VENOUS BLD VENIPUNCTURE: CPT | Mod: EDC

## 2017-08-19 PROCEDURE — 700102 HCHG RX REV CODE 250 W/ 637 OVERRIDE(OP): Mod: EDC | Performed by: FAMILY MEDICINE

## 2017-08-19 PROCEDURE — A9270 NON-COVERED ITEM OR SERVICE: HCPCS | Mod: EDC | Performed by: INTERNAL MEDICINE

## 2017-08-19 PROCEDURE — 82962 GLUCOSE BLOOD TEST: CPT | Mod: 91,EDC

## 2017-08-19 RX ORDER — MECLIZINE HYDROCHLORIDE 25 MG/1
25 TABLET ORAL EVERY 8 HOURS
Status: DISCONTINUED | OUTPATIENT
Start: 2017-08-19 | End: 2017-08-22 | Stop reason: HOSPADM

## 2017-08-19 RX ADMIN — INSULIN LISPRO 4 UNITS: 100 INJECTION, SOLUTION INTRAVENOUS; SUBCUTANEOUS at 22:49

## 2017-08-19 RX ADMIN — AMLODIPINE BESYLATE 10 MG: 10 TABLET ORAL at 09:01

## 2017-08-19 RX ADMIN — INSULIN LISPRO 1 UNITS: 100 INJECTION, SOLUTION INTRAVENOUS; SUBCUTANEOUS at 09:13

## 2017-08-19 RX ADMIN — BRIMONIDINE TARTRATE AND TIMOLOL MALEATE 1 DROP: 2; 5 SOLUTION OPHTHALMIC at 09:00

## 2017-08-19 RX ADMIN — HYDROCODONE BITARTRATE AND ACETAMINOPHEN 1 TABLET: 5; 325 TABLET ORAL at 12:12

## 2017-08-19 RX ADMIN — INSULIN LISPRO 3 UNITS: 100 INJECTION, SOLUTION INTRAVENOUS; SUBCUTANEOUS at 16:50

## 2017-08-19 RX ADMIN — MECLIZINE HYDROCHLORIDE 25 MG: 25 TABLET ORAL at 15:55

## 2017-08-19 RX ADMIN — CARVEDILOL 12.5 MG: 12.5 TABLET, FILM COATED ORAL at 09:01

## 2017-08-19 RX ADMIN — PREGABALIN 50 MG: 25 CAPSULE ORAL at 22:40

## 2017-08-19 RX ADMIN — CARVEDILOL 12.5 MG: 12.5 TABLET, FILM COATED ORAL at 17:00

## 2017-08-19 RX ADMIN — MECLIZINE HYDROCHLORIDE 25 MG: 25 TABLET ORAL at 22:40

## 2017-08-19 RX ADMIN — LATANOPROST 1 DROP: 50 SOLUTION OPHTHALMIC at 22:42

## 2017-08-19 RX ADMIN — PREGABALIN 50 MG: 25 CAPSULE ORAL at 09:01

## 2017-08-19 RX ADMIN — HYDROCODONE BITARTRATE AND ACETAMINOPHEN 1 TABLET: 5; 325 TABLET ORAL at 22:40

## 2017-08-19 RX ADMIN — BRIMONIDINE TARTRATE AND TIMOLOL MALEATE 1 DROP: 2; 5 SOLUTION OPHTHALMIC at 22:53

## 2017-08-19 ASSESSMENT — ENCOUNTER SYMPTOMS
WEAKNESS: 1
VOMITING: 0
DIZZINESS: 1
EYES NEGATIVE: 1
SHORTNESS OF BREATH: 0
DEPRESSION: 0
BLURRED VISION: 1
HEADACHES: 0
CONSTIPATION: 0
NERVOUS/ANXIOUS: 0
FEVER: 0
PHOTOPHOBIA: 0
COUGH: 0
MUSCULOSKELETAL NEGATIVE: 1
CARDIOVASCULAR NEGATIVE: 1
DIARRHEA: 0
CHILLS: 0
SENSORY CHANGE: 0
HEARTBURN: 0
NAUSEA: 0
SPEECH CHANGE: 0
ABDOMINAL PAIN: 0
INSOMNIA: 0
CLAUDICATION: 0
MYALGIAS: 0
SHORTNESS OF BREATH: 1
GASTROINTESTINAL NEGATIVE: 1

## 2017-08-19 ASSESSMENT — PAIN SCALES - GENERAL
PAINLEVEL_OUTOF10: 0
PAINLEVEL_OUTOF10: 0
PAINLEVEL_OUTOF10: 8
PAINLEVEL_OUTOF10: 6

## 2017-08-19 NOTE — PROGRESS NOTES
"[facility] -  PROGRESS NOTE               Author: Chato Alberts D.O. Date & Time: 8/19/2017  10:02 AM     HPI:  62yoF with PMH significant for ESRD on HD MWF via L arm AVF, HTN, DM II, Atrial Fibrillation, PVD, COPD, Anemia secondary to Myelodysplastic Syndrome, h/o CVA, COPD on home O2 admitted with complaints of nausea, vomiting and diarrhea for the past. 5 days with abdominal pain and fever and chills. Patient has myelodysplastic syndrome, has been on chemotherapy with Vidaza for 5 days which was completed yesterday. Nephrology was consulted to assisst with providing HD while inpatient.        DAILY NEPHROLOGY SUMMARY:   8/13/17 - Consult done   8/14/17 - NAEO, SOD, Qb400, VSS   8/15/17 - NAEO, doing well, feels good, Hgb 10.6 post transfusion yesterday   8/16/17 - NAEO, MRI with gadolinium today by primary team   8/17/17 - Sitting up in chair, feeling better today.  Still feels weak.  Getting HD x 3 days in a row   8/18/17 - NAEO, SOD, Qb350, 3L UF goal, feels good   8/19/17 - Feeling \"much better\".  Still weak.  Can't walk on her own.  S/p 3 daily HD treatments after LASHANDA exposure.  No edema.       Review of Systems   Constitutional: Positive for malaise/fatigue.   HENT: Negative.    Eyes: Negative.    Respiratory: Positive for shortness of breath.    Cardiovascular: Negative.    Gastrointestinal: Negative.    Musculoskeletal: Negative.    Skin: Negative.    Neurological: Positive for weakness.   Endo/Heme/Allergies: Negative.    All other systems reviewed and are negative.        Physical Exam   Constitutional: She is oriented to person, place, and time. She appears well-developed and well-nourished. She appears ill.   HENT:   Head: Normocephalic and atraumatic.   Eyes: Conjunctivae are normal. No scleral icterus.   Neck: Neck supple. No tracheal deviation present.   Cardiovascular: Normal rate and regular rhythm.    Pulmonary/Chest: Effort normal and breath sounds normal. "   Abdominal: Soft. Bowel sounds are normal.   Musculoskeletal: She exhibits no edema or tenderness.   Neurological: She is alert and oriented to person, place, and time.   Skin: Skin is warm and dry.         PAST FAMILY HISTORY: Reviewed and Unchanged  SOCIAL HISTORY: Reviewed and Unchanged  CURRENT MEDICATIONS: Reviewed  LABORATORY RESULTS: Reviewed  IMAGING STUDIES: Reviewed      Fluids:    Date 08/19/17 0700 - 08/20/17 0659   Shift 8148-5632 [id number] 1149-2191 24 Hour Total   I  N  T  A  K  E   Shift Total       O  U  T  P  U  T   Urine 200   200      Void (ml) 200   200    Shift Total 200   200   NET -200   -200         IMPRESSION:  - ESRD    * Etiology likely 2/2 HTN/DM    * No need for HD today  (SAT)    * Maintenance dialysis qMWF and PRN  - Acute on chronic anemia    * VANCE with HD    * Transfuse prn for Hgb < 7.0  - Nausea/Vomiting, resolved   - HTN, controlled   - type 2 DM    * per primary team   - Renal Osteodystrophy  - HLD  - CAD  - COPD    ASSESSMENT:  - GFR: HD dependent  - Urine: oligoanuric  - BP: Goal < 140/90  - Volume: euvolemic  - Acid/Base: no sig. acid base disturbance  - Electrolytes: stable  - Anemia: Goal Hgb 10-11  - MBD: Ca normal, PO4 elevated  - HD Access: LAVF (+thrill/bruit)    PLAN:  - No need for dialysis today (SAT)  - Maintenance dialysis qMWF and PRN  - Completed 3 daily dialysis treatments following LASHANDA exposure  - No dietary protein restrictions  - UF as tolerated  - VANCE with iHD, epogen 10K units qRx  - Antiemetics as needed  - Dose all meds per ESRD guidelines  - Will follow    Thank you,

## 2017-08-19 NOTE — CARE PLAN
Problem: Safety  Goal: Will remain free from falls  Outcome: PROGRESSING AS EXPECTED  Pt with no falls this shift.    Problem: Pain Management  Goal: Pain level will decrease to patient’s comfort goal  Outcome: PROGRESSING AS EXPECTED  Pt required on 1 dose of pain meds prior to bed d/t 7/10 bilat leg pain.

## 2017-08-19 NOTE — PROGRESS NOTES
[facility] Hospitalist Progress Note    Date of Service: 8/18/2017    Chief Complaint  62 y.o. female admitted 8/12/2017 with intractable nausea and vomiting status post chemotherapy for myelodysplastic syndrome.    Interval Problem Update  8/15. Patient states that her nausea and vomiting has improved in her energy level has improved however she continues to have dizziness. MRI with contrast ordered for further evaluation of any intercerebral pathology. This will be coordinated tomorrow in conjunction with her hemodialysis. Otherwise patient without any other complaints.  8/16. Patient states she feels about the same today. MRI went well and discuss the results showing no intracranial pathology. Possible light up of one of the cervical vertebrae consistent with myelodysplastic syndrome. However this finding would not explain her dizziness. Will have physical and occupational therapy continue to work with the patient.  Patient also had hemodialysis today without issue.  8/17 patient states she feels about the same today. She did get up with physical and occupational therapy and had increase in dizziness but even with resting had persistent dizziness. There is question as to whether orthostasis could be playing a role in dizziness however if this were the sole cause of it should relieve with resting. Unfortunately he does not at this time. No evidence of source on MRI. She is having worsening vision and worsening glaucoma and her site could certainly be contributing but she states she feels dizzy when the room is dark and her eyes are closed. At this time it is not obvious as to what the source of her dizziness is. She is not significantly anemic for her, she does not have sign of infection, there is no intercerebral pathology identified on MRI.  If this were related to her most recent chemotherapy, likely should have resolved by now.  8/18. Patient states she feels complaints today. She had a normal hemodialysis today  without complication. Patient still has persistent dizziness no matter what her blood pressure or heart rate is. I'll discuss further with oncology as possible bone marrow biopsy is needed to see if there is any progression of the mild dysplastic syndrome while patients are in the hospital. Patient has no new acute complaints.    Consultants/Specialty  [id number]    Disposition  Home when appropriate        Review of Systems   Constitutional: Positive for malaise/fatigue (Improving). Negative for fever and chills.   HENT: Negative for congestion.    Eyes: Positive for blurred vision. Negative for photophobia.   Respiratory: Negative for cough and shortness of breath.    Cardiovascular: Negative for chest pain, claudication and leg swelling.   Gastrointestinal: Negative for heartburn, nausea, vomiting, abdominal pain, diarrhea and constipation.   Genitourinary: Negative for dysuria and hematuria.   Musculoskeletal: Negative for myalgias and joint pain.   Skin: Negative for itching and rash.   Neurological: Positive for dizziness and weakness. Negative for sensory change, speech change and headaches.   Psychiatric/Behavioral: Negative for depression. The patient is not nervous/anxious and does not have insomnia.       Physical Exam  Laboratory/Imaging   Hemodynamics  Temp (24hrs), Av.3 °C (97.3 °F), Min:36.2 °C (97.2 °F), Max:36.3 °C (97.4 °F)   Temperature: 36.3 °C (97.3 °F)  Pulse  Av.8  Min: 63  Max: 100    Blood Pressure: 111/64 mmHg      Respiratory      Respiration: 18, Pulse Oximetry: 100 %             Fluids    Intake/Output Summary (Last 24 hours) at 17 1901  Last data filed at 17 1124   Gross per 24 hour   Intake     10 ml   Output      0 ml   Net     10 ml       Nutrition  Orders Placed This Encounter   Procedures   • Diet Order     Standing Status: Standing      Number of Occurrences: 1      Standing Expiration Date:      Order Specific Question:  Diet:     Answer:  Renal  [8]     Order Specific Question:  Diet:     Answer:  Diabetic [3]     Physical Exam   Constitutional: She is oriented to person, place, and time. She appears well-developed and well-nourished. No distress.   HENT:   Head: Normocephalic and atraumatic.   Eyes: Conjunctivae are normal. No scleral icterus.   Patient squinting as I enter the room, states her vision is blurry at baseline.   Neck: Neck supple. No JVD present.   Cardiovascular: Normal rate, regular rhythm and normal heart sounds.  Exam reveals no gallop and no friction rub.    No murmur heard.  Pulmonary/Chest: Effort normal and breath sounds normal. No respiratory distress. She exhibits no tenderness.   Abdominal: Soft. Bowel sounds are normal. She exhibits no distension. There is no guarding.   Musculoskeletal: She exhibits no edema or tenderness.   Neurological: She is alert and oriented to person, place, and time. No cranial nerve deficit.   Skin: Skin is warm and dry. She is not diaphoretic. No erythema. No pallor.   Psychiatric: She has a normal mood and affect. Her behavior is normal.   Nursing note and vitals reviewed.      Recent Labs      08/16/17   0104 08/16/17 2347 08/18/17   0008   WBC  3.5*  3.2*  2.5*   RBC  3.09*  2.88*  2.75*   HEMOGLOBIN  9.8*  9.2*  8.8*   HEMATOCRIT  27.6*  26.1*  25.6*   MCV  89.3  90.6  93.1   MCH  31.7  31.9  32.0   MCHC  35.5*  35.2*  34.4   RDW  48.7  48.4  49.5   PLATELETCT  164  137*  111*   MPV  12.1  12.3  12.7     Recent Labs      08/16/17   0104 08/16/17 2347  08/18/17   0008   SODIUM  131*  133*  134*   POTASSIUM  4.4  4.6  4.9   CHLORIDE  96  97  99   CO2  23  27  28   GLUCOSE  180*  244*  312*   BUN  63*  36*  27*   CREATININE  6.39*  3.99*  3.51*   CALCIUM  7.6*  7.8*  7.5*                      Assessment/Plan     ESRD (end stage renal disease) on dialysis (CMS-[facility]) (present on admission)  Assessment & Plan  Pt dialysis days are MWF.   Nephrology consulted.   Additional hemodialysis today  secondary to contrast exposure with MRI and resume regular dialysis tomorrow.    Myelodysplasia (myelodysplastic syndrome) (CMS-HCC) (present on admission)  Assessment & Plan  Pt follows Dr. Fish.   Continue to follow as outpt.     Intractable nausea and vomiting (present on admission)  Assessment & Plan  Improving.  Continue supportive care with Antiemetics.   Likely secondary to recent chemotherapy  Has improved and patient able to tolerate by mouth without issue    Anemia of chronic disease (present on admission)  Assessment & Plan  Pt hgb generally at 8.0  Hemoglobin 9.2  Low hemoglobin compounded with chemotherapy for myelodysplastic syndrome       Paroxysmal atrial fibrillation (CMS-HCC) (present on admission)  Assessment & Plan  Continue Coreg, not on anticoagulation secondary to MDS    Diastolic dysfunction (present on admission)  Assessment & Plan  Stable. No exacerbation.     Glaucoma  Assessment & Plan  Patient states she normally has poor blurred vision secondary to her glaucoma  This could be contributing to her symptom of dizziness.  Ordered her combigan gtts that she has at bedside      Dizziness  Assessment & Plan  Patient states that this is a normal symptom for her when her hemoglobin is low  She is continuing to have the symptoms however even after transfusion and without activity. MRI of the brain with contrast without intracranial abnormality seen.  Patient does state that her blurred vision is getting worse and she saw her ophthalmologist last week and he adjusted her glaucoma medicine. She hadn't received her combigan since admission, it is at bedside, I've placed nursing communication that she should receive this medication.      HTN (hypertension) (present on admission)  Assessment & Plan  Stable. Continue home medications.     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 Continue oxygen support.     Diabetic neuropathy (CMS-HCC) (present on admission)  Assessment & Plan  Pain under control. Continue home medications.     Vitamin D deficiency (present on admission)  Assessment & Plan  Stable. Continue replacement.       Medications reviewed, Radiology images reviewed and Labs reviewed  Bauer catheter: No Bauer      DVT Prophylaxis: Contraindicated - High bleeding risk  DVT prophylaxis - mechanical: SCDs

## 2017-08-19 NOTE — PROGRESS NOTES
Hemodialysis ordered by Dr. Perez. Treatment started at 0742 and ended at 1042. Pt stable, vss, no c/o post tx. See flow sheets for details. Net UF 2.0 liters. Report to DEVORAH Zhong RN. Lt ua avf dressing clean, dry, intact.

## 2017-08-19 NOTE — DIETARY
Nutrition note:  MD order for Nepro supplement.  [facility] does not carry this product.  Pt is on a renal diabetic diet.  Appropriate equivalent is Boost Glucose Control.  Will add to meals.

## 2017-08-19 NOTE — PROGRESS NOTES
Received bedside report from day RN and assumed pt's care at 1845. Pt A&Ox4 and VSS with some mild hypotension overnight. Pt reporting no dizziness/lightheadedness with hypotension. Pt c/o 7/10 pain overnight. x1 dose of Norco given. I&O adequate but no BM overnight. No c/o n/v/d. Please see MAR and flowsheets for further documentation.

## 2017-08-20 LAB
ALBUMIN SERPL BCP-MCNC: 3 G/DL (ref 3.2–4.9)
ANISOCYTOSIS BLD QL SMEAR: ABNORMAL
BASOPHILS # BLD AUTO: 0.9 % (ref 0–1.8)
BASOPHILS # BLD: 0.01 K/UL (ref 0–0.12)
BUN SERPL-MCNC: 56 MG/DL (ref 8–22)
CALCIUM SERPL-MCNC: 7.7 MG/DL (ref 8.5–10.5)
CHLORIDE SERPL-SCNC: 97 MMOL/L (ref 96–112)
CO2 SERPL-SCNC: 24 MMOL/L (ref 20–33)
CREAT SERPL-MCNC: 5.24 MG/DL (ref 0.5–1.4)
EOSINOPHIL # BLD AUTO: 0.07 K/UL (ref 0–0.51)
EOSINOPHIL NFR BLD: 4.4 % (ref 0–6.9)
ERYTHROCYTE [DISTWIDTH] IN BLOOD BY AUTOMATED COUNT: 49.1 FL (ref 35.9–50)
GFR SERPL CREATININE-BSD FRML MDRD: 8 ML/MIN/1.73 M 2
GIANT PLATELETS BLD QL SMEAR: NORMAL
GLUCOSE BLD-MCNC: 140 MG/DL (ref 65–99)
GLUCOSE BLD-MCNC: 174 MG/DL (ref 65–99)
GLUCOSE BLD-MCNC: 188 MG/DL (ref 65–99)
GLUCOSE BLD-MCNC: 189 MG/DL (ref 65–99)
GLUCOSE BLD-MCNC: 287 MG/DL (ref 65–99)
GLUCOSE SERPL-MCNC: 303 MG/DL (ref 65–99)
HCT VFR BLD AUTO: 24.7 % (ref 37–47)
HGB BLD-MCNC: 8.2 G/DL (ref 12–16)
HYPOCHROMIA BLD QL SMEAR: ABNORMAL
LYMPHOCYTES # BLD AUTO: 0.39 K/UL (ref 1–4.8)
LYMPHOCYTES NFR BLD: 24.3 % (ref 22–41)
MACROCYTES BLD QL SMEAR: ABNORMAL
MANUAL DIFF BLD: NORMAL
MCH RBC QN AUTO: 30.8 PG (ref 27–33)
MCHC RBC AUTO-ENTMCNC: 33.2 G/DL (ref 33.6–35)
MCV RBC AUTO: 92.9 FL (ref 81.4–97.8)
MICROCYTES BLD QL SMEAR: ABNORMAL
MONOCYTES # BLD AUTO: 0.08 K/UL (ref 0–0.85)
MONOCYTES NFR BLD AUTO: 5.2 % (ref 0–13.4)
MORPHOLOGY BLD-IMP: NORMAL
NEUTROPHILS # BLD AUTO: 1.04 K/UL (ref 2–7.15)
NEUTROPHILS NFR BLD: 61.7 % (ref 44–72)
NEUTS BAND NFR BLD MANUAL: 3.5 % (ref 0–10)
NRBC # BLD AUTO: 0 K/UL
NRBC BLD AUTO-RTO: 0 /100 WBC
PHOSPHATE SERPL-MCNC: 6 MG/DL (ref 2.5–4.5)
PLATELET # BLD AUTO: 85 K/UL (ref 164–446)
PLATELET BLD QL SMEAR: NORMAL
PMV BLD AUTO: 13.1 FL (ref 9–12.9)
POTASSIUM SERPL-SCNC: 5 MMOL/L (ref 3.6–5.5)
RBC # BLD AUTO: 2.66 M/UL (ref 4.2–5.4)
RBC BLD AUTO: PRESENT
SODIUM SERPL-SCNC: 131 MMOL/L (ref 135–145)
WBC # BLD AUTO: 1.6 K/UL (ref 4.8–10.8)

## 2017-08-20 PROCEDURE — 85027 COMPLETE CBC AUTOMATED: CPT | Mod: EDC

## 2017-08-20 PROCEDURE — 85007 BL SMEAR W/DIFF WBC COUNT: CPT | Mod: EDC

## 2017-08-20 PROCEDURE — 700102 HCHG RX REV CODE 250 W/ 637 OVERRIDE(OP): Mod: EDC | Performed by: INTERNAL MEDICINE

## 2017-08-20 PROCEDURE — 36415 COLL VENOUS BLD VENIPUNCTURE: CPT | Mod: EDC

## 2017-08-20 PROCEDURE — A9270 NON-COVERED ITEM OR SERVICE: HCPCS | Mod: EDC | Performed by: INTERNAL MEDICINE

## 2017-08-20 PROCEDURE — 770004 HCHG ROOM/CARE - ONCOLOGY PRIVATE *: Mod: EDC

## 2017-08-20 PROCEDURE — 700102 HCHG RX REV CODE 250 W/ 637 OVERRIDE(OP): Mod: EDC | Performed by: FAMILY MEDICINE

## 2017-08-20 PROCEDURE — 82962 GLUCOSE BLOOD TEST: CPT | Mod: 91,EDC

## 2017-08-20 PROCEDURE — 99232 SBSQ HOSP IP/OBS MODERATE 35: CPT | Performed by: HOSPITALIST

## 2017-08-20 PROCEDURE — A9270 NON-COVERED ITEM OR SERVICE: HCPCS | Mod: EDC | Performed by: FAMILY MEDICINE

## 2017-08-20 PROCEDURE — 80069 RENAL FUNCTION PANEL: CPT | Mod: EDC

## 2017-08-20 RX ADMIN — INSULIN LISPRO 1 UNITS: 100 INJECTION, SOLUTION INTRAVENOUS; SUBCUTANEOUS at 18:06

## 2017-08-20 RX ADMIN — LATANOPROST 1 DROP: 50 SOLUTION OPHTHALMIC at 21:41

## 2017-08-20 RX ADMIN — BRIMONIDINE TARTRATE AND TIMOLOL MALEATE 1 DROP: 2; 5 SOLUTION OPHTHALMIC at 21:00

## 2017-08-20 RX ADMIN — PREGABALIN 50 MG: 25 CAPSULE ORAL at 10:22

## 2017-08-20 RX ADMIN — MECLIZINE HYDROCHLORIDE 25 MG: 25 TABLET ORAL at 21:40

## 2017-08-20 RX ADMIN — INSULIN LISPRO 1 UNITS: 100 INJECTION, SOLUTION INTRAVENOUS; SUBCUTANEOUS at 09:27

## 2017-08-20 RX ADMIN — INSULIN LISPRO 1 UNITS: 100 INJECTION, SOLUTION INTRAVENOUS; SUBCUTANEOUS at 21:43

## 2017-08-20 RX ADMIN — MECLIZINE HYDROCHLORIDE 25 MG: 25 TABLET ORAL at 13:54

## 2017-08-20 RX ADMIN — PREGABALIN 50 MG: 25 CAPSULE ORAL at 21:40

## 2017-08-20 RX ADMIN — BRIMONIDINE TARTRATE AND TIMOLOL MALEATE 1 DROP: 2; 5 SOLUTION OPHTHALMIC at 06:52

## 2017-08-20 RX ADMIN — MECLIZINE HYDROCHLORIDE 25 MG: 25 TABLET ORAL at 06:50

## 2017-08-20 RX ADMIN — CARVEDILOL 12.5 MG: 12.5 TABLET, FILM COATED ORAL at 18:05

## 2017-08-20 ASSESSMENT — ENCOUNTER SYMPTOMS
CARDIOVASCULAR NEGATIVE: 1
MYALGIAS: 0
DIARRHEA: 0
INSOMNIA: 0
SHORTNESS OF BREATH: 0
HEADACHES: 0
NERVOUS/ANXIOUS: 0
HEARTBURN: 0
BLURRED VISION: 1
DEPRESSION: 0
DIZZINESS: 1
ABDOMINAL PAIN: 0
GASTROINTESTINAL NEGATIVE: 1
MUSCULOSKELETAL NEGATIVE: 1
EYES NEGATIVE: 1
CLAUDICATION: 0
PHOTOPHOBIA: 0
FEVER: 0
NAUSEA: 0
VOMITING: 0
WEAKNESS: 1
SENSORY CHANGE: 0
SPEECH CHANGE: 0
COUGH: 0
SHORTNESS OF BREATH: 1
CONSTIPATION: 0
CHILLS: 0

## 2017-08-20 ASSESSMENT — PAIN SCALES - GENERAL: PAINLEVEL_OUTOF10: 0

## 2017-08-20 NOTE — PROGRESS NOTES
"[facility] -  PROGRESS NOTE               Author: Chato Alberts D.O. Date & Time: 8/20/2017  10:01 AM     HPI:  62yoF with PMH significant for ESRD on HD MWF via L arm AVF, HTN, DM II, Atrial Fibrillation, PVD, COPD, Anemia secondary to Myelodysplastic Syndrome, h/o CVA, COPD on home O2 admitted with complaints of nausea, vomiting and diarrhea for the past. 5 days with abdominal pain and fever and chills. Patient has myelodysplastic syndrome, has been on chemotherapy with Vidaza for 5 days which was completed yesterday. Nephrology was consulted to assisst with providing HD while inpatient.        DAILY NEPHROLOGY SUMMARY:   8/13/17 - Consult done   8/14/17 - NAEO, SOD, Qb400, VSS   8/15/17 - NAEO, doing well, feels good, Hgb 10.6 post transfusion yesterday   8/16/17 - NAEO, MRI with gadolinium today by primary team   8/17/17 - Sitting up in chair, feeling better today.  Still feels weak.  Getting HD x 3 days in a row   8/18/17 - NAEO, SOD, Qb350, 3L UF goal, feels good   8/19/17 - Feeling \"much better\".  Still weak.  Can't walk on her own.  S/p 3 daily HD treatments after LASHANDA exposure.  No edema.    8/20/17 - No overnight events.  No new complaints.  Still weak.  Not eating much.  No edema.  No dyspnea.       Review of Systems   Constitutional: Positive for malaise/fatigue.   HENT: Negative.    Eyes: Negative.    Respiratory: Positive for shortness of breath.    Cardiovascular: Negative.    Gastrointestinal: Negative.    Musculoskeletal: Negative.    Skin: Negative.    Neurological: Positive for weakness.   Endo/Heme/Allergies: Negative.    All other systems reviewed and are negative.        Physical Exam   Constitutional: She is oriented to person, place, and time. She appears well-developed and well-nourished. She appears ill.   HENT:   Head: Normocephalic and atraumatic.   Eyes: Conjunctivae are normal. No scleral icterus.   Neck: Neck supple. No tracheal deviation present. "   Cardiovascular: Normal rate and regular rhythm.    Pulmonary/Chest: Effort normal and breath sounds normal.   Abdominal: Soft. Bowel sounds are normal.   Musculoskeletal: She exhibits no edema or tenderness.   Neurological: She is alert and oriented to person, place, and time.   Skin: Skin is warm and dry.         PAST FAMILY HISTORY: Reviewed and Unchanged  SOCIAL HISTORY: Reviewed and Unchanged  CURRENT MEDICATIONS: Reviewed  LABORATORY RESULTS: Reviewed  IMAGING STUDIES: Reviewed      Fluids:         IMPRESSION:  - ESRD    * Etiology likely 2/2 HTN/DM    * No need for HD today  (SUN)    * Maintenance dialysis qMWF and PRN, next in AM  - Acute on chronic anemia    * VANCE with HD    * Transfuse prn for Hgb < 7.0  - Nausea/Vomiting, resolved   - HTN, controlled   - type 2 DM    * per primary team   - Renal Osteodystrophy  - HLD  - CAD  - COPD    ASSESSMENT:  - GFR: HD dependent  - Urine: oligoanuric  - BP: Goal < 140/90  - Volume: euvolemic  - Acid/Base: no sig. acid base disturbance  - Electrolytes: stable  - Anemia: Goal Hgb 10-11  - MBD: Ca normal, PO4 elevated  - HD Access: LAVF (+thrill/bruit)    PLAN:  - No need for dialysis today (SUN)  - Maintenance dialysis qMWF and PRN, next in AM  - Completed 3 daily dialysis treatments following LASHANDA exposure  - No dietary protein restrictions  - UF as tolerated  - VANCE with iHD to goal Hgb 10-11  - Antiemetics as needed  - Will follow    Thank you,

## 2017-08-20 NOTE — PROGRESS NOTES
Assumed care of pt @ 0700.  POC discussed w/ night nurse and pt, monitor blood sugars, meclizine for dizziness, dialysis MWF, call for assistance; pt in agreement w/ goals.  Pt AAOx4, VSS, c/o 0/10 pain.  Pt's skin assessed CDI.  Pt educated re: increased fall risk, use of call light, hourly rounding, and pain scale; pt verbalizes her understanding.  Personal possessions and call light w/n reach, bed in lowest position.  Bed strip alarm on, pt up 1 assist, calls appropriately.

## 2017-08-20 NOTE — PROGRESS NOTES
Received bedside report from day RN and assumed pt's care at 1845. Pt A&Ox4 and VSS with some mild hypotension overnight. Pt reporting no dizziness/lightheadedness with hypotension. Pt c/o 8/10 pain overnight. x1 dose of Norco given. I&O adequate but no BM overnight. No c/o n/v/d. Creatinine critical lab value. Reported to on call ENMA Garcias MD despite ESRD. Please see MAR and flowsheets for further documentation.

## 2017-08-21 PROBLEM — D70.1 CHEMOTHERAPY-INDUCED NEUTROPENIA (HCC): Status: ACTIVE | Noted: 2017-08-21

## 2017-08-21 PROBLEM — T45.1X5A CHEMOTHERAPY-INDUCED NEUTROPENIA (HCC): Status: ACTIVE | Noted: 2017-08-21

## 2017-08-21 LAB
ALBUMIN SERPL BCP-MCNC: 3 G/DL (ref 3.2–4.9)
BASOPHILS # BLD AUTO: 0.5 % (ref 0–1.8)
BASOPHILS # BLD: 0.01 K/UL (ref 0–0.12)
BUN SERPL-MCNC: 86 MG/DL (ref 8–22)
CALCIUM SERPL-MCNC: 8.1 MG/DL (ref 8.5–10.5)
CHLORIDE SERPL-SCNC: 99 MMOL/L (ref 96–112)
CO2 SERPL-SCNC: 21 MMOL/L (ref 20–33)
CREAT SERPL-MCNC: 6.95 MG/DL (ref 0.5–1.4)
EOSINOPHIL # BLD AUTO: 0.1 K/UL (ref 0–0.51)
EOSINOPHIL NFR BLD: 5.1 % (ref 0–6.9)
ERYTHROCYTE [DISTWIDTH] IN BLOOD BY AUTOMATED COUNT: 48.7 FL (ref 35.9–50)
GFR SERPL CREATININE-BSD FRML MDRD: 6 ML/MIN/1.73 M 2
GLUCOSE BLD-MCNC: 114 MG/DL (ref 65–99)
GLUCOSE BLD-MCNC: 120 MG/DL (ref 65–99)
GLUCOSE BLD-MCNC: 279 MG/DL (ref 65–99)
GLUCOSE BLD-MCNC: 308 MG/DL (ref 65–99)
GLUCOSE SERPL-MCNC: 142 MG/DL (ref 65–99)
HCT VFR BLD AUTO: 24.3 % (ref 37–47)
HGB BLD-MCNC: 8.2 G/DL (ref 12–16)
IMM GRANULOCYTES # BLD AUTO: 0.02 K/UL (ref 0–0.11)
IMM GRANULOCYTES NFR BLD AUTO: 1 % (ref 0–0.9)
LYMPHOCYTES # BLD AUTO: 1.24 K/UL (ref 1–4.8)
LYMPHOCYTES NFR BLD: 63.6 % (ref 22–41)
MCH RBC QN AUTO: 30.6 PG (ref 27–33)
MCHC RBC AUTO-ENTMCNC: 33.7 G/DL (ref 33.6–35)
MCV RBC AUTO: 90.7 FL (ref 81.4–97.8)
MONOCYTES # BLD AUTO: 0.08 K/UL (ref 0–0.85)
MONOCYTES NFR BLD AUTO: 4.1 % (ref 0–13.4)
NEUTROPHILS # BLD AUTO: 0.5 K/UL (ref 2–7.15)
NEUTROPHILS NFR BLD: 25.7 % (ref 44–72)
NRBC # BLD AUTO: 0 K/UL
NRBC BLD AUTO-RTO: 0 /100 WBC
PHOSPHATE SERPL-MCNC: 9.1 MG/DL (ref 2.5–4.5)
PLATELET # BLD AUTO: 82 K/UL (ref 164–446)
PMV BLD AUTO: 12.7 FL (ref 9–12.9)
POTASSIUM SERPL-SCNC: 5.9 MMOL/L (ref 3.6–5.5)
RBC # BLD AUTO: 2.68 M/UL (ref 4.2–5.4)
SODIUM SERPL-SCNC: 129 MMOL/L (ref 135–145)
WBC # BLD AUTO: 2 K/UL (ref 4.8–10.8)

## 2017-08-21 PROCEDURE — 700102 HCHG RX REV CODE 250 W/ 637 OVERRIDE(OP): Mod: EDC | Performed by: FAMILY MEDICINE

## 2017-08-21 PROCEDURE — 80069 RENAL FUNCTION PANEL: CPT | Mod: EDC

## 2017-08-21 PROCEDURE — 85025 COMPLETE CBC W/AUTO DIFF WBC: CPT | Mod: EDC

## 2017-08-21 PROCEDURE — 82962 GLUCOSE BLOOD TEST: CPT | Mod: EDC

## 2017-08-21 PROCEDURE — 700111 HCHG RX REV CODE 636 W/ 250 OVERRIDE (IP): Mod: EDC

## 2017-08-21 PROCEDURE — 90935 HEMODIALYSIS ONE EVALUATION: CPT | Mod: EDC

## 2017-08-21 PROCEDURE — 36415 COLL VENOUS BLD VENIPUNCTURE: CPT | Mod: EDC

## 2017-08-21 PROCEDURE — A9270 NON-COVERED ITEM OR SERVICE: HCPCS | Mod: EDC | Performed by: FAMILY MEDICINE

## 2017-08-21 PROCEDURE — A9270 NON-COVERED ITEM OR SERVICE: HCPCS | Mod: EDC | Performed by: INTERNAL MEDICINE

## 2017-08-21 PROCEDURE — 700102 HCHG RX REV CODE 250 W/ 637 OVERRIDE(OP): Mod: EDC | Performed by: INTERNAL MEDICINE

## 2017-08-21 PROCEDURE — 770004 HCHG ROOM/CARE - ONCOLOGY PRIVATE *: Mod: EDC

## 2017-08-21 PROCEDURE — 99232 SBSQ HOSP IP/OBS MODERATE 35: CPT | Performed by: HOSPITALIST

## 2017-08-21 RX ADMIN — MECLIZINE HYDROCHLORIDE 25 MG: 25 TABLET ORAL at 21:14

## 2017-08-21 RX ADMIN — PREGABALIN 50 MG: 25 CAPSULE ORAL at 21:14

## 2017-08-21 RX ADMIN — ERYTHROPOIETIN 10000 UNITS: 10000 INJECTION, SOLUTION INTRAVENOUS; SUBCUTANEOUS at 11:57

## 2017-08-21 RX ADMIN — INSULIN LISPRO 4 UNITS: 100 INJECTION, SOLUTION INTRAVENOUS; SUBCUTANEOUS at 18:25

## 2017-08-21 RX ADMIN — MECLIZINE HYDROCHLORIDE 25 MG: 25 TABLET ORAL at 15:08

## 2017-08-21 RX ADMIN — INSULIN LISPRO 3 UNITS: 100 INJECTION, SOLUTION INTRAVENOUS; SUBCUTANEOUS at 21:19

## 2017-08-21 RX ADMIN — AMLODIPINE BESYLATE 10 MG: 10 TABLET ORAL at 15:47

## 2017-08-21 RX ADMIN — MECLIZINE HYDROCHLORIDE 25 MG: 25 TABLET ORAL at 07:11

## 2017-08-21 RX ADMIN — CARVEDILOL 12.5 MG: 12.5 TABLET, FILM COATED ORAL at 15:47

## 2017-08-21 RX ADMIN — BRIMONIDINE TARTRATE AND TIMOLOL MALEATE 1 DROP: 2; 5 SOLUTION OPHTHALMIC at 09:38

## 2017-08-21 RX ADMIN — LATANOPROST 1 DROP: 50 SOLUTION OPHTHALMIC at 21:15

## 2017-08-21 RX ADMIN — PREGABALIN 50 MG: 25 CAPSULE ORAL at 09:38

## 2017-08-21 RX ADMIN — BRIMONIDINE TARTRATE AND TIMOLOL MALEATE 1 DROP: 2; 5 SOLUTION OPHTHALMIC at 21:00

## 2017-08-21 ASSESSMENT — ENCOUNTER SYMPTOMS
GASTROINTESTINAL NEGATIVE: 1
DIZZINESS: 1
NAUSEA: 0
SHORTNESS OF BREATH: 1
VOMITING: 0
FEVER: 0
WEAKNESS: 1
EYES NEGATIVE: 1
CLAUDICATION: 0
PHOTOPHOBIA: 0
COUGH: 0
CHILLS: 0
CONSTIPATION: 0
DIARRHEA: 0
MYALGIAS: 0
HEADACHES: 0
CARDIOVASCULAR NEGATIVE: 1
DEPRESSION: 0
HEARTBURN: 0
SHORTNESS OF BREATH: 0
SENSORY CHANGE: 0
SPEECH CHANGE: 0
INSOMNIA: 0
BLURRED VISION: 1
NERVOUS/ANXIOUS: 0
BACK PAIN: 0
MUSCULOSKELETAL NEGATIVE: 1
ABDOMINAL PAIN: 0

## 2017-08-21 ASSESSMENT — PAIN SCALES - GENERAL
PAINLEVEL_OUTOF10: 0

## 2017-08-21 NOTE — PROGRESS NOTES
[facility] Hospitalist Progress Note    Date of Service: 2017    Chief Complaint  62 y.o. female admitted 2017 with intractable nausea and vomiting status post chemotherapy for myelodysplastic syndrome.    Interval Problem Update    The patient is awake  No further N/V  Complains that she feels dizzy, especially when she gets up  Noted to be neutropenic today, no fever, no chills    Consultants/Specialty  [id number]    Disposition  Home with home health        Review of Systems   Constitutional: Negative for fever and chills. Malaise/fatigue: Improving.   HENT: Negative for congestion.    Eyes: Positive for blurred vision. Negative for photophobia.   Respiratory: Negative for cough and shortness of breath.    Cardiovascular: Negative for chest pain, claudication and leg swelling.   Gastrointestinal: Negative for heartburn, nausea, vomiting, abdominal pain, diarrhea and constipation.   Genitourinary: Negative for dysuria and hematuria.   Musculoskeletal: Negative for myalgias, back pain and joint pain.   Skin: Negative for itching and rash.   Neurological: Positive for dizziness. Negative for sensory change, speech change and headaches.   Psychiatric/Behavioral: Negative for depression. The patient is not nervous/anxious and does not have insomnia.       Physical Exam  Laboratory/Imaging   Hemodynamics  Temp (24hrs), Av.6 °C (97.9 °F), Min:36.3 °C (97.3 °F), Max:36.8 °C (98.2 °F)   Temperature: 36.8 °C (98.2 °F)  Pulse  Av.2  Min: 63  Max: 100    Blood Pressure: 100/60 mmHg      Respiratory      Respiration: 18, Pulse Oximetry: 100 %             Fluids    Intake/Output Summary (Last 24 hours) at 17 1608  Last data filed at 17 0600   Gross per 24 hour   Intake    220 ml   Output      0 ml   Net    220 ml       Nutrition  Orders Placed This Encounter   Procedures   • Diet Order     Standing Status: Standing      Number of Occurrences: 1      Standing Expiration Date:      Order  Specific Question:  Diet:     Answer:  Diabetic [3]     Order Specific Question:  Diet:     Answer:  2 Gram Sodium [7]     Order Specific Question:  Macronutrient modifications:     Answer:  High Protein [4]     Physical Exam   Constitutional: She is oriented to person, place, and time. She appears well-nourished. No distress.   HENT:   Head: Normocephalic and atraumatic.   Eyes: Conjunctivae are normal. Right eye exhibits no discharge. Left eye exhibits no discharge.   Patient squinting as I enter the room, states her vision is blurry at baseline.   Neck: Neck supple. No JVD present.   Cardiovascular: Normal rate, regular rhythm and normal heart sounds.  Exam reveals no gallop and no friction rub.    No murmur heard.  Pulmonary/Chest: Effort normal and breath sounds normal. No respiratory distress. She exhibits no tenderness.   Abdominal: Soft. Bowel sounds are normal. She exhibits no distension. There is no guarding.   Musculoskeletal: She exhibits no edema or tenderness.   Neurological: She is alert and oriented to person, place, and time. No cranial nerve deficit.   Skin: Skin is warm and dry. No erythema. No pallor.   Psychiatric: She has a normal mood and affect. Her behavior is normal.   Nursing note and vitals reviewed.      Recent Labs      08/19/17   0059  08/20/17   0001  08/21/17   0402   WBC  2.4*  1.6*  2.0*   RBC  2.69*  2.66*  2.68*   HEMOGLOBIN  8.6*  8.2*  8.2*   HEMATOCRIT  24.5*  24.7*  24.3*   MCV  91.1  92.9  90.7   MCH  32.0  30.8  30.6   MCHC  35.1*  33.2*  33.7   RDW  48.3  49.1  48.7   PLATELETCT  96*  85*  82*   MPV  13.3*  13.1*  12.7     Recent Labs      08/20/17   0001  08/21/17   0614   SODIUM  131*  129*   POTASSIUM  5.0  5.9*   CHLORIDE  97  99   CO2  24  21   GLUCOSE  303*  142*   BUN  56*  86*   CREATININE  5.24*  6.95*   CALCIUM  7.7*  8.1*                      Assessment/Plan     * Chemotherapy-induced neutropenia (CMS-HCC)  Assessment & Plan  Worsened neutropenia today  No  fevers  Discussed with oncology, no need for GCSF    ESRD (end stage renal disease) on dialysis (CMS-HCC) (present on admission)  Assessment & Plan  Dialysis MWF    Myelodysplasia (myelodysplastic syndrome) (CMS-HCC) (present on admission)  Assessment & Plan  Pt follows Dr. Fish.   Continue to follow as outpt.     Intractable nausea and vomiting (present on admission)  Assessment & Plan  Improving    Anemia of chronic disease (present on admission)  Assessment & Plan  No need for transfusion today       Paroxysmal atrial fibrillation (CMS-HCC) (present on admission)  Assessment & Plan  Continue Coreg, not on anticoagulation secondary to MDS    Glaucoma (present on admission)  Assessment & Plan  Patient states she normally has poor blurred vision secondary to her glaucoma  This could be contributing to her symptom of dizziness.  Ordered her combigan gtts that she has at bedside      Dizziness  Assessment & Plan  MRI is negative  Continue meclizine   Home health for PT/OT, she says she wouldn't go to a SNF    HTN (hypertension) (present on admission)  Assessment & Plan  Stable. Continue home medications.     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 Continue oxygen support.     Diabetic neuropathy (CMS-HCC) (present on admission)  Assessment & Plan  Pain under control. Continue home medications.     Vitamin D deficiency (present on admission)  Assessment & Plan  Stable. Continue replacement.       Medications reviewed, Radiology images reviewed and Labs reviewed  Bauer catheter: No Bauer      DVT Prophylaxis: Contraindicated - High bleeding risk  DVT prophylaxis - mechanical: SCDs

## 2017-08-21 NOTE — PROGRESS NOTES
"[facility] -  PROGRESS NOTE               Author: Chao Perez M.D. Date & Time: 8/21/2017  11:19 AM     HPI:  62yoF with PMH significant for ESRD on HD MWF via L arm AVF, HTN, DM II, Atrial Fibrillation, PVD, COPD, Anemia secondary to Myelodysplastic Syndrome, h/o CVA, COPD on home O2 admitted with complaints of nausea, vomiting and diarrhea for the past. 5 days with abdominal pain and fever and chills. Patient has myelodysplastic syndrome, has been on chemotherapy with Vidaza for 5 days which was completed yesterday. Nephrology was consulted to assisst with providing HD while inpatient.        DAILY NEPHROLOGY SUMMARY:  8/13/17 - Consult done  8/14/17 - NAEO, SOD, Qb400, VSS  8/15/17 - NAEO, doing well, feels good, Hgb 10.6 post transfusion yesterday  8/16/17 - NAEO, MRI with gadolinium today by primary team  8/17/17 - Sitting up in chair, feeling better today.  Still feels weak.  Getting HD x 3 days in a row  8/18/17 - NAEO, SOD, Qb350, 3L UF goal, feels good  8/19/17 - Feeling \"much better\".  Still weak.  Can't walk on her own.  S/p 3 daily HD treatments after LASHANDA exposure.  No edema.    8/20/17 - No overnight events.  No new complaints.  Still weak.  Not eating much.  No edema.  No dyspnea.  8/21/17 - NAEO, no complaints, doing ok, still having intermittent dizziness, antivert started yesterday      Review of Systems   Constitutional: Positive for malaise/fatigue.   HENT: Negative.    Eyes: Negative.    Respiratory: Positive for shortness of breath.    Cardiovascular: Negative.    Gastrointestinal: Negative.    Musculoskeletal: Negative.    Skin: Negative.    Neurological: Positive for weakness.   Endo/Heme/Allergies: Negative.    All other systems reviewed and are negative.        Physical Exam   Constitutional: She is oriented to person, place, and time. She appears well-developed and well-nourished. She appears ill.   HENT:   Head: Normocephalic and atraumatic.   Eyes: " Conjunctivae are normal. No scleral icterus.   Neck: Neck supple. No tracheal deviation present.   Cardiovascular: Normal rate and regular rhythm.    Pulmonary/Chest: Effort normal and breath sounds normal.   Abdominal: Soft. Bowel sounds are normal.   Musculoskeletal: She exhibits no edema or tenderness.   Neurological: She is alert and oriented to person, place, and time.   Skin: Skin is warm and dry.         PAST FAMILY HISTORY: Reviewed and Unchanged  SOCIAL HISTORY: Reviewed and Unchanged  CURRENT MEDICATIONS: Reviewed  LABORATORY RESULTS: Reviewed  IMAGING STUDIES: Reviewed      Fluids:         IMPRESSION:  - ESRD    * Etiology likely 2/2 HTN/DM    * HD today   - Acute on chronic anemia    * VANCE with HD    * Transfuse prn for Hgb < 7.0  - Nausea/Vomiting, resolved   - HTN, controlled   - type 2 DM    * per primary team   - Renal Osteodystrophy  - HLD  - CAD  - COPD    ASSESSMENT:  - GFR: HD dependent  - Urine: oligoanuric  - BP: Goal < 140/90  - Volume: euvolemic  - Acid/Base: no sig. acid base disturbance  - Electrolytes: stable  - Anemia: Goal Hgb 10-11  - MBD: Ca normal, PO4 elevated  - HD Access: LAVF (+thrill/bruit)    PLAN:  - MWF iHD  - UF as tolerated  - VANCE with iHD, epogen 10K units qRx  - Antiemetics as needed  - Dose all meds per ESRD guidelines

## 2017-08-21 NOTE — PROGRESS NOTES
Hemodialysis ordered by Dr. Alberts. Treatment started at 1141 and ended at 1441. Pt stable, vss, no c/o post tx. See flow sheets for details. Net UF 1.3 liters. Report to LIUDMILA White RN. LT UA AVF dressing clean, dry, intact.

## 2017-08-21 NOTE — FACE TO FACE
Face to Face Supporting Documentation - Home Health    The encounter with this patient was in whole or in part the primary reason for home health admission.    Date of encounter:   Patient:                    MRN:                       YOB: 2018  Vielka Briscoe  [id number]  1955     Home health to see patient for:  Skilled Nursing care for assessment, interventions & education, Physical Therapy evaluation and treatment and Occupational therapy evaluation and treatment    Skilled need for:  New Onset Medical Diagnosis vertigo    Skilled nursing interventions to include:  Comment: medication managment    Homebound status evidenced by:  Need the aid of supportive devices such as crutches, canes, wheelchairs or walkers. Leaving home requires a considerable and taxing effort. There is a normal inability to leave the home.    Community Physician to provide follow up care: Nyla Nava M.D.     Optional Interventions? No      I certify the face to face encounter for this home health care referral meets the CMS requirements and the encounter/clinical assessment with the patient was, in whole, or in part, for the medical condition(s) listed above, which is the primary reason for home health care. Based on my clinical findings: the service(s) are medically necessary, support the need for home health care, and the homebound criteria are met.  I certify that this patient has had a face to face encounter by myself.  Remy Hernandez M.D. - NPI: [id number]

## 2017-08-21 NOTE — PROGRESS NOTES
[facility] Hospitalist Progress Note    Date of Service: 2017    Chief Complaint  62 y.o. female admitted 2017 with intractable nausea and vomiting status post chemotherapy for myelodysplastic syndrome.    Interval Problem Update    Resting, I have to wake her  Says comfortable while lying down, when ambulates gets very dizzy, has only walked to bathroom  Worried about ability to abulate at home    Consultants/Specialty  [id number]    Disposition  Home when appropriate        Review of Systems   Constitutional: Positive for malaise/fatigue (Improving). Negative for fever and chills.   HENT: Negative for congestion.    Eyes: Positive for blurred vision. Negative for photophobia.   Respiratory: Negative for cough and shortness of breath.    Cardiovascular: Negative for chest pain, claudication and leg swelling.   Gastrointestinal: Negative for heartburn, nausea, vomiting, abdominal pain, diarrhea and constipation.   Genitourinary: Negative for dysuria and hematuria.   Musculoskeletal: Negative for myalgias and joint pain.   Skin: Negative for itching and rash.   Neurological: Positive for dizziness and weakness. Negative for sensory change, speech change and headaches.   Psychiatric/Behavioral: Negative for depression. The patient is not nervous/anxious and does not have insomnia.       Physical Exam  Laboratory/Imaging   Hemodynamics  Temp (24hrs), Av.6 °C (97.9 °F), Min:36.3 °C (97.3 °F), Max:37.2 °C (98.9 °F)   Temperature: 36.4 °C (97.6 °F)  Pulse  Av.6  Min: 63  Max: 100    Blood Pressure: (!) 95/47 mmHg      Respiratory      Respiration: 18, Pulse Oximetry: 100 %             Fluids  No intake or output data in the 24 hours ending 17    Nutrition  Orders Placed This Encounter   Procedures   • Diet Order     Standing Status: Standing      Number of Occurrences: 1      Standing Expiration Date:      Order Specific Question:  Diet:     Answer:  Diabetic [3]     Order Specific  Question:  Diet:     Answer:  2 Gram Sodium [7]     Order Specific Question:  Macronutrient modifications:     Answer:  High Protein [4]     Physical Exam   Constitutional: She is oriented to person, place, and time. She appears well-nourished. No distress.   HENT:   Head: Normocephalic and atraumatic.   Eyes: Conjunctivae are normal. Right eye exhibits no discharge. Left eye exhibits no discharge.   Patient squinting as I enter the room, states her vision is blurry at baseline.   Neck: Neck supple. No JVD present.   Cardiovascular: Normal rate, regular rhythm and normal heart sounds.  Exam reveals no gallop and no friction rub.    No murmur heard.  Pulmonary/Chest: Effort normal and breath sounds normal. No respiratory distress. She exhibits no tenderness.   Abdominal: Soft. Bowel sounds are normal. She exhibits no distension. There is no guarding.   Musculoskeletal: She exhibits no edema or tenderness.   Neurological: She is alert and oriented to person, place, and time. No cranial nerve deficit.   Skin: Skin is warm and dry. No erythema. No pallor.   Psychiatric: She has a normal mood and affect. Her behavior is normal.   Nursing note and vitals reviewed.      Recent Labs      08/18/17   0008  08/19/17   0059  08/20/17   0001   WBC  2.5*  2.4*  1.6*   RBC  2.75*  2.69*  2.66*   HEMOGLOBIN  8.8*  8.6*  8.2*   HEMATOCRIT  25.6*  24.5*  24.7*   MCV  93.1  91.1  92.9   MCH  32.0  32.0  30.8   MCHC  34.4  35.1*  33.2*   RDW  49.5  48.3  49.1   PLATELETCT  111*  96*  85*   MPV  12.7  13.3*  13.1*     Recent Labs      08/18/17   0008  08/20/17   0001   SODIUM  134*  131*   POTASSIUM  4.9  5.0   CHLORIDE  99  97   CO2  28  24   GLUCOSE  312*  303*   BUN  27*  56*   CREATININE  3.51*  5.24*   CALCIUM  7.5*  7.7*                      Assessment/Plan     ESRD (end stage renal disease) on dialysis (CMS-[facility]) (present on admission)  Assessment & Plan  Pt dialysis days are MWF.   Nephrology consulted.   Additional hemodialysis  today secondary to contrast exposure with MRI and resume regular dialysis tomorrow.    Myelodysplasia (myelodysplastic syndrome) (CMS-HCC) (present on admission)  Assessment & Plan  Pt follows Dr. Fish.   Continue to follow as outpt.     Intractable nausea and vomiting (present on admission)  Assessment & Plan  Improving    Anemia of chronic disease (present on admission)  Assessment & Plan  No need for transfusion today       Paroxysmal atrial fibrillation (CMS-HCC) (present on admission)  Assessment & Plan  Continue Coreg, not on anticoagulation secondary to MDS    Diastolic dysfunction (present on admission)  Assessment & Plan  Stable. No exacerbation.     Glaucoma (present on admission)  Assessment & Plan  Patient states she normally has poor blurred vision secondary to her glaucoma  This could be contributing to her symptom of dizziness.  Ordered her combigan gtts that she has at bedside      Dizziness  Assessment & Plan  Says still dizzy when mobilized  Mobilize  MRI negative    HTN (hypertension) (present on admission)  Assessment & Plan  Stable. Continue home medications.     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 Continue oxygen support.     Diabetic neuropathy (CMS-HCC) (present on admission)  Assessment & Plan  Pain under control. Continue home medications.     Vitamin D deficiency (present on admission)  Assessment & Plan  Stable. Continue replacement.       Medications reviewed, Radiology images reviewed and Labs reviewed  Bauer catheter: No Bauer      DVT Prophylaxis: Contraindicated - High bleeding risk  DVT prophylaxis - mechanical: SCDs

## 2017-08-21 NOTE — CARE PLAN
Problem: Knowledge Deficit  Goal: Knowledge of disease process/condition, treatment plan, diagnostic tests, and medications will improve  POC: dialysis MWF    Problem: Discharge Barriers/Planning  Goal: Patient’s continuum of care needs will be met  DC home when cleared

## 2017-08-22 VITALS
OXYGEN SATURATION: 92 % | TEMPERATURE: 98.1 F | HEIGHT: 58 IN | WEIGHT: 137.35 LBS | HEART RATE: 67 BPM | BODY MASS INDEX: 28.83 KG/M2 | SYSTOLIC BLOOD PRESSURE: 107 MMHG | DIASTOLIC BLOOD PRESSURE: 53 MMHG | RESPIRATION RATE: 17 BRPM

## 2017-08-22 LAB
ANISOCYTOSIS BLD QL SMEAR: ABNORMAL
BASOPHILS # BLD AUTO: 0 % (ref 0–1.8)
BASOPHILS # BLD: 0 K/UL (ref 0–0.12)
EOSINOPHIL # BLD AUTO: 0.06 K/UL (ref 0–0.51)
EOSINOPHIL NFR BLD: 4.6 % (ref 0–6.9)
ERYTHROCYTE [DISTWIDTH] IN BLOOD BY AUTOMATED COUNT: 45.7 FL (ref 35.9–50)
GIANT PLATELETS BLD QL SMEAR: NORMAL
GLUCOSE BLD-MCNC: 115 MG/DL (ref 65–99)
HCT VFR BLD AUTO: 22 % (ref 37–47)
HGB BLD-MCNC: 7.5 G/DL (ref 12–16)
LYMPHOCYTES # BLD AUTO: 0.8 K/UL (ref 1–4.8)
LYMPHOCYTES NFR BLD: 57.4 % (ref 22–41)
MACROCYTES BLD QL SMEAR: ABNORMAL
MANUAL DIFF BLD: NORMAL
MCH RBC QN AUTO: 30.5 PG (ref 27–33)
MCHC RBC AUTO-ENTMCNC: 34.1 G/DL (ref 33.6–35)
MCV RBC AUTO: 89.4 FL (ref 81.4–97.8)
MICROCYTES BLD QL SMEAR: ABNORMAL
MONOCYTES # BLD AUTO: 0 K/UL (ref 0–0.85)
MONOCYTES NFR BLD AUTO: 0 % (ref 0–13.4)
MORPHOLOGY BLD-IMP: NORMAL
NEUTROPHILS # BLD AUTO: 0.53 K/UL (ref 2–7.15)
NEUTROPHILS NFR BLD: 38 % (ref 44–72)
NRBC # BLD AUTO: 0 K/UL
NRBC BLD AUTO-RTO: 0 /100 WBC
PLATELET # BLD AUTO: 77 K/UL (ref 164–446)
PLATELET BLD QL SMEAR: NORMAL
PMV BLD AUTO: 12.6 FL (ref 9–12.9)
RBC # BLD AUTO: 2.46 M/UL (ref 4.2–5.4)
RBC BLD AUTO: PRESENT
WBC # BLD AUTO: 1.4 K/UL (ref 4.8–10.8)

## 2017-08-22 PROCEDURE — 36415 COLL VENOUS BLD VENIPUNCTURE: CPT | Mod: EDC

## 2017-08-22 PROCEDURE — 85027 COMPLETE CBC AUTOMATED: CPT | Mod: EDC

## 2017-08-22 PROCEDURE — 700102 HCHG RX REV CODE 250 W/ 637 OVERRIDE(OP): Mod: EDC | Performed by: INTERNAL MEDICINE

## 2017-08-22 PROCEDURE — 99239 HOSP IP/OBS DSCHRG MGMT >30: CPT | Performed by: HOSPITALIST

## 2017-08-22 PROCEDURE — 82962 GLUCOSE BLOOD TEST: CPT | Mod: EDC

## 2017-08-22 PROCEDURE — 85007 BL SMEAR W/DIFF WBC COUNT: CPT | Mod: EDC

## 2017-08-22 PROCEDURE — A9270 NON-COVERED ITEM OR SERVICE: HCPCS | Mod: EDC | Performed by: INTERNAL MEDICINE

## 2017-08-22 PROCEDURE — A9270 NON-COVERED ITEM OR SERVICE: HCPCS | Mod: EDC | Performed by: FAMILY MEDICINE

## 2017-08-22 PROCEDURE — 700102 HCHG RX REV CODE 250 W/ 637 OVERRIDE(OP): Mod: EDC | Performed by: FAMILY MEDICINE

## 2017-08-22 PROCEDURE — 97535 SELF CARE MNGMENT TRAINING: CPT | Mod: EDC

## 2017-08-22 RX ORDER — ONDANSETRON 4 MG/1
4 TABLET, ORALLY DISINTEGRATING ORAL EVERY 4 HOURS PRN
Qty: 10 TAB | Refills: 0 | Status: ON HOLD | OUTPATIENT
Start: 2017-08-22 | End: 2017-09-20

## 2017-08-22 RX ORDER — MECLIZINE HYDROCHLORIDE 25 MG/1
25 TABLET ORAL EVERY 8 HOURS
Qty: 30 TAB | Refills: 0 | Status: SHIPPED | OUTPATIENT
Start: 2017-08-22 | End: 2017-09-28

## 2017-08-22 RX ADMIN — CARVEDILOL 12.5 MG: 12.5 TABLET, FILM COATED ORAL at 09:07

## 2017-08-22 RX ADMIN — PREGABALIN 50 MG: 25 CAPSULE ORAL at 09:07

## 2017-08-22 RX ADMIN — AMLODIPINE BESYLATE 10 MG: 10 TABLET ORAL at 09:07

## 2017-08-22 RX ADMIN — BRIMONIDINE TARTRATE AND TIMOLOL MALEATE 1 DROP: 2; 5 SOLUTION OPHTHALMIC at 09:00

## 2017-08-22 RX ADMIN — MECLIZINE HYDROCHLORIDE 25 MG: 25 TABLET ORAL at 06:13

## 2017-08-22 ASSESSMENT — PAIN SCALES - GENERAL
PAINLEVEL_OUTOF10: 0
PAINLEVEL_OUTOF10: 0

## 2017-08-22 ASSESSMENT — LIFESTYLE VARIABLES: EVER_SMOKED: NEVER

## 2017-08-22 ASSESSMENT — ENCOUNTER SYMPTOMS
CARDIOVASCULAR NEGATIVE: 1
GASTROINTESTINAL NEGATIVE: 1
SHORTNESS OF BREATH: 1
MUSCULOSKELETAL NEGATIVE: 1
WEAKNESS: 1
EYES NEGATIVE: 1

## 2017-08-22 ASSESSMENT — COGNITIVE AND FUNCTIONAL STATUS - GENERAL
DAILY ACTIVITIY SCORE: 21
SUGGESTED CMS G CODE MODIFIER DAILY ACTIVITY: CJ
HELP NEEDED FOR BATHING: A LITTLE
TOILETING: A LITTLE
DRESSING REGULAR LOWER BODY CLOTHING: A LITTLE

## 2017-08-22 NOTE — THERAPY
"Occupational Therapy Treatment completed with focus on ADLs, ADL transfers and patient education.  Functional Status:  Pt was seen for Occupational Therapy treatment today, see Therapy Kardex for details. Treatment included education in breath control with activity and at rest, self pacing techs and energy conservation for pain management. Educated pt in safety awareness techs as well. Reviewed fall prevention techs prior to activity as well as listening to her body and rest as needed. Pt c/o \"great fatigue\". Pt is Mod Indep with bed mobility. Pt ambulted with direct supervision refusing to use the FWW, to/from BR with occasional CGA. Pt stated \"I have a cane at home\".  Pt transfers safety on/ off the toilet using grab bars. Pt is Mod Indep with full toilet hygiene seated and standing for thoroughness using grab bar as needed to support dynamic standing balance. Pt demo CGA/Min A for walk- in shower tranfers. Educated pt on DME needs for home. Pt stood at sink for oral hygiene and grooming, to wash hands and face with CGA. Pt demo some unsteadiness with fatigue.  Pt demonstrated  Mod Indep for UB dressing seated base, CGA for LB dressing including clothing management . Pt also demonstrated CGA with fatigue for Ambulating ADL's without AD . Pt was left up in chair, call light in reach, chair alarm on  and nursing is aware. Recommend continued OT services through Home Health for increasing strength, safety and Indep with ADL's, I ADL's and ADL transfers. Pt will be at home alone most of the day and will need to cook for herself. Dtr works, grand children in school and son-in-law is disabled. RN/SW informed. Continue Occupational Therapy services as per plan.    Plan of Care: Will benefit from Occupational Therapy 1 times per week  Discharge Recommendations:  Equipment No Equipment Needed per pt.. Post-acute therapy Discharge to home with outpatient or home health for additional skilled therapy services.    See \"Rehab " "Therapy-Acute\" Patient Summary Report for complete documentation.   "

## 2017-08-22 NOTE — DISCHARGE INSTRUCTIONS
Discharge Instructions    Discharged to home by car with relative. Discharged via wheelchair, hospital escort: Yes.  Special equipment needed: Oxygen    Be sure to schedule a follow-up appointment with your primary care doctor or any specialists as instructed.     Discharge Plan:   Influenza Vaccine Indication: Not indicated: Previously immunized this influenza season and > 8 years of age    I understand that a diet low in cholesterol, fat, and sodium is recommended for good health. Unless I have been given specific instructions below for another diet, I accept this instruction as my diet prescription.   Other diet: Return to Regular diet    Special Instructions: None    · Is patient discharged on Warfarin / Coumadin?   No     · Is patient Post Blood Transfusion?  No    Depression / Suicide Risk    As you are discharged from this [facility] facility, it is important to learn how to keep safe from harming yourself.    Recognize the warning signs:  · Abrupt changes in personality, positive or negative- including increase in energy   · Giving away possessions  · Change in eating patterns- significant weight changes-  positive or negative  · Change in sleeping patterns- unable to sleep or sleeping all the time   · Unwillingness or inability to communicate  · Depression  · Unusual sadness, discouragement and loneliness  · Talk of wanting to die  · Neglect of personal appearance   · Rebelliousness- reckless behavior  · Withdrawal from people/activities they love  · Confusion- inability to concentrate     If you or a loved one observes any of these behaviors or has concerns about self-harm, here's what you can do:  · Talk about it- your feelings and reasons for harming yourself  · Remove any means that you might use to hurt yourself (examples: pills, rope, extension cords, firearm)  · Get professional help from the community (Mental Health, Substance Abuse, psychological counseling)  · Do not be alone:Call your Safe  Contact- someone whom you trust who will be there for you.  · Call your local CRISIS HOTLINE 549-5568 or 563-894-0660  · Call your local Children's Mobile Crisis Response Team [place] (722) 805-5782 or www.[employer]  · Call the toll free National Suicide Prevention Hotlines   · National Suicide Prevention Lifeline 980-263-PAXX (6886)  · [employer] Line [facility] 800-SUICIDE (399-5889)    Neutropenia  Neutropenia is a condition that occurs when the level of a certain type of white blood cell (neutrophil) in your body becomes lower than normal. Neutrophils are made in the bone marrow and fight infections. These cells protect against bacteria and viruses. The fewer neutrophils you have, and the longer your body remains without them, the greater your risk of getting a severe infection becomes.  CAUSES   The cause of neutropenia may be hard to determine. However, it is usually due to 3 main problems:   · Decreased production of neutrophils. This may be due to:  · Certain medicines such as chemotherapy.  · Genetic problems.  · Cancer.  · Radiation treatments.  · Vitamin deficiency.  · Some pesticides.  · Increased destruction of neutrophils. This may be due to:  · Overwhelming infections.  · Hemolytic anemia. This is when the body destroys its own blood cells.  · Chemotherapy.  · Neutrophils moving to areas of the body where they cannot fight infections. This may be due to:  · Dialysis procedures.  · Conditions where the spleen becomes enlarged. Neutrophils are held in the spleen and are not available to the rest of the body.  · Overwhelming infections. The neutrophils are held in the area of the infection and are not available to the rest of the body.  SYMPTOMS   There are no specific symptoms of neutropenia. The lack of neutrophils can result in an infection, and an infection can cause various problems.  DIAGNOSIS   Diagnosis is made by a blood test. A complete blood count is performed. The normal level of  neutrophils in human blood differs with age and race. Infants have lower counts than older children and adults.  Americans have lower counts than Caucasians or Asians. The average adult level is 1500 cells/mm3 of blood. Neutrophil counts are interpreted as follows:  · Greater than 1000 cells/mm3 gives normal protection against infection.  · 500 to 1000 cells/mm3 gives an increased risk for infection.  · 200 to 500 cells/mm3 is a greater risk for severe infection.  · Lower than 200 cells/mm3 is a marked risk of infection. This may require hospitalization and treatment with antibiotic medicines.  TREATMENT   Treatment depends on the underlying cause, severity, and presence of infections or symptoms. It also depends on your health. Your caregiver will discuss the treatment plan with you. Mild cases are often easily treated and have a good outcome. Preventative measures may also be started to limit your risk of infections. Treatment can include:  · Taking antibiotics.  · Stopping medicines that are known to cause neutropenia.  · Correcting nutritional deficiencies by eating green vegetables to supply folic acid and taking vitamin B supplements.  · Stopping exposure to pesticides if your neutropenia is related to pesticide exposure.  · Taking a blood growth factor called sargramostim, pegfilgrastim, or filgrastim if you are undergoing chemotherapy for cancer. This stimulates white blood cell production.  · Removal of the spleen if you have Felty's syndrome and have repeated infections.  HOME CARE INSTRUCTIONS   · Follow your caregiver's instructions about when you need to have blood work done.  · Wash your hands often. Make sure others who come in contact with you also wash their hands.  · Wash raw fruits and vegetables before eating them. They can carry bacteria and fungi.  · Avoid people with colds or spreadable (contagious) diseases (chickenpox, herpes zoster, influenza).  · Avoid large crowds.  · Avoid  construction areas. The dust can release fungus into the air.  · Be cautious around children in  or school environments.  · Take care of your respiratory system by coughing and deep breathing.  · Bathe daily.  · Protect your skin from cuts and burns.  · Do not work in the garden or with flowers and plants.  · Care for the mouth before and after meals by brushing with a soft toothbrush. If you have mucositis, do not use mouthwash. Mouthwash contains alcohol and can dry out the mouth even more.  · Clean the area between the genitals and the anus (perineal area) after urination and bowel movements. Women need to wipe from front to back.  · Use a water soluble lubricant during sexual intercourse and practice good hygiene after. Do not have intercourse if you are severely neutropenic. Check with your caregiver for guidelines.  · Exercise daily as tolerated.  · Avoid people who were vaccinated with a live vaccine in the past 30 days. You should not receive live vaccines (polio, typhoid).  · Do not provide direct care for pets. Avoid animal droppings. Do not clean litter boxes and bird cages.  · Do not share food utensils.  · Do not use tampons, enemas, or rectal suppositories unless directed by your caregiver.  · Use an electric razor to remove hair.  · Wash your hands after handling magazines, letters, and newspapers.  SEEK IMMEDIATE MEDICAL CARE IF:   · You have a fever.  · You have chills or start to shake.  · You feel nauseous or vomit.  · You develop mouth sores.  · You develop aches and pains.  · You have redness and swelling around open wounds.  · Your skin is warm to the touch.  · You have pus coming from your wounds.  · You develop swollen lymph nodes.  · You feel weak or fatigued.  · You develop red streaks on the skin.  MAKE SURE YOU:  · Understand these instructions.  · Will watch your condition.  · Will get help right away if you are not doing well or get worse.     This information is not intended to  replace advice given to you by your health care provider. Make sure you discuss any questions you have with your health care provider.     Document Released: 06/09/2003 Document Revised: 03/11/2013 Document Reviewed: 07/06/2012  [employer] Interactive Patient Education ©2016 [employer]    Neutropenic Fever  Neutropenic fever is a type of fever that can develop in someone who has a very low number of a certain kind of white blood cells called neutrophils (neutropenia). These blood cells are important for fighting infections caused by bacteria and fungi. When you have neutropenia, you could be in danger of a severe infection. You may need to start taking antibiotic medicines.  CAUSES  Neutrophils are made in the spongy tissue inside your bones (bone marrow). Anything that damages your bone marrow or damages neutrophils after they leave your bone marrow can cause neutropenia. Once you have a dangerously low level of neutrophils, you are at risk for infection and neutropenic fever.  Causes of neutropenia may include:  · Cancer treatments.  · Bone marrow cancer.  · Cancer of the white blood cells (leukemia or myeloma).  · Severe infection.  · Bone marrow failure (aplastic anemia).  · Many types of medicines.  · Diseases of the body's defense system (autoimmune diseases).  · Inherited genes that cause neutropenia.  · Vitamin B deficiency.  · Spleen enlargement in rheumatoid arthritis (Felty syndrome).  SIGNS AND SYMPTOMS  Fever is the main symptom of neutropenic fever. Other signs and symptoms may include:  · Chills.  · Fatigue.  · Painful mouth ulcers.  · Cough.  · Shortness of breath.  · Swollen glands (lymph nodes).  · Sore throat.  · Sinus and ear infections.  · Gum disease.  · Skin infection.  · Burning and frequent urination.  · Rectal infections.  · Vaginal discharge or itching.  DIAGNOSIS   Your health care provider may diagnose neutropenic fever if your neutrophil count is less than 500 neutrophils per  microliter of blood and you have a fever of at least 100.4°F (38.0°C).   · Blood tests and other tests that measure neutrophils will be done. These may include:  ¨ A complete blood count (CBC) and a differential white blood count (WBC).  ¨ Peripheral smear. This test involves checking a blood sample under a microscope.  · Other types of tests may also be done, including:  ¨ Chest X-rays.  ¨ Cultures of blood and body fluids to look for a source of infection.  Your health care provider will also determine if your neutropenic fever is high risk or low risk.  · You may have high-risk neutropenic fever if:  ¨ Your neutrophil count is less than 100 neutrophils per microliter of blood.  ¨ You have also been diagnosed with pneumonia or another serious medical problem.  ¨ Your condition requires you to be treated in the hospital.  · You may have low-risk neutropenic fever if:  ¨ Your neutrophil count is more than 100 neutrophils per microliter of blood.  ¨ Your chest X-ray is normal.  ¨ You do not have an active illness or any other problems that require you to be in the hospital.  TREATMENT   You may start treatment as soon as you get diagnosed with neutropenic fever, even if your health care provider is still looking for the source of infection.  · Treatment for high-risk neutropenic fever is antibiotic medicine given through an IV access tube. This is done in the hospital. You may be given a single antibiotic or a combination of antibiotics.  · Low-risk neutropenic fever may be treated at home. You may have to take one or two different oral antibiotics. In some cases, you may need to be treated with IV antibiotics that are given by a home health care provider who visits your home.  · If your health care provider finds a specific cause of infection, you may be switched to the antibiotics that work best against those particular bacteria.  · If a fungal infection is found, your medicine will be changed to an antifungal  medicine.  · If the fever goes away in 3-5 days, you may have to take medicine for about 7 days. If the fever is not responding, you may have to take medicine longer.  · You may have to stop taking any medicine that could be causing neutropenic fever.  · If you have neutropenic fever from cancer treatment drugs (chemotherapy), you may need to take a type of medicine called white blood cell growth factors. This medicine can help prevent fever.  HOME CARE INSTRUCTIONS  · Only take medicines as directed by your health care provider.  ¨ If you are being treated with oral antibiotics at home, you may need to return to your health care provider every day to have your CBC checked. You may have to do this until your fever responds.  ¨ Take your antibiotics as directed. Make sure you finish them even if you start to feel better.  · Preventing infection is important when you have neutropenia. Here are some ways to prevent infections:  ¨ Avoid sick friends and family members.  ¨ Wash your hands often.  ¨ Do not eat uncooked or undercooked meats.  ¨ Wash all fruits and vegetables.  ¨ Do not eat or drink unpasteurized dairy products.  ¨ Get regular dental care, and maintain good dental hygiene.  ¨ Get a flu shot. Ask your health care provider whether you need any other vaccines.  ¨ Wear gloves when gardening.  · Follow up with your health care provider as directed.  SEEK MEDICAL CARE IF:  · You have chills.  · You have a fever.  · You have signs or symptoms of infection.  SEEK IMMEDIATE MEDICAL CARE IF:  · You have trouble breathing.  · You have chest pain.     This information is not intended to replace advice given to you by your health care provider. Make sure you discuss any questions you have with your health care provider.     Document Released: 12/23/2014 Document Reviewed: 12/23/2014  [employer] Interactive Patient Education ©2016 [employer]

## 2017-08-22 NOTE — PROGRESS NOTES
"[facility] -  PROGRESS NOTE               Author: Chao Perez M.D. Date & Time: 8/22/2017  10:41 AM     HPI:  62yoF with PMH significant for ESRD on HD MWF via L arm AVF, HTN, DM II, Atrial Fibrillation, PVD, COPD, Anemia secondary to Myelodysplastic Syndrome, h/o CVA, COPD on home O2 admitted with complaints of nausea, vomiting and diarrhea for the past. 5 days with abdominal pain and fever and chills. Patient has myelodysplastic syndrome, has been on chemotherapy with Vidaza for 5 days which was completed yesterday. Nephrology was consulted to assisst with providing HD while inpatient.        DAILY NEPHROLOGY SUMMARY:  8/13/17 - Consult done  8/14/17 - NAEO, SOD, Qb400, VSS  8/15/17 - NAEO, doing well, feels good, Hgb 10.6 post transfusion yesterday  8/16/17 - NAEO, MRI with gadolinium today by primary team  8/17/17 - Sitting up in chair, feeling better today.  Still feels weak.  Getting HD x 3 days in a row  8/18/17 - NAEO, SOD, Qb350, 3L UF goal, feels good  8/19/17 - Feeling \"much better\".  Still weak.  Can't walk on her own.  S/p 3 daily HD treatments after LASHANDA exposure.  No edema.    8/20/17 - No overnight events.  No new complaints.  Still weak.  Not eating much.  No edema.  No dyspnea.  8/21/17 - NAEO, no complaints, doing ok, still having intermittent dizziness, antivert started yesterday  8/22/17 - NAEO, doing well, feeling stronger each day, may go home soon    Review of Systems   Constitutional: Positive for malaise/fatigue.   HENT: Negative.    Eyes: Negative.    Respiratory: Positive for shortness of breath.    Cardiovascular: Negative.    Gastrointestinal: Negative.    Musculoskeletal: Negative.    Skin: Negative.    Neurological: Positive for weakness.   Endo/Heme/Allergies: Negative.    All other systems reviewed and are negative.        Physical Exam   Constitutional: She is oriented to person, place, and time. She appears well-developed and well-nourished. She " appears ill.   HENT:   Head: Normocephalic and atraumatic.   Eyes: Conjunctivae are normal. No scleral icterus.   Neck: Neck supple. No tracheal deviation present.   Cardiovascular: Normal rate and regular rhythm.    Pulmonary/Chest: Effort normal and breath sounds normal.   Abdominal: Soft. Bowel sounds are normal.   Musculoskeletal: She exhibits no edema or tenderness.   Neurological: She is alert and oriented to person, place, and time.   Skin: Skin is warm and dry.         PAST FAMILY HISTORY: Reviewed and Unchanged  SOCIAL HISTORY: Reviewed and Unchanged  CURRENT MEDICATIONS: Reviewed  LABORATORY RESULTS: Reviewed  IMAGING STUDIES: Reviewed      Fluids:         IMPRESSION:  - ESRD    * Etiology likely 2/2 HTN/DM    * HD today   - Acute on chronic anemia    * VANCE with HD    * Transfuse prn for Hgb < 7.0  - Nausea/Vomiting, resolved   - HTN, controlled   - type 2 DM    * per primary team   - Renal Osteodystrophy  - HLD  - CAD  - COPD    ASSESSMENT:  - GFR: HD dependent  - Urine: oligoanuric  - BP: Goal < 140/90  - Volume: euvolemic  - Acid/Base: no sig. acid base disturbance  - Electrolytes: stable  - Anemia: Goal Hgb 10-11  - MBD: Ca normal, PO4 elevated  - HD Access: LAVF (+thrill/bruit)    PLAN:  - MWF iHD  - UF as tolerated  - VANCE with iHD, epogen 10K units qRx  - Antiemetics as needed  - Dose all meds per ESRD guidelines

## 2017-08-22 NOTE — DISCHARGE PLANNING
Received call from Alison with [facility] accepting patient. Notified MARYELLEN Friedman via voicemail.

## 2017-08-22 NOTE — PROGRESS NOTES
Assumed care of pt at 0700, pt AAOx4, denies any pain at this time. Pt blind in both eyes, utilizing eye drops. POC discussed with pt, all questions and concerns addressed at this time. Bed is in low and locked position, call light within reach and hourly rounding in place.

## 2017-08-22 NOTE — CARE PLAN
Problem: Safety  Goal: Will remain free from injury  Outcome: PROGRESSING AS EXPECTED  Bed is in low and locked position, call light within reach and pt calling appropriately for assistance, bed alarm is on at this time. Hourly rounding in place.    Problem: Discharge Barriers/Planning  Goal: Patient’s continuum of care needs will be met  Outcome: PROGRESSING AS EXPECTED  Discussed POC and plan for discharge today with pt. Pt states daughter will not be by to pick pt up until 1700. All questions and concerns addressed at this time.

## 2017-08-22 NOTE — PROGRESS NOTES
Pt discharged with all dc instructions, follow up information and prescriptions. RN reveiwed all dc paperwork and addressed all questions. Daughter of pt here to take pt home. Pt left via wheelchair with hospital escort and home oxygen.

## 2017-08-22 NOTE — DISCHARGE SUMMARY
DATE OF ADMISSION:  08/12/2017    DATE OF DISCHARGE:  08/22/2017    The patient was seen and examined on 08/22/2017.  This notes documents that   encounter.    DISCHARGE DIAGNOSES:  1.  Intractable nausea, vomiting and diarrhea.  2.  Myelodysplastic syndrome, on chemotherapy.  3.  End-stage renal disease.  She is on hemodialysis on Monday, Wednesday, and   Friday.  4.  Paroxysmal atrial fibrillation, not on anticoagulation secondary to   hematologic malignancy.  5.  Glaucoma.  6.  Dizziness.  7.  Type 2 diabetes with complications of diabetic nephropathy and peripheral   neuropathy.  8.  Chronic respiratory failure on 2 liters by nasal cannula at baseline.  9.  Vitamin D deficiency.    DISCHARGE MEDICATIONS:  1.  Meclizine 25 mg every 8 hours as needed.  2.  Zofran 4 mg every 4 hours as needed.  3.  Amlodipine 10 mg daily.  4.  Coreg 12.5 mg twice daily.  5.  Combigan 0.2/0.5% solution 1 drop twice daily.  6.  Flexeril 10 mg 3 times a day.  7.  Norco 1-2 tablets every 6 hours as needed, 5/325.  8.  Lumigan 0.03% solution 1 drop in both eyes each day.  9.  Lyrica 50 mg twice daily.  10.  Compazine 10 mg every 6 hours as needed for nausea and vomiting.  11.  Januvia 25 mg daily.    MAJOR STUDIES AND PROCEDURES PERFORMED WHILE INPATIENT:  1.  CT scan of the abdomen and pelvis on 08/12/2017, no acute intra-abdominal   process is evident.  2.  MRI of the brain on 08/16/2017, no acute abnormality.  There is mild   cerebral atrophy, low T1 signal intensity in C4 vertebral body, likely   secondary to myelodysplastic syndrome.    HOSPITAL COURSE:  The patient is a 62-year-old female.  She has a history of   myelodysplastic syndrome as well as end-stage renal disease.  She was admitted   on 08/12/2017 with gastrointestinal complaints of vomiting, diarrhea.  Workup   included a CT scan of the abdomen and pelvis, which was negative.  Patient   was treated with dialysis.  Upon further questioning, it appears the patient   was  suffering from some significant dizziness and/or vertigo.  This did   improve steadily; however, MRI was performed, which showed no evidence of   intracranial pathology.  She continues to have some intermittent dizziness,   especially when she walks.    We discussed possible discharge options with the patient including possible   referral to skilled nursing facility.  She prefers to go home.  Arrangements   have been made to set up home health with PT, OT support.  I did discuss the   case with Dr. Remy Mcadams on 08/21/2017.  We agreed that the patient would   continue to have neutropenia.  She is to follow up closely with her oncologist   for further treatment.    DISPOSITION:  To home with home health.    FOLLOWUP PLAN:  Follow up with oncology.  She has an appointment on 09/04/2017   for chemo.    DIET:  A 2-g sodium, high-protein diet.    Forty-five minutes were spent discharging this patient from the hospital.       ____________________________________     MD LEONELA SULLIVAN / RAMONE    DD:  08/22/2017 08:08:36  DT:  08/22/2017 10:00:42    D#:  [id number]  Job#:  [id number]
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
## 2017-08-23 ENCOUNTER — PATIENT OUTREACH (OUTPATIENT)
Dept: HEALTH INFORMATION MANAGEMENT | Facility: OTHER | Age: 62
End: 2017-08-23

## 2017-08-24 ENCOUNTER — HOSPITAL ENCOUNTER (OUTPATIENT)
Facility: MEDICAL CENTER | Age: 62
End: 2017-08-24
Payer: MEDICARE

## 2017-08-24 NOTE — PROGRESS NOTES
Patient is a direct admit on 8/26/2017. Dr Avila is admitting the patient. Orders faxed to the floor. Outside medical records scanned into the media tab.

## 2017-08-26 ENCOUNTER — HOSPITAL ENCOUNTER (OUTPATIENT)
Facility: MEDICAL CENTER | Age: 62
End: 2017-08-26
Attending: INTERNAL MEDICINE | Admitting: INTERNAL MEDICINE
Payer: MEDICARE

## 2017-08-26 VITALS
HEART RATE: 82 BPM | TEMPERATURE: 97.8 F | SYSTOLIC BLOOD PRESSURE: 163 MMHG | RESPIRATION RATE: 18 BRPM | HEIGHT: 58 IN | DIASTOLIC BLOOD PRESSURE: 67 MMHG | WEIGHT: 133.82 LBS | OXYGEN SATURATION: 100 % | BODY MASS INDEX: 28.09 KG/M2

## 2017-08-26 LAB
ABO GROUP BLD: NORMAL
BARCODED ABORH UBTYP: 5100
BARCODED ABORH UBTYP: 6200
BARCODED PRD CODE UBPRD: NORMAL
BARCODED PRD CODE UBPRD: NORMAL
BARCODED UNIT NUM UBUNT: NORMAL
BARCODED UNIT NUM UBUNT: NORMAL
BLD GP AB SCN SERPL QL: NORMAL
COMPONENT R 8504R: NORMAL
COMPONENT R 8504R: NORMAL
PRODUCT TYPE UPROD: NORMAL
PRODUCT TYPE UPROD: NORMAL
RH BLD: NORMAL
UNIT STATUS USTAT: NORMAL
UNIT STATUS USTAT: NORMAL

## 2017-08-26 PROCEDURE — 36430 TRANSFUSION BLD/BLD COMPNT: CPT

## 2017-08-26 PROCEDURE — 86644 CMV ANTIBODY: CPT

## 2017-08-26 PROCEDURE — 700102 HCHG RX REV CODE 250 W/ 637 OVERRIDE(OP): Performed by: INTERNAL MEDICINE

## 2017-08-26 PROCEDURE — 86850 RBC ANTIBODY SCREEN: CPT

## 2017-08-26 PROCEDURE — 86901 BLOOD TYPING SEROLOGIC RH(D): CPT

## 2017-08-26 PROCEDURE — G0378 HOSPITAL OBSERVATION PER HR: HCPCS

## 2017-08-26 PROCEDURE — 86900 BLOOD TYPING SEROLOGIC ABO: CPT

## 2017-08-26 PROCEDURE — 86923 COMPATIBILITY TEST ELECTRIC: CPT

## 2017-08-26 PROCEDURE — P9016 RBC LEUKOCYTES REDUCED: HCPCS | Mod: 91

## 2017-08-26 PROCEDURE — A9270 NON-COVERED ITEM OR SERVICE: HCPCS | Performed by: INTERNAL MEDICINE

## 2017-08-26 RX ORDER — DIPHENHYDRAMINE HCL 25 MG
25 TABLET ORAL ONCE
Status: DISCONTINUED | OUTPATIENT
Start: 2017-08-26 | End: 2017-08-26

## 2017-08-26 RX ORDER — ACETAMINOPHEN 325 MG/1
650 TABLET ORAL ONCE
Status: COMPLETED | OUTPATIENT
Start: 2017-08-26 | End: 2017-08-26

## 2017-08-26 RX ADMIN — ACETAMINOPHEN 650 MG: 325 TABLET, FILM COATED ORAL at 14:48

## 2017-08-26 ASSESSMENT — PATIENT HEALTH QUESTIONNAIRE - PHQ9
2. FEELING DOWN, DEPRESSED, IRRITABLE, OR HOPELESS: NOT AT ALL
SUM OF ALL RESPONSES TO PHQ QUESTIONS 1-9: 0
SUM OF ALL RESPONSES TO PHQ9 QUESTIONS 1 AND 2: 0
1. LITTLE INTEREST OR PLEASURE IN DOING THINGS: NOT AT ALL

## 2017-08-26 ASSESSMENT — LIFESTYLE VARIABLES
EVER_SMOKED: NEVER
ALCOHOL_USE: NO

## 2017-08-26 NOTE — PROGRESS NOTES
ER ARIEL Oconnor made 3 US guided attempts at PIV, 3rd attempt successful with 20 g in RAC, but unable to fully advance catheter, which was flushed with 10 mL saline.  Will run 50 mL of saline prior to releasing blood to ensure PIV is patent and intact.

## 2017-08-26 NOTE — PROGRESS NOTES
First unit of blood continues to run; Pt and PIV tolerating well; Pt sleeping with even, unlabored breathing.

## 2017-08-27 NOTE — PROGRESS NOTES
A/o,assessment completed per CDU, 2nd unit RBC infusing,poc discussed,verbalized understanding,call button within reach,will continue to monitor.

## 2017-08-27 NOTE — DISCHARGE INSTRUCTIONS
Discharge Instructions    Discharged to home by car with relative. Discharged via wheelchair, hospital escort: Yes.  Special equipment needed: Not Applicable    Be sure to schedule a follow-up appointment with your primary care doctor or any specialists as instructed.     Discharge Plan:   Diet Plan: Discussed  Activity Level: Discussed  Confirmed Follow up Appointment: Patient to Call and Schedule Appointment  Confirmed Symptoms Management: Discussed  Influenza Vaccine Indication: Not indicated: Previously immunized this influenza season and > 8 years of age    I understand that a diet low in cholesterol, fat, and sodium is recommended for good health. Unless I have been given specific instructions below for another diet, I accept this instruction as my diet prescription.   Other diet: as tolerated      Special Instructions: None    · Is patient discharged on Warfarin / Coumadin?   No     · Is patient Post Blood Transfusion?  Yes  POST BLOOD TRANSFUSION INFORMATION (ADULT)    The purpose of blood transfusion is to correct anemia, low levels of blood clotting factors or to correct acute blood loss.   Blood transfusion is very safe but occasionally unexpected adverse reactions do occur. Most adverse reactions occur during transfusion, within one to two days following transfusion or one to two weeks following transfusion. Some adverse reactions can occur one to six months after transfusion and some even years later.             If any of the symptoms listed below happen to you during your transfusion,                                 please notify your nurse immediately.   · Fever or Chills  · Flushing of the Face  · Hives, rash or itching  · Difficulty in breathing or shortness of breath  · Pain or oozing of blood from the IV needle site  · Low back pain  · Nausea or vomiting  · Weakness or fainting  · Chest pain  · Blood in the urine  · Decreased frequency of urination    The above symptoms may also occur within 24  to 48 after transfusion; if so, notify your physician.     · Yellowing of the skin    This can happen one to six months after transfusion; if so, notify your physician    Depression / Suicide Risk    As you are discharged from this [facility] facility, it is important to learn how to keep safe from harming yourself.    Recognize the warning signs:  · Abrupt changes in personality, positive or negative- including increase in energy   · Giving away possessions  · Change in eating patterns- significant weight changes-  positive or negative  · Change in sleeping patterns- unable to sleep or sleeping all the time   · Unwillingness or inability to communicate  · Depression  · Unusual sadness, discouragement and loneliness  · Talk of wanting to die  · Neglect of personal appearance   · Rebelliousness- reckless behavior  · Withdrawal from people/activities they love  · Confusion- inability to concentrate     If you or a loved one observes any of these behaviors or has concerns about self-harm, here's what you can do:  · Talk about it- your feelings and reasons for harming yourself  · Remove any means that you might use to hurt yourself (examples: pills, rope, extension cords, firearm)  · Get professional help from the community (Mental Health, Substance Abuse, psychological counseling)  · Do not be alone:Call your Safe Contact- someone whom you trust who will be there for you.  · Call your local CRISIS HOTLINE 970-6870 or 987-401-3249  · Call your local Children's Mobile Crisis Response Team [place] (513) 118-0563 or www.[employer]  · Call the toll free National Suicide Prevention Hotlines   · National Suicide Prevention Lifeline 108-755-RUPY (5739)  · National Hope Line Network 800-SUICIDE (209-5605)

## 2017-08-27 NOTE — PROGRESS NOTES
Blood transfusion ended,pt tolerated well,no adverse reaction noted,vss,see doc flow sheet,iv removed,,discharged instruction discussed,verbalized understanding,daughter here bringing pt home,papers signed,pt wheeled to parking by tech,belongings sent with pt.

## 2017-09-11 ENCOUNTER — APPOINTMENT (OUTPATIENT)
Dept: ONCOLOGY | Facility: MEDICAL CENTER | Age: 62
End: 2017-09-11
Attending: INTERNAL MEDICINE
Payer: MEDICARE

## 2017-09-12 ENCOUNTER — APPOINTMENT (OUTPATIENT)
Dept: ONCOLOGY | Facility: MEDICAL CENTER | Age: 62
End: 2017-09-12
Attending: INTERNAL MEDICINE
Payer: MEDICARE

## 2017-09-13 ENCOUNTER — APPOINTMENT (OUTPATIENT)
Dept: ONCOLOGY | Facility: MEDICAL CENTER | Age: 62
End: 2017-09-13
Attending: INTERNAL MEDICINE
Payer: MEDICARE

## 2017-09-14 ENCOUNTER — HOSPITAL ENCOUNTER (EMERGENCY)
Facility: MEDICAL CENTER | Age: 62
End: 2017-09-14
Attending: EMERGENCY MEDICINE
Payer: MEDICARE

## 2017-09-14 ENCOUNTER — APPOINTMENT (OUTPATIENT)
Dept: ONCOLOGY | Facility: MEDICAL CENTER | Age: 62
End: 2017-09-14
Attending: INTERNAL MEDICINE
Payer: MEDICARE

## 2017-09-14 VITALS
DIASTOLIC BLOOD PRESSURE: 75 MMHG | SYSTOLIC BLOOD PRESSURE: 170 MMHG | WEIGHT: 133 LBS | HEIGHT: 58 IN | HEART RATE: 75 BPM | RESPIRATION RATE: 16 BRPM | OXYGEN SATURATION: 98 % | TEMPERATURE: 95.8 F | BODY MASS INDEX: 27.92 KG/M2

## 2017-09-14 DIAGNOSIS — R52 GENERALIZED PAIN: ICD-10-CM

## 2017-09-14 DIAGNOSIS — D64.9 CHRONIC ANEMIA: ICD-10-CM

## 2017-09-14 DIAGNOSIS — R45.2 UNHAPPINESS: ICD-10-CM

## 2017-09-14 LAB
ANION GAP SERPL CALC-SCNC: 14 MMOL/L (ref 0–11.9)
BASOPHILS # BLD AUTO: 0 % (ref 0–1.8)
BASOPHILS # BLD: 0 K/UL (ref 0–0.12)
BUN SERPL-MCNC: 54 MG/DL (ref 8–22)
CALCIUM SERPL-MCNC: 8.2 MG/DL (ref 8.5–10.5)
CHLORIDE SERPL-SCNC: 97 MMOL/L (ref 96–112)
CO2 SERPL-SCNC: 26 MMOL/L (ref 20–33)
CREAT SERPL-MCNC: 4.99 MG/DL (ref 0.5–1.4)
EOSINOPHIL # BLD AUTO: 0.05 K/UL (ref 0–0.51)
EOSINOPHIL NFR BLD: 1.8 % (ref 0–6.9)
ERYTHROCYTE [DISTWIDTH] IN BLOOD BY AUTOMATED COUNT: 48.3 FL (ref 35.9–50)
GFR SERPL CREATININE-BSD FRML MDRD: 9 ML/MIN/1.73 M 2
GLUCOSE SERPL-MCNC: 136 MG/DL (ref 65–99)
HCT VFR BLD AUTO: 26 % (ref 37–47)
HGB BLD-MCNC: 8.8 G/DL (ref 12–16)
IMM GRANULOCYTES # BLD AUTO: 0.02 K/UL (ref 0–0.11)
IMM GRANULOCYTES NFR BLD AUTO: 0.7 % (ref 0–0.9)
LYMPHOCYTES # BLD AUTO: 1.54 K/UL (ref 1–4.8)
LYMPHOCYTES NFR BLD: 56.4 % (ref 22–41)
MCH RBC QN AUTO: 31.2 PG (ref 27–33)
MCHC RBC AUTO-ENTMCNC: 33.8 G/DL (ref 33.6–35)
MCV RBC AUTO: 92.2 FL (ref 81.4–97.8)
MONOCYTES # BLD AUTO: 0.18 K/UL (ref 0–0.85)
MONOCYTES NFR BLD AUTO: 6.6 % (ref 0–13.4)
NEUTROPHILS # BLD AUTO: 0.94 K/UL (ref 2–7.15)
NEUTROPHILS NFR BLD: 34.5 % (ref 44–72)
NRBC # BLD AUTO: 0 K/UL
NRBC BLD AUTO-RTO: 0 /100 WBC
PLATELET # BLD AUTO: 99 K/UL (ref 164–446)
PMV BLD AUTO: 12.4 FL (ref 9–12.9)
POTASSIUM SERPL-SCNC: 4.3 MMOL/L (ref 3.6–5.5)
RBC # BLD AUTO: 2.82 M/UL (ref 4.2–5.4)
SODIUM SERPL-SCNC: 137 MMOL/L (ref 135–145)
WBC # BLD AUTO: 2.7 K/UL (ref 4.8–10.8)

## 2017-09-14 PROCEDURE — 85025 COMPLETE CBC W/AUTO DIFF WBC: CPT

## 2017-09-14 PROCEDURE — A9270 NON-COVERED ITEM OR SERVICE: HCPCS | Performed by: EMERGENCY MEDICINE

## 2017-09-14 PROCEDURE — 700102 HCHG RX REV CODE 250 W/ 637 OVERRIDE(OP): Performed by: EMERGENCY MEDICINE

## 2017-09-14 PROCEDURE — 99284 EMERGENCY DEPT VISIT MOD MDM: CPT

## 2017-09-14 PROCEDURE — 80048 BASIC METABOLIC PNL TOTAL CA: CPT

## 2017-09-14 RX ORDER — HYDROCODONE BITARTRATE AND ACETAMINOPHEN 10; 325 MG/1; MG/1
1-2 TABLET ORAL EVERY 6 HOURS PRN
Qty: 12 TAB | Refills: 0 | Status: SHIPPED | OUTPATIENT
Start: 2017-09-14 | End: 2017-11-03

## 2017-09-14 RX ORDER — HYDROCODONE BITARTRATE AND ACETAMINOPHEN 10; 325 MG/1; MG/1
1 TABLET ORAL ONCE
Status: COMPLETED | OUTPATIENT
Start: 2017-09-14 | End: 2017-09-14

## 2017-09-14 RX ADMIN — HYDROCODONE BITARTRATE AND ACETAMINOPHEN 1 TABLET: 10; 325 TABLET ORAL at 20:53

## 2017-09-15 ENCOUNTER — APPOINTMENT (OUTPATIENT)
Dept: ONCOLOGY | Facility: MEDICAL CENTER | Age: 62
End: 2017-09-15
Attending: INTERNAL MEDICINE
Payer: MEDICARE

## 2017-09-15 ENCOUNTER — PATIENT OUTREACH (OUTPATIENT)
Dept: HEALTH INFORMATION MANAGEMENT | Facility: OTHER | Age: 62
End: 2017-09-15

## 2017-09-15 NOTE — ED NOTES
Discharge instructions provided with Rx x 1.  Verbalized understanding of follow-up care, medication management & reasons to return to Ed.  Released in stable condition with daughter.

## 2017-09-15 NOTE — ED NOTES
"Chief Complaint   Patient presents with   • Medication Refill     BIBA, c/o body aches. Pt has hx of bone cancer, ran out of norco yesterday.      /74   Pulse 80   Temp (!) 35.4 °C (95.8 °F) (Temporal)   Resp 18   Ht 1.473 m (4' 10\")   Wt 60.3 kg (133 lb)   LMP 01/01/1995   SpO2 98%   BMI 27.80 kg/m²       Pt Informed regarding triage process and verbalized understanding to inform triage tech or RN for any changes in condition.  Placed in lobby.    "

## 2017-09-15 NOTE — DISCHARGE INSTRUCTIONS
Return immediately to the Emergency Department if you experience continuing or worsening symptoms or if you develop any new or worsening symptoms including but not limited to fever, chest pain, difficulty breathing, any numbness/weakness/tingling, abdominal pain or any other concerning symptoms.      Pain Management, Chronic  You have a painful condition that has required frequent use of narcotic-type pain medicine. We would like to see that you receive the best possible care for your problem. To achieve this, you must have a personal physician who can supervise a treatment plan for you. You may locate a personal physician on your own or contact one of the doctors whose name has been given to you.  If your physician determines that you need to visit the Emergency Department for pain control, that doctor should provide you with a pain contract. This is a letter from your doctor which describes what pain medicine you may receive, how much and how often. You sign it agreeing to the terms of the treatment plan. Bring this with each time you come to this facility. It will help the Emergency Physician provide the proper treatment for you with minimal delay.  Please Note: In the future you may not be able to receive narcotic pain medicine from this facility without a pain contract or telephone approval from your personal physician.  Document Released: 08/10/2005 Document Revised: 03/11/2013 Document Reviewed: 12/14/2009  [employer]® Patient Information ©2013 [employer].    Anemia, Frequently Asked Questions  WHAT ARE THE SYMPTOMS OF ANEMIA?  · Headache.   · Difficulty thinking.   · Fatigue.   · Shortness of breath.   · Weakness.   · Rapid heartbeat.   AT WHAT POINT ARE PEOPLE CONSIDERED ANEMIC?   This varies with gender and age.   · Both hemoglobin (Hgb) and hematocrit values are used to define anemia. These lab values are obtained from a complete blood count (CBC) test. This is performed at a caregiver's office.   · The  normal range of hemoglobin values for adult men is 14.0 g/dL to 17.4 g/dL. For nonpregnant women, values are 12.3 g/dL to 15.3 g/dL.   · The World Health Organization defines anemia as less than 12 g/dL for nonpregnant women and less than 13 g/dL for men.   · For adult males, the average normal hematocrit is 46%, and the range is 40% to 52%.   · For adult females, the average normal hematocrit is 41%, and the range is 35% to 47%.   · Values that fall below the lower limits can be a sign of anemia and should have further checking (evaluation).   GROUPS OF PEOPLE WHO ARE AT RISK FOR DEVELOPING ANEMIA INCLUDE:   · Infants who are  or taking a formula that is not fortified with iron.   · Children going through a rapid growth spurt. The iron available can not keep up with the needs for a red cell mass which must grow with the child.   · Women in childbearing years. They need iron because of blood loss during menstruation.   · Pregnant women. The growing fetus creates a high demand for iron.   · People with ongoing gastrointestinal blood loss are at risk of developing iron deficiency.   · Individuals with leukemia or cancer who must receive chemotherapy or radiation to treat their disease. The drugs or radiation used to treat these diseases often decreases the bone marrow's ability to make cells of all classes. This includes red blood cells, white blood cells, and platelets.   · Individuals with chronic inflammatory conditions such as rheumatoid arthritis or chronic infections.   · The elderly.   ARE SOME TYPES OF ANEMIA INHERITED?   · Yes, some types of anemia are due to inherited or genetic defects.   · Sickle cell anemia. This occurs most often in people of , , and Mediterranean descent.   · Thalassemia (or Groves's anemia). This type is found in people of Mediterranean and Southeast  descent. These types of anemia are common.   · Fanconi. This is rare.   CAN CERTAIN MEDICATIONS  CAUSE A PERSON TO BECOME ANEMIC?   Yes. For example, drugs to fight cancer (chemotherapeutic agents) often cause anemia. These drugs can slow the bone marrow's ability to make red blood cells. If there are not enough red blood cells, the body does not get enough oxygen.  WHAT HEMATOCRIT LEVEL IS REQUIRED TO DONATE BLOOD?   The lower limit of an acceptable hematocrit for blood donors is 38%. If you have a low hematocrit value, you should schedule an appointment with your caregiver.  ARE BLOOD TRANSFUSIONS COMMONLY USED TO CORRECT ANEMIA, AND ARE THEY DANGEROUS?   They are used to treat anemia as a last resort. Your caregiver will find the cause of the anemia and correct it if possible. Most blood transfusions are given because of excessive bleeding at the time of surgery, with trauma, or because of bone marrow suppression in patients with cancer or leukemia on chemotherapy. Blood transfusions are safer than ever before. We also know that blood transfusions affect the immune system and may increase certain risks. There is also a concern for human error. In 1/16,000 transfusions, a patient receives a transfusion of blood that is not matched with his or her blood type.   WHAT IS IRON DEFICIENCY ANEMIA AND CAN I CORRECT IT BY CHANGING MY DIET?   Iron is an essential part of hemoglobin. Without enough hemoglobin, anemia develops and the body does not get the right amount of oxygen. Iron deficiency anemia develops after the body has had a low level of iron for a long time. This is either caused by blood loss, not taking in or absorbing enough iron, or increased demands for iron (like pregnancy or rapid growth).   Foods from animal origin such as beef, chicken, and pork, are good sources of iron. Be sure to have one of these foods at each meal. Vitamin C helps your body absorb iron. Foods rich in Vitamin C include citrus, bell pepper, strawberries, spinach and cantaloupe. In some cases, iron supplements may be needed in  order to correct the iron deficiency. In the case of poor absorption, extra iron may have to be given directly into the vein through a needle (intravenously).  I HAVE BEEN DIAGNOSED WITH IRON DEFICIENCY ANEMIA AND MY CAREGIVER PRESCRIBED IRON SUPPLEMENTS. HOW LONG WILL IT TAKE FOR MY BLOOD TO BECOME NORMAL?   It depends on the degree of anemia at the beginning of treatment. Most people with mild to moderate iron deficiency, anemia will correct the anemia over a period of 2 to 3 months. But after the anemia is corrected, the iron stored by the body is still low. Caregivers often suggest an additional 6 months of oral iron therapy once the anemia has been reversed. This will help prevent the iron deficiency anemia from quickly happening again. Non-anemic adult males should take iron supplements only under the direction of a doctor, too much iron can cause liver damage.   MY HEMOGLOBIN IS 9 G/DL AND I AM SCHEDULED FOR SURGERY. SHOULD I POSTPONE THE SURGERY?   If you have Hgb of 9, you should discuss this with your caregiver right away. Many patients with similar hemoglobin levels have had surgery without problems. If minimal blood loss is expected for a minor procedure, no treatment may be necessary.   If a greater blood loss is expected for more extensive procedures, you should ask your caregiver about being treated with erythropoietin and iron. This is to accelerate the recovery of your hemoglobin to a normal level before surgery. An anemic patient who undergoes high-blood-loss surgery has a greater risk of surgical complications and need for a blood transfusion, which also carries some risk.   I HAVE BEEN TOLD THAT HEAVY MENSTRUAL PERIODS CAUSE ANEMIA. IS THERE ANYTHING I CAN DO TO PREVENT THE ANEMIA?   Anemia that results from heavy periods is usually due to iron deficiency. You can try to meet the increased demands for iron caused by the heavy monthly blood loss by increasing the intake of iron-rich foods. Iron  supplements may be required. Discuss your concerns with your caregiver.  WHAT CAUSES ANEMIA DURING PREGNANCY?   Pregnancy places major demands on the body. The mother must meet the needs of both her body and her growing baby. The body needs enough iron and folate to make the right amount of red blood cells. To prevent anemia while pregnant, the mother should stay in close contact with her caregiver.   Be sure to eat a diet that has foods rich in iron and folate like liver and dark green leafy vegetables. Folate plays an important role in the normal development of a baby's spinal cord. Folate can help prevent serious disorders like spina bifida. If your diet does not provide adequate nutrients, you may want to talk with your caregiver about nutritional supplements.   WHAT IS THE RELATIONSHIP BETWEEN FIBROID TUMORS AND ANEMIA IN WOMEN?   The relationship is usually caused by the increased menstrual blood loss caused by fibroids. Good iron intake may be required to prevent iron deficiency anemia from developing.   Document Released: 07/26/2005 Document Revised: 03/11/2013 Document Reviewed: 01/10/2012  [employer]® Patient Information ©2013 [employer].

## 2017-09-15 NOTE — DISCHARGE PLANNING
Medical Social Work:  MARYELLEN contacted by ERP to assist with transport home.  Pt has medicaid with no MTM benefits.  SW found number to daughter Shannon  [phone] and daughter will come and get Pt upon discharge.

## 2017-09-15 NOTE — ED PROVIDER NOTES
ED Provider Note    CHIEF COMPLAINT  Chief Complaint   Patient presents with   • Medication Refill       HPI  Vielka Briscoe is a 62 y.o. female who presents For evaluation of multiple complaints including generalized worsening chronic pain after running out of Norco last night, she also complains generalized weakness, she feels generally ill and she is concerned because the water company as turning off her water tomorrow. The patient has history of MDS and has a history of recurring anemia associated with this. She also is on dialysis. Has a history of COPD on continuous oxygen and also has congestive heart failure. She is unable to identify a localized source of pain stating that she hurts everywhere. She denies fever, she has not been vomiting, she does not have any focal abdominal pain. No other complaints offered at this time.    REVIEW OF SYSTEMS  Negative for fever, focal abdominal pain, vomiting. All other systems are otherwise negative    PAST MEDICAL HISTORY   has a past medical history of Anemia; Angina; Arthritis; Atrial fibrillation (CMS-HCC); Blood transfusion without reported diagnosis; Breath shortness; Cancer (CMS-HCC); Cataract; Chronic anemia; Chronic kidney disease; Chronic obstructive pulmonary disease (CMS-HCC); Congestive heart failure (CMS-HCC); Dental disorder; Diabetes; Diabetes; Dialysis patient; Glaucoma; Heart abnormalities; Hypertension; MDS (myelodysplastic syndrome) (10/2016); Pain; Renal disorder; Stroke (CMS-HCC) (03/2015); and Supplemental oxygen dependent.    SOCIAL HISTORY  Social History     Social History Main Topics   • Smoking status: Never Smoker   • Smokeless tobacco: Never Used   • Alcohol use No   • Drug use: No   • Sexual activity: Not on file       SURGICAL HISTORY   has a past surgical history that includes other cardiac surgery; other abdominal surgery; gyn surgery; gyn surgery; gyn surgery; other cardiac surgery; other; retinal detachment repair (Right); av  "fistula creation (Left, 2/8/2016); other abdominal surgery; gastroscopy (12/17/2015); colonoscopy (12/17/2015); and vein ligation (Left, 11/10/2016).    CURRENT MEDICATIONS  I personally reviewed the medication list in the charting documentation.     ALLERGIES  Allergies   Allergen Reactions   • Actos [Pioglitazone Hydrochloride] Unspecified     Cause blindness   IXP=2359   • Darvocet [Propoxyphene N-Apap] Vomiting     ZPE=7857   • Demerol Vomiting     IUU=0073   • Glucophage [Metformin Hydrochloride] Vomiting     BQD=4852   • Morphine Vomiting     RPB=0560   • Oxycodone Vomiting     RXN=1/2016   • Pcn [Penicillins] Vomiting     NZJ=4219     • Requip Vomiting     RXN=12/2015   • Simvastatin Unspecified     Leg cramps  USA=9513   • Sulfa Drugs Rash     RXN=>10 years   • Tramadol Vomiting     HHS=0725   • Trazodone Vomiting     KEA=9241   • Demerol    • Dilaudid [Hydromorphone] Vomiting     Unknown    • Diphenhydramine Vomiting   • Iron      vomiting   • Lenalidomide Vomiting   • Morphine    • Multivitamin      itching   • Naprosyn [Naproxen]    • Other Drug      Any binders that remove phosphorus from the body such as tums   • Sulfa Drugs        PHYSICAL EXAM  VITAL SIGNS: /74   Pulse 80   Temp (!) 35.4 °C (95.8 °F) (Temporal)   Resp 18   Ht 1.473 m (4' 10\")   Wt 60.3 kg (133 lb)   LMP 01/01/1995   SpO2 98%   BMI 27.80 kg/m²   Constitutional: Chronically ill-appearing but does not appear acutely toxic..  HENT: No signs of trauma. Edentulous.  Eyes: Conjunctiva normal, Non-icteric.   Chest: Normal nonlabored respirations, lungs are clear to auscultation bilaterally. Heart is regular in both rate and rhythm.  Abdomen: Soft, nontender, no distention.  Skin: No erythema, No rash.   Musculoskeletal: Good range of motion in all major joints.   Neurologic: Alert, No focal deficits noted.   Psychiatric: Depressed affect, tearful.    DIAGNOSTIC STUDIES / PROCEDURES    LABS  Results for orders placed or performed " during the hospital encounter of 09/14/17   CBC WITH DIFFERENTIAL   Result Value Ref Range    WBC 2.7 (L) 4.8 - 10.8 K/uL    RBC 2.82 (L) 4.20 - 5.40 M/uL    Hemoglobin 8.8 (L) 12.0 - 16.0 g/dL    Hematocrit 26.0 (L) 37.0 - 47.0 %    MCV 92.2 81.4 - 97.8 fL    MCH 31.2 27.0 - 33.0 pg    MCHC 33.8 33.6 - 35.0 g/dL    RDW 48.3 35.9 - 50.0 fL    Platelet Count 99 (L) 164 - 446 K/uL    MPV 12.4 9.0 - 12.9 fL    Neutrophils-Polys 34.50 (L) 44.00 - 72.00 %    Lymphocytes 56.40 (H) 22.00 - 41.00 %    Monocytes 6.60 0.00 - 13.40 %    Eosinophils 1.80 0.00 - 6.90 %    Basophils 0.00 0.00 - 1.80 %    Immature Granulocytes 0.70 0.00 - 0.90 %    Nucleated RBC 0.00 /100 WBC    Neutrophils (Absolute) 0.94 (L) 2.00 - 7.15 K/uL    Lymphs (Absolute) 1.54 1.00 - 4.80 K/uL    Monos (Absolute) 0.18 0.00 - 0.85 K/uL    Eos (Absolute) 0.05 0.00 - 0.51 K/uL    Baso (Absolute) 0.00 0.00 - 0.12 K/uL    Immature Granulocytes (abs) 0.02 0.00 - 0.11 K/uL    NRBC (Absolute) 0.00 K/uL   BASIC METABOLIC PANEL   Result Value Ref Range    Sodium 137 135 - 145 mmol/L    Potassium 4.3 3.6 - 5.5 mmol/L    Chloride 97 96 - 112 mmol/L    Co2 26 20 - 33 mmol/L    Glucose 136 (H) 65 - 99 mg/dL    Bun 54 (H) 8 - 22 mg/dL    Creatinine 4.99 (H) 0.50 - 1.40 mg/dL    Calcium 8.2 (L) 8.5 - 10.5 mg/dL    Anion Gap 14.0 (H) 0.0 - 11.9   ESTIMATED GFR   Result Value Ref Range    GFR If  11 (A) >60 mL/min/1.73 m 2    GFR If Non African American 9 (A) >60 mL/min/1.73 m 2         COURSE & MEDICAL DECISION MAKING  Pertinent Labs & Imaging studies reviewed. (See chart for details)  Differential diagnosis includes: Anemia, electrolyte abnormalities,    Encounter Summary: This is a 62 y.o. female with generalized pain after running out of Norco last night, she also feels generally weak, has a comp acute past medical history for multiple chronic comorbidities. Does not appear acutely on exam. She is concerned because the water company shutting off her  water tomorrow, she also is concerned about not having a ride home. I will check a blood test and have social work involved early on in the case to aid in disposition. Will treat with a 10/325 Norco ------ blood work is unremarkable for acute abnormalities, patient's pain is controlled, social work has evaluated her regarding some of her living situations, they contacted the daughter who is coming to pick her up. Strict return instructions discussed, will be prescribed 10/25 [place] chronic databases queried.      DISPOSITION: Discharge Home      FINAL IMPRESSION  1. Generalized pain    2. Chronic anemia    3. Unhappiness        This dictation was created using voice recognition software. The accuracy of the dictation is limited to the abilities of the software. I expect there may be some errors of grammar and possibly content. The nursing notes were reviewed and certain aspects of this information were incorporated into this note.    Electronically signed by: Dawit Leroy, 9/14/2017 8:34 PM

## 2017-09-19 ENCOUNTER — APPOINTMENT (OUTPATIENT)
Dept: ADMISSIONS | Facility: MEDICAL CENTER | Age: 62
End: 2017-09-19
Attending: HOSPITALIST
Payer: MEDICARE

## 2017-09-20 ENCOUNTER — RESOLUTE PROFESSIONAL BILLING HOSPITAL PROF FEE (OUTPATIENT)
Dept: HOSPITALIST | Facility: MEDICAL CENTER | Age: 62
End: 2017-09-20
Payer: MEDICARE

## 2017-09-20 ENCOUNTER — HOSPITAL ENCOUNTER (OUTPATIENT)
Facility: MEDICAL CENTER | Age: 62
End: 2017-09-20
Attending: HOSPITALIST | Admitting: HOSPITALIST
Payer: MEDICARE

## 2017-09-20 VITALS
SYSTOLIC BLOOD PRESSURE: 112 MMHG | HEART RATE: 68 BPM | OXYGEN SATURATION: 100 % | BODY MASS INDEX: 30.76 KG/M2 | HEIGHT: 59 IN | WEIGHT: 152.56 LBS | DIASTOLIC BLOOD PRESSURE: 57 MMHG | TEMPERATURE: 99.2 F | RESPIRATION RATE: 16 BRPM

## 2017-09-20 PROBLEM — D64.9 ANEMIA, UNSPECIFIED: Status: ACTIVE | Noted: 2017-09-20

## 2017-09-20 LAB
BASOPHILS # BLD AUTO: 0 % (ref 0–1.8)
BASOPHILS # BLD: 0 K/UL (ref 0–0.12)
EOSINOPHIL # BLD AUTO: 0.03 K/UL (ref 0–0.51)
EOSINOPHIL NFR BLD: 1 % (ref 0–6.9)
ERYTHROCYTE [DISTWIDTH] IN BLOOD BY AUTOMATED COUNT: 47.1 FL (ref 35.9–50)
GLUCOSE BLD-MCNC: 220 MG/DL (ref 65–99)
HCT VFR BLD AUTO: 21.2 % (ref 37–47)
HGB BLD-MCNC: 7.2 G/DL (ref 12–16)
HGB RETIC QN AUTO: 37.8 PG/CELL (ref 29–35)
IMM GRANULOCYTES # BLD AUTO: 0.02 K/UL (ref 0–0.11)
IMM GRANULOCYTES NFR BLD AUTO: 0.7 % (ref 0–0.9)
IMM RETICS NFR: 14.9 % (ref 9.3–17.4)
LYMPHOCYTES # BLD AUTO: 1.35 K/UL (ref 1–4.8)
LYMPHOCYTES NFR BLD: 45.6 % (ref 22–41)
MCH RBC QN AUTO: 30.8 PG (ref 27–33)
MCHC RBC AUTO-ENTMCNC: 34 G/DL (ref 33.6–35)
MCV RBC AUTO: 90.6 FL (ref 81.4–97.8)
MONOCYTES # BLD AUTO: 0.26 K/UL (ref 0–0.85)
MONOCYTES NFR BLD AUTO: 8.8 % (ref 0–13.4)
NEUTROPHILS # BLD AUTO: 1.3 K/UL (ref 2–7.15)
NEUTROPHILS NFR BLD: 43.9 % (ref 44–72)
NRBC # BLD AUTO: 0 K/UL
NRBC BLD AUTO-RTO: 0 /100 WBC
PLATELET # BLD AUTO: 74 K/UL (ref 164–446)
PMV BLD AUTO: 13.5 FL (ref 9–12.9)
RBC # BLD AUTO: 2.34 M/UL (ref 4.2–5.4)
RETICS # AUTO: 0.01 M/UL (ref 0.04–0.06)
RETICS/RBC NFR: 0.6 % (ref 0.8–2.1)
WBC # BLD AUTO: 3 K/UL (ref 4.8–10.8)

## 2017-09-20 PROCEDURE — 85025 COMPLETE CBC W/AUTO DIFF WBC: CPT

## 2017-09-20 PROCEDURE — 82962 GLUCOSE BLOOD TEST: CPT

## 2017-09-20 PROCEDURE — 88313 SPECIAL STAINS GROUP 2: CPT

## 2017-09-20 PROCEDURE — 700105 HCHG RX REV CODE 258: Performed by: HOSPITALIST

## 2017-09-20 PROCEDURE — 88264 CHROMOSOME ANALYSIS 20-25: CPT

## 2017-09-20 PROCEDURE — 88305 TISSUE EXAM BY PATHOLOGIST: CPT

## 2017-09-20 PROCEDURE — 88311 DECALCIFY TISSUE: CPT

## 2017-09-20 PROCEDURE — 160048 HCHG OR STATISTICAL LEVEL 1-5: Performed by: HOSPITALIST

## 2017-09-20 PROCEDURE — 160002 HCHG RECOVERY MINUTES (STAT): Performed by: HOSPITALIST

## 2017-09-20 PROCEDURE — 700111 HCHG RX REV CODE 636 W/ 250 OVERRIDE (IP)

## 2017-09-20 PROCEDURE — 85046 RETICYTE/HGB CONCENTRATE: CPT

## 2017-09-20 PROCEDURE — 99152 MOD SED SAME PHYS/QHP 5/>YRS: CPT | Performed by: HOSPITALIST

## 2017-09-20 PROCEDURE — 38221 DX BONE MARROW BIOPSIES: CPT | Performed by: HOSPITALIST

## 2017-09-20 PROCEDURE — 88184 FLOWCYTOMETRY/ TC 1 MARKER: CPT

## 2017-09-20 PROCEDURE — 88237 TISSUE CULTURE BONE MARROW: CPT

## 2017-09-20 PROCEDURE — 160035 HCHG PACU - 1ST 60 MINS PHASE I: Performed by: HOSPITALIST

## 2017-09-20 PROCEDURE — 88280 CHROMOSOME KARYOTYPE STUDY: CPT

## 2017-09-20 PROCEDURE — 88185 FLOWCYTOMETRY/TC ADD-ON: CPT | Mod: 91

## 2017-09-20 PROCEDURE — 160027 HCHG SURGERY MINUTES - 1ST 30 MINS LEVEL 2: Performed by: HOSPITALIST

## 2017-09-20 RX ORDER — SODIUM CHLORIDE 9 MG/ML
500 INJECTION, SOLUTION INTRAVENOUS
Status: DISCONTINUED | OUTPATIENT
Start: 2017-09-20 | End: 2017-09-20 | Stop reason: HOSPADM

## 2017-09-20 RX ORDER — MIDAZOLAM HYDROCHLORIDE 1 MG/ML
INJECTION INTRAMUSCULAR; INTRAVENOUS
Status: DISCONTINUED | OUTPATIENT
Start: 2017-09-20 | End: 2017-09-20 | Stop reason: HOSPADM

## 2017-09-20 ASSESSMENT — PAIN SCALES - GENERAL
PAINLEVEL_OUTOF10: 0

## 2017-09-20 NOTE — OR NURSING
"RECEIVED FROM [facility] WITH TRANSPORT.  PATIENT AWAKE.  VSS.  BAND-AID ON LEFT HIP WITH VERY SMALL \"DOT\" OF DRIED BLOOD UNDERNEATH.  DENIES PAIN/NAUSEA.  GIVEN JUICE AND A SANDWICH.  RIDE CALLED.  1330 UP AND DRESSED.  FEELS READY FOR DISCHARGE.  INSTRUCTIONS GIVEN.    1400 DISCHARGED TO HOME  "

## 2017-09-20 NOTE — DISCHARGE INSTRUCTIONS
BONE MARROW ASPIRATION & BIOPSY DISCHARGE INSTRUCTIONS    1. After the numbing medicine wears off, you may feel some discomfort.    2. Keep bandage clean and dry for 24 hours.  After this time, you can change the bandage.  You may now bathe or shower.    3. If bleeding occurs after your bone marrow aspiration or biopsy, apply pressure to the area and call your doctor immediately.    4. Call your doctor if pain persists for greater than 24 hours in the area where you had your aspiration or biopsy.    5. Call your doctor immediately if you notice redness or drainage in the area or if you have a fever.    6. Call your doctor if you have numbness or weakness in the area where the doctor took the bone marrow or down your leg.    7. Do not drive or drink alcohol for 24 hours if you have had sedation medication.  The medication will make you drowsy.    8. Resume your regular diet.    9. Follow up with your doctor        I acknowledge receipt and understanding of these Home Care Instructions.

## 2017-09-20 NOTE — PROCEDURES
Procedure- bone marrow biopsy  Indication- MDS    Consent obtained prior to procedure  concious sedation achieved with versed and fentanyl  Patient prepped and draped in sterile fashion  Lidocaine used for anesthesia  Samshidi introduced into the _____left___ posterior iliac crest  Heparinized and nonheparinized samples obtained  Bone marrow aspirate was performed in addition to the Core biopsy  Core sample then obtained from the same incision site  No complications  EBL 0cc

## 2017-09-20 NOTE — OR NURSING
"Pt arrived early for procedure, placed in QR1 for comfort, on 2 L oxygen NC, had call light and understands how to use it and T.V. Remote. Pt states she is \"legally blind.\"   "

## 2017-09-28 ENCOUNTER — HOSPITAL ENCOUNTER (OUTPATIENT)
Facility: MEDICAL CENTER | Age: 62
End: 2017-09-29
Attending: EMERGENCY MEDICINE | Admitting: HOSPITALIST
Payer: MEDICARE

## 2017-09-28 ENCOUNTER — RESOLUTE PROFESSIONAL BILLING HOSPITAL PROF FEE (OUTPATIENT)
Dept: HOSPITALIST | Facility: MEDICAL CENTER | Age: 62
End: 2017-09-28
Payer: MEDICARE

## 2017-09-28 ENCOUNTER — APPOINTMENT (OUTPATIENT)
Dept: RADIOLOGY | Facility: MEDICAL CENTER | Age: 62
End: 2017-09-28
Attending: EMERGENCY MEDICINE
Payer: MEDICARE

## 2017-09-28 DIAGNOSIS — D64.9 SYMPTOMATIC ANEMIA: ICD-10-CM

## 2017-09-28 DIAGNOSIS — N18.9 CHRONIC RENAL FAILURE, UNSPECIFIED STAGE: ICD-10-CM

## 2017-09-28 LAB
ABO GROUP BLD: NORMAL
ALBUMIN SERPL BCP-MCNC: 3.6 G/DL (ref 3.2–4.9)
ALBUMIN/GLOB SERPL: 1 G/DL
ALP SERPL-CCNC: 65 U/L (ref 30–99)
ALT SERPL-CCNC: 13 U/L (ref 2–50)
ANION GAP SERPL CALC-SCNC: 13 MMOL/L (ref 0–11.9)
AST SERPL-CCNC: 13 U/L (ref 12–45)
BARCODED ABORH UBTYP: 600
BARCODED ABORH UBTYP: 600
BARCODED ABORH UBTYP: 6200
BARCODED PRD CODE UBPRD: NORMAL
BARCODED UNIT NUM UBUNT: NORMAL
BASOPHILS # BLD AUTO: 0 % (ref 0–1.8)
BASOPHILS # BLD: 0 K/UL (ref 0–0.12)
BILIRUB SERPL-MCNC: 0.4 MG/DL (ref 0.1–1.5)
BLD GP AB SCN SERPL QL: NORMAL
BUN SERPL-MCNC: 51 MG/DL (ref 8–22)
CALCIUM SERPL-MCNC: 8.2 MG/DL (ref 8.5–10.5)
CHLORIDE SERPL-SCNC: 94 MMOL/L (ref 96–112)
CO2 SERPL-SCNC: 29 MMOL/L (ref 20–33)
COMPONENT R 8504R: NORMAL
CREAT SERPL-MCNC: 5.86 MG/DL (ref 0.5–1.4)
EKG IMPRESSION: NORMAL
EKG IMPRESSION: NORMAL
EOSINOPHIL # BLD AUTO: 0.05 K/UL (ref 0–0.51)
EOSINOPHIL NFR BLD: 1.5 % (ref 0–6.9)
ERYTHROCYTE [DISTWIDTH] IN BLOOD BY AUTOMATED COUNT: 49 FL (ref 35.9–50)
GFR SERPL CREATININE-BSD FRML MDRD: 7 ML/MIN/1.73 M 2
GLOBULIN SER CALC-MCNC: 3.6 G/DL (ref 1.9–3.5)
GLUCOSE SERPL-MCNC: 135 MG/DL (ref 65–99)
HCT VFR BLD AUTO: 17.3 % (ref 37–47)
HGB BLD-MCNC: 5.9 G/DL (ref 12–16)
IMM GRANULOCYTES # BLD AUTO: 0.02 K/UL (ref 0–0.11)
IMM GRANULOCYTES NFR BLD AUTO: 0.6 % (ref 0–0.9)
LYMPHOCYTES # BLD AUTO: 1.31 K/UL (ref 1–4.8)
LYMPHOCYTES NFR BLD: 40.2 % (ref 22–41)
MCH RBC QN AUTO: 31.4 PG (ref 27–33)
MCHC RBC AUTO-ENTMCNC: 34.1 G/DL (ref 33.6–35)
MCV RBC AUTO: 92 FL (ref 81.4–97.8)
MONOCYTES # BLD AUTO: 0.28 K/UL (ref 0–0.85)
MONOCYTES NFR BLD AUTO: 8.6 % (ref 0–13.4)
NEUTROPHILS # BLD AUTO: 1.6 K/UL (ref 2–7.15)
NEUTROPHILS NFR BLD: 49.1 % (ref 44–72)
NRBC # BLD AUTO: 0 K/UL
NRBC BLD AUTO-RTO: 0 /100 WBC
PLATELET # BLD AUTO: 146 K/UL (ref 164–446)
PMV BLD AUTO: 12.9 FL (ref 9–12.9)
POTASSIUM SERPL-SCNC: 4.3 MMOL/L (ref 3.6–5.5)
PRODUCT TYPE UPROD: NORMAL
PROT SERPL-MCNC: 7.2 G/DL (ref 6–8.2)
RBC # BLD AUTO: 1.88 M/UL (ref 4.2–5.4)
RH BLD: NORMAL
SODIUM SERPL-SCNC: 136 MMOL/L (ref 135–145)
TROPONIN I SERPL-MCNC: <0.01 NG/ML (ref 0–0.04)
UNIT STATUS USTAT: NORMAL
WBC # BLD AUTO: 3.3 K/UL (ref 4.8–10.8)

## 2017-09-28 PROCEDURE — 86923 COMPATIBILITY TEST ELECTRIC: CPT

## 2017-09-28 PROCEDURE — G0378 HOSPITAL OBSERVATION PER HR: HCPCS

## 2017-09-28 PROCEDURE — P9016 RBC LEUKOCYTES REDUCED: HCPCS

## 2017-09-28 PROCEDURE — 93005 ELECTROCARDIOGRAM TRACING: CPT

## 2017-09-28 PROCEDURE — 36415 COLL VENOUS BLD VENIPUNCTURE: CPT

## 2017-09-28 PROCEDURE — 86901 BLOOD TYPING SEROLOGIC RH(D): CPT

## 2017-09-28 PROCEDURE — A9270 NON-COVERED ITEM OR SERVICE: HCPCS | Performed by: HOSPITALIST

## 2017-09-28 PROCEDURE — 99285 EMERGENCY DEPT VISIT HI MDM: CPT

## 2017-09-28 PROCEDURE — 71010 DX-CHEST-PORTABLE (1 VIEW): CPT

## 2017-09-28 PROCEDURE — 99219 PR INITIAL OBSERVATION CARE,LEVL II: CPT | Performed by: HOSPITALIST

## 2017-09-28 PROCEDURE — 84484 ASSAY OF TROPONIN QUANT: CPT

## 2017-09-28 PROCEDURE — 86850 RBC ANTIBODY SCREEN: CPT

## 2017-09-28 PROCEDURE — 700102 HCHG RX REV CODE 250 W/ 637 OVERRIDE(OP): Performed by: HOSPITALIST

## 2017-09-28 PROCEDURE — 70450 CT HEAD/BRAIN W/O DYE: CPT

## 2017-09-28 PROCEDURE — 86644 CMV ANTIBODY: CPT

## 2017-09-28 PROCEDURE — 80053 COMPREHEN METABOLIC PANEL: CPT

## 2017-09-28 PROCEDURE — 36430 TRANSFUSION BLD/BLD COMPNT: CPT

## 2017-09-28 PROCEDURE — 86900 BLOOD TYPING SEROLOGIC ABO: CPT

## 2017-09-28 PROCEDURE — 93005 ELECTROCARDIOGRAM TRACING: CPT | Performed by: EMERGENCY MEDICINE

## 2017-09-28 PROCEDURE — 85025 COMPLETE CBC W/AUTO DIFF WBC: CPT

## 2017-09-28 RX ORDER — BRIMONIDINE TARTRATE AND TIMOLOL MALEATE 2; 5 MG/ML; MG/ML
1 SOLUTION OPHTHALMIC 2 TIMES DAILY
Status: DISCONTINUED | OUTPATIENT
Start: 2017-09-28 | End: 2017-09-28

## 2017-09-28 RX ORDER — AMLODIPINE BESYLATE 10 MG/1
10 TABLET ORAL DAILY
Status: DISCONTINUED | OUTPATIENT
Start: 2017-09-29 | End: 2017-09-29 | Stop reason: HOSPADM

## 2017-09-28 RX ORDER — ACETAMINOPHEN 325 MG/1
650 TABLET ORAL EVERY 6 HOURS PRN
Status: DISCONTINUED | OUTPATIENT
Start: 2017-09-28 | End: 2017-09-29 | Stop reason: HOSPADM

## 2017-09-28 RX ORDER — MECLIZINE HYDROCHLORIDE 25 MG/1
25 TABLET ORAL EVERY 8 HOURS
Status: DISCONTINUED | OUTPATIENT
Start: 2017-09-28 | End: 2017-09-29 | Stop reason: HOSPADM

## 2017-09-28 RX ORDER — CYCLOBENZAPRINE HCL 10 MG
10 TABLET ORAL 3 TIMES DAILY PRN
Status: DISCONTINUED | OUTPATIENT
Start: 2017-09-28 | End: 2017-09-29 | Stop reason: HOSPADM

## 2017-09-28 RX ORDER — HYDROCODONE BITARTRATE AND ACETAMINOPHEN 10; 325 MG/1; MG/1
1-2 TABLET ORAL EVERY 6 HOURS PRN
Status: DISCONTINUED | OUTPATIENT
Start: 2017-09-28 | End: 2017-09-29 | Stop reason: HOSPADM

## 2017-09-28 RX ORDER — DEXTROSE MONOHYDRATE 25 G/50ML
25 INJECTION, SOLUTION INTRAVENOUS
Status: DISCONTINUED | OUTPATIENT
Start: 2017-09-28 | End: 2017-09-29 | Stop reason: HOSPADM

## 2017-09-28 RX ORDER — BRIMONIDINE TARTRATE 2 MG/ML
1 SOLUTION/ DROPS OPHTHALMIC 2 TIMES DAILY
Status: DISCONTINUED | OUTPATIENT
Start: 2017-09-28 | End: 2017-09-29 | Stop reason: HOSPADM

## 2017-09-28 RX ORDER — LATANOPROST 50 UG/ML
1 SOLUTION/ DROPS OPHTHALMIC EVERY EVENING
Status: DISCONTINUED | OUTPATIENT
Start: 2017-09-28 | End: 2017-09-29 | Stop reason: HOSPADM

## 2017-09-28 RX ORDER — POLYETHYLENE GLYCOL 3350 17 G/17G
1 POWDER, FOR SOLUTION ORAL
Status: DISCONTINUED | OUTPATIENT
Start: 2017-09-28 | End: 2017-09-29 | Stop reason: HOSPADM

## 2017-09-28 RX ORDER — BISACODYL 10 MG
10 SUPPOSITORY, RECTAL RECTAL
Status: DISCONTINUED | OUTPATIENT
Start: 2017-09-28 | End: 2017-09-29 | Stop reason: HOSPADM

## 2017-09-28 RX ORDER — PREGABALIN 25 MG/1
50 CAPSULE ORAL 2 TIMES DAILY
Status: DISCONTINUED | OUTPATIENT
Start: 2017-09-28 | End: 2017-09-29 | Stop reason: HOSPADM

## 2017-09-28 RX ORDER — AMOXICILLIN 250 MG
2 CAPSULE ORAL 2 TIMES DAILY
Status: DISCONTINUED | OUTPATIENT
Start: 2017-09-28 | End: 2017-09-29 | Stop reason: HOSPADM

## 2017-09-28 RX ORDER — CARVEDILOL 6.25 MG/1
12.5 TABLET ORAL 2 TIMES DAILY
Status: DISCONTINUED | OUTPATIENT
Start: 2017-09-28 | End: 2017-09-29 | Stop reason: HOSPADM

## 2017-09-28 RX ORDER — TIMOLOL MALEATE 5 MG/ML
1 SOLUTION/ DROPS OPHTHALMIC 2 TIMES DAILY
Status: DISCONTINUED | OUTPATIENT
Start: 2017-09-28 | End: 2017-09-29 | Stop reason: HOSPADM

## 2017-09-28 RX ORDER — HYDRALAZINE HYDROCHLORIDE 20 MG/ML
20 INJECTION INTRAMUSCULAR; INTRAVENOUS EVERY 6 HOURS PRN
Status: DISCONTINUED | OUTPATIENT
Start: 2017-09-28 | End: 2017-09-29 | Stop reason: HOSPADM

## 2017-09-28 RX ADMIN — CARVEDILOL 12.5 MG: 6.25 TABLET, FILM COATED ORAL at 23:50

## 2017-09-28 RX ADMIN — PREGABALIN 50 MG: 25 CAPSULE ORAL at 23:50

## 2017-09-28 ASSESSMENT — PATIENT HEALTH QUESTIONNAIRE - PHQ9
SUM OF ALL RESPONSES TO PHQ9 QUESTIONS 1 AND 2: 0
1. LITTLE INTEREST OR PLEASURE IN DOING THINGS: NOT AT ALL
SUM OF ALL RESPONSES TO PHQ QUESTIONS 1-9: 0
2. FEELING DOWN, DEPRESSED, IRRITABLE, OR HOPELESS: NOT AT ALL

## 2017-09-28 ASSESSMENT — LIFESTYLE VARIABLES
ALCOHOL_USE: NO
EVER_SMOKED: NEVER

## 2017-09-28 ASSESSMENT — PAIN SCALES - GENERAL: PAINLEVEL_OUTOF10: 7

## 2017-09-29 VITALS
WEIGHT: 138.45 LBS | TEMPERATURE: 97 F | OXYGEN SATURATION: 100 % | DIASTOLIC BLOOD PRESSURE: 69 MMHG | HEART RATE: 68 BPM | BODY MASS INDEX: 29.06 KG/M2 | SYSTOLIC BLOOD PRESSURE: 157 MMHG | RESPIRATION RATE: 15 BRPM | HEIGHT: 58 IN

## 2017-09-29 PROBLEM — I48.91 ATRIAL FIBRILLATION (HCC): Status: RESOLVED | Noted: 2017-02-20 | Resolved: 2017-09-29

## 2017-09-29 PROBLEM — E11.8 DIABETES MELLITUS WITH COMPLICATION (HCC): Status: RESOLVED | Noted: 2017-03-20 | Resolved: 2017-09-29

## 2017-09-29 PROBLEM — R07.9 CHEST PAIN: Status: RESOLVED | Noted: 2017-02-20 | Resolved: 2017-09-29

## 2017-09-29 PROBLEM — K76.0 FATTY LIVER: Status: ACTIVE | Noted: 2017-09-29

## 2017-09-29 LAB
ERYTHROCYTE [DISTWIDTH] IN BLOOD BY AUTOMATED COUNT: 44.8 FL (ref 35.9–50)
GLUCOSE BLD-MCNC: 121 MG/DL (ref 65–99)
GLUCOSE BLD-MCNC: 182 MG/DL (ref 65–99)
GLUCOSE BLD-MCNC: 192 MG/DL (ref 65–99)
GLUCOSE BLD-MCNC: 193 MG/DL (ref 65–99)
HCT VFR BLD AUTO: 20.6 % (ref 37–47)
HGB BLD-MCNC: 7.1 G/DL (ref 12–16)
MCH RBC QN AUTO: 29.8 PG (ref 27–33)
MCHC RBC AUTO-ENTMCNC: 34.5 G/DL (ref 33.6–35)
MCV RBC AUTO: 86.6 FL (ref 81.4–97.8)
PLATELET # BLD AUTO: 120 K/UL (ref 164–446)
PMV BLD AUTO: 12.4 FL (ref 9–12.9)
RBC # BLD AUTO: 2.38 M/UL (ref 4.2–5.4)
WBC # BLD AUTO: 3.6 K/UL (ref 4.8–10.8)

## 2017-09-29 PROCEDURE — 36415 COLL VENOUS BLD VENIPUNCTURE: CPT

## 2017-09-29 PROCEDURE — 700101 HCHG RX REV CODE 250: Performed by: HOSPITALIST

## 2017-09-29 PROCEDURE — 700111 HCHG RX REV CODE 636 W/ 250 OVERRIDE (IP): Performed by: HOSPITALIST

## 2017-09-29 PROCEDURE — 36430 TRANSFUSION BLD/BLD COMPNT: CPT

## 2017-09-29 PROCEDURE — 700102 HCHG RX REV CODE 250 W/ 637 OVERRIDE(OP): Performed by: HOSPITALIST

## 2017-09-29 PROCEDURE — 85027 COMPLETE CBC AUTOMATED: CPT

## 2017-09-29 PROCEDURE — G0378 HOSPITAL OBSERVATION PER HR: HCPCS

## 2017-09-29 PROCEDURE — A9270 NON-COVERED ITEM OR SERVICE: HCPCS | Performed by: HOSPITALIST

## 2017-09-29 PROCEDURE — 96372 THER/PROPH/DIAG INJ SC/IM: CPT

## 2017-09-29 PROCEDURE — 86644 CMV ANTIBODY: CPT

## 2017-09-29 PROCEDURE — 82962 GLUCOSE BLOOD TEST: CPT | Mod: 91

## 2017-09-29 PROCEDURE — 86923 COMPATIBILITY TEST ELECTRIC: CPT

## 2017-09-29 PROCEDURE — P9016 RBC LEUKOCYTES REDUCED: HCPCS | Mod: 91

## 2017-09-29 PROCEDURE — 99217 PR OBSERVATION CARE DISCHARGE: CPT | Performed by: INTERNAL MEDICINE

## 2017-09-29 PROCEDURE — 96374 THER/PROPH/DIAG INJ IV PUSH: CPT

## 2017-09-29 PROCEDURE — 90935 HEMODIALYSIS ONE EVALUATION: CPT

## 2017-09-29 RX ADMIN — MECLIZINE HYDROCHLORIDE 25 MG: 25 TABLET ORAL at 08:19

## 2017-09-29 RX ADMIN — HYDROCODONE BITARTRATE AND ACETAMINOPHEN 1 TABLET: 10; 325 TABLET ORAL at 06:49

## 2017-09-29 RX ADMIN — LATANOPROST 1 DROP: 50 SOLUTION OPHTHALMIC at 00:25

## 2017-09-29 RX ADMIN — TIMOLOL MALEATE 1 DROP: 5 SOLUTION OPHTHALMIC at 00:22

## 2017-09-29 RX ADMIN — BRIMONIDINE TARTRATE 1 DROP: 2 SOLUTION OPHTHALMIC at 00:24

## 2017-09-29 RX ADMIN — BRIMONIDINE TARTRATE 1 DROP: 2 SOLUTION OPHTHALMIC at 09:48

## 2017-09-29 RX ADMIN — SITAGLIPTIN 25 MG: 25 TABLET, FILM COATED ORAL at 09:50

## 2017-09-29 RX ADMIN — HYDRALAZINE HYDROCHLORIDE 20 MG: 20 INJECTION INTRAMUSCULAR; INTRAVENOUS at 00:23

## 2017-09-29 RX ADMIN — INSULIN LISPRO 2 UNITS: 100 INJECTION, SOLUTION INTRAVENOUS; SUBCUTANEOUS at 18:17

## 2017-09-29 RX ADMIN — TIMOLOL MALEATE 1 DROP: 5 SOLUTION OPHTHALMIC at 09:49

## 2017-09-29 RX ADMIN — CARVEDILOL 12.5 MG: 6.25 TABLET, FILM COATED ORAL at 09:46

## 2017-09-29 RX ADMIN — PREGABALIN 50 MG: 25 CAPSULE ORAL at 09:46

## 2017-09-29 RX ADMIN — AMLODIPINE BESYLATE 10 MG: 10 TABLET ORAL at 09:46

## 2017-09-29 RX ADMIN — CYCLOBENZAPRINE 10 MG: 10 TABLET, FILM COATED ORAL at 04:29

## 2017-09-29 RX ADMIN — INSULIN LISPRO 2 UNITS: 100 INJECTION, SOLUTION INTRAVENOUS; SUBCUTANEOUS at 10:46

## 2017-09-29 ASSESSMENT — ENCOUNTER SYMPTOMS
SPEECH CHANGE: 0
HEADACHES: 0
WEAKNESS: 1
ABDOMINAL PAIN: 0
COUGH: 0
PALPITATIONS: 0
VOMITING: 0
STRIDOR: 0
BACK PAIN: 0
DEPRESSION: 0
EYE DISCHARGE: 0
FEVER: 0
NERVOUS/ANXIOUS: 0
NAUSEA: 0
CHILLS: 1
DIZZINESS: 0
SHORTNESS OF BREATH: 0
DIARRHEA: 0

## 2017-09-29 ASSESSMENT — PAIN SCALES - GENERAL
PAINLEVEL_OUTOF10: 7
PAINLEVEL_OUTOF10: 7

## 2017-09-29 NOTE — PROGRESS NOTES
[facility] Progress Note    Hemodialysis treatment ordered today per Dr. Starks x 3 hours. Treatment initiated at 1309, ended at 1610.     Patient tolerated tx; see paper flow sheet for details. Patient received 1 unit of PRBC during HD. No s/s of adverse reaction.     Net UF 2000 mL.     Needles removed from access site. Dressings applied and sites held x 10 minutes; verified no bleeding. Positive bruit/thrill post tx. Staff RN instructed to monitor AVF for breakthrough bleeding. Should breakthrough bleeding occur staff RN instructed to apply pressure to access sites until bleeding resolved. Notify Dialysis and Nephrologist for follow-up.    Report given to Primary RN.

## 2017-09-29 NOTE — DISCHARGE PLANNING
Met with pt during POC rounds.  Discussed POC for pt the for hospital course of treatment.  Discussed transportation for home, pt informed CM that she wasn't eligible for [employer]TM rides ([employer] contacted and this was confirmed).  She also stated that she had no one that could give her a ride.  A little longer of a discussion she admitted that she could call her dtr to come get her.  CM encouraged her to that.  She verbalized that she would do that post her dialysis today.  Pt also wanted to know about future transfusions.  CM informed her that she would need to work with her dialysis SW and nephrologist for these infusions.  She could receive these infusions as a planned treatment from the infusion center.      Care Transition Team Assessment    Information Source  Orientation : Oriented x 4  Information Given By: Patient  Who is responsible for making decisions for patient? : Patient         Elopement Risk  Legal Hold: No  Ambulatory or Self Mobile in Wheelchair: No-Not an Elopement Risk  Elopement Risk: Not at Risk for Elopement    Interdisciplinary Discharge Planning  Does Admitting Nurse Feel This Could be a Complex Discharge?: No  Primary Care Physician: Dr Leach  Lives with - Patient's Self Care Capacity: Adult Children  Support Systems: Family Member(s)  Housing / Facility: [address]  Do You Take your Prescribed Medications Regularly: Yes  Able to Return to Previous ADL's: Yes  Mobility Issues: No  Prior Services: Skilled Home Health Services  Patient Expects to be Discharged to:: home  Assistance Needed: No  Durable Medical Equipment: Unknown    Discharge Preparedness  What is your plan after discharge?: Home with help, Home health care  What are your discharge supports?: Child  Prior Functional Level: Ambulatory, Independent with Activities of Daily Living, Independent with Medication Management    Functional Assesment  Prior Functional Level: Ambulatory, Independent with Activities of Daily Living,  Independent with Medication Management    Finances  Financial Barriers to Discharge: No    Vision / Hearing Impairment  Vision Impairment : Yes  Right Eye Vision: Wears Glasses  Left Eye Vision: Wears Glasses  Hearing Impairment : No              Domestic Abuse  Have you ever been the victim of abuse or violence?: No    Psychological Assessment  History of Substance Abuse: None    Discharge Risks or Barriers  Discharge risks or barriers?: No  Patient risk factors: Complex medical needs    Anticipated Discharge Information  Anticipated discharge disposition: Home, [facility]  Discharge Address: [address] [place] [address]  Discharge Contact Phone Number: [phone]

## 2017-09-29 NOTE — DISCHARGE PLANNING
Patient is on hemodialysis at [facility] Mon, Wed, Fri at 1000. Clinic has been notified of hospitalization.

## 2017-09-29 NOTE — ED NOTES
Cody from Lab called with critical result of Hbg 5.9 and Hct 17.3 at 2043. Critical lab result read back to Cody.   Dr. Townsend notified of critical lab result at 2043.  Critical lab result read back by Dr. Townsend.

## 2017-09-29 NOTE — ED NOTES
"Chief Complaint   Patient presents with   • Sent by MD     BIB EMS pt reports that she has posterior head pain that runs down her left breast and down her left arm. pt reports that symptoms started last weekend. today she was called by her MD and told to come to ER for Hgb 6.0   • Abnormal Labs   • Headache   • Chest Pain   • Vertigo     pt states that \"my whole world is spinning.\"     To triage after EKG. Pt currently getting chemo for MDS.   Neutropenia score 1.   Blood pressure 132/58, pulse 80, temperature 37 °C (98.6 °F), resp. rate 16, height 1.473 m (4' 10\"), last menstrual period 01/01/1995, SpO2 100 %.  charge RN notified.  Pt to senior lounge.      "

## 2017-09-29 NOTE — RESPIRATORY CARE
COPD EDUCATION by COPD CLINICAL EDUCATOR  9/29/2017 at 7:21 AM by Ada Tamez     Patient reviewed by COPD education team. Patient does not qualify for COPD program.

## 2017-09-29 NOTE — DISCHARGE SUMMARY
"CHIEF COMPLAINT ON ADMISSION  Chief Complaint   Patient presents with   • Sent by MD     BIB EMS pt reports that she has posterior head pain that runs down her left breast and down her left arm. pt reports that symptoms started last weekend. today she was called by her MD and told to come to ER for Hgb 6.0   • Abnormal Labs   • Headache   • Chest Pain   • Vertigo     pt states that \"my whole world is spinning.\"       CODE STATUS  Full Code    HPI & HOSPITAL COURSE  This is a 62 y.o. female With a history of end stage renal disease on dialysis Monday Wednesday Friday, atrial fibrillation, COPD, type II diabetes myelodysplastic syndrome, associated with chronic anemia here with acute on chronic anemia. She denies any blood loss in her urine or stool. She has required transfusions for her anemia in the past. She was transfused a total of 3 units and her hemoglobin increased appropriately. Given her history of malignancy her hemoglobin goal is greater than 10, per her oncologist. She will require serial monitoring of her hemoglobin and possibly transfusion if hemoglobin should drop less than 10.    She did undergo dialysis according to her regular schedule on 9/29/2017 and was discharged following her dialysis session.    Therefore, she is discharged in good and stable condition with close outpatient follow-up.    SPECIFIC OUTPATIENT FOLLOW-UP  Follow-up with oncology as needed and to discuss possibility of scheduled transfusions at the infusion center to avoid repeat hospitalizations  Follow-up with nephrology and Monday Wednesday Friday [facility]    DISCHARGE PROBLEM LIST  Principal Problem:    Anemia POA: Yes  Active Problems:    ESRD (end stage renal disease) (CMS-HCC) POA: Yes      Overview: Followed by nephrology. Pt is currently getting dialysis MWF.     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 *      FOLLOW UP  No future appointments.  No follow-up provider specified.    MEDICATIONS ON DISCHARGE   Vielka Briscoe   Home Medication Instructions KENTON:[id number]    Printed on:09/29/17 1213   Medication Information                      amlodipine (NORVASC) 10 MG Tab  Take 10 mg by mouth every day.             bimatoprost (LUMIGAN) 0.03 % Solution  Place 1 Drop in both eyes every evening.             Brimonidine Tartrate-Timolol (COMBIGAN) 0.2-0.5 % Solution  Place 1 Drop in both eyes 2 Times a Day.             carvedilol (COREG) 12.5 MG Tab  Take 12.5 mg by mouth 2 times a day.             cyclobenzaprine (FLEXERIL) 10 MG Tab  Take 1 Tab by mouth 3 times a day as needed for Muscle Spasms.             hydrocodone/acetaminophen (NORCO)  MG Tab  Take 1-2 Tabs by mouth every 6 hours as needed.             pregabalin (LYRICA) 50 MG capsule  Take 50 mg by mouth 2 times a day.             sitagliptin (JANUVIA) 25 MG Tab  Take 1 Tab by mouth every morning.                 DIET  Orders Placed This Encounter   Procedures   • Diet Order     Standing Status:   Standing     Number of Occurrences:   1     Order Specific Question:   Diet:     Answer:   Diabetic [3]       ACTIVITY  As tolerated.  Weight bearing as tolerated      CONSULTATIONS  Dr. Starks nephrology    PROCEDURES  None    LABORATORY  Lab Results   Component Value Date/Time    SODIUM 136 09/28/2017 08:20 PM    POTASSIUM 4.3 09/28/2017 08:20 PM    CHLORIDE 94 (L) 09/28/2017 08:20 PM    CO2 29 09/28/2017 08:20 PM    GLUCOSE 135 (H) 09/28/2017 08:20 PM    BUN 51 (H) 09/28/2017 08:20 PM    CREATININE 5.86 (HH) 09/28/2017 08:20  PM    CREATININE 0.6 07/20/2007 04:00 AM        Lab Results   Component Value Date/Time    WBC 3.6 (L) 09/29/2017 04:37 AM    HEMOGLOBIN 7.1 (L) 09/29/2017 04:37 AM    HEMATOCRIT 20.6 (L) 09/29/2017 04:37 AM    PLATELETCT 120 (L) 09/29/2017 04:37 AM        Total time of the discharge process exceeds 38 minutes

## 2017-09-29 NOTE — DISCHARGE PLANNING
[facility] were here to eval pt per a referral that was received.  ID was verified.  Discuss POC with her.  She informed me that she was working pt up for services.  Requested Facesheet.

## 2017-09-29 NOTE — H&P
" Hospital Medicine History and Physical    Date of Service  Seen, examined, orders placed 9/28/2017  This note completed 9/29/2017    Chief Complaint  Chief Complaint   Patient presents with   • Sent by MD     BIB EMS pt reports that she has posterior head pain that runs down her left breast and down her left arm. pt reports that symptoms started last weekend. today she was called by her MD and told to come to ER for Hgb 6.0   • Abnormal Labs   • Headache   • Chest Pain   • Vertigo     pt states that \"my whole world is spinning.\"       History of Presenting Illness  62 y.o. female who presented 9/28/2017 Being sent in from dialysis with concern of low hemoglobin and weakness. Patient currently is awake and alert she has end-stage renal disease chronic anemia. She is requiring a transfusion of packed red blood cells. Denies any chest pain palpitations. She does feel weak. No recent hematuria, melena, nor hematochezia. Patient does not have any chest pain    Primary Care Physician  Nyla Nava M.D.    Code Status  FULL    Review of Systems  Review of Systems   Constitutional: Positive for chills and malaise/fatigue. Negative for fever.   Eyes: Negative for discharge.   Respiratory: Negative for cough, shortness of breath and stridor.    Cardiovascular: Negative for chest pain, palpitations and leg swelling.   Gastrointestinal: Negative for abdominal pain, diarrhea, nausea and vomiting.   Genitourinary: Negative for dysuria and hematuria.   Musculoskeletal: Negative for back pain and joint pain.   Neurological: Positive for weakness. Negative for dizziness, speech change and headaches.   Psychiatric/Behavioral: Negative for depression. The patient is not nervous/anxious.           Past Medical History  Past Medical History:   Diagnosis Date   • MDS (myelodysplastic syndrome) 10/2016    bone marrow biopsy   • Stroke (CMS-[facility]) 03/2015    No residual weakness/problems   • Arthritis     hands    • Atrial fibrillation " "(CMS-[facility])     HX   • Blood transfusion without reported diagnosis    • Breath shortness     w/exertion   • Cancer (CMS-[facility])     MDS in bones   • Cataract     bilat IOL   • Chronic anemia    • Chronic kidney disease        • Chronic obstructive pulmonary disease (CMS-[facility])    • Congestive heart failure (CMS-[facility])    • Dental disorder     full dentures   • Diabetes     Diet controlled   • Dialysis patient     [facility] F ,   [facility] in [place]   • Glaucoma    • Heart abnormalities    • Hypertension    • Pain     \"bones\", generalized, 5/10   • Supplemental oxygen dependent     2 liters       Surgical History  Past Surgical History:   Procedure Laterality Date   • BONE MARROW BIOPSY, NDL/TROCAR  2017    Procedure: BONE MARROW BIOPSY, NDL/TROCAR;  Surgeon: Wade Turner M.D.;  Location: ENDOSCOPY [facility];  Service: Orthopedics   • BONE MARROW ASPIRATION  2017    Procedure: BONE MARROW ASPIRATION;  Surgeon: Wade Turner M.D.;  Location: ENDOSCOPY [facility];  Service: Orthopedics   • VEIN LIGATION Left 11/10/2016    Procedure: VEIN LIGATION FOR DISTAL REVASCULARIZATION AND INTERVAL LIGATION OF LEFT ARM DIALYISIS ACCESS (DRIL PROCEDURE);  Surgeon: Ranulfo Jolly M.D.;  Location: SURGERY [facility];  Service:    • AV FISTULA CREATION Left 2016    Procedure: AV FISTULA CREATION UPPER EXTREMITY;  Surgeon: Ranulfo Jolly M.D.;  Location: SURGERY [facility];  Service:    • GASTROSCOPY  2015    Procedure: ESOPHAGOGASTRODUODENOSCOPY WITH BIOPSY;  Surgeon: Wing Álvarez M.D.;  Location: SURGERY [facility];  Service:    • COLONOSCOPY  2015    Procedure: COLONOSCOPY;  Surgeon: Wing Álvarez M.D.;  Location: SURGERY [facility];  Service:    • GYN SURGERY      hysterectomy   • GYN SURGERY       x 2   • GYN SURGERY      d&C x2   • OTHER      angioplasty/ stents bilat LE   • OTHER ABDOMINAL SURGERY      appendectomy,  x 2, hysterectomy, " D & C   • OTHER ABDOMINAL SURGERY      appendectomy   • OTHER CARDIAC SURGERY      Angioplasty 1998 and 2001   • OTHER CARDIAC SURGERY      cardiac angiogram, angioplasty   • RETINAL DETACHMENT REPAIR Right        Medications  No current facility-administered medications on file prior to encounter.      Current Outpatient Prescriptions on File Prior to Encounter   Medication Sig Dispense Refill   • hydrocodone/acetaminophen (NORCO)  MG Tab Take 1-2 Tabs by mouth every 6 hours as needed. 12 Tab 0   • Brimonidine Tartrate-Timolol (COMBIGAN) 0.2-0.5 % Solution Place 1 Drop in both eyes 2 Times a Day.     • cyclobenzaprine (FLEXERIL) 10 MG Tab Take 1 Tab by mouth 3 times a day as needed for Muscle Spasms. 20 Tab 0   • sitagliptin (JANUVIA) 25 MG Tab Take 1 Tab by mouth every morning. 60 Tab 3   • carvedilol (COREG) 12.5 MG Tab Take 12.5 mg by mouth 2 times a day.     • pregabalin (LYRICA) 50 MG capsule Take 50 mg by mouth 2 times a day.     • amlodipine (NORVASC) 10 MG Tab Take 10 mg by mouth every day.     • bimatoprost (LUMIGAN) 0.03 % Solution Place 1 Drop in both eyes every evening.         Family History  Family History   Problem Relation Age of Onset   • Hypertension Father    • Hypertension Mother        Social History  Social History   Substance Use Topics   • Smoking status: Never Smoker   • Smokeless tobacco: Never Used   • Alcohol use No       Allergies  Allergies   Allergen Reactions   • Actos [Pioglitazone Hydrochloride] Unspecified     Cause blindness   FHS=6379   • Darvocet [Propoxyphene N-Apap] Vomiting     MVR=7478   • Demerol Vomiting     LYR=8154   • Glucophage [Metformin Hydrochloride] Vomiting     JAS=5253   • Morphine Vomiting     YOB=2243   • Oxycodone Vomiting     RXN=1/2016   • Pcn [Penicillins] Vomiting     YEQ=2697     • Requip Vomiting     RXN=12/2015   • Simvastatin Unspecified     Leg cramps  JBS=8052   • Sulfa Drugs Rash     RXN=>10 years   • Tramadol Vomiting     BUO=5745   •  Trazodone Vomiting     MPQ=3440   • Demerol    • Dilaudid [Hydromorphone] Vomiting     Unknown    • Diphenhydramine Vomiting   • Iron      vomiting   • Lenalidomide Vomiting   • Morphine    • Multivitamin      itching   • Naprosyn [Naproxen]    • Other Drug      Any binders that remove phosphorus from the body such as tums   • Sulfa Drugs         Physical Exam  Laboratory   Hemodynamics  Temp (24hrs), Av.8 °C (98.2 °F), Min:36.1 °C (97 °F), Max:37.2 °C (98.9 °F)   Temperature: 36.1 °C (97 °F)  Pulse  Av.2  Min: 69  Max: 85 Heart Rate (Monitored): 74  Blood Pressure: 138/60, NIBP: (!) 197/55      Respiratory      Respiration: 16, Pulse Oximetry: 100 %             Physical Exam   Constitutional: She is oriented to person, place, and time. No distress.   Obese   HENT:   Head: Normocephalic and atraumatic.   Nose: Nose normal.   Mouth/Throat: Oropharynx is clear and moist. No oropharyngeal exudate.   Eyes: Conjunctivae and EOM are normal. Right eye exhibits no discharge. Left eye exhibits no discharge. No scleral icterus.   Neck: No tracheal deviation present.   Cardiovascular: Normal rate, regular rhythm, normal heart sounds and intact distal pulses.    No murmur heard.  Pulses:       Radial pulses are 2+ on the right side.        Dorsalis pedis pulses are 2+ on the right side, and 2+ on the left side.   Palpable thrill in left upper extremity AV fistula   Pulmonary/Chest: Effort normal and breath sounds normal. No stridor. No respiratory distress. She has no wheezes.   Abdominal: Soft. Bowel sounds are normal. She exhibits no distension. There is no tenderness. There is no rebound.   Musculoskeletal: She exhibits no edema.   Neurological: She is alert and oriented to person, place, and time. No cranial nerve deficit.   Skin: Skin is warm and dry. She is not diaphoretic.   Psychiatric: She has a normal mood and affect. Her behavior is normal. Thought content normal.   Vitals reviewed.      Recent Labs       09/28/17 2022   WBC  3.3*   RBC  1.88*   HEMOGLOBIN  5.9*   HEMATOCRIT  17.3*   MCV  92.0   MCH  31.4   MCHC  34.1   RDW  49.0   PLATELETCT  146*   MPV  12.9     Recent Labs      09/28/17 2020   SODIUM  136   POTASSIUM  4.3   CHLORIDE  94*   CO2  29   GLUCOSE  135*   BUN  51*   CREATININE  5.86*   CALCIUM  8.2*                   Imaging  Ct-head W/o    Result Date: 9/28/2017  HISTORY/REASON FOR EXAM:  Headache and vertigo. TECHNIQUE/EXAM DESCRIPTION: CT scan of the head without contrast, 9/28/2017 9:56 PM. Contiguous 5 mm axial sections were obtained from the skull base through the vertex. Up to date radiation dose reduction adjustments have been utilized to meet ALARA standards for radiation dose reduction. COMPARISON:  2/20/2017 FINDINGS:   The ventricular system and cortical sulci are normal.  There is no midline shift or other mass effect.  There is no acute intra-axial abnormality or extra-axial fluid collection.  There is no intracranial hemorrhage.  The calvaria are intact.  The visualized paranasal sinuses show no unusual opacity.     Unremarkable CT scan of the head without contrast. INTERPRETING LOCATION:  [address] [place] [address], [id number]    Dx-chest-portable (1 View)    Result Date: 9/28/2017 9/28/2017 8:54 PM HISTORY/REASON FOR EXAM:  Chest Pain TECHNIQUE/EXAM DESCRIPTION AND NUMBER OF VIEWS: Single portable view of the chest. COMPARISON: 8/12/2017 FINDINGS: The heart is enlarged. The elena and mediastinum are normal. The lungs are well expanded with no infiltrate or other acute process. No pleural effusion.     No acute cardiopulmonary abnormality.     Assessment/Plan     I anticipate this patient is appropriate for observation status at this time.    * Anemia- (present on admission)   Assessment & Plan    Transfuse 1 unit packed red blood cells then recheck hemoglobin and hematocrit  Likely of chronic disease needs to be placed on erythropoietin and closely monitored by nephrology        ESRD (end  stage renal disease) (CMS-HCC)- (present on admission)   Assessment & Plan    [facility] nephrology needs to be consult for ongoing hemodialysis        Essential hypertension, malignant- (present on admission)   Assessment & Plan    Monitor vitals  Continue amlodipine, Coreg, and as needed hydralazine        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

## 2017-09-29 NOTE — ED PROVIDER NOTES
"ED Provider Note    Scribed for Daniel Townsend M.D. by Edita Manning. 9/28/2017, 8:34 PM.    Primary care provider: Nyla Nava M.D.  Means of arrival: EMS  History obtained from: Patient  History limited by: None    CHIEF COMPLAINT  Chief Complaint   Patient presents with   • Sent by MD     BIB EMS pt reports that she has posterior head pain that runs down her left breast and down her left arm. pt reports that symptoms started last weekend. today she was called by her MD and told to come to ER for Hgb 6.0   • Abnormal Labs   • Headache   • Chest Pain   • Vertigo     pt states that \"my whole world is spinning.\"       HPI  Vielka Briscoe is a 62 y.o. female with chronic anemia and chronic renal failure who presents to the Emergency Department by recommendation of her doctor for abnormal hemoglobin of 6.0. The patient states typically receives a blood transfusion every month and received her last one on the 22nd of August. She also reports chest pain that feels \"like an elephant sitting on my chest\" and headaches that occur 2-3 times a day that all began one week ago. The patient additionally endorses feeling weak. She states she has experienced these symptoms before when she has anemia. The patient reports history of cardiovascular problems that include an arrythmia as well as a blood clot. She also states she is currently on chemotherapy and dialysis.    REVIEW OF SYSTEMS  See HPI for further details. All other systems are negative.   C.  PAST MEDICAL HISTORY   has a past medical history of Arthritis; Atrial fibrillation (CMS-HCC); Blood transfusion without reported diagnosis; Breath shortness; Cancer (CMS-HCC); Cataract; Chronic anemia; Chronic kidney disease; Chronic obstructive pulmonary disease (CMS-HCC); Congestive heart failure (CMS-HCC); Dental disorder; Diabetes; Dialysis patient; Glaucoma; Heart abnormalities; Hypertension; MDS (myelodysplastic syndrome) (10/2016); Pain (-2017); Stroke " "(CMS-[facility]) (03/2015); and Supplemental oxygen dependent.    SURGICAL HISTORY   has a past surgical history that includes other cardiac surgery; other abdominal surgery; gyn surgery; gyn surgery; gyn surgery; other cardiac surgery; other; retinal detachment repair (Right); av fistula creation (Left, 2/8/2016); other abdominal surgery; gastroscopy (12/17/2015); colonoscopy (12/17/2015); vein ligation (Left, 11/10/2016); bone marrow biopsy, ndl/trocar (9/20/2017); and bone marrow aspiration (9/20/2017).    SOCIAL HISTORY  Social History   Substance Use Topics   • Smoking status: Never Smoker   • Smokeless tobacco: Never Used   • Alcohol use No      History   Drug Use No       FAMILY HISTORY  Family History   Problem Relation Age of Onset   • Hypertension Father    • Hypertension Mother        CURRENT MEDICATIONS  Reviewed.  See Encounter Summary.     ALLERGIES  Allergies   Allergen Reactions   • Actos [Pioglitazone Hydrochloride] Unspecified     Cause blindness   JMA=7305   • Darvocet [Propoxyphene N-Apap] Vomiting     CCJ=2248   • Demerol Vomiting     GIP=5637   • Glucophage [Metformin Hydrochloride] Vomiting     FBX=4549   • Morphine Vomiting     NEA=1015   • Oxycodone Vomiting     RXN=1/2016   • Pcn [Penicillins] Vomiting     PKN=4750     • Requip Vomiting     RXN=12/2015   • Simvastatin Unspecified     Leg cramps  WSH=9306   • Sulfa Drugs Rash     RXN=>10 years   • Tramadol Vomiting     MJT=3628   • Trazodone Vomiting     AWC=2156   • Demerol    • Dilaudid [Hydromorphone] Vomiting     Unknown    • Diphenhydramine Vomiting   • Iron      vomiting   • Lenalidomide Vomiting   • Morphine    • Multivitamin      itching   • Naprosyn [Naproxen]    • Other Drug      Any binders that remove phosphorus from the body such as tums   • Sulfa Drugs        PHYSICAL EXAM  VITAL SIGNS: /58   Pulse 80   Temp 37 °C (98.6 °F)   Resp 16   Ht 1.473 m (4' 10\")   LMP 01/01/1995   SpO2 100%   Constitutional: Alert in no apparent " distress, Comprehensive normal  HENT: No signs of trauma, Bilateral external ears normal, Nose normal.   Eyes: Pupils are equal and reactive, Pale Conjunctiva, Non-icteric.   Neck: Normal range of motion, No tenderness, Supple, No stridor.   Lymphatic: No lymphadenopathy noted.   Cardiovascular: Fistula left upper extremity, Regular rate and rhythm, no murmurs.   Thorax & Lungs: Normal breath sounds, No respiratory distress, No wheezing, No chest tenderness.   Abdomen: Bowel sounds normal, Soft, No tenderness, No masses, No pulsatile masses. No peritoneal signs.  Skin: Warm, Dry, No erythema, No rash.   Back: No bony tenderness, No CVA tenderness.   Extremities: Intact distal pulses, No edema, No tenderness, No cyanosis  Musculoskeletal: Good range of motion in all major joints. No tenderness to palpation or major deformities noted.   Neurologic: Alert , Normal motor function, Normal sensory function, No focal deficits noted.   Psychiatric: Affect normal, Judgment normal, Mood normal.     DIAGNOSTIC STUDIES / PROCEDURES     LABS  Results for orders placed or performed during the hospital encounter of 09/28/17   CBC WITH DIFFERENTIAL   Result Value Ref Range    WBC 3.3 (L) 4.8 - 10.8 K/uL    RBC 1.88 (L) 4.20 - 5.40 M/uL    Hemoglobin 5.9 (LL) 12.0 - 16.0 g/dL    Hematocrit 17.3 (LL) 37.0 - 47.0 %    MCV 92.0 81.4 - 97.8 fL    MCH 31.4 27.0 - 33.0 pg    MCHC 34.1 33.6 - 35.0 g/dL    RDW 49.0 35.9 - 50.0 fL    Platelet Count 146 (L) 164 - 446 K/uL    MPV 12.9 9.0 - 12.9 fL    Neutrophils-Polys 49.10 44.00 - 72.00 %    Lymphocytes 40.20 22.00 - 41.00 %    Monocytes 8.60 0.00 - 13.40 %    Eosinophils 1.50 0.00 - 6.90 %    Basophils 0.00 0.00 - 1.80 %    Immature Granulocytes 0.60 0.00 - 0.90 %    Nucleated RBC 0.00 /100 WBC    Neutrophils (Absolute) 1.60 (L) 2.00 - 7.15 K/uL    Lymphs (Absolute) 1.31 1.00 - 4.80 K/uL    Monos (Absolute) 0.28 0.00 - 0.85 K/uL    Eos (Absolute) 0.05 0.00 - 0.51 K/uL    Baso (Absolute)  0.00 0.00 - 0.12 K/uL    Immature Granulocytes (abs) 0.02 0.00 - 0.11 K/uL    NRBC (Absolute) 0.00 K/uL   COMP METABOLIC PANEL   Result Value Ref Range    Sodium 136 135 - 145 mmol/L    Potassium 4.3 3.6 - 5.5 mmol/L    Chloride 94 (L) 96 - 112 mmol/L    Co2 29 20 - 33 mmol/L    Anion Gap 13.0 (H) 0.0 - 11.9    Glucose 135 (H) 65 - 99 mg/dL    Bun 51 (H) 8 - 22 mg/dL    Creatinine 5.86 (HH) 0.50 - 1.40 mg/dL    Calcium 8.2 (L) 8.5 - 10.5 mg/dL    AST(SGOT) 13 12 - 45 U/L    ALT(SGPT) 13 2 - 50 U/L    Alkaline Phosphatase 65 30 - 99 U/L    Total Bilirubin 0.4 0.1 - 1.5 mg/dL    Albumin 3.6 3.2 - 4.9 g/dL    Total Protein 7.2 6.0 - 8.2 g/dL    Globulin 3.6 (H) 1.9 - 3.5 g/dL    A-G Ratio 1.0 g/dL   TROPONIN   Result Value Ref Range    Troponin I <0.01 0.00 - 0.04 ng/mL   COD (ADULT)   Result Value Ref Range    ABO Grouping Only AB     Rh Grouping Only POS     Antibody Screen-Cod NEG     Component R       RI3                 RedBloodCellsIRR    [id number]   transfused   17   22:14      Product Type Red Blood Cells IRR LR  AS-3     Dispense Status Transfused     Unit Number (Barcoded) [id number]     Product Code (Barcoded) N4073W77     Blood Type (Barcoded) 6200    ESTIMATED GFR   Result Value Ref Range    GFR If  9 (A) >60 mL/min/1.73 m 2    GFR If Non African American 7 (A) >60 mL/min/1.73 m 2   EKG (NOW)   Result Value Ref Range    Report       [facility] Emergency Dept.    Test Date:  2017  Pt Name:    JESUS SILVEIRA              Department: ER  MRN:        [id number]                      Room:  Gender:     F                            Technician: [id number]  :        1955                   Requested By:ER TRIAGE PROTOCOL  Order #:    [id number]                    Reading MD:    Measurements  Intervals                                Axis  Rate:       86                           P:          -11  RI:         152                          QRS:        -12  QRSD:        78                           T:          58  QT:         404  QTc:        484    Interpretive Statements  SINUS RHYTHM  CONSIDER LEFT VENTRICULAR HYPERTROPHY  Compared to ECG 2017 18:38:36  Myocardial infarct finding no longer present     EKG (NOW)   Result Value Ref Range    Report       [facility] Emergency Dept.    Test Date:  2017  Pt Name:    JESUS SILVEIRA              Department: ER  MRN:        [id number]                      Room:       [facility]  Gender:     F                            Technician: [id number]  :        1955                   Requested By:MICHELLE VÁZQUEZ  Order #:    [id number]                    Reading MD:    Measurements  Intervals                                Axis  Rate:       78                           P:          -8  VT:         176                          QRS:        -4  QRSD:       80                           T:          58  QT:         428  QTc:        488    Interpretive Statements  SINUS RHYTHM  ABNRM R PROG, CONSIDER ASMI OR LEAD PLACEMENT  BORDERLINE PROLONGED QT INTERVAL  BASELINE WANDER IN LEAD(S) V6  Compared to ECG 2017 19:01:37  Myocardial infarct finding now present         All labs were reviewed by me.    EKG  12 Lead EKG interpreted by me to show:  Taken at 20:49  Sinus rhythm  Rate 78  Axis: Left Axis Deviation  Intervals: Normal  No acute ST-T wave changes  QTc interval: 488  No change from prior EKG from 17    RADIOLOGY  CT-HEAD W/O   Final Result      Unremarkable CT scan of the head without contrast.               INTERPRETING LOCATION:  [address] [place] [address], [place], [address]      DX-CHEST-PORTABLE (1 VIEW)   Final Result      No acute cardiopulmonary abnormality.        The radiologist's interpretation of all radiological studies and images have been reviewed by me.    COURSE & MEDICAL DECISION MAKING  Pertinent Labs & Imaging studies reviewed. (See chart for details)      8:34 PM - Patient seen and examined at bedside. Patient  will be treated with transfusion. Ordered EKG, DX-Chest, CBC,  to evaluate her symptoms.     9:44 PM - Consult with Dr. Holman, Hospitalist, who agrees to admit the patient.     9:58 PM - Patient was reevaluated at bedside. She was informed of her lab results. The patient was made aware she will be admitted to the hospital and receive a blood transfusion.    Decision Making:  This is a 62 y.o. year old female who presents with Acute on chronic anemia. Patient's with profound anemia less than 6. Initiation of transfusion has been started by myself. Hospital service has been consulted for ongoing inpatient care.    DISPOSITION:  Patient will be admitted to Dr. Holman, Hospitalist in guarded condition.      FINAL IMPRESSION  1. Symptomatic anemia    2. Chronic renal failure, unspecified stage          I, Edita Manning (Ervin), am scribing for, and in the presence of, Daniel Townsend M.D..    Electronically signed by: Edita Manning (Ervin), 9/28/2017    IDaniel M.D. personally performed the services described in this documentation, as scribed by Edita Manning in my presence, and it is both accurate and complete.    The note accurately reflects work and decisions made by me.  Daniel Townsend  9/29/2017  4:40 AM

## 2017-09-29 NOTE — PROGRESS NOTES
Assessment completed. Pt A&O. Respirations are even and unlabored on 2L n/c. POC updated. Pt educated on room and call light, pt verbalized understanding. Pt reports chronic bilateral leg pain at this time, tolerable at this time. Monitors applied, VS stable, call light within reach. Needs met.

## 2017-09-29 NOTE — CONSULTS
DATE OF SERVICE:  2017    REASON FOR CONSULTATION:  This is nephrology consultation for evaluation and   management of end-stage renal disease.    REQUESTING PHYSICIAN:  From [facility].    HISTORY OF PRESENT ILLNESS:  The patient is a pleasant 62-year-old female with   history of diabetes, hypertension, hyperlipidemia, atrial fibrillation,   anemia secondary to myelodysplastic syndrome as well as secondary to CKD,   follows with Dr. Avila and was on chemotherapy and requires frequent blood   transfusions, presented to [facility] with complaints of   dizziness and was found to have a hemoglobin of 6 and hence was admitted for   further evaluation and blood transfusion.  Nephrology was called to evaluate   her for her ESRD and dialysis management.  She normally gets dialysis Monday,   Wednesday, and Friday via left upper extremity AV fistula.  She has no uremic   symptoms.    REVIEW OF SYSTEMS:  She has a headache.  No nausea, vomiting, or generalized   weakness.  Currently, she is status post 1 unit of packed red blood cells on   this admission and awaiting a second unit.  PULMONARY:  No complaints of dyspnea, cough or wheeze.  CARDIOVASCULAR:  No chest pain, pedal edema, orthopnea.  SKIN:  With no evidence of rash, pruritus, or wounds.  GASTROINTESTINAL:  No nausea, vomiting, diarrhea or constipation.  All other   systems reviewed and are negative.    PAST MEDICAL HISTORY:  Significant for diabetes mellitus, hypertension, atrial   fibrillation, renal osteodystrophy, anemia of chronic kidney disease and   myelodysplastic syndrome with frequent blood transfusions, history of   peripheral vascular disease, COPD, diastolic congestive heart failure, and   history of steal syndrome.    PAST SURGICAL HISTORY:  , hysterectomy, D and C, appendectomy, left   arm AV fistula creation by Dr. Jolly, history of stent placement with   bilateral lower extremities, [facility]  placement and removal, retinal   detachment surgery, bilateral cataract surgery, coronary angiogram, and upper   EGD.    ALLERGIES:  ACTOS, DARVOCET, DEMEROL, GLUCOPHAGE, MORPHINE, OXYCODONE,   PENICILLIN, REQUIP, SIMVASTATIN, SULFA DRUGS, TRAMADOL, TRAZODONE, DILAUDID,   BENADRYL, IRON, MULTIVITAMINS, AND ALL PHOSPHORUS BINDERS.    FAMILY HISTORY:  No significant family history of a kidney disease.  No   relatives on dialysis.    SOCIAL HISTORY:  She denies smoking, alcohol or illicit drug use.  Lives with   her daughter, is currently disabled and used to work as a CNA.    MEDICATIONS:  Amlodipine, Alphagan, Timoptic, Coreg, Humalog, Xalatan,   Antivert, Lyrica, _____, and Januvia.    PHYSICAL EXAMINATION:  VITAL SIGNS:  Revealing a blood pressure of 149/78 with a pulse of 75,   temperature 36.7.  GENERAL:  She is lying in bed, no major hemodynamic distress.  HEENT:  Normocephalic, atraumatic.  NECK:  Supple, no JVD, no thyromegaly.  CHEST:  Clear bilaterally.  CARDIOVASCULAR:  S1, S2 heard.  ABDOMEN:  Soft, nontender, nondistended.  Bowel sounds are present.  EXTREMITIES:  Without any evidence of cyanosis, clubbing or edema.  SKIN:  With no evidence of rash, pruritus or wounds.    LABORATORY DATA:  White count is 3.6, hemoglobin 7.1.  Initially when she came   in, her hemoglobin was 5.9.  She is status post 1 packed red blood cell   transfusion already and pending second one.  Platelet count is 120.  Sodium   136, potassium 4.3, bicarbonate 29, BUN and creatinine 51/5.86 with a calcium   of 8.2, and albumin of 3.6.    ASSESSMENT AND PLAN:  1.  End-stage renal disease, on hemodialysis Monday, Wednesday, and Friday as   an outpatient.  We will arrange for dialysis today per her regular Monday,   Wednesday, and Friday schedule.  2.  Anemia with symptomatic dizziness secondary to myelodysplastic syndrome.    We will transfuse third unit with dialysis today.  3.  Dizziness/headache.  4.  Hypertension.  5.  Diabetes  mellitus.  6.  Renal osteodystrophy.  7.  Atrial fibrillation, medical management.    PLAN:  As mentioned above, we will dialyze today per her regular Monday,   Wednesday, and Friday schedule.  Place her on a renal low-sodium diabetic   diet.  Avoid nephrotoxins.    Thank you for allowing us to care for the patient.  We will follow her closely   with you.       ____________________________________     MD ALINA ORTIZ / RAMONE    DD:  09/29/2017 10:10:44  DT:  09/29/2017 13:35:56    D#:  [id number]  Job#:  [id number]

## 2017-09-29 NOTE — ASSESSMENT & PLAN NOTE
Transfuse 1 unit packed red blood cells then recheck hemoglobin and hematocrit  Likely of chronic disease needs to be placed on erythropoietin and closely monitored by nephrology

## 2017-09-29 NOTE — ED NOTES
Attempted IV access without success.  Blood able to be collected.  Another RN attempting IV placement.

## 2017-09-29 NOTE — PROGRESS NOTES
A/o,assessment completed per CDU, poc discussed,verbalized understanding,PRBC transfusing,hot meal given,call button within reach,will continue to monitor.

## 2017-09-30 NOTE — PROGRESS NOTES
Pt states all personal belongings are in possession.  Pt escorted off unit by tech via w/c with her oxygen without incident.

## 2017-09-30 NOTE — DISCHARGE INSTRUCTIONS
Discharge Instructions    Discharged to home by car with relative. Discharged via wheelchair, hospital escort: Refused.  Special equipment needed: Not Applicable    Be sure to schedule a follow-up appointment with your primary care doctor or any specialists as instructed.     Discharge Plan:   Diet Plan: Discussed  Activity Level: Discussed  Confirmed Follow up Appointment: Patient to Call and Schedule Appointment  Confirmed Symptoms Management: Discussed  Medication Reconciliation Updated: Yes  Influenza Vaccine Indication: Not indicated: Previously immunized this influenza season and > 8 years of age    I understand that a diet low in cholesterol, fat, and sodium is recommended for good health. Unless I have been given specific instructions below for another diet, I accept this instruction as my diet prescription.   Other diet: heart healthy    Special Instructions: None    · Is patient discharged on Warfarin / Coumadin?   No     · Is patient Post Blood Transfusion?  No                Depression / Suicide Risk    As you are discharged from this [facility] facility, it is important to learn how to keep safe from harming yourself.    Recognize the warning signs:  · Abrupt changes in personality, positive or negative- including increase in energy   · Giving away possessions  · Change in eating patterns- significant weight changes-  positive or negative  · Change in sleeping patterns- unable to sleep or sleeping all the time   · Unwillingness or inability to communicate  · Depression  · Unusual sadness, discouragement and loneliness  · Talk of wanting to die  · Neglect of personal appearance   · Rebelliousness- reckless behavior  · Withdrawal from people/activities they love  · Confusion- inability to concentrate     If you or a loved one observes any of these behaviors or has concerns about self-harm, here's what you can do:  · Talk about it- your feelings and reasons for harming yourself  · Remove any means that  you might use to hurt yourself (examples: pills, rope, extension cords, firearm)  · Get professional help from the community (Mental Health, Substance Abuse, psychological counseling)  · Do not be alone:Call your Safe Contact- someone whom you trust who will be there for you.  · Call your local CRISIS HOTLINE 673-5906 or 403-762-6332  · Call your local Children's Mobile Crisis Response Team [place] (488) 908-5987 or www.[employer]  · Call the toll free National Suicide Prevention Hotlines   · National Suicide Prevention Lifeline 598-515-GVFB (6456)  · National Hope Line Network 800-SUICIDE (953-4042)

## 2017-09-30 NOTE — PROGRESS NOTES
IV Dc'd. Discharge instructions provided to patient. Pt verbalizes understanding. Pt states all questions have been answered. Copy of DC provided to patient. Signed copy in chart. Pt waiting for daughter to pick her up.

## 2017-10-02 NOTE — ADDENDUM NOTE
Encounter addended by: Milka Dominguez R.N. on: 10/2/2017  2:43 PM<BR>    Actions taken: Visit Navigator Flowsheet section accepted

## 2017-10-04 ENCOUNTER — HOSPITAL ENCOUNTER (OUTPATIENT)
Facility: MEDICAL CENTER | Age: 62
End: 2017-10-04
Attending: INTERNAL MEDICINE
Payer: MEDICARE

## 2017-10-04 LAB
FERRITIN SERPL-MCNC: 1787.8 NG/ML (ref 10–291)
IRON SATN MFR SERPL: 96 % (ref 15–55)
IRON SERPL-MCNC: 222 UG/DL (ref 40–170)
TIBC SERPL-MCNC: 232 UG/DL (ref 250–450)

## 2017-10-04 PROCEDURE — 82728 ASSAY OF FERRITIN: CPT

## 2017-10-04 PROCEDURE — 82668 ASSAY OF ERYTHROPOIETIN: CPT

## 2017-10-04 PROCEDURE — 83540 ASSAY OF IRON: CPT

## 2017-10-04 PROCEDURE — 83550 IRON BINDING TEST: CPT

## 2017-10-06 LAB — EPO SERPL-ACNC: 465 MU/ML (ref 4–27)

## 2017-11-03 ENCOUNTER — APPOINTMENT (OUTPATIENT)
Dept: RADIOLOGY | Facility: MEDICAL CENTER | Age: 62
DRG: 291 | End: 2017-11-03
Attending: EMERGENCY MEDICINE
Payer: MEDICARE

## 2017-11-03 ENCOUNTER — HOSPITAL ENCOUNTER (INPATIENT)
Facility: MEDICAL CENTER | Age: 62
LOS: 1 days | DRG: 291 | End: 2017-11-04
Attending: EMERGENCY MEDICINE | Admitting: INTERNAL MEDICINE
Payer: MEDICARE

## 2017-11-03 ENCOUNTER — RESOLUTE PROFESSIONAL BILLING HOSPITAL PROF FEE (OUTPATIENT)
Dept: HOSPITALIST | Facility: MEDICAL CENTER | Age: 62
End: 2017-11-03
Payer: MEDICARE

## 2017-11-03 DIAGNOSIS — R07.89 OTHER CHEST PAIN: ICD-10-CM

## 2017-11-03 DIAGNOSIS — D63.1 ANEMIA IN CHRONIC KIDNEY DISEASE, UNSPECIFIED CKD STAGE: ICD-10-CM

## 2017-11-03 DIAGNOSIS — N18.9 ANEMIA IN CHRONIC KIDNEY DISEASE, UNSPECIFIED CKD STAGE: ICD-10-CM

## 2017-11-03 PROBLEM — E87.5 HYPERKALEMIA: Status: ACTIVE | Noted: 2017-11-03

## 2017-11-03 PROBLEM — D64.9 SEVERE ANEMIA: Status: ACTIVE | Noted: 2017-11-03

## 2017-11-03 PROBLEM — N18.6 ESRD (END STAGE RENAL DISEASE) ON DIALYSIS (HCC): Status: ACTIVE | Noted: 2017-11-03

## 2017-11-03 PROBLEM — Z99.2 ESRD (END STAGE RENAL DISEASE) ON DIALYSIS (HCC): Status: ACTIVE | Noted: 2017-11-03

## 2017-11-03 LAB
ABO GROUP BLD: NORMAL
ALBUMIN SERPL BCP-MCNC: 3.7 G/DL (ref 3.2–4.9)
ALBUMIN/GLOB SERPL: 1 G/DL
ALP SERPL-CCNC: 72 U/L (ref 30–99)
ALT SERPL-CCNC: 15 U/L (ref 2–50)
ANION GAP SERPL CALC-SCNC: 14 MMOL/L (ref 0–11.9)
ANION GAP SERPL CALC-SCNC: 20 MMOL/L (ref 0–11.9)
AST SERPL-CCNC: 15 U/L (ref 12–45)
BARCODED ABORH UBTYP: 6200
BARCODED ABORH UBTYP: 6200
BARCODED PRD CODE UBPRD: NORMAL
BARCODED PRD CODE UBPRD: NORMAL
BARCODED UNIT NUM UBUNT: NORMAL
BARCODED UNIT NUM UBUNT: NORMAL
BASOPHILS # BLD AUTO: 0.2 % (ref 0–1.8)
BASOPHILS # BLD AUTO: 0.7 % (ref 0–1.8)
BASOPHILS # BLD: 0.01 K/UL (ref 0–0.12)
BASOPHILS # BLD: 0.03 K/UL (ref 0–0.12)
BILIRUB SERPL-MCNC: 0.3 MG/DL (ref 0.1–1.5)
BLD GP AB SCN SERPL QL: NORMAL
BUN SERPL-MCNC: 126 MG/DL (ref 8–22)
BUN SERPL-MCNC: 51 MG/DL (ref 8–22)
CALCIUM SERPL-MCNC: 7.1 MG/DL (ref 8.5–10.5)
CALCIUM SERPL-MCNC: 8.1 MG/DL (ref 8.5–10.5)
CHLORIDE SERPL-SCNC: 97 MMOL/L (ref 96–112)
CHLORIDE SERPL-SCNC: 99 MMOL/L (ref 96–112)
CO2 SERPL-SCNC: 19 MMOL/L (ref 20–33)
CO2 SERPL-SCNC: 24 MMOL/L (ref 20–33)
COMPONENT R 8504R: NORMAL
COMPONENT R 8504R: NORMAL
CREAT SERPL-MCNC: 10.07 MG/DL (ref 0.5–1.4)
CREAT SERPL-MCNC: 5.1 MG/DL (ref 0.5–1.4)
EOSINOPHIL # BLD AUTO: 0.11 K/UL (ref 0–0.51)
EOSINOPHIL # BLD AUTO: 0.13 K/UL (ref 0–0.51)
EOSINOPHIL NFR BLD: 2.4 % (ref 0–6.9)
EOSINOPHIL NFR BLD: 2.6 % (ref 0–6.9)
ERYTHROCYTE [DISTWIDTH] IN BLOOD BY AUTOMATED COUNT: 44.3 FL (ref 35.9–50)
ERYTHROCYTE [DISTWIDTH] IN BLOOD BY AUTOMATED COUNT: 54 FL (ref 35.9–50)
GFR SERPL CREATININE-BSD FRML MDRD: 4 ML/MIN/1.73 M 2
GFR SERPL CREATININE-BSD FRML MDRD: 9 ML/MIN/1.73 M 2
GLOBULIN SER CALC-MCNC: 3.6 G/DL (ref 1.9–3.5)
GLUCOSE SERPL-MCNC: 159 MG/DL (ref 65–99)
GLUCOSE SERPL-MCNC: 188 MG/DL (ref 65–99)
HCT VFR BLD AUTO: 18.4 % (ref 37–47)
HCT VFR BLD AUTO: 29.4 % (ref 37–47)
HGB BLD-MCNC: 10.2 G/DL (ref 12–16)
HGB BLD-MCNC: 5.9 G/DL (ref 12–16)
IMM GRANULOCYTES # BLD AUTO: 0.04 K/UL (ref 0–0.11)
IMM GRANULOCYTES # BLD AUTO: 0.04 K/UL (ref 0–0.11)
IMM GRANULOCYTES NFR BLD AUTO: 0.7 % (ref 0–0.9)
IMM GRANULOCYTES NFR BLD AUTO: 0.9 % (ref 0–0.9)
LYMPHOCYTES # BLD AUTO: 0.92 K/UL (ref 1–4.8)
LYMPHOCYTES # BLD AUTO: 1.34 K/UL (ref 1–4.8)
LYMPHOCYTES NFR BLD: 21.7 % (ref 22–41)
LYMPHOCYTES NFR BLD: 25 % (ref 22–41)
MCH RBC QN AUTO: 29.4 PG (ref 27–33)
MCH RBC QN AUTO: 30.1 PG (ref 27–33)
MCHC RBC AUTO-ENTMCNC: 32.2 G/DL (ref 33.6–35)
MCHC RBC AUTO-ENTMCNC: 34.7 G/DL (ref 33.6–35)
MCV RBC AUTO: 84.7 FL (ref 81.4–97.8)
MCV RBC AUTO: 93.4 FL (ref 81.4–97.8)
MONOCYTES # BLD AUTO: 0.33 K/UL (ref 0–0.85)
MONOCYTES # BLD AUTO: 0.45 K/UL (ref 0–0.85)
MONOCYTES NFR BLD AUTO: 7.8 % (ref 0–13.4)
MONOCYTES NFR BLD AUTO: 8.4 % (ref 0–13.4)
NEUTROPHILS # BLD AUTO: 2.81 K/UL (ref 2–7.15)
NEUTROPHILS # BLD AUTO: 3.39 K/UL (ref 2–7.15)
NEUTROPHILS NFR BLD: 63.3 % (ref 44–72)
NEUTROPHILS NFR BLD: 66.3 % (ref 44–72)
NRBC # BLD AUTO: 0 K/UL
NRBC # BLD AUTO: 0 K/UL
NRBC BLD AUTO-RTO: 0 /100 WBC
NRBC BLD AUTO-RTO: 0 /100 WBC
PLATELET # BLD AUTO: 148 K/UL (ref 164–446)
PLATELET # BLD AUTO: 155 K/UL (ref 164–446)
PMV BLD AUTO: 13.3 FL (ref 9–12.9)
PMV BLD AUTO: 13.3 FL (ref 9–12.9)
POTASSIUM SERPL-SCNC: 3.6 MMOL/L (ref 3.6–5.5)
POTASSIUM SERPL-SCNC: 6.2 MMOL/L (ref 3.6–5.5)
PRODUCT TYPE UPROD: NORMAL
PRODUCT TYPE UPROD: NORMAL
PROT SERPL-MCNC: 7.3 G/DL (ref 6–8.2)
RBC # BLD AUTO: 1.96 M/UL (ref 4.2–5.4)
RBC # BLD AUTO: 3.47 M/UL (ref 4.2–5.4)
RH BLD: NORMAL
SODIUM SERPL-SCNC: 135 MMOL/L (ref 135–145)
SODIUM SERPL-SCNC: 138 MMOL/L (ref 135–145)
TROPONIN I SERPL-MCNC: <0.01 NG/ML (ref 0–0.04)
UNIT STATUS USTAT: NORMAL
UNIT STATUS USTAT: NORMAL
WBC # BLD AUTO: 4.2 K/UL (ref 4.8–10.8)
WBC # BLD AUTO: 5.4 K/UL (ref 4.8–10.8)

## 2017-11-03 PROCEDURE — 94760 N-INVAS EAR/PLS OXIMETRY 1: CPT

## 2017-11-03 PROCEDURE — 81256 HFE GENE: CPT

## 2017-11-03 PROCEDURE — A9270 NON-COVERED ITEM OR SERVICE: HCPCS | Performed by: INTERNAL MEDICINE

## 2017-11-03 PROCEDURE — 84484 ASSAY OF TROPONIN QUANT: CPT

## 2017-11-03 PROCEDURE — 700105 HCHG RX REV CODE 258: Performed by: INTERNAL MEDICINE

## 2017-11-03 PROCEDURE — 700101 HCHG RX REV CODE 250: Performed by: INTERNAL MEDICINE

## 2017-11-03 PROCEDURE — 86644 CMV ANTIBODY: CPT | Mod: 91

## 2017-11-03 PROCEDURE — 80053 COMPREHEN METABOLIC PANEL: CPT

## 2017-11-03 PROCEDURE — 304561 HCHG STAT O2

## 2017-11-03 PROCEDURE — 700102 HCHG RX REV CODE 250 W/ 637 OVERRIDE(OP): Performed by: INTERNAL MEDICINE

## 2017-11-03 PROCEDURE — 30233N1 TRANSFUSION OF NONAUTOLOGOUS RED BLOOD CELLS INTO PERIPHERAL VEIN, PERCUTANEOUS APPROACH: ICD-10-PCS | Performed by: INTERNAL MEDICINE

## 2017-11-03 PROCEDURE — 71010 DX-CHEST-PORTABLE (1 VIEW): CPT

## 2017-11-03 PROCEDURE — 86850 RBC ANTIBODY SCREEN: CPT

## 2017-11-03 PROCEDURE — 770020 HCHG ROOM/CARE - TELE (206)

## 2017-11-03 PROCEDURE — 93010 ELECTROCARDIOGRAM REPORT: CPT | Performed by: INTERNAL MEDICINE

## 2017-11-03 PROCEDURE — 82272 OCCULT BLD FECES 1-3 TESTS: CPT

## 2017-11-03 PROCEDURE — 99223 1ST HOSP IP/OBS HIGH 75: CPT | Mod: AI | Performed by: INTERNAL MEDICINE

## 2017-11-03 PROCEDURE — 36415 COLL VENOUS BLD VENIPUNCTURE: CPT

## 2017-11-03 PROCEDURE — 86901 BLOOD TYPING SEROLOGIC RH(D): CPT

## 2017-11-03 PROCEDURE — 36430 TRANSFUSION BLD/BLD COMPNT: CPT

## 2017-11-03 PROCEDURE — 86900 BLOOD TYPING SEROLOGIC ABO: CPT

## 2017-11-03 PROCEDURE — 85025 COMPLETE CBC W/AUTO DIFF WBC: CPT

## 2017-11-03 PROCEDURE — 5A1D70Z PERFORMANCE OF URINARY FILTRATION, INTERMITTENT, LESS THAN 6 HOURS PER DAY: ICD-10-PCS | Performed by: INTERNAL MEDICINE

## 2017-11-03 PROCEDURE — 99285 EMERGENCY DEPT VISIT HI MDM: CPT

## 2017-11-03 PROCEDURE — 90935 HEMODIALYSIS ONE EVALUATION: CPT

## 2017-11-03 PROCEDURE — 80048 BASIC METABOLIC PNL TOTAL CA: CPT

## 2017-11-03 PROCEDURE — P9016 RBC LEUKOCYTES REDUCED: HCPCS

## 2017-11-03 PROCEDURE — 86923 COMPATIBILITY TEST ELECTRIC: CPT

## 2017-11-03 PROCEDURE — 700111 HCHG RX REV CODE 636 W/ 250 OVERRIDE (IP): Performed by: FAMILY MEDICINE

## 2017-11-03 PROCEDURE — 304562 HCHG STAT O2 MASK/CANNULA

## 2017-11-03 PROCEDURE — 93005 ELECTROCARDIOGRAM TRACING: CPT | Performed by: INTERNAL MEDICINE

## 2017-11-03 RX ORDER — ONDANSETRON 2 MG/ML
4 INJECTION INTRAMUSCULAR; INTRAVENOUS EVERY 6 HOURS PRN
Status: DISCONTINUED | OUTPATIENT
Start: 2017-11-03 | End: 2017-11-04 | Stop reason: HOSPADM

## 2017-11-03 RX ORDER — TIMOLOL MALEATE 5 MG/ML
1 SOLUTION/ DROPS OPHTHALMIC
Status: DISCONTINUED | OUTPATIENT
Start: 2017-11-03 | End: 2017-11-04 | Stop reason: HOSPADM

## 2017-11-03 RX ORDER — BRIMONIDINE TARTRATE AND TIMOLOL MALEATE 2; 5 MG/ML; MG/ML
1 SOLUTION OPHTHALMIC 2 TIMES DAILY
Status: DISCONTINUED | OUTPATIENT
Start: 2017-11-03 | End: 2017-11-03

## 2017-11-03 RX ORDER — AMLODIPINE BESYLATE 10 MG/1
10 TABLET ORAL DAILY
Status: DISCONTINUED | OUTPATIENT
Start: 2017-11-03 | End: 2017-11-04 | Stop reason: HOSPADM

## 2017-11-03 RX ORDER — SODIUM CHLORIDE 9 MG/ML
INJECTION, SOLUTION INTRAVENOUS CONTINUOUS
Status: DISCONTINUED | OUTPATIENT
Start: 2017-11-03 | End: 2017-11-03

## 2017-11-03 RX ORDER — LATANOPROST 50 UG/ML
1 SOLUTION/ DROPS OPHTHALMIC EVERY EVENING
Status: DISCONTINUED | OUTPATIENT
Start: 2017-11-03 | End: 2017-11-04 | Stop reason: HOSPADM

## 2017-11-03 RX ORDER — CARVEDILOL 12.5 MG/1
12.5 TABLET ORAL 2 TIMES DAILY
Status: DISCONTINUED | OUTPATIENT
Start: 2017-11-03 | End: 2017-11-04 | Stop reason: HOSPADM

## 2017-11-03 RX ORDER — PREGABALIN 25 MG/1
50 CAPSULE ORAL 2 TIMES DAILY
Status: DISCONTINUED | OUTPATIENT
Start: 2017-11-03 | End: 2017-11-04 | Stop reason: HOSPADM

## 2017-11-03 RX ORDER — CYCLOBENZAPRINE HCL 10 MG
10 TABLET ORAL 3 TIMES DAILY PRN
Status: DISCONTINUED | OUTPATIENT
Start: 2017-11-03 | End: 2017-11-04 | Stop reason: HOSPADM

## 2017-11-03 RX ORDER — LABETALOL HYDROCHLORIDE 5 MG/ML
10 INJECTION, SOLUTION INTRAVENOUS EVERY 4 HOURS PRN
Status: DISCONTINUED | OUTPATIENT
Start: 2017-11-03 | End: 2017-11-04 | Stop reason: HOSPADM

## 2017-11-03 RX ORDER — BRIMONIDINE TARTRATE 2 MG/ML
1 SOLUTION/ DROPS OPHTHALMIC
Status: DISCONTINUED | OUTPATIENT
Start: 2017-11-03 | End: 2017-11-04 | Stop reason: HOSPADM

## 2017-11-03 RX ORDER — HYDROCODONE BITARTRATE AND ACETAMINOPHEN 5; 325 MG/1; MG/1
1 TABLET ORAL EVERY 4 HOURS PRN
COMMUNITY
End: 2018-01-22

## 2017-11-03 RX ORDER — SODIUM CHLORIDE 9 MG/ML
INJECTION, SOLUTION INTRAVENOUS CONTINUOUS
Status: DISCONTINUED | OUTPATIENT
Start: 2017-11-03 | End: 2017-11-04

## 2017-11-03 RX ORDER — HYDROCODONE BITARTRATE AND ACETAMINOPHEN 10; 325 MG/1; MG/1
1-2 TABLET ORAL EVERY 6 HOURS PRN
Status: DISCONTINUED | OUTPATIENT
Start: 2017-11-03 | End: 2017-11-04 | Stop reason: HOSPADM

## 2017-11-03 RX ADMIN — HYDROCODONE BITARTRATE AND ACETAMINOPHEN 1 TABLET: 10; 325 TABLET ORAL at 18:16

## 2017-11-03 RX ADMIN — TIMOLOL MALEATE 1 DROP: 5 SOLUTION OPHTHALMIC at 22:18

## 2017-11-03 RX ADMIN — AMLODIPINE BESYLATE 10 MG: 10 TABLET ORAL at 10:15

## 2017-11-03 RX ADMIN — PREGABALIN 50 MG: 25 CAPSULE ORAL at 11:49

## 2017-11-03 RX ADMIN — CARVEDILOL 12.5 MG: 12.5 TABLET, FILM COATED ORAL at 11:49

## 2017-11-03 RX ADMIN — SODIUM CHLORIDE: 9 INJECTION, SOLUTION INTRAVENOUS at 10:15

## 2017-11-03 RX ADMIN — PREGABALIN 50 MG: 25 CAPSULE ORAL at 22:10

## 2017-11-03 RX ADMIN — ONDANSETRON 4 MG: 2 INJECTION, SOLUTION INTRAMUSCULAR; INTRAVENOUS at 22:11

## 2017-11-03 RX ADMIN — CARVEDILOL 12.5 MG: 12.5 TABLET, FILM COATED ORAL at 22:11

## 2017-11-03 RX ADMIN — BRIMONIDINE TARTRATE 1 DROP: 2 SOLUTION OPHTHALMIC at 22:19

## 2017-11-03 RX ADMIN — LATANOPROST 1 DROP: 50 SOLUTION OPHTHALMIC at 22:18

## 2017-11-03 RX ADMIN — CYCLOBENZAPRINE 10 MG: 10 TABLET, FILM COATED ORAL at 22:11

## 2017-11-03 ASSESSMENT — PATIENT HEALTH QUESTIONNAIRE - PHQ9
9. THOUGHTS THAT YOU WOULD BE BETTER OFF DEAD, OR OF HURTING YOURSELF: NOT AT ALL
3. TROUBLE FALLING OR STAYING ASLEEP OR SLEEPING TOO MUCH: NEARLY EVERY DAY
8. MOVING OR SPEAKING SO SLOWLY THAT OTHER PEOPLE COULD HAVE NOTICED. OR THE OPPOSITE, BEING SO FIGETY OR RESTLESS THAT YOU HAVE BEEN MOVING AROUND A LOT MORE THAN USUAL: SEVERAL DAYS
7. TROUBLE CONCENTRATING ON THINGS, SUCH AS READING THE NEWSPAPER OR WATCHING TELEVISION: SEVERAL DAYS
2. FEELING DOWN, DEPRESSED, IRRITABLE, OR HOPELESS: SEVERAL DAYS
4. FEELING TIRED OR HAVING LITTLE ENERGY: MORE THAN HALF THE DAYS
SUM OF ALL RESPONSES TO PHQ QUESTIONS 1-9: 11
6. FEELING BAD ABOUT YOURSELF - OR THAT YOU ARE A FAILURE OR HAVE LET YOURSELF OR YOUR FAMILY DOWN: MORE THAN HALF THE DAYS
SUM OF ALL RESPONSES TO PHQ9 QUESTIONS 1 AND 2: 1
5. POOR APPETITE OR OVEREATING: SEVERAL DAYS

## 2017-11-03 ASSESSMENT — PAIN SCALES - GENERAL
PAINLEVEL_OUTOF10: 8
PAINLEVEL_OUTOF10: 1
PAINLEVEL_OUTOF10: 7

## 2017-11-03 ASSESSMENT — ENCOUNTER SYMPTOMS
WEAKNESS: 1
SHORTNESS OF BREATH: 1

## 2017-11-03 ASSESSMENT — LIFESTYLE VARIABLES
EVER_SMOKED: NEVER
ALCOHOL_USE: NO
DO YOU DRINK ALCOHOL: NO

## 2017-11-03 NOTE — ED NOTES
Med Rec completed per patient  Allergies reviewed  No ORAL antibiotics in last 30 days    Patient had Dialysis MWF and stated she doesn't take her BP medications the day of dialysis due to it dropping her BP

## 2017-11-03 NOTE — ED NOTES
from Lab called with critical result of h&h 5.9 and 18.4 at 0943. Critical lab result read back to lab.   Dr. medrano notified of critical lab result at 0944.  Critical lab result read back by Dr. vaca.

## 2017-11-03 NOTE — ASSESSMENT & PLAN NOTE
Status post chemo in August for 4 months total. Was unable to finish 6 months course.  Follow  oncology outpatient

## 2017-11-03 NOTE — CONSULTS
DATE OF SERVICE:  11/03/2017    REASON FOR CONSULTATION:  End-stage renal disease.    HISTORY OF PRESENT ILLNESS:  The patient is a 62-year-old female with a   history of end-stage renal disease secondary to hypertension and diabetes who   dialyzes at [facility].  She has additional past medical history of chronic   anemia secondary to myelodysplastic syndrome and end-stage renal disease,   atrial fibrillation, arthritis who presented with symptomatic anemia.  The   patient was initially informed on Wednesday that her hemoglobin was less than   7.  She could not get to the hospital and presented today.  Today, her   hemoglobin is under 6 and she is being admitted for further treatment.    PAST MEDICAL HISTORY:  Reviewed in the HPI.    SOCIAL HISTORY:  She does not smoke or drink.  She lives with her family in   [place].    FAMILY HISTORY:  Reviewed and is negative.    MEDICATIONS:  Include the following, hydrocodone, brimonidine,   cyclobenzaprine, Januvia, Coreg, pregabalin, amlodipine and Lumigan.    REVIEW OF SYSTEMS:  GENERAL:  The patient's weight has been stable.  She does not have significant   fluid gains.  HEENT:  She denies difficulty seeing, hearing or swallowing.  CARDIOVASCULAR:  She gets intermittent chest pain.  PULMONARY:  She gets significant dyspnea on exertion.  GASTROINTESTINAL:  She has had no diarrhea, but she did have occult positive.  GENITOURINARY:  She makes a very small amount of urine.  EXTREMITIES:  She gets occasional lower extremity swelling.  She has a fistula   in her left upper extremity.    PHYSICAL EXAMINATION:  VITAL SIGNS:  Her blood pressure was 170s/90s, her heart rates in the 80s,   respirations are 18.  She is afebrile.  GENERAL:  She is lying in bed, in no acute distress.  HEENT:  Her extraocular muscles are intact.  Her sclerae are anicteric.  Her   oropharynx is clear.  HEART:  Regular rate, S1 and S2.  LUNGS:  Anteriorly are clear.  ABDOMEN:  Soft and nontender.  She  has no significant lower extremity swelling.  She has a fistula in her   left arm with a good bruit and thrill.  NEUROLOGIC:  Exam is grossly nonfocal.    LABORATORY DATA:  Significant for hemoglobin of 5.9, her platelet count is   155, her white blood cell count is 5.4.    In conclusion, this is a 62-year-old female with multiple medical   comorbidities who has symptomatic anemia.  1.  Anemia.  This is likely from her myelodysplastic syndrome and end-stage   renal disease.  The patient did receive chemotherapy in the past.    Unfortunately, there are no treatments available and she requires transfusions   every 4-6 weeks.  Of note, she did have a positive occult in the emergency   room and may require GI evaluation.  2.  End-stage renal disease with high blood pressure, mild shortness of   breath.  This could be from anemia versus volume overload.  She will require   dialysis today.  Given her anemia and possible gastrointestinal bleed, we will   not use heparin.  3.  Hypertension, question volume versus noncompliance.  We will have increase   UF.  4.  Diabetes.    PLAN:  At this time, will be typed and cross, transfuse with dialysis,   increase UF as tolerated.  Hold heparin given her potential for   gastrointestinal bleed.    Thank you very much for the consultation.       ____________________________________     MD TOMY PHIPPS / NTS    DD:  11/03/2017 10:00:56  DT:  11/03/2017 13:17:59    D#:  [id number]  Job#:  [id number]    cc: Jayda Jorge MD

## 2017-11-03 NOTE — H&P
" Hospital Medicine History and Physical    Date of Service  11/3/2017    Chief Complaint  Chief Complaint   Patient presents with   • Sent by MD     missed diaylisis on Wednesday   • Other     \"I think my blood levels are low\"   • Shortness of Breath     x1 week       History of Presenting Illness  62 y.o. female w/ medical history of myelodysplastic syndrome, status post chemo, severe chronic anemia requiring scheduled blood transfusion, end-stage renal disease, on hemodialysis, atrial fibrillation, COPD, CHF, who presented 11/3/2017 with complaints of generalized weakness, fatigue. She also had right-sided chest pain, sharp, constant, on deep inspiration, chronic since 2015. Hemoglobin 5.9, 2 Units of Blood Ordered, irradiated, CMV negative. She missed dialysis on Wednesday, and  she came to emergency department to get dialysis and blood transfusion  because her dialysis center is closed today. Apparently, patient had slightly positive fecal occult blood test according emergency physician. She denies any kind of bleeding, vomiting, diarrhea, blood in stool.  Consulted by Dr. Berger in the ER. She is to get hemodialysis today.      Primary Care Physician  Nyla Nava M.D.    Consultants  Dr. Berger/nephrology    Code Status  Full code    Review of Systems  Review of Systems   Constitutional: Positive for malaise/fatigue.   Respiratory: Positive for shortness of breath.    Cardiovascular: Positive for chest pain.   Neurological: Positive for weakness.   All other systems reviewed and are negative.       Past Medical History  Past Medical History:   Diagnosis Date   • MDS (myelodysplastic syndrome) 10/2016    bone marrow biopsy   • Stroke (CMS-HCC) 03/2015    No residual weakness/problems   • Arthritis     hands    • Atrial fibrillation (CMS-HCC)     HX   • Blood transfusion without reported diagnosis    • Breath shortness     w/exertion   • Cancer (CMS-HCC)     MDS in bones   • Cataract     bilat IOL   • Chronic " "anemia    • Chronic kidney disease        • Chronic obstructive pulmonary disease (CMS-HCC)    • Congestive heart failure (CMS-HCC)    • Dental disorder     full dentures   • Diabetes     Diet controlled   • Dialysis patient     [facility] in [place]   • Glaucoma    • Heart abnormalities    • Hypertension    • Pain     \"bones\", generalized, 5/10   • Supplemental oxygen dependent     2 liters       Surgical History  Past Surgical History:   Procedure Laterality Date   • BONE MARROW BIOPSY, NDL/TROCAR  2017    Procedure: BONE MARROW BIOPSY, NDL/TROCAR;  Surgeon: Wade Turner M.D.;  Location: ENDOSCOPY [facility];  Service: Orthopedics   • BONE MARROW ASPIRATION  2017    Procedure: BONE MARROW ASPIRATION;  Surgeon: Wade Turner M.D.;  Location: ENDOSCOPY [facility];  Service: Orthopedics   • VEIN LIGATION Left 11/10/2016    Procedure: VEIN LIGATION FOR DISTAL REVASCULARIZATION AND INTERVAL LIGATION OF LEFT ARM DIALYISIS ACCESS (DRIL PROCEDURE);  Surgeon: Ranulfo Jolly M.D.;  Location: SURGERY [facility];  Service:    • AV FISTULA CREATION Left 2016    Procedure: AV FISTULA CREATION UPPER EXTREMITY;  Surgeon: Ranulfo Jolly M.D.;  Location: SURGERY [facility];  Service:    • GASTROSCOPY  2015    Procedure: ESOPHAGOGASTRODUODENOSCOPY WITH BIOPSY;  Surgeon: Wing Álvarez M.D.;  Location: SURGERY [facility];  Service:    • COLONOSCOPY  2015    Procedure: COLONOSCOPY;  Surgeon: Wing Álvarez M.D.;  Location: SURGERY [facility];  Service:    • GYN SURGERY      hysterectomy   • GYN SURGERY       x 2   • GYN SURGERY      d&C x2   • OTHER      angioplasty/ stents bilat LE   • OTHER ABDOMINAL SURGERY      appendectomy,  x 2, hysterectomy, D & C   • OTHER ABDOMINAL SURGERY      appendectomy   • OTHER CARDIAC SURGERY      Angioplasty  and    • OTHER CARDIAC SURGERY      cardiac angiogram, angioplasty   • " RETINAL DETACHMENT REPAIR Right        Medications  No current facility-administered medications on file prior to encounter.      Current Outpatient Prescriptions on File Prior to Encounter   Medication Sig Dispense Refill   • Brimonidine Tartrate-Timolol (COMBIGAN) 0.2-0.5 % Solution Place 1 Drop in both eyes 2 Times a Day.     • sitagliptin (JANUVIA) 25 MG Tab Take 1 Tab by mouth every morning. 60 Tab 3   • carvedilol (COREG) 12.5 MG Tab Take 12.5 mg by mouth 2 times a day.     • pregabalin (LYRICA) 50 MG capsule Take 50 mg by mouth 2 times a day.     • amlodipine (NORVASC) 10 MG Tab Take 10 mg by mouth every day.     • bimatoprost (LUMIGAN) 0.03 % Solution Place 1 Drop in both eyes every evening.         Family History  Family History   Problem Relation Age of Onset   • Hypertension Father    • Hypertension Mother        Social History  Social History   Substance Use Topics   • Smoking status: Never Smoker   • Smokeless tobacco: Never Used   • Alcohol use No       Allergies  Allergies   Allergen Reactions   • Actos [Pioglitazone Hydrochloride] Unspecified     Cause blindness   UVJ=3422   • Darvocet [Propoxyphene N-Apap] Vomiting     UPM=4246   • Demerol Vomiting     IVP=0032   • Glucophage [Metformin Hydrochloride] Vomiting     XPT=0698   • Morphine Vomiting     GOZ=7321   • Oxycodone Vomiting     RXN=1/2016   • Pcn [Penicillins] Vomiting     ORV=4113     • Requip Vomiting     RXN=12/2015   • Simvastatin Unspecified     Leg cramps  UOO=2634   • Sulfa Drugs Rash     RXN=>10 years   • Tramadol Vomiting     GJG=4720   • Trazodone Vomiting     LNH=9398   • Demerol    • Dilaudid [Hydromorphone] Vomiting     Unknown    • Diphenhydramine Vomiting   • Iron      vomiting   • Lenalidomide Vomiting   • Morphine    • Multivitamin      itching   • Naprosyn [Naproxen]    • Other Drug      Any binders that remove phosphorus from the body such as tums   • Sulfa Drugs         Physical Exam  Laboratory   Hemodynamics  Temp  (24hrs), Av.2 °C (97.1 °F), Min:35.7 °C (96.2 °F), Max:36.4 °C (97.5 °F)   Temperature: 36.3 °C (97.3 °F)  Pulse  Av.6  Min: 73  Max: 146 Heart Rate (Monitored): (!) 142  Blood Pressure: 119/52, NIBP: 115/59      Respiratory      Respiration: (!) 26, Pulse Oximetry: (!) 82 %             Physical Exam   Constitutional: She is oriented to person, place, and time. She appears well-developed and well-nourished.   HENT:   Head: Normocephalic and atraumatic.   Neck: Normal range of motion. Neck supple.   Cardiovascular: Normal rate and regular rhythm.    Pulmonary/Chest: Breath sounds normal. No respiratory distress.   Abdominal: Soft. Bowel sounds are normal. There is no tenderness.   Musculoskeletal: Normal range of motion.   Left arm AV fistula, bruits auscultated   Neurological: She is alert and oriented to person, place, and time.   Skin: Skin is warm and dry. There is pallor.   Psychiatric: She has a normal mood and affect.       Recent Labs      17   0922   WBC  5.4   RBC  1.96*   HEMOGLOBIN  5.9*   HEMATOCRIT  18.4*   MCV  93.4   MCH  30.1   MCHC  32.2*   RDW  54.0*   PLATELETCT  155*   MPV  13.3*     Recent Labs      17   0922   SODIUM  138   POTASSIUM  6.2*   CHLORIDE  99   CO2  19*   GLUCOSE  188*   BUN  126*   CREATININE  10.07*   CALCIUM  7.1*     Recent Labs      17   0922   ALTSGPT  15   ASTSGOT  15   ALKPHOSPHAT  72   TBILIRUBIN  0.3   GLUCOSE  188*                 Lab Results   Component Value Date    TROPONINI <0.01 2017     Urinalysis:    Lab Results  Component Value Date/Time   SPECGRAVITY 1.008 2017   GLUCOSEUR 300 (A) 2017   KETONES Negative 2017   NITRITE Negative 2017   WBCURINE 20-50 (A) 2017   RBCURINE 2-5 (A) 2017   BACTERIA Few (A) 2017   EPITHELCELL Few 2017        Imaging  DX-CHEST-PORTABLE (1 VIEW)   Final Result      Cardiomegaly without evidence of pulmonary edema.            Assessment/Plan     I anticipate this patient will require at least two midnights for appropriate medical management, necessitating inpatient admission.    Severe anemia   Assessment & Plan    Chronic, severe, symptomatic  Secondary to MDS and end-stage renal disease  Ferritin is very high at 1600s  EPO is  High   Considering positive fecal occult test, needs GI workup, likely as outpatient  No active bleeding  Repeat folic acid, vitamin B12 level        Pancytopenia (CMS-HCC)- (present on admission)   Assessment & Plan    Secondary to MDS  Stable  NOT absolutely neutropenic  Follow  Oncology outpatient        MDS (myelodysplastic syndrome) (CMS-HCC)   Assessment & Plan    Status post chemo in August for 4 months total. Was unable to finish 6 months course.  Needs cmv  negative, irradiated red blood cells transfusion for severe anemia  Follow  oncology outpatient        ESRD (end stage renal disease) (CMS-HCC)- (present on admission)   Assessment & Plan    Mssed hemodialysis on Wednesday  will be scheduled for today  Follow nephrology recommendations        Hyperkalemia   Overview    Secondary to end-stage renal disease. Treatment with hemodialysis  Repeat BMP today later  Monitor on telemetry     ESRD (end stage renal disease) on dialysis (CMS-HCC)   Assessment & Plan    Monday Wednesday Friday through left AV fistula  AV fistula is working okay  Dr. Berger consulted, hemodialysis is being arranged          Type 2 diabetes mellitus (CMS-HCC)- (present on admission)   Assessment & Plan    Continues sitagliptin  Hypoglycemia protocol            VTE prophylaxis: SCD. Chemical prophylaxis Contraindicated, high bleeding risk .

## 2017-11-03 NOTE — ED NOTES
Pt transferred to dialysis transport/RN. Pt aware of POC, belongings on [facility].

## 2017-11-03 NOTE — ASSESSMENT & PLAN NOTE
Chronic, severe, symptomatic  Secondary to MDS and end-stage renal disease  Ferritin is very high at 1600s  EPO is  High   Considering positive fecal occult test, needs GI workup, likely as outpatient  No active bleeding  Repeat folic acid, vitamin B12 level

## 2017-11-03 NOTE — ASSESSMENT & PLAN NOTE
Monday Wednesday Friday through left AV fistula  AV fistula is working okay  Dr. Berger consulted, hemodialysis is being arranged

## 2017-11-03 NOTE — ED PROVIDER NOTES
"ED Provider Note    Scribed for Jayda Jorge M.D. by Radha Vail. 11/3/2017, 8:48 AM.    Primary care provider: Nyla Nava M.D.  Means of arrival: Walk-in  History obtained from: Patient  History limited by: None    CHIEF COMPLAINT  Chief Complaint   Patient presents with   • Sent by MD     missed diaylisis on Wednesday   • Other     \"I think my blood levels are low\"   • Shortness of Breath     x1 week       HPI  Vielka Briscoe is a 62 y.o. female who presents to the Emergency Department sent by her PCP for abnormal blood work. The patient reports having blood work performed every 2 weeks at PCP with blood transfusions and last had blood work taken on Monday 5 days ago. She received a phone call from her PCP this morning and was told she needed a blood transfusion for low hemoglobin levels. The patient is a MWF dialysis patient and missed dialysis on Wednesday because she did not have a ride. Her next dialysis was today at 10AM, but came to the ED instead because there was no opening for transfusion today. She reports of fatigue, right-sided chest pains, and shortness of breath for the last week and is on 2L oxygen at home. He denies any headache and abdominal pain. The patient is seen by Dr. Bennett (Nephrology).     REVIEW OF SYSTEMS  Pertinent positives include fatigue, right-sided chest pains, and shortness of breath. Pertinent negatives include no headache or abdominal pain. All other systems reviewed and negative.   C.     PAST MEDICAL HISTORY   has a past medical history of Arthritis; Atrial fibrillation (CMS-[facility]); Blood transfusion without reported diagnosis; Breath shortness; Cancer (CMS-[facility]); Cataract; Chronic anemia; Chronic kidney disease; Chronic obstructive pulmonary disease (CMS-[facility]); Congestive heart failure (CMS-[facility]); Dental disorder; Diabetes; Dialysis patient; Glaucoma; Heart abnormalities; Hypertension; MDS (myelodysplastic syndrome) (10/2016); Pain (-2017); Stroke " (CMS-[facility]) (03/2015); and Supplemental oxygen dependent.    SURGICAL HISTORY   has a past surgical history that includes other cardiac surgery; other abdominal surgery; gyn surgery; gyn surgery; gyn surgery; other cardiac surgery; other; retinal detachment repair (Right); av fistula creation (Left, 2/8/2016); other abdominal surgery; gastroscopy (12/17/2015); colonoscopy (12/17/2015); vein ligation (Left, 11/10/2016); bone marrow biopsy, ndl/trocar (9/20/2017); and bone marrow aspiration (9/20/2017).    SOCIAL HISTORY  Social History   Substance Use Topics   • Smoking status: Never Smoker   • Smokeless tobacco: Never Used   • Alcohol use No      History   Drug Use No       FAMILY HISTORY  Family History   Problem Relation Age of Onset   • Hypertension Father    • Hypertension Mother        CURRENT MEDICATIONS    Current Facility-Administered Medications:   •  NS infusion, , Intravenous, Continuous, Lowell Hi M.D., Stopped at 11/03/17 1239  •  labetalol (NORMODYNE,TRANDATE) injection 10 mg, 10 mg, Intravenous, Q4HRS PRN, Lowell Hi M.D.  •  amlodipine (NORVASC) tablet 10 mg, 10 mg, Oral, DAILY, Lowell Hi M.D., 10 mg at 11/03/17 1015  •  latanoprost (XALATAN) 0.005 % ophthalmic solution 1 Drop, 1 Drop, Both Eyes, Q EVENING, Lowell Hi M.D.  •  carvedilol (COREG) tablet 12.5 mg, 12.5 mg, Oral, BID, Lowell Hi M.D., 12.5 mg at 11/03/17 1149  •  cyclobenzaprine (FLEXERIL) tablet 10 mg, 10 mg, Oral, TID PRN, Lowell Hi M.D.  •  hydrocodone/acetaminophen (NORCO)  MG per tablet 1-2 Tab, 1-2 Tab, Oral, Q6HRS PRN, Lowell Hi M.D.  •  pregabalin (LYRICA) capsule 50 mg, 50 mg, Oral, BID, Lowell Hi M.D., 50 mg at 11/03/17 1149  •  sitagliptin (JANUVIA) tablet 25 mg, 25 mg, Oral, Lowell PARK M.D.  •  brimonidine (ALPHAGAN) 0.2 % ophthalmic solution 1 Drop, 1 Drop, Both Eyes, QHS **AND** timolol (TIMOPTIC) 0.5 % ophthalmic solution 1 Drop, 1 Drop, Both  Eyes, QHS, Lowell Hi M.D.    Current Outpatient Prescriptions:   •  hydrocodone-acetaminophen (NORCO) 5-325 MG Tab per tablet, Take 1 Tab by mouth every four hours as needed., Disp: , Rfl:   •  Brimonidine Tartrate-Timolol (COMBIGAN) 0.2-0.5 % Solution, Place 1 Drop in both eyes 2 Times a Day., Disp: , Rfl:   •  sitagliptin (JANUVIA) 25 MG Tab, Take 1 Tab by mouth every morning., Disp: 60 Tab, Rfl: 3  •  carvedilol (COREG) 12.5 MG Tab, Take 12.5 mg by mouth 2 times a day., Disp: , Rfl:   •  pregabalin (LYRICA) 50 MG capsule, Take 50 mg by mouth 2 times a day., Disp: , Rfl:   •  amlodipine (NORVASC) 10 MG Tab, Take 10 mg by mouth every day., Disp: , Rfl:   •  bimatoprost (LUMIGAN) 0.03 % Solution, Place 1 Drop in both eyes every evening., Disp: , Rfl:     ALLERGIES  Allergies   Allergen Reactions   • Actos [Pioglitazone Hydrochloride] Unspecified     Cause blindness   THS=8283   • Darvocet [Propoxyphene N-Apap] Vomiting     ECN=7218   • Demerol Vomiting     GZQ=9603   • Glucophage [Metformin Hydrochloride] Vomiting     VQE=6128   • Morphine Vomiting     OZD=6335   • Oxycodone Vomiting     RXN=1/2016   • Pcn [Penicillins] Vomiting     VIW=3083     • Requip Vomiting     RXN=12/2015   • Simvastatin Unspecified     Leg cramps  BOV=6977   • Sulfa Drugs Rash     RXN=>10 years   • Tramadol Vomiting     HRZ=4408   • Trazodone Vomiting     ROS=3375   • Demerol    • Dilaudid [Hydromorphone] Vomiting     Unknown    • Diphenhydramine Vomiting   • Iron      vomiting   • Lenalidomide Vomiting   • Morphine    • Multivitamin      itching   • Naprosyn [Naproxen]    • Other Drug      Any binders that remove phosphorus from the body such as tums   • Sulfa Drugs        PHYSICAL EXAM  VITAL SIGNS: /59   Pulse 85   Temp (!) 35.7 °C (96.2 °F)   Resp 18   Wt 66.5 kg (146 lb 9.7 oz)   LMP 01/01/1995   SpO2 100%   BMI 30.64 kg/m²     Constitutional: Standing up next to the gurney getting dressed, on Nasal Canula, able to  answer questions  HENT: Nose is normal in appearance without rhinorrhea, external ears are normal,  moist mucous membranes  Eyes: Anicteric,  pupils are equal round and reactive, there is no conjunctival drainage or pallor   Neck: The trachea is midline, there is no obvious mass or meningeal signs  Cardiovascular: Equal radial pulsation, good perfusion.   Thorax & Lungs: Respiratory rate and effort are normal. There is normal chest excursion with respiration.  Abdomen: Abdomen is normal in appearance,  :  No CVA tenderness to palpation  Rectal Exam: Stool brown with trace guaiac positive.   Musculoskeletal: Fistula with bruit right forearm, No deformities noted in all 4 extremities. Actively moves all 4 extremities  Skin: Visualized skin is pale, warm, no erythema, no rash.  Neurologic:  Cranial nerves II through XII are intact there is no focal abnormality noted.  Psychiatric: Normal mood and mentation    DIAGNOSTIC STUDIES / PROCEDURES    LABS  Results for orders placed or performed during the hospital encounter of 11/03/17   CBC WITH DIFFERENTIAL   Result Value Ref Range    WBC 5.4 4.8 - 10.8 K/uL    RBC 1.96 (L) 4.20 - 5.40 M/uL    Hemoglobin 5.9 (LL) 12.0 - 16.0 g/dL    Hematocrit 18.4 (L) 37.0 - 47.0 %    MCV 93.4 81.4 - 97.8 fL    MCH 30.1 27.0 - 33.0 pg    MCHC 32.2 (L) 33.6 - 35.0 g/dL    RDW 54.0 (H) 35.9 - 50.0 fL    Platelet Count 155 (L) 164 - 446 K/uL    MPV 13.3 (H) 9.0 - 12.9 fL    Neutrophils-Polys 63.30 44.00 - 72.00 %    Lymphocytes 25.00 22.00 - 41.00 %    Monocytes 8.40 0.00 - 13.40 %    Eosinophils 2.40 0.00 - 6.90 %    Basophils 0.20 0.00 - 1.80 %    Immature Granulocytes 0.70 0.00 - 0.90 %    Nucleated RBC 0.00 /100 WBC    Neutrophils (Absolute) 3.39 2.00 - 7.15 K/uL    Lymphs (Absolute) 1.34 1.00 - 4.80 K/uL    Monos (Absolute) 0.45 0.00 - 0.85 K/uL    Eos (Absolute) 0.13 0.00 - 0.51 K/uL    Baso (Absolute) 0.01 0.00 - 0.12 K/uL    Immature Granulocytes (abs) 0.04 0.00 - 0.11 K/uL    NRBC  (Absolute) 0.00 K/uL   CMP   Result Value Ref Range    Sodium 138 135 - 145 mmol/L    Potassium 6.2 (H) 3.6 - 5.5 mmol/L    Chloride 99 96 - 112 mmol/L    Co2 19 (L) 20 - 33 mmol/L    Anion Gap 20.0 (H) 0.0 - 11.9    Glucose 188 (H) 65 - 99 mg/dL    Bun 126 (HH) 8 - 22 mg/dL    Creatinine 10.07 (HH) 0.50 - 1.40 mg/dL    Calcium 7.1 (L) 8.5 - 10.5 mg/dL    AST(SGOT) 15 12 - 45 U/L    ALT(SGPT) 15 2 - 50 U/L    Alkaline Phosphatase 72 30 - 99 U/L    Total Bilirubin 0.3 0.1 - 1.5 mg/dL    Albumin 3.7 3.2 - 4.9 g/dL    Total Protein 7.3 6.0 - 8.2 g/dL    Globulin 3.6 (H) 1.9 - 3.5 g/dL    A-G Ratio 1.0 g/dL   COD (ADULT)   Result Value Ref Range    ABO Grouping Only AB     Rh Grouping Only POS     Antibody Screen-Cod NEG     Component R       RI3                 RedBloodCellsIRR    [id number]   issued       11/03/17   11:59      Product Type Red Blood Cells IRR LR  AS-3     Dispense Status Issued     Unit Number (Barcoded) [id number]     Product Code (Barcoded) B0610X39     Blood Type (Barcoded) 6200     Component R       RI3                 RedBloodCellsIRR    [id number]   issued       11/03/17   14:17      Product Type Red Blood Cells IRR LR  AS-3     Dispense Status Issued     Unit Number (Barcoded) [id number]     Product Code (Barcoded) E5773R27     Blood Type (Barcoded) 6200    TROPONIN   Result Value Ref Range    Troponin I <0.01 0.00 - 0.04 ng/mL   ESTIMATED GFR   Result Value Ref Range    GFR If African American 5 (A) >60 mL/min/1.73 m 2    GFR If Non African American 4 (A) >60 mL/min/1.73 m 2   All labs reviewed by me.    RADIOLOGY  DX-CHEST-PORTABLE (1 VIEW)   Final Result      Cardiomegaly without evidence of pulmonary edema.      The radiologist's interpretation of all radiological studies have been reviewed by me.    COURSE & MEDICAL DECISION MAKING  Nursing notes and vital signs were reviewed. (See chart for details)  The patient's  records were reviewed, which shows the patient has past  history of Afib, COPD, myelodysplastic syndrome, chronic anemia. Hemoglobin goal > 10 , history was obtained from the patient;     The patient presents with anemia and missed dialaysis and the differential diagnosis includes but is not limited to chest pain, fluid overload.  acute on chronic anemia.       8:48 AM Initial orders in the Emergency Department included troponin, CBC, CMP, and COD. Rectal exam was performed, see above exam for further details.     8:55 AM Paged Nephrology.     9:01 AM I discussed the patient's case with Dr. Berger (Nephrology) who recommends the patient be admitted for transfusion and dialysis. I did discuss that there may be a slow GI bleed based on my rectal exam with trace positive blood.      9:08 AM I discussed the patient's case and the above findings with Dr. Hi (Hospitalist) who agreed to admit the patient.     9:45 AM  informed me of the patient's critical result of H&H level of 5.9 and 18.4 respectively.     DISPOSITION:  Patient will be admitted to Dr. Hi in guarded condition.     FINAL IMPRESSION  1. Anemia in chronic kidney disease, unspecified CKD stage    2. Other chest pain      IRadha (Scribhoda), am scribing for, and in the presence of, Jayda Jorge M.D..    Electronically signed by: Radha Vail (Ervin), 11/3/2017    IJayda M.D. personally performed the services described in this documentation, as scribed by Radha Vail in my presence, and it is both accurate and complete.    The note accurately reflects work and decisions made by me.  Jayda Jorge  11/3/2017  3:02 PM

## 2017-11-03 NOTE — ED NOTES
"Pt ambulates to triage.  Pt sent to ER by MD for \"low blood levels\" (pt unsure what is low).  Pt also c/c \"I missed dialysis on Wednesday because I didn't have a ride.\"  Pt also c/c SOB x1 week.  Pt normally wears home oxygen- placed on oxygen in triage.  A&ox4.  Pt to lobby & advised to inform RN of any changes.    "

## 2017-11-04 VITALS
HEART RATE: 60 BPM | TEMPERATURE: 96.9 F | RESPIRATION RATE: 18 BRPM | DIASTOLIC BLOOD PRESSURE: 43 MMHG | WEIGHT: 150.13 LBS | OXYGEN SATURATION: 96 % | SYSTOLIC BLOOD PRESSURE: 99 MMHG | BODY MASS INDEX: 31.38 KG/M2

## 2017-11-04 LAB — EKG IMPRESSION: NORMAL

## 2017-11-04 PROCEDURE — A9270 NON-COVERED ITEM OR SERVICE: HCPCS | Performed by: INTERNAL MEDICINE

## 2017-11-04 PROCEDURE — 700102 HCHG RX REV CODE 250 W/ 637 OVERRIDE(OP): Performed by: INTERNAL MEDICINE

## 2017-11-04 PROCEDURE — 99239 HOSP IP/OBS DSCHRG MGMT >30: CPT | Performed by: HOSPITALIST

## 2017-11-04 RX ORDER — CYCLOBENZAPRINE HCL 10 MG
10 TABLET ORAL 3 TIMES DAILY PRN
Qty: 20 TAB | Refills: 1 | Status: SHIPPED | OUTPATIENT
Start: 2017-11-04 | End: 2018-01-22

## 2017-11-04 RX ADMIN — SITAGLIPTIN 25 MG: 25 TABLET, FILM COATED ORAL at 12:56

## 2017-11-04 RX ADMIN — PREGABALIN 50 MG: 25 CAPSULE ORAL at 08:35

## 2017-11-04 RX ADMIN — CARVEDILOL 12.5 MG: 12.5 TABLET, FILM COATED ORAL at 08:34

## 2017-11-04 RX ADMIN — AMLODIPINE BESYLATE 10 MG: 10 TABLET ORAL at 08:35

## 2017-11-04 ASSESSMENT — PATIENT HEALTH QUESTIONNAIRE - PHQ9
SUM OF ALL RESPONSES TO PHQ9 QUESTIONS 1 AND 2: 0
SUM OF ALL RESPONSES TO PHQ QUESTIONS 1-9: 0
1. LITTLE INTEREST OR PLEASURE IN DOING THINGS: NOT AT ALL

## 2017-11-04 ASSESSMENT — LIFESTYLE VARIABLES: EVER_SMOKED: NEVER

## 2017-11-04 NOTE — PROGRESS NOTES
Discharge instructions reviewed with patient, questions answered. Denies chest pain, pain or sob. No distress noted. Patient awaiting transport home.

## 2017-11-04 NOTE — PROGRESS NOTES
Report received, pt care assumed, tele box on. VSS, pt assessment complete. Pt aaox4, no signs of distress noted at this time. POC discussed with pt and verbalizes no questions. Pt denies pain.  Pt denies any additional needs at this time. Bed in lowest position, bed alarm on, pt educated on fall risk and verbalized understanding, call light within reach, will continue to monitor.

## 2017-11-04 NOTE — DISCHARGE SUMMARY
"CHIEF COMPLAINT ON ADMISSION  Chief Complaint   Patient presents with   • Sent by MD meagan rao on Wednesday   • Other     \"I think my blood levels are low\"   • Shortness of Breath     x1 week       CODE STATUS  Full Code    HPI & HOSPITAL COURSE  62 y.o. female admitted 11/3/2017 with a history of MDS and esrd on dialysis who presented after missing dialysis with with complaints of generalized weakness, fatigue. She was  Found to have  A severe worsening of her anemia which is common for her. She was transfused with dialysis and improved with her hospital course. He posassium was corrected. This patient also has evidence of iron overload and hemochromatosis genetic studies were sent here.      Therefore, she is discharged in good and stable condition with close outpatient follow-up.    SPECIFIC OUTPATIENT FOLLOW-UP  Pcp and nephrology    DISCHARGE PROBLEM LIST  Active Problems:    Myelodysplasia (myelodysplastic syndrome) (CMS-HCC) POA: Yes    Anemia of chronic disease POA: Yes    ESRD (end stage renal disease) (CMS-HCC) POA: Yes      Overview: Followed by nephrology. Pt is currently getting dialysis MWF.     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 Treatment with hemodialysis      Repeat BMP today later      Monitor on telemetry  Resolved Problems:    Iron overload POA: Yes      FOLLOW UP  No future appointments.  No follow-up " provider specified.    MEDICATIONS ON DISCHARGE   Vielka Briscoe   Home Medication Instructions KENTON:[id number]    Printed on:11/04/17 0810   Medication Information                      amlodipine (NORVASC) 10 MG Tab  Take 10 mg by mouth every day.             bimatoprost (LUMIGAN) 0.03 % Solution  Place 1 Drop in both eyes every evening.             Brimonidine Tartrate-Timolol (COMBIGAN) 0.2-0.5 % Solution  Place 1 Drop in both eyes 2 Times a Day.             carvedilol (COREG) 12.5 MG Tab  Take 12.5 mg by mouth 2 times a day.             cyclobenzaprine (FLEXERIL) 10 MG Tab  Take 1 Tab by mouth 3 times a day as needed for Muscle Spasms.             hydrocodone-acetaminophen (NORCO) 5-325 MG Tab per tablet  Take 1 Tab by mouth every four hours as needed.             pregabalin (LYRICA) 50 MG capsule  Take 50 mg by mouth 2 times a day.             sitagliptin (JANUVIA) 25 MG Tab  Take 1 Tab by mouth every morning.                 DIET  Orders Placed This Encounter   Procedures   • Diet Order     Standing Status:   Standing     Number of Occurrences:   1     Order Specific Question:   Diet:     Answer:   Renal [8]       ACTIVITY  As tolerated.  Weight bearing as tolerated      CONSULTATIONS  nephrology    PROCEDURES  dialysis    LABORATORY  Lab Results   Component Value Date/Time    SODIUM 135 11/03/2017 06:44 PM    POTASSIUM 3.6 11/03/2017 06:44 PM    CHLORIDE 97 11/03/2017 06:44 PM    CO2 24 11/03/2017 06:44 PM    GLUCOSE 159 (H) 11/03/2017 06:44 PM    BUN 51 (H) 11/03/2017 06:44 PM    CREATININE 5.10 (HH) 11/03/2017 06:44 PM    CREATININE 0.6 07/20/2007 04:00 AM        Lab Results   Component Value Date/Time    WBC 4.2 (L) 11/03/2017 06:44 PM    HEMOGLOBIN 10.2 (L) 11/03/2017 06:44 PM    HEMATOCRIT 29.4 (L) 11/03/2017 06:44 PM    PLATELETCT 148 (L) 11/03/2017 06:44 PM        Total time of the discharge process exceeds 32 minutes

## 2017-11-04 NOTE — PROGRESS NOTES
Hemodialysis treatment ordered today per Dr. Berger x 3 hours. Treatment initiated at 1401, ended at 1646.     Patient tolerated tx, ended tx 15 min early d/t clotting, blood able to return, Dr Berger notified, VSS post HD; see paper flow sheet for details.     Net UF 2,743 mL.     Needles removed from access site. Dressings applied and sites held x 10 minutes; verified no bleeding. Positive bruit/thrill post tx. Staff RN to monitor AVF for breakthrough bleeding. Should breakthrough bleeding occur, staff RN to apply pressure to access sites until bleeding resolved. Notify Dialysis and Nephrologist for follow-up.    Report given to Primary RN.

## 2017-11-04 NOTE — PROGRESS NOTES
Pt updated on POC, pt verbalizes understanding, no questions. Pt resting comfortably in bed, denies any additional needs, call light within reach, will continue to monitor.

## 2017-11-04 NOTE — PROGRESS NOTES
[facility] Progress Note               Author: Shirley Guzman Date & Time created: 11/4/2017  12:22 PM     Interval History:  HPI:  The patient is a 62-year-old female with a history of end-stage renal disease secondary to hypertension and diabetes who   dialyzes at [facility].  She has additional past medical history of chronic anemia secondary to myelodysplastic syndrome and end-stage renal disease, atrial fibrillation, arthritis who presented with symptomatic anemia.  The patient was initially informed on Wednesday that her hemoglobin was less than 7.  She could not get to the hospital and presented today.  Today, her hemoglobin is under 6 and she is being admitted for further treatment.    DAILY NEPHROLOGY SUMMARY:  11/04/17 - Had transfusion with HD yesterday and feeling much better today.  Hgb went from 5.9 to 10.2.  She is looking forward to being discharged today.     Review of Systems:  ROS  GENERAL:  The patient's weight has been stable.  She does not have significant   fluid gains.  HEENT:  She denies difficulty seeing, hearing or swallowing.  CARDIOVASCULAR:  She gets intermittent chest pain.  PULMONARY:  She gets significant dyspnea on exertion.  GASTROINTESTINAL:  She has had no diarrhea, but she did have occult positive.  GENITOURINARY:  She makes a very small amount of urine.  EXTREMITIES:  She gets occasional lower extremity swelling.  She has a fistula   in her left upper extremity.    Physical Exam:  Physical Exam  GENERAL:  She is lying in bed, in no acute distress.  HEENT:  Her extraocular muscles are intact.  Her sclerae are anicteric.  Her oropharynx is clear.  HEART:  Regular rate, S1 and S2.  LUNGS:  Anteriorly are clear.  ABDOMEN:  Soft and nontender.  MUSCULOSKELETAL:  She has no significant lower extremity swelling.  She has a fistula in her left arm with a good bruit and thrill.  NEUROLOGIC:  Exam is grossly nonfocal.    Labs:        Invalid input(s):  [id number]  Recent Labs      17   TROPONINI  <0.01     Recent Labs      1722  17   1844   SODIUM  138  135   POTASSIUM  6.2*  3.6   CHLORIDE  99  97   CO2  19*  24   BUN  126*  51*   CREATININE  10.07*  5.10*   CALCIUM  7.1*  8.1*     Recent Labs      1722  17   1844   ALTSGPT  15   --    ASTSGOT  15   --    ALKPHOSPHAT  72   --    TBILIRUBIN  0.3   --    GLUCOSE  188*  159*     Recent Labs      1722  17   1844   RBC  1.96*  3.47*   HEMOGLOBIN  5.9*  10.2*   HEMATOCRIT  18.4*  29.4*   PLATELETCT  155*  148*     Recent Labs      17   1844   WBC  5.4  4.2*   NEUTSPOLYS  63.30  66.30   LYMPHOCYTES  25.00  21.70*   MONOCYTES  8.40  7.80   EOSINOPHILS  2.40  2.60   BASOPHILS  0.20  0.70   ASTSGOT  15   --    ALTSGPT  15   --    ALKPHOSPHAT  72   --    TBILIRUBIN  0.3   --            Hemodynamics:  Temp (24hrs), Av.3 °C (97.3 °F), Min:36 °C (96.8 °F), Max:37.1 °C (98.8 °F)  Temperature:  (patient going home)  Pulse  Av.6  Min: 58  Max: 146Heart Rate (Monitored): (!) 142  Blood Pressure: (!) 99/43 (rn notified), NIBP: 115/59     Respiratory:    Respiration: 18, Pulse Oximetry: 96 %, O2 Daily Delivery Respiratory : Silicone Nasal Cannula           Fluids:    Intake/Output Summary (Last 24 hours) at 17 1222  Last data filed at 17 2100   Gross per 24 hour   Intake             1600 ml   Output             3793 ml   Net            -2193 ml     Weight: 68.1 kg (150 lb 2.1 oz)  GI/Nutrition:  Orders Placed This Encounter   Procedures   • Diet Order     Standing Status:   Standing     Number of Occurrences:   1     Order Specific Question:   Diet:     Answer:   Renal [8]     Medical Decision Making, by Problem:  Active Hospital Problems    Diagnosis   • Anemia of chronic disease [D63.8]   • Myelodysplasia (myelodysplastic syndrome) (CMS-HCC) [D46.9]   • Paroxysmal atrial fibrillation (CMS-HCC) [I48.0]   • Pancytopenia  (CMS-HCC) [D61.818]   • MDS (myelodysplastic syndrome) (CMS-HCC) [D46.9]   • ESRD (end stage renal disease) (CMS-HCC) [N18.6]   • COPD (chronic obstructive pulmonary disease) (CMS-HCC) [J44.9]   • HTN (hypertension) [I10]   • 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 ([facility]) [E08.21]   • ESRD (end stage renal disease) on dialysis (CMS-HCC) [N18.6, Z99.2]   • Hyperkalemia [E87.5]   • Type 2 diabetes mellitus (CMS-HCC) [E11.9]   • Pulmonary hypertension [I27.20]     1.  Anemia.  This is likely from her myelodysplastic syndrome and end-stage renal disease.  The patient did receive chemotherapy in the past.  Unfortunately, there are no treatments available and she requires transfusions  every 4-6 weeks.  Of note, she did have a positive occult in the emergency room and may require GI evaluation.  2.  End-stage renal disease     - Dialyzes MWF   - Given her anemia and possible gastrointestinal bleed, we will not use heparin.  3.  Hypertension, question volume versus noncompliance.   - On Amlodipine and Carvedilol  4.  Diabetes     PLAN:    - No need for HD today  - May go home today and resume her scheduled HD qMWF at [facility]    Quality-Core Measures

## 2017-11-04 NOTE — RESPIRATORY CARE
COPD EDUCATION by COPD CLINICAL EDUCATOR  11/4/2017 at 6:38 AM by Ada Tamez     Patient reviewed by COPD education team. Patient does not qualify for COPD program.

## 2017-11-04 NOTE — PROGRESS NOTES
"Pt arrived to tele floor via gurney, tele box already on. Pt c/o 7/10 \"leg cramping pain.\" pressure dressing assessed, no bleeding.   Bed locked in low position, call light within reach, bed alarm on.      "

## 2017-11-04 NOTE — DISCHARGE INSTRUCTIONS
Discharge Instructions    Discharged to home by taxi with self. Discharged via wheelchair, hospital escort: Yes.  Special equipment needed: Not Applicable    Be sure to schedule a follow-up appointment with your primary care doctor or any specialists as instructed.     Discharge Plan:   Diet Plan: Discussed  Activity Level: Discussed  Confirmed Follow up Appointment: Patient to Call and Schedule Appointment  Confirmed Symptoms Management: Discussed  Medication Reconciliation Updated: Yes  Influenza Vaccine Indication: Not indicated: Previously immunized this influenza season and > 8 years of age    I understand that a diet low in cholesterol, fat, and sodium is recommended for good health. Unless I have been given specific instructions below for another diet, I accept this instruction as my diet prescription.   Other diet: Renal    Special Instructions: None    · Is patient discharged on Warfarin / Coumadin?   No     · Is patient Post Blood Transfusion?  NoAnemia, Nonspecific  Anemia is a condition in which the concentration of red blood cells or hemoglobin in the blood is below normal. Hemoglobin is a substance in red blood cells that carries oxygen to the tissues of the body. Anemia results in not enough oxygen reaching these tissues.   CAUSES   Common causes of anemia include:   Excessive bleeding. Bleeding may be internal or external. This includes excessive bleeding from periods (in women) or from the intestine.    Poor nutrition.    Chronic kidney, thyroid, and liver disease.   Bone marrow disorders that decrease red blood cell production.  Cancer and treatments for cancer.  HIV, AIDS, and their treatments.  Spleen problems that increase red blood cell destruction.  Blood disorders.  Excess destruction of red blood cells due to infection, medicines, and autoimmune disorders.  SIGNS AND SYMPTOMS   Minor weakness.    Dizziness.    Headache.  Palpitations.    Shortness of breath, especially with exercise.     Paleness.  Cold sensitivity.  Indigestion.  Nausea.  Difficulty sleeping.  Difficulty concentrating.  Symptoms may occur suddenly or they may develop slowly.   DIAGNOSIS   Additional blood tests are often needed. These help your health care provider determine the best treatment. Your health care provider will check your stool for blood and look for other causes of blood loss.   TREATMENT   Treatment varies depending on the cause of the anemia. Treatment can include:   Supplements of iron, vitamin B12, or folic acid.    Hormone medicines.    A blood transfusion. This may be needed if blood loss is severe.    Hospitalization. This may be needed if there is significant continual blood loss.    Dietary changes.  Spleen removal.  HOME CARE INSTRUCTIONS  Keep all follow-up appointments. It often takes many weeks to correct anemia, and having your health care provider check on your condition and your response to treatment is very important.  SEEK IMMEDIATE MEDICAL CARE IF:   You develop extreme weakness, shortness of breath, or chest pain.    You become dizzy or have trouble concentrating.  You develop heavy vaginal bleeding.    You develop a rash.    You have bloody or black, tarry stools.    You faint.    You vomit up blood.    You vomit repeatedly.    You have abdominal pain.  You have a fever or persistent symptoms for more than 2-3 days.    You have a fever and your symptoms suddenly get worse.    You are dehydrated.    MAKE SURE YOU:  Understand these instructions.  Will watch your condition.  Will get help right away if you are not doing well or get worse.     This information is not intended to replace advice given to you by your health care provider. Make sure you discuss any questions you have with your health care provider.     Document Released: 01/25/2006 Document Revised: 08/20/2014 Document Reviewed: 06/13/2014  [employer] Interactive Patient Education ©2016 [employer]  End-Stage Kidney Disease  The  kidneys are two organs that lie on either side of the spine between the middle of the back and the front of the abdomen. The kidneys:   Remove wastes and extra water from the blood.    Produce important hormones. These help keep bones strong, regulate blood pressure, and help create red blood cells.    Balance the fluids and chemicals in the blood and tissues.  End-stage kidney disease occurs when the kidneys are so damaged that they cannot do their job. When the kidneys cannot do their job, life-threatening problems occur. The body cannot stay clean and strong without the help of the kidneys. In end-stage kidney disease, the kidneys cannot get better. You need a new kidney or treatments to do some of the work healthy kidneys do in order to stay alive.  CAUSES   End-stage kidney disease usually occurs when a long-lasting (chronic) kidney disease gets worse. It may also occur after the kidneys are suddenly damaged (acute kidney injury).   SYMPTOMS   Swelling (edema) of the legs, ankles, or feet.    Tiredness (lethargy).    Nausea or vomiting.    Confusion.    Problems with urination, such as:    Decreased urine production.    Frequent urination, especially at night.    Frequent accidents in children who are potty trained.    Muscle twitches and cramps.    Persistent itchiness.    Loss of appetite.    Headaches.    Abnormally dark or light skin.    Numbness in the hands or feet.    Easy bruising.    Frequent hiccups.    Menstruation stops.  DIAGNOSIS   Your health care provider will measure your blood pressure and take some tests. These may include:   Urine tests.    Blood tests.    Imaging tests, such as:    An ultrasound exam.    Computed tomography (CT).  A kidney biopsy.  TREATMENT   There are two treatments for end-stage kidney disease:   A procedure that removes toxic wastes from the body (dialysis).    Receiving a new kidney (kidney transplant).  Both of these treatments have serious risks and consequences.  Your health care provider will help you determine which treatment is best for you based on your health, age, and other factors. In addition to having dialysis or a kidney transplant, you may need to take medicines to control high blood pressure (hypertension) and cholesterol and to decrease phosphorus levels in your blood.   HOME CARE INSTRUCTIONS  Follow your prescribed diet.    Take medicines only as directed by your health care provider.    Do not take any new medicines (prescription, over-the-counter, or nutritional supplements) unless approved by your health care provider. Many medicines can worsen your kidney damage or need to have the dose adjusted.    Keep all follow-up visits as directed by your health care provider.  MAKE SURE YOU:  Understand these instructions.  Will watch your condition.  Will get help right away if you are not doing well or get worse.     This information is not intended to replace advice given to you by your health care provider. Make sure you discuss any questions you have with your health care provider.     Document Released: 03/09/2005 Document Revised: 01/08/2016 Document Reviewed: 08/16/2013  [employer] Interactive Patient Education ©2016 [employer]      Depression / Suicide Risk    As you are discharged from this [facility] facility, it is important to learn how to keep safe from harming yourself.    Recognize the warning signs:  · Abrupt changes in personality, positive or negative- including increase in energy   · Giving away possessions  · Change in eating patterns- significant weight changes-  positive or negative  · Change in sleeping patterns- unable to sleep or sleeping all the time   · Unwillingness or inability to communicate  · Depression  · Unusual sadness, discouragement and loneliness  · Talk of wanting to die  · Neglect of personal appearance   · Rebelliousness- reckless behavior  · Withdrawal from people/activities they love  · Confusion- inability to  concentrate     If you or a loved one observes any of these behaviors or has concerns about self-harm, here's what you can do:  · Talk about it- your feelings and reasons for harming yourself  · Remove any means that you might use to hurt yourself (examples: pills, rope, extension cords, firearm)  · Get professional help from the community (Mental Health, Substance Abuse, psychological counseling)  · Do not be alone:Call your Safe Contact- someone whom you trust who will be there for you.  · Call your local CRISIS HOTLINE 653-1361 or 514-582-5039  · Call your local Children's Mobile Crisis Response Team [place] (343) 932-4080 or www.ResearchGate  · Call the toll free National Suicide Prevention Hotlines   · National Suicide Prevention Lifeline 240-675-PDNG (5442)  · National Hope Line Network 800-SUICIDE (384-7947)

## 2017-11-05 ENCOUNTER — PATIENT OUTREACH (OUTPATIENT)
Dept: HEALTH INFORMATION MANAGEMENT | Facility: OTHER | Age: 62
End: 2017-11-05

## 2017-11-05 NOTE — PROGRESS NOTES
Placed discharge outreach phone call to patient s/p hospital discharge 11/4/17.  Left voicemail providing my contact information and instructions to call with any questions or concerns.

## 2017-11-06 NOTE — DISCHARGE PLANNING
Outpatient Dialysis Note    Confirmed patient is active at:    [facility]  [address] [place] [address]      Schedule: Monday, Wednesday, Friday  Time: 10:30 am    Spoke with Zamzam at facility who confirmed.    Forwarded records for review.    Dialysis Coordinator, [facility]  Stacia Stephens [phone]

## 2017-11-08 ENCOUNTER — HOSPITAL ENCOUNTER (OUTPATIENT)
Facility: MEDICAL CENTER | Age: 62
End: 2017-11-08
Attending: NURSE PRACTITIONER
Payer: MEDICARE

## 2017-11-08 LAB
ALBUMIN SERPL BCP-MCNC: 3.8 G/DL (ref 3.2–4.9)
ALBUMIN/GLOB SERPL: 1.3 G/DL
ALP SERPL-CCNC: 89 U/L (ref 30–99)
ALT SERPL-CCNC: 15 U/L (ref 2–50)
ANION GAP SERPL CALC-SCNC: 16 MMOL/L (ref 0–11.9)
AST SERPL-CCNC: 14 U/L (ref 12–45)
BILIRUB SERPL-MCNC: 0.5 MG/DL (ref 0.1–1.5)
BUN SERPL-MCNC: 77 MG/DL (ref 8–22)
CALCIUM SERPL-MCNC: 7.6 MG/DL (ref 8.5–10.5)
CHLORIDE SERPL-SCNC: 97 MMOL/L (ref 96–112)
CO2 SERPL-SCNC: 23 MMOL/L (ref 20–33)
CREAT SERPL-MCNC: 7.93 MG/DL (ref 0.5–1.4)
GFR SERPL CREATININE-BSD FRML MDRD: 5 ML/MIN/1.73 M 2
GLOBULIN SER CALC-MCNC: 3 G/DL (ref 1.9–3.5)
GLUCOSE SERPL-MCNC: 195 MG/DL (ref 65–99)
POTASSIUM SERPL-SCNC: 5.1 MMOL/L (ref 3.6–5.5)
PROT SERPL-MCNC: 6.8 G/DL (ref 6–8.2)
SODIUM SERPL-SCNC: 136 MMOL/L (ref 135–145)

## 2017-11-08 PROCEDURE — 80053 COMPREHEN METABOLIC PANEL: CPT

## 2017-11-09 LAB
HFE GENE MUT ANL BLD/T: NORMAL
HFE P.C282Y BLD/T QL: NEGATIVE
HFE P.H63D BLD/T QL: NEGATIVE
HFE P.S65C BLD/T QL: NEGATIVE

## 2017-11-12 ENCOUNTER — HOSPITAL ENCOUNTER (INPATIENT)
Facility: MEDICAL CENTER | Age: 62
LOS: 3 days | DRG: 811 | End: 2017-11-16
Attending: EMERGENCY MEDICINE | Admitting: HOSPITALIST
Payer: MEDICARE

## 2017-11-12 ENCOUNTER — APPOINTMENT (OUTPATIENT)
Dept: RADIOLOGY | Facility: MEDICAL CENTER | Age: 62
DRG: 811 | End: 2017-11-12
Attending: EMERGENCY MEDICINE
Payer: MEDICARE

## 2017-11-12 DIAGNOSIS — D64.9 SYMPTOMATIC ANEMIA: ICD-10-CM

## 2017-11-12 DIAGNOSIS — E87.5 HYPERKALEMIA: ICD-10-CM

## 2017-11-12 LAB
APTT PPP: <20 SEC (ref 24.7–36)
EKG IMPRESSION: NORMAL
INR PPP: 0.85 (ref 0.87–1.13)
PROTHROMBIN TIME: 11.3 SEC (ref 12–14.6)
TROPONIN I SERPL-MCNC: <0.01 NG/ML (ref 0–0.04)

## 2017-11-12 PROCEDURE — 36415 COLL VENOUS BLD VENIPUNCTURE: CPT

## 2017-11-12 PROCEDURE — 93005 ELECTROCARDIOGRAM TRACING: CPT | Performed by: EMERGENCY MEDICINE

## 2017-11-12 PROCEDURE — 93005 ELECTROCARDIOGRAM TRACING: CPT

## 2017-11-12 PROCEDURE — 85610 PROTHROMBIN TIME: CPT

## 2017-11-12 PROCEDURE — 84484 ASSAY OF TROPONIN QUANT: CPT

## 2017-11-12 PROCEDURE — 99285 EMERGENCY DEPT VISIT HI MDM: CPT

## 2017-11-12 PROCEDURE — 71010 DX-CHEST-PORTABLE (1 VIEW): CPT

## 2017-11-12 PROCEDURE — 85730 THROMBOPLASTIN TIME PARTIAL: CPT

## 2017-11-13 ENCOUNTER — PATIENT OUTREACH (OUTPATIENT)
Dept: HEALTH INFORMATION MANAGEMENT | Facility: OTHER | Age: 62
End: 2017-11-13

## 2017-11-13 ENCOUNTER — RESOLUTE PROFESSIONAL BILLING HOSPITAL PROF FEE (OUTPATIENT)
Dept: HOSPITALIST | Facility: MEDICAL CENTER | Age: 62
End: 2017-11-13
Payer: MEDICARE

## 2017-11-13 PROBLEM — R07.9 CHEST PAIN: Status: ACTIVE | Noted: 2017-11-13

## 2017-11-13 LAB
ABO GROUP BLD: NORMAL
ALBUMIN SERPL BCP-MCNC: 2.8 G/DL (ref 3.2–4.9)
ALBUMIN/GLOB SERPL: 0.9 G/DL
ALP SERPL-CCNC: 66 U/L (ref 30–99)
ALT SERPL-CCNC: 18 U/L (ref 2–50)
ANION GAP SERPL CALC-SCNC: 14 MMOL/L (ref 0–11.9)
AST SERPL-CCNC: 20 U/L (ref 12–45)
BARCODED ABORH UBTYP: 6200
BARCODED PRD CODE UBPRD: NORMAL
BARCODED UNIT NUM UBUNT: NORMAL
BASOPHILS # BLD AUTO: 0.2 % (ref 0–1.8)
BASOPHILS # BLD: 0.01 K/UL (ref 0–0.12)
BILIRUB SERPL-MCNC: 0.3 MG/DL (ref 0.1–1.5)
BLD GP AB SCN SERPL QL: NORMAL
BNP SERPL-MCNC: 53 PG/ML (ref 0–100)
BUN SERPL-MCNC: 81 MG/DL (ref 8–22)
CALCIUM SERPL-MCNC: 6.2 MG/DL (ref 8.5–10.5)
CHLORIDE SERPL-SCNC: 99 MMOL/L (ref 96–112)
CHOLEST SERPL-MCNC: 144 MG/DL (ref 100–199)
CO2 SERPL-SCNC: 20 MMOL/L (ref 20–33)
COMPONENT R 8504R: NORMAL
CREAT SERPL-MCNC: 7.55 MG/DL (ref 0.5–1.4)
EKG IMPRESSION: NORMAL
EOSINOPHIL # BLD AUTO: 0.14 K/UL (ref 0–0.51)
EOSINOPHIL NFR BLD: 3 % (ref 0–6.9)
ERYTHROCYTE [DISTWIDTH] IN BLOOD BY AUTOMATED COUNT: 47.3 FL (ref 35.9–50)
GFR SERPL CREATININE-BSD FRML MDRD: 5 ML/MIN/1.73 M 2
GLOBULIN SER CALC-MCNC: 3 G/DL (ref 1.9–3.5)
GLUCOSE BLD-MCNC: 127 MG/DL (ref 65–99)
GLUCOSE BLD-MCNC: 373 MG/DL (ref 65–99)
GLUCOSE BLD-MCNC: 422 MG/DL (ref 65–99)
GLUCOSE SERPL-MCNC: 233 MG/DL (ref 65–99)
HCT VFR BLD AUTO: 20.9 % (ref 37–47)
HDLC SERPL-MCNC: 37 MG/DL
HGB BLD-MCNC: 10.6 G/DL (ref 12–16)
HGB BLD-MCNC: 7.2 G/DL (ref 12–16)
HGB BLD-MCNC: 9 G/DL (ref 12–16)
IMM GRANULOCYTES # BLD AUTO: 0.02 K/UL (ref 0–0.11)
IMM GRANULOCYTES NFR BLD AUTO: 0.4 % (ref 0–0.9)
LDLC SERPL CALC-MCNC: 80 MG/DL
LIPASE SERPL-CCNC: 138 U/L (ref 11–82)
LV EJECT FRACT  99904: 65
LV EJECT FRACT MOD 2C 99903: 80.73
LV EJECT FRACT MOD 4C 99902: 62.71
LV EJECT FRACT MOD BP 99901: 73.08
LYMPHOCYTES # BLD AUTO: 1.49 K/UL (ref 1–4.8)
LYMPHOCYTES NFR BLD: 32.1 % (ref 22–41)
MAGNESIUM SERPL-MCNC: 1.8 MG/DL (ref 1.5–2.5)
MCH RBC QN AUTO: 31 PG (ref 27–33)
MCHC RBC AUTO-ENTMCNC: 34.4 G/DL (ref 33.6–35)
MCV RBC AUTO: 90.1 FL (ref 81.4–97.8)
MONOCYTES # BLD AUTO: 0.43 K/UL (ref 0–0.85)
MONOCYTES NFR BLD AUTO: 9.3 % (ref 0–13.4)
NEUTROPHILS # BLD AUTO: 2.55 K/UL (ref 2–7.15)
NEUTROPHILS NFR BLD: 55 % (ref 44–72)
NRBC # BLD AUTO: 0 K/UL
NRBC BLD AUTO-RTO: 0 /100 WBC
PHOSPHATE SERPL-MCNC: 8.5 MG/DL (ref 2.5–4.5)
PLATELET # BLD AUTO: 99 K/UL (ref 164–446)
PMV BLD AUTO: 13.1 FL (ref 9–12.9)
POTASSIUM SERPL-SCNC: 5.4 MMOL/L (ref 3.6–5.5)
PRODUCT TYPE UPROD: NORMAL
PROT SERPL-MCNC: 5.8 G/DL (ref 6–8.2)
RBC # BLD AUTO: 2.32 M/UL (ref 4.2–5.4)
RH BLD: NORMAL
SODIUM SERPL-SCNC: 133 MMOL/L (ref 135–145)
TRIGL SERPL-MCNC: 133 MG/DL (ref 0–149)
TROPONIN I SERPL-MCNC: <0.01 NG/ML (ref 0–0.04)
TROPONIN I SERPL-MCNC: <0.01 NG/ML (ref 0–0.04)
TSH SERPL DL<=0.005 MIU/L-ACNC: 3 UIU/ML (ref 0.3–3.7)
UNIT STATUS USTAT: NORMAL
WBC # BLD AUTO: 4.6 K/UL (ref 4.8–10.8)

## 2017-11-13 PROCEDURE — 30233N1 TRANSFUSION OF NONAUTOLOGOUS RED BLOOD CELLS INTO PERIPHERAL VEIN, PERCUTANEOUS APPROACH: ICD-10-PCS | Performed by: HOSPITALIST

## 2017-11-13 PROCEDURE — 84443 ASSAY THYROID STIM HORMONE: CPT

## 2017-11-13 PROCEDURE — 96365 THER/PROPH/DIAG IV INF INIT: CPT

## 2017-11-13 PROCEDURE — 83880 ASSAY OF NATRIURETIC PEPTIDE: CPT

## 2017-11-13 PROCEDURE — P9016 RBC LEUKOCYTES REDUCED: HCPCS

## 2017-11-13 PROCEDURE — 83735 ASSAY OF MAGNESIUM: CPT

## 2017-11-13 PROCEDURE — 36415 COLL VENOUS BLD VENIPUNCTURE: CPT

## 2017-11-13 PROCEDURE — 82962 GLUCOSE BLOOD TEST: CPT | Mod: 91

## 2017-11-13 PROCEDURE — 700105 HCHG RX REV CODE 258: Performed by: EMERGENCY MEDICINE

## 2017-11-13 PROCEDURE — 93005 ELECTROCARDIOGRAM TRACING: CPT | Performed by: HOSPITALIST

## 2017-11-13 PROCEDURE — A9270 NON-COVERED ITEM OR SERVICE: HCPCS | Performed by: INTERNAL MEDICINE

## 2017-11-13 PROCEDURE — 93306 TTE W/DOPPLER COMPLETE: CPT

## 2017-11-13 PROCEDURE — 86644 CMV ANTIBODY: CPT

## 2017-11-13 PROCEDURE — 90935 HEMODIALYSIS ONE EVALUATION: CPT

## 2017-11-13 PROCEDURE — 36430 TRANSFUSION BLD/BLD COMPNT: CPT

## 2017-11-13 PROCEDURE — 5A1D70Z PERFORMANCE OF URINARY FILTRATION, INTERMITTENT, LESS THAN 6 HOURS PER DAY: ICD-10-PCS | Performed by: INTERNAL MEDICINE

## 2017-11-13 PROCEDURE — 86901 BLOOD TYPING SEROLOGIC RH(D): CPT

## 2017-11-13 PROCEDURE — 86850 RBC ANTIBODY SCREEN: CPT

## 2017-11-13 PROCEDURE — 85018 HEMOGLOBIN: CPT

## 2017-11-13 PROCEDURE — 93306 TTE W/DOPPLER COMPLETE: CPT | Mod: 26 | Performed by: INTERNAL MEDICINE

## 2017-11-13 PROCEDURE — 83690 ASSAY OF LIPASE: CPT

## 2017-11-13 PROCEDURE — A9270 NON-COVERED ITEM OR SERVICE: HCPCS | Performed by: HOSPITALIST

## 2017-11-13 PROCEDURE — 99223 1ST HOSP IP/OBS HIGH 75: CPT | Mod: AI | Performed by: HOSPITALIST

## 2017-11-13 PROCEDURE — 86923 COMPATIBILITY TEST ELECTRIC: CPT

## 2017-11-13 PROCEDURE — 80061 LIPID PANEL: CPT

## 2017-11-13 PROCEDURE — 700102 HCHG RX REV CODE 250 W/ 637 OVERRIDE(OP): Performed by: HOSPITALIST

## 2017-11-13 PROCEDURE — 80053 COMPREHEN METABOLIC PANEL: CPT

## 2017-11-13 PROCEDURE — 700102 HCHG RX REV CODE 250 W/ 637 OVERRIDE(OP): Performed by: INTERNAL MEDICINE

## 2017-11-13 PROCEDURE — 302135 SEQUENTIAL COMPRESSION MACHINE: Performed by: INTERNAL MEDICINE

## 2017-11-13 PROCEDURE — 770020 HCHG ROOM/CARE - TELE (206)

## 2017-11-13 PROCEDURE — 85025 COMPLETE CBC W/AUTO DIFF WBC: CPT

## 2017-11-13 PROCEDURE — 86900 BLOOD TYPING SEROLOGIC ABO: CPT

## 2017-11-13 PROCEDURE — 84100 ASSAY OF PHOSPHORUS: CPT

## 2017-11-13 PROCEDURE — 700111 HCHG RX REV CODE 636 W/ 250 OVERRIDE (IP): Performed by: EMERGENCY MEDICINE

## 2017-11-13 PROCEDURE — 84484 ASSAY OF TROPONIN QUANT: CPT

## 2017-11-13 PROCEDURE — 93010 ELECTROCARDIOGRAM REPORT: CPT | Performed by: INTERNAL MEDICINE

## 2017-11-13 RX ORDER — DEXTROSE MONOHYDRATE 25 G/50ML
25 INJECTION, SOLUTION INTRAVENOUS
Status: DISCONTINUED | OUTPATIENT
Start: 2017-11-13 | End: 2017-11-16 | Stop reason: HOSPADM

## 2017-11-13 RX ORDER — BISACODYL 10 MG
10 SUPPOSITORY, RECTAL RECTAL
Status: DISCONTINUED | OUTPATIENT
Start: 2017-11-13 | End: 2017-11-16 | Stop reason: HOSPADM

## 2017-11-13 RX ORDER — CALCIUM GLUCONATE 94 MG/ML
1 INJECTION, SOLUTION INTRAVENOUS ONCE
Status: DISCONTINUED | OUTPATIENT
Start: 2017-11-13 | End: 2017-11-13

## 2017-11-13 RX ORDER — NITROGLYCERIN 0.4 MG/1
0.4 TABLET SUBLINGUAL
Status: DISCONTINUED | OUTPATIENT
Start: 2017-11-13 | End: 2017-11-16 | Stop reason: HOSPADM

## 2017-11-13 RX ORDER — HEPARIN SODIUM 1000 [USP'U]/ML
1500 INJECTION, SOLUTION INTRAVENOUS; SUBCUTANEOUS
Status: DISCONTINUED | OUTPATIENT
Start: 2017-11-13 | End: 2017-11-16 | Stop reason: HOSPADM

## 2017-11-13 RX ORDER — HEPARIN SODIUM 1000 [USP'U]/ML
INJECTION, SOLUTION INTRAVENOUS; SUBCUTANEOUS
Status: DISPENSED
Start: 2017-11-13 | End: 2017-11-14

## 2017-11-13 RX ORDER — POLYETHYLENE GLYCOL 3350 17 G/17G
1 POWDER, FOR SOLUTION ORAL
Status: DISCONTINUED | OUTPATIENT
Start: 2017-11-13 | End: 2017-11-16 | Stop reason: HOSPADM

## 2017-11-13 RX ORDER — AMOXICILLIN 250 MG
2 CAPSULE ORAL 2 TIMES DAILY
Status: DISCONTINUED | OUTPATIENT
Start: 2017-11-13 | End: 2017-11-16 | Stop reason: HOSPADM

## 2017-11-13 RX ORDER — PREGABALIN 25 MG/1
50 CAPSULE ORAL 2 TIMES DAILY
Status: DISCONTINUED | OUTPATIENT
Start: 2017-11-13 | End: 2017-11-16 | Stop reason: HOSPADM

## 2017-11-13 RX ORDER — CARVEDILOL 12.5 MG/1
12.5 TABLET ORAL 2 TIMES DAILY WITH MEALS
Status: DISCONTINUED | OUTPATIENT
Start: 2017-11-13 | End: 2017-11-16

## 2017-11-13 RX ORDER — AMLODIPINE BESYLATE 10 MG/1
10 TABLET ORAL DAILY
Status: DISCONTINUED | OUTPATIENT
Start: 2017-11-13 | End: 2017-11-16

## 2017-11-13 RX ADMIN — PREGABALIN 50 MG: 25 CAPSULE ORAL at 20:59

## 2017-11-13 RX ADMIN — STANDARDIZED SENNA CONCENTRATE AND DOCUSATE SODIUM 2 TABLET: 8.6; 5 TABLET, FILM COATED ORAL at 08:55

## 2017-11-13 RX ADMIN — CARVEDILOL 12.5 MG: 12.5 TABLET, FILM COATED ORAL at 08:54

## 2017-11-13 RX ADMIN — AMLODIPINE BESYLATE 10 MG: 10 TABLET ORAL at 08:54

## 2017-11-13 RX ADMIN — CARVEDILOL 12.5 MG: 12.5 TABLET, FILM COATED ORAL at 18:33

## 2017-11-13 RX ADMIN — INSULIN HUMAN 9 UNITS: 100 INJECTION, SOLUTION PARENTERAL at 21:21

## 2017-11-13 RX ADMIN — INSULIN HUMAN 8 UNITS: 100 INJECTION, SOLUTION PARENTERAL at 18:04

## 2017-11-13 RX ADMIN — CALCIUM GLUCONATE 1 G: 94 INJECTION, SOLUTION INTRAVENOUS at 01:49

## 2017-11-13 ASSESSMENT — LIFESTYLE VARIABLES
ALCOHOL_USE: NO
EVER_SMOKED: NEVER

## 2017-11-13 ASSESSMENT — PATIENT HEALTH QUESTIONNAIRE - PHQ9
9. THOUGHTS THAT YOU WOULD BE BETTER OFF DEAD, OR OF HURTING YOURSELF: NOT AT ALL
SUM OF ALL RESPONSES TO PHQ QUESTIONS 1-9: 0
7. TROUBLE CONCENTRATING ON THINGS, SUCH AS READING THE NEWSPAPER OR WATCHING TELEVISION: NOT AT ALL
8. MOVING OR SPEAKING SO SLOWLY THAT OTHER PEOPLE COULD HAVE NOTICED. OR THE OPPOSITE, BEING SO FIGETY OR RESTLESS THAT YOU HAVE BEEN MOVING AROUND A LOT MORE THAN USUAL: NOT AT ALL
5. POOR APPETITE OR OVEREATING: NOT AT ALL
2. FEELING DOWN, DEPRESSED, IRRITABLE, OR HOPELESS: NOT AT ALL
4. FEELING TIRED OR HAVING LITTLE ENERGY: NOT AT ALL
3. TROUBLE FALLING OR STAYING ASLEEP OR SLEEPING TOO MUCH: NOT AT ALL
SUM OF ALL RESPONSES TO PHQ9 QUESTIONS 1 AND 2: 0
1. LITTLE INTEREST OR PLEASURE IN DOING THINGS: NOT AT ALL
6. FEELING BAD ABOUT YOURSELF - OR THAT YOU ARE A FAILURE OR HAVE LET YOURSELF OR YOUR FAMILY DOWN: NOT AL ALL

## 2017-11-13 ASSESSMENT — ENCOUNTER SYMPTOMS
TINGLING: 0
ABDOMINAL PAIN: 0
DIZZINESS: 0
HEADACHES: 0
MYALGIAS: 0
DIARRHEA: 0
SHORTNESS OF BREATH: 0
FOCAL WEAKNESS: 0
PHOTOPHOBIA: 0
SORE THROAT: 0
CHILLS: 0
NAUSEA: 0
COUGH: 0
VOMITING: 0
DEPRESSION: 0
WHEEZING: 0
PALPITATIONS: 0
FEVER: 0

## 2017-11-13 ASSESSMENT — PAIN SCALES - GENERAL
PAINLEVEL_OUTOF10: 0
PAINLEVEL_OUTOF10: 0
PAINLEVEL_OUTOF10: 5

## 2017-11-13 NOTE — CONSULTS
[facility] Nephrology Consult Note    Patient seen and examined, please see my dictated consult note for full details.   62yoF with PMH significant for ESRD on HD MWF via L arm AVF, HTN, DM II, Atrial Fibrillation, PVD, COPD, Anemia secondary to Myelodysplastic Syndrome, h/o CVA, COPD on home O2 admitted with complaints of chest pain and headache and found to be anemic.  Assessment/Plan:  1. ESRD--HD MWF as outpatient   --HD today  --Dose adjust all meds for decreased GFR  2. Hyperkalemia--HD today on low K bath  3. Anemia--secondary to myelodysplastic syndrome  --Transfusion ordered by primary svc, will transfuse with HD today  --Monitor  4. Chest Pain--possibly related to anemia, Trop x2 negative  --Transfuse  --Monitor  5. HTN--BP elevated  --Continue home BP meds  --Fluid removal with HD  6. DM II  7. Atrial Fibrillation--medical management  8. PVD--supportive care  9. Pancytopenia--secondary to myelodysplastic syndrome, follow up with heme/onc as outpatient  10. Hyponatremia--mild, HD today  11. Renal Osteodystrophy--pt reports intolerance to all phosphorous binders  --Low Phos diet  12. Moderate Protein-Calorie Malnutrition--no dietary protein restrictions    Report Confirmation #[id number]

## 2017-11-13 NOTE — CARE PLAN
Problem: Safety  Goal: Will remain free from falls  Outcome: PROGRESSING AS EXPECTED  Pt mobility assessed at beginning of shift. Pt is standby to one assist. Fall precautions in place. Non-slip socks on. Bed in lowest locked position. Bed alarm on. Call light within reach. Pt educated to call for assistance and verbalizes understanding.    Problem: Knowledge Deficit  Goal: Knowledge of disease process/condition, treatment plan, diagnostic tests, and medications will improve  Outcome: PROGRESSING AS EXPECTED  Pt educated about disease process. Reason why medications are taken. And informed about treatment plan.

## 2017-11-13 NOTE — PROGRESS NOTES
Received report from Satnam GEORGE. Pt in bed, pt AXOX4, VS - T- 36.0, P- 71, R- 20 lungs clr, SaO2- 100% on 2L/NC, BP - 167/62. Pt c/o mild leg cramping in lower extremities pn 5 on 1 to 10 scale. Pt to go to dialysis today. No requests at this time, call bell in reach, will continue to monitor.

## 2017-11-13 NOTE — ED NOTES
Triage notes    Pt c/o bilateral weakness from hips down, a dull chest pain and weakness in left arm, she then started to talk about her neck pain that is causing headache.  Pt is a diaylsis pt that was recently admitted on 11/3.   EKG done in triage and Dr Reyna saw it and asked for pt to be brought back.  Pt taken to Room 11 report given to Padmini GEORGE.     .  Chief Complaint   Patient presents with   • Weakness     bilateral leg weakness    • Chest Pain     dull pain in left side of chest, hurts when pt takes a deep breath   • Head Ache     starts with neck and goes to head since 1400 today      Vitals not done by triage tech

## 2017-11-13 NOTE — ED PROVIDER NOTES
ED Provider Note    Scribed for David Jerry M.D. by David Jerry. 11/12/2017,  11:01 PM.    CHIEF COMPLAINT  Chief Complaint   Patient presents with   • Weakness     bilateral leg weakness    • Chest Pain     dull pain in left side of chest, hurts when pt takes a deep breath   • Head Ache     starts with neck and goes to head since 1400 today        [facility]  Vielka Briscoe is a 62 y.o. female With a history of chronic anemia requiring multiple transfusions, diabetes, end-stage renal disease on dialysis, myelodysplastic syndrome, congestive heart failure, atrial fibrillation, and other chronic medical conditions who presents to the Emergency Department with multiple complaints, including bilateral lower extremity weakness, dull left-sided chest pain worse with expiration, and a headache with associated neck pain since 2 PM today. She has Monday Wednesday Friday dialysis,    She has presented with these complaints at various times in the past. Her most recent admission was November 3, with chest pain, that was attributed to worsening of chronic anemia, as it has been the past. Records show a normal stress test about a year ago. Her triage EKG showed peaked T waves with poor anterior R-wave progression.       REVIEW OF SYSTEMS  See HPI for further details. All other systems are negative.     PAST MEDICAL HISTORY   has a past medical history of Arthritis; Atrial fibrillation (CMS-HCC); Blood transfusion without reported diagnosis; Breath shortness; Cancer (CMS-HCC); Cataract; Chronic anemia; Chronic kidney disease; Chronic obstructive pulmonary disease (CMS-HCC); Congestive heart failure (CMS-HCC); Dental disorder; Diabetes; Dialysis patient; Glaucoma; Heart abnormalities; Hypertension; MDS (myelodysplastic syndrome) (10/2016); Pain (-2017); Stroke (CMS-HCC) (03/2015); and Supplemental oxygen dependent.    SOCIAL HISTORY  Social History     Social History Main Topics   • Smoking status: Never  Smoker   • Smokeless tobacco: Never Used   • Alcohol use No   • Drug use: No   • Sexual activity: Not on file     History   Drug Use No       SURGICAL HISTORY   has a past surgical history that includes other cardiac surgery; other abdominal surgery; gyn surgery; gyn surgery; gyn surgery; other cardiac surgery; other; retinal detachment repair (Right); av fistula creation (Left, 2/8/2016); other abdominal surgery; gastroscopy (12/17/2015); colonoscopy (12/17/2015); vein ligation (Left, 11/10/2016); bone marrow biopsy, ndl/trocar (9/20/2017); and bone marrow aspiration (9/20/2017).    CURRENT MEDICATIONS  Home Medications     Reviewed by Yajaira Parish R.N. (Registered Nurse) on 11/13/17 at 0324  Med List Status: <None>   Medication Last Dose Status   amlodipine (NORVASC) 10 MG Tab 11/2/2017 Active   bimatoprost (LUMIGAN) 0.03 % Solution 11/2/2017 Active   Brimonidine Tartrate-Timolol (COMBIGAN) 0.2-0.5 % Solution 11/2/2017 Active   carvedilol (COREG) 12.5 MG Tab 11/2/2017 Active   cyclobenzaprine (FLEXERIL) 10 MG Tab  Active   hydrocodone-acetaminophen (NORCO) 5-325 MG Tab per tablet 11/2/2017 Active   pregabalin (LYRICA) 50 MG capsule 11/2/2017 Active   sitagliptin (JANUVIA) 25 MG Tab 11/2/2017 Active                ALLERGIES  Allergies   Allergen Reactions   • Actos [Pioglitazone Hydrochloride] Unspecified     Cause blindness   RVC=2697   • Darvocet [Propoxyphene N-Apap] Vomiting     NFV=0678   • Demerol Vomiting     PTH=9744   • Glucophage [Metformin Hydrochloride] Vomiting     DYS=7034   • Morphine Vomiting     XRC=8763   • Oxycodone Vomiting     RXN=1/2016   • Pcn [Penicillins] Vomiting     GPU=9061     • Requip Vomiting     RXN=12/2015   • Simvastatin Unspecified     Leg cramps  LIP=8928   • Sulfa Drugs Rash     RXN=>10 years   • Tramadol Vomiting     QHR=7255   • Trazodone Vomiting     ZOB=0441   • Demerol    • Dilaudid [Hydromorphone] Vomiting     Unknown    • Diphenhydramine Vomiting   • Iron       "vomiting   • Lenalidomide Vomiting   • Morphine    • Multivitamin      itching   • Naprosyn [Naproxen]    • Other Drug      Any binders that remove phosphorus from the body such as tums   • Sulfa Drugs        PHYSICAL EXAM  VITAL SIGNS: /44   Pulse 74   Temp 36.4 °C (97.6 °F)   Resp 17   Ht 1.473 m (4' 10\")   Wt 62.5 kg (137 lb 12.6 oz)   LMP 01/01/1995   SpO2 99%   Breastfeeding? No   BMI 28.80 kg/m²   Pulse ox interpretation: I interpret this pulse ox as normal.  Constitutional: Alert in no apparent distress.  HENT: No signs of trauma, Bilateral external ears normal, Nose normal.   Eyes: Conjunctiva normal, (legally blind).  Neck: Normal range of motion, Supple, No stridor.   Lymphatic: No lymphadenopathy noted.   Cardiovascular: Regular rate and rhythm, no murmurs.   Thorax & Lungs: Normal breath sounds, No respiratory distress, No wheezing, No chest tenderness.   Abdomen: Bowel sounds normal, Soft, No tenderness, No masses, No pulsatile masses. No peritoneal signs.  Skin: Warm, Dry, No erythema, No rash.   Back: No CVA tenderness.  Extremities: Intact distal pulses, No edema, No cyanosis.  Musculoskeletal: Good range of motion in all major joints. No or major deformities noted.   Neurologic: Alert , Normal motor function, Normal sensory function, No focal deficits noted.   Psychiatric: Affect normal, Judgment normal, Mood normal.     DIAGNOSTIC STUDIES / PROCEDURES    EKG  Interpreted by me    Rhythm:  Normal sinus rhythm   Axis: normal  Intervals: normal  Ectopy: none  Conduction: poor rwave progression (old)  ST Segments: no acute change  T Waves: no acute change to the old inversions anteriorly  Q Waves: none  Old EKG from September 2017 shows new peaked T-waves.    LABS  Labs Reviewed   PROTHROMBIN TIME - Abnormal; Notable for the following:        Result Value    PT 11.3 (*)     INR 0.85 (*)     All other components within normal limits    Narrative:     Indicate which anticoagulants the " patient is on:->UNKNOWN   APTT - Abnormal; Notable for the following:     APTT <20.0 (*)     All other components within normal limits    Narrative:     Indicate which anticoagulants the patient is on:->UNKNOWN   CBC WITH DIFFERENTIAL - Abnormal; Notable for the following:     WBC 4.6 (*)     RBC 2.32 (*)     Hemoglobin 7.2 (*)     Hematocrit 20.9 (*)     Platelet Count 99 (*)     MPV 13.1 (*)     All other components within normal limits    Narrative:     SPECIMEN IS A RECOLLECT   COMP METABOLIC PANEL - Abnormal; Notable for the following:     Sodium 133 (*)     Anion Gap 14.0 (*)     Glucose 233 (*)     Bun 81 (*)     Creatinine 7.55 (*)     Calcium 6.2 (*)     Albumin 2.8 (*)     Total Protein 5.8 (*)     All other components within normal limits    Narrative:     SPECIMEN IS A RECOLLECT   LIPASE - Abnormal; Notable for the following:     Lipase 138 (*)     All other components within normal limits    Narrative:     SPECIMEN IS A RECOLLECT   PHOSPHORUS - Abnormal; Notable for the following:     Phosphorus 8.5 (*)     All other components within normal limits    Narrative:     SPECIMEN IS A RECOLLECT   ESTIMATED GFR - Abnormal; Notable for the following:     GFR If  7 (*)     GFR If Non  5 (*)     All other components within normal limits    Narrative:     SPECIMEN IS A RECOLLECT   LIPID PROFILE - Abnormal; Notable for the following:     HDL 37 (*)     All other components within normal limits    Narrative:     Fasting   HGB - Abnormal; Notable for the following:     Hemoglobin 9.0 (*)     All other components within normal limits   HGB - Abnormal; Notable for the following:     Hemoglobin 10.6 (*)     All other components within normal limits   ACCU-CHEK GLUCOSE - Abnormal; Notable for the following:     Glucose - Accu-Ck 127 (*)     All other components within normal limits   ACCU-CHEK GLUCOSE - Abnormal; Notable for the following:     Glucose - Accu-Ck 373 (*)     All other  components within normal limits   TROPONIN    Narrative:     Indicate which anticoagulants the patient is on:->UNKNOWN   TROPONIN   BTYPE NATRIURETIC PEPTIDE    Narrative:     SPECIMEN IS A RECOLLECT   MAGNESIUM    Narrative:     SPECIMEN IS A RECOLLECT   TSH    Narrative:     SPECIMEN IS A RECOLLECT   COD (ADULT)   TROPONIN    Narrative:     Fasting   HGB   HGB   RELEASE RED BLOOD CELLS   TRANSFUSE RED BLOOD CELLS-NURSING COMMUNICATION     All labs reviewed by me.    RADIOLOGY  Echocardiogram Comp W/O Cont   Final Result      DX-CHEST-PORTABLE (1 VIEW)   Final Result      No acute cardiopulmonary abnormality identified.        The radiologist's interpretation of all radiological studies have been reviewed by me.    COURSE & MEDICAL DECISION MAKING  Nursing notes, VS, PMSFHx reviewed in chart.     11:01 PM Patient seen and examined at bedside. Differential diagnosis includes but is not limited to hyperkalemia, anemia, electrolyte abnormality, MI, viral syndrome, malaise. Ordered for labs and chest xray to evaluate.     12:30 AM Blood previously sent to lab was apparently lost, though the troponin and INR results reported. New blood drawn and sent to lab.     12:57 AM Lab called with a Hgb of 7.2, down from 10.2 nine days ago. This patient's chest pain seems to correlate with worsening anemia, suggesting possible ischemia. I think this patient has fairly obvious symptomatic anemia, seems to be becoming anemic more rapidly in recent weeks than in the past. She'll be admitted for likely transfusion, which will need to be coordinated with hemodialysis to avoid fluid overload. Fortunately, she's due for dialysis later this morning.     1:20 AM lab is on downtime, but we called to obtain the patient's potassium results, and it is 5.4. This is not a dangerous range, though still could be related to her peaked T waves. I discussed the patient's presentation with Dr. Osman, who will admit to her service. She is due for  dialysis later this morning, but will be given some calcium in the meantime.    DISPOSITION:  Patient will be admitted to Dr. Osman in guarded condition.      FINAL IMPRESSION  1. Symptomatic anemia    2. Hyperkalemia

## 2017-11-13 NOTE — CONSULTS
DATE OF SERVICE:  11/13/2017    REQUESTING PHYSICIAN:  Hospitalist service.    REASON FOR CONSULTATION:  Evaluation and management of end-stage renal   disease.    HISTORY OF PRESENT ILLNESS:  This is a 62-year-old female with a past medical   history significant for end-stage renal disease, on chronic hemodialysis   Monday, Wednesday, and Fridays via a left arm AV fistula under the care of   [facility]; hypertension, diabetes mellitus type 2, atrial   fibrillation, peripheral vascular disease, COPD, anemia secondary to   myelodysplastic syndrome, history of CVA, who was admitted with complaints of   chest pain and headache and was found to be anemic.  The patient reports that   she developed chest discomfort and a dull aching chest pain yesterday along   with bilateral lower extremity weakness that worsened as the day progressed,   so she presented to the emergency room where cardiac enzymes were negative and   she was found to be anemic with a hemoglobin of 7.2.  The patient has a   history of myelodysplastic syndrome for which she is admitted for frequent   blood transfusions.  Her last admission was about a week ago at which time her   hemoglobin was 5.4.  She was transfused.  Her hemoglobin was around 9 at the   time of discharge.  The patient reports that she has been compliant with her   outpatient dialysis treatments.  She usually tolerates about 1-2 liters of   fluid removed with treatments and reports that she has significant   intra-dialytic hypotension with treatment when more than about 2 liters are   removed.  She denies any shortness of breath.  This morning, her second set of   troponins were negative.  She reports that the chest pain is persistent, but   that it is dull and she denies any lower extremity edema.    REVIEW OF SYSTEMS:  Significant for lower extremity weakness.  The patient   reports dull aching chest pain that has persisted for the past 24 hours.  She   denies any nausea  or vomiting.  No diarrhea or constipation.  She does report   malaise and fatigue.  She denies any lower extremity edema and the rest of the   12-point review of systems is negative.    PAST MEDICAL HISTORY:  1.  End-stage renal disease.  The patient is on chronic hemodialysis Monday,   Wednesday, Friday via a left arm AV fistula under the care of [facility]   [facility].  2.  Anemia secondary to myelodysplastic syndrome as well as secondary to   chronic kidney disease.  The patient follows with Dr. Avila as an outpatient   and had been on chemotherapy in the past, but she has still required frequent   blood transfusions and she is no longer on chemotherapy.  3.  Chest pain in the past.  This has been related to anemia.  She did have a   nuclear medicine scan in 2016, that showed no ischemia, but did show   a small area of infarction in the distal inferolateral wall extending to the   apex.  4.  Hypertension.  5.  Diabetes mellitus type 2.  6.  Atrial fibrillation.  7.  Vitamin D deficiency.  8.  Peripheral vascular disease.  The patient has a history of stent   placements in lower extremities.  9.  Chronic obstructive pulmonary disease.  The patient is on home oxygen.  10.  Diastolic congestive heart failure.  11.  History of cerebrovascular accident with no residual deficits.  12.  Legally blind.  13.  History of steal syndrome.  This required revision of her left arm AV   fistula by Dr. Ranulfo Jolly in 2016.    PAST SURGICAL HISTORY:  1.   x2.  2.  Hysterectomy.  3.  D and C x2.  4.  Appendectomy.  5.  Left arm AV fistula creation by Dr. Ranulfo Jolly.  6.  Revision of the left arm AV fistula by Dr. Ranulfo Jolly in 2016   secondary to steal syndrome.  7.  History of stent placement to the bilateral lower extremities.  8.  PermCath placement and removal.  9.  Retinal detachment surgery.  10.  Bilateral cataract surgery.  11.  Coronary angiogram.  12.  Upper EGD.    ALLERGIES:   THE PATIENT REPORTS THAT SHE IS ALLERGIC TO ACTOS, DARVOCET,   DEMEROL, GLUCOPHAGE, MORPHINE, OXYCODONE, PENICILLIN, REQUIP, SIMVASTATIN,   SULFA DRUGS, TRAMADOL, TRAZODONE, DILAUDID, BENADRYL, IRON, MULTIVITAMINS, ALL   PHOSPHORUS BINDERS.    SOCIAL HISTORY:  The patient denies any smoking, alcohol or illicit drug use.    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    FAMILY HISTORY:  There is no family history of kidney disease or relatives on   dialysis.    CURRENT MEDICATIONS:  1.  Norvasc 10 mg daily.  2.  Calcium gluconate 1 g IV x1 dose was given earlier today.  3.  Carvedilol 12.5 mg twice daily.  4.  Regular insulin sliding scale.  5.  Senokot 2 tablets twice daily.    PHYSICAL EXAMINATION:  VITAL SIGNS:  Temperature is 36 degrees Celsius, pulse 66, respirations 20,   blood pressure 114/61, satting 100% on 2 liters nasal cannula.  GENERAL:  The patient is alert and oriented x3 and in no acute distress.  She   does appear chronically ill and older than her stated age.  HEENT:  Pupils are equal, round and reactive to light.  Extraocular muscles   are intact.  Mucous membranes appear moist.  NECK:  Exam reveals no JVD.  Trachea is midline.  There is no thyromegaly.  CARDIOVASCULAR:  Heart is regular rate and rhythm.  No murmurs, rubs or   gallops.  RESPIRATORY:  Lungs are generally clear to auscultation bilaterally.  No   wheezes, rales or rhonchi.  There is no tachypnea and there is no increased   work of breathing.  GASTROINTESTINAL:  Abdomen is soft, nontender, nondistended.  Bowel sounds are   present.  EXTREMITIES:  Reveals no clubbing, cyanosis or edema.  There is a left forearm   AV fistula in place with palpable thrill and audible bruit.  SKIN:  Exam reveals no rashes or ulcerations.  There is a normal skin turgor.  NEUROLOGIC:  Exam reveals no focal motor deficits.  Sensation is grossly   intact to light touch.  LYMPHATIC:  Exam reveals no cervical lymphadenopathy, no axillary    lymphadenopathy.  PSYCHIATRIC:  The patient is alert and oriented x3.  She has an appropriate   mood and affect.    LABORATORY DATA:  Labs reveal a white blood cell count of 4.6, hemoglobin 7.2,   platelet count is 99.  Differential reveals 55% neutrophils, 32% lymphocytes.    Chemistries reveal a sodium of 133, potassium 5.4, chloride is 99,   bicarbonate 20, BUN is 81, creatinine of 7.55, calcium of 6.2, AST is 20, ALT   is 18, alkaline phosphatase is 66.  Albumin is 2.8, phosphorus is 8.5,   magnesium is 1.8.  Lipase is 138.  Troponin x2 are negative.  BNP is 53.  INR   is 0.85.    IMAGING STUDIES:  Chest x-ray on 11/12/2017 reveals no acute cardiopulmonary   abnormality.  Echocardiogram on 11/13/2017 reveals normal LV systolic   function.  There is no evidence of valvular abnormality.    ASSESSMENT AND PLAN:  1.  End-stage renal disease.  The patient dialyzes Monday, Wednesday, and   Fridays as an outpatient.  We will plan on hemodialysis today, recommend dose   adjusting all of her medications for her decreased glomerular filtration rate.  2.  Hyperkalemia.  We will plan on hemodialysis today on low potassium bath.  3.  Anemia secondary to myelodysplastic syndrome.  The patient has frequent   admits for blood transfusion.  Transfusion has been ordered, but the primary   service.  We will transfuse with hemodialysis today.  Continue to monitor.  4.  Chest pain, possibly related to anemia.  Troponins x2 are negative.    Recommend transfusions as needed and continue to monitor.  5.  Hypertension.  Blood pressure is elevated this morning.  Continue home   blood pressure medications.  We will plan on fluid removal with hemodialysis.  6.  Diabetes mellitus type 2.  Management per the primary service.  7.  Atrial fibrillation.  Continue medical management.  8.  Peripheral vascular disease.  Continue supportive care.  9.  Pancytopenia.  This is secondary to myelodysplastic syndrome.  Follow up   with  hematology/oncology as an outpatient.  10.  Hyponatremia.  This is mild.  We will plan on hemodialysis today.  11.  Renal osteodystrophy.  The patient reports intolerance to all phosphorus   binders.  Her phosphorus level is elevated.  Recommended a low phosphorus   diet.  12.  Hypocalcemia.  Calcium is actually higher when corrected for her low   albumin.  We will plan on hemodialysis today as well.  13.  Moderate protein-calorie malnutrition.  Recommend no dietary protein   restrictions at this time.    Thank you for this very interesting consult and thank you for involving me in   the care of this very pleasant patient.  If you have any questions or need any   further information, please do not hesitate to contact me.       ____________________________________     MD ABY MYLES / RAMONE    DD:  11/13/2017 14:42:44  DT:  11/13/2017 15:25:21    D#:  [id number]  Job#:  [id number]

## 2017-11-13 NOTE — PROGRESS NOTES
Pt seen and examined, admitted early this AM, for chest pain and anemia due to her MDS. Receiving 1 unit of RBC. Trop has remained neg. Nephrology also consulted regarding her ESRD and will have dialysis today.

## 2017-11-13 NOTE — DISCHARGE PLANNING
Outpatient Dialysis Note    Confirmed [facility] at:    [facility]  [address] [place] [address]      Schedule: Monday, Wednesday, Friday  Time: 10:30 am    Spoke with Zamzam at facility who confirmed.    Forwarded records for review.    Dialysis Coordinator, [facility]  Stacia Stephens [phone]

## 2017-11-13 NOTE — PROGRESS NOTES
[facility] Progress [facility]    Patient seen and examined on hemodialysis  VSS--see dialysis treatment sheet for full details  Labs reviewed

## 2017-11-13 NOTE — PROGRESS NOTES
PT arrived to unit via gurney escorted by ER nurse. Pt is in sinus rhythm. PT A&O x 4  Monitor leads in place. Monitor room notified. PT is orientated to the unit, call light, phone system, fall precautions in place, appropriate signs in place, bed in low and locked positions, bed alarm on. Pt educated regarded fall precautions and importance of calling staff for assistance. Pt denies further needs at the time. Will continue to monitor.

## 2017-11-13 NOTE — ASSESSMENT & PLAN NOTE
Patient has a history of severe chronic anemia in the setting of mild dysplastic syndrome with previous chemotherapy requiring scheduled transfusions. She has had a 2 point drop in her hemoglobin level since her last available labs. Given her end-stage renal disease, and the fact that her hemoglobin level is still above 7, I will hold off on transfusion tonight but will order a 1 unit packed red blood cell, irradiated, CMV negative transfusion in the morning when the patient receives dialysis. I will continue to trend her hemoglobin levels. There is no evidence of source of bleed at this time. Suspect acute on chronic worsening of disease.

## 2017-11-14 LAB
ANION GAP SERPL CALC-SCNC: 13 MMOL/L (ref 0–11.9)
BUN SERPL-MCNC: 48 MG/DL (ref 8–22)
CALCIUM SERPL-MCNC: 8.5 MG/DL (ref 8.5–10.5)
CHLORIDE SERPL-SCNC: 95 MMOL/L (ref 96–112)
CO2 SERPL-SCNC: 27 MMOL/L (ref 20–33)
CREAT SERPL-MCNC: 5.39 MG/DL (ref 0.5–1.4)
ERYTHROCYTE [DISTWIDTH] IN BLOOD BY AUTOMATED COUNT: 43.8 FL (ref 35.9–50)
GFR SERPL CREATININE-BSD FRML MDRD: 8 ML/MIN/1.73 M 2
GLUCOSE BLD-MCNC: 127 MG/DL (ref 65–99)
GLUCOSE BLD-MCNC: 175 MG/DL (ref 65–99)
GLUCOSE BLD-MCNC: 216 MG/DL (ref 65–99)
GLUCOSE BLD-MCNC: 243 MG/DL (ref 65–99)
GLUCOSE SERPL-MCNC: 171 MG/DL (ref 65–99)
HCT VFR BLD AUTO: 30.7 % (ref 37–47)
HGB BLD-MCNC: 10.6 G/DL (ref 12–16)
MCH RBC QN AUTO: 30.3 PG (ref 27–33)
MCHC RBC AUTO-ENTMCNC: 34.5 G/DL (ref 33.6–35)
MCV RBC AUTO: 87.7 FL (ref 81.4–97.8)
PLATELET # BLD AUTO: 119 K/UL (ref 164–446)
PMV BLD AUTO: 13 FL (ref 9–12.9)
POTASSIUM SERPL-SCNC: 4.6 MMOL/L (ref 3.6–5.5)
RBC # BLD AUTO: 3.5 M/UL (ref 4.2–5.4)
SODIUM SERPL-SCNC: 135 MMOL/L (ref 135–145)
WBC # BLD AUTO: 3.9 K/UL (ref 4.8–10.8)

## 2017-11-14 PROCEDURE — 700102 HCHG RX REV CODE 250 W/ 637 OVERRIDE(OP): Performed by: INTERNAL MEDICINE

## 2017-11-14 PROCEDURE — 700102 HCHG RX REV CODE 250 W/ 637 OVERRIDE(OP): Performed by: HOSPITALIST

## 2017-11-14 PROCEDURE — 700102 HCHG RX REV CODE 250 W/ 637 OVERRIDE(OP): Performed by: FAMILY MEDICINE

## 2017-11-14 PROCEDURE — 36415 COLL VENOUS BLD VENIPUNCTURE: CPT

## 2017-11-14 PROCEDURE — 82962 GLUCOSE BLOOD TEST: CPT | Mod: 91

## 2017-11-14 PROCEDURE — A9270 NON-COVERED ITEM OR SERVICE: HCPCS | Performed by: FAMILY MEDICINE

## 2017-11-14 PROCEDURE — 80048 BASIC METABOLIC PNL TOTAL CA: CPT

## 2017-11-14 PROCEDURE — 99233 SBSQ HOSP IP/OBS HIGH 50: CPT | Performed by: FAMILY MEDICINE

## 2017-11-14 PROCEDURE — 85027 COMPLETE CBC AUTOMATED: CPT

## 2017-11-14 PROCEDURE — A9270 NON-COVERED ITEM OR SERVICE: HCPCS | Performed by: INTERNAL MEDICINE

## 2017-11-14 PROCEDURE — A9270 NON-COVERED ITEM OR SERVICE: HCPCS | Performed by: HOSPITALIST

## 2017-11-14 PROCEDURE — 770020 HCHG ROOM/CARE - TELE (206)

## 2017-11-14 RX ORDER — CYCLOBENZAPRINE HCL 10 MG
10 TABLET ORAL 3 TIMES DAILY PRN
Status: DISCONTINUED | OUTPATIENT
Start: 2017-11-14 | End: 2017-11-16 | Stop reason: HOSPADM

## 2017-11-14 RX ADMIN — INSULIN HUMAN 2 UNITS: 100 INJECTION, SOLUTION PARENTERAL at 20:37

## 2017-11-14 RX ADMIN — CARVEDILOL 12.5 MG: 12.5 TABLET, FILM COATED ORAL at 10:33

## 2017-11-14 RX ADMIN — AMLODIPINE BESYLATE 10 MG: 10 TABLET ORAL at 10:33

## 2017-11-14 RX ADMIN — SITAGLIPTIN 25 MG: 25 TABLET, FILM COATED ORAL at 10:33

## 2017-11-14 RX ADMIN — PREGABALIN 50 MG: 25 CAPSULE ORAL at 10:33

## 2017-11-14 RX ADMIN — CARVEDILOL 12.5 MG: 12.5 TABLET, FILM COATED ORAL at 17:17

## 2017-11-14 RX ADMIN — INSULIN HUMAN 3 UNITS: 100 INJECTION, SOLUTION PARENTERAL at 17:20

## 2017-11-14 RX ADMIN — CYCLOBENZAPRINE 10 MG: 10 TABLET, FILM COATED ORAL at 17:17

## 2017-11-14 RX ADMIN — PREGABALIN 50 MG: 25 CAPSULE ORAL at 20:36

## 2017-11-14 ASSESSMENT — PAIN SCALES - GENERAL
PAINLEVEL_OUTOF10: 0

## 2017-11-14 ASSESSMENT — ENCOUNTER SYMPTOMS
NAUSEA: 0
GASTROINTESTINAL NEGATIVE: 1
VOMITING: 0
FEVER: 0
MUSCULOSKELETAL NEGATIVE: 1
CONSTITUTIONAL NEGATIVE: 1
ABDOMINAL PAIN: 0
SHORTNESS OF BREATH: 0
DIZZINESS: 0
HEADACHES: 0
BLURRED VISION: 0
RESPIRATORY NEGATIVE: 1
CHILLS: 0
COUGH: 0
PALPITATIONS: 0
ORTHOPNEA: 0
SORE THROAT: 0
WHEEZING: 0
HEARTBURN: 0
EYES NEGATIVE: 1
WEAKNESS: 1
DIARRHEA: 0
PSYCHIATRIC NEGATIVE: 1
NEUROLOGICAL NEGATIVE: 1

## 2017-11-14 NOTE — CARE PLAN
Problem: Safety  Goal: Will remain free from falls  Outcome: PROGRESSING AS EXPECTED  Pt mobility assessed at beginning of shift. Pt is a 1 assist. Fall precautions in place. Non-slip socks on. Bed in lowest locked position. Bed alarm on. Call light within reach. Pt educated to call for assistance and verbalizes understanding.    Problem: Knowledge Deficit  Goal: Knowledge of disease process/condition, treatment plan, diagnostic tests, and medications will improve  Outcome: PROGRESSING AS EXPECTED  Pt educated about disease process. Reason why medications are taken. And informed about treatment plan.

## 2017-11-14 NOTE — RESPIRATORY CARE
COPD EDUCATION by COPD CLINICAL EDUCATOR  11/14/2017 at 7:20 AM by Dilcia Guajardo     Patient reviewed by COPD education team. Patient does not qualify for COPD program.

## 2017-11-14 NOTE — PROGRESS NOTES
Patient tolerated her 3 hr HD and 2400 ml net UF. She received 1 unit PRBC without problem. Left UE AVF working well. + B/T post treatment. Report given to her primary nurse. Patient is stable, no complaint afdter HD.

## 2017-11-14 NOTE — PROGRESS NOTES
[facility] Progress Note               Author: Renetta Le M.D. Date & Time created: 11/14/2017  10:13 AM     Interval History:  This is a 62-year-old female with a past medical history significant for end-stage renal disease, on chronic hemodialysis Monday, Wednesday, and Fridays via a left arm AV fistula under the care of [facility]; hypertension, diabetes mellitus type 2, atrial fibrillation, peripheral vascular disease, COPD, anemia secondary to myelodysplastic syndrome, history of CVA, who was admitted with complaints of chest pain and headache and was found to be anemic.  The patient reports that she developed chest discomfort and a dull aching chest pain yesterday along with bilateral lower extremity weakness that worsened as the day progressed, so she presented to the emergency room where cardiac enzymes were negative and she was found to be anemic with a hemoglobin of 7.2.  The patient has a   history of myelodysplastic syndrome for which she is admitted for frequent blood transfusions.  Her last admission was about a week ago at which time her hemoglobin was 5.4.  She was transfused.  Her hemoglobin was around 9 at the time of discharge.  The patient reports that she has been compliant with her outpatient dialysis treatments.  She usually tolerates about 1-2 liters of fluid removed with treatments and reports that she has significant intra-dialytic hypotension with treatment when more than about 2 liters are removed.  She denies any shortness of breath.  This morning, her second set of troponins were negative.  She reports that the chest pain is persistent, but that it is dull and she denies any lower extremity edema.    DAILY NEPHROLOGY SUMMARY  11/14/17--No events, still has dull substernal CP, had cramping with fluid removal on HD yesterday, received 1 unit PRBC with HD yesterday and Hgb improved, BP stable    Review of Systems:  Review of Systems   Constitutional:  Negative.    HENT: Negative.    Eyes: Negative.    Respiratory: Negative.    Cardiovascular: Positive for chest pain. Negative for palpitations, orthopnea and leg swelling.   Gastrointestinal: Negative.    Genitourinary: Negative.    Musculoskeletal: Negative.    Skin: Negative.    Neurological: Negative.    Endo/Heme/Allergies: Negative.    Psychiatric/Behavioral: Negative.        Physical Exam:  Physical Exam   Constitutional: She is oriented to person, place, and time. She appears well-developed and well-nourished. No distress.   HENT:   Head: Normocephalic and atraumatic.   Mouth/Throat: No oropharyngeal exudate.   Eyes: Conjunctivae and EOM are normal. Pupils are equal, round, and reactive to light. No scleral icterus.   Neck: Normal range of motion. Neck supple. No thyromegaly present.   Cardiovascular: Normal rate and regular rhythm.    No murmur heard.  Pulmonary/Chest: Effort normal and breath sounds normal. No respiratory distress.   Abdominal: Soft. Bowel sounds are normal. She exhibits no distension.   Musculoskeletal: Normal range of motion. She exhibits no edema, tenderness or deformity.   +L arm AVF with thrill/bruit   Neurological: She is alert and oriented to person, place, and time. She exhibits normal muscle tone.   Skin: Skin is warm and dry. No erythema.   Psychiatric: She has a normal mood and affect. Her behavior is normal.   Nursing note and vitals reviewed.      Labs:        Invalid input(s): [id number]  Recent Labs      11/12/17   2312  11/13/17   0030  11/13/17   0150  11/13/17   0609   TROPONINI  <0.01   --   <0.01  <0.01   BNPBTYPENAT   --   53   --    --      Recent Labs      11/13/17   0030  11/14/17   0006   SODIUM  133*  135   POTASSIUM  5.4  4.6   CHLORIDE  99  95*   CO2  20  27   BUN  81*  48*   CREATININE  7.55*  5.39*   MAGNESIUM  1.8   --    PHOSPHORUS  8.5*   --    CALCIUM  6.2*  8.5     Recent Labs      11/13/17   0030  11/14/17   0006   ALTSGPT  18   --    ASTSGOT  20    --    ALKPHOSPHAT  66   --    TBILIRUBIN  0.3   --    LIPASE  138*   --    GLUCOSE  233*  171*     Recent Labs      17   2312   17   0030  17   1150  17   1807  17   0005   RBC   --    --   2.32*   --    --   3.50*   HEMOGLOBIN   --    < >  7.2*  9.0*  10.6*  10.6*   HEMATOCRIT   --    --   20.9*   --    --   30.7*   PLATELETCT   --    --   99*   --    --   119*   PROTHROMBTM  11.3*   --    --    --    --    --    APTT  <20.0*   --    --    --    --    --    INR  0.85*   --    --    --    --    --     < > = values in this interval not displayed.     Recent Labs      17   0030  17   0005   WBC  4.6*  3.9*   NEUTSPOLYS  55.00   --    LYMPHOCYTES  32.10   --    MONOCYTES  9.30   --    EOSINOPHILS  3.00   --    BASOPHILS  0.20   --    ASTSGOT  20   --    ALTSGPT  18   --    ALKPHOSPHAT  66   --    TBILIRUBIN  0.3   --            Hemodynamics:  Temp (24hrs), Av.3 °C (97.3 °F), Min:36 °C (96.8 °F), Max:36.5 °C (97.7 °F)  Temperature: 36.5 °C (97.7 °F)  Pulse  Av.2  Min: 58  Max: 146   Blood Pressure: 126/57     Respiratory:    Respiration: 16, Pulse Oximetry: 100 %           Fluids:    Intake/Output Summary (Last 24 hours) at 17 1013  Last data filed at 17 0900   Gross per 24 hour   Intake              770 ml   Output              200 ml   Net              570 ml     Weight: 62.5 kg (137 lb 12.6 oz)  GI/Nutrition:  Orders Placed This Encounter   Procedures   • Diet Order     Standing Status:   Standing     Number of Occurrences:   1     Order Specific Question:   Diet:     Answer:   Diabetic [3]     Medical Decision Making, by Problem:  Active Hospital Problems    Diagnosis   • *Anemia [D64.9]   • Chest pain [R07.9]   • DM  2 complicated by peripheral neuropathy [E11.9]   • MDS (myelodysplastic syndrome) (CMS-HCC) [D46.9]   • ESRD (end stage renal disease) (CMS-HCC) [N18.6]         Reviewed items::  Radiology images reviewed, Labs reviewed and Medications  reviewed    Assessment/Plan:  1. ESRD--HD MWF as outpatient   --No HD today, will plan for HD tomorrow  --Dose adjust all meds for decreased GFR  2. Hyperkalemia--resolved with HD  3. Anemia--secondary to myelodysplastic syndrome  --Transfused 11/13  --Monitor  4. Chest Pain--possibly related to anemia, Trop x2 negative, still present  --Monitor  5. HTN--BP stable  --Continue home BP meds  --Fluid removal with HD  6. DM II--per primary svc  7. Atrial Fibrillation--medical management  8. PVD--supportive care  9. Pancytopenia--secondary to myelodysplastic syndrome, follow up with heme/onc as outpatient  10. Hyponatremia--improved with HD  11. Renal Osteodystrophy--pt reports intolerance to all phosphorous binders  --Low Phos diet  12.  Hypocalcemia--improved with HD  13. Moderate Protein-Calorie Malnutrition--no dietary protein restrictions

## 2017-11-14 NOTE — PROGRESS NOTES
[facility] Hospitalist Progress Note    Date of Service: 2017    Chief Complaint  62 y.o. female admitted 2017 with Chest pain, Pancytopenia.    Interval Problem Update  CP - still some occasionally  Pancytopenia - known MDS  HTN - controlled  Diabetes - mostly controlled    Consultants/Specialty  Nephro - [facility]    Disposition  Stress test        Review of Systems   Constitutional: Positive for malaise/fatigue. Negative for chills and fever.   HENT: Negative for sore throat.    Eyes: Negative for blurred vision.   Respiratory: Negative for cough, shortness of breath and wheezing.    Cardiovascular: Positive for chest pain. Negative for leg swelling.   Gastrointestinal: Negative for abdominal pain, diarrhea, heartburn, nausea and vomiting.   Genitourinary: Negative for dysuria.   Neurological: Positive for weakness. Negative for dizziness and headaches.      Physical Exam  Laboratory/Imaging   Hemodynamics  Temp (24hrs), Av.3 °C (97.4 °F), Min:36 °C (96.8 °F), Max:36.5 °C (97.7 °F)   Temperature: 36.4 °C (97.5 °F)  Pulse  Av.6  Min: 66  Max: 83    Blood Pressure: 121/88      Respiratory      Respiration: 18, Pulse Oximetry: 100 %             Fluids    Intake/Output Summary (Last 24 hours) at 17 1402  Last data filed at 17 0900   Gross per 24 hour   Intake              770 ml   Output              200 ml   Net              570 ml       Nutrition  Orders Placed This Encounter   Procedures   • Diet Order     Standing Status:   Standing     Number of Occurrences:   1     Order Specific Question:   Diet:     Answer:   Diabetic [3]     Physical Exam   Constitutional: She is oriented to person, place, and time. She appears well-developed and well-nourished.   HENT:   Head: Normocephalic and atraumatic.   Eyes: Conjunctivae are normal. Pupils are equal, round, and reactive to light.   Neck: No tracheal deviation present. No thyromegaly present.   Cardiovascular: Normal rate and regular  rhythm.    Pulmonary/Chest: Effort normal and breath sounds normal.   Abdominal: Soft. Bowel sounds are normal. She exhibits no distension. There is no tenderness.   Lymphadenopathy:     She has no cervical adenopathy.   Neurological: She is alert and oriented to person, place, and time.   Skin: Skin is warm and dry.   Nursing note and vitals reviewed.      Recent Labs      11/13/17   0030  11/13/17   1150  11/13/17   1807  11/14/17   0005   WBC  4.6*   --    --   3.9*   RBC  2.32*   --    --   3.50*   HEMOGLOBIN  7.2*  9.0*  10.6*  10.6*   HEMATOCRIT  20.9*   --    --   30.7*   MCV  90.1   --    --   87.7   MCH  31.0   --    --   30.3   MCHC  34.4   --    --   34.5   RDW  47.3   --    --   43.8   PLATELETCT  99*   --    --   119*   MPV  13.1*   --    --   13.0*     Recent Labs      11/13/17   0030  11/14/17   0006   SODIUM  133*  135   POTASSIUM  5.4  4.6   CHLORIDE  99  95*   CO2  20  27   GLUCOSE  233*  171*   BUN  81*  48*   CREATININE  7.55*  5.39*   CALCIUM  6.2*  8.5     Recent Labs      11/12/17   2312   APTT  <20.0*   INR  0.85*     Recent Labs      11/13/17   0030   BNPBTYPENAT  53     Recent Labs      11/13/17   0609   TRIGLYCERIDE  133   HDL  37*   LDL  80          Assessment/Plan     Chest pain- (present on admission)   Assessment & Plan    Check stress test, Plavix?        Pancytopenia (CMS-[facility])- (present on admission)   Assessment & Plan    Transfused PRBC        MDS (myelodysplastic syndrome) (CMS-HCC)- (present on admission)   Assessment & Plan    Follow cbc        ESRD (end stage renal disease) (CMS-[facility])- (present on admission)   Assessment & Plan    HD MWF        DM  2 complicated by peripheral neuropathy- (present on admission)   Assessment & Plan    SSI, Januvia        HTN (hypertension)- (present on admission)   Assessment & Plan    Coreg, Norvasc        Diabetic neuropathy (CMS-[facility])- (present on admission)   Assessment & Plan    Lyrica            Reviewed items::  EKG reviewed, Radiology  images reviewed, Labs reviewed and Medications reviewed  Bauer catheter::  No Bauer  DVT prophylaxis - mechanical:  SCDs  Ulcer Prophylaxis::  No

## 2017-11-14 NOTE — CARE PLAN
Problem: Safety  Goal: Will remain free from falls    Intervention: Assess risk factors for falls   11/13/17 1819   OTHER   Fall Risk High Risk to Fall - 2 or more points    Risk for Injury-Any positive answers results in the pt being at high risk for fall related injury Not Applicable   Mobility Status Assessment 1-1 Healthcare Provider Required for Assistance with Ambulation & Transfer   History of fall 2   Date of Last Fall 10/18/17   Pt Calls for Assistance Yes

## 2017-11-14 NOTE — PROGRESS NOTES
Assumed care of PT A&O x 4. Pt resting in bed with no signs of labored breathing. On 2L n.c . Tele monitor in place, cardiac rhythm being monitored. Call light within reach, bed in lowest position, upper bed rails up, bed alarm on. Pt was updated on plan of care for the night . Will continue to monitor.

## 2017-11-14 NOTE — PROGRESS NOTES
Pt resting in bed at this time. Even visible chest rise. No signs of distress. Bed alarm on. Call light in place. Bed in low and locked position.

## 2017-11-15 ENCOUNTER — APPOINTMENT (OUTPATIENT)
Dept: RADIOLOGY | Facility: MEDICAL CENTER | Age: 62
DRG: 811 | End: 2017-11-15
Attending: FAMILY MEDICINE
Payer: MEDICARE

## 2017-11-15 LAB
ANION GAP SERPL CALC-SCNC: 19 MMOL/L (ref 0–11.9)
BASOPHILS # BLD AUTO: 0.4 % (ref 0–1.8)
BASOPHILS # BLD: 0.02 K/UL (ref 0–0.12)
BUN SERPL-MCNC: 78 MG/DL (ref 8–22)
CALCIUM SERPL-MCNC: 7.9 MG/DL (ref 8.5–10.5)
CHLORIDE SERPL-SCNC: 96 MMOL/L (ref 96–112)
CO2 SERPL-SCNC: 18 MMOL/L (ref 20–33)
CREAT SERPL-MCNC: 7.21 MG/DL (ref 0.5–1.4)
EOSINOPHIL # BLD AUTO: 0.18 K/UL (ref 0–0.51)
EOSINOPHIL NFR BLD: 3.3 % (ref 0–6.9)
ERYTHROCYTE [DISTWIDTH] IN BLOOD BY AUTOMATED COUNT: 44.4 FL (ref 35.9–50)
GFR SERPL CREATININE-BSD FRML MDRD: 6 ML/MIN/1.73 M 2
GLUCOSE BLD-MCNC: 135 MG/DL (ref 65–99)
GLUCOSE BLD-MCNC: 320 MG/DL (ref 65–99)
GLUCOSE BLD-MCNC: 352 MG/DL (ref 65–99)
GLUCOSE SERPL-MCNC: 123 MG/DL (ref 65–99)
HCT VFR BLD AUTO: 32 % (ref 37–47)
HGB BLD-MCNC: 10.8 G/DL (ref 12–16)
IMM GRANULOCYTES # BLD AUTO: 0.03 K/UL (ref 0–0.11)
IMM GRANULOCYTES NFR BLD AUTO: 0.6 % (ref 0–0.9)
LYMPHOCYTES # BLD AUTO: 1.58 K/UL (ref 1–4.8)
LYMPHOCYTES NFR BLD: 29 % (ref 22–41)
MCH RBC QN AUTO: 29.8 PG (ref 27–33)
MCHC RBC AUTO-ENTMCNC: 33.8 G/DL (ref 33.6–35)
MCV RBC AUTO: 88.2 FL (ref 81.4–97.8)
MONOCYTES # BLD AUTO: 0.5 K/UL (ref 0–0.85)
MONOCYTES NFR BLD AUTO: 9.2 % (ref 0–13.4)
NEUTROPHILS # BLD AUTO: 3.14 K/UL (ref 2–7.15)
NEUTROPHILS NFR BLD: 57.5 % (ref 44–72)
NRBC # BLD AUTO: 0 K/UL
NRBC BLD AUTO-RTO: 0 /100 WBC
PLATELET # BLD AUTO: 130 K/UL (ref 164–446)
PMV BLD AUTO: 13.1 FL (ref 9–12.9)
POTASSIUM SERPL-SCNC: 6 MMOL/L (ref 3.6–5.5)
RBC # BLD AUTO: 3.63 M/UL (ref 4.2–5.4)
SODIUM SERPL-SCNC: 133 MMOL/L (ref 135–145)
TROPONIN I SERPL-MCNC: <0.01 NG/ML (ref 0–0.04)
WBC # BLD AUTO: 5.5 K/UL (ref 4.8–10.8)

## 2017-11-15 PROCEDURE — 90935 HEMODIALYSIS ONE EVALUATION: CPT

## 2017-11-15 PROCEDURE — 80048 BASIC METABOLIC PNL TOTAL CA: CPT

## 2017-11-15 PROCEDURE — 700102 HCHG RX REV CODE 250 W/ 637 OVERRIDE(OP): Performed by: FAMILY MEDICINE

## 2017-11-15 PROCEDURE — 85025 COMPLETE CBC W/AUTO DIFF WBC: CPT

## 2017-11-15 PROCEDURE — 700102 HCHG RX REV CODE 250 W/ 637 OVERRIDE(OP): Performed by: INTERNAL MEDICINE

## 2017-11-15 PROCEDURE — 84484 ASSAY OF TROPONIN QUANT: CPT

## 2017-11-15 PROCEDURE — A9270 NON-COVERED ITEM OR SERVICE: HCPCS | Performed by: FAMILY MEDICINE

## 2017-11-15 PROCEDURE — A9270 NON-COVERED ITEM OR SERVICE: HCPCS | Performed by: HOSPITALIST

## 2017-11-15 PROCEDURE — 770020 HCHG ROOM/CARE - TELE (206)

## 2017-11-15 PROCEDURE — 36415 COLL VENOUS BLD VENIPUNCTURE: CPT

## 2017-11-15 PROCEDURE — 700102 HCHG RX REV CODE 250 W/ 637 OVERRIDE(OP): Performed by: HOSPITALIST

## 2017-11-15 PROCEDURE — 700111 HCHG RX REV CODE 636 W/ 250 OVERRIDE (IP)

## 2017-11-15 PROCEDURE — 99232 SBSQ HOSP IP/OBS MODERATE 35: CPT | Performed by: FAMILY MEDICINE

## 2017-11-15 PROCEDURE — 700105 HCHG RX REV CODE 258: Performed by: FAMILY MEDICINE

## 2017-11-15 PROCEDURE — 82962 GLUCOSE BLOOD TEST: CPT | Mod: 91

## 2017-11-15 PROCEDURE — A9270 NON-COVERED ITEM OR SERVICE: HCPCS | Performed by: INTERNAL MEDICINE

## 2017-11-15 RX ORDER — HEPARIN SODIUM 1000 [USP'U]/ML
INJECTION, SOLUTION INTRAVENOUS; SUBCUTANEOUS
Status: COMPLETED
Start: 2017-11-15 | End: 2017-11-15

## 2017-11-15 RX ORDER — SODIUM CHLORIDE 9 MG/ML
1000 INJECTION, SOLUTION INTRAVENOUS ONCE
Status: COMPLETED | OUTPATIENT
Start: 2017-11-15 | End: 2017-11-15

## 2017-11-15 RX ORDER — BRIMONIDINE TARTRATE AND TIMOLOL MALEATE 2; 5 MG/ML; MG/ML
1 SOLUTION OPHTHALMIC 2 TIMES DAILY
Status: DISCONTINUED | OUTPATIENT
Start: 2017-11-15 | End: 2017-11-16 | Stop reason: HOSPADM

## 2017-11-15 RX ADMIN — SODIUM CHLORIDE 1000 ML: 9 INJECTION, SOLUTION INTRAVENOUS at 22:21

## 2017-11-15 RX ADMIN — PREGABALIN 50 MG: 25 CAPSULE ORAL at 21:01

## 2017-11-15 RX ADMIN — AMLODIPINE BESYLATE 10 MG: 10 TABLET ORAL at 13:35

## 2017-11-15 RX ADMIN — CYCLOBENZAPRINE 10 MG: 10 TABLET, FILM COATED ORAL at 00:47

## 2017-11-15 RX ADMIN — INSULIN HUMAN 6 UNITS: 100 INJECTION, SOLUTION PARENTERAL at 17:50

## 2017-11-15 RX ADMIN — HEPARIN SODIUM 1500 UNITS: 1000 INJECTION, SOLUTION INTRAVENOUS; SUBCUTANEOUS at 07:25

## 2017-11-15 RX ADMIN — CYCLOBENZAPRINE 10 MG: 10 TABLET, FILM COATED ORAL at 23:53

## 2017-11-15 RX ADMIN — PREGABALIN 50 MG: 25 CAPSULE ORAL at 13:35

## 2017-11-15 RX ADMIN — INSULIN HUMAN 8 UNITS: 100 INJECTION, SOLUTION PARENTERAL at 21:03

## 2017-11-15 RX ADMIN — CARVEDILOL 12.5 MG: 12.5 TABLET, FILM COATED ORAL at 13:35

## 2017-11-15 ASSESSMENT — ENCOUNTER SYMPTOMS
WHEEZING: 0
DIARRHEA: 0
BLURRED VISION: 0
CHILLS: 0
DIZZINESS: 0
HEARTBURN: 0
PSYCHIATRIC NEGATIVE: 1
CONSTITUTIONAL NEGATIVE: 1
ABDOMINAL PAIN: 0
NEUROLOGICAL NEGATIVE: 1
VOMITING: 0
COUGH: 0
GASTROINTESTINAL NEGATIVE: 1
PALPITATIONS: 0
ORTHOPNEA: 0
HEADACHES: 0
SHORTNESS OF BREATH: 0
FEVER: 0
WEAKNESS: 1
MUSCULOSKELETAL NEGATIVE: 1
NAUSEA: 0
EYES NEGATIVE: 1
SORE THROAT: 0
RESPIRATORY NEGATIVE: 1

## 2017-11-15 ASSESSMENT — PAIN SCALES - GENERAL
PAINLEVEL_OUTOF10: 0
PAINLEVEL_OUTOF10: 1
PAINLEVEL_OUTOF10: 0

## 2017-11-15 NOTE — PROGRESS NOTES
"Received call from [facility] med dept that pt is \"tired and doesn't want to do stress test today.\" Transport bringing pt back to room.   "

## 2017-11-15 NOTE — PROGRESS NOTES
[facility] Progress Note               Author: Renetta Le M.D. Date & Time created: 11/15/2017  11:45 AM     Interval History:  This is a 62-year-old female with a past medical history significant for end-stage renal disease, on chronic hemodialysis Monday, Wednesday, and Fridays via a left arm AV fistula under the care of [facility]; hypertension, diabetes mellitus type 2, atrial fibrillation, peripheral vascular disease, COPD, anemia secondary to myelodysplastic syndrome, history of CVA, who was admitted with complaints of chest pain and headache and was found to be anemic.  The patient reports that she developed chest discomfort and a dull aching chest pain yesterday along with bilateral lower extremity weakness that worsened as the day progressed, so she presented to the emergency room where cardiac enzymes were negative and she was found to be anemic with a hemoglobin of 7.2.  The patient has a   history of myelodysplastic syndrome for which she is admitted for frequent blood transfusions.  Her last admission was about a week ago at which time her hemoglobin was 5.4.  She was transfused.  Her hemoglobin was around 9 at the time of discharge.  The patient reports that she has been compliant with her outpatient dialysis treatments.  She usually tolerates about 1-2 liters of fluid removed with treatments and reports that she has significant intra-dialytic hypotension with treatment when more than about 2 liters are removed.  She denies any shortness of breath.  This morning, her second set of troponins were negative.  She reports that the chest pain is persistent, but that it is dull and she denies any lower extremity edema.    DAILY NEPHROLOGY SUMMARY  11/14/17--No events, still has dull substernal CP, had cramping with fluid removal on HD yesterday, received 1 unit PRBC with HD yesterday and Hgb improved, BP stable  11/15/17--No events, seen on HD this am, still with dull  chest pressure unchanged, Hgb stable, K high this am, discussed low K diet, BP stable    Review of Systems:  Review of Systems   Constitutional: Negative.    HENT: Negative.    Eyes: Negative.    Respiratory: Negative.    Cardiovascular: Positive for chest pain. Negative for palpitations, orthopnea and leg swelling.   Gastrointestinal: Negative.    Genitourinary: Negative.    Musculoskeletal: Negative.    Skin: Negative.    Neurological: Negative.    Endo/Heme/Allergies: Negative.    Psychiatric/Behavioral: Negative.        Physical Exam:  Physical Exam   Constitutional: She is oriented to person, place, and time. She appears well-developed and well-nourished. No distress.   HENT:   Head: Normocephalic and atraumatic.   Mouth/Throat: No oropharyngeal exudate.   Eyes: Conjunctivae and EOM are normal. Pupils are equal, round, and reactive to light. No scleral icterus.   Neck: Normal range of motion. Neck supple. No thyromegaly present.   Cardiovascular: Normal rate and regular rhythm.    No murmur heard.  Pulmonary/Chest: Effort normal and breath sounds normal. No respiratory distress.   Abdominal: Soft. Bowel sounds are normal. She exhibits no distension.   Musculoskeletal: Normal range of motion. She exhibits no edema, tenderness or deformity.   +L arm AVF with thrill/bruit   Neurological: She is alert and oriented to person, place, and time. She exhibits normal muscle tone.   Skin: Skin is warm and dry. No erythema.   Psychiatric: She has a normal mood and affect. Her behavior is normal.   Nursing note and vitals reviewed.      Labs:        Invalid input(s): [id number]  Recent Labs      11/13/17   0030  11/13/17   0150  11/13/17   0609  11/15/17   0845   TROPONINI   --   <0.01  <0.01  <0.01   BNPBTYPENAT  53   --    --    --      Recent Labs      11/13/17   0030  11/14/17   0006  11/15/17   0304   SODIUM  133*  135  133*   POTASSIUM  5.4  4.6  6.0*   CHLORIDE  99  95*  96   CO2  20  27  18*   BUN  81*  48*  78*    CREATININE  7.55*  5.39*  7.21*   MAGNESIUM  1.8   --    --    PHOSPHORUS  8.5*   --    --    CALCIUM  6.2*  8.5  7.9*     Recent Labs      17   0030  17   0006  11/15/17   0304   ALTSGPT  18   --    --    ASTSGOT  20   --    --    ALKPHOSPHAT  66   --    --    TBILIRUBIN  0.3   --    --    LIPASE  138*   --    --    GLUCOSE  233*  171*  123*     Recent Labs      17   2312  17   0030   17   1807  17   0005  11/15/17   0304   RBC   --   2.32*   --    --   3.50*  3.63*   HEMOGLOBIN   --   7.2*   < >  10.6*  10.6*  10.8*   HEMATOCRIT   --   20.9*   --    --   30.7*  32.0*   PLATELETCT   --   99*   --    --   119*  130*   PROTHROMBTM  11.3*   --    --    --    --    --    APTT  <20.0*   --    --    --    --    --    INR  0.85*   --    --    --    --    --     < > = values in this interval not displayed.     Recent Labs      17   0005  11/15/17   0304   WBC  4.6*  3.9*  5.5   NEUTSPOLYS  55.00   --   57.50   LYMPHOCYTES  32.10   --   29.00   MONOCYTES  9.30   --   9.20   EOSINOPHILS  3.00   --   3.30   BASOPHILS  0.20   --   0.40   ASTSGOT  20   --    --    ALTSGPT  18   --    --    ALKPHOSPHAT  66   --    --    TBILIRUBIN  0.3   --    --            Hemodynamics:  Temp (24hrs), Av.2 °C (97.2 °F), Min:36.1 °C (96.9 °F), Max:36.4 °C (97.5 °F)  Temperature: 36.2 °C (97.1 °F)  Pulse  Av.7  Min: 58  Max: 146   Blood Pressure: 124/56     Respiratory:    Respiration: 17, Pulse Oximetry: 100 %           Fluids:    Intake/Output Summary (Last 24 hours) at 11/15/17 1145  Last data filed at 17 1300   Gross per 24 hour   Intake              360 ml   Output                0 ml   Net              360 ml        GI/Nutrition:  Orders Placed This Encounter   Procedures   • DIET NPO     Standing Status:   Standing     Number of Occurrences:   8     Order Specific Question:   Restrict to:     Answer:   Sips with Medications [3]     Medical Decision Making, by  Problem:  Active Hospital Problems    Diagnosis   • *Anemia [D64.9]   • Chest pain [R07.9]   • DM  2 complicated by peripheral neuropathy [E11.9]   • MDS (myelodysplastic syndrome) (CMS-HCC) [D46.9]   • ESRD (end stage renal disease) (CMS-HCC) [N18.6]         Reviewed items::  Radiology images reviewed, Labs reviewed and Medications reviewed    Assessment/Plan:  1. ESRD--HD MWF as outpatient   --No HD today, will plan for HD tomorrow  --Dose adjust all meds for decreased GFR  2. Hyperkalemia--HD today on low K bath  --Low K diet  3. Anemia--secondary to myelodysplastic syndrome  --Transfused 11/13  --Monitor  4. Chest Pain--possibly related to anemia, Trop x2 negative, still present  --To have cardiac stress test today  5. HTN--BP stable  --Continue home BP meds  --Fluid removal with HD  6. DM II--per primary svc  7. Atrial Fibrillation--medical management  8. PVD--supportive care  9. Pancytopenia--secondary to myelodysplastic syndrome, follow up with heme/onc as outpatient  10. Hyponatremia--HD today  11. Renal Osteodystrophy--pt reports intolerance to all phosphorous binders  --Low Phos diet  12.  Hypocalcemia--improved with HD  13. Moderate Protein-Calorie Malnutrition--no dietary protein restrictions

## 2017-11-15 NOTE — CARE PLAN
Problem: Safety  Goal: Will remain free from injury  Outcome: PROGRESSING AS EXPECTED  Call bell in reach PT encouraged to call for assist.    Problem: Bowel/Gastric:  Goal: Normal bowel function is maintained or improved  Outcome: PROGRESSING AS EXPECTED  PT had large amount of BM today.

## 2017-11-15 NOTE — PROGRESS NOTES
Assumed care of Pt. PT resting comfortably in bed, denies pain. PT on 2L O2 which is her baseline. Will continue to monitor.

## 2017-11-15 NOTE — PROGRESS NOTES
Pt observed. Pt c/o BLEpain at this time  Pt AAOx4, no other signs or symptoms of distress, fall precautions in place, call light within reach, all questions answered, will continue to monitor. NPO at this time for cardiac stress test.

## 2017-11-16 ENCOUNTER — APPOINTMENT (OUTPATIENT)
Dept: RADIOLOGY | Facility: MEDICAL CENTER | Age: 62
DRG: 811 | End: 2017-11-16
Attending: FAMILY MEDICINE
Payer: MEDICARE

## 2017-11-16 VITALS
DIASTOLIC BLOOD PRESSURE: 69 MMHG | RESPIRATION RATE: 15 BRPM | BODY MASS INDEX: 29.8 KG/M2 | HEART RATE: 78 BPM | OXYGEN SATURATION: 100 % | WEIGHT: 141.98 LBS | HEIGHT: 58 IN | SYSTOLIC BLOOD PRESSURE: 140 MMHG | TEMPERATURE: 96.4 F

## 2017-11-16 LAB
ANION GAP SERPL CALC-SCNC: 10 MMOL/L (ref 0–11.9)
BASOPHILS # BLD AUTO: 0.6 % (ref 0–1.8)
BASOPHILS # BLD: 0.03 K/UL (ref 0–0.12)
BUN SERPL-MCNC: 63 MG/DL (ref 8–22)
CALCIUM SERPL-MCNC: 7.4 MG/DL (ref 8.5–10.5)
CHLORIDE SERPL-SCNC: 100 MMOL/L (ref 96–112)
CO2 SERPL-SCNC: 27 MMOL/L (ref 20–33)
CREAT SERPL-MCNC: 5.94 MG/DL (ref 0.5–1.4)
EOSINOPHIL # BLD AUTO: 0.16 K/UL (ref 0–0.51)
EOSINOPHIL NFR BLD: 3.2 % (ref 0–6.9)
ERYTHROCYTE [DISTWIDTH] IN BLOOD BY AUTOMATED COUNT: 46.2 FL (ref 35.9–50)
GFR SERPL CREATININE-BSD FRML MDRD: 7 ML/MIN/1.73 M 2
GLUCOSE BLD-MCNC: 100 MG/DL (ref 65–99)
GLUCOSE BLD-MCNC: 161 MG/DL (ref 65–99)
GLUCOSE BLD-MCNC: 221 MG/DL (ref 65–99)
GLUCOSE SERPL-MCNC: 168 MG/DL (ref 65–99)
HCT VFR BLD AUTO: 29.7 % (ref 37–47)
HGB BLD-MCNC: 9.7 G/DL (ref 12–16)
IMM GRANULOCYTES # BLD AUTO: 0.08 K/UL (ref 0–0.11)
IMM GRANULOCYTES NFR BLD AUTO: 1.6 % (ref 0–0.9)
LYMPHOCYTES # BLD AUTO: 1.53 K/UL (ref 1–4.8)
LYMPHOCYTES NFR BLD: 30.6 % (ref 22–41)
MCH RBC QN AUTO: 29.8 PG (ref 27–33)
MCHC RBC AUTO-ENTMCNC: 32.7 G/DL (ref 33.6–35)
MCV RBC AUTO: 91.4 FL (ref 81.4–97.8)
MONOCYTES # BLD AUTO: 0.56 K/UL (ref 0–0.85)
MONOCYTES NFR BLD AUTO: 11.2 % (ref 0–13.4)
NEUTROPHILS # BLD AUTO: 2.64 K/UL (ref 2–7.15)
NEUTROPHILS NFR BLD: 52.8 % (ref 44–72)
NRBC # BLD AUTO: 0 K/UL
NRBC BLD AUTO-RTO: 0 /100 WBC
PLATELET # BLD AUTO: 115 K/UL (ref 164–446)
PMV BLD AUTO: 13.2 FL (ref 9–12.9)
POTASSIUM SERPL-SCNC: 5.3 MMOL/L (ref 3.6–5.5)
RBC # BLD AUTO: 3.25 M/UL (ref 4.2–5.4)
SODIUM SERPL-SCNC: 137 MMOL/L (ref 135–145)
WBC # BLD AUTO: 5 K/UL (ref 4.8–10.8)

## 2017-11-16 PROCEDURE — 82962 GLUCOSE BLOOD TEST: CPT | Mod: 91

## 2017-11-16 PROCEDURE — A9270 NON-COVERED ITEM OR SERVICE: HCPCS | Performed by: INTERNAL MEDICINE

## 2017-11-16 PROCEDURE — 99239 HOSP IP/OBS DSCHRG MGMT >30: CPT | Performed by: FAMILY MEDICINE

## 2017-11-16 PROCEDURE — 700102 HCHG RX REV CODE 250 W/ 637 OVERRIDE(OP): Performed by: FAMILY MEDICINE

## 2017-11-16 PROCEDURE — 700102 HCHG RX REV CODE 250 W/ 637 OVERRIDE(OP): Performed by: INTERNAL MEDICINE

## 2017-11-16 PROCEDURE — 36415 COLL VENOUS BLD VENIPUNCTURE: CPT

## 2017-11-16 PROCEDURE — 700111 HCHG RX REV CODE 636 W/ 250 OVERRIDE (IP)

## 2017-11-16 PROCEDURE — A9270 NON-COVERED ITEM OR SERVICE: HCPCS | Performed by: FAMILY MEDICINE

## 2017-11-16 PROCEDURE — 80048 BASIC METABOLIC PNL TOTAL CA: CPT

## 2017-11-16 PROCEDURE — A9502 TC99M TETROFOSMIN: HCPCS

## 2017-11-16 PROCEDURE — 85025 COMPLETE CBC W/AUTO DIFF WBC: CPT

## 2017-11-16 RX ORDER — INSULIN GLARGINE 100 [IU]/ML
10 INJECTION, SOLUTION SUBCUTANEOUS
Status: DISCONTINUED | OUTPATIENT
Start: 2017-11-17 | End: 2017-11-16 | Stop reason: HOSPADM

## 2017-11-16 RX ORDER — CARVEDILOL 3.12 MG/1
3.12 TABLET ORAL 2 TIMES DAILY WITH MEALS
Status: DISCONTINUED | OUTPATIENT
Start: 2017-11-16 | End: 2017-11-16 | Stop reason: HOSPADM

## 2017-11-16 RX ORDER — REGADENOSON 0.08 MG/ML
INJECTION, SOLUTION INTRAVENOUS
Status: COMPLETED
Start: 2017-11-16 | End: 2017-11-16

## 2017-11-16 RX ORDER — CARVEDILOL 3.12 MG/1
12.5 TABLET ORAL 2 TIMES DAILY WITH MEALS
Qty: 60 TAB | Refills: 1 | Status: SHIPPED | OUTPATIENT
Start: 2017-11-16 | End: 2018-01-05

## 2017-11-16 RX ADMIN — PREGABALIN 50 MG: 25 CAPSULE ORAL at 08:17

## 2017-11-16 RX ADMIN — BRIMONIDINE TARTRATE AND TIMOLOL MALEATE 1 DROP: 2; 5 SOLUTION OPHTHALMIC at 02:43

## 2017-11-16 RX ADMIN — CARVEDILOL 3.12 MG: 3.12 TABLET, FILM COATED ORAL at 17:20

## 2017-11-16 RX ADMIN — BRIMONIDINE TARTRATE AND TIMOLOL MALEATE 1 DROP: 2; 5 SOLUTION OPHTHALMIC at 08:19

## 2017-11-16 RX ADMIN — INSULIN HUMAN 3 UNITS: 100 INJECTION, SOLUTION PARENTERAL at 17:24

## 2017-11-16 RX ADMIN — INSULIN HUMAN 2 UNITS: 100 INJECTION, SOLUTION PARENTERAL at 06:18

## 2017-11-16 RX ADMIN — REGADENOSON 0.4 MG: 0.08 INJECTION, SOLUTION INTRAVENOUS at 14:00

## 2017-11-16 ASSESSMENT — ENCOUNTER SYMPTOMS
CONSTITUTIONAL NEGATIVE: 1
PSYCHIATRIC NEGATIVE: 1
MUSCULOSKELETAL NEGATIVE: 1
GASTROINTESTINAL NEGATIVE: 1
NEUROLOGICAL NEGATIVE: 1
EYES NEGATIVE: 1
ORTHOPNEA: 0
PALPITATIONS: 0
RESPIRATORY NEGATIVE: 1

## 2017-11-16 ASSESSMENT — PAIN SCALES - GENERAL
PAINLEVEL_OUTOF10: 0

## 2017-11-16 ASSESSMENT — LIFESTYLE VARIABLES: EVER_SMOKED: NEVER

## 2017-11-16 NOTE — PROGRESS NOTES
[facility] Nephrology Progress Note               Author: Renetta Le M.D. Date & Time created: 11/16/2017  10:09 AM     Interval History:  This is a 62-year-old female with a past medical history significant for end-stage renal disease, on chronic hemodialysis Monday, Wednesday, and Fridays via a left arm AV fistula under the care of [facility]; hypertension, diabetes mellitus type 2, atrial fibrillation, peripheral vascular disease, COPD, anemia secondary to myelodysplastic syndrome, history of CVA, who was admitted with complaints of chest pain and headache and was found to be anemic.  The patient reports that she developed chest discomfort and a dull aching chest pain yesterday along with bilateral lower extremity weakness that worsened as the day progressed, so she presented to the emergency room where cardiac enzymes were negative and she was found to be anemic with a hemoglobin of 7.2.  The patient has a   history of myelodysplastic syndrome for which she is admitted for frequent blood transfusions.  Her last admission was about a week ago at which time her hemoglobin was 5.4.  She was transfused.  Her hemoglobin was around 9 at the time of discharge.  The patient reports that she has been compliant with her outpatient dialysis treatments.  She usually tolerates about 1-2 liters of fluid removed with treatments and reports that she has significant intra-dialytic hypotension with treatment when more than about 2 liters are removed.  She denies any shortness of breath.  This morning, her second set of troponins were negative.  She reports that the chest pain is persistent, but that it is dull and she denies any lower extremity edema.    DAILY NEPHROLOGY SUMMARY  11/14/17--No events, still has dull substernal CP, had cramping with fluid removal on HD yesterday, received 1 unit PRBC with HD yesterday and Hgb improved, BP stable  11/15/17--No events, seen on HD this am, still with dull  chest pressure unchanged, Hgb stable, K high this am, discussed low K diet, BP stable  11/16/17--No events, to go for stress test today, K improved, BP low overnight and received 1L NS bolus, BP better this am, still has CP unchanged    Review of Systems:  Review of Systems   Constitutional: Negative.    HENT: Negative.    Eyes: Negative.    Respiratory: Negative.    Cardiovascular: Positive for chest pain. Negative for palpitations, orthopnea and leg swelling.   Gastrointestinal: Negative.    Genitourinary: Negative.    Musculoskeletal: Negative.    Skin: Negative.    Neurological: Negative.    Endo/Heme/Allergies: Negative.    Psychiatric/Behavioral: Negative.        Physical Exam:  Physical Exam   Constitutional: She is oriented to person, place, and time. She appears well-developed and well-nourished. No distress.   HENT:   Head: Normocephalic and atraumatic.   Mouth/Throat: No oropharyngeal exudate.   Eyes: Conjunctivae and EOM are normal. Pupils are equal, round, and reactive to light. No scleral icterus.   Neck: Normal range of motion. Neck supple. No thyromegaly present.   Cardiovascular: Normal rate and regular rhythm.    No murmur heard.  Pulmonary/Chest: Effort normal and breath sounds normal. No respiratory distress.   Abdominal: Soft. Bowel sounds are normal. She exhibits no distension.   Musculoskeletal: Normal range of motion. She exhibits no edema, tenderness or deformity.   +L arm AVF with thrill/bruit   Neurological: She is alert and oriented to person, place, and time. She exhibits normal muscle tone.   Skin: Skin is warm and dry. No erythema.   Psychiatric: She has a normal mood and affect. Her behavior is normal.   Nursing note and vitals reviewed.      Labs:        Invalid input(s): [id number]  Recent Labs      11/15/17   0845   TROPONINI  <0.01     Recent Labs      11/14/17   0006  11/15/17   0304  11/16/17   0330   SODIUM  135  133*  137   POTASSIUM  4.6  6.0*  5.3   CHLORIDE  95*  96   100   CO2  27  18*  27   BUN  48*  78*  63*   CREATININE  5.39*  7.21*  5.94*   CALCIUM  8.5  7.9*  7.4*     Recent Labs      17   0006  11/15/17   0304  17   0330   GLUCOSE  171*  123*  168*     Recent Labs      17   0005  11/15/17   0304  17   0330   RBC  3.50*  3.63*  3.25*   HEMOGLOBIN  10.6*  10.8*  9.7*   HEMATOCRIT  30.7*  32.0*  29.7*   PLATELETCT  119*  130*  115*     Recent Labs      17   0005  11/15/17   0304  17   0330   WBC  3.9*  5.5  5.0   NEUTSPOLYS   --   57.50  52.80   LYMPHOCYTES   --   29.00  30.60   MONOCYTES   --   9.20  11.20   EOSINOPHILS   --   3.30  3.20   BASOPHILS   --   0.40  0.60           Hemodynamics:  Temp (24hrs), Av.4 °C (97.6 °F), Min:36.1 °C (97 °F), Max:36.7 °C (98 °F)  Temperature: 36.7 °C (98 °F)  Pulse  Av.2  Min: 58  Max: 146   Blood Pressure: (!) 95/55 (RN notified)     Respiratory:    Respiration: 15, Pulse Oximetry: 99 %        RUL Breath Sounds: Diminished, RML Breath Sounds: Diminished, RLL Breath Sounds: Diminished, BLANK Breath Sounds: Diminished, LLL Breath Sounds: Diminished  Fluids:    Intake/Output Summary (Last 24 hours) at 17 1009  Last data filed at 17 0600   Gross per 24 hour   Intake             2220 ml   Output             2350 ml   Net             -130 ml     Weight: 64.4 kg (141 lb 15.6 oz)  GI/Nutrition:  Orders Placed This Encounter   Procedures   • DIET ORDER     Standing Status:   Standing     Number of Occurrences:   1     Order Specific Question:   Diet:     Answer:   Diabetic [3]     Medical Decision Making, by Problem:  Active Hospital Problems    Diagnosis   • *Anemia [D64.9]   • Chest pain [R07.9]   • DM  2 complicated by peripheral neuropathy [E11.9]   • MDS (myelodysplastic syndrome) (CMS-HCC) [D46.9]   • ESRD (end stage renal disease) (CMS-HCC) [N18.6]         Reviewed items::  Radiology images reviewed, Labs reviewed and Medications reviewed    Assessment/Plan:  1. ESRD--HD MWF as  outpatient   --No HD today, will plan for HD tomorrow 11/17/17  --Dose adjust all meds for decreased GFR  2. Hyperkalemia--improved with HD on low K bath  --Low K diet  3. Anemia--secondary to myelodysplastic syndrome  --Transfused 11/13  --Monitor  4. Chest Pain--possibly related to anemia, Trop x2 negative, still present  --To have cardiac stress test  5. HTN--BP low overnight and received IVF's  --BP meds held with holding parameters  --PRN fluid boluses for symptomatic hypotension  --Monitor  6. DM II--per primary svc  7. Atrial Fibrillation--medical management  8. PVD--supportive care  9. Pancytopenia--secondary to myelodysplastic syndrome, follow up with heme/onc as outpatient  10. Hyponatremia--treated with HD  11. Renal Osteodystrophy--pt reports intolerance to all phosphorous binders  --Low Phos diet  12.  Hypocalcemia--improved with HD  13. Moderate Protein-Calorie Malnutrition--no dietary protein restrictions

## 2017-11-16 NOTE — PROGRESS NOTES
Report received at bedside, assumed care. Pt is resting in bed. A&O x4. Pain at 0/10. Bruit and thrill present over fistula. No other concerns, complains or distress. Tele box on. Chart reviewed. Bed in lowest position, treaded slipper sock on, and call light within reach. Will continue to monitor for new onset chest pain or s/s of MI. .

## 2017-11-16 NOTE — PROGRESS NOTES
[facility] Hospital[facility] Progress Note    Date of Service: 11/15/2017    Chief Complaint  62 y.o. female admitted 2017 with Chest pain, Pancytopenia.    Interval Problem Update  CP - still some occasionally, refused stress test today as she felt tired after HD  Pancytopenia - known MDS  HTN - controlled  Diabetes - mostly controlled  Hyperkalemia - K 6.0    Consultants/Specialty  Nephro - [facility]    Disposition  Stress test        Review of Systems   Constitutional: Positive for malaise/fatigue. Negative for chills and fever.   HENT: Negative for sore throat.    Eyes: Negative for blurred vision.   Respiratory: Negative for cough, shortness of breath and wheezing.    Cardiovascular: Positive for chest pain. Negative for leg swelling.   Gastrointestinal: Negative for abdominal pain, diarrhea, heartburn, nausea and vomiting.   Genitourinary: Negative for dysuria.   Neurological: Positive for weakness. Negative for dizziness and headaches.      Physical Exam  Laboratory/Imaging   Hemodynamics  Temp (24hrs), Av.2 °C (97.2 °F), Min:36.1 °C (96.9 °F), Max:36.4 °C (97.6 °F)   Temperature: 36.4 °C (97.6 °F)  Pulse  Av  Min: 64  Max: 83    Blood Pressure: 107/55      Respiratory      Respiration: 20, Pulse Oximetry: 100 %             Fluids  No intake or output data in the 24 hours ending 11/15/17 1632    Nutrition  Orders Placed This Encounter   Procedures   • DIET ORDER     Standing Status:   Standing     Number of Occurrences:   1     Order Specific Question:   Diet:     Answer:   Diabetic [3]     Physical Exam   Constitutional: She is oriented to person, place, and time. She appears well-developed and well-nourished.   HENT:   Head: Normocephalic and atraumatic.   Eyes: Conjunctivae are normal. Pupils are equal, round, and reactive to light.   Neck: No tracheal deviation present. No thyromegaly present.   Cardiovascular: Normal rate and regular rhythm.    Pulmonary/Chest: Effort normal and breath sounds  normal.   Abdominal: Soft. Bowel sounds are normal. She exhibits no distension. There is no tenderness.   Lymphadenopathy:     She has no cervical adenopathy.   Neurological: She is alert and oriented to person, place, and time.   Skin: Skin is warm and dry.   Nursing note and vitals reviewed.      Recent Labs      11/13/17   0030   11/13/17   1807  11/14/17   0005  11/15/17   0304   WBC  4.6*   --    --   3.9*  5.5   RBC  2.32*   --    --   3.50*  3.63*   HEMOGLOBIN  7.2*   < >  10.6*  10.6*  10.8*   HEMATOCRIT  20.9*   --    --   30.7*  32.0*   MCV  90.1   --    --   87.7  88.2   MCH  31.0   --    --   30.3  29.8   MCHC  34.4   --    --   34.5  33.8   RDW  47.3   --    --   43.8  44.4   PLATELETCT  99*   --    --   119*  130*   MPV  13.1*   --    --   13.0*  13.1*    < > = values in this interval not displayed.     Recent Labs      11/13/17   0030  11/14/17   0006  11/15/17   0304   SODIUM  133*  135  133*   POTASSIUM  5.4  4.6  6.0*   CHLORIDE  99  95*  96   CO2  20  27  18*   GLUCOSE  233*  171*  123*   BUN  81*  48*  78*   CREATININE  7.55*  5.39*  7.21*   CALCIUM  6.2*  8.5  7.9*     Recent Labs      11/12/17   2312   APTT  <20.0*   INR  0.85*     Recent Labs      11/13/17   0030   BNPBTYPENAT  53     Recent Labs      11/13/17   0609   TRIGLYCERIDE  133   HDL  37*   LDL  80          Assessment/Plan     Chest pain- (present on admission)   Assessment & Plan    Check stress test, Plavix?        Hyperkalemia- (present on admission)   Assessment & Plan    For HD today, follow bmp        Pancytopenia (CMS-HCC)- (present on admission)   Assessment & Plan    Transfused PRBC        MDS (myelodysplastic syndrome) (CMS-HCC)- (present on admission)   Assessment & Plan    Follow cbc        ESRD (end stage renal disease) (CMS-HCC)- (present on admission)   Assessment & Plan    HD MWF        DM  2 complicated by peripheral neuropathy- (present on admission)   Assessment & Plan    SSI, Francuvia        HTN (hypertension)-  (present on admission)   Assessment & Plan    Yair Patricia        Diabetic neuropathy (CMS-HCC)- (present on admission)   Assessment & Plan    Lyrica            Reviewed items::  EKG reviewed, Radiology images reviewed, Labs reviewed and Medications reviewed  Bauer catheter::  No Bauer  DVT prophylaxis - mechanical:  SCDs  Ulcer Prophylaxis::  No

## 2017-11-16 NOTE — PROGRESS NOTES
3hr HD started @ 0731 and completed @ 1215,had to stop tx @ 0945 due to dialysis RO machine problem,tx resumed with new set up @ 1128 after problem has been resolved,Dr Le notified.Net UF =1250ml,LUAAVF + bruit/thrill,cannulation sites covered with DD,CDI,report given to DEVYN Sanchez RN.

## 2017-11-16 NOTE — CARE PLAN
Problem: Safety  Goal: Will remain free from falls  Outcome: PROGRESSING AS EXPECTED  Pt was educated on the indication for a bed alarm use, however, declined the use of the bed alarm and stated that she will use her call light before leaving bed.    Problem: Knowledge Deficit  Goal: Knowledge of disease process/condition, treatment plan, diagnostic tests, and medications will improve  Outcome: PROGRESSING AS EXPECTED  Pt understands the indication and meaning of NPO at midnight. All questions were answered regarding why an NPO status is important for a stress test tomorrow morning. Pt was also educated on the indication and mechanism of an SCD machine in decreasing the risk of forming a DVT or PE.

## 2017-11-16 NOTE — PROGRESS NOTES
Dr. Galdamez at bedside for rounds. RN notified MD of pt's hypotension with fluid bolus over night. RN held Coreg and Norvasc this morning. Orders for parameters on Coreg and Norvasc: held med if SBP less than 100. Read back by Rachel GEORGE.

## 2017-11-16 NOTE — PROGRESS NOTES
Pt observed. Pt doesn't c/o pain at this time  Pt AAOx4, NPO for cardiac stress test today. No other signs or symptoms of distress, fall precautions in place, call light within reach, all questions answered, will continue to monitor.

## 2017-11-16 NOTE — PROGRESS NOTES
Pt blood pressure decreased to 67/41. Hospitalist was paged and updated. Orders received on a 1l bolus. Blood pressure then increased to 83/50. SCDs were placed on pt. Pt has no complaints or distress at this time.

## 2017-11-17 ENCOUNTER — PATIENT OUTREACH (OUTPATIENT)
Dept: HEALTH INFORMATION MANAGEMENT | Facility: OTHER | Age: 62
End: 2017-11-17

## 2017-11-17 NOTE — PROGRESS NOTES
7:35am: Report received at bedside, assumed care. Assessment complete. Pt is resting in bed. A&O x4. Pain at 0/10. Fistula assessed. Bruit and thrill present. No other concerns, complains or distress. Tele box off per discharge order. Chart reviewed. Bed in lowest position, treaded slipper sock on, and call light within reach. Pt patiently waiting for ride to [place]. All discharge paperwork with pt as well as all belongings.    7:40am: Pt escorted off unit with RN to pt .

## 2017-11-17 NOTE — PROGRESS NOTES
Discharge instructions given and discussed. Pt verbalized understanding and all questions answered. Pt has prescriptions. Pt discharged in wheelchair escorted by RN in stable condition on 2LO2 via nasal cannula. Personal belongings with patient. VSS, IV removed and tolerated well. Tele box removed, monitor room notified.

## 2017-11-17 NOTE — DISCHARGE SUMMARY
Hospital Medicine Discharge Note     Admit Date:  11/12/2017       Discharge Date:   11/16/2017    Attending Physician:  Red Galdamez     Diagnoses (includes active and resolved):   Active Problems:    Chest pain POA: Yes    ESRD (end stage renal disease) (CMS-HCC) POA: Yes      Overview: Followed by nephrology. Pt is currently getting dialysis MWF.     MDS (myelodysplastic syndrome) (CMS-HCC) POA: Yes    HTN (hypertension) POA: Yes      Overview:           DM  2 complicated by peripheral neuropathy POA: Yes    Pancytopenia (CMS-HCC) POA: Yes    Hyperkalemia POA: Yes      Overview: Secondary to end-stage renal disease. Treatment with hemodialysis      Repeat BMP today later      Monitor on telemetry    Diabetic neuropathy (CMS-HCC) POA: Yes  Resolved Problems:    * No resolved hospital problems. *      Hospital Summary (Brief Narrative):       62-year-old patient female, came in with chest pain. She was noted to have anemia she was transfused packed red blood cells. She has known history of myelodysplastic syndrome. Her hemoglobin was stable after transfusion. She was noted to have hyperkalemia and also she does have interstitial disease and hyperkalemia resolved with hemodialysis. Known history of hypertension, she did become hypotensive after dialysis so her blood pressure medications were changed. Her blood pressure stable since then. For her chest pain she does have strong risk factors, stress test was done which was noted to be negative. Patient states she is allergic to aspirin, Plavix was offered to her but she would like to think about this further.     Consultants:      Nephrology Misael Le    Procedures:        None    Discharge Medications:        Medication Reconciliation Completed     Medication List      CHANGE how you take these medications      Instructions   carvedilol 3.125 MG Tabs  What changed:  · medication strength  · when to take this  Commonly known as:  COREG   Take 4 Tabs by mouth 2  times a day, with meals.  Dose:  12.5 mg        CONTINUE taking these medications      Instructions   COMBIGAN 0.2-0.5 % Soln  Generic drug:  Brimonidine Tartrate-Timolol   Place 1 Drop in both eyes 2 Times a Day.  Dose:  1 Drop     cyclobenzaprine 10 MG Tabs  Commonly known as:  FLEXERIL   Take 1 Tab by mouth 3 times a day as needed for Muscle Spasms.  Dose:  10 mg     hydrocodone-acetaminophen 5-325 MG Tabs per tablet  Commonly known as:  NORCO   Take 1 Tab by mouth every four hours as needed.  Dose:  1 Tab     LUMIGAN 0.03 % Soln  Generic drug:  bimatoprost   Place 1 Drop in both eyes every evening.  Dose:  1 Drop     LYRICA 50 MG capsule  Generic drug:  pregabalin   Take 50 mg by mouth 2 times a day.  Dose:  50 mg     sitagliptin 25 MG Tabs  Commonly known as:  JANUVIA   Take 1 Tab by mouth every morning.  Dose:  25 mg        STOP taking these medications    amlodipine 10 MG Tabs  Commonly known as:  NORVASC              Disposition:  Home    Diet:   Diabetic    Activity:   As tolerated    Code status:  Full    Primary Care Provider:    Nyla Nava M.D.    Follow up appointment details :        Gia Lam M.D.  [address] [facility] [id number] [phone]    Go on 11/21/2017  Please arrive at 9:45am for a 10:00am appointment. Thank you    No future appointments.    Pending Studies:        None    Time spent on discharge day patient visit: 40 minutes    #################################################      Most Recent Labs:    Lab Results   Component Value Date/Time    WBC 5.0 11/16/2017 03:30 AM    RBC 3.25 (L) 11/16/2017 03:30 AM    HEMOGLOBIN 9.7 (L) 11/16/2017 03:30 AM    HEMATOCRIT 29.7 (L) 11/16/2017 03:30 AM    MCV 91.4 11/16/2017 03:30 AM    MCH 29.8 11/16/2017 03:30 AM    MCHC 32.7 (L) 11/16/2017 03:30 AM    MPV 13.2 (H) 11/16/2017 03:30 AM    NEUTSPOLYS 52.80 11/16/2017 03:30 AM    LYMPHOCYTES 30.60 11/16/2017 03:30 AM    MONOCYTES 11.20 11/16/2017 03:30 AM    EOSINOPHILS 3.20  11/16/2017 03:30 AM    BASOPHILS 0.60 11/16/2017 03:30 AM    HYPOCHROMIA 1+ 08/20/2017 12:01 AM    ANISOCYTOSIS 1+ 08/22/2017 12:38 AM      Lab Results   Component Value Date/Time    SODIUM 137 11/16/2017 03:30 AM    POTASSIUM 5.3 11/16/2017 03:30 AM    CHLORIDE 100 11/16/2017 03:30 AM    CO2 27 11/16/2017 03:30 AM    GLUCOSE 168 (H) 11/16/2017 03:30 AM    BUN 63 (H) 11/16/2017 03:30 AM    CREATININE 5.94 (HH) 11/16/2017 03:30 AM    CREATININE 0.6 07/20/2007 04:00 AM      Lab Results   Component Value Date/Time    ALTSGPT 18 11/13/2017 12:30 AM    ASTSGOT 20 11/13/2017 12:30 AM    ALKPHOSPHAT 66 11/13/2017 12:30 AM    TBILIRUBIN 0.3 11/13/2017 12:30 AM    DBILIRUBIN <0.1 01/31/2017 11:03 AM    LIPASE 138 (H) 11/13/2017 12:30 AM    ALBUMIN 2.8 (L) 11/13/2017 12:30 AM    ALBUMIN 4.29 09/08/2016 12:50 PM    GLOBULIN 3.0 11/13/2017 12:30 AM    INR 0.85 (L) 11/12/2017 11:12 PM    MACROCYTOSIS 1+ 08/22/2017 12:38 AM     Lab Results   Component Value Date/Time    PROTHROMBTM 11.3 (L) 11/12/2017 11:12 PM    INR 0.85 (L) 11/12/2017 11:12 PM        Imaging/ Testing:      NM-CARDIAC STRESS TEST   Final Result      Echocardiogram Comp W/O Cont   Final Result      DX-CHEST-PORTABLE (1 VIEW)   Final Result      No acute cardiopulmonary abnormality identified.          Instructions:      The patient was instructed to return to the ER in the event of worsening symptoms. I have counseled the patient on the importance of compliance and the patient has agreed to proceed with all medical recommendations and follow up plan indicated above.   The patient understands that all medications come with benefits and risks. Risks may include permanent injury or death and these risks can be minimized with close reassessment and monitoring.

## 2017-11-17 NOTE — DISCHARGE INSTRUCTIONS
Dialysis Diet  Dialysis is a treatment that you may undergo if you have significant damage to your kidneys. Dialysis replaces some of the work that the kidneys do. One of the jobs that it takes over is removing wastes, salt, and extra water from your blood. This helps to keep the amount of potassium and other nutrients in your blood at healthy levels.  When you need dialysis, it is important to pay careful attention to your diet. Between dialysis sessions, certain nutrients can build up in your blood and cause you to get sick. Vitamins and minerals are an important part of a healthy diet and should not be avoided entirely. However, it is commonly recommended that you limit your intake of potassium, phosphorus, and sodium. It may also be necessary to restrict other nutrients, such as carbohydrates or fat, if you have other health conditions. Your health care provider or dietitian can help you to determine the amount of these nutrients that is right for you.  WHAT IS MY PLAN?  Your dietitian will help you to design a meal plan that is specific to your needs. Generally, meal plans include:  · Grains, 6-11 servings per day. One serving is equal to 1 slice of bread or ½ cup of cooked rice or pasta.  · Low-potassium vegetables, 2-3 servings per day. One serving is equal to ½ cup.  · Low-potassium fruits, 2-3 servings per day. One serving is equal to ½ cup.  · Meats and other protein sources, 8-11 oz per day.  · Dairy, ½ cup per day.  Your dietitian will provide you with specific instructions about the amount of fluids you can have each day.  WHAT DO I NEED TO KNOW ABOUT A DIALYSIS DIET?  · Limit your intake of potassium. Potassium is found in milk, fruits, and vegetables.  · Limit your intake of phosphorus. Phosphorus is found in milk, cheese, beans, nuts, and carbonated beverages. Avoid whole-grain and high-fiber foods because they contain high amounts of phosphorus.  · Limit your intake of sodium. Foods that are high in  sodium include processed and cured meats, ready-made frozen meals, canned vegetables, and salty snack foods. Do not use salt substitutes because they contain potassium.  · If you were instructed to restrict your fluid intake, follow your health care provider's specific instructions. You may be told to:  ¨ Write down what you drink and any foods you eat that are made mostly from water, such as gelatin and soups.  ¨ Drink from small cups to help control how much you drink.  · Ask your health care provider if you should regularly take an over-the-counter medicine that binds phosphorus, such as antacid products that contain calcium carbonate.  · Take vitamin and mineral supplements only as directed by your health care provider.  · Eat high-quality proteins, such as meat, poultry, fish, and eggs. Limit low-quality plant-based proteins, such as nuts and beans.  · Cut potatoes into small pieces and boil them in unsalted water before you eat them. This can help to remove some potassium from the potato.  · Drain all fluid from cooked vegetables and canned fruits before eating them.  WHAT FOODS CAN I EAT?  Grains  White bread. White rice. Cooked cereal. Unsalted popcorn. Tortillas. Pasta.  Vegetables  Fresh or frozen broccoli, carrots, and green beans. Cabbage. Cauliflower. Celery. Cucumbers. Eggplant. Radishes. Zucchini.  Fruits  Apples. Fresh or frozen berries. Fresh or canned pears, peaches, and pineapple. Grapes. Plums.  Meats and Other Protein Sources  Fresh or frozen beef, pork, chicken, and fish. Eggs. Low-sodium canned tuna or salmon.  Dairy  Cream cheese. Heavy cream. Ricotta cheese.  Beverages  Apple cider. Cranberry juice. Grape juice. Lemonade. Black coffee.  Condiments  Herbs. Spices. Jam and jelly. Honey.  Sweets and Desserts  Sherbet. Cakes. Cookies.  Fats and Oils  Olive oil, canola oil, and safflower oil.  Other  Non-dairy creamer. Non-dairy whipped topping. Homemade broth without salt.  The items listed  above may not be a complete list of recommended foods or beverages. Contact your dietitian for more options.   WHAT FOODS ARE NOT RECOMMENDED?  Grains  Whole-grain bread. Whole-grain pasta. High-fiber cereal.  Vegetables  Potatoes. Beets. Tomatoes. Winter squash and pumpkin. Asparagus. Spinach. Parsnips.  Fruits  Star fruit. Bananas. Oranges. Kiwi. Nectarines. Prunes. Melon. Dried fruit. Avocado.  Meats and Other Protein Sources  Canned, smoked, and cured meats. Packaged luncheon meat. Sardines. Nuts and seeds. Peanut butter. Beans and legumes.  Dairy  Milk. Buttermilk. Yogurt. Cheese and cottage cheese. Processed cheese spreads.  Beverages  Orange juice. Prune juice. Carbonated soft drinks.  Condiments  Salt. Salt substitutes. Soy sauce.  Sweets and Desserts  Ice cream. Chocolate. Candied nuts.  Fats and Oils  Butter. Margarine.  Other  Ready-made frozen meals. Canned soups.  The items listed above may not be a complete list of foods and beverages to avoid. Contact your dietitian for more information.     This information is not intended to replace advice given to you by your health care provider. Make sure you discuss any questions you have with your health care provider.     Document Released: 09/14/2005 Document Revised: 01/08/2016 Document Reviewed: 07/21/2015  [employer] Interactive Patient Education ©2016 [employer]  Discharge Instructions    Discharged to home by car with relative. Discharged via wheelchair, hospital escort: Yes.  Special equipment needed: Not Applicable    Be sure to schedule a follow-up appointment with your primary care doctor or any specialists as instructed.     Discharge Plan:   Influenza Vaccine Indication: Not indicated: Previously immunized this influenza season and > 8 years of age    I understand that a diet low in cholesterol, fat, and sodium is recommended for good health. Unless I have been given specific instructions below for another diet, I accept this instruction as my  diet prescription.   Other diet: Renal diet    Special Instructions: None    · Is patient discharged on Warfarin / Coumadin?   No     · Is patient Post Blood Transfusion?  Yes  POST BLOOD TRANSFUSION INFORMATION (ADULT)    The purpose of blood transfusion is to correct anemia, low levels of blood clotting factors or to correct acute blood loss.   Blood transfusion is very safe but occasionally unexpected adverse reactions do occur. Most adverse reactions occur during transfusion, within one to two days following transfusion or one to two weeks following transfusion. Some adverse reactions can occur one to six months after transfusion and some even years later.             If any of the symptoms listed below happen to you during your transfusion,                                 please notify your nurse immediately.   · Fever or Chills  · Flushing of the Face  · Hives, rash or itching  · Difficulty in breathing or shortness of breath  · Pain or oozing of blood from the IV needle site  · Low back pain  · Nausea or vomiting  · Weakness or fainting  · Chest pain  · Blood in the urine  · Decreased frequency of urination    The above symptoms may also occur within 24 to 48 after transfusion; if so, notify your physician.     · Yellowing of the skin    This can happen one to six months after transfusion; if so, notify your physician    Depression / Suicide Risk    As you are discharged from this [facility] facility, it is important to learn how to keep safe from harming yourself.    Recognize the warning signs:  · Abrupt changes in personality, positive or negative- including increase in energy   · Giving away possessions  · Change in eating patterns- significant weight changes-  positive or negative  · Change in sleeping patterns- unable to sleep or sleeping all the time   · Unwillingness or inability to communicate  · Depression  · Unusual sadness, discouragement and loneliness  · Talk of wanting to die  · Neglect of  personal appearance   · Rebelliousness- reckless behavior  · Withdrawal from people/activities they love  · Confusion- inability to concentrate     If you or a loved one observes any of these behaviors or has concerns about self-harm, here's what you can do:  · Talk about it- your feelings and reasons for harming yourself  · Remove any means that you might use to hurt yourself (examples: pills, rope, extension cords, firearm)  · Get professional help from the community (Mental Health, Substance Abuse, psychological counseling)  · Do not be alone:Call your Safe Contact- someone whom you trust who will be there for you.  · Call your local CRISIS HOTLINE 373-2309 or 786-891-7095  · Call your local Children's Mobile Crisis Response Team [place] (879) 091-7145 or www.[employer]  · Call the toll free National Suicide Prevention Hotlines   · National Suicide Prevention Lifeline 505-076-RSFY (1121)  · [employer] Line Network 800-SUICIDE (242-9827)    Hemodialysis  Dialysis is a procedure that replaces some of the work that healthy kidneys do. It is done when you lose about 85-90% of your kidney function and sometimes earlier if your symptoms may be improved by dialysis. During dialysis, wastes, salt, and extra water are removed from the blood, and the level of certain chemicals in the blood (such as potassium) is maintained. Hemodialysis is a type of dialysis in which a machine called a dialyzer is used to filter the blood. Hemodialysis is usually performed by a health care provider at a hospital or dialysis center 3 times a week for 3-5 hours during each visit. It may also be performed more frequently and for longer periods of time at home with training.  LET YOUR HEALTH CARE PROVIDER KNOW ABOUT:  · Any allergies you have.  · All medicines you are taking, including vitamins, herbs, eye drops, creams, and over-the-counter medicines.  · Any blood disorders you have.  · Other health problems you have.  RISKS  AND COMPLICATIONS  Generally, hemodialysis is a safe procedure. However, problems can occur and include:  · Low blood pressure (hypotension).  · Narrowing or ballooning of a blood vessel or bleeding at the site where blood is removed from the body and returned to the body (vascular access).  · An infection or blockage at the vascular access site.  BEFORE THE PROCEDURE  Before having hemodialysis for the first time, you will need surgery to create a site where blood can be removed from the body and returned to the body (vascular access). There are three types of vascular accesses:  · Arteriovenous fistula. This type of access is created when an artery and a vein (usually in the arm) are connected during surgery. The arteriovenous fistula takes 1-6 months to develop after surgery.  · Arteriovenous graft. This type of access is created when an artery and a vein in the arm are connected during surgery with a tube. An arteriovenous graft can be used within 2-3 weeks of surgery.  · A venous catheter. To create this type of access, a thin, flexible tube (catheter) is placed in a large vein in your neck, chest, or groin. A venous catheter can be used right away.  PROCEDURE   Hemodialysis is performed while you are sitting or reclining. During the procedure, you may sleep, read, watch television, or perform any other tasks that can be done while you are in this position. If you experience side effects or any other discomfort during the procedure, let your health care provider know. He or she may be able to make adjustments to help you feel more comfortable.  Your hemodialysis process may look something like this:  1. Your weight, blood pressure, pulse, and temperature will be measured.  2. The skin around your vascular access will be sterilized.  3. Your vascular access will be connected to the dialyzer. If your access is a fistula or graft, two needles will be inserted through the vascular access. The needles will be  connected to a plastic tube that is connected to the dialyzer. They will be taped to your skin so that they do not move out of place. If your access is a catheter, it will be connected to a plastic tube that is connected to the dialyzer.  ¨ The dialysis machine will be turned on. This will cause your blood to flow to the dialyzer. The dialyzer will filter and clean your blood before returning it to your body. While the dialysis machine is working, your blood pressure and pulse will be checked several times.  4. Once dialysis is complete, your vascular access will be disconnected from the dialyzer. If your access is a fistula or graft, the needles will be removed and a bandage (dressing) will be placed over the access.  AFTER THE PROCEDURE  · Your weight will be measured.  · Your blood may be tested to see whether your treatments are removing enough wastes. This is usually done once a month.  · You may experience or continue to experience side effects, including:  ¨ Dizziness.  ¨ Muscle cramps.  ¨ Nausea.  ¨ Headaches.  ¨ Feeling tired. Energy levels usually return to normal the day after the procedure.  ¨ Itchiness. Your health care provider may be able to prescribe medicine to relieve it.  ¨ Achy or jittery legs. You may feel like kicking your legs. This sometimes causes sleeping problems.  ¨ Allergic reaction to the chemicals used to sterilize the dialyzer.     This information is not intended to replace advice given to you by your health care provider. Make sure you discuss any questions you have with your health care provider.     Document Released: 04/14/2014 Document Revised: 01/08/2016 Document Reviewed: 04/14/2014  [employer] Interactive Patient Education ©2016 [employer]  Anemia, Nonspecific  Anemia is a condition in which the concentration of red blood cells or hemoglobin in the blood is below normal. Hemoglobin is a substance in red blood cells that carries oxygen to the tissues of the body. Anemia  results in not enough oxygen reaching these tissues.   CAUSES   Common causes of anemia include:   · Excessive bleeding. Bleeding may be internal or external. This includes excessive bleeding from periods (in women) or from the intestine.    · Poor nutrition.    · Chronic kidney, thyroid, and liver disease.   · Bone marrow disorders that decrease red blood cell production.  · Cancer and treatments for cancer.  · HIV, AIDS, and their treatments.  · Spleen problems that increase red blood cell destruction.  · Blood disorders.  · Excess destruction of red blood cells due to infection, medicines, and autoimmune disorders.  SIGNS AND SYMPTOMS   · Minor weakness.    · Dizziness.    · Headache.  · Palpitations.    · Shortness of breath, especially with exercise.    · Paleness.  · Cold sensitivity.  · Indigestion.  · Nausea.  · Difficulty sleeping.  · Difficulty concentrating.  Symptoms may occur suddenly or they may develop slowly.   DIAGNOSIS   Additional blood tests are often needed. These help your health care provider determine the best treatment. Your health care provider will check your stool for blood and look for other causes of blood loss.   TREATMENT   Treatment varies depending on the cause of the anemia. Treatment can include:   · Supplements of iron, vitamin B12, or folic acid.    · Hormone medicines.    · A blood transfusion. This may be needed if blood loss is severe.    · Hospitalization. This may be needed if there is significant continual blood loss.    · Dietary changes.  · Spleen removal.  HOME CARE INSTRUCTIONS  Keep all follow-up appointments. It often takes many weeks to correct anemia, and having your health care provider check on your condition and your response to treatment is very important.  SEEK IMMEDIATE MEDICAL CARE IF:   · You develop extreme weakness, shortness of breath, or chest pain.    · You become dizzy or have trouble concentrating.  · You develop heavy vaginal bleeding.    · You  develop a rash.    · You have bloody or black, tarry stools.    · You faint.    · You vomit up blood.    · You vomit repeatedly.    · You have abdominal pain.  · You have a fever or persistent symptoms for more than 2-3 days.    · You have a fever and your symptoms suddenly get worse.    · You are dehydrated.    MAKE SURE YOU:  · Understand these instructions.  · Will watch your condition.  · Will get help right away if you are not doing well or get worse.     This information is not intended to replace advice given to you by your health care provider. Make sure you discuss any questions you have with your health care provider.     Document Released: 01/25/2006 Document Revised: 08/20/2014 Document Reviewed: 06/13/2014  [employer] Interactive Patient Education ©2016 [employer]

## 2017-11-17 NOTE — PROGRESS NOTES
Pt to be discharged home to [facility].. Discharge instructions given and explained to pt. No further questions. Per Dr. Galdamez over the phone, Coreg med script sent electronically to her pharmacy. Home medications retrieved from pharmacy and given back to pt. All belongings with pt. IV and Telemetry monitor taken out. Pt waiting for ride from daughter.

## 2017-12-06 ENCOUNTER — HOSPITAL ENCOUNTER (OUTPATIENT)
Facility: MEDICAL CENTER | Age: 62
End: 2017-12-06
Attending: INTERNAL MEDICINE
Payer: MEDICARE

## 2017-12-06 LAB — FERRITIN SERPL-MCNC: 2726.3 NG/ML (ref 10–291)

## 2017-12-06 PROCEDURE — 82728 ASSAY OF FERRITIN: CPT

## 2017-12-11 RX ORDER — ACETAMINOPHEN 325 MG/1
650 TABLET ORAL
Status: CANCELLED | OUTPATIENT
Start: 2017-12-11

## 2017-12-11 RX ORDER — SODIUM CHLORIDE 9 MG/ML
INJECTION, SOLUTION INTRAVENOUS ONCE
Status: CANCELLED | OUTPATIENT
Start: 2017-12-11 | End: 2017-12-11

## 2017-12-11 RX ORDER — DIPHENHYDRAMINE HCL 25 MG
25 TABLET ORAL ONCE
Status: CANCELLED | OUTPATIENT
Start: 2017-12-11 | End: 2017-12-11

## 2017-12-11 RX ORDER — ACETAMINOPHEN 325 MG/1
650 TABLET ORAL ONCE
Status: CANCELLED | OUTPATIENT
Start: 2017-12-11 | End: 2017-12-11

## 2017-12-11 RX ORDER — LIDOCAINE HYDROCHLORIDE 10 MG/ML
0.5 INJECTION, SOLUTION INFILTRATION; PERINEURAL PRN
Status: CANCELLED | OUTPATIENT
Start: 2017-12-11

## 2017-12-12 ENCOUNTER — HOSPITAL ENCOUNTER (INPATIENT)
Facility: MEDICAL CENTER | Age: 62
LOS: 4 days | DRG: 291 | End: 2017-12-16
Attending: EMERGENCY MEDICINE | Admitting: HOSPITALIST
Payer: MEDICARE

## 2017-12-12 ENCOUNTER — RESOLUTE PROFESSIONAL BILLING HOSPITAL PROF FEE (OUTPATIENT)
Dept: HOSPITALIST | Facility: MEDICAL CENTER | Age: 62
End: 2017-12-12
Payer: MEDICARE

## 2017-12-12 DIAGNOSIS — D64.9 ANEMIA, UNSPECIFIED TYPE: ICD-10-CM

## 2017-12-12 DIAGNOSIS — N18.6 ESRD (END STAGE RENAL DISEASE) (HCC): ICD-10-CM

## 2017-12-12 LAB
ABO GROUP BLD: NORMAL
ALBUMIN SERPL BCP-MCNC: 3.5 G/DL (ref 3.2–4.9)
ALBUMIN/GLOB SERPL: 1.1 G/DL
ALP SERPL-CCNC: 79 U/L (ref 30–99)
ALT SERPL-CCNC: 30 U/L (ref 2–50)
ANION GAP SERPL CALC-SCNC: 14 MMOL/L (ref 0–11.9)
ANISOCYTOSIS BLD QL SMEAR: ABNORMAL
AST SERPL-CCNC: 23 U/L (ref 12–45)
BARCODED ABORH UBTYP: 600
BARCODED ABORH UBTYP: 6200
BARCODED PRD CODE UBPRD: NORMAL
BARCODED PRD CODE UBPRD: NORMAL
BARCODED UNIT NUM UBUNT: NORMAL
BARCODED UNIT NUM UBUNT: NORMAL
BASOPHILS # BLD AUTO: 0.3 % (ref 0–1.8)
BASOPHILS # BLD: 0.02 K/UL (ref 0–0.12)
BILIRUB SERPL-MCNC: 0.3 MG/DL (ref 0.1–1.5)
BLD GP AB SCN SERPL QL: NORMAL
BUN SERPL-MCNC: 63 MG/DL (ref 8–22)
CALCIUM SERPL-MCNC: 7.3 MG/DL (ref 8.5–10.5)
CHLORIDE SERPL-SCNC: 93 MMOL/L (ref 96–112)
CO2 SERPL-SCNC: 28 MMOL/L (ref 20–33)
COMMENT 1642: NORMAL
COMPONENT R 8504R: NORMAL
COMPONENT R 8504R: NORMAL
CREAT SERPL-MCNC: 6.67 MG/DL (ref 0.5–1.4)
EOSINOPHIL # BLD AUTO: 0.21 K/UL (ref 0–0.51)
EOSINOPHIL NFR BLD: 3.4 % (ref 0–6.9)
ERYTHROCYTE [DISTWIDTH] IN BLOOD BY AUTOMATED COUNT: 67.5 FL (ref 35.9–50)
GFR SERPL CREATININE-BSD FRML MDRD: 6 ML/MIN/1.73 M 2
GLOBULIN SER CALC-MCNC: 3.2 G/DL (ref 1.9–3.5)
GLUCOSE SERPL-MCNC: 285 MG/DL (ref 65–99)
HCT VFR BLD AUTO: 20.2 % (ref 37–47)
HGB BLD-MCNC: 6.6 G/DL (ref 12–16)
IMM GRANULOCYTES # BLD AUTO: 0.04 K/UL (ref 0–0.11)
IMM GRANULOCYTES NFR BLD AUTO: 0.7 % (ref 0–0.9)
LYMPHOCYTES # BLD AUTO: 1.63 K/UL (ref 1–4.8)
LYMPHOCYTES NFR BLD: 26.8 % (ref 22–41)
MACROCYTES BLD QL SMEAR: ABNORMAL
MCH RBC QN AUTO: 31.7 PG (ref 27–33)
MCHC RBC AUTO-ENTMCNC: 33 G/DL (ref 33.6–35)
MCV RBC AUTO: 96.2 FL (ref 81.4–97.8)
MICROCYTES BLD QL SMEAR: ABNORMAL
MONOCYTES # BLD AUTO: 0.69 K/UL (ref 0–0.85)
MONOCYTES NFR BLD AUTO: 11.3 % (ref 0–13.4)
MORPHOLOGY BLD-IMP: NORMAL
NEUTROPHILS # BLD AUTO: 3.5 K/UL (ref 2–7.15)
NEUTROPHILS NFR BLD: 57.5 % (ref 44–72)
NRBC # BLD AUTO: 0.02 K/UL
NRBC BLD AUTO-RTO: 0.3 /100 WBC
PLATELET # BLD AUTO: 152 K/UL (ref 164–446)
PMV BLD AUTO: 13.8 FL (ref 9–12.9)
POIKILOCYTOSIS BLD QL SMEAR: NORMAL
POTASSIUM SERPL-SCNC: 5 MMOL/L (ref 3.6–5.5)
PRODUCT TYPE UPROD: NORMAL
PRODUCT TYPE UPROD: NORMAL
PROT SERPL-MCNC: 6.7 G/DL (ref 6–8.2)
RBC # BLD AUTO: 2.08 M/UL (ref 4.2–5.4)
RBC BLD AUTO: PRESENT
RH BLD: NORMAL
SODIUM SERPL-SCNC: 135 MMOL/L (ref 135–145)
STOMATOCYTES BLD QL SMEAR: NORMAL
UNIT STATUS USTAT: NORMAL
UNIT STATUS USTAT: NORMAL
WBC # BLD AUTO: 6.1 K/UL (ref 4.8–10.8)

## 2017-12-12 PROCEDURE — 80053 COMPREHEN METABOLIC PANEL: CPT

## 2017-12-12 PROCEDURE — 99223 1ST HOSP IP/OBS HIGH 75: CPT | Mod: AI | Performed by: HOSPITALIST

## 2017-12-12 PROCEDURE — 99285 EMERGENCY DEPT VISIT HI MDM: CPT

## 2017-12-12 PROCEDURE — 86901 BLOOD TYPING SEROLOGIC RH(D): CPT

## 2017-12-12 PROCEDURE — 86923 COMPATIBILITY TEST ELECTRIC: CPT

## 2017-12-12 PROCEDURE — 86850 RBC ANTIBODY SCREEN: CPT

## 2017-12-12 PROCEDURE — 770006 HCHG ROOM/CARE - MED/SURG/GYN SEMI*

## 2017-12-12 PROCEDURE — 36415 COLL VENOUS BLD VENIPUNCTURE: CPT

## 2017-12-12 PROCEDURE — P9016 RBC LEUKOCYTES REDUCED: HCPCS

## 2017-12-12 PROCEDURE — 86900 BLOOD TYPING SEROLOGIC ABO: CPT

## 2017-12-12 PROCEDURE — 85025 COMPLETE CBC W/AUTO DIFF WBC: CPT

## 2017-12-12 PROCEDURE — 36430 TRANSFUSION BLD/BLD COMPNT: CPT

## 2017-12-12 RX ORDER — POLYETHYLENE GLYCOL 3350 17 G/17G
1 POWDER, FOR SOLUTION ORAL
Status: DISCONTINUED | OUTPATIENT
Start: 2017-12-12 | End: 2017-12-16 | Stop reason: HOSPADM

## 2017-12-12 RX ORDER — AMOXICILLIN 250 MG
2 CAPSULE ORAL 2 TIMES DAILY
Status: DISCONTINUED | OUTPATIENT
Start: 2017-12-12 | End: 2017-12-16 | Stop reason: HOSPADM

## 2017-12-12 RX ORDER — BISACODYL 10 MG
10 SUPPOSITORY, RECTAL RECTAL
Status: DISCONTINUED | OUTPATIENT
Start: 2017-12-12 | End: 2017-12-16 | Stop reason: HOSPADM

## 2017-12-12 ASSESSMENT — ENCOUNTER SYMPTOMS
NAUSEA: 0
DIARRHEA: 0
VOMITING: 0
CHILLS: 0
SHORTNESS OF BREATH: 0

## 2017-12-12 ASSESSMENT — LIFESTYLE VARIABLES: DO YOU DRINK ALCOHOL: NO

## 2017-12-13 ENCOUNTER — OUTPATIENT INFUSION SERVICES (OUTPATIENT)
Dept: ONCOLOGY | Facility: MEDICAL CENTER | Age: 62
End: 2017-12-13
Attending: INTERNAL MEDICINE
Payer: MEDICARE

## 2017-12-13 LAB
ANION GAP SERPL CALC-SCNC: 14 MMOL/L (ref 0–11.9)
BASOPHILS # BLD AUTO: 0.4 % (ref 0–1.8)
BASOPHILS # BLD: 0.02 K/UL (ref 0–0.12)
BUN SERPL-MCNC: 67 MG/DL (ref 8–22)
CALCIUM SERPL-MCNC: 7.3 MG/DL (ref 8.5–10.5)
CHLORIDE SERPL-SCNC: 97 MMOL/L (ref 96–112)
CO2 SERPL-SCNC: 25 MMOL/L (ref 20–33)
CREAT SERPL-MCNC: 6.42 MG/DL (ref 0.5–1.4)
EOSINOPHIL # BLD AUTO: 0.16 K/UL (ref 0–0.51)
EOSINOPHIL NFR BLD: 3.4 % (ref 0–6.9)
ERYTHROCYTE [DISTWIDTH] IN BLOOD BY AUTOMATED COUNT: 56.5 FL (ref 35.9–50)
EST. AVERAGE GLUCOSE BLD GHB EST-MCNC: 192 MG/DL
GFR SERPL CREATININE-BSD FRML MDRD: 7 ML/MIN/1.73 M 2
GLUCOSE BLD-MCNC: 164 MG/DL (ref 65–99)
GLUCOSE BLD-MCNC: 173 MG/DL (ref 65–99)
GLUCOSE BLD-MCNC: 335 MG/DL (ref 65–99)
GLUCOSE SERPL-MCNC: 154 MG/DL (ref 65–99)
HAV IGM SERPL QL IA: NEGATIVE
HBA1C MFR BLD: 8.3 % (ref 0–5.6)
HBV CORE IGM SER QL: NEGATIVE
HBV SURFACE AB SERPL IA-ACNC: 109.67 MIU/ML (ref 0–10)
HBV SURFACE AG SER QL: NEGATIVE
HCT VFR BLD AUTO: 33.2 % (ref 37–47)
HCV AB SER QL: NEGATIVE
HGB BLD-MCNC: 11.2 G/DL (ref 12–16)
IMM GRANULOCYTES # BLD AUTO: 0.03 K/UL (ref 0–0.11)
IMM GRANULOCYTES NFR BLD AUTO: 0.6 % (ref 0–0.9)
LYMPHOCYTES # BLD AUTO: 1.23 K/UL (ref 1–4.8)
LYMPHOCYTES NFR BLD: 26 % (ref 22–41)
MCH RBC QN AUTO: 30.2 PG (ref 27–33)
MCHC RBC AUTO-ENTMCNC: 33.7 G/DL (ref 33.6–35)
MCV RBC AUTO: 89.5 FL (ref 81.4–97.8)
MONOCYTES # BLD AUTO: 0.51 K/UL (ref 0–0.85)
MONOCYTES NFR BLD AUTO: 10.8 % (ref 0–13.4)
NEUTROPHILS # BLD AUTO: 2.78 K/UL (ref 2–7.15)
NEUTROPHILS NFR BLD: 58.8 % (ref 44–72)
NRBC # BLD AUTO: 0 K/UL
NRBC BLD AUTO-RTO: 0 /100 WBC
PLATELET # BLD AUTO: 107 K/UL (ref 164–446)
PMV BLD AUTO: 13.5 FL (ref 9–12.9)
POTASSIUM SERPL-SCNC: 4.4 MMOL/L (ref 3.6–5.5)
RBC # BLD AUTO: 3.71 M/UL (ref 4.2–5.4)
SODIUM SERPL-SCNC: 136 MMOL/L (ref 135–145)
TSH SERPL DL<=0.005 MIU/L-ACNC: 4.08 UIU/ML (ref 0.3–3.7)
WBC # BLD AUTO: 4.7 K/UL (ref 4.8–10.8)

## 2017-12-13 PROCEDURE — 80074 ACUTE HEPATITIS PANEL: CPT

## 2017-12-13 PROCEDURE — 700111 HCHG RX REV CODE 636 W/ 250 OVERRIDE (IP)

## 2017-12-13 PROCEDURE — 82962 GLUCOSE BLOOD TEST: CPT | Mod: 91

## 2017-12-13 PROCEDURE — 700102 HCHG RX REV CODE 250 W/ 637 OVERRIDE(OP): Performed by: HOSPITALIST

## 2017-12-13 PROCEDURE — 90935 HEMODIALYSIS ONE EVALUATION: CPT

## 2017-12-13 PROCEDURE — 770006 HCHG ROOM/CARE - MED/SURG/GYN SEMI*

## 2017-12-13 PROCEDURE — 700111 HCHG RX REV CODE 636 W/ 250 OVERRIDE (IP): Performed by: HOSPITALIST

## 2017-12-13 PROCEDURE — 80048 BASIC METABOLIC PNL TOTAL CA: CPT

## 2017-12-13 PROCEDURE — 5A1D70Z PERFORMANCE OF URINARY FILTRATION, INTERMITTENT, LESS THAN 6 HOURS PER DAY: ICD-10-PCS | Performed by: INTERNAL MEDICINE

## 2017-12-13 PROCEDURE — 30233N1 TRANSFUSION OF NONAUTOLOGOUS RED BLOOD CELLS INTO PERIPHERAL VEIN, PERCUTANEOUS APPROACH: ICD-10-PCS | Performed by: INTERNAL MEDICINE

## 2017-12-13 PROCEDURE — 700111 HCHG RX REV CODE 636 W/ 250 OVERRIDE (IP): Performed by: INTERNAL MEDICINE

## 2017-12-13 PROCEDURE — 83036 HEMOGLOBIN GLYCOSYLATED A1C: CPT

## 2017-12-13 PROCEDURE — A9270 NON-COVERED ITEM OR SERVICE: HCPCS | Performed by: FAMILY MEDICINE

## 2017-12-13 PROCEDURE — 84443 ASSAY THYROID STIM HORMONE: CPT

## 2017-12-13 PROCEDURE — 700102 HCHG RX REV CODE 250 W/ 637 OVERRIDE(OP)

## 2017-12-13 PROCEDURE — 36415 COLL VENOUS BLD VENIPUNCTURE: CPT

## 2017-12-13 PROCEDURE — 86706 HEP B SURFACE ANTIBODY: CPT

## 2017-12-13 PROCEDURE — 700102 HCHG RX REV CODE 250 W/ 637 OVERRIDE(OP): Performed by: FAMILY MEDICINE

## 2017-12-13 PROCEDURE — A9270 NON-COVERED ITEM OR SERVICE: HCPCS | Performed by: HOSPITALIST

## 2017-12-13 PROCEDURE — 86644 CMV ANTIBODY: CPT

## 2017-12-13 PROCEDURE — 99233 SBSQ HOSP IP/OBS HIGH 50: CPT | Performed by: INTERNAL MEDICINE

## 2017-12-13 PROCEDURE — 85025 COMPLETE CBC W/AUTO DIFF WBC: CPT

## 2017-12-13 PROCEDURE — A9270 NON-COVERED ITEM OR SERVICE: HCPCS

## 2017-12-13 RX ORDER — HYDROCODONE BITARTRATE AND ACETAMINOPHEN 5; 325 MG/1; MG/1
TABLET ORAL
Status: COMPLETED
Start: 2017-12-13 | End: 2017-12-13

## 2017-12-13 RX ORDER — PREGABALIN 25 MG/1
50 CAPSULE ORAL 2 TIMES DAILY
Status: DISCONTINUED | OUTPATIENT
Start: 2017-12-13 | End: 2017-12-16 | Stop reason: HOSPADM

## 2017-12-13 RX ORDER — CARVEDILOL 12.5 MG/1
12.5 TABLET ORAL 2 TIMES DAILY WITH MEALS
Status: DISCONTINUED | OUTPATIENT
Start: 2017-12-13 | End: 2017-12-16 | Stop reason: HOSPADM

## 2017-12-13 RX ORDER — HEPARIN SODIUM 1000 [USP'U]/ML
INJECTION, SOLUTION INTRAVENOUS; SUBCUTANEOUS
Status: COMPLETED
Start: 2017-12-13 | End: 2017-12-13

## 2017-12-13 RX ORDER — HYDROCODONE BITARTRATE AND ACETAMINOPHEN 5; 325 MG/1; MG/1
1 TABLET ORAL EVERY 4 HOURS PRN
Status: DISCONTINUED | OUTPATIENT
Start: 2017-12-13 | End: 2017-12-16 | Stop reason: HOSPADM

## 2017-12-13 RX ORDER — AMOXICILLIN 250 MG
2 CAPSULE ORAL 2 TIMES DAILY
Status: DISCONTINUED | OUTPATIENT
Start: 2017-12-13 | End: 2017-12-16 | Stop reason: HOSPADM

## 2017-12-13 RX ORDER — PREGABALIN 25 MG/1
CAPSULE ORAL
Status: COMPLETED
Start: 2017-12-13 | End: 2017-12-13

## 2017-12-13 RX ORDER — DEXTROSE MONOHYDRATE 25 G/50ML
25 INJECTION, SOLUTION INTRAVENOUS
Status: DISCONTINUED | OUTPATIENT
Start: 2017-12-13 | End: 2017-12-16 | Stop reason: HOSPADM

## 2017-12-13 RX ORDER — HEPARIN SODIUM 5000 [USP'U]/ML
5000 INJECTION, SOLUTION INTRAVENOUS; SUBCUTANEOUS EVERY 8 HOURS
Status: DISCONTINUED | OUTPATIENT
Start: 2017-12-13 | End: 2017-12-16 | Stop reason: HOSPADM

## 2017-12-13 RX ORDER — ACETAMINOPHEN 325 MG/1
650 TABLET ORAL EVERY 6 HOURS PRN
Status: DISCONTINUED | OUTPATIENT
Start: 2017-12-13 | End: 2017-12-16 | Stop reason: HOSPADM

## 2017-12-13 RX ORDER — CYCLOBENZAPRINE HCL 10 MG
10 TABLET ORAL 3 TIMES DAILY PRN
Status: DISCONTINUED | OUTPATIENT
Start: 2017-12-13 | End: 2017-12-16 | Stop reason: HOSPADM

## 2017-12-13 RX ORDER — HYDRALAZINE HYDROCHLORIDE 20 MG/ML
10-20 INJECTION INTRAMUSCULAR; INTRAVENOUS EVERY 6 HOURS PRN
Status: DISCONTINUED | OUTPATIENT
Start: 2017-12-13 | End: 2017-12-16 | Stop reason: HOSPADM

## 2017-12-13 RX ORDER — HEPARIN SODIUM 1000 [USP'U]/ML
1700 INJECTION, SOLUTION INTRAVENOUS; SUBCUTANEOUS
Status: DISCONTINUED | OUTPATIENT
Start: 2017-12-13 | End: 2017-12-16 | Stop reason: HOSPADM

## 2017-12-13 RX ORDER — POLYETHYLENE GLYCOL 3350 17 G/17G
1 POWDER, FOR SOLUTION ORAL
Status: DISCONTINUED | OUTPATIENT
Start: 2017-12-13 | End: 2017-12-16 | Stop reason: HOSPADM

## 2017-12-13 RX ORDER — BISACODYL 10 MG
10 SUPPOSITORY, RECTAL RECTAL
Status: DISCONTINUED | OUTPATIENT
Start: 2017-12-13 | End: 2017-12-16 | Stop reason: HOSPADM

## 2017-12-13 RX ADMIN — PREGABALIN 50 MG: 25 CAPSULE ORAL at 01:05

## 2017-12-13 RX ADMIN — HEPARIN SODIUM 1700 UNITS: 1000 INJECTION, SOLUTION INTRAVENOUS; SUBCUTANEOUS at 10:35

## 2017-12-13 RX ADMIN — HYDRALAZINE HYDROCHLORIDE 10 MG: 20 INJECTION INTRAMUSCULAR; INTRAVENOUS at 01:58

## 2017-12-13 RX ADMIN — PREGABALIN 50 MG: 25 CAPSULE ORAL at 08:32

## 2017-12-13 RX ADMIN — INSULIN HUMAN 1 UNITS: 100 INJECTION, SOLUTION PARENTERAL at 06:29

## 2017-12-13 RX ADMIN — HYDROCODONE BITARTRATE AND ACETAMINOPHEN 1 TABLET: 5; 325 TABLET ORAL at 01:11

## 2017-12-13 RX ADMIN — HEPARIN SODIUM 5000 UNITS: 5000 INJECTION, SOLUTION INTRAVENOUS; SUBCUTANEOUS at 20:00

## 2017-12-13 RX ADMIN — HYDRALAZINE HYDROCHLORIDE 20 MG: 20 INJECTION INTRAMUSCULAR; INTRAVENOUS at 16:13

## 2017-12-13 RX ADMIN — INSULIN HUMAN 1 UNITS: 100 INJECTION, SOLUTION PARENTERAL at 18:27

## 2017-12-13 RX ADMIN — INSULIN HUMAN 4 UNITS: 100 INJECTION, SOLUTION PARENTERAL at 20:05

## 2017-12-13 RX ADMIN — ACETAMINOPHEN 325 MG: 325 TABLET, FILM COATED ORAL at 19:56

## 2017-12-13 RX ADMIN — SITAGLIPTIN 25 MG: 25 TABLET, FILM COATED ORAL at 08:41

## 2017-12-13 RX ADMIN — CARVEDILOL 12.5 MG: 12.5 TABLET, FILM COATED ORAL at 18:27

## 2017-12-13 RX ADMIN — PREGABALIN 50 MG: 25 CAPSULE ORAL at 19:58

## 2017-12-13 RX ADMIN — HYDROCODONE BITARTRATE AND ACETAMINOPHEN 1 TABLET: 5; 325 TABLET ORAL at 22:58

## 2017-12-13 RX ADMIN — HEPARIN SODIUM 5000 UNITS: 5000 INJECTION, SOLUTION INTRAVENOUS; SUBCUTANEOUS at 06:27

## 2017-12-13 ASSESSMENT — PAIN SCALES - GENERAL
PAINLEVEL_OUTOF10: 8
PAINLEVEL_OUTOF10: 6
PAINLEVEL_OUTOF10: 4
PAINLEVEL_OUTOF10: 4

## 2017-12-13 ASSESSMENT — PATIENT HEALTH QUESTIONNAIRE - PHQ9
SUM OF ALL RESPONSES TO PHQ QUESTIONS 1-9: 0
2. FEELING DOWN, DEPRESSED, IRRITABLE, OR HOPELESS: NOT AT ALL
1. LITTLE INTEREST OR PLEASURE IN DOING THINGS: NOT AT ALL
SUM OF ALL RESPONSES TO PHQ9 QUESTIONS 1 AND 2: 0

## 2017-12-13 ASSESSMENT — COPD QUESTIONNAIRES
HAVE YOU SMOKED AT LEAST 100 CIGARETTES IN YOUR ENTIRE LIFE: NO/DON'T KNOW
COPD SCREENING SCORE: 1
DURING THE PAST 4 WEEKS HOW MUCH DID YOU FEEL SHORT OF BREATH: NONE/LITTLE OF THE TIME
DO YOU EVER COUGH UP ANY MUCUS OR PHLEGM?: NO/ONLY WITH OCCASIONAL COLDS OR INFECTIONS

## 2017-12-13 ASSESSMENT — ENCOUNTER SYMPTOMS
HEADACHES: 0
HALLUCINATIONS: 0
BACK PAIN: 0
FOCAL WEAKNESS: 0
CHILLS: 0
NAUSEA: 0
DIZZINESS: 0
MYALGIAS: 0
DEPRESSION: 0
HEADACHES: 1
SENSORY CHANGE: 0
EYE PAIN: 0
GASTROINTESTINAL NEGATIVE: 1
CONSTIPATION: 0
MUSCULOSKELETAL NEGATIVE: 1
COUGH: 0
SPEECH CHANGE: 0
FALLS: 0
DIZZINESS: 1
FEVER: 0
ABDOMINAL PAIN: 0
EYES NEGATIVE: 1
SHORTNESS OF BREATH: 0
RESPIRATORY NEGATIVE: 1
FLANK PAIN: 0
WHEEZING: 0
WEAKNESS: 1
SORE THROAT: 0
VOMITING: 0
DIARRHEA: 0
CARDIOVASCULAR NEGATIVE: 1

## 2017-12-13 ASSESSMENT — LIFESTYLE VARIABLES
EVER_SMOKED: NEVER
EVER_SMOKED: NEVER
ALCOHOL_USE: NO

## 2017-12-13 NOTE — ED NOTES
Med rec updated and complete.  Allergies reviewed.  No antibiotic use in last 30 days.  Pt took morning doses.

## 2017-12-13 NOTE — H&P
Hospital Medicine History and Physical    Date of Service  12/12/2017    Chief Complaint  Chief Complaint   Patient presents with   • Weakness   • Abnormal Labs       History of Presenting Illness  62 y.o. female who presented 12/12/2017 with Past medical history of end-stage renal disease and chronic anemia presenting with dizziness headache. patient found to have symptomatic anemia. Patient with complicated medical history has multiple bouts of symptomatic anemia requiring blood transfusions. She started having headaches and dizziness on Friday at so she was told by dialysis that she needed to go to the hospital for blood transfusion however she refused. She states she had a doctor's appointment today that she cannot miss did not want to be in the hospital and miss her appointment.. She also has had severe lightheadedness and weakness difficulty even walking to the bathroom.     Primary Care Physician  Nyla Nava M.D.    Consultants  Needs nephro consult in am    Code Status  FUll    Review of Systems  Review of Systems   Constitutional: Negative for chills and fever.   HENT: Negative for hearing loss and sore throat.    Eyes: Negative for pain.        Blind   Respiratory: Negative for cough, shortness of breath and wheezing.    Cardiovascular: Negative for chest pain and leg swelling.   Gastrointestinal: Negative for abdominal pain, constipation, diarrhea, nausea and vomiting.   Genitourinary: Negative for dysuria and flank pain.   Musculoskeletal: Negative for back pain, falls and myalgias.   Neurological: Positive for dizziness, weakness and headaches. Negative for sensory change and speech change.   Psychiatric/Behavioral: Negative for depression and hallucinations.        Past Medical History  Past Medical History:   Diagnosis Date   • MDS (myelodysplastic syndrome) 10/2016    bone marrow biopsy   • Stroke (CMS-[facility]) 03/2015    No residual weakness/problems   • Arthritis     hands    • Atrial fibrillation  "(CMS-[facility])     HX   • Blood transfusion without reported diagnosis    • Breath shortness     w/exertion   • Cancer (CMS-[facility])     MDS in bones   • Cataract     bilat IOL   • Chronic anemia    • Chronic kidney disease        • Chronic obstructive pulmonary disease (CMS-[facility])    • Congestive heart failure (CMS-[facility])    • Dental disorder     full dentures   • Diabetes     Diet controlled   • Dialysis patient     [facility] ,   [facility] in [place]   • Glaucoma    • Heart abnormalities    • Hypertension    • Pain     \"bones\", generalized, 5/10   • Supplemental oxygen dependent     2 liters       Surgical History  Past Surgical History:   Procedure Laterality Date   • BONE MARROW BIOPSY, NDL/TROCAR  2017    Procedure: BONE MARROW BIOPSY, NDL/TROCAR;  Surgeon: Wade Turner M.D.;  Location: ENDOSCOPY [facility];  Service: Orthopedics   • BONE MARROW ASPIRATION  2017    Procedure: BONE MARROW ASPIRATION;  Surgeon: Wade Turner M.D.;  Location: ENDOSCOPY [facility];  Service: Orthopedics   • VEIN LIGATION Left 11/10/2016    Procedure: VEIN LIGATION FOR DISTAL REVASCULARIZATION AND INTERVAL LIGATION OF LEFT ARM DIALYISIS ACCESS (DRIL PROCEDURE);  Surgeon: Ranulfo Jolly M.D.;  Location: SURGERY [facility];  Service:    • AV FISTULA CREATION Left 2016    Procedure: AV FISTULA CREATION UPPER EXTREMITY;  Surgeon: Ranulfo Jolly M.D.;  Location: SURGERY [facility];  Service:    • GASTROSCOPY  2015    Procedure: ESOPHAGOGASTRODUODENOSCOPY WITH BIOPSY;  Surgeon: Wnig Álvarez M.D.;  Location: SURGERY [facility];  Service:    • COLONOSCOPY  2015    Procedure: COLONOSCOPY;  Surgeon: Wing Álvarez M.D.;  Location: SURGERY [facility];  Service:    • GYN SURGERY      hysterectomy   • GYN SURGERY       x 2   • GYN SURGERY      d&C x2   • OTHER      angioplasty/ stents bilat LE   • OTHER ABDOMINAL SURGERY      appendectomy,  x 2, hysterectomy, " D & C   • OTHER ABDOMINAL SURGERY      appendectomy   • OTHER CARDIAC SURGERY      Angioplasty 1998 and 2001   • OTHER CARDIAC SURGERY      cardiac angiogram, angioplasty   • RETINAL DETACHMENT REPAIR Right        Medications  No current facility-administered medications on file prior to encounter.      Current Outpatient Prescriptions on File Prior to Encounter   Medication Sig Dispense Refill   • carvedilol (COREG) 3.125 MG Tab Take 4 Tabs by mouth 2 times a day, with meals. 60 Tab 1   • cyclobenzaprine (FLEXERIL) 10 MG Tab Take 1 Tab by mouth 3 times a day as needed for Muscle Spasms. 20 Tab 1   • hydrocodone-acetaminophen (NORCO) 5-325 MG Tab per tablet Take 1 Tab by mouth every four hours as needed.     • Brimonidine Tartrate-Timolol (COMBIGAN) 0.2-0.5 % Solution Place 1 Drop in both eyes 2 Times a Day.     • sitagliptin (JANUVIA) 25 MG Tab Take 1 Tab by mouth every morning. 60 Tab 3   • pregabalin (LYRICA) 50 MG capsule Take 50 mg by mouth 2 times a day.     • bimatoprost (LUMIGAN) 0.03 % Solution Place 1 Drop in both eyes every evening.         Family History  Family History   Problem Relation Age of Onset   • Hypertension Father    • Hypertension Mother        Social History  Social History   Substance Use Topics   • Smoking status: Never Smoker   • Smokeless tobacco: Never Used   • Alcohol use No       Allergies  Allergies   Allergen Reactions   • Actos [Pioglitazone Hydrochloride] Unspecified     Cause blindness   OWJ=5114   • Darvocet [Propoxyphene N-Apap] Vomiting     ZEY=8630   • Demerol Vomiting     DJM=6581   • Glucophage [Metformin Hydrochloride] Vomiting     WUO=3530   • Morphine Vomiting     CPQ=0816   • Oxycodone Vomiting     RXN=1/2016   • Pcn [Penicillins] Vomiting     FUM=7628     • Requip Vomiting     RXN=12/2015   • Simvastatin Unspecified     Leg cramps  IXX=4047   • Sulfa Drugs Rash     RXN=>10 years   • Tramadol Vomiting     ULD=1663   • Trazodone Vomiting     MSY=6950   • Demerol    •  Dilaudid [Hydromorphone] Vomiting     Unknown    • Diphenhydramine Vomiting   • Iron      vomiting   • Lenalidomide Vomiting   • Morphine    • Multivitamin      itching   • Naprosyn [Naproxen]    • Other Drug      Any binders that remove phosphorus from the body such as tums   • Sulfa Drugs         Physical Exam  Laboratory   Hemodynamics  Temp (24hrs), Av.7 °C (98.1 °F), Min:36.7 °C (98.1 °F), Max:36.7 °C (98.1 °F)   Temperature: 36.7 °C (98.1 °F)  Pulse  Av  Min: 78  Max: 95 Heart Rate (Monitored): 77  Blood Pressure: (!) 218/70, NIBP: (!) 165/60      Respiratory      Respiration: 16, Pulse Oximetry: 100 %             Physical Exam   Constitutional: She is oriented to person, place, and time. She appears well-developed and well-nourished.   HENT:   Head: Normocephalic and atraumatic.   Eyes: Pupils are equal, round, and reactive to light.   Pale conjunctiva   Neck: Normal range of motion. Neck supple. No JVD present.   Cardiovascular: Normal rate, regular rhythm and intact distal pulses.    No murmur heard.  Pulmonary/Chest: Effort normal and breath sounds normal. No respiratory distress. She exhibits no tenderness.   Abdominal: Soft. Bowel sounds are normal. She exhibits no distension. There is no tenderness.   Musculoskeletal: Normal range of motion. She exhibits no edema.   Neurological: She is alert and oriented to person, place, and time. No cranial nerve deficit. Coordination normal.   Skin: There is pallor.   Psychiatric: She has a normal mood and affect. Her behavior is normal. Judgment and thought content normal.       Recent Labs      17   WBC  6.1   RBC  2.08*   HEMOGLOBIN  6.6*   HEMATOCRIT  20.2*   MCV  96.2   MCH  31.7   MCHC  33.0*   RDW  67.5*   PLATELETCT  152*   MPV  13.8*     Recent Labs      17   SODIUM  135   POTASSIUM  5.0   CHLORIDE  93*   CO2  28   GLUCOSE  285*   BUN  63*   CREATININE  6.67*   CALCIUM  7.3*     Recent Labs      17   ALTSGPT   30   ASTSGOT  23   ALKPHOSPHAT  79   TBILIRUBIN  0.3   GLUCOSE  285*                 Lab Results   Component Value Date    TROPONINI <0.01 11/15/2017     Urinalysis:    Lab Results  Component Value Date/Time   SPECGRAVITY 1.008 03/20/2017 0439   GLUCOSEUR 300 (A) 03/20/2017 0439   KETONES Negative 03/20/2017 0439   NITRITE Negative 03/20/2017 0439   WBCURINE 20-50 (A) 03/20/2017 0439   RBCURINE 2-5 (A) 03/20/2017 0439   BACTERIA Few (A) 03/20/2017 0439   EPITHELCELL Few 03/20/2017 0439        Imaging  None   Assessment/Plan     I anticipate this patient will require at least two midnights for appropriate medical management, necessitating inpatient admission.    * Symptomatic anemia   Assessment & Plan    Hx of reccurent symptomatic anemia, requiring multiple drug transfusions  Will transfuse 2 units in ER  Will need nephro consult in am  Recheck CBC In am  Anemia of chronic disease; no signs of acute bleeding        Paroxysmal atrial fibrillation (CMS-HCC)- (present on admission)   Assessment & Plan    Normal sinus in Er, not on OAC at home        MDS (myelodysplastic syndrome) (CMS-HCC)- (present on admission)   Assessment & Plan    Chronic        ESRD (end stage renal disease) (CMS-HCC)- (present on admission)   Assessment & Plan    MWF, needs nephro consultation        Chronic respiratory failure with hypoxia, 2L- (present on admission)   Assessment & Plan    At baseline, Resp protocol ordered        Legally blind- (present on admission)   Assessment & Plan    chronic        Essential hypertension, malignant- (present on admission)   Assessment & Plan    Cont coreg        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 (HCC)- (present on admission)   Assessment & Plan    Low dose SSI and home januiva            VTE prophylaxis: heparin.

## 2017-12-13 NOTE — PROGRESS NOTES
Pt currently admitted to the hospital.  Received PRBC transfusion last night per RN progress notes.  Dr. Cook's nurse, [place], notified that pt was admitted.

## 2017-12-13 NOTE — PROGRESS NOTES
Pt refuses use of bed alarm. Educated pt on risks and need for alarm but continues to refuse. Educated pt on use of call light to call for assistance prior to getting OOB. Pt acknowledges understanding. Fall precautions in place; bed lowest position, treaded socks at bedside, call light within reach.

## 2017-12-13 NOTE — ED PROVIDER NOTES
ED Provider Note    Scribed for Lynn Sorenson M.D. by Zack Cintron. 12/12/2017, 8:19 PM.    Primary care provider: Nyla Nava M.D.  Means of arrival: Walk in  History obtained from: Patient  History limited by: None    CHIEF COMPLAINT  Chief Complaint   Patient presents with   • Weakness   • Abnormal Labs       HPI  Vielka Briscoe is a 62 y.o. female who presents to the Emergency Department for evaluation of abnormal labs of low hemoglobin. Patient had dialysis yesterday and was advised to go to the ER for hemoglobin of 5, labs were checked a few days ago. Patient reports associated generalized weakness severe enough to causing difficulty ambulating to her bathroom. She also reports some lightheadedness. She does not report any alleviating factors to the lightheadedness.Patient reports that she has had recurrent anemia secondary to her end-stage renal disease and has required transfusion in the past, last one was a month ago. Patient otherwise does not report any other associated symptoms at this time. Negative nausea, vomiting, diarrhea, chills, chest pain, or shortness of breath. Patient has a history of multiple blood transfusions.    REVIEW OF SYSTEMS  Review of Systems   Constitutional: Negative for chills.   Respiratory: Negative for shortness of breath.    Cardiovascular: Negative for chest pain.   Gastrointestinal: Negative for diarrhea, nausea and vomiting.   Skin: Negative for rash.   Neurological:        Positive generalized weakness, lightheadedness   All other systems reviewed and are negative.    C    PAST MEDICAL HISTORY   has a past medical history of Arthritis; Atrial fibrillation (CMS-HCC); Blood transfusion without reported diagnosis; Breath shortness; Cancer (CMS-HCC); Cataract; Chronic anemia; Chronic kidney disease; Chronic obstructive pulmonary disease (CMS-[facility]); Congestive heart failure (CMS-HCC); Dental disorder; Diabetes; Dialysis patient; Glaucoma; Heart abnormalities;  Hypertension; MDS (myelodysplastic syndrome) (10/2016); Pain (-2017); Stroke (CMS-[facility]) (03/2015); and Supplemental oxygen dependent.    SURGICAL HISTORY   has a past surgical history that includes other cardiac surgery; other abdominal surgery; gyn surgery; gyn surgery; gyn surgery; other cardiac surgery; other; retinal detachment repair (Right); av fistula creation (Left, 2/8/2016); other abdominal surgery; gastroscopy (12/17/2015); colonoscopy (12/17/2015); vein ligation (Left, 11/10/2016); bone marrow biopsy, ndl/trocar (9/20/2017); and bone marrow aspiration (9/20/2017).    SOCIAL HISTORY  Social History   Substance Use Topics   • Smoking status: Never Smoker   • Smokeless tobacco: Never Used   • Alcohol use No      History   Drug Use No       FAMILY HISTORY  Family History   Problem Relation Age of Onset   • Hypertension Father    • Hypertension Mother        CURRENT MEDICATIONS  No current facility-administered medications on file prior to encounter.      Current Outpatient Prescriptions on File Prior to Encounter   Medication Sig Dispense Refill   • carvedilol (COREG) 3.125 MG Tab Take 4 Tabs by mouth 2 times a day, with meals. 60 Tab 1   • cyclobenzaprine (FLEXERIL) 10 MG Tab Take 1 Tab by mouth 3 times a day as needed for Muscle Spasms. 20 Tab 1   • hydrocodone-acetaminophen (NORCO) 5-325 MG Tab per tablet Take 1 Tab by mouth every four hours as needed.     • Brimonidine Tartrate-Timolol (COMBIGAN) 0.2-0.5 % Solution Place 1 Drop in both eyes 2 Times a Day.     • sitagliptin (JANUVIA) 25 MG Tab Take 1 Tab by mouth every morning. 60 Tab 3   • pregabalin (LYRICA) 50 MG capsule Take 50 mg by mouth 2 times a day.     • bimatoprost (LUMIGAN) 0.03 % Solution Place 1 Drop in both eyes every evening.        ALLERGIES  Allergies   Allergen Reactions   • Actos [Pioglitazone Hydrochloride] Unspecified     Cause blindness   FYG=9998   • Darvocet [Propoxyphene N-Apap] Vomiting     PHF=6740   • Demerol Vomiting      "QTH=1315   • Glucophage [Metformin Hydrochloride] Vomiting     MCO=3444   • Morphine Vomiting     WKL=6480   • Oxycodone Vomiting     RXN=1/2016   • Pcn [Penicillins] Vomiting     SKO=8006     • Requip Vomiting     RXN=12/2015   • Simvastatin Unspecified     Leg cramps  HXL=6806   • Sulfa Drugs Rash     RXN=>10 years   • Tramadol Vomiting     PQF=1324   • Trazodone Vomiting     KLU=3629   • Demerol    • Dilaudid [Hydromorphone] Vomiting     Unknown    • Diphenhydramine Vomiting   • Iron      vomiting   • Lenalidomide Vomiting   • Morphine    • Multivitamin      itching   • Naprosyn [Naproxen]    • Other Drug      Any binders that remove phosphorus from the body such as tums   • Sulfa Drugs        PHYSICAL EXAM  VITAL SIGNS: BP (!) 218/70   Pulse 95   Temp 36.7 °C (98.1 °F)   Resp 16   Ht 1.473 m (4' 10\")   Wt 64 kg (141 lb)   LMP 01/01/1995   SpO2 100%   BMI 29.47 kg/m²   Vitals reviewed by myself.  Physical Exam  Nursing note and vitals reviewed.  Constitutional: Well-developed and well-nourished. No acute distress.   HENT: Head is normocephalic and atraumatic.  Eyes: extra-ocular movements intact. Pale conjunctiva.  Cardiovascular: Regular rate and regular rhythm. No murmur heard.  Pulmonary/Chest: Breath sounds normal. No wheezes or rales.   Abdominal: Soft and non-tender. No distention.    Musculoskeletal: Extremities exhibit normal range of motion without edema or tenderness.   Neurological: Awake and alert  Skin: Skin is warm and dry. No rash.     DIAGNOSTIC STUDIES /  LABS  Labs Reviewed   COMP METABOLIC PANEL - Abnormal; Notable for the following:        Result Value    Chloride 93 (*)     Anion Gap 14.0 (*)     Glucose 285 (*)     Bun 63 (*)     Creatinine 6.67 (*)     Calcium 7.3 (*)     All other components within normal limits   ESTIMATED GFR - Abnormal; Notable for the following:     GFR If  8 (*)     GFR If Non  6 (*)     All other components within normal limits "   CBC WITH DIFFERENTIAL - Abnormal; Notable for the following:     RBC 2.08 (*)     Hemoglobin 6.6 (*)     Hematocrit 20.2 (*)     MCHC 33.0 (*)     RDW 67.5 (*)     Platelet Count 152 (*)     MPV 13.8 (*)     All other components within normal limits   COD (ADULT)   RELEASE RED BLOOD CELLS   TRANSFUSE RED BLOOD CELLS-NURSING COMMUNICATION      All labs reviewed by me.    REASSESSMENT  8:19 PM Patient seen and examined at bedside. The patient presents with abnormal labs with low hemoglobin. Discussed plan of care which includes labs and blood transfusion. Patient understands and agrees.     10:16 PM Patient reevaluated at bedside. Updated patient on plan which includes blood transfusion.    COURSE & MEDICAL DECISION MAKING  Nursing notes, VS, PMSFHx reviewed in chart.    Patient is a 62-year-old female with end-stage renal disease who comes in for abnormal labs. The frontal diagnosis includes anemia, end-stage renal disease, iron deficiency. Diagnostic workup includes labs.    Patient's initial vitals are notable for hypertension. Patient has known labile blood pressure. She reports that her blood pressure dialysis is usually hypotensive and when she is not at dialysis that she is hypertensive. Therefore I elected to not treat this urgently and she is not having any symptoms of chest pain or headache. Patient's labs returned and are notable for a hemoglobin of 6.6. Therefore I will transfuse and admit her for further management. Patient is agreeable to blood transfusion. I discussed the case with Dr. Dobbs has accepted the admission. At time of admission patient is in stable condition.      FINAL IMPRESSION  1. Anemia, unspecified type    2. ESRD (end stage renal disease) (CMS-HCC)          Zack KENNY ([place]), am scribing for, and in the presence of, Lynn Sorenson M.D..    Electronically signed by: Zack Cintron (Ervin), 12/12/2017    Lynn KENNY M.D. personally performed the services  described in this documentation, as scribed by Zack Cintron in my presence, and it is both accurate and complete.    The note accurately reflects work and decisions made by me.  Lynn Sorenson  12/12/2017  10:38 PM

## 2017-12-13 NOTE — ASSESSMENT & PLAN NOTE
- hx of recurrent sx anemia requiring blood transfusions  - s/p 1U PRBC transfusion; pending additional unit transfusion  - transfuse if Hgb < 7  - no e/o acute bleeding  - Nephro recs appreciated

## 2017-12-13 NOTE — PROGRESS NOTES
Assumed care of pt, discussed plan of care. A&Ox4. 2L O2 NC, tolerates well; baseline. RML, RLL, LLL diminished. Complains of 6/10 pain to legs, back; medicated per MAR. Last BM, 12/12. Skin intact. SBA, uses cane, hand-held assist at times. On cardiac, renal, diabetic diet with 2g Na restriction. Takes pills whole without difficulty. IV, CDI, running RBCs. Pt brought up to floor without pump. Has left UA fistula. Fall precautions in place; bed lowest position, treaded socks at bedside, call light within reach. All needs met at this time.

## 2017-12-13 NOTE — RESPIRATORY CARE
COPD EDUCATION by COPD CLINICAL EDUCATOR  12/13/2017 at 5:35 AM by Cecily Prado     Patient reviewed by COPD education team. Patient does not qualify for COPD program.

## 2017-12-13 NOTE — CONSULTS
"[facility] -  CONSULTATION NOTE               Author: Chao Perez M.D. Date & Time: 2017  11:38 AM       REASON FOR CONSULTATION:   - Inpatient hemodialysis management.      CHIEF COMPLAINT:   -  \"I'm short of breath and weak\"    HISTORY OF PRESENT ILLNESS:    63 yo female PMH ESRD MWF iHD, HTN, type 2 DM, anemia of chronic disease, renal osteodystrophy, HLD, and MDS who presents with CC as above.  She states that she was at her usual state of health an doing well until past few days where she's had a progressively worsening fatigue and SOB.  She is well known to us from outpatient dialysis as well as multiple admissions for similar episodes in past.  She states her weakness was severe enough where it limited her ability to even walk to bathroom.  She also admits to light headedness, dizziness, and HA.  She denies F/C/N/V/CP.  No abd pain, no melena, no hematochezia.  Labs in ED showed a drop in her Hgb again and she was admitted for further treatment and management.  She received 1 unit pRBC urgently overnight and was feeling much better this AM.  She was asking if she would get her dialysis today in the hospital.  No change to appetite or weight recently.    REVIEW OF SYSTEMS:    - As per HPI, otherwise review of systems negative per AMA/CMS criteria.    PAST MEDICAL HISTORY:   - ESRD MWF iHD  - HTN  - type 2 DM  - Anemia of chronic disease  - Renal osteodystrophy  - MDS  - COPD  - CAD  - HLD  - CVA  - Steal syndrome  - Chronic diastolic HF  - PVD  - Chronic chest pain    PAST SURGICAL HISTORY:   - Cardiac surgery  - Abdominal surgery  - AVF placement  - Bilateral cataract repair  - Retinal detachment surgery  - LHC  - Upper EGD  - LAVF revision for steal syndrome  - Appendectomy  - Bone marrow bx  - D&C x 2  -  x 2  - Hysterectomy  - BLE stenting  - PermCath placement and removal    FAMILY HISTORY:   - Reviewed and non contributory to current illness    SOCIAL HISTORY: " "  - No tobacco  - No EtOH  - No illicits  - 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    HOME MEDICATIONS:   - Reviewed and documented in chart    ALLERGIES:  Actos [pioglitazone hydrochloride]; Darvocet [propoxyphene n-apap]; Demerol; Glucophage [metformin hydrochloride]; Morphine; Oxycodone; Pcn [penicillins]; Requip; Simvastatin; Sulfa drugs; Tramadol; Trazodone; Demerol; Dilaudid [hydromorphone]; Diphenhydramine; Iron; Lenalidomide; Morphine; Multivitamin; Naprosyn [naproxen]; Other drug; and Sulfa drugs    PHYSICAL EXAM:  VS:  BP (!) 164/75   Pulse 84   Temp 36.9 °C (98.4 °F)   Resp 16   Ht 1.473 m (4' 10\")   Wt 64.4 kg (141 lb 15.6 oz)   LMP 01/01/1995   SpO2 100%   Breastfeeding? No   BMI 29.67 kg/m²   GENERAL:  WDWN, NAD  HEENT:  NC/AT, no scleral icterus; conjunctiva normal  NECK:  Supple; Non tender  CV:  RRR, no m/r/g  LUNGS:  CTAB, no W/R  ABDOMEN:  SNTND, +BS  EXTREMETIES:  No C/C/E  SKIN:  Warm and dry  NEURO:  A&O, no focal deficits  PSYCH:  Cooperative, appropriate mood and affect    LABS:  Recent Results (from the past 24 hour(s))   COMP METABOLIC PANEL    Collection Time: 12/12/17  8:03 PM   Result Value Ref Range    Sodium 135 135 - 145 mmol/L    Potassium 5.0 3.6 - 5.5 mmol/L    Chloride 93 (L) 96 - 112 mmol/L    Co2 28 20 - 33 mmol/L    Anion Gap 14.0 (H) 0.0 - 11.9    Glucose 285 (H) 65 - 99 mg/dL    Bun 63 (H) 8 - 22 mg/dL    Creatinine 6.67 (HH) 0.50 - 1.40 mg/dL    Calcium 7.3 (L) 8.5 - 10.5 mg/dL    AST(SGOT) 23 12 - 45 U/L    ALT(SGPT) 30 2 - 50 U/L    Alkaline Phosphatase 79 30 - 99 U/L    Total Bilirubin 0.3 0.1 - 1.5 mg/dL    Albumin 3.5 3.2 - 4.9 g/dL    Total Protein 6.7 6.0 - 8.2 g/dL    Globulin 3.2 1.9 - 3.5 g/dL    A-G Ratio 1.1 g/dL   COD (ADULT)    Collection Time: 12/12/17  8:03 PM   Result Value Ref Range    Rh Grouping Only POS     Antibody Screen-Cod NEG     ABO Grouping Only AB     Component R       RI3                 RedBloodCellsIRR    [id number]   " transfused   12/12/17   22:56      Product Type Red Blood Cells IRR LR  AS-3     Dispense Status Transfused     Unit Number (Barcoded) [id number]     Product Code (Barcoded) U1564C76     Blood Type (Barcoded) 0600    ESTIMATED GFR    Collection Time: 12/12/17  8:03 PM   Result Value Ref Range    GFR If  8 (A) >60 mL/min/1.73 m 2    GFR If Non African American 6 (A) >60 mL/min/1.73 m 2   CBC WITH DIFFERENTIAL    Collection Time: 12/12/17  8:34 PM   Result Value Ref Range    WBC 6.1 4.8 - 10.8 K/uL    RBC 2.08 (L) 4.20 - 5.40 M/uL    Hemoglobin 6.6 (L) 12.0 - 16.0 g/dL    Hematocrit 20.2 (L) 37.0 - 47.0 %    MCV 96.2 81.4 - 97.8 fL    MCH 31.7 27.0 - 33.0 pg    MCHC 33.0 (L) 33.6 - 35.0 g/dL    RDW 67.5 (H) 35.9 - 50.0 fL    Platelet Count 152 (L) 164 - 446 K/uL    MPV 13.8 (H) 9.0 - 12.9 fL    Nucleated RBC 0.30 /100 WBC    NRBC (Absolute) 0.02 K/uL    Neutrophils-Polys 57.50 44.00 - 72.00 %    Lymphocytes 26.80 22.00 - 41.00 %    Monocytes 11.30 0.00 - 13.40 %    Eosinophils 3.40 0.00 - 6.90 %    Basophils 0.30 0.00 - 1.80 %    Immature Granulocytes 0.70 0.00 - 0.90 %    Lymphs (Absolute) 1.63 1.00 - 4.80 K/uL    Monos (Absolute) 0.69 0.00 - 0.85 K/uL    Eos (Absolute) 0.21 0.00 - 0.51 K/uL    Baso (Absolute) 0.02 0.00 - 0.12 K/uL    Immature Granulocytes (abs) 0.04 0.00 - 0.11 K/uL    Neutrophils (Absolute) 3.50 2.00 - 7.15 K/uL    Anisocytosis 1+     Macrocytosis 1+     Microcytosis 1+    PERIPHERAL SMEAR REVIEW    Collection Time: 12/12/17  8:34 PM   Result Value Ref Range    Peripheral Smear Review see below    MORPHOLOGY    Collection Time: 12/12/17  8:34 PM   Result Value Ref Range    RBC Morphology Present     Poikilocytosis 1+     Stomatocytes 1+    DIFFERENTIAL COMMENT    Collection Time: 12/12/17  8:34 PM   Result Value Ref Range    Comments-Diff see below    TSH (Thyroid Stimulating Hormone)    Collection Time: 12/13/17  4:06 AM   Result Value Ref Range    TSH 4.080 (H) 0.300 - 3.700  uIU/mL   Basic Metabolic Panel (BMP)    Collection Time: 12/13/17  4:06 AM   Result Value Ref Range    Sodium 136 135 - 145 mmol/L    Potassium 4.4 3.6 - 5.5 mmol/L    Chloride 97 96 - 112 mmol/L    Co2 25 20 - 33 mmol/L    Glucose 154 (H) 65 - 99 mg/dL    Bun 67 (H) 8 - 22 mg/dL    Creatinine 6.42 (HH) 0.50 - 1.40 mg/dL    Calcium 7.3 (L) 8.5 - 10.5 mg/dL    Anion Gap 14.0 (H) 0.0 - 11.9   CBC with Differential    Collection Time: 12/13/17  4:06 AM   Result Value Ref Range    WBC 4.7 (L) 4.8 - 10.8 K/uL    RBC 3.71 (L) 4.20 - 5.40 M/uL    Hemoglobin 11.2 (L) 12.0 - 16.0 g/dL    Hematocrit 33.2 (L) 37.0 - 47.0 %    MCV 89.5 81.4 - 97.8 fL    MCH 30.2 27.0 - 33.0 pg    MCHC 33.7 33.6 - 35.0 g/dL    RDW 56.5 (H) 35.9 - 50.0 fL    Platelet Count 107 (L) 164 - 446 K/uL    MPV 13.5 (H) 9.0 - 12.9 fL    Neutrophils-Polys 58.80 44.00 - 72.00 %    Lymphocytes 26.00 22.00 - 41.00 %    Monocytes 10.80 0.00 - 13.40 %    Eosinophils 3.40 0.00 - 6.90 %    Basophils 0.40 0.00 - 1.80 %    Immature Granulocytes 0.60 0.00 - 0.90 %    Nucleated RBC 0.00 /100 WBC    Neutrophils (Absolute) 2.78 2.00 - 7.15 K/uL    Lymphs (Absolute) 1.23 1.00 - 4.80 K/uL    Monos (Absolute) 0.51 0.00 - 0.85 K/uL    Eos (Absolute) 0.16 0.00 - 0.51 K/uL    Baso (Absolute) 0.02 0.00 - 0.12 K/uL    Immature Granulocytes (abs) 0.03 0.00 - 0.11 K/uL    NRBC (Absolute) 0.00 K/uL   ESTIMATED GFR    Collection Time: 12/13/17  4:06 AM   Result Value Ref Range    GFR If  8 (A) >60 mL/min/1.73 m 2    GFR If Non African American 7 (A) >60 mL/min/1.73 m 2   ACCU-CHEK GLUCOSE    Collection Time: 12/13/17  6:28 AM   Result Value Ref Range    Glucose - Accu-Ck 164 (H) 65 - 99 mg/dL       (click the triangle to expand results)    IMAGING:  No orders to display       IMPRESSION:  - ESRD    * Etiology likely 2/2 HTN/DM  - Acute on chronic anemia    * VANCE with HD    * Transfuse prn for Hgb < 7.0    * Etiology likely 2/2 ESRD/MDS  - HTN, controlled   - type 2  DM  - Renal Osteodystrophy  - HLD  - CAD  - COPD     ASSESSMENT:  - GFR: HD dependent  - Urine: oligoanuric  - BP: Goal < 140/90  - Volume: euvolemic  - Acid/Base: no sig. acid base disturbance  - Electrolytes: stable  - Anemia: Goal Hgb 10-11  - MBD: Ca normal, PO4 elevated  - HD Access: LAVF (+thrill/bruit)     PLAN:  - MWF iHD  - UF as tolerated  - Ok to transfuse more pRBCs if needed during iHD so we can pull excess volume off  - VANCE with iHD, epogen 10K units qRx  - Dose all meds per ESRD guidelines    Thank you for the consultation!

## 2017-12-13 NOTE — PROGRESS NOTES
3 1/2hr HD started @ 1037 and completed @ 1413,tolerated 2100ml net UF. VSS post HD,LUAAVF + bruit/thrill,cannulation sites covered with DD,CDI,report given to Corby Kiser RN.

## 2017-12-13 NOTE — PROGRESS NOTES
2 RN skin check completed.   No wounds or pressure ulcers noted.   Bilat feet are dry, flaky, calloused. AV fistula to ALIVIA.   Otherwise skin is intact.

## 2017-12-13 NOTE — CARE PLAN
Problem: Safety  Goal: Will remain free from falls    Intervention: Implement fall precautions  Pt refuses bed alarm despite extensive education regarding risks and need for alarm. Educated pt on use of call light to call for assistance when needed. Fall precautions in place; bed lowest position, treaded socks at bedside, call light within reach.      Problem: Pain Management  Goal: Pain level will decrease to patient's comfort goal    Intervention: Follow pain managment plan developed in collaboration with patient and Interdisciplinary Team  Pt complains of 6/10 pain to both legs and back. Medicated per MAR. Has PRN pain meds available for pain control.

## 2017-12-13 NOTE — PROGRESS NOTES
Hospital Medicine Interval Note  Date of Service:  12/13/2017    Chief Complaint  62 y.o.-year-old female admitted 12/12/2017 with sx anemia with hx of MDS and ESRD on HD.    Interval Problem Update  Pt denies any headache, lightheadedness, dizziness, nausea, or vomiting.     Consultants/Specialty  Nephrology    Disposition  TBD     Review of Systems   Constitutional: Negative for chills, fever and malaise/fatigue.   HENT: Negative.    Eyes: Negative.    Respiratory: Negative.    Cardiovascular: Negative.    Gastrointestinal: Negative.    Genitourinary: Negative.    Musculoskeletal: Negative.    Neurological: Negative for dizziness, focal weakness and headaches.      Physical Exam Laboratory/Imaging   Vitals:    12/13/17 0145 12/13/17 0239 12/13/17 0300 12/13/17 0659   BP: 160/64  134/52 (!) 164/75   Pulse:   74 84   Resp:    16   Temp:    36.9 °C (98.4 °F)   SpO2:  100%  100%   Weight:       Height:         Physical Exam   Constitutional: She is oriented to person, place, and time. She appears well-developed and well-nourished. No distress.   HENT:   Head: Normocephalic and atraumatic.   Right Ear: External ear normal.   Left Ear: External ear normal.   Nose: Nose normal.   Mouth/Throat: No oropharyngeal exudate.   Eyes: Conjunctivae and EOM are normal. Pupils are equal, round, and reactive to light. No scleral icterus.   Neck: Normal range of motion. Neck supple.   Cardiovascular: Exam reveals no gallop and no friction rub.    No murmur heard.  Pulmonary/Chest: Effort normal and breath sounds normal.   Abdominal: Soft. Bowel sounds are normal. She exhibits no distension. There is no tenderness. There is no rebound and no guarding.   Musculoskeletal: Normal range of motion. She exhibits no edema or tenderness.   Neurological: She is alert and oriented to person, place, and time. She has normal reflexes.   Skin: Skin is warm and dry.   Nursing note and vitals reviewed.   Lab Results   Component Value Date/Time     WBC 4.7 (L) 12/13/2017 04:06 AM    HEMOGLOBIN 11.2 (L) 12/13/2017 04:06 AM    HEMATOCRIT 33.2 (L) 12/13/2017 04:06 AM    PLATELETCT 107 (L) 12/13/2017 04:06 AM     Lab Results   Component Value Date/Time    SODIUM 136 12/13/2017 04:06 AM    POTASSIUM 4.4 12/13/2017 04:06 AM    CHLORIDE 97 12/13/2017 04:06 AM    CO2 25 12/13/2017 04:06 AM    GLUCOSE 154 (H) 12/13/2017 04:06 AM    BUN 67 (H) 12/13/2017 04:06 AM    CREATININE 6.42 (HH) 12/13/2017 04:06 AM    CREATININE 0.6 07/20/2007 04:00 AM      Assessment/Plan    * Symptomatic anemia   Assessment & Plan    - hx of recurrent sx anemia requiring blood transfusions  - s/p 2U PRBC transfusions; hgb responded appropriately  - clinical sx's now resolved; no e/o acute bleeding  - Nephro consulted; appreciate further recs        Paroxysmal atrial fibrillation (CMS-HCC)- (present on admission)   Assessment & Plan    - Normal sinus in Er, not on OAC at home  - cont Coreg        MDS (myelodysplastic syndrome) (CMS-HCC)- (present on admission)   Assessment & Plan    Chronic        ESRD (end stage renal disease) (CMS-HCC)- (present on admission)   Assessment & Plan    - MWF  - Nephro consulted; recs and mgmt appreciated        Chronic respiratory failure with hypoxia, 2L- (present on admission)   Assessment & Plan    At baseline, Resp protocol ordered        Legally blind- (present on admission)   Assessment & Plan    chronic        Essential hypertension, malignant- (present on admission)   Assessment & Plan    Cont coreg        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

## 2017-12-13 NOTE — CARE PLAN
Problem: Safety  Goal: Will remain free from injury  Outcome: PROGRESSING AS EXPECTED  Pt refuses bed alarm. Pt steady per night nurse. Does call appropriately. Hourly rounding in place/    Problem: Discharge Barriers/Planning  Goal: Patient's continuum of care needs will be met  Outcome: PROGRESSING AS EXPECTED  Pt to receive dialysis and then to be reevaluated before she will be discharged.

## 2017-12-14 LAB
ALBUMIN SERPL BCP-MCNC: 3.2 G/DL (ref 3.2–4.9)
ALBUMIN/GLOB SERPL: 1 G/DL
ALP SERPL-CCNC: 74 U/L (ref 30–99)
ALT SERPL-CCNC: 28 U/L (ref 2–50)
ANION GAP SERPL CALC-SCNC: 11 MMOL/L (ref 0–11.9)
AST SERPL-CCNC: 18 U/L (ref 12–45)
BASOPHILS # BLD AUTO: 0.4 % (ref 0–1.8)
BASOPHILS # BLD: 0.02 K/UL (ref 0–0.12)
BILIRUB SERPL-MCNC: 0.6 MG/DL (ref 0.1–1.5)
BUN SERPL-MCNC: 32 MG/DL (ref 8–22)
CALCIUM SERPL-MCNC: 7.9 MG/DL (ref 8.5–10.5)
CHLORIDE SERPL-SCNC: 93 MMOL/L (ref 96–112)
CHOLEST SERPL-MCNC: 163 MG/DL (ref 100–199)
CO2 SERPL-SCNC: 27 MMOL/L (ref 20–33)
CREAT SERPL-MCNC: 4.57 MG/DL (ref 0.5–1.4)
EOSINOPHIL # BLD AUTO: 0.22 K/UL (ref 0–0.51)
EOSINOPHIL NFR BLD: 4 % (ref 0–6.9)
ERYTHROCYTE [DISTWIDTH] IN BLOOD BY AUTOMATED COUNT: 56.8 FL (ref 35.9–50)
GFR SERPL CREATININE-BSD FRML MDRD: 10 ML/MIN/1.73 M 2
GLOBULIN SER CALC-MCNC: 3.1 G/DL (ref 1.9–3.5)
GLUCOSE BLD-MCNC: 147 MG/DL (ref 65–99)
GLUCOSE BLD-MCNC: 176 MG/DL (ref 65–99)
GLUCOSE BLD-MCNC: 177 MG/DL (ref 65–99)
GLUCOSE BLD-MCNC: 191 MG/DL (ref 65–99)
GLUCOSE SERPL-MCNC: 190 MG/DL (ref 65–99)
HCT VFR BLD AUTO: 25.4 % (ref 37–47)
HCT VFR BLD AUTO: 27.1 % (ref 37–47)
HDLC SERPL-MCNC: 42 MG/DL
HGB BLD-MCNC: 8.5 G/DL (ref 12–16)
HGB BLD-MCNC: 9.4 G/DL (ref 12–16)
IMM GRANULOCYTES # BLD AUTO: 0.03 K/UL (ref 0–0.11)
IMM GRANULOCYTES NFR BLD AUTO: 0.5 % (ref 0–0.9)
LDLC SERPL CALC-MCNC: 83 MG/DL
LYMPHOCYTES # BLD AUTO: 1.61 K/UL (ref 1–4.8)
LYMPHOCYTES NFR BLD: 29.1 % (ref 22–41)
MCH RBC QN AUTO: 29.9 PG (ref 27–33)
MCHC RBC AUTO-ENTMCNC: 32.9 G/DL (ref 33.6–35)
MCV RBC AUTO: 90.7 FL (ref 81.4–97.8)
MONOCYTES # BLD AUTO: 0.62 K/UL (ref 0–0.85)
MONOCYTES NFR BLD AUTO: 11.2 % (ref 0–13.4)
NEUTROPHILS # BLD AUTO: 3.04 K/UL (ref 2–7.15)
NEUTROPHILS NFR BLD: 54.8 % (ref 44–72)
NRBC # BLD AUTO: 0 K/UL
NRBC BLD AUTO-RTO: 0 /100 WBC
PLATELET # BLD AUTO: 128 K/UL (ref 164–446)
PMV BLD AUTO: 13.2 FL (ref 9–12.9)
POTASSIUM SERPL-SCNC: 4.4 MMOL/L (ref 3.6–5.5)
PROT SERPL-MCNC: 6.3 G/DL (ref 6–8.2)
RBC # BLD AUTO: 2.81 M/UL (ref 4.2–5.4)
SODIUM SERPL-SCNC: 131 MMOL/L (ref 135–145)
TRIGL SERPL-MCNC: 192 MG/DL (ref 0–149)
WBC # BLD AUTO: 5.5 K/UL (ref 4.8–10.8)

## 2017-12-14 PROCEDURE — 85014 HEMATOCRIT: CPT

## 2017-12-14 PROCEDURE — 85018 HEMOGLOBIN: CPT

## 2017-12-14 PROCEDURE — 86644 CMV ANTIBODY: CPT

## 2017-12-14 PROCEDURE — 99233 SBSQ HOSP IP/OBS HIGH 50: CPT | Performed by: INTERNAL MEDICINE

## 2017-12-14 PROCEDURE — 82962 GLUCOSE BLOOD TEST: CPT | Mod: 91

## 2017-12-14 PROCEDURE — 700111 HCHG RX REV CODE 636 W/ 250 OVERRIDE (IP): Performed by: HOSPITALIST

## 2017-12-14 PROCEDURE — 700102 HCHG RX REV CODE 250 W/ 637 OVERRIDE(OP): Performed by: HOSPITALIST

## 2017-12-14 PROCEDURE — 80061 LIPID PANEL: CPT

## 2017-12-14 PROCEDURE — 85025 COMPLETE CBC W/AUTO DIFF WBC: CPT

## 2017-12-14 PROCEDURE — 36430 TRANSFUSION BLD/BLD COMPNT: CPT

## 2017-12-14 PROCEDURE — 86923 COMPATIBILITY TEST ELECTRIC: CPT

## 2017-12-14 PROCEDURE — 80053 COMPREHEN METABOLIC PANEL: CPT

## 2017-12-14 PROCEDURE — P9016 RBC LEUKOCYTES REDUCED: HCPCS

## 2017-12-14 PROCEDURE — A9270 NON-COVERED ITEM OR SERVICE: HCPCS | Performed by: HOSPITALIST

## 2017-12-14 PROCEDURE — 770006 HCHG ROOM/CARE - MED/SURG/GYN SEMI*

## 2017-12-14 PROCEDURE — 36415 COLL VENOUS BLD VENIPUNCTURE: CPT

## 2017-12-14 RX ADMIN — CARVEDILOL 12.5 MG: 12.5 TABLET, FILM COATED ORAL at 09:15

## 2017-12-14 RX ADMIN — CYCLOBENZAPRINE 10 MG: 10 TABLET, FILM COATED ORAL at 09:26

## 2017-12-14 RX ADMIN — SITAGLIPTIN 25 MG: 25 TABLET, FILM COATED ORAL at 09:15

## 2017-12-14 RX ADMIN — CYCLOBENZAPRINE 10 MG: 10 TABLET, FILM COATED ORAL at 00:20

## 2017-12-14 RX ADMIN — PREGABALIN 50 MG: 25 CAPSULE ORAL at 09:15

## 2017-12-14 RX ADMIN — INSULIN HUMAN 1 UNITS: 100 INJECTION, SOLUTION PARENTERAL at 05:37

## 2017-12-14 RX ADMIN — HEPARIN SODIUM 5000 UNITS: 5000 INJECTION, SOLUTION INTRAVENOUS; SUBCUTANEOUS at 05:37

## 2017-12-14 RX ADMIN — INSULIN HUMAN 1 UNITS: 100 INJECTION, SOLUTION PARENTERAL at 21:40

## 2017-12-14 RX ADMIN — PREGABALIN 50 MG: 25 CAPSULE ORAL at 21:37

## 2017-12-14 RX ADMIN — CARVEDILOL 12.5 MG: 12.5 TABLET, FILM COATED ORAL at 18:14

## 2017-12-14 RX ADMIN — HEPARIN SODIUM 5000 UNITS: 5000 INJECTION, SOLUTION INTRAVENOUS; SUBCUTANEOUS at 14:06

## 2017-12-14 RX ADMIN — HEPARIN SODIUM 5000 UNITS: 5000 INJECTION, SOLUTION INTRAVENOUS; SUBCUTANEOUS at 21:37

## 2017-12-14 RX ADMIN — INSULIN HUMAN 1 UNITS: 100 INJECTION, SOLUTION PARENTERAL at 12:05

## 2017-12-14 RX ADMIN — CYCLOBENZAPRINE 10 MG: 10 TABLET, FILM COATED ORAL at 14:35

## 2017-12-14 ASSESSMENT — ENCOUNTER SYMPTOMS
HEADACHES: 0
MUSCULOSKELETAL NEGATIVE: 1
FEVER: 0
CONSTITUTIONAL NEGATIVE: 1
DIZZINESS: 1
CHILLS: 0
PSYCHIATRIC NEGATIVE: 1
TINGLING: 0
WEAKNESS: 1
CARDIOVASCULAR NEGATIVE: 1
GASTROINTESTINAL NEGATIVE: 1
EYES NEGATIVE: 1
FOCAL WEAKNESS: 0
DIZZINESS: 0
RESPIRATORY NEGATIVE: 1

## 2017-12-14 ASSESSMENT — PAIN SCALES - GENERAL
PAINLEVEL_OUTOF10: 8
PAINLEVEL_OUTOF10: 4

## 2017-12-14 NOTE — PROGRESS NOTES
Received report and assumed care at 1900. Patient AAOx 4. Medicated per MAR for shoulder pain, but patient requested only half a dose of ordered tylenol. Patient encouraged to call for assistance out of bed. Bed is locked and in low position, call light within reach, hourly rounding in place.

## 2017-12-14 NOTE — PROGRESS NOTES
Hospital Medicine Interval Note  Date of Service:  12/14/2017    Chief Complaint  62 y.o.-year-old female admitted 12/12/2017 with sx anemia with hx of MDS and ESRD on HD.    Interval Problem Update  Pt c/o generalized weakness and states she only received 1U PRBC ystdy.  Denies any acute bleeding.     Consultants/Specialty  Nephrology    Disposition  TBD     Review of Systems   Constitutional: Negative for chills, fever and malaise/fatigue.   HENT: Negative.    Eyes: Negative.    Respiratory: Negative.    Cardiovascular: Negative.    Gastrointestinal: Negative.    Genitourinary: Negative.    Musculoskeletal: Negative.    Neurological: Positive for weakness. Negative for dizziness, focal weakness and headaches.      Physical Exam Laboratory/Imaging   Vitals:    12/13/17 1927 12/13/17 2144 12/14/17 0324 12/14/17 0800   BP: (!) 88/55 (!) 89/53 133/60 (!) 99/48   Pulse: 86  73 74   Resp: 16  18 15   Temp: 36.3 °C (97.4 °F)  36.4 °C (97.6 °F) 36.6 °C (97.8 °F)   SpO2: 100%  100% 96%   Weight:       Height:         Physical Exam   Constitutional: She is oriented to person, place, and time. She appears well-developed and well-nourished. No distress.   HENT:   Head: Normocephalic and atraumatic.   Right Ear: External ear normal.   Left Ear: External ear normal.   Nose: Nose normal.   Mouth/Throat: No oropharyngeal exudate.   Eyes: Conjunctivae and EOM are normal. Pupils are equal, round, and reactive to light. No scleral icterus.   Neck: Normal range of motion. Neck supple.   Cardiovascular: Exam reveals no gallop and no friction rub.    No murmur heard.  Pulmonary/Chest: Effort normal and breath sounds normal.   Abdominal: Soft. Bowel sounds are normal. She exhibits no distension. There is no tenderness. There is no rebound and no guarding.   Musculoskeletal: Normal range of motion. She exhibits no edema or tenderness.   Neurological: She is alert and oriented to person, place, and time. She has normal reflexes.   Skin:  Skin is warm and dry.   Nursing note and vitals reviewed.   Lab Results   Component Value Date/Time    WBC 5.5 12/14/2017 04:04 AM    HEMOGLOBIN 8.5 (L) 12/14/2017 04:04 AM    HEMATOCRIT 25.4 (L) 12/14/2017 04:04 AM    PLATELETCT 128 (L) 12/14/2017 04:04 AM     Lab Results   Component Value Date/Time    SODIUM 131 (L) 12/14/2017 04:04 AM    POTASSIUM 4.4 12/14/2017 04:04 AM    CHLORIDE 93 (L) 12/14/2017 04:04 AM    CO2 27 12/14/2017 04:04 AM    GLUCOSE 190 (H) 12/14/2017 04:04 AM    BUN 32 (H) 12/14/2017 04:04 AM    CREATININE 4.57 (H) 12/14/2017 04:04 AM    CREATININE 0.6 07/20/2007 04:00 AM      Assessment/Plan    * Symptomatic anemia   Assessment & Plan    - hx of recurrent sx anemia requiring blood transfusions  - s/p 1U PRBC transfusion; pending additional unit transfusion  - transfuse if Hgb < 7  - no e/o acute bleeding  - Nephro recs appreciated        Paroxysmal atrial fibrillation (CMS-HCC)- (present on admission)   Assessment & Plan    - Normal sinus in Er, not on OAC at home  - cont Coreg        MDS (myelodysplastic syndrome) (CMS-HCC)- (present on admission)   Assessment & Plan    Chronic        ESRD (end stage renal disease) (CMS-HCC)- (present on admission)   Assessment & Plan    - MWF  - Nephro recs and mgmt appreciated        Chronic respiratory failure with hypoxia, 2L- (present on admission)   Assessment & Plan    At baseline, Resp protocol ordered        Legally blind- (present on admission)   Assessment & Plan    chronic        Essential hypertension, malignant- (present on admission)   Assessment & Plan    Cont coreg        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

## 2017-12-14 NOTE — CARE PLAN
Problem: Safety  Goal: Will remain free from falls  Patient educated regarding fall risk and continues to refuse bed alarm, call light within reach, bed locked and in low position, hourly rounding in place.     Problem: Pain Management  Goal: Pain level will decrease to patient's comfort goal  Pain assessments done before and after medications, alternative to pain medications offered like heat packs.

## 2017-12-14 NOTE — PROGRESS NOTES
[facility] -  PROGRESS NOTE               Author: Chao Perez M.D. Date & Time: 12/14/2017  11:45 AM     HPI:  61 yo female PMH ESRD MWF iHD, HTN, type 2 DM, anemia of chronic disease, renal osteodystrophy, HLD, and MDS who presents with CC as above.  She states that she was at her usual state of health an doing well until past few days where she's had a progressively worsening fatigue and SOB.  She is well known to us from outpatient dialysis as well as multiple admissions for similar episodes in past.  She states her weakness was severe enough where it limited her ability to even walk to bathroom.  She also admits to light headedness, dizziness, and HA.  She denies F/C/N/V/CP.  No abd pain, no melena, no hematochezia.  Labs in ED showed a drop in her Hgb again and she was admitted for further treatment and management.  She received 1 unit pRBC urgently overnight and was feeling much better this AM.  She was asking if she would get her dialysis today in the hospital.  No change to appetite or weight recently.      DAILY NEPHROLOGY SUMMARY:  12/13/17 - Consult done  12/14/17 - NAEO, no complaints, doing ok, feels dizzy/light-headed today; Hgb dropped to 8.5 from 11 yesterday      Review of Systems   Constitutional: Negative.    HENT: Negative.    Eyes: Negative.    Respiratory: Negative.    Cardiovascular: Negative.    Gastrointestinal: Negative.    Skin: Negative.    Neurological: Positive for dizziness. Negative for tingling and headaches.   Endo/Heme/Allergies: Negative.    Psychiatric/Behavioral: Negative.    All other systems reviewed and are negative.      Physical Exam   Constitutional: She is oriented to person, place, and time. She appears well-developed and well-nourished.   HENT:   Head: Normocephalic and atraumatic.   Eyes: Conjunctivae are normal. No scleral icterus.   Neck: Neck supple. No tracheal deviation present.   Cardiovascular: Normal rate and regular rhythm.     Pulmonary/Chest: Effort normal and breath sounds normal.   Abdominal: Soft. Bowel sounds are normal.   Musculoskeletal: She exhibits no edema or tenderness.   Neurological: She is alert and oriented to person, place, and time.   Skin: Skin is warm and dry.   Psychiatric: She has a normal mood and affect. Her behavior is normal.   Nursing note and vitals reviewed.      PAST FAMILY HISTORY: Reviewed and Unchanged  SOCIAL HISTORY: Reviewed and Unchanged  CURRENT MEDICATIONS: Reviewed  LABORATORY RESULTS: Reviewed  IMAGING STUDIES: Reviewed      Fluids:  In: 820 [P.O.:120; Dialysis:700]  Out: 2800       IMPRESSION:  - ESRD    * Etiology likely 2/2 HTN/DM  - Acute on chronic anemia    * VANCE with HD    * Transfuse prn for Hgb < 7.0    * Etiology likely 2/2 ESRD/MDS  - HTN, controlled   - type 2 DM  - Renal Osteodystrophy  - HLD  - CAD  - COPD     ASSESSMENT:  - GFR: HD dependent  - Urine: oligoanuric  - BP: Goal < 140/90  - Volume: euvolemic  - Acid/Base: no sig. acid base disturbance  - Electrolytes: stable  - Anemia: Goal Hgb 10-11  - MBD: Ca normal, PO4 elevated  - HD Access: LAVF (+thrill/bruit)     PLAN:  - MWF iHD  - UF as tolerated  - Discussed her current symptoms of dizziness likely more hemodynamic mediated than anemia  - H&H monitoring to rule out acute bleed  - Transfuse as needed to maintain Hgb > 7  - VANCE with iHD, epogen 10K units qRx  - Dose all meds per ESRD guidelines

## 2017-12-15 LAB
ANION GAP SERPL CALC-SCNC: 13 MMOL/L (ref 0–11.9)
BASOPHILS # BLD AUTO: 0.4 % (ref 0–1.8)
BASOPHILS # BLD: 0.02 K/UL (ref 0–0.12)
BUN SERPL-MCNC: 51 MG/DL (ref 8–22)
CALCIUM SERPL-MCNC: 7.3 MG/DL (ref 8.5–10.5)
CHLORIDE SERPL-SCNC: 93 MMOL/L (ref 96–112)
CO2 SERPL-SCNC: 23 MMOL/L (ref 20–33)
CREAT SERPL-MCNC: 6.13 MG/DL (ref 0.5–1.4)
EOSINOPHIL # BLD AUTO: 0.22 K/UL (ref 0–0.51)
EOSINOPHIL NFR BLD: 4.4 % (ref 0–6.9)
ERYTHROCYTE [DISTWIDTH] IN BLOOD BY AUTOMATED COUNT: 49.5 FL (ref 35.9–50)
GFR SERPL CREATININE-BSD FRML MDRD: 7 ML/MIN/1.73 M 2
GLUCOSE BLD-MCNC: 145 MG/DL (ref 65–99)
GLUCOSE BLD-MCNC: 201 MG/DL (ref 65–99)
GLUCOSE BLD-MCNC: 226 MG/DL (ref 65–99)
GLUCOSE SERPL-MCNC: 125 MG/DL (ref 65–99)
HCT VFR BLD AUTO: 26.6 % (ref 37–47)
HCT VFR BLD AUTO: 27.4 % (ref 37–47)
HCT VFR BLD AUTO: 28.6 % (ref 37–47)
HGB BLD-MCNC: 9.1 G/DL (ref 12–16)
HGB BLD-MCNC: 9.3 G/DL (ref 12–16)
HGB BLD-MCNC: 9.8 G/DL (ref 12–16)
IMM GRANULOCYTES # BLD AUTO: 0.03 K/UL (ref 0–0.11)
IMM GRANULOCYTES NFR BLD AUTO: 0.6 % (ref 0–0.9)
LYMPHOCYTES # BLD AUTO: 1.61 K/UL (ref 1–4.8)
LYMPHOCYTES NFR BLD: 32.1 % (ref 22–41)
MCH RBC QN AUTO: 30.7 PG (ref 27–33)
MCHC RBC AUTO-ENTMCNC: 33.9 G/DL (ref 33.6–35)
MCV RBC AUTO: 90.4 FL (ref 81.4–97.8)
MONOCYTES # BLD AUTO: 0.64 K/UL (ref 0–0.85)
MONOCYTES NFR BLD AUTO: 12.8 % (ref 0–13.4)
NEUTROPHILS # BLD AUTO: 2.49 K/UL (ref 2–7.15)
NEUTROPHILS NFR BLD: 49.7 % (ref 44–72)
NRBC # BLD AUTO: 0 K/UL
NRBC BLD AUTO-RTO: 0 /100 WBC
PLATELET # BLD AUTO: 117 K/UL (ref 164–446)
PMV BLD AUTO: 13.8 FL (ref 9–12.9)
POTASSIUM SERPL-SCNC: 4.9 MMOL/L (ref 3.6–5.5)
RBC # BLD AUTO: 3.03 M/UL (ref 4.2–5.4)
SODIUM SERPL-SCNC: 129 MMOL/L (ref 135–145)
WBC # BLD AUTO: 5 K/UL (ref 4.8–10.8)

## 2017-12-15 PROCEDURE — 770006 HCHG ROOM/CARE - MED/SURG/GYN SEMI*

## 2017-12-15 PROCEDURE — 36415 COLL VENOUS BLD VENIPUNCTURE: CPT

## 2017-12-15 PROCEDURE — 700111 HCHG RX REV CODE 636 W/ 250 OVERRIDE (IP): Performed by: HOSPITALIST

## 2017-12-15 PROCEDURE — 700111 HCHG RX REV CODE 636 W/ 250 OVERRIDE (IP)

## 2017-12-15 PROCEDURE — 85018 HEMOGLOBIN: CPT

## 2017-12-15 PROCEDURE — 80048 BASIC METABOLIC PNL TOTAL CA: CPT

## 2017-12-15 PROCEDURE — 82962 GLUCOSE BLOOD TEST: CPT | Mod: 91

## 2017-12-15 PROCEDURE — 90935 HEMODIALYSIS ONE EVALUATION: CPT

## 2017-12-15 PROCEDURE — 85014 HEMATOCRIT: CPT | Mod: 91

## 2017-12-15 PROCEDURE — 700102 HCHG RX REV CODE 250 W/ 637 OVERRIDE(OP): Performed by: HOSPITALIST

## 2017-12-15 PROCEDURE — 85025 COMPLETE CBC W/AUTO DIFF WBC: CPT

## 2017-12-15 PROCEDURE — A9270 NON-COVERED ITEM OR SERVICE: HCPCS | Performed by: HOSPITALIST

## 2017-12-15 PROCEDURE — 99233 SBSQ HOSP IP/OBS HIGH 50: CPT | Performed by: INTERNAL MEDICINE

## 2017-12-15 RX ORDER — HEPARIN SODIUM 1000 [USP'U]/ML
INJECTION, SOLUTION INTRAVENOUS; SUBCUTANEOUS
Status: COMPLETED
Start: 2017-12-15 | End: 2017-12-15

## 2017-12-15 RX ADMIN — STANDARDIZED SENNA CONCENTRATE AND DOCUSATE SODIUM 2 TABLET: 8.6; 5 TABLET, FILM COATED ORAL at 20:27

## 2017-12-15 RX ADMIN — INSULIN HUMAN 2 UNITS: 100 INJECTION, SOLUTION PARENTERAL at 12:31

## 2017-12-15 RX ADMIN — CARVEDILOL 12.5 MG: 12.5 TABLET, FILM COATED ORAL at 18:09

## 2017-12-15 RX ADMIN — INSULIN HUMAN 2 UNITS: 100 INJECTION, SOLUTION PARENTERAL at 20:27

## 2017-12-15 RX ADMIN — CYCLOBENZAPRINE 10 MG: 10 TABLET, FILM COATED ORAL at 05:43

## 2017-12-15 RX ADMIN — INSULIN HUMAN 1 UNITS: 100 INJECTION, SOLUTION PARENTERAL at 18:08

## 2017-12-15 RX ADMIN — HEPARIN SODIUM 5000 UNITS: 5000 INJECTION, SOLUTION INTRAVENOUS; SUBCUTANEOUS at 15:01

## 2017-12-15 RX ADMIN — PREGABALIN 50 MG: 25 CAPSULE ORAL at 11:38

## 2017-12-15 RX ADMIN — HYDROCODONE BITARTRATE AND ACETAMINOPHEN 1 TABLET: 5; 325 TABLET ORAL at 11:38

## 2017-12-15 RX ADMIN — HEPARIN SODIUM 1700 UNITS: 1000 INJECTION, SOLUTION INTRAVENOUS; SUBCUTANEOUS at 07:30

## 2017-12-15 RX ADMIN — HEPARIN SODIUM 5000 UNITS: 5000 INJECTION, SOLUTION INTRAVENOUS; SUBCUTANEOUS at 05:36

## 2017-12-15 RX ADMIN — CYCLOBENZAPRINE 10 MG: 10 TABLET, FILM COATED ORAL at 11:38

## 2017-12-15 RX ADMIN — ERYTHROPOIETIN 10000 UNITS: 10000 INJECTION, SOLUTION INTRAVENOUS; SUBCUTANEOUS at 09:09

## 2017-12-15 RX ADMIN — PREGABALIN 50 MG: 25 CAPSULE ORAL at 20:28

## 2017-12-15 RX ADMIN — HEPARIN SODIUM 5000 UNITS: 5000 INJECTION, SOLUTION INTRAVENOUS; SUBCUTANEOUS at 20:30

## 2017-12-15 ASSESSMENT — ENCOUNTER SYMPTOMS
FOCAL WEAKNESS: 0
EYES NEGATIVE: 1
RESPIRATORY NEGATIVE: 1
PSYCHIATRIC NEGATIVE: 1
GASTROINTESTINAL NEGATIVE: 1
HEADACHES: 0
CARDIOVASCULAR NEGATIVE: 1
FEVER: 0
CHILLS: 0
WEAKNESS: 1
CONSTITUTIONAL NEGATIVE: 1
TINGLING: 0
DIZZINESS: 0
DIZZINESS: 1
MUSCULOSKELETAL NEGATIVE: 1

## 2017-12-15 ASSESSMENT — PAIN SCALES - GENERAL: PAINLEVEL_OUTOF10: 1

## 2017-12-15 NOTE — CARE PLAN
Problem: Safety  Goal: Will remain free from injury  Educate on bed alarm and the need for it, encourage patient to use bed alarm before attempting to get out of bed. Fall identifiers in place, bed locked and in low position, treaded socks on patient.     Problem: Pain Management  Goal: Pain level will decrease to patient's comfort goal  Assess pain, establish a baseline for comfort, medicate as needed. Offer non pharmacological interventions.

## 2017-12-15 NOTE — PROGRESS NOTES
Patient educated regarding fall risk but continues to refuse bed alarm, patient states she will call before getting out of bed.

## 2017-12-15 NOTE — PROGRESS NOTES
Hospital Medicine Interval Note  Date of Service:  12/15/2017    Chief Complaint  62 y.o.-year-old female admitted 12/12/2017 with sx anemia with hx of MDS and ESRD on HD.    Interval Problem Update  Pt c/o generalized weakness after HD session this morning.  Denied any lightheadedness or dizziness.     Consultants/Specialty  Nephrology    Disposition  TBD     Review of Systems   Constitutional: Negative for chills, fever and malaise/fatigue.   HENT: Negative.    Eyes: Negative.    Respiratory: Negative.    Cardiovascular: Negative.    Gastrointestinal: Negative.    Genitourinary: Negative.    Musculoskeletal: Negative.    Neurological: Positive for weakness. Negative for dizziness, focal weakness and headaches.      Physical Exam Laboratory/Imaging   Vitals:    12/14/17 1400 12/14/17 1600 12/14/17 1900 12/15/17 0328   BP: (!) 98/59 110/85 148/69 126/69   Pulse: 74 66 74 65   Resp: 16 16 16 16   Temp: 36.1 °C (97 °F) 37.1 °C (98.8 °F) 36.6 °C (97.8 °F) 36.7 °C (98 °F)   SpO2: 92% 96% 100% 100%   Weight:       Height:         Physical Exam   Constitutional: She is oriented to person, place, and time. She appears well-developed and well-nourished. No distress.   HENT:   Head: Normocephalic and atraumatic.   Right Ear: External ear normal.   Left Ear: External ear normal.   Nose: Nose normal.   Mouth/Throat: No oropharyngeal exudate.   Eyes: Conjunctivae and EOM are normal. Pupils are equal, round, and reactive to light. No scleral icterus.   Neck: Normal range of motion. Neck supple.   Cardiovascular: Exam reveals no gallop and no friction rub.    No murmur heard.  Pulmonary/Chest: Effort normal and breath sounds normal.   Abdominal: Soft. Bowel sounds are normal. She exhibits no distension. There is no tenderness. There is no rebound and no guarding.   Musculoskeletal: Normal range of motion. She exhibits no edema or tenderness.   Neurological: She is alert and oriented to person, place, and time. She has normal  reflexes.   Skin: Skin is warm and dry.   Nursing note and vitals reviewed.   Lab Results   Component Value Date/Time    WBC 5.0 12/15/2017 02:59 AM    HEMOGLOBIN 9.3 (L) 12/15/2017 02:59 AM    HEMATOCRIT 27.4 (L) 12/15/2017 02:59 AM    PLATELETCT 117 (L) 12/15/2017 02:59 AM     Lab Results   Component Value Date/Time    SODIUM 129 (L) 12/15/2017 02:59 AM    POTASSIUM 4.9 12/15/2017 02:59 AM    CHLORIDE 93 (L) 12/15/2017 02:59 AM    CO2 23 12/15/2017 02:59 AM    GLUCOSE 125 (H) 12/15/2017 02:59 AM    BUN 51 (H) 12/15/2017 02:59 AM    CREATININE 6.13 (HH) 12/15/2017 02:59 AM    CREATININE 0.6 07/20/2007 04:00 AM      Assessment/Plan    * Symptomatic anemia   Assessment & Plan    - hx of recurrent sx anemia requiring blood transfusions  - s/p 1U PRBC transfusion; pending additional unit transfusion  - transfuse if Hgb < 7  - no e/o acute bleeding  - Nephro recs appreciated        Paroxysmal atrial fibrillation (CMS-HCC)- (present on admission)   Assessment & Plan    - Normal sinus in Er, not on OAC at home  - cont Coreg        MDS (myelodysplastic syndrome) (CMS-HCC)- (present on admission)   Assessment & Plan    Chronic        ESRD (end stage renal disease) (CMS-HCC)- (present on admission)   Assessment & Plan    - MWF  - Nephro recs and mgmt appreciated        Chronic respiratory failure with hypoxia, 2L- (present on admission)   Assessment & Plan    At baseline, Resp protocol ordered        Legally blind- (present on admission)   Assessment & Plan    chronic        Essential hypertension, malignant- (present on admission)   Assessment & Plan    Cont coreg        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

## 2017-12-15 NOTE — CARE PLAN
Problem: Safety  Goal: Will remain free from falls  Outcome: PROGRESSING AS EXPECTED  Hourly rounding in place. Educated pt to use call light if she needs to get up. Pt verbalizes understanding.     Problem: Pain Management  Goal: Pain level will decrease to patient's comfort goal  Outcome: PROGRESSING AS EXPECTED  Pt having cramps. Discussed use of muscle relaxer with pt or use of pain meds for body aches.

## 2017-12-15 NOTE — PROGRESS NOTES
"Received report and assumed care at 1900. Patient is AAO x 4. States she has some pain but, \"I'll just take my lyrica that will help\". Patient requesting to shower later and have told her we will help her with that. Coverage given for her glucose reading. Refused stool softener as she states she has had one already. Denies any other needs at this time. Refusing bed alarm but bed is locked and in low position, call light is within reach and patient stating she will call, treaded socks on patient, hourly rounding in place.   "

## 2017-12-15 NOTE — PROGRESS NOTES
[facility] -  PROGRESS NOTE               Author: Chao Perez M.D. Date & Time: 12/15/2017  11:17 AM     HPI:  63 yo female PMH ESRD MWF iHD, HTN, type 2 DM, anemia of chronic disease, renal osteodystrophy, HLD, and MDS who presents with CC as above.  She states that she was at her usual state of health an doing well until past few days where she's had a progressively worsening fatigue and SOB.  She is well known to us from outpatient dialysis as well as multiple admissions for similar episodes in past.  She states her weakness was severe enough where it limited her ability to even walk to bathroom.  She also admits to light headedness, dizziness, and HA.  She denies F/C/N/V/CP.  No abd pain, no melena, no hematochezia.  Labs in ED showed a drop in her Hgb again and she was admitted for further treatment and management.  She received 1 unit pRBC urgently overnight and was feeling much better this AM.  She was asking if she would get her dialysis today in the hospital.  No change to appetite or weight recently.      DAILY NEPHROLOGY SUMMARY:  12/13/17 - Consult done  12/14/17 - EMMANUEL, no complaints, doing ok, feels dizzy/light-headed today; Hgb dropped to 8.5 from 11 yesterday  12/15/17 - EMMANUEL, SOD, Qb300; c/o severe pain in AVF arm with 50 min left and had to terminate early because she was about to pull her needles out      Review of Systems   Constitutional: Negative.    HENT: Negative.    Eyes: Negative.    Respiratory: Negative.    Cardiovascular: Negative.    Gastrointestinal: Negative.    Skin: Negative.    Neurological: Positive for dizziness. Negative for tingling and headaches.   Endo/Heme/Allergies: Negative.    Psychiatric/Behavioral: Negative.    All other systems reviewed and are negative.      Physical Exam   Constitutional: She is oriented to person, place, and time. She appears well-developed and well-nourished.   HENT:   Head: Normocephalic and atraumatic.   Eyes:  Conjunctivae are normal. No scleral icterus.   Neck: Neck supple. No tracheal deviation present.   Cardiovascular: Normal rate and regular rhythm.    Pulmonary/Chest: Effort normal and breath sounds normal.   Abdominal: Soft. Bowel sounds are normal.   Musculoskeletal: She exhibits no edema or tenderness.   Neurological: She is alert and oriented to person, place, and time.   Skin: Skin is warm and dry.   Psychiatric: She has a normal mood and affect. Her behavior is normal.   Nursing note and vitals reviewed.      PAST FAMILY HISTORY: Reviewed and Unchanged  SOCIAL HISTORY: Reviewed and Unchanged  CURRENT MEDICATIONS: Reviewed  LABORATORY RESULTS: Reviewed  IMAGING STUDIES: Reviewed      Fluids:  No intake/output data recorded.      IMPRESSION:  - ESRD    * Etiology likely 2/2 HTN/DM  - Acute on chronic anemia    * VANCE with HD    * Transfuse prn for Hgb < 7.0    * Etiology likely 2/2 ESRD/MDS  - HTN, controlled   - type 2 DM  - Renal Osteodystrophy  - HLD  - CAD  - COPD     ASSESSMENT:  - GFR: HD dependent  - Urine: oligoanuric  - BP: Goal < 140/90  - Volume: euvolemic  - Acid/Base: no sig. acid base disturbance  - Electrolytes: stable  - Anemia: Goal Hgb 10-11  - MBD: Ca normal, PO4 elevated  - HD Access: LAVF (+thrill/bruit)     PLAN:  - MWF iHD  - UF as tolerated  - Monitor for additional iHD in AM due to incomplete session today  - Transfuse as needed to maintain Hgb > 7  - VANCE with iHD, epogen 10K units qRx  - Dose all meds per ESRD guidelines

## 2017-12-16 VITALS
HEIGHT: 58 IN | OXYGEN SATURATION: 99 % | RESPIRATION RATE: 16 BRPM | BODY MASS INDEX: 29.8 KG/M2 | DIASTOLIC BLOOD PRESSURE: 60 MMHG | HEART RATE: 67 BPM | WEIGHT: 141.98 LBS | TEMPERATURE: 97.2 F | SYSTOLIC BLOOD PRESSURE: 110 MMHG

## 2017-12-16 PROBLEM — D64.9 SYMPTOMATIC ANEMIA: Status: RESOLVED | Noted: 2017-12-12 | Resolved: 2017-12-16

## 2017-12-16 LAB
BASOPHILS # BLD AUTO: 0.2 % (ref 0–1.8)
BASOPHILS # BLD: 0.01 K/UL (ref 0–0.12)
EOSINOPHIL # BLD AUTO: 0.15 K/UL (ref 0–0.51)
EOSINOPHIL NFR BLD: 3.5 % (ref 0–6.9)
ERYTHROCYTE [DISTWIDTH] IN BLOOD BY AUTOMATED COUNT: 49.1 FL (ref 35.9–50)
GLUCOSE BLD-MCNC: 127 MG/DL (ref 65–99)
GLUCOSE BLD-MCNC: 176 MG/DL (ref 65–99)
GLUCOSE BLD-MCNC: 287 MG/DL (ref 65–99)
HCT VFR BLD AUTO: 26 % (ref 37–47)
HCT VFR BLD AUTO: 27.1 % (ref 37–47)
HGB BLD-MCNC: 8.8 G/DL (ref 12–16)
HGB BLD-MCNC: 9.2 G/DL (ref 12–16)
IMM GRANULOCYTES # BLD AUTO: 0.04 K/UL (ref 0–0.11)
IMM GRANULOCYTES NFR BLD AUTO: 0.9 % (ref 0–0.9)
LYMPHOCYTES # BLD AUTO: 1.28 K/UL (ref 1–4.8)
LYMPHOCYTES NFR BLD: 30.2 % (ref 22–41)
MCH RBC QN AUTO: 30.7 PG (ref 27–33)
MCHC RBC AUTO-ENTMCNC: 33.8 G/DL (ref 33.6–35)
MCV RBC AUTO: 90.6 FL (ref 81.4–97.8)
MONOCYTES # BLD AUTO: 0.56 K/UL (ref 0–0.85)
MONOCYTES NFR BLD AUTO: 13.2 % (ref 0–13.4)
NEUTROPHILS # BLD AUTO: 2.2 K/UL (ref 2–7.15)
NEUTROPHILS NFR BLD: 52 % (ref 44–72)
NRBC # BLD AUTO: 0 K/UL
NRBC BLD AUTO-RTO: 0 /100 WBC
PLATELET # BLD AUTO: 117 K/UL (ref 164–446)
PMV BLD AUTO: 13.9 FL (ref 9–12.9)
RBC # BLD AUTO: 2.87 M/UL (ref 4.2–5.4)
WBC # BLD AUTO: 4.2 K/UL (ref 4.8–10.8)

## 2017-12-16 PROCEDURE — 85018 HEMOGLOBIN: CPT

## 2017-12-16 PROCEDURE — A9270 NON-COVERED ITEM OR SERVICE: HCPCS | Performed by: HOSPITALIST

## 2017-12-16 PROCEDURE — 99239 HOSP IP/OBS DSCHRG MGMT >30: CPT | Performed by: INTERNAL MEDICINE

## 2017-12-16 PROCEDURE — 36415 COLL VENOUS BLD VENIPUNCTURE: CPT

## 2017-12-16 PROCEDURE — 82962 GLUCOSE BLOOD TEST: CPT | Mod: 91

## 2017-12-16 PROCEDURE — 85014 HEMATOCRIT: CPT

## 2017-12-16 PROCEDURE — 85025 COMPLETE CBC W/AUTO DIFF WBC: CPT

## 2017-12-16 PROCEDURE — 700111 HCHG RX REV CODE 636 W/ 250 OVERRIDE (IP): Performed by: HOSPITALIST

## 2017-12-16 PROCEDURE — 700102 HCHG RX REV CODE 250 W/ 637 OVERRIDE(OP): Performed by: HOSPITALIST

## 2017-12-16 RX ADMIN — INSULIN HUMAN 3 UNITS: 100 INJECTION, SOLUTION PARENTERAL at 13:46

## 2017-12-16 RX ADMIN — CARVEDILOL 12.5 MG: 12.5 TABLET, FILM COATED ORAL at 09:29

## 2017-12-16 RX ADMIN — PREGABALIN 50 MG: 25 CAPSULE ORAL at 09:28

## 2017-12-16 RX ADMIN — HEPARIN SODIUM 5000 UNITS: 5000 INJECTION, SOLUTION INTRAVENOUS; SUBCUTANEOUS at 05:56

## 2017-12-16 RX ADMIN — SITAGLIPTIN 25 MG: 25 TABLET, FILM COATED ORAL at 11:02

## 2017-12-16 ASSESSMENT — ENCOUNTER SYMPTOMS
PSYCHIATRIC NEGATIVE: 1
DIZZINESS: 1
CONSTITUTIONAL NEGATIVE: 1
TINGLING: 0
RESPIRATORY NEGATIVE: 1
HEADACHES: 0
EYES NEGATIVE: 1
CARDIOVASCULAR NEGATIVE: 1
GASTROINTESTINAL NEGATIVE: 1

## 2017-12-16 ASSESSMENT — PATIENT HEALTH QUESTIONNAIRE - PHQ9
1. LITTLE INTEREST OR PLEASURE IN DOING THINGS: NOT AT ALL
2. FEELING DOWN, DEPRESSED, IRRITABLE, OR HOPELESS: NOT AT ALL
SUM OF ALL RESPONSES TO PHQ9 QUESTIONS 1 AND 2: 0
SUM OF ALL RESPONSES TO PHQ QUESTIONS 1-9: 0

## 2017-12-16 NOTE — CARE PLAN
Problem: Safety  Goal: Will remain free from injury  Outcome: PROGRESSING AS EXPECTED  Pt refuses bed alarm but calls appropriately when getting up. Hourly rounding in place.    Problem: Pain Management  Goal: Pain level will decrease to patient's comfort goal  Outcome: PROGRESSING AS EXPECTED  Pain regimen explained to patient. Pt verbalizes understanding. Pt complaining of pain in left arm from being hooked up in dialysis.

## 2017-12-16 NOTE — CARE PLAN
Problem: Safety  Goal: Will remain free from falls  Outcome: PROGRESSING AS EXPECTED    Intervention: Assess risk factors for falls  Patient is alert and oriented, able to use call light for assistance.       Problem: Venous Thromboembolism (VTW)/Deep Vein Thrombosis (DVT) Prevention:  Goal: Patient will participate in Venous Thrombosis (VTE)/Deep Vein Thrombosis (DVT)Prevention Measures  Outcome: PROGRESSING AS EXPECTED    Intervention: Assess and monitor for anticoagulation complications  Nurse assessed and monitored for anticoagulation complications every shift.   Intervention: Ensure patient wears graduated elastic stockings (MEG hose) and/or SCDs, if ordered, when in bed or chair (Remove at least once per shift for skin check)  NA  Intervention: Encourage patient to perform ankle flex, foot rotation, and knee flex exercises in addition to other prophylatic measures every hour while awake  Patient is able to perform exercise in bed.   Intervention: Encourage ambulation/mobilization at level directed by Physical Therapy in collaboration with Interdisciplinary Team  NA

## 2017-12-16 NOTE — PROGRESS NOTES
Assumed pt care at 1850. Received report from day RN. Assessment completed. Pt A&Ox4. Pain 1/10. Bed in lowest position, locked, and pt refused bed alarm. Pt calls appropriately. Treaded socks in place, call light within reach and staff numbers provided. Pt needs met at this time.

## 2017-12-16 NOTE — DISCHARGE INSTRUCTIONS
Discharge Instructions    Discharged to home by car with relative. Discharged via wheelchair, hospital escort: Yes.  Special equipment needed: Not Applicable and Oxygen    Be sure to schedule a follow-up appointment with your primary care doctor or any specialists as instructed.     Discharge Plan:   Influenza Vaccine Indication: Not indicated: Previously immunized this influenza season and > 8 years of age    I understand that a diet low in cholesterol, fat, and sodium is recommended for good health. Unless I have been given specific instructions below for another diet, I accept this instruction as my diet prescription.   Other diet: Diabetic, Renal     Special Instructions: None    · Is patient discharged on Warfarin / Coumadin?   No     · Is patient Post Blood Transfusion?  No      Fall Prevention in the Home   Falls can cause injuries and can affect people from all age groups. There are many simple things that you can do to make your home safe and to help prevent falls.  WHAT CAN I DO ON THE OUTSIDE OF MY HOME?  · Regularly repair the edges of walkways and driveways and fix any cracks.  · Remove high doorway thresholds.  · Trim any shrubbery on the main path into your home.  · Use bright outdoor lighting.  · Clear walkways of debris and clutter, including tools and rocks.  · Regularly check that handrails are securely fastened and in good repair. Both sides of any steps should have handrails.  · Install guardrails along the edges of any raised decks or porches.  · Have leaves, snow, and ice cleared regularly.  · Use sand or salt on walkways during winter months.  · In the garage, clean up any spills right away, including grease or oil spills.  WHAT CAN I DO IN THE BATHROOM?  · Use night lights.  · Install grab bars by the toilet and in the tub and shower. Do not use towel bars as grab bars.  · Use non-skid mats or decals on the floor of the tub or shower.  · If you need to sit down while you are in the shower,  use a plastic, non-slip stool..  · Keep the floor dry. Immediately clean up any water that spills on the floor.  · Remove soap buildup in the tub or shower on a regular basis.  · Attach bath mats securely with double-sided non-slip rug tape.  · Remove throw rugs and other tripping hazards from the floor.  WHAT CAN I DO IN THE BEDROOM?  · Use night lights.  · Make sure that a bedside light is easy to reach.  · Do not use oversized bedding that drapes onto the floor.  · Have a firm chair that has side arms to use for getting dressed.  · Remove throw rugs and other tripping hazards from the floor.  WHAT CAN I DO IN THE KITCHEN?   · Clean up any spills right away.  · Avoid walking on wet floors.  · Place frequently used items in easy-to-reach places.  · If you need to reach for something above you, use a sturdy step stool that has a grab bar.  · Keep electrical cables out of the way.  · Do not use floor polish or wax that makes floors slippery. If you have to use wax, make sure that it is non-skid floor wax.  · Remove throw rugs and other tripping hazards from the floor.  WHAT CAN I DO IN THE STAIRWAYS?  · Do not leave any items on the stairs.  · Make sure that there are handrails on both sides of the stairs. Fix handrails that are broken or loose. Make sure that handrails are as long as the stairways.  · Check any carpeting to make sure that it is firmly attached to the stairs. Fix any carpet that is loose or worn.  · Avoid having throw rugs at the top or bottom of stairways, or secure the rugs with carpet tape to prevent them from moving.  · Make sure that you have a light switch at the top of the stairs and the bottom of the stairs. If you do not have them, have them installed.  WHAT ARE SOME OTHER FALL PREVENTION TIPS?  · Wear closed-toe shoes that fit well and support your feet. Wear shoes that have rubber soles or low heels.  · When you use a stepladder, make sure that it is completely opened and that the sides  are firmly locked. Have someone hold the ladder while you are using it. Do not climb a closed stepladder.  · Add color or contrast paint or tape to grab bars and handrails in your home. Place contrasting color strips on the first and last steps.  · Use mobility aids as needed, such as canes, walkers, scooters, and crutches.  · Turn on lights if it is dark. Replace any light bulbs that burn out.  · Set up furniture so that there are clear paths. Keep the furniture in the same spot.  · Fix any uneven floor surfaces.  · Choose a carpet design that does not hide the edge of steps of a stairway.  · Be aware of any and all pets.  · Review your medicines with your healthcare provider. Some medicines can cause dizziness or changes in blood pressure, which increase your risk of falling.  Talk with your health care provider about other ways that you can decrease your risk of falls. This may include working with a physical therapist or  to improve your strength, balance, and endurance.     This information is not intended to replace advice given to you by your health care provider. Make sure you discuss any questions you have with your health care provider.     Document Released: 12/08/2003 Document Revised: 05/03/2016 Document Reviewed: 01/22/2016  [employer] Interactive Patient Education ©2016 [employer]      Depression / Suicide Risk    As you are discharged from this [facility] facility, it is important to learn how to keep safe from harming yourself.    Recognize the warning signs:  · Abrupt changes in personality, positive or negative- including increase in energy   · Giving away possessions  · Change in eating patterns- significant weight changes-  positive or negative  · Change in sleeping patterns- unable to sleep or sleeping all the time   · Unwillingness or inability to communicate  · Depression  · Unusual sadness, discouragement and loneliness  · Talk of wanting to die  · Neglect of personal  appearance   · Rebelliousness- reckless behavior  · Withdrawal from people/activities they love  · Confusion- inability to concentrate     If you or a loved one observes any of these behaviors or has concerns about self-harm, here's what you can do:  · Talk about it- your feelings and reasons for harming yourself  · Remove any means that you might use to hurt yourself (examples: pills, rope, extension cords, firearm)  · Get professional help from the community (Mental Health, Substance Abuse, psychological counseling)  · Do not be alone:Call your Safe Contact- someone whom you trust who will be there for you.  · Call your local CRISIS HOTLINE 258-5930 or 274-273-1133  · Call your local Children's Mobile Crisis Response Team [place] (223) 607-7212 or www.InCast  · Call the toll free National Suicide Prevention Hotlines   · National Suicide Prevention Lifeline 408-245-YDCY (7254)  · National Hope Line Network 800-SUICIDE (404-7971)

## 2017-12-16 NOTE — PROGRESS NOTES
Report taken from Celestino Arenas. Patient is resting comfortably. No concerns or issues at this time. All precautions are in place. Cont to monitor.

## 2017-12-16 NOTE — DISCHARGE SUMMARY
DISCHARGE SUMMARY     ADMIT DATE:  12/12/2017         DISCHARGE DATE:  12/16/2017    PATIENT ID:  Name: Vielka Briscoe   YOB: 1955  Age: 62 y.o. female   MRN: [id number]  Address: [facility] [place] [address]  Phone: [phone] (home)    DISCHARGE DIAGNOSIS:  Symptomatic Anemia  Paroxysmal Atrial Fibrillation   ESRD on HD  Chronic Respiratory Failure with Hypoxia  MDS  Hypertension  Diabetes Mellitus Type 2 with Nephropathy and Neuropathy    CONSULTANTS:  Nephrology    CONDITION:Stable    DISPOSITION:Home    DIET: ADA/Renal    ACTIVITY: As tolerated     HPI/HOSPITAL COURSE:  Please see H&P for details regarding initial hospital presentation.  In summary, 63yo F with PMHx of HTN, ESRD on HD was admitted for symptomatic anemia.  Patient was transfused 2U PRBC and underwent HD as scheduled.  Nephrology was consulted to assist with HD mgmt and anemia.  Patient was given Epo during HD sessions.  With treatment, symptoms resolved and patient is now stable for discharge home.  All other chronic medical conditions remained stable during hospital course.       Physical exam:  Vitals:    12/15/17 1605 12/15/17 1930 12/16/17 0323 12/16/17 0750   BP: 108/58 (!) 91/69 148/76 110/60   Pulse: 96 75 69 67   Resp: 16 16 16 16   Temp: 36.2 °C (97.1 °F) 36.7 °C (98 °F) 36.4 °C (97.6 °F) 36.2 °C (97.2 °F)   SpO2: 99% 100% 100% 99%   Weight:       Height:         Weight/BMI: Body mass index is 29.67 kg/m².  Pulse Oximetry: 99 %, O2 (LPM): 2, O2 Delivery: Silicone Nasal Cannula     Constitutional: She is oriented to person, place, and time. She appears well-developed and well-nourished. No distress.   HENT:   Head: Normocephalic and atraumatic.   Right Ear: External ear normal.   Left Ear: External ear normal.   Nose: Nose normal.   Mouth/Throat: No oropharyngeal exudate.   Eyes: Conjunctivae and EOM are normal. Pupils are equal, round, and reactive to light. No scleral icterus.   Neck: Normal range of  motion. Neck supple.   Cardiovascular: Exam reveals no gallop and no friction rub.    No murmur heard.  Pulmonary/Chest: Effort normal and breath sounds normal.   Abdominal: Soft. Bowel sounds are normal. She exhibits no distension. There is no tenderness. There is no rebound and no guarding.   Musculoskeletal: Normal range of motion. She exhibits no edema or tenderness.   Neurological: She is alert and oriented to person, place, and time. She has normal reflexes.   Skin: Skin is warm and dry.     PROCEDURES:  NONE    RADIOLOGY:  No orders to display       DISCHARGE LABS:    Recent Labs      12/14/17   0404   12/15/17   0259   12/15/17   2046  12/16/17   0256  12/16/17   0840   WBC  5.5   --   5.0   --    --   4.2*   --    RBC  2.81*   --   3.03*   --    --   2.87*   --    HEMOGLOBIN  8.5*   < >  9.3*   < >  9.1*  8.8*  9.2*   HEMATOCRIT  25.4*   < >  27.4*   < >  26.6*  26.0*  27.1*   MCV  90.7   --   90.4   --    --   90.6   --    MCH  29.9   --   30.7   --    --   30.7   --    RDW  56.8*   --   49.5   --    --   49.1   --    PLATELETCT  128*   --   117*   --    --   117*   --    MPV  13.2*   --   13.8*   --    --   13.9*   --    NEUTSPOLYS  54.80   --   49.70   --    --   52.00   --    LYMPHOCYTES  29.10   --   32.10   --    --   30.20   --    MONOCYTES  11.20   --   12.80   --    --   13.20   --    EOSINOPHILS  4.00   --   4.40   --    --   3.50   --    BASOPHILS  0.40   --   0.40   --    --   0.20   --     < > = values in this interval not displayed.     Lab Results   Component Value Date    GUCEFMNG02 866 06/06/2017    OPKFMQKN70 672 03/20/2017    RXSCUNRX59 671 02/22/2017    FOLATE 7.5 06/06/2017    FOLATE 12.1 03/20/2017    FOLATE 9.9 02/22/2017    FERRITIN 2,726.3 (H) 12/06/2017    FERRITIN 1,787.8 (H) 10/04/2017    FERRITIN 1032.4 (H) 03/20/2017    IRON 222 (H) 10/04/2017    IRON 239 (H) 06/06/2017    IRON 188 (H) 05/24/2017    TOTIRONBC 232 (L) 10/04/2017    TOTIRONBC 260 06/06/2017    TOTIRONBC 223 (L)  03/20/2017       Estimated GFR/CRCL = CrCl cannot be calculated (Unknown ideal weight.).  Recent Labs      12/14/17   0404  12/15/17   0259   SODIUM  131*  129*   POTASSIUM  4.4  4.9   CHLORIDE  93*  93*   CO2  27  23   BUN  32*  51*   CREATININE  4.57*  6.13*   CALCIUM  7.9*  7.3*   ALBUMIN  3.2   --        Recent Labs      12/14/17   0404   ALTSGPT  28   ASTSGOT  18   ALKPHOSPHAT  74   TBILIRUBIN  0.6   ALBUMIN  3.2   GLOBULIN  3.1       @PNLABRCNT(GLUCOSE:3,POCGLUCOSE:3)  )  Lab Results   Component Value Date    HBA1C 8.3 (H) 12/13/2017    HBA1C 9.8 (H) 05/10/2017    HBA1C 8.4 (H) 02/20/2017    FREET4 0.79 02/20/2017       DISCHARGE MEDICATIONS:  (X)  Medication Reconciliation Completed   Vielka Briscoe   [facility] Medication Instructions [place]:[id number]    Printed on:12/16/17 1038   Medication Information                      bimatoprost (LUMIGAN) 0.03 % Solution  Place 1 Drop in both eyes every evening.             Brimonidine Tartrate-Timolol (COMBIGAN) 0.2-0.5 % Solution  Place 1 Drop in both eyes 2 Times a Day.             carvedilol (COREG) 3.125 MG Tab  Take 4 Tabs by mouth 2 times a day, with meals.             cyclobenzaprine (FLEXERIL) 10 MG Tab  Take 1 Tab by mouth 3 times a day as needed for Muscle Spasms.             hydrocodone-acetaminophen (NORCO) 5-325 MG Tab per tablet  Take 1 Tab by mouth every four hours as needed.             pregabalin (LYRICA) 50 MG capsule  Take 50 mg by mouth 2 times a day.             sitagliptin (JANUVIA) 25 MG Tab  Take 1 Tab by mouth every morning.                 INSTRUCTIONS:   The patient was instructed to return to the ER in the event of worsening symptoms. I have counseled the patient on the importance of compliance and the patient has agreed to proceed with all medical recommendations and follow up plan indicated above.   The patient understands that all medications come with benefits and risks. Risks may include permanent injury or death and these risks can  be minimized with close reassessment and monitoring.        Follow up appointment details :     F/U with Nephrology with HD session  F/U with PCP in 1-2 weeks    PENDING STUDIES:  NONE    Primary Care Provider: Nyla Nava MD      Time spent on discharge day patient visit, preparing discharge paperwork and arranging for patient follow up 41 mins.

## 2017-12-16 NOTE — PROGRESS NOTES
[facility] Progress Note    Hemodialysis treatment ordered today per Dr. Perez x 3.5 hours. Treatment initiated at 0737, ended at 1020.     Patient not able to complete HD tx r/t pain to L arm. Dr. Perez notified; patient has 50 minutes left on tx; see paper flow sheet for details.     Net  mL.     Needles removed from access site. Dressings applied and sites held x 10 minutes; verified no bleeding. Positive bruit/thrill post tx. Staff RN instructed to monitor AVF for breakthrough bleeding. Should breakthrough bleeding occur staff RN instructed to apply pressure to access sites until bleeding resolved. Notify Dialysis and Nephrologist for follow-up.    Report given to Primary RN.

## 2017-12-16 NOTE — PROGRESS NOTES
[facility] -  PROGRESS NOTE               Author: Lion Bennett M.D. Date & Time: 12/16/2017  9:16 AM     HPI:  63 yo female PMH ESRD MWF iHD, HTN, type 2 DM, anemia of chronic disease, renal osteodystrophy, HLD, and MDS who presents with CC as above.  She states that she was at her usual state of health an doing well until past few days where she's had a progressively worsening fatigue and SOB.  She is well known to us from outpatient dialysis as well as multiple admissions for similar episodes in past.  She states her weakness was severe enough where it limited her ability to even walk to bathroom.  She also admits to light headedness, dizziness, and HA.  She denies F/C/N/V/CP.  No abd pain, no melena, no hematochezia.  Labs in ED showed a drop in her Hgb again and she was admitted for further treatment and management.  She received 1 unit pRBC urgently overnight and was feeling much better this AM.  She was asking if she would get her dialysis today in the hospital.  No change to appetite or weight recently.      DAILY NEPHROLOGY SUMMARY:  12/13/17 - Consult done  12/14/17 - EMMANUEL, no complaints, doing ok, feels dizzy/light-headed today; Hgb dropped to 8.5 from 11 yesterday  12/15/17 - EMMANUEL, SOD, Qb300; c/o severe pain in AVF arm with 50 min left and had to terminate early because she was about to pull her needles out  12/16/17 - Had HD yesterday. mL.Cut it short.Feels fine.      Review of Systems   Constitutional: Negative.    HENT: Negative.    Eyes: Negative.    Respiratory: Negative.    Cardiovascular: Negative.    Gastrointestinal: Negative.    Skin: Negative.    Neurological: Positive for dizziness. Negative for tingling and headaches.   Endo/Heme/Allergies: Negative.    Psychiatric/Behavioral: Negative.    All other systems reviewed and are negative.      Physical Exam   Constitutional: She is oriented to person, place, and time. She appears well-developed and  well-nourished.   HENT:   Head: Normocephalic and atraumatic.   Eyes: Conjunctivae are normal. No scleral icterus.   Neck: Neck supple. No tracheal deviation present.   Cardiovascular: Normal rate and regular rhythm.    Pulmonary/Chest: Effort normal and breath sounds normal.   Abdominal: Soft. Bowel sounds are normal.   Musculoskeletal: She exhibits no edema or tenderness.   Neurological: She is alert and oriented to person, place, and time.   Skin: Skin is warm and dry.   Psychiatric: She has a normal mood and affect. Her behavior is normal.   Nursing note and vitals reviewed.      PAST FAMILY HISTORY: Reviewed and Unchanged  SOCIAL HISTORY: Reviewed and Unchanged  CURRENT MEDICATIONS: Reviewed  LABORATORY RESULTS: Reviewed  IMAGING STUDIES: Reviewed      Fluids:  In: 500 [Dialysis:500]  Out: 1253       IMPRESSION:  - ESRD    * Etiology likely 2/2 HTN/DM  - Acute on chronic anemia.MDS.    * VANCE with HD    * Transfuse prn for Hgb < 7.0    * Etiology likely 2/2 ESRD/MDS  - HTN, controlled   - type 2 DM  - Renal Osteodystrophy  - HLD  - CAD  - COPD/Home O2  - PAF  - Blind     ASSESSMENT:  - GFR: HD dependent  - Urine: oligoanuric  - BP: Goal < 140/90  - Volume: euvolemic  - Acid/Base: no sig. acid base disturbance  - Electrolytes: stable  - Anemia: Goal Hgb 10-11  - MBD: Ca normal, PO4 elevated.Does not tolerate any binders.  - HD Access: LAVF (+thrill/bruit)     PLAN:  - MWF iHD  - UF as tolerated  - Transfuse as needed to maintain Hgb > 7  - VANCE with iHD, epogen 10K units qRx  - Dose all meds per ESRD guidelines  - Possible d/c today

## 2017-12-17 ENCOUNTER — PATIENT OUTREACH (OUTPATIENT)
Dept: HEALTH INFORMATION MANAGEMENT | Facility: OTHER | Age: 62
End: 2017-12-17

## 2018-01-05 ENCOUNTER — RESOLUTE PROFESSIONAL BILLING HOSPITAL PROF FEE (OUTPATIENT)
Dept: HOSPITALIST | Facility: MEDICAL CENTER | Age: 63
End: 2018-01-05
Payer: MEDICARE

## 2018-01-05 ENCOUNTER — HOSPITAL ENCOUNTER (OUTPATIENT)
Facility: MEDICAL CENTER | Age: 63
End: 2018-01-06
Attending: EMERGENCY MEDICINE | Admitting: HOSPITALIST
Payer: MEDICARE

## 2018-01-05 ENCOUNTER — APPOINTMENT (OUTPATIENT)
Dept: RADIOLOGY | Facility: MEDICAL CENTER | Age: 63
End: 2018-01-05
Attending: EMERGENCY MEDICINE
Payer: MEDICARE

## 2018-01-05 DIAGNOSIS — N18.9 CHRONIC RENAL FAILURE, UNSPECIFIED CKD STAGE: ICD-10-CM

## 2018-01-05 DIAGNOSIS — D64.9 SYMPTOMATIC ANEMIA: ICD-10-CM

## 2018-01-05 PROBLEM — R53.1 WEAKNESS: Status: ACTIVE | Noted: 2018-01-05

## 2018-01-05 LAB
ABO GROUP BLD: NORMAL
ALBUMIN SERPL BCP-MCNC: 3.9 G/DL (ref 3.2–4.9)
ALBUMIN/GLOB SERPL: 1.1 G/DL
ALP SERPL-CCNC: 69 U/L (ref 30–99)
ALT SERPL-CCNC: 86 U/L (ref 2–50)
ANION GAP SERPL CALC-SCNC: 15 MMOL/L (ref 0–11.9)
ANISOCYTOSIS BLD QL SMEAR: ABNORMAL
AST SERPL-CCNC: 51 U/L (ref 12–45)
BARCODED ABORH UBTYP: 6200
BARCODED ABORH UBTYP: 6200
BARCODED PRD CODE UBPRD: NORMAL
BARCODED PRD CODE UBPRD: NORMAL
BARCODED UNIT NUM UBUNT: NORMAL
BARCODED UNIT NUM UBUNT: NORMAL
BASOPHILS # BLD AUTO: 0.4 % (ref 0–1.8)
BASOPHILS # BLD: 0.02 K/UL (ref 0–0.12)
BILIRUB SERPL-MCNC: 0.4 MG/DL (ref 0.1–1.5)
BLD GP AB SCN SERPL QL: NORMAL
BNP SERPL-MCNC: 235 PG/ML (ref 0–100)
BUN SERPL-MCNC: 64 MG/DL (ref 8–22)
CALCIUM SERPL-MCNC: 7.2 MG/DL (ref 8.5–10.5)
CHLORIDE SERPL-SCNC: 96 MMOL/L (ref 96–112)
CO2 SERPL-SCNC: 24 MMOL/L (ref 20–33)
COMMENT 1642: NORMAL
COMPONENT R 8504R: NORMAL
COMPONENT R 8504R: NORMAL
CREAT SERPL-MCNC: 7.13 MG/DL (ref 0.5–1.4)
EKG IMPRESSION: NORMAL
EOSINOPHIL # BLD AUTO: 0.2 K/UL (ref 0–0.51)
EOSINOPHIL NFR BLD: 4 % (ref 0–6.9)
ERYTHROCYTE [DISTWIDTH] IN BLOOD BY AUTOMATED COUNT: 74.7 FL (ref 35.9–50)
GFR SERPL CREATININE-BSD FRML MDRD: 6 ML/MIN/1.73 M 2
GLOBULIN SER CALC-MCNC: 3.5 G/DL (ref 1.9–3.5)
GLUCOSE BLD-MCNC: 256 MG/DL (ref 65–99)
GLUCOSE SERPL-MCNC: 206 MG/DL (ref 65–99)
HCT VFR BLD AUTO: 24.5 % (ref 37–47)
HGB BLD-MCNC: 8 G/DL (ref 12–16)
IMM GRANULOCYTES # BLD AUTO: 0.03 K/UL (ref 0–0.11)
IMM GRANULOCYTES NFR BLD AUTO: 0.6 % (ref 0–0.9)
INR PPP: 0.9 (ref 0.87–1.13)
LYMPHOCYTES # BLD AUTO: 1.28 K/UL (ref 1–4.8)
LYMPHOCYTES NFR BLD: 25.9 % (ref 22–41)
MACROCYTES BLD QL SMEAR: ABNORMAL
MCH RBC QN AUTO: 32.3 PG (ref 27–33)
MCHC RBC AUTO-ENTMCNC: 32.7 G/DL (ref 33.6–35)
MCV RBC AUTO: 98.8 FL (ref 81.4–97.8)
MONOCYTES # BLD AUTO: 0.57 K/UL (ref 0–0.85)
MONOCYTES NFR BLD AUTO: 11.5 % (ref 0–13.4)
MORPHOLOGY BLD-IMP: NORMAL
NEUTROPHILS # BLD AUTO: 2.84 K/UL (ref 2–7.15)
NEUTROPHILS NFR BLD: 57.6 % (ref 44–72)
NRBC # BLD AUTO: 0 K/UL
NRBC BLD-RTO: 0 /100 WBC
PLATELET # BLD AUTO: 154 K/UL (ref 164–446)
PLATELET BLD QL SMEAR: NORMAL
PMV BLD AUTO: 13.8 FL (ref 9–12.9)
POTASSIUM SERPL-SCNC: 4.5 MMOL/L (ref 3.6–5.5)
PRODUCT TYPE UPROD: NORMAL
PRODUCT TYPE UPROD: NORMAL
PROT SERPL-MCNC: 7.4 G/DL (ref 6–8.2)
PROTHROMBIN TIME: 11.9 SEC (ref 12–14.6)
RBC # BLD AUTO: 2.48 M/UL (ref 4.2–5.4)
RBC BLD AUTO: PRESENT
RH BLD: NORMAL
SODIUM SERPL-SCNC: 135 MMOL/L (ref 135–145)
UNIT STATUS USTAT: NORMAL
UNIT STATUS USTAT: NORMAL
WBC # BLD AUTO: 4.9 K/UL (ref 4.8–10.8)

## 2018-01-05 PROCEDURE — 99285 EMERGENCY DEPT VISIT HI MDM: CPT

## 2018-01-05 PROCEDURE — 85025 COMPLETE CBC W/AUTO DIFF WBC: CPT

## 2018-01-05 PROCEDURE — G0378 HOSPITAL OBSERVATION PER HR: HCPCS

## 2018-01-05 PROCEDURE — 82962 GLUCOSE BLOOD TEST: CPT

## 2018-01-05 PROCEDURE — 85610 PROTHROMBIN TIME: CPT

## 2018-01-05 PROCEDURE — 94760 N-INVAS EAR/PLS OXIMETRY 1: CPT

## 2018-01-05 PROCEDURE — 83880 ASSAY OF NATRIURETIC PEPTIDE: CPT

## 2018-01-05 PROCEDURE — 99220 PR INITIAL OBSERVATION CARE,LEVL III: CPT | Mod: GC | Performed by: HOSPITALIST

## 2018-01-05 PROCEDURE — 90935 HEMODIALYSIS ONE EVALUATION: CPT

## 2018-01-05 PROCEDURE — 86900 BLOOD TYPING SEROLOGIC ABO: CPT

## 2018-01-05 PROCEDURE — 36430 TRANSFUSION BLD/BLD COMPNT: CPT | Mod: XU

## 2018-01-05 PROCEDURE — A9270 NON-COVERED ITEM OR SERVICE: HCPCS | Performed by: STUDENT IN AN ORGANIZED HEALTH CARE EDUCATION/TRAINING PROGRAM

## 2018-01-05 PROCEDURE — 86850 RBC ANTIBODY SCREEN: CPT

## 2018-01-05 PROCEDURE — 86644 CMV ANTIBODY: CPT | Mod: 91

## 2018-01-05 PROCEDURE — 80053 COMPREHEN METABOLIC PANEL: CPT

## 2018-01-05 PROCEDURE — 86901 BLOOD TYPING SEROLOGIC RH(D): CPT

## 2018-01-05 PROCEDURE — 700102 HCHG RX REV CODE 250 W/ 637 OVERRIDE(OP): Performed by: STUDENT IN AN ORGANIZED HEALTH CARE EDUCATION/TRAINING PROGRAM

## 2018-01-05 PROCEDURE — 700111 HCHG RX REV CODE 636 W/ 250 OVERRIDE (IP)

## 2018-01-05 PROCEDURE — 71045 X-RAY EXAM CHEST 1 VIEW: CPT

## 2018-01-05 PROCEDURE — 96372 THER/PROPH/DIAG INJ SC/IM: CPT | Mod: XU

## 2018-01-05 PROCEDURE — 86923 COMPATIBILITY TEST ELECTRIC: CPT | Mod: 91

## 2018-01-05 PROCEDURE — 96374 THER/PROPH/DIAG INJ IV PUSH: CPT

## 2018-01-05 PROCEDURE — 700111 HCHG RX REV CODE 636 W/ 250 OVERRIDE (IP): Performed by: STUDENT IN AN ORGANIZED HEALTH CARE EDUCATION/TRAINING PROGRAM

## 2018-01-05 PROCEDURE — P9016 RBC LEUKOCYTES REDUCED: HCPCS | Mod: 91

## 2018-01-05 PROCEDURE — 93005 ELECTROCARDIOGRAM TRACING: CPT | Performed by: EMERGENCY MEDICINE

## 2018-01-05 RX ORDER — HEPARIN SODIUM 1000 [USP'U]/ML
1500 INJECTION, SOLUTION INTRAVENOUS; SUBCUTANEOUS
Status: DISCONTINUED | OUTPATIENT
Start: 2018-01-05 | End: 2018-01-06 | Stop reason: HOSPADM

## 2018-01-05 RX ORDER — CARVEDILOL 12.5 MG/1
12.5 TABLET ORAL 2 TIMES DAILY WITH MEALS
Status: ON HOLD | COMMUNITY
End: 2019-04-12 | Stop reason: CLARIF

## 2018-01-05 RX ORDER — TIMOLOL MALEATE 5 MG/ML
1 SOLUTION/ DROPS OPHTHALMIC 2 TIMES DAILY
Status: DISCONTINUED | OUTPATIENT
Start: 2018-01-05 | End: 2018-01-06 | Stop reason: HOSPADM

## 2018-01-05 RX ORDER — CARVEDILOL 6.25 MG/1
12.5 TABLET ORAL 2 TIMES DAILY WITH MEALS
Status: DISCONTINUED | OUTPATIENT
Start: 2018-01-05 | End: 2018-01-06 | Stop reason: HOSPADM

## 2018-01-05 RX ORDER — BIMATOPROST 0.3 MG/ML
1 SOLUTION/ DROPS OPHTHALMIC EVERY EVENING
Status: DISCONTINUED | OUTPATIENT
Start: 2018-01-05 | End: 2018-01-05

## 2018-01-05 RX ORDER — HEPARIN SODIUM 5000 [USP'U]/ML
5000 INJECTION, SOLUTION INTRAVENOUS; SUBCUTANEOUS EVERY 8 HOURS
Status: DISCONTINUED | OUTPATIENT
Start: 2018-01-05 | End: 2018-01-06 | Stop reason: HOSPADM

## 2018-01-05 RX ORDER — LATANOPROST 50 UG/ML
1 SOLUTION/ DROPS OPHTHALMIC EVERY EVENING
Status: DISCONTINUED | OUTPATIENT
Start: 2018-01-05 | End: 2018-01-06 | Stop reason: HOSPADM

## 2018-01-05 RX ORDER — POLYETHYLENE GLYCOL 3350 17 G/17G
1 POWDER, FOR SOLUTION ORAL
Status: DISCONTINUED | OUTPATIENT
Start: 2018-01-05 | End: 2018-01-06 | Stop reason: HOSPADM

## 2018-01-05 RX ORDER — HYDROCODONE BITARTRATE AND ACETAMINOPHEN 5; 325 MG/1; MG/1
1 TABLET ORAL EVERY 4 HOURS PRN
Status: DISCONTINUED | OUTPATIENT
Start: 2018-01-05 | End: 2018-01-06 | Stop reason: HOSPADM

## 2018-01-05 RX ORDER — PREGABALIN 25 MG/1
50 CAPSULE ORAL 2 TIMES DAILY
Status: DISCONTINUED | OUTPATIENT
Start: 2018-01-05 | End: 2018-01-06 | Stop reason: HOSPADM

## 2018-01-05 RX ORDER — BISACODYL 10 MG
10 SUPPOSITORY, RECTAL RECTAL
Status: DISCONTINUED | OUTPATIENT
Start: 2018-01-05 | End: 2018-01-06 | Stop reason: HOSPADM

## 2018-01-05 RX ORDER — HEPARIN SODIUM 1000 [USP'U]/ML
INJECTION, SOLUTION INTRAVENOUS; SUBCUTANEOUS
Status: COMPLETED
Start: 2018-01-05 | End: 2018-01-05

## 2018-01-05 RX ORDER — BRIMONIDINE TARTRATE AND TIMOLOL MALEATE 2; 5 MG/ML; MG/ML
1 SOLUTION OPHTHALMIC 2 TIMES DAILY
Status: DISCONTINUED | OUTPATIENT
Start: 2018-01-05 | End: 2018-01-05

## 2018-01-05 RX ORDER — BRIMONIDINE TARTRATE 2 MG/ML
1 SOLUTION/ DROPS OPHTHALMIC 2 TIMES DAILY
Status: DISCONTINUED | OUTPATIENT
Start: 2018-01-05 | End: 2018-01-06 | Stop reason: HOSPADM

## 2018-01-05 RX ORDER — CYCLOBENZAPRINE HCL 10 MG
10 TABLET ORAL 3 TIMES DAILY PRN
Status: DISCONTINUED | OUTPATIENT
Start: 2018-01-05 | End: 2018-01-06 | Stop reason: HOSPADM

## 2018-01-05 RX ORDER — AMOXICILLIN 250 MG
2 CAPSULE ORAL 2 TIMES DAILY
Status: DISCONTINUED | OUTPATIENT
Start: 2018-01-05 | End: 2018-01-06 | Stop reason: HOSPADM

## 2018-01-05 RX ORDER — BENZONATATE 100 MG/1
100 CAPSULE ORAL 3 TIMES DAILY PRN
Status: DISCONTINUED | OUTPATIENT
Start: 2018-01-05 | End: 2018-01-06 | Stop reason: HOSPADM

## 2018-01-05 RX ORDER — DEXTROSE MONOHYDRATE 25 G/50ML
25 INJECTION, SOLUTION INTRAVENOUS
Status: DISCONTINUED | OUTPATIENT
Start: 2018-01-05 | End: 2018-01-06 | Stop reason: HOSPADM

## 2018-01-05 RX ORDER — ACETAMINOPHEN 325 MG/1
650 TABLET ORAL EVERY 6 HOURS PRN
Status: DISCONTINUED | OUTPATIENT
Start: 2018-01-05 | End: 2018-01-06 | Stop reason: HOSPADM

## 2018-01-05 RX ADMIN — LATANOPROST 1 DROP: 50 SOLUTION OPHTHALMIC at 21:18

## 2018-01-05 RX ADMIN — CARVEDILOL 12.5 MG: 6.25 TABLET, FILM COATED ORAL at 20:31

## 2018-01-05 RX ADMIN — INSULIN HUMAN 3 UNITS: 100 INJECTION, SOLUTION PARENTERAL at 21:27

## 2018-01-05 RX ADMIN — HEPARIN SODIUM 1500 UNITS: 1000 INJECTION, SOLUTION INTRAVENOUS; SUBCUTANEOUS at 16:55

## 2018-01-05 RX ADMIN — Medication 1 LOZENGE: at 22:05

## 2018-01-05 RX ADMIN — PREGABALIN 50 MG: 25 CAPSULE ORAL at 20:32

## 2018-01-05 RX ADMIN — BENZONATATE 100 MG: 100 CAPSULE ORAL at 22:05

## 2018-01-05 RX ADMIN — BRIMONIDINE TARTRATE 1 DROP: 2 SOLUTION OPHTHALMIC at 21:17

## 2018-01-05 RX ADMIN — HEPARIN SODIUM 5000 UNITS: 5000 INJECTION, SOLUTION INTRAVENOUS; SUBCUTANEOUS at 20:33

## 2018-01-05 RX ADMIN — CYCLOBENZAPRINE 10 MG: 10 TABLET, FILM COATED ORAL at 21:27

## 2018-01-05 ASSESSMENT — ENCOUNTER SYMPTOMS
ORTHOPNEA: 0
PALPITATIONS: 0
FOCAL WEAKNESS: 0
SHORTNESS OF BREATH: 0
BLOOD IN STOOL: 0
DOUBLE VISION: 0
WEAKNESS: 1
FALLS: 1
COUGH: 0
DEPRESSION: 0
BACK PAIN: 0
VOMITING: 0
SPUTUM PRODUCTION: 0
POLYDIPSIA: 0
ABDOMINAL PAIN: 0
FEVER: 0
NAUSEA: 0
DIARRHEA: 0
BLURRED VISION: 0
SORE THROAT: 0
CHILLS: 0
DIZZINESS: 1
SPEECH CHANGE: 0
HEADACHES: 1

## 2018-01-05 ASSESSMENT — LIFESTYLE VARIABLES
DO YOU DRINK ALCOHOL: NO
ALCOHOL_USE: NO
EVER_SMOKED: NEVER

## 2018-01-05 ASSESSMENT — PATIENT HEALTH QUESTIONNAIRE - PHQ9
SUM OF ALL RESPONSES TO PHQ9 QUESTIONS 1 AND 2: 0
2. FEELING DOWN, DEPRESSED, IRRITABLE, OR HOPELESS: NOT AT ALL
1. LITTLE INTEREST OR PLEASURE IN DOING THINGS: NOT AT ALL
SUM OF ALL RESPONSES TO PHQ QUESTIONS 1-9: 0

## 2018-01-05 ASSESSMENT — PAIN SCALES - GENERAL: PAINLEVEL_OUTOF10: 6

## 2018-01-05 NOTE — ED NOTES
The Medication Reconciliation process has been completed by interviewing the patient    Allergies have been reviewed  Antibiotic use in 30 days -none     Home Pharmacy:  [facility]

## 2018-01-05 NOTE — ED NOTES
"Pt comes in complaining of weakness and dizziness since last Friday. Pt reporting she was suppose to receive dialysis today but \"was too weak\"   "

## 2018-01-06 VITALS
WEIGHT: 138.45 LBS | HEIGHT: 58 IN | OXYGEN SATURATION: 99 % | SYSTOLIC BLOOD PRESSURE: 127 MMHG | TEMPERATURE: 96.9 F | DIASTOLIC BLOOD PRESSURE: 65 MMHG | BODY MASS INDEX: 29.06 KG/M2 | HEART RATE: 61 BPM | RESPIRATION RATE: 19 BRPM

## 2018-01-06 LAB
ALBUMIN SERPL BCP-MCNC: 3.4 G/DL (ref 3.2–4.9)
ALBUMIN/GLOB SERPL: 1 G/DL
ALP SERPL-CCNC: 61 U/L (ref 30–99)
ALT SERPL-CCNC: 77 U/L (ref 2–50)
ANION GAP SERPL CALC-SCNC: 11 MMOL/L (ref 0–11.9)
AST SERPL-CCNC: 50 U/L (ref 12–45)
BASOPHILS # BLD AUTO: 0.4 % (ref 0–1.8)
BASOPHILS # BLD: 0.02 K/UL (ref 0–0.12)
BILIRUB SERPL-MCNC: 0.5 MG/DL (ref 0.1–1.5)
BUN SERPL-MCNC: 34 MG/DL (ref 8–22)
CALCIUM SERPL-MCNC: 7.5 MG/DL (ref 8.5–10.5)
CHLORIDE SERPL-SCNC: 96 MMOL/L (ref 96–112)
CO2 SERPL-SCNC: 27 MMOL/L (ref 20–33)
CREAT SERPL-MCNC: 4.88 MG/DL (ref 0.5–1.4)
EOSINOPHIL # BLD AUTO: 0.19 K/UL (ref 0–0.51)
EOSINOPHIL NFR BLD: 4.1 % (ref 0–6.9)
ERYTHROCYTE [DISTWIDTH] IN BLOOD BY AUTOMATED COUNT: 54.6 FL (ref 35.9–50)
GFR SERPL CREATININE-BSD FRML MDRD: 9 ML/MIN/1.73 M 2
GLOBULIN SER CALC-MCNC: 3.3 G/DL (ref 1.9–3.5)
GLUCOSE BLD-MCNC: 112 MG/DL (ref 65–99)
GLUCOSE SERPL-MCNC: 118 MG/DL (ref 65–99)
HCT VFR BLD AUTO: 31.8 % (ref 37–47)
HGB BLD-MCNC: 10.9 G/DL (ref 12–16)
IMM GRANULOCYTES # BLD AUTO: 0.03 K/UL (ref 0–0.11)
IMM GRANULOCYTES NFR BLD AUTO: 0.6 % (ref 0–0.9)
LYMPHOCYTES # BLD AUTO: 1.43 K/UL (ref 1–4.8)
LYMPHOCYTES NFR BLD: 30.5 % (ref 22–41)
MAGNESIUM SERPL-MCNC: 2 MG/DL (ref 1.5–2.5)
MCH RBC QN AUTO: 30.6 PG (ref 27–33)
MCHC RBC AUTO-ENTMCNC: 34.3 G/DL (ref 33.6–35)
MCV RBC AUTO: 89.3 FL (ref 81.4–97.8)
MONOCYTES # BLD AUTO: 0.56 K/UL (ref 0–0.85)
MONOCYTES NFR BLD AUTO: 11.9 % (ref 0–13.4)
NEUTROPHILS # BLD AUTO: 2.46 K/UL (ref 2–7.15)
NEUTROPHILS NFR BLD: 52.5 % (ref 44–72)
NRBC # BLD AUTO: 0.02 K/UL
NRBC BLD-RTO: 0.4 /100 WBC
PLATELET # BLD AUTO: 133 K/UL (ref 164–446)
PMV BLD AUTO: 12.6 FL (ref 9–12.9)
POTASSIUM SERPL-SCNC: 3.7 MMOL/L (ref 3.6–5.5)
PROT SERPL-MCNC: 6.7 G/DL (ref 6–8.2)
RBC # BLD AUTO: 3.56 M/UL (ref 4.2–5.4)
SODIUM SERPL-SCNC: 134 MMOL/L (ref 135–145)
WBC # BLD AUTO: 4.7 K/UL (ref 4.8–10.8)

## 2018-01-06 PROCEDURE — 85025 COMPLETE CBC W/AUTO DIFF WBC: CPT

## 2018-01-06 PROCEDURE — 80053 COMPREHEN METABOLIC PANEL: CPT

## 2018-01-06 PROCEDURE — A9270 NON-COVERED ITEM OR SERVICE: HCPCS | Performed by: STUDENT IN AN ORGANIZED HEALTH CARE EDUCATION/TRAINING PROGRAM

## 2018-01-06 PROCEDURE — 36415 COLL VENOUS BLD VENIPUNCTURE: CPT

## 2018-01-06 PROCEDURE — 99217 PR OBSERVATION CARE DISCHARGE: CPT | Mod: GC | Performed by: HOSPITALIST

## 2018-01-06 PROCEDURE — 83735 ASSAY OF MAGNESIUM: CPT

## 2018-01-06 PROCEDURE — 700102 HCHG RX REV CODE 250 W/ 637 OVERRIDE(OP): Performed by: STUDENT IN AN ORGANIZED HEALTH CARE EDUCATION/TRAINING PROGRAM

## 2018-01-06 PROCEDURE — 700111 HCHG RX REV CODE 636 W/ 250 OVERRIDE (IP): Performed by: STUDENT IN AN ORGANIZED HEALTH CARE EDUCATION/TRAINING PROGRAM

## 2018-01-06 PROCEDURE — 82962 GLUCOSE BLOOD TEST: CPT

## 2018-01-06 PROCEDURE — 96372 THER/PROPH/DIAG INJ SC/IM: CPT

## 2018-01-06 PROCEDURE — G0378 HOSPITAL OBSERVATION PER HR: HCPCS

## 2018-01-06 RX ADMIN — HEPARIN SODIUM 5000 UNITS: 5000 INJECTION, SOLUTION INTRAVENOUS; SUBCUTANEOUS at 05:09

## 2018-01-06 RX ADMIN — CARVEDILOL 12.5 MG: 6.25 TABLET, FILM COATED ORAL at 09:45

## 2018-01-06 RX ADMIN — PREGABALIN 50 MG: 25 CAPSULE ORAL at 09:45

## 2018-01-06 RX ADMIN — BRIMONIDINE TARTRATE 1 DROP: 2 SOLUTION OPHTHALMIC at 09:47

## 2018-01-06 ASSESSMENT — ENCOUNTER SYMPTOMS
PSYCHIATRIC NEGATIVE: 1
RESPIRATORY NEGATIVE: 1
CONSTITUTIONAL NEGATIVE: 1
CARDIOVASCULAR NEGATIVE: 1
MUSCULOSKELETAL NEGATIVE: 1
NEUROLOGICAL NEGATIVE: 1
EYES NEGATIVE: 1
GASTROINTESTINAL NEGATIVE: 1

## 2018-01-06 ASSESSMENT — PAIN SCALES - GENERAL
PAINLEVEL_OUTOF10: 0
PAINLEVEL_OUTOF10: 0

## 2018-01-06 NOTE — PROGRESS NOTES
Bedside report received 0710. POC discussed with pt; Blood transfusion no longer infusing at this time, Completed in dialysis. Pending discharge; per pt daughter to pick her up; Updated Ramila ; No overnight events; all questions answered at this time.

## 2018-01-06 NOTE — H&P
Internal Medicine Admitting History and Physical    Note Author: Jesus Alves M.D.       Name Vielka Briscoe     1955   Age/Sex 62 y.o. female   MRN [id number]   Code Status FULL     After 5PM or if no immediate response to page, please call for cross-coverage  Attending/Team: Dr. Sutton/Edwin See Patient List for primary contact information  Call [phone] to page    1st Call - Day Intern (R1):   Dr. Alves 2nd Call - Day Sr. Resident (R2/R3):   Dr. hCe       Chief Complaint:  Weakness    HPI:  Vielka Briscoe is a 61yo female PMHx ESRD 2/2 DM2 and HTN, on HD MWF. She presents today with generalized weakness and diziness, having missed her dialysis appointment today, and found to have a hemoglobin of 8. She also has a h/o myelodysplastic syndrome and has frequent hospital admissions for similar complaints of weakness requiring dialysis and blood transfusions. She states she has been having weakness and dizziness for the past 2 days, and experienced a fall onto her shoulder at home; denies any CP, SOB, N/V/D, or fever/chills.     Labs showed a potassium of 4.5, BUN of 64, and BNP of 235. She has mild rales on exam. Nephrology was consulted and the pt was taken to dialysis and will be transfused 2 units PRBC.     Review of Systems   Constitutional: Negative for chills and fever.   HENT: Negative for congestion and sore throat.    Eyes: Negative for blurred vision and double vision.   Respiratory: Negative for cough, sputum production and shortness of breath.    Cardiovascular: Negative for chest pain, palpitations, orthopnea and leg swelling.   Gastrointestinal: Negative for abdominal pain, blood in stool, diarrhea, melena, nausea and vomiting.   Genitourinary: Negative for dysuria, frequency and urgency.   Musculoskeletal: Positive for falls. Negative for back pain.   Skin: Negative for itching and rash.   Neurological: Positive for dizziness, weakness and headaches. Negative for  "speech change and focal weakness.   Endo/Heme/Allergies: Negative for environmental allergies and polydipsia.   Psychiatric/Behavioral: Negative for depression and suicidal ideas.             Past Medical History:   Past Medical History:   Diagnosis Date   • Arthritis     hands    • Atrial fibrillation (CMS-[facility])     HX   • Blood transfusion without reported diagnosis    • Breath shortness     w/exertion   • Cancer (CMS-[facility])     MDS in bones   • Cataract     bilat IOL   • Chronic anemia    • Chronic kidney disease        • Chronic obstructive pulmonary disease (CMS-[facility])    • Congestive heart failure (CMS-[facility])    • Dental disorder     full dentures   • Diabetes     Diet controlled   • Dialysis patient     M W F ,   [facility] in [place]   • Glaucoma    • Heart abnormalities    • Hypertension    • MDS (myelodysplastic syndrome) 10/2016    bone marrow biopsy   • Pain -2017    \"bones\", generalized, 5/10   • Stroke (CMS-[facility]) 03/2015    No residual weakness/problems   • Supplemental oxygen dependent     2 liters       Past Surgical History:  Past Surgical History:   Procedure Laterality Date   • BONE MARROW BIOPSY, NDL/TROCAR  9/20/2017    Procedure: BONE MARROW BIOPSY, NDL/TROCAR;  Surgeon: Wade Turner M.D.;  Location: ENDOSCOPY [facility];  Service: Orthopedics   • BONE MARROW ASPIRATION  9/20/2017    Procedure: BONE MARROW ASPIRATION;  Surgeon: Wade Turner M.D.;  Location: ENDOSCOPY [facility];  Service: Orthopedics   • VEIN LIGATION Left 11/10/2016    Procedure: VEIN LIGATION FOR DISTAL REVASCULARIZATION AND INTERVAL LIGATION OF LEFT ARM DIALYISIS ACCESS (DRIL PROCEDURE);  Surgeon: Ranulfo Jolly M.D.;  Location: SURGERY [facility];  Service:    • AV FISTULA CREATION Left 2/8/2016    Procedure: AV FISTULA CREATION UPPER EXTREMITY;  Surgeon: Ranulfo Jolly M.D.;  Location: SURGERY [facility];  Service:    • GASTROSCOPY  12/17/2015    Procedure: ESOPHAGOGASTRODUODENOSCOPY WITH " BIOPSY;  Surgeon: Wing Álvarez M.D.;  Location: SURGERY [facility];  Service:    • COLONOSCOPY  2015    Procedure: COLONOSCOPY;  Surgeon: Wing Álvarez M.D.;  Location: SURGERY [facility];  Service:    • GYN SURGERY      hysterectomy   • GYN SURGERY       x 2   • GYN SURGERY      d&C x2   • OTHER      angioplasty/ stents bilat LE   • OTHER ABDOMINAL SURGERY      appendectomy,  x 2, hysterectomy, D & C   • OTHER ABDOMINAL SURGERY      appendectomy   • OTHER CARDIAC SURGERY      Angioplasty  and    • OTHER CARDIAC SURGERY      cardiac angiogram, angioplasty   • RETINAL DETACHMENT REPAIR Right        Current Outpatient Medications:  Home Medications     Reviewed by Sun Nieto (Pharmacy Tech) on 18 at 1445  Med List Status: Complete   Medication Last Dose Status   bimatoprost (LUMIGAN) 0.03 % Solution 2018 Active   Brimonidine Tartrate-Timolol (COMBIGAN) 0.2-0.5 % Solution 2018 Active   carvedilol (COREG) 12.5 MG Tab 2018 Active   cyclobenzaprine (FLEXERIL) 10 MG Tab week Active   hydrocodone-acetaminophen (NORCO) 5-325 MG Tab per tablet 2018 Active   pregabalin (LYRICA) 50 MG capsule 2018 Active   Pseudoephedrine-APAP-DM (TYLENOL COLD/FLU SEVERE DAY PO) 2018 Active   sitagliptin (JANUVIA) 25 MG Tab 2018 Active                Medication Allergy/Sensitivities:  Allergies   Allergen Reactions   • Actos [Pioglitazone Hydrochloride] Unspecified     Cause blindness   QST=5770   • Darvocet [Propoxyphene N-Apap] Vomiting     NGR=5535   • Demerol Vomiting     RVW=8982   • Glucophage [Metformin Hydrochloride] Vomiting     YYT=0245   • Morphine Vomiting     PPF=2925   • Oxycodone Vomiting     RXN=2016   • Pcn [Penicillins] Vomiting     TDI=2945     • Requip Vomiting     RXN=2015   • Simvastatin Unspecified     Leg cramps  HIS=9447   • Sulfa Drugs Rash     RXN=>10 years   • Tramadol Vomiting     HYO=6366   • Trazodone Vomiting      XEW=6815   • Dilaudid [Hydromorphone] Vomiting     Unknown    • Diphenhydramine Vomiting   • Iron      vomiting   • Lenalidomide Vomiting   • Multivitamin      itching   • Naprosyn [Naproxen]    • Other Drug      Any binders that remove phosphorus from the body such as tums         Family History:  Family History   Problem Relation Age of Onset   • Hypertension Father    • Hypertension Mother        Social History:  Social History     Social History   • Marital status:      Spouse name: N/A   • Number of children: N/A   • Years of education: N/A     Occupational History   • Not on file.     Social History Main Topics   • Smoking status: Never Smoker   • Smokeless tobacco: Never Used   • Alcohol use No   • Drug use: No   • Sexual activity: Not on file     Other Topics Concern   • Not on file     Social History Narrative    ** Merged History Encounter **          PCP : Nyla Nava M.D.      Physical Exam     Vitals:    01/05/18 1401 01/05/18 1430 01/05/18 1712 01/05/18 1715   BP:    (!) (P) 211/89   Pulse: 67 67  (P) 64   Resp: 18 20  (P) 18   Temp:    (P) 35.9 °C (96.7 °F)   SpO2: 100% 98%  (P) 98%   Weight:   65 kg (143 lb 4.8 oz)      Body mass index is 29.95 kg/m².  BP (!) (P) 211/89   Pulse (P) 64   Temp (P) 35.9 °C (96.7 °F)   Resp (P) 18   Wt 65 kg (143 lb 4.8 oz)   LMP 01/01/1995   SpO2 (P) 98%   BMI 29.95 kg/m²   O2 therapy: Pulse Oximetry: (P) 98 %, O2 Delivery: Silicone Nasal Cannula    Physical Exam   Constitutional: She is oriented to person, place, and time and well-developed, well-nourished, and in no distress.   HENT:   Head: Normocephalic and atraumatic.   Mouth/Throat: Oropharynx is clear and moist.   Eyes: EOM are normal. Pupils are equal, round, and reactive to light. No scleral icterus.   Neck: Neck supple. No JVD present. No tracheal deviation present.   Cardiovascular:   Murmur (2/6 systolic) heard.  Pulmonary/Chest: Effort normal and breath sounds normal. No respiratory distress.    Abdominal: Soft. Bowel sounds are normal. She exhibits no distension. There is no rebound and no guarding.   Musculoskeletal: Normal range of motion. She exhibits no edema or tenderness.   Lymphadenopathy:     She has no cervical adenopathy.   Neurological: She is alert and oriented to person, place, and time. No cranial nerve deficit. GCS score is 15.   Skin: Skin is warm and dry.   Psychiatric: Affect and judgment normal.             Data Review       Old Records Request:   Completed  Current Records review and summary: Completed    Lab Data Review:  Recent Results (from the past 24 hour(s))   EKG (ER)    Collection Time: 18 12:09 PM   Result Value Ref Range    Report       [facility] Emergency Dept.    Test Date:  2018  Pt Name:    JESUS SILVEIRA              Department: ER  MRN:        [id number]                      Room:        10  Gender:     Female                       Technician: [id number]  :        1955                   Requested By:ELADIA YADAV  Order #:    [id number]                    Reading MD:    Measurements  Intervals                                Axis  Rate:       67                           P:          -9  NM:         196                          QRS:        -8  QRSD:       86                           T:          60  QT:         480  QTc:        507    Interpretive Statements  SINUS RHYTHM  ABNRM R PROG, CONSIDER ASMI OR LEAD PLACEMENT  BORDERLINE PROLONGED QT INTERVAL  Compared to ECG 2017 06:17:28  Myocardial infarct finding now present     CBC WITH DIFFERENTIAL    Collection Time: 18 12:14 PM   Result Value Ref Range    WBC 4.9 4.8 - 10.8 K/uL    RBC 2.48 (L) 4.20 - 5.40 M/uL    Hemoglobin 8.0 (L) 12.0 - 16.0 g/dL    Hematocrit 24.5 (L) 37.0 - 47.0 %    MCV 98.8 (H) 81.4 - 97.8 fL    MCH 32.3 27.0 - 33.0 pg    MCHC 32.7 (L) 33.6 - 35.0 g/dL    RDW 74.7 (H) 35.9 - 50.0 fL    Platelet Count 154 (L) 164 - 446 K/uL    MPV 13.8 (H) 9.0 -  12.9 fL    Neutrophils-Polys 57.60 44.00 - 72.00 %    Lymphocytes 25.90 22.00 - 41.00 %    Monocytes 11.50 0.00 - 13.40 %    Eosinophils 4.00 0.00 - 6.90 %    Basophils 0.40 0.00 - 1.80 %    Immature Granulocytes 0.60 0.00 - 0.90 %    Nucleated RBC 0.00 /100 WBC    Neutrophils (Absolute) 2.84 2.00 - 7.15 K/uL    Lymphs (Absolute) 1.28 1.00 - 4.80 K/uL    Monos (Absolute) 0.57 0.00 - 0.85 K/uL    Eos (Absolute) 0.20 0.00 - 0.51 K/uL    Baso (Absolute) 0.02 0.00 - 0.12 K/uL    Immature Granulocytes (abs) 0.03 0.00 - 0.11 K/uL    NRBC (Absolute) 0.00 K/uL    Anisocytosis 2+     Macrocytosis 1+    BTYPE NATRIURETIC PEPTIDE    Collection Time: 01/05/18 12:14 PM   Result Value Ref Range    B Natriuretic Peptide 235 (H) 0 - 100 pg/mL   COMP METABOLIC PANEL    Collection Time: 01/05/18 12:14 PM   Result Value Ref Range    Sodium 135 135 - 145 mmol/L    Potassium 4.5 3.6 - 5.5 mmol/L    Chloride 96 96 - 112 mmol/L    Co2 24 20 - 33 mmol/L    Anion Gap 15.0 (H) 0.0 - 11.9    Glucose 206 (H) 65 - 99 mg/dL    Bun 64 (H) 8 - 22 mg/dL    Creatinine 7.13 (HH) 0.50 - 1.40 mg/dL    Calcium 7.2 (L) 8.5 - 10.5 mg/dL    AST(SGOT) 51 (H) 12 - 45 U/L    ALT(SGPT) 86 (H) 2 - 50 U/L    Alkaline Phosphatase 69 30 - 99 U/L    Total Bilirubin 0.4 0.1 - 1.5 mg/dL    Albumin 3.9 3.2 - 4.9 g/dL    Total Protein 7.4 6.0 - 8.2 g/dL    Globulin 3.5 1.9 - 3.5 g/dL    A-G Ratio 1.1 g/dL   PROTHROMBIN TIME    Collection Time: 01/05/18 12:14 PM   Result Value Ref Range    PT 11.9 (L) 12.0 - 14.6 sec    INR 0.90 0.87 - 1.13   COD (ADULT)    Collection Time: 01/05/18 12:14 PM   Result Value Ref Range    Antibody Screen-Cod NEG     ABO Grouping Only AB     Rh Grouping Only POS     Component R       RI3                 RedBloodCellsIRR    [id number]   issued       01/05/18   17:08      Product Type Red Blood Cells IRR LR  AS-3     Dispense Status Issued     Unit Number (Barcoded) [id number]     Product Code (Barcoded) M3893Z11     Blood Type  (Barcoded) 1787    ESTIMATED GFR    Collection Time: 01/05/18 12:14 PM   Result Value Ref Range    GFR If  7 (A) >60 mL/min/1.73 m 2    GFR If Non African American 6 (A) >60 mL/min/1.73 m 2   PERIPHERAL SMEAR REVIEW    Collection Time: 01/05/18 12:14 PM   Result Value Ref Range    Peripheral Smear Review see below    PLATELET ESTIMATE    Collection Time: 01/05/18 12:14 PM   Result Value Ref Range    Plt Estimation Decreased    MORPHOLOGY    Collection Time: 01/05/18 12:14 PM   Result Value Ref Range    RBC Morphology Present    DIFFERENTIAL COMMENT    Collection Time: 01/05/18 12:14 PM   Result Value Ref Range    Comments-Diff see below        Imaging/Procedures Review:    ndependant Imaging Review: Completed  DX-CHEST-PORTABLE (1 VIEW)   Final Result      1.  No acute cardiopulmonary disease.   2.  Stable cardiomegaly.           EKG:   EKG Independant Review: Completed  QTc:507, HR: 67, Normal Sinus Rhythm, no ST/T changes     (yes) Records reviewed and summarized in current documentation             Assessment/Plan     Weakness   Assessment & Plan    symptomatic anemia w/ Hb 8 2/2 MDS, as well as missing dialysis. Several prior admits for the same.  - sent for HD w/ transfusion of 2 units PRBC        MDS (myelodysplastic syndrome) (CMS-HCC)- (present on admission)   Assessment & Plan    Chronic, requiring regular transfusions. Causing anemia/weakness as above.         Chronic respiratory failure with hypoxia, 2L- (present on admission)   Assessment & Plan    Cont supplemental O2 as needed        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 (HCC)- (present on admission)   Assessment & Plan    On HD MWF  Holding januvia while inpatient; started on low-dose ISS  Last A1c 7.3%            Anticipated Hospital stay: Observation admit        Quality Measures    Reviewed items::  EKG reviewed, Labs reviewed, Medications reviewed and Radiology  images reviewed  Bauer catheter::  No Bauer  DVT prophylaxis pharmacological::  Heparin

## 2018-01-06 NOTE — RESPIRATORY CARE
COPD EDUCATION by COPD CLINICAL EDUCATOR  1/6/2018 at 6:53 AM by Dilcia Guajardo     Patient reviewed by COPD education team. Patient does not qualify for COPD program.

## 2018-01-06 NOTE — ASSESSMENT & PLAN NOTE
symptomatic anemia w/ Hb 8.0  2/2 MDS, as well as missing dialysis. Several prior admits for the same.  - sent for HD w/ transfusion of 2 units PRBC  - Hb up to 10.9; symptoms resolved  - Highly recommend increasing exercise/walking and/or seeing outpatient physical therapy

## 2018-01-06 NOTE — DISCHARGE SUMMARY
Internal Medicine Discharge Summary  Note Author: Jesus Alves M.D.       Admit Date:  1/5/2018       Discharge Date:   1/6/2018    Service:   R Internal Medicine Red Team  Attending Physician(s):   Dr. Sutton       Senior Resident(s):   Dr. Che  Gal Resident(s):   Dr. Alves      Primary Diagnosis:   Anemia requiring blood transfusion    Secondary Diagnoses:                Active Problems:    Weakness POA: Unknown    MDS (myelodysplastic syndrome) (CMS-HCC) POA: Yes    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 (HCC) (Chronic) POA: Yes    Chronic respiratory failure with hypoxia, 2L (Chronic) POA: Yes  Resolved Problems:    * No resolved hospital problems. *      Hospital Summary (Brief Narrative):       Vielka Briscoe is a 62 year old female with past history of end-stage renal disease on hemodialysis Mon/Wed/Fri as well as myelodysplastic syndrome, with frequent hospital admissions for complaints of weakness requiring dialysis and blood transfusions. She was admitted again for symptomatic anemia, complaining of generalized weakness and dizziness for 2 days. Patient was transfused 2U PRBC and underwent HD as scheduled. Nephrology was consulted to assist with hemodialysis management and anemia. With treatment, symptoms resolved and patient is now stable for discharge home.  All other chronic medical conditions remained stable during hospital course. We recommended to the patient that she increase the amount of exercise/walking that she gets and/or see a physical therapist as an outpatient, as at least some of her symptoms of generalized weakness seem to be due to physical deconditioning.    Patient /Hospital Summary (Details -- Problem Oriented) :          Weakness   Assessment & Plan    symptomatic anemia w/ Hb 8.0  2/2 MDS, as well as missing dialysis. Several prior admits for the same.  - sent for HD w/ transfusion of 2 units  PRBC  - Hb up to 10.9; symptoms resolved  - Highly recommend increasing exercise/walking and/or seeing outpatient physical therapy        MDS (myelodysplastic syndrome) (CMS-HCC)   Assessment & Plan    Chronic, requiring regular transfusions. Causing anemia/weakness as above.         Chronic respiratory failure with hypoxia, 2L   Assessment & Plan    Cont supplemental O2 as needed        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 (HCC)   Assessment & Plan    On HD MWF  Holding januvia while inpatient; started on low-dose ISS  Last A1c 7.3%            Consultants:     Nephrology (Tereza)    Procedures:        Hemodialysis with transfusion of 2 units PRBC    Imaging/ Testing:      DX-CHEST-PORTABLE (1 VIEW)   Final Result      1.  No acute cardiopulmonary disease.   2.  Stable cardiomegaly.            Discharge Medications:         Medication Reconciliation: Completed       Medication List      ASK your doctor about these medications      Instructions   carvedilol 12.5 MG Tabs  Commonly known as:  COREG   Take 12.5 mg by mouth 2 times a day, with meals.  Dose:  12.5 mg     COMBIGAN 0.2-0.5 % Soln  Generic drug:  Brimonidine Tartrate-Timolol   Place 1 Drop in both eyes 2 Times a Day.  Dose:  1 Drop     cyclobenzaprine 10 MG Tabs  Commonly known as:  FLEXERIL   Take 1 Tab by mouth 3 times a day as needed for Muscle Spasms.  Dose:  10 mg     hydrocodone-acetaminophen 5-325 MG Tabs per tablet  Commonly known as:  NORCO   Take 1 Tab by mouth every four hours as needed.  Dose:  1 Tab     LUMIGAN 0.03 % Soln  Generic drug:  bimatoprost   Place 1 Drop in both eyes every evening.  Dose:  1 Drop     LYRICA 50 MG capsule  Generic drug:  pregabalin   Take 50 mg by mouth 2 times a day.  Dose:  50 mg     sitagliptin 25 MG Tabs  Commonly known as:  JANUVIA   Take 1 Tab by mouth every morning.  Dose:  25 mg     TYLENOL COLD/FLU SEVERE DAY PO   Take 1-2 Tabs by mouth every  6 hours as needed.  Dose:  1-2 Tab              Disposition:   Discharge to home    Diet:   Diabetic    Activity:   As tolerated    Instructions:       The patient was instructed to return to the ER in the event of worsening symptoms. I have counseled the patient on the importance of compliance and the patient has agreed to proceed with all medical recommendations and follow up plan indicated above.   The patient understands that all medications come with benefits and risks. Risks may include permanent injury or death and these risks can be minimized with close reassessment and monitoring.        Primary Care Provider:    Nyla Nava M.D.    Discharge summary faxed to primary care provider:  Completed  Copy of discharge summary given to the patient: Deferred      Follow up appointment details :      Jan 07, 2018  3:00 PM PST  Lab Draw with RN 3  [facility] ([facility]) [address] [place] [id number]  [phone]   Jan 08, 2018  2:00 PM PST  Est Blood Products with RN 5  [facility] ([facility]) [address] [place] [id number]  [phone]     Summary of Follow-up Issues:  Please follow up for generalized weakness; highly recommend increasing exercise/walking and/or referral to outpatient physical therapy    Pending Studies:        None    Time spent on discharge day patient visit, preparing discharge paperwork and arranging for patient follow up.      Discharge Time (Minutes) :    45  Hospital Course Type: Observation Stay      Condition on Discharge  Medically stable and self-ambulatory  ______________________________________________________________________    Interval history/exam for day of discharge:     No events o/n. Pt states she feels much better following dialysis and transfusion. States she feels ready to discharge today and will need transport to [facility].    Review of Systems   Constitutional: Negative for chills and fever.   HENT: Negative for congestion and  "sore throat.    Eyes: Negative for blurred vision and double vision.   Respiratory: Negative for cough, sputum production and shortness of breath.    Cardiovascular: Negative for chest pain, palpitations, orthopnea and leg swelling.   Gastrointestinal: Negative for abdominal pain, blood in stool, diarrhea, melena, nausea and vomiting.   Genitourinary: Negative for dysuria, frequency and urgency.   Musculoskeletal: Positive for falls. Negative for back pain.   Skin: Negative for itching and rash.   Neurological: Negative for dizziness, weakness and headaches. Negative for speech change and focal weakness.   Endo/Heme/Allergies: Negative for environmental allergies and polydipsia.   Psychiatric/Behavioral: Negative for depression and suicidal ideas.     Vitals:    01/05/18 2033 01/05/18 2034 01/05/18 2349 01/06/18 0340   BP: (!) 173/71  157/70 153/68   Pulse: 75  61 (!) 59   Resp: 18 20 18   Temp: 36 °C (96.8 °F)  36.1 °C (96.9 °F) 36.1 °C (96.9 °F)   SpO2: 100%  96% 100%   Weight:  62.8 kg (138 lb 7.2 oz)     Height:  1.473 m (4' 10\")       Weight/BMI: Body mass index is 28.94 kg/m².  Pulse Oximetry: 100 %, O2 (LPM): 2, O2 Delivery: Nasal Cannula    Physical Exam   Constitutional: She is oriented to person, place, and time and well-developed, well-nourished, and in no distress.   HENT:   Head: Normocephalic and atraumatic.   Mouth/Throat: Oropharynx is clear and moist.   Eyes: EOM are normal. Pupils are equal, round, and reactive to light. No scleral icterus.   Neck: Neck supple. No JVD present. No tracheal deviation present.   Cardiovascular:   Murmur (2/6 systolic) heard.  Pulmonary/Chest: Effort normal and breath sounds normal. No respiratory distress.   Abdominal: Soft. Bowel sounds are normal. She exhibits no distension. There is no rebound and no guarding.   Musculoskeletal: Normal range of motion. She exhibits no edema or tenderness.   Lymphadenopathy:     She has no cervical adenopathy.   Neurological: She " is alert and oriented to person, place, and time. No cranial nerve deficit. GCS score is 15.   Skin: Skin is warm and dry.   Psychiatric: Affect and judgment normal.     Most Recent Labs:    Lab Results   Component Value Date/Time    WBC 4.7 (L) 01/06/2018 03:44 AM    RBC 3.56 (L) 01/06/2018 03:44 AM    HEMOGLOBIN 10.9 (L) 01/06/2018 03:44 AM    HEMATOCRIT 31.8 (L) 01/06/2018 03:44 AM    MCV 89.3 01/06/2018 03:44 AM    MCH 30.6 01/06/2018 03:44 AM    MCHC 34.3 01/06/2018 03:44 AM    MPV 12.6 01/06/2018 03:44 AM    NEUTSPOLYS 52.50 01/06/2018 03:44 AM    LYMPHOCYTES 30.50 01/06/2018 03:44 AM    MONOCYTES 11.90 01/06/2018 03:44 AM    EOSINOPHILS 4.10 01/06/2018 03:44 AM    BASOPHILS 0.40 01/06/2018 03:44 AM    HYPOCHROMIA 1+ 08/20/2017 12:01 AM    ANISOCYTOSIS 2+ 01/05/2018 12:14 PM      Lab Results   Component Value Date/Time    SODIUM 134 (L) 01/06/2018 03:44 AM    POTASSIUM 3.7 01/06/2018 03:44 AM    CHLORIDE 96 01/06/2018 03:44 AM    CO2 27 01/06/2018 03:44 AM    GLUCOSE 118 (H) 01/06/2018 03:44 AM    BUN 34 (H) 01/06/2018 03:44 AM    CREATININE 4.88 (H) 01/06/2018 03:44 AM    CREATININE 0.6 07/20/2007 04:00 AM      Lab Results   Component Value Date/Time    ALTSGPT 77 (H) 01/06/2018 03:44 AM    ASTSGOT 50 (H) 01/06/2018 03:44 AM    ALKPHOSPHAT 61 01/06/2018 03:44 AM    TBILIRUBIN 0.5 01/06/2018 03:44 AM    DBILIRUBIN <0.1 01/31/2017 11:03 AM    LIPASE 138 (H) 11/13/2017 12:30 AM    ALBUMIN 3.4 01/06/2018 03:44 AM    ALBUMIN 4.29 09/08/2016 12:50 PM    GLOBULIN 3.3 01/06/2018 03:44 AM    INR 0.90 01/05/2018 12:14 PM    MACROCYTOSIS 1+ 01/05/2018 12:14 PM     Lab Results   Component Value Date/Time    PROTHROMBTM 11.9 (L) 01/05/2018 12:14 PM    INR 0.90 01/05/2018 12:14 PM

## 2018-01-06 NOTE — CONSULTS
DATE OF SERVICE:  2018    REASON FOR CONSULTATION:  This is nephrology consultation for evaluation and   management of end-stage renal disease.    REQUESTING PHYSICIAN:  From the emergency room at [facility].    HISTORY OF PRESENT ILLNESS:  The patient is a 62-year-old female with history   of end-stage renal disease, on dialysis Monday, Wednesday, Friday, secondary   to diabetes mellitus and hypertension, presents to the [facility] ER with   complaints of generalized weakness, found to have a hemoglobin of 8.  She has   had this issue repeatedly and has come to the hospital for transfusions.  She   has history of myelodysplastic syndrome.  Nephrology was hence called to   evaluate her for her ESRD and dialysis management.    REVIEW OF SYSTEMS:  PULMONARY:  No complaints of dyspnea, cough or wheeze.  CARDIOVASCULAR:  No chest pain, pedal edema, orthopnea.  SKIN:  With no evidence of rash, pruritus, or wounds.  GASTROINTESTINAL:  No nausea, vomiting, diarrhea or constipation.  NEUROLOGIC:  She has generalized weakness.  Otherwise, no headaches.  She does   have dizziness.    PAST MEDICAL HISTORY:  1.  ESRD, on hemodialysis Monday, Wednesday, Friday.  2.  Diabetes mellitus.  3.  Hypertension.  4.  Anemia of chronic kidney disease.  5.  Renal osteodystrophy.  6.  Myelodysplastic syndrome.  7.  Chronic obstructive pulmonary disease.  8.  Coronary artery disease.  9.  Cerebrovascular accident.  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  11.  Chronic diastolic heart failure.  12.  Peripheral vascular disease.  13.  Chronic chest pain.    PAST SURGICAL HISTORY:  1.  Cardiac surgery.  2.  Abdominal surgery.  3.  AV fistula placement.  4.  Bilateral cataract repair.  5.  Retinal detachment surgery.  6.  Upper EGD.  7.  Appendectomy.  8.  Bone marrow biopsy.  9.  D and C x2.  10.  .  11.  Hysterectomy.  12.  PermCath placement and removal.    FAMILY HISTORY:  Reviewed and noncontributory to the current  illness.    SOCIAL HISTORY:  She does not smoke, drink alcohol or use illicit drugs.  She   lives with her daughter and used to work as a CNA.    HOME MEDICATIONS:  Reviewed and is in the hospital record.    ALLERGIES:  INCLUDE ACTOS, DARVOCET, DEMEROL, GLUCOPHAGE, MORPHINE, OXYCODONE,   REQUIP, SIMVASTATIN, SULFA DRUGS, TRAMADOL, TRAZODONE, DEMEROL, DILAUDID,   DIPHENHYDRAMINE, IRON, MORPHINE, MULTIVITAMIN, NAPROSYN.    PHYSICAL EXAMINATION:  VITAL SIGNS:  On examination, vitals revealing a blood pressure of 184/72 with   a pulse of 66, temperature 35.9.  GENERAL:  She is lying in bed, in no major hemodynamic distress.  HEENT:  Normocephalic, atraumatic.  NECK:  Supple, no JVD, no thyromegaly.  CHEST:  Clear bilaterally.  CARDIOVASCULAR:  S1, S2 heard.  ABDOMEN:  Soft, nontender, nondistended.  Bowel sounds are present.  EXTREMITIES:  Without any evidence of cyanosis, clubbing or edema.  SKIN:  With no evidence of rash, pruritus, or wounds.  NEUROLOGIC:  Nonfocal, moving all 4 extremities.    LABORATORY DATA:  Sodium 135, potassium 4.5, chloride 96, bicarbonate 24,   glucose 206, BUN and creatinine 64/7.1, calcium 7.2, albumin 3.9.  White count   4.9, hemoglobin 8, platelet count 154.    ASSESSMENT AND PLAN:  1.  End-stage renal disease, on hemodialysis Monday, Wednesday, Friday.  2.  Anemia of chronic kidney disease with acute anemia secondary to her   myelodysplastic syndrome.  There is no evidence of bleeding.  3.  Diabetes mellitus.  4.  Hypertension.  5.  Renal osteodystrophy.  6.  Coronary artery disease.  7.  Chronic obstructive pulmonary disease.    PLAN:  We will arrange for dialysis today per her regular schedule and   transfuse her 2 units with dialysis today.  Avoid nephrotoxins.  Renal dose   medications as necessary.  Place her on a renal low sodium diabetic diet.    Thank you for allowing us to care for the patient.  We will follow her closely   with you.       ____________________________________      MD ALINA ORTIZ / RAMONE    DD:  01/05/2018 15:29:01  DT:  01/05/2018 16:05:42    D#:  [id number]  Job#:  [id number]

## 2018-01-06 NOTE — DISCHARGE PLANNING
Outpatient Dialysis Note    Confirmed patient is active at:    [facility]  [address] [place] [address]      Schedule: Monday, Wednesday, Friday  Time: 10:30 am    Spoke with Loreto at facility who confirmed.    Forwarded records for review.    Dialysis Coordinator, [facility]  Stacia Stephens [phone]

## 2018-01-06 NOTE — PROGRESS NOTES
Pt to unit at this time. Report received by phone from dialysis RN . Pt is A&Ox4. Assessment completed. Left AV fistula has dressing in place, C/D/I, thrill and bruit present. Admission profile completed. BP elevated, rx given as ordered.

## 2018-01-06 NOTE — SENIOR ADMIT NOTE
SENIOR ADMIT NOTE    Patient is a 62 y.o female with MDS, HTN, ESRD on HD, DMII who presented to ED with weakness, dizziness, ad lightheadedness. he also has multiple past admissions for similar symptoms which resolved after transfusion of PRBC.  She was diagnosed with MDS about 4 years ago and has required frequent transfusions ever since. Patient is also on dialysis (MWF) and missed her appointment today due to weakness. In the ED, the patient had a standard lab panel which showed her to be anemic at Hb 8. Chemistry showed normal electrolytes, but elevated creatinine. CXR without signs of fluid overload.     On exam, the patient is hemodynamically stable, afebrile, not tachycardic. Not in distress. Heart sounds regular. Chest auscultation revealed mild rhonchi in right lower lung, otherwise was clear.    A/P:    Anemia most likely due to both MDS and kidney disease  - transfuse 2 units after dialysis  - monitor for signs of hemodynamic instability  - O2 via NC    ESRD  Patient on a schedule of MWD, due for dialysis today but missed due to weakness. Labs without electrolyte abnormalities.  - dialysis today  - avoid nephrotoxic medications  - monitor for signs of fluid overload    DM  Last A1C 7.3. On Januvia 25 mg at home.  - restart home medications    HTN  Well controled on carvedilol. Patient doesn't take BP meds on dialysis days as it decreases her BP too rapidly.  - restart carvedilol tomorrow

## 2018-01-06 NOTE — PROGRESS NOTES
Pt dc'd home, Transported with daughter, Shannon. IV and monitor removed; Pt left unit via wheelchair with Rn x 2. Personal belongings with pt when leaving unit. Home meds returned to pt from pharmacy; Pt given discharge instructions prior to leaving unit including prescription and when to visit with physician; verbalizes understanding. Copy of discharge instructions with pt and in the chart.

## 2018-01-06 NOTE — PROGRESS NOTES
[facility] Progress Note    Hemodialysis treatment ordered today per Dr. Starks x 3 hours. Treatment initiated at 1700, ended at 2000.     Patient tolerated tx; see paper flow sheet for details.     Net UF 1500 mL.     Needles removed from access site. Dressings applied and sites held x 10 minutes; verified no bleeding. Positive bruit/thrill post tx. Staff RN instructed to monitor AVF for breakthrough bleeding. Should breakthrough bleeding occur staff RN instructed to apply pressure to access sites until bleeding resolved. Notify Dialysis and Nephrologist for follow-up.    Report given to Primary RN.

## 2018-01-06 NOTE — ED PROVIDER NOTES
"ED Provider Note    CHIEF COMPLAINT  Chief Complaint   Patient presents with   • Weakness   • Abnormal Labs     stating low h&h        HPI  Vielka Briscoe is a 62 y.o. female who presents to emergency room today with low hemoglobin, generalized weakness and shortness of breath. Patient has a history of myelodysplastic syndrome has frequent transfusion. States that \"I think I need a transfusion\" reports weakness and difficulty walking and fatigue shortness of breath increased over the last 1-2 weeks. She has a history of chronic renal failure dialysis dependent was unable to make her dialysis today because of the weakness. Denies chest pain fever chills change to bowel or bladder.    REVIEW OF SYSTEMS  See HPI for further details. All other systems are negative.     PAST MEDICAL HISTORY  Past Medical History:   Diagnosis Date   • MDS (myelodysplastic syndrome) 10/2016    bone marrow biopsy   • Stroke (CMS-HCC) 03/2015    No residual weakness/problems   • Arthritis     hands    • Atrial fibrillation (CMS-[facility])     HX   • Blood transfusion without reported diagnosis    • Breath shortness     w/exertion   • Cancer (CMS-[facility])     MDS in bones   • Cataract     bilat IOL   • Chronic anemia    • Chronic kidney disease        • Chronic obstructive pulmonary disease (CMS-[facility])    • Congestive heart failure (CMS-HCC)    • Dental disorder     full dentures   • Diabetes     Diet controlled   • Dialysis patient     [facility] F ,   [facility] [place]   • Glaucoma    • Heart abnormalities    • Hypertension    • Pain -2017    \"bones\", generalized, 5/10   • Supplemental oxygen dependent     2 liters       FAMILY HISTORY  [unfilled]    SOCIAL HISTORY  Social History     Social History   • Marital status:      Spouse name: N/A   • Number of children: N/A   • Years of education: N/A     Social History Main Topics   • Smoking status: Never Smoker   • Smokeless tobacco: Never Used   • Alcohol use No   • Drug use: No "   • Sexual activity: Not on file     Other Topics Concern   • Not on file     Social History Narrative    ** Merged History Encounter **            SURGICAL HISTORY  Past Surgical History:   Procedure Laterality Date   • BONE MARROW BIOPSY, NDL/TROCAR  2017    Procedure: BONE MARROW BIOPSY, NDL/TROCAR;  Surgeon: Wade Turner M.D.;  Location: [facility];  Service: Orthopedics   • BONE MARROW ASPIRATION  2017    Procedure: BONE MARROW ASPIRATION;  Surgeon: Wade Turner M.D.;  Location: ENDOSCOPY [facility];  Service: Orthopedics   • VEIN LIGATION Left 11/10/2016    Procedure: VEIN LIGATION FOR DISTAL REVASCULARIZATION AND INTERVAL LIGATION OF LEFT ARM DIALYISIS ACCESS (DRIL PROCEDURE);  Surgeon: Ranulfo Jolly M.D.;  Location: SURGERY [facility];  Service:    • AV FISTULA CREATION Left 2016    Procedure: AV FISTULA CREATION UPPER EXTREMITY;  Surgeon: Ranulfo Jolly M.D.;  Location: SURGERY [facility];  Service:    • GASTROSCOPY  2015    Procedure: ESOPHAGOGASTRODUODENOSCOPY WITH BIOPSY;  Surgeon: Wing Álvarez M.D.;  Location: SURGERY [facility];  Service:    • COLONOSCOPY  2015    Procedure: COLONOSCOPY;  Surgeon: Wing Álvarez M.D.;  Location: SURGERY [facility];  Service:    • GYN SURGERY      hysterectomy   • GYN SURGERY       x 2   • GYN SURGERY      d&C x2   • OTHER      angioplasty/ stents bilat LE   • OTHER ABDOMINAL SURGERY      appendectomy,  x 2, hysterectomy, D & C   • OTHER ABDOMINAL SURGERY      appendectomy   • OTHER CARDIAC SURGERY      Angioplasty  and    • OTHER CARDIAC SURGERY      cardiac angiogram, angioplasty   • RETINAL DETACHMENT REPAIR Right        CURRENT MEDICATIONS  Home Medications     Reviewed by Sun Nieto (Pharmacy Tech) on 18 at 1445  Med List Status: Complete   Medication Last Dose Status   bimatoprost (LUMIGAN) 0.03 % Solution 2018 Active   Brimonidine  Tartrate-Timolol (COMBIGAN) 0.2-0.5 % Solution 1/4/2018 Active   carvedilol (COREG) 12.5 MG Tab 1/4/2018 Active   cyclobenzaprine (FLEXERIL) 10 MG Tab week Active   hydrocodone-acetaminophen (NORCO) 5-325 MG Tab per tablet 1/4/2018 Active   pregabalin (LYRICA) 50 MG capsule 1/4/2018 Active   Pseudoephedrine-APAP-DM (TYLENOL COLD/FLU SEVERE DAY PO) 1/4/2018 Active   sitagliptin (JANUVIA) 25 MG Tab 1/4/2018 Active                ALLERGIES  Allergies   Allergen Reactions   • Actos [Pioglitazone Hydrochloride] Unspecified     Cause blindness   YRW=7572   • Darvocet [Propoxyphene N-Apap] Vomiting     XYG=8671   • Demerol Vomiting     JZU=0972   • Glucophage [Metformin Hydrochloride] Vomiting     ESK=8096   • Morphine Vomiting     YUH=7754   • Oxycodone Vomiting     RXN=1/2016   • Pcn [Penicillins] Vomiting     EQU=5528     • Requip Vomiting     RXN=12/2015   • Simvastatin Unspecified     Leg cramps  NXQ=7706   • Sulfa Drugs Rash     RXN=>10 years   • Tramadol Vomiting     PDL=2634   • Trazodone Vomiting     UVV=6069   • Dilaudid [Hydromorphone] Vomiting     Unknown    • Diphenhydramine Vomiting   • Iron      vomiting   • Lenalidomide Vomiting   • Multivitamin      itching   • Naprosyn [Naproxen]    • Other Drug      Any binders that remove phosphorus from the body such as tums       PHYSICAL EXAM  VITAL SIGNS: BP (!) 219/94   Pulse 64   Temp 35.9 °C (96.7 °F)   Resp 19   Wt 65 kg (143 lb 4.8 oz)   LMP 01/01/1995   SpO2 98%   BMI 29.95 kg/m²  Room air O2: 100    Constitutional: Well developed, Well nourished, acute distress, Non-toxic appearance.   HENT: Normocephalic, Atraumatic, Bilateral external ears normal, Oropharynx moist, No oral exudates, Nose normal.   Eyes: PERRLA, EOMI, Conjunctiva normal, No discharge.   Neck: Normal range of motion, No tenderness, Supple, No stridor.   Lymphatic: No lymphadenopathy noted.   Cardiovascular: Normal heart rate, Normal rhythm, No murmurs, No rubs, No gallops.   Thorax &  Lungs: Normal breath sounds, No respiratory distress, No wheezing, No chest tenderness.   Abdomen: Bowel sounds normal, Soft, No tenderness, No masses, No pulsatile masses.   Skin: Warm, Dry, No erythema, No rash.   Back: No tenderness, No CVA tenderness.    Extremities: Intact distal pulses, No edema, No tenderness, No cyanosis, No clubbing.   Musculoskeletal: Good range of motion in all major joints. No tenderness to palpation or major deformities noted.   Neurologic: Alert & oriented x 3, Normal motor function, Normal sensory function, No focal deficits noted.   Psychiatric: Affect normal, Judgment normal, Mood normal.     EKG  Normal sinus rhythm at 60 beats per minute, no ST elevation or depression, no widening of QRS complex, poor R-wave progression, pr intervals are normal, no diagnostic Q waves, is a 12-lead EKG there is no previous EKG available this time for comparison.    RADIOLOGY/PROCEDURES  DX-CHEST-PORTABLE (1 VIEW)   Final Result      1.  No acute cardiopulmonary disease.   2.  Stable cardiomegaly.            COURSE & MEDICAL DECISION MAKING  Pertinent Labs & Imaging studies reviewed. (See chart for details)  Patient has hemoglobin 8 his symptomatic we'll transfuse she has oncology problem with myelodysplasia with frequent transfusions. Discussed case with hospitalist also discussed case with nephrologist on call for dialysis. Patient admitted to the hospital please see orders for further plan.    FINAL IMPRESSION  1. Symptomatic anemia  2. Chronic renal failure, dialysis dependent  3. Dyspnea  4. Diabetes mellitus history         Electronically signed by: Margarito Hyde, 1/5/2018 6:17 PM

## 2018-01-06 NOTE — DISCHARGE PLANNING
[facility] van will transport pt home on dc today at 1130.  Pt aware and agreeable.  Pt's daughter Shannon aware also.  Pt reports having the key to get in to her house.  No other needs verbalized/id'd by pt/staff/family.  #[id number] work order for transport.

## 2018-01-06 NOTE — PROGRESS NOTES
[facility] Nephrology Progress Note, Adult, Complex               Author: Jose A Starks Date & Time created: 1/6/2018  10:13 AM     Interval History:  The patient is a 62-year-old female with history   of end-stage renal disease, on dialysis Monday, Wednesday, Friday, secondary   to diabetes mellitus and hypertension, presents to the [facility] ER with   complaints of generalized weakness, found to have a hemoglobin of 8.  She has   had this issue repeatedly and has come to the hospital for transfusions.  She   has history of myelodysplastic syndrome.  Nephrology was hence called to   evaluate her for her ESRD and dialysis management.    Daily Nephrology Summary:  01/06/18 - s/p HD yesterday with 2 units of PRBCs. Feels ok.    /Review of Systems:  Review of Systems   Constitutional: Negative.    HENT: Negative.    Eyes: Negative.    Respiratory: Negative.    Cardiovascular: Negative.    Gastrointestinal: Negative.    Musculoskeletal: Negative.    Skin: Negative.    Neurological: Negative.    Endo/Heme/Allergies: Negative.    Psychiatric/Behavioral: Negative.        Physical Exam:  Physical Exam   Constitutional: She is oriented to person, place, and time. She appears well-developed and well-nourished.   HENT:   Head: Normocephalic and atraumatic.   Eyes: Conjunctivae and EOM are normal. Pupils are equal, round, and reactive to light.   Neck: Normal range of motion. Neck supple.   Cardiovascular: Normal rate and regular rhythm.    Pulmonary/Chest: Effort normal and breath sounds normal.   Abdominal: Soft. Bowel sounds are normal.   Musculoskeletal: Normal range of motion.   Neurological: She is alert and oriented to person, place, and time.   Skin: Skin is warm and dry.   Nursing note and vitals reviewed.      Labs:        Invalid input(s): [id number]  Recent Labs      01/05/18   1214   BNPBTYPENAT  235*     Recent Labs      01/05/18   1214  01/06/18   0344   SODIUM  135  134*   POTASSIUM  4.5  3.7   CHLORIDE  96   96   CO2  24  27   BUN  64*  34*   CREATININE  7.13*  4.88*   MAGNESIUM   --   2.0   CALCIUM  7.2*  7.5*     Recent Labs      18   1214  18   0344   ALTSGPT  86*  77*   ASTSGOT  51*  50*   ALKPHOSPHAT  69  61   TBILIRUBIN  0.4  0.5   GLUCOSE  206*  118*     Recent Labs      18   1214  18   0344   RBC  2.48*  3.56*   HEMOGLOBIN  8.0*  10.9*   HEMATOCRIT  24.5*  31.8*   PLATELETCT  154*  133*   PROTHROMBTM  11.9*   --    INR  0.90   --      Recent Labs      18   1214  18   0344   WBC  4.9  4.7*   NEUTSPOLYS  57.60  52.50   LYMPHOCYTES  25.90  30.50   MONOCYTES  11.50  11.90   EOSINOPHILS  4.00  4.10   BASOPHILS  0.40  0.40   ASTSGOT  51*  50*   ALTSGPT  86*  77*   ALKPHOSPHAT  69  61   TBILIRUBIN  0.4  0.5           Hemodynamics:  Temp (24hrs), Av.9 °C (96.7 °F), Min:35.8 °C (96.5 °F), Max:36.1 °C (96.9 °F)  Temperature: 35.8 °C (96.5 °F)  Pulse  Av.8  Min: 58  Max: 75Heart Rate (Monitored): 100  Blood Pressure: 156/69, NIBP: 147/65     Respiratory:    Respiration: 17, Pulse Oximetry: 99 %           Fluids:    Intake/Output Summary (Last 24 hours) at 18 1013  Last data filed at 18   Gross per 24 hour   Intake             2200 ml   Output             3000 ml   Net             -800 ml     Weight: 62.8 kg (138 lb 7.2 oz)  GI/Nutrition:  Orders Placed This Encounter   Procedures   • Diet Order     Standing Status:   Standing     Number of Occurrences:   1     Order Specific Question:   Diet:     Answer:   Diabetic [3]     Medical Decision Making, by Problem:  Active Hospital Problems    Diagnosis   • Weakness [R53.1]   • MDS (myelodysplastic syndrome) (CMS-HCC) [D46.9]   • Chronic respiratory failure with hypoxia, 2L [J96.11]   • 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  End-stage renal disease, on hemodialysis Monday, Wednesday, Friday.  2.  Anemia of chronic kidney disease with acute anemia secondary to her   myelodysplastic syndrome.  There is no evidence of bleeding. S/p 2 units PRBC on 1/05/18  3.  Diabetes mellitus.  4.  Hypertension.  5.  Renal osteodystrophy.  6.  Coronary artery disease.  7.  Chronic obstructive pulmonary disease.    No plans for HD today  Ok to d/c from renal standpoint

## 2018-01-07 ENCOUNTER — TELEPHONE (OUTPATIENT)
Dept: ONCOLOGY | Facility: MEDICAL CENTER | Age: 63
End: 2018-01-07

## 2018-01-07 ENCOUNTER — APPOINTMENT (OUTPATIENT)
Dept: ONCOLOGY | Facility: MEDICAL CENTER | Age: 63
End: 2018-01-07
Attending: INTERNAL MEDICINE
Payer: MEDICARE

## 2018-01-07 ENCOUNTER — PATIENT OUTREACH (OUTPATIENT)
Dept: HEALTH INFORMATION MANAGEMENT | Facility: OTHER | Age: 63
End: 2018-01-07

## 2018-01-07 NOTE — TELEPHONE ENCOUNTER
Spoke with Dr Starks regarding patient's appointments for today and tomorrow for COD and PRBC transfusion.  Patient was hospitalized and received 2 units PRBC on 1/5.  Hgb yesterday, 1/6, is 10.9.  Asked MD if patient stilled needed to come for clinic appointments.  Per Dr Starks, he saw her in hospital and she does not need to come to clinic for appointments since she received blood transfusions inpatient.

## 2018-01-07 NOTE — PROGRESS NOTES
Placed discharge outreach phone call to patient s/p hospital discharge 1/6/18.  Left voicemail providing my contact information and instructions to call with any questions or concerns.

## 2018-01-08 ENCOUNTER — APPOINTMENT (OUTPATIENT)
Dept: ONCOLOGY | Facility: MEDICAL CENTER | Age: 63
End: 2018-01-08
Attending: INTERNAL MEDICINE
Payer: MEDICARE

## 2018-01-22 ENCOUNTER — APPOINTMENT (OUTPATIENT)
Dept: RADIOLOGY | Facility: MEDICAL CENTER | Age: 63
DRG: 438 | End: 2018-01-22
Attending: EMERGENCY MEDICINE
Payer: MEDICARE

## 2018-01-22 ENCOUNTER — HOSPITAL ENCOUNTER (INPATIENT)
Facility: MEDICAL CENTER | Age: 63
LOS: 5 days | DRG: 438 | End: 2018-01-27
Attending: EMERGENCY MEDICINE | Admitting: INTERNAL MEDICINE
Payer: MEDICARE

## 2018-01-22 ENCOUNTER — RESOLUTE PROFESSIONAL BILLING HOSPITAL PROF FEE (OUTPATIENT)
Dept: MEDSURG UNIT | Facility: MEDICAL CENTER | Age: 63
End: 2018-01-22
Payer: MEDICARE

## 2018-01-22 ENCOUNTER — APPOINTMENT (OUTPATIENT)
Dept: RADIOLOGY | Facility: MEDICAL CENTER | Age: 63
DRG: 438 | End: 2018-01-22
Attending: STUDENT IN AN ORGANIZED HEALTH CARE EDUCATION/TRAINING PROGRAM
Payer: MEDICARE

## 2018-01-22 DIAGNOSIS — R51.9 ACUTE NONINTRACTABLE HEADACHE, UNSPECIFIED HEADACHE TYPE: ICD-10-CM

## 2018-01-22 DIAGNOSIS — I16.0 HYPERTENSIVE URGENCY: ICD-10-CM

## 2018-01-22 DIAGNOSIS — K85.90 ACUTE PANCREATITIS, UNSPECIFIED COMPLICATION STATUS, UNSPECIFIED PANCREATITIS TYPE: ICD-10-CM

## 2018-01-22 DIAGNOSIS — D46.9 MDS (MYELODYSPLASTIC SYNDROME) (HCC): ICD-10-CM

## 2018-01-22 DIAGNOSIS — R11.2 NAUSEA AND VOMITING, INTRACTABILITY OF VOMITING NOT SPECIFIED, UNSPECIFIED VOMITING TYPE: ICD-10-CM

## 2018-01-22 DIAGNOSIS — I10 ESSENTIAL HYPERTENSION, MALIGNANT: Chronic | ICD-10-CM

## 2018-01-22 DIAGNOSIS — E55.9 VITAMIN D DEFICIENCY: ICD-10-CM

## 2018-01-22 DIAGNOSIS — Z99.2 ESRD (END STAGE RENAL DISEASE) ON DIALYSIS (HCC): ICD-10-CM

## 2018-01-22 DIAGNOSIS — E08.21 DIABETES MELLITUS DUE TO UNDERLYING CONDITION WITH DIABETIC NEPHROPATHY, UNSPECIFIED LONG TERM INSULIN USE STATUS: Chronic | ICD-10-CM

## 2018-01-22 DIAGNOSIS — I27.20 PULMONARY HYPERTENSION (HCC): ICD-10-CM

## 2018-01-22 DIAGNOSIS — H54.8 LEGALLY BLIND: Chronic | ICD-10-CM

## 2018-01-22 DIAGNOSIS — N18.6 ESRD (END STAGE RENAL DISEASE) ON DIALYSIS (HCC): ICD-10-CM

## 2018-01-22 DIAGNOSIS — D64.9 NORMOCYTIC ANEMIA: ICD-10-CM

## 2018-01-22 DIAGNOSIS — J96.11 CHRONIC RESPIRATORY FAILURE WITH HYPOXIA (HCC): ICD-10-CM

## 2018-01-22 DIAGNOSIS — N18.6 ESRD (END STAGE RENAL DISEASE) (HCC): ICD-10-CM

## 2018-01-22 PROBLEM — K92.0 HEMATEMESIS: Status: ACTIVE | Noted: 2018-01-22

## 2018-01-22 PROBLEM — K92.2 UPPER GI BLEED: Status: ACTIVE | Noted: 2018-01-22

## 2018-01-22 LAB
ALBUMIN SERPL BCP-MCNC: 3.8 G/DL (ref 3.2–4.9)
ALBUMIN/GLOB SERPL: 1 G/DL
ALP SERPL-CCNC: 79 U/L (ref 30–99)
ALT SERPL-CCNC: 15 U/L (ref 2–50)
ANION GAP SERPL CALC-SCNC: 18 MMOL/L (ref 0–11.9)
AST SERPL-CCNC: 21 U/L (ref 12–45)
BILIRUB SERPL-MCNC: 0.7 MG/DL (ref 0.1–1.5)
BUN SERPL-MCNC: 39 MG/DL (ref 8–22)
CALCIUM SERPL-MCNC: 8.7 MG/DL (ref 8.5–10.5)
CHLORIDE SERPL-SCNC: 93 MMOL/L (ref 96–112)
CO2 SERPL-SCNC: 25 MMOL/L (ref 20–33)
CREAT SERPL-MCNC: 4.21 MG/DL (ref 0.5–1.4)
EKG IMPRESSION: NORMAL
ERYTHROCYTE [DISTWIDTH] IN BLOOD BY AUTOMATED COUNT: 71 FL (ref 35.9–50)
ERYTHROCYTE [DISTWIDTH] IN BLOOD BY AUTOMATED COUNT: 73.4 FL (ref 35.9–50)
GLOBULIN SER CALC-MCNC: 3.8 G/DL (ref 1.9–3.5)
GLUCOSE SERPL-MCNC: 140 MG/DL (ref 65–99)
HCT VFR BLD AUTO: 28.5 % (ref 37–47)
HCT VFR BLD AUTO: 30.7 % (ref 37–47)
HGB BLD-MCNC: 10.3 G/DL (ref 12–16)
HGB BLD-MCNC: 9.5 G/DL (ref 12–16)
LIPASE SERPL-CCNC: 264 U/L (ref 11–82)
MAGNESIUM SERPL-MCNC: 2.2 MG/DL (ref 1.5–2.5)
MCH RBC QN AUTO: 32.1 PG (ref 27–33)
MCH RBC QN AUTO: 32.3 PG (ref 27–33)
MCHC RBC AUTO-ENTMCNC: 33.3 G/DL (ref 33.6–35)
MCHC RBC AUTO-ENTMCNC: 33.6 G/DL (ref 33.6–35)
MCV RBC AUTO: 95.6 FL (ref 81.4–97.8)
MCV RBC AUTO: 96.9 FL (ref 81.4–97.8)
PLATELET # BLD AUTO: 113 K/UL (ref 164–446)
PLATELET # BLD AUTO: 119 K/UL (ref 164–446)
PMV BLD AUTO: 14 FL (ref 9–12.9)
PMV BLD AUTO: 14.1 FL (ref 9–12.9)
POTASSIUM SERPL-SCNC: 3.8 MMOL/L (ref 3.6–5.5)
PROT SERPL-MCNC: 7.6 G/DL (ref 6–8.2)
RBC # BLD AUTO: 2.94 M/UL (ref 4.2–5.4)
RBC # BLD AUTO: 3.21 M/UL (ref 4.2–5.4)
SODIUM SERPL-SCNC: 136 MMOL/L (ref 135–145)
TROPONIN I SERPL-MCNC: <0.01 NG/ML (ref 0–0.04)
WBC # BLD AUTO: 5.7 K/UL (ref 4.8–10.8)
WBC # BLD AUTO: 6.5 K/UL (ref 4.8–10.8)

## 2018-01-22 PROCEDURE — 700105 HCHG RX REV CODE 258: Performed by: STUDENT IN AN ORGANIZED HEALTH CARE EDUCATION/TRAINING PROGRAM

## 2018-01-22 PROCEDURE — 74150 CT ABDOMEN W/O CONTRAST: CPT

## 2018-01-22 PROCEDURE — 700111 HCHG RX REV CODE 636 W/ 250 OVERRIDE (IP): Performed by: EMERGENCY MEDICINE

## 2018-01-22 PROCEDURE — 85027 COMPLETE CBC AUTOMATED: CPT

## 2018-01-22 PROCEDURE — 96366 THER/PROPH/DIAG IV INF ADDON: CPT

## 2018-01-22 PROCEDURE — 84484 ASSAY OF TROPONIN QUANT: CPT

## 2018-01-22 PROCEDURE — 71045 X-RAY EXAM CHEST 1 VIEW: CPT

## 2018-01-22 PROCEDURE — C9113 INJ PANTOPRAZOLE SODIUM, VIA: HCPCS | Performed by: STUDENT IN AN ORGANIZED HEALTH CARE EDUCATION/TRAINING PROGRAM

## 2018-01-22 PROCEDURE — 96365 THER/PROPH/DIAG IV INF INIT: CPT

## 2018-01-22 PROCEDURE — A9270 NON-COVERED ITEM OR SERVICE: HCPCS | Performed by: STUDENT IN AN ORGANIZED HEALTH CARE EDUCATION/TRAINING PROGRAM

## 2018-01-22 PROCEDURE — 83690 ASSAY OF LIPASE: CPT

## 2018-01-22 PROCEDURE — 83735 ASSAY OF MAGNESIUM: CPT

## 2018-01-22 PROCEDURE — 93005 ELECTROCARDIOGRAM TRACING: CPT | Performed by: EMERGENCY MEDICINE

## 2018-01-22 PROCEDURE — 70450 CT HEAD/BRAIN W/O DYE: CPT

## 2018-01-22 PROCEDURE — 82962 GLUCOSE BLOOD TEST: CPT

## 2018-01-22 PROCEDURE — 700102 HCHG RX REV CODE 250 W/ 637 OVERRIDE(OP): Performed by: STUDENT IN AN ORGANIZED HEALTH CARE EDUCATION/TRAINING PROGRAM

## 2018-01-22 PROCEDURE — 82728 ASSAY OF FERRITIN: CPT

## 2018-01-22 PROCEDURE — 99285 EMERGENCY DEPT VISIT HI MDM: CPT

## 2018-01-22 PROCEDURE — 770006 HCHG ROOM/CARE - MED/SURG/GYN SEMI*

## 2018-01-22 PROCEDURE — 700111 HCHG RX REV CODE 636 W/ 250 OVERRIDE (IP): Performed by: STUDENT IN AN ORGANIZED HEALTH CARE EDUCATION/TRAINING PROGRAM

## 2018-01-22 PROCEDURE — 80053 COMPREHEN METABOLIC PANEL: CPT

## 2018-01-22 PROCEDURE — 93005 ELECTROCARDIOGRAM TRACING: CPT

## 2018-01-22 PROCEDURE — 96375 TX/PRO/DX INJ NEW DRUG ADDON: CPT

## 2018-01-22 RX ORDER — POLYETHYLENE GLYCOL 3350 17 G/17G
1 POWDER, FOR SOLUTION ORAL
Status: DISCONTINUED | OUTPATIENT
Start: 2018-01-22 | End: 2018-01-22

## 2018-01-22 RX ORDER — BRIMONIDINE TARTRATE 2 MG/ML
1 SOLUTION/ DROPS OPHTHALMIC 2 TIMES DAILY
Status: DISCONTINUED | OUTPATIENT
Start: 2018-01-22 | End: 2018-01-27 | Stop reason: HOSPADM

## 2018-01-22 RX ORDER — LATANOPROST 50 UG/ML
1 SOLUTION/ DROPS OPHTHALMIC EVERY EVENING
Status: DISCONTINUED | OUTPATIENT
Start: 2018-01-22 | End: 2018-01-27 | Stop reason: HOSPADM

## 2018-01-22 RX ORDER — BIMATOPROST 0.3 MG/ML
1 SOLUTION/ DROPS OPHTHALMIC EVERY EVENING
Status: DISCONTINUED | OUTPATIENT
Start: 2018-01-22 | End: 2018-01-22

## 2018-01-22 RX ORDER — PREGABALIN 25 MG/1
50 CAPSULE ORAL 2 TIMES DAILY
Status: DISCONTINUED | OUTPATIENT
Start: 2018-01-22 | End: 2018-01-27 | Stop reason: HOSPADM

## 2018-01-22 RX ORDER — ONDANSETRON 2 MG/ML
4 INJECTION INTRAMUSCULAR; INTRAVENOUS ONCE
Status: COMPLETED | OUTPATIENT
Start: 2018-01-22 | End: 2018-01-22

## 2018-01-22 RX ORDER — CARVEDILOL 12.5 MG/1
12.5 TABLET ORAL 2 TIMES DAILY WITH MEALS
Status: DISCONTINUED | OUTPATIENT
Start: 2018-01-22 | End: 2018-01-23

## 2018-01-22 RX ORDER — AMOXICILLIN 250 MG
2 CAPSULE ORAL 2 TIMES DAILY
Status: DISCONTINUED | OUTPATIENT
Start: 2018-01-22 | End: 2018-01-22

## 2018-01-22 RX ORDER — POLYETHYLENE GLYCOL 3350 17 G/17G
1 POWDER, FOR SOLUTION ORAL
Status: DISCONTINUED | OUTPATIENT
Start: 2018-01-22 | End: 2018-01-25

## 2018-01-22 RX ORDER — HEPARIN SODIUM 5000 [USP'U]/ML
5000 INJECTION, SOLUTION INTRAVENOUS; SUBCUTANEOUS EVERY 8 HOURS
Status: DISCONTINUED | OUTPATIENT
Start: 2018-01-22 | End: 2018-01-23

## 2018-01-22 RX ORDER — LABETALOL HYDROCHLORIDE 5 MG/ML
10 INJECTION, SOLUTION INTRAVENOUS EVERY 4 HOURS PRN
Status: DISCONTINUED | OUTPATIENT
Start: 2018-01-22 | End: 2018-01-27 | Stop reason: HOSPADM

## 2018-01-22 RX ORDER — PANTOPRAZOLE SODIUM 40 MG/10ML
80 INJECTION, POWDER, LYOPHILIZED, FOR SOLUTION INTRAVENOUS ONCE
Status: COMPLETED | OUTPATIENT
Start: 2018-01-22 | End: 2018-01-22

## 2018-01-22 RX ORDER — BRIMONIDINE TARTRATE AND TIMOLOL MALEATE 2; 5 MG/ML; MG/ML
1 SOLUTION OPHTHALMIC 2 TIMES DAILY
Status: DISCONTINUED | OUTPATIENT
Start: 2018-01-22 | End: 2018-01-22

## 2018-01-22 RX ORDER — LABETALOL HYDROCHLORIDE 5 MG/ML
10 INJECTION, SOLUTION INTRAVENOUS ONCE
Status: DISCONTINUED | OUTPATIENT
Start: 2018-01-22 | End: 2018-01-23

## 2018-01-22 RX ORDER — TIMOLOL MALEATE 5 MG/ML
1 SOLUTION/ DROPS OPHTHALMIC 2 TIMES DAILY
Status: DISCONTINUED | OUTPATIENT
Start: 2018-01-22 | End: 2018-01-27 | Stop reason: HOSPADM

## 2018-01-22 RX ORDER — BISACODYL 10 MG
10 SUPPOSITORY, RECTAL RECTAL
Status: DISCONTINUED | OUTPATIENT
Start: 2018-01-22 | End: 2018-01-22

## 2018-01-22 RX ORDER — DEXTROSE MONOHYDRATE 25 G/50ML
25 INJECTION, SOLUTION INTRAVENOUS
Status: DISCONTINUED | OUTPATIENT
Start: 2018-01-22 | End: 2018-01-23

## 2018-01-22 RX ORDER — BISACODYL 10 MG
10 SUPPOSITORY, RECTAL RECTAL
Status: DISCONTINUED | OUTPATIENT
Start: 2018-01-22 | End: 2018-01-25

## 2018-01-22 RX ORDER — AMOXICILLIN 250 MG
2 CAPSULE ORAL 2 TIMES DAILY
Status: DISCONTINUED | OUTPATIENT
Start: 2018-01-22 | End: 2018-01-23

## 2018-01-22 RX ORDER — GUAIFENESIN/DEXTROMETHORPHAN 100-10MG/5
10 SYRUP ORAL EVERY 6 HOURS PRN
Status: DISCONTINUED | OUTPATIENT
Start: 2018-01-22 | End: 2018-01-27 | Stop reason: HOSPADM

## 2018-01-22 RX ORDER — SODIUM CHLORIDE 9 MG/ML
INJECTION, SOLUTION INTRAVENOUS CONTINUOUS
Status: DISCONTINUED | OUTPATIENT
Start: 2018-01-22 | End: 2018-01-24

## 2018-01-22 RX ORDER — ACETAMINOPHEN 325 MG/1
650 TABLET ORAL EVERY 6 HOURS PRN
Status: DISCONTINUED | OUTPATIENT
Start: 2018-01-22 | End: 2018-01-27 | Stop reason: HOSPADM

## 2018-01-22 RX ADMIN — PANTOPRAZOLE SODIUM 80 MG: 40 INJECTION, POWDER, FOR SOLUTION INTRAVENOUS at 20:50

## 2018-01-22 RX ADMIN — HEPARIN SODIUM 5000 UNITS: 5000 INJECTION, SOLUTION INTRAVENOUS; SUBCUTANEOUS at 23:40

## 2018-01-22 RX ADMIN — PREGABALIN 50 MG: 25 CAPSULE ORAL at 23:41

## 2018-01-22 RX ADMIN — ONDANSETRON 4 MG: 2 INJECTION INTRAMUSCULAR; INTRAVENOUS at 16:49

## 2018-01-22 RX ADMIN — SODIUM CHLORIDE 8 MG/HR: 9 INJECTION, SOLUTION INTRAVENOUS at 20:58

## 2018-01-22 RX ADMIN — SODIUM CHLORIDE: 9 INJECTION, SOLUTION INTRAVENOUS at 23:43

## 2018-01-22 RX ADMIN — PROCHLORPERAZINE EDISYLATE 10 MG: 5 INJECTION INTRAMUSCULAR; INTRAVENOUS at 20:55

## 2018-01-22 RX ADMIN — CARVEDILOL 12.5 MG: 12.5 TABLET, FILM COATED ORAL at 23:41

## 2018-01-22 ASSESSMENT — PATIENT HEALTH QUESTIONNAIRE - PHQ9
SUM OF ALL RESPONSES TO PHQ9 QUESTIONS 1 AND 2: 0
SUM OF ALL RESPONSES TO PHQ QUESTIONS 1-9: 0
1. LITTLE INTEREST OR PLEASURE IN DOING THINGS: NOT AT ALL
2. FEELING DOWN, DEPRESSED, IRRITABLE, OR HOPELESS: NOT AT ALL

## 2018-01-22 ASSESSMENT — ENCOUNTER SYMPTOMS
BLOOD IN STOOL: 0
LOSS OF CONSCIOUSNESS: 0
TREMORS: 1
COUGH: 0
ABDOMINAL PAIN: 1
HEADACHES: 0
DIAPHORESIS: 1
CONSTIPATION: 0
DEPRESSION: 0
POLYDIPSIA: 0
HEARTBURN: 1
DIZZINESS: 1
SORE THROAT: 0
CHILLS: 1
SEIZURES: 0
SPEECH CHANGE: 0
DIARRHEA: 0
SHORTNESS OF BREATH: 1
FEVER: 0
PALPITATIONS: 0
VOMITING: 1
SINUS PAIN: 0
FOCAL WEAKNESS: 0
WEAKNESS: 1
BRUISES/BLEEDS EASILY: 0
NAUSEA: 1

## 2018-01-22 ASSESSMENT — LIFESTYLE VARIABLES
EVER_SMOKED: NEVER
EVER_SMOKED: NEVER
ALCOHOL_USE: NO

## 2018-01-22 ASSESSMENT — PAIN SCALES - GENERAL: PAINLEVEL_OUTOF10: 0

## 2018-01-22 NOTE — ED NOTES
MANISHA from [facility] for dizziness and nausea while in dialysis. Fistula to the left upper arm, MWF. Missed Friday dialysis. Wears 2L NC home oxygen. A&OX4. Appears tired, but will wake up to voice or when staff enters the room.

## 2018-01-23 PROBLEM — E83.39 SERUM PHOSPHATE ELEVATED: Status: ACTIVE | Noted: 2018-01-23

## 2018-01-23 LAB
CHOLEST SERPL-MCNC: 172 MG/DL (ref 100–199)
ERYTHROCYTE [DISTWIDTH] IN BLOOD BY AUTOMATED COUNT: 73.5 FL (ref 35.9–50)
ERYTHROCYTE [DISTWIDTH] IN BLOOD BY AUTOMATED COUNT: 73.7 FL (ref 35.9–50)
ERYTHROCYTE [DISTWIDTH] IN BLOOD BY AUTOMATED COUNT: 73.8 FL (ref 35.9–50)
FERRITIN SERPL-MCNC: 2985.5 NG/ML (ref 10–291)
GLUCOSE BLD-MCNC: 102 MG/DL (ref 65–99)
GLUCOSE BLD-MCNC: 141 MG/DL (ref 65–99)
GLUCOSE BLD-MCNC: 86 MG/DL (ref 65–99)
GLUCOSE BLD-MCNC: 98 MG/DL (ref 65–99)
HCT VFR BLD AUTO: 27.4 % (ref 37–47)
HCT VFR BLD AUTO: 28.7 % (ref 37–47)
HCT VFR BLD AUTO: 29.9 % (ref 37–47)
HDLC SERPL-MCNC: 43 MG/DL
HEMOCCULT STL QL: POSITIVE
HGB BLD-MCNC: 9.1 G/DL (ref 12–16)
HGB BLD-MCNC: 9.4 G/DL (ref 12–16)
HGB BLD-MCNC: 9.9 G/DL (ref 12–16)
HGB RETIC QN AUTO: 38 PG/CELL (ref 29–35)
IMM RETICS NFR: 29.4 % (ref 9.3–17.4)
INR PPP: 0.95 (ref 0.87–1.13)
IRON SATN MFR SERPL: 67 % (ref 15–55)
IRON SERPL-MCNC: 175 UG/DL (ref 40–170)
LDLC SERPL CALC-MCNC: 94 MG/DL
MAGNESIUM SERPL-MCNC: 2.2 MG/DL (ref 1.5–2.5)
MCH RBC QN AUTO: 32 PG (ref 27–33)
MCH RBC QN AUTO: 32.2 PG (ref 27–33)
MCH RBC QN AUTO: 32.6 PG (ref 27–33)
MCHC RBC AUTO-ENTMCNC: 32.8 G/DL (ref 33.6–35)
MCHC RBC AUTO-ENTMCNC: 33.1 G/DL (ref 33.6–35)
MCHC RBC AUTO-ENTMCNC: 33.2 G/DL (ref 33.6–35)
MCV RBC AUTO: 97.4 FL (ref 81.4–97.8)
MCV RBC AUTO: 97.6 FL (ref 81.4–97.8)
MCV RBC AUTO: 98.2 FL (ref 81.4–97.8)
PHOSPHATE SERPL-MCNC: 7.9 MG/DL (ref 2.5–4.5)
PLATELET # BLD AUTO: 103 K/UL (ref 164–446)
PLATELET # BLD AUTO: 95 K/UL (ref 164–446)
PLATELET # BLD AUTO: 96 K/UL (ref 164–446)
PROTHROMBIN TIME: 12.4 SEC (ref 12–14.6)
RBC # BLD AUTO: 2.79 M/UL (ref 4.2–5.4)
RBC # BLD AUTO: 2.94 M/UL (ref 4.2–5.4)
RBC # BLD AUTO: 3.07 M/UL (ref 4.2–5.4)
RETICS # AUTO: 0.09 M/UL (ref 0.04–0.06)
RETICS/RBC NFR: 3 % (ref 0.8–2.1)
TIBC SERPL-MCNC: 260 UG/DL (ref 250–450)
TRIGL SERPL-MCNC: 176 MG/DL (ref 0–149)
WBC # BLD AUTO: 4.2 K/UL (ref 4.8–10.8)
WBC # BLD AUTO: 4.3 K/UL (ref 4.8–10.8)
WBC # BLD AUTO: 5.1 K/UL (ref 4.8–10.8)

## 2018-01-23 PROCEDURE — 700105 HCHG RX REV CODE 258: Performed by: STUDENT IN AN ORGANIZED HEALTH CARE EDUCATION/TRAINING PROGRAM

## 2018-01-23 PROCEDURE — 700102 HCHG RX REV CODE 250 W/ 637 OVERRIDE(OP): Performed by: HOSPITALIST

## 2018-01-23 PROCEDURE — 82784 ASSAY IGA/IGD/IGG/IGM EACH: CPT

## 2018-01-23 PROCEDURE — 83516 IMMUNOASSAY NONANTIBODY: CPT

## 2018-01-23 PROCEDURE — 36415 COLL VENOUS BLD VENIPUNCTURE: CPT

## 2018-01-23 PROCEDURE — 80061 LIPID PANEL: CPT

## 2018-01-23 PROCEDURE — A9270 NON-COVERED ITEM OR SERVICE: HCPCS | Performed by: STUDENT IN AN ORGANIZED HEALTH CARE EDUCATION/TRAINING PROGRAM

## 2018-01-23 PROCEDURE — 700101 HCHG RX REV CODE 250: Performed by: STUDENT IN AN ORGANIZED HEALTH CARE EDUCATION/TRAINING PROGRAM

## 2018-01-23 PROCEDURE — 82962 GLUCOSE BLOOD TEST: CPT

## 2018-01-23 PROCEDURE — A9270 NON-COVERED ITEM OR SERVICE: HCPCS | Performed by: HOSPITALIST

## 2018-01-23 PROCEDURE — 85027 COMPLETE CBC AUTOMATED: CPT

## 2018-01-23 PROCEDURE — 83540 ASSAY OF IRON: CPT

## 2018-01-23 PROCEDURE — 700111 HCHG RX REV CODE 636 W/ 250 OVERRIDE (IP): Performed by: STUDENT IN AN ORGANIZED HEALTH CARE EDUCATION/TRAINING PROGRAM

## 2018-01-23 PROCEDURE — 99223 1ST HOSP IP/OBS HIGH 75: CPT | Mod: AI,GC | Performed by: INTERNAL MEDICINE

## 2018-01-23 PROCEDURE — 83735 ASSAY OF MAGNESIUM: CPT

## 2018-01-23 PROCEDURE — 700111 HCHG RX REV CODE 636 W/ 250 OVERRIDE (IP): Performed by: HOSPITALIST

## 2018-01-23 PROCEDURE — 700102 HCHG RX REV CODE 250 W/ 637 OVERRIDE(OP): Performed by: STUDENT IN AN ORGANIZED HEALTH CARE EDUCATION/TRAINING PROGRAM

## 2018-01-23 PROCEDURE — 85610 PROTHROMBIN TIME: CPT

## 2018-01-23 PROCEDURE — 84100 ASSAY OF PHOSPHORUS: CPT

## 2018-01-23 PROCEDURE — 83550 IRON BINDING TEST: CPT

## 2018-01-23 PROCEDURE — C9113 INJ PANTOPRAZOLE SODIUM, VIA: HCPCS | Performed by: STUDENT IN AN ORGANIZED HEALTH CARE EDUCATION/TRAINING PROGRAM

## 2018-01-23 PROCEDURE — 82272 OCCULT BLD FECES 1-3 TESTS: CPT

## 2018-01-23 PROCEDURE — 770020 HCHG ROOM/CARE - TELE (206)

## 2018-01-23 PROCEDURE — C9113 INJ PANTOPRAZOLE SODIUM, VIA: HCPCS | Performed by: HOSPITALIST

## 2018-01-23 PROCEDURE — 85046 RETICYTE/HGB CONCENTRATE: CPT

## 2018-01-23 RX ORDER — PANTOPRAZOLE SODIUM 40 MG/10ML
40 INJECTION, POWDER, LYOPHILIZED, FOR SOLUTION INTRAVENOUS 2 TIMES DAILY
Status: DISCONTINUED | OUTPATIENT
Start: 2018-01-23 | End: 2018-01-26

## 2018-01-23 RX ORDER — SUCRALFATE 1 G/1
1 TABLET ORAL EVERY 6 HOURS
Status: DISCONTINUED | OUTPATIENT
Start: 2018-01-23 | End: 2018-01-24

## 2018-01-23 RX ORDER — CARVEDILOL 6.25 MG/1
6.25 TABLET ORAL 2 TIMES DAILY WITH MEALS
Status: DISCONTINUED | OUTPATIENT
Start: 2018-01-23 | End: 2018-01-27 | Stop reason: HOSPADM

## 2018-01-23 RX ORDER — SEVELAMER HYDROCHLORIDE 400 MG/1
400 TABLET, FILM COATED ORAL
Status: DISCONTINUED | OUTPATIENT
Start: 2018-01-23 | End: 2018-01-25

## 2018-01-23 RX ORDER — DEXTROSE MONOHYDRATE 25 G/50ML
25 INJECTION, SOLUTION INTRAVENOUS
Status: DISCONTINUED | OUTPATIENT
Start: 2018-01-23 | End: 2018-01-27 | Stop reason: HOSPADM

## 2018-01-23 RX ADMIN — SODIUM CHLORIDE 8 MG/HR: 9 INJECTION, SOLUTION INTRAVENOUS at 08:43

## 2018-01-23 RX ADMIN — PREGABALIN 50 MG: 25 CAPSULE ORAL at 08:43

## 2018-01-23 RX ADMIN — SUCRALFATE 1 G: 1 TABLET ORAL at 12:25

## 2018-01-23 RX ADMIN — TIMOLOL MALEATE 1 DROP: 5 SOLUTION OPHTHALMIC at 21:11

## 2018-01-23 RX ADMIN — TIMOLOL MALEATE 1 DROP: 5 SOLUTION OPHTHALMIC at 08:44

## 2018-01-23 RX ADMIN — SODIUM CHLORIDE: 9 INJECTION, SOLUTION INTRAVENOUS at 15:35

## 2018-01-23 RX ADMIN — PREGABALIN 50 MG: 25 CAPSULE ORAL at 21:10

## 2018-01-23 RX ADMIN — BRIMONIDINE TARTRATE 1 DROP: 2 SOLUTION OPHTHALMIC at 08:44

## 2018-01-23 RX ADMIN — CARVEDILOL 6.25 MG: 6.25 TABLET, FILM COATED ORAL at 14:08

## 2018-01-23 RX ADMIN — SUCRALFATE 1 G: 1 TABLET ORAL at 17:45

## 2018-01-23 RX ADMIN — PANTOPRAZOLE SODIUM 40 MG: 40 INJECTION, POWDER, FOR SOLUTION INTRAVENOUS at 21:10

## 2018-01-23 RX ADMIN — BRIMONIDINE TARTRATE 1 DROP: 2 SOLUTION OPHTHALMIC at 21:10

## 2018-01-23 RX ADMIN — LATANOPROST 1 DROP: 50 SOLUTION OPHTHALMIC at 21:11

## 2018-01-23 RX ADMIN — SUCRALFATE 1 G: 1 TABLET ORAL at 23:24

## 2018-01-23 ASSESSMENT — ENCOUNTER SYMPTOMS
ABDOMINAL PAIN: 1
CONSTIPATION: 0
DIARRHEA: 0
HEADACHES: 1
EYE DISCHARGE: 0
VOMITING: 0
TINGLING: 0
SHORTNESS OF BREATH: 0
EYE PAIN: 0
CHILLS: 0
DIZZINESS: 0
BLOOD IN STOOL: 0
COUGH: 0
HEARTBURN: 1
FEVER: 0
SENSORY CHANGE: 1
BLURRED VISION: 1
MYALGIAS: 1
SPUTUM PRODUCTION: 0
SORE THROAT: 0
PALPITATIONS: 0
NAUSEA: 0

## 2018-01-23 ASSESSMENT — PAIN SCALES - GENERAL
PAINLEVEL_OUTOF10: 3
PAINLEVEL_OUTOF10: 0

## 2018-01-23 ASSESSMENT — LIFESTYLE VARIABLES: SUBSTANCE_ABUSE: 0

## 2018-01-23 NOTE — ED NOTES
Med rec updated and complete.  Allergies reviewed.  Met with pt at bedside and dicussed current medications and   Last doses taken.  No antibiotic use in last 30 days.

## 2018-01-23 NOTE — CONSULTS
DATE OF SERVICE:  2018    GASTROENTEROLOGY CONSULTATION    REFERRING PHYSICIAN:  Shashank Luciano MD    REASON FOR THE CONSULT:  Coffee-ground emesis and acute pancreatitis.    HISTORY OF PRESENT ILLNESS:  The patient is a 62-year-old female who was   brought in to the ER from [facility] yesterday for complaints of nausea,   vomiting, headaches and dizziness.  In the emergency room, she was noted to   have a small amount of brown emesis that may have appeared to be coffee   ground.  She also was reported to have had several episodes of emesis after   eating a fatty meal at [facility].  The patient is legally blind, so it is   unknown if it truly was coffee grounds or normal bilious like gastric fluid.    The patient states she does not take any nonsteroidal anti-inflammatory   medications as she used to.  She denies a history of peptic ulcer disease.    She does complain of frequent heartburn and acid indigestion.  She is not   taking any over-the-counter antacids, histamine blockers, or proton pump   inhibitors for this.  She did undergo an upper endoscopy in  showing   nonerosive gastritis and duodenitis.  She also had a colonoscopy that same   date that was normal.    She has been having 5/10 epigastric pain.  She does not drink alcohol.  She   did not have gallstones on ultrasound from 2017.  She did have a CT   of the abdomen without contrast yesterday and there was no description of   peripancreatic fluid or any findings of acute pancreatitis.    ALLERGIES:  ACTOS, DARVOCET, DEMEROL, GLUCOPHAGE, MORPHINE, OXYCODONE,   PENICILLIN, REQUIP, SIMVASTATIN, SULFA, TRAMADOL, TRAZODONE, DILAUDID.    MEDICATIONS PRIOR TO ADMISSION:  Lumigan 0.3%, Combigan 0.2-0.5%, carvedilol   12.5 mg, pregabalin 50 mg, sitagliptin 25 mg.    SURGERIES:  Vein ligation in 2016, AV fistula creation in 2016, hysterectomy,    section x2, bilateral lower extremity angioplasty with stents,   appendectomy,  angioplasty in 1998 and 2001.    MEDICAL ILLNESSES:  History of stroke in 2015, myelodysplastic syndrome,   hypertension, glaucoma, end-stage renal disease, type 2 diabetes mellitus,   chronic respiratory failure with chronic O2 at 2 liters per nasal cannula,   coronary artery disease, peripheral vascular disease.    SOCIAL HISTORY:  She lives with her daughter.  She is a nonsmoker, nondrinker.    FAMILY HISTORY:  Family history in her parents is notable for hypertension.    REVIEW OF SYSTEMS:  GENERAL:  She had chills yesterday along with complaints of diaphoresis,   malaise, nausea, vomiting.  HEENT:  Positive for headaches.  RESPIRATORY:  Mild shortness of breath.  CARDIOVASCULAR:  No chest pain.  GASTROINTESTINAL:  As above.  GENITOURINARY:  No pain with urination.  NEUROLOGIC:  Positive for dizziness.    PHYSICAL EXAMINATION:  VITAL SIGNS:  Temperature 36.6, pulse 70, respirations 16, blood pressure   145/63.  GENERAL:  This is a fatigued, 62-year-old female, in no acute distress.  HEENT:  Pupils are round.  Sclerae appear anicteric.  LUNGS:  Clear anteriorly.  CARDIOVASCULAR:  S1, S2 with II/VI systolic murmur.  ABDOMEN:  Bowel sounds are present.  It is soft, nontender.  I did not   appreciate any adenopathy or masses around the umbilicus as indicated on the   CT.  I did not appreciate hepatosplenomegaly.  RECTAL:  Deferred.  EXTREMITIES:  No clubbing, cyanosis, or peripheral edema.  SKIN:  Smooth, moist, and warm.  NEUROLOGIC:  She is awake and alert.    LABORATORY DATA:  On admission, WBC 6.5, hemoglobin 10.3, hematocrit 30.7,   platelets 119.  Today, WBC 5.1, hemoglobin 9.4, hematocrit 28.7, platelets   103.  January 22nd, sodium 134, potassium 3.7, chloride 96, bicarb 27, BUN 34,   creatinine 4.88, AST 50, ALT 77, lipase 264.    IMPRESSION:  1.  Low volume coffee-ground emesis.  2.  Anemia, most likely due to myelodysplastic syndrome and end-stage renal   disease.  3.  Acute pancreatitis.  She is  negative.  There is no evidence for biliary   etiology, no alcohol, no recent medications, no hyperlipidemia, no   hypercalcemia.  Patients with hemodialysis are at higher risk than the general   population.  It is possible that her dialysis was changed to a higher   dextrose solution, which can induce an acute pancreatitis.  Her calcium is   normal, though she could have secondary hyperparathyroidism and possibly that   could cause pancreatitis.  Pancreatic enzyme levels also can be higher in   patients with end-stage renal disease.  4.  Myelodysplastic syndrome with blood transfusion every 3 weeks with history   of failed chemotherapy.  5.  End-stage renal disease, on hemodialysis.  6.  Hypertension.  7.  Type 2 diabetes mellitus.  8.  Legally blind.  9.  Presumed coronary artery disease with history of angioplasty.  10.  Presumed peripheral vascular disease with history of stents in lower   extremities.    RECOMMENDATIONS:  1.  I do not feel she needs a Protonix drip, but could have Protonix 40 mg IV   b.i.d.  2.  I would add Carafate slurry 1 g q.i.d.  3.  She can have sips of clear liquids, but no red fluids.  4.  Serial hemoglobin and hematocrits today and a.m. labs.  5.  I would like to hold on endoscopy since she would require anesthesia for   safe deep sedation due to her A3 status and her chronic respiratory failure.    If her condition changes and she is actively bleeding, then certainly benefit   outweighs the risk and we could schedule for endoscopy.    Thank you for involving us in the care of this patient.       ____________________________________     MD HUNTER GREWAL / RAMONE    DD:  01/23/2018 11:07:40  DT:  01/23/2018 12:02:22    D#:  [id number]  Job#:  [id number]

## 2018-01-23 NOTE — ASSESSMENT & PLAN NOTE
- Hb at 9.5  - H/O of ESRD  Elevated iron and % saturation with retic of 3%, retic index of 1.5  Ferritin - 2985  FOBT positive    Anemia due to myelodysplastic syndrome and CKD; exacerbated by GI blood loss    Plan:  Monitor  Transfuse if <7g

## 2018-01-23 NOTE — SENIOR ADMIT NOTE
Chief complaint:  Nausea and vomiting and epigastric pain for one day    History of present illness:  61-year-old lady with history of end-stage renal disease on hemodialysis, 3 times week, history of myelodysplastic syndrome on blood transfusion, hypertension, diabetic on Januvia, presented with epigastric pain, nausea and vomiting that started around 10 AM this morning. The patient was fine until this morning when she ate at my [employer] restaurant, then she developed dizziness associated with nausea and epigastric pain then she vomited many times. She is blind and cannot see her vomitus color, but was bloody in ED. the pain is severe as she said, rated 10 over 10, continuous, burning in nature, nor agitation, nothing makes the pain worse or better. She denies any fever, no change in bowel habits no diarrhea. She received hemodialysis session today, 3/12 h finished around 3 PM. She didn't receive her blood pressure medication at morning for concern of hypotension during hemodialysis, but she continues to have epigastric pain and vomiting until her blood pressure stayed elevated after HD.   She denies any alcohol intake, no smoking history. No history of biliary stone or gallbladder disease.    She has diagnoses of myelodysplastic syndrome and she is on blood transfusion every 3 weeks, tried  chemotherapy in the past but failed due to complications.  Other systems reveiwed and     Admitted to the ED on found that her blood pressure was high up  To 200/60, her lipase was high, Received labetalol injection and blood pressure was controlled down to 150/75.  At ED, developed episode of hematemesis.    Past medical history:  MDS, ESR on HD, DM on januvia, Respiratory faiure on Oxygen 2  LPM but she does not know the cause of this respiratory failure.  Hx of heart clot, received anitcoagulation 1980s per the patient.    Medications: carvedilol, sitagliptin, pregabalin       Physical Exam     Constitutional:   Pale  in color, oriented to person, place, and time and well-developed, not in acute distress  Head: Normocephalic and atraumatic.    Mouth/Throat: Oropharynx is clear and moist. No oropharyngeal exudate.   Eyes: Conjunctivae and EOM are normal. Pupils are equal, round, and reactive to light.  No scleral icterus.   Neck: Normal range of motion. Neck supple. No JVD present. No tracheal deviation present. No thyromegaly present.   Cardiovascular: Normal rate, regular rhythm, normal heart sounds and intact distal pulses.  Exam reveals no gallop and no friction rub.    Ejection systolic murmur at aortic area radiated to carotids  Pulmonary/Chest: Effort normal and breath sounds normal. No stridor. No respiratory distress.  No wheezes, no rales. No tenderness  Abdominal: Soft, epigastric tenderness and right hypochondrial tenderness. There is no rebound and no guarding. wong sign negative, BS +  Extremities: no leg edema, peripheral pulses are intact. Functioning AV fistula at left forearm  Skin: No rash noted. No erythema. No pallor.         Assessment and Plan    Acute Pancreatitis  Upper GI bleeding    The patient presented with epigastric pain and vomiting, some streaks of blood in vomitus. Her lipase is high up to 264, CT abdomen without contrast is negative. So most likely case of pancreatitis, her ESRD is risk factor in addition to use of sitagliptin.  The hematemesis is most likely lexie smith. Hemodynamically stable, her BP was high initially, controlled later. Her HB stable around 10 since admission which is her  Baseline.    - admission in tele  - cautious IV fluid resuscitation for ESRD concern  - NPO  - PPI IV with pantoprazole   - compazine IV for vomiting, cautious use with long QT, her cQT 499 ms  - no anticoagulation  - her pain is controlled now, will consider pain medication with hydromorph, renal dose if she develops pain  - GI consulted and to be seen tomorrow. I spoke with GI consultant from GI  consultant group  - H/H every 6 hours    # hypertensive urgency    BP at admission 200/60. Controlled with labetalol IV. Last reading 120 /60     Labetalol prn IV  Continue carvedilol    ESRD on HD  Creatine 4.21, K 3.8  Continue HD, received HD today, next session will be on Wednesday     DM  - will stop sitagliptin as this can contribute to her pancreatitis  - ISS, hypoglycemia protocol    Long QT  cQT 499  To avoid qt prolonging medications  Needs follow up     MDS  Not on chemotherapy. For follow up    DVT prophylaxis: scd  Code status: full code

## 2018-01-23 NOTE — ED NOTES
Pt continues to rest on the cart, endorses she is tired because she does not feel well. Falls asleep during questions.

## 2018-01-23 NOTE — PROGRESS NOTES
GI Consults  Re: coffee grounds emesis    BIB to ER from [facility] dialysis yesterday for N/V, HA and dizziness.  Noted to have small amount of brown emesis but several emesis earlier in the day post fatty meal at [facility].    Denies any recent NSAIDs, denies hx of PUD but does have heartburn and acid indigestion frequently.  Does not take any OTC meds for this.  She is legally blind so unknown if stool has been black or normal.      EGD 2015 with nonerosive gastritis and duodenitis  Colon 2015 normal    No hx gallstones, no ETOH, no new medications.  Epigastric pain 5/10    Impression:  1.  Low volume coffee grounds emesis  2.  Anemia most likely due to MDS.  3.  Acute pancreatitis:  Higher risk in dialysis patients than general population.  ??was dialysate recently changed to a higher dextrose solution. ?secondary hyperparathyroidism.  Also pancreatic enzyme levels can be higher in patients with ESRD  4.  MDS with blood transfusion every 3 weeks, failed chemotherapy  5.  ESRD on HD  6.  HTN  7.  Type II DM  8.  Legally blind    Recs:  1.  Protonix 40 mg IV BID  2.  Carafate slurry 1 gm QID  3.  Sips cl liq, no red  4.  Serial H&H today, am labs  5.  I would like to hold on endoscopy since she would require anesthesia for safe sedation due to ASA III status and chronic resp failure.  If condition changes and benefit of endoscopy outweighs risk, then can schedule.

## 2018-01-23 NOTE — ASSESSMENT & PLAN NOTE
- Symptoms and coffee-ground emesis suggestive of upper GI bleed  Stool occult = positive  DEMARIO- normal rectal tone, no overt bleed or melena visible, no hemorrhoids palpable or visible  CT- hepatomegaly, no acute process, left renal cyst  Repeat Lipase: 51  EGD performed 1/25/2018:  Esophagus: esophagitis, GE junction, LA-A  Stomach: mild gastritis, antrum, s/p biopsy  Duodenum: mild duodenitis, whole examined duodenum, s/p biopsy    Likely due to gastritis vs PUD vs lexie smith    Plan:  Omeprazole PO  sucralfate - discontinued- per nephro- risk of aluminum toxicity in patients with ESRD  Diet as tolerated  Transfuse if hb <7

## 2018-01-23 NOTE — H&P
Internal Medicine Admitting History and Physical    Note Author: Austyn Mcmillan M.D.       Name Vielka Briscoe     1955   Age/Sex 62 y.o. female   MRN [id number]   Code Status FULL     After 5PM or if no immediate response to page, please call for cross-coverage  Attending/Team: Mustapha/Darrion See Patient List for primary contact information  Call [phone] to page    1st Call - Day Intern (R1):   Maral 2nd Call - Day Sr. Resident (R2/R3):   Mackenzie       Chief Complaint:  Nausea and vomiting    HPI:  62-year-old female with a past medical history of ESRD on hemodialysis MWF, myelodysplastic syndrome for which she receives blood transfusions, HTN on carvedilol, DM on Januvia and multiple hospital admissions for symptomatic anemia requiring PRBC transfusion last one being on 18 comes to the ED complaining of nausea and multiple bouts of brownish emesis that started today around 10 AM in the morning shortly after she had sausage McMuffin along with a dull/burning, 10/10, nonradiating intermitent epigastric pain. She reports chills, malaise, dyspnea on mild exertion, diaphoresis, headaches, lightheadedness, presyncope but no syncope episode. She has extreme weakness for which she has not been able to walk and has stayed in bed all day. She denies fevers, chest pain, palpitation, LE edema, focal weakness or numbness.      At the ED, she presented with hypertensive urgency which was resolved with by mouth medications. As per EDP, she was noted for brownish emesis with benign abdominal physical exam. Head CT is negative. Lipase at 264. Patient was then interviewed, evaluated, and admitted by the [facility] Night team to telemetry.      Review of Systems   Constitutional: Positive for chills, diaphoresis and malaise/fatigue. Negative for fever.   HENT: Negative for congestion, hearing loss, sinus pain, sore throat and tinnitus.    Eyes: Negative for pain and discharge.   Respiratory:  "Positive for shortness of breath. Negative for cough.    Cardiovascular: Negative for chest pain and palpitations.   Gastrointestinal: Positive for abdominal pain, heartburn, nausea and vomiting. Negative for blood in stool, constipation, diarrhea and melena.   Genitourinary: Negative for dysuria and urgency.   Skin: Negative for itching and rash.   Neurological: Positive for dizziness, tremors and weakness. Negative for speech change, focal weakness, seizures, loss of consciousness and headaches.   Endo/Heme/Allergies: Negative for polydipsia. Does not bruise/bleed easily.   Psychiatric/Behavioral: Negative for depression and suicidal ideas.             Past Medical History:   Past Medical History:   Diagnosis Date   • Arthritis     hands    • Atrial fibrillation (CMS-[facility])     HX   • Blood transfusion without reported diagnosis    • Breath shortness     w/exertion   • Cancer (CMS-[facility])     MDS in bones   • Cataract     bilat IOL   • Chronic anemia    • Chronic kidney disease        • Chronic obstructive pulmonary disease (CMS-[facility])    • Congestive heart failure (CMS-[facility])    • Dental disorder     full dentures   • Diabetes     Diet controlled   • Dialysis patient     [facility] ,   [facility] in [place]   • Glaucoma    • Heart abnormalities    • Hypertension    • MDS (myelodysplastic syndrome) 10/2016    bone marrow biopsy   • Pain -2017    \"bones\", generalized, 5/10   • Stroke (CMS-[facility]) 03/2015    No residual weakness/problems   • Supplemental oxygen dependent     2 liters       Past Surgical History:  Past Surgical History:   Procedure Laterality Date   • BONE MARROW BIOPSY, NDL/TROCAR  9/20/2017    Procedure: BONE MARROW BIOPSY, NDL/TROCAR;  Surgeon: Wade Turner M.D.;  Location: ENDOSCOPY [facility];  Service: Orthopedics   • BONE MARROW ASPIRATION  9/20/2017    Procedure: BONE MARROW ASPIRATION;  Surgeon: Wade Turner M.D.;  Location: ENDOSCOPY [facility];  Service: Orthopedics   • VEIN " LIGATION Left 11/10/2016    Procedure: VEIN LIGATION FOR DISTAL REVASCULARIZATION AND INTERVAL LIGATION OF LEFT ARM DIALYISIS ACCESS (DRIL PROCEDURE);  Surgeon: Ranulfo Jolly M.D.;  Location: SURGERY [facility];  Service:    • AV FISTULA CREATION Left 2016    Procedure: AV FISTULA CREATION UPPER EXTREMITY;  Surgeon: Ranulfo Jolly M.D.;  Location: SURGERY [facility];  Service:    • GASTROSCOPY  2015    Procedure: ESOPHAGOGASTRODUODENOSCOPY WITH BIOPSY;  Surgeon: Wing Álvarez M.D.;  Location: SURGERY [facility];  Service:    • COLONOSCOPY  2015    Procedure: COLONOSCOPY;  Surgeon: Wing Álvarez M.D.;  Location: SURGERY [facility];  Service:    • GYN SURGERY      hysterectomy   • GYN SURGERY       x 2   • GYN SURGERY      d&C x2   • OTHER      angioplasty/ stents bilat LE   • OTHER ABDOMINAL SURGERY      appendectomy,  x 2, hysterectomy, D & C   • OTHER ABDOMINAL SURGERY      appendectomy   • OTHER CARDIAC SURGERY      Angioplasty  and    • OTHER CARDIAC SURGERY      cardiac angiogram, angioplasty   • RETINAL DETACHMENT REPAIR Right        Current Outpatient Medications:  Home Medications     Reviewed by Sun Lanier (Pharmacy Tech) on 18 at 1819  Med List Status: Complete   Medication Last Dose Status   bimatoprost (LUMIGAN) 0.03 % Solution 2018 Active   Brimonidine Tartrate-Timolol (COMBIGAN) 0.2-0.5 % Solution 2018 Active   carvedilol (COREG) 12.5 MG Tab 2018 Active   pregabalin (LYRICA) 50 MG capsule 2018 Active   sitagliptin (JANUVIA) 25 MG Tab 2018 Active                Medication Allergy/Sensitivities:  Allergies   Allergen Reactions   • Actos [Pioglitazone Hydrochloride] Unspecified     Cause blindness   HTL=0041   • Darvocet [Propoxyphene N-Apap] Vomiting     GYX=8879   • Demerol Vomiting     TLD=9223   • Glucophage [Metformin Hydrochloride] Vomiting     BQE=4083   • Morphine Vomiting     UHQ=6136   •  "Oxycodone Vomiting     RXN=1/2016   • Pcn [Penicillins] Vomiting     YZO=2399     • Requip Vomiting     RXN=12/2015   • Simvastatin Unspecified     Leg cramps  THX=0235   • Sulfa Drugs Rash     RXN=>10 years   • Tramadol Vomiting     INO=5392   • Trazodone Vomiting     BHD=2226   • Dilaudid [Hydromorphone] Vomiting     Unknown    • Diphenhydramine Vomiting   • Iron      vomiting   • Lenalidomide Vomiting   • Multivitamin      itching   • Naprosyn [Naproxen]    • Other Drug      Any binders that remove phosphorus from the body such as tums         Family History:  Family History   Problem Relation Age of Onset   • Hypertension Father    • Hypertension Mother        Social History:  Social History     Social History   • Marital status:      Spouse name: N/A   • Number of children: N/A   • Years of education: N/A     Occupational History   • Not on file.     Social History Main Topics   • Smoking status: Never Smoker   • Smokeless tobacco: Never Used   • Alcohol use No   • Drug use: No   • Sexual activity: Not on file     Other Topics Concern   • Not on file     Social History Narrative    ** Merged History Encounter **          Living situation: Lives with Daughter   PCP : Nyla Nava M.D.    Physical Exam     Vitals:    01/22/18 2122 01/22/18 2130 01/22/18 2134 01/22/18 2318   BP:    124/42   Pulse: 84 83 82 74   Resp: 17 17 15 14   Temp:    36.3 °C (97.3 °F)   SpO2: 97% 100% 100% 95%   Weight:    67.2 kg (148 lb 2.4 oz)   Height:         Body mass index is 30.96 kg/m².  /42   Pulse 74   Temp 36.3 °C (97.3 °F)   Resp 14   Ht 1.473 m (4' 10\")   Wt 67.2 kg (148 lb 2.4 oz)   LMP 01/01/1995   SpO2 95%   Breastfeeding? No   BMI 30.96 kg/m²   O2 therapy: Pulse Oximetry: 95 %, O2 (LPM): 2, O2 Delivery: Nasal Cannula    Physical Exam   Constitutional: She is oriented to person, place, and time and well-developed, well-nourished, and in no distress. No distress.   HENT:   Head: Normocephalic and " atraumatic.   Eyes: Conjunctivae and EOM are normal. Right eye exhibits no discharge. Left eye exhibits no discharge. No scleral icterus.   Neck: Normal range of motion. Neck supple. No JVD present. No tracheal deviation present.   Cardiovascular: Normal rate and regular rhythm.    Murmur heard.  AS murmur   Pulmonary/Chest: Effort normal and breath sounds normal. No stridor. No respiratory distress.   Abdominal: Soft. Bowel sounds are normal. She exhibits no distension. There is no tenderness.   Epigastric tenderness and right hypochondrial tenderness  Vomited with coffee-ground spicules  Rectal: Intact finchter tone, No blood on glove or dark black stool. Collected brown stool sample for FOBT   Musculoskeletal: Normal range of motion.   Fistula to the left upper arm   Lymphadenopathy:     She has no cervical adenopathy.   Neurological: She is alert and oriented to person, place, and time. No cranial nerve deficit. GCS score is 15.   Skin: Skin is warm. No rash noted. She is not diaphoretic. No erythema. There is pallor.   Psychiatric: Mood, memory, affect and judgment normal.         Data Review       Old Records Request:   Completed  Current Records review and summary: Completed    Lab Data Review:  Recent Results (from the past 24 hour(s))   EKG (NOW)    Collection Time: 18  3:29 PM   Result Value Ref Range    Report       [facility]    Test Date:  2018  Pt Name:    JESUS SILVEIRA              Department: ER  MRN:        [id number]                      Room:       [facility]  Gender:     Female                       Technician: [id number]  :        1955                   Requested By:ER TRIAGE PROTOCOL  Order #:    [id number]                    Reading MD:    Measurements  Intervals                                Axis  Rate:       88                           P:          46  SC:         208                          QRS:        -5  QRSD:       88                            T:          42  QT:         412  QTc:        499    Interpretive Statements  SINUS RHYTHM  BORDERLINE PROLONGED QT INTERVAL  Compared to ECG 01/05/2018 12:09:05  Myocardial infarct finding no longer present     CBC WITHOUT DIFFERENTIAL    Collection Time: 01/22/18  3:45 PM   Result Value Ref Range    WBC 6.5 4.8 - 10.8 K/uL    RBC 3.21 (L) 4.20 - 5.40 M/uL    Hemoglobin 10.3 (L) 12.0 - 16.0 g/dL    Hematocrit 30.7 (L) 37.0 - 47.0 %    MCV 95.6 81.4 - 97.8 fL    MCH 32.1 27.0 - 33.0 pg    MCHC 33.6 33.6 - 35.0 g/dL    RDW 71.0 (H) 35.9 - 50.0 fL    Platelet Count 119 (L) 164 - 446 K/uL    MPV 14.1 (H) 9.0 - 12.9 fL   COMP METABOLIC PANEL    Collection Time: 01/22/18  3:45 PM   Result Value Ref Range    Sodium 136 135 - 145 mmol/L    Potassium 3.8 3.6 - 5.5 mmol/L    Chloride 93 (L) 96 - 112 mmol/L    Co2 25 20 - 33 mmol/L    Anion Gap 18.0 (H) 0.0 - 11.9    Glucose 140 (H) 65 - 99 mg/dL    Bun 39 (H) 8 - 22 mg/dL    Creatinine 4.21 (H) 0.50 - 1.40 mg/dL    Calcium 8.7 8.5 - 10.5 mg/dL    AST(SGOT) 21 12 - 45 U/L    ALT(SGPT) 15 2 - 50 U/L    Alkaline Phosphatase 79 30 - 99 U/L    Total Bilirubin 0.7 0.1 - 1.5 mg/dL    Albumin 3.8 3.2 - 4.9 g/dL    Total Protein 7.6 6.0 - 8.2 g/dL    Globulin 3.8 (H) 1.9 - 3.5 g/dL    A-G Ratio 1.0 g/dL   LIPASE    Collection Time: 01/22/18  3:45 PM   Result Value Ref Range    Lipase 264 (H) 11 - 82 U/L   TROPONIN    Collection Time: 01/22/18  3:45 PM   Result Value Ref Range    Troponin I <0.01 0.00 - 0.04 ng/mL   ESTIMATED GFR    Collection Time: 01/22/18  3:45 PM   Result Value Ref Range    GFR If  13 (A) >60 mL/min/1.73 m 2    GFR If Non African American 11 (A) >60 mL/min/1.73 m 2   Magnesium    Collection Time: 01/22/18  3:45 PM   Result Value Ref Range    Magnesium 2.2 1.5 - 2.5 mg/dL   CBC WITHOUT DIFFERENTIAL    Collection Time: 01/22/18 10:30 PM   Result Value Ref Range    WBC 5.7 4.8 - 10.8 K/uL    RBC 2.94 (L) 4.20 - 5.40 M/uL    Hemoglobin 9.5 (L) 12.0 -  16.0 g/dL    Hematocrit 28.5 (L) 37.0 - 47.0 %    MCV 96.9 81.4 - 97.8 fL    MCH 32.3 27.0 - 33.0 pg    MCHC 33.3 (L) 33.6 - 35.0 g/dL    RDW 73.4 (H) 35.9 - 50.0 fL    Platelet Count 113 (L) 164 - 446 K/uL    MPV 14.0 (H) 9.0 - 12.9 fL       Imaging/Procedures Review:    ndependant Imaging Review: Completed  CT-ABDOMEN W/O   Final Result         1.  No acute abnormality.   2.  Left renal cysts, similar to prior study with calcification compatible with mild complexity. Could be followed up with renal sonogram for further characterization.   3.  Soft tissue nodule laterally at the level of the umbilicus, could represent prominent lymph node or other soft tissue process. Correlate with exam.   4.  Hepatomegaly   5.  Atherosclerosis      DX-CHEST-PORTABLE (1 VIEW)   Final Result         No acute cardiac or pulmonary abnormality is identified.      CT-HEAD W/O   Final Result      No intracranial mass effect or acute hemorrhage.   Fluid within the right mastoid similar to previous findings.          EKG:   EKG Independant Review: Completed  QTc:499, HR: 88, Normal Sinus Rhythm, no ST/T changes    (X) Records reviewed and summarized in current documentation             Assessment/Plan     Upper GI bleed   Assessment & Plan    - Symptoms and coffee-ground emesis suggestive of upper GI bleed  - Patient admitted to telemetry  - NPO  - H/H q 6hrs, last Hb at   - Gave 80 mg pantoprazole bolus at the ED  - 8 mg/ml pantoprazole continuous  - Day team to consult GI for possible EGD        Normocytic anemia- (present on admission)   Assessment & Plan    - Hb at 9.5  - H/O of ESRD (EPO deficiency vs. new acute UGIB  - H/H q 6hrs  - Pending FOBT        DM  2 complicated by peripheral neuropathy- (present on admission)   Assessment & Plan    - On HD MWF  - Holding januvia while inpatient  - Started on low-dose ISS  - Last A1c 7.3%  - Continue home Lyrica for neuropathic pain  - Labs on AM        Pancreatitis   Assessment & Plan    -  Presenting with epigastric pain, N/V triggered by fatty meal  - No history of gallbladder disease, cholecystectomy or ethanol use  - Patient currently not on 5-ASA, 6-MP, ACEi, NSAIDs, statin, furosemide, or thiazide  - Hold Januvia as it can rarely cause pancreatitis  - CMP with no signs of hypercalcemia  - Last lipid profile with triglycerides at 192 (12/14/17)  - No H/O ERCP or Trauma  - No recorded fever, no leukocytosis, troponin is negative  - Sorenson's neg, ALP < 100  - AST/ALT at 21/15 in the setting of thyromegaly on CT  - CXR with no focal infiltrates or consolidations   - ABD CT showed fatty replacement of the pancreas noted, gallbladder within normal limits, liver is normal in contour, no intrahepatic biliary ductal dilatation is seen. Hepatomegaly noted  - Admit to telemetry   - NPO  - Continue IV hydration  - Started Compazine for nausea  - Pending lipid profile        ESRD (end stage renal disease) on dialysis (CMS-HCC)- (present on admission)   Assessment & Plan    - ESRD on HD (MWF)  - Last HD on Monday  - Day team to consult nephrology   - Labs on AM        Chronic respiratory failure with hypoxia, 2L- (present on admission)   Assessment & Plan    - SpO2 at 95 on 2L NC  - Placed on RT protocol        Essential hypertension, malignant- (present on admission)   Assessment & Plan    - Presented w/ BP in the 200s/60s at the ED  - Controlled with ED PO med  - Continue home carvedilol  - Started labetalol PRN        Headache- (present on admission)   Assessment & Plan    - Frontal (may be 2/2 high SBP)  - PRN acetaminophen for headaches            Anticipated Hospital stay:  >2 midnights        Quality Measures    Reviewed items::  EKG reviewed, Labs reviewed, Medications reviewed and Radiology images reviewed  Bauer catheter::  No Bauer  DVT prophylaxis - mechanical:  SCDs

## 2018-01-23 NOTE — RESPIRATORY CARE
COPD EDUCATION by COPD CLINICAL EDUCATOR  1/23/2018 at 7:29 AM by Cecily Prado     Patient reviewed by COPD education team. Patient does not qualify for COPD program.

## 2018-01-23 NOTE — CARE PLAN
Problem: Safety  Goal: Will remain free from injury  Fall risk assessed, fall precautions in place, bed alarm set, pt verbalizes understanding of fall risk.

## 2018-01-23 NOTE — PROGRESS NOTES
Internal Medicine Interval Note  Note Author: Jenaro Alicia M.D.     Name Vielka Briscoe     1955   Age/Sex 62 y.o. female   MRN [id number]   Code Status Full     After 5PM or if no immediate response to page, please call for cross-coverage  Attending/Team: Dr. Lucinao / [facility] team See Patient List for primary contact information  Call [phone] to page    1st Call - Day Intern (R1):   Maral WISE 2nd Call - Day Sr. Resident (R2/R3):   Mackenzie WISE         Reason for interval visit  (Principal Problem)   Upper GI bleed    Interval Problem Daily Status Update  (24 hours)   Patient doing well with no additional episodes of vomiting and denies nausea. Epigastric pain about 5/10 with mild generalized abdominal tenderness to palpation.  Patient had bowel movement, but flushed the toilet before it could be visualized. But remaining water did not appear pinkish.   DEMARIO - no gross blood or melena. Normal rectal tone.   Mild frontal 2/10 headache    Patient has h/o A-fib- she admits to being on ASA and coumadin at some point. However, she does not know why it was stopped and says that was over 10-15 years ago before a surgery and was not started again. When asked about what type of surgery, she did not know    Review of Systems   Constitutional: Negative for chills and fever.   HENT: Negative for congestion and sore throat.    Eyes: Positive for blurred vision.   Respiratory: Negative for cough, sputum production and shortness of breath.    Cardiovascular: Negative for chest pain, palpitations and leg swelling.   Gastrointestinal: Positive for abdominal pain and heartburn (chronic h/o heartburn). Negative for blood in stool, constipation, diarrhea, melena, nausea and vomiting.   Genitourinary: Negative for dysuria and urgency.   Musculoskeletal: Positive for joint pain and myalgias.   Skin: Negative for rash.   Neurological: Positive for sensory change (numbness) and headaches. Negative for dizziness and  tingling.   Psychiatric/Behavioral: Negative for substance abuse.       Consultants/Specialty  GI    Disposition  Inpatient. Monitoring Hb    Quality Measures    Reviewed items::  Labs reviewed, Medications reviewed, Radiology images reviewed and EKG reviewed  Bauer catheter::  No Bauer  DVT prophylaxis pharmacological::  Contraindicated - High bleeding risk          Physical Exam       Vitals:    01/23/18 0410 01/23/18 0800 01/23/18 1200 01/23/18 1700   BP: 105/54 145/63 157/74 146/64   Pulse: 67 70 66 63   Resp: 14 16 18 15   Temp: 36.4 °C (97.6 °F) 36.6 °C (97.8 °F) 36.6 °C (97.9 °F) 36.2 °C (97.2 °F)   SpO2: 95% 100% 98% 100%   Weight:       Height:         Body mass index is 30.96 kg/m². Weight: 67.2 kg (148 lb 2.4 oz)  Oxygen Therapy:  Pulse Oximetry: 100 %, O2 (LPM): 2, O2 Delivery: Silicone Nasal Cannula    Physical Exam   Constitutional: She is oriented to person, place, and time and well-developed, well-nourished, and in no distress. No distress.   HENT:   Head: Normocephalic and atraumatic.   Mouth/Throat: No oropharyngeal exudate.   Eyes: Conjunctivae and EOM are normal.   Neck: Normal range of motion. Neck supple.   Cardiovascular: Normal rate, regular rhythm and normal heart sounds.  Exam reveals no friction rub.    No murmur heard.  Pulmonary/Chest: Effort normal and breath sounds normal. No respiratory distress. She has no wheezes. She has no rales.   Abdominal: Bowel sounds are normal. She exhibits no distension. There is tenderness (mild generalized tenderness). There is no rebound and no guarding.   Genitourinary: Rectal exam shows guaiac positive stool.   Musculoskeletal: Normal range of motion. She exhibits no edema, tenderness or deformity.   Neurological: She is alert and oriented to person, place, and time.   Skin: Skin is warm and dry. No rash noted. She is not diaphoretic. No erythema.   Psychiatric: Mood and affect normal.   Nursing note and vitals reviewed.        Lab Data Review:          1/23/2018  3:03 PM    Recent Labs      01/22/18   1545  01/23/18   0344   SODIUM  136   --    POTASSIUM  3.8   --    CHLORIDE  93*   --    CO2  25   --    BUN  39*   --    CREATININE  4.21*   --    MAGNESIUM  2.2  2.2   PHOSPHORUS   --   7.9*   CALCIUM  8.7   --        Recent Labs      01/22/18   1545   ALTSGPT  15   ASTSGOT  21   ALKPHOSPHAT  79   TBILIRUBIN  0.7   LIPASE  264*   GLUCOSE  140*       Recent Labs      01/22/18   1545   01/23/18   0344  01/23/18   0938  01/23/18   1552   RBC  3.21*   < >  2.94*  3.07*  2.79*   HEMOGLOBIN  10.3*   < >  9.4*  9.9*  9.1*   HEMATOCRIT  30.7*   < >  28.7*  29.9*  27.4*   PLATELETCT  119*   < >  103*  96*  95*   PROTHROMBTM   --    --    --   12.4   --    INR   --    --    --   0.95   --    IRON   --    --   175*   --    --    FERRITIN  2985.5*   --    --    --    --    TOTIRONBC   --    --   260   --    --     < > = values in this interval not displayed.       Recent Labs      01/22/18   1545   01/23/18   0344  01/23/18   0938  01/23/18   1552   WBC  6.5   < >  5.1  4.2*  4.3*   ASTSGOT  21   --    --    --    --    ALTSGPT  15   --    --    --    --    ALKPHOSPHAT  79   --    --    --    --    TBILIRUBIN  0.7   --    --    --    --     < > = values in this interval not displayed.           Assessment/Plan     * Upper GI bleed- (present on admission)   Assessment & Plan    - Symptoms and coffee-ground emesis suggestive of upper GI bleed  Stool occult = positive  DEMARIO- normal rectal tone, no overt bleed or melena visible, no hemorrhoids palpable or visible  CT- hepatomegaly, no acute process, left renal cyst  GI- no EGD acutely, monitor Hb, sucralfate and clear liquids    Likely due to gastritis vs PUD vs lexie smith    Plan:  8 mg/ml pantoprazole continuous  sucralfate   clear liquids  EGD as outpatient if stable  Transfuse if hb <7        Normocytic anemia- (present on admission)   Assessment & Plan    - Hb at 9.5  - H/O of ESRD  Elevated iron and % saturation  with retic of 3%, retic index of 1.5  Ferritin - 2985  FOBT positive    Anemia due to myelodysplastic syndrome and CKD; exacerbated by GI blood loss    Plan:  Monitor  Transfuse if <7g        DM  2 complicated by peripheral neuropathy- (present on admission)   Assessment & Plan    - On HD MWF  - Holding januvia while inpatient  - Last A1c 7.3%    Plan:  Started on humalog low dose sliding scale  Started on low-dose ISS  Continue home Lyrica for neuropathic pain  Hypoglycemia protocol        Serum phosphate elevated- (present on admission)   Assessment & Plan    7.9 on 1/23/2018    Likely due to ESRD on hemodialysis    Plan:  Sevelamer  Low phosphate diet, once started, currently clear liquids        Pancreatitis- (present on admission)   Assessment & Plan    - Presenting with epigastric pain, N/V triggered by fatty meal  - No history of gallbladder disease, cholecystectomy or ethanol use  - Patient currently not on 5-ASA, 6-MP, ACEi, NSAIDs, statin, furosemide, or thiazide  - Hold Januvia as it can rarely cause pancreatitis  - CMP with no signs of hypercalcemia  - Last lipid profile with triglycerides at 192 (12/14/17)  - No H/O ERCP or Trauma  - No recorded fever, no leukocytosis, troponin is negative  - Sorenson's neg, ALP < 100  - AST/ALT at 21/15 in the setting of thyromegaly on CT  - CXR with no focal infiltrates or consolidations   - ABD CT showed fatty replacement of the pancreas noted, gallbladder within normal limits, liver is normal in contour, no intrahepatic biliary ductal dilatation is seen. Hepatomegaly noted    Mild pancreatitis    Plan:  Clear liquids  Compazine for nausea        ESRD (end stage renal disease) on dialysis (CMS-HCC)- (present on admission)   Assessment & Plan    - ESRD on HD (MWF)  - Last HD on Monday        Chronic respiratory failure with hypoxia, 2L- (present on admission)   Assessment & Plan    - SpO2 at 95 on 2L NC  - Placed on RT protocol        Essential hypertension, malignant-  (present on admission)   Assessment & Plan    - Presented w/ BP in the 200s/60s at the ED  - Controlled in ED with PO med    Plan:  Continue home carvedilol   labetalol PRN        Headache- (present on admission)   Assessment & Plan    - Frontal (may be 2/2 high SBP)  - PRN acetaminophen for headaches

## 2018-01-23 NOTE — ASSESSMENT & PLAN NOTE
- On HD MWF  - Holding januvia while inpatient  - Last A1c 7.3%    Plan:  Started on humalog low dose sliding scale  Started on low-dose ISS  Continue home Lyrica for neuropathic pain  Hypoglycemia protocol

## 2018-01-23 NOTE — PROGRESS NOTES
Shift report received and assessment completed. No family at bedside. Pt indicates no distress. Updated MD to nightly reports. Call light placed within reach, bed in lowest position, and bed alarm is set. Will continue to monitor patient.

## 2018-01-23 NOTE — ASSESSMENT & PLAN NOTE
- Presented w/ BP in the 200s/60s at the ED  - Controlled in ED with PO med    Plan:  Continue home carvedilol   labetalol PRN

## 2018-01-23 NOTE — ED PROVIDER NOTES
"ED Provider Note    CHIEF COMPLAINT  Chief Complaint   Patient presents with   • Dizziness   • N/V   • Hypertension       HPI  Vielka Briscoe is a 62 y.o. female who presents to the ER complaining of nausea, vomiting, headache, and just generally not feeling well.  The patient has end-stage renal disease and is on hemodialysis.  She had dialysis today.  Later in the day she became nauseated, developed vomiting.  Vomiting is brownish in color.  Denies any abdominal pain.  But does have a headache.  Headache is moderate in severity and came on gradually.  Has been associated with sensation of dizziness.  The patient has photophobia, but she states she has chronic photophobia.  She denies any numbness or tingling, weakness or arms or legs.  She complains of some left-sided chest wall pain, which his mother for some time.  He denies any shortness of breath.  Nothing makes it better, nothing makes it worse.  She denies any other associated complaints.    REVIEW OF SYSTEMS  See HPI for further details. All other systems are negative.    PAST MEDICAL HISTORY  Past Medical History:   Diagnosis Date   • Arthritis     hands    • Atrial fibrillation (CMS-[facility])     HX   • Blood transfusion without reported diagnosis    • Breath shortness     w/exertion   • Cancer (CMS-HCC)     MDS in bones   • Cataract     bilat IOL   • Chronic anemia    • Chronic kidney disease        • Chronic obstructive pulmonary disease (CMS-[facility])    • Congestive heart failure (CMS-[facility])    • Dental disorder     full dentures   • Diabetes     Diet controlled   • Dialysis patient     M W F ,   [facility] in [place]   • Glaucoma    • Heart abnormalities    • Hypertension    • MDS (myelodysplastic syndrome) 10/2016    bone marrow biopsy   • Pain -2017    \"bones\", generalized, 5/10   • Stroke (CMS-HCC) 03/2015    No residual weakness/problems   • Supplemental oxygen dependent     2 liters       FAMILY HISTORY  Family History   Problem " Relation Age of Onset   • Hypertension Father    • Hypertension Mother        SOCIAL HISTORY  Social History     Social History   • Marital status:      Spouse name: N/A   • Number of children: N/A   • Years of education: N/A     Social History Main Topics   • Smoking status: Never Smoker   • Smokeless tobacco: Never Used   • Alcohol use No   • Drug use: No   • Sexual activity: Not on file     Other Topics Concern   • Not on file     Social History Narrative    ** Merged History Encounter **            SURGICAL HISTORY  Past Surgical History:   Procedure Laterality Date   • BONE MARROW BIOPSY, NDL/TROCAR  2017    Procedure: BONE MARROW BIOPSY, NDL/TROCAR;  Surgeon: Wade Turner M.D.;  Location: [facility];  Service: Orthopedics   • BONE MARROW ASPIRATION  2017    Procedure: BONE MARROW ASPIRATION;  Surgeon: Wade Turner M.D.;  Location: [facility];  Service: Orthopedics   • VEIN LIGATION Left 11/10/2016    Procedure: VEIN LIGATION FOR DISTAL REVASCULARIZATION AND INTERVAL LIGATION OF LEFT ARM DIALYISIS ACCESS (DRIL PROCEDURE);  Surgeon: Ranulfo Jolly M.D.;  Location: [facility];  Service:    • AV FISTULA CREATION Left 2016    Procedure: AV FISTULA CREATION UPPER EXTREMITY;  Surgeon: Ranulfo Jolly M.D.;  Location: [facility];  Service:    • GASTROSCOPY  2015    Procedure: ESOPHAGOGASTRODUODENOSCOPY WITH BIOPSY;  Surgeon: Wing Álvarez M.D.;  Location: [facility];  Service:    • COLONOSCOPY  2015    Procedure: COLONOSCOPY;  Surgeon: Wing Álvarez M.D.;  Location: [facility];  Service:    • GYN SURGERY      hysterectomy   • GYN SURGERY       x 2   • GYN SURGERY      d&C x2   • OTHER      angioplasty/ stents bilat LE   • OTHER ABDOMINAL SURGERY      appendectomy,  x 2, hysterectomy, D & C   • OTHER ABDOMINAL SURGERY      appendectomy   • OTHER CARDIAC SURGERY       "Angioplasty 1998 and 2001   • OTHER CARDIAC SURGERY      cardiac angiogram, angioplasty   • RETINAL DETACHMENT REPAIR Right        CURRENT MEDICATIONS  Home Medications     Reviewed by Joshua Alarcon R.N. (Registered Nurse) on 01/22/18 at 1533  Med List Status: Not Addressed   Medication Last Dose Status   bimatoprost (LUMIGAN) 0.03 % Solution 1/4/2018 Active   Brimonidine Tartrate-Timolol (COMBIGAN) 0.2-0.5 % Solution 1/4/2018 Active   carvedilol (COREG) 12.5 MG Tab 1/4/2018 Active   cyclobenzaprine (FLEXERIL) 10 MG Tab week Active   hydrocodone-acetaminophen (NORCO) 5-325 MG Tab per tablet 1/4/2018 Active   pregabalin (LYRICA) 50 MG capsule 1/4/2018 Active   Pseudoephedrine-APAP-DM (TYLENOL COLD/FLU SEVERE DAY PO) 1/4/2018 Active   sitagliptin (JANUVIA) 25 MG Tab 1/4/2018 Active                ALLERGIES  Allergies   Allergen Reactions   • Actos [Pioglitazone Hydrochloride] Unspecified     Cause blindness   ZQE=8422   • Darvocet [Propoxyphene N-Apap] Vomiting     TYU=3910   • Demerol Vomiting     NDI=5982   • Glucophage [Metformin Hydrochloride] Vomiting     JAK=0678   • Morphine Vomiting     HUZ=1310   • Oxycodone Vomiting     RXN=1/2016   • Pcn [Penicillins] Vomiting     HZR=2981     • Requip Vomiting     RXN=12/2015   • Simvastatin Unspecified     Leg cramps  ALS=5011   • Sulfa Drugs Rash     RXN=>10 years   • Tramadol Vomiting     ENU=1362   • Trazodone Vomiting     SQO=1514   • Dilaudid [Hydromorphone] Vomiting     Unknown    • Diphenhydramine Vomiting   • Iron      vomiting   • Lenalidomide Vomiting   • Multivitamin      itching   • Naprosyn [Naproxen]    • Other Drug      Any binders that remove phosphorus from the body such as tums       PHYSICAL EXAM  VITAL SIGNS: BP (!) 200/61   Pulse 86   Temp 36.1 °C (97 °F)   Resp 16   Ht 1.473 m (4' 10\")   Wt 68 kg (150 lb)   LMP 01/01/1995   SpO2 100%   BMI 31.35 kg/m²    Constitutional: Awake, alert, appears uncomfortable, chronically ill " appearing.  HENT: Normocephalic, Atraumatic, Bilateral external ears normal, Oropharynx moist, No oral exudates, Nose normal.   Eyes: PERRL, EOMI, Conjunctiva normal, No discharge.   Neck: Normal range of motion, No tenderness, Supple, No stridor.   Lymphatic: No lymphadenopathy noted.   Cardiovascular: Normal heart rate, Normal rhythm, No murmurs, No rubs,  Thorax & Lungs: Normal breath sounds, No respiratory distress, No wheezing,   .  Cardiac: Regular rate and rhythm, murmurs or rubs  Abdomen: Bowel sounds normal, Soft, No tenderness  Skin: Warm, Dry, No erythema, No rash.   Back: No tenderness, No CVA tenderness.   Musculoskeletal: Good range of motion in all major joints. ,  Mild symmetric edema  Neurologic: Somnolent but arousable, No focal deficits noted.   Psychiatric: Affect is flat    EKG  EKG Interpretation    Interpreted by me    Rhythm: normal sinus   Rate: normal  Axis: normal  Ectopy: none  Conduction: Normal except for R progression and borderline LVH.  ST Segments: no acute change  T Waves: no acute change  Q Waves: none    Clinical Impression: no acute changes and normal EKG    RADIOLOGY/PROCEDURES  DX-CHEST-PORTABLE (1 VIEW)   Final Result         No acute cardiac or pulmonary abnormality is identified.      CT-HEAD W/O   Final Result      No intracranial mass effect or acute hemorrhage.   Fluid within the right mastoid similar to previous findings.      CT-ABDOMEN W/O    (Results Pending)         COURSE & MEDICAL DECISION MAKING  Pertinent Labs & Imaging studies reviewed. (See chart for details)    Patient presents to the ER after having dialysis today with headache, dizziness, hypertension and vomiting.  Initially, her blood pressure is quite high concerning for hypertensive urgency and emergency and she has a headache.  Concerning her bleed considering her emesis.  This reason, I ordered some labetalol and a head scan.  EKG and labs have been negative except for an elevated lipase, which I'll  discuss later.  The patient's blood pressure is improved steadily without any IV therapy.  I believe her hypertensive urgency has resolved.    Patient is vomiting some brownish emesis which is concerning for gastritis.  Abdomen is benign without any tenderness or peritonitis.  She has a mild elevation in her lipase.    .  I considered an abdominal CT, but her abdominal exam is benign.  She really has no pain.  I am concerned about her emesis and elevated lipase.  Chart is been reviewed.  She's had previous admissions for a similar process.  She previous CTs in the past have been unremarkable.  The essence of any abdominal tenderness.  I don't think she requires a CT at this time.  [facility].    Head CT is negative.  No signs of hemorrhage.    FINAL IMPRESSION  1. Nausea and vomiting, intractability of vomiting not specified, unspecified vomiting type    2. Acute pancreatitis, unspecified complication status, unspecified pancreatitis type    3. Acute nonintractable headache, unspecified headache type    4. Hypertensive urgency    5. ESRD (end stage renal disease) (CMS-HCC)        2.   3.         Electronically signed by: Daniel Ramos, 1/22/2018 5:06 PM

## 2018-01-23 NOTE — ASSESSMENT & PLAN NOTE
- Presenting with epigastric pain, N/V triggered by fatty meal  - No history of gallbladder disease, cholecystectomy or ethanol use  - Patient currently not on 5-ASA, 6-MP, ACEi, NSAIDs, statin, furosemide, or thiazide  - Hold Januvia as it can rarely cause pancreatitis  - CMP with no signs of hypercalcemia  - Last lipid profile with triglycerides at 192 (12/14/17)  - No H/O ERCP or Trauma  - No recorded fever, no leukocytosis, troponin is negative  - Sorenson's neg, ALP < 100  - AST/ALT at 21/15 in the setting of thyromegaly on CT  - CXR with no focal infiltrates or consolidations   - ABD CT showed fatty replacement of the pancreas noted, gallbladder within normal limits, liver is normal in contour, no intrahepatic biliary ductal dilatation is seen. Hepatomegaly noted  Repeat lipase of 51  USG abd- Gallbladder wall is borderline thickened. No gallstones are identified.  Increased echogenicity of the right kidney is noted. This could indicate medical renal disease.    Mild pancreatitis    Plan:  GI soft, renal diet  Compazine for nausea

## 2018-01-23 NOTE — PROGRESS NOTES
[facility] paged at this time regarding pt being on continuous fluids and having end stage renal disease with minimal urine output.   Per Dr. Sandoval this order is correct and fluids are to be continued.

## 2018-01-23 NOTE — CARE PLAN
Problem: Safety  Goal: Will remain free from falls  Outcome: PROGRESSING AS EXPECTED  Pt mobility assessed at beginning of shift. Pt is standby assist. Fall precautions in place. Non-slip socks on. Bed in lowest locked position. Bed alarm on. Call light within reach. Pt educated to call for assistance and verbalizes understanding.    Problem: Knowledge Deficit  Goal: Knowledge of disease process/condition, treatment plan, diagnostic tests, and medications will improve  Outcome: PROGRESSING AS EXPECTED  Pt educated about disease process. Reason why medications are taken. And informed about treatment plan.

## 2018-01-23 NOTE — PROGRESS NOTES
PT arrived to unit via gangartasha escorted this RN.  Pt is in sinus rhythm rate of 78. PT A&O x 4 . Monitor leads in place. Monitor room notified. PT is orientated to the unit, call light, phone system, fall precautions in place, appropriate signs in place, bed in low and locked positions, bed alarm on. Pt educated regarded fall precautions and importance of calling staff for assistance. Pt denies further needs at the time. Will continue to monitor.

## 2018-01-24 ENCOUNTER — APPOINTMENT (OUTPATIENT)
Dept: RADIOLOGY | Facility: MEDICAL CENTER | Age: 63
DRG: 438 | End: 2018-01-24
Attending: HOSPITALIST
Payer: MEDICARE

## 2018-01-24 ENCOUNTER — APPOINTMENT (OUTPATIENT)
Dept: RADIOLOGY | Facility: MEDICAL CENTER | Age: 63
DRG: 438 | End: 2018-01-24
Attending: STUDENT IN AN ORGANIZED HEALTH CARE EDUCATION/TRAINING PROGRAM
Payer: MEDICARE

## 2018-01-24 LAB
ALBUMIN SERPL BCP-MCNC: 2.9 G/DL (ref 3.2–4.9)
ALBUMIN/GLOB SERPL: 1 G/DL
ALP SERPL-CCNC: 60 U/L (ref 30–99)
ALT SERPL-CCNC: 10 U/L (ref 2–50)
ANION GAP SERPL CALC-SCNC: 14 MMOL/L (ref 0–11.9)
ANION GAP SERPL CALC-SCNC: 8 MMOL/L (ref 0–11.9)
ANISOCYTOSIS BLD QL SMEAR: ABNORMAL
AST SERPL-CCNC: 11 U/L (ref 12–45)
BASOPHILS # BLD AUTO: 0 % (ref 0–1.8)
BASOPHILS # BLD AUTO: 0.8 % (ref 0–1.8)
BASOPHILS # BLD: 0 K/UL (ref 0–0.12)
BASOPHILS # BLD: 0.03 K/UL (ref 0–0.12)
BILIRUB SERPL-MCNC: 0.5 MG/DL (ref 0.1–1.5)
BUN SERPL-MCNC: 15 MG/DL (ref 8–22)
BUN SERPL-MCNC: 55 MG/DL (ref 8–22)
CALCIUM SERPL-MCNC: 7.3 MG/DL (ref 8.5–10.5)
CALCIUM SERPL-MCNC: 8.3 MG/DL (ref 8.5–10.5)
CHLORIDE SERPL-SCNC: 91 MMOL/L (ref 96–112)
CHLORIDE SERPL-SCNC: 99 MMOL/L (ref 96–112)
CO2 SERPL-SCNC: 23 MMOL/L (ref 20–33)
CO2 SERPL-SCNC: 30 MMOL/L (ref 20–33)
CREAT SERPL-MCNC: 3.57 MG/DL (ref 0.5–1.4)
CREAT SERPL-MCNC: 6.98 MG/DL (ref 0.5–1.4)
DEPRECATED D DIMER PPP IA-ACNC: 1097 NG/ML(D-DU)
EKG IMPRESSION: NORMAL
EKG IMPRESSION: NORMAL
EOSINOPHIL # BLD AUTO: 0.14 K/UL (ref 0–0.51)
EOSINOPHIL # BLD AUTO: 0.17 K/UL (ref 0–0.51)
EOSINOPHIL NFR BLD: 3.5 % (ref 0–6.9)
EOSINOPHIL NFR BLD: 4.3 % (ref 0–6.9)
ERYTHROCYTE [DISTWIDTH] IN BLOOD BY AUTOMATED COUNT: 70.1 FL (ref 35.9–50)
ERYTHROCYTE [DISTWIDTH] IN BLOOD BY AUTOMATED COUNT: 71.5 FL (ref 35.9–50)
GLOBULIN SER CALC-MCNC: 3 G/DL (ref 1.9–3.5)
GLUCOSE BLD-MCNC: 101 MG/DL (ref 65–99)
GLUCOSE BLD-MCNC: 115 MG/DL (ref 65–99)
GLUCOSE BLD-MCNC: 189 MG/DL (ref 65–99)
GLUCOSE SERPL-MCNC: 125 MG/DL (ref 65–99)
GLUCOSE SERPL-MCNC: 267 MG/DL (ref 65–99)
HCT VFR BLD AUTO: 25.1 % (ref 37–47)
HCT VFR BLD AUTO: 29.6 % (ref 37–47)
HGB BLD-MCNC: 8.4 G/DL (ref 12–16)
HGB BLD-MCNC: 9.5 G/DL (ref 12–16)
IGA SERPL-MCNC: 173 MG/DL (ref 68–408)
IMM GRANULOCYTES # BLD AUTO: 0.02 K/UL (ref 0–0.11)
IMM GRANULOCYTES NFR BLD AUTO: 0.5 % (ref 0–0.9)
LG PLATELETS BLD QL SMEAR: NORMAL
LYMPHOCYTES # BLD AUTO: 0.72 K/UL (ref 1–4.8)
LYMPHOCYTES # BLD AUTO: 0.92 K/UL (ref 1–4.8)
LYMPHOCYTES NFR BLD: 18 % (ref 22–41)
LYMPHOCYTES NFR BLD: 23.5 % (ref 22–41)
MACROCYTES BLD QL SMEAR: ABNORMAL
MAGNESIUM SERPL-MCNC: 2.1 MG/DL (ref 1.5–2.5)
MANUAL DIFF BLD: NORMAL
MCH RBC QN AUTO: 31 PG (ref 27–33)
MCH RBC QN AUTO: 32.4 PG (ref 27–33)
MCHC RBC AUTO-ENTMCNC: 32.1 G/DL (ref 33.6–35)
MCHC RBC AUTO-ENTMCNC: 33.5 G/DL (ref 33.6–35)
MCV RBC AUTO: 96.7 FL (ref 81.4–97.8)
MCV RBC AUTO: 96.9 FL (ref 81.4–97.8)
METAMYELOCYTES NFR BLD MANUAL: 0.9 %
MONOCYTES # BLD AUTO: 0.34 K/UL (ref 0–0.85)
MONOCYTES # BLD AUTO: 0.58 K/UL (ref 0–0.85)
MONOCYTES NFR BLD AUTO: 14.5 % (ref 0–13.4)
MONOCYTES NFR BLD AUTO: 8.7 % (ref 0–13.4)
MORPHOLOGY BLD-IMP: NORMAL
NEUTROPHILS # BLD AUTO: 2.44 K/UL (ref 2–7.15)
NEUTROPHILS # BLD AUTO: 2.5 K/UL (ref 2–7.15)
NEUTROPHILS NFR BLD: 59.1 % (ref 44–72)
NEUTROPHILS NFR BLD: 62.7 % (ref 44–72)
NEUTS BAND NFR BLD MANUAL: 3.5 % (ref 0–10)
NRBC # BLD AUTO: 0 K/UL
NRBC # BLD AUTO: 0 K/UL
NRBC BLD-RTO: 0 /100 WBC
NRBC BLD-RTO: 0 /100 WBC
OVALOCYTES BLD QL SMEAR: NORMAL
PHOSPHATE SERPL-MCNC: 8.6 MG/DL (ref 2.5–4.5)
PLATELET # BLD AUTO: 109 K/UL (ref 164–446)
PLATELET # BLD AUTO: 95 K/UL (ref 164–446)
PLATELET BLD QL SMEAR: NORMAL
PMV BLD AUTO: 14.1 FL (ref 9–12.9)
POIKILOCYTOSIS BLD QL SMEAR: NORMAL
POTASSIUM SERPL-SCNC: 4.1 MMOL/L (ref 3.6–5.5)
POTASSIUM SERPL-SCNC: 4.3 MMOL/L (ref 3.6–5.5)
PROT SERPL-MCNC: 5.9 G/DL (ref 6–8.2)
RBC # BLD AUTO: 2.59 M/UL (ref 4.2–5.4)
RBC # BLD AUTO: 3.06 M/UL (ref 4.2–5.4)
RBC BLD AUTO: PRESENT
SODIUM SERPL-SCNC: 129 MMOL/L (ref 135–145)
SODIUM SERPL-SCNC: 136 MMOL/L (ref 135–145)
TROPONIN I SERPL-MCNC: <0.01 NG/ML (ref 0–0.04)
TROPONIN I SERPL-MCNC: <0.01 NG/ML (ref 0–0.04)
WBC # BLD AUTO: 3.9 K/UL (ref 4.8–10.8)
WBC # BLD AUTO: 4 K/UL (ref 4.8–10.8)

## 2018-01-24 PROCEDURE — 36415 COLL VENOUS BLD VENIPUNCTURE: CPT

## 2018-01-24 PROCEDURE — 700111 HCHG RX REV CODE 636 W/ 250 OVERRIDE (IP): Performed by: HOSPITALIST

## 2018-01-24 PROCEDURE — 82962 GLUCOSE BLOOD TEST: CPT

## 2018-01-24 PROCEDURE — 770020 HCHG ROOM/CARE - TELE (206)

## 2018-01-24 PROCEDURE — 80048 BASIC METABOLIC PNL TOTAL CA: CPT

## 2018-01-24 PROCEDURE — 74018 RADEX ABDOMEN 1 VIEW: CPT

## 2018-01-24 PROCEDURE — 700102 HCHG RX REV CODE 250 W/ 637 OVERRIDE(OP): Performed by: STUDENT IN AN ORGANIZED HEALTH CARE EDUCATION/TRAINING PROGRAM

## 2018-01-24 PROCEDURE — 85007 BL SMEAR W/DIFF WBC COUNT: CPT

## 2018-01-24 PROCEDURE — 700102 HCHG RX REV CODE 250 W/ 637 OVERRIDE(OP): Performed by: HOSPITALIST

## 2018-01-24 PROCEDURE — 71045 X-RAY EXAM CHEST 1 VIEW: CPT

## 2018-01-24 PROCEDURE — A9270 NON-COVERED ITEM OR SERVICE: HCPCS | Performed by: STUDENT IN AN ORGANIZED HEALTH CARE EDUCATION/TRAINING PROGRAM

## 2018-01-24 PROCEDURE — 85027 COMPLETE CBC AUTOMATED: CPT

## 2018-01-24 PROCEDURE — 93010 ELECTROCARDIOGRAM REPORT: CPT | Mod: 76 | Performed by: INTERNAL MEDICINE

## 2018-01-24 PROCEDURE — G8979 MOBILITY GOAL STATUS: HCPCS | Mod: CI

## 2018-01-24 PROCEDURE — 84100 ASSAY OF PHOSPHORUS: CPT

## 2018-01-24 PROCEDURE — A9270 NON-COVERED ITEM OR SERVICE: HCPCS | Performed by: HOSPITALIST

## 2018-01-24 PROCEDURE — 84484 ASSAY OF TROPONIN QUANT: CPT

## 2018-01-24 PROCEDURE — 85379 FIBRIN DEGRADATION QUANT: CPT

## 2018-01-24 PROCEDURE — 80053 COMPREHEN METABOLIC PANEL: CPT

## 2018-01-24 PROCEDURE — 97162 PT EVAL MOD COMPLEX 30 MIN: CPT

## 2018-01-24 PROCEDURE — 99233 SBSQ HOSP IP/OBS HIGH 50: CPT | Mod: GC | Performed by: INTERNAL MEDICINE

## 2018-01-24 PROCEDURE — G8978 MOBILITY CURRENT STATUS: HCPCS | Mod: CK

## 2018-01-24 PROCEDURE — C9113 INJ PANTOPRAZOLE SODIUM, VIA: HCPCS | Performed by: HOSPITALIST

## 2018-01-24 PROCEDURE — 90935 HEMODIALYSIS ONE EVALUATION: CPT

## 2018-01-24 PROCEDURE — 93005 ELECTROCARDIOGRAM TRACING: CPT | Performed by: STUDENT IN AN ORGANIZED HEALTH CARE EDUCATION/TRAINING PROGRAM

## 2018-01-24 PROCEDURE — 83735 ASSAY OF MAGNESIUM: CPT

## 2018-01-24 PROCEDURE — 85025 COMPLETE CBC W/AUTO DIFF WBC: CPT

## 2018-01-24 RX ORDER — CYCLOBENZAPRINE HCL 10 MG
10 TABLET ORAL
Status: COMPLETED | OUTPATIENT
Start: 2018-01-24 | End: 2018-01-24

## 2018-01-24 RX ADMIN — BRIMONIDINE TARTRATE 1 DROP: 2 SOLUTION OPHTHALMIC at 12:41

## 2018-01-24 RX ADMIN — CARVEDILOL 6.25 MG: 6.25 TABLET, FILM COATED ORAL at 17:23

## 2018-01-24 RX ADMIN — PANTOPRAZOLE SODIUM 40 MG: 40 INJECTION, POWDER, FOR SOLUTION INTRAVENOUS at 20:34

## 2018-01-24 RX ADMIN — BRIMONIDINE TARTRATE 1 DROP: 2 SOLUTION OPHTHALMIC at 20:34

## 2018-01-24 RX ADMIN — PREGABALIN 50 MG: 25 CAPSULE ORAL at 08:02

## 2018-01-24 RX ADMIN — CARVEDILOL 6.25 MG: 6.25 TABLET, FILM COATED ORAL at 12:39

## 2018-01-24 RX ADMIN — SUCRALFATE 1 G: 1 TABLET ORAL at 06:13

## 2018-01-24 RX ADMIN — PREGABALIN 50 MG: 25 CAPSULE ORAL at 20:34

## 2018-01-24 RX ADMIN — PANTOPRAZOLE SODIUM 40 MG: 40 INJECTION, POWDER, FOR SOLUTION INTRAVENOUS at 08:03

## 2018-01-24 RX ADMIN — TIMOLOL MALEATE 1 DROP: 5 SOLUTION OPHTHALMIC at 12:40

## 2018-01-24 RX ADMIN — CYCLOBENZAPRINE 10 MG: 10 TABLET, FILM COATED ORAL at 22:11

## 2018-01-24 RX ADMIN — LATANOPROST 1 DROP: 50 SOLUTION OPHTHALMIC at 20:34

## 2018-01-24 RX ADMIN — TIMOLOL MALEATE 1 DROP: 5 SOLUTION OPHTHALMIC at 20:34

## 2018-01-24 ASSESSMENT — COGNITIVE AND FUNCTIONAL STATUS - GENERAL
STANDING UP FROM CHAIR USING ARMS: A LITTLE
MOBILITY SCORE: 18
TURNING FROM BACK TO SIDE WHILE IN FLAT BAD: A LITTLE
WALKING IN HOSPITAL ROOM: A LITTLE
SUGGESTED CMS G CODE MODIFIER MOBILITY: CK
MOVING FROM LYING ON BACK TO SITTING ON SIDE OF FLAT BED: A LITTLE
CLIMB 3 TO 5 STEPS WITH RAILING: A LITTLE
MOVING TO AND FROM BED TO CHAIR: A LITTLE

## 2018-01-24 ASSESSMENT — ENCOUNTER SYMPTOMS
CHILLS: 0
SENSORY CHANGE: 1
HEADACHES: 1
VOMITING: 0
MYALGIAS: 1
NAUSEA: 0
CONSTIPATION: 0
BLOOD IN STOOL: 0
TINGLING: 0
SPUTUM PRODUCTION: 0
ABDOMINAL PAIN: 1
COUGH: 0
DIARRHEA: 0
FEVER: 0
SORE THROAT: 0
BLURRED VISION: 1
PALPITATIONS: 0
DIZZINESS: 0
HEARTBURN: 0
SHORTNESS OF BREATH: 0

## 2018-01-24 ASSESSMENT — PAIN SCALES - GENERAL
PAINLEVEL_OUTOF10: 8
PAINLEVEL_OUTOF10: 0
PAINLEVEL_OUTOF10: 6
PAINLEVEL_OUTOF10: 2
PAINLEVEL_OUTOF10: 1
PAINLEVEL_OUTOF10: 2
PAINLEVEL_OUTOF10: 0
PAINLEVEL_OUTOF10: 2

## 2018-01-24 ASSESSMENT — GAIT ASSESSMENTS
DISTANCE (FEET): 10
ASSISTIVE DEVICE: QUAD CANE
DEVIATION: BRADYKINETIC;INCREASED BASE OF SUPPORT;SHUFFLED GAIT
GAIT LEVEL OF ASSIST: CONTACT GUARD ASSIST

## 2018-01-24 ASSESSMENT — LIFESTYLE VARIABLES: SUBSTANCE_ABUSE: 0

## 2018-01-24 NOTE — PROGRESS NOTES
Shift report received and assessment completed. No family at bedside. Pt indicates no distress. Night RN reports that current IV may not be functional. Will review. Call light placed within reach, bed in lowest position, and bed alarm is set. Will continue to monitor patient.

## 2018-01-24 NOTE — PROGRESS NOTES
Pt reported to PT that she was experiencing 8/10 chest pain. Dr Alicia updated, EKG and trops ordered STAT.

## 2018-01-24 NOTE — THERAPY
"Physical Therapy Evaluation completed.   Bed Mobility:  Supine to Sit: Contact Guard Assist  Transfers: Sit to Stand: Minimal Assist (CGA from bed; MIN A from low chair)  Gait: Level Of Assist: Contact Guard Assist with Quad Cane x10 ft      Plan of Care: Will benefit from Physical Therapy 3 times per week  Discharge Recommendations: Equipment: No Equipment Needed.   Post-acute therapy: Discharge to home with outpatient or home health for additional skilled therapy services.    Patient is 62/F admitted with upper GI bleed and pancreatitis. PMH significant for DM2, ESRD with HD MWF, chronic respiratory failure with hypoxia, on 2L O2 at home. Patient is legally blind. Patient lives with daughter and her family in single story home with ramp at entrance. PLOF MOD INDEP ADLs and household amb with SPC. Family performs IADLs. Patient has meals on wheels and HH nsg. Patient presents today with report of feeling fatigued s/p HD this morning, and intermittent 8/10 central chest pain. Nsg present and aware of pain. Patient is primarily limited by weakness and fatigue. Able to amb 10 ft to/from bathroom with WBQC RUE, CGA. Acute PT indicated to progress mobility. D/c pending progress and family's ability to care for patient--- daugther works full time. At this time would rec home with HH nsg/PT/OT and 24 hr family SPV initially upon d/c.      See \"Rehab Therapy-Acute\" Patient Summary Report for complete documentation.     "

## 2018-01-24 NOTE — PROGRESS NOTES
Assumed care of PT A&O x 4. Pt resting in bed with no signs of labored breathing. On 2 L n.c . Tele monitor in place, cardiac rhythm being monitored. Call light within reach, bed in lowest position, upper bed rails up, bed alarm on. Pt was updated on plan of care for the night . Will continue to monitor.

## 2018-01-24 NOTE — ASSESSMENT & PLAN NOTE
7.9 on 1/23/2018    Likely due to ESRD on hemodialysis    Plan:  Sevelamer  Low phosphate diet, once started, currently clear liquids

## 2018-01-24 NOTE — PROGRESS NOTES
Hemodialysis ordered by Dr Perez for today. Treatment started at  08:26 and ended at 11:56. UF net - 2650  ml. Vital signs stable during dialysis. Left upper arm AVF (+) for thrill and bruit pre and post dialysis.  Accessed with gauge 15 sharp needles without difficulty. Access site bleeding controlled in 10 minutes. Gauze dressing applied. RN instructed to monitor site for bleeding and to apply pressure until bleeding stops if breakthrough bleeding occurs. Report given to primary RN Maciej Garcias.

## 2018-01-24 NOTE — DISCHARGE PLANNING
Outpatient Dialysis Note    Confirmed patient is active at:      [facility]  [address] [place] [address]      Schedule: Monday, Wednesday, Friday  Time: 10:30 am    Spoke with Loreto at facility who confirmed.    Forwarded records for review.    Dialysis Coordinator, [facility]  Stacia Stephens [phone]

## 2018-01-24 NOTE — CONSULTS
"[facility] -  CONSULTATION NOTE               Author: Chao Perez M.D. Date & Time: 2018  2:41 PM       REASON FOR CONSULTATION:   - Inpatient hemodialysis management.      CHIEF COMPLAINT:   -  \"I threw up coffee ground looking material\"    HISTORY OF PRESENT ILLNESS:    61 yo female PMH ESRD MWF iHD, HTN, type 2 DM, anemia of chronic disease, renal osteodystrophy, HLD, and MDS who presents with CC as above.  She was at her usual HD session on 18 when she complained of N/V/HA/dizziness.  After her treatment her symptoms did not improve and she was sent to ED for further evaluation.  In the ED she had small amount of what appeared to be coffee ground emesis and she states she had similar vomitus after eating a fatty meal at [employer] as well, however, she is legally blind, so unclear how accurate that may have been or wether it was possibly more bilious/food content like.  She denies any NSAIDs recently.  EGD in  had non-erosive gastritis and duodenitis.  She described her pain 5/10 and epigastric with no radiation.  No alleviations/aggravating factors.  CT with contrast of Abd/Pelvis was negative.  No F/C/CP/SOB.  Her Hgb is stable as are her electrolytes.    REVIEW OF SYSTEMS:    - As per HPI, otherwise review of systems negative per AMA/CMS criteria.    PAST MEDICAL HISTORY:   - ESRD MWF iHD  - HTN  - type 2 DM  - Anemia of chronic disease  - Renal osteodystrophy  - MDS  - COPD  - CAD  - HLD  - CVA  - Steal syndrome  - Chronic diastolic HF  - PVD  - Chronic chest pain     PAST SURGICAL HISTORY:   - Cardiac surgery  - Abdominal surgery  - AVF placement  - Bilateral cataract repair  - Retinal detachment surgery  - LHC  - Upper EGD  - LAVF revision for steal syndrome  - Appendectomy  - Bone marrow bx  - D&C x 2  -  x 2  - Hysterectomy  - BLE stenting  - PermCath placement and removal     FAMILY HISTORY:   - Reviewed and non contributory to current " "illness     SOCIAL HISTORY:   - No tobacco  - No EtOH  - No illicits  - 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 No   BMI 31.56 kg/m²   GENERAL:  WDWN, NAD  HEENT:  NC/AT, no scleral icterus; conjunctiva normal  NECK:  Supple; Non tender  CV:  RRR, 2/6 systolic murmur  LUNGS:  CTAB, no W/R  ABDOMEN:  SNTND, +BS  EXTREMETIES:  No C/C/E  SKIN:  Warm and dry  NEURO:  A&O, no focal deficits  PSYCH:  Cooperative, appropriate mood and affect    LABS:  Recent Results (from the past 24 hour(s))   CBC WITHOUT DIFFERENTIAL    Collection Time: 01/23/18  3:52 PM   Result Value Ref Range    WBC 4.3 (L) 4.8 - 10.8 K/uL    RBC 2.79 (L) 4.20 - 5.40 M/uL    Hemoglobin 9.1 (L) 12.0 - 16.0 g/dL    Hematocrit 27.4 (L) 37.0 - 47.0 %    MCV 98.2 (H) 81.4 - 97.8 fL    MCH 32.6 27.0 - 33.0 pg    MCHC 33.2 (L) 33.6 - 35.0 g/dL    RDW 73.8 (H) 35.9 - 50.0 fL    Platelet Count 95 (L) 164 - 446 K/uL   ACCU-CHEK GLUCOSE    Collection Time: 01/23/18  5:45 PM   Result Value Ref Range    Glucose - Accu-Ck 141 (H) 65 - 99 mg/dL   CBC WITH DIFFERENTIAL    Collection Time: 01/24/18  3:59 AM   Result Value Ref Range    WBC 3.9 (L) 4.8 - 10.8 K/uL    RBC 2.59 (L) 4.20 - 5.40 M/uL    Hemoglobin 8.4 (L) 12.0 - 16.0 g/dL    Hematocrit 25.1 (L) 37.0 - 47.0 %    MCV 96.9 81.4 - 97.8 fL    MCH 32.4 27.0 - 33.0 pg    MCHC 33.5 (L) 33.6 - 35.0 g/dL    RDW 71.5 (H) 35.9 - 50.0 fL    " Platelet Count 95 (L) 164 - 446 K/uL    MPV 14.1 (H) 9.0 - 12.9 fL    Nucleated RBC 0.00 /100 WBC    NRBC (Absolute) 0.00 K/uL    Neutrophils-Polys 59.10 44.00 - 72.00 %    Lymphocytes 23.50 22.00 - 41.00 %    Monocytes 8.70 0.00 - 13.40 %    Eosinophils 4.30 0.00 - 6.90 %    Basophils 0.00 0.00 - 1.80 %    Neutrophils (Absolute) 2.44 2.00 - 7.15 K/uL    Lymphs (Absolute) 0.92 (L) 1.00 - 4.80 K/uL    Monos (Absolute) 0.34 0.00 - 0.85 K/uL    Eos (Absolute) 0.17 0.00 - 0.51 K/uL    Baso (Absolute) 0.00 0.00 - 0.12 K/uL    Anisocytosis 1+     Macrocytosis 1+    COMP METABOLIC PANEL    Collection Time: 01/24/18  3:59 AM   Result Value Ref Range    Sodium 136 135 - 145 mmol/L    Potassium 4.3 3.6 - 5.5 mmol/L    Chloride 99 96 - 112 mmol/L    Co2 23 20 - 33 mmol/L    Anion Gap 14.0 (H) 0.0 - 11.9    Glucose 125 (H) 65 - 99 mg/dL    Bun 55 (H) 8 - 22 mg/dL    Creatinine 6.98 (HH) 0.50 - 1.40 mg/dL    Calcium 7.3 (L) 8.5 - 10.5 mg/dL    AST(SGOT) 11 (L) 12 - 45 U/L    ALT(SGPT) 10 2 - 50 U/L    Alkaline Phosphatase 60 30 - 99 U/L    Total Bilirubin 0.5 0.1 - 1.5 mg/dL    Albumin 2.9 (L) 3.2 - 4.9 g/dL    Total Protein 5.9 (L) 6.0 - 8.2 g/dL    Globulin 3.0 1.9 - 3.5 g/dL    A-G Ratio 1.0 g/dL   PHOSPHORUS    Collection Time: 01/24/18  3:59 AM   Result Value Ref Range    Phosphorus 8.6 (H) 2.5 - 4.5 mg/dL   MAGNESIUM    Collection Time: 01/24/18  3:59 AM   Result Value Ref Range    Magnesium 2.1 1.5 - 2.5 mg/dL   ESTIMATED GFR    Collection Time: 01/24/18  3:59 AM   Result Value Ref Range    GFR If  7 (A) >60 mL/min/1.73 m 2    GFR If Non African American 6 (A) >60 mL/min/1.73 m 2   DIFFERENTIAL MANUAL    Collection Time: 01/24/18  3:59 AM   Result Value Ref Range    Bands-Stabs 3.50 0.00 - 10.00 %    Metamyelocytes 0.90 %    Manual Diff Status PERFORMED    PERIPHERAL SMEAR REVIEW    Collection Time: 01/24/18  3:59 AM   Result Value Ref Range    Peripheral Smear Review see below    PLATELET ESTIMATE     Collection Time: 01/24/18  3:59 AM   Result Value Ref Range    Plt Estimation Decreased    MORPHOLOGY    Collection Time: 01/24/18  3:59 AM   Result Value Ref Range    RBC Morphology Present     Large Platelets 1+     Poikilocytosis 1+     Ovalocytes 1+    ACCU-CHEK GLUCOSE    Collection Time: 01/24/18  6:14 AM   Result Value Ref Range    Glucose - Accu-Ck 101 (H) 65 - 99 mg/dL       (click the triangle to expand results)    IMAGING:  CT-ABDOMEN W/O   Final Result         1.  No acute abnormality.   2.  Left renal cysts, similar to prior study with calcification compatible with mild complexity. Could be followed up with renal sonogram for further characterization.   3.  Soft tissue nodule laterally at the level of the umbilicus, could represent prominent lymph node or other soft tissue process. Correlate with exam.   4.  Hepatomegaly   5.  Atherosclerosis      DX-CHEST-PORTABLE (1 VIEW)   Final Result         No acute cardiac or pulmonary abnormality is identified.      CT-HEAD W/O   Final Result      No intracranial mass effect or acute hemorrhage.   Fluid within the right mastoid similar to previous findings.          IMPRESSION:  - ESRD    * Etiology likely 2/2 HTN/DM  - Coffee ground emesis  - Abdominal pain  - HTN  - type 2 DM  - Anemia of chronic disease  - Renal Osteodystrophy  - HLD  - CAD  - COPD     ASSESSMENT:  - GFR: HD dependent  - Urine: oligoanuric  - BP: Goal < 140/90  - Volume: euvolemic  - Acid/Base: no sig. acid base disturbance  - Electrolytes: stable  - Anemia: Goal Hgb 10-11  - MBD: Ca normal, PO4 elevated  - HD Access: LAVF (+thrill/bruit)     PLAN:  - MWF iHD  - UF as tolerated  - Continue PO4 binders  - FU GI rec's  - D/C Carafate as that can lead to aluminum toxicity and buildup in ESRD and advanced CKD patients  - Epogen 10K units QRx  - Dose all meds per ESRD guidelines    Thank you for the consultation!

## 2018-01-24 NOTE — CARE PLAN
Problem: Safety  Goal: Will remain free from injury  Fall risk assessed, fall precautions in place, bed alarm set, pt verbalizes understanding of fall risk.    Problem: Knowledge Deficit  Goal: Knowledge of disease process/condition, treatment plan, diagnostic tests, and medications will improve  Pt and family educated on POC, all questions answered in regards to disease process, treatment and diet. Pt and family verbalize understanding and voice no further questions at this time.

## 2018-01-25 LAB
ALBUMIN SERPL BCP-MCNC: 3.1 G/DL (ref 3.2–4.9)
ALBUMIN/GLOB SERPL: 1 G/DL
ALP SERPL-CCNC: 55 U/L (ref 30–99)
ALT SERPL-CCNC: 17 U/L (ref 2–50)
ANION GAP SERPL CALC-SCNC: 11 MMOL/L (ref 0–11.9)
AST SERPL-CCNC: 22 U/L (ref 12–45)
BASOPHILS # BLD AUTO: 0.7 % (ref 0–1.8)
BASOPHILS # BLD: 0.03 K/UL (ref 0–0.12)
BILIRUB SERPL-MCNC: 0.8 MG/DL (ref 0.1–1.5)
BUN SERPL-MCNC: 19 MG/DL (ref 8–22)
CALCIUM SERPL-MCNC: 7.9 MG/DL (ref 8.4–10.2)
CHLORIDE SERPL-SCNC: 92 MMOL/L (ref 96–112)
CO2 SERPL-SCNC: 25 MMOL/L (ref 20–33)
CREAT SERPL-MCNC: 4.93 MG/DL (ref 0.5–1.4)
EOSINOPHIL # BLD AUTO: 0.17 K/UL (ref 0–0.51)
EOSINOPHIL NFR BLD: 3.9 % (ref 0–6.9)
ERYTHROCYTE [DISTWIDTH] IN BLOOD BY AUTOMATED COUNT: 67.3 FL (ref 35.9–50)
GLOBULIN SER CALC-MCNC: 3 G/DL (ref 1.9–3.5)
GLUCOSE BLD-MCNC: 131 MG/DL (ref 65–99)
GLUCOSE BLD-MCNC: 87 MG/DL (ref 65–99)
GLUCOSE BLD-MCNC: 96 MG/DL (ref 65–99)
GLUCOSE SERPL-MCNC: 98 MG/DL (ref 65–99)
HCT VFR BLD AUTO: 28.7 % (ref 37–47)
HGB BLD-MCNC: 9.3 G/DL (ref 12–16)
IMM GRANULOCYTES # BLD AUTO: 0.02 K/UL (ref 0–0.11)
IMM GRANULOCYTES NFR BLD AUTO: 0.5 % (ref 0–0.9)
LIPASE SERPL-CCNC: 51 U/L (ref 11–82)
LYMPHOCYTES # BLD AUTO: 1.44 K/UL (ref 1–4.8)
LYMPHOCYTES NFR BLD: 33 % (ref 22–41)
MCH RBC QN AUTO: 31.1 PG (ref 27–33)
MCHC RBC AUTO-ENTMCNC: 32.4 G/DL (ref 33.6–35)
MCV RBC AUTO: 96 FL (ref 81.4–97.8)
MONOCYTES # BLD AUTO: 0.74 K/UL (ref 0–0.85)
MONOCYTES NFR BLD AUTO: 17 % (ref 0–13.4)
NEUTROPHILS # BLD AUTO: 1.96 K/UL (ref 2–7.15)
NEUTROPHILS NFR BLD: 44.9 % (ref 44–72)
NRBC # BLD AUTO: 0 K/UL
NRBC BLD-RTO: 0 /100 WBC
PLATELET # BLD AUTO: 105 K/UL (ref 164–446)
PMV BLD AUTO: 14.1 FL (ref 9–12.9)
POTASSIUM SERPL-SCNC: 3.6 MMOL/L (ref 3.6–5.5)
PROT SERPL-MCNC: 6.1 G/DL (ref 6–8.2)
RBC # BLD AUTO: 2.99 M/UL (ref 4.2–5.4)
SODIUM SERPL-SCNC: 128 MMOL/L (ref 135–145)
TTG IGA SER IA-ACNC: 0 U/ML (ref 0–3)
WBC # BLD AUTO: 4.4 K/UL (ref 4.8–10.8)

## 2018-01-25 PROCEDURE — 160035 HCHG PACU - 1ST 60 MINS PHASE I: Performed by: INTERNAL MEDICINE

## 2018-01-25 PROCEDURE — 160203 HCHG ENDO MINUTES - 1ST 30 MINS LEVEL 4: Performed by: INTERNAL MEDICINE

## 2018-01-25 PROCEDURE — 700111 HCHG RX REV CODE 636 W/ 250 OVERRIDE (IP)

## 2018-01-25 PROCEDURE — 0DB68ZX EXCISION OF STOMACH, VIA NATURAL OR ARTIFICIAL OPENING ENDOSCOPIC, DIAGNOSTIC: ICD-10-PCS | Performed by: INTERNAL MEDICINE

## 2018-01-25 PROCEDURE — 160009 HCHG ANES TIME/MIN: Performed by: INTERNAL MEDICINE

## 2018-01-25 PROCEDURE — A9270 NON-COVERED ITEM OR SERVICE: HCPCS | Performed by: INTERNAL MEDICINE

## 2018-01-25 PROCEDURE — 160002 HCHG RECOVERY MINUTES (STAT): Performed by: INTERNAL MEDICINE

## 2018-01-25 PROCEDURE — 700102 HCHG RX REV CODE 250 W/ 637 OVERRIDE(OP): Performed by: INTERNAL MEDICINE

## 2018-01-25 PROCEDURE — 88305 TISSUE EXAM BY PATHOLOGIST: CPT

## 2018-01-25 PROCEDURE — 93970 EXTREMITY STUDY: CPT

## 2018-01-25 PROCEDURE — 93970 EXTREMITY STUDY: CPT | Mod: 26 | Performed by: SURGERY

## 2018-01-25 PROCEDURE — A9270 NON-COVERED ITEM OR SERVICE: HCPCS | Performed by: HOSPITALIST

## 2018-01-25 PROCEDURE — 700102 HCHG RX REV CODE 250 W/ 637 OVERRIDE(OP): Performed by: HOSPITALIST

## 2018-01-25 PROCEDURE — 5A1D70Z PERFORMANCE OF URINARY FILTRATION, INTERMITTENT, LESS THAN 6 HOURS PER DAY: ICD-10-PCS | Performed by: INTERNAL MEDICINE

## 2018-01-25 PROCEDURE — 0DB98ZX EXCISION OF DUODENUM, VIA NATURAL OR ARTIFICIAL OPENING ENDOSCOPIC, DIAGNOSTIC: ICD-10-PCS | Performed by: INTERNAL MEDICINE

## 2018-01-25 PROCEDURE — 88312 SPECIAL STAINS GROUP 1: CPT

## 2018-01-25 PROCEDURE — 85025 COMPLETE CBC W/AUTO DIFF WBC: CPT

## 2018-01-25 PROCEDURE — 36415 COLL VENOUS BLD VENIPUNCTURE: CPT

## 2018-01-25 PROCEDURE — 82962 GLUCOSE BLOOD TEST: CPT | Mod: 91

## 2018-01-25 PROCEDURE — 700102 HCHG RX REV CODE 250 W/ 637 OVERRIDE(OP): Performed by: STUDENT IN AN ORGANIZED HEALTH CARE EDUCATION/TRAINING PROGRAM

## 2018-01-25 PROCEDURE — 83690 ASSAY OF LIPASE: CPT

## 2018-01-25 PROCEDURE — A9270 NON-COVERED ITEM OR SERVICE: HCPCS | Performed by: STUDENT IN AN ORGANIZED HEALTH CARE EDUCATION/TRAINING PROGRAM

## 2018-01-25 PROCEDURE — 80053 COMPREHEN METABOLIC PANEL: CPT

## 2018-01-25 PROCEDURE — 160036 HCHG PACU - EA ADDL 30 MINS PHASE I: Performed by: INTERNAL MEDICINE

## 2018-01-25 PROCEDURE — 700111 HCHG RX REV CODE 636 W/ 250 OVERRIDE (IP): Performed by: HOSPITALIST

## 2018-01-25 PROCEDURE — 160048 HCHG OR STATISTICAL LEVEL 1-5: Performed by: INTERNAL MEDICINE

## 2018-01-25 PROCEDURE — 500066 HCHG BITE BLOCK, ECT: Performed by: INTERNAL MEDICINE

## 2018-01-25 PROCEDURE — 99233 SBSQ HOSP IP/OBS HIGH 50: CPT | Mod: GC | Performed by: INTERNAL MEDICINE

## 2018-01-25 PROCEDURE — 770020 HCHG ROOM/CARE - TELE (206)

## 2018-01-25 PROCEDURE — C9113 INJ PANTOPRAZOLE SODIUM, VIA: HCPCS | Performed by: HOSPITALIST

## 2018-01-25 RX ORDER — SEVELAMER HYDROCHLORIDE 400 MG/1
800 TABLET, FILM COATED ORAL
Status: DISCONTINUED | OUTPATIENT
Start: 2018-01-25 | End: 2018-01-27 | Stop reason: HOSPADM

## 2018-01-25 RX ORDER — DIPHENHYDRAMINE HYDROCHLORIDE 50 MG/ML
10 INJECTION INTRAMUSCULAR; INTRAVENOUS ONCE
Status: ACTIVE | OUTPATIENT
Start: 2018-01-25 | End: 2018-01-26

## 2018-01-25 RX ORDER — CYCLOBENZAPRINE HCL 10 MG
10 TABLET ORAL ONCE
Status: COMPLETED | OUTPATIENT
Start: 2018-01-25 | End: 2018-01-26

## 2018-01-25 RX ORDER — POLYETHYLENE GLYCOL 3350 17 G/17G
1 POWDER, FOR SOLUTION ORAL 2 TIMES DAILY
Status: DISCONTINUED | OUTPATIENT
Start: 2018-01-25 | End: 2018-01-27 | Stop reason: HOSPADM

## 2018-01-25 RX ADMIN — BRIMONIDINE TARTRATE 1 DROP: 2 SOLUTION OPHTHALMIC at 08:36

## 2018-01-25 RX ADMIN — POLYETHYLENE GLYCOL 3350 1 PACKET: 17 POWDER, FOR SOLUTION ORAL at 17:35

## 2018-01-25 RX ADMIN — BRIMONIDINE TARTRATE 1 DROP: 2 SOLUTION OPHTHALMIC at 23:11

## 2018-01-25 RX ADMIN — PANTOPRAZOLE SODIUM 40 MG: 40 INJECTION, POWDER, FOR SOLUTION INTRAVENOUS at 10:00

## 2018-01-25 RX ADMIN — RENAGEL 800 MG: 400 TABLET ORAL at 17:39

## 2018-01-25 RX ADMIN — CARVEDILOL 6.25 MG: 6.25 TABLET, FILM COATED ORAL at 08:38

## 2018-01-25 RX ADMIN — TIMOLOL MALEATE 1 DROP: 5 SOLUTION OPHTHALMIC at 08:41

## 2018-01-25 RX ADMIN — LATANOPROST 1 DROP: 50 SOLUTION OPHTHALMIC at 23:11

## 2018-01-25 RX ADMIN — PREGABALIN 50 MG: 25 CAPSULE ORAL at 08:38

## 2018-01-25 RX ADMIN — TIMOLOL MALEATE 1 DROP: 5 SOLUTION OPHTHALMIC at 23:12

## 2018-01-25 RX ADMIN — CARVEDILOL 6.25 MG: 6.25 TABLET, FILM COATED ORAL at 17:35

## 2018-01-25 RX ADMIN — PANTOPRAZOLE SODIUM 40 MG: 40 INJECTION, POWDER, FOR SOLUTION INTRAVENOUS at 23:12

## 2018-01-25 RX ADMIN — PREGABALIN 50 MG: 25 CAPSULE ORAL at 23:10

## 2018-01-25 ASSESSMENT — ENCOUNTER SYMPTOMS
HEARTBURN: 0
DIARRHEA: 0
WEAKNESS: 1
SENSORY CHANGE: 1
EYES NEGATIVE: 1
RESPIRATORY NEGATIVE: 1
PALPITATIONS: 0
CHILLS: 0
BLURRED VISION: 1
PSYCHIATRIC NEGATIVE: 1
TINGLING: 0
HEADACHES: 0
BACK PAIN: 1
VOMITING: 0
MYALGIAS: 1
COUGH: 0
CARDIOVASCULAR NEGATIVE: 1
FEVER: 0
ABDOMINAL PAIN: 0
NAUSEA: 1
SORE THROAT: 0
ABDOMINAL PAIN: 1
NAUSEA: 0
CONSTIPATION: 0
SHORTNESS OF BREATH: 0
BLOOD IN STOOL: 0

## 2018-01-25 ASSESSMENT — PAIN SCALES - GENERAL
PAINLEVEL_OUTOF10: 0
PAINLEVEL_OUTOF10: 5
PAINLEVEL_OUTOF10: 0
PAINLEVEL_OUTOF10: 0
PAINLEVEL_OUTOF10: 7
PAINLEVEL_OUTOF10: 7
PAINLEVEL_OUTOF10: 0

## 2018-01-25 ASSESSMENT — LIFESTYLE VARIABLES: SUBSTANCE_ABUSE: 0

## 2018-01-25 NOTE — PROGRESS NOTES
Gastroenterology Progress Note     Author: Martha Gibbs   Date & Time Created: 1/24/2018 5:26 PM      Chief Complaint:  GI follow up re: coffee grounds emesis    Interval History:    Denies emesis or any stool today.  Had 8/10 chest pain.  Troponin pending, d dimer mildly elevated, EKG without acute change    Review of Systems:  Review of Systems   Constitutional: Negative for chills and fever.   Respiratory: Negative for shortness of breath.    Cardiovascular: Positive for chest pain.   Gastrointestinal: Positive for abdominal pain. Negative for constipation, diarrhea, nausea and vomiting.       Physical Exam:  Physical Exam   Constitutional: She appears well-developed and well-nourished. No distress.   Cardiovascular: Normal rate.    Pulmonary/Chest: Effort normal and breath sounds normal.   Abdominal: Soft. Bowel sounds are normal. She exhibits no distension. There is no tenderness.   Neurological: She is alert.   Skin: Skin is warm and dry. She is not diaphoretic.       Labs:        Invalid input(s): [id number]  Recent Labs      01/22/18   1545  01/24/18   1520   TROPONINI  <0.01  <0.01     Recent Labs      01/22/18   1545  01/23/18   0344  01/24/18   0359  01/24/18   1520   SODIUM  136   --   136  129*   POTASSIUM  3.8   --   4.3  4.1   CHLORIDE  93*   --   99  91*   CO2  25   --   23  30   BUN  39*   --   55*  15   CREATININE  4.21*   --   6.98*  3.57*   MAGNESIUM  2.2  2.2  2.1   --    PHOSPHORUS   --   7.9*  8.6*   --    CALCIUM  8.7   --   7.3*  8.3*     Recent Labs      01/22/18   1545  01/24/18   0359  01/24/18   1520   ALTSGPT  15  10   --    ASTSGOT  21  11*   --    ALKPHOSPHAT  79  60   --    TBILIRUBIN  0.7  0.5   --    LIPASE  264*   --    --    GLUCOSE  140*  125*  267*     Recent Labs      01/22/18   1545   01/23/18   0344  01/23/18   0938  01/23/18   1552  01/24/18   0359  01/24/18   1520   RBC  3.21*   < >  2.94*  3.07*  2.79*  2.59*  3.06*   HEMOGLOBIN  10.3*   < >  9.4*  9.9*  9.1*   8.4*  9.5*   HEMATOCRIT  30.7*   < >  28.7*  29.9*  27.4*  25.1*  29.6*   PLATELETCT  119*   < >  103*  96*  95*  95*  109*   PROTHROMBTM   --    --    --   12.4   --    --    --    INR   --    --    --   0.95   --    --    --    IRON   --    --   175*   --    --    --    --    FERRITIN  2985.5*   --    --    --    --    --    --    TOTIRONBC   --    --   260   --    --    --    --     < > = values in this interval not displayed.     Recent Labs      18   1545   18   1552  18   0359  18   1520   WBC  6.5   < >  4.3*  3.9*  4.0*   NEUTSPOLYS   --    --    --   59.10  62.70   LYMPHOCYTES   --    --    --   23.50  18.00*   MONOCYTES   --    --    --   8.70  14.50*   EOSINOPHILS   --    --    --   4.30  3.50   BASOPHILS   --    --    --   0.00  0.80   ASTSGOT  21   --    --   11*   --    ALTSGPT  15   --    --   10   --    ALKPHOSPHAT  79   --    --   60   --    TBILIRUBIN  0.7   --    --   0.5   --     < > = values in this interval not displayed.     Hemodynamics:  Temp (24hrs), Av.4 °C (97.5 °F), Min:35.6 °C (96.1 °F), Max:36.8 °C (98.2 °F)  Temperature: 36.1 °C (97 °F)  Pulse  Av.4  Min: 60  Max: 89   Blood Pressure: (!) 168/60     Respiratory:    Respiration: 16, Pulse Oximetry: 98 %        RUL Breath Sounds: Clear, RML Breath Sounds: Diminished, RLL Breath Sounds: Diminished, BLANK Breath Sounds: Clear, LLL Breath Sounds: Diminished  Fluids:    Intake/Output Summary (Last 24 hours) at 18 1726  Last data filed at 18 1200   Gross per 24 hour   Intake             2170 ml   Output             3350 ml   Net            -1180 ml     Weight: 68.5 kg (151 lb 0.2 oz)  GI/Nutrition:  Orders Placed This Encounter   Procedures   • DIET ORDER     Standing Status:   Standing     Number of Occurrences:   1     Order Specific Question:   Diet:     Answer:   Clear Liquids - No Red Foods [12]     Order Specific Question:   Diet:     Answer:   Renal [8]     Order Specific Question:    Electrolyte modifications:     Answer:   Low Phosphorus [5]   • DIET NPO     Standing Status:   Standing     Number of Occurrences:   8     Order Specific Question:   Restrict to:     Answer:   Sips with Medications [3]     Medical Decision Making, by Problem:  Active Hospital Problems    Diagnosis   • *Upper GI bleed [K92.2]   • DM  2 complicated by peripheral neuropathy [E11.9]   • Normocytic anemia [D64.9]   • ESRD (end stage renal disease) on dialysis (CMS-[facility]) [N18.6, Z99.2]   • Headache [R51]   • Chronic respiratory failure with hypoxia, 2L [J96.11]   • Essential hypertension, malignant [I10]   • Serum phosphate elevated [E83.39]   • Pancreatitis [K85.90]     Impression:  1.   Coffee grounds emesis  2.  Anemia, multifactorial  3.  Acute pancreatitis:  No clear etiology.  ?related to dialysis  4.  Chest pain: doubt angina, ? PE but wonder if d dimer is elevated due to ESRD, doubt GERD as she is on PPI, chest wall pain  4.  MDS, receives transfusion every 3 weeks  5.  ESRD on HD tiw  6.  HTN  7.  Type II DM    Plan:  1. Discussed case with House Officer.  I will schedule her for EGD tomorrow with Dr. Santos but if situation changes and EGD with anesthesia high risk, we can cancel and hold until medically stable.  2.  NPO p MN  3.  Continue Protonix IV BID  4.  Check lipase in am    Quality-Core Measures

## 2018-01-25 NOTE — PROGRESS NOTES
Internal Medicine Interval Note  Note Author: Jenaro Alicia M.D.     Name Vielka Briscoe     1955   Age/Sex 62 y.o. female   MRN [id number]   Code Status Full     After 5PM or if no immediate response to page, please call for cross-coverage  Attending/Team: Dr. Luciano / [facility] team See Patient List for primary contact information  Call [phone] to page    1st Call - Day Intern (R1):   Maral WISE 2nd Call - Day Sr. Resident (R2/R3):   Mackenzie WISE         Reason for interval visit  (Principal Problem)   Upper GI bleed    Interval Problem Daily Status Update  (24 hours)   2018  Patient doing well in am with no additional episodes of vomiting and denies nausea. She says epigastric pain is better, but rates it at a 8/10.   In pm, she developed central chest and epigastric pain. Trops and EKG, cxr, d-dimer, with bmp and cbc ordered. EKG unchanged from prior. Trop negative. Electrolytes wnl except Na of 129. Hb of 9.5, stable. CXR negative for acute process.   D dimer of 1097. LE duplex ordered. Low suspicion.   Mild intermittent headache that improves on its own.     Upper GI bleed:  GI saw patient, plan for EGD in am of 2018 if stable. Hb stable. Continues to have epigastric pain.   NPO at midnight. Recheck lipase in am.    Review of Systems   Constitutional: Negative for chills and fever.   HENT: Negative for congestion and sore throat.    Eyes: Positive for blurred vision.   Respiratory: Negative for cough, sputum production and shortness of breath.    Cardiovascular: Positive for chest pain. Negative for palpitations and leg swelling.   Gastrointestinal: Positive for abdominal pain. Negative for blood in stool, constipation, diarrhea, heartburn, melena, nausea and vomiting.   Genitourinary: Negative for dysuria and urgency.   Musculoskeletal: Positive for joint pain and myalgias.   Neurological: Positive for sensory change (numbness glove and stocking distribution) and headaches.  Negative for dizziness and tingling.   Psychiatric/Behavioral: Negative for substance abuse.       Consultants/Specialty  GI    Disposition  Inpatient. Monitoring Hb    Quality Measures    Reviewed items::  Labs reviewed, Medications reviewed, Radiology images reviewed and EKG reviewed  Bauer catheter::  No Bauer  DVT prophylaxis pharmacological::  Contraindicated - High bleeding risk          Physical Exam       Vitals:    01/24/18 1515 01/24/18 1530 01/24/18 1547 01/24/18 1600   BP: (!) 173/60 (!) 165/68 (!) 168/60    Pulse: 68 64 66 65   Resp: 18 18 16    Temp: 36.8 °C (98.2 °F)  36.1 °C (97 °F)    SpO2: 99% 98% 98%    Weight:       Height:         Body mass index is 31.56 kg/m². Weight: 68.5 kg (151 lb 0.2 oz)  Oxygen Therapy:  Pulse Oximetry: 98 %, O2 (LPM): 2, O2 Delivery: Silicone Nasal Cannula    Physical Exam   Constitutional: She is oriented to person, place, and time and well-developed, well-nourished, and in no distress. No distress.   HENT:   Head: Normocephalic and atraumatic.   Mouth/Throat: No oropharyngeal exudate.   Eyes: Conjunctivae and EOM are normal.   Neck: Normal range of motion. Neck supple.   Cardiovascular: Normal rate, regular rhythm and normal heart sounds.  Exam reveals no friction rub.    No murmur heard.  Pulmonary/Chest: Effort normal and breath sounds normal. No respiratory distress. She has no wheezes. She has no rales. She exhibits tenderness.   Abdominal: Soft. Bowel sounds are normal. She exhibits no distension. There is tenderness (mild generalized tenderness). There is no rebound and no guarding.   Genitourinary: Rectal exam shows guaiac positive stool.   Musculoskeletal: Normal range of motion. She exhibits no edema, tenderness or deformity.   Neurological: She is alert and oriented to person, place, and time.   Skin: Skin is warm and dry. No rash noted. She is not diaphoretic. No erythema.   Psychiatric: Mood and affect normal.   Nursing note and vitals reviewed.        Lab  Data Review:         1/23/2018  3:03 PM    Recent Labs      01/22/18   1545  01/23/18   0344  01/24/18 0359 01/24/18   1520   SODIUM  136   --   136  129*   POTASSIUM  3.8   --   4.3  4.1   CHLORIDE  93*   --   99  91*   CO2  25   --   23  30   BUN  39*   --   55*  15   CREATININE  4.21*   --   6.98*  3.57*   MAGNESIUM  2.2  2.2  2.1   --    PHOSPHORUS   --   7.9*  8.6*   --    CALCIUM  8.7   --   7.3*  8.3*       Recent Labs      01/22/18   1545  01/24/18 0359 01/24/18   1520   ALTSGPT  15  10   --    ASTSGOT  21  11*   --    ALKPHOSPHAT  79  60   --    TBILIRUBIN  0.7  0.5   --    LIPASE  264*   --    --    GLUCOSE  140*  125*  267*       Recent Labs      01/22/18   1545   01/23/18   0344  01/23/18   0938  01/23/18   1552 01/24/18 0359 01/24/18   1520   RBC  3.21*   < >  2.94*  3.07*  2.79*  2.59*  3.06*   HEMOGLOBIN  10.3*   < >  9.4*  9.9*  9.1*  8.4*  9.5*   HEMATOCRIT  30.7*   < >  28.7*  29.9*  27.4*  25.1*  29.6*   PLATELETCT  119*   < >  103*  96*  95*  95*  109*   PROTHROMBTM   --    --    --   12.4   --    --    --    INR   --    --    --   0.95   --    --    --    IRON   --    --   175*   --    --    --    --    FERRITIN  2985.5*   --    --    --    --    --    --    TOTIRONBC   --    --   260   --    --    --    --     < > = values in this interval not displayed.       Recent Labs      01/22/18   1545   01/23/18   1552  01/24/18 0359 01/24/18   1520   WBC  6.5   < >  4.3*  3.9*  4.0*   NEUTSPOLYS   --    --    --   59.10  62.70   LYMPHOCYTES   --    --    --   23.50  18.00*   MONOCYTES   --    --    --   8.70  14.50*   EOSINOPHILS   --    --    --   4.30  3.50   BASOPHILS   --    --    --   0.00  0.80   ASTSGOT  21   --    --   11*   --    ALTSGPT  15   --    --   10   --    ALKPHOSPHAT  79   --    --   60   --    TBILIRUBIN  0.7   --    --   0.5   --     < > = values in this interval not displayed.           Assessment/Plan     * Upper GI bleed- (present on admission)   Assessment & Plan     - Symptoms and coffee-ground emesis suggestive of upper GI bleed  Stool occult = positive  DEMARIO- normal rectal tone, no overt bleed or melena visible, no hemorrhoids palpable or visible  CT- hepatomegaly, no acute process, left renal cyst  GI- no EGD acutely, monitor Hb, sucralfate and clear liquids    Likely due to gastritis vs PUD vs lexie smith    Plan:  8 mg/ml pantoprazole continuous  sucralfate   clear liquids  EGD as outpatient if stable  Transfuse if hb <7        Normocytic anemia- (present on admission)   Assessment & Plan    - Hb at 9.5  - H/O of ESRD  Elevated iron and % saturation with retic of 3%, retic index of 1.5  Ferritin - 2985  FOBT positive    Anemia due to myelodysplastic syndrome and CKD; exacerbated by GI blood loss    Plan:  Monitor  Transfuse if <7g        DM  2 complicated by peripheral neuropathy- (present on admission)   Assessment & Plan    - On HD MWF  - Holding januvia while inpatient  - Last A1c 7.3%    Plan:  Started on humalog low dose sliding scale  Started on low-dose ISS  Continue home Lyrica for neuropathic pain  Hypoglycemia protocol        Serum phosphate elevated- (present on admission)   Assessment & Plan    7.9 on 1/23/2018    Likely due to ESRD on hemodialysis    Plan:  Sevelamer  Low phosphate diet, once started, currently clear liquids        Pancreatitis- (present on admission)   Assessment & Plan    - Presenting with epigastric pain, N/V triggered by fatty meal  - No history of gallbladder disease, cholecystectomy or ethanol use  - Patient currently not on 5-ASA, 6-MP, ACEi, NSAIDs, statin, furosemide, or thiazide  - Hold Januvia as it can rarely cause pancreatitis  - CMP with no signs of hypercalcemia  - Last lipid profile with triglycerides at 192 (12/14/17)  - No H/O ERCP or Trauma  - No recorded fever, no leukocytosis, troponin is negative  - Sorenson's neg, ALP < 100  - AST/ALT at 21/15 in the setting of thyromegaly on CT  - CXR with no focal infiltrates or  consolidations   - ABD CT showed fatty replacement of the pancreas noted, gallbladder within normal limits, liver is normal in contour, no intrahepatic biliary ductal dilatation is seen. Hepatomegaly noted    Mild pancreatitis    Plan:  Clear liquids  Compazine for nausea  Per GI, recheck lipase in am        ESRD (end stage renal disease) on dialysis (CMS-HCC)- (present on admission)   Assessment & Plan    - ESRD on HD (MWF)  - Last HD on Monday        Chronic respiratory failure with hypoxia, 2L- (present on admission)   Assessment & Plan    - SpO2 at 95 on 2L NC  - Placed on RT protocol        Essential hypertension, malignant- (present on admission)   Assessment & Plan    - Presented w/ BP in the 200s/60s at the ED  - Controlled in ED with PO med    Plan:  Continue home carvedilol   labetalol PRN        Headache- (present on admission)   Assessment & Plan    - Frontal (may be 2/2 high SBP)  - PRN acetaminophen for headaches

## 2018-01-25 NOTE — OP REPORT
OP Note    Procedure Date: 1/25/2018     PreOp Diagnosis: Anemia, hematemesis    PostOp Diagnosis:   Esophagus: esophagitis, GE junction, LA-A  Stomach: mild gastritis, antrum, s/p biopsy  Duodenum: mild duodenitis, whole examined duodenum, s/p biopsy  No fresh blood nor blood clot was seen in the entire exam.    Procedure(s):  GASTROSCOPY - Wound Class: Clean Contaminated    Surgeon(s):  Blanca Santos M.D.    Anesthesiologist/Type of Anesthesia:  No anesthesia staff entered./General    Surgical Staff:  Circulator: Lynn Walls R.N.  Endoscopy Technician: Kirstin Correia    Specimens:  A. Gastric antrum  B. Duodenum    Estimated Blood Loss: minimal    Anesthesia/Medications:  see anesthesia note     COMPLICATIONS:  No immediate complications.    PROCEDURE IN DETAIL, Findings and ENDOSCOPIC DIAGNOSIS:      -Prior to the procedure, a History and Physical was performed, and patient medications and allergies were reviewed. The patient’s tolerance of previous anesthesia was also reviewed. The risks and benefits of the procedure and the sedation options and risks were discussed with the patient. All questions were answered, and informed consent was obtained. The patient was deemed in satisfactory condition to undergo the procedure.    -Prior Anticoagulants: the patient has taken no previous anticoagulant or antiplatelet agents.    -ASA Grade Assessment: see anesthesia note     -The patient was placed in the left lateral decubitus position. The scope was passed under direct vision. Continuous oxygen was provided via nasal cannula and intravenous sedation was administered in divided doses throughout the procedure. The patient’s blood pressure, pulse, and oxygen saturation were monitored continuously throughout the procedure.    -The gastroscope was gently advanced under direct visualization over the tongue, down the esophagus, through the stomach and into the 2nd portion of the duodenum. The color, texture, mucosa  and anatomy of esophagus, stomach and duodenum were carefully examined with the scope. The scope was then withdrawn from the patient and the procedure terminated. Further details are in the finding section, based on anatomical location.    -The patient tolerated the procedure well and there were no immediate complications. The patient was then transferred to the recovery room in stable condition.    Findings:  Esophagus: esophagitis, GE junction, LA-A  Stomach: mild gastritis, antrum, s/p biopsy  Duodenum: mild duodenitis, whole examined duodenum, s/p biopsy  No fresh blood nor blood clot was seen in the entire exam.    RECOMMENDATIONS:    1. A letter will be sent regarding to the biopsy result in about 2 weeks.  2. Resume diet  3. Monitor H/H and vitals  4. Transfusion per primary team  5. If rebleeding, consider doing a stat NM RBC bleeding scan  6. Continue PPI  7. If stable and tolerating diet, ok to discharge home from GI standpoint.    1/25/2018 1:45 PM Blanca Santos

## 2018-01-25 NOTE — CARE PLAN
Problem: Safety  Goal: Will remain free from falls  Outcome: PROGRESSING AS EXPECTED  Pt mobility assessed at beginning of shift. Pt is standby assist. Fall precautions in place. Non-slip socks on. Bed in lowest locked position. Bed alarm on. Call light within reach. Pt educated to call for assistance and verbalizes understanding.    Problem: Knowledge Deficit  Goal: Knowledge of disease process/condition, treatment plan, diagnostic tests, and medications will improve  Outcome: PROGRESSING AS EXPECTED  Pt educated about disease process. Reason why medications are taken. And informed about treatment plan. [employer]ume handout provided to PT.

## 2018-01-25 NOTE — PROGRESS NOTES
[facility] -  PROGRESS NOTE               Author: Chao Perez M.D. Date & Time: 1/25/2018  9:19 AM     HPI:  61 yo female PMH ESRD MWF iHD, HTN, type 2 DM, anemia of chronic disease, renal osteodystrophy, HLD, and MDS who presents with CC as above.  She was at her usual HD session on 1/21/18 when she complained of N/V/HA/dizziness.  After her treatment her symptoms did not improve and she was sent to ED for further evaluation.  In the ED she had small amount of what appeared to be coffee ground emesis and she states she had similar vomitus after eating a fatty meal at [employer] as well, however, she is legally blind, so unclear how accurate that may have been or wether it was possibly more bilious/food content like.  She denies any NSAIDs recently.  EGD in 2015 had non-erosive gastritis and duodenitis.  She described her pain 5/10 and epigastric with no radiation.  No alleviations/aggravating factors.  CT with contrast of Abd/Pelvis was negative.  No F/C/CP/SOB.  Her Hgb is stable as are her electrolytes.      DAILY NEPHROLOGY SUMMARY:  1/24/18 - Consult done  1/25/18 - Tired and resting in bed, EGD scheduled today, abd pain still there      Review of Systems   Constitutional: Positive for malaise/fatigue.   HENT: Negative.    Eyes: Negative.    Respiratory: Negative.    Cardiovascular: Negative.    Gastrointestinal: Positive for abdominal pain and nausea. Negative for constipation, diarrhea and vomiting.   Skin: Negative.    Neurological: Positive for weakness.   Endo/Heme/Allergies: Negative.    Psychiatric/Behavioral: Negative.    All other systems reviewed and are negative.        Physical Exam   Constitutional: She is oriented to person, place, and time. She appears well-developed and well-nourished. She is cooperative.   HENT:   Head: Normocephalic and atraumatic.   Eyes: Conjunctivae are normal. No scleral icterus.   Neck: Neck supple. No tracheal deviation present.    Cardiovascular: Normal rate and regular rhythm.    Pulmonary/Chest: Effort normal and breath sounds normal.   Abdominal: Soft. She exhibits no distension. There is tenderness. There is no rebound and no guarding.   Musculoskeletal: She exhibits no edema or tenderness.   Neurological: She is alert and oriented to person, place, and time.   Skin: Skin is warm and dry.   Psychiatric: She has a normal mood and affect. Her behavior is normal.   Nursing note and vitals reviewed.        PAST FAMILY HISTORY: Reviewed and Unchanged  SOCIAL HISTORY: Reviewed and Unchanged  CURRENT MEDICATIONS: Reviewed  LABORATORY RESULTS: Reviewed  IMAGING STUDIES: Reviewed      Fluids:  In: 900 [P.O.:400; Dialysis:500]  Out: 3700       IMPRESSION:  - ESRD    * Etiology likely 2/2 HTN/DM  - Coffee ground emesis  - Abdominal pain  - HTN  - type 2 DM  - Anemia of chronic disease  - Renal Osteodystrophy  - HLD  - CAD  - COPD     ASSESSMENT:  - GFR: HD dependent  - Urine: oligoanuric  - BP: Goal < 140/90  - Volume: euvolemic  - Acid/Base: no sig. acid base disturbance  - Electrolytes: stable  - Anemia: Goal Hgb 10-11  - MBD: Ca normal, PO4 elevated  - HD Access: LAVF (+thrill/bruit)     PLAN:  - MWF iHD  - UF as tolerated  - Sevelamer 800mg TIDWM  - FU EGD results  - Carafate contraindicated in ESRD patients due to potential aluminum toxicity  - Epogen 10K units QRx  - Dose all meds per ESRD guidelines

## 2018-01-25 NOTE — PROGRESS NOTES
Assumed care of PT A&O x 4. Pt resting in bed with no signs of labored breathing. On 2L n.c. Tele monitor in place, cardiac rhythm being monitored. Call light within reach, bed in lowest position, upper bed rails up, bed alarm on. Pt was updated on plan of care for the night . Will continue to monitor.

## 2018-01-26 ENCOUNTER — APPOINTMENT (OUTPATIENT)
Dept: RADIOLOGY | Facility: MEDICAL CENTER | Age: 63
DRG: 438 | End: 2018-01-26
Attending: STUDENT IN AN ORGANIZED HEALTH CARE EDUCATION/TRAINING PROGRAM
Payer: MEDICARE

## 2018-01-26 LAB
ALBUMIN SERPL BCP-MCNC: 3.1 G/DL (ref 3.2–4.9)
ALBUMIN/GLOB SERPL: 1.1 G/DL
ALP SERPL-CCNC: 53 U/L (ref 30–99)
ALT SERPL-CCNC: 14 U/L (ref 2–50)
ANION GAP SERPL CALC-SCNC: 10 MMOL/L (ref 0–11.9)
AST SERPL-CCNC: 15 U/L (ref 12–45)
BASOPHILS # BLD AUTO: 0.6 % (ref 0–1.8)
BASOPHILS # BLD: 0.03 K/UL (ref 0–0.12)
BILIRUB SERPL-MCNC: 0.4 MG/DL (ref 0.1–1.5)
BUN SERPL-MCNC: 31 MG/DL (ref 8–22)
CALCIUM SERPL-MCNC: 7.5 MG/DL (ref 8.5–10.5)
CHLORIDE SERPL-SCNC: 97 MMOL/L (ref 96–112)
CO2 SERPL-SCNC: 23 MMOL/L (ref 20–33)
CREAT SERPL-MCNC: 6.14 MG/DL (ref 0.5–1.4)
EOSINOPHIL # BLD AUTO: 0.19 K/UL (ref 0–0.51)
EOSINOPHIL NFR BLD: 4 % (ref 0–6.9)
ERYTHROCYTE [DISTWIDTH] IN BLOOD BY AUTOMATED COUNT: 66.5 FL (ref 35.9–50)
GLOBULIN SER CALC-MCNC: 2.8 G/DL (ref 1.9–3.5)
GLUCOSE BLD-MCNC: 121 MG/DL (ref 65–99)
GLUCOSE BLD-MCNC: 194 MG/DL (ref 65–99)
GLUCOSE SERPL-MCNC: 112 MG/DL (ref 65–99)
HCT VFR BLD AUTO: 26.8 % (ref 37–47)
HGB BLD-MCNC: 8.8 G/DL (ref 12–16)
IMM GRANULOCYTES # BLD AUTO: 0.02 K/UL (ref 0–0.11)
IMM GRANULOCYTES NFR BLD AUTO: 0.4 % (ref 0–0.9)
LYMPHOCYTES # BLD AUTO: 1.17 K/UL (ref 1–4.8)
LYMPHOCYTES NFR BLD: 24.8 % (ref 22–41)
MAGNESIUM SERPL-MCNC: 1.9 MG/DL (ref 1.5–2.5)
MCH RBC QN AUTO: 31.3 PG (ref 27–33)
MCHC RBC AUTO-ENTMCNC: 32.8 G/DL (ref 33.6–35)
MCV RBC AUTO: 95.4 FL (ref 81.4–97.8)
MONOCYTES # BLD AUTO: 0.67 K/UL (ref 0–0.85)
MONOCYTES NFR BLD AUTO: 14.2 % (ref 0–13.4)
NEUTROPHILS # BLD AUTO: 2.63 K/UL (ref 2–7.15)
NEUTROPHILS NFR BLD: 56 % (ref 44–72)
NRBC # BLD AUTO: 0 K/UL
NRBC BLD-RTO: 0 /100 WBC
PHOSPHATE SERPL-MCNC: 7.1 MG/DL (ref 2.5–4.5)
PLATELET # BLD AUTO: 107 K/UL (ref 164–446)
POTASSIUM SERPL-SCNC: 4 MMOL/L (ref 3.6–5.5)
PROT SERPL-MCNC: 5.9 G/DL (ref 6–8.2)
RBC # BLD AUTO: 2.81 M/UL (ref 4.2–5.4)
SODIUM SERPL-SCNC: 130 MMOL/L (ref 135–145)
WBC # BLD AUTO: 4.7 K/UL (ref 4.8–10.8)

## 2018-01-26 PROCEDURE — 700102 HCHG RX REV CODE 250 W/ 637 OVERRIDE(OP): Performed by: INTERNAL MEDICINE

## 2018-01-26 PROCEDURE — 97530 THERAPEUTIC ACTIVITIES: CPT

## 2018-01-26 PROCEDURE — 84100 ASSAY OF PHOSPHORUS: CPT

## 2018-01-26 PROCEDURE — A9270 NON-COVERED ITEM OR SERVICE: HCPCS | Performed by: STUDENT IN AN ORGANIZED HEALTH CARE EDUCATION/TRAINING PROGRAM

## 2018-01-26 PROCEDURE — 99232 SBSQ HOSP IP/OBS MODERATE 35: CPT | Mod: GC | Performed by: INTERNAL MEDICINE

## 2018-01-26 PROCEDURE — 770020 HCHG ROOM/CARE - TELE (206)

## 2018-01-26 PROCEDURE — 700102 HCHG RX REV CODE 250 W/ 637 OVERRIDE(OP): Performed by: HOSPITALIST

## 2018-01-26 PROCEDURE — 82787 IGG 1 2 3 OR 4 EACH: CPT

## 2018-01-26 PROCEDURE — A9270 NON-COVERED ITEM OR SERVICE: HCPCS | Performed by: HOSPITALIST

## 2018-01-26 PROCEDURE — 76705 ECHO EXAM OF ABDOMEN: CPT

## 2018-01-26 PROCEDURE — 82784 ASSAY IGA/IGD/IGG/IGM EACH: CPT

## 2018-01-26 PROCEDURE — 97116 GAIT TRAINING THERAPY: CPT

## 2018-01-26 PROCEDURE — 82962 GLUCOSE BLOOD TEST: CPT | Mod: 91

## 2018-01-26 PROCEDURE — 700102 HCHG RX REV CODE 250 W/ 637 OVERRIDE(OP): Performed by: STUDENT IN AN ORGANIZED HEALTH CARE EDUCATION/TRAINING PROGRAM

## 2018-01-26 PROCEDURE — 90935 HEMODIALYSIS ONE EVALUATION: CPT

## 2018-01-26 PROCEDURE — 80053 COMPREHEN METABOLIC PANEL: CPT

## 2018-01-26 PROCEDURE — 36415 COLL VENOUS BLD VENIPUNCTURE: CPT

## 2018-01-26 PROCEDURE — 85025 COMPLETE CBC W/AUTO DIFF WBC: CPT

## 2018-01-26 PROCEDURE — A9270 NON-COVERED ITEM OR SERVICE: HCPCS | Performed by: INTERNAL MEDICINE

## 2018-01-26 PROCEDURE — 83735 ASSAY OF MAGNESIUM: CPT

## 2018-01-26 RX ORDER — OMEPRAZOLE 20 MG/1
20 CAPSULE, DELAYED RELEASE ORAL 2 TIMES DAILY
Status: DISCONTINUED | OUTPATIENT
Start: 2018-01-26 | End: 2018-01-27 | Stop reason: HOSPADM

## 2018-01-26 RX ADMIN — PREGABALIN 50 MG: 25 CAPSULE ORAL at 09:10

## 2018-01-26 RX ADMIN — ACETAMINOPHEN 650 MG: 325 TABLET, FILM COATED ORAL at 12:32

## 2018-01-26 RX ADMIN — RENAGEL 800 MG: 400 TABLET ORAL at 09:11

## 2018-01-26 RX ADMIN — LATANOPROST 1 DROP: 50 SOLUTION OPHTHALMIC at 20:58

## 2018-01-26 RX ADMIN — RENAGEL 800 MG: 400 TABLET ORAL at 17:36

## 2018-01-26 RX ADMIN — PREGABALIN 50 MG: 25 CAPSULE ORAL at 20:53

## 2018-01-26 RX ADMIN — BRIMONIDINE TARTRATE 1 DROP: 2 SOLUTION OPHTHALMIC at 20:54

## 2018-01-26 RX ADMIN — BRIMONIDINE TARTRATE 1 DROP: 2 SOLUTION OPHTHALMIC at 09:14

## 2018-01-26 RX ADMIN — TIMOLOL MALEATE 1 DROP: 5 SOLUTION OPHTHALMIC at 20:56

## 2018-01-26 RX ADMIN — CARVEDILOL 6.25 MG: 6.25 TABLET, FILM COATED ORAL at 17:36

## 2018-01-26 RX ADMIN — ACETAMINOPHEN 650 MG: 325 TABLET, FILM COATED ORAL at 21:01

## 2018-01-26 RX ADMIN — CYCLOBENZAPRINE 10 MG: 10 TABLET, FILM COATED ORAL at 00:10

## 2018-01-26 ASSESSMENT — ENCOUNTER SYMPTOMS
TINGLING: 0
COUGH: 0
MYALGIAS: 1
PSYCHIATRIC NEGATIVE: 1
CONSTIPATION: 0
CHILLS: 0
SHORTNESS OF BREATH: 1
PALPITATIONS: 0
EYES NEGATIVE: 1
DIARRHEA: 0
HEMOPTYSIS: 0
BLOOD IN STOOL: 0
ABDOMINAL PAIN: 1
BACK PAIN: 1
ABDOMINAL PAIN: 0
CARDIOVASCULAR NEGATIVE: 1
HEADACHES: 0
NAUSEA: 1
SENSORY CHANGE: 1
SORE THROAT: 0
BLURRED VISION: 1
VOMITING: 0
WEAKNESS: 1
FEVER: 0
SHORTNESS OF BREATH: 0

## 2018-01-26 ASSESSMENT — PATIENT HEALTH QUESTIONNAIRE - PHQ9
1. LITTLE INTEREST OR PLEASURE IN DOING THINGS: NOT AT ALL
SUM OF ALL RESPONSES TO PHQ QUESTIONS 1-9: 0
SUM OF ALL RESPONSES TO PHQ9 QUESTIONS 1 AND 2: 0
2. FEELING DOWN, DEPRESSED, IRRITABLE, OR HOPELESS: NOT AT ALL
1. LITTLE INTEREST OR PLEASURE IN DOING THINGS: NOT AT ALL
2. FEELING DOWN, DEPRESSED, IRRITABLE, OR HOPELESS: NOT AT ALL
SUM OF ALL RESPONSES TO PHQ9 QUESTIONS 1 AND 2: 0
SUM OF ALL RESPONSES TO PHQ QUESTIONS 1-9: 0

## 2018-01-26 ASSESSMENT — PAIN SCALES - GENERAL
PAINLEVEL_OUTOF10: 3
PAINLEVEL_OUTOF10: 4
PAINLEVEL_OUTOF10: 0

## 2018-01-26 ASSESSMENT — COGNITIVE AND FUNCTIONAL STATUS - GENERAL
MOBILITY SCORE: 18
STANDING UP FROM CHAIR USING ARMS: A LITTLE
MOVING FROM LYING ON BACK TO SITTING ON SIDE OF FLAT BED: A LITTLE
TURNING FROM BACK TO SIDE WHILE IN FLAT BAD: A LITTLE
SUGGESTED CMS G CODE MODIFIER MOBILITY: CK
MOVING TO AND FROM BED TO CHAIR: A LITTLE
WALKING IN HOSPITAL ROOM: A LITTLE
CLIMB 3 TO 5 STEPS WITH RAILING: A LITTLE

## 2018-01-26 ASSESSMENT — LIFESTYLE VARIABLES
DO YOU DRINK ALCOHOL: NO
SUBSTANCE_ABUSE: 0
DO YOU DRINK ALCOHOL: NO

## 2018-01-26 ASSESSMENT — GAIT ASSESSMENTS
DEVIATION: BRADYKINETIC
GAIT LEVEL OF ASSIST: CONTACT GUARD ASSIST
DISTANCE (FEET): 200
ASSISTIVE DEVICE: FRONT WHEEL WALKER

## 2018-01-26 NOTE — PROGRESS NOTES
Internal Medicine Interval Note  Note Author: Jenaro Alicia M.D.     Name Vielka Briscoe     1955   Age/Sex 62 y.o. female   MRN [id number]   Code Status Full     After 5PM or if no immediate response to page, please call for cross-coverage  Attending/Team: Dr. Luciano / [facility] team See Patient List for primary contact information  Call [phone] to page    1st Call - Day Intern (R1):   Maral WISE 2nd Call - Day Sr. Resident (R2/R3):   Mackenzie WIES         Reason for interval visit  (Principal Problem)   Upper GI bleed    Interval Problem Daily Status Update  (24 hours)   2018  Patient doing well in am with no additional episodes of vomiting and denies nausea. She says epigastric and chest pain is better, but still present. She additionally has mild left sided lumbar back pain.   LE duplex - neg for clots    Upper GI bleed:  EGD performed 2018:  Esophagus: esophagitis, GE junction, LA-A  Stomach: mild gastritis, antrum, s/p biopsy  Duodenum: mild duodenitis, whole examined duodenum, s/p biopsy    Repeat Lipase: 51    Review of Systems   Constitutional: Negative for chills and fever.   HENT: Negative for congestion and sore throat.    Eyes: Positive for blurred vision.   Respiratory: Negative for cough and shortness of breath.    Cardiovascular: Positive for chest pain (decreased). Negative for palpitations and leg swelling.   Gastrointestinal: Negative for abdominal pain, blood in stool, constipation, diarrhea, heartburn, melena, nausea and vomiting.   Genitourinary: Negative for dysuria and urgency.   Musculoskeletal: Positive for back pain and myalgias. Negative for joint pain.   Neurological: Positive for sensory change (numbness glove and stocking distribution). Negative for tingling and headaches.   Psychiatric/Behavioral: Negative for substance abuse.       Consultants/Specialty  GI    Disposition  Inpatient. Monitoring Hb    Quality Measures    Reviewed items::  Labs reviewed,  Medications reviewed, Radiology images reviewed and EKG reviewed  Bauer catheter::  No Bauer  DVT prophylaxis pharmacological::  Contraindicated - High bleeding risk          Physical Exam       Vitals:    01/25/18 1515 01/25/18 1530 01/25/18 1545 01/25/18 1615   BP:    160/61   Pulse:    60   Resp: 17 (!) 21 15    Temp:    36.2 °C (97.1 °F)   SpO2: 100% 100% 99% 99%   Weight:       Height:         Body mass index is 30.69 kg/m². Weight: 66.6 kg (146 lb 13.2 oz)  Oxygen Therapy:  Pulse Oximetry: 99 %, O2 (LPM): 2, O2 Delivery: Silicone Nasal Cannula    Physical Exam   Constitutional: She is oriented to person, place, and time and well-developed, well-nourished, and in no distress. No distress.   HENT:   Head: Normocephalic and atraumatic.   Mouth/Throat: No oropharyngeal exudate.   Eyes: Conjunctivae and EOM are normal.   Neck: Normal range of motion. Neck supple.   Cardiovascular: Normal rate, regular rhythm and normal heart sounds.  Exam reveals no friction rub.    No murmur heard.  Pulmonary/Chest: Effort normal and breath sounds normal. No respiratory distress. She has no wheezes. She has no rales. She exhibits tenderness.   Abdominal: Soft. Bowel sounds are normal. She exhibits no distension. There is tenderness (continues to have mild generalized tenderness). There is no rebound and no guarding.   Genitourinary: Rectal exam shows guaiac positive stool.   Musculoskeletal: Normal range of motion. She exhibits no edema, tenderness or deformity.   Neurological: She is alert and oriented to person, place, and time.   Skin: Skin is warm and dry. No rash noted. She is not diaphoretic. No erythema.   Psychiatric: Mood and affect normal.   Nursing note and vitals reviewed.        Lab Data Review:         1/23/2018  3:03 PM    Recent Labs      01/23/18   0344  01/24/18   0359  01/24/18   1520  01/25/18   0806   SODIUM   --   136  129*  128*   POTASSIUM   --   4.3  4.1  3.6   CHLORIDE   --   99  91*  92*   CO2   --   23   30  25   BUN   --   55*  15  19   CREATININE   --   6.98*  3.57*  4.93*   MAGNESIUM  2.2  2.1   --    --    PHOSPHORUS  7.9*  8.6*   --    --    CALCIUM   --   7.3*  8.3*  7.9*       Recent Labs      01/24/18 0359 01/24/18   1520  01/25/18 0355 01/25/18   0806   ALTSGPT  10   --    --   17   ASTSGOT  11*   --    --   22   ALKPHOSPHAT  60   --    --   55   TBILIRUBIN  0.5   --    --   0.8   LIPASE   --    --   51   --    GLUCOSE  125*  267*   --   98       Recent Labs      01/23/18   0344 01/23/18   0938   01/24/18 0359 01/24/18 1520 01/25/18 0355   RBC  2.94*  3.07*   < >  2.59*  3.06*  2.99*   HEMOGLOBIN  9.4*  9.9*   < >  8.4*  9.5*  9.3*   HEMATOCRIT  28.7*  29.9*   < >  25.1*  29.6*  28.7*   PLATELETCT  103*  96*   < >  95*  109*  105*   PROTHROMBTM   --   12.4   --    --    --    --    INR   --   0.95   --    --    --    --    IRON  175*   --    --    --    --    --    TOTIRONBC  260   --    --    --    --    --     < > = values in this interval not displayed.       Recent Labs      01/24/18 0359 01/24/18 1520 01/25/18 0355 01/25/18 0806   WBC  3.9*  4.0*  4.4*   --    NEUTSPOLYS  59.10  62.70  44.90   --    LYMPHOCYTES  23.50  18.00*  33.00   --    MONOCYTES  8.70  14.50*  17.00*   --    EOSINOPHILS  4.30  3.50  3.90   --    BASOPHILS  0.00  0.80  0.70   --    ASTSGOT  11*   --    --   22   ALTSGPT  10   --    --   17   ALKPHOSPHAT  60   --    --   55   TBILIRUBIN  0.5   --    --   0.8           Assessment/Plan     * Upper GI bleed- (present on admission)   Assessment & Plan    - Symptoms and coffee-ground emesis suggestive of upper GI bleed  Stool occult = positive  DEMARIO- normal rectal tone, no overt bleed or melena visible, no hemorrhoids palpable or visible  CT- hepatomegaly, no acute process, left renal cyst  Repeat Lipase: 51  EGD performed 1/25/2018:  Esophagus: esophagitis, GE junction, LA-A  Stomach: mild gastritis, antrum, s/p biopsy  Duodenum: mild duodenitis, whole examined  duodenum, s/p biopsy    Likely due to gastritis vs PUD vs lexie smith    Plan:  8 mg/ml pantoprazole continuous  sucralfate - discontinued- per nephro- risk of aluminum toxicity in patients with ESRD  clear liquids  EGD as outpatient if stable  Transfuse if hb <7        Normocytic anemia- (present on admission)   Assessment & Plan    - Hb at 9.5  - H/O of ESRD  Elevated iron and % saturation with retic of 3%, retic index of 1.5  Ferritin - 2985  FOBT positive    Anemia due to myelodysplastic syndrome and CKD; exacerbated by GI blood loss    Plan:  Monitor  Transfuse if <7g        DM  2 complicated by peripheral neuropathy- (present on admission)   Assessment & Plan    - On HD MWF  - Holding januvia while inpatient  - Last A1c 7.3%    Plan:  Started on humalog low dose sliding scale  Started on low-dose ISS  Continue home Lyrica for neuropathic pain  Hypoglycemia protocol        Serum phosphate elevated- (present on admission)   Assessment & Plan    7.9 on 1/23/2018    Likely due to ESRD on hemodialysis    Plan:  Sevelamer  Low phosphate diet, once started, currently clear liquids        Pancreatitis- (present on admission)   Assessment & Plan    - Presenting with epigastric pain, N/V triggered by fatty meal  - No history of gallbladder disease, cholecystectomy or ethanol use  - Patient currently not on 5-ASA, 6-MP, ACEi, NSAIDs, statin, furosemide, or thiazide  - Hold Januvia as it can rarely cause pancreatitis  - CMP with no signs of hypercalcemia  - Last lipid profile with triglycerides at 192 (12/14/17)  - No H/O ERCP or Trauma  - No recorded fever, no leukocytosis, troponin is negative  - Sorenson's neg, ALP < 100  - AST/ALT at 21/15 in the setting of thyromegaly on CT  - CXR with no focal infiltrates or consolidations   - ABD CT showed fatty replacement of the pancreas noted, gallbladder within normal limits, liver is normal in contour, no intrahepatic biliary ductal dilatation is seen. Hepatomegaly  noted  Repeat lipase of 51    Mild pancreatitis    Plan:  GI soft, renal diet  Compazine for nausea        ESRD (end stage renal disease) on dialysis (CMS-HCC)- (present on admission)   Assessment & Plan    - ESRD on HD ([facility])  - Last HD on Monday        Chronic respiratory failure with hypoxia, 2L- (present on admission)   Assessment & Plan    - SpO2 at 95 on 2L NC  - Placed on RT protocol        Essential hypertension, malignant- (present on admission)   Assessment & Plan    - Presented w/ BP in the 200s/60s at the ED  - Controlled in ED with PO med    Plan:  Continue home carvedilol   labetalol PRN        Headache- (present on admission)   Assessment & Plan    - Frontal (may be 2/2 high SBP)  - PRN acetaminophen for headaches

## 2018-01-26 NOTE — PROGRESS NOTES
HD treatment today using LUAAVF.SBP dropped  In the 70's in the last 45 mins of treatment,patient symptomatic-400 ml NS bolus given.Dr Perez notified.BP improved in the 90's post treatment.Net UF removed 2L.Report given to primary Rn.   8

## 2018-01-26 NOTE — THERAPY
"Physical Therapy Treatment completed.   Bed Mobility:  Supine to Sit: Supervised  Transfers: Sit to Stand: Contact Guard Assist  Gait: Level Of Assist: Contact Guard Assist (CGA w/FWW; MIN A w/SPC s/p LOB)      Plan of Care: Will benefit from Physical Therapy 3 times per week  Discharge Recommendations: Equipment: Will Continue to Assess for Equipment Needs.   Post-acute therapy Discharge to home with outpatient or home health for additional skilled therapy services.     Patient with improved activity tolerance and motivation today. Patient seen before dialysis today, last PT session on 1/24 was post dialysis. Patient with slight LOB when amb with SPC, req MIN A to correct. Improved stability with FWW, able to amb 200 ft in hallway. Will continue to follow for acute LOS. D/c pending progress and family's capabilities to assist patient. Patient would need initial assist to enter home (with manual w/c vs FWW on ramp) and intermittent assist with ADLs/IADLs, CGA for amb w/FWW. Rec  PT/OT upon d/c.     See \"Rehab Therapy-Acute\" Patient Summary Report for complete documentation.       "

## 2018-01-26 NOTE — PROGRESS NOTES
[facility] -  PROGRESS NOTE               Author: Chao Perez M.D. Date & Time: 1/26/2018  9:51 AM     HPI:  63 yo female PMH ESRD MWF iHD, HTN, type 2 DM, anemia of chronic disease, renal osteodystrophy, HLD, and MDS who presents with CC as above.  She was at her usual HD session on 1/21/18 when she complained of N/V/HA/dizziness.  After her treatment her symptoms did not improve and she was sent to ED for further evaluation.  In the ED she had small amount of what appeared to be coffee ground emesis and she states she had similar vomitus after eating a fatty meal at [employer] as well, however, she is legally blind, so unclear how accurate that may have been or wether it was possibly more bilious/food content like.  She denies any NSAIDs recently.  EGD in 2015 had non-erosive gastritis and duodenitis.  She described her pain 5/10 and epigastric with no radiation.  No alleviations/aggravating factors.  CT with contrast of Abd/Pelvis was negative.  No F/C/CP/SOB.  Her Hgb is stable as are her electrolytes.      DAILY NEPHROLOGY SUMMARY:  1/24/18 - Consult done  1/25/18 - Tired and resting in bed, EGD scheduled today, abd pain still there  1/26/18 - EGD negative for bleed, feels good otherwise, still +_SOB      Review of Systems   Constitutional: Positive for malaise/fatigue.   HENT: Negative.    Eyes: Negative.    Respiratory: Positive for shortness of breath. Negative for cough and hemoptysis.    Cardiovascular: Negative.    Gastrointestinal: Positive for abdominal pain and nausea. Negative for constipation, diarrhea and vomiting.   Skin: Negative.    Neurological: Positive for weakness.   Endo/Heme/Allergies: Negative.    Psychiatric/Behavioral: Negative.    All other systems reviewed and are negative.        Physical Exam   Constitutional: She is oriented to person, place, and time. She appears well-developed and well-nourished. She is cooperative.   HENT:   Head: Normocephalic and  atraumatic.   Eyes: Conjunctivae are normal. No scleral icterus.   Neck: Neck supple. No tracheal deviation present.   Cardiovascular: Normal rate and regular rhythm.    Pulmonary/Chest: Effort normal and breath sounds normal.   Abdominal: Soft. She exhibits no distension. There is tenderness. There is no rebound and no guarding.   Musculoskeletal: She exhibits no edema or tenderness.   Neurological: She is alert and oriented to person, place, and time.   Skin: Skin is warm and dry.   Psychiatric: She has a normal mood and affect. Her behavior is normal.   Nursing note and vitals reviewed.        PAST FAMILY HISTORY: Reviewed and Unchanged  SOCIAL HISTORY: Reviewed and Unchanged  CURRENT MEDICATIONS: Reviewed  LABORATORY RESULTS: Reviewed  IMAGING STUDIES: Reviewed      Fluids:  In: 1040 [P.O.:240; I.V.:800]  Out: 200       IMPRESSION:  - ESRD    * Etiology likely 2/2 HTN/DM  - Coffee ground emesis  - Abdominal pain  - HTN  - type 2 DM  - Anemia of chronic disease  - Renal Osteodystrophy  - HLD  - CAD  - COPD     ASSESSMENT:  - GFR: HD dependent  - Urine: oligoanuric  - BP: Goal < 140/90  - Volume: euvolemic  - Acid/Base: no sig. acid base disturbance  - Electrolytes: stable  - Anemia: Goal Hgb 10-11  - MBD: Ca normal, PO4 elevated  - HD Access: LAVF (+thrill/bruit)     PLAN:  - MWF iHD  - UF as tolerated/needed  - Sevelamer 800mg TIDWM  - Transfuse as needed to maintain Hgb > 7  - Epogen 10K units QRx  - Dose all meds per ESRD guidelines  - Ok to transition to outpatient care from renal standpoint

## 2018-01-26 NOTE — PROGRESS NOTES
Received report on patient. She is resting in bed watching Tv, has no complaints of pain and no indications of distress. Bed alarm is on, bed in the lowest position, and call light and personal belongings within reach.

## 2018-01-26 NOTE — DISCHARGE SUMMARY
Internal Medicine Discharge Summary  Note Author: Ya Mann M.D.       Admit Date:  1/22/2018       Discharge Date:   1/27/18     Service:   [facility] Internal [facility] Gray Team  Attending Physician(s):   Dr Luciano        Senior Resident(s):   Mackenzie  Gal Resident(s):   Maral       Primary Diagnosis:   Upper gastrointestinal bleed secondary to esophagitis, gastritis     Secondary Diagnoses:                  Normocytic anemia POA: Yes    Esophagitis     Gastritis and duodenitis     Diabetes mellitus type II complicated by peripheral neuropathy POA: Yes    Essential hypertension, malignant (Chronic) POA: Yes    Chronic respiratory failure with hypoxia, 2L POA: Yes    ESRD (end stage renal disease) on dialysis (CMS-[facility]) POA: Yes    Serum phosphate elevated POA: Yes    Legal blindness   Resolved Problems:    Pancreatitis POA: Yes    Upper GI bleed       Hospital Summary (Brief Narrative):       62 yrs old female with multiple co morbidities such as end stage hemodialysis (MWF), atrial fibrillation not on anticoagulation (unclear reason), myelodysplastic syndrome (5Q deletion, followed by Dr Avila, diagnosed 2016, failed vidaza) on frequent blood transfusion, chronic hypoxic respiratory failure on oxygen at home, diabetes mellitus on januvia, coronary artery disease s/p remote angioplasty in 1980's, stroke in 2015, legal blindness was brought on 1/22/18 for dizziness and nausea while on dialysis, she missed her previous dialysis. Later during the same day she developed vomiting with multiple bouts of vomiting noticed to be coffee ground emesis in ED. She was hemodynamically stable.     Pancreatitis   Lipase at 264 on admission decreased to 51 on 1/25/18. CT abdomen was negative. Ultrasound abdomen showed gallbladder wall borderline thickened, no gall stones. Could be secondary to sitagliptin or autoimmune pancreatitis (IgG4 pending).     Upper GI bleed secondary to esophagitis  Per history she had  "multiple bouts of vomiting followed by coffee ground emesis. She is legally blind so was unable to note fresh bleeding however denies change in taste due to fresh blood. Unable to notice melena or fresh blood in stools. Fecal occult blood test was negative. GI was consulted. EGD on 1/25/18 showed esophagitis, mild gastritis, (biopsy showed benign mucosa, no H.pylori, no evidence of sprue, negative for malignancy) no fresh bleeding was noted. Continued on PPI. Possibly secondary to uremic gastritis and lexie smith tear. Stool H.pylori pending. Patient was successfully transitioned to oral omeprazole BID before meals, sucralfate was discontinued per Nephrology (aluminium toxicity in ESRD patients) and follow with Primary care in 2 weeks. Advised medication compliance.     Hypertensive urgency   Controlled with oral and IV medications. Monitored with dialysis. Discharged on coreg.     Obstructive sleep apnea - high suspicion for ЮЛИЯ (given Mallampati score 4). Needs outpatient sleep study. Follow with PCP.     ESRD - nephrology was consulted. Received hemodialysis as scheduled. Received erythropoietin with hemodialysis.   Hyperphosphatemia - calcium phosphate product at 75, started sevelamer to decrease PO4XCa to 55. Continued on sevelamer 800 mg TID with meals. Follow with Nephrologist.     Diabetes mellitus  - sitagliptin was discontinued. Her blood glucose was well controlled in the hospital she did not require sliding scale . Advised to monitor BG, make BG log and follow with primary care.     Myelodysplastic syndrome - failed chemotherapy in the past. On frequent blood transfusions. Ferritin at 2000's, consistent with iron overload due to trasnfusion. HFE gene testing was negative 11/2017. Follow with Oncology as outpatient.     Bilateral renal cyst - identified on abdominal USG on 2/2017 showed \"There are bilateral renal cysts. The largest cyst is seen on the left measuring 4.8 x 3.8 x 4.0 cm in size\". CT " "abdomen 1/22/18 showed \"5.1 cm left interpolar renal cyst is identified, similar in size to prior study. There are areas of calcification within this cyst along the superior wall\".     Prolonged QTc - Qtc prolonging medications were avoided. EKG 1/24/2018 Qtc at 487.     During the course of hospital stay she developed epigastric and substernal chest pain immediate EKG, troponin was negative for acute abnormality. DVT of lower extremity was negative. She has risk factors for coronary artery disease however recent stress test 11/2017 was negative.     Physical therapy recommends home with home health. She refused to go to Skilled Nursing facility.       Consultants:     Nephrology, Gastroenterology     Procedures:        1/25/2018   GASTROSCOPY - Wound Class: Clean Contaminated  Findings:  Esophagus: esophagitis, GE junction, LA-A  Stomach: mild gastritis, antrum, s/p biopsy  Duodenum: mild duodenitis, whole examined duodenum, s/p biopsy  No fresh blood nor blood clot was seen in the entire exam.    Imaging/ Testing:      US-GALLBLADDER   Final Result      Gallbladder wall is borderline thickened. No gallstones are identified.      Increased echogenicity of the right kidney is noted. This could indicate medical renal disease.      LE VENOUS DUPLEX (DVT)   Final Result      DX-CHEST-LIMITED (1 VIEW)   Final Result      1.  No acute cardiopulmonary disease.   2.  Stable cardiomegaly.      [id number] VIEW   Final Result      Unremarkable abdominal radiograph.      CT-ABDOMEN W/O   Final Result         1.  No acute abnormality.   2.  Left renal cysts, similar to prior study with calcification compatible with mild complexity. Could be followed up with renal sonogram for further characterization.   3.  Soft tissue nodule laterally at the level of the umbilicus, could represent prominent lymph node or other soft tissue process. Correlate with exam.   4.  Hepatomegaly   5.  Atherosclerosis      DX-CHEST-PORTABLE (1 VIEW) "   Final Result         No acute cardiac or pulmonary abnormality is identified.      CT-HEAD W/O   Final Result      No intracranial mass effect or acute hemorrhage.   Fluid within the right mastoid similar to previous findings.            Discharge Medications:           Medication List      START taking these medications      Instructions   omeprazole 20 MG delayed-release capsule  Commonly known as:  PRILOSEC   Take 2 Caps by mouth 2 times a day.  Dose:  40 mg     polyethylene glycol/lytes Pack  Commonly known as:  MIRALAX   Take 1 Packet by mouth 2 Times a Day.  Dose:  17 g     sevelamer 800 MG Tabs  Commonly known as:  RENAGEL   Take 1 Tab by mouth 3 times a day, with meals.  Dose:  800 mg        CONTINUE taking these medications      Instructions   carvedilol 12.5 MG Tabs  Commonly known as:  COREG   Take 12.5 mg by mouth 2 times a day, with meals.  Dose:  12.5 mg     COMBIGAN 0.2-0.5 % Soln  Generic drug:  Brimonidine Tartrate-Timolol   Place 1 Drop in both eyes 2 Times a Day.  Dose:  1 Drop     LUMIGAN 0.03 % Soln  Generic drug:  bimatoprost   Place 1 Drop in both eyes every evening.  Dose:  1 Drop     LYRICA 50 MG capsule  Generic drug:  pregabalin   Take 50 mg by mouth 2 times a day.  Dose:  50 mg        STOP taking these medications    sitagliptin 25 MG Tabs  Commonly known as:  JANUVIA              Disposition:  Home with  Home health     Diet:  Low phosphate diet, renal diet     Activity:  As tolerated     Instructions:      The patient was instructed to return to the ER in the event of worsening symptoms. I have counseled the patient on the importance of compliance and the patient has agreed to proceed with all medical recommendations and follow up plan indicated above.   The patient understands that all medications come with benefits and risks. Risks may include permanent injury or death and these risks can be minimized with close reassessment and monitoring.        Primary Care Provider:   Nyla Nava  M.D.  Discharge summary faxed to primary care provider:  Completed  Copy of discharge summary given to the patient: Deferred      Follow up appointment details :      Follow with PCP in 2 weeks.     Pending Studies:        H.pylori stool testing results. IgG4 testing.     Time spent on discharge day patient visit, preparing discharge paperwork and arranging for patient follow up.    Summary of follow up issues:   - needs referral to Urology and follow up on left renal cyst   - needs referral to Oncology for further treatment options for MDS  - needs detailed discussion on the need for anticoagulation for paroxysmal atrial fibrillation   - follow up to monitor epigastric pain (due to recent Upper GI bleed)     Discharge Time (Minutes) :    60 minutes   Hospital Course Type:  Inpatient Stay >2 midnights      Condition on Discharge    ______________________________________________________________________    Interval history/exam for day of discharge:    Patient is back to baseline. Denies nausea, abdominal pain, able to ambulate with no support. Eager to go home. She will call home health to set up on Monday. Discussed with  and set up home health.     Vitals:    01/25/18 1530 01/25/18 1545 01/25/18 1600 01/25/18 1615   BP:    160/61   Pulse:    60   Resp: (!) 21 15 18    Temp:    36.2 °C (97.1 °F)   SpO2: 100% 99% 100% 99%   Weight:       Height:         Weight/BMI: Body mass index is 30.69 kg/m².  Pulse Oximetry: 99 %, O2 (LPM): 2, O2 Delivery: Silicone Nasal Cannula    General: 62 yrs old female, comfortable   CVS: regular rate and rhythm, no murmur able to hear soft systolicmurmur all over the chest wall due to AV fistula   PULM: clear to auscultation bilaterally, no added sounds   Alert, oriented ambulatory.     Most Recent Labs:    Lab Results   Component Value Date/Time    WBC 4.4 (L) 01/25/2018 03:55 AM    RBC 2.99 (L) 01/25/2018 03:55 AM    HEMOGLOBIN 9.3 (L) 01/25/2018 03:55 AM    HEMATOCRIT 28.7  (L) 01/25/2018 03:55 AM    MCV 96.0 01/25/2018 03:55 AM    MCH 31.1 01/25/2018 03:55 AM    MCHC 32.4 (L) 01/25/2018 03:55 AM    MPV 14.1 (H) 01/25/2018 03:55 AM    NEUTSPOLYS 44.90 01/25/2018 03:55 AM    LYMPHOCYTES 33.00 01/25/2018 03:55 AM    MONOCYTES 17.00 (H) 01/25/2018 03:55 AM    EOSINOPHILS 3.90 01/25/2018 03:55 AM    BASOPHILS 0.70 01/25/2018 03:55 AM    HYPOCHROMIA 1+ 08/20/2017 12:01 AM    ANISOCYTOSIS 1+ 01/24/2018 03:59 AM      Lab Results   Component Value Date/Time    SODIUM 128 (L) 01/25/2018 08:06 AM    POTASSIUM 3.6 01/25/2018 08:06 AM    CHLORIDE 92 (L) 01/25/2018 08:06 AM    CO2 25 01/25/2018 08:06 AM    GLUCOSE 98 01/25/2018 08:06 AM    BUN 19 01/25/2018 08:06 AM    CREATININE 4.93 (H) 01/25/2018 08:06 AM    CREATININE 0.6 07/20/2007 04:00 AM      Lab Results   Component Value Date/Time    ALTSGPT 17 01/25/2018 08:06 AM    ASTSGOT 22 01/25/2018 08:06 AM    ALKPHOSPHAT 55 01/25/2018 08:06 AM    TBILIRUBIN 0.8 01/25/2018 08:06 AM    DBILIRUBIN <0.1 01/31/2017 11:03 AM    LIPASE 51 01/25/2018 03:55 AM    ALBUMIN 3.1 (L) 01/25/2018 08:06 AM    ALBUMIN 4.29 09/08/2016 12:50 PM    GLOBULIN 3.0 01/25/2018 08:06 AM    INR 0.95 01/23/2018 09:38 AM    MACROCYTOSIS 1+ 01/24/2018 03:59 AM     Lab Results   Component Value Date/Time    PROTHROMBTM 12.4 01/23/2018 09:38 AM    INR 0.95 01/23/2018 09:38 AM

## 2018-01-26 NOTE — PROGRESS NOTES
Gastroenterology Progress Note:    Ting-Lisa Tom  Date & Time note created:    1/26/2018   10:43 AM     Patient ID:  Name:             Vielka Briscoe  YOB: 1955  Age:                 62 y.o.  female  MRN:               [id number]    Referring MD:  Dr. Luciano                                                             Chief Complaint(s):      Coffee-ground emesis and acute pancreatitis.    History of Present Illness:    This is a very pleasant 62 y.o. female with the past medical history as listed below.  ===1/23/18 Dr. Gibbs's Consult===  The patient is a 62-year-old female who was brought in to the ER from [facility] yesterday for complaints of nausea, vomiting, headaches and dizziness. In the emergency room, she was noted to have a small amount of brown emesis that may have appeared to be coffee ground. She also was reported to have had several episodes of emesis after eating a fatty meal at [facility]. The patient is legally blind, so it is unknown if it truly was coffee grounds or normal bilious like gastric fluid. The patient states she does not take any nonsteroidal anti-inflammatory   medications as she used to. She denies a history of peptic ulcer disease. She does complain of frequent heartburn and acid indigestion. She is not taking any over-the-counter antacids, histamine blockers, or proton pump inhibitors for this. She did undergo an upper endoscopy in 2015 showing nonerosive gastritis and duodenitis. She also had a colonoscopy that same   date that was normal. She has been having 5/10 epigastric pain. She does not drink alcohol. She did not have gallstones on ultrasound from February 2017. She did have a CT   of the abdomen without contrast yesterday and there was no description of peripancreatic fluid or any findings of acute pancreatitis.  ===  1/25/18 EGD  Esophagus: esophagitis, GE junction, LA-A   Stomach: mild gastritis, antrum, s/p biopsy   Duodenum: mild  duodenitis, whole examined duodenum, s/p biopsy   No fresh blood nor blood clot was seen in the entire exam.  1/26/2018 Doing fine. Mild epigastric pain. Path: gastritis no Hp, normal duodenum.    Otherwise the patient is doing fine without complaints of fever/chills/weight loss/appetite change/dysphagia/odynophagia/heartburn/nausea/vomiting/bloating/constipation/diarrhea/melena/hematochezia.    Review of Systems:      Constitutional: Denies fevers, weight changes  Eyes: Denies changes in vision, jaundice  Ears/Nose/Throat/Mouth: Denies nasal congestion or sore throat   Cardiovascular: Denies chest pain, Denies palpitations   Respiratory: Denies shortness of breath, denies cough  Gastrointestinal/Hepatic: Pos abdominal pain, denies nausea, vomiting, diarrhea, constipation or GI bleeding   Genitourinary: Denies dysuria or frequency  Musculoskeletal/Rheum: Denies  joint pain and swelling, edema  Skin: Denies rash  Neurological: Denies headache, confusion, memory loss or focal weakness/parasthesias  Psychiatric: denies mood disorder   Endocrine: Yvrose thyroid problems  Heme/Oncology/Lymph Nodes: Denies enlarged lymph nodes, denies brusing or known bleeding disorder  All other systems were reviewed and are negative (AMA/CMS criteria)              Past Medical History:   Past Medical History:   Diagnosis Date   • Arthritis     hands    • Atrial fibrillation (CMS-HCC)     HX   • Blood transfusion without reported diagnosis    • Breath shortness     w/exertion   • Cancer (CMS-HCC)     MDS in bones   • Cataract     bilat IOL   • Chronic anemia    • Chronic kidney disease        • Chronic obstructive pulmonary disease (CMS-HCC)    • Congestive heart failure (CMS-HCC)    • Dental disorder     full dentures   • Diabetes     Diet controlled   • Dialysis patient     [facility] ,   [facility] in [place]   • Glaucoma    • Heart abnormalities    • Hypertension    • MDS (myelodysplastic syndrome) 10/2016    bone marrow biopsy   •  "Pain -2017    \"bones\", generalized, 5/10   • Stroke (CMS-HCC) 03/2015    No residual weakness/problems   • Supplemental oxygen dependent     2 liters     Active Hospital Problems    Diagnosis   • Upper GI bleed [K92.2]     Priority: High   • DM  2 complicated by peripheral neuropathy [E11.9]     Priority: Medium   • Normocytic anemia [D64.9]     Priority: Medium   • ESRD (end stage renal disease) on dialysis (CMS-HCC) [N18.6, Z99.2]     Priority: Low   • Headache [R51]     Priority: Low   • Chronic respiratory failure with hypoxia, 2L [J96.11]     Priority: Low   • Essential hypertension, malignant [I10]     Priority: Low   • Serum phosphate elevated [E83.39]   • Pancreatitis [K85.90]       Past Surgical History:  Past Surgical History:   Procedure Laterality Date   • GASTROSCOPY N/A 1/25/2018    Procedure: GASTROSCOPY;  Surgeon: Blanca Santos M.D.;  Location: [facility];  Service: Gastroenterology   • BONE MARROW BIOPSY, NDL/TROCAR  9/20/2017    Procedure: BONE MARROW BIOPSY, NDL/TROCAR;  Surgeon: Wade Turner M.D.;  Location: [facility];  Service: Orthopedics   • BONE MARROW ASPIRATION  9/20/2017    Procedure: BONE MARROW ASPIRATION;  Surgeon: Wade Turner M.D.;  Location: [facility];  Service: Orthopedics   • VEIN LIGATION Left 11/10/2016    Procedure: VEIN LIGATION FOR DISTAL REVASCULARIZATION AND INTERVAL LIGATION OF LEFT ARM DIALYISIS ACCESS (DRIL PROCEDURE);  Surgeon: Ranulfo Jolly M.D.;  Location: [facility];  Service:    • AV FISTULA CREATION Left 2/8/2016    Procedure: AV FISTULA CREATION UPPER EXTREMITY;  Surgeon: Ranulfo Jolly M.D.;  Location: [facility];  Service:    • GASTROSCOPY  12/17/2015    Procedure: ESOPHAGOGASTRODUODENOSCOPY WITH BIOPSY;  Surgeon: Wing Álvarez M.D.;  Location: [facility];  Service:    • COLONOSCOPY  12/17/2015    Procedure: COLONOSCOPY;  Surgeon: Wing Álvarez M.D.;  " Location: SURGERY [facility];  Service:    • GYN SURGERY      hysterectomy   • GYN SURGERY       x 2   • GYN SURGERY      d&C x2   • OTHER      angioplasty/ stents bilat LE   • OTHER ABDOMINAL SURGERY      appendectomy,  x 2, hysterectomy, D & C   • OTHER ABDOMINAL SURGERY      appendectomy   • OTHER CARDIAC SURGERY      Angioplasty  and    • OTHER CARDIAC SURGERY      cardiac angiogram, angioplasty   • RETINAL DETACHMENT REPAIR Right        Hospital Medications:  Current Facility-Administered Medications   Medication Dose Frequency Provider Last Rate Last Dose   • sevelamer (RENAGEL) tablet 800 mg  800 mg TID WITH MEALS Chao Perez M.D.   800 mg at 18 0911   • polyethylene glycol/lytes (MIRALAX) PACKET 1 Packet  1 Packet BID Blanca Santos M.D.   1 Packet at 18 1735   • glucose 4 g chewable tablet 16 g  16 g Q15 MIN PRN Ya Mann M.D.        And   • dextrose 50% (D50W) injection 25 mL  25 mL Q15 MIN PRN Ya Mann M.D.       • insulin lispro (HUMALOG) injection 0-3 Units  0-3 Units TID AC Jenaro Alicia M.D.   Stopped at 18 1100   • carvedilol (COREG) tablet 6.25 mg  6.25 mg BID WITH MEALS Ya Mann M.D.   6.25 mg at 18 1735   • pantoprazole (PROTONIX) injection 40 mg  40 mg BID Ya Mann M.D.   40 mg at 18 2312   • labetalol (NORMODYNE,TRANDATE) injection 10 mg  10 mg Q4HRS PRN Austyn Mcmillan M.D.       • acetaminophen (TYLENOL) tablet 650 mg  650 mg Q6HRS PRN Austyn Mcmillan M.D.       • guaifenesin dextromethorphan (ROBITUSSIN DM) 100-10 MG/5ML syrup 10 mL  10 mL Q6HRS PRN Austyn Mcmillan M.D.       • Respiratory Care per Protocol   Continuous RT Austyn Mcmillan M.D.       • prochlorperazine (COMPAZINE) injection 10 mg  10 mg Q6HRS PRN Austyn Mcmillan M.D.   10 mg at 18   • pregabalin (LYRICA) capsule 50 mg  50 mg BID Austyn Mcmillan M.D.   50 mg  at 01/26/18 0910   • brimonidine (ALPHAGAN) 0.2 % ophthalmic solution 1 Drop  1 Drop BID Austyn Mcmillan M.D.   1 Drop at 01/26/18 0914   • timolol (TIMOPTIC) 0.5 % ophthalmic solution 1 Drop  1 Drop BID Austyn Mcmillan M.D.   1 Drop at 01/25/18 2312   • latanoprost (XALATAN) 0.005 % ophthalmic solution 1 Drop  1 Drop Q EVENING Austynamelia Mcmillan M.D.   1 Drop at 01/25/18 2311     Last reviewed on 1/25/2018  1:32 PM by Lynn Walls R.N.    Current Outpatient Medications:  Prescriptions Prior to Admission   Medication Sig Dispense Refill Last Dose   • carvedilol (COREG) 12.5 MG Tab Take 12.5 mg by mouth 2 times a day, with meals.   1/22/2018 at 0700   • Brimonidine Tartrate-Timolol (COMBIGAN) 0.2-0.5 % Solution Place 1 Drop in both eyes 2 Times a Day.   1/22/2018 at 0700   • sitagliptin (JANUVIA) 25 MG Tab Take 1 Tab by mouth every morning. 60 Tab 3 1/22/2018 at 0700   • pregabalin (LYRICA) 50 MG capsule Take 50 mg by mouth 2 times a day.   1/22/2018 at 0700   • bimatoprost (LUMIGAN) 0.03 % Solution Place 1 Drop in both eyes every evening.   1/21/2018 at 2100       Medication Allergy:  Allergies   Allergen Reactions   • Actos [Pioglitazone Hydrochloride] Unspecified     Cause blindness   NTL=0882   • Darvocet [Propoxyphene N-Apap] Vomiting     QZF=7398   • Demerol Vomiting     PLC=3864   • Glucophage [Metformin Hydrochloride] Vomiting     HXK=2349   • Morphine Vomiting     WIY=6738   • Oxycodone Vomiting     RXN=1/2016   • Pcn [Penicillins] Vomiting     NRM=1009     • Requip Vomiting     RXN=12/2015   • Simvastatin Unspecified     Leg cramps  DYK=2119   • Sulfa Drugs Rash     RXN=>10 years   • Tramadol Vomiting     GME=7888   • Trazodone Vomiting     DXW=2201   • Dilaudid [Hydromorphone] Vomiting     Unknown    • Diphenhydramine Vomiting   • Iron      vomiting   • Lenalidomide Vomiting   • Multivitamin      itching   • Naprosyn [Naproxen]    • Other Drug      Any binders that remove  "phosphorus from the body such as tums       Physical Exam:  Weight/BMI: Body mass index is 30.69 kg/m².  Blood pressure (!) 165/81, pulse 64, temperature 37.2 °C (98.9 °F), resp. rate 16, height 1.473 m (4' 10\"), weight 66.6 kg (146 lb 13.2 oz), last menstrual period 01/01/1995, SpO2 98 %, not currently breastfeeding.  Vitals:    01/25/18 2000 01/26/18 0000 01/26/18 0442 01/26/18 0849   BP: (!) 81/39 149/57 129/80 (!) 165/81   Pulse: 64 66 82 64   Resp: 18 18 18 16   Temp: 36.3 °C (97.4 °F) 36.4 °C (97.5 °F) 36.8 °C (98.2 °F) 37.2 °C (98.9 °F)   SpO2: 97% 99% 93% 98%   Weight:  66.6 kg (146 lb 13.2 oz)     Height:         Oxygen Therapy:  Pulse Oximetry: 98 %, O2 (LPM): 2, O2 Delivery: Silicone Nasal Cannula    Intake/Output Summary (Last 24 hours) at 01/26/18 1043  Last data filed at 01/26/18 0900   Gross per 24 hour   Intake             1380 ml   Output              200 ml   Net             1180 ml       Constitutional:   Well developed, well nourished, chronic ill-looking  HEENT:  Normocephalic, Atraumatic, Conjunctiva not pale, Sclera not icteric, Oropharynx moist mucous membranes, No oral exudates, Nose normal.  No thyromegaly.  Neck:  Normal range of motion, No cervical tenderness,  no JVD.  Chest/Lungs:  Symmetric expansion, no spider angioma, breath sounds clear to auscultation bilaterally,  no crackles, no wheezing.   Cardiovascular:  Normal heart rate, Normal rhythm, No murmurs, No rubs, No gallops.    Abdomen: Bowel sounds normal, Soft, No tenderness, No guarding, No rebound, No masses, No hepatosplenomegaly.  Extremities: No cyanosis/clubbing/edema/palmar erythema/flapping tremor  Skin: Warm, Dry, No erythema, No rash, no induration.    MDM (Data Review):     Records reviewed and summarized in current documentation    Lab Data Review:  Recent Results (from the past 24 hour(s))   ACCU-CHEK GLUCOSE    Collection Time: 01/25/18 12:49 PM   Result Value Ref Range    Glucose - Accu-Ck 131 (H) 65 - 99 mg/dL "   ACCU-CHEK GLUCOSE    Collection Time: 01/25/18  5:44 PM   Result Value Ref Range    Glucose - Accu-Ck 87 65 - 99 mg/dL   CBC WITH DIFFERENTIAL    Collection Time: 01/26/18  4:56 AM   Result Value Ref Range    WBC 4.7 (L) 4.8 - 10.8 K/uL    RBC 2.81 (L) 4.20 - 5.40 M/uL    Hemoglobin 8.8 (L) 12.0 - 16.0 g/dL    Hematocrit 26.8 (L) 37.0 - 47.0 %    MCV 95.4 81.4 - 97.8 fL    MCH 31.3 27.0 - 33.0 pg    MCHC 32.8 (L) 33.6 - 35.0 g/dL    RDW 66.5 (H) 35.9 - 50.0 fL    Platelet Count 107 (L) 164 - 446 K/uL    Neutrophils-Polys 56.00 44.00 - 72.00 %    Lymphocytes 24.80 22.00 - 41.00 %    Monocytes 14.20 (H) 0.00 - 13.40 %    Eosinophils 4.00 0.00 - 6.90 %    Basophils 0.60 0.00 - 1.80 %    Immature Granulocytes 0.40 0.00 - 0.90 %    Nucleated RBC 0.00 /100 WBC    Neutrophils (Absolute) 2.63 2.00 - 7.15 K/uL    Lymphs (Absolute) 1.17 1.00 - 4.80 K/uL    Monos (Absolute) 0.67 0.00 - 0.85 K/uL    Eos (Absolute) 0.19 0.00 - 0.51 K/uL    Baso (Absolute) 0.03 0.00 - 0.12 K/uL    Immature Granulocytes (abs) 0.02 0.00 - 0.11 K/uL    NRBC (Absolute) 0.00 K/uL   COMP METABOLIC PANEL    Collection Time: 01/26/18  4:56 AM   Result Value Ref Range    Sodium 130 (L) 135 - 145 mmol/L    Potassium 4.0 3.6 - 5.5 mmol/L    Chloride 97 96 - 112 mmol/L    Co2 23 20 - 33 mmol/L    Anion Gap 10.0 0.0 - 11.9    Glucose 112 (H) 65 - 99 mg/dL    Bun 31 (H) 8 - 22 mg/dL    Creatinine 6.14 (HH) 0.50 - 1.40 mg/dL    Calcium 7.5 (L) 8.5 - 10.5 mg/dL    AST(SGOT) 15 12 - 45 U/L    ALT(SGPT) 14 2 - 50 U/L    Alkaline Phosphatase 53 30 - 99 U/L    Total Bilirubin 0.4 0.1 - 1.5 mg/dL    Albumin 3.1 (L) 3.2 - 4.9 g/dL    Total Protein 5.9 (L) 6.0 - 8.2 g/dL    Globulin 2.8 1.9 - 3.5 g/dL    A-G Ratio 1.1 g/dL   PHOSPHORUS    Collection Time: 01/26/18  4:56 AM   Result Value Ref Range    Phosphorus 7.1 (H) 2.5 - 4.5 mg/dL   MAGNESIUM    Collection Time: 01/26/18  4:56 AM   Result Value Ref Range    Magnesium 1.9 1.5 - 2.5 mg/dL   ESTIMATED GFR     Collection Time: 01/26/18  4:56 AM   Result Value Ref Range    GFR If  8 (A) >60 mL/min/1.73 m 2    GFR If Non African American 7 (A) >60 mL/min/1.73 m 2   ACCU-CHEK GLUCOSE    Collection Time: 01/26/18  6:28 AM   Result Value Ref Range    Glucose - Accu-Ck 121 (H) 65 - 99 mg/dL         Imaging/Procedures Review:    1/22/18 CT:  1. No acute abnormality.  2. Left renal cysts, similar to prior study with calcification compatible with mild complexity. Could be followed up with renal sonogram for further characterization.  3. Soft tissue nodule laterally at the level of the umbilicus, could represent prominent lymph node or other soft tissue process. Correlate with exam.  4. Hepatomegaly  5. Atherosclerosis    MDM (Assessment and Plan):     Active Hospital Problems    Diagnosis   • Upper GI bleed [K92.2]     Priority: High   • DM  2 complicated by peripheral neuropathy [E11.9]     Priority: Medium   • Normocytic anemia [D64.9]     Priority: Medium   • ESRD (end stage renal disease) on dialysis (CMS-HCC) [N18.6, Z99.2]     Priority: Low   • Headache [R51]     Priority: Low   • Chronic respiratory failure with hypoxia, 2L [J96.11]     Priority: Low   • Essential hypertension, malignant [I10]     Priority: Low   • Serum phosphate elevated [E83.39]   • Pancreatitis [K85.90]         Assessment  1. Gastritis  2. Esophagitis  3. DM  4. ESRD  5. Epigastric pain  6. Pancreatitis, resolved    Plan  1. Ok with PPI BIDAC  2. OK to discharge home from GI standpoint, pending hospitalist's final decision  3. The patient was discharge from [facility] Consult[facility] in 2017 due to non-payment. Pls advise the patient to establish care with other GI providers.  4. Will sign off and stand by. Please contact us again if we can be of further assistance.     Thank you very much for allowing me to participate in the care of your patient.  Please feel free to contact me anytime at [phone].     Blanca Santos M.D.

## 2018-01-26 NOTE — CARE PLAN
Problem: Communication  Goal: The ability to communicate needs accurately and effectively will improve  Outcome: PROGRESSING AS EXPECTED  Teach back method utilized in reinforcing teaching on proper use of call light when needing assistance.     Problem: Safety  Goal: Will remain free from falls  Outcome: PROGRESSING AS EXPECTED  Educated patient on fall precautions and on use of call light.

## 2018-01-27 ENCOUNTER — PATIENT OUTREACH (OUTPATIENT)
Dept: HEALTH INFORMATION MANAGEMENT | Facility: OTHER | Age: 63
End: 2018-01-27

## 2018-01-27 VITALS
DIASTOLIC BLOOD PRESSURE: 70 MMHG | BODY MASS INDEX: 30.31 KG/M2 | SYSTOLIC BLOOD PRESSURE: 148 MMHG | HEART RATE: 70 BPM | HEIGHT: 58 IN | OXYGEN SATURATION: 100 % | TEMPERATURE: 97.1 F | WEIGHT: 144.4 LBS | RESPIRATION RATE: 14 BRPM

## 2018-01-27 PROBLEM — R51.9 HEADACHE: Status: RESOLVED | Noted: 2017-02-20 | Resolved: 2018-01-27

## 2018-01-27 PROBLEM — K85.90 PANCREATITIS: Status: RESOLVED | Noted: 2018-01-22 | Resolved: 2018-01-27

## 2018-01-27 PROBLEM — K92.2 UPPER GI BLEED: Status: RESOLVED | Noted: 2018-01-22 | Resolved: 2018-01-27

## 2018-01-27 LAB
ALBUMIN SERPL BCP-MCNC: 3.2 G/DL (ref 3.2–4.9)
ALBUMIN/GLOB SERPL: 1.1 G/DL
ALP SERPL-CCNC: 63 U/L (ref 30–99)
ALT SERPL-CCNC: 15 U/L (ref 2–50)
ANION GAP SERPL CALC-SCNC: 10 MMOL/L (ref 0–11.9)
AST SERPL-CCNC: 17 U/L (ref 12–45)
BASOPHILS # BLD AUTO: 0.5 % (ref 0–1.8)
BASOPHILS # BLD: 0.02 K/UL (ref 0–0.12)
BILIRUB SERPL-MCNC: 0.3 MG/DL (ref 0.1–1.5)
BUN SERPL-MCNC: 22 MG/DL (ref 8–22)
CALCIUM SERPL-MCNC: 8.2 MG/DL (ref 8.5–10.5)
CHLORIDE SERPL-SCNC: 96 MMOL/L (ref 96–112)
CO2 SERPL-SCNC: 26 MMOL/L (ref 20–33)
CREAT SERPL-MCNC: 5.12 MG/DL (ref 0.5–1.4)
EOSINOPHIL # BLD AUTO: 0.17 K/UL (ref 0–0.51)
EOSINOPHIL NFR BLD: 4 % (ref 0–6.9)
ERYTHROCYTE [DISTWIDTH] IN BLOOD BY AUTOMATED COUNT: 68.7 FL (ref 35.9–50)
GLOBULIN SER CALC-MCNC: 2.9 G/DL (ref 1.9–3.5)
GLUCOSE BLD-MCNC: 154 MG/DL (ref 65–99)
GLUCOSE BLD-MCNC: 168 MG/DL (ref 65–99)
GLUCOSE SERPL-MCNC: 195 MG/DL (ref 65–99)
HCT VFR BLD AUTO: 27.3 % (ref 37–47)
HGB BLD-MCNC: 9.1 G/DL (ref 12–16)
IMM GRANULOCYTES # BLD AUTO: 0.03 K/UL (ref 0–0.11)
IMM GRANULOCYTES NFR BLD AUTO: 0.7 % (ref 0–0.9)
LYMPHOCYTES # BLD AUTO: 1.23 K/UL (ref 1–4.8)
LYMPHOCYTES NFR BLD: 29.3 % (ref 22–41)
MAGNESIUM SERPL-MCNC: 1.9 MG/DL (ref 1.5–2.5)
MCH RBC QN AUTO: 32.2 PG (ref 27–33)
MCHC RBC AUTO-ENTMCNC: 33.3 G/DL (ref 33.6–35)
MCV RBC AUTO: 96.5 FL (ref 81.4–97.8)
MONOCYTES # BLD AUTO: 0.67 K/UL (ref 0–0.85)
MONOCYTES NFR BLD AUTO: 16 % (ref 0–13.4)
NEUTROPHILS # BLD AUTO: 2.08 K/UL (ref 2–7.15)
NEUTROPHILS NFR BLD: 49.5 % (ref 44–72)
NRBC # BLD AUTO: 0 K/UL
NRBC BLD-RTO: 0 /100 WBC
PHOSPHATE SERPL-MCNC: 4.7 MG/DL (ref 2.5–4.5)
PLATELET # BLD AUTO: 112 K/UL (ref 164–446)
PMV BLD AUTO: 13.9 FL (ref 9–12.9)
POTASSIUM SERPL-SCNC: 4 MMOL/L (ref 3.6–5.5)
PROT SERPL-MCNC: 6.1 G/DL (ref 6–8.2)
RBC # BLD AUTO: 2.83 M/UL (ref 4.2–5.4)
SODIUM SERPL-SCNC: 132 MMOL/L (ref 135–145)
WBC # BLD AUTO: 4.2 K/UL (ref 4.8–10.8)

## 2018-01-27 PROCEDURE — 83735 ASSAY OF MAGNESIUM: CPT

## 2018-01-27 PROCEDURE — 700102 HCHG RX REV CODE 250 W/ 637 OVERRIDE(OP): Performed by: STUDENT IN AN ORGANIZED HEALTH CARE EDUCATION/TRAINING PROGRAM

## 2018-01-27 PROCEDURE — A9270 NON-COVERED ITEM OR SERVICE: HCPCS | Performed by: INTERNAL MEDICINE

## 2018-01-27 PROCEDURE — A9270 NON-COVERED ITEM OR SERVICE: HCPCS | Performed by: HOSPITALIST

## 2018-01-27 PROCEDURE — 80053 COMPREHEN METABOLIC PANEL: CPT

## 2018-01-27 PROCEDURE — 99239 HOSP IP/OBS DSCHRG MGMT >30: CPT | Mod: GC | Performed by: INTERNAL MEDICINE

## 2018-01-27 PROCEDURE — A9270 NON-COVERED ITEM OR SERVICE: HCPCS | Performed by: STUDENT IN AN ORGANIZED HEALTH CARE EDUCATION/TRAINING PROGRAM

## 2018-01-27 PROCEDURE — 82962 GLUCOSE BLOOD TEST: CPT

## 2018-01-27 PROCEDURE — 700102 HCHG RX REV CODE 250 W/ 637 OVERRIDE(OP): Performed by: INTERNAL MEDICINE

## 2018-01-27 PROCEDURE — 700102 HCHG RX REV CODE 250 W/ 637 OVERRIDE(OP): Performed by: HOSPITALIST

## 2018-01-27 PROCEDURE — 84100 ASSAY OF PHOSPHORUS: CPT

## 2018-01-27 PROCEDURE — 85025 COMPLETE CBC W/AUTO DIFF WBC: CPT

## 2018-01-27 PROCEDURE — 36415 COLL VENOUS BLD VENIPUNCTURE: CPT

## 2018-01-27 RX ORDER — SEVELAMER HYDROCHLORIDE 800 MG/1
800 TABLET, FILM COATED ORAL
Qty: 180 TAB | Refills: 3 | Status: ON HOLD | OUTPATIENT
Start: 2018-01-27 | End: 2018-04-11

## 2018-01-27 RX ORDER — SUCRALFATE 1 G/1
1 TABLET ORAL 3 TIMES DAILY
Qty: 30 TAB | Refills: 0 | Status: SHIPPED | OUTPATIENT
Start: 2018-01-27 | End: 2018-01-27

## 2018-01-27 RX ORDER — OMEPRAZOLE 20 MG/1
40 CAPSULE, DELAYED RELEASE ORAL 2 TIMES DAILY
Qty: 60 CAP | Refills: 0 | Status: ON HOLD | OUTPATIENT
Start: 2018-01-27 | End: 2018-04-11

## 2018-01-27 RX ORDER — POLYETHYLENE GLYCOL 3350 17 G/17G
17 POWDER, FOR SOLUTION ORAL 2 TIMES DAILY
Qty: 30 EACH | Refills: 0 | Status: ON HOLD | OUTPATIENT
Start: 2018-01-27 | End: 2018-04-11

## 2018-01-27 RX ADMIN — PREGABALIN 50 MG: 25 CAPSULE ORAL at 08:52

## 2018-01-27 RX ADMIN — TIMOLOL MALEATE 1 DROP: 5 SOLUTION OPHTHALMIC at 08:57

## 2018-01-27 RX ADMIN — ACETAMINOPHEN 650 MG: 325 TABLET, FILM COATED ORAL at 04:26

## 2018-01-27 RX ADMIN — RENAGEL 800 MG: 400 TABLET ORAL at 08:52

## 2018-01-27 RX ADMIN — RENAGEL 800 MG: 400 TABLET ORAL at 13:12

## 2018-01-27 RX ADMIN — BRIMONIDINE TARTRATE 1 DROP: 2 SOLUTION OPHTHALMIC at 08:56

## 2018-01-27 RX ADMIN — OMEPRAZOLE 20 MG: 20 CAPSULE, DELAYED RELEASE ORAL at 08:52

## 2018-01-27 RX ADMIN — CARVEDILOL 6.25 MG: 6.25 TABLET, FILM COATED ORAL at 08:52

## 2018-01-27 ASSESSMENT — ENCOUNTER SYMPTOMS
NAUSEA: 0
EYES NEGATIVE: 1
HEMOPTYSIS: 0
PSYCHIATRIC NEGATIVE: 1
WEAKNESS: 0
DIARRHEA: 0
CONSTIPATION: 0
SHORTNESS OF BREATH: 0
CARDIOVASCULAR NEGATIVE: 1
ABDOMINAL PAIN: 0
VOMITING: 0
COUGH: 0

## 2018-01-27 ASSESSMENT — PAIN SCALES - GENERAL
PAINLEVEL_OUTOF10: 4
PAINLEVEL_OUTOF10: 0
PAINLEVEL_OUTOF10: 2

## 2018-01-27 NOTE — DISCHARGE PLANNING
Received choice form from [facility] [place] [id number].  Referral sent to [facility] at 0980 on 01-27-18.

## 2018-01-27 NOTE — PROGRESS NOTES
Received bedside report from Ld Tirado. Pt sitting up in bed eating dinner. A/Ox4. Dressing to left fistula CDI. No signs of distress at this time.

## 2018-01-27 NOTE — PROGRESS NOTES
Assumed care @ 0715. Bedside report from ARIEL Caballero. Pt resting in bed and in no distress. Bed in low position, call light w/in reach. Strip bed alarm on.

## 2018-01-27 NOTE — FACE TO FACE
Face to Face Note  -  Durable Medical Equipment    Jenaro Alicia M.D. - NPI: [id number]  I certify that this patient is under my care and that they had a durable medical equipment(DME)face to face encounter by myself that meets the physician DME face-to-face encounter requirements with this patient on:    Date of encounter:   Patient:                    MRN:                       YOB: 2019  Vielka Briscoe  [id number]  1955     The encounter with the patient was in whole, or in part, for the following medical condition, which is the primary reason for durable medical equipment:  Other - ESRD, myelodysplastic syndrome    I certify that, based on my findings, the following durable medical equipment is medically necessary:  Other DME Equipment - HOME HEALTH with PT/OT.    HOME O2 Saturation Measurements:(Values must be present for Home Oxygen orders)         ,     ,         My Clinical findings support the need for the above equipment due to:  Other - generalized weakness due to ESRD and deconditioning    Supporting Symptoms: mildly stable on her feet but would benefit from PT/OT.

## 2018-01-27 NOTE — PROGRESS NOTES
Patient is sleeping comfortably in bed, no signs of distress, even unlabored breathing, will continue to monitor.

## 2018-01-27 NOTE — FACE TO FACE
Face to Face Supporting Documentation - Home Health    The encounter with this patient was in whole or in part the primary reason for home health admission.    Date of encounter:   Patient:                    MRN:                       YOB: 2019  Vielka Briscoe  [id number]  1955     Home health to see patient for:  Physical Therapy evaluation and treatment    Skilled need for:  Exacerbation of Chronic Disease State ESRD along with myelodysplastic syndrome and deconditioning    Skilled nursing interventions to include:  Comment: PT/OT    Homebound status evidenced by:  Need the aid of supportive devices such as crutches, canes, wheelchairs or walkers. Leaving home requires a considerable and taxing effort. There is a normal inability to leave the home.    Community Physician to provide follow up care: Nyla Nava M.D.     Optional Interventions? No      I certify the face to face encounter for this home health care referral meets the CMS requirements and the encounter/clinical assessment with the patient was, in whole, or in part, for the medical condition(s) listed above, which is the primary reason for home health care. Based on my clinical findings: the service(s) are medically necessary, support the need for home health care, and the homebound criteria are met.  I certify that this patient has had a face to face encounter by myself.  Jenaro Alicia M.D. - NPI: [id number]

## 2018-01-27 NOTE — DISCHARGE PLANNING
Care Transition Team Assessment    Met with pt at bedside to complete assessment and discuss discharge. Pt reports she currently lives with her daughter and her family. Pt primarily uses a cane to ambulate but also has a FWW and WC to use as needed. Pt reports her three grandchildren ages 9,13 and 15 also assist her as needed. Her son in law is unable to work and is home with her most of the time.     Pt was on service with [facility]  prior to admission and would like to resume services.     Choice form completed and faxed to [facility] delivered      Information Source  Information Given By: Patient         Elopement Risk  Legal Hold: No  Ambulatory or Self Mobile in Wheelchair: No-Not an Elopement Risk  Disoriented: No  Psychiatric Symptoms: None  History of Wandering: No  Elopement this Admit: No  Vocalizing Wanting to Leave: No  Displays Behaviors, Body Language Wanting to Leave: No-Not at Risk for Elopement  Elopement Risk: Not at Risk for Elopement    Interdisciplinary Discharge Planning  Does Admitting Nurse Feel This Could be a Complex Discharge?: No  Primary Care Physician: Dr. Otoole  Lives with - Patient's Self Care Capacity: Adult Children (daughter, son-in-law, and grandchildren)  Patient or legal guardian wants to designate a caregiver (see row info): No  Support Systems: Children, Friends / Neighbors  Housing / Facility: [address]  Do You Take your Prescribed Medications Regularly: Yes  Able to Return to Previous ADL's: Yes  Mobility Issues: No  Prior Services: Meals on Wheels, Skilled Home Health Services, Intermittent Physical Support for ADL Per Family ( nsg)  Patient Expects to be Discharged to:: home  Assistance Needed: No  Durable Medical Equipment: Home Oxygen, Other - Specify (Cane)    Discharge Preparedness  What is your plan after discharge?: Home health care  What are your discharge supports?: Child  Prior Functional Level: Needs Assist with Activities of Daily Living, Independent with  Medication Management, Uses Cane, Uses Walker, Uses Wheelchair  Difficulity with ADLs: Walking  Difficulity with IADLs: Cooking, Driving    Functional Assesment  Prior Functional Level: Needs Assist with Activities of Daily Living, Independent with Medication Management, Uses Cane, Uses Walker, Uses Wheelchair    Finances  Financial Barriers to Discharge: No  Prescription Coverage: Yes    Vision / Hearing Impairment  Vision Impairment : Yes  Right Eye Vision: Impaired  Left Eye Vision: Impaired  Hearing Impairment : No    Values / Beliefs / Concerns  Values / Beliefs Concerns : No         Domestic Abuse  Have you ever been the victim of abuse or violence?: No  Physical Abuse or Sexual Abuse: No  Verbal Abuse or Emotional Abuse: No  Possible Abuse Reported to:: Not Applicable    Psychological Assessment  History of Substance Abuse: None  History of Psychiatric Problems: No  Non-compliant with Treatment: No    Discharge Risks or Barriers  Discharge risks or barriers?: No    Anticipated Discharge Information  Anticipated discharge disposition: [facility]

## 2018-01-27 NOTE — CARE PLAN
Problem: Safety  Goal: Will remain free from injury  Outcome: PROGRESSING AS EXPECTED  Assessed strength and mobility.  Staff present for mobilization.      Problem: Infection  Goal: Will remain free from infection  Outcome: PROGRESSING AS EXPECTED  Monitored labs and vital signs.  Monitored for signs of infection.

## 2018-01-27 NOTE — PROGRESS NOTES
Internal Medicine Interval Note  Note Author: Jenaro Alicia M.D.     Name Vielka Briscoe     1955   Age/Sex 62 y.o. female   MRN [id number]   Code Status Full     After 5PM or if no immediate response to page, please call for cross-coverage  Attending/Team: Dr. Luciano / [facility] team See Patient List for primary contact information  Call [phone] to page    1st Call - Day Intern (R1):   Maral WISE 2nd Call - Day Sr. Resident (R2/R3):   Mackenzie WISE         Reason for interval visit  (Principal Problem)   Upper GI bleed    Interval Problem Daily Status Update  (24 hours)   2018  Patient doing well in am.   USG abd- Gallbladder wall is borderline thickened. No gallstones are identified.  Increased echogenicity of the right kidney is noted. This could indicate medical renal disease.    Upper GI bleed:  EGD performed 2018:  Esophagus: esophagitis, GE junction, LA-A  Stomach: mild gastritis, antrum, s/p biopsy  Duodenum: mild duodenitis, whole examined duodenum, s/p biopsy   - cont PPI      Review of Systems   Constitutional: Negative for chills and fever.   HENT: Negative for congestion and sore throat.    Eyes: Positive for blurred vision.   Respiratory: Negative for cough and shortness of breath.    Cardiovascular: Negative for chest pain, palpitations and leg swelling.   Gastrointestinal: Negative for abdominal pain (mild), blood in stool, constipation, diarrhea and melena.   Genitourinary: Negative for dysuria and urgency.   Musculoskeletal: Positive for back pain and myalgias. Negative for joint pain.   Neurological: Positive for sensory change (numbness glove and stocking distribution). Negative for tingling and headaches.   Psychiatric/Behavioral: Negative for substance abuse.       Consultants/Specialty  GI    Disposition  Inpatient. Monitoring Hb    Quality Measures    Reviewed items::  Labs reviewed, Medications reviewed, Radiology images reviewed and EKG reviewed  Juancarlos  catheter::  No Bauer  DVT prophylaxis pharmacological::  Contraindicated - High bleeding risk          Physical Exam       Vitals:    01/26/18 0442 01/26/18 0849 01/26/18 1547 01/26/18 2000   BP: 129/80 (!) 165/81 118/66 145/69   Pulse: 82 64 66 66   Resp: 18 16 16 18   Temp: 36.8 °C (98.2 °F) 37.2 °C (98.9 °F) 36.7 °C (98 °F) 36.6 °C (97.9 °F)   SpO2: 93% 98% 99% 99%   Weight:    65.5 kg (144 lb 6.4 oz)   Height:         Body mass index is 30.18 kg/m². Weight: 65.5 kg (144 lb 6.4 oz)  Oxygen Therapy:  Pulse Oximetry: 99 %, O2 (LPM): 2, O2 Delivery: Silicone Nasal Cannula    Physical Exam   Constitutional: She is oriented to person, place, and time and well-developed, well-nourished, and in no distress. No distress.   HENT:   Head: Normocephalic and atraumatic.   Mouth/Throat: No oropharyngeal exudate.   Eyes: Conjunctivae and EOM are normal.   Neck: Normal range of motion. Neck supple.   Cardiovascular: Normal rate, regular rhythm and normal heart sounds.  Exam reveals no friction rub.    No murmur heard.  Pulmonary/Chest: Effort normal and breath sounds normal. No respiratory distress. She has no wheezes. She has no rales. She exhibits tenderness.   Abdominal: Soft. Bowel sounds are normal. She exhibits no distension. There is tenderness ( mild generalized tenderness). There is no rebound and no guarding.   Genitourinary: Rectal exam shows guaiac positive stool.   Musculoskeletal: Normal range of motion. She exhibits no edema, tenderness or deformity.   Neurological: She is alert and oriented to person, place, and time.   Skin: Skin is warm and dry. No rash noted. She is not diaphoretic. No erythema.   Psychiatric: Mood and affect normal.   Nursing note and vitals reviewed.        Lab Data Review:         1/23/2018  3:03 PM    Recent Labs      01/24/18   0359  01/24/18   1520  01/25/18   0806  01/26/18   0456   SODIUM  136  129*  128*  130*   POTASSIUM  4.3  4.1  3.6  4.0   CHLORIDE  99  91*  92*  97   CO2  23  30   25 23   BUN  55*  15  19  31*   CREATININE  6.98*  3.57*  4.93*  6.14*   MAGNESIUM  2.1   --    --   1.9   PHOSPHORUS  8.6*   --    --   7.1*   CALCIUM  7.3*  8.3*  7.9*  7.5*       Recent Labs      01/24/18 0359 01/24/18   1520  01/25/18   0355  01/25/18   0806  01/26/18   0456   ALTSGPT  10   --    --   17  14   ASTSGOT  11*   --    --   22  15   ALKPHOSPHAT  60   --    --   55  53   TBILIRUBIN  0.5   --    --   0.8  0.4   LIPASE   --    --   51   --    --    GLUCOSE  125*  267*   --   98  112*       Recent Labs      01/24/18 1520 01/25/18 0355 01/26/18 0456   RBC  3.06*  2.99*  2.81*   HEMOGLOBIN  9.5*  9.3*  8.8*   HEMATOCRIT  29.6*  28.7*  26.8*   PLATELETCT  109*  105*  107*       Recent Labs      01/24/18 0359 01/24/18 1520 01/25/18 0355 01/25/18   0806 01/26/18   0456   WBC  3.9*  4.0*  4.4*   --   4.7*   NEUTSPOLYS  59.10  62.70  44.90   --   56.00   LYMPHOCYTES  23.50  18.00*  33.00   --   24.80   MONOCYTES  8.70  14.50*  17.00*   --   14.20*   EOSINOPHILS  4.30  3.50  3.90   --   4.00   BASOPHILS  0.00  0.80  0.70   --   0.60   ASTSGOT  11*   --    --   22  15   ALTSGPT  10   --    --   17  14   ALKPHOSPHAT  60   --    --   55  53   TBILIRUBIN  0.5   --    --   0.8  0.4           Assessment/Plan     * Upper GI bleed- (present on admission)   Assessment & Plan    - Symptoms and coffee-ground emesis suggestive of upper GI bleed  Stool occult = positive  DEMARIO- normal rectal tone, no overt bleed or melena visible, no hemorrhoids palpable or visible  CT- hepatomegaly, no acute process, left renal cyst  Repeat Lipase: 51  EGD performed 1/25/2018:  Esophagus: esophagitis, GE junction, LA-A  Stomach: mild gastritis, antrum, s/p biopsy  Duodenum: mild duodenitis, whole examined duodenum, s/p biopsy    Likely due to gastritis vs PUD vs lexie smith    Plan:  Omeprazole PO  sucralfate - discontinued- per nephro- risk of aluminum toxicity in patients with ESRD  Diet as tolerated  Transfuse if  hb <7        Normocytic anemia- (present on admission)   Assessment & Plan    - Hb at 9.5  - H/O of ESRD  Elevated iron and % saturation with retic of 3%, retic index of 1.5  Ferritin - 2985  FOBT positive    Anemia due to myelodysplastic syndrome and CKD; exacerbated by GI blood loss    Plan:  Monitor  Transfuse if <7g        DM  2 complicated by peripheral neuropathy- (present on admission)   Assessment & Plan    - On HD MWF  - Holding januvia while inpatient  - Last A1c 7.3%    Plan:  Started on humalog low dose sliding scale  Started on low-dose ISS  Continue home Lyrica for neuropathic pain  Hypoglycemia protocol        Serum phosphate elevated- (present on admission)   Assessment & Plan    7.9 on 1/23/2018    Likely due to ESRD on hemodialysis    Plan:  Sevelamer  Low phosphate diet, once started, currently clear liquids        Pancreatitis- (present on admission)   Assessment & Plan    - Presenting with epigastric pain, N/V triggered by fatty meal  - No history of gallbladder disease, cholecystectomy or ethanol use  - Patient currently not on 5-ASA, 6-MP, ACEi, NSAIDs, statin, furosemide, or thiazide  - Hold Januvia as it can rarely cause pancreatitis  - CMP with no signs of hypercalcemia  - Last lipid profile with triglycerides at 192 (12/14/17)  - No H/O ERCP or Trauma  - No recorded fever, no leukocytosis, troponin is negative  - Sorenson's neg, ALP < 100  - AST/ALT at 21/15 in the setting of thyromegaly on CT  - CXR with no focal infiltrates or consolidations   - ABD CT showed fatty replacement of the pancreas noted, gallbladder within normal limits, liver is normal in contour, no intrahepatic biliary ductal dilatation is seen. Hepatomegaly noted  Repeat lipase of 51  USG abd- Gallbladder wall is borderline thickened. No gallstones are identified.  Increased echogenicity of the right kidney is noted. This could indicate medical renal disease.    Mild pancreatitis    Plan:  GI soft, renal diet  Compazine for  nausea        ESRD (end stage renal disease) on dialysis (CMS-HCC)- (present on admission)   Assessment & Plan    - ESRD on HD (MWF)        Chronic respiratory failure with hypoxia, 2L- (present on admission)   Assessment & Plan    - SpO2 at 95 on 2L NC  - Placed on RT protocol        Essential hypertension, malignant- (present on admission)   Assessment & Plan    - Presented w/ BP in the 200s/60s at the ED  - Controlled in ED with PO med    Plan:  Continue home carvedilol   labetalol PRN        Headache- (present on admission)   Assessment & Plan    - Frontal on and off  - PRN acetaminophen for headaches

## 2018-01-27 NOTE — PROGRESS NOTES
[facility] -  PROGRESS NOTE               Author: KAREEM Khalil Date & Time: 1/27/2018  12:49 PM   Supervising Physician: Dr Chato Alberts    HPI:  61 yo female PMH ESRD MWF iHD, HTN, type 2 DM, anemia of chronic disease, renal osteodystrophy, HLD, and MDS who presents with CC as above.  She was at her usual HD session on 1/21/18 when she complained of N/V/HA/dizziness.  After her treatment her symptoms did not improve and she was sent to ED for further evaluation.  In the ED she had small amount of what appeared to be coffee ground emesis and she states she had similar vomitus after eating a fatty meal at [employer] as well, however, she is legally blind, so unclear how accurate that may have been or wether it was possibly more bilious/food content like.  She denies any NSAIDs recently.  EGD in 2015 had non-erosive gastritis and duodenitis.  She described her pain 5/10 and epigastric with no radiation.  No alleviations/aggravating factors.  CT with contrast of Abd/Pelvis was negative.  No F/C/CP/SOB.  Her Hgb is stable as are her electrolytes.      DAILY NEPHROLOGY SUMMARY:  1/24/18 - Consult done  1/25/18 - Tired and resting in bed, EGD scheduled today, abd pain still there  1/26/18 - EGD negative for bleed, feels good otherwise, still +_SOB  1/27/18 - Feels pretty good. HD yesterday. No SOB. Going home today.      Review of Systems   Constitutional: Negative for malaise/fatigue.   HENT: Negative.    Eyes: Negative.    Respiratory: Negative for cough, hemoptysis and shortness of breath.    Cardiovascular: Negative.    Gastrointestinal: Negative for abdominal pain, constipation, diarrhea, nausea and vomiting.   Skin: Negative.    Neurological: Negative for weakness.   Endo/Heme/Allergies: Negative.    Psychiatric/Behavioral: Negative.    All other systems reviewed and are negative.        Physical Exam   Constitutional: She is oriented to person, place, and time. She appears  well-developed and well-nourished. She is cooperative.   HENT:   Head: Normocephalic and atraumatic.   Eyes: Conjunctivae are normal. No scleral icterus.   Neck: Neck supple. No tracheal deviation present.   Cardiovascular: Normal rate and regular rhythm.    Pulmonary/Chest: Effort normal and breath sounds normal.   Abdominal: Soft. She exhibits no distension. There is tenderness. There is no rebound and no guarding.   Musculoskeletal: She exhibits no edema or tenderness.   Neurological: She is alert and oriented to person, place, and time.   Skin: Skin is warm and dry.   Psychiatric: She has a normal mood and affect. Her behavior is normal.   Nursing note and vitals reviewed.        PAST FAMILY HISTORY: Reviewed and Unchanged  SOCIAL HISTORY: Reviewed and Unchanged  CURRENT MEDICATIONS: Reviewed  LABORATORY RESULTS: Reviewed  IMAGING STUDIES: Reviewed      Fluids:  In: 340 [P.O.:340]  Out: -       IMPRESSION:  - ESRD, oligoanuric    *  Etiology likely 2/2 HTN/DM    *  HD dependent    *  HD Access: LAVF (+thrill/bruit)  - Coffee ground emesis    *  EGD negative for bleed  - Abdominal pain, improved  - HTN    * Goal < 140/90  - type 2 DM    * Per primary team  - Anemia of chronic disease    *  VANCE with HD    *  Goal Hgb 10-11  - Renal Osteodystrophy    *  Ca normal    *  PO4 improved with sevelemer  - HLD  - CAD  - COPD     PLAN:  - No HD today  - Continue qMWF iHD  - UF as tolerated/needed  - Sevelamer 800mg TIDWM  - Transfuse as needed to maintain Hgb > 7  - Epogen 10K units QRx  - Dose all meds per ESRD guidelines  - Ok to transition to outpatient care from renal standpoint    Thank you,

## 2018-01-27 NOTE — PROGRESS NOTES
No new complaints. Wants to go home. Lian po's, oob ad fili w/ steady gait using fww prn. VSS, afebrile.

## 2018-01-27 NOTE — CARE PLAN
Problem: Safety  Goal: Will remain free from falls    Intervention: Assess risk factors for falls  Pt has no slip socks on. Bed alarm set. Instructed to use call light and verbalizes understanding.

## 2018-01-27 NOTE — DISCHARGE INSTRUCTIONS
Discharge Instructions    Discharged to home by car with relative. Discharged via wheelchair, hospital escort: Yes.  Special equipment needed: Not Applicable    Be sure to schedule a follow-up appointment with your primary care doctor or any specialists as instructed.     Discharge Plan:   Diet Plan: Discussed  Activity Level: Discussed  Confirmed Follow up Appointment: Patient to Call and Schedule Appointment  Confirmed Symptoms Management: Discussed  Medication Reconciliation Updated: Yes  Influenza Vaccine Indication: Not indicated: Previously immunized this influenza season and > 8 years of age    I understand that a diet low in cholesterol, fat, and sodium is recommended for good health. Unless I have been given specific instructions below for another diet, I accept this instruction as my diet prescription.   Other diet:     Special Instructions: None    · Is patient discharged on Warfarin / Coumadin?   No     Depression / Suicide Risk    As you are discharged from this [facility] facility, it is important to learn how to keep safe from harming yourself.    Recognize the warning signs:  · Abrupt changes in personality, positive or negative- including increase in energy   · Giving away possessions  · Change in eating patterns- significant weight changes-  positive or negative  · Change in sleeping patterns- unable to sleep or sleeping all the time   · Unwillingness or inability to communicate  · Depression  · Unusual sadness, discouragement and loneliness  · Talk of wanting to die  · Neglect of personal appearance   · Rebelliousness- reckless behavior  · Withdrawal from people/activities they love  · Confusion- inability to concentrate     If you or a loved one observes any of these behaviors or has concerns about self-harm, here's what you can do:  · Talk about it- your feelings and reasons for harming yourself  · Remove any means that you might use to hurt yourself (examples: pills, rope, extension cords,  firearm)  · Get professional help from the community (Mental Health, Substance Abuse, psychological counseling)  · Do not be alone:Call your Safe Contact- someone whom you trust who will be there for you.  · Call your local CRISIS HOTLINE 835-4925 or 042-934-5474  · Call your local Children's Mobile Crisis Response Team [place] (872) 147-7336 or www.StockUp  · Call the toll free National Suicide Prevention Hotlines   · National Suicide Prevention Lifeline 994-162-MYQB (7158)  · National Hope Line Network 800-SUICIDE (379-2447)

## 2018-01-28 LAB
IGG SERPL-MCNC: 1260 MG/DL (ref 768–1632)
IGG4 SER-MCNC: 29 MG/DL (ref 1–123)

## 2018-01-28 NOTE — PROGRESS NOTES
Discharge to home with daughter in stable condition. Discharge instructions given to pt. Verbalized understanding.

## 2018-03-04 ENCOUNTER — OUTPATIENT INFUSION SERVICES (OUTPATIENT)
Dept: ONCOLOGY | Facility: MEDICAL CENTER | Age: 63
End: 2018-03-04
Attending: NURSE PRACTITIONER
Payer: MEDICARE

## 2018-03-04 VITALS
SYSTOLIC BLOOD PRESSURE: 165 MMHG | BODY MASS INDEX: 33.27 KG/M2 | WEIGHT: 143.74 LBS | TEMPERATURE: 97.6 F | OXYGEN SATURATION: 100 % | HEIGHT: 55 IN | RESPIRATION RATE: 16 BRPM | HEART RATE: 73 BPM | DIASTOLIC BLOOD PRESSURE: 47 MMHG

## 2018-03-04 DIAGNOSIS — D64.9 ANEMIA REQUIRING TRANSFUSIONS: ICD-10-CM

## 2018-03-04 LAB
ABO GROUP BLD: NORMAL
ANISOCYTOSIS BLD QL SMEAR: ABNORMAL
BASOPHILS # BLD AUTO: 0.9 % (ref 0–1.8)
BASOPHILS # BLD: 0.06 K/UL (ref 0–0.12)
BLD GP AB SCN SERPL QL: NORMAL
EOSINOPHIL # BLD AUTO: 0.06 K/UL (ref 0–0.51)
EOSINOPHIL NFR BLD: 0.9 % (ref 0–6.9)
ERYTHROCYTE [DISTWIDTH] IN BLOOD BY AUTOMATED COUNT: 79.5 FL (ref 35.9–50)
HCT VFR BLD AUTO: 24.7 % (ref 37–47)
HGB BLD-MCNC: 8.2 G/DL (ref 12–16)
LYMPHOCYTES # BLD AUTO: 0.87 K/UL (ref 1–4.8)
LYMPHOCYTES NFR BLD: 13.4 % (ref 22–41)
MACROCYTES BLD QL SMEAR: ABNORMAL
MANUAL DIFF BLD: NORMAL
MCH RBC QN AUTO: 36.4 PG (ref 27–33)
MCHC RBC AUTO-ENTMCNC: 33.2 G/DL (ref 33.6–35)
MCV RBC AUTO: 109.8 FL (ref 81.4–97.8)
MONOCYTES # BLD AUTO: 0.29 K/UL (ref 0–0.85)
MONOCYTES NFR BLD AUTO: 4.5 % (ref 0–13.4)
MORPHOLOGY BLD-IMP: NORMAL
NEUTROPHILS # BLD AUTO: 5.22 K/UL (ref 2–7.15)
NEUTROPHILS NFR BLD: 80.3 % (ref 44–72)
NRBC # BLD AUTO: 0 K/UL
NRBC BLD-RTO: 0 /100 WBC
PLATELET # BLD AUTO: 124 K/UL (ref 164–446)
PMV BLD AUTO: 13.6 FL (ref 9–12.9)
RBC # BLD AUTO: 2.25 M/UL (ref 4.2–5.4)
RBC BLD AUTO: PRESENT
RH BLD: NORMAL
WBC # BLD AUTO: 6.5 K/UL (ref 4.8–10.8)

## 2018-03-04 PROCEDURE — 86901 BLOOD TYPING SEROLOGIC RH(D): CPT

## 2018-03-04 PROCEDURE — 85007 BL SMEAR W/DIFF WBC COUNT: CPT

## 2018-03-04 PROCEDURE — 36415 COLL VENOUS BLD VENIPUNCTURE: CPT

## 2018-03-04 PROCEDURE — 86850 RBC ANTIBODY SCREEN: CPT

## 2018-03-04 PROCEDURE — 86900 BLOOD TYPING SEROLOGIC ABO: CPT

## 2018-03-04 PROCEDURE — 85027 COMPLETE CBC AUTOMATED: CPT

## 2018-03-04 ASSESSMENT — PAIN SCALES - GENERAL: PAINLEVEL: NO PAIN

## 2018-03-08 ENCOUNTER — TELEPHONE (OUTPATIENT)
Dept: ONCOLOGY | Facility: MEDICAL CENTER | Age: 63
End: 2018-03-08

## 2018-03-08 NOTE — TELEPHONE ENCOUNTER
Received call from Nephrology office.  Pt no longer needs appointment for blood transfusion.  Pt get HD 3 x/week and hemoglobin level is WNL.  Called pt to make her aware her appointment has been cancelled.

## 2018-03-14 ENCOUNTER — APPOINTMENT (OUTPATIENT)
Dept: ONCOLOGY | Facility: MEDICAL CENTER | Age: 63
End: 2018-03-14
Attending: NURSE PRACTITIONER
Payer: MEDICARE

## 2018-03-22 ENCOUNTER — APPOINTMENT (OUTPATIENT)
Dept: RADIOLOGY | Facility: MEDICAL CENTER | Age: 63
End: 2018-03-22
Attending: EMERGENCY MEDICINE
Payer: MEDICARE

## 2018-03-22 ENCOUNTER — HOSPITAL ENCOUNTER (EMERGENCY)
Facility: MEDICAL CENTER | Age: 63
End: 2018-03-23
Attending: EMERGENCY MEDICINE
Payer: MEDICARE

## 2018-03-22 DIAGNOSIS — D64.9 SYMPTOMATIC ANEMIA: ICD-10-CM

## 2018-03-22 LAB
ABO GROUP BLD: NORMAL
ANION GAP SERPL CALC-SCNC: 14 MMOL/L (ref 0–11.9)
ANISOCYTOSIS BLD QL SMEAR: ABNORMAL
BARCODED ABORH UBTYP: 6200
BARCODED PRD CODE UBPRD: NORMAL
BARCODED UNIT NUM UBUNT: NORMAL
BASOPHILS # BLD AUTO: 0.9 % (ref 0–1.8)
BASOPHILS # BLD: 0.06 K/UL (ref 0–0.12)
BLD GP AB SCN SERPL QL: NORMAL
BUN SERPL-MCNC: 58 MG/DL (ref 8–22)
CALCIUM SERPL-MCNC: 7.6 MG/DL (ref 8.5–10.5)
CHLORIDE SERPL-SCNC: 92 MMOL/L (ref 96–112)
CO2 SERPL-SCNC: 29 MMOL/L (ref 20–33)
COMPONENT R 8504R: NORMAL
CREAT SERPL-MCNC: 5.91 MG/DL (ref 0.5–1.4)
EKG IMPRESSION: NORMAL
EOSINOPHIL # BLD AUTO: 0.18 K/UL (ref 0–0.51)
EOSINOPHIL NFR BLD: 2.6 % (ref 0–6.9)
ERYTHROCYTE [DISTWIDTH] IN BLOOD BY AUTOMATED COUNT: 71.2 FL (ref 35.9–50)
GLUCOSE SERPL-MCNC: 140 MG/DL (ref 65–99)
HCT VFR BLD AUTO: 21.3 % (ref 37–47)
HGB BLD-MCNC: 7.1 G/DL (ref 12–16)
LYMPHOCYTES # BLD AUTO: 1.6 K/UL (ref 1–4.8)
LYMPHOCYTES NFR BLD: 22.8 % (ref 22–41)
MACROCYTES BLD QL SMEAR: ABNORMAL
MANUAL DIFF BLD: NORMAL
MCH RBC QN AUTO: 38 PG (ref 27–33)
MCHC RBC AUTO-ENTMCNC: 33.3 G/DL (ref 33.6–35)
MCV RBC AUTO: 113.9 FL (ref 81.4–97.8)
MONOCYTES # BLD AUTO: 0.74 K/UL (ref 0–0.85)
MONOCYTES NFR BLD AUTO: 10.5 % (ref 0–13.4)
MORPHOLOGY BLD-IMP: NORMAL
NEUTROPHILS # BLD AUTO: 4.42 K/UL (ref 2–7.15)
NEUTROPHILS NFR BLD: 62.3 % (ref 44–72)
NEUTS BAND NFR BLD MANUAL: 0.9 % (ref 0–10)
NRBC # BLD AUTO: 0 K/UL
NRBC BLD-RTO: 0 /100 WBC
PLATELET # BLD AUTO: 155 K/UL (ref 164–446)
PLATELET BLD QL SMEAR: NORMAL
PMV BLD AUTO: 13.9 FL (ref 9–12.9)
POIKILOCYTOSIS BLD QL SMEAR: NORMAL
POLYCHROMASIA BLD QL SMEAR: NORMAL
POTASSIUM SERPL-SCNC: 3.4 MMOL/L (ref 3.6–5.5)
PRODUCT TYPE UPROD: NORMAL
RBC # BLD AUTO: 1.87 M/UL (ref 4.2–5.4)
RBC BLD AUTO: PRESENT
RH BLD: NORMAL
SCHISTOCYTES BLD QL SMEAR: NORMAL
SODIUM SERPL-SCNC: 135 MMOL/L (ref 135–145)
STOMATOCYTES BLD QL SMEAR: NORMAL
TROPONIN I SERPL-MCNC: 0.01 NG/ML (ref 0–0.04)
UNIT STATUS USTAT: NORMAL
WBC # BLD AUTO: 7 K/UL (ref 4.8–10.8)

## 2018-03-22 PROCEDURE — 86901 BLOOD TYPING SEROLOGIC RH(D): CPT

## 2018-03-22 PROCEDURE — 85027 COMPLETE CBC AUTOMATED: CPT

## 2018-03-22 PROCEDURE — 84484 ASSAY OF TROPONIN QUANT: CPT

## 2018-03-22 PROCEDURE — 99285 EMERGENCY DEPT VISIT HI MDM: CPT

## 2018-03-22 PROCEDURE — 80048 BASIC METABOLIC PNL TOTAL CA: CPT

## 2018-03-22 PROCEDURE — 93005 ELECTROCARDIOGRAM TRACING: CPT | Performed by: EMERGENCY MEDICINE

## 2018-03-22 PROCEDURE — 93005 ELECTROCARDIOGRAM TRACING: CPT

## 2018-03-22 PROCEDURE — 71045 X-RAY EXAM CHEST 1 VIEW: CPT

## 2018-03-22 PROCEDURE — 36415 COLL VENOUS BLD VENIPUNCTURE: CPT

## 2018-03-22 PROCEDURE — 86923 COMPATIBILITY TEST ELECTRIC: CPT

## 2018-03-22 PROCEDURE — 86900 BLOOD TYPING SEROLOGIC ABO: CPT

## 2018-03-22 PROCEDURE — 85007 BL SMEAR W/DIFF WBC COUNT: CPT

## 2018-03-22 PROCEDURE — 86850 RBC ANTIBODY SCREEN: CPT

## 2018-03-22 PROCEDURE — 86644 CMV ANTIBODY: CPT

## 2018-03-22 PROCEDURE — 700105 HCHG RX REV CODE 258: Performed by: EMERGENCY MEDICINE

## 2018-03-22 PROCEDURE — P9016 RBC LEUKOCYTES REDUCED: HCPCS

## 2018-03-22 PROCEDURE — 36430 TRANSFUSION BLD/BLD COMPNT: CPT

## 2018-03-22 RX ORDER — SODIUM CHLORIDE 9 MG/ML
INJECTION, SOLUTION INTRAVENOUS CONTINUOUS
Status: DISCONTINUED | OUTPATIENT
Start: 2018-03-22 | End: 2018-03-23 | Stop reason: HOSPADM

## 2018-03-22 RX ADMIN — SODIUM CHLORIDE: 9 INJECTION, SOLUTION INTRAVENOUS at 22:35

## 2018-03-22 ASSESSMENT — PAIN SCALES - GENERAL: PAINLEVEL_OUTOF10: 5

## 2018-03-23 VITALS
HEIGHT: 55 IN | SYSTOLIC BLOOD PRESSURE: 121 MMHG | WEIGHT: 143.3 LBS | DIASTOLIC BLOOD PRESSURE: 65 MMHG | BODY MASS INDEX: 33.16 KG/M2 | HEART RATE: 68 BPM | OXYGEN SATURATION: 100 % | TEMPERATURE: 98.1 F | RESPIRATION RATE: 12 BRPM

## 2018-03-23 NOTE — DISCHARGE PLANNING
Medical Social Work    MARYELLEN asked to assist Pt with a ride home.   SW reviewed chart and  Department have helped this Pt 2-3 times before with rides to [place].  She does have a daughter who has gotten her in the past as well but Pt states she is out of town.    MARYELLEN did approve for an [employer] ride back to home and spoke with Pt regarding transportation issues back to [place].     No other needs at this time.

## 2018-03-23 NOTE — ED NOTES
Blood bank called and states that in the past pt received CMV irrated blood, erp notified  orders changed.

## 2018-03-23 NOTE — ED NOTES
Pt states her daughter is out of town and she doesn't have money to get home, social work notified.

## 2018-03-23 NOTE — DISCHARGE INSTRUCTIONS
Blood Transfusion, Care After  These instructions give you information about caring for yourself after your procedure. Your doctor may also give you more specific instructions. Call your doctor if you have any problems or questions after your procedure.   HOME CARE  · Take medicines only as told by your doctor. Ask your doctor if you can take an over-the-counter pain reliever if you have a fever or headache a day or two after your procedure.  · Return to your normal activities as told by your doctor.  GET HELP IF:   · You develop redness or irritation at your IV site.  · You have a fever, chills, or a headache that does not go away.  · Your pee (urine) is darker than normal.  · Your urine turns:  ¨ Pink.  ¨ Red.  ¨ Brown.  · The white part of your eye turns yellow (jaundice).  · You feel weak after doing your normal activities.  GET HELP RIGHT AWAY IF:   · You have trouble breathing.  · You have fever and chills and you also have:  ¨ Anxiety.  ¨ Chest or back pain.  ¨ Flushed or pink skin.  ¨ Clammy or sweaty skin.  ¨ A fast heartbeat.  ¨ A sick feeling in your stomach (nausea).     This information is not intended to replace advice given to you by your health care provider. Make sure you discuss any questions you have with your health care provider.     Document Released: 01/08/2016 Document Reviewed: 01/08/2016  [employer] Interactive Patient Education ©2016 [employer]

## 2018-03-23 NOTE — ED PROVIDER NOTES
"ER Provider Note     Scribed for Wade Nielsen M.D. by Karel Amaya. 3/22/2018, 7:16 PM.    Primary Care Provider: Nyla Nava M.D.  Means of Arrival: ambulance   History obtained from: Patient  History limited by: None     CHIEF COMPLAINT  Chief Complaint   Patient presents with   • Chest Pain   • Headache       HPI  Vielka Briscoe is a 62 y.o. female who presents to the Emergency Department for evaluation of chest pain onset yesterday. Per patient, she has been experiencing constant substernal chest pain since onset. She describes her chest pain as, \"An elephant sitting on my chest\".  The patient rates her pain as moderate, and it does not radiate to other areas. She endorses associated headache, shortness of breath, bilateral leg numbness and generalized weakness. She does not note any exacerbating or alleviating factors. Patient confirms a history of anemia, for which she has required multiple blood transfusions in the past. The patient reports whenever she is anemic, she experiences similar symptoms that she is currently experiencing. Patient is currently using 2 L of supplemental oxygen on exam. She denies fever, nausea, vomiting, hematemesis, bloody stools, melena.         REVIEW OF SYSTEMS  See HPI for further details. All other systems are negative. C.       PAST MEDICAL HISTORY   has a past medical history of Arthritis; Atrial fibrillation (CMS-HCC); Blood transfusion without reported diagnosis; Breath shortness; Cancer (CMS-HCC); Cataract; Chronic anemia; Chronic kidney disease; Chronic obstructive pulmonary disease (CMS-HCC); Congestive heart failure (CMS-HCC); Dental disorder; Diabetes; Dialysis patient; Glaucoma; Heart abnormalities; Hypertension; MDS (myelodysplastic syndrome) (10/2016); Pain (-2017); Stroke (CMS-[facility]) (03/2015); and Supplemental oxygen dependent.    SURGICAL HISTORY   has a past surgical history that includes other cardiac surgery; other abdominal surgery; gyn " surgery; gyn surgery; gyn surgery; other cardiac surgery; other; retinal detachment repair (Right); av fistula creation (Left, 2/8/2016); other abdominal surgery; gastroscopy (12/17/2015); colonoscopy (12/17/2015); vein ligation (Left, 11/10/2016); bone marrow biopsy, ndl/trocar (9/20/2017); bone marrow aspiration (9/20/2017); and gastroscopy (N/A, 1/25/2018).    SOCIAL HISTORY  Social History   Substance Use Topics   • Smoking status: Never Smoker   • Smokeless tobacco: Never Used   • Alcohol use No      History   Drug Use No       FAMILY HISTORY  Family History   Problem Relation Age of Onset   • Hypertension Father    • Hypertension Mother        CURRENT MEDICATIONS  No current facility-administered medications on file prior to encounter.      Current Outpatient Prescriptions on File Prior to Encounter   Medication Sig Dispense Refill   • polyethylene glycol/lytes (MIRALAX) Pack Take 1 Packet by mouth 2 Times a Day. 30 Each 0   • sevelamer (RENAGEL) 800 MG Tab Take 1 Tab by mouth 3 times a day, with meals. 180 Tab 3   • omeprazole (PRILOSEC) 20 MG delayed-release capsule Take 2 Caps by mouth 2 times a day. 60 Cap 0   • carvedilol (COREG) 12.5 MG Tab Take 12.5 mg by mouth 2 times a day, with meals.     • Brimonidine Tartrate-Timolol (COMBIGAN) 0.2-0.5 % Solution Place 1 Drop in both eyes 2 Times a Day.     • pregabalin (LYRICA) 50 MG capsule Take 50 mg by mouth 2 times a day.     • bimatoprost (LUMIGAN) 0.03 % Solution Place 1 Drop in both eyes every evening.        ALLERGIES  Allergies   Allergen Reactions   • Actos [Pioglitazone Hydrochloride] Unspecified     Cause blindness   OID=1661   • Darvocet [Propoxyphene N-Apap] Vomiting     XIO=6205   • Demerol Vomiting     JAQ=3981   • Glucophage [Metformin Hydrochloride] Vomiting     TOM=4807   • Morphine Vomiting     OZJ=2047   • Oxycodone Vomiting     RXN=1/2016   • Pcn [Penicillins] Vomiting     EXN=5479     • Requip Vomiting     RXN=12/2015   • Simvastatin  "Unspecified     Leg cramps  JZN=9010   • Sulfa Drugs Rash     RXN=>10 years   • Tramadol Vomiting     XGC=0715   • Trazodone Vomiting     BHS=6392   • Dilaudid [Hydromorphone] Vomiting     Unknown    • Diphenhydramine Vomiting   • Iron      vomiting   • Lenalidomide Vomiting   • Multivitamin      itching   • Naprosyn [Naproxen]    • Other Drug      Any binders that remove phosphorus from the body such as tums       PHYSICAL EXAM  VITAL SIGNS: /74   Pulse 74   Temp 36.3 °C (97.3 °F)   Resp 18   Ht 1.397 m (4' 7\")   Wt 65 kg (143 lb 4.8 oz)   LMP 01/01/1995   BMI 33.31 kg/m²      Constitutional: Alert in no apparent distress.  HENT: No signs of trauma, Bilateral external ears normal, Nose normal.   Eyes: Pupils are equal and reactive, Pale conjunctiva, Non-icteric.   Neck: Normal range of motion, No tenderness, Supple, No stridor.   Lymphatic: No lymphadenopathy noted.   Cardiovascular: Regular rate and rhythm, no murmurs.   Thorax & Lungs: Normal breath sounds. Wearing a nasal cannula with 2 L of supplemental oxygen, but in no obvious respiratory distress, No wheezing, No chest tenderness.   Abdomen: Bowel sounds normal, Soft, No tenderness, No masses, No pulsatile masses. No peritoneal signs.  Skin: Warm, Dry, No erythema, No rash.   Back: No bony tenderness, No CVA tenderness.   Extremities: Intact distal pulses, No edema, No tenderness, No cyanosis.  Musculoskeletal: Good range of motion in all major joints. No tenderness to palpation or major deformities noted.   Neurologic: Alert , Normal motor function, Normal sensory function, No focal deficits noted.   Psychiatric: Affect normal, Judgment normal, Mood normal.     DIAGNOSTIC STUDIES / PROCEDURES    EKG Interpretation:  Interpreted by me  12 Lead EKG interpreted by me to show:  Normal sinus rhythm  Rate 79  Axis: Normal  Intervals: Normal  No ectopy  Normal T waves  No ST-T wave changes.   My impression of this EKG: Does not indicate ischemia or " arrhythmia at this time.     LABS  Labs Reviewed   CBC WITH DIFFERENTIAL - Abnormal; Notable for the following:        Result Value    RBC 1.87 (*)     Hemoglobin 7.1 (*)     Hematocrit 21.3 (*)     .9 (*)     MCH 38.0 (*)     MCHC 33.3 (*)     RDW 71.2 (*)     Platelet Count 155 (*)     MPV 13.9 (*)     All other components within normal limits   BASIC METABOLIC PANEL - Abnormal; Notable for the following:     Potassium 3.4 (*)     Chloride 92 (*)     Glucose 140 (*)     Bun 58 (*)     Creatinine 5.91 (*)     Calcium 7.6 (*)     Anion Gap 14.0 (*)     All other components within normal limits   ESTIMATED GFR - Abnormal; Notable for the following:     GFR If  9 (*)     GFR If Non  7 (*)     All other components within normal limits   TROPONIN   DIFFERENTIAL MANUAL   PERIPHERAL SMEAR REVIEW   PLATELET ESTIMATE   MORPHOLOGY   COD (ADULT)   RELEASE RED BLOOD CELLS   TRANSFUSE RED BLOOD CELLS-NURSING COMMUNICATION        All labs reviewed by me.    RADIOLOGY  DX-CHEST-PORTABLE (1 VIEW)   Final Result      No acute cardiopulmonary abnormality.          The radiologist's interpretation of all radiological studies have been reviewed by me.    COURSE & MEDICAL DECISION MAKING  Pertinent Labs & Imaging studies reviewed. (See chart for details)    This is a 62 y.o. female that presents with what appears to be the patient's baseline symptomatically anemia. She is having some mild chest discomfort as well as shortness of breath and weakness associated with this. She says that she feels the same way before every transfusion. I'll perform the below percussion and reassessed the patient.     7:16 PM - Patient seen and examined at bedside. Ordered chest x-ray, CBC with differential, BMP, Troponin, EKG.  I discussed the plan of care with the patient. She understood and verbalized agreement.      8:52 PM - Reviewed patient labs. The patient will be treated with continuous NS infusion. Patient is  treated with IV fluids secondary to symptomatic anemia. She will also be given a blood transfusion. Patient was found be profoundly anemic. She has a negative troponin as well as a GFR that is her baseline. Her potassium is within normal limits. She has no leukocytosis.    10:16 PM - Reevaluated the patient at bedside. The patient is feeling somewhat improved after treatment with IV fluids. Updated the patient on her lab and imaging results as above. Discussed plans for blood transfusion with patient. She will be treated with 1 unit of blood. See blood consent below.     I (Wade Nielsen M.D.) certify that I have explained to the patient or the patient’s legal representative, the following:    (i)     Explained the Blood Transfusion procedure to be undertaken;  (ii)    Explained alternative treatments and their general nature and risks; and  (iii)   Explained the risks, and extent of risk, to the Blood Transfusion procedure.  Risks included, but are not limited to the following:  mild allergic reactions, hemolytic reaction, and possible exposure to infectious agents such as hepatitis, cytomegalovirus, infectious mononucleosis, and acquired immune deficiency syndrome (AIDS)    I have explained all of the above and have answered all patient questions arising regarding the procedure to be taken, and I believe that the patient understands what I have explained.    Date 3/22/2018  Time of Consent 10:17 PM   This form is electronically signed by Wade Nielsen M.D.     The patient will return for new or worsening symptoms and is stable at the time of discharge.    The patient is referred to a primary physician for blood pressure management, diabetic screening, and for all other preventative health concerns.    11:47 PM  The patient is feeling much improved after being given blood. He transfusion is not completed at this time but the patient will be discharged after transfusion complete. Strict return precautions were  given and follow-up was arranged.    Results, exam findings, clinical impression, presumed diagnosis, treatment options, and strict return precautions were discussed with the patient, and they verbalized understanding, agreed with, and appreciated the plan of care.     Pertinent Labs & Imaging studies reviewed and verified by myself, as well as nursing notes and the patient's past medical, family, and social histories (See chart for details).     The patient is referred to a primary physician for blood pressure management, diabetic screening, and for all other preventative health concerns.      Portions of this record were made with voice recognition software.  Despite my review, spelling/grammar/context errors may still remain. Interpretation of this chart should be taken in this context.        DISPOSITION:  Patient will be discharged home in stable condition.    FOLLOW UP:  Nyla Nava M.D.  [address] [facility] [id number] [phone]    In 2 days            FINAL IMPRESSION  1. Symptomatic anemia          Karel KENNY (Scribe), am scribing for, and in the presence of, Wade Nielsen M.D..    Electronically signed by: Karel Amaya (Scribe), 3/22/2018    Wade KENNY M.D. personally performed the services described in this documentation, as scribed by Karel Amaya in my presence, and it is both accurate and complete.     The note accurately reflects work and decisions made by me.  Wade Nielsen  3/22/2018  11:48 PM

## 2018-03-23 NOTE — ED TRIAGE NOTES
Pt arrived via ems, per ems pt has been having substernal cp with a headache for 3 days. Pt has hx of blood transfusions  And states how she currently feels it feels like she will need a transfusion. U/a pt sitting up caox4 speaking in full sentences, maintaining patent airway, +sob, +substernal cp,no abd pain.

## 2018-03-24 ENCOUNTER — PATIENT OUTREACH (OUTPATIENT)
Dept: HEALTH INFORMATION MANAGEMENT | Facility: OTHER | Age: 63
End: 2018-03-24

## 2018-03-24 DIAGNOSIS — D64.9 ANEMIA, UNSPECIFIED TYPE: ICD-10-CM

## 2018-03-26 ENCOUNTER — PATIENT OUTREACH (OUTPATIENT)
Dept: HEALTH INFORMATION MANAGEMENT | Facility: OTHER | Age: 63
End: 2018-03-26

## 2018-04-11 ENCOUNTER — HOSPITAL ENCOUNTER (OUTPATIENT)
Dept: LAB | Facility: MEDICAL CENTER | Age: 63
End: 2018-04-11
Attending: INTERNAL MEDICINE
Payer: MEDICARE

## 2018-04-11 ENCOUNTER — HOSPITAL ENCOUNTER (OUTPATIENT)
Facility: MEDICAL CENTER | Age: 63
End: 2018-04-12
Attending: INTERNAL MEDICINE | Admitting: INTERNAL MEDICINE
Payer: MEDICARE

## 2018-04-11 ENCOUNTER — APPOINTMENT (OUTPATIENT)
Dept: RADIOLOGY | Facility: MEDICAL CENTER | Age: 63
End: 2018-04-11
Attending: INTERNAL MEDICINE
Payer: MEDICARE

## 2018-04-11 ENCOUNTER — HOSPITAL ENCOUNTER (OUTPATIENT)
Facility: MEDICAL CENTER | Age: 63
End: 2018-04-11
Payer: MEDICARE

## 2018-04-11 LAB
ABO GROUP BLD: NORMAL
BARCODED ABORH UBTYP: 6200
BARCODED ABORH UBTYP: 6200
BARCODED PRD CODE UBPRD: NORMAL
BARCODED PRD CODE UBPRD: NORMAL
BARCODED UNIT NUM UBUNT: NORMAL
BARCODED UNIT NUM UBUNT: NORMAL
BLD GP AB SCN SERPL QL: NORMAL
COMPONENT R 8504R: NORMAL
COMPONENT R 8504R: NORMAL
PRODUCT TYPE UPROD: NORMAL
PRODUCT TYPE UPROD: NORMAL
RH BLD: NORMAL
UNIT STATUS USTAT: NORMAL
UNIT STATUS USTAT: NORMAL

## 2018-04-11 PROCEDURE — 80053 COMPREHEN METABOLIC PANEL: CPT

## 2018-04-11 PROCEDURE — 86900 BLOOD TYPING SEROLOGIC ABO: CPT

## 2018-04-11 PROCEDURE — 86644 CMV ANTIBODY: CPT

## 2018-04-11 PROCEDURE — 86850 RBC ANTIBODY SCREEN: CPT

## 2018-04-11 PROCEDURE — 82607 VITAMIN B-12: CPT

## 2018-04-11 PROCEDURE — 82728 ASSAY OF FERRITIN: CPT

## 2018-04-11 PROCEDURE — P9016 RBC LEUKOCYTES REDUCED: HCPCS | Mod: 91

## 2018-04-11 PROCEDURE — 86901 BLOOD TYPING SEROLOGIC RH(D): CPT

## 2018-04-11 PROCEDURE — 82746 ASSAY OF FOLIC ACID SERUM: CPT

## 2018-04-11 PROCEDURE — G0378 HOSPITAL OBSERVATION PER HR: HCPCS

## 2018-04-11 PROCEDURE — A9270 NON-COVERED ITEM OR SERVICE: HCPCS | Performed by: INTERNAL MEDICINE

## 2018-04-11 PROCEDURE — 700102 HCHG RX REV CODE 250 W/ 637 OVERRIDE(OP): Performed by: INTERNAL MEDICINE

## 2018-04-11 PROCEDURE — 86923 COMPATIBILITY TEST ELECTRIC: CPT | Mod: 91

## 2018-04-11 PROCEDURE — 36430 TRANSFUSION BLD/BLD COMPNT: CPT

## 2018-04-11 RX ORDER — ACETAMINOPHEN 325 MG/1
650 TABLET ORAL ONCE
Status: COMPLETED | OUTPATIENT
Start: 2018-04-11 | End: 2018-04-11

## 2018-04-11 RX ORDER — CARVEDILOL 6.25 MG/1
12.5 TABLET ORAL 2 TIMES DAILY WITH MEALS
Status: DISCONTINUED | OUTPATIENT
Start: 2018-04-11 | End: 2018-04-12 | Stop reason: HOSPADM

## 2018-04-11 RX ORDER — PREGABALIN 25 MG/1
50 CAPSULE ORAL 2 TIMES DAILY
Status: DISCONTINUED | OUTPATIENT
Start: 2018-04-11 | End: 2018-04-12 | Stop reason: HOSPADM

## 2018-04-11 RX ORDER — DIPHENHYDRAMINE HCL 25 MG
25 TABLET ORAL ONCE
Status: DISCONTINUED | OUTPATIENT
Start: 2018-04-11 | End: 2018-04-12 | Stop reason: HOSPADM

## 2018-04-11 RX ORDER — HYDROCODONE BITARTRATE AND ACETAMINOPHEN 5; 325 MG/1; MG/1
1 TABLET ORAL EVERY 6 HOURS PRN
Status: ON HOLD | COMMUNITY
End: 2019-04-12 | Stop reason: CLARIF

## 2018-04-11 RX ADMIN — CARVEDILOL 12.5 MG: 6.25 TABLET, FILM COATED ORAL at 17:20

## 2018-04-11 RX ADMIN — PREGABALIN 50 MG: 25 CAPSULE ORAL at 21:21

## 2018-04-11 RX ADMIN — ACETAMINOPHEN 650 MG: 325 TABLET, FILM COATED ORAL at 15:24

## 2018-04-11 ASSESSMENT — PATIENT HEALTH QUESTIONNAIRE - PHQ9
SUM OF ALL RESPONSES TO PHQ9 QUESTIONS 1 AND 2: 0
1. LITTLE INTEREST OR PLEASURE IN DOING THINGS: NOT AT ALL
2. FEELING DOWN, DEPRESSED, IRRITABLE, OR HOPELESS: NOT AT ALL

## 2018-04-11 ASSESSMENT — LIFESTYLE VARIABLES: EVER_SMOKED: NEVER

## 2018-04-11 ASSESSMENT — PAIN SCALES - GENERAL: PAINLEVEL_OUTOF10: 0

## 2018-04-11 NOTE — PROGRESS NOTES
Direct admit from [facility] by Dr. Avila for Blood transfusion.  Diagnosis of Anemia.  ADT signed & held on 4/11/2018 @ 1257, needs to be released upon pt arrival.  Written orders received, faxed to unit, and scanned to media tab.  Pt coming by private vehicle.

## 2018-04-11 NOTE — PROGRESS NOTES
[facility] Notify got a IV 22 gauge on right finger. EJ on left neck 20 gauge. Will start BT .

## 2018-04-12 VITALS
TEMPERATURE: 97.6 F | HEIGHT: 58 IN | RESPIRATION RATE: 17 BRPM | DIASTOLIC BLOOD PRESSURE: 70 MMHG | BODY MASS INDEX: 29.99 KG/M2 | SYSTOLIC BLOOD PRESSURE: 165 MMHG | WEIGHT: 142.86 LBS | OXYGEN SATURATION: 100 % | HEART RATE: 71 BPM

## 2018-04-12 LAB
ALBUMIN SERPL BCP-MCNC: 3.5 G/DL (ref 3.2–4.9)
ALBUMIN/GLOB SERPL: 1.3 G/DL
ALP SERPL-CCNC: 69 U/L (ref 30–99)
ALT SERPL-CCNC: 25 U/L (ref 2–50)
ANION GAP SERPL CALC-SCNC: 20 MMOL/L (ref 0–11.9)
AST SERPL-CCNC: 17 U/L (ref 12–45)
BILIRUB SERPL-MCNC: 0.3 MG/DL (ref 0.1–1.5)
BUN SERPL-MCNC: 78 MG/DL (ref 8–22)
CALCIUM SERPL-MCNC: 7.6 MG/DL (ref 8.5–10.5)
CHLORIDE SERPL-SCNC: 93 MMOL/L (ref 96–112)
CO2 SERPL-SCNC: 24 MMOL/L (ref 20–33)
CREAT SERPL-MCNC: 6.75 MG/DL (ref 0.5–1.4)
FERRITIN SERPL-MCNC: 2196.3 NG/ML (ref 10–291)
FOLATE SERPL-MCNC: 8.9 NG/ML
GLOBULIN SER CALC-MCNC: 2.8 G/DL (ref 1.9–3.5)
GLUCOSE SERPL-MCNC: 274 MG/DL (ref 65–99)
POTASSIUM SERPL-SCNC: 6 MMOL/L (ref 3.6–5.5)
PROT SERPL-MCNC: 6.3 G/DL (ref 6–8.2)
SODIUM SERPL-SCNC: 137 MMOL/L (ref 135–145)
VIT B12 SERPL-MCNC: 757 PG/ML (ref 211–911)

## 2018-04-12 PROCEDURE — G0378 HOSPITAL OBSERVATION PER HR: HCPCS

## 2018-04-12 ASSESSMENT — PAIN SCALES - GENERAL
PAINLEVEL_OUTOF10: 0
PAINLEVEL_OUTOF10: 0

## 2018-04-12 NOTE — PROGRESS NOTES
2nd unit RBC transfused,pt tolerated well, anticipating to go home, per MD orders to d/c after transfusion ,discharged instruction provided and discussed with pt, understood,papers signed,called daughter to come and   the patient.

## 2018-04-12 NOTE — PROGRESS NOTES
A/o,assessment completed, poc discussed,verbalized understanding, 2nd unit RBCs transfusing,pt tolerated,ambulates to the bathroom with sba,denies pain ,call button within reach,will continue to monitor.

## 2018-04-12 NOTE — PROGRESS NOTES
Talking to patient about her ride. BT wont get done by midnight. Will get CW. Did [employer] service before and let 1 am. Called her daughter haven't called back yet.

## 2018-04-12 NOTE — DISCHARGE INSTRUCTIONS
Discharge Instructions    Discharged to home by car with relative. Discharged via wheelchair, hospital escort: Yes.  Special equipment needed: Not Applicable    Be sure to schedule a follow-up appointment with your primary care doctor or any specialists as instructed.     Discharge Plan:   Influenza Vaccine Indication: Not indicated: Previously immunized this influenza season and > 8 years of age    I understand that a diet low in cholesterol, fat, and sodium is recommended for good health. Unless I have been given specific instructions below for another diet, I accept this instruction as my diet prescription.   Other diet: regular    Special Instructions: None    · Is patient discharged on Warfarin / Coumadin?   No     Depression / Suicide Risk    As you are discharged from this [facility], it is important to learn how to keep safe from harming yourself.    Recognize the warning signs:  · Abrupt changes in personality, positive or negative- including increase in energy   · Giving away possessions  · Change in eating patterns- significant weight changes-  positive or negative  · Change in sleeping patterns- unable to sleep or sleeping all the time   · Unwillingness or inability to communicate  · Depression  · Unusual sadness, discouragement and loneliness  · Talk of wanting to die  · Neglect of personal appearance   · Rebelliousness- reckless behavior  · Withdrawal from people/activities they love  · Confusion- inability to concentrate     If you or a loved one observes any of these behaviors or has concerns about self-harm, here's what you can do:  · Talk about it- your feelings and reasons for harming yourself  · Remove any means that you might use to hurt yourself (examples: pills, rope, extension cords, firearm)  · Get professional help from the community (Mental Health, Substance Abuse, psychological counseling)  · Do not be alone:Call your Safe Contact- someone whom you trust who will be there for  you.  · Call your local CRISIS HOTLINE 831-0907 or 620-495-5045  · Call your local Children's Mobile Crisis Response Team [place] (604) 406-4225 or www.[employer]ov[employer]  · Call the toll free National Suicide Prevention Hotlines   · National Suicide Prevention Lifeline 758-923-KSGW (7767)  · National Takes Line Network 800-SUICIDE (043-5675)

## 2018-04-16 ENCOUNTER — APPOINTMENT (OUTPATIENT)
Dept: RADIOLOGY | Facility: MEDICAL CENTER | Age: 63
End: 2018-04-16
Payer: MEDICARE

## 2018-04-16 ENCOUNTER — APPOINTMENT (OUTPATIENT)
Dept: RADIOLOGY | Facility: MEDICAL CENTER | Age: 63
End: 2018-04-16
Attending: EMERGENCY MEDICINE
Payer: MEDICARE

## 2018-04-16 ENCOUNTER — HOSPITAL ENCOUNTER (EMERGENCY)
Facility: MEDICAL CENTER | Age: 63
End: 2018-04-16
Attending: EMERGENCY MEDICINE
Payer: MEDICARE

## 2018-04-16 VITALS
HEART RATE: 76 BPM | BODY MASS INDEX: 29.81 KG/M2 | TEMPERATURE: 97.4 F | SYSTOLIC BLOOD PRESSURE: 148 MMHG | DIASTOLIC BLOOD PRESSURE: 73 MMHG | WEIGHT: 142.64 LBS | OXYGEN SATURATION: 97 % | RESPIRATION RATE: 18 BRPM

## 2018-04-16 DIAGNOSIS — R07.9 CHEST PAIN, UNSPECIFIED TYPE: ICD-10-CM

## 2018-04-16 LAB
ALBUMIN SERPL BCP-MCNC: 3.6 G/DL (ref 3.2–4.9)
ALBUMIN/GLOB SERPL: 1.1 G/DL
ALP SERPL-CCNC: 59 U/L (ref 30–99)
ALT SERPL-CCNC: 17 U/L (ref 2–50)
ANION GAP SERPL CALC-SCNC: 14 MMOL/L (ref 0–11.9)
ANISOCYTOSIS BLD QL SMEAR: ABNORMAL
APTT PPP: 27.8 SEC (ref 24.7–36)
AST SERPL-CCNC: 15 U/L (ref 12–45)
BASOPHILS # BLD AUTO: 0.3 % (ref 0–1.8)
BASOPHILS # BLD: 0.02 K/UL (ref 0–0.12)
BILIRUB SERPL-MCNC: 0.4 MG/DL (ref 0.1–1.5)
BNP SERPL-MCNC: 122 PG/ML (ref 0–100)
BUN SERPL-MCNC: 53 MG/DL (ref 8–22)
CALCIUM SERPL-MCNC: 8.2 MG/DL (ref 8.5–10.5)
CHLORIDE SERPL-SCNC: 91 MMOL/L (ref 96–112)
CO2 SERPL-SCNC: 29 MMOL/L (ref 20–33)
COMMENT 1642: NORMAL
CREAT SERPL-MCNC: 5.2 MG/DL (ref 0.5–1.4)
EKG IMPRESSION: NORMAL
EOSINOPHIL # BLD AUTO: 0.19 K/UL (ref 0–0.51)
EOSINOPHIL NFR BLD: 3 % (ref 0–6.9)
ERYTHROCYTE [DISTWIDTH] IN BLOOD BY AUTOMATED COUNT: 71.8 FL (ref 35.9–50)
GLOBULIN SER CALC-MCNC: 3.3 G/DL (ref 1.9–3.5)
GLUCOSE SERPL-MCNC: 259 MG/DL (ref 65–99)
HCT VFR BLD AUTO: 27.9 % (ref 37–47)
HGB BLD-MCNC: 9.3 G/DL (ref 12–16)
IMM GRANULOCYTES # BLD AUTO: 0.05 K/UL (ref 0–0.11)
IMM GRANULOCYTES NFR BLD AUTO: 0.8 % (ref 0–0.9)
INR PPP: 0.93 (ref 0.87–1.13)
LIPASE SERPL-CCNC: 72 U/L (ref 11–82)
LYMPHOCYTES # BLD AUTO: 0.99 K/UL (ref 1–4.8)
LYMPHOCYTES NFR BLD: 15.7 % (ref 22–41)
MACROCYTES BLD QL SMEAR: ABNORMAL
MCH RBC QN AUTO: 33.2 PG (ref 27–33)
MCHC RBC AUTO-ENTMCNC: 33.3 G/DL (ref 33.6–35)
MCV RBC AUTO: 99.6 FL (ref 81.4–97.8)
MONOCYTES # BLD AUTO: 0.66 K/UL (ref 0–0.85)
MONOCYTES NFR BLD AUTO: 10.5 % (ref 0–13.4)
MORPHOLOGY BLD-IMP: NORMAL
NEUTROPHILS # BLD AUTO: 4.4 K/UL (ref 2–7.15)
NEUTROPHILS NFR BLD: 69.7 % (ref 44–72)
NRBC # BLD AUTO: 0 K/UL
NRBC BLD-RTO: 0 /100 WBC
PLATELET # BLD AUTO: 134 K/UL (ref 164–446)
PLATELET BLD QL SMEAR: NORMAL
PMV BLD AUTO: 13.3 FL (ref 9–12.9)
POTASSIUM SERPL-SCNC: 3.9 MMOL/L (ref 3.6–5.5)
PROT SERPL-MCNC: 6.9 G/DL (ref 6–8.2)
PROTHROMBIN TIME: 12.2 SEC (ref 12–14.6)
RBC # BLD AUTO: 2.8 M/UL (ref 4.2–5.4)
RBC BLD AUTO: PRESENT
SODIUM SERPL-SCNC: 134 MMOL/L (ref 135–145)
TROPONIN I SERPL-MCNC: 0.01 NG/ML (ref 0–0.04)
TROPONIN I SERPL-MCNC: 0.02 NG/ML (ref 0–0.04)
WBC # BLD AUTO: 6.3 K/UL (ref 4.8–10.8)

## 2018-04-16 PROCEDURE — 85610 PROTHROMBIN TIME: CPT

## 2018-04-16 PROCEDURE — 36415 COLL VENOUS BLD VENIPUNCTURE: CPT

## 2018-04-16 PROCEDURE — 85025 COMPLETE CBC W/AUTO DIFF WBC: CPT

## 2018-04-16 PROCEDURE — 93005 ELECTROCARDIOGRAM TRACING: CPT

## 2018-04-16 PROCEDURE — 99285 EMERGENCY DEPT VISIT HI MDM: CPT

## 2018-04-16 PROCEDURE — 93005 ELECTROCARDIOGRAM TRACING: CPT | Performed by: EMERGENCY MEDICINE

## 2018-04-16 PROCEDURE — 84484 ASSAY OF TROPONIN QUANT: CPT

## 2018-04-16 PROCEDURE — 80053 COMPREHEN METABOLIC PANEL: CPT

## 2018-04-16 PROCEDURE — 85730 THROMBOPLASTIN TIME PARTIAL: CPT

## 2018-04-16 PROCEDURE — 83690 ASSAY OF LIPASE: CPT

## 2018-04-16 PROCEDURE — 71045 X-RAY EXAM CHEST 1 VIEW: CPT

## 2018-04-16 PROCEDURE — 83880 ASSAY OF NATRIURETIC PEPTIDE: CPT

## 2018-04-16 ASSESSMENT — PAIN SCALES - GENERAL: PAINLEVEL_OUTOF10: 3

## 2018-04-16 NOTE — ED TRIAGE NOTES
PT BIB EMS per report pt was at dialysis and began having CP.  PT reports the pain has improved since stopping dialysis.  Per report pt had a high K+ prior to dialysis today.  PT reports that they pulled 1 liter   Chief Complaint   Patient presents with   • Abnormal Labs     high K+   • Chest Pain     while at diaysis      Blood pressure 152/60, pulse 78, temperature 36.3 °C (97.4 °F), resp. rate 18, weight 64.7 kg (142 lb 10.2 oz), last menstrual period 01/01/1995, SpO2 99 %.

## 2018-04-16 NOTE — ED NOTES
ROMMEL Cabral aware pt's K was 7.2 prior to dialysis today.  Pt denying CP at this time,  No acute distress noted.   Unable to draw labs, VSS.  Pt given warm blanket and apologized for wait,  Denies other needs at this time.

## 2018-04-17 ENCOUNTER — PATIENT OUTREACH (OUTPATIENT)
Dept: HEALTH INFORMATION MANAGEMENT | Facility: OTHER | Age: 63
End: 2018-04-17

## 2018-04-17 NOTE — ED PROVIDER NOTES
"ED Provider Note    Scribed for Denis Ann M.D. by Radha Garzon. 4/16/2018, 5:17 PM.    Primary care provider: Nyla Nava M.D.  Means of arrival: ambulance  History obtained from: patient  History limited by: none    CHIEF COMPLAINT  Chief Complaint   Patient presents with   • Abnormal Labs     high K+   • Chest Pain     while at diaysis        [facility]  Vielka Briscoe is a 62 y.o. female with a history of atrial fibrillation, chronic kidney disease, COPD, CHF, diabetes, bone cancer, hypertension, stroke, and MDS who presents to the Emergency Department for left sided chest pain onset earlier today while the patient was undergoing dialysis with associated shortness of breath. The chest discomfort alternated between being a sharp and dull pain, however, is mostly described as \"an elephant sitting on my chest.\" Patient reports that she has experienced this discomfort in the past and has been evaluated for it before, however, nothing definite nor acute has been diagnosed as the origin of the pain. She always experiences shortness of breath with these episodes of chest pain. Labs completed at her appointment today also showed an elevated potassium. 1 L was pulled off today before dialysis was discontinued and patient reports that her chest pain improved once the dialysis was stopped. Dialysis is completed through a fistula along her left arm Monday, Wednesday and Friday with her last appointment being completed last Friday, however, that was also not a completed dialysis appointment secondary to hypotension. Dry weight is 63 kilograms. Patient is on 24/7 2L O2.  No complaints of nausea, vomiting, fever.    REVIEW OF SYSTEMS  Pertinent positives include chest pain, shortness of breath. Pertinent negatives include no nausea, vomiting, fever. As above, all other systems reviewed and are negative.   See [facility] for further details.     PAST MEDICAL HISTORY   has a past medical history of Arthritis; Atrial " fibrillation (CMS-HCC); Blood transfusion without reported diagnosis; Breath shortness; Cancer (CMS-HCC); Cataract; Chronic anemia; Chronic kidney disease; Chronic obstructive pulmonary disease (CMS-HCC); Congestive heart failure (CMS-HCC); Dental disorder; Diabetes; Dialysis patient; Glaucoma; Heart abnormalities; Hypertension; MDS (myelodysplastic syndrome) (10/2016); Pain (-2017); Stroke (CMS-HCC) (03/2015); and Supplemental oxygen dependent.    SURGICAL HISTORY   has a past surgical history that includes other cardiac surgery; other abdominal surgery; gyn surgery; gyn surgery; gyn surgery; other cardiac surgery; other; retinal detachment repair (Right); av fistula creation (Left, 2/8/2016); other abdominal surgery; gastroscopy (12/17/2015); colonoscopy (12/17/2015); vein ligation (Left, 11/10/2016); bone marrow biopsy, ndl/trocar (9/20/2017); bone marrow aspiration (9/20/2017); and gastroscopy (N/A, 1/25/2018).    SOCIAL HISTORY  Social History   Substance Use Topics   • Smoking status: Never Smoker   • Smokeless tobacco: Never Used   • Alcohol use No      History   Drug Use No       FAMILY HISTORY  Family History   Problem Relation Age of Onset   • Hypertension Father    • Hypertension Mother        CURRENT MEDICATIONS  Home Medications     Reviewed by Savanna Olvera R.N. (Registered Nurse) on 04/16/18 at 1704  Med List Status: Partial   Medication Last Dose Status   bimatoprost (LUMIGAN) 0.03 % Solution 4/15/2018 Active   Brimonidine Tartrate-Timolol (COMBIGAN) 0.2-0.5 % Solution 4/15/2018 Active   carvedilol (COREG) 12.5 MG Tab 4/15/2018 Active   HYDROcodone-acetaminophen (NORCO) 5-325 MG Tab per tablet 4/16/2018 Active   pregabalin (LYRICA) 50 MG capsule 4/13/2018 Active                ALLERGIES  Allergies   Allergen Reactions   • Actos [Pioglitazone Hydrochloride] Unspecified     Cause blindness   YHP=0475   • Darvocet [Propoxyphene N-Apap] Vomiting     SHG=0442   • Demerol Vomiting     JCE=7322    • Glucophage [Metformin Hydrochloride] Vomiting     CWN=6366   • Morphine Vomiting     WOO=5885   • Oxycodone Vomiting     RXN=1/2016   • Pcn [Penicillins] Vomiting     JWP=8287     • Requip Vomiting     RXN=12/2015   • Simvastatin Unspecified     Leg cramps  NCX=4516   • Sulfa Drugs Rash     RXN=>10 years   • Tramadol Vomiting     SKW=1222   • Trazodone Vomiting     ACJ=8189   • Dilaudid [Hydromorphone] Vomiting     Unknown    • Diphenhydramine Vomiting   • Iron      vomiting   • Lenalidomide Vomiting   • Multivitamin      itching   • Naprosyn [Naproxen]    • Other Drug      Any binders that remove phosphorus from the body such as tums       PHYSICAL EXAM  VITAL SIGNS: /73   Pulse 79   Temp 36.3 °C (97.4 °F)   Resp 16   Wt 64.7 kg (142 lb 10.2 oz)   LMP 01/01/1995   SpO2 100%   BMI 29.81 kg/m²   Vitals reviewed.    Constitutional: Alert in no apparent distress. Nasal cannula in place.  HENT: No signs of trauma, Bilateral external ears normal, Nose normal.   Eyes: Pupils are equal and reactive, Conjunctiva normal, Non-icteric.   Neck: Normal range of motion, No tenderness, Supple, No stridor.   Lymphatic: No lymphadenopathy noted.   Cardiovascular: Regular rate and rhythm, no murmurs.   Thorax & Lungs: Normal breath sounds, No respiratory distress, No wheezing, No chest tenderness.   Abdomen: Bowel sounds normal, Soft, No tenderness, No peritoneal signs, No masses, No pulsatile masses.   Skin: Warm, Dry, No erythema, No rash.   Extremities: Bandaged fistula present in left upper extremity with bruit and thrill, Intact distal pulses, No edema, No tenderness, No cyanosis  Musculoskeletal: Good range of motion in all major joints. No tenderness to palpation or major deformities noted.   Neurologic: Alert , Normal motor function, Normal sensory function, No focal deficits noted.   Psychiatric: Affect normal, Judgment normal, Mood normal.     DIAGNOSTIC STUDIES / PROCEDURES    LABS  Labs Reviewed   BTYPE  NATRIURETIC PEPTIDE - Abnormal; Notable for the following:        Result Value    B Natriuretic Peptide 122 (*)     All other components within normal limits    Narrative:     Indicate which anticoagulants the patient is on:->UNKNOWN   CBC WITH DIFFERENTIAL - Abnormal; Notable for the following:     RBC 2.80 (*)     Hemoglobin 9.3 (*)     Hematocrit 27.9 (*)     MCV 99.6 (*)     MCH 33.2 (*)     MCHC 33.3 (*)     RDW 71.8 (*)     Platelet Count 134 (*)     MPV 13.3 (*)     Lymphocytes 15.70 (*)     Lymphs (Absolute) 0.99 (*)     All other components within normal limits    Narrative:     Indicate which anticoagulants the patient is on:->UNKNOWN   COMP METABOLIC PANEL - Abnormal; Notable for the following:     Sodium 134 (*)     Chloride 91 (*)     Anion Gap 14.0 (*)     Glucose 259 (*)     Bun 53 (*)     Creatinine 5.20 (*)     Calcium 8.2 (*)     All other components within normal limits    Narrative:     Indicate which anticoagulants the patient is on:->UNKNOWN   ESTIMATED GFR - Abnormal; Notable for the following:     GFR If  10 (*)     GFR If Non  8 (*)     All other components within normal limits    Narrative:     Indicate which anticoagulants the patient is on:->UNKNOWN   TROPONIN    Narrative:     Indicate which anticoagulants the patient is on:->UNKNOWN   PROTHROMBIN TIME    Narrative:     Indicate which anticoagulants the patient is on:->UNKNOWN   APTT    Narrative:     Indicate which anticoagulants the patient is on:->UNKNOWN   LIPASE    Narrative:     Indicate which anticoagulants the patient is on:->UNKNOWN   PERIPHERAL SMEAR REVIEW    Narrative:     Indicate which anticoagulants the patient is on:->UNKNOWN   PLATELET ESTIMATE    Narrative:     Indicate which anticoagulants the patient is on:->UNKNOWN   MORPHOLOGY    Narrative:     Indicate which anticoagulants the patient is on:->UNKNOWN   DIFFERENTIAL COMMENT    Narrative:     Indicate which anticoagulants the patient  is on:->UNKNOWN   TROPONIN      All labs reviewed by me.    EKG Interpretation:  Interpreted by me    12 Lead EKG interpreted by me to show:  Normal sinus rhythm  Rate 78  Axis: leftward  Intervals: Normal  Isolated T wave inversion in V1  Normal ST segments  My impression of this EKG: Does not indicate ischemia or arrhythmia at this time.  No peaked T waves as compared to EKG 3/22/18     RADIOLOGY  DX-CHEST-LIMITED (1 VIEW)   Final Result      No acute cardiopulmonary disease.        The radiologist's interpretation of all radiological studies have been reviewed by me.    COURSE & MEDICAL DECISION MAKING  Nursing notes, VS, PMSFHx reviewed in chart.    Differential diagnoses include but not limited to: hyperkalemia, ACS, fluid overload.     5:17 PM Patient seen and examined at bedside. Patient arrives hypertensive but afebrile with otherwise normal vital signs. Patient appears well hydrated and non-toxic. The physical exam is remarkable for a normal appearing LUE bandaged fistula. She presents to the ER status post an episode of chest pain and shortness of breath that she experienced during her regular dialysis appointment today which was discontinued and not completed secondary to these symptoms. This chest discomfort is not new for her, she has been evaluated for this in the past and has experienced it during dialysis many times. No etiology has been found for this in the past. She does appear well and her symptoms completely resolved after dialysis completion. Will run two trops, check electrolytes, CBC, CXR. Ordered for chest xray, troponin, BNP, CBC, CMP, prothrombin time, APTT, lipase, and EKG to evaluate. I informed the patient that labs, imaging and EKG would be completed to rule out anything acute. Patient understands and agrees with evaluation plan.    Labs without leukocytosis. Mild anemia. Labs c/w ESRD. Potassium noted to be 3.9. EKG without peaked T waves. CXR without acute abnormality. First troponin  is negative. Second trop sent.    8:19 PM Patient's 2nd Troponin was negative. All other images and labs indicate nothing acute at this time, nor do they indicate any reason for admission to the hospital. Patient's blood pressure has improved at this time, and she is ready for discharge home. Social Work will be consulted regarding patient's transfer back to her home.    8:23 PM I informed the patient that her test indicated nothing acute at this time, explaining that she would be discharged home. She understands and agrees with discharge, being given strict return precautions. I have asked her to follow up with her PCP tomorrow for a recheck.     The patient will return for new or worsening symptoms and is stable at the time of discharge.    DISPOSITION:  Patient will be discharged home in stable condition.    FOLLOW UP:  Nyla Nava M.D.  [facility] [id number]  [phone]    Schedule an appointment as soon as possible for a visit in 1 day  for recheck      FINAL IMPRESSION  1. Chest pain, unspecified type          I, Radha Garzon (Eugeneibhoda), am scribing for, and in the presence of, Denis Ann M.D..    Electronically signed by: Radha Garzon (Eugeneibhoda), 4/16/2018    IDenis M.D. personally performed the services described in this documentation, as scribed by Radha Garzon in my presence, and it is both accurate and complete.    The note accurately reflects work and decisions made by me.  Denis Ann  4/17/2018  5:59 PM

## 2018-04-17 NOTE — DISCHARGE INSTRUCTIONS
You were seen in the ER for chest pain. Your labs and imaging did not reveal any acute abnormality that requires further workup, consultation, admission to the hospital. You are safe to go home. I would like you to follow up with your primary care physician tomorrow for recheck. If you develop new or worsening symptoms return to the ER.    Chest Pain, Nonspecific  It is often hard to give a specific diagnosis for the cause of chest pain. There is always a chance that your pain could be related to something serious, like a heart attack or a blood clot in the lungs. You need to follow up with your caregiver for further evaluation. More lab tests or other studies such as X-rays, electrocardiography, stress testing, or cardiac imaging may be needed to find the cause of your pain.  Most of the time, nonspecific chest pain improves within 2 to 3 days with rest and mild pain medicine. For the next few days, avoid physical exertion or activities that bring on pain. Do not smoke. Avoid drinking alcohol. Call your caregiver for routine follow-up as advised.   SEEK IMMEDIATE MEDICAL CARE IF:  · You develop increased chest pain or pain that radiates to the arm, neck, jaw, back, or abdomen.   · You develop shortness of breath, increased coughing, or you start coughing up blood.   · You have severe back or abdominal pain, nausea, or vomiting.   · You develop severe weakness, fainting, fever, or chills.   Document Released: 12/18/2006 Document Revised: 03/11/2013 Document Reviewed: 06/06/2008  [employer]® Patient Information ©2013 [employer].

## 2018-04-17 NOTE — ED NOTES
Called  to update them that patient is ready to be discharge. Iv DC'D. Patient ambulated to restroom. Patient wheeled out to lobby. Updated to ask  what color car will be picking up patient. D/C home with written and verbal instructions re: Rx, activity, f/u.  Verbalizes understanding.

## 2018-04-17 NOTE — CONSULTS
"REASON FOR CONSULTATION:   - Inpatient hemodialysis management.        HISTORY OF PRESENT ILLNESS:    61 yo female PMH ESRD MWF iHD, HTN, type 2 DM, anemia of chronic disease, renal osteodystrophy, HLD, and MDS who presents with SOB and chest pain, She was at her usual HD session today and started C/o of SOB and Chest pain and her HD was terminated early. She missed HD session last week and her K was reported more than 7.8 before HD on . The patient feels good now and has no specific complaints   awaiting labs to decide on admission.        REVIEW OF SYSTEMS:    - As per HPI, otherwise review of systems negative per AMA/CMS criteria.     PAST MEDICAL HISTORY:   - ESRD MWF iHD  - HTN  - type 2 DM  - Anemia of chronic disease  - Renal osteodystrophy  - MDS  - COPD  - CAD  - HLD  - CVA  - Steal syndrome  - Chronic diastolic HF  - PVD  - Chronic chest pain     PAST SURGICAL HISTORY:   - Cardiac surgery  - Abdominal surgery  - AVF placement  - Bilateral cataract repair  - Retinal detachment surgery  - LHC  - Upper EGD  - LAVF revision for steal syndrome  - Appendectomy  - Bone marrow bx  - D&C x 2  -  x 2  - Hysterectomy  - BLE stenting  - PermCath placement and removal     FAMILY HISTORY:   - Reviewed and non contributory to current illness     SOCIAL HISTORY:   - No tobacco  - No EtOH  - No illicits  - 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 No   BMI 31.56 kg/m²   GENERAL:  WDWN, NAD  HEENT:  NC/AT, no scleral icterus; conjunctiva normal  NECK:  Supple; Non tender  CV:  RRR, 2/6 systolic murmur  LUNGS:  CTAB, no W/R  ABDOMEN:  SNTND, +BS  EXTREMETIES:  No C/C/E  SKIN:  Warm and dry  NEURO:  A&O, no focal deficits  PSYCH:  Cooperative, appropriate mood and affect     Invalid input(s): [id number]  Recent Labs      18   1730  18   1941   TROPONINI  0.02  0.01   BNPBTYPENAT  122*   --      Recent Labs      18   1730   SODIUM  134*   POTASSIUM  3.9   CHLORIDE  91*   CO2  29   BUN  53*   CREATININE  5.20*   CALCIUM  8.2*     Recent Labs      18   1730   ALTSGPT  17   ASTSGOT  15   ALKPHOSPHAT  59   TBILIRUBIN  0.4   LIPASE  72   GLUCOSE  259*     Recent Labs      18   1730   RBC  2.80*   HEMOGLOBIN  9.3*   HEMATOCRIT  27.9*   PLATELETCT  134*   PROTHROMBTM  12.2   APTT  27.8   INR  0.93     Recent Labs      18   1730   WBC  6.3   NEUTSPOLYS  69.70   LYMPHOCYTES  15.70*   MONOCYTES  10.50   EOSINOPHILS  3.00   BASOPHILS  0.30   ASTSGOT  15   ALTSGPT  17   ALKPHOSPHAT  59   TBILIRUBIN  0.4           Hemodynamics:  Temp (24hrs), Av.3 °C (97.4 °F), Min:36.3 °C (97.4 °F), Max:36.3 °C (97.4 °F)  Temperature: 36.3 °C (97.4 °F)  Pulse  Av  Min: 66  Max: 80Heart Rate (Monitored): 72  Blood Pressure: 148/73, NIBP: 154/47     Respiratory:    Respiration: 18, Pulse Oximetry: 97 %           Fluids:  No intake or output data in the 24 hours ending 18  Weight: 64.7 kg (142 lb 10.2 oz)  GI/Nutrition:  No orders of the defined types were placed in this encounter.    Medical Decision Making, by Problem:  There are no active hospital problems to display for this patient.      Quality-Core Measures         IMPRESSION:  - ESRD    * Etiology likely 2/2 HTN/DM  - Coffee ground emesis  - Abdominal pain  - HTN  - type 2 DM  - Anemia of chronic disease  - Renal Osteodystrophy  - HLD  - CAD  -  COPD     PLAN:    No indication for HD   - MWF iHD as an out patient.   - UF as tolerated  - Continue PO4 binders  - Dose all meds per ESRD guidelines    Thank you

## 2018-04-17 NOTE — DISCHARGE PLANNING
Medical Social Work    Referral: Transportation Assistance    Intervention: MSW received a call from bedside RN who states that pt is need of a ride home to :[address].  Pt takes the bus to and from [place] for dialysis three times a week but is unable to take the bus due to the current time.  Pt's daughter is unable to pick pt up due to financial restraints at this time.  [employer] has provided transportation assistance to pt in the past.  MSW arranged Uber ride for pt to return home safely.  Bedside RN and [facility] staff notified.      Plan: Pt to D/C home via Uber.

## 2018-06-27 ENCOUNTER — HOSPITAL ENCOUNTER (OUTPATIENT)
Facility: MEDICAL CENTER | Age: 63
End: 2018-06-27
Attending: EMERGENCY MEDICINE | Admitting: INTERNAL MEDICINE
Payer: MEDICARE

## 2018-06-27 ENCOUNTER — APPOINTMENT (OUTPATIENT)
Dept: RADIOLOGY | Facility: MEDICAL CENTER | Age: 63
End: 2018-06-27
Attending: EMERGENCY MEDICINE
Payer: MEDICARE

## 2018-06-27 VITALS
RESPIRATION RATE: 20 BRPM | TEMPERATURE: 97.8 F | DIASTOLIC BLOOD PRESSURE: 69 MMHG | WEIGHT: 142.42 LBS | BODY MASS INDEX: 29.89 KG/M2 | HEART RATE: 66 BPM | SYSTOLIC BLOOD PRESSURE: 155 MMHG | HEIGHT: 58 IN | OXYGEN SATURATION: 98 %

## 2018-06-27 PROBLEM — E11.42 TYPE 2 DIABETES MELLITUS WITH DIABETIC POLYNEUROPATHY, WITHOUT LONG-TERM CURRENT USE OF INSULIN (HCC): Status: ACTIVE | Noted: 2017-02-20

## 2018-06-27 PROBLEM — R53.81 DEBILITY: Status: ACTIVE | Noted: 2018-06-27

## 2018-06-27 LAB
ABO GROUP BLD: NORMAL
ALBUMIN SERPL BCP-MCNC: 3.9 G/DL (ref 3.2–4.9)
ALBUMIN/GLOB SERPL: 1.3 G/DL
ALP SERPL-CCNC: 63 U/L (ref 30–99)
ALT SERPL-CCNC: 18 U/L (ref 2–50)
ANION GAP SERPL CALC-SCNC: 15 MMOL/L (ref 0–11.9)
ANISOCYTOSIS BLD QL SMEAR: NORMAL
APTT PPP: 30.1 SEC (ref 24.7–36)
AST SERPL-CCNC: 18 U/L (ref 12–45)
BARCODED ABORH UBTYP: 6200
BARCODED ABORH UBTYP: 6200
BARCODED PRD CODE UBPRD: NORMAL
BARCODED PRD CODE UBPRD: NORMAL
BARCODED UNIT NUM UBUNT: NORMAL
BARCODED UNIT NUM UBUNT: NORMAL
BASOPHILS # BLD AUTO: 0.7 % (ref 0–1.8)
BASOPHILS # BLD: 0.04 K/UL (ref 0–0.12)
BILIRUB SERPL-MCNC: 0.5 MG/DL (ref 0.1–1.5)
BLD GP AB SCN SERPL QL: NORMAL
BNP SERPL-MCNC: 331 PG/ML (ref 0–100)
BUN SERPL-MCNC: 64 MG/DL (ref 8–22)
CALCIUM SERPL-MCNC: 7.6 MG/DL (ref 8.5–10.5)
CHLORIDE SERPL-SCNC: 99 MMOL/L (ref 96–112)
CO2 SERPL-SCNC: 22 MMOL/L (ref 20–33)
COMPONENT R 8504R: NORMAL
COMPONENT R 8504R: NORMAL
CREAT SERPL-MCNC: 6.87 MG/DL (ref 0.5–1.4)
EKG IMPRESSION: NORMAL
EOSINOPHIL # BLD AUTO: 0.31 K/UL (ref 0–0.51)
EOSINOPHIL NFR BLD: 5 % (ref 0–6.9)
ERYTHROCYTE [DISTWIDTH] IN BLOOD BY AUTOMATED COUNT: 63.7 FL (ref 35.9–50)
GLOBULIN SER CALC-MCNC: 3.1 G/DL (ref 1.9–3.5)
GLUCOSE BLD-MCNC: 96 MG/DL (ref 65–99)
GLUCOSE SERPL-MCNC: 253 MG/DL (ref 65–99)
HCT VFR BLD AUTO: 23.9 % (ref 37–47)
HCT VFR BLD AUTO: 27.2 % (ref 37–47)
HGB BLD-MCNC: 7.7 G/DL (ref 12–16)
HGB BLD-MCNC: 9.1 G/DL (ref 12–16)
IMM GRANULOCYTES # BLD AUTO: 0.03 K/UL (ref 0–0.11)
IMM GRANULOCYTES NFR BLD AUTO: 0.5 % (ref 0–0.9)
INR PPP: 1 (ref 0.87–1.13)
LYMPHOCYTES # BLD AUTO: 1.23 K/UL (ref 1–4.8)
LYMPHOCYTES NFR BLD: 20 % (ref 22–41)
MACROCYTES BLD QL SMEAR: NORMAL
MCH RBC QN AUTO: 38.1 PG (ref 27–33)
MCHC RBC AUTO-ENTMCNC: 32.2 G/DL (ref 33.6–35)
MCV RBC AUTO: 118.3 FL (ref 81.4–97.8)
MONOCYTES # BLD AUTO: 0.59 K/UL (ref 0–0.85)
MONOCYTES NFR BLD AUTO: 9.6 % (ref 0–13.4)
MORPHOLOGY BLD-IMP: NORMAL
NEUTROPHILS # BLD AUTO: 3.95 K/UL (ref 2–7.15)
NEUTROPHILS NFR BLD: 64.2 % (ref 44–72)
NRBC # BLD AUTO: 0 K/UL
NRBC BLD-RTO: 0 /100 WBC
OVALOCYTES BLD QL SMEAR: NORMAL
PLATELET # BLD AUTO: 123 K/UL (ref 164–446)
PLATELET BLD QL SMEAR: NORMAL
POIKILOCYTOSIS BLD QL SMEAR: NORMAL
POTASSIUM SERPL-SCNC: 6 MMOL/L (ref 3.6–5.5)
PRODUCT TYPE UPROD: NORMAL
PRODUCT TYPE UPROD: NORMAL
PROT SERPL-MCNC: 7 G/DL (ref 6–8.2)
PROTHROMBIN TIME: 12.9 SEC (ref 12–14.6)
RBC # BLD AUTO: 2.02 M/UL (ref 4.2–5.4)
RBC BLD AUTO: PRESENT
RH BLD: NORMAL
SCHISTOCYTES BLD QL SMEAR: NORMAL
SODIUM SERPL-SCNC: 136 MMOL/L (ref 135–145)
TROPONIN I SERPL-MCNC: 0.01 NG/ML (ref 0–0.04)
UNIT STATUS USTAT: NORMAL
UNIT STATUS USTAT: NORMAL
WBC # BLD AUTO: 6.2 K/UL (ref 4.8–10.8)

## 2018-06-27 PROCEDURE — 90935 HEMODIALYSIS ONE EVALUATION: CPT

## 2018-06-27 PROCEDURE — 700102 HCHG RX REV CODE 250 W/ 637 OVERRIDE(OP): Performed by: INTERNAL MEDICINE

## 2018-06-27 PROCEDURE — 700101 HCHG RX REV CODE 250: Performed by: INTERNAL MEDICINE

## 2018-06-27 PROCEDURE — 85018 HEMOGLOBIN: CPT

## 2018-06-27 PROCEDURE — 99285 EMERGENCY DEPT VISIT HI MDM: CPT

## 2018-06-27 PROCEDURE — 86901 BLOOD TYPING SEROLOGIC RH(D): CPT

## 2018-06-27 PROCEDURE — P9016 RBC LEUKOCYTES REDUCED: HCPCS | Mod: 91

## 2018-06-27 PROCEDURE — 80053 COMPREHEN METABOLIC PANEL: CPT

## 2018-06-27 PROCEDURE — 96374 THER/PROPH/DIAG INJ IV PUSH: CPT

## 2018-06-27 PROCEDURE — G0378 HOSPITAL OBSERVATION PER HR: HCPCS

## 2018-06-27 PROCEDURE — 86923 COMPATIBILITY TEST ELECTRIC: CPT | Mod: 91

## 2018-06-27 PROCEDURE — 93005 ELECTROCARDIOGRAM TRACING: CPT | Performed by: EMERGENCY MEDICINE

## 2018-06-27 PROCEDURE — 85025 COMPLETE CBC W/AUTO DIFF WBC: CPT

## 2018-06-27 PROCEDURE — 83880 ASSAY OF NATRIURETIC PEPTIDE: CPT

## 2018-06-27 PROCEDURE — A9270 NON-COVERED ITEM OR SERVICE: HCPCS | Performed by: INTERNAL MEDICINE

## 2018-06-27 PROCEDURE — 85610 PROTHROMBIN TIME: CPT

## 2018-06-27 PROCEDURE — 85014 HEMATOCRIT: CPT

## 2018-06-27 PROCEDURE — 36430 TRANSFUSION BLD/BLD COMPNT: CPT

## 2018-06-27 PROCEDURE — 99220 PR INITIAL OBSERVATION CARE,LEVL III: CPT | Performed by: INTERNAL MEDICINE

## 2018-06-27 PROCEDURE — 82962 GLUCOSE BLOOD TEST: CPT

## 2018-06-27 PROCEDURE — 71045 X-RAY EXAM CHEST 1 VIEW: CPT

## 2018-06-27 PROCEDURE — 86850 RBC ANTIBODY SCREEN: CPT

## 2018-06-27 PROCEDURE — 85730 THROMBOPLASTIN TIME PARTIAL: CPT

## 2018-06-27 PROCEDURE — 84484 ASSAY OF TROPONIN QUANT: CPT

## 2018-06-27 PROCEDURE — 36415 COLL VENOUS BLD VENIPUNCTURE: CPT

## 2018-06-27 PROCEDURE — 86900 BLOOD TYPING SEROLOGIC ABO: CPT

## 2018-06-27 PROCEDURE — 86644 CMV ANTIBODY: CPT | Mod: 91

## 2018-06-27 RX ORDER — CLONIDINE HYDROCHLORIDE 0.1 MG/1
0.1 TABLET ORAL EVERY 8 HOURS PRN
Status: DISCONTINUED | OUTPATIENT
Start: 2018-06-27 | End: 2018-06-27 | Stop reason: HOSPADM

## 2018-06-27 RX ORDER — LATANOPROST 50 UG/ML
1 SOLUTION/ DROPS OPHTHALMIC EVERY EVENING
Status: DISCONTINUED | OUTPATIENT
Start: 2018-06-27 | End: 2018-06-27 | Stop reason: HOSPADM

## 2018-06-27 RX ORDER — PREGABALIN 25 MG/1
50 CAPSULE ORAL 2 TIMES DAILY
Status: DISCONTINUED | OUTPATIENT
Start: 2018-06-27 | End: 2018-06-27 | Stop reason: HOSPADM

## 2018-06-27 RX ORDER — TIMOLOL MALEATE 5 MG/ML
1 SOLUTION/ DROPS OPHTHALMIC 2 TIMES DAILY
Status: DISCONTINUED | OUTPATIENT
Start: 2018-06-27 | End: 2018-06-27 | Stop reason: HOSPADM

## 2018-06-27 RX ORDER — CYCLOBENZAPRINE HCL 10 MG
5 TABLET ORAL 3 TIMES DAILY PRN
Status: DISCONTINUED | OUTPATIENT
Start: 2018-06-27 | End: 2018-06-27 | Stop reason: HOSPADM

## 2018-06-27 RX ORDER — ACETAMINOPHEN 325 MG/1
650 TABLET ORAL EVERY 6 HOURS PRN
Status: DISCONTINUED | OUTPATIENT
Start: 2018-06-27 | End: 2018-06-27 | Stop reason: HOSPADM

## 2018-06-27 RX ORDER — LABETALOL HYDROCHLORIDE 5 MG/ML
10 INJECTION, SOLUTION INTRAVENOUS ONCE
Status: COMPLETED | OUTPATIENT
Start: 2018-06-27 | End: 2018-06-27

## 2018-06-27 RX ORDER — BRIMONIDINE TARTRATE 2 MG/ML
1 SOLUTION/ DROPS OPHTHALMIC 2 TIMES DAILY
Status: DISCONTINUED | OUTPATIENT
Start: 2018-06-27 | End: 2018-06-27 | Stop reason: HOSPADM

## 2018-06-27 RX ORDER — HYDROCODONE BITARTRATE AND ACETAMINOPHEN 5; 325 MG/1; MG/1
1 TABLET ORAL EVERY 6 HOURS PRN
Status: DISCONTINUED | OUTPATIENT
Start: 2018-06-27 | End: 2018-06-27 | Stop reason: HOSPADM

## 2018-06-27 RX ORDER — HYDRALAZINE HYDROCHLORIDE 20 MG/ML
20 INJECTION INTRAMUSCULAR; INTRAVENOUS ONCE
Status: DISCONTINUED | OUTPATIENT
Start: 2018-06-27 | End: 2018-06-27

## 2018-06-27 RX ORDER — CARVEDILOL 12.5 MG/1
12.5 TABLET ORAL 2 TIMES DAILY WITH MEALS
Status: DISCONTINUED | OUTPATIENT
Start: 2018-06-27 | End: 2018-06-27 | Stop reason: HOSPADM

## 2018-06-27 RX ORDER — CYCLOBENZAPRINE HCL 5 MG
5 TABLET ORAL 3 TIMES DAILY PRN
Status: ON HOLD | COMMUNITY
End: 2019-04-12 | Stop reason: CLARIF

## 2018-06-27 RX ORDER — LOSARTAN POTASSIUM 50 MG/1
50 TABLET ORAL DAILY
Status: DISCONTINUED | OUTPATIENT
Start: 2018-06-27 | End: 2018-06-27 | Stop reason: HOSPADM

## 2018-06-27 RX ORDER — LOSARTAN POTASSIUM 50 MG/1
50 TABLET ORAL DAILY
Qty: 30 TAB | Refills: 0 | Status: SHIPPED | OUTPATIENT
Start: 2018-06-27 | End: 2018-09-26

## 2018-06-27 RX ORDER — LOSARTAN POTASSIUM 25 MG/1
25 TABLET ORAL DAILY
Status: DISCONTINUED | OUTPATIENT
Start: 2018-06-28 | End: 2018-06-27

## 2018-06-27 RX ORDER — LOSARTAN POTASSIUM 25 MG/1
25 TABLET ORAL DAILY
Status: ON HOLD | COMMUNITY
End: 2018-06-27

## 2018-06-27 RX ORDER — BRIMONIDINE TARTRATE AND TIMOLOL MALEATE 2; 5 MG/ML; MG/ML
1 SOLUTION OPHTHALMIC 2 TIMES DAILY
Status: DISCONTINUED | OUTPATIENT
Start: 2018-06-27 | End: 2018-06-27

## 2018-06-27 RX ADMIN — LOSARTAN POTASSIUM 50 MG: 50 TABLET, FILM COATED ORAL at 18:53

## 2018-06-27 RX ADMIN — CARVEDILOL 12.5 MG: 12.5 TABLET, FILM COATED ORAL at 18:53

## 2018-06-27 RX ADMIN — LABETALOL HYDROCHLORIDE 10 MG: 5 INJECTION, SOLUTION INTRAVENOUS at 16:52

## 2018-06-27 ASSESSMENT — ENCOUNTER SYMPTOMS
FALLS: 0
NECK PAIN: 0
TINGLING: 0
DIARRHEA: 0
CONSTIPATION: 0
DOUBLE VISION: 0
VOMITING: 0
CLAUDICATION: 0
FLANK PAIN: 0
SPEECH CHANGE: 0
HEMOPTYSIS: 0
PND: 0
DEPRESSION: 0
SHORTNESS OF BREATH: 0
BLURRED VISION: 0
SENSORY CHANGE: 0
DIAPHORESIS: 0
COUGH: 0
SEIZURES: 0
CHILLS: 0
MYALGIAS: 0
FOCAL WEAKNESS: 0
LOSS OF CONSCIOUSNESS: 0
TREMORS: 0
WHEEZING: 0
NAUSEA: 0
HEARTBURN: 0
ORTHOPNEA: 0
BACK PAIN: 0
WEIGHT LOSS: 0
FEVER: 0
PALPITATIONS: 0
HEADACHES: 0
BLOOD IN STOOL: 0
DIZZINESS: 0
ABDOMINAL PAIN: 0
SPUTUM PRODUCTION: 0
WEAKNESS: 1

## 2018-06-27 ASSESSMENT — COPD QUESTIONNAIRES
COPD SCREENING SCORE: 3
IN THE PAST 12 MONTHS DO YOU DO LESS THAN YOU USED TO BECAUSE OF YOUR BREATHING PROBLEMS: DISAGREE/UNSURE
DURING THE PAST 4 WEEKS HOW MUCH DID YOU FEEL SHORT OF BREATH: SOME OF THE TIME
DO YOU EVER COUGH UP ANY MUCUS OR PHLEGM?: NO/ONLY WITH OCCASIONAL COLDS OR INFECTIONS
HAVE YOU SMOKED AT LEAST 100 CIGARETTES IN YOUR ENTIRE LIFE: NO/DON'T KNOW

## 2018-06-27 ASSESSMENT — PAIN SCALES - GENERAL
PAINLEVEL_OUTOF10: 6
PAINLEVEL_OUTOF10: 8
PAINLEVEL_OUTOF10: 0

## 2018-06-27 ASSESSMENT — LIFESTYLE VARIABLES
ALCOHOL_USE: NO
EVER_SMOKED: NEVER
DO YOU DRINK ALCOHOL: NO

## 2018-06-27 ASSESSMENT — PATIENT HEALTH QUESTIONNAIRE - PHQ9
1. LITTLE INTEREST OR PLEASURE IN DOING THINGS: NOT AT ALL
2. FEELING DOWN, DEPRESSED, IRRITABLE, OR HOPELESS: NOT AT ALL
SUM OF ALL RESPONSES TO PHQ9 QUESTIONS 1 AND 2: 0

## 2018-06-27 NOTE — ASSESSMENT & PLAN NOTE
Discussed the case with Dr. Le, she will evaluate the patient  Plan for dialysis today  Patient undergoes Monday, Wednesday and Friday dialysis  She will get 2 units of blood transfusion with dialysis  Discussed above with Dr. Le and she will be arranging for above  I have placed orders for blood transfusion

## 2018-06-27 NOTE — ED PROVIDER NOTES
"ED Provider Note    CHIEF COMPLAINT  Chief Complaint   Patient presents with   • Sent by MD   • Fatigue   • Dizziness       HPI  Vielka Briscoe is a 62 y.o. female who presents for evaluation of generalized fatigue weakness not feeling well. The patient has a complex medical history as listed below. This includes end-stage renal disease. She is followed by nephrology with Dr. Qureshi and she is dialyzed Monday Wednesday Friday. Apparently she had been getting progressively more anemic and her nephrologist told her to come to the emergency department to get admitted for dialysis as well as a blood transfusion. She reports general fatigue but no chest pain. She did not go to dialysis today no high fevers or chills no numbness weakness or tingling.    REVIEW OF SYSTEMS  See HPI for further details. No high fevers productive cough leg swelling All other systems are negative.     PAST MEDICAL HISTORY  Past Medical History:   Diagnosis Date   • MDS (myelodysplastic syndrome) 10/2016    bone marrow biopsy   • Stroke (HCC) 03/2015    No residual weakness/problems   • Arthritis     hands    • Atrial fibrillation (HCC)     HX   • Blood transfusion without reported diagnosis    • Breath shortness     w/exertion   • Cancer (HCC)     MDS in bones   • Cataract     bilat IOL   • Chronic anemia    • Chronic kidney disease        • Chronic obstructive pulmonary disease (HCC)    • Congestive heart failure (HCC)    • Dental disorder     full dentures   • Diabetes     Diet controlled   • Dialysis patient     [facility] ,   [facility] in [place]   • Glaucoma    • Heart abnormalities    • Hypertension    • Pain -2017    \"bones\", generalized, 5/10   • Supplemental oxygen dependent     2 liters       FAMILY HISTORY  Noncontributory    SOCIAL HISTORY  Social History     Social History   • Marital status:      Spouse name: N/A   • Number of children: N/A   • Years of education: N/A     Social History Main Topics   • " Smoking status: Never Smoker   • Smokeless tobacco: Never Used   • Alcohol use No   • Drug use: No   • Sexual activity: Not on file     Other Topics Concern   • Not on file     Social History Narrative    ** Merged History Encounter **          Denies IV drugs  SURGICAL HISTORY  Past Surgical History:   Procedure Laterality Date   • GASTROSCOPY N/A 2018    Procedure: GASTROSCOPY;  Surgeon: Blanca Santos M.D.;  Location: SURGERY [facility];  Service: Gastroenterology   • BONE MARROW BIOPSY, NDL/TROCAR  2017    Procedure: BONE MARROW BIOPSY, NDL/TROCAR;  Surgeon: Wade Turner M.D.;  Location: ENDOSCOPY [facility];  Service: Orthopedics   • BONE MARROW ASPIRATION  2017    Procedure: BONE MARROW ASPIRATION;  Surgeon: Wade Turner M.D.;  Location: [facility];  Service: Orthopedics   • VEIN LIGATION Left 11/10/2016    Procedure: VEIN LIGATION FOR DISTAL REVASCULARIZATION AND INTERVAL LIGATION OF LEFT ARM DIALYISIS ACCESS (DRIL PROCEDURE);  Surgeon: Ranulfo Jolly M.D.;  Location: [facility];  Service:    • AV FISTULA CREATION Left 2016    Procedure: AV FISTULA CREATION UPPER EXTREMITY;  Surgeon: Ranulfo Jolly M.D.;  Location: [facility];  Service:    • GASTROSCOPY  2015    Procedure: ESOPHAGOGASTRODUODENOSCOPY WITH BIOPSY;  Surgeon: Wing Álvarez M.D.;  Location: [facility];  Service:    • COLONOSCOPY  2015    Procedure: COLONOSCOPY;  Surgeon: Wing Álvarez M.D.;  Location: [facility];  Service:    • GYN SURGERY      hysterectomy   • GYN SURGERY       x 2   • GYN SURGERY      d&C x2   • OTHER      angioplasty/ stents bilat LE   • OTHER ABDOMINAL SURGERY      appendectomy,  x 2, hysterectomy, D & C   • OTHER ABDOMINAL SURGERY      appendectomy   • OTHER CARDIAC SURGERY      Angioplasty  and    • OTHER CARDIAC SURGERY      cardiac angiogram, angioplasty   • RETINAL DETACHMENT  "REPAIR Right        CURRENT MEDICATIONS    Current Facility-Administered Medications:   •  latanoprost (XALATAN) 0.005 % ophthalmic solution 1 Drop, 1 Drop, Both Eyes, Q EVENING, Malissa Sorensen M.D.  •  carvedilol (COREG) tablet 12.5 mg, 12.5 mg, Oral, BID WITH MEALS, Malissa Sorensen M.D.  •  cyclobenzaprine (FLEXERIL) tablet 5 mg, 5 mg, Oral, TID PRN, Malissa Sorensen M.D.  •  HYDROcodone-acetaminophen (NORCO) 5-325 MG per tablet 1 Tab, 1 Tab, Oral, Q6HRS PRN, Malissa Sorensen M.D.  •  [START ON 6/28/2018] losartan (COZAAR) tablet 25 mg, 25 mg, Oral, DAILY, Malissa Sorensen M.D.  •  pregabalin (LYRICA) capsule 50 mg, 50 mg, Oral, BID, Malissa Sorensen M.D.  •  acetaminophen (TYLENOL) tablet 650 mg, 650 mg, Oral, Q6HRS PRN, Malissa Sorensen M.D.      ALLERGIES  Allergies   Allergen Reactions   • Actos [Pioglitazone Hydrochloride] Unspecified     Cause blindness   YBO=8860   • Darvocet [Propoxyphene N-Apap] Vomiting     NTF=6230   • Demerol Vomiting     ZIM=3055   • Glucophage [Metformin Hydrochloride] Vomiting     CIA=1784   • Morphine Vomiting     MDM=6750   • Oxycodone Vomiting     RXN=1/2016   • Pcn [Penicillins] Vomiting     LQM=8446     • Requip Vomiting     RXN=12/2015   • Simvastatin Unspecified     Leg cramps  QSF=2149   • Sulfa Drugs Rash     RXN=>10 years   • Tramadol Vomiting     NCB=0572   • Trazodone Vomiting     HPJ=5903   • Dilaudid [Hydromorphone] Vomiting     Unknown    • Diphenhydramine Vomiting   • Iron      vomiting   • Lenalidomide Vomiting   • Multivitamin      itching   • Naprosyn [Naproxen]    • Other Drug      Any binders that remove phosphorus from the body such as tums       PHYSICAL EXAM  VITAL SIGNS: BP (!) 183/77   Pulse 62   Temp 35.8 °C (96.5 °F)   Resp 18   Ht 1.473 m (4' 10\")   Wt 64.6 kg (142 lb 6.7 oz)   LMP 01/01/1995   SpO2 100%   Breastfeeding? No   BMI 29.77 kg/m²       Constitutional: Patient appears chronically ill  HENT: Normocephalic, Atraumatic, Bilateral external ears normal, Oropharynx " moist, No oral exudates, Nose normal.   Eyes: PERRLA, EOMI, Conjunctiva normal, No discharge.   Neck: Normal range of motion, No tenderness, Supple, No stridor.   Cardiovascular: Normal heart rate, Normal rhythm, No murmurs, No rubs, No gallops.   Thorax & Lungs: Normal breath sounds, No respiratory distress, No wheezing, No chest tenderness.   Abdomen: Bowel sounds normal, Soft, No tenderness, No masses, No pulsatile masses.   Skin: Warm, Dry, No erythema, No rash.   Back: No tenderness, No CVA tenderness.   Extremities: Intact distal pulses, left upper extremity has an AV fistula with a good thrill  Musculoskeletal: Good range of motion in all major joints. No tenderness to palpation or major deformities noted.   Neurologic: Alert & oriented x 3, Normal motor function, Normal sensory function, No focal deficits noted.   Psychiatric: Anxious.     Results for orders placed or performed during the hospital encounter of 06/27/18   COD (ADULT)   Result Value Ref Range    ABO Grouping Only AB     Rh Grouping Only POS     Antibody Screen-Cod NEG     Component R       RI3                 RedBloodCellsIRR    [id number]   issued       06/27/18   14:49      Product Type Red Blood Cells IRR LR  AS-3     Dispense Status Issued     Unit Number (Barcoded) [id number]     Product Code (Barcoded) S5549Y13     Blood Type (Barcoded) 6200     Component R       RI3                 RedBloodCellsIRR    [id number]   issued       06/27/18   14:49      Product Type Red Blood Cells IRR LR  AS-3     Dispense Status Issued     Unit Number (Barcoded) [id number]     Product Code (Barcoded) S4704W89     Blood Type (Barcoded) 6200    CBC WITH DIFFERENTIAL   Result Value Ref Range    WBC 6.2 4.8 - 10.8 K/uL    RBC 2.02 (L) 4.20 - 5.40 M/uL    Hemoglobin 7.7 (L) 12.0 - 16.0 g/dL    Hematocrit 23.9 (L) 37.0 - 47.0 %    .3 (H) 81.4 - 97.8 fL    MCH 38.1 (H) 27.0 - 33.0 pg    MCHC 32.2 (L) 33.6 - 35.0 g/dL    RDW 63.7 (H) 35.9 - 50.0  fL    Platelet Count 123 (L) 164 - 446 K/uL    Neutrophils-Polys 64.20 44.00 - 72.00 %    Lymphocytes 20.00 (L) 22.00 - 41.00 %    Monocytes 9.60 0.00 - 13.40 %    Eosinophils 5.00 0.00 - 6.90 %    Basophils 0.70 0.00 - 1.80 %    Immature Granulocytes 0.50 0.00 - 0.90 %    Nucleated RBC 0.00 /100 WBC    Neutrophils (Absolute) 3.95 2.00 - 7.15 K/uL    Lymphs (Absolute) 1.23 1.00 - 4.80 K/uL    Monos (Absolute) 0.59 0.00 - 0.85 K/uL    Eos (Absolute) 0.31 0.00 - 0.51 K/uL    Baso (Absolute) 0.04 0.00 - 0.12 K/uL    Immature Granulocytes (abs) 0.03 0.00 - 0.11 K/uL    NRBC (Absolute) 0.00 K/uL   COMP METABOLIC PANEL   Result Value Ref Range    Sodium 136 135 - 145 mmol/L    Potassium 6.0 (H) 3.6 - 5.5 mmol/L    Chloride 99 96 - 112 mmol/L    Co2 22 20 - 33 mmol/L    Anion Gap 15.0 (H) 0.0 - 11.9    Glucose 253 (H) 65 - 99 mg/dL    Bun 64 (H) 8 - 22 mg/dL    Creatinine 6.87 (HH) 0.50 - 1.40 mg/dL    Calcium 7.6 (L) 8.5 - 10.5 mg/dL    AST(SGOT) 18 12 - 45 U/L    ALT(SGPT) 18 2 - 50 U/L    Alkaline Phosphatase 63 30 - 99 U/L    Total Bilirubin 0.5 0.1 - 1.5 mg/dL    Albumin 3.9 3.2 - 4.9 g/dL    Total Protein 7.0 6.0 - 8.2 g/dL    Globulin 3.1 1.9 - 3.5 g/dL    A-G Ratio 1.3 g/dL   TROPONIN   Result Value Ref Range    Troponin I 0.01 0.00 - 0.04 ng/mL   PROTHROMBIN TIME   Result Value Ref Range    PT 12.9 12.0 - 14.6 sec    INR 1.00 0.87 - 1.13   APTT   Result Value Ref Range    APTT 30.1 24.7 - 36.0 sec   BTYPE NATRIURETIC PEPTIDE   Result Value Ref Range    B Natriuretic Peptide 331 (H) 0 - 100 pg/mL   ESTIMATED GFR   Result Value Ref Range    GFR If  7 (A) >60 mL/min/1.73 m 2    GFR If Non African American 6 (A) >60 mL/min/1.73 m 2   EKG (ER)   Result Value Ref Range    Report       [facility] Emergency Dept.    Test Date:  2018-06-27  Pt Name:    JESUS SILVEIRA              Department: ER  MRN:        [id number]                      Room:       RD 07  Gender:     Female                        Technician: [id number]  :        1955                   Requested By:DANIEL POWELL  Order #:    [id number]                    Reading MD:    Measurements  Intervals                                Axis  Rate:       61                           P:          -4  AL:         180                          QRS:        0  QRSD:       88                           T:          49  QT:         492  QTc:        496    Interpretive Statements  SINUS RHYTHM  CONSIDER LEFT VENTRICULAR HYPERTROPHY  BORDERLINE PROLONGED QT INTERVAL  Compared to ECG 2018 13:58:20  No significant changes        COURSE & MEDICAL DECISION MAKING  Pertinent Labs & Imaging studies reviewed. (See chart for details)  Patient presents here essentially for admission. I consulted nephrology with Dr. Wong. He will arrange dialysis for the patient. She will likely also need a unit of blood. She'll be admitted to the dialysis floor for hemodialysis therapy as well as blood transfusion    FINAL IMPRESSION  1. Symptomatic anemia  2. End stage renal disease         Electronically signed by: Daniel Powell, 2018 11:30 AM

## 2018-06-27 NOTE — ED NOTES
Report called to CDU RN.  Discussed with RN no IV access and RN discussed with Admitting MD and Edenilson for transfer to floor and MD to attempt for IV access.

## 2018-06-27 NOTE — H&P
Hospital Medicine History and Physical    Date of Service  6/27/2018    Chief Complaint  Chief Complaint   Patient presents with   • Sent by MD   • Fatigue   • Dizziness       History of Presenting Illness  62 y.o. female who presented 6/27/2018 with symptomatic anemia.  The patient has complex medical history which includes end-stage renal disease on intermittent hemodialysis every Monday, Wednesday and Friday.  In addition she has underlying myelodysplastic syndrome, she is followed by Dr. Avila from hematology and she is transfusion dependent requiring previous hospitalization for transfusions.  In addition she has underlying malignant hypertension, glaucoma, type 2 diabetes mellitus which has been complicated by end-stage renal disease, neuropathy and she is legally blind with underlying glaucoma and diabetic retinopathy.  She also has a history of underlying paroxysmal atrial fibrillation but is not on anticoagulation.  She also has a history of coronary artery disease and a prior history of a stroke.  She is followed by Dr. Qureshi from nephrology team.  She was seen in the outpatient setting by a nurse practitioner from her nephrology team, [facility] nephrology group.  She was advised to coming to the hospital where she can receive transfusion and subsequent dialysis.  He subsequently presented to the emergency room.  It was difficult to establish IV access in the emergency room.  We will consulted by the emergency room team for inpatient observation needs for this patient.  Patient has been subsequently admitted to the CDU.  Upon evaluation by me patient reports that she has chronic debility.  She has a motorized wheelchair.  Recently she has been more weak than usual and she reports that this happens whenever she is more anemic.  Otherwise she denies any headaches, fever or chills.  No chest pain.  No shortness of breath.  No melena or bright red blood per rectum.  No hematuria.  No evidence of acute  bleeding.  No other complaints per patient at this time.    Primary Care Physician  Nyla Nava M.D.    Consultants  Nephrology - Dr Qureshi    Code Status  FULL CODE     Review of Systems  Review of Systems   Constitutional: Positive for malaise/fatigue. Negative for chills, diaphoresis, fever and weight loss.   HENT: Negative for hearing loss and tinnitus.    Eyes: Negative for blurred vision and double vision.   Respiratory: Negative for cough, hemoptysis, sputum production, shortness of breath and wheezing.    Cardiovascular: Negative for chest pain, palpitations, orthopnea, claudication, leg swelling and PND.   Gastrointestinal: Negative for abdominal pain, blood in stool, constipation, diarrhea, heartburn, melena, nausea and vomiting.   Genitourinary: Negative for dysuria, flank pain, frequency, hematuria and urgency.   Musculoskeletal: Negative for back pain, falls, joint pain, myalgias and neck pain.   Skin: Negative for itching and rash.   Neurological: Positive for weakness. Negative for dizziness, tingling, tremors, sensory change, speech change, focal weakness, seizures, loss of consciousness and headaches.   Psychiatric/Behavioral: Negative for depression and suicidal ideas.        Past Medical History  Past Medical History:   Diagnosis Date   • MDS (myelodysplastic syndrome) 10/2016    bone marrow biopsy   • Stroke (HCC) 03/2015    No residual weakness/problems   • Arthritis     hands    • Atrial fibrillation (HCC)     HX   • Blood transfusion without reported diagnosis    • Breath shortness     w/exertion   • Cancer (HCC)     MDS in bones   • Cataract     bilat IOL   • Chronic anemia    • Chronic kidney disease        • Chronic obstructive pulmonary disease (HCC)    • Congestive heart failure (HCC)    • Dental disorder     full dentures   • Diabetes     Diet controlled   • Dialysis patient     [facility] ,   [facility] in [place]   • Glaucoma    • Heart abnormalities    • Hypertension    • Pain  "    \"bones\", generalized, 5/10   • Supplemental oxygen dependent     2 liters       Surgical History  Past Surgical History:   Procedure Laterality Date   • GASTROSCOPY N/A 2018    Procedure: GASTROSCOPY;  Surgeon: Blanca Santos M.D.;  Location: [facility];  Service: Gastroenterology   • BONE MARROW BIOPSY, NDL/TROCAR  2017    Procedure: BONE MARROW BIOPSY, NDL/TROCAR;  Surgeon: Wade Turner M.D.;  Location: ENDOSCOPY [facility];  Service: Orthopedics   • BONE MARROW ASPIRATION  2017    Procedure: BONE MARROW ASPIRATION;  Surgeon: Wade Turner M.D.;  Location: ENDOSCOPY [facility];  Service: Orthopedics   • VEIN LIGATION Left 11/10/2016    Procedure: VEIN LIGATION FOR DISTAL REVASCULARIZATION AND INTERVAL LIGATION OF LEFT ARM DIALYISIS ACCESS (DRIL PROCEDURE);  Surgeon: Ranulfo Jolly M.D.;  Location: [facility];  Service:    • AV FISTULA CREATION Left 2016    Procedure: AV FISTULA CREATION UPPER EXTREMITY;  Surgeon: Ranulfo Jolly M.D.;  Location: [facility];  Service:    • GASTROSCOPY  2015    Procedure: ESOPHAGOGASTRODUODENOSCOPY WITH BIOPSY;  Surgeon: Wing Álvarez M.D.;  Location: [facility];  Service:    • COLONOSCOPY  2015    Procedure: COLONOSCOPY;  Surgeon: Wing Álvarez M.D.;  Location: [facility];  Service:    • GYN SURGERY      hysterectomy   • GYN SURGERY       x 2   • GYN SURGERY      d&C x2   • OTHER      angioplasty/ stents bilat LE   • OTHER ABDOMINAL SURGERY      appendectomy,  x 2, hysterectomy, D & C   • OTHER ABDOMINAL SURGERY      appendectomy   • OTHER CARDIAC SURGERY      Angioplasty  and    • OTHER CARDIAC SURGERY      cardiac angiogram, angioplasty   • RETINAL DETACHMENT REPAIR Right        Medications  No current facility-administered medications on file prior to encounter.      Current Outpatient Prescriptions on File Prior to " Encounter   Medication Sig Dispense Refill   • HYDROcodone-acetaminophen (NORCO) 5-325 MG Tab per tablet Take 1 Tab by mouth every 6 hours as needed.     • carvedilol (COREG) 12.5 MG Tab Take 12.5 mg by mouth 2 times a day, with meals.     • Brimonidine Tartrate-Timolol (COMBIGAN) 0.2-0.5 % Solution Place 1 Drop in both eyes 2 Times a Day.     • pregabalin (LYRICA) 50 MG capsule Take 50 mg by mouth 2 times a day.     • bimatoprost (LUMIGAN) 0.03 % Solution Place 1 Drop in both eyes every evening.         Family History  Family History   Problem Relation Age of Onset   • Hypertension Father    • Hypertension Mother        Social History  Social History   Substance Use Topics   • Smoking status: Never Smoker   • Smokeless tobacco: Never Used   • Alcohol use No       Allergies  Allergies   Allergen Reactions   • Actos [Pioglitazone Hydrochloride] Unspecified     Cause blindness   YNB=8206   • Darvocet [Propoxyphene N-Apap] Vomiting     KLK=3068   • Demerol Vomiting     CEI=6420   • Glucophage [Metformin Hydrochloride] Vomiting     MPA=5870   • Morphine Vomiting     IMR=3749   • Oxycodone Vomiting     RXN=2016   • Pcn [Penicillins] Vomiting     UYP=8848     • Requip Vomiting     RXN=2015   • Simvastatin Unspecified     Leg cramps  JWV=8832   • Sulfa Drugs Rash     RXN=>10 years   • Tramadol Vomiting     DIG=2744   • Trazodone Vomiting     AKC=2085   • Dilaudid [Hydromorphone] Vomiting     Unknown    • Diphenhydramine Vomiting   • Iron      vomiting   • Lenalidomide Vomiting   • Multivitamin      itching   • Naprosyn [Naproxen]    • Other Drug      Any binders that remove phosphorus from the body such as tums        Physical Exam  Laboratory   Hemodynamics  Temp (24hrs), Av.9 °C (96.7 °F), Min:35.8 °C (96.5 °F), Max:36.1 °C (96.9 °F)   Temperature: 35.8 °C (96.5 °F)  Pulse  Av.5  Min: 61  Max: 66 Heart Rate (Monitored): 61  Blood Pressure: (!) 183/77, NIBP: (!) 162/71      Respiratory      Respiration: 18,  Pulse Oximetry: 100 %             Physical Exam   Constitutional: She is oriented to person, place, and time. She appears well-developed and well-nourished. No distress.   HENT:   Head: Normocephalic.   Mouth/Throat: Oropharynx is clear and moist. No oropharyngeal exudate.   Eyes: Pupils are equal, round, and reactive to light. No scleral icterus.   The patient has conjunctival pallor   Neck: No JVD present.   Cardiovascular: Normal rate, regular rhythm and normal heart sounds.  Exam reveals no gallop and no friction rub.    No murmur heard.  Pulmonary/Chest: Breath sounds normal. No stridor. No respiratory distress. She has no wheezes. She has no rales.   Abdominal: Soft. Bowel sounds are normal. She exhibits no distension. There is no tenderness. There is no rebound and no guarding.   Musculoskeletal: Normal range of motion. She exhibits edema (Minimal). She exhibits no tenderness or deformity.   Neurological: She is alert and oriented to person, place, and time. No cranial nerve deficit.   Skin: Skin is warm and dry. She is not diaphoretic. There is pallor.   Psychiatric: She has a normal mood and affect. Her behavior is normal. Judgment and thought content normal.       Recent Labs      06/27/18   1125   WBC  6.2   RBC  2.02*   HEMOGLOBIN  7.7*   HEMATOCRIT  23.9*   MCV  118.3*   MCH  38.1*   MCHC  32.2*   RDW  63.7*   PLATELETCT  123*     Recent Labs      06/27/18   1125   SODIUM  136   POTASSIUM  6.0*   CHLORIDE  99   CO2  22   GLUCOSE  253*   BUN  64*   CREATININE  6.87*   CALCIUM  7.6*     Recent Labs      06/27/18   1125   ALTSGPT  18   ASTSGOT  18   ALKPHOSPHAT  63   TBILIRUBIN  0.5   GLUCOSE  253*     Recent Labs      06/27/18   1243   APTT  30.1   INR  1.00     Recent Labs      06/27/18   1125   BNPBTYPENAT  331*         Lab Results   Component Value Date    TROPONINI 0.01 06/27/2018     Urinalysis:    Lab Results  Component Value Date/Time   SPECGRAVITY 1.008 03/20/2017 0439   GLUCOSEUR 300 (A)  03/20/2017 0439   KETONES Negative 03/20/2017 0439   NITRITE Negative 03/20/2017 0439   WBCURINE 20-50 (A) 03/20/2017 0439   RBCURINE 2-5 (A) 03/20/2017 0439   BACTERIA Few (A) 03/20/2017 0439   EPITHELCELL Few 03/20/2017 0439        Imaging  Reviewed   Assessment/Plan     I anticipate this patient is appropriate for observation status at this time.    Symptomatic anemia- (present on admission)   Assessment & Plan    Symptomatic with weakness, acute on chronic debility  Related to anemia of renal disease with underlying myelodysplastic syndrome  She is transfusion dependent  We will plan transfusion with 2 units of PRBC today  Discussed above with nephrology team, will provide with dialysis to avoid overload  Dr. Le aware of above from nephrology team, I personally discussed the case with them  Patient will maintain outpatient follow-up with hematology/oncology-Dr. Avila        Hyperkalemia- (present on admission)   Assessment & Plan    Patient to undergo dialysis today  Continue telemetry monitor  EKG obtained today and personally reviewed by me does not reveal any acute concerns  No needs for reversal at this point in time medically        ESRD (end stage renal disease) on dialysis (HCC)- (present on admission)   Assessment & Plan    Discussed the case with Dr. Le, she will evaluate the patient  Plan for dialysis today  Patient undergoes Monday, Wednesday and Friday dialysis  She will get 2 units of blood transfusion with dialysis  Discussed above with Dr. Le and she will be arranging for above  I have placed orders for blood transfusion        Debility- (present on admission)   Assessment & Plan    Chronic  Acute component likely anemia related  We will provide blood transfusion        Glaucoma- (present on admission)   Assessment & Plan    Continue outpatient regimen        Paroxysmal atrial fibrillation (HCC)- (present on admission)   Assessment & Plan    Patient will maintain  outpatient follow-up for this including recommendations for anticoagulation        COPD (chronic obstructive pulmonary disease) (HCC)- (present on admission)   Assessment & Plan    Without acute exacerbation        Essential hypertension, malignant- (present on admission)   Assessment & Plan    Continue at home regimen, anticipate improvement with dialysis today        Type 2 diabetes mellitus with diabetic polyneuropathy, without long-term current use of insulin (HCC)- (present on admission)   Assessment & Plan    Diet-controlled  Patient will maintain outpatient follow-up with PCP for management        MDS (myelodysplastic syndrome) (HCC)- (present on admission)   Assessment & Plan    Patient will maintain outpatient follow-up with Dr. Avila  She is transfusion dependent            VTE prophylaxis: SCD.

## 2018-06-27 NOTE — ASSESSMENT & PLAN NOTE
Patient to undergo dialysis today  Continue telemetry monitor  EKG obtained today and personally reviewed by me does not reveal any acute concerns  No needs for reversal at this point in time medically

## 2018-06-27 NOTE — PROGRESS NOTES
Report received, pt care assumed. Pt arrive to floor. PT ambulate to bed with steady gait. MD Edu to pt bedside.

## 2018-06-27 NOTE — ED TRIAGE NOTES
61 y/o female bib wheelchair after being sent by her doctor, pt states she needs admission, blood transfusion and dialysis. Pt has history of same in the past. She c/o dizziness, generalized fatigue and weakness.

## 2018-06-27 NOTE — DISCHARGE PLANNING
Outpatient Dialysis Note    Confirmed patient is active at:    [facility]  [address] [place] [address]      Schedule: Monday, Wednesday, Friday  Time: 10:30 am    Spoke with Meagan at facility who confirmed.    Forwarded records for review.    Dialysis Coordinator, [facility]  Stacia Stephens [phone]

## 2018-06-27 NOTE — CONSULTS
DATE OF SERVICE:  06/27/2018    REASON FOR CONSULTATION:  End-stage renal failure, on chronic hemodialysis.    HISTORY OF PRESENT ILLNESS:  The patient is a 62-year-old lady who presents to   the emergency room today complaining of weakness and fatigue.  She was   apparently told to come here by her dialysis unit to have transfusion.  She is   admitted for dialysis and transfusion as she has missed her dialysis   treatment for today at the outpatient clinic.    PAST MEDICAL HISTORY:  1.  End-stage renal failure, on chronic maintenance hemodialysis.  2.  Myelodysplastic syndrome.  3.  Arthritis.  4.  Atrial fibrillation.  5.  COPD.  6.  Congestive heart failure.  7.  Diabetes mellitus.  8.  Hypertension.    MEDICATIONS:  Norco, carvedilol, Combigan, Lyrica, Lumigan.    PHYSICAL EXAMINATION:  GENERAL:  She is a pleasant lady, in no distress.  VITAL SIGNS:  Blood pressure is 170/49, pulse is 66, temperature is 36.1.  HEENT:  Examination is atraumatic and normocephalic.  Pupils are equal, round,   reactive to light and accommodation.  Extraocular movements are full.  NECK:  Supple.  CHEST:  Clear to auscultation and percussion.  HEART:  Reveals normal S1 and S2, without murmurs, gallops or rubs.  ABDOMEN:  Soft, nontender without organomegaly or mass.  EXTREMITIES:  Have no edema.  She has left upper arm AV fistula with a good   thrill.    LABORATORY DATA:  Hemoglobin 7.7, white count 6.2, platelet count 123,000.    Sodium 136, potassium 6, chloride 99, CO2 of 22, BUN 64, creatinine 6.87.    IMPRESSION:  1.  End-stage renal failure, on chronic maintenance hemodialysis.  2.  Marked anemia.  3.  Myelodysplastic syndrome.  4.  Diabetes mellitus.  5.  Hypertension.    PLAN:  We will arrange for dialysis today with 2 units packed cells.    Thank you very much for this consult.       ____________________________________     MD LIZZY MENDES / RAMONE    DD:  06/27/2018 13:30:52  DT:  06/27/2018 13:44:03    D#:  [id number]   Job#:  [id number]

## 2018-06-28 ENCOUNTER — PATIENT OUTREACH (OUTPATIENT)
Dept: HEALTH INFORMATION MANAGEMENT | Facility: OTHER | Age: 63
End: 2018-06-28

## 2018-06-28 NOTE — PROGRESS NOTES
Bedside report received, pt resting in bed no complaints. Aware of plan of care for BP recheck and discharge this evening. Call light within reach.

## 2018-06-28 NOTE — PROGRESS NOTES
Discharge instructions reviewed and signed with patient. Pt expressed understanding. PIV removed, voided in  BR. Has taxi voucher and instructions in hand.

## 2018-06-28 NOTE — DISCHARGE PLANNING
Medical Social Work     Referral: Transportation     SW received a call from the RN requesting assistance with transportation home to [place] for the pt. SW reviewed the chart and the pt does not have MTM and does not have a ride home. MARYELLEN provided a taxi voucher for the pt. MARYELLEN tubed the taxi voucher to the CDU.     Plan: SW will remain available for pt and family support.

## 2018-06-28 NOTE — PROGRESS NOTES
Hd treatment ordered by Dr Wong started at 1448 and ended at 1751 with net uf of 2680 ml as bp tolerated. Hypertensive pre hd, entire treatment and post treatment. Primary RN came down and gave bp medication which decreased slightly while on hd. Gave report to ARIEL Wyman post treatment. No bleeding at access sites.

## 2018-06-28 NOTE — PROGRESS NOTES
Spoke with Andrew from [email or web address] cab, 20-25 minutes until w/c accessible taxi arrives. Pt aware.

## 2018-06-28 NOTE — DISCHARGE SUMMARY
Discharge Summary    CHIEF COMPLAINT ON ADMISSION  Chief Complaint   Patient presents with   • Sent by MD   • Fatigue   • Dizziness       Reason for Admission  SENT BY MD ABNORMAL LABS     Admission Date  6/27/2018    CODE STATUS  Full Code    HPI & HOSPITAL COURSE  This is a 62 y.o. female here with symptomatic anemia.  Underlying history of end-stage renal disease on intermittent hemodialysis every Monday, Wednesday and Friday followed by Dr. Qureshi from nephrology.  Underlying history of myelodysplastic syndrome, followed by . she is from hematology with transfusion dependence. In addition she has underlying malignant hypertension, glaucoma, type 2 diabetes mellitus which has been complicated by end-stage renal disease, neuropathy and she is legally blind with underlying glaucoma and diabetic retinopathy.  She also has a history of underlying paroxysmal atrial fibrillation but is not on anticoagulation.  She also has a history of coronary artery disease and a prior history of a stroke.  She is followed by Dr. Qureshi from nephrology team.  She was seen in the outpatient setting by a nurse practitioner from her nephrology team, [facility] nephrology group.  She was advised to coming to the hospital where she can receive transfusion and subsequent dialysis.  She subsequently presented to the emergency room.  It was difficult to establish IV access in the emergency room.  Hospital medicine consulted by the emergency room team for inpatient observation needs for this patient.  Patient was subsequently admitted to the CDU.  She received 2 units of blood with her dialysis today.  She was subsequently dialyzed.  She was subsequently noted to have uncontrolled hypertension which improved with her baseline regimen of carvedilol increasing her baseline regimen of losartan to 50 mg for which a new prescription has been provided.  She is advised close outpatient follow-up with her primary care physician and  nephrologist for ongoing monitoring of her blood pressure control.  In addition she will need ongoing monitoring of her hemoglobin by her primary care physician and her hematologist.  She has history of paroxysmal atrial fibrillation she will need to discuss anticoagulation needs with the primary care physician.  Patient was eager for discharge at this point in time.  Voucher for taxi provided.  Patient discharged home in stable condition with plans to maintain close outpatient follow-up       Therefore, she is discharged in good and stable condition to home with close outpatient follow-up.    Discharge Date  6/27/2018    FOLLOW UP ITEMS POST DISCHARGE  Close f/u nephrology, hematology and PCP    DISCHARGE DIAGNOSES  Active Problems:    Symptomatic anemia POA: Yes    ESRD (end stage renal disease) on dialysis ([facility]) POA: Yes    Hyperkalemia POA: Yes      Overview: Secondary to end-stage renal disease. Treatment with hemodialysis      Repeat BMP today later      Monitor on telemetry    Essential hypertension, malignant POA: Yes    MDS (myelodysplastic syndrome) ([facility]) POA: Yes      Overview: - Transfusion dependent    Type 2 diabetes mellitus with diabetic polyneuropathy, without long-term current use of insulin ([facility]) POA: Yes    COPD (chronic obstructive pulmonary disease) ([facility]) POA: Yes    Paroxysmal atrial fibrillation ([facility]) POA: Yes    Glaucoma POA: Yes    Debility POA: Yes  Resolved Problems:    * No resolved hospital problems. *      FOLLOW UP  Future Appointments  Date Time Provider Department Center   8/2/2018 10:00 AM Germania Alberts M.D. [facility] None     No follow-up provider specified.    MEDICATIONS ON DISCHARGE     Medication List      CHANGE how you take these medications      Instructions   losartan 50 MG Tabs  What changed:  · medication strength  · how much to take  Commonly known as:  COZAAR   Take 1 Tab by mouth every day.  Dose:  50 mg        CONTINUE taking these medications      Instructions    carvedilol 12.5 MG Tabs  Commonly known as:  COREG   Take 12.5 mg by mouth 2 times a day, with meals.  Dose:  12.5 mg     COMBIGAN 0.2-0.5 % Soln  Generic drug:  Brimonidine Tartrate-Timolol   Place 1 Drop in both eyes 2 Times a Day.  Dose:  1 Drop     cyclobenzaprine 5 MG tablet  Commonly known as:  FLEXERIL   Take 5 mg by mouth 3 times a day as needed.  Dose:  5 mg     HYDROcodone-acetaminophen 5-325 MG Tabs per tablet  Commonly known as:  NORCO   Take 1 Tab by mouth every 6 hours as needed.  Dose:  1 Tab     LUMIGAN 0.03 % Soln  Generic drug:  bimatoprost   Place 1 Drop in both eyes every evening.  Dose:  1 Drop     LYRICA 50 MG capsule  Generic drug:  pregabalin   Take 50 mg by mouth 2 times a day.  Dose:  50 mg            Allergies  Allergies   Allergen Reactions   • Actos [Pioglitazone Hydrochloride] Unspecified     Cause blindness   XAL=1870   • Darvocet [Propoxyphene N-Apap] Vomiting     AXB=1836   • Demerol Vomiting     PCH=9630   • Glucophage [Metformin Hydrochloride] Vomiting     LKA=6036   • Morphine Vomiting     FFD=0130   • Oxycodone Vomiting     RXN=1/2016   • Pcn [Penicillins] Vomiting     DMR=2357     • Requip Vomiting     RXN=12/2015   • Simvastatin Unspecified     Leg cramps  INR=4148   • Sulfa Drugs Rash     RXN=>10 years   • Tramadol Vomiting     JHA=5186   • Trazodone Vomiting     RQL=9924   • Dilaudid [Hydromorphone] Vomiting     Unknown    • Diphenhydramine Vomiting   • Iron      vomiting   • Lenalidomide Vomiting   • Multivitamin      itching   • Naprosyn [Naproxen]    • Other Drug      Any binders that remove phosphorus from the body such as tums       DIET  Orders Placed This Encounter   Procedures   • Diet Order Renal     Standing Status:   Standing     Number of Occurrences:   1     Order Specific Question:   Diet:     Answer:   Renal [8]       ACTIVITY  As tolerated.  Weight bearing as tolerated    CONSULTATIONS  Nephrology     PROCEDURES  None    LABORATORY  Lab Results   Component  Value Date    SODIUM 136 06/27/2018    POTASSIUM 6.0 (H) 06/27/2018    CHLORIDE 99 06/27/2018    CO2 22 06/27/2018    GLUCOSE 253 (H) 06/27/2018    BUN 64 (H) 06/27/2018    CREATININE 6.87 (HH) 06/27/2018    CREATININE 0.6 07/20/2007        Lab Results   Component Value Date    WBC 6.2 06/27/2018    HEMOGLOBIN 9.1 (L) 06/27/2018    HEMATOCRIT 27.2 (L) 06/27/2018    PLATELETCT 123 (L) 06/27/2018      Time spent on admission and discharge 75 minutes today which includes evaluation, admission needs, care coordination and discharge needs.

## 2018-06-28 NOTE — DISCHARGE INSTRUCTIONS
Discharge Instructions    Discharged to home by wheelchair accessible taxi with self. Discharged via wheelchair, hospital escort: Yes.  Special equipment needed: Oxygen and Wheelchair    Be sure to schedule a follow-up appointment with your primary care doctor or any specialists as instructed.     Discharge Plan:   Influenza Vaccine Indication: Not indicated: Previously immunized this influenza season and > 8 years of age    I understand that a diet low in cholesterol, fat, and sodium is recommended for good health. Unless I have been given specific instructions below for another diet, I accept this instruction as my diet prescription.   Other diet: Renal    Special Instructions:    Follow-up with your primary care physician within 1 week of hospital discharge.  Have your primary care physician obtain blood counts in 1 week of hospital discharge.  Increase your losartan to 50 mg daily.  Prescription for this has been sent to your pharmacy.  Discussed your blood pressure control with your kidney doctor Dr. Qureshi and your primary care physician.      · Is patient discharged on Warfarin / Coumadin?   No     Depression / Suicide Risk    As you are discharged from this [facility] facility, it is important to learn how to keep safe from harming yourself.    Recognize the warning signs:  · Abrupt changes in personality, positive or negative- including increase in energy   · Giving away possessions  · Change in eating patterns- significant weight changes-  positive or negative  · Change in sleeping patterns- unable to sleep or sleeping all the time   · Unwillingness or inability to communicate  · Depression  · Unusual sadness, discouragement and loneliness  · Talk of wanting to die  · Neglect of personal appearance   · Rebelliousness- reckless behavior  · Withdrawal from people/activities they love  · Confusion- inability to concentrate     If you or a loved one observes any of these behaviors or has concerns about  self-harm, here's what you can do:  · Talk about it- your feelings and reasons for harming yourself  · Remove any means that you might use to hurt yourself (examples: pills, rope, extension cords, firearm)  · Get professional help from the community (Mental Health, Substance Abuse, psychological counseling)  · Do not be alone:Call your Safe Contact- someone whom you trust who will be there for you.  · Call your local CRISIS HOTLINE 859-4863 or 954-342-4009  · Call your local Children's Mobile Crisis Response Team [place] (860) 311-3510 or www.EchoPixel  · Call the toll free National Suicide Prevention Hotlines   · National Suicide Prevention Lifeline 382-328-JDNP (7161)  · National Hope Line Network 800-SUICIDE (242-7240)

## 2018-06-28 NOTE — PROGRESS NOTES
Called SW for transportation for discharge, will tube taxi voucher for wheelchair accessible taxi.

## 2018-07-09 NOTE — ED NOTES
Call light in reach, no needs at this time, pt watching TV.    Refill request. Last office visit 6/11/2018. Please advise.

## 2018-08-09 ENCOUNTER — OUTPATIENT INFUSION SERVICES (OUTPATIENT)
Dept: ONCOLOGY | Facility: MEDICAL CENTER | Age: 63
End: 2018-08-09
Attending: NURSE PRACTITIONER
Payer: MEDICARE

## 2018-08-09 VITALS
SYSTOLIC BLOOD PRESSURE: 146 MMHG | RESPIRATION RATE: 18 BRPM | DIASTOLIC BLOOD PRESSURE: 51 MMHG | BODY MASS INDEX: 29.96 KG/M2 | HEART RATE: 69 BPM | HEIGHT: 57 IN | TEMPERATURE: 97.7 F | OXYGEN SATURATION: 99 % | WEIGHT: 138.89 LBS

## 2018-08-09 DIAGNOSIS — D64.9 ANEMIA REQUIRING TRANSFUSIONS: ICD-10-CM

## 2018-08-09 LAB
ABO GROUP BLD: NORMAL
ANISOCYTOSIS BLD QL SMEAR: ABNORMAL
BARCODED ABORH UBTYP: 6200
BARCODED PRD CODE UBPRD: NORMAL
BARCODED UNIT NUM UBUNT: NORMAL
BASOPHILS # BLD AUTO: 1 % (ref 0–1.8)
BASOPHILS # BLD: 0.05 K/UL (ref 0–0.12)
BLD GP AB SCN SERPL QL: NORMAL
COMPONENT R 8504R: NORMAL
EOSINOPHIL # BLD AUTO: 0.09 K/UL (ref 0–0.51)
EOSINOPHIL NFR BLD: 2 % (ref 0–6.9)
ERYTHROCYTE [DISTWIDTH] IN BLOOD BY AUTOMATED COUNT: 70 FL (ref 35.9–50)
HCT VFR BLD AUTO: 22.8 % (ref 37–47)
HGB BLD-MCNC: 7.6 G/DL (ref 12–16)
LG PLATELETS BLD QL SMEAR: NORMAL
LYMPHOCYTES # BLD AUTO: 0.66 K/UL (ref 1–4.8)
LYMPHOCYTES NFR BLD: 14.7 % (ref 22–41)
MACROCYTES BLD QL SMEAR: ABNORMAL
MANUAL DIFF BLD: NORMAL
MCH RBC QN AUTO: 36.4 PG (ref 27–33)
MCHC RBC AUTO-ENTMCNC: 33.3 G/DL (ref 33.6–35)
MCV RBC AUTO: 109.1 FL (ref 81.4–97.8)
MONOCYTES # BLD AUTO: 0.44 K/UL (ref 0–0.85)
MONOCYTES NFR BLD AUTO: 9.8 % (ref 0–13.4)
MORPHOLOGY BLD-IMP: NORMAL
NEUTROPHILS # BLD AUTO: 3.26 K/UL (ref 2–7.15)
NEUTROPHILS NFR BLD: 68.6 % (ref 44–72)
NEUTS BAND NFR BLD MANUAL: 3.9 % (ref 0–10)
NRBC # BLD AUTO: 0 K/UL
NRBC BLD-RTO: 0 /100 WBC
OVALOCYTES BLD QL SMEAR: NORMAL
PLATELET # BLD AUTO: 146 K/UL (ref 164–446)
PLATELET BLD QL SMEAR: NORMAL
PMV BLD AUTO: 14.2 FL (ref 9–12.9)
POIKILOCYTOSIS BLD QL SMEAR: NORMAL
PRODUCT TYPE UPROD: NORMAL
RBC # BLD AUTO: 2.09 M/UL (ref 4.2–5.4)
RBC BLD AUTO: PRESENT
RH BLD: NORMAL
UNIT STATUS USTAT: NORMAL
WBC # BLD AUTO: 4.5 K/UL (ref 4.8–10.8)

## 2018-08-09 PROCEDURE — 36415 COLL VENOUS BLD VENIPUNCTURE: CPT

## 2018-08-09 PROCEDURE — 85027 COMPLETE CBC AUTOMATED: CPT

## 2018-08-09 PROCEDURE — 86644 CMV ANTIBODY: CPT

## 2018-08-09 PROCEDURE — 86923 COMPATIBILITY TEST ELECTRIC: CPT

## 2018-08-09 PROCEDURE — 36430 TRANSFUSION BLD/BLD COMPNT: CPT

## 2018-08-09 PROCEDURE — P9016 RBC LEUKOCYTES REDUCED: HCPCS

## 2018-08-09 PROCEDURE — 86850 RBC ANTIBODY SCREEN: CPT

## 2018-08-09 PROCEDURE — 86900 BLOOD TYPING SEROLOGIC ABO: CPT

## 2018-08-09 PROCEDURE — 85007 BL SMEAR W/DIFF WBC COUNT: CPT

## 2018-08-09 PROCEDURE — 86901 BLOOD TYPING SEROLOGIC RH(D): CPT

## 2018-08-09 PROCEDURE — 306780 HCHG STAT FOR TRANSFUSION PER CASE

## 2018-08-09 ASSESSMENT — PAIN SCALES - GENERAL: PAINLEVEL_OUTOF10: 5

## 2018-08-09 NOTE — LETTER
Infusion Services   [address] [place] [address]  [place] [address]  Phone: [phone]  Fax: [phone]              Dear provider,    Your patient, Vielka Briscoe (: 1955), was scheduled at [facility] for 1 unit PRBCs. Per Dr Fish, Vielka should always receive irradiated, CMV negative blood products.  Please indicate these special requirements on any future orders.  Thank you.    Sincerely,  Barby Pickard R.N.

## 2018-08-09 NOTE — PROGRESS NOTES
Pt presents for labs and 1 unit PRBCs.  PIV started with brisk blood return in right AC.  Hgb 7.6 today, within parameters for treatment.  Per Dr. Avila, pt is to receive irradiated and CMV negative blood products.  Blood consents signed.  Blood transfused and tolerated well.  PIV flushed and removed.  No sign of adverse reaction. Pt stable and ambulatory at discharge.

## 2018-08-27 ENCOUNTER — APPOINTMENT (OUTPATIENT)
Dept: RADIOLOGY | Facility: MEDICAL CENTER | Age: 63
DRG: 640 | End: 2018-08-27
Attending: EMERGENCY MEDICINE
Payer: MEDICARE

## 2018-08-27 ENCOUNTER — HOSPITAL ENCOUNTER (INPATIENT)
Facility: MEDICAL CENTER | Age: 63
LOS: 2 days | DRG: 640 | End: 2018-08-29
Attending: EMERGENCY MEDICINE | Admitting: INTERNAL MEDICINE
Payer: MEDICARE

## 2018-08-27 PROBLEM — Z99.2 ESRD (END STAGE RENAL DISEASE) ON DIALYSIS (HCC): Status: ACTIVE | Noted: 2018-08-27

## 2018-08-27 PROBLEM — E11.42 TYPE 2 DIABETES MELLITUS WITH DIABETIC POLYNEUROPATHY, WITHOUT LONG-TERM CURRENT USE OF INSULIN (HCC): Status: ACTIVE | Noted: 2018-08-27

## 2018-08-27 PROBLEM — I16.1 HYPERTENSIVE EMERGENCY: Status: ACTIVE | Noted: 2018-08-27

## 2018-08-27 PROBLEM — E11.9 TYPE 2 DIABETES MELLITUS, WITHOUT LONG-TERM CURRENT USE OF INSULIN (HCC): Status: ACTIVE | Noted: 2018-08-27

## 2018-08-27 PROBLEM — E87.5 HYPERKALEMIA: Status: ACTIVE | Noted: 2018-08-27

## 2018-08-27 PROBLEM — N18.6 ESRD (END STAGE RENAL DISEASE) ON DIALYSIS (HCC): Status: ACTIVE | Noted: 2018-08-27

## 2018-08-27 PROBLEM — D46.9 MDS (MYELODYSPLASTIC SYNDROME) (HCC): Status: ACTIVE | Noted: 2018-08-27

## 2018-08-27 PROBLEM — J44.1 ACUTE EXACERBATION OF CHRONIC OBSTRUCTIVE PULMONARY DISEASE (COPD) (HCC): Status: ACTIVE | Noted: 2018-08-27

## 2018-08-27 LAB
ALBUMIN SERPL BCP-MCNC: 3.9 G/DL (ref 3.2–4.9)
ALBUMIN/GLOB SERPL: 1.3 G/DL
ALP SERPL-CCNC: 70 U/L (ref 30–99)
ALT SERPL-CCNC: 24 U/L (ref 2–50)
ANION GAP SERPL CALC-SCNC: 14 MMOL/L (ref 0–11.9)
ANION GAP SERPL CALC-SCNC: 15 MMOL/L (ref 0–11.9)
ANISOCYTOSIS BLD QL SMEAR: ABNORMAL
AST SERPL-CCNC: 25 U/L (ref 12–45)
BASOPHILS # BLD AUTO: 0 % (ref 0–1.8)
BASOPHILS # BLD: 0 K/UL (ref 0–0.12)
BILIRUB SERPL-MCNC: 0.5 MG/DL (ref 0.1–1.5)
BNP SERPL-MCNC: 368 PG/ML (ref 0–100)
BUN SERPL-MCNC: 100 MG/DL (ref 8–22)
BUN SERPL-MCNC: 98 MG/DL (ref 8–22)
CALCIUM SERPL-MCNC: 8.2 MG/DL (ref 8.5–10.5)
CALCIUM SERPL-MCNC: 8.3 MG/DL (ref 8.5–10.5)
CHLORIDE SERPL-SCNC: 92 MMOL/L (ref 96–112)
CHLORIDE SERPL-SCNC: 92 MMOL/L (ref 96–112)
CHOLEST SERPL-MCNC: 132 MG/DL (ref 100–199)
CK SERPL-CCNC: 69 U/L (ref 0–154)
CK SERPL-CCNC: 72 U/L (ref 0–154)
CO2 SERPL-SCNC: 21 MMOL/L (ref 20–33)
CO2 SERPL-SCNC: 22 MMOL/L (ref 20–33)
CREAT SERPL-MCNC: 7.96 MG/DL (ref 0.5–1.4)
CREAT SERPL-MCNC: 8.27 MG/DL (ref 0.5–1.4)
EKG IMPRESSION: NORMAL
EKG IMPRESSION: NORMAL
EOSINOPHIL # BLD AUTO: 0.16 K/UL (ref 0–0.51)
EOSINOPHIL NFR BLD: 1.9 % (ref 0–6.9)
ERYTHROCYTE [DISTWIDTH] IN BLOOD BY AUTOMATED COUNT: 75.8 FL (ref 35.9–50)
EST. AVERAGE GLUCOSE BLD GHB EST-MCNC: 226 MG/DL
FERRITIN SERPL-MCNC: 2513.5 NG/ML (ref 10–291)
GLOBULIN SER CALC-MCNC: 3 G/DL (ref 1.9–3.5)
GLUCOSE BLD-MCNC: 344 MG/DL (ref 65–99)
GLUCOSE SERPL-MCNC: 345 MG/DL (ref 65–99)
GLUCOSE SERPL-MCNC: 373 MG/DL (ref 65–99)
HAV IGM SERPL QL IA: NEGATIVE
HBA1C MFR BLD: 9.5 % (ref 0–5.6)
HBV CORE IGM SER QL: NEGATIVE
HBV SURFACE AB SERPL IA-ACNC: 16.68 MIU/ML (ref 0–10)
HBV SURFACE AG SER QL: NEGATIVE
HCT VFR BLD AUTO: 29.4 % (ref 37–47)
HCV AB SER QL: NEGATIVE
HDLC SERPL-MCNC: 48 MG/DL
HGB BLD-MCNC: 9.2 G/DL (ref 12–16)
IRON SATN MFR SERPL: 62 % (ref 15–55)
IRON SERPL-MCNC: 183 UG/DL (ref 40–170)
LDLC SERPL CALC-MCNC: 64 MG/DL
LV EJECT FRACT  99904: 75
LV EJECT FRACT MOD 2C 99903: 76.83
LV EJECT FRACT MOD 4C 99902: 82.92
LV EJECT FRACT MOD BP 99901: 82.49
LYMPHOCYTES # BLD AUTO: 1.92 K/UL (ref 1–4.8)
LYMPHOCYTES NFR BLD: 23.1 % (ref 22–41)
MANUAL DIFF BLD: NORMAL
MCH RBC QN AUTO: 35.5 PG (ref 27–33)
MCHC RBC AUTO-ENTMCNC: 31.3 G/DL (ref 33.6–35)
MCV RBC AUTO: 113.5 FL (ref 81.4–97.8)
MICROCYTES BLD QL SMEAR: ABNORMAL
MONOCYTES # BLD AUTO: 0 K/UL (ref 0–0.85)
MONOCYTES NFR BLD AUTO: 0 % (ref 0–13.4)
MORPHOLOGY BLD-IMP: NORMAL
NEUTROPHILS # BLD AUTO: 6.23 K/UL (ref 2–7.15)
NEUTROPHILS NFR BLD: 74.1 % (ref 44–72)
NEUTS BAND NFR BLD MANUAL: 0.9 % (ref 0–10)
NRBC # BLD AUTO: 0 K/UL
NRBC BLD-RTO: 0 /100 WBC
PLATELET # BLD AUTO: ABNORMAL K/UL (ref 164–446)
POTASSIUM SERPL-SCNC: 7.3 MMOL/L (ref 3.6–5.5)
POTASSIUM SERPL-SCNC: 7.5 MMOL/L (ref 3.6–5.5)
PROT SERPL-MCNC: 6.9 G/DL (ref 6–8.2)
RBC # BLD AUTO: 2.59 M/UL (ref 4.2–5.4)
RBC BLD AUTO: PRESENT
SODIUM SERPL-SCNC: 128 MMOL/L (ref 135–145)
SODIUM SERPL-SCNC: 128 MMOL/L (ref 135–145)
TIBC SERPL-MCNC: 295 UG/DL (ref 250–450)
TRIGL SERPL-MCNC: 100 MG/DL (ref 0–149)
TROPONIN I SERPL-MCNC: 0.01 NG/ML (ref 0–0.04)
TROPONIN I SERPL-MCNC: 0.01 NG/ML (ref 0–0.04)
TROPONIN I SERPL-MCNC: 0.02 NG/ML (ref 0–0.04)
WBC # BLD AUTO: 8.3 K/UL (ref 4.8–10.8)

## 2018-08-27 PROCEDURE — 700111 HCHG RX REV CODE 636 W/ 250 OVERRIDE (IP): Performed by: EMERGENCY MEDICINE

## 2018-08-27 PROCEDURE — 99223 1ST HOSP IP/OBS HIGH 75: CPT | Performed by: INTERNAL MEDICINE

## 2018-08-27 PROCEDURE — 93306 TTE W/DOPPLER COMPLETE: CPT | Mod: 26 | Performed by: INTERNAL MEDICINE

## 2018-08-27 PROCEDURE — 82962 GLUCOSE BLOOD TEST: CPT | Mod: 91

## 2018-08-27 PROCEDURE — 36415 COLL VENOUS BLD VENIPUNCTURE: CPT

## 2018-08-27 PROCEDURE — 304561 HCHG STAT O2

## 2018-08-27 PROCEDURE — 96365 THER/PROPH/DIAG IV INF INIT: CPT

## 2018-08-27 PROCEDURE — 85007 BL SMEAR W/DIFF WBC COUNT: CPT

## 2018-08-27 PROCEDURE — 93306 TTE W/DOPPLER COMPLETE: CPT

## 2018-08-27 PROCEDURE — 96368 THER/DIAG CONCURRENT INF: CPT

## 2018-08-27 PROCEDURE — 94760 N-INVAS EAR/PLS OXIMETRY 1: CPT

## 2018-08-27 PROCEDURE — 80061 LIPID PANEL: CPT

## 2018-08-27 PROCEDURE — A9270 NON-COVERED ITEM OR SERVICE: HCPCS | Performed by: INTERNAL MEDICINE

## 2018-08-27 PROCEDURE — 700102 HCHG RX REV CODE 250 W/ 637 OVERRIDE(OP)

## 2018-08-27 PROCEDURE — 700105 HCHG RX REV CODE 258: Performed by: EMERGENCY MEDICINE

## 2018-08-27 PROCEDURE — 84484 ASSAY OF TROPONIN QUANT: CPT

## 2018-08-27 PROCEDURE — 700101 HCHG RX REV CODE 250: Performed by: INTERNAL MEDICINE

## 2018-08-27 PROCEDURE — 5A09357 ASSISTANCE WITH RESPIRATORY VENTILATION, LESS THAN 24 CONSECUTIVE HOURS, CONTINUOUS POSITIVE AIRWAY PRESSURE: ICD-10-PCS | Performed by: EMERGENCY MEDICINE

## 2018-08-27 PROCEDURE — 700105 HCHG RX REV CODE 258: Performed by: INTERNAL MEDICINE

## 2018-08-27 PROCEDURE — 99291 CRITICAL CARE FIRST HOUR: CPT | Performed by: INTERNAL MEDICINE

## 2018-08-27 PROCEDURE — 96366 THER/PROPH/DIAG IV INF ADDON: CPT

## 2018-08-27 PROCEDURE — A9270 NON-COVERED ITEM OR SERVICE: HCPCS | Performed by: EMERGENCY MEDICINE

## 2018-08-27 PROCEDURE — 700111 HCHG RX REV CODE 636 W/ 250 OVERRIDE (IP): Performed by: INTERNAL MEDICINE

## 2018-08-27 PROCEDURE — 93005 ELECTROCARDIOGRAM TRACING: CPT | Performed by: EMERGENCY MEDICINE

## 2018-08-27 PROCEDURE — A9270 NON-COVERED ITEM OR SERVICE: HCPCS

## 2018-08-27 PROCEDURE — 80048 BASIC METABOLIC PNL TOTAL CA: CPT

## 2018-08-27 PROCEDURE — 700102 HCHG RX REV CODE 250 W/ 637 OVERRIDE(OP): Performed by: EMERGENCY MEDICINE

## 2018-08-27 PROCEDURE — 82550 ASSAY OF CK (CPK): CPT

## 2018-08-27 PROCEDURE — 71045 X-RAY EXAM CHEST 1 VIEW: CPT

## 2018-08-27 PROCEDURE — 86706 HEP B SURFACE ANTIBODY: CPT

## 2018-08-27 PROCEDURE — 83540 ASSAY OF IRON: CPT

## 2018-08-27 PROCEDURE — 82728 ASSAY OF FERRITIN: CPT

## 2018-08-27 PROCEDURE — 90935 HEMODIALYSIS ONE EVALUATION: CPT

## 2018-08-27 PROCEDURE — 85027 COMPLETE CBC AUTOMATED: CPT

## 2018-08-27 PROCEDURE — 99292 CRITICAL CARE ADDL 30 MIN: CPT | Performed by: INTERNAL MEDICINE

## 2018-08-27 PROCEDURE — 94002 VENT MGMT INPAT INIT DAY: CPT

## 2018-08-27 PROCEDURE — 94640 AIRWAY INHALATION TREATMENT: CPT

## 2018-08-27 PROCEDURE — 83550 IRON BINDING TEST: CPT

## 2018-08-27 PROCEDURE — 80074 ACUTE HEPATITIS PANEL: CPT

## 2018-08-27 PROCEDURE — 80053 COMPREHEN METABOLIC PANEL: CPT

## 2018-08-27 PROCEDURE — 770022 HCHG ROOM/CARE - ICU (200)

## 2018-08-27 PROCEDURE — 700102 HCHG RX REV CODE 250 W/ 637 OVERRIDE(OP): Performed by: INTERNAL MEDICINE

## 2018-08-27 PROCEDURE — 700101 HCHG RX REV CODE 250: Performed by: EMERGENCY MEDICINE

## 2018-08-27 PROCEDURE — 99291 CRITICAL CARE FIRST HOUR: CPT

## 2018-08-27 PROCEDURE — 700111 HCHG RX REV CODE 636 W/ 250 OVERRIDE (IP): Performed by: HOSPITALIST

## 2018-08-27 PROCEDURE — 83880 ASSAY OF NATRIURETIC PEPTIDE: CPT

## 2018-08-27 PROCEDURE — 5A1D70Z PERFORMANCE OF URINARY FILTRATION, INTERMITTENT, LESS THAN 6 HOURS PER DAY: ICD-10-PCS | Performed by: INTERNAL MEDICINE

## 2018-08-27 PROCEDURE — 83036 HEMOGLOBIN GLYCOSYLATED A1C: CPT

## 2018-08-27 RX ORDER — BIMATOPROST 0.3 MG/ML
1 SOLUTION/ DROPS OPHTHALMIC EVERY EVENING
Status: ON HOLD | COMMUNITY
End: 2018-08-27

## 2018-08-27 RX ORDER — DEXTROSE MONOHYDRATE 25 G/50ML
50 INJECTION, SOLUTION INTRAVENOUS ONCE
Status: COMPLETED | OUTPATIENT
Start: 2018-08-27 | End: 2018-08-27

## 2018-08-27 RX ORDER — NITROGLYCERIN 20 MG/100ML
0-200 INJECTION INTRAVENOUS ONCE
Status: COMPLETED | OUTPATIENT
Start: 2018-08-27 | End: 2018-08-27

## 2018-08-27 RX ORDER — HEPARIN SODIUM 5000 [USP'U]/ML
5000 INJECTION, SOLUTION INTRAVENOUS; SUBCUTANEOUS EVERY 8 HOURS
Status: DISCONTINUED | OUTPATIENT
Start: 2018-08-27 | End: 2018-08-29 | Stop reason: HOSPADM

## 2018-08-27 RX ORDER — CALCIUM GLUCONATE 94 MG/ML
1 INJECTION, SOLUTION INTRAVENOUS ONCE
Status: DISCONTINUED | OUTPATIENT
Start: 2018-08-27 | End: 2018-08-27

## 2018-08-27 RX ORDER — IPRATROPIUM BROMIDE AND ALBUTEROL SULFATE 2.5; .5 MG/3ML; MG/3ML
3 SOLUTION RESPIRATORY (INHALATION)
Status: DISCONTINUED | OUTPATIENT
Start: 2018-08-27 | End: 2018-08-27

## 2018-08-27 RX ORDER — CARVEDILOL 6.25 MG/1
6.25 TABLET ORAL 2 TIMES DAILY WITH MEALS
Status: DISCONTINUED | OUTPATIENT
Start: 2018-08-27 | End: 2018-08-29 | Stop reason: HOSPADM

## 2018-08-27 RX ORDER — BRIMONIDINE TARTRATE AND TIMOLOL MALEATE 2; 5 MG/ML; MG/ML
1 SOLUTION OPHTHALMIC 2 TIMES DAILY
COMMUNITY
End: 2019-02-16

## 2018-08-27 RX ORDER — IPRATROPIUM BROMIDE AND ALBUTEROL SULFATE 2.5; .5 MG/3ML; MG/3ML
3 SOLUTION RESPIRATORY (INHALATION)
Status: DISCONTINUED | OUTPATIENT
Start: 2018-08-27 | End: 2018-08-29 | Stop reason: HOSPADM

## 2018-08-27 RX ORDER — HEPARIN SODIUM 1000 [USP'U]/ML
1500 INJECTION, SOLUTION INTRAVENOUS; SUBCUTANEOUS
Status: DISCONTINUED | OUTPATIENT
Start: 2018-08-27 | End: 2018-08-29 | Stop reason: HOSPADM

## 2018-08-27 RX ORDER — LOSARTAN POTASSIUM 25 MG/1
100 TABLET ORAL
Status: DISCONTINUED | OUTPATIENT
Start: 2018-08-27 | End: 2018-08-27

## 2018-08-27 RX ORDER — BIMATOPROST 0.1 MG/ML
1 SOLUTION/ DROPS OPHTHALMIC
COMMUNITY
End: 2020-06-16

## 2018-08-27 RX ORDER — CARVEDILOL 12.5 MG/1
12.5 TABLET ORAL 2 TIMES DAILY WITH MEALS
Status: DISCONTINUED | OUTPATIENT
Start: 2018-08-27 | End: 2018-08-27

## 2018-08-27 RX ORDER — HYDROCODONE BITARTRATE AND ACETAMINOPHEN 5; 325 MG/1; MG/1
1 TABLET ORAL
COMMUNITY
End: 2019-02-16

## 2018-08-27 RX ORDER — SODIUM POLYSTYRENE SULFONATE 15 G/60ML
30 SUSPENSION ORAL; RECTAL ONCE
Status: COMPLETED | OUTPATIENT
Start: 2018-08-27 | End: 2018-08-27

## 2018-08-27 RX ORDER — LABETALOL HYDROCHLORIDE 5 MG/ML
10 INJECTION, SOLUTION INTRAVENOUS
Status: DISCONTINUED | OUTPATIENT
Start: 2018-08-27 | End: 2018-08-27

## 2018-08-27 RX ORDER — PREGABALIN 25 MG/1
50 CAPSULE ORAL 2 TIMES DAILY
COMMUNITY
End: 2019-02-16

## 2018-08-27 RX ORDER — IPRATROPIUM BROMIDE AND ALBUTEROL SULFATE 2.5; .5 MG/3ML; MG/3ML
SOLUTION RESPIRATORY (INHALATION)
Status: DISPENSED
Start: 2018-08-27 | End: 2018-08-27

## 2018-08-27 RX ORDER — HYDRALAZINE HYDROCHLORIDE 20 MG/ML
20 INJECTION INTRAMUSCULAR; INTRAVENOUS EVERY 6 HOURS PRN
Status: DISCONTINUED | OUTPATIENT
Start: 2018-08-27 | End: 2018-08-29 | Stop reason: HOSPADM

## 2018-08-27 RX ORDER — CYCLOBENZAPRINE HCL 10 MG
10 TABLET ORAL 3 TIMES DAILY PRN
COMMUNITY
End: 2019-02-16

## 2018-08-27 RX ORDER — LOSARTAN POTASSIUM 50 MG/1
50 TABLET ORAL EVERY EVENING
COMMUNITY
End: 2019-02-16

## 2018-08-27 RX ORDER — NITROGLYCERIN 0.4 MG/1
TABLET SUBLINGUAL
Status: COMPLETED
Start: 2018-08-27 | End: 2018-08-27

## 2018-08-27 RX ORDER — NITROGLYCERIN 0.4 MG/1
0.4 TABLET SUBLINGUAL ONCE
Status: COMPLETED | OUTPATIENT
Start: 2018-08-27 | End: 2018-08-27

## 2018-08-27 RX ORDER — AMLODIPINE BESYLATE 10 MG/1
10 TABLET ORAL
Status: DISCONTINUED | OUTPATIENT
Start: 2018-08-27 | End: 2018-08-29 | Stop reason: HOSPADM

## 2018-08-27 RX ORDER — AMLODIPINE BESYLATE 10 MG/1
10 TABLET ORAL DAILY
COMMUNITY
End: 2019-02-16

## 2018-08-27 RX ORDER — CARVEDILOL 12.5 MG/1
12.5 TABLET ORAL 2 TIMES DAILY
COMMUNITY
End: 2019-02-16

## 2018-08-27 RX ORDER — SODIUM POLYSTYRENE SULFONATE 15 G/60ML
15 SUSPENSION ORAL; RECTAL ONCE
Status: DISCONTINUED | OUTPATIENT
Start: 2018-08-27 | End: 2018-08-27

## 2018-08-27 RX ORDER — DEXTROSE MONOHYDRATE 25 G/50ML
25 INJECTION, SOLUTION INTRAVENOUS
Status: DISCONTINUED | OUTPATIENT
Start: 2018-08-27 | End: 2018-08-29 | Stop reason: HOSPADM

## 2018-08-27 RX ORDER — BRINZOLAMIDE 10 MG/ML
1 SUSPENSION/ DROPS OPHTHALMIC 2 TIMES DAILY
COMMUNITY

## 2018-08-27 RX ORDER — METOCLOPRAMIDE HYDROCHLORIDE 5 MG/ML
10 INJECTION INTRAMUSCULAR; INTRAVENOUS EVERY 6 HOURS PRN
Status: DISCONTINUED | OUTPATIENT
Start: 2018-08-27 | End: 2018-08-27

## 2018-08-27 RX ORDER — LABETALOL HYDROCHLORIDE 5 MG/ML
10 INJECTION, SOLUTION INTRAVENOUS EVERY 4 HOURS PRN
Status: DISCONTINUED | OUTPATIENT
Start: 2018-08-27 | End: 2018-08-29 | Stop reason: HOSPADM

## 2018-08-27 RX ADMIN — CALCIUM GLUCONATE 1 G: 94 INJECTION, SOLUTION INTRAVENOUS at 04:18

## 2018-08-27 RX ADMIN — PROCHLORPERAZINE EDISYLATE 10 MG: 5 INJECTION INTRAMUSCULAR; INTRAVENOUS at 02:14

## 2018-08-27 RX ADMIN — NITROGLYCERIN 0.4 MG: 0.4 TABLET SUBLINGUAL at 00:48

## 2018-08-27 RX ADMIN — INSULIN HUMAN 5 UNITS: 100 INJECTION, SOLUTION PARENTERAL at 05:45

## 2018-08-27 RX ADMIN — LOSARTAN POTASSIUM 100 MG: 25 TABLET, FILM COATED ORAL at 05:19

## 2018-08-27 RX ADMIN — NITROGLYCERIN 0.4 MG: 0.4 TABLET SUBLINGUAL at 01:03

## 2018-08-27 RX ADMIN — DEXTROSE MONOHYDRATE 50 ML: 25 INJECTION, SOLUTION INTRAVENOUS at 05:44

## 2018-08-27 RX ADMIN — HYDRALAZINE HYDROCHLORIDE 20 MG: 20 INJECTION INTRAMUSCULAR; INTRAVENOUS at 13:27

## 2018-08-27 RX ADMIN — HEPARIN SODIUM 1500 UNITS: 1000 INJECTION, SOLUTION INTRAVENOUS; SUBCUTANEOUS at 08:03

## 2018-08-27 RX ADMIN — NITROGLYCERIN 10 MCG/MIN: 20 INJECTION INTRAVENOUS at 01:31

## 2018-08-27 RX ADMIN — SODIUM POLYSTYRENE SULFONATE 30 G: 15 SUSPENSION ORAL; RECTAL at 06:19

## 2018-08-27 RX ADMIN — IPRATROPIUM BROMIDE AND ALBUTEROL SULFATE 3 ML: .5; 3 SOLUTION RESPIRATORY (INHALATION) at 00:36

## 2018-08-27 RX ADMIN — SODIUM CHLORIDE 5 MG/HR: 9 INJECTION, SOLUTION INTRAVENOUS at 05:43

## 2018-08-27 RX ADMIN — HEPARIN SODIUM 5000 UNITS: 5000 INJECTION, SOLUTION INTRAVENOUS; SUBCUTANEOUS at 14:00

## 2018-08-27 RX ADMIN — AMLODIPINE BESYLATE 10 MG: 10 TABLET ORAL at 05:19

## 2018-08-27 RX ADMIN — HEPARIN SODIUM 5000 UNITS: 5000 INJECTION, SOLUTION INTRAVENOUS; SUBCUTANEOUS at 05:20

## 2018-08-27 RX ADMIN — HEPARIN SODIUM 5000 UNITS: 5000 INJECTION, SOLUTION INTRAVENOUS; SUBCUTANEOUS at 22:00

## 2018-08-27 ASSESSMENT — LIFESTYLE VARIABLES: EVER_SMOKED: NEVER

## 2018-08-27 ASSESSMENT — ENCOUNTER SYMPTOMS
HEADACHES: 0
WEAKNESS: 1
CHILLS: 0
DIZZINESS: 0
ORTHOPNEA: 1
WHEEZING: 1
ABDOMINAL PAIN: 0
BLOOD IN STOOL: 0
FEVER: 0
PALPITATIONS: 0
NECK PAIN: 0
FLANK PAIN: 0
VOMITING: 0
FOCAL WEAKNESS: 0
SORE THROAT: 0
SEIZURES: 0
DIARRHEA: 0
SHORTNESS OF BREATH: 1
DIAPHORESIS: 0
DEPRESSION: 0
SPUTUM PRODUCTION: 0
MYALGIAS: 0
VOMITING: 1
NAUSEA: 1
COUGH: 0
BACK PAIN: 0
BRUISES/BLEEDS EASILY: 0
DOUBLE VISION: 0
PND: 1
BLURRED VISION: 0

## 2018-08-27 ASSESSMENT — PAIN SCALES - GENERAL
PAINLEVEL_OUTOF10: 0
PAINLEVEL_OUTOF10: 6
PAINLEVEL_OUTOF10: 7
PAINLEVEL_OUTOF10: 0
PAINLEVEL_OUTOF10: 5
PAINLEVEL_OUTOF10: 3
PAINLEVEL_OUTOF10: 0
PAINLEVEL_OUTOF10: 0

## 2018-08-27 ASSESSMENT — PULMONARY FUNCTION TESTS
EPAP_CMH2O: 8
EPAP_CMH2O: 8

## 2018-08-27 ASSESSMENT — COPD QUESTIONNAIRES
DO YOU EVER COUGH UP ANY MUCUS OR PHLEGM?: NO/ONLY WITH OCCASIONAL COLDS OR INFECTIONS
DURING THE PAST 4 WEEKS HOW MUCH DID YOU FEEL SHORT OF BREATH: NONE/LITTLE OF THE TIME
COPD SCREENING SCORE: 2
HAVE YOU SMOKED AT LEAST 100 CIGARETTES IN YOUR ENTIRE LIFE: NO/DON'T KNOW

## 2018-08-27 NOTE — ED TRIAGE NOTES
Pt bib [facility] after experiencing sob, weakness, n/v and fatigue. On scene pt was 87% on RA and wheezing throughout , fire gave 2 duonebs and placed pt on 14L and wheezing reduced. No other interventions en route. Upon arrival to ed pt continued on 14L, ERP at bedside upon arrival.

## 2018-08-27 NOTE — PROGRESS NOTES
HEMODIALYSIS NOTES:     HD today x 3 hours per Dr. Berger. Initiated at 0800 and ended at 1100. Patient tolerated treatment. Please see paper flowsheet for details.    UF Net: 3,000 mL    Blood returned. Applied gauze and held L AVF site for 10 minutes. Verified no bleeding. Bruit and thrill present post dialysis. Instructions given to Primary RN that if bleeding occurs on the AVF site, change dressing and held the site with pressure.     Report given to CASSIE Martin RN.

## 2018-08-27 NOTE — ASSESSMENT & PLAN NOTE
Uncontrolled with hyperglycemia  Started on insulin sliding scale with serial Accu-Cheks  Hemoglobin A1c is 9.5

## 2018-08-27 NOTE — H&P
Hospital Medicine History & Physical Note    Date of Service  8/27/2018    Primary Care Physician  No primary care provider on file.    Consultants  Pulmonology Dr Pack     Code Status  Full Code    Chief Complaint  Shortness of breath    History of Presenting Illness  63 y.o. female with a past medical history of end-stage renal disease on hemodialysis MWF, COPD on 2 L of oxygen, type 2 diabetes mellitus who presented 8/27/2018 with shortness of breath and fatigue since yesterday.  Patient reports she felt generalized weakness and fatigue all day with associated nausea and vomiting.  She also developed shortness of breath that progressively worsened and called EMS.  She was found to be 87% on room air with diffuse wheezing and was given to DuoNeb breathing treatments and placed on 14 L of oxygen with some improvement of her wheezing.  She also reports intermittent chest pressure with radiation to the left arm.  She denies any fevers, cough, headache, focal muscle weakness, abdominal pain, diarrhea or dysuria.  Patient has not missed any hemodialysis sessions, her last session was on Friday.    In the ER the patient was found to be hypertensive at 210/80.  Patient was started on BiPAP.  She was started on nitro drip.  Initial workup revealed sodium 128, potassium 7.5, glucose 373, BUN 98, creatinine 7.96, troponin 0 0.01.    Chest x-ray interpreted by me reveals bilateral pulmonary edema and pleural effusion with cardiomegaly.  EKG interpreted by me reveals sinus rhythm with prolonged QT interval.  No ST elevation ST depression    Review of Systems  Review of Systems   Constitutional: Positive for malaise/fatigue. Negative for chills, diaphoresis and fever.   HENT: Negative for hearing loss and sore throat.    Eyes: Negative for blurred vision.   Respiratory: Positive for shortness of breath and wheezing. Negative for cough and sputum production.    Cardiovascular: Positive for chest pain. Negative for  palpitations and leg swelling.   Gastrointestinal: Positive for nausea and vomiting. Negative for abdominal pain, blood in stool and diarrhea.   Genitourinary: Negative for dysuria, flank pain and urgency.   Musculoskeletal: Negative for back pain, joint pain, myalgias and neck pain.   Skin: Negative for rash.   Neurological: Positive for weakness. Negative for dizziness, focal weakness, seizures and headaches.   Endo/Heme/Allergies: Does not bruise/bleed easily.   Psychiatric/Behavioral: Negative for suicidal ideas.   All other systems reviewed and are negative.      Past Medical History   has a past medical history of Chronic obstructive pulmonary disease (HCC); Diabetes (HCC); Hypertension; and Renal disorder.    Surgical History  No pertinent surgical history    Family History  History reviewed.  No pertinent family history    Social History   Denies alcohol or tobacco use.    Allergies  Allergies   Allergen Reactions   • Flomax [Tamsulosin]    • Glipizide    • Ondansetron    • Pcn [Penicillins]    • Tramadol        Medications  Klonopin 10 mg daily  Coreg 12.5 mg twice daily  Losartan 100 mg daily  Lyrica 50 mg twice daily  Norco 5 325  Flexeril       Physical Exam  Blood Pressure: (!) 184/85   Temperature: 37.1 °C (98.7 °F)   Pulse: 81   Respiration: 16   Pulse Oximetry: 100 %     Physical Exam   Constitutional: She is oriented to person, place, and time. She appears well-developed and well-nourished. No distress.   HENT:   Head: Normocephalic and atraumatic.   Mouth/Throat: Oropharynx is clear and moist.   Eyes: Pupils are equal, round, and reactive to light. Conjunctivae are normal.   Neck: Neck supple. No thyromegaly present.   Cardiovascular: Normal rate, regular rhythm and normal heart sounds.    Pulmonary/Chest: She is in respiratory distress. She has wheezes. She has rales.   Abdominal: Soft. Bowel sounds are normal. She exhibits no distension. There is no tenderness. There is no rebound.    Musculoskeletal: Normal range of motion. She exhibits no edema or tenderness.   Neurological: She is alert and oriented to person, place, and time. No cranial nerve deficit. Coordination normal.   Skin: Skin is warm and dry.   Psychiatric: She has a normal mood and affect. Her behavior is normal.   Nursing note and vitals reviewed.      Laboratory:  Recent Labs      08/27/18 0148   WBC  8.3   RBC  2.59*   HEMOGLOBIN  9.2*   HEMATOCRIT  29.4*   MCV  113.5*   MCH  35.5*   MCHC  31.3*   RDW  75.8*   PLATELETCT  #NM QNS FOR LEATHA     Recent Labs      08/27/18 0148   SODIUM  128*   POTASSIUM  7.5*   CHLORIDE  92*   CO2  21   GLUCOSE  373*   BUN  98*   CREATININE  7.96*   CALCIUM  8.3*     Recent Labs      08/27/18 0148   ALTSGPT  24   ASTSGOT  25   ALKPHOSPHAT  70   TBILIRUBIN  0.5   GLUCOSE  373*                 Lab Results   Component Value Date    TROPONINI 0.01 08/27/2018       Urinalysis:    No results found     Imaging:  DX-CHEST-PORTABLE (1 VIEW)   Final Result         1.  Hazy interstitial bilateral pulmonary opacities suggests interstitial edema or infiltrates.   2.  Trace bilateral pleural effusions   3.  Cardiomegaly   4.  Atherosclerosis      ECHOCARDIOGRAM COMP W/O CONT    (Results Pending)         Assessment/Plan:  I anticipate this patient will require at least two midnights for appropriate medical management, necessitating inpatient admission.    Hyperkalemia- (present on admission)   Assessment & Plan    Severe hyperkalemia with evidence of AV block on EKG  Patient has received IV calcium, insulin with dextrose and Kayexalate  Patient will undergo emergent hemodialysis  Continuous cardiac monitoring  Monitor BMP          Hypertensive emergency- (present on admission)   Assessment & Plan    Patient has been started on IV nitro and Cardene, titrate to SBP less than 160  Continue amlodipine 10 mg daily and losartan 100 mg daily  Hold Coreg 12.5 mg twice daily in the setting of COPD exacerbation, will  consider switching to beta 1 selective metoprolol          Type 2 diabetes mellitus with diabetic polyneuropathy, without long-term current use of insulin ([facility])- (present on admission)   Assessment & Plan    Uncontrolled with hyperglycemia  Start on insulin sliding scale with serial Accu-Cheks  Check hemoglobin A1c  Hypoglycemic protocol in place            Acute exacerbation of chronic obstructive pulmonary disease (COPD) ([facility])- (present on admission)   Assessment & Plan    With active wheezing and shortness of breath  Patient has been started on DuoNeb breathing treatment with RT protocol and supplemental oxygen  We will consider starting her on steroids if she has persistent wheezing after ultrafiltration and hemodialysis        ESRD (end stage renal disease) on dialysis ([facility])- (present on admission)   Assessment & Plan    She will undergo urgent hemodialysis            VTE prophylaxis: Heparin

## 2018-08-27 NOTE — PROGRESS NOTES
Discussed increasing blood pressures with Dr. Hernandez; medication reconciliation started - pharmacy will verify. New orders received - see MAR.

## 2018-08-27 NOTE — PROGRESS NOTES
Received patient from ED at 4:50am. Pt on nitro gtt for HTN; switching to Nicardipine gtt. Pt AOx4, denies pain, appropriate behavior and otherwise stable. 3L NC for O2. Will continue to monitor closely.

## 2018-08-27 NOTE — CONSULTS
DATE OF SERVICE:  08/27/2018    REASON FOR CONSULTATION:  End-stage renal disease, hyperkalemia.    HISTORY OF PRESENT ILLNESS:  The patient is a 63-year-old female well known to   our group.  She has end-stage renal disease and dialyzes Monday, Wednesday,   and Friday.  She has additional past medical history of myelodysplastic   syndrome requiring frequent transfusions, arthritis, atrial fibrillation,   COPD, CHF, diabetes, and hypertension who was increasingly short of breath,   feeling poorly and was transferred to [facility] for further evaluation and   treatment.  Once in the emergency room, she had a chest x-ray suggestive of   volume overload.  She was hyperkalemic and is admitted for further treatment.    Currently, she is receiving dialysis.  She is also on nicardipine drip, which   has slowly been titrated off secondary to her admission blood pressure, which   was greater than 200.    PAST MEDICAL HISTORY:  Reviewed in the HPI.    FAMILY HISTORY:  Significant for hypertension.    SOCIAL HISTORY:  She does not smoke or drink.    REVIEW OF SYSTEMS:  GENERAL:  The patient has been feeling poorly, significant tiredness.  HEENT:  She denies difficulty seeing, hearing or swallowing.  RESPIRATORY:  She has had increasing shortness of breath and dyspnea on   exertion.  CARDIOVASCULAR:  She gets intermittent chest pain.  GASTROINTESTINAL:  She has had nausea, vomiting, but no pain.  GENITOURINARY:  She makes very little urine.  MUSCULOSKELETAL:  She has a fistula in her left arm.  SKIN:  She has had no rashes.  NEUROLOGIC:  She has had significant weakness.  ENDOCRINE:  She has diabetes, which is variable.    MEDICATIONS:  At this time include the following:  Albuterol, amlodipine,   heparin, insulin, ipratropium, and nicardipine.    PHYSICAL EXAMINATION:  VITAL SIGNS:  Her blood pressure on admission was greater than 200, it is   currently 136/68.  She is afebrile.  Her heart rate is in the 70s,   respirations are  16.  GENERAL:  She is a chronically ill-appearing, resting comfortably.  HEENT:  Her extraocular muscles are intact.  Her sclerae are anicteric.  Her   oropharynx is clear.  HEART:  Regular rate, S1, S2.  LUNGS:  Anteriorly are clear.  ABDOMEN:  Soft and nontender.  She has no significant lower extremity   swelling.  Her fistula on the left arm is cannulated.    LABORATORY DATA:  As follows:  Her white blood cell count is 8.3, her   hemoglobin is 9.2.  Her sodium is 128, potassium 7.3, chloride 92, CO2 is 22,   BUN is 100, creatinine 8.27, calcium is 8.2, glucose is 344.  Chest x-ray   suggestive of increased volume.    CONCLUSION:  The patient is a 63-year-old female who presented for shortness   of breath and was found to have hypertensive urgency and hyperkalemia, as well   as anemia.  1.  Hyperkalemia secondary to end-stage renal disease.  She will be urgently   dialyzed.  2.  Anemia secondary to end-stage renal disease, myelodysplastic syndrome.  We   will give her Epo and check iron stores.  3.  End-stage renal disease with hyperkalemia and increased volume.  4.  Shortness of breath secondary to chronic obstructive pulmonary   disease/congestive heart failure.  5.  Hypertension secondary to increased volume.  6.  Hyponatremia secondary to increased volume.    PLAN:  At this time is urgent dialysis, Epo, titrate nicardipine as tolerated   and daily evaluation for dialysis.    Thank you very much for the consultation.       ____________________________________     MD TOMY PHIPPS / RAMONE    DD:  08/27/2018 09:44:04  DT:  08/27/2018 10:50:22    D#:  [id number]  Job#:  [id number]

## 2018-08-27 NOTE — PROGRESS NOTES
Med rec completed per pt and [facility].   Antibiotics within last 30 days: No  Comments: pt states taking coreg and norvasc, however, pharmacy does not have record of pt filling meds with them. Also, per pharmacy, pt was prescribed losartan 50 mg qday not 100 mg. Pharmacist aware.

## 2018-08-27 NOTE — ED NOTES
Pts bp continuing to increase despite nitro drip titration. ERP informed. Pt also c/o nausea, unable to obtain secondary iv access site. ERP aware, decision to place central line. ED tech assisting.

## 2018-08-27 NOTE — ASSESSMENT & PLAN NOTE
Severe hyperkalemia of 7.5 with evidence of AV block on EKG  She was treated in the ER with IV calcium, insulin with dextrose and Kayexalate  Hemodialysis by nephrology  Continuous cardiac monitoring  Monitor daily BMP

## 2018-08-27 NOTE — ED NOTES
Misbah from Lab called with critical result of K+: 7.5, BUN 98, Creatinine 7.96. at 0258. Critical lab result read back to Misbah.   Dr. Agarwal notified of critical lab result at 0259.  Critical lab result read back by Dr. Agarwal.

## 2018-08-27 NOTE — ASSESSMENT & PLAN NOTE
Patient had been started on IV nitro and Cardene, with ICU admission  Continue amlodipine 10 mg daily and coreg 6.25 mg BID  Losartan 50 mg daily had been held due to hyperkalemia and will be restarted.

## 2018-08-27 NOTE — ASSESSMENT & PLAN NOTE
Due to volume overload.  She is on 2 liters nasal cannula oxygen outpatient and has required increased oxygen.

## 2018-08-27 NOTE — ED NOTES
Dialysis RN Nilsa is in the dialysis room ext. [phone]. She will need to be called with pt room number when she moves to the unit.

## 2018-08-27 NOTE — PROGRESS NOTES
12 hour chart check; chart reviewed at bedside; report received from AKIKO Valdez.     HD setting up at bedside; pt alert and oriented x 4; no signs of respiratory distress or c/o pain at this time.     Will continue to monitor closely.

## 2018-08-27 NOTE — ED NOTES
IV infusion delayed medication administration. IV infiltrated and removed, two new ivs inserted by US RN.

## 2018-08-27 NOTE — PROGRESS NOTES
Pt has general dry skin; dry/calloused feet/heels. Back side remains intact and blanchable on sacrum. Rest of skin is normal for ethnicity, clean, dry, and intact.

## 2018-08-27 NOTE — CONSULTS
Critical Care/Pulmonary Consultation    Date of service: 8/27/2018    Consulting Physician: Ranulfo Agarwal M.D.    Chief Complaint: Shortness of breath, nausea, chest discomfort.    History of Present Illness: Vielka Quijano is a 63 y.o. female with a past medical history of ESRD/HDD, last full session hemodialysis was passed Friday, suboptimally controlled hypertension, COPD on home O2 via nasal cannula at 2 L/min and diabetes mellitus, presented to the hospital by EMS complaining of onset of dizziness, nausea, vomiting, chest discomfort radiating to left arm, crampy leg pains and worsening shortness of breath.  In ED patient was found to have severe hypertension and plain x-ray revealed interstitial pulmonary edema, patient was placed on BiPAP for severe respiratory distress and a nitroglycerin drip for hypertensive emergency.  Once the patient's blood pressure control improved, patient was weaned from BiPAP high flow nasal cannula.  Lab report is critical value of serum potassium at 7.5 and emergent dialysis was requested.  Patient is to be admitted to ICU for the treatment of hypertensive emergency with pulmonary edema and severe hypoxemia, hyperkalemia with rescue hemodialysis, therefore ICU consult.    ROS: As per HPI, otherwise all systems been reviewed and were negative    No current facility-administered medications on file prior to encounter.      No current outpatient prescriptions on file prior to encounter.       Social History   Substance Use Topics   • Smoking status: Unknown If Ever Smoked   • Smokeless tobacco: Not on file   • Alcohol use Not on file        Past Medical History:   Diagnosis Date   • Chronic obstructive pulmonary disease (HCC)    • Diabetes (HCC)    • Hypertension    • Renal disorder        No past surgical history on file.    Allergies: Flomax [tamsulosin]; Glipizide; Ondansetron; Pcn [penicillins]; and Tramadol    No family history on file.    Vitals:    08/27/18 0035 08/27/18  "0036 08/27/18 0038 08/27/18 0047   Height:  1.473 m (4' 10\")     Weight: 62.2 kg (137 lb 2 oz) 62.2 kg (137 lb 2 oz)     Weight % change since last entry.: 0 % 0 %     BP:   (!) 220/80 (!) 238/86   Pulse:   83    BMI (Calculated):  28.66     Resp:   (!) 21    Temp:   37.1 °C (98.7 °F)     08/27/18 0104 08/27/18 0151 08/27/18 0200 08/27/18 0218   Height:       Weight:       Weight % change since last entry.:       BP: (!) 210/80 (!) 179/86 (!) 181/80 (!) 188/81   Pulse:   76    BMI (Calculated):       Resp:   (!) 23    Temp:        08/27/18 0234 08/27/18 0245 08/27/18 0247   Height:      Weight:      Weight % change since last entry.:      BP: (!) 179/81 (!) 184/85    Pulse:  80 81   BMI (Calculated):      Resp:  20 16   Temp:          Physical Examination  General: Average build  Neuro/Psych: CN II-XII grossly within normal limits, alert and oriented ×4, no focal neurological deficits  HEENT: Head is normocephalic, atraumatic, PERRLA, EOMI  CVS: S1-S2 within normal limits, regular rate, no murmurs, rubs or gallops, 1+ bilateral lower extremities edema  Respiratory: Diminished breath sounds bilaterally with mild bilateral expiratory wheezes, trachea is midline, symmetric expansion of the chest with respirations  Abdomen/: Soft, nontender, nondistended abdomen  Extremities: No bony deformities, joint swelling, joint erythema  Skin: No skin rashes, bruises, jaundice    Recent Labs      08/27/18 0148   ASTSGOT  25   ALTSGPT  24   ALKPHOSPHAT  70   TBILIRUBIN  0.5     Recent Labs      08/27/18 0148   SODIUM  128*   POTASSIUM  7.5*   CHLORIDE  92*   CO2  21   BUN  98*   CREATININE  7.96*   CALCIUM  8.3*     Recent Labs      08/27/18 0148   ALTSGPT  24   ASTSGOT  25   ALKPHOSPHAT  70   TBILIRUBIN  0.5   GLUCOSE  373*           Invalid input(s): [id number]  DX-CHEST-PORTABLE (1 VIEW)   Final Result         1.  Hazy interstitial bilateral pulmonary opacities suggests interstitial edema or infiltrates.   2.  Trace " bilateral pleural effusions   3.  Cardiomegaly   4.  Atherosclerosis      ECHOCARDIOGRAM COMP W/O CONT    (Results Pending)       Assessment and Plan:    1.  Hypertensive emergency with pulmonary edema  -Nitroglycerin gtt, Cardizem drip  -Labetalol as needed with parameters  -Resume home Coreg, Norvasc, losartan  -Monitoring troponin trend  -2D echo is pending    2.  Hyperkalemia  -IV insulin, D50, calcium, oral Kayexalate and nebulized albuterol treatment given in ED  -Rescue emergent hemodialysis pending    3.  Acute on chronic hypoxemic respiratory failure  -Due to interstitial pulmonary edema from hypertensive emergency  -BiPAP as needed, supplemental oxygen to keep oxygen saturation level above 90%  -Hemodialysis pending    4.  ESRD/HDD  -Inpatient hemodialysis  -Nephrology is following    5.  History of COPD with hypoxemia requiring supplemental oxygen  -Bronchodilators    6.  Diabetes mellitus type 2  -Insulin sliding scale  -ADA diet  -Hemoglobin A1c pending    7.  ICU prophylaxis  -Heparin subcu    Family meeting: Not available at bedside    Critical care time: I spent 30 minutes personally providing critical care services including formulating plan of care.  This time was exclusive to this patient and does not include time spent in procedures.

## 2018-08-27 NOTE — PROGRESS NOTES
Patient admitted by Dr. Denise this morning with hyperkalemia and hypertensive emergency  Dialysis in progress this morning  Discussed on rounds

## 2018-08-27 NOTE — PROGRESS NOTES
Pulmonary Critical Care Progress Note      Admit Date: 8/27/2018    Chief Complaint: Shortness of breath, nausea, chest discomfort.       History of Present Illness: 63 y.o. female with a past medical history of ESRD/HDD, last full session hemodialysis was passed Friday, suboptimally controlled hypertension, COPD on home O2 via nasal cannula at 2 L/min and diabetes mellitus, presented to the hospital by EMS complaining of onset of dizziness, nausea, vomiting, chest discomfort radiating to left arm, crampy leg pains and worsening shortness of breath.  In ED patient was found to have severe hypertension and plain x-ray revealed interstitial pulmonary edema, patient was placed on BiPAP for severe respiratory distress and a nitroglycerin drip for hypertensive emergency.  Once the patient's blood pressure control improved, patient was weaned from BiPAP high flow nasal cannula.  Lab report is critical value of serum potassium at 7.5 and emergent dialysis was requested.  Patient is to be admitted to ICU for the treatment of hypertensive emergency with pulmonary edema and severe hypoxemia, hyperkalemia with rescue hemodialysis, therefore ICU consult      Interval Events:  24 hour interval history reviewed   Tm 98.7  +240cc over last 24hr  cxr w b/l pulm opacities  Na 128  Glucose 345  K 7.3    Cardene @ 5    Review of Systems   Constitutional: Positive for malaise/fatigue. Negative for chills and fever.   HENT: Negative for congestion.    Eyes: Negative for blurred vision and double vision.   Respiratory: Positive for shortness of breath. Negative for cough and sputum production.    Cardiovascular: Negative for chest pain and palpitations.   Gastrointestinal: Positive for nausea. Negative for abdominal pain and vomiting.   Neurological: Positive for weakness. Negative for focal weakness.   Psychiatric/Behavioral: Negative for depression.   All other systems reviewed and are negative.    Physical Exam   Constitutional: She  appears well-developed and well-nourished.   HENT:   Head: Normocephalic and atraumatic.   Eyes: Pupils are equal, round, and reactive to light. Conjunctivae are normal.   Neck: No tracheal deviation present.   Cardiovascular: Normal rate and regular rhythm.    Pulmonary/Chest: She has no wheezes. She has rales.   Abdominal: Soft. She exhibits no distension. There is no tenderness.   Musculoskeletal: She exhibits no edema.   Neurological: No cranial nerve deficit.   Skin: Skin is warm and dry.   Psychiatric: She has a normal mood and affect.   Nursing note and vitals reviewed.      PFSH:  No change.    Respiratory:     Pulse Oximetry: 99 %  Chest Tube Drains:                  Invalid input(s): [id number]    HemoDynamics:  Pulse: 76, Heart Rate (Monitored): 76  Blood Pressure: (!) 184/85, NIBP: 136/60         Recent Labs      08/27/18   0148  08/27/18   0316  08/27/18   0400   CPKTOTAL   --   72  69   TROPONINI  0.01  0.02   --    BNPBTYPENAT   --    --   368*       Neuro:  GCS           Fluids:  Intake/Output       08/25/18 0700 - 08/26/18 0659 (Not Admitted) 08/26/18 0700 - 08/27/18 0659 (Not Admitted) 08/27/18 0700 - 08/28/18 0659      [id number] 5812-4391 Total 1345-5993 1246-4759 Total 4134-5808 2567-3400 Total       Intake    P.O.  --  -- --  --  240 240  --  -- --    P.O. -- -- -- -- 240 240 -- -- --    Total Intake -- -- -- -- 240 240 -- -- --       Output    Total Output -- -- -- -- -- -- -- -- --       Net I/O     -- -- -- -- 240 240 -- -- --        Weight: 64.6 kg (142 lb 6.7 oz)  Recent Labs      08/27/18   0148  08/27/18   0400   SODIUM  128*  128*   POTASSIUM  7.5*  7.3*   CHLORIDE  92*  92*   CO2  21  22   BUN  98*  100*   CREATININE  7.96*  8.27*   CALCIUM  8.3*  8.2*       GI/Nutrition:    Liver Function  Recent Labs      08/27/18   0148  08/27/18   0400   ALTSGPT  24   --    ASTSGOT  25   --    ALKPHOSPHAT  70   --    TBILIRUBIN  0.5   --    GLUCOSE  373*  345*       Heme:  Recent Labs       18   RBC  2.59*   HEMOGLOBIN  9.2*   HEMATOCRIT  29.4*   PLATELETCT  #NM QNS FOR LEATHA       Infectious Disease:  Temp  Av.5 °C (97.7 °F)  Min: 35.9 °C (96.6 °F)  Max: 37.1 °C (98.7 °F)  Micro: cultures reviewed  Recent Labs      18   WBC  8.3   NEUTSPOLYS  74.10*   LYMPHOCYTES  23.10   MONOCYTES  0.00   EOSINOPHILS  1.90   BASOPHILS  0.00   ASTSGOT  25   ALTSGPT  24   ALKPHOSPHAT  70   TBILIRUBIN  0.5     Current Facility-Administered Medications   Medication Dose Frequency Provider Last Rate Last Dose   • IPRATROPIUM-ALBUTEROL 0.5-2.5 (3) MG/3ML INH SOLN           • ipratropium-albuterol (DUONEB) nebulizer solution  3 mL Q4H PRN (RT) Ranulfo Agarwal M.D.   3 mL at 18 0036   • albuterol (PROVENTIL) 2.5mg/0.5ml nebulizer solution 2.5 mg  2.5 mg ONCE (RT) Ranulfo Agarwal M.D.   Stopped at 18 0100   • amLODIPine (NORVASC) tablet 10 mg  10 mg Q DAY Nahun Pack M.D.   10 mg at 18 0519   • losartan (COZAAR) tablet 100 mg  100 mg Q DAY Nahun Pack M.D.   100 mg at 18 0519   • labetalol (NORMODYNE,TRANDATE) injection 10 mg  10 mg Q10 MIN PRN Nahun Pack M.D.       • ipratropium-albuterol (DUONEB) nebulizer solution  3 mL TID (RT) Nahun Pack M.D.   Stopped at 18 0315   • niCARdipine (CARDENE) 25 mg in  mL Infusion  0-15 mg/hr Continuous Nahun Pack M.D. 50 mL/hr at 18 0600 5 mg/hr at 18 0600   • insulin regular (HUMULIN R) injection 2-9 Units  2-9 Units 4X/DAY ACHS Nahun Pack M.D.   6 Units at 18 0658    And   • glucose 4 g chewable tablet 16 g  16 g Q15 MIN PRN Nahun Pack M.D.        And   • dextrose 50% (D50W) injection 25 mL  25 mL Q15 MIN PRN Nahun Pack M.D.       • Respiratory Care per Protocol   Continuous RT Nahun Pack M.D.       • metoclopramide (REGLAN) injection 10 mg  10 mg Q6HRS PRN Nahun Pack M.D.       • heparin injection 5,000 Units  5,000 Units Q8HRS Nahun  GUERDA Pack   5,000 Units at 08/27/18 0520   • heparin injection 1,500 Units  1,500 Units DIALYSIS PRN Chato Alberts D.O.         Last reviewed on 8/27/2018 12:42 AM by Jeri Chavez R.N.    Quality  Measures:  Labs reviewed, Medications reviewed and Radiology images reviewed                      Assessment/Plan:  -  Hypertensive emergency with pulmonary edema   - continue to titrate cardene infusion    - norvasc 10   - add coreg   - echo pending    -  Hyperkalemia   - resolved   - getting HD      -  Acute on chronic hypoxemic respiratory failure   - HD with volume removal   - rt/02 protocols   - bipap if deteriorates      -  ESRD/HDD   - HD per renal    -  History of COPD with hypoxemia requiring supplemental oxygen   - rt/02 protocols   - monitor for exacerbation     -  Uncontrolled Diabetes mellitus type 2   - a1c 9.5   - ssi + fsbs    Pt remains critically ill with ongoing titration of cardene infusion for hypertensive emergency.     Discussed patient condition and risk of morbidity and/or mortality with RN, RT, Therapies, Pharmacy and hospitalist.    The patient remains critically ill.  Critical care time = 46 minutes in directly providing and coordinating critical care and extensive data review.  No time overlap and excludes procedures.

## 2018-08-27 NOTE — ED PROVIDER NOTES
ED Provider Note    CHIEF COMPLAINT  No chief complaint on file.  Shortness of breath nausea    HPI  Vielka Quijano is a 63 y.o. female who presents with chief complaint of shortness of breath.  Patient with history of heart failure, ESRD, COPD.  Patient finished a full session of dialysis on Friday, she has not missed any dialysis sessions recently.  She reports that upon waking up this morning she is felt very weak and mildly nauseous.  She reports that she has worsening orthopnea and decreased exertional capacity.  She reports that she felt so weak she almost fell.  She denies any blunt head trauma or loss of consciousness.  She denies any focal weakness or numbness.  She denies any associated chest pain.    REVIEW OF SYSTEMS  Review of Systems   Constitutional: Negative for chills and fever.   HENT: Negative for hearing loss.    Respiratory: Positive for shortness of breath and wheezing.    Cardiovascular: Positive for orthopnea and PND. Negative for chest pain and leg swelling.   Gastrointestinal: Positive for nausea. Negative for abdominal pain, diarrhea and vomiting.       See HPI for further details. All other systems are negative.     PAST MEDICAL HISTORY       SOCIAL HISTORY  Social History     Social History Main Topics   • Smoking status: Not on file   • Smokeless tobacco: Not on file   • Alcohol use Not on file   • Drug use: Unknown   • Sexual activity: Not on file       SURGICAL HISTORY  patient denies any surgical history    CURRENT MEDICATIONS  Home Medications    **Home medications have not yet been reviewed for this encounter**         ALLERGIES  Allergies not on file    PHYSICAL EXAM  Physical Exam   Constitutional: She is oriented to person, place, and time. She appears well-developed and well-nourished.   Eyes: Pupils are equal, round, and reactive to light. Conjunctivae are normal.   Neck: Normal range of motion. Neck supple.   Cardiovascular: Normal rate, regular rhythm and normal heart  sounds.    No obvious murmur, fistula with palpable thrill no overlying erythema   Pulmonary/Chest:   Tachypneic, diffuse mild wheezing throughout with considerable crackles in the bases   Abdominal: Soft. Bowel sounds are normal. She exhibits no distension. There is no tenderness. There is no rebound.   Neurological: She is alert and oriented to person, place, and time.         DIAGNOSTIC STUDIES / PROCEDURES    EKG  Normal sinus rhythm normal axis, mildly prolonged QRS no ST changes consistent with acute regional ischemia    LABS  Results for orders placed or performed during the hospital encounter of 08/27/18   CBC w/ Differential   Result Value Ref Range    Nucleated RBC 0.00 /100 WBC    NRBC (Absolute) 0.00 K/uL    WBC 8.3 4.8 - 10.8 K/uL    RBC 2.59 (L) 4.20 - 5.40 M/uL    Hemoglobin 9.2 (L) 12.0 - 16.0 g/dL    Hematocrit 29.4 (L) 37.0 - 47.0 %    .5 (H) 81.4 - 97.8 fL    MCH 35.5 (H) 27.0 - 33.0 pg    MCHC 31.3 (L) 33.6 - 35.0 g/dL    RDW 75.8 (H) 35.9 - 50.0 fL    Platelet Count #NM QNS FOR LEATHA 164 - 446 K/uL    Neutrophils-Polys 74.10 (H) 44.00 - 72.00 %    Lymphocytes 23.10 22.00 - 41.00 %    Monocytes 0.00 0.00 - 13.40 %    Eosinophils 1.90 0.00 - 6.90 %    Basophils 0.00 0.00 - 1.80 %    Neutrophils (Absolute) 6.23 2.00 - 7.15 K/uL    Lymphs (Absolute) 1.92 1.00 - 4.80 K/uL    Monos (Absolute) 0.00 0.00 - 0.85 K/uL    Eos (Absolute) 0.16 0.00 - 0.51 K/uL    Baso (Absolute) 0.00 0.00 - 0.12 K/uL    Anisocytosis 2+     Microcytosis 2+    Complete Metabolic Panel (CMP)   Result Value Ref Range    Sodium 128 (L) 135 - 145 mmol/L    Potassium 7.5 (HH) 3.6 - 5.5 mmol/L    Chloride 92 (L) 96 - 112 mmol/L    Co2 21 20 - 33 mmol/L    Anion Gap 15.0 (H) 0.0 - 11.9    Glucose 373 (H) 65 - 99 mg/dL    Bun 98 (HH) 8 - 22 mg/dL    Creatinine 7.96 (HH) 0.50 - 1.40 mg/dL    Calcium 8.3 (L) 8.5 - 10.5 mg/dL    AST(SGOT) 25 12 - 45 U/L    ALT(SGPT) 24 2 - 50 U/L    Alkaline Phosphatase 70 30 - 99 U/L    Total  Bilirubin 0.5 0.1 - 1.5 mg/dL    Albumin 3.9 3.2 - 4.9 g/dL    Total Protein 6.9 6.0 - 8.2 g/dL    Globulin 3.0 1.9 - 3.5 g/dL    A-G Ratio 1.3 g/dL   Troponin STAT   Result Value Ref Range    Troponin I 0.01 0.00 - 0.04 ng/mL   ESTIMATED GFR   Result Value Ref Range    GFR If  6 (A) >60 mL/min/1.73 m 2    GFR If Non African American 5 (A) >60 mL/min/1.73 m 2   HEP B SURFACE AB   Result Value Ref Range    Hep B Surface Antibody Quant 16.68 (H) 0.00 - 10.00 mIU/mL   CREATINE KINASE   Result Value Ref Range    CPK Total 72 0 - 154 U/L   DIFFERENTIAL MANUAL   Result Value Ref Range    Bands-Stabs 0.90 0.00 - 10.00 %    Manual Diff Status PERFORMED    PERIPHERAL SMEAR REVIEW   Result Value Ref Range    Peripheral Smear Review see below    MORPHOLOGY   Result Value Ref Range    RBC Morphology Present    LIPID PROFILE   Result Value Ref Range    Cholesterol,Tot 132 100 - 199 mg/dL    Triglycerides 100 0 - 149 mg/dL    HDL 48 >=40 mg/dL    LDL 64 <100 mg/dL   EKG (ER)   Result Value Ref Range    Report       [facility] Emergency Dept.    Test Date:  2018  Pt Name:    JESUS MCKEON             Department: ER  MRN:        [id number]                      Room:       RD 12  Gender:     Female                       Technician: [id number]  :        1955                   Requested By:ELLIE IVORY  Order #:    [id number]                    Reading MD:    Measurements  Intervals                                Axis  Rate:       82                           P:          39  GA:         200                          QRS:        -7  QRSD:       108                          T:          50  QT:         420  QTc:        491    Interpretive Statements  SINUS RHYTHM  BORDERLINE INTRAVENTRICULAR CONDUCTION DELAY  BORDERLINE R WAVE PROGRESSION, ANTERIOR LEADS  BORDERLINE PROLONGED QT INTERVAL  BASELINE WANDER IN LEAD(S) V6  No previous ECG available for comparison     EKG - STAT   Result Value  Ref Range    Report       [facility] Emergency Dept.    Test Date:  2018  Pt Name:    JESUS MCKEON             Department: ER  MRN:        [id number]                      Room:       RD 12  Gender:     Female                       Technician: [id number]  :        1955                   Requested By:ELLIE IVORY  Order #:    [id number]                    Reading MD:    Measurements  Intervals                                Axis  Rate:       76                           P:  WA:                                      QRS:        -14  QRSD:       104                          T:          55  QT:         432  QTc:        486    Interpretive Statements  A-FLUTTER W/ PREDOM 3:1 AV BLOCK, A-RATE 211  BORDERLINE R WAVE PROGRESSION, ANTERIOR LEADS  BORDERLINE PROLONGED QT INTERVAL  BASELINE WANDER IN LEAD(S) III,V1  Compared to ECG 2018 00:34:03  Sinus rhythm no longer present           RADIOLOGY  DX-CHEST-PORTABLE (1 VIEW)   Final Result         1.  Hazy interstitial bilateral pulmonary opacities suggests interstitial edema or infiltrates.   2.  Trace bilateral pleural effusions   3.  Cardiomegaly   4.  Atherosclerosis      ECHOCARDIOGRAM COMP W/O CONT    (Results Pending)           COURSE & MEDICAL DECISION MAKING  Pertinent Labs & Imaging studies reviewed. (See chart for details)  Patient's exam is consistent with volume overload complicated by concurrent COPD exacerbation.  Patient's This patient is noted to be hypertensive here in the emergency department, referred to PCP for blood pressure management.  Is with a systolic of greater than 230.  Patient symptoms are consistent with acute flash pulmonary edema, given her history of ESRD she will likely need emergent dialysis.  In the interim we will acutely lower blood pressure with nitroglycerin and manage her COPD and flash pulmonary edema with BiPAP.  Check basic labs troponin EKG and chest x-ray.  Patient's EKG is inconsistent with ST  elevation MI.  Her chest x-ray is consistent with pulmonary edema.    Patient improved following BiPAP and aggressive management of her blood pressure while on a nitroglycerin drip.  Patient's labs are consistent with ESRD, her potassium is critically elevated, temporizing medications and calcium gluconate were given.  Nephrology is aware and will dialyze patient emergently.  Patient awaiting bed in ICU.  I discussed the case with hospitalist nephrology and the intensivist who will agree with current management.    40 minutes of critical care time was spent taking care of this patient.    FINAL IMPRESSION  1.  ESRD, hyperkalemia, flash pulmonary edema, hypoxia, hypertensive emergency         Electronically signed by: Ranulfo Agarwal, 8/27/2018 12:36 AM

## 2018-08-27 NOTE — DISCHARGE PLANNING
Outpatient Dialysis Note    Confirmed patient is active at:    [facility]  [address] [place] [address]      Schedule: Monday, Wednesday, Friday  Time: 10:00 am    Spoke with Roxann at facility who confirmed.    Forwarded records for review.    Dialysis Coordinator, [facility]  Stacia Stephens [phone]

## 2018-08-27 NOTE — ED NOTES
ICU RN updated on pt IV access and reasons for delay on IV medication administration. Pt AxOx4, denies nausea, headache, pain at this time.

## 2018-08-27 NOTE — PROGRESS NOTES
[facility] Services    Answering service called at 0100, ER RN informed that HD RN that Pt needs emergent dialysis. Pt still in ER, 1RN informed to notified nephrologist for a consult. Multiple f/u w/ ER RN and Bed control for ICU bed. 1RN informed this HD RN at 0300 that Critical MD seen Pt, has elevated K+, Pt stable and doesn't not need emergent HD and can be done 1st shift today. Dr. Alberts notified.

## 2018-08-28 LAB
ANION GAP SERPL CALC-SCNC: 11 MMOL/L (ref 0–11.9)
BUN SERPL-MCNC: 52 MG/DL (ref 8–22)
CALCIUM SERPL-MCNC: 8.2 MG/DL (ref 8.5–10.5)
CHLORIDE SERPL-SCNC: 95 MMOL/L (ref 96–112)
CK SERPL-CCNC: 43 U/L (ref 0–154)
CO2 SERPL-SCNC: 27 MMOL/L (ref 20–33)
CREAT SERPL-MCNC: 5.88 MG/DL (ref 0.5–1.4)
ERYTHROCYTE [DISTWIDTH] IN BLOOD BY AUTOMATED COUNT: 72.3 FL (ref 35.9–50)
GLUCOSE BLD-MCNC: 130 MG/DL (ref 65–99)
GLUCOSE BLD-MCNC: 130 MG/DL (ref 65–99)
GLUCOSE BLD-MCNC: 162 MG/DL (ref 65–99)
GLUCOSE BLD-MCNC: 246 MG/DL (ref 65–99)
GLUCOSE SERPL-MCNC: 169 MG/DL (ref 65–99)
HCT VFR BLD AUTO: 23.7 % (ref 37–47)
HGB BLD-MCNC: 7.6 G/DL (ref 12–16)
MCH RBC QN AUTO: 34.7 PG (ref 27–33)
MCHC RBC AUTO-ENTMCNC: 32.1 G/DL (ref 33.6–35)
MCV RBC AUTO: 108.2 FL (ref 81.4–97.8)
PLATELET # BLD AUTO: 172 K/UL (ref 164–446)
PMV BLD AUTO: 13.3 FL (ref 9–12.9)
POTASSIUM SERPL-SCNC: 4.8 MMOL/L (ref 3.6–5.5)
RBC # BLD AUTO: 2.19 M/UL (ref 4.2–5.4)
SODIUM SERPL-SCNC: 133 MMOL/L (ref 135–145)
WBC # BLD AUTO: 5.3 K/UL (ref 4.8–10.8)

## 2018-08-28 PROCEDURE — 700102 HCHG RX REV CODE 250 W/ 637 OVERRIDE(OP): Performed by: HOSPITALIST

## 2018-08-28 PROCEDURE — 700102 HCHG RX REV CODE 250 W/ 637 OVERRIDE(OP): Performed by: INTERNAL MEDICINE

## 2018-08-28 PROCEDURE — 5A1D70Z PERFORMANCE OF URINARY FILTRATION, INTERMITTENT, LESS THAN 6 HOURS PER DAY: ICD-10-PCS | Performed by: INTERNAL MEDICINE

## 2018-08-28 PROCEDURE — A9270 NON-COVERED ITEM OR SERVICE: HCPCS | Performed by: INTERNAL MEDICINE

## 2018-08-28 PROCEDURE — 99233 SBSQ HOSP IP/OBS HIGH 50: CPT | Performed by: INTERNAL MEDICINE

## 2018-08-28 PROCEDURE — 90935 HEMODIALYSIS ONE EVALUATION: CPT

## 2018-08-28 PROCEDURE — 80048 BASIC METABOLIC PNL TOTAL CA: CPT

## 2018-08-28 PROCEDURE — 770022 HCHG ROOM/CARE - ICU (200)

## 2018-08-28 PROCEDURE — 99233 SBSQ HOSP IP/OBS HIGH 50: CPT | Performed by: HOSPITALIST

## 2018-08-28 PROCEDURE — 700111 HCHG RX REV CODE 636 W/ 250 OVERRIDE (IP): Performed by: INTERNAL MEDICINE

## 2018-08-28 PROCEDURE — 82550 ASSAY OF CK (CPK): CPT

## 2018-08-28 PROCEDURE — A9270 NON-COVERED ITEM OR SERVICE: HCPCS | Performed by: HOSPITALIST

## 2018-08-28 PROCEDURE — 85027 COMPLETE CBC AUTOMATED: CPT

## 2018-08-28 PROCEDURE — 700101 HCHG RX REV CODE 250: Performed by: HOSPITALIST

## 2018-08-28 PROCEDURE — 82962 GLUCOSE BLOOD TEST: CPT | Mod: 91

## 2018-08-28 RX ORDER — LATANOPROST 50 UG/ML
1 SOLUTION/ DROPS OPHTHALMIC EVERY EVENING
Status: DISCONTINUED | OUTPATIENT
Start: 2018-08-28 | End: 2018-08-29 | Stop reason: HOSPADM

## 2018-08-28 RX ORDER — DORZOLAMIDE HYDROCHLORIDE AND TIMOLOL MALEATE 20; 5 MG/ML; MG/ML
1 SOLUTION/ DROPS OPHTHALMIC 2 TIMES DAILY
Status: DISCONTINUED | OUTPATIENT
Start: 2018-08-28 | End: 2018-08-29 | Stop reason: HOSPADM

## 2018-08-28 RX ORDER — BRIMONIDINE TARTRATE AND TIMOLOL MALEATE 2; 5 MG/ML; MG/ML
1 SOLUTION OPHTHALMIC 2 TIMES DAILY
Status: DISCONTINUED | OUTPATIENT
Start: 2018-08-28 | End: 2018-08-28

## 2018-08-28 RX ORDER — BRINZOLAMIDE 10 MG/ML
1 SUSPENSION/ DROPS OPHTHALMIC 2 TIMES DAILY
Status: DISCONTINUED | OUTPATIENT
Start: 2018-08-28 | End: 2018-08-28

## 2018-08-28 RX ORDER — BRIMONIDINE TARTRATE 2 MG/ML
1 SOLUTION/ DROPS OPHTHALMIC 2 TIMES DAILY
Status: DISCONTINUED | OUTPATIENT
Start: 2018-08-28 | End: 2018-08-29 | Stop reason: HOSPADM

## 2018-08-28 RX ORDER — PREGABALIN 25 MG/1
50 CAPSULE ORAL 2 TIMES DAILY
Status: DISCONTINUED | OUTPATIENT
Start: 2018-08-28 | End: 2018-08-29 | Stop reason: HOSPADM

## 2018-08-28 RX ORDER — LOSARTAN POTASSIUM 25 MG/1
50 TABLET ORAL EVERY EVENING
Status: DISCONTINUED | OUTPATIENT
Start: 2018-08-28 | End: 2018-08-29 | Stop reason: HOSPADM

## 2018-08-28 RX ADMIN — HEPARIN SODIUM 5000 UNITS: 5000 INJECTION, SOLUTION INTRAVENOUS; SUBCUTANEOUS at 21:33

## 2018-08-28 RX ADMIN — CARVEDILOL 6.25 MG: 6.25 TABLET, FILM COATED ORAL at 17:54

## 2018-08-28 RX ADMIN — AMLODIPINE BESYLATE 10 MG: 10 TABLET ORAL at 06:18

## 2018-08-28 RX ADMIN — LABETALOL HYDROCHLORIDE 10 MG: 5 INJECTION, SOLUTION INTRAVENOUS at 04:15

## 2018-08-28 RX ADMIN — HEPARIN SODIUM 5000 UNITS: 5000 INJECTION, SOLUTION INTRAVENOUS; SUBCUTANEOUS at 14:00

## 2018-08-28 RX ADMIN — PREGABALIN 50 MG: 25 CAPSULE ORAL at 17:54

## 2018-08-28 RX ADMIN — LOSARTAN POTASSIUM 50 MG: 25 TABLET, FILM COATED ORAL at 17:54

## 2018-08-28 RX ADMIN — CARVEDILOL 6.25 MG: 6.25 TABLET, FILM COATED ORAL at 09:00

## 2018-08-28 RX ADMIN — HEPARIN SODIUM 5000 UNITS: 5000 INJECTION, SOLUTION INTRAVENOUS; SUBCUTANEOUS at 06:18

## 2018-08-28 ASSESSMENT — ENCOUNTER SYMPTOMS
PALPITATIONS: 0
NAUSEA: 0
FEVER: 0
WEAKNESS: 1
CARDIOVASCULAR NEGATIVE: 1
FOCAL WEAKNESS: 0
DEPRESSION: 0
VOMITING: 0
GASTROINTESTINAL NEGATIVE: 1
MUSCULOSKELETAL NEGATIVE: 1
CHILLS: 0
EYES NEGATIVE: 1
ABDOMINAL PAIN: 0
SPUTUM PRODUCTION: 0
COUGH: 0
BLURRED VISION: 0
DOUBLE VISION: 0
SHORTNESS OF BREATH: 1
SHORTNESS OF BREATH: 0

## 2018-08-28 ASSESSMENT — PAIN SCALES - GENERAL
PAINLEVEL_OUTOF10: 0

## 2018-08-28 ASSESSMENT — COGNITIVE AND FUNCTIONAL STATUS - GENERAL
SUGGESTED CMS G CODE MODIFIER MOBILITY: CJ
CLIMB 3 TO 5 STEPS WITH RAILING: A LITTLE
STANDING UP FROM CHAIR USING ARMS: A LITTLE
MOBILITY SCORE: 21
DAILY ACTIVITIY SCORE: 23
SUGGESTED CMS G CODE MODIFIER DAILY ACTIVITY: CI
WALKING IN HOSPITAL ROOM: A LITTLE
TOILETING: A LITTLE

## 2018-08-28 ASSESSMENT — LIFESTYLE VARIABLES
ALCOHOL_USE: NO
EVER_SMOKED: NEVER
EVER_SMOKED: NEVER

## 2018-08-28 ASSESSMENT — PATIENT HEALTH QUESTIONNAIRE - PHQ9
1. LITTLE INTEREST OR PLEASURE IN DOING THINGS: NOT AT ALL
SUM OF ALL RESPONSES TO PHQ9 QUESTIONS 1 AND 2: 0
2. FEELING DOWN, DEPRESSED, IRRITABLE, OR HOPELESS: NOT AT ALL

## 2018-08-28 NOTE — PROGRESS NOTES
[facility] Hospitalist Progress Note    Date of Service: 2018    Chief Complaint  63 y.o. female admitted 2018 with shortness of breath.     Interval Problem Update  Ms. Quijano has a history of ESRD on dialysis as well as TD and oxygen-dependent COPD at 2 liters that presented to the ER with progressive shortness of breath. She was found to have sats of 87% on room air and required rescue BiPAP and ICU admission.     RN notes that she received one dose of IV labetalol last night due to elevated BP. She had dialysis  and again today.  She is on 4 liters [place] oxygen.    Consultants/Specialty  Nephrology   Critical care. I discussed with Dr. Clark on ICU Hot Rounds.             Review of Systems   Cardiovascular: Negative for chest pain.   Gastrointestinal: Negative for vomiting.   All other systems reviewed and are negative.     Physical Exam  Laboratory/Imaging   Hemodynamics  Temp (24hrs), Av.2 °C (97.1 °F), Min:35.9 °C (96.7 °F), Max:36.5 °C (97.7 °F)   Temperature: 36 °C (96.8 °F)  Pulse  Av.8  Min: 68  Max: 95 Heart Rate (Monitored): 78  NIBP: 141/50      Respiratory      Respiration: 16, Pulse Oximetry: 100 %        RUL Breath Sounds: Clear, RML Breath Sounds: Clear, RLL Breath Sounds: Diminished, BLANK Breath Sounds: Clear, LLL Breath Sounds: Diminished    Fluids    Intake/Output Summary (Last 24 hours) at 18 0806  Last data filed at 18 0000   Gross per 24 hour   Intake           825.67 ml   Output             3500 ml   Net         -2674.33 ml       Nutrition  Orders Placed This Encounter   Procedures   • Diet Order Renal, Diabetic     Standing Status:   Standing     Number of Occurrences:   1     Order Specific Question:   Diet:     Answer:   Renal [8]     Order Specific Question:   Diet:     Answer:   Diabetic [3]     Physical Exam   Constitutional: She is oriented to person, place, and time.   HENT:   Head: Normocephalic and atraumatic.   Neck: Normal range of motion. Neck  supple.   Cardiovascular: Normal rate and regular rhythm.    Murmur heard.  Pulmonary/Chest: Effort normal. No respiratory distress. She has no wheezes.   Abdominal: Soft. She exhibits no distension. There is no tenderness.   Musculoskeletal: She exhibits no edema or tenderness.   Left arm fistula   Neurological: She is alert and oriented to person, place, and time.   Skin: Skin is warm and dry. She is not diaphoretic.   Nursing note and vitals reviewed.      Recent Labs      08/27/18   0148  08/28/18   0537   WBC  8.3  5.3   RBC  2.59*  2.19*   HEMOGLOBIN  9.2*  7.6*   HEMATOCRIT  29.4*  23.7*   MCV  113.5*  108.2*   MCH  35.5*  34.7*   MCHC  31.3*  32.1*   RDW  75.8*  72.3*   PLATELETCT  #NM QNS FOR LEATHA  172   MPV   --   13.3*     Recent Labs      08/27/18   0148  08/27/18   0400  08/28/18   0537   SODIUM  128*  128*  133*   POTASSIUM  7.5*  7.3*  4.8   CHLORIDE  92*  92*  95*   CO2  21  22  27   GLUCOSE  373*  345*  169*   BUN  98*  100*  52*   CREATININE  7.96*  8.27*  5.88*   CALCIUM  8.3*  8.2*  8.2*         Recent Labs      08/27/18   0400   BNPBTYPENAT  368*     Recent Labs      08/27/18   0316   TRIGLYCERIDE  100   HDL  48   LDL  64          Assessment/Plan     * Hypertensive emergency- (present on admission)   Assessment & Plan    Patient had been started on IV nitro and Cardene, with ICU admission  Continue amlodipine 10 mg daily and coreg 6.25 mg BID  Losartan 50 mg daily had been held due to hyperkalemia and will be restarted.            Hyperkalemia- (present on admission)   Assessment & Plan    Severe hyperkalemia of 7.5 with evidence of AV block on EKG  She was treated in the ER with IV calcium, insulin with dextrose and Kayexalate  Hemodialysis by nephrology  Continuous cardiac monitoring  Monitor daily BMP          MDS (myelodysplastic syndrome) (HCC)- (present on admission)   Assessment & Plan    History of  Follow CBC  Hb 7  Followed by Dr. Avila, oncology        Type 2 diabetes mellitus with  diabetic polyneuropathy, without long-term current use of insulin ([facility])- (present on admission)   Assessment & Plan    Uncontrolled with hyperglycemia  Started on insulin sliding scale with serial Accu-Cheks  Hemoglobin A1c is 9.5              Acute exacerbation of chronic obstructive pulmonary disease (COPD) ([facility])- (present on admission)   Assessment & Plan    Due to volume overload.  She is on 2 liters nasal cannula oxygen outpatient and has required increased oxygen.         ESRD (end stage renal disease) on dialysis ([facility])- (present on admission)   Assessment & Plan    Hemodialysis inpatient initiated on 8/27          Quality-Core Measures

## 2018-08-28 NOTE — PROGRESS NOTES
Pt ambulated to restroom with no assistance needed; bathed with teeth brushed; linens changed.     Will continue to monitor.

## 2018-08-28 NOTE — PROGRESS NOTES
Pulmonary Critical Care Progress Note      Admit Date: 8/27/2018    Chief Complaint: Shortness of breath, nausea, chest discomfort.       History of Present Illness: 63 y.o. female with a past medical history of ESRD/HDD, last full session hemodialysis was passed Friday, suboptimally controlled hypertension, COPD on home O2 via nasal cannula at 2 L/min and diabetes mellitus, presented to the hospital by EMS complaining of onset of dizziness, nausea, vomiting, chest discomfort radiating to left arm, crampy leg pains and worsening shortness of breath.  In ED patient was found to have severe hypertension and plain x-ray revealed interstitial pulmonary edema, patient was placed on BiPAP for severe respiratory distress and a nitroglycerin drip for hypertensive emergency.  Once the patient's blood pressure control improved, patient was weaned from BiPAP high flow nasal cannula.  Lab report is critical value of serum potassium at 7.5 and emergent dialysis was requested.  Patient is to be admitted to ICU for the treatment of hypertensive emergency with pulmonary edema and severe hypoxemia, hyperkalemia with rescue hemodialysis, therefore ICU consult      Interval Events:  24 hour interval history reviewed   Tm 97.7  -2.4L over last 24hr, -2.2L since admit  cxr w b/l pulm opacities  Echo 8/27 EF 75%, hyperdynamic LV  Hb 9.2->7.6  Na 128->133  K 4.8    2L Nc  Off cardene infusion  Tolerating orals  Last BM 8/27      Review of Systems   Constitutional: Negative for chills, fever and malaise/fatigue.   HENT: Negative for congestion.    Eyes: Negative for blurred vision and double vision.   Respiratory: Negative for cough, sputum production and shortness of breath.    Cardiovascular: Negative for chest pain and palpitations.   Gastrointestinal: Negative for abdominal pain, nausea and vomiting.   Neurological: Positive for weakness. Negative for focal weakness.   Psychiatric/Behavioral: Negative for depression.   All other  systems reviewed and are negative.    Physical Exam   Constitutional: She appears well-developed and well-nourished.   HENT:   Head: Normocephalic and atraumatic.   Eyes: Pupils are equal, round, and reactive to light. Conjunctivae are normal.   Neck: No tracheal deviation present.   Cardiovascular: Normal rate and regular rhythm.    Pulmonary/Chest: She has no wheezes. She has no rales.   Abdominal: Soft. She exhibits no distension. There is no tenderness.   Musculoskeletal: She exhibits no edema.   Neurological: No cranial nerve deficit.   Skin: Skin is warm and dry.   Psychiatric: She has a normal mood and affect.   Nursing note and vitals reviewed.      PFSH:  No change.    Respiratory:     Pulse Oximetry: 100 %  Chest Tube Drains:                  Invalid input(s): [id number]    HemoDynamics:  Pulse: 69, Heart Rate (Monitored): 69  NIBP: 141/50         Recent Labs      08/27/18   0148  08/27/18   0316  08/27/18   0400  08/27/18   1105  08/28/18   0537   CPKTOTAL   --   72  69   --   43   TROPONINI  0.01  0.02   --   0.01   --    BNPBTYPENAT   --    --   368*   --    --        Neuro:  GCS           Fluids:  Intake/Output       08/26/18 0700 - 08/27/18 0659 (Not Admitted) 08/27/18 0700 - 08/28/18 0659 08/28/18 0700 - 08/29/18 0659      [id number] 4947-3508 Total 8208-7125 7785-4476 Total 9502-3346 4104-3025 Total       Intake    P.O.  --  240 240  280  100 380  --  -- --    P.O. -- 240 240 280 100 380 -- -- --    I.V.  --  -- --  148.3  -- 148.3  --  -- --    Cardene Volume -- -- -- 124.8 -- 124.8 -- -- --    Nitroglycerin Volume -- -- -- 23.5 -- 23.5 -- -- --    Dialysis  --  -- --  500  -- 500  --  -- --    Dialysis Volume (Dialysis Intake) -- -- -- 500 -- 500 -- -- --    Total Intake -- 240 240 928.3 100 1028.3 -- -- --       Output    Urine  --  -- --  --  -- --  --  -- --    Number of Times Voided -- -- -- -- 2 x 2 x -- -- --    Dialysis  --  -- --  3500  -- 3500  --  -- --    Dialysis Output (Dialysis  Output) -- -- -- 3500 -- 3500 -- -- --    Stool  --  -- --  --  -- --  --  -- --    Number of Times Stooled -- -- -- -- 2 x 2 x -- -- --    Total Output -- -- -- 3500 -- 3500 -- -- --       Net I/O     -- 240 240 -2571.7 100 -2471.7 -- -- --           Recent Labs      18   0400  18   0537   SODIUM  128*  128*  133*   POTASSIUM  7.5*  7.3*  4.8   CHLORIDE  92*  92*  95*   CO2  21  22  27   BUN  98*  100*  52*   CREATININE  7.96*  8.27*  5.88*   CALCIUM  8.3*  8.2*  8.2*       GI/Nutrition:    Liver Function  Recent Labs      18   0400  18   0537   ALTSGPT  24   --    --    ASTSGOT  25   --    --    ALKPHOSPHAT  70   --    --    TBILIRUBIN  0.5   --    --    GLUCOSE  373*  345*  169*       Heme:  Recent Labs      18   1105  18   0537   RBC  2.59*   --   2.19*   HEMOGLOBIN  9.2*   --   7.6*   HEMATOCRIT  29.4*   --   23.7*   PLATELETCT  #NM QNS FOR LEATHA   --   172   IRON   --   183*   --    FERRITIN   --   2513.5*   --    TOTIRONBC   --   295   --        Infectious Disease:  Temp  Av.2 °C (97.1 °F)  Min: 35.9 °C (96.7 °F)  Max: 36.5 °C (97.7 °F)  Micro: cultures reviewed  Recent Labs      18   0537   WBC  8.3  5.3   NEUTSPOLYS  74.10*   --    LYMPHOCYTES  23.10   --    MONOCYTES  0.00   --    EOSINOPHILS  1.90   --    BASOPHILS  0.00   --    ASTSGOT  25   --    ALTSGPT  24   --    ALKPHOSPHAT  70   --    TBILIRUBIN  0.5   --      Current Facility-Administered Medications   Medication Dose Frequency Provider Last Rate Last Dose   • ipratropium-albuterol (DUONEB) nebulizer solution  3 mL Q4H PRN (RT) Ranulfo Agarwal M.D.   3 mL at 18 0036   • amLODIPine (NORVASC) tablet 10 mg  10 mg Q DAY Nahun Pack M.D.   10 mg at 18 0618   • niCARdipine (CARDENE) 25 mg in  mL Infusion  0-15 mg/hr Continuous Nahun Pack M.D.   Stopped at 18 0902   • insulin regular (HUMULIN R) injection  2-9 Units  2-9 Units 4X/DAY ACHS Nahun Pack M.D.   2 Units at 08/28/18 0618    And   • glucose 4 g chewable tablet 16 g  16 g Q15 MIN PRN Nahun Pack M.D.        And   • dextrose 50% (D50W) injection 25 mL  25 mL Q15 MIN PRN Nahun Pack M.D.       • Respiratory Care per Protocol   Continuous RT Nahun Pack M.D.       • heparin injection 5,000 Units  5,000 Units Q8HRS Nahun Pack M.D.   5,000 Units at 08/28/18 0618   • heparin injection 1,500 Units  1,500 Units DIALYSIS PRN Chato Alberts D.O.   1,500 Units at 08/27/18 0803   • [START ON 8/29/2018] epoetin ivonne (EPOGEN,PROCRIT) injection 10,000 Units  10,000 Units MO, WE +  Shola Berger M.D.       • carvedilol (COREG) tablet 6.25 mg  6.25 mg BID WITH MEALS David Clark M.D.   Stopped at 08/27/18 1730   • labetalol (NORMODYNE,TRANDATE) injection 10 mg  10 mg Q4HRS PRJAMEL Hernandez M.D.   10 mg at 08/28/18 0415   • hydrALAZINE (APRESOLINE) injection 20 mg  20 mg Q6HRS PRJAMEL Hernandez M.D.   20 mg at 08/27/18 1327   • prochlorperazine (COMPAZINE) injection 10 mg  10 mg Q6HRS PRJAMEL Hernandez M.D.         Last reviewed on 8/27/2018  2:59 PM by Francheska Whelan [facility]    Quality  Measures:  Labs reviewed, Medications reviewed and Radiology images reviewed                      Assessment/Plan:  -  Hypertensive emergency with pulmonary edema   - norvasc 10, coreg 6.25 bid   - add losartan   - echo 8/27 reviewed    -  Hyperkalemia   - resolved   - getting HD      -  Acute on chronic hypoxemic respiratory failure   - HD with volume removal   - rt/02 protocols   - bipap if deteriorates     -  Hyponatremia   - related to renal dz   - improving    -  Macrocytic anemia   - check b12/folate/tsh   - conservative transfusion strategy     -  ESRD/HDD   - HD per renal    -  History of COPD with hypoxemia requiring supplemental oxygen   - rt/02 protocols   - monitor for exacerbation     -  Uncontrolled Diabetes mellitus type 2   - a1c  9.5   - ssi + fsbs      Discussed patient condition and risk of morbidity and/or mortality with RN, RT, Therapies, Pharmacy and hospitalist.

## 2018-08-28 NOTE — PROGRESS NOTES
12 hour chart check; chart reviewed at bedside; report received from ARIEL Artis.     Pt awake and talking on cell phone; no signs of respiratory distress or c/o pain at this time.     Will continue to monitor.

## 2018-08-28 NOTE — PROGRESS NOTES
[facility] Nephrology Progress Note               Author: Shola DORADO Celine Date & Time created: 8/28/2018  11:02 AM     Interval History:  The patient is a 63-year-old female well known to   our group.  She has end-stage renal disease and dialyzes Monday, Wednesday,   and Friday.  She has additional past medical history of myelodysplastic   syndrome requiring frequent transfusions, arthritis, atrial fibrillation,   COPD, CHF, diabetes, and hypertension who was increasingly short of breath,   feeling poorly and was transferred to [facility] for further evaluation and   treatment.  Once in the emergency room, she had a chest x-ray suggestive of   volume overload.  She was hyperkalemic and is admitted for further treatment.    Currently, she is receiving dialysis.  She is also on nicardipine drip, which   has slowly been titrated off secondary to her admission blood pressure, which   was greater than 200.    8/28/18 : HD yesterday 3 liter UF, breathing improved from prior    Review of Systems:  Review of Systems   HENT: Negative.    Eyes: Negative.    Respiratory: Positive for shortness of breath.    Cardiovascular: Negative.    Gastrointestinal: Negative.    Genitourinary: Negative.    Musculoskeletal: Negative.    Skin: Negative.    Neurological: Positive for weakness.   Endo/Heme/Allergies: Negative.        Physical Exam:  Physical Exam   Constitutional: She is oriented to person, place, and time. She appears well-developed and well-nourished.   HENT:   Head: Normocephalic and atraumatic.   Eyes: Conjunctivae are normal.   Neck: Normal range of motion. Neck supple.   Cardiovascular: Normal rate and regular rhythm.    Pulmonary/Chest: Effort normal and breath sounds normal.   Abdominal: Bowel sounds are normal.   Musculoskeletal: Normal range of motion.   Fistula in left arm with bruit/thrill   Neurological: She is alert and oriented to person, place, and time.   Skin: Skin is warm and dry.   Nursing note and vitals  reviewed.      Labs:        Invalid input(s): [id number]  Recent Labs      18   0316  18   0400  18   1105  18   0537   CPKTOTAL   --   72  69   --   43   TROPONINI  0.01  0.02   --   0.01   --    BNPBTYPENAT   --    --   368*   --    --      Recent Labs      18   0537   SODIUM  128*  128*  133*   POTASSIUM  7.5*  7.3*  4.8   CHLORIDE  92*  92*  95*   CO2  21  22  27   BUN  98*  100*  52*   CREATININE  7.96*  8.27*  5.88*   CALCIUM  8.3*  8.2*  8.2*     Recent Labs      18   0537   ALTSGPT  24   --    --    ASTSGOT  25   --    --    ALKPHOSPHAT  70   --    --    TBILIRUBIN  0.5   --    --    GLUCOSE  373*  345*  169*     Recent Labs      18   1105  18   0537   RBC  2.59*   --   2.19*   HEMOGLOBIN  9.2*   --   7.6*   HEMATOCRIT  29.4*   --   23.7*   PLATELETCT  #NM QNS FOR LEATHA   --   172   IRON   --   183*   --    FERRITIN   --   2513.5*   --    TOTIRONBC   --   295   --      Recent Labs      18   0537   WBC  8.3  5.3   NEUTSPOLYS  74.10*   --    LYMPHOCYTES  23.10   --    MONOCYTES  0.00   --    EOSINOPHILS  1.90   --    BASOPHILS  0.00   --    ASTSGOT  25   --    ALTSGPT  24   --    ALKPHOSPHAT  70   --    TBILIRUBIN  0.5   --            Hemodynamics:  Temp (24hrs), Av.1 °C (97 °F), Min:35.9 °C (96.7 °F), Max:36.5 °C (97.7 °F)  Temperature: 35.9 °C (96.7 °F)  Pulse  Av.8  Min: 68  Max: 95Heart Rate (Monitored): 77  NIBP: 141/50     Respiratory:    Respiration: 20, Pulse Oximetry: 99 %        RUL Breath Sounds: Clear, RML Breath Sounds: Clear, RLL Breath Sounds: Diminished, BLANK Breath Sounds: Clear, LLL Breath Sounds: Diminished  Fluids:    Intake/Output Summary (Last 24 hours) at 18 1102  Last data filed at 18 0800   Gross per 24 hour   Intake              540 ml   Output                0 ml   Net              540 ml         GI/Nutrition:  Orders Placed This Encounter   Procedures   • Diet Order Renal, Diabetic     Standing Status:   Standing     Number of Occurrences:   1     Order Specific Question:   Diet:     Answer:   Renal [8]     Order Specific Question:   Diet:     Answer:   Diabetic [3]     Medical Decision Making, by Problem:  Active Hospital Problems    Diagnosis   • *Hypertensive emergency [I16.1]   • Hyperkalemia [E87.5]   • ESRD (end stage renal disease) on dialysis ([facility]) [N18.6, Z99.2]   • Acute exacerbation of chronic obstructive pulmonary disease (COPD) ([facility]) [J44.1]   • Type 2 diabetes mellitus with diabetic polyneuropathy, without long-term current use of insulin ([facility]) [E11.42]   • MDS (myelodysplastic syndrome) ([facility]) [D46.9]       Quality-Core Measures   Reviewed items::  Labs reviewed and Medications reviewed  CONCLUSION:  The patient is a 63-year-old female who presented for shortness   of breath and was found to have hypertensive urgency and hyperkalemia, as well   as anemia.  1.  Hyperkalemia resolved after HD  2.  Anemia secondary to end-stage renal disease, myelodysplastic syndrome.    3.  End-stage renal disease with hyperkalemia and increased volume. HD MWF  4.  Shortness of breath secondary to chronic obstructive pulmonary disease/congestive heart failure-improved after HD  5.  Hypertension improved  6.  Hyponatremia improved    Plan  PUF today  HD in am  EPO

## 2018-08-28 NOTE — PROGRESS NOTES
PUFF ordered by Dr. Berger. Treatment started at 1226 and ended at 1426.     Total Net UF 2000 mL.    Pt tolerated treatment well with no issues. See flow sheet for details. Cannulation needles taken out, held pressure for 10 min and placed gauze dressing with no bleeding. ALIVIA AVF + for bruit/thrill. Report given to CASSIE Martin RN.

## 2018-08-29 VITALS
HEART RATE: 74 BPM | WEIGHT: 144.4 LBS | RESPIRATION RATE: 18 BRPM | OXYGEN SATURATION: 100 % | DIASTOLIC BLOOD PRESSURE: 62 MMHG | BODY MASS INDEX: 30.31 KG/M2 | HEIGHT: 58 IN | TEMPERATURE: 97.3 F | SYSTOLIC BLOOD PRESSURE: 162 MMHG

## 2018-08-29 LAB
ABO GROUP BLD: NORMAL
ABO GROUP BLD: NORMAL
ANION GAP SERPL CALC-SCNC: 15 MMOL/L (ref 0–11.9)
BARCODED ABORH UBTYP: 8400
BARCODED ABORH UBTYP: 8400
BARCODED PRD CODE UBPRD: NORMAL
BARCODED PRD CODE UBPRD: NORMAL
BARCODED UNIT NUM UBUNT: NORMAL
BARCODED UNIT NUM UBUNT: NORMAL
BLD GP AB SCN SERPL QL: NORMAL
BUN SERPL-MCNC: 77 MG/DL (ref 8–22)
CALCIUM SERPL-MCNC: 8 MG/DL (ref 8.5–10.5)
CHLORIDE SERPL-SCNC: 95 MMOL/L (ref 96–112)
CK SERPL-CCNC: 68 U/L (ref 0–154)
CO2 SERPL-SCNC: 22 MMOL/L (ref 20–33)
COMPONENT R 8504R: NORMAL
COMPONENT R 8504R: NORMAL
CREAT SERPL-MCNC: 6.9 MG/DL (ref 0.5–1.4)
ERYTHROCYTE [DISTWIDTH] IN BLOOD BY AUTOMATED COUNT: 71.5 FL (ref 35.9–50)
GLUCOSE BLD-MCNC: 169 MG/DL (ref 65–99)
GLUCOSE BLD-MCNC: 175 MG/DL (ref 65–99)
GLUCOSE SERPL-MCNC: 205 MG/DL (ref 65–99)
HCT VFR BLD AUTO: 20.6 % (ref 37–47)
HGB BLD-MCNC: 6.5 G/DL (ref 12–16)
MCH RBC QN AUTO: 34.2 PG (ref 27–33)
MCHC RBC AUTO-ENTMCNC: 31.6 G/DL (ref 33.6–35)
MCV RBC AUTO: 108.4 FL (ref 81.4–97.8)
PLATELET # BLD AUTO: 158 K/UL (ref 164–446)
PMV BLD AUTO: 13.2 FL (ref 9–12.9)
POTASSIUM SERPL-SCNC: 5.4 MMOL/L (ref 3.6–5.5)
PRODUCT TYPE UPROD: NORMAL
PRODUCT TYPE UPROD: NORMAL
RBC # BLD AUTO: 1.9 M/UL (ref 4.2–5.4)
RH BLD: NORMAL
RH BLD: NORMAL
SODIUM SERPL-SCNC: 132 MMOL/L (ref 135–145)
UNIT STATUS USTAT: NORMAL
UNIT STATUS USTAT: NORMAL
WBC # BLD AUTO: 5.9 K/UL (ref 4.8–10.8)

## 2018-08-29 PROCEDURE — 700102 HCHG RX REV CODE 250 W/ 637 OVERRIDE(OP): Performed by: INTERNAL MEDICINE

## 2018-08-29 PROCEDURE — 86850 RBC ANTIBODY SCREEN: CPT

## 2018-08-29 PROCEDURE — 85027 COMPLETE CBC AUTOMATED: CPT

## 2018-08-29 PROCEDURE — A9270 NON-COVERED ITEM OR SERVICE: HCPCS | Performed by: INTERNAL MEDICINE

## 2018-08-29 PROCEDURE — 86900 BLOOD TYPING SEROLOGIC ABO: CPT

## 2018-08-29 PROCEDURE — 700102 HCHG RX REV CODE 250 W/ 637 OVERRIDE(OP): Performed by: HOSPITALIST

## 2018-08-29 PROCEDURE — A9270 NON-COVERED ITEM OR SERVICE: HCPCS | Performed by: HOSPITALIST

## 2018-08-29 PROCEDURE — 99233 SBSQ HOSP IP/OBS HIGH 50: CPT | Performed by: INTERNAL MEDICINE

## 2018-08-29 PROCEDURE — 700111 HCHG RX REV CODE 636 W/ 250 OVERRIDE (IP): Performed by: INTERNAL MEDICINE

## 2018-08-29 PROCEDURE — 97161 PT EVAL LOW COMPLEX 20 MIN: CPT

## 2018-08-29 PROCEDURE — 82550 ASSAY OF CK (CPK): CPT

## 2018-08-29 PROCEDURE — 86901 BLOOD TYPING SEROLOGIC RH(D): CPT

## 2018-08-29 PROCEDURE — 86923 COMPATIBILITY TEST ELECTRIC: CPT | Mod: 91

## 2018-08-29 PROCEDURE — 36430 TRANSFUSION BLD/BLD COMPNT: CPT

## 2018-08-29 PROCEDURE — 700111 HCHG RX REV CODE 636 W/ 250 OVERRIDE (IP): Mod: JG | Performed by: INTERNAL MEDICINE

## 2018-08-29 PROCEDURE — G8979 MOBILITY GOAL STATUS: HCPCS | Mod: CI

## 2018-08-29 PROCEDURE — 5A1D70Z PERFORMANCE OF URINARY FILTRATION, INTERMITTENT, LESS THAN 6 HOURS PER DAY: ICD-10-PCS | Performed by: INTERNAL MEDICINE

## 2018-08-29 PROCEDURE — 82962 GLUCOSE BLOOD TEST: CPT | Mod: 91

## 2018-08-29 PROCEDURE — P9016 RBC LEUKOCYTES REDUCED: HCPCS

## 2018-08-29 PROCEDURE — 99239 HOSP IP/OBS DSCHRG MGMT >30: CPT | Performed by: HOSPITALIST

## 2018-08-29 PROCEDURE — G8978 MOBILITY CURRENT STATUS: HCPCS | Mod: CI

## 2018-08-29 PROCEDURE — 30233N1 TRANSFUSION OF NONAUTOLOGOUS RED BLOOD CELLS INTO PERIPHERAL VEIN, PERCUTANEOUS APPROACH: ICD-10-PCS | Performed by: HOSPITALIST

## 2018-08-29 PROCEDURE — 80048 BASIC METABOLIC PNL TOTAL CA: CPT

## 2018-08-29 PROCEDURE — 90935 HEMODIALYSIS ONE EVALUATION: CPT

## 2018-08-29 PROCEDURE — G8980 MOBILITY D/C STATUS: HCPCS | Mod: CI

## 2018-08-29 RX ORDER — SODIUM CHLORIDE 9 MG/ML
INJECTION, SOLUTION INTRAVENOUS
Status: DISCONTINUED
Start: 2018-08-29 | End: 2018-08-29 | Stop reason: HOSPADM

## 2018-08-29 RX ADMIN — CARVEDILOL 6.25 MG: 6.25 TABLET, FILM COATED ORAL at 11:14

## 2018-08-29 RX ADMIN — EPOETIN ALFA 10000 UNITS: 10000 SOLUTION INTRAVENOUS; SUBCUTANEOUS at 08:10

## 2018-08-29 RX ADMIN — HEPARIN SODIUM 5000 UNITS: 5000 INJECTION, SOLUTION INTRAVENOUS; SUBCUTANEOUS at 05:51

## 2018-08-29 RX ADMIN — BRIMONIDINE TARTRATE 1 DROP: 2 SOLUTION OPHTHALMIC at 05:40

## 2018-08-29 RX ADMIN — BRIMONIDINE TARTRATE 1 DROP: 2 SOLUTION OPHTHALMIC at 01:08

## 2018-08-29 RX ADMIN — AMLODIPINE BESYLATE 10 MG: 10 TABLET ORAL at 05:42

## 2018-08-29 RX ADMIN — PREGABALIN 50 MG: 25 CAPSULE ORAL at 05:41

## 2018-08-29 RX ADMIN — LATANOPROST 1 DROP: 50 SOLUTION OPHTHALMIC at 01:08

## 2018-08-29 RX ADMIN — HEPARIN SODIUM 1500 UNITS: 1000 INJECTION, SOLUTION INTRAVENOUS; SUBCUTANEOUS at 07:10

## 2018-08-29 ASSESSMENT — GAIT ASSESSMENTS
DEVIATION: OTHER (COMMENT)
GAIT LEVEL OF ASSIST: SUPERVISED
ASSISTIVE DEVICE: FRONT WHEEL WALKER
DISTANCE (FEET): 200

## 2018-08-29 ASSESSMENT — ENCOUNTER SYMPTOMS
SPUTUM PRODUCTION: 0
FOCAL WEAKNESS: 0
VOMITING: 0
SHORTNESS OF BREATH: 0
NAUSEA: 0
GASTROINTESTINAL NEGATIVE: 1
WEAKNESS: 0
BLURRED VISION: 0
CHILLS: 0
ABDOMINAL PAIN: 0
PALPITATIONS: 0
DOUBLE VISION: 0
CARDIOVASCULAR NEGATIVE: 1
WEAKNESS: 1
FEVER: 0
EYES NEGATIVE: 1
MUSCULOSKELETAL NEGATIVE: 1
COUGH: 0
DEPRESSION: 0

## 2018-08-29 ASSESSMENT — COGNITIVE AND FUNCTIONAL STATUS - GENERAL
CLIMB 3 TO 5 STEPS WITH RAILING: A LITTLE
MOBILITY SCORE: 23
SUGGESTED CMS G CODE MODIFIER MOBILITY: CI

## 2018-08-29 ASSESSMENT — PAIN SCALES - GENERAL: PAINLEVEL_OUTOF10: 0

## 2018-08-29 NOTE — PROGRESS NOTES
Pulmonary Critical Care Progress Note      Admit Date: 8/27/2018    Chief Complaint: Shortness of breath, nausea, chest discomfort.       History of Present Illness: 63 y.o. female with a past medical history of ESRD/HDD, last full session hemodialysis was passed Friday, suboptimally controlled hypertension, COPD on home O2 via nasal cannula at 2 L/min and diabetes mellitus, presented to the hospital by EMS complaining of onset of dizziness, nausea, vomiting, chest discomfort radiating to left arm, crampy leg pains and worsening shortness of breath.  In ED patient was found to have severe hypertension and plain x-ray revealed interstitial pulmonary edema, patient was placed on BiPAP for severe respiratory distress and a nitroglycerin drip for hypertensive emergency.  Once the patient's blood pressure control improved, patient was weaned from BiPAP high flow nasal cannula.  Lab report is critical value of serum potassium at 7.5 and emergent dialysis was requested.  Patient is to be admitted to ICU for the treatment of hypertensive emergency with pulmonary edema and severe hypoxemia, hyperkalemia with rescue hemodialysis, therefore ICU consult      Interval Events:  24 hour interval history reviewed   Tm 97  -1.2L over last 24hr, -3.5L since admit  No cxr this am  Echo 8/27 EF 75%, hyperdynamic LV  Hb 9.2->7.6->6.5  Na 132  K 5.4    1.5L Nc  Tolerating orals  Last BM 8/28      Review of Systems   Constitutional: Negative for chills, fever and malaise/fatigue.   HENT: Negative for congestion.    Eyes: Negative for blurred vision and double vision.   Respiratory: Negative for cough, sputum production and shortness of breath.    Cardiovascular: Negative for chest pain and palpitations.   Gastrointestinal: Negative for abdominal pain, nausea and vomiting.   Neurological: Positive for weakness. Negative for focal weakness.   Psychiatric/Behavioral: Negative for depression.   All other systems reviewed and are  negative.  unchanged    Physical Exam   Constitutional: She appears well-developed and well-nourished.   HENT:   Head: Normocephalic and atraumatic.   Eyes: Pupils are equal, round, and reactive to light. Conjunctivae are normal.   Neck: No tracheal deviation present.   Cardiovascular: Normal rate and regular rhythm.    Pulmonary/Chest: She has no wheezes. She has no rales.   Abdominal: Soft. She exhibits no distension. There is no tenderness.   Musculoskeletal: She exhibits no edema.   Neurological: No cranial nerve deficit.   Skin: Skin is warm and dry.   Psychiatric: She has a normal mood and affect.   Nursing note and vitals reviewed.  unchanged    PFSH:  No change.    Respiratory:     Pulse Oximetry: 100 %  Chest Tube Drains:                  Invalid input(s): [id number]    HemoDynamics:  Pulse: 72, Heart Rate (Monitored): 77  Blood Pressure: 145/54, NIBP: 149/63         Recent Labs      08/27/18   0148   08/27/18   0316  08/27/18   0400  08/27/18   1105  08/28/18   0537  08/29/18   0120   CPKTOTAL   --    < >  72  69   --   43  68   TROPONINI  0.01   --   0.02   --   0.01   --    --    BNPBTYPENAT   --    --    --   368*   --    --    --     < > = values in this interval not displayed.       Neuro:  GCS           Fluids:  Intake/Output       08/27/18 0700 - 08/28/18 0659 08/28/18 0700 - 08/29/18 0659 08/29/18 0700 - 08/30/18 0659      7644-9983 6299-5520 Total 8471-1776 2957-5986 Total 5197-2021 1028-9749 Total       Intake    P.O.  280  100 380  720  -- 720  --  -- --    P.O. 280 100 380 720 -- 720 -- -- --    I.V.  148.3  -- 148.3  --  -- --  --  -- --    Cardene Volume 124.8 -- 124.8 -- -- -- -- -- --    Nitroglycerin Volume 23.5 -- 23.5 -- -- -- -- -- --    Dialysis  500  -- 500  500  -- 500  --  -- --    Dialysis Volume (Dialysis Intake) 500 -- 500 500 -- 500 -- -- --    Total Intake 928.3 100 1028.3 1220 -- 1220 -- -- --       Output    Urine  --  -- --  --  -- --  --  -- --    Number of Times Voided  -- 2 x 2 x 3 x -- 3 x -- -- --    Dialysis  3500  -- 3500  2500  -- 2500  --  -- --    Dialysis Output (Dialysis Output) 3500 -- 3500 2500 -- 2500 -- -- --    Stool  --  -- --  --  -- --  --  -- --    Number of Times Stooled -- 2 x 2 x 1 x -- 1 x -- -- --    Total Output 3500 -- 3500 2500 -- 2500 -- -- --       Net I/O     -2571.7 100 -2471.7 -1280 -- -1280 -- -- --        Weight: 65.5 kg (144 lb 6.4 oz)  Recent Labs      18   0400  18   0518   0120   SODIUM  128*  133*  132*   POTASSIUM  7.3*  4.8  5.4   CHLORIDE  92*  95*  95*   CO2  22  27  22   BUN  100*  52*  77*   CREATININE  8.27*  5.88*  6.90*   CALCIUM  8.2*  8.2*  8.0*       GI/Nutrition:    Liver Function  Recent Labs      18   01418   0400  18   0537  18   0120   ALTSGPT  24   --    --    --    ASTSGOT  25   --    --    --    ALKPHOSPHAT  70   --    --    --    TBILIRUBIN  0.5   --    --    --    GLUCOSE  373*  345*  169*  205*       Heme:  Recent Labs      18   1105  18   0537  18   0120   RBC  2.59*   --   2.19*  1.90*   HEMOGLOBIN  9.2*   --   7.6*  6.5*   HEMATOCRIT  29.4*   --   23.7*  20.6*   PLATELETCT  #NM QNS FOR LEATHA   --   172  158*   IRON   --   183*   --    --    FERRITIN   --   2513.5*   --    --    TOTIRONBC   --   295   --    --        Infectious Disease:  Temp  Av °C (96.8 °F)  Min: 35.9 °C (96.7 °F)  Max: 36.1 °C (97 °F)  Micro: cultures reviewed  Recent Labs      18  18   0537  18   0120   WBC  8.3  5.3  5.9   NEUTSPOLYS  74.10*   --    --    LYMPHOCYTES  23.10   --    --    MONOCYTES  0.00   --    --    EOSINOPHILS  1.90   --    --    BASOPHILS  0.00   --    --    ASTSGOT  25   --    --    ALTSGPT  24   --    --    ALKPHOSPHAT  70   --    --    TBILIRUBIN  0.5   --    --      Current Facility-Administered Medications   Medication Dose Frequency Provider Last Rate Last Dose   • losartan (COZAAR) tablet 50 mg  50 mg Q EVENING  Chepe Camargo M.D.   50 mg at 08/28/18 1754   • pregabalin (LYRICA) capsule 50 mg  50 mg BID David Clark M.D.   50 mg at 08/29/18 0541   • latanoprost (XALATAN) 0.005 % ophthalmic solution 1 Drop  1 Drop Q EVENING David Clark M.D.   1 Drop at 08/29/18 0108   • brimonidine (ALPHAGAN) 0.2 % ophthalmic solution 1 Drop  1 Drop BID KOKO CarmichaelORobbie   1 Drop at 08/29/18 0540   • dorzolamide-timolol (COSOPT) 22.3-6.8 MG/ML ophthalmic drops 1 Drop  1 Drop BID Wade Holman D.O.   Stopped at 08/28/18 2200   • ipratropium-albuterol (DUONEB) nebulizer solution  3 mL Q4H PRN (RT) Ranulfo Agarwal M.D.   3 mL at 08/27/18 0036   • amLODIPine (NORVASC) tablet 10 mg  10 mg Q DAY Nhaun Pack M.D.   10 mg at 08/29/18 0542   • insulin regular (HUMULIN R) injection 2-9 Units  2-9 Units 4X/DAY ACHS Nahun Pack M.D.   2 Units at 08/29/18 0558    And   • glucose 4 g chewable tablet 16 g  16 g Q15 MIN PRN Nahun Pack M.D.        And   • dextrose 50% (D50W) injection 25 mL  25 mL Q15 MIN PRN Nahun Pack M.D.       • Respiratory Care per Protocol   Continuous RT Nahun Pack M.D.       • heparin injection 5,000 Units  5,000 Units Q8HRS Nahun Pack M.D.   5,000 Units at 08/29/18 0551   • heparin injection 1,500 Units  1,500 Units DIALYSIS PRN Chato Alberts D.ORobbie   1,500 Units at 08/27/18 0803   • epoetin ivonne (EPOGEN,PROCRIT) injection 10,000 Units  10,000 Units MO, WE + FR Shola Berger M.D.       • carvedilol (COREG) tablet 6.25 mg  6.25 mg BID WITH MEALS David E Forsyth, M.D.   6.25 mg at 08/28/18 1754   • labetalol (NORMODYNE,TRANDATE) injection 10 mg  10 mg Q4HRS PRJAMEL Hernandez M.D.   10 mg at 08/28/18 0415   • hydrALAZINE (APRESOLINE) injection 20 mg  20 mg Q6HRS PRJAMEL Hernandez M.D.   20 mg at 08/27/18 1327   • prochlorperazine (COMPAZINE) injection 10 mg  10 mg Q6HRS PRJAMEL Hernandez M.D.         Last reviewed on 8/27/2018  2:59 PM by Sun Delvalle    Quality   Measures:  Labs reviewed, Medications reviewed and Radiology images reviewed                      Assessment/Plan:  -  Hypertensive emergency with pulmonary edema   - norvasc 10, coreg 6.25 bid   - losartan   - echo 8/27 reviewed    -  Hyperkalemia   - resolved     -  Acute on chronic hypoxemic respiratory failure   - HD with volume removal   - rt/02 protocols   - improving    -  Hyponatremia   - related to renal dz   - improving    -  Macrocytic anemia   - f/u b12/folate/tsh   - conservative transfusion strategy   - give 2u prbcs now     -  ESRD/HDD   - HD per renal    -  History of COPD with hypoxemia requiring supplemental oxygen   - rt/02 protocols   - monitor for exacerbation     -  Uncontrolled Diabetes mellitus type 2   - a1c 9.5   - ssi + fsbs      Discussed patient condition and risk of morbidity and/or mortality with RN, RT, Therapies, Pharmacy and hospitalist.

## 2018-08-29 NOTE — DISCHARGE PLANNING
Anticipated Discharge Disposition: Home    Action: Discussed pt's case with bedside RN, there may be concerns about transportation to and from dialysis. LSW met with pt at bedside, confirmed face sheet information is correct and completed assessment with pt at bedside. Pt lives alone in [place], [place]. Pt has an electric scooter she uses for nearby places and uses to go to the grocery store. Pt is on home 02 through [employer] Pt does her dialysis at [facility] M/W/F and pt confirmed she does not have any issues getting to and from dialysis and utilizes the [facility] van that transports dialysis pts from [place] to the [facility] dialysis center and back home on M/W/F. Pt states she also uses [employer] when needed. Pt states she is on a limited income $950 month. Pt owns her own home and states her mortgage is about $375 month. Pt confirmed she has MOW's and has accessed [facility] [employer] for resources. Pt expressed concern about getting home from the hospital and pt will need a 02 tank to get home. Provided  IMM letter and pt initialed.     Discussed with bedside RN obtaining orders for PT/OT. Pt has had [facility] services in the past.    Barriers to Discharge: N/A.     Plan: As above, left report for unit SW. Awaiting medical clearance and PT/OT evals. Pt will likely need assistance with transportation home and [employer] to deliver a tank to bedside for pt to go home with.           Care Transition Team Assessment    Information Source  Orientation : Oriented x 4  Information Given By: Patient  Informant's Name: Vielka  Who is responsible for making decisions for patient? : Patient    Readmission Evaluation  Is this a readmission?: No    Elopement Risk  Legal Hold: No  Ambulatory or Self Mobile in Wheelchair: Yes  Disoriented: No  Psychiatric Symptoms: None  History of Wandering: No  Elopement this Admit: No  Vocalizing Wanting to Leave: No  Displays Behaviors, Body Language Wanting to Leave: No-Not at Risk for  Elopement  Elopement Risk: Not at Risk for Elopement    Interdisciplinary Discharge Planning  Primary Care Physician: Nyla Nava  Lives with - Patient's Self Care Capacity: Alone and Able to Care For Self  Durable Medical Equipment: Home Oxygen, Other - Specify (Electric Scooter)  DME Provider / Phone: [employer]    Discharge Preparedness  What is your plan after discharge?: Home with help  What are your discharge supports?: Other (comment) (limited)  Prior Functional Level: Ambulatory, Independent with Activities of Daily Living, Independent with Medication Management  Difficulity with ADLs: None  Difficulity with IADLs: Driving  Difficulity with IADL Comments: pt utilizes [facility] for transport to and from dialysis, RSV[employer] or scooter for nearby places    Functional Assesment  Prior Functional Level: Ambulatory, Independent with Activities of Daily Living, Independent with Medication Management    Finances  Financial Barriers to Discharge: Yes  Average Monthly Income: 950 $  Source of Income: Social Security Disability  Prescription Coverage: Yes                             Discharge Risks or Barriers  Discharge risks or barriers?: Transportation, Lives alone, no community support, Complex medical needs  Patient risk factors: Complex medical needs    Anticipated Discharge Information  Anticipated discharge disposition: Home  Discharge Address: [address] [place] [address] [place] [id number]  Discharge Contact Phone Number: [phone]

## 2018-08-29 NOTE — PROGRESS NOTES
Pt just began to c/o itching post PRBC transfusion and dialysis. Pt did not verbalize this during transfusion when asked about reactions, but states that she feels like it started during the second transfusion. No visible hives or signs of reaction, no SOB, VSS, pt has no other complaints. Dr. Clark notifed, he stated to monitor pt.

## 2018-08-29 NOTE — PROGRESS NOTES
[facility] Nephrology Progress Note               Author: Shola DORADO Celine Date & Time created: 8/29/2018  9:47 AM     Interval History:  The patient is a 63-year-old female well known to   our group.  She has end-stage renal disease and dialyzes Monday, Wednesday,   and Friday.  She has additional past medical history of myelodysplastic   syndrome requiring frequent transfusions, arthritis, atrial fibrillation,   COPD, CHF, diabetes, and hypertension who was increasingly short of breath,   feeling poorly and was transferred to [facility] for further evaluation and   treatment.  Once in the emergency room, she had a chest x-ray suggestive of   volume overload.  She was hyperkalemic and is admitted for further treatment.    Currently, she is receiving dialysis.  She is also on nicardipine drip, which   has slowly been titrated off secondary to her admission blood pressure, which   was greater than 200.    8/28/18 : HD yesterday 3 liter UF, breathing improved from prior  8/29/18 HGB dropped today, no active signs of bleeding  2 liter PUF yesterday    Review of Systems:  Review of Systems   HENT: Negative.    Eyes: Negative.    Respiratory: Negative for shortness of breath.    Cardiovascular: Negative.    Gastrointestinal: Negative.    Genitourinary: Negative.    Musculoskeletal: Negative.    Skin: Negative.    Neurological: Negative for weakness.   Endo/Heme/Allergies: Negative.        Physical Exam:  Physical Exam   Constitutional: She is oriented to person, place, and time. She appears well-developed and well-nourished.   HENT:   Head: Normocephalic and atraumatic.   Eyes: Conjunctivae are normal.   Neck: Normal range of motion. Neck supple.   Cardiovascular: Normal rate and regular rhythm.    Pulmonary/Chest: Effort normal and breath sounds normal.   Abdominal: Bowel sounds are normal.   Musculoskeletal: Normal range of motion.   Fistula in left arm with bruit/thrill   Neurological: She is alert and oriented to  person, place, and time.   Skin: Skin is warm and dry.   Nursing note and vitals reviewed.      Labs:        Invalid input(s): [id number]  Recent Labs      18   0316  18   CPKTOTAL   --    < >  72  69   --   43  68   TROPONINI  0.01   --   0.02   --   0.01   --    --    BNPBTYPENAT   --    --    --   368*   --    --    --     < > = values in this interval not displayed.     Recent Labs      18   SODIUM  128*  133*  132*   POTASSIUM  7.3*  4.8  5.4   CHLORIDE  92*  95*  95*   CO2  22  27  22   BUN  100*  52*  77*   CREATININE  8.27*  5.88*  6.90*   CALCIUM  8.2*  8.2*  8.0*     Recent Labs      18   ALTSGPT  24   --    --    --    ASTSGOT  25   --    --    --    ALKPHOSPHAT  70   --    --    --    TBILIRUBIN  0.5   --    --    --    GLUCOSE  373*  345*  169*  205*     Recent Labs      18   RBC  2.59*   --   2.19*  1.90*   HEMOGLOBIN  9.2*   --   7.6*  6.5*   HEMATOCRIT  29.4*   --   23.7*  20.6*   PLATELETCT  #NM QNS FOR LEATHA   --   172  158*   IRON   --   183*   --    --    FERRITIN   --   2513.5*   --    --    TOTIRONBC   --   295   --    --      Recent Labs      18   WBC  8.3  5.3  5.9   NEUTSPOLYS  74.10*   --    --    LYMPHOCYTES  23.10   --    --    MONOCYTES  0.00   --    --    EOSINOPHILS  1.90   --    --    BASOPHILS  0.00   --    --    ASTSGOT  25   --    --    ALTSGPT  24   --    --    ALKPHOSPHAT  70   --    --    TBILIRUBIN  0.5   --    --            Hemodynamics:  Temp (24hrs), Av.2 °C (97.1 °F), Min:35.8 °C (96.4 °F), Max:36.6 °C (97.8 °F)  Temperature: 36.5 °C (97.7 °F)  Pulse  Av.9  Min: 68  Max: 95Heart Rate (Monitored): 77  Blood Pressure: (!) 187/71, NIBP: 115/59     Respiratory:     Respiration: 17, Pulse Oximetry: 100 %        RUL Breath Sounds: Clear, RML Breath Sounds: Diminished, RLL Breath Sounds: Diminished, BLANK Breath Sounds: Clear, LLL Breath Sounds: Diminished  Fluids:    Intake/Output Summary (Last 24 hours) at 08/29/18 0947  Last data filed at 08/28/18 1800   Gross per 24 hour   Intake              980 ml   Output             2500 ml   Net            -1520 ml     Weight: 65.5 kg (144 lb 6.4 oz)  GI/Nutrition:  Orders Placed This Encounter   Procedures   • Diet Order Renal, Diabetic     Standing Status:   Standing     Number of Occurrences:   1     Order Specific Question:   Diet:     Answer:   Renal [8]     Order Specific Question:   Diet:     Answer:   Diabetic [3]     Medical Decision Making, by Problem:  Active Hospital Problems    Diagnosis   • *Hypertensive emergency [I16.1]   • Hyperkalemia [E87.5]   • ESRD (end stage renal disease) on dialysis ([facility]) [N18.6, Z99.2]   • Acute exacerbation of chronic obstructive pulmonary disease (COPD) ([facility]) [J44.1]   • Type 2 diabetes mellitus with diabetic polyneuropathy, without long-term current use of insulin ([facility]) [E11.42]   • MDS (myelodysplastic syndrome) ([facility]) [D46.9]       Quality-Core Measures   Reviewed items::  Labs reviewed and Medications reviewed  CONCLUSION:  The patient is a 63-year-old female who presented for shortness   of breath and was found to have hypertensive urgency and hyperkalemia, as well   as anemia.  1.  Hyperkalemia resolved after HD  2.  Anemia secondary to end-stage renal disease, myelodysplastic syndrome.    3.  End-stage renal disease with hyperkalemia and increased volume. HD MWF  4.  Shortness of breath secondary to chronic obstructive pulmonary disease/congestive heart failure-improved after HD  5.  Hypertension improved  6.  Hyponatremia improved    Plan  HD today  EPO  Transfuse 2 UPRBC with HD

## 2018-08-29 NOTE — CARE PLAN
Problem: Communication  Goal: The ability to communicate needs accurately and effectively will improve  Outcome: PROGRESSING AS EXPECTED  Educated pt on night place of care. Encouraged pt vocalize concerns and call if needing assistance.    Problem: Venous Thromboembolism (VTW)/Deep Vein Thrombosis (DVT) Prevention:  Goal: Patient will participate in Venous Thrombosis (VTE)/Deep Vein Thrombosis (DVT)Prevention Measures  Outcome: PROGRESSING AS EXPECTED  Assessed pt for warmth and tenderness in extremities. Provided anti-coag shot and educated pt on action.

## 2018-08-29 NOTE — PROGRESS NOTES
Pt worried about not having portable oxygen or ride home during discharge; almost expressed concern about having ride to HD; stated someone told her she was unable to get assistance because he daughter was living with her; after daughter moved out pt stated she was still unable to get transport assistance.     Discussed with ; SW has talked with patient and given assistance with handouts and phone numbers (see note); SW stated they would call patient's oxygen supplier to bring transport O2 for discharge once orders placed.     Oxygen supplier is [employer].

## 2018-08-29 NOTE — PROGRESS NOTES
Assumed care of patient, bedside report received from ARIEL Jin. Call light within reach and fall precautions in place. Bed locked and in lowest position. Patient instructed to call for assistance before getting out of bed. All questions answered, no other needs at this time.

## 2018-08-29 NOTE — PROGRESS NOTES
HD ordered by Dr. Berger. Treatment started at 0716 and ended at 1016.     Total Net UF 3000 mL.    Pt tolerated treatment well with no issues. See flow sheet for details. Gave pt 2 units of PRBC's per MD due to low hemoglobin with no adverse reactions. Cannulation needles taken out, held pressure for 10 min and placed gauze dressing with no bleeding. ALIVIA AVF + for bruit/thrill. Report given to DEVORAH Jacques RN.

## 2018-08-29 NOTE — THERAPY
"Physical Therapy Evaluation completed.   Bed Mobility:  Supine to Sit: Supervised  Transfers: Sit to Stand: Supervised  Gait: Level Of Assist: Supervised with Front-Wheel Walker       Plan of Care: Patient with no further skilled PT needs in the acute care setting at this time  Discharge Recommendations: Equipment: No Equipment Needed. See below    After initial evaluation pt has no further skilled acute PT needs. She is essentially at baseline with regards to functional activity. She reports no concerns with functional abiltiy to dc to home once medically cleared and appears to have appropriate AD's and home setup given pt's subjective hx. She was able to demonstrate hallway ambulation with FWW with SPv with no tim LOB and pt reports she uses SPC or FWW in home and power wc when leaving home with ramps to enter/exit. She has no further skilled acute PT needs and anticipate no immediate skilled PT needs after medical dc to home given current functional status.     See \"Rehab Therapy-Acute\" Patient Summary Report for complete documentation.     "

## 2018-08-29 NOTE — DISCHARGE INSTRUCTIONS
Discharge Instructions    Discharged to home by taxi with self. Discharged via wheelchair, hospital escort: Yes.  Special equipment needed: Oxygen    Be sure to schedule a follow-up appointment with your primary care doctor or any specialists as instructed.     Discharge Plan:   Influenza Vaccine Indication: Indicated: 9 to 64 years of age    I understand that a diet low in cholesterol, fat, and sodium is recommended for good health. Unless I have been given specific instructions below for another diet, I accept this instruction as my diet prescription.   Other diet: Diabetic/ Renal diet    Special Instructions: None    · Is patient discharged on Warfarin / Coumadin?   No       Depression / Suicide Risk    As you are discharged from this [facility] facility, it is important to learn how to keep safe from harming yourself.    Recognize the warning signs:  · Abrupt changes in personality, positive or negative- including increase in energy   · Giving away possessions  · Change in eating patterns- significant weight changes-  positive or negative  · Change in sleeping patterns- unable to sleep or sleeping all the time   · Unwillingness or inability to communicate  · Depression  · Unusual sadness, discouragement and loneliness  · Talk of wanting to die  · Neglect of personal appearance   · Rebelliousness- reckless behavior  · Withdrawal from people/activities they love  · Confusion- inability to concentrate     If you or a loved one observes any of these behaviors or has concerns about self-harm, here's what you can do:  · Talk about it- your feelings and reasons for harming yourself  · Remove any means that you might use to hurt yourself (examples: pills, rope, extension cords, firearm)  · Get professional help from the community (Mental Health, Substance Abuse, psychological counseling)  · Do not be alone:Call your Safe Contact- someone whom you trust who will be there for you.  · Call your local CRISIS HOTLINE  946-0293 or 580-975-4313  · Call your local Children's Mobile Crisis Response Team [place] (908) 263-5814 or [email or web address]  · Call the toll free National Suicide Prevention Hotlines   · National Suicide Prevention Lifeline 350-837-RPRP (1921)  · National [facility] Line Network 800-SUICIDE (858-9530)

## 2018-08-29 NOTE — DISCHARGE PLANNING
Anticipated Discharge Disposition: d/c home w/ O2 established at home, and needs transport    Action: Spoke to [employer] and pt's last name is Edgmond not Raniscal w/ same first name, same address, same .    Pt will received portable tank for transport w/in 1-1.5 hours (from 118PM).     Spoke to bedside RN and pt. She will nto be able to walk independently from street to door w/ [employer]. She lives alone and her scooter is in her house.    CCA indicates [employer] will not support her to get to front door. Also, pt does not have MTM services.    Sw left Vm w/ SWS requesting to provide taxi voucher.     Barriers to Discharge: transport, o2 bottle delivery from establish [employer]    Plan: f/u w/ medical team

## 2018-08-29 NOTE — PROGRESS NOTES
Discussed discharge with pt and removed IVs. Belongings gathered by pt. Social work is calling [employer] to take pt home.

## 2018-08-29 NOTE — DISCHARGE PLANNING
Anticipated Discharge Disposition: TBD    Action: Spoke to pt and provided resources in [facility]Book[facility]. Provided [facility] contacts for more services.    Pt has following services in place: [facility], [facility], [facility].    Her dtr moved to [place] and she has medical appointments through the week as follows: M, W, F Dialysis w/ [facility] transport except not work on holidays or if  sick. Those times misses dialysis. Also, RSVP  is no longer able to drive to [facility] for Tue/ Thrs apts w/ cancer and eye specialists. Pt had to miss dialysis to attend one of these appointments.    Give verbal permission from pt to call all transport services to attempt to solve her concerns. Pt repeatedly states no friend, family or [employer] to assist w/ transport. Advised in past not qualified for MTM.         Barriers to Discharge: TBD    Plan: f/u w/ transport sources x3

## 2018-08-29 NOTE — DISCHARGE PLANNING
Anticipated Discharge Disposition: d/c home w/ o2 and uber    Action: Spoke to pt who signed IMM yesterday. Spoke to RN and pt can be teady to d/c soon as she just received her o2 at bedside.    Sw provided contact number for er sw so bedside rn can provide time frame for uber to arrive down stairs to p/u pt.      Barriers to Discharge: none    Plan: none

## 2018-08-29 NOTE — PROGRESS NOTES
Paged on call hospitalist for clarification on amount of packed RBC. New orders received from MD Denise.

## 2018-08-29 NOTE — PROGRESS NOTES
Pt A&O x4 and verbalized understanding of discharge instructions. Pt escorted out in wheelchair with home O2 brought by DME. Pt familiar with the tank and placed it on 2L, verified by RN. Pt gathered belongings and ambulated to wheelchair without difficulty. ER  set up Uber. RN waitied with pt outside and helped pt place O2  And belongings in car.

## 2018-08-29 NOTE — DISCHARGE PLANNING
Spoke To: LUIS Friedman  Agency/Facility Name: [facility]  Plan or Request: Per LUIS Ordoñez request, called [employer] to verify whether or not patient has benefits, and patients does not per [facility]

## 2018-08-29 NOTE — DISCHARGE SUMMARY
Discharge Summary    CHIEF COMPLAINT ON ADMISSION  Chief Complaint   Patient presents with   • Shortness of Breath   • Weakness       Reason for Admission  EMS     Admission Date  8/27/2018    CODE STATUS  Full Code    HPI & HOSPITAL COURSE  This is a 63 y.o. female here with shortness of breath.    Ms. Quijano has a history of ESRD on dialysis as well as MDS, diabetes and oxygen-dependent COPD at 2 liters that presented to the ER with progressive shortness of breath. She was found to have sats of 87% on room air and required rescue BiPAP and ICU admission. Her potassium in the ER was 7.5 with evidence of AV block thus she was treated with IV calcium, insulin, dextrose, and Kayexalate. She underwent emergency dialysis.   She has a history of Myelodysplastic syndrome followed by Dr. Avila and requires frequent blood transfusions as an outpatient. She had a Hb of 6.5 today thus was transfused 2 units of RBCs.    Due to severely elevated initial blood pressure, she was started on IV nitroglycerin and a nicardipine drip. She has been transitioned to her home blood pressure medications.   Today, after 2 units of RBCs during dialysis, Ms. Quijano is feeling much better and is anxious to go home.   I spoke with Dr. Berger, nephrology, in person and he agrees with discharge.    Therefore, she is discharged in good and stable condition to home with close outpatient follow-up.    The patient met 2-midnight criteria for an inpatient stay at the time of discharge.    Discharge Date  8/29/18    FOLLOW UP ITEMS POST DISCHARGE  Resume home medications      DISCHARGE DIAGNOSES  Principal Problem:    Hypertensive emergency POA: Yes  Active Problems:    Hyperkalemia POA: Yes    ESRD (end stage renal disease) on dialysis ([facility]) POA: Yes    Acute exacerbation of chronic obstructive pulmonary disease (COPD) ([facility]) POA: Yes    Type 2 diabetes mellitus with diabetic polyneuropathy, without long-term current use of insulin ([facility]) POA:  Yes    MDS (myelodysplastic syndrome) ([facility]) POA: Yes  Resolved Problems:    * No resolved hospital problems. *      FOLLOW UP  No future appointments.  No follow-up provider specified.    MEDICATIONS ON DISCHARGE     Medication List      CONTINUE taking these medications      Instructions   amLODIPine 10 MG Tabs  Commonly known as:  NORVASC   Take 10 mg by mouth every day.  Dose:  10 mg     brinzolamide 1 % Susp  Commonly known as:  AZOPT   Place 1 Drop in both eyes 2 Times a Day.  Dose:  1 Drop     carvedilol 12.5 MG Tabs  Commonly known as:  COREG   Take 12.5 mg by mouth 2 times a day.  Dose:  12.5 mg     COMBIGAN 0.2-0.5 % Soln  Generic drug:  Brimonidine Tartrate-Timolol   Place 1 Drop in both eyes 2 Times a Day.  Dose:  1 Drop     cyclobenzaprine 10 MG Tabs  Commonly known as:  FLEXERIL   Take 10 mg by mouth 3 times a day as needed for Muscle Spasms.  Dose:  10 mg     HYDROcodone-acetaminophen 5-325 MG Tabs per tablet  Commonly known as:  NORCO   Take 1 Tab by mouth 1 time daily as needed.  Dose:  1 Tab     losartan 50 MG Tabs  Commonly known as:  COZAAR   Take 50 mg by mouth every evening.  Dose:  50 mg     LUMIGAN 0.01 % Soln  Generic drug:  bimatoprost   Place 1 Drop in both eyes every bedtime.  Dose:  1 Drop     pregabalin 25 MG Caps  Commonly known as:  LYRICA   Take 50 mg by mouth 2 times a day.  Dose:  50 mg            Allergies  Allergies   Allergen Reactions   • Flomax [Tamsulosin]    • Glipizide    • Ondansetron    • Pcn [Penicillins]    • Tramadol        DIET  Orders Placed This Encounter   Procedures   • Diet Order Renal, Diabetic     Standing Status:   Standing     Number of Occurrences:   1     Order Specific Question:   Diet:     Answer:   Renal [8]     Order Specific Question:   Diet:     Answer:   Diabetic [3]           CONSULTATIONS  Critical care  Nephrology    PROCEDURES  Dialysis     LABORATORY  Lab Results   Component Value Date    SODIUM 132 (L) 08/29/2018    POTASSIUM 5.4 08/29/2018     CHLORIDE 95 (L) 08/29/2018    CO2 22 08/29/2018    GLUCOSE 205 (H) 08/29/2018    BUN 77 (HH) 08/29/2018    CREATININE 6.90 (HH) 08/29/2018        Lab Results   Component Value Date    WBC 5.9 08/29/2018    HEMOGLOBIN 6.5 (L) 08/29/2018    HEMATOCRIT 20.6 (L) 08/29/2018    PLATELETCT 158 (L) 08/29/2018      HbA1c 9.5     Total time of the discharge process exceeds 35 minutes.

## 2018-08-29 NOTE — DISCHARGE PLANNING
Anticipated Discharge Disposition: transport home w/ O2    Action: Spoke to SWS and pt can take uber home once o2 received. SWs will f'/u w/ other transport options in [facility].    LSw spoke to bedside RN and advised as above. She will call LSW once pt is ready for d/c.    SWS indicates PATRICIA stearns works M, W, F only from [facility] to [place]. Pt has this for dialysis appointments in [place].    Barriers to Discharge: transport and o2 for home today     Plan: f/u w/ medical team

## 2018-08-29 NOTE — DOCUMENTATION QUERY
DOCUMENTATION QUERY    PROVIDERS: Please select “Cosign w/ note”to reply to query.    To better represent the severity of illness of your patient, please review the following information and exercise your independent professional judgment in responding to this query.     A history of heart failure is documented in the ED MD Notes and Nephrology Notes. Based upon the clinical findings, risk factors, and treatment, can this diagnosis be further specified?    • Acute Systolic heart failure   • Chronic Systolic heart failure  • Acute on Chronic Systolic heart failure  • Acute Diastolic heart failure   • Chronic Diastolic heart failure  • Acute on Chronic Diastolic heart failure  • Acute Systolic and Diastolic heart failure  • Chronic Systolic and Diastolic heart failure  • Acute on Chronic Systolic and Diastolic heart failure  • Heart Failure ruled out  • Other explanation of clinical findings  • Unable to determine    The medical record reflects the following:   Clinical Findings Per Nephrology Notes:   Shortness of breath secondary to chronic obstructive pulmonary   disease/congestive heart failure.    Per Progress Notes:   Acute exacerbation of chronic obstructive pulmonary disease (COPD) (HCC)- (present on admission)  Due to volume overload.    Results Review:     CXR: 1.  Hazy interstitial bilateral pulmonary opacities suggests interstitial edema or infiltrates.   2.  Trace bilateral pleural effusions.   ECHO: Mild concentric left ventricular hypertrophy. EF 75%. Indeterminate diastolic function. Mild aortic stenosis.    Treatment Hemodialysis, Coreg, Cozaar, echocardiogram, CXR, & I&O   Risk Factors ESRD, COPD, CHF, HTN, DM, hypertensive emergency, & MDS   Location within medical record History & Physical, Progress Notes, Lab Results, & Radiology Results     Thank you,   Lynn Ivan RN  Clinical   Phone [phone]

## 2018-08-29 NOTE — DISCHARGE PLANNING
Anticipated Discharge Disposition: TBD    Action: LSW called [facility] [phone]) [facility]. LSw left .    Bedside RN indicates pt may d/c home today.    Spoke to Dr Camargo. Pt medically cleared to d/c now.     Sw completed TCF and faxed to [facility]. Sw indicated waiting for portable tank from [employer] first then can set up transport.     Pt needs portable o2 tank from established company [employer]     Tigre spoke to Daniel at [facility] and he requests face sheet be faxed as he is not able to locate pt in the system.    Sw faxed face sheet.    Barriers to Discharge: transport, o2    Plan: f/u w/ [facility]ca, etc

## 2018-08-30 ENCOUNTER — PATIENT OUTREACH (OUTPATIENT)
Dept: HEALTH INFORMATION MANAGEMENT | Facility: OTHER | Age: 63
End: 2018-08-30

## 2018-08-30 LAB — GLUCOSE BLD-MCNC: 151 MG/DL (ref 65–99)

## 2018-08-31 LAB — GLUCOSE BLD-MCNC: 233 MG/DL (ref 65–99)

## 2018-09-04 LAB — GLUCOSE BLD-MCNC: 233 MG/DL (ref 65–99)

## 2018-09-18 ENCOUNTER — DOCUMENTATION (OUTPATIENT)
Dept: CARDIOLOGY | Facility: MEDICAL CENTER | Age: 63
End: 2018-09-18

## 2018-09-18 NOTE — PROGRESS NOTES
Faxed [facility] cardiology for prior cardiac records.    >Med. Records Fax # [phone]

## 2018-09-20 ENCOUNTER — OFFICE VISIT (OUTPATIENT)
Dept: CARDIOLOGY | Facility: MEDICAL CENTER | Age: 63
End: 2018-09-20
Payer: MEDICARE

## 2018-09-20 VITALS
DIASTOLIC BLOOD PRESSURE: 60 MMHG | BODY MASS INDEX: 29.62 KG/M2 | SYSTOLIC BLOOD PRESSURE: 100 MMHG | HEIGHT: 58 IN | HEART RATE: 66 BPM | WEIGHT: 141.09 LBS | OXYGEN SATURATION: 98 %

## 2018-09-20 DIAGNOSIS — I10 ESSENTIAL HYPERTENSION: ICD-10-CM

## 2018-09-20 DIAGNOSIS — E87.5 HYPERKALEMIA: ICD-10-CM

## 2018-09-20 DIAGNOSIS — D64.9 SYMPTOMATIC ANEMIA: ICD-10-CM

## 2018-09-20 DIAGNOSIS — N18.6 ESRD (END STAGE RENAL DISEASE) (HCC): ICD-10-CM

## 2018-09-20 DIAGNOSIS — J96.11 CHRONIC RESPIRATORY FAILURE WITH HYPOXIA (HCC): ICD-10-CM

## 2018-09-20 DIAGNOSIS — D61.818 PANCYTOPENIA (HCC): ICD-10-CM

## 2018-09-20 DIAGNOSIS — K76.0 FATTY LIVER: ICD-10-CM

## 2018-09-20 DIAGNOSIS — I48.0 PAROXYSMAL ATRIAL FIBRILLATION (HCC): ICD-10-CM

## 2018-09-20 DIAGNOSIS — R07.89 OTHER CHEST PAIN: ICD-10-CM

## 2018-09-20 DIAGNOSIS — I27.20 PULMONARY HYPERTENSION (HCC): ICD-10-CM

## 2018-09-20 DIAGNOSIS — D46.9 MDS (MYELODYSPLASTIC SYNDROME) (HCC): ICD-10-CM

## 2018-09-20 PROCEDURE — 99204 OFFICE O/P NEW MOD 45 MIN: CPT | Performed by: INTERNAL MEDICINE

## 2018-09-20 RX ORDER — NITROGLYCERIN 0.4 MG/1
0.4 TABLET SUBLINGUAL PRN
Qty: 25 TAB | Refills: 11 | Status: SHIPPED | OUTPATIENT
Start: 2018-09-20 | End: 2019-11-04

## 2018-09-20 RX ORDER — AMLODIPINE BESYLATE 10 MG/1
2.5 TABLET ORAL DAILY
Qty: 1 TAB | Refills: 0 | COMMUNITY
Start: 2018-09-20 | End: 2019-11-04

## 2018-09-20 ASSESSMENT — ENCOUNTER SYMPTOMS
EYE REDNESS: 1
VOMITING: 1
FALLS: 1
CLAUDICATION: 1
EYE PAIN: 1
SHORTNESS OF BREATH: 1
HEADACHES: 1
MEMORY LOSS: 1
BACK PAIN: 1
BLURRED VISION: 1
ORTHOPNEA: 1
NECK PAIN: 1
MYALGIAS: 1
NAUSEA: 1
PHOTOPHOBIA: 1
WHEEZING: 1
CHILLS: 1
DOUBLE VISION: 1
WEAKNESS: 1
INSOMNIA: 1
POLYDIPSIA: 1

## 2018-09-20 NOTE — LETTER
[facility]   [address] [place] [address] [address]  [address] [place] [address]  Phone: [phone]  Fax: [phone]              Vielka Briscoe  1955    Encounter Date: 9/20/2018    Germania Alberts M.D.          PROGRESS NOTE:  Subjective:   Chief Complaint:   Chief Complaint   Patient presents with   • Chest Pain       Vielka Briscoe is a 63 y.o. female who is referred by Dr. Nyla Nava for chest pain.  She has multiple chronic issues.  She wears oxygen for what is supposed to be COPD but it was never a smoker.  She is going to see a lung specialist.  She has myelodysplastic syndrome and has been intolerant to some of the treatments.  Her hemoglobin has been very low.  Is probably combined with end-stage renal disease and anemia of chronic disease.  She required 3 blood transfusions last month and more recently required 2.    She has had episodes of chest pain going on for many years.  It will happen with activity or rest.  It is substernal chest pain and sometimes radiates to the back.  It will last for a few minutes.  Many years ago nitroglycerin helped but she has not had it recently.  Her EKG looks like an old anterior MI but she had a normal perfusion nuclear stress test in December 2017 and a structurally normal heart by echo at that time.  There is no family history of coronary disease.  Her biggest risk factor is her being a dialysis patient.    She also experiences palpitations with heart will race and this lasts for a few minutes.  She is intermittently dizzy upon standing.  Her blood pressures extremely labile.  Today in the office here it is low.  Sometimes is as high as 218/90.  It is often lower after dialysis.  Her blood pressure medications have been adjusted from time to time.    She is not on primary prevention aspirin therapy. I believe because of the pancytopenia and particularly the anemia.    DATA REVIEWED by me:  ECG 6/27/2018  Sinus  "rhythm, rate 61, probable old anterior MI,     Nuclear stress test 11/16/2017  Normal perfusion, EF 58%      Echo November 13, 2017  Normal LV systolic function, no significant Doppler or valve abnormality, EF 65%      Most recent labs:     August 9, 2018 hemoglobin 7.6, platelets 146, white count 4.5    June 27, 2018 sodium 136, potassium 6, creatinine 6.87, LFTs normal, troponins normal,     January 23, 2018 total cholesterol 172, triglycerides 176, HDL 43, LDL 94    Past Medical History:   Diagnosis Date   • Arthritis     hands    • Atrial fibrillation (HCC)     HX   • Blood transfusion without reported diagnosis    • Breath shortness     w/exertion   • Cancer (HCC)     MDS in bones   • Cataract     bilat IOL   • Chronic anemia    • Chronic kidney disease        • Chronic obstructive pulmonary disease (HCC)    • Congestive heart failure (HCC)    • Dental disorder     full dentures   • Diabetes     Diet controlled   • Dialysis patient     [facility] ,   [facility] [place]   • Glaucoma    • Heart abnormalities    • Hypertension    • MDS (myelodysplastic syndrome) 10/2016    bone marrow biopsy   • Pain -2017    \"bones\", generalized, 5/10   • Stroke (HCC) 03/2015    No residual weakness/problems   • Supplemental oxygen dependent     2 liters     Past Surgical History:   Procedure Laterality Date   • GASTROSCOPY N/A 1/25/2018    Procedure: GASTROSCOPY;  Surgeon: Blanca Santos M.D.;  Location: SURGERY [facility];  Service: Gastroenterology   • BONE MARROW BIOPSY, NDL/TROCAR  9/20/2017    Procedure: BONE MARROW BIOPSY, NDL/TROCAR;  Surgeon: Wade Turner M.D.;  Location: ENDOSCOPY [facility];  Service: Orthopedics   • BONE MARROW ASPIRATION  9/20/2017    Procedure: BONE MARROW ASPIRATION;  Surgeon: Wade Turner M.D.;  Location: ENDOSCOPY [facility];  Service: Orthopedics   • VEIN LIGATION Left 11/10/2016    Procedure: VEIN LIGATION FOR DISTAL REVASCULARIZATION AND " INTERVAL LIGATION OF LEFT ARM DIALYISIS ACCESS (DRIL PROCEDURE);  Surgeon: Ranulfo Jolly M.D.;  Location: SURGERY [facility];  Service:    • AV FISTULA CREATION Left 2016    Procedure: AV FISTULA CREATION UPPER EXTREMITY;  Surgeon: Ranulfo Jolly M.D.;  Location: SURGERY [facility];  Service:    • GASTROSCOPY  2015    Procedure: ESOPHAGOGASTRODUODENOSCOPY WITH BIOPSY;  Surgeon: Wing Álvarez M.D.;  Location: SURGERY [facility];  Service:    • COLONOSCOPY  2015    Procedure: COLONOSCOPY;  Surgeon: Wing Álvarez M.D.;  Location: SURGERY [facility];  Service:    • GYN SURGERY      hysterectomy   • GYN SURGERY       x 2   • GYN SURGERY      d&C x2   • OTHER      angioplasty/ stents bilat LE   • OTHER ABDOMINAL SURGERY      appendectomy,  x 2, hysterectomy, D & C   • OTHER ABDOMINAL SURGERY      appendectomy   • OTHER CARDIAC SURGERY      Angioplasty  and    • OTHER CARDIAC SURGERY      cardiac angiogram, angioplasty   • RETINAL DETACHMENT REPAIR Right      Family History   Problem Relation Age of Onset   • Hypertension Father    • Hypertension Mother      Social History     Social History   • Marital status:      Spouse name: N/A   • Number of children: N/A   • Years of education: N/A     Occupational History   • Not on file.     Social History Main Topics   • Smoking status: Never Smoker   • Smokeless tobacco: Never Used   • Alcohol use No   • Drug use: No   • Sexual activity: Not on file     Other Topics Concern   • Not on file     Social History Narrative    ** Merged History Encounter **          Allergies   Allergen Reactions   • Actos [Pioglitazone Hydrochloride] Unspecified     Cause blindness   NUI=2103   • Darvocet [Propoxyphene N-Apap] Vomiting     GDA=3511   • Demerol Vomiting     UKO=3559   • Glucophage [Metformin Hydrochloride] Vomiting     TTU=8694   • Morphine Vomiting     ENY=4732   • Oxycodone Vomiting     RXN=2016   • Pcn  [Penicillins] Vomiting     QLC=8190     • Requip Vomiting     RXN=12/2015   • Simvastatin Unspecified     Leg cramps  OPM=8040   • Sulfa Drugs Rash     RXN=>10 years   • Tramadol Vomiting     QRL=5446   • Trazodone Vomiting     YTF=2029   • Dilaudid [Hydromorphone] Vomiting     Unknown    • Diphenhydramine Vomiting   • Iron      vomiting   • Lenalidomide Vomiting   • Multivitamin      itching   • Naprosyn [Naproxen]    • Other Drug      Any binders that remove phosphorus from the body such as tums       Current Outpatient Prescriptions   Medication Sig Dispense Refill   • amLODIPine (NORVASC) 10 MG Tab Take 1 Tab by mouth every day. 1 Tab 0   • nitroglycerin (NITROSTAT) 0.4 MG SL Tab Place 1 Tab under tongue as needed for Chest Pain. 25 Tab 11   • cyclobenzaprine (FLEXERIL) 5 MG tablet Take 5 mg by mouth 3 times a day as needed.     • losartan (COZAAR) 50 MG Tab Take 1 Tab by mouth every day. (Patient taking differently: Take 100 mg by mouth every day.) 30 Tab 0   • HYDROcodone-acetaminophen (NORCO) 5-325 MG Tab per tablet Take 1 Tab by mouth every 6 hours as needed.     • carvedilol (COREG) 12.5 MG Tab Take 12.5 mg by mouth 2 times a day, with meals.     • Brimonidine Tartrate-Timolol (COMBIGAN) 0.2-0.5 % Solution Place 1 Drop in both eyes 2 Times a Day.     • pregabalin (LYRICA) 50 MG capsule Take 50 mg by mouth 2 times a day.     • bimatoprost (LUMIGAN) 0.03 % Solution Place 1 Drop in both eyes every evening.       No current facility-administered medications for this visit.        Review of Systems   Constitutional: Positive for chills and malaise/fatigue.   HENT: Positive for hearing loss.    Eyes: Positive for blurred vision, double vision, photophobia, pain and redness.   Respiratory: Positive for shortness of breath and wheezing.    Cardiovascular: Positive for chest pain, orthopnea, claudication and leg swelling.   Gastrointestinal: Positive for nausea and vomiting.   Musculoskeletal: Positive for back  "pain, falls, joint pain, myalgias and neck pain.   Skin: Positive for itching.   Neurological: Positive for weakness and headaches.   Endo/Heme/Allergies: Positive for polydipsia.   Psychiatric/Behavioral: Positive for memory loss. The patient has insomnia.      All others systems reviewed and negative.     Objective:     Blood pressure 100/60, pulse 66, height 1.473 m (4' 10\"), weight 64 kg (141 lb 1.5 oz), last menstrual period 01/01/1995, SpO2 98 %, not currently breastfeeding. Body mass index is 29.49 kg/m².    Physical Exam   General: No acute distress. Well nourished. Wearing O2.  HEENT: EOM grossly intact, no scleral icterus, no pharyngeal erythema.   Neck:  No JVD, no bruits, trachea midline  CVS: RRR. Normal S1, S2. No M/R/G. No LE edema.  2+ radial pulses, 2+ DP pulses  Resp: CTAB. No wheezing or crackles/rhonchi. Normal respiratory effort.  Abdomen: Soft, NT, no tim hepatomegaly.  MSK/Ext: No clubbing or cyanosis.  Skin: Warm and dry, no rashes.  Neurological: CN III-XII grossly intact. No focal deficits.   Psych: A&O x 3, appropriate affect, good judgement      Assessment:     1. Other chest pain     2. Symptomatic anemia     3. ESRD (end stage renal disease) (HCC)     4. MDS (myelodysplastic syndrome) (HCC)     5. Essential hypertension     6. Pancytopenia (HCC)     7. Hyperkalemia     8. Pulmonary hypertension (HCC)     9. Diastolic dysfunction     10. Fatty liver     11. Chronic respiratory failure with hypoxia, 2L     12. Paroxysmal atrial fibrillation (HCC)         Medical Decision Making:  Today's Assessment / Status / Plan:     -Atypical CP, nitro has helped in the past so we will try this again.  -Reassuring trop, nuc, echo  -If she ever has an MI, there is little we could do for her except medical management because of her MDS and severe anemia- this is her biggest medical issue.  -I do not think primary prevention aspirin therapy is helpful at this time.  -I do not see any evidence of " diastolic dysfunction or heart failure.  Her volume status is based on her requiring dialysis.  -There is mention in her medical history of paroxysmal atrial fibrillation but I did not find any details of this and she would not be a candidate for anticoagulation anyway.  -Most of her symptoms are likely related to her anemia.  -I am going to follow-up with her in 6 months and see if the nitroglycerin is helping.  Unfortunately I do not have much more to offer except support.  -Her LDL cholesterol is in a good place so until she has an event such as a heart attack or stroke I would not start cholesterol medication.        Return in about 6 months (around 3/20/2019).    It is my pleasure to participate in the care of Ms. Briscoe.  Please do not hesitate to contact me with questions or concerns.    Germania Alberts MD, [facility]  Cardiologist [facility]    Please note that this dictation was created using voice recognition software. I have made every reasonable attempt to correct obvious errors, but it is possible there are errors of grammar and possibly content that I did not discover before finalizing the note.      Nyla Nava M.D.  [address] [place] [id number]  VIA Facsimile: [phone]

## 2018-09-20 NOTE — PROGRESS NOTES
Subjective:   Chief Complaint:   Chief Complaint   Patient presents with   • Chest Pain       Vielka Briscoe is a 63 y.o. female who is referred by Dr. Nyla Nava for chest pain.  She has multiple chronic issues.  She wears oxygen for what is supposed to be COPD but it was never a smoker.  She is going to see a lung specialist.  She has myelodysplastic syndrome and has been intolerant to some of the treatments.  Her hemoglobin has been very low.  Is probably combined with end-stage renal disease and anemia of chronic disease.  She required 3 blood transfusions last month and more recently required 2.    She has had episodes of chest pain going on for many years.  It will happen with activity or rest.  It is substernal chest pain and sometimes radiates to the back.  It will last for a few minutes.  Many years ago nitroglycerin helped but she has not had it recently.  Her EKG looks like an old anterior MI but she had a normal perfusion nuclear stress test in December 2017 and a structurally normal heart by echo at that time.  There is no family history of coronary disease.  Her biggest risk factor is her being a dialysis patient.    She also experiences palpitations with heart will race and this lasts for a few minutes.  She is intermittently dizzy upon standing.  Her blood pressures extremely labile.  Today in the office here it is low.  Sometimes is as high as 218/90.  It is often lower after dialysis.  Her blood pressure medications have been adjusted from time to time.    She is not on primary prevention aspirin therapy. I believe because of the pancytopenia and particularly the anemia.    DATA REVIEWED by me:  ECG 6/27/2018  Sinus rhythm, rate 61, probable old anterior MI,     Nuclear stress test 11/16/2017  Normal perfusion, EF 58%      Echo November 13, 2017  Normal LV systolic function, no significant Doppler or valve abnormality, EF 65%      Most recent labs:     August 9, 2018 hemoglobin 7.6,  "platelets 146, white count 4.5    June 27, 2018 sodium 136, potassium 6, creatinine 6.87, LFTs normal, troponins normal,     January 23, 2018 total cholesterol 172, triglycerides 176, HDL 43, LDL 94    Past Medical History:   Diagnosis Date   • Arthritis     hands    • Atrial fibrillation (HCC)     HX   • Blood transfusion without reported diagnosis    • Breath shortness     w/exertion   • Cancer (HCC)     MDS in bones   • Cataract     bilat IOL   • Chronic anemia    • Chronic kidney disease        • Chronic obstructive pulmonary disease (HCC)    • Congestive heart failure (HCC)    • Dental disorder     full dentures   • Diabetes     Diet controlled   • Dialysis patient     [facility] ,   [facility] in [place]   • Glaucoma    • Heart abnormalities    • Hypertension    • MDS (myelodysplastic syndrome) 10/2016    bone marrow biopsy   • Pain -2017    \"bones\", generalized, 5/10   • Stroke (HCC) 03/2015    No residual weakness/problems   • Supplemental oxygen dependent     2 liters     Past Surgical History:   Procedure Laterality Date   • GASTROSCOPY N/A 1/25/2018    Procedure: GASTROSCOPY;  Surgeon: Blanca Santos M.D.;  Location: SURGERY [facility];  Service: Gastroenterology   • BONE MARROW BIOPSY, NDL/TROCAR  9/20/2017    Procedure: BONE MARROW BIOPSY, NDL/TROCAR;  Surgeon: Wade Turner M.D.;  Location: ENDOSCOPY [facility];  Service: Orthopedics   • BONE MARROW ASPIRATION  9/20/2017    Procedure: BONE MARROW ASPIRATION;  Surgeon: Wade Turner M.D.;  Location: ENDOSCOPY [facility];  Service: Orthopedics   • VEIN LIGATION Left 11/10/2016    Procedure: VEIN LIGATION FOR DISTAL REVASCULARIZATION AND INTERVAL LIGATION OF LEFT ARM DIALYISIS ACCESS (DRIL PROCEDURE);  Surgeon: Ranulfo Jolly M.D.;  Location: SURGERY [facility];  Service:    • AV FISTULA CREATION Left 2/8/2016    Procedure: AV FISTULA CREATION UPPER EXTREMITY;  Surgeon: Ranulfo Jolly M.D.;  Location: [facility];  Service:    • GASTROSCOPY  2015    Procedure: ESOPHAGOGASTRODUODENOSCOPY WITH BIOPSY;  Surgeon: Wing Álvarez M.D.;  Location: SURGERY [facility];  Service:    • COLONOSCOPY  2015    Procedure: COLONOSCOPY;  Surgeon: Wing Álvarez M.D.;  Location: SURGERY [facility];  Service:    • GYN SURGERY      hysterectomy   • GYN SURGERY       x 2   • GYN SURGERY      d&C x2   • OTHER      angioplasty/ stents bilat LE   • OTHER ABDOMINAL SURGERY      appendectomy,  x 2, hysterectomy, D & C   • OTHER ABDOMINAL SURGERY      appendectomy   • OTHER CARDIAC SURGERY      Angioplasty  and    • OTHER CARDIAC SURGERY      cardiac angiogram, angioplasty   • RETINAL DETACHMENT REPAIR Right      Family History   Problem Relation Age of Onset   • Hypertension Father    • Hypertension Mother      Social History     Social History   • Marital status:      Spouse name: N/A   • Number of children: N/A   • Years of education: N/A     Occupational History   • Not on file.     Social History Main Topics   • Smoking status: Never Smoker   • Smokeless tobacco: Never Used   • Alcohol use No   • Drug use: No   • Sexual activity: Not on file     Other Topics Concern   • Not on file     Social History Narrative    ** Merged History Encounter **          Allergies   Allergen Reactions   • Actos [Pioglitazone Hydrochloride] Unspecified     Cause blindness   EEL=6444   • Darvocet [Propoxyphene N-Apap] Vomiting     ZHG=8507   • Demerol Vomiting     QOE=9061   • Glucophage [Metformin Hydrochloride] Vomiting     LXD=4787   • Morphine Vomiting     HNP=6973   • Oxycodone Vomiting     RXN=2016   • Pcn [Penicillins] Vomiting     VXK=9357     • Requip Vomiting     RXN=2015   • Simvastatin Unspecified     Leg cramps  JRC=5672   • Sulfa Drugs Rash     RXN=>10 years   • Tramadol Vomiting     QAZ=1132   • Trazodone Vomiting     AVI=5595   • Dilaudid [Hydromorphone] Vomiting     Unknown     • Diphenhydramine Vomiting   • Iron      vomiting   • Lenalidomide Vomiting   • Multivitamin      itching   • Naprosyn [Naproxen]    • Other Drug      Any binders that remove phosphorus from the body such as tums       Current Outpatient Prescriptions   Medication Sig Dispense Refill   • amLODIPine (NORVASC) 10 MG Tab Take 1 Tab by mouth every day. 1 Tab 0   • nitroglycerin (NITROSTAT) 0.4 MG SL Tab Place 1 Tab under tongue as needed for Chest Pain. 25 Tab 11   • cyclobenzaprine (FLEXERIL) 5 MG tablet Take 5 mg by mouth 3 times a day as needed.     • losartan (COZAAR) 50 MG Tab Take 1 Tab by mouth every day. (Patient taking differently: Take 100 mg by mouth every day.) 30 Tab 0   • HYDROcodone-acetaminophen (NORCO) 5-325 MG Tab per tablet Take 1 Tab by mouth every 6 hours as needed.     • carvedilol (COREG) 12.5 MG Tab Take 12.5 mg by mouth 2 times a day, with meals.     • Brimonidine Tartrate-Timolol (COMBIGAN) 0.2-0.5 % Solution Place 1 Drop in both eyes 2 Times a Day.     • pregabalin (LYRICA) 50 MG capsule Take 50 mg by mouth 2 times a day.     • bimatoprost (LUMIGAN) 0.03 % Solution Place 1 Drop in both eyes every evening.       No current facility-administered medications for this visit.        Review of Systems   Constitutional: Positive for chills and malaise/fatigue.   HENT: Positive for hearing loss.    Eyes: Positive for blurred vision, double vision, photophobia, pain and redness.   Respiratory: Positive for shortness of breath and wheezing.    Cardiovascular: Positive for chest pain, orthopnea, claudication and leg swelling.   Gastrointestinal: Positive for nausea and vomiting.   Musculoskeletal: Positive for back pain, falls, joint pain, myalgias and neck pain.   Skin: Positive for itching.   Neurological: Positive for weakness and headaches.   Endo/Heme/Allergies: Positive for polydipsia.   Psychiatric/Behavioral: Positive for memory loss. The patient has insomnia.      All others systems reviewed  "and negative.     Objective:     Blood pressure 100/60, pulse 66, height 1.473 m (4' 10\"), weight 64 kg (141 lb 1.5 oz), last menstrual period 01/01/1995, SpO2 98 %, not currently breastfeeding. Body mass index is 29.49 kg/m².    Physical Exam   General: No acute distress. Well nourished. Wearing O2.  HEENT: EOM grossly intact, no scleral icterus, no pharyngeal erythema.   Neck:  No JVD, no bruits, trachea midline  CVS: RRR. Normal S1, S2. No M/R/G. No LE edema.  2+ radial pulses, 2+ DP pulses  Resp: CTAB. No wheezing or crackles/rhonchi. Normal respiratory effort.  Abdomen: Soft, NT, no tim hepatomegaly.  MSK/Ext: No clubbing or cyanosis.  Skin: Warm and dry, no rashes.  Neurological: CN III-XII grossly intact. No focal deficits.   Psych: A&O x 3, appropriate affect, good judgement      Assessment:     1. Other chest pain     2. Symptomatic anemia     3. ESRD (end stage renal disease) (HCC)     4. MDS (myelodysplastic syndrome) (HCC)     5. Essential hypertension     6. Pancytopenia (HCC)     7. Hyperkalemia     8. Pulmonary hypertension (HCC)     9. Diastolic dysfunction     10. Fatty liver     11. Chronic respiratory failure with hypoxia, 2L     12. Paroxysmal atrial fibrillation (HCC)         Medical Decision Making:  Today's Assessment / Status / Plan:     -Atypical CP, nitro has helped in the past so we will try this again.  -Reassuring trop, nuc, echo  -If she ever has an MI, there is little we could do for her except medical management because of her MDS and severe anemia- this is her biggest medical issue.  -I do not think primary prevention aspirin therapy is helpful at this time.  -I do not see any evidence of diastolic dysfunction or heart failure.  Her volume status is based on her requiring dialysis.  -There is mention in her medical history of paroxysmal atrial fibrillation but I did not find any details of this and she would not be a candidate for anticoagulation anyway.  -Most of her symptoms are " likely related to her anemia.  -I am going to follow-up with her in 6 months and see if the nitroglycerin is helping.  Unfortunately I do not have much more to offer except support.  -Her LDL cholesterol is in a good place so until she has an event such as a heart attack or stroke I would not start cholesterol medication.        Return in about 6 months (around 3/20/2019).    It is my pleasure to participate in the care of Ms. Briscoe.  Please do not hesitate to contact me with questions or concerns.    Germania Alberts MD, [facility]  Cardiologist [facility]    Please note that this dictation was created using voice recognition software. I have made every reasonable attempt to correct obvious errors, but it is possible there are errors of grammar and possibly content that I did not discover before finalizing the note.

## 2018-09-26 ENCOUNTER — HOSPITAL ENCOUNTER (INPATIENT)
Facility: MEDICAL CENTER | Age: 63
LOS: 1 days | DRG: 811 | End: 2018-09-27
Attending: EMERGENCY MEDICINE | Admitting: HOSPITALIST
Payer: MEDICARE

## 2018-09-26 ENCOUNTER — APPOINTMENT (OUTPATIENT)
Dept: RADIOLOGY | Facility: MEDICAL CENTER | Age: 63
DRG: 811 | End: 2018-09-26
Attending: EMERGENCY MEDICINE
Payer: MEDICARE

## 2018-09-26 DIAGNOSIS — D64.9 ANEMIA, UNSPECIFIED TYPE: ICD-10-CM

## 2018-09-26 DIAGNOSIS — R53.1 WEAKNESS: ICD-10-CM

## 2018-09-26 DIAGNOSIS — N18.6 END STAGE RENAL DISEASE (HCC): ICD-10-CM

## 2018-09-26 PROBLEM — E87.5 HYPERKALEMIA: Status: RESOLVED | Noted: 2017-11-03 | Resolved: 2018-09-26

## 2018-09-26 PROBLEM — D46.9 MDS (MYELODYSPLASTIC SYNDROME) (HCC): Status: RESOLVED | Noted: 2017-02-20 | Resolved: 2018-09-26

## 2018-09-26 PROBLEM — Z99.2 ESRD (END STAGE RENAL DISEASE) ON DIALYSIS (HCC): Status: RESOLVED | Noted: 2017-11-03 | Resolved: 2018-09-26

## 2018-09-26 PROBLEM — R42 DIZZINESS: Status: RESOLVED | Noted: 2017-08-15 | Resolved: 2018-09-26

## 2018-09-26 LAB
ABO GROUP BLD: NORMAL
ALBUMIN SERPL BCP-MCNC: 3.4 G/DL (ref 3.2–4.9)
ALBUMIN/GLOB SERPL: 1.1 G/DL
ALP SERPL-CCNC: 56 U/L (ref 30–99)
ALT SERPL-CCNC: 15 U/L (ref 2–50)
ANION GAP SERPL CALC-SCNC: 16 MMOL/L (ref 0–11.9)
APTT PPP: 29.8 SEC (ref 24.7–36)
AST SERPL-CCNC: 20 U/L (ref 12–45)
BARCODED ABORH UBTYP: 8400
BARCODED ABORH UBTYP: 8400
BARCODED PRD CODE UBPRD: NORMAL
BARCODED PRD CODE UBPRD: NORMAL
BARCODED UNIT NUM UBUNT: NORMAL
BARCODED UNIT NUM UBUNT: NORMAL
BASOPHILS # BLD AUTO: 0.4 % (ref 0–1.8)
BASOPHILS # BLD: 0.02 K/UL (ref 0–0.12)
BILIRUB SERPL-MCNC: 0.4 MG/DL (ref 0.1–1.5)
BLD GP AB SCN SERPL QL: NORMAL
BNP SERPL-MCNC: 210 PG/ML (ref 0–100)
BUN SERPL-MCNC: 78 MG/DL (ref 8–22)
CALCIUM SERPL-MCNC: 8.3 MG/DL (ref 8.5–10.5)
CHLORIDE SERPL-SCNC: 94 MMOL/L (ref 96–112)
CO2 SERPL-SCNC: 24 MMOL/L (ref 20–33)
COMMENT 1642: NORMAL
COMPONENT R 8504R: NORMAL
COMPONENT R 8504R: NORMAL
CREAT SERPL-MCNC: 7.82 MG/DL (ref 0.5–1.4)
EKG IMPRESSION: NORMAL
EOSINOPHIL # BLD AUTO: 0.18 K/UL (ref 0–0.51)
EOSINOPHIL NFR BLD: 3.4 % (ref 0–6.9)
ERYTHROCYTE [DISTWIDTH] IN BLOOD BY AUTOMATED COUNT: 74.1 FL (ref 35.9–50)
GLOBULIN SER CALC-MCNC: 3.2 G/DL (ref 1.9–3.5)
GLUCOSE BLD-MCNC: 211 MG/DL (ref 65–99)
GLUCOSE SERPL-MCNC: 290 MG/DL (ref 65–99)
HCT VFR BLD AUTO: 18.6 % (ref 37–47)
HGB BLD-MCNC: 6 G/DL (ref 12–16)
IMM GRANULOCYTES # BLD AUTO: 0.03 K/UL (ref 0–0.11)
IMM GRANULOCYTES NFR BLD AUTO: 0.6 % (ref 0–0.9)
INR PPP: 1 (ref 0.87–1.13)
LG PLATELETS BLD QL SMEAR: NORMAL
LYMPHOCYTES # BLD AUTO: 1.1 K/UL (ref 1–4.8)
LYMPHOCYTES NFR BLD: 21 % (ref 22–41)
MACROCYTES BLD QL SMEAR: ABNORMAL
MCH RBC QN AUTO: 34.7 PG (ref 27–33)
MCHC RBC AUTO-ENTMCNC: 32.3 G/DL (ref 33.6–35)
MCV RBC AUTO: 107.5 FL (ref 81.4–97.8)
MONOCYTES # BLD AUTO: 0.65 K/UL (ref 0–0.85)
MONOCYTES NFR BLD AUTO: 12.4 % (ref 0–13.4)
MORPHOLOGY BLD-IMP: NORMAL
NEUTROPHILS # BLD AUTO: 3.27 K/UL (ref 2–7.15)
NEUTROPHILS NFR BLD: 62.2 % (ref 44–72)
NRBC # BLD AUTO: 0.02 K/UL
NRBC BLD-RTO: 0.4 /100 WBC
OVALOCYTES BLD QL SMEAR: NORMAL
PLATELET # BLD AUTO: 137 K/UL (ref 164–446)
PLATELET BLD QL SMEAR: NORMAL
PMV BLD AUTO: 14.4 FL (ref 9–12.9)
POIKILOCYTOSIS BLD QL SMEAR: NORMAL
POLYCHROMASIA BLD QL SMEAR: NORMAL
POTASSIUM SERPL-SCNC: 5.5 MMOL/L (ref 3.6–5.5)
PRODUCT TYPE UPROD: NORMAL
PRODUCT TYPE UPROD: NORMAL
PROT SERPL-MCNC: 6.6 G/DL (ref 6–8.2)
PROTHROMBIN TIME: 13.3 SEC (ref 12–14.6)
RBC # BLD AUTO: 1.73 M/UL (ref 4.2–5.4)
RBC BLD AUTO: PRESENT
RH BLD: NORMAL
SODIUM SERPL-SCNC: 134 MMOL/L (ref 135–145)
TROPONIN I SERPL-MCNC: <0.01 NG/ML (ref 0–0.04)
UNIT STATUS USTAT: NORMAL
UNIT STATUS USTAT: NORMAL
WBC # BLD AUTO: 5.3 K/UL (ref 4.8–10.8)

## 2018-09-26 PROCEDURE — P9016 RBC LEUKOCYTES REDUCED: HCPCS | Mod: 91

## 2018-09-26 PROCEDURE — 30233N1 TRANSFUSION OF NONAUTOLOGOUS RED BLOOD CELLS INTO PERIPHERAL VEIN, PERCUTANEOUS APPROACH: ICD-10-PCS | Performed by: HOSPITALIST

## 2018-09-26 PROCEDURE — 85025 COMPLETE CBC W/AUTO DIFF WBC: CPT

## 2018-09-26 PROCEDURE — 85730 THROMBOPLASTIN TIME PARTIAL: CPT

## 2018-09-26 PROCEDURE — 93005 ELECTROCARDIOGRAM TRACING: CPT | Performed by: EMERGENCY MEDICINE

## 2018-09-26 PROCEDURE — 99223 1ST HOSP IP/OBS HIGH 75: CPT | Mod: AI | Performed by: HOSPITALIST

## 2018-09-26 PROCEDURE — 36430 TRANSFUSION BLD/BLD COMPNT: CPT

## 2018-09-26 PROCEDURE — 3E0234Z INTRODUCTION OF SERUM, TOXOID AND VACCINE INTO MUSCLE, PERCUTANEOUS APPROACH: ICD-10-PCS | Performed by: HOSPITALIST

## 2018-09-26 PROCEDURE — 86900 BLOOD TYPING SEROLOGIC ABO: CPT

## 2018-09-26 PROCEDURE — 94760 N-INVAS EAR/PLS OXIMETRY 1: CPT

## 2018-09-26 PROCEDURE — 90935 HEMODIALYSIS ONE EVALUATION: CPT

## 2018-09-26 PROCEDURE — 99285 EMERGENCY DEPT VISIT HI MDM: CPT

## 2018-09-26 PROCEDURE — A9270 NON-COVERED ITEM OR SERVICE: HCPCS | Performed by: HOSPITALIST

## 2018-09-26 PROCEDURE — 71045 X-RAY EXAM CHEST 1 VIEW: CPT

## 2018-09-26 PROCEDURE — 700102 HCHG RX REV CODE 250 W/ 637 OVERRIDE(OP): Performed by: HOSPITALIST

## 2018-09-26 PROCEDURE — 85610 PROTHROMBIN TIME: CPT

## 2018-09-26 PROCEDURE — 82962 GLUCOSE BLOOD TEST: CPT

## 2018-09-26 PROCEDURE — 86850 RBC ANTIBODY SCREEN: CPT

## 2018-09-26 PROCEDURE — 5A1D70Z PERFORMANCE OF URINARY FILTRATION, INTERMITTENT, LESS THAN 6 HOURS PER DAY: ICD-10-PCS | Performed by: INTERNAL MEDICINE

## 2018-09-26 PROCEDURE — 86923 COMPATIBILITY TEST ELECTRIC: CPT

## 2018-09-26 PROCEDURE — 80053 COMPREHEN METABOLIC PANEL: CPT

## 2018-09-26 PROCEDURE — 84484 ASSAY OF TROPONIN QUANT: CPT

## 2018-09-26 PROCEDURE — 86901 BLOOD TYPING SEROLOGIC RH(D): CPT

## 2018-09-26 PROCEDURE — 83880 ASSAY OF NATRIURETIC PEPTIDE: CPT

## 2018-09-26 PROCEDURE — 770020 HCHG ROOM/CARE - TELE (206)

## 2018-09-26 RX ORDER — BRIMONIDINE TARTRATE AND TIMOLOL MALEATE 2; 5 MG/ML; MG/ML
1 SOLUTION OPHTHALMIC 2 TIMES DAILY
Status: DISCONTINUED | OUTPATIENT
Start: 2018-09-26 | End: 2018-09-26

## 2018-09-26 RX ORDER — BISACODYL 10 MG
10 SUPPOSITORY, RECTAL RECTAL
Status: DISCONTINUED | OUTPATIENT
Start: 2018-09-26 | End: 2018-09-27 | Stop reason: HOSPADM

## 2018-09-26 RX ORDER — PROMETHAZINE HYDROCHLORIDE 25 MG/1
12.5-25 SUPPOSITORY RECTAL EVERY 4 HOURS PRN
Status: DISCONTINUED | OUTPATIENT
Start: 2018-09-26 | End: 2018-09-27 | Stop reason: HOSPADM

## 2018-09-26 RX ORDER — ONDANSETRON 2 MG/ML
4 INJECTION INTRAMUSCULAR; INTRAVENOUS EVERY 4 HOURS PRN
Status: DISCONTINUED | OUTPATIENT
Start: 2018-09-26 | End: 2018-09-27 | Stop reason: HOSPADM

## 2018-09-26 RX ORDER — CARVEDILOL 12.5 MG/1
12.5 TABLET ORAL 2 TIMES DAILY WITH MEALS
Status: DISCONTINUED | OUTPATIENT
Start: 2018-09-26 | End: 2018-09-27 | Stop reason: HOSPADM

## 2018-09-26 RX ORDER — PREGABALIN 25 MG/1
50 CAPSULE ORAL 2 TIMES DAILY
Status: DISCONTINUED | OUTPATIENT
Start: 2018-09-26 | End: 2018-09-27 | Stop reason: HOSPADM

## 2018-09-26 RX ORDER — DEXTROSE MONOHYDRATE 25 G/50ML
25 INJECTION, SOLUTION INTRAVENOUS
Status: DISCONTINUED | OUTPATIENT
Start: 2018-09-26 | End: 2018-09-27 | Stop reason: HOSPADM

## 2018-09-26 RX ORDER — LOSARTAN POTASSIUM 100 MG/1
100 TABLET ORAL EVERY EVENING
COMMUNITY

## 2018-09-26 RX ORDER — BRIMONIDINE TARTRATE 2 MG/ML
1 SOLUTION/ DROPS OPHTHALMIC 2 TIMES DAILY
Status: DISCONTINUED | OUTPATIENT
Start: 2018-09-26 | End: 2018-09-27 | Stop reason: HOSPADM

## 2018-09-26 RX ORDER — POLYETHYLENE GLYCOL 3350 17 G/17G
1 POWDER, FOR SOLUTION ORAL
Status: DISCONTINUED | OUTPATIENT
Start: 2018-09-26 | End: 2018-09-27 | Stop reason: HOSPADM

## 2018-09-26 RX ORDER — LATANOPROST 50 UG/ML
1 SOLUTION/ DROPS OPHTHALMIC EVERY EVENING
Status: DISCONTINUED | OUTPATIENT
Start: 2018-09-26 | End: 2018-09-27 | Stop reason: HOSPADM

## 2018-09-26 RX ORDER — AMLODIPINE BESYLATE 10 MG/1
10 TABLET ORAL DAILY
Status: DISCONTINUED | OUTPATIENT
Start: 2018-09-27 | End: 2018-09-27 | Stop reason: HOSPADM

## 2018-09-26 RX ORDER — ONDANSETRON 4 MG/1
4 TABLET, ORALLY DISINTEGRATING ORAL EVERY 4 HOURS PRN
Status: DISCONTINUED | OUTPATIENT
Start: 2018-09-26 | End: 2018-09-27 | Stop reason: HOSPADM

## 2018-09-26 RX ORDER — LOSARTAN POTASSIUM 50 MG/1
100 TABLET ORAL DAILY
Status: DISCONTINUED | OUTPATIENT
Start: 2018-09-26 | End: 2018-09-27 | Stop reason: HOSPADM

## 2018-09-26 RX ORDER — PROMETHAZINE HYDROCHLORIDE 25 MG/1
12.5-25 TABLET ORAL EVERY 4 HOURS PRN
Status: DISCONTINUED | OUTPATIENT
Start: 2018-09-26 | End: 2018-09-27 | Stop reason: HOSPADM

## 2018-09-26 RX ORDER — ACETAMINOPHEN 325 MG/1
650 TABLET ORAL EVERY 6 HOURS PRN
Status: DISCONTINUED | OUTPATIENT
Start: 2018-09-26 | End: 2018-09-27 | Stop reason: HOSPADM

## 2018-09-26 RX ORDER — BIMATOPROST 0.3 MG/ML
1 SOLUTION/ DROPS OPHTHALMIC EVERY EVENING
Status: DISCONTINUED | OUTPATIENT
Start: 2018-09-26 | End: 2018-09-26

## 2018-09-26 RX ORDER — AMOXICILLIN 250 MG
2 CAPSULE ORAL 2 TIMES DAILY
Status: DISCONTINUED | OUTPATIENT
Start: 2018-09-27 | End: 2018-09-27 | Stop reason: HOSPADM

## 2018-09-26 RX ORDER — TIMOLOL MALEATE 5 MG/ML
1 SOLUTION/ DROPS OPHTHALMIC 2 TIMES DAILY
Status: DISCONTINUED | OUTPATIENT
Start: 2018-09-26 | End: 2018-09-27 | Stop reason: HOSPADM

## 2018-09-26 RX ADMIN — LOSARTAN POTASSIUM 100 MG: 50 TABLET ORAL at 20:27

## 2018-09-26 RX ADMIN — CARVEDILOL 12.5 MG: 12.5 TABLET, FILM COATED ORAL at 20:28

## 2018-09-26 RX ADMIN — INSULIN HUMAN 4 UNITS: 100 INJECTION, SOLUTION PARENTERAL at 21:03

## 2018-09-26 RX ADMIN — PREGABALIN 50 MG: 25 CAPSULE ORAL at 20:28

## 2018-09-26 ASSESSMENT — ENCOUNTER SYMPTOMS
SHORTNESS OF BREATH: 0
DIZZINESS: 1

## 2018-09-26 NOTE — ED NOTES
Bedside report to Stacia GEORGE pt to dialysis via clif on telebox then to T7 all pt belongings with pt and to admit room via RN

## 2018-09-26 NOTE — ED PROVIDER NOTES
ED Provider Note    Scribed for Matias Lou M.D. by Kyle Brown. 9/26/2018, 11:54 AM.    Primary care provider: Nyla Nava M.D.  Means of arrival: Walk In   History obtained from: Patient  History limited by: None     CHIEF COMPLAINT  Chief Complaint   Patient presents with   • Sent by MD     was told Hgb was low last week, sent by nephrologist. requesting blood transfusion and dialysis at same time.    • Abnormal Labs     HPI  Vielka Briscoe is a 63 y.o. female who presents to the Emergency Department complaining of generalized weakness. The patient complains of an onset of generalized weakness three days ago. Her generalized weakness has been gradually worsening. The patient has not treated her symptoms with any medications, and denies any alleviating factors of her symptoms. She denies any focal weakness or numbness.   The patient complains of intermittent chest pain, and was told this chest pain could be caused by anemia by her nephrologist, Dr. Alberts. The patient requires constant supplemental oxygen at baseline.     The patient has a history of end stage renal disease and Myelodysplastic syndrome and is dialyzed on Mon/wed/Fri, but did not receive her dialysis today secondary to presentation to the ED.   The patient was instructed to present to the ED by her Nephrologist, Dr. Alberts secondary to abnormal labs drawn last Wednesday.     The patient denies any nausea, vomiting, abdominal pain, shortness of breath, diarrhea.     REVIEW OF SYSTEMS  Pertinent positives include generalized weakness. Pertinent negatives include no focal numbness or weakness, nausea, vomiting, abdominal pain, shortness of breath, diarrhea. As above, all other systems reviewed and are negative.   See HPI for further details.     PAST MEDICAL HISTORY   has a past medical history of Arthritis; Atrial fibrillation (HCC); Blood transfusion without reported diagnosis; Breath shortness; Cancer (HCC); Cataract; Chronic  anemia; Chronic kidney disease; Chronic obstructive pulmonary disease (HCC); Congestive heart failure (HCC); Dental disorder; Diabetes; Dialysis patient; Glaucoma; Heart abnormalities; Hypertension; MDS (myelodysplastic syndrome) (10/2016); Pain (); Stroke (HCC) (2015); and Supplemental oxygen dependent.    SURGICAL HISTORY   has a past surgical history that includes other cardiac surgery; other abdominal surgery; gyn surgery; gyn surgery; gyn surgery; other cardiac surgery; other; retinal detachment repair (Right); av fistula creation (Left, 2016); other abdominal surgery; gastroscopy (2015); colonoscopy (2015); vein ligation (Left, 11/10/2016); bone marrow biopsy, ndl/trocar (2017); bone marrow aspiration (2017); and gastroscopy (N/A, 2018).    SOCIAL HISTORY  Social History   Substance Use Topics   • Smoking status: Never Smoker   • Smokeless tobacco: Never Used   • Alcohol use No      History   Drug Use No     FAMILY HISTORY  Family History   Problem Relation Age of Onset   • Hypertension Father          at 89   • Hypertension Mother      CURRENT MEDICATIONS  Home Medications     Reviewed by Sun Daigle (Pharmacy Tech) on 18 at 1438  Med List Status: Complete   Medication Last Dose Status   amLODIPine (NORVASC) 10 MG Tab 2018 Active   bimatoprost (LUMIGAN) 0.03 % Solution 2018 Active   Brimonidine Tartrate-Timolol (COMBIGAN) 0.2-0.5 % Solution Taking Active   carvedilol (COREG) 12.5 MG Tab 2018 Active   cyclobenzaprine (FLEXERIL) 5 MG tablet 2018 Active   HYDROcodone-acetaminophen (NORCO) 5-325 MG Tab per tablet 2018 Active   losartan (COZAAR) 100 MG Tab 2018 Active   nitroglycerin (NITROSTAT) 0.4 MG SL Tab 2018 Active   pregabalin (LYRICA) 50 MG capsule 2018 Active              ALLERGIES  Allergies   Allergen Reactions   • Actos [Pioglitazone Hydrochloride] Unspecified     Cause blindness   KSP=1323   •  "Darvocet [Propoxyphene N-Apap] Vomiting     HQH=3590   • Demerol Vomiting     DAL=7378   • Glucophage [Metformin Hydrochloride] Vomiting     OET=1065   • Morphine Vomiting     GFV=5723   • Oxycodone Vomiting     RXN=1/2016   • Pcn [Penicillins] Vomiting     HMW=1457     • Requip Vomiting     RXN=12/2015   • Simvastatin Unspecified     Leg cramps  ZMO=9894   • Sulfa Drugs Rash     RXN=>10 years   • Tramadol Vomiting     ESL=5469   • Trazodone Vomiting     HRP=1645   • Dilaudid [Hydromorphone] Vomiting     Unknown    • Diphenhydramine Vomiting   • Iron      vomiting   • Lenalidomide Vomiting   • Multivitamin      itching   • Naprosyn [Naproxen] Hives   • Other Drug Rash and Vomiting     Any binders that remove phosphorus from the body such as tums     PHYSICAL EXAM  VITAL SIGNS: /55   Pulse 65   Temp 36 °C (96.8 °F)   Resp 20   Ht 1.473 m (4' 10\")   Wt 63.5 kg (140 lb)   LMP 01/01/1995   SpO2 100%   BMI 29.26 kg/m²   Vitals reviewed.  Constitutional: Alert.   HENT: No signs of trauma, Bilateral external ears normal, Nose normal.   Eyes: Pupils are equal and reactive, Conjunctiva normal, Non-icteric.   Neck: Normal range of motion, No tenderness, Supple, No stridor.   Lymphatic: No lymphadenopathy noted.   Cardiovascular: Regular rate and rhythm, no murmurs.   Thorax & Lungs: Normal breath sounds, No respiratory distress, No wheezing, No chest tenderness.   Abdomen: Bowel sounds normal, Soft, No tenderness, No peritoneal signs, No masses, No pulsatile masses.   Skin: Warm, Dry, No erythema, No rash.   Extremities: Intact distal pulses, No edema, No tenderness, No cyanosis  Musculoskeletal: Good range of motion in all major joints. No tenderness to palpation or major deformities noted.   Neurologic: Alert , Normal motor function, Normal sensory function, No focal deficits noted.   Psychiatric: Affect normal, Judgment normal, Mood normal.     DIAGNOSTIC STUDIES / PROCEDURES    LABS  Labs Reviewed   COMP " METABOLIC PANEL - Abnormal; Notable for the following:        Result Value    Sodium 134 (*)     Chloride 94 (*)     Anion Gap 16.0 (*)     Glucose 290 (*)     Bun 78 (*)     Creatinine 7.82 (*)     Calcium 8.3 (*)     All other components within normal limits    Narrative:     Indicate which anticoagulants the patient is on:->UNKNOWN   ESTIMATED GFR - Abnormal; Notable for the following:     GFR If  6 (*)     GFR If Non  5 (*)     All other components within normal limits    Narrative:     Indicate which anticoagulants the patient is on:->UNKNOWN   CBC WITH DIFFERENTIAL - Abnormal; Notable for the following:     RBC 1.73 (*)     Hemoglobin 6.0 (*)     Hematocrit 18.6 (*)     .5 (*)     MCH 34.7 (*)     MCHC 32.3 (*)     RDW 74.1 (*)     Platelet Count 137 (*)     MPV 14.4 (*)     Lymphocytes 21.00 (*)     All other components within normal limits    Narrative:     SPECIMEN IS A RECOLLECT  Clotted   BTYPE NATRIURETIC PEPTIDE - Abnormal; Notable for the following:     B Natriuretic Peptide 210 (*)     All other components within normal limits    Narrative:     SPECIMEN IS A RECOLLECT  Clotted   PROTHROMBIN TIME    Narrative:     Indicate which anticoagulants the patient is on:->UNKNOWN   APTT    Narrative:     Indicate which anticoagulants the patient is on:->UNKNOWN   TROPONIN    Narrative:     Indicate which anticoagulants the patient is on:->UNKNOWN   COD (ADULT)   PERIPHERAL SMEAR REVIEW    Narrative:     SPECIMEN IS A RECOLLECT  Clotted   PLATELET ESTIMATE    Narrative:     SPECIMEN IS A RECOLLECT  Clotted   MORPHOLOGY    Narrative:     SPECIMEN IS A RECOLLECT  Clotted   DIFFERENTIAL COMMENT    Narrative:     SPECIMEN IS A RECOLLECT  Clotted   TRANSFUSE RED BLOOD CELLS-NURSING COMMUNICATION   TRANSFUSE RED BLOOD CELLS-NURSING COMMUNICATION      All labs reviewed by me.    EKG Interpretation:  Interpreted by me    12 Lead EKG interpreted by me to show:  Normal sinus  rhythm  Rate 64  Axis: Normal  Intervals: Borderline prolonged QTc 487.   Normal T waves  Normal ST segments  My impression of this EKG: Does not indicate ischemia or arrhythmia at this time.      RADIOLOGY  DX-CHEST-PORTABLE (1 VIEW)   Final Result      1.  Persistently enlarged cardiac silhouette   2.  Atherosclerosis   3.  Possible small RIGHT pleural effusion        The radiologist's interpretation of all radiological studies have been reviewed by me.    COURSE & MEDICAL DECISION MAKING  Nursing notes, VS, PMSFHx reviewed in chart.  Differential diagnoses include but not limited to: electrolyte abnormality, cardiac ischemia, anemia.     Obtained and reviewed past medical records: The patient was seen at [facility] on 9/20/18 complaining of chest pain.     11:54 AM Patient seen and examined at bedside. Ordered for Chest X Ray, COD, CMP, PT/INR, GFR, CBC, BNP, APTT, troponin to evaluate.     3:13 PM Discussed patient's condition Dr. Camargo, Hospitalist, who is agreeable to admit the patient. Dr. Camargo has spoken with the Nephrologist, Dr. Le, about scheduling dialysis for the patient.  Dr. Camargo will transfuse the patient.       Disposition:  Patient will be admitted to the hospital under the care of Dr. Camargo (Hospitalist) in guarded condition.       FINAL IMPRESSION  1. Anemia, unspecified type    2. End stage renal disease (HCC)    3. Weakness          Kyle KENNY (Scribe), am scribing for, and in the presence of, Matias Lou M.D..    Electronically signed by: Kyle Brown (Ervin), 9/26/2018    Matias KENNY M.D. personally performed the services described in this documentation, as scribed by Kyle Brown in my presence, and it is both accurate and complete.    The note accurately reflects work and decisions made by me.  Matias Lou  9/26/2018  3:18 PM

## 2018-09-26 NOTE — PROGRESS NOTES
RN placed tele box on patient while down in the ER. Monitor room notified, she is in SR in the 60's. Bedside report completed, pt is awake, stable and denies pain. Per ED RN Alissa, dialysis agreed to transfuse the blood that was ordered. Pt was then transported to dialysis by transport staff. All personal belongings were transported by RN up to her room in 728-1 including her motorized wheelchair. Monitor room notified that she is going to dialysis.

## 2018-09-26 NOTE — ED TRIAGE NOTES
"Chief Complaint   Patient presents with   • Sent by MD     was told Hgb was low last week, sent by nephrologist. requesting blood transfusion and dialysis at same time.    • Abnormal Labs     Pt to triage via personal w/c for above. States Hgb was 7.1. Gets dialysis M, W, F. Hx of MDS.     Pt to senior [facility]. Educated on triage process and to inform staff of any changes.     /55   Pulse 64   Temp 36 °C (96.8 °F)   Resp 17   Ht 1.473 m (4' 10\")   Wt 63.5 kg (140 lb)   LMP 01/01/1995   SpO2 100%   BMI 29.26 kg/m²     "

## 2018-09-26 NOTE — DISCHARGE PLANNING
Outpatient Dialysis Note    Confirmed patient is active at:    [facility]  [address] [place] [address]      Schedule: Monday, Wednesday, Friday  Time: 10:00 am    Spoke with Zamzam at facility who confirmed.    Forwarded records for review.    Dialysis Coordinator, [facility]  Stacia Stephens [phone]

## 2018-09-26 NOTE — ASSESSMENT & PLAN NOTE
Hemoglobin of 6 thus 2 units of RBCs will be transfused during dialysis to avoid volume-overload.  Hemoglobin ordered for the morning.

## 2018-09-27 VITALS
DIASTOLIC BLOOD PRESSURE: 65 MMHG | HEIGHT: 58 IN | WEIGHT: 141.76 LBS | OXYGEN SATURATION: 100 % | BODY MASS INDEX: 29.76 KG/M2 | RESPIRATION RATE: 18 BRPM | SYSTOLIC BLOOD PRESSURE: 161 MMHG | HEART RATE: 62 BPM | TEMPERATURE: 98.9 F

## 2018-09-27 LAB
GLUCOSE BLD-MCNC: 175 MG/DL (ref 65–99)
GLUCOSE BLD-MCNC: 206 MG/DL (ref 65–99)
HCT VFR BLD AUTO: 26.7 % (ref 37–47)
HGB BLD-MCNC: 9 G/DL (ref 12–16)

## 2018-09-27 PROCEDURE — G8978 MOBILITY CURRENT STATUS: HCPCS | Mod: CI

## 2018-09-27 PROCEDURE — 82962 GLUCOSE BLOOD TEST: CPT

## 2018-09-27 PROCEDURE — A9270 NON-COVERED ITEM OR SERVICE: HCPCS | Performed by: HOSPITALIST

## 2018-09-27 PROCEDURE — 700101 HCHG RX REV CODE 250: Performed by: HOSPITALIST

## 2018-09-27 PROCEDURE — 85018 HEMOGLOBIN: CPT

## 2018-09-27 PROCEDURE — G8987 SELF CARE CURRENT STATUS: HCPCS | Mod: CI

## 2018-09-27 PROCEDURE — 36415 COLL VENOUS BLD VENIPUNCTURE: CPT

## 2018-09-27 PROCEDURE — 85014 HEMATOCRIT: CPT

## 2018-09-27 PROCEDURE — 97161 PT EVAL LOW COMPLEX 20 MIN: CPT

## 2018-09-27 PROCEDURE — G8979 MOBILITY GOAL STATUS: HCPCS | Mod: CI

## 2018-09-27 PROCEDURE — G8980 MOBILITY D/C STATUS: HCPCS | Mod: CI

## 2018-09-27 PROCEDURE — 700102 HCHG RX REV CODE 250 W/ 637 OVERRIDE(OP): Performed by: HOSPITALIST

## 2018-09-27 PROCEDURE — 97166 OT EVAL MOD COMPLEX 45 MIN: CPT

## 2018-09-27 PROCEDURE — G8988 SELF CARE GOAL STATUS: HCPCS | Mod: CI

## 2018-09-27 PROCEDURE — G8989 SELF CARE D/C STATUS: HCPCS | Mod: CI

## 2018-09-27 PROCEDURE — 99239 HOSP IP/OBS DSCHRG MGMT >30: CPT | Performed by: INTERNAL MEDICINE

## 2018-09-27 RX ORDER — CYCLOBENZAPRINE HCL 10 MG
10 TABLET ORAL ONCE
Status: COMPLETED | OUTPATIENT
Start: 2018-09-27 | End: 2018-09-27

## 2018-09-27 RX ADMIN — STANDARDIZED SENNA CONCENTRATE AND DOCUSATE SODIUM 2 TABLET: 8.6; 5 TABLET, FILM COATED ORAL at 06:00

## 2018-09-27 RX ADMIN — CYCLOBENZAPRINE 10 MG: 10 TABLET, FILM COATED ORAL at 04:09

## 2018-09-27 RX ADMIN — PREGABALIN 50 MG: 25 CAPSULE ORAL at 05:23

## 2018-09-27 RX ADMIN — TIMOLOL MALEATE 1 DROP: 5 SOLUTION OPHTHALMIC at 00:59

## 2018-09-27 RX ADMIN — BRIMONIDINE TARTRATE 1 DROP: 2 SOLUTION OPHTHALMIC at 00:58

## 2018-09-27 RX ADMIN — LATANOPROST 1 DROP: 50 SOLUTION OPHTHALMIC at 00:58

## 2018-09-27 RX ADMIN — INSULIN HUMAN 3 UNITS: 100 INJECTION, SOLUTION PARENTERAL at 14:09

## 2018-09-27 RX ADMIN — BRIMONIDINE TARTRATE 1 DROP: 2 SOLUTION OPHTHALMIC at 05:27

## 2018-09-27 RX ADMIN — TIMOLOL MALEATE 1 DROP: 5 SOLUTION OPHTHALMIC at 05:29

## 2018-09-27 RX ADMIN — AMLODIPINE BESYLATE 10 MG: 10 TABLET ORAL at 05:25

## 2018-09-27 RX ADMIN — ACETAMINOPHEN 650 MG: 325 TABLET, FILM COATED ORAL at 00:57

## 2018-09-27 RX ADMIN — INSULIN HUMAN 4 UNITS: 100 INJECTION, SOLUTION PARENTERAL at 09:11

## 2018-09-27 RX ADMIN — CARVEDILOL 12.5 MG: 12.5 TABLET, FILM COATED ORAL at 09:11

## 2018-09-27 ASSESSMENT — COGNITIVE AND FUNCTIONAL STATUS - GENERAL
MOBILITY SCORE: 18
TURNING FROM BACK TO SIDE WHILE IN FLAT BAD: A LITTLE
DRESSING REGULAR UPPER BODY CLOTHING: A LITTLE
WALKING IN HOSPITAL ROOM: A LITTLE
SUGGESTED CMS G CODE MODIFIER MOBILITY: CJ
PERSONAL GROOMING: A LITTLE
CLIMB 3 TO 5 STEPS WITH RAILING: A LITTLE
MOVING FROM LYING ON BACK TO SITTING ON SIDE OF FLAT BED: A LITTLE
SUGGESTED CMS G CODE MODIFIER DAILY ACTIVITY: CH
DAILY ACTIVITIY SCORE: 24
MOBILITY SCORE: 22
SUGGESTED CMS G CODE MODIFIER MOBILITY: CK
MOVING TO AND FROM BED TO CHAIR: A LITTLE
STANDING UP FROM CHAIR USING ARMS: A LITTLE
DRESSING REGULAR LOWER BODY CLOTHING: A LITTLE
CLIMB 3 TO 5 STEPS WITH RAILING: A LOT
EATING MEALS: A LITTLE
TOILETING: A LITTLE

## 2018-09-27 ASSESSMENT — GAIT ASSESSMENTS
ASSISTIVE DEVICE: FRONT WHEEL WALKER
DEVIATION: OTHER (COMMENT)
DISTANCE (FEET): 200
GAIT LEVEL OF ASSIST: STAND BY ASSIST

## 2018-09-27 ASSESSMENT — ENCOUNTER SYMPTOMS
BACK PAIN: 0
WHEEZING: 0
DIZZINESS: 0
SHORTNESS OF BREATH: 0
NAUSEA: 0
VOMITING: 0
CONSTIPATION: 0
ABDOMINAL PAIN: 0
MYALGIAS: 0
FEVER: 0
HEADACHES: 0
DIARRHEA: 0
CHILLS: 0
COUGH: 0

## 2018-09-27 ASSESSMENT — PATIENT HEALTH QUESTIONNAIRE - PHQ9
SUM OF ALL RESPONSES TO PHQ9 QUESTIONS 1 AND 2: 0
2. FEELING DOWN, DEPRESSED, IRRITABLE, OR HOPELESS: NOT AT ALL
1. LITTLE INTEREST OR PLEASURE IN DOING THINGS: NOT AT ALL

## 2018-09-27 ASSESSMENT — LIFESTYLE VARIABLES
EVER_SMOKED: NEVER
ALCOHOL_USE: NO

## 2018-09-27 ASSESSMENT — PAIN SCALES - GENERAL: PAINLEVEL_OUTOF10: 0

## 2018-09-27 ASSESSMENT — ACTIVITIES OF DAILY LIVING (ADL): TOILETING: INDEPENDENT

## 2018-09-27 NOTE — H&P
Hospital Medicine History & Physical Note    Date of Service  9/26/2018    Primary Care Physician  Nyla Nava M.D.    Consultants  I discussed with Dr. Le for consultation    Code Status  full    Chief Complaint  weakness    History of Presenting Illness  63 y.o. female who presented 9/26/2018 with weakness and body aches. Ms. Briscoe has a past medical history of myelodysplastic syndrome for which she requires frequent transfusions as well as end-stage renal disease and hypertension that had blood drawn at dialysis last Wednesday and was called on Thursday that she was to go to the emergency room for blood transfusion as her hemoglobin is 7.  Currently they did not have any appointments at the transfusion center for over 2 weeks according to patient.  Has been too weak to use a walker and to dizzy function except only in her wheelchair.  Today she was not able to get to dialysis because of her symptoms therefore was brought to the emergency room where she is found to have a hemoglobin of 6 therefore she will be admitted for blood transfusions for symptomatic anemia.  Of note, her glucose is 290 in the ER and she is not on diabetic medications. Insulin will be initiated.   Review of Systems  Review of Systems   Eyes:        She is blind in the left eye and losing her eyesight in the right eye   Respiratory: Negative for shortness of breath.    Cardiovascular: Negative for chest pain and leg swelling.   Neurological: Positive for dizziness.        She has not been falling   All other systems reviewed and are negative.      Past Medical History   has a past medical history of Arthritis; Atrial fibrillation (HCC); Blood transfusion without reported diagnosis; Breath shortness; Cancer (HCC); Cataract; Chronic anemia; Chronic kidney disease; Chronic obstructive pulmonary disease (HCC); Congestive heart failure (HCC); Dental disorder; Diabetes; Dialysis patient; Glaucoma; Heart abnormalities; Hypertension; MDS  (myelodysplastic syndrome) (10/2016); Pain (-2017); Stroke (HCC) (03/2015); and Supplemental oxygen dependent.  She is followed by Dr. Avila for her myelodysplastic syndrome  End-stage renal disease on dialysis    Surgical History   has a past surgical history that includes other cardiac surgery; other abdominal surgery; gyn surgery; gyn surgery; gyn surgery; other cardiac surgery; other; retinal detachment repair (Right); av fistula creation (Left, 2/8/2016); other abdominal surgery; gastroscopy (12/17/2015); colonoscopy (12/17/2015); vein ligation (Left, 11/10/2016); bone marrow biopsy, ndl/trocar (9/20/2017); bone marrow aspiration (9/20/2017); and gastroscopy (N/A, 1/25/2018).     Family History  family history includes Hypertension in her father and mother.  No family history of myelodysplastic syndrome    Social History   reports that she has never smoked. She has never used smokeless tobacco. She reports that she does not drink alcohol or use drugs.  She lives alone in [facility]    Allergies  Allergies   Allergen Reactions   • Actos [Pioglitazone Hydrochloride] Unspecified     Cause blindness   ZZQ=8560   • Darvocet [Propoxyphene N-Apap] Vomiting     OFH=2513   • Demerol Vomiting     HPF=2708   • Glucophage [Metformin Hydrochloride] Vomiting     NXY=9841   • Morphine Vomiting     GAC=7714   • Oxycodone Vomiting     RXN=1/2016   • Pcn [Penicillins] Vomiting     QDW=2981     • Requip Vomiting     RXN=12/2015   • Simvastatin Unspecified     Leg cramps  JXF=9472   • Sulfa Drugs Rash     RXN=>10 years   • Tramadol Vomiting     YGE=7903   • Trazodone Vomiting     FZR=9170   • Dilaudid [Hydromorphone] Vomiting     Unknown    • Diphenhydramine Vomiting   • Iron      vomiting   • Lenalidomide Vomiting   • Multivitamin      itching   • Naprosyn [Naproxen] Hives   • Other Drug Rash and Vomiting     Any binders that remove phosphorus from the body such as tums       Medications  Prior to Admission  Medications   Prescriptions Last Dose Informant Patient Reported? Taking?   Brimonidine Tartrate-Timolol (COMBIGAN) 0.2-0.5 % Solution Taking at PM Patient Yes No   Sig: Place 1 Drop in both eyes 2 Times a Day.   HYDROcodone-acetaminophen (NORCO) 5-325 MG Tab per tablet 9/24/2018 at 2000 Patient Yes No   Sig: Take 1 Tab by mouth every 6 hours as needed (pain).   amLODIPine (NORVASC) 10 MG Tab 9/26/2018 at 0630 Patient Yes No   Sig: Take 1 Tab by mouth every day.   bimatoprost (LUMIGAN) 0.03 % Solution 9/25/2018 at 2000 Patient Yes No   Sig: Place 1 Drop in both eyes every evening.   carvedilol (COREG) 12.5 MG Tab 9/26/2018 at 0630 Patient Yes No   Sig: Take 12.5 mg by mouth 2 times a day, with meals.   cyclobenzaprine (FLEXERIL) 5 MG tablet 9/25/2018 at 2000 Patient Yes No   Sig: Take 5 mg by mouth 3 times a day as needed.   losartan (COZAAR) 100 MG Tab 9/25/2018 at 2000 Patient Yes Yes   Sig: Take 100 mg by mouth every day.   nitroglycerin (NITROSTAT) 0.4 MG SL Tab 9/25/2018 at 2030 Patient No No   Sig: Place 1 Tab under tongue as needed for Chest Pain.   pregabalin (LYRICA) 50 MG capsule 9/26/2018 at 0630 Patient Yes No   Sig: Take 50 mg by mouth 2 times a day.      Facility-Administered Medications: None       Physical Exam  Temp:  [36 °C (96.8 °F)-37.1 °C (98.7 °F)] 36.6 °C (97.9 °F)  Pulse:  [62-69] 69  Resp:  [14-20] 16  BP: (127-186)/(55-86) 157/65    Physical Exam   Constitutional: She is oriented to person, place, and time. No distress.   Eyes:   Pale conjunctiva   Neck: Neck supple.   Cardiovascular:   Sinus rhythm with a slight murmur   Abdominal: Soft. She exhibits no distension. There is no tenderness.   Musculoskeletal: She exhibits no edema or tenderness.   Left arm fistula with a thrill   Neurological: She is alert and oriented to person, place, and time.   Skin: She is not diaphoretic. There is pallor.   Psychiatric:   Very pleasant   Nursing note and vitals reviewed.      Laboratory:  Recent Labs       09/26/18   1300   WBC  5.3   RBC  1.73*   HEMOGLOBIN  6.0*   HEMATOCRIT  18.6*   MCV  107.5*   MCH  34.7*   MCHC  32.3*   RDW  74.1*   PLATELETCT  137*   MPV  14.4*     Recent Labs      09/26/18   1130   SODIUM  134*   POTASSIUM  5.5   CHLORIDE  94*   CO2  24   GLUCOSE  290*   BUN  78*   CREATININE  7.82*   CALCIUM  8.3*     Recent Labs      09/26/18   1130   ALTSGPT  15   ASTSGOT  20   ALKPHOSPHAT  56   TBILIRUBIN  0.4   GLUCOSE  290*     Recent Labs      09/26/18   1150   APTT  29.8   INR  1.00     Recent Labs      09/26/18   1300   BNPBTYPENAT  210*         Recent Labs      09/26/18   1130   TROPONINI  <0.01       Urinalysis:    No results found     Imaging:  DX-CHEST-PORTABLE (1 VIEW)   Final Result      1.  Persistently enlarged cardiac silhouette   2.  Atherosclerosis   3.  Possible small RIGHT pleural effusion            Assessment/Plan:  I anticipate this patient will require at least two midnights for appropriate medical management, necessitating inpatient admission.    * Anemia- (present on admission)   Assessment & Plan    Hemoglobin of 6 thus 2 units of RBCs will be transfused during dialysis to avoid volume-overload.  Hemoglobin ordered for the morning.         HTN (hypertension)- (present on admission)   Assessment & Plan    Continue Norvasc 10 mg, Coreg 12.5 mg BID, and cozaar 100 mg daily         Myelodysplasia (myelodysplastic syndrome) (HCC)- (present on admission)   Assessment & Plan    Followed by Dr. Avila, heme/onc  She has required blood transfusions in the past.         ESRD (end stage renal disease) (HCC)- (present on admission)   Assessment & Plan    [facility] has been consulted for dialysis.        Paroxysmal atrial fibrillation (HCC)- (present on admission)   Assessment & Plan    History of  She is not a candidate for anticoagulation due to her severe anemia.        COPD (chronic obstructive pulmonary disease) (HCC)- (present on admission)   Assessment & Plan    History  of  She does not have an acute exacerbation.        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   Her blood sugar was 290 in the ER and last glycohemoglobin was 8.3 9 months ago.  Sliding scale insulin will be initiated and Ms. Briscoe may be a good candidate for Lantus nightly upon discharge.             VTE prophylaxis: SCDs

## 2018-09-27 NOTE — PROGRESS NOTES
Two RN skin check completed with RNMaryam.  Patient skin is dry and flaky.  Moisturizer being in use by patient.  Patient denies any other questions or concerns at this time.

## 2018-09-27 NOTE — PROGRESS NOTES
"[facility]    3.0 hours hemodialysis treatment w/ 2 units PRBC transfusion order received from Dr. Le. HD tx initiated at 1630 and completed at 1930. Pt stable upon assessment.BP elevated, afebrile w/ mild SOB w/ exertion, on O@ at 2Lmin. Denies any pain and discomfort. L UA AVF, (+) Thrill and Bruit,accessed w/ 15G 1\" needle, patent, no s/sx of infection noted.  notified of tx start.    Condition stable throughout tx. 2 units of PRBC given during HD and no bld transfusion reaction/s noted. Dr. Le rounded. Order to increase UF to 3L as tolerated received and noted. UF adjusted. Pt BP stayed elevated, c/o HA, 1RN notified but never came for the medication.     Post HD tx, Bld returned w/ NS rinseback.  Pt stable and  VSS. HA improved and refused pain medication. No active bleeding at the needle access sites. Post HD needle removal, direct pressure to Left AVF  For 10mins. L UA AVF (+) thrill and bruit. Primary RN informed to monitor L AVF access for any breakthrough bleeding.      Net UF 3000 mL.     Report given to Primary RN. See paper flow sheet for details.  "

## 2018-09-27 NOTE — DISCHARGE SUMMARY
Discharge Summary    CHIEF COMPLAINT ON ADMISSION  Chief Complaint   Patient presents with   • Sent by MD     was told Hgb was low last week, sent by nephrologist. requesting blood transfusion and dialysis at same time.    • Abnormal Labs       Reason for Admission  Low blood count     Admission Date  9/26/2018    CODE STATUS  Full Code    HPI & HOSPITAL COURSE  This is a 63 y.o. female here with above medical issues. Patient was found to have an admission hemoglobin of 6.0 with no evidence of overt bleeding. She likely has transfusion dependent MDS at this point. She was admitted to the general medical floor and received 2 units of PRBC's with robust response and discharge hemoglobin of 9.0. Renal was consulted and she underwent iHD without issue. Her AV fistula is operating well at this time. She is medically stable for discharge home.         Therefore, she is discharged in fair and stable condition to home with close outpatient follow-up.    The patient recovered much more quickly than anticipated on admission.    Discharge Date  9/27/18    FOLLOW UP ITEMS POST DISCHARGE  F/U with renal in 1-2 weeks  F/U with Dr Avila of oncology in 1-2 weeks    DISCHARGE DIAGNOSES  Principal Problem:    Anemia POA: Yes      Overview: Most likely secondary to ESRD      - Hgb on admission 6.6 -> 6.0      - 2/1/17: hgb 6.3 -> 7.2 s/p 1 unit prbcs      - Hgb goal of 8.5-9 per nephrology and oncology      - Iron 168, TIBC 231, %sat 73, Ferritin 1236.3        Active Problems:    HTN (hypertension) POA: Yes      Overview:           ESRD (end stage renal disease) (HCC) POA: Yes    Myelodysplasia (myelodysplastic syndrome) (HCC) POA: Yes    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 *      FOLLOW UP  Future Appointments  Date Time Provider Department Center   11/1/2018 2:45 PM PULMONARY DX 1 [facility] [address]   11/1/2018 3:00 PM PFT-RM1 PULM None   11/1/2018 4:00 PM Rosa Ruiz M.D. P[facility] None   3/28/2019 10:40 AM Germania Alberts M.D. [facility] None     Nyla Nava M.D.  [address] [facility] [id number] [phone]    In 2 weeks  Anemia requiring transfusions      MEDICATIONS ON DISCHARGE     Medication List      CONTINUE taking these medications      Instructions   amLODIPine 10 MG Tabs  Commonly known as:  NORVASC   Take 1 Tab by mouth every day.  Dose:  10 mg     carvedilol 12.5 MG Tabs  Commonly known as:  COREG   Take 12.5 mg by mouth 2 times a day, with meals.  Dose:  12.5 mg     COMBIGAN 0.2-0.5 % Soln  Generic drug:  Brimonidine Tartrate-Timolol   Place 1 Drop in both eyes 2 Times a Day.  Dose:  1 Drop     cyclobenzaprine 5 MG tablet  Commonly known as:  FLEXERIL   Take 5 mg by mouth 3 times a day as needed.  Dose:  5 mg     HYDROcodone-acetaminophen 5-325 MG Tabs per tablet  Commonly known as:  NORCO   Take 1 Tab by mouth every 6 hours as needed (pain).  Dose:  1 Tab     losartan 100 MG Tabs  Commonly known as:  COZAAR   Take 100 mg by mouth every day.  Dose:  100 mg     LUMIGAN 0.03 % Soln  Generic drug:  bimatoprost   Place 1 Drop in both eyes every evening.  Dose:  1 Drop     LYRICA 50 MG capsule  Generic drug:  pregabalin   Take 50 mg by mouth 2 times a day.  Dose:  50 mg     nitroglycerin 0.4 MG Subl  Commonly known as:  NITROSTAT   Place 1 Tab under tongue as needed for Chest Pain.  Dose:  0.4 mg            Allergies  Allergies   Allergen Reactions   • Actos [Pioglitazone Hydrochloride] Unspecified     Cause blindness   XDY=4137   • Darvocet [Propoxyphene N-Apap] Vomiting     KLH=8643   • Demerol Vomiting     CFK=8578   • Glucophage [Metformin Hydrochloride] Vomiting     XYS=0187   • Morphine Vomiting     TKD=6976   •  Oxycodone Vomiting     RXN=1/2016   • Pcn [Penicillins] Vomiting     FXL=3399     • Requip Vomiting     RXN=12/2015   • Simvastatin Unspecified     Leg cramps  WBB=6436   • Sulfa Drugs Rash     RXN=>10 years   • Tramadol Vomiting     MCD=3266   • Trazodone Vomiting     EQW=9598   • Dilaudid [Hydromorphone] Vomiting     Unknown    • Diphenhydramine Vomiting   • Iron      vomiting   • Lenalidomide Vomiting   • Multivitamin      itching   • Naprosyn [Naproxen] Hives   • Other Drug Rash and Vomiting     Any binders that remove phosphorus from the body such as tums       DIET  Orders Placed This Encounter   Procedures   • Diet Order Regular     Standing Status:   Standing     Number of Occurrences:   1     Order Specific Question:   Diet:     Answer:   Regular [1]       ACTIVITY  As tolerated.  Weight bearing as tolerated    CONSULTATIONS  RENAL    PROCEDURES  DX-CHEST-PORTABLE (1 VIEW)   Final Result      1.  Persistently enlarged cardiac silhouette   2.  Atherosclerosis   3.  Possible small RIGHT pleural effusion            LABORATORY  Lab Results   Component Value Date    SODIUM 134 (L) 09/26/2018    POTASSIUM 5.5 09/26/2018    CHLORIDE 94 (L) 09/26/2018    CO2 24 09/26/2018    GLUCOSE 290 (H) 09/26/2018    BUN 78 (HH) 09/26/2018    CREATININE 7.82 (HH) 09/26/2018    CREATININE 0.6 07/20/2007        Lab Results   Component Value Date    WBC 5.3 09/26/2018    HEMOGLOBIN 9.0 (L) 09/27/2018    HEMATOCRIT 26.7 (L) 09/27/2018    PLATELETCT 137 (L) 09/26/2018        Total time of the discharge process exceeds 39 minutes.

## 2018-09-27 NOTE — PROGRESS NOTES
Rn received report from Dialysis RN.  Telephone report completed.  Assumed pt care.  Pt AAO x4, resting in bed comfortably with no signs of labored breathing.  Pt tele monitor in place, cardiac rhythm being monitored.  Pt call light within reach, bed in low position, non skid socks in place.  Pt denies any pain or other distress at this time.

## 2018-09-27 NOTE — THERAPY
"Occupational Therapy Evaluation completed.   Functional Status: Spv w/bed mobility, spv w/LB dressing, spv w/sit>stand walking in room w/o fww, mod I w/toilet txf, and grooming standing. Pt reports she feels like she has returned to baseline. Pt remained up in room in chair w/call light tray table, alarm on and RN aware   Plan of Care: Patient with no further skilled OT needs in the acute care setting at this time  Discharge Recommendations:  Equipment: No Equipment Needed. Post-acute therapy Discharge to home with outpatient or home health for additional skilled therapy services.    See \"Rehab Therapy-Acute\" Patient Summary Report for complete documentation.      63 yr old female admitted for weakness, found to have low hemoglobin. Pt has a hx of myelodysplastic syndrome and requries frequency transfusions, as well as ESRD, impaired vision, HTN, Afib, COPD and DM2. At present pt demonstrated ability to complete basic ADL's and txfs. Pt reports feeling better and being at/near baseline. Pt does report living alone and that her family has recently moved to [place]. Pt may benefit from HH to further assist w/home safety after d/c home   "

## 2018-09-27 NOTE — CARE PLAN
Problem: Infection  Goal: Will remain free from infection    Intervention: Implement standard precautions and perform hand washing before and after patient contact  Assessing closely for s/s of infection. Vitals q4, and additionally as needed. Encouraged pt on using alcohol hand wipes before eating meals, and after toileting.   Used scrupulous hand hygiene, and sterile technique when administering IV medications.   Provided education on signs and symptoms of infection, how to shower at home with the pleural drain, used teach back.         Problem: Venous Thromboembolism (VTW)/Deep Vein Thrombosis (DVT) Prevention:  Goal: Patient will participate in Venous Thrombosis (VTE)/Deep Vein Thrombosis (DVT)Prevention Measures    Intervention: Encourage patient to perform ankle flex, foot rotation, and knee flex exercises in addition to other prophylatic measures every hour while awake  Taught patient how to do range of motion exercises, explained importance of ambulation to prevent blood clots.      Problem: Skin Integrity  Goal: Risk for impaired skin integrity will decrease  Outcome: PROGRESSING AS EXPECTED

## 2018-09-27 NOTE — PROGRESS NOTES
Nephrology Daily Progress Note    Date of Service  9/27/2018    HISTORY OF PRESENT ILLNESS:  This is a 63-year-old female with a past medical   history significant for end-stage renal disease, on chronic hemodialysis   Monday, Wednesday, and Fridays via a left arm AV fistula under the care of   [facility], hypertension, diabetes mellitus type 2, atrial   fibrillation, peripheral vascular disease, COPD, anemia secondary to   myelodysplastic syndrome, history of CVA, who was admitted with complaints of   weakness and fatigue x3 days as well as intermittent chest pain and was found   to be anemic.  The patient has had multiple hospital admissions for anemia   secondary to her myelodysplastic syndrome requiring frequent blood   transfusions.  She had labs drawn at outpatient dialysis last Wednesday that   revealed hemoglobin of about 7 and she was instructed to go to the emergency   room for admission for transfusion; however, she reports that she had a couple   doctor's appointments that she does not want to reschedule and so she waited   until today.  In the emergency room, her labs revealed a hemoglobin of 6.0, 2   units of blood have been ordered.  She is due for dialysis today.  Patient   reports that she is compliant with her dialysis treatments.  She does feel a   little extra fluid retention and is requesting additional fluid removal today   with treatment.  She reports no trouble with her left arm AV fistula in terms   of cannulation.  She does report intermittent chest pain and reports that she   usually gets this when she is anemic.  She denies any coffee-ground emesis or   bloody stools or hematemesis.      Interval Problem Update  09/26/18 - consult done  09/27/18 - feels well, no SOB, eager to DC    Review of Systems  Review of Systems   Constitutional: Negative for chills and fever.   Respiratory: Negative for cough, shortness of breath and wheezing.    Cardiovascular: Negative for chest  pain and leg swelling.   Gastrointestinal: Negative for abdominal pain, constipation, diarrhea, nausea and vomiting.   Musculoskeletal: Negative for back pain and myalgias.   Skin: Negative for itching.   Neurological: Negative for dizziness and headaches.        Physical Exam  Temp:  [35.9 °C (96.6 °F)-37.2 °C (98.9 °F)] 37.2 °C (98.9 °F)  Pulse:  [62-69] 62  Resp:  [16-20] 18  BP: (103-186)/(60-86) 161/65    Physical Exam   Constitutional: She is oriented to person, place, and time. She appears well-developed and well-nourished. No distress.   HENT:   Head: Normocephalic and atraumatic.   Eyes: Conjunctivae and EOM are normal.   Neck: No tracheal deviation present.   Cardiovascular: Normal rate and regular rhythm.    ALIVIA AVF, +thrill, +bruit, mildly hyperpulsatile   Pulmonary/Chest: Effort normal and breath sounds normal.   Abdominal: Soft. Bowel sounds are normal. She exhibits no distension.   Musculoskeletal: Normal range of motion. She exhibits edema (trace).   Neurological: She is alert and oriented to person, place, and time.   Skin: Skin is warm and dry. She is not diaphoretic. No erythema.   Psychiatric: She has a normal mood and affect. Her behavior is normal. Judgment and thought content normal.       Fluids    Intake/Output Summary (Last 24 hours) at 09/27/18 1400  Last data filed at 09/26/18 1945   Gross per 24 hour   Intake              639 ml   Output             4140 ml   Net            -3501 ml       Laboratory  Recent Labs      09/26/18   1300  09/27/18   0311   WBC  5.3   --    RBC  1.73*   --    HEMOGLOBIN  6.0*  9.0*   HEMATOCRIT  18.6*  26.7*   MCV  107.5*   --    MCH  34.7*   --    MCHC  32.3*   --    RDW  74.1*   --    PLATELETCT  137*   --    MPV  14.4*   --      Recent Labs      09/26/18   1130   SODIUM  134*   POTASSIUM  5.5   CHLORIDE  94*   CO2  24   GLUCOSE  290*   BUN  78*   CREATININE  7.82*   CALCIUM  8.3*     Recent Labs      09/26/18   1150   APTT  29.8   INR  1.00     Recent Labs       09/26/18   1300   BNPBTYPENAT  210*           Imaging   CXR 9/26 per radiology  1.  Persistently enlarged cardiac silhouette  2.  Atherosclerosis  3.  Possible small RIGHT pleural effusion    Assessment/Plan  1.  End-stage renal disease.  Patient dialyzes Monday, Wednesday, and Fridays   via a left arm AV fistula as an outpatient.  We will continue her Monday,   Wednesday, Friday dialysis schedule..  Recommend   dose adjusting all of her medications for her decreased GFR. Has f/u at  scheduled for venogram.    2.  Hyperkalemia. Correct with HD.    3.  Anemia secondary to myelodysplastic syndrome. S/p transfusion    4.  Chest pain. Resolved. Defer to primary service    5.  Hypertension.  Somewhat labile. UF with HD.  Continue home blood pressure medications.    6.  Diabetes mellitus type 2.  Management per the primary service.    7.  Atrial fibrillation.  Continue medical management.    8.  Peripheral vascular disease.  Continue supportive care.    9.  Hyponatremia, this is mild.  We will plan on hemodialysis today.    10.  Renal osteodystrophy.  The patient reports intolerance to all phosphorus   binders.  Recommend a low phosphorus diet.    11.  Mild protein-calorie malnutrition.  Recommend no dietary protein   restrictions at this time.       Other management per primary service

## 2018-09-27 NOTE — DISCHARGE PLANNING
LSW informed by bedside RN Radha who states that Pt needs assistance with transportation to home, [address]. LSW informed Pt’s DTR lives in [place] and no longer lives in [place]. Pt reports receiving $950.00 per month from [employer] on the 3rd of each month.     Pt takes [facility] transport bus to and from [place] for dialysis 3xper week but bus is only available MW. [facility] has provided transportation assistance to Pt in the past several times (cab, [employer] van and uber). LSW discussed with ARIEL Garcia regarding Pt’s options for transportation. Pt unable to use [employer] van as she is SBA with transfers. Pt is unable to take Uber as she had motorized wheelchair with her and will need a ramp access. LSW received approval from  supervisor Jailene for cab ride transportation for Pt to return home safely. Bedside RN to call cab upon discharge.         Information Source  Orientation : Oriented x 4  Information Given By: Patient  Informant's Name: Vielka    Readmission Evaluation  Is this a readmission?: No    Elopement Risk  Legal Hold: No  Ambulatory or Self Mobile in Wheelchair: Yes  Disoriented: No  Psychiatric Symptoms: None  History of Wandering: No  Elopement this Admit: No  Vocalizing Wanting to Leave: No  Displays Behaviors, Body Language Wanting to Leave: No-Not at Risk for Elopement  Elopement Risk: Not at Risk for Elopement    Interdisciplinary Discharge Planning  Lives with - Patient's Self Care Capacity: Alone and Able to Care For Self  Patient or legal guardian wants to designate a caregiver (see row info): No  Housing / Facility: [address] [place]  Prior Services: Home-Independent    Discharge Preparedness  What is your plan after discharge?: Home with help  Prior Functional Level: Independent with Medication Management, Independent with Activities of Daily Living  Difficulity with IADLs: Driving    Functional Assesment  Prior Functional Level: Independent with Medication Management,  Independent with Activities of Daily Living    Finances  Financial Barriers to Discharge: Yes  Average Monthly Income: 950 $  Source of Income: Social Security  Prescription Coverage: Yes    Vision / Hearing Impairment  Vision Impairment : Yes  Right Eye Vision: Wears Glasses  Left Eye Vision: Wears Glasses, Blind  Hearing Impairment : No    Values / Beliefs / Concerns  Values / Beliefs Concerns : No         Domestic Abuse  Have you ever been the victim of abuse or violence?: No  Physical Abuse or Sexual Abuse: No  Verbal Abuse or Emotional Abuse: No  Possible Abuse Reported to:: Not Applicable    Psychological Assessment  History of Substance Abuse: None  History of Psychiatric Problems: No    Discharge Risks or Barriers  Discharge risks or barriers?: Transportation  Patient risk factors: Lack of outside supports    Anticipated Discharge Information  Anticipated discharge disposition: Home  Discharge Address:  ([address] )

## 2018-09-27 NOTE — THERAPY
"Physical Therapy Evaluation completed.   Bed Mobility:  Supine to Sit: Supervised  Transfers: Sit to Stand: Stand by Assist  Gait: Level Of Assist: Stand by Assist with Front-Wheel Walker       Plan of Care: Patient with no further skilled PT needs in the acute care setting at this time  Discharge Recommendations: Equipment: No Equipment Needed.     See \"Rehab Therapy-Acute\" Patient Summary Report for complete documentation.     Pt was recenlty admitted for weakness and dizziness with anemia. Pt has a PMHx of L eye blindness, Afib, cancer, CKD, COPD, CHF, DM, myelodysplastic syndrome, and hx of CVA. Pt was able to demonstrate SPV to SBA for all functional mobility with FWW use. No itm LOB was noted during functional mobility, however, she did demosntrate with slight swaying during dynamic standing balance. Pt was able to ambulate with FWW use w/SBA with no tim LOB, appears to be near baseline with functional mobility. Pt is in no acute skilled PT needs at this time, encourage pt to continue ambulating with [facility] staff to prevent deconditioning while in acute care setting. Will recommend HH therapy services upon d/c to home for home saftey evaluation.   "

## 2018-09-27 NOTE — PROGRESS NOTES
Bedside report completed with noc RN. Pt sleeping. Bed locked in low position, call light within reach. Bed alarm on. Reviewed plan of care with noc RN. No significant events overnight per noc RN.

## 2018-09-27 NOTE — PROGRESS NOTES
[facility] [place] [facility] Progress Note    Patient seen and examined on hemodialysis  VSS--see dialysis treatment sheet for full details  Labs reviewed  Transfusing 2 units PRBC's with dialysis treatment

## 2018-09-27 NOTE — CONSULTS
DATE OF SERVICE:  09/26/2018    REQUESTING PHYSICIAN:  Dr. Camargo (hospitalist).    REASON FOR CONSULTATION:  Evaluation and management of end-stage renal   disease.    HISTORY OF PRESENT ILLNESS:  This is a 63-year-old female with a past medical   history significant for end-stage renal disease, on chronic hemodialysis   Monday, Wednesday, and Fridays via a left arm AV fistula under the care of   [facility], hypertension, diabetes mellitus type 2, atrial   fibrillation, peripheral vascular disease, COPD, anemia secondary to   myelodysplastic syndrome, history of CVA, who was admitted with complaints of   weakness and fatigue x3 days as well as intermittent chest pain and was found   to be anemic.  The patient has had multiple hospital admissions for anemia   secondary to her myelodysplastic syndrome requiring frequent blood   transfusions.  She had labs drawn at outpatient dialysis last Wednesday that   revealed hemoglobin of about 7 and she was instructed to go to the emergency   room for admission for transfusion; however, she reports that she had a couple   doctor's appointments that she does not want to reschedule and so she waited   until today.  In the emergency room, her labs revealed a hemoglobin of 6.0, 2   units of blood have been ordered.  She is due for dialysis today.  Patient   reports that she is compliant with her dialysis treatments.  She does feel a   little extra fluid retention and is requesting additional fluid removal today   with treatment.  She reports no trouble with her left arm AV fistula in terms   of cannulation.  She does report intermittent chest pain and reports that she   usually gets this when she is anemic.  She denies any coffee-ground emesis or   bloody stools or hematemesis.    REVIEW OF SYSTEMS:  Significant for generalized weakness and fatigue.  Patient   is also complaining of intermittent chest pain.  She denies any diarrhea or   constipation.  No nausea or  vomiting.  No hematemesis or bloody bowel   movements.  She denies any lower extremity edema.  She does report occasional   shortness of breath and the rest of the 12-point review of systems is   negative.    PAST MEDICAL HISTORY:  1.  End-stage renal disease.  Patient is on chronic hemodialysis Monday,   Wednesday, and  via a left arm AV fistula under the care of [facility].  2.  Anemia secondary to myelodysplastic syndrome as well as secondary to   chronic kidney disease.  The patient follows with Dr. Avila as an outpatient   and had been on chemotherapy in the past.  3.  Chest pain.  In the past, this has been related to anemia.  She did have   an abnormal nuclear medicine scan in 2016 that showed no ischemia, but did   show a small area of infarction in the distal inferolateral wall extending to   the apex.  She more recently had a nuclear medicine stress test in 2017   that showed no evidence of significant jeopardized viable myocardium or prior   myocardial infarction.  4.  Hypertension.  5.  Diabetes mellitus type 2.  6.  Atrial fibrillation.  7.  Vitamin D deficiency.  8.  Peripheral vascular disease.  The patient has a history of stent placement   in lower extremities.  9.  COPD.  Patient is on home oxygen.  10.  Diastolic CHF.  11.  History of cerebrovascular accident with no residual deficits.  12.  Legally blind.  13.  History of steal syndrome.  This required revision of her left arm AV   fistula by Dr. Ranulfo Jolly in 2016.    PAST SURGICAL HISTORY:  1.   x2.  2.  Hysterectomy.  3.  D and C x2.  4.  Appendectomy.  5.  Left arm AV fistula creation by Dr. Ranulfo Jolly.  6.  Revision of the left arm AV fistula by Dr. Ranulfo Jolly in 2016   secondary to steal syndrome.  7.  History of stent placement to the bilateral lower extremities.  8.  PermCath placement and removal.  9.  Retinal detachment surgery.  10.  Bilateral cataract surgery.  11.  Coronary  angiogram.  12.  Upper EGD.    ALLERGIES:  PATIENT REPORTS THAT SHE IS ALLERGIC TO ACTOS, DARVOCET, DEMEROL,   GLUCOPHAGE, MORPHINE, OXYCODONE, PENICILLIN, REQUIP, SIMVASTATIN, SULFA DRUGS,   TRAMADOL, TRAZODONE, DILAUDID, DIPHENHYDRAMINE, IRON LENALIDOMIDE   MULTIVITAMIN, NAPROSYN AND ALL PHOSPHORUS BINDERS.    SOCIAL HISTORY:  Patient denies any smoking, alcohol or illicit drug use.  She   is currently disabled, but she used to work as a CNA.    FAMILY HISTORY:  There is no family history of kidney disease or relatives on   dialysis.    CURRENT MEDICATIONS:  1.  Amlodipine 10 mg daily.  2.  Lumigan eye solution.  3.  Combigan eye solutions.  4.  Coreg 12.5 mg twice daily.  5.  Flexeril 5 mg 3 times a day as needed.  6.  Norco 1 tablet every 6 hours as needed.  7.  Cozaar 100 mg daily.  8.  Nitrostat as needed for chest pain.  9.  Lyrica 50 mg twice daily.    PHYSICAL EXAMINATION:  VITAL SIGNS:  Temperature is 97.9 degrees Fahrenheit, pulse 69, respirations   16, blood pressure 157/65, satting 99% on 2 liters nasal cannula.  GENERAL:  Patient is alert and oriented x3 and in no acute distress.  She   appears chronically ill and older than her stated age.  HEENT:  Pupils are equal, round and reactive to light.  Extraocular muscles   are intact.  Mucous membranes appear moist.  NECK:  Reveals no JVD.  Trachea is midline.  There is no thyromegaly.  CARDIOVASCULAR:  Heart is regular rate and rhythm.  No murmurs, rubs or   gallops.  RESPIRATORY:  Lungs are generally clear to auscultation bilaterally.  No   wheezes, rales or rhonchi.  There is no tachypnea and there is no increased   work of breathing.  GASTROINTESTINAL:  Abdomen is soft, nontender, nondistended.  Bowel sounds are   present.  EXTREMITIES:  Reveals no clubbing, cyanosis or edema.  There is a left forearm   AV fistula in place with a palpable thrill and audible bruit.  SKIN:  Reveals no rashes or ulcerations.  There is normal skin turgor.  NEUROLOGIC:   Reveals no focal motor deficits.  Sensation is grossly intact to   light touch.  LYMPHATIC:  Reveals no cervical lymphadenopathy, no axillary lymphadenopathy.  PSYCHIATRIC:  Patient is alert and oriented x3.  She has an appropriate mood   and affect.    LABORATORY DATA:  Labs reveal a white blood cell count of 5.3, hemoglobin 6.0,   platelet count is 137.  Differential reveals 62% neutrophils, 21%   lymphocytes.  Chemistries reveal a sodium of 134, potassium is 5.5, chloride   is 94, bicarbonate is 24, BUN is 78, creatinine is 7.82, calcium is 8.3.  AST   is 20, ALT is 15, alkaline phosphatase is 56, albumin is 3.4.  Troponin is   negative.  BNP is 210.  INR is 1.0.    IMAGING:  Chest x-ray on 09/26/2018 reveals persistently enlarged cardiac   silhouette.  There is atherosclerosis.  There is a possible small right   pleural effusion.    ASSESSMENT AND PLAN:  1.  End-stage renal disease.  Patient dialyzes Monday, Wednesday, and Fridays   via a left arm AV fistula as an outpatient.  We will continue her Monday,   Wednesday, Friday dialysis schedule and arrange for dialysis today.  Recommend   dose adjusting all of her medications for her decreased GFR.  2.  Hyperkalemia, this is mild.  We will plan on hemodialysis today on a low   potassium bath.  3.  Anemia secondary to myelodysplastic syndrome.  Patient has a history of   frequent admissions for blood transfusions, 2 units of packed red blood cells   have been ordered.  We will plan on transfusion done with hemodialysis today.    Continue to monitor.  4.  Chest pain.  In the past, this has been related to her anemia.  Troponin   in the emergency room is negative.  Recommended close monitoring and PRBC   transfusion as above.  Continue medical management.  5.  Hypertension.  Blood pressure is elevated.  We will plan on fluid removal   with hemodialysis.  Continue home blood pressure medications.  6.  Diabetes mellitus type 2.  Management per the primary service.  7.   Atrial fibrillation.  Continue medical management.  8.  Peripheral vascular disease.  Continue supportive care.  9.  Hyponatremia, this is mild.  We will plan on hemodialysis today.  10.  Renal osteodystrophy.  The patient reports intolerance to all phosphorus   binders.  Recommend a low phosphorus diet.  11.  Hypocalcemia, this is very mild.  Calcium level is actually normal when   corrected for her mildly low albumin as well, hemodialysis today.  12.  Mild protein-calorie malnutrition.  Recommend no dietary protein   restrictions at this time.    Thank you for this very interesting consult and thank you for involving me in   the care of this very pleasant patient.  If you have any questions or need any   further information, please do not hesitate to contact me.       ____________________________________     MD ABY MYLES / NTS    DD:  09/26/2018 19:15:39  DT:  09/26/2018 22:47:58    D#:  [id number]  Job#:  [id number]

## 2018-09-27 NOTE — RESPIRATORY CARE
COPD EDUCATION by COPD CLINICAL EDUCATOR  9/27/2018 at 7:25 AM by Ada Tamez     Patient reviewed by COPD education team. Patient does not qualify for COPD program.

## 2018-09-27 NOTE — CARE PLAN
Problem: Safety  Goal: Will remain free from injury    Intervention: Provide assistance with mobility   09/27/18 0446   OTHER   Assistance / Tolerance Tolerates Well;Assistance of One     RN educated patient regarding the importance of calling for assistance to decrease the risk of injury due to falls. Patient verbalized understanding of education and denies any questions at this time.        Problem: Skin Integrity  Goal: Risk for impaired skin integrity will decrease    Intervention: Assess risk factors for impaired skin integrity and/or pressure ulcers  RN educated patient regarding the importance of regular movement to decrease the risk of pressure ulcer formation.  Patient verbalized understanding of education and denies any questions at this time.

## 2018-09-27 NOTE — DISCHARGE INSTRUCTIONS
Discharge Instructions    Discharged to home by medical transportation with self. Discharged via wheelchair, hospital escort: Yes.  Special equipment needed: Wheelchair    Be sure to schedule a follow-up appointment with your primary care doctor or any specialists as instructed.     Discharge Plan:   Diet Plan: Discussed  Activity Level: Discussed  Confirmed Follow up Appointment: Appointment Scheduled  Confirmed Symptoms Management: Discussed  Medication Reconciliation Updated: Yes  Influenza Vaccine Indication: Not indicated: Previously immunized this influenza season and > 8 years of age    I understand that a diet low in cholesterol, fat, and sodium is recommended for good health. Unless I have been given specific instructions below for another diet, I accept this instruction as my diet prescription.   Other diet: diabetic, heart healthy    Special Instructions:   Anemia, Nonspecific  Anemia is a condition in which the concentration of red blood cells or hemoglobin in the blood is below normal. Hemoglobin is a substance in red blood cells that carries oxygen to the tissues of the body. Anemia results in not enough oxygen reaching these tissues.  What are the causes?  Common causes of anemia include:  · Excessive bleeding. Bleeding may be internal or external. This includes excessive bleeding from periods (in women) or from the intestine.  · Poor nutrition.  · Chronic kidney, thyroid, and liver disease.  · Bone marrow disorders that decrease red blood cell production.  · Cancer and treatments for cancer.  · HIV, AIDS, and their treatments.  · Spleen problems that increase red blood cell destruction.  · Blood disorders.  · Excess destruction of red blood cells due to infection, medicines, and autoimmune disorders.  What are the signs or symptoms?  · Minor weakness.  · Dizziness.  · Headache.  · Palpitations.  · Shortness of breath, especially with exercise.  · Paleness.  · Cold  sensitivity.  · Indigestion.  · Nausea.  · Difficulty sleeping.  · Difficulty concentrating.  Symptoms may occur suddenly or they may develop slowly.  How is this diagnosed?  Additional blood tests are often needed. These help your health care provider determine the best treatment. Your health care provider will check your stool for blood and look for other causes of blood loss.  How is this treated?  Treatment varies depending on the cause of the anemia. Treatment can include:  · Supplements of iron, vitamin B12, or folic acid.  · Hormone medicines.  · A blood transfusion. This may be needed if blood loss is severe.  · Hospitalization. This may be needed if there is significant continual blood loss.  · Dietary changes.  · Spleen removal.  Follow these instructions at home:  Keep all follow-up appointments. It often takes many weeks to correct anemia, and having your health care provider check on your condition and your response to treatment is very important.  Get help right away if:  · You develop extreme weakness, shortness of breath, or chest pain.  · You become dizzy or have trouble concentrating.  · You develop heavy vaginal bleeding.  · You develop a rash.  · You have bloody or black, tarry stools.  · You faint.  · You vomit up blood.  · You vomit repeatedly.  · You have abdominal pain.  · You have a fever or persistent symptoms for more than 2-3 days.  · You have a fever and your symptoms suddenly get worse.  · You are dehydrated.  This information is not intended to replace advice given to you by your health care provider. Make sure you discuss any questions you have with your health care provider.  Document Released: 01/25/2006 Document Revised: 05/31/2017 Document Reviewed: 06/13/2014  Elsevier Interactive Patient Education © 2017 [employer]      · Is patient discharged on Warfarin / Coumadin?   No     Depression / Suicide Risk    As you are discharged from this [facility] facility, it is important  to learn how to keep safe from harming yourself.    Recognize the warning signs:  · Abrupt changes in personality, positive or negative- including increase in energy   · Giving away possessions  · Change in eating patterns- significant weight changes-  positive or negative  · Change in sleeping patterns- unable to sleep or sleeping all the time   · Unwillingness or inability to communicate  · Depression  · Unusual sadness, discouragement and loneliness  · Talk of wanting to die  · Neglect of personal appearance   · Rebelliousness- reckless behavior  · Withdrawal from people/activities they love  · Confusion- inability to concentrate     If you or a loved one observes any of these behaviors or has concerns about self-harm, here's what you can do:  · Talk about it- your feelings and reasons for harming yourself  · Remove any means that you might use to hurt yourself (examples: pills, rope, extension cords, firearm)  · Get professional help from the community (Mental Health, Substance Abuse, psychological counseling)  · Do not be alone:Call your Safe Contact- someone whom you trust who will be there for you.  · Call your local CRISIS HOTLINE 819-5350 or 103-464-9567  · Call your local Children's Mobile Crisis Response Team [place] (839) 087-3021 or www.[employer]  · Call the toll free National Suicide Prevention Hotlines   · National Suicide Prevention Lifeline 745-682-VQEG (3319)  · National Hope Line Network 800-SUICIDE (199-3161)

## 2018-09-28 NOTE — PROGRESS NOTES
Note ** discharge time was 1600. This note is not letting me chart past 10:41??    Pt discharged via taxi with self to home with home health. Pt was AOx4 at time of discharge. PIVs removed, tele box taken off. RN went over discharge instructions thoroughly including medications, healthy diet, activity, signs and symptoms of infection, heart attack and stroke. RN reviewed when to call MD and when to call 911. Paperwork signed and all questions answered. Follow up appointments set and pt verbally understands that following through with the appointments is necessary. All personal belongings with patient including her motorized wheelchair with O2 tank. Copy of AVS discharge instructions given to patient.

## 2018-10-10 NOTE — ED NOTES
10/10/2018       RE: Feliberto Perry  : 1984   MRN: [id number]      To Whom It May Concern,     Mr. Perry has been seizure free since 2018, he is compliant with antiepileptic drugs. He should not miss his antiepileptic drugs day prior to surgery. In the event, he has a seizure during surgery he may be given Ativan 2-4 mg IV or increase anesthic drip per anesthesiologist recommendations.     Sincerely,         Amelia Talbot MD        Pt off the floor with transport.  NAD noted.

## 2018-10-19 ENCOUNTER — HOSPITAL ENCOUNTER (INPATIENT)
Facility: MEDICAL CENTER | Age: 63
LOS: 3 days | DRG: 640 | End: 2018-10-22
Attending: EMERGENCY MEDICINE | Admitting: INTERNAL MEDICINE
Payer: MEDICARE

## 2018-10-19 DIAGNOSIS — E87.5 HYPERKALEMIA: ICD-10-CM

## 2018-10-19 DIAGNOSIS — N18.6 ESRD (END STAGE RENAL DISEASE) (HCC): ICD-10-CM

## 2018-10-19 DIAGNOSIS — R53.1 WEAKNESS: ICD-10-CM

## 2018-10-19 PROBLEM — R74.8 ELEVATED LIPASE: Status: ACTIVE | Noted: 2018-10-19

## 2018-10-19 LAB
ALBUMIN SERPL BCP-MCNC: 4.2 G/DL (ref 3.2–4.9)
ALBUMIN/GLOB SERPL: 1.2 G/DL
ALP SERPL-CCNC: 51 U/L (ref 30–99)
ALT SERPL-CCNC: 19 U/L (ref 2–50)
ANION GAP SERPL CALC-SCNC: 14 MMOL/L (ref 0–11.9)
ANION GAP SERPL CALC-SCNC: 15 MMOL/L (ref 0–11.9)
ANISOCYTOSIS BLD QL SMEAR: ABNORMAL
AST SERPL-CCNC: 25 U/L (ref 12–45)
BASOPHILS # BLD AUTO: 3.2 % (ref 0–1.8)
BASOPHILS # BLD: 0.19 K/UL (ref 0–0.12)
BILIRUB SERPL-MCNC: 0.4 MG/DL (ref 0.1–1.5)
BUN SERPL-MCNC: 119 MG/DL (ref 8–22)
BUN SERPL-MCNC: 36 MG/DL (ref 8–22)
CALCIUM SERPL-MCNC: 7.2 MG/DL (ref 8.5–10.5)
CALCIUM SERPL-MCNC: 7.7 MG/DL (ref 8.5–10.5)
CHLORIDE SERPL-SCNC: 100 MMOL/L (ref 96–112)
CHLORIDE SERPL-SCNC: 97 MMOL/L (ref 96–112)
CO2 SERPL-SCNC: 22 MMOL/L (ref 20–33)
CO2 SERPL-SCNC: 24 MMOL/L (ref 20–33)
CREAT SERPL-MCNC: 12.09 MG/DL (ref 0.5–1.4)
CREAT SERPL-MCNC: 5.37 MG/DL (ref 0.5–1.4)
DACRYOCYTES BLD QL SMEAR: NORMAL
EOSINOPHIL # BLD AUTO: 0 K/UL (ref 0–0.51)
EOSINOPHIL NFR BLD: 0 % (ref 0–6.9)
ERYTHROCYTE [DISTWIDTH] IN BLOOD BY AUTOMATED COUNT: 76.3 FL (ref 35.9–50)
GIANT PLATELETS BLD QL SMEAR: NORMAL
GLOBULIN SER CALC-MCNC: 3.5 G/DL (ref 1.9–3.5)
GLUCOSE SERPL-MCNC: 211 MG/DL (ref 65–99)
GLUCOSE SERPL-MCNC: 287 MG/DL (ref 65–99)
HCT VFR BLD AUTO: 22.6 % (ref 37–47)
HGB BLD-MCNC: 7.3 G/DL (ref 12–16)
LIPASE SERPL-CCNC: 215 U/L (ref 11–82)
LYMPHOCYTES # BLD AUTO: 0.88 K/UL (ref 1–4.8)
LYMPHOCYTES NFR BLD: 14.7 % (ref 22–41)
MACROCYTES BLD QL SMEAR: ABNORMAL
MANUAL DIFF BLD: NORMAL
MCH RBC QN AUTO: 34.6 PG (ref 27–33)
MCHC RBC AUTO-ENTMCNC: 32.3 G/DL (ref 33.6–35)
MCV RBC AUTO: 107.1 FL (ref 81.4–97.8)
MICROCYTES BLD QL SMEAR: ABNORMAL
MONOCYTES # BLD AUTO: 0.06 K/UL (ref 0–0.85)
MONOCYTES NFR BLD AUTO: 1 % (ref 0–13.4)
MORPHOLOGY BLD-IMP: NORMAL
NEUTROPHILS # BLD AUTO: 4.87 K/UL (ref 2–7.15)
NEUTROPHILS NFR BLD: 80 % (ref 44–72)
NEUTS BAND NFR BLD MANUAL: 1.1 % (ref 0–10)
NRBC # BLD AUTO: 0 K/UL
NRBC BLD-RTO: 0 /100 WBC
PLATELET # BLD AUTO: 173 K/UL (ref 164–446)
PLATELET BLD QL SMEAR: NORMAL
PMV BLD AUTO: 14 FL (ref 9–12.9)
POIKILOCYTOSIS BLD QL SMEAR: NORMAL
POLYCHROMASIA BLD QL SMEAR: NORMAL
POTASSIUM SERPL-SCNC: 4.2 MMOL/L (ref 3.6–5.5)
POTASSIUM SERPL-SCNC: 9 MMOL/L (ref 3.6–5.5)
POTASSIUM SERPL-SCNC: 9.1 MMOL/L (ref 3.6–5.5)
PROT SERPL-MCNC: 7.7 G/DL (ref 6–8.2)
RBC # BLD AUTO: 2.11 M/UL (ref 4.2–5.4)
RBC BLD AUTO: PRESENT
SCHISTOCYTES BLD QL SMEAR: NORMAL
SODIUM SERPL-SCNC: 134 MMOL/L (ref 135–145)
SODIUM SERPL-SCNC: 138 MMOL/L (ref 135–145)
WBC # BLD AUTO: 6 K/UL (ref 4.8–10.8)

## 2018-10-19 PROCEDURE — 80048 BASIC METABOLIC PNL TOTAL CA: CPT

## 2018-10-19 PROCEDURE — 304561 HCHG STAT O2

## 2018-10-19 PROCEDURE — 99291 CRITICAL CARE FIRST HOUR: CPT | Performed by: INTERNAL MEDICINE

## 2018-10-19 PROCEDURE — 770022 HCHG ROOM/CARE - ICU (200)

## 2018-10-19 PROCEDURE — 93005 ELECTROCARDIOGRAM TRACING: CPT | Performed by: EMERGENCY MEDICINE

## 2018-10-19 PROCEDURE — 700102 HCHG RX REV CODE 250 W/ 637 OVERRIDE(OP)

## 2018-10-19 PROCEDURE — A9270 NON-COVERED ITEM OR SERVICE: HCPCS | Performed by: INTERNAL MEDICINE

## 2018-10-19 PROCEDURE — 700101 HCHG RX REV CODE 250: Performed by: INTERNAL MEDICINE

## 2018-10-19 PROCEDURE — 84132 ASSAY OF SERUM POTASSIUM: CPT

## 2018-10-19 PROCEDURE — 99291 CRITICAL CARE FIRST HOUR: CPT

## 2018-10-19 PROCEDURE — 96374 THER/PROPH/DIAG INJ IV PUSH: CPT

## 2018-10-19 PROCEDURE — 80053 COMPREHEN METABOLIC PANEL: CPT

## 2018-10-19 PROCEDURE — 90935 HEMODIALYSIS ONE EVALUATION: CPT

## 2018-10-19 PROCEDURE — 96375 TX/PRO/DX INJ NEW DRUG ADDON: CPT

## 2018-10-19 PROCEDURE — 36415 COLL VENOUS BLD VENIPUNCTURE: CPT

## 2018-10-19 PROCEDURE — 700101 HCHG RX REV CODE 250

## 2018-10-19 PROCEDURE — 83690 ASSAY OF LIPASE: CPT

## 2018-10-19 PROCEDURE — 700111 HCHG RX REV CODE 636 W/ 250 OVERRIDE (IP)

## 2018-10-19 PROCEDURE — 85007 BL SMEAR W/DIFF WBC COUNT: CPT

## 2018-10-19 PROCEDURE — 700111 HCHG RX REV CODE 636 W/ 250 OVERRIDE (IP): Performed by: INTERNAL MEDICINE

## 2018-10-19 PROCEDURE — 700102 HCHG RX REV CODE 250 W/ 637 OVERRIDE(OP): Performed by: INTERNAL MEDICINE

## 2018-10-19 PROCEDURE — 85027 COMPLETE CBC AUTOMATED: CPT

## 2018-10-19 PROCEDURE — 5A1D70Z PERFORMANCE OF URINARY FILTRATION, INTERMITTENT, LESS THAN 6 HOURS PER DAY: ICD-10-PCS | Performed by: INTERNAL MEDICINE

## 2018-10-19 PROCEDURE — 700101 HCHG RX REV CODE 250: Performed by: EMERGENCY MEDICINE

## 2018-10-19 PROCEDURE — 94640 AIRWAY INHALATION TREATMENT: CPT

## 2018-10-19 RX ORDER — ONDANSETRON 2 MG/ML
4 INJECTION INTRAMUSCULAR; INTRAVENOUS ONCE
Status: ACTIVE | OUTPATIENT
Start: 2018-10-19 | End: 2018-10-20

## 2018-10-19 RX ORDER — PROMETHAZINE HYDROCHLORIDE 25 MG/1
12.5-25 TABLET ORAL EVERY 4 HOURS PRN
Status: DISCONTINUED | OUTPATIENT
Start: 2018-10-19 | End: 2018-10-22 | Stop reason: HOSPADM

## 2018-10-19 RX ORDER — CARVEDILOL 6.25 MG/1
12.5 TABLET ORAL 2 TIMES DAILY WITH MEALS
Status: DISCONTINUED | OUTPATIENT
Start: 2018-10-19 | End: 2018-10-22 | Stop reason: HOSPADM

## 2018-10-19 RX ORDER — ACETAMINOPHEN 325 MG/1
650 TABLET ORAL EVERY 6 HOURS PRN
Status: DISCONTINUED | OUTPATIENT
Start: 2018-10-19 | End: 2018-10-22 | Stop reason: HOSPADM

## 2018-10-19 RX ORDER — HYDROCODONE BITARTRATE AND ACETAMINOPHEN 5; 325 MG/1; MG/1
1 TABLET ORAL EVERY 6 HOURS PRN
Status: DISCONTINUED | OUTPATIENT
Start: 2018-10-19 | End: 2018-10-22 | Stop reason: HOSPADM

## 2018-10-19 RX ORDER — TIMOLOL MALEATE 5 MG/ML
1 SOLUTION/ DROPS OPHTHALMIC 2 TIMES DAILY
Status: DISCONTINUED | OUTPATIENT
Start: 2018-10-19 | End: 2018-10-22 | Stop reason: HOSPADM

## 2018-10-19 RX ORDER — AMLODIPINE BESYLATE 10 MG/1
10 TABLET ORAL DAILY
Status: DISCONTINUED | OUTPATIENT
Start: 2018-10-19 | End: 2018-10-22 | Stop reason: HOSPADM

## 2018-10-19 RX ORDER — CALCIUM CHLORIDE 100 MG/ML
INJECTION INTRAVENOUS; INTRAVENTRICULAR
Status: COMPLETED
Start: 2018-10-19 | End: 2018-10-19

## 2018-10-19 RX ORDER — NITROGLYCERIN 0.4 MG/1
0.4 TABLET SUBLINGUAL
Status: DISCONTINUED | OUTPATIENT
Start: 2018-10-19 | End: 2018-10-22 | Stop reason: HOSPADM

## 2018-10-19 RX ORDER — ONDANSETRON 4 MG/1
4 TABLET, ORALLY DISINTEGRATING ORAL EVERY 4 HOURS PRN
Status: DISCONTINUED | OUTPATIENT
Start: 2018-10-19 | End: 2018-10-19 | Stop reason: ALTCHOICE

## 2018-10-19 RX ORDER — HEPARIN SODIUM 1000 [USP'U]/ML
1700 INJECTION, SOLUTION INTRAVENOUS; SUBCUTANEOUS
Status: DISCONTINUED | OUTPATIENT
Start: 2018-10-19 | End: 2018-10-22 | Stop reason: HOSPADM

## 2018-10-19 RX ORDER — HEPARIN SODIUM 5000 [USP'U]/ML
5000 INJECTION, SOLUTION INTRAVENOUS; SUBCUTANEOUS EVERY 8 HOURS
Status: DISCONTINUED | OUTPATIENT
Start: 2018-10-19 | End: 2018-10-22 | Stop reason: HOSPADM

## 2018-10-19 RX ORDER — BRIMONIDINE TARTRATE 2 MG/ML
1 SOLUTION/ DROPS OPHTHALMIC 2 TIMES DAILY
Status: DISCONTINUED | OUTPATIENT
Start: 2018-10-19 | End: 2018-10-22 | Stop reason: HOSPADM

## 2018-10-19 RX ORDER — BISACODYL 10 MG
10 SUPPOSITORY, RECTAL RECTAL
Status: DISCONTINUED | OUTPATIENT
Start: 2018-10-19 | End: 2018-10-20

## 2018-10-19 RX ORDER — CALCIUM CHLORIDE 100 MG/ML
1 INJECTION INTRAVENOUS; INTRAVENTRICULAR ONCE
Status: DISCONTINUED | OUTPATIENT
Start: 2018-10-19 | End: 2018-10-19

## 2018-10-19 RX ORDER — DEXTROSE MONOHYDRATE 25 G/50ML
25 INJECTION, SOLUTION INTRAVENOUS ONCE
Status: DISCONTINUED | OUTPATIENT
Start: 2018-10-19 | End: 2018-10-19

## 2018-10-19 RX ORDER — CALCIUM GLUCONATE 94 MG/ML
1 INJECTION, SOLUTION INTRAVENOUS ONCE
Status: DISCONTINUED | OUTPATIENT
Start: 2018-10-19 | End: 2018-10-19

## 2018-10-19 RX ORDER — DEXTROSE MONOHYDRATE 25 G/50ML
INJECTION, SOLUTION INTRAVENOUS
Status: COMPLETED
Start: 2018-10-19 | End: 2018-10-19

## 2018-10-19 RX ORDER — CALCIUM CHLORIDE 100 MG/ML
1 INJECTION INTRAVENOUS; INTRAVENTRICULAR ONCE
Status: COMPLETED | OUTPATIENT
Start: 2018-10-19 | End: 2018-10-19

## 2018-10-19 RX ORDER — ONDANSETRON 2 MG/ML
4 INJECTION INTRAMUSCULAR; INTRAVENOUS EVERY 4 HOURS PRN
Status: DISCONTINUED | OUTPATIENT
Start: 2018-10-19 | End: 2018-10-19 | Stop reason: ALTCHOICE

## 2018-10-19 RX ORDER — BRIMONIDINE TARTRATE AND TIMOLOL MALEATE 2; 5 MG/ML; MG/ML
1 SOLUTION OPHTHALMIC 2 TIMES DAILY
Status: DISCONTINUED | OUTPATIENT
Start: 2018-10-19 | End: 2018-10-19

## 2018-10-19 RX ORDER — DEXTROSE MONOHYDRATE 25 G/50ML
50 INJECTION, SOLUTION INTRAVENOUS ONCE
Status: COMPLETED | OUTPATIENT
Start: 2018-10-19 | End: 2018-10-19

## 2018-10-19 RX ORDER — PROMETHAZINE HYDROCHLORIDE 25 MG/1
12.5-25 SUPPOSITORY RECTAL EVERY 4 HOURS PRN
Status: DISCONTINUED | OUTPATIENT
Start: 2018-10-19 | End: 2018-10-22 | Stop reason: HOSPADM

## 2018-10-19 RX ORDER — POLYETHYLENE GLYCOL 3350 17 G/17G
1 POWDER, FOR SOLUTION ORAL
Status: DISCONTINUED | OUTPATIENT
Start: 2018-10-19 | End: 2018-10-20

## 2018-10-19 RX ORDER — LATANOPROST 50 UG/ML
1 SOLUTION/ DROPS OPHTHALMIC EVERY EVENING
Status: DISCONTINUED | OUTPATIENT
Start: 2018-10-19 | End: 2018-10-22 | Stop reason: HOSPADM

## 2018-10-19 RX ORDER — HEPARIN SODIUM 1000 [USP'U]/ML
INJECTION, SOLUTION INTRAVENOUS; SUBCUTANEOUS
Status: COMPLETED
Start: 2018-10-19 | End: 2018-10-19

## 2018-10-19 RX ORDER — PREGABALIN 25 MG/1
50 CAPSULE ORAL DAILY
Status: DISCONTINUED | OUTPATIENT
Start: 2018-10-19 | End: 2018-10-22 | Stop reason: HOSPADM

## 2018-10-19 RX ORDER — AMOXICILLIN 250 MG
2 CAPSULE ORAL 2 TIMES DAILY
Status: DISCONTINUED | OUTPATIENT
Start: 2018-10-19 | End: 2018-10-20

## 2018-10-19 RX ADMIN — CALCIUM CHLORIDE 1 G: 100 INJECTION INTRAVENOUS; INTRAVENTRICULAR at 10:57

## 2018-10-19 RX ADMIN — DEXTROSE MONOHYDRATE 50 ML: 25 INJECTION, SOLUTION INTRAVENOUS at 10:57

## 2018-10-19 RX ADMIN — HEPARIN SODIUM 1700 UNITS: 1000 INJECTION, SOLUTION INTRAVENOUS; SUBCUTANEOUS at 12:45

## 2018-10-19 RX ADMIN — PREGABALIN 50 MG: 25 CAPSULE ORAL at 14:27

## 2018-10-19 RX ADMIN — CARVEDILOL 12.5 MG: 12.5 TABLET, FILM COATED ORAL at 17:25

## 2018-10-19 RX ADMIN — SENNOSIDES AND DOCUSATE SODIUM 2 TABLET: 8.6; 5 TABLET ORAL at 17:26

## 2018-10-19 RX ADMIN — BRIMONIDINE TARTRATE 1 DROP: 2 SOLUTION OPHTHALMIC at 17:44

## 2018-10-19 RX ADMIN — LATANOPROST 1 DROP: 50 SOLUTION OPHTHALMIC at 20:18

## 2018-10-19 RX ADMIN — TIMOLOL MALEATE 1 DROP: 5 SOLUTION/ DROPS OPHTHALMIC at 17:44

## 2018-10-19 RX ADMIN — AMLODIPINE BESYLATE 10 MG: 10 TABLET ORAL at 12:07

## 2018-10-19 RX ADMIN — INSULIN HUMAN 10 UNITS: 100 INJECTION, SOLUTION PARENTERAL at 11:01

## 2018-10-19 RX ADMIN — HEPARIN SODIUM 5000 UNITS: 5000 INJECTION, SOLUTION INTRAVENOUS; SUBCUTANEOUS at 17:26

## 2018-10-19 RX ADMIN — ALBUTEROL SULFATE 2.5 MG: 2.5 SOLUTION RESPIRATORY (INHALATION) at 11:20

## 2018-10-19 ASSESSMENT — ENCOUNTER SYMPTOMS
WEIGHT LOSS: 0
EYE DISCHARGE: 0
HEADACHES: 0
EYE PAIN: 0
BLURRED VISION: 0
PALPITATIONS: 0
FEVER: 0
EYE REDNESS: 0
ORTHOPNEA: 0
DIZZINESS: 0
MYALGIAS: 0
SHORTNESS OF BREATH: 0
NERVOUS/ANXIOUS: 0
STRIDOR: 0
BACK PAIN: 0
WEAKNESS: 1
DEPRESSION: 0
ABDOMINAL PAIN: 0
NAUSEA: 0
COUGH: 0
HEARTBURN: 0
CHILLS: 0
NECK PAIN: 0
INSOMNIA: 0
FOCAL WEAKNESS: 0
VOMITING: 0
DIARRHEA: 1
SEIZURES: 0
SPUTUM PRODUCTION: 0

## 2018-10-19 ASSESSMENT — PAIN SCALES - GENERAL
PAINLEVEL_OUTOF10: 0

## 2018-10-19 ASSESSMENT — COGNITIVE AND FUNCTIONAL STATUS - GENERAL
DRESSING REGULAR UPPER BODY CLOTHING: A LITTLE
MOVING FROM LYING ON BACK TO SITTING ON SIDE OF FLAT BED: A LITTLE
MOBILITY SCORE: 15
STANDING UP FROM CHAIR USING ARMS: A LOT
TURNING FROM BACK TO SIDE WHILE IN FLAT BAD: A LITTLE
WALKING IN HOSPITAL ROOM: A LOT
DAILY ACTIVITIY SCORE: 19
SUGGESTED CMS G CODE MODIFIER MOBILITY: CK
HELP NEEDED FOR BATHING: A LITTLE
TOILETING: A LITTLE
CLIMB 3 TO 5 STEPS WITH RAILING: A LOT
DRESSING REGULAR LOWER BODY CLOTHING: A LITTLE
MOVING TO AND FROM BED TO CHAIR: A LITTLE
SUGGESTED CMS G CODE MODIFIER DAILY ACTIVITY: CK
PERSONAL GROOMING: A LITTLE

## 2018-10-19 ASSESSMENT — LIFESTYLE VARIABLES
EVER_SMOKED: NEVER
DO YOU DRINK ALCOHOL: NO
ALCOHOL_USE: NO

## 2018-10-19 ASSESSMENT — COPD QUESTIONNAIRES
DURING THE PAST 4 WEEKS HOW MUCH DID YOU FEEL SHORT OF BREATH: NONE/LITTLE OF THE TIME
IN THE PAST 12 MONTHS DO YOU DO LESS THAN YOU USED TO BECAUSE OF YOUR BREATHING PROBLEMS: DISAGREE/UNSURE
DO YOU EVER COUGH UP ANY MUCUS OR PHLEGM?: NO/ONLY WITH OCCASIONAL COLDS OR INFECTIONS
HAVE YOU SMOKED AT LEAST 100 CIGARETTES IN YOUR ENTIRE LIFE: NO/DON'T KNOW
COPD SCREENING SCORE: 2

## 2018-10-19 ASSESSMENT — PATIENT HEALTH QUESTIONNAIRE - PHQ9
2. FEELING DOWN, DEPRESSED, IRRITABLE, OR HOPELESS: NOT AT ALL
SUM OF ALL RESPONSES TO PHQ9 QUESTIONS 1 AND 2: 0
1. LITTLE INTEREST OR PLEASURE IN DOING THINGS: NOT AT ALL

## 2018-10-19 NOTE — ED NOTES
Med rec complete per pt at bedside  Allergies have been verified and updated  No ABX within the last 30 days

## 2018-10-19 NOTE — CONSULTS
"[facility] -  CONSULTATION NOTE               Author: Rakesh Sharp M.D. Date & Time: 10/19/2018  1:17 PM       REASON FOR CONSULTATION:   - Inpatient hemodialysis management and management of hyperkalemia    CHIEF COMPLAINT:   -  \"Weakness and diarrhea \"    HISTORY OF PRESENT ILLNESS:    63-year-old female with a medical history significant for end-stage renal disease (on hemodialysis Monday Wednesday and Fridays via left arm AV fistula under the care of [facility] nephrology), hypertension, diabetes mellitus type 2, atrial fibrillation, peripheral vascular disease, COPD, anemia secondary to myelodysplastic syndrome, history of CVA, who was admitted with complaints of weakness and diarrhea, found to have a potassium of 9.    She notes missing dialysis on Wednesday because she felt too weak to go.  Has had ongoing diarrhea for the last week.  No chest pain or shortness of breath.  No abdominal pain.  No fevers chills or sweats.    In the emergency room was found to have a potassium of 9 with EKG changes.  Was given temporizing measures and was transferred to the ICU for further observation.      REVIEW OF SYSTEMS:    - General:  +weakness, +malaise  - HEENT:  No ocular pain; no nasal discharge  - CV:  No chest pain, no palpitations  - Lungs:  No cough; no PND  - GI:  +diarrhea. No n/v  - MSK:  No joint pain; No trauma  - Skin: No rashes; No lesions  - Neuro: No paresthesia; No LOC  - Psych: No depression; No anxiety    PAST MEDICAL HISTORY:   1.  End-stage renal disease.  Patient is on chronic hemodialysis Monday, Wednesday, and Fridays via a left arm AV fistula under the care of [facility].  2.  Anemia secondary to myelodysplastic syndrome as well as secondary to chronic kidney disease.  The patient follows with Dr. Avila as an outpatient and had been on chemotherapy in the past.  3.  Chest pain.  In the past, this has been related to anemia.  She did have   an " "abnormal nuclear medicine scan in 2016 that showed no ischemia, but did   show a small area of infarction in the distal inferolateral wall extending to   the apex.  She more recently had a nuclear medicine stress test in 2017   that showed no evidence of significant jeopardized viable myocardium or prior   myocardial infarction.  4.  Hypertension.  5.  Diabetes mellitus type 2.  6.  Atrial fibrillation.  7.  Vitamin D deficiency.  8.  Peripheral vascular disease.  The patient has a history of stent placement   in lower extremities.  9.  COPD.  Patient is on home oxygen.  10.  Diastolic CHF.  11.  History of cerebrovascular accident with no residual deficits.  12.  Legally blind.  13.  History of steal syndrome.  This required revision of her left arm AV   fistula by Dr. Ranulfo Jolly in 2016.    PAST SURGICAL HISTORY:   1.   x2.  2.  Hysterectomy.  3.  D and C x2.  4.  Appendectomy.  5.  Left arm AV fistula creation by Dr. Ranulfo Jolly.  6.  Revision of the left arm AV fistula by Dr. Ranulfo Jolly in 2016   secondary to steal syndrome.  7.  History of stent placement to the bilateral lower extremities.  8.  PermCath placement and removal.  9.  Retinal detachment surgery.  10.  Bilateral cataract surgery.  11.  Coronary angiogram.  12.  Upper EGD.    FAMILY HISTORY:   - Reviewed and non contributory to current illness    SOCIAL HISTORY:   - No tobacco  - No EtOH  - No illicits    HOME MEDICATIONS:   - Reviewed and documented in chart    ALLERGIES:  Pioglitazone; Simvastatin; Darvocet [propoxyphene n-apap]; Demerol; Dilaudid [hydromorphone]; Diphenhydramine; Glucophage [metformin hydrochloride]; Iron; Lenalidomide; Morphine; Multivitamin; Naprosyn [naproxen]; Other drug; Oxycodone; Pcn [penicillins]; Requip; Sulfa drugs; Tramadol; and Trazodone    PHYSICAL EXAM:  VS:  /43   Pulse 97   Temp (!) 35.6 °C (96.1 °F)   Resp 16   Ht 1.499 m (4' 11\")   Wt 63.9 kg (140 lb 14 oz)   LMP 1995   " SpO2 97%   Breastfeeding? No   BMI 28.45 kg/m²   GENERAL: Mild distress, uncomfortable  HEENT:  NC/AT, no scleral icterus; conjunctiva normal  NECK:  Supple; Non tender  CV:  RRR, no m/r/g  LUNGS:  CTAB, no W/R  ABDOMEN:  SNTND, +BS  EXTREMETIES:  No C/C/E  SKIN:  Warm and dry  NEURO:  A&O, no focal deficits  PSYCH:  Cooperative, appropriate mood and affect    LABS:  Recent Results (from the past 24 hour(s))   CBC WITH DIFFERENTIAL    Collection Time: 10/19/18  9:34 AM   Result Value Ref Range    WBC 6.0 4.8 - 10.8 K/uL    RBC 2.11 (L) 4.20 - 5.40 M/uL    Hemoglobin 7.3 (L) 12.0 - 16.0 g/dL    Hematocrit 22.6 (L) 37.0 - 47.0 %    .1 (H) 81.4 - 97.8 fL    MCH 34.6 (H) 27.0 - 33.0 pg    MCHC 32.3 (L) 33.6 - 35.0 g/dL    RDW 76.3 (H) 35.9 - 50.0 fL    Platelet Count 173 164 - 446 K/uL    MPV 14.0 (H) 9.0 - 12.9 fL    Nucleated RBC 0.00 /100 WBC    NRBC (Absolute) 0.00 K/uL    Neutrophils-Polys 80.00 (H) 44.00 - 72.00 %    Lymphocytes 14.70 (L) 22.00 - 41.00 %    Monocytes 1.00 0.00 - 13.40 %    Eosinophils 0.00 0.00 - 6.90 %    Basophils 3.20 (H) 0.00 - 1.80 %    Neutrophils (Absolute) 4.87 2.00 - 7.15 K/uL    Lymphs (Absolute) 0.88 (L) 1.00 - 4.80 K/uL    Monos (Absolute) 0.06 0.00 - 0.85 K/uL    Eos (Absolute) 0.00 0.00 - 0.51 K/uL    Baso (Absolute) 0.19 (H) 0.00 - 0.12 K/uL    Anisocytosis 1+     Macrocytosis 1+     Microcytosis 1+    CMP    Collection Time: 10/19/18  9:34 AM   Result Value Ref Range    Sodium 134 (L) 135 - 145 mmol/L    Potassium 9.0 (HH) 3.6 - 5.5 mmol/L    Chloride 97 96 - 112 mmol/L    Co2 22 20 - 33 mmol/L    Anion Gap 15.0 (H) 0.0 - 11.9    Glucose 211 (H) 65 - 99 mg/dL    Bun 119 (HH) 8 - 22 mg/dL    Creatinine 12.09 (HH) 0.50 - 1.40 mg/dL    Calcium 7.2 (L) 8.5 - 10.5 mg/dL    AST(SGOT) 25 12 - 45 U/L    ALT(SGPT) 19 2 - 50 U/L    Alkaline Phosphatase 51 30 - 99 U/L    Total Bilirubin 0.4 0.1 - 1.5 mg/dL    Albumin 4.2 3.2 - 4.9 g/dL    Total Protein 7.7 6.0 - 8.2 g/dL    Globulin  3.5 1.9 - 3.5 g/dL    A-G Ratio 1.2 g/dL   LIPASE    Collection Time: 10/19/18  9:34 AM   Result Value Ref Range    Lipase 215 (H) 11 - 82 U/L   ESTIMATED GFR    Collection Time: 10/19/18  9:34 AM   Result Value Ref Range    GFR If  4 (A) >60 mL/min/1.73 m 2    GFR If Non African American 3 (A) >60 mL/min/1.73 m 2   DIFFERENTIAL MANUAL    Collection Time: 10/19/18  9:34 AM   Result Value Ref Range    Bands-Stabs 1.10 0.00 - 10.00 %    Manual Diff Status PERFORMED    PERIPHERAL SMEAR REVIEW    Collection Time: 10/19/18  9:34 AM   Result Value Ref Range    Peripheral Smear Review see below    PLATELET ESTIMATE    Collection Time: 10/19/18  9:34 AM   Result Value Ref Range    Plt Estimation Normal    MORPHOLOGY    Collection Time: 10/19/18  9:34 AM   Result Value Ref Range    RBC Morphology Present     Giant Platelets 3+     Polychromia 1+     Poikilocytosis 1+     Schistocytes 1+     Tear Drop Cells 1+    Potassium Serum (K)    Collection Time: 10/19/18  9:34 AM   Result Value Ref Range    Potassium 9.1 (HH) 3.6 - 5.5 mmol/L   EKG    Collection Time: 10/19/18 10:13 AM   Result Value Ref Range    Report       [facility] Emergency Dept[facility]    Test Date:  2018-10-19  Pt Name:    JESUS SILVEIRA              Department: ER  MRN:        [id number]                      Room:       [facility]  Gender:     Female                       Technician: [id number]  :        1955                   Requested By:JOSE LEVINE  Order #:    [id number]                    Reading MD:    Measurements  Intervals                                Axis  Rate:       139                          P:  KY:                                      QRS:        210  QRSD:       222                          T:          -88  QT:         408  QTc:        621    Interpretive Statements  ATRIAL FLUTTER, A-RATE 227  VENTRICULAR BIGEMINY  NONSPECIFIC INTRAVENTRICULAR CONDUCTION DELAY  Compared to ECG 2018 12:29:38  Ventricular  premature complex(es) now present  Intraventricular conduction delay now present  Sinus rhythm no longer present     EKG    Collection Time: 10/19/18 10:52 AM   Result Value Ref Range    Report       [facility] Emergency Dept[facility]    Test Date:  2018-10-19  Pt Name:    JESUS SILVEIRA              Department: ER  MRN:        [id number]                      Room:       RD 02  Gender:     Female                       Technician: ALEX  :        1955                   Requested By:JOSE LEVINE  Order #:    [id number]                    Reading MD:    Measurements  Intervals                                Axis  Rate:       171                          P:          0  NE:         192                          QRS:        -61  QRSD:       114                          T:          64  QT:         412  QTc:        695    Interpretive Statements  SUPRAVENTRICULAR TACHYCARDIA  VENTRICULAR BIGEMINY  LEFT ANTERIOR FASCICULAR BLOCK  ABNRM R PROG, CONSIDER ASMI OR LEAD PLACEMENT  Compared to ECG 10/19/2018 10:13:41  Left anterior fascicular block now present  Myocardial infarct finding now present  Atrial flutter no longer present  Intraventricular conduction delay no longer  present         (click the triangle to expand results)    IMAGING:  US-RUQ    (Results Pending)       IMPRESSION:  # ESRD: MWF schedlule via LUE AVF  # Hyperkalemia with EKG changes. 2/2 missed dialysis sessions  # HTN  # Anemia: Secondary to myelodysplastic syndrome and component from CKD  # Renal osteodystrophy  # Diarrhea  # Atrial fibrillation  # Acidosis  # Myelodysplastic syndrome    PLAN:  -Emergent dialysis today, will likely require dialysis again tomorrow   -Telemetry monitoring  -recheck k this PM  -Renal diet, limit IV fluids  -Strict I/Os  - Dose all meds per ESRD  -Consider checking for C. Difficile  -Hold home blood pressure meds for now    Thank you for this consult, we will continue to follow.    Rakesh Sharp MD

## 2018-10-19 NOTE — PROGRESS NOTES
HEMODIALYSIS NOTES:     STAT HD today x 3.5 hours per Dr. Sharp. Initiated at 1243 and ended at 1613 . Patient has high BP episodes while on dialysis. ICU RN aware. Patient tolerated treatment. Please see paper flowsheet for details.    UF Net: 2,000 mL as ordered    Blood returned. Applied gauze and held L AVF site for 7 minutes. Verified no bleeding. Bruit and thrill present post dialysis. Instructions given to Primary RN that if bleeding occurs on the AVF site, change dressing and held the site with pressure.     Report given to RIVERA Del Real RN.

## 2018-10-19 NOTE — ASSESSMENT & PLAN NOTE
Continue amlodipine and carvedilol  Holding losartan due to hyperkalemia  Adjust medication as needed

## 2018-10-19 NOTE — ED TRIAGE NOTES
Pt reporting increased weakness over the past 3 weeks. Pt has been unable to walk since Monday. Pt missed dialysis Weds due to being immobile. Pt reporting being unable to even hold her phone today to call 9-1-1. Pt speaking softly, no SOB, no distress noted at this time.

## 2018-10-19 NOTE — H&P
Hospital Medicine History and Physical      Date of Service  10/19/2018    Chief Complaint  Chief Complaint   Patient presents with   • Emesis       History of Presenting Illness  Rachna is a 63 y.o. female PMH of end-stage renal disease on hemodialysis Monday Wednesday and Friday, essential hypertension who presents with worsening of weakness, ongoing diarrhea since this past Monday.  She stated that she had more than 5 times loose stool a day.  Associated with nausea and generalized weakness.  Because of that she missed her hemodialysis this past Wednesday.  She can barely get up or walk due to her severe weakness.  In the ER she was found to have hypokalemia with potassium of 9.  Nephrology was consulted for emergent hemodialysis.  The patient will be admitted to ICU in critical condition.    Primary Care Physician  Nyla Nava M.D.      Code Status  Full code    Review of Systems  Review of Systems   Constitutional: Positive for malaise/fatigue. Negative for chills, fever and weight loss.   HENT: Negative for congestion and nosebleeds.    Eyes: Negative for blurred vision, pain, discharge and redness.   Respiratory: Negative for cough, sputum production, shortness of breath and stridor.    Cardiovascular: Positive for leg swelling. Negative for chest pain, palpitations and orthopnea.   Gastrointestinal: Positive for diarrhea. Negative for abdominal pain, heartburn, nausea and vomiting.   Genitourinary: Negative for dysuria, frequency and urgency.   Musculoskeletal: Negative for back pain, myalgias and neck pain.   Skin: Negative for itching and rash.   Neurological: Positive for weakness. Negative for dizziness, focal weakness, seizures and headaches.   Psychiatric/Behavioral: Negative for depression. The patient is not nervous/anxious and does not have insomnia.      Please see HPI, all other systems were reviewed and are negative (AMA/CMS criteria)     Past Medical History  Past Medical History:   Diagnosis  "Date   • MDS (myelodysplastic syndrome) 10/2016    bone marrow biopsy   • Stroke (HCC) 03/2015    No residual weakness/problems   • Arthritis     hands    • Atrial fibrillation (HCC)     HX   • Blood transfusion without reported diagnosis    • Breath shortness     w/exertion   • Cancer (HCC)     MDS in bones   • Cataract     bilat IOL   • Chronic anemia    • Chronic kidney disease        • Chronic obstructive pulmonary disease (HCC)    • Congestive heart failure (HCC)    • Dental disorder     full dentures   • Diabetes     Diet controlled   • Dialysis patient     M W F ,   [facility] in [place]   • Glaucoma    • Heart abnormalities    • Hypertension    • Pain -2017    \"bones\", generalized, 5/10   • Supplemental oxygen dependent     2 liters       Surgical History  Past Surgical History:   Procedure Laterality Date   • GASTROSCOPY N/A 1/25/2018    Procedure: GASTROSCOPY;  Surgeon: Blanca Santos M.D.;  Location: SURGERY [facility];  Service: Gastroenterology   • BONE MARROW BIOPSY, NDL/TROCAR  9/20/2017    Procedure: BONE MARROW BIOPSY, NDL/TROCAR;  Surgeon: Wade Turner M.D.;  Location: ENDOSCOPY [facility];  Service: Orthopedics   • BONE MARROW ASPIRATION  9/20/2017    Procedure: BONE MARROW ASPIRATION;  Surgeon: Wade Turenr M.D.;  Location: ENDOSCOPY [facility];  Service: Orthopedics   • VEIN LIGATION Left 11/10/2016    Procedure: VEIN LIGATION FOR DISTAL REVASCULARIZATION AND INTERVAL LIGATION OF LEFT ARM DIALYISIS ACCESS (DRIL PROCEDURE);  Surgeon: Ranulfo Jolly M.D.;  Location: SURGERY [facility];  Service:    • AV FISTULA CREATION Left 2/8/2016    Procedure: AV FISTULA CREATION UPPER EXTREMITY;  Surgeon: Ranulfo Jolly M.D.;  Location: SURGERY [facility];  Service:    • GASTROSCOPY  12/17/2015    Procedure: ESOPHAGOGASTRODUODENOSCOPY WITH BIOPSY;  Surgeon: Wing Álvarez M.D.;  Location: SURGERY [facility];  Service:    • COLONOSCOPY  12/17/2015    " Procedure: COLONOSCOPY;  Surgeon: Wing Álvarez M.D.;  Location: SURGERY [facility];  Service:    • GYN SURGERY      hysterectomy   • GYN SURGERY       x 2   • GYN SURGERY      d&C x2   • OTHER      angioplasty/ stents bilat LE   • OTHER ABDOMINAL SURGERY      appendectomy,  x 2, hysterectomy, D & C   • OTHER ABDOMINAL SURGERY      appendectomy   • OTHER CARDIAC SURGERY      Angioplasty  and    • OTHER CARDIAC SURGERY      cardiac angiogram, angioplasty   • RETINAL DETACHMENT REPAIR Right        Medications  No current facility-administered medications on file prior to encounter.      Current Outpatient Prescriptions on File Prior to Encounter   Medication Sig Dispense Refill   • losartan (COZAAR) 100 MG Tab Take 100 mg by mouth every day.     • amLODIPine (NORVASC) 10 MG Tab Take 1 Tab by mouth every day. 1 Tab 0   • nitroglycerin (NITROSTAT) 0.4 MG SL Tab Place 1 Tab under tongue as needed for Chest Pain. 25 Tab 11   • cyclobenzaprine (FLEXERIL) 5 MG tablet Take 5 mg by mouth 3 times a day as needed.     • HYDROcodone-acetaminophen (NORCO) 5-325 MG Tab per tablet Take 1 Tab by mouth every 6 hours as needed (pain).     • carvedilol (COREG) 12.5 MG Tab Take 12.5 mg by mouth 2 times a day, with meals.     • Brimonidine Tartrate-Timolol (COMBIGAN) 0.2-0.5 % Solution Place 1 Drop in both eyes 2 Times a Day.     • pregabalin (LYRICA) 50 MG capsule Take 50 mg by mouth 2 times a day.     • bimatoprost (LUMIGAN) 0.03 % Solution Place 1 Drop in both eyes every evening.       Family History  Family History   Problem Relation Age of Onset   • Hypertension Father          at 89   • Hypertension Mother          Social History  Social History   Substance Use Topics   • Smoking status: Never Smoker   • Smokeless tobacco: Never Used   • Alcohol use No       Allergies  Allergies   Allergen Reactions   • Actos [Pioglitazone Hydrochloride] Unspecified     Cause blindness   QCH=7178   • Darvocet  [Propoxyphene N-Apap] Vomiting     ORC=3161   • Demerol Vomiting     YRB=4041   • Glucophage [Metformin Hydrochloride] Vomiting     NEV=8381   • Morphine Vomiting     MDG=9072   • Oxycodone Vomiting     RXN=2016   • Pcn [Penicillins] Vomiting     UBQ=0010     • Requip Vomiting     RXN=2015   • Simvastatin Unspecified     Leg cramps  TRW=7329   • Sulfa Drugs Rash     RXN=>10 years   • Tramadol Vomiting     XWI=0010   • Trazodone Vomiting     ZEH=9554   • Dilaudid [Hydromorphone] Vomiting     Unknown    • Diphenhydramine Vomiting   • Iron      vomiting   • Lenalidomide Vomiting   • Multivitamin      itching   • Naprosyn [Naproxen] Hives   • Other Drug Rash and Vomiting     Any binders that remove phosphorus from the body such as tums        Physical Exam  Laboratory   Hemodynamics  Temp (24hrs), Av.6 °C (96 °F), Min:35.5 °C (95.9 °F), Max:35.6 °C (96.1 °F)   Temperature: (!) 35.6 °C (96.1 °F)  Pulse  Av  Min: 62  Max: 62    Blood Pressure: 133/43      Respiratory      Respiration: 18, Pulse Oximetry: 95 %             Physical Exam   Constitutional: She is oriented to person, place, and time. No distress.   HENT:   Head: Normocephalic and atraumatic.   Mouth/Throat: Oropharynx is clear and moist.   Eyes: Pupils are equal, round, and reactive to light. Conjunctivae and EOM are normal.   Neck: Normal range of motion. Neck supple. No tracheal deviation present. No thyromegaly present.   Cardiovascular: Normal rate and regular rhythm.    No murmur heard.  Pulmonary/Chest: Effort normal and breath sounds normal. No respiratory distress. She has no wheezes.   Abdominal: Soft. Bowel sounds are normal. She exhibits no distension. There is no tenderness.   Musculoskeletal: She exhibits edema. She exhibits no tenderness.   Neurological: She is alert and oriented to person, place, and time. No cranial nerve deficit.   Skin: Skin is warm and dry. She is not diaphoretic. No erythema.   Psychiatric: She has a normal  mood and affect. Her behavior is normal. Thought content normal.       Recent Labs      10/19/18   0934   WBC  6.0   RBC  2.11*   HEMOGLOBIN  7.3*   HEMATOCRIT  22.6*   MCV  107.1*   MCH  34.6*   MCHC  32.3*   RDW  76.3*   PLATELETCT  173   MPV  14.0*     Recent Labs      10/19/18   0934   SODIUM  134*   POTASSIUM  9.0*   CHLORIDE  97   CO2  22   GLUCOSE  211*   BUN  119*   CREATININE  12.09*   CALCIUM  7.2*     Recent Labs      10/19/18   0934   ALTSGPT  19   ASTSGOT  25   ALKPHOSPHAT  51   TBILIRUBIN  0.4   LIPASE  215*   GLUCOSE  211*                 Lab Results   Component Value Date    TROPONINI <0.01 09/26/2018       Imaging  No orders to display          Assessment/Plan     I anticipate this patient will require at least two midnights for appropriate medical management, necessitating inpatient admission.    Hyperkalemia- (present on admission)   Assessment & Plan    K 8  Emergent HD  Hold losartan  Calcium gluconate, insulin, albuterol given  Monitor bmp closely  Hyperkalemic protocol  Monitor on tele closely  nephro consulted  HD per nephro        Anemia of chronic disease- (present on admission)   Assessment & Plan    Related to end-stage renal disease  Follow CBC  Transfuse as needed        Essential hypertension- (present on admission)   Assessment & Plan    Continue amlodipine and carvedilol  Holding losartan due to hyperkalemia  Adjust medication as needed        ESRD (end stage renal disease) (HCC)- (present on admission)   Assessment & Plan    On HD  nephro on  HD today        Elevated lipase- (present on admission)   Assessment & Plan    Normal liver enzyme  Check ultrasound liver        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 (HCC)- (present on admission)   Assessment & Plan    On sliding scale insulin        Patient is critically ill with hyperkalemia  The vital organ system that is affected is electrolytes, cardiovascular  If  untreated there is a high chance of deterioration into cardiac arrest/ failure and eventually death.   The critical care that I am providing today is aggressive medical management to lower potassium with calcium gluconate, insulin, HD  The critical that has been undertaken is medically complex.   There has been no overlap in critical care time.   Critical Care Time not including procedures: 40 minutes    Prophylaxis:  sc heparin

## 2018-10-19 NOTE — ED PROVIDER NOTES
ED Provider Note    Chief Complaint:   Generalized weakness, vomiting.    HPI:  Vielka Briscoe is a 63 y.o. female who presents with generalized weakness for the past 5 days, and vomiting that began last night.  She has a past medical history of end-stage renal disease on hemodialysis. She is dialyzed Monday, Wednesday, Friday.  Her last dialysis was Monday, 5 days ago.  She missed dialysis on Wednesday, 3 days ago, because she was feeling unwell and did not go to dialysis today.  She does have some associated abdominal discomfort, denies fevers but states she has had some sweats recently. She has had prior similar symptoms, however she is uncertain as to the etiology previously. She denies associated fevers, has no current chest pain, no shortness of breath. Denies leg pain or leg swelling. She has developed intermittent nausea and vomiting over the past 24 hours, but denies abdominal pain. She is unable to identify any exacerbating nor alleviating factors.     Review of Systems:  See HPI for pertinent positives and negatives. All other systems negative.    Past Medical History:   has a past medical history of Arthritis; Atrial fibrillation (HCC); Blood transfusion without reported diagnosis; Breath shortness; Cancer (HCC); Cataract; Chronic anemia; Chronic kidney disease; Chronic obstructive pulmonary disease (HCC); Congestive heart failure (HCC); Dental disorder; Diabetes; Dialysis patient; Glaucoma; Heart abnormalities; Hypertension; MDS (myelodysplastic syndrome) (10/2016); Pain (-2017); Stroke (HCC) (03/2015); and Supplemental oxygen dependent.    Social History:  Social History     Social History Main Topics   • Smoking status: Never Smoker   • Smokeless tobacco: Never Used   • Alcohol use No   • Drug use: No   • Sexual activity: Not on file       Surgical History:   has a past surgical history that includes other cardiac surgery; other abdominal surgery; gyn surgery; gyn surgery; gyn surgery;  other cardiac surgery; other; retinal detachment repair (Right); av fistula creation (Left, 2/8/2016); other abdominal surgery; gastroscopy (12/17/2015); colonoscopy (12/17/2015); vein ligation (Left, 11/10/2016); bone marrow biopsy, ndl/trocar (9/20/2017); bone marrow aspiration (9/20/2017); and gastroscopy (N/A, 1/25/2018).    Current Medications:  Home Medications     Reviewed by Georgiana Pulliam R.N. (Registered Nurse) on 10/19/18 at 0919  Med List Status: Not Addressed   Medication Last Dose Status   amLODIPine (NORVASC) 10 MG Tab 9/26/2018 Active   bimatoprost (LUMIGAN) 0.03 % Solution 9/25/2018 Active   Brimonidine Tartrate-Timolol (COMBIGAN) 0.2-0.5 % Solution 9/25/2018 Active   carvedilol (COREG) 12.5 MG Tab 10/18/2018 Active   cyclobenzaprine (FLEXERIL) 5 MG tablet 9/25/2018 Active   HYDROcodone-acetaminophen (NORCO) 5-325 MG Tab per tablet 9/24/2018 Active   losartan (COZAAR) 100 MG Tab 10/18/2018 Active   nitroglycerin (NITROSTAT) 0.4 MG SL Tab 9/25/2018 Active   pregabalin (LYRICA) 50 MG capsule 10/18/2018 Active                Allergies:  Allergies   Allergen Reactions   • Actos [Pioglitazone Hydrochloride] Unspecified     Cause blindness   ARG=9492   • Darvocet [Propoxyphene N-Apap] Vomiting     MRM=4027   • Demerol Vomiting     NZY=6658   • Glucophage [Metformin Hydrochloride] Vomiting     AOU=1048   • Morphine Vomiting     RSZ=3856   • Oxycodone Vomiting     RXN=1/2016   • Pcn [Penicillins] Vomiting     CZM=0295     • Requip Vomiting     RXN=12/2015   • Simvastatin Unspecified     Leg cramps  LJV=4830   • Sulfa Drugs Rash     RXN=>10 years   • Tramadol Vomiting     BDV=1834   • Trazodone Vomiting     JXR=0786   • Dilaudid [Hydromorphone] Vomiting     Unknown    • Diphenhydramine Vomiting   • Iron      vomiting   • Lenalidomide Vomiting   • Multivitamin      itching   • Naprosyn [Naproxen] Hives   • Other Drug Rash and Vomiting     Any binders that remove phosphorus from the body such as tums  "      Physical Exam:  Vital Signs: /43   Pulse 62   Temp (!) 35.6 °C (96.1 °F)   Resp 18   Ht 1.499 m (4' 11\")   Wt 63.9 kg (140 lb 14 oz)   LMP 01/01/1995   SpO2 95%   BMI 28.45 kg/m²   Constitutional: Alert, uncomfortable appearing.   HENT: Moist mucus membranes  Eyes: Pupils equal and reactive, normal conjunctiva  Neck: Supple, normal range of motion, no stridor  Cardiovascular: Extremities are warm and well perfused, no murmur appreciated, normal cardiac auscultation, exam limited by body habitus  Pulmonary: No respiratory distress, normal work of breathing, no accessory muscule usage, very mild coarse breath sounds equal bilaterally.  Abdomen: Soft, non-distended, non-tender to palpation, no peritoneal signs  Skin: Warm, dry, no rashes or lesions  Musculoskeletal: Normal range of motion in all extremities, no swelling or deformity noted, no unilateral lower extremity edema, no pitting edema  Neurologic: Alert, oriented, normal speech, normal motor function  Psychiatric: Flat affect    Medical records reviewed for continuity of care.  Medical records are located under N [id number].  Discharge summary reviewed from 8/29/18.  Patient noted to have a history of ESRD on dialysis, MDS, diabetes, and oxygen dependent COPD at 2 L.  She presented with shortness of breath, required rescue BiPAP and ICU admission.  Potassium in the ER was 7.5, she was treated with IV calcium, insulin, dextrose and Kayexalate.  She underwent emergency dialysis.  Mild dysplastic syndrome is followed by Dr. Avila.  She requires frequent blood transfusions as an outpatient.    EKG: Rate 70, markedly widened QRS, no ST changes, on prior EKG dated 9/26/18, QRS complexes are normal    Labs:  Labs Reviewed   CBC WITH DIFFERENTIAL - Abnormal; Notable for the following:        Result Value    RBC 2.11 (*)     Hemoglobin 7.3 (*)     Hematocrit 22.6 (*)     .1 (*)     MCH 34.6 (*)     MCHC 32.3 (*)     RDW 76.3 (*)     MPV 14.0 " (*)     Neutrophils-Polys 80.00 (*)     Lymphocytes 14.70 (*)     Basophils 3.20 (*)     Lymphs (Absolute) 0.88 (*)     Baso (Absolute) 0.19 (*)     All other components within normal limits   COMP METABOLIC PANEL - Abnormal; Notable for the following:     Sodium 134 (*)     Potassium 9.0 (*)     Anion Gap 15.0 (*)     Glucose 211 (*)     Bun 119 (*)     Creatinine 12.09 (*)     Calcium 7.2 (*)     All other components within normal limits   LIPASE - Abnormal; Notable for the following:     Lipase 215 (*)     All other components within normal limits   ESTIMATED GFR - Abnormal; Notable for the following:     GFR If  4 (*)     GFR If Non  3 (*)     All other components within normal limits   DIFFERENTIAL MANUAL   PERIPHERAL SMEAR REVIEW   PLATELET ESTIMATE   MORPHOLOGY       Radiology:  No orders to display        ED Medications Administered:  Medications   ondansetron (ZOFRAN) syringe/vial injection 4 mg (not administered)   CALCIUM CHLORIDE 10 % IV SOLN (not administered)   calcium CHLORIDE injection 1 g (not administered)   INSULIN REGULAR HUMAN 100 UNIT/ML INJ SOLN (not administered)   albuterol (PROVENTIL) 2.5mg/0.5ml nebulizer solution 2.5 mg (not administered)   insulin regular (HUMULIN R) injection 10 Units (not administered)   dextrose 50% (D50W) injection 50 mL (not administered)   DEXTROSE 50 % IV SOLN (not administered)       Differential diagnosis:  Volume overload, electrolyte abnormality, pancreatitis, gastritis, hyperkalemia    MDM:  History and physical exam as documented above. Patient presents with generalized weakness, nausea, and vomiting. She is afebrile, with no hypotension, no tachycardia. Less concerning for SIRS or sepsis, though temperature was low on arrival. Her abdominal exam is benign. Due to EKG change from baseline, calcium ordered out of concern for hyperkalemia.      On laboratory evaluation, she has a normal white blood count with 1% bands resulted  at this time, again less concerning for infectious etiology. Hemoglobin of 7.3 is consistent with her baseline.  Lipase is elevated to 215. Potassium resulted at 9.0.    After administration of calcium, EKG normalized with narrowing of QRS complexes. She was also treated with insulin, dextrose, and albuterol.     Case discussed with Dr. Sharp, nephrologist, who will arrange for emergent dialysis.   Case discussed with Dr. Mace, hospitalist, who kindly agrees to admit the patient.    Patient is critically ill.   The patient continues to have: severe hyperkalemia  The vital organ system that is affected is the: metabolic  If untreated there is a high chance of deterioration into: cardiac arrhythmia, cardiac arrest  And eventually death.   The critical care that I am providing today is: serial EKG's, cardiac stabilization, treatment of cardiac arrhythmia, treatment of hyperkalemia, subspecialist consultation  The critical that has been undertaken is medically complex.   There has been no overlap in critical care time.   Critical Care Time not including procedures: 55 min    Disposition:  Patient is admitted for emergent dialysis in critical condition.    Final Impression:  1. Hyperkalemia    2. ESRD (end stage renal disease) (HCC)    3. Weakness        Electronically signed by: Dori Corado, 10/19/2018 7:27 PM

## 2018-10-19 NOTE — DISCHARGE PLANNING
Outpatient Dialysis Note    Confirmed patient is active at:    [facility]  [address] [place] [address]      Schedule: Monday, Wednesday, Friday  Time: 10:30 am    Spoke with Donnie at facility who confirmed.      Dialysis Coordinator, [facility]  Stacia Stephens [phone]

## 2018-10-20 ENCOUNTER — APPOINTMENT (OUTPATIENT)
Dept: RADIOLOGY | Facility: MEDICAL CENTER | Age: 63
DRG: 640 | End: 2018-10-20
Attending: INTERNAL MEDICINE
Payer: MEDICARE

## 2018-10-20 LAB
ABO GROUP BLD: NORMAL
ALBUMIN SERPL BCP-MCNC: 3.8 G/DL (ref 3.2–4.9)
ALBUMIN/GLOB SERPL: 1.2 G/DL
ALP SERPL-CCNC: 51 U/L (ref 30–99)
ALT SERPL-CCNC: 20 U/L (ref 2–50)
ANION GAP SERPL CALC-SCNC: 12 MMOL/L (ref 0–11.9)
AST SERPL-CCNC: 19 U/L (ref 12–45)
BARCODED ABORH UBTYP: 8400
BARCODED PRD CODE UBPRD: NORMAL
BARCODED UNIT NUM UBUNT: NORMAL
BILIRUB SERPL-MCNC: 0.6 MG/DL (ref 0.1–1.5)
BLD GP AB SCN SERPL QL: NORMAL
BUN SERPL-MCNC: 44 MG/DL (ref 8–22)
CALCIUM SERPL-MCNC: 8.4 MG/DL (ref 8.5–10.5)
CHLORIDE SERPL-SCNC: 98 MMOL/L (ref 96–112)
CO2 SERPL-SCNC: 26 MMOL/L (ref 20–33)
COMPONENT R 8504R: NORMAL
CREAT SERPL-MCNC: 6.63 MG/DL (ref 0.5–1.4)
ERYTHROCYTE [DISTWIDTH] IN BLOOD BY AUTOMATED COUNT: 72.1 FL (ref 35.9–50)
GLOBULIN SER CALC-MCNC: 3.2 G/DL (ref 1.9–3.5)
GLUCOSE SERPL-MCNC: 121 MG/DL (ref 65–99)
HCT VFR BLD AUTO: 22.7 % (ref 37–47)
HGB BLD-MCNC: 7.3 G/DL (ref 12–16)
MCH RBC QN AUTO: 33.3 PG (ref 27–33)
MCHC RBC AUTO-ENTMCNC: 32.2 G/DL (ref 33.6–35)
MCV RBC AUTO: 103.7 FL (ref 81.4–97.8)
PLATELET # BLD AUTO: 165 K/UL (ref 164–446)
PMV BLD AUTO: 13.4 FL (ref 9–12.9)
POTASSIUM SERPL-SCNC: 4.6 MMOL/L (ref 3.6–5.5)
PRODUCT TYPE UPROD: NORMAL
PROT SERPL-MCNC: 7 G/DL (ref 6–8.2)
RBC # BLD AUTO: 2.19 M/UL (ref 4.2–5.4)
RH BLD: NORMAL
SODIUM SERPL-SCNC: 136 MMOL/L (ref 135–145)
UNIT STATUS USTAT: NORMAL
WBC # BLD AUTO: 4.9 K/UL (ref 4.8–10.8)

## 2018-10-20 PROCEDURE — 90935 HEMODIALYSIS ONE EVALUATION: CPT

## 2018-10-20 PROCEDURE — 86923 COMPATIBILITY TEST ELECTRIC: CPT

## 2018-10-20 PROCEDURE — 700111 HCHG RX REV CODE 636 W/ 250 OVERRIDE (IP): Performed by: INTERNAL MEDICINE

## 2018-10-20 PROCEDURE — 5A1D70Z PERFORMANCE OF URINARY FILTRATION, INTERMITTENT, LESS THAN 6 HOURS PER DAY: ICD-10-PCS | Performed by: INTERNAL MEDICINE

## 2018-10-20 PROCEDURE — A9270 NON-COVERED ITEM OR SERVICE: HCPCS | Performed by: INTERNAL MEDICINE

## 2018-10-20 PROCEDURE — 36430 TRANSFUSION BLD/BLD COMPNT: CPT

## 2018-10-20 PROCEDURE — 700102 HCHG RX REV CODE 250 W/ 637 OVERRIDE(OP): Performed by: INTERNAL MEDICINE

## 2018-10-20 PROCEDURE — 86901 BLOOD TYPING SEROLOGIC RH(D): CPT

## 2018-10-20 PROCEDURE — 86900 BLOOD TYPING SEROLOGIC ABO: CPT

## 2018-10-20 PROCEDURE — 76705 ECHO EXAM OF ABDOMEN: CPT

## 2018-10-20 PROCEDURE — 99233 SBSQ HOSP IP/OBS HIGH 50: CPT | Performed by: HOSPITALIST

## 2018-10-20 PROCEDURE — 30253N1 TRANSFUSE NONAUT RED BLOOD CELLS IN PERIPH ART, PERC: ICD-10-PCS | Performed by: INTERNAL MEDICINE

## 2018-10-20 PROCEDURE — 86850 RBC ANTIBODY SCREEN: CPT

## 2018-10-20 PROCEDURE — 770001 HCHG ROOM/CARE - MED/SURG/GYN PRIV*

## 2018-10-20 PROCEDURE — 85027 COMPLETE CBC AUTOMATED: CPT

## 2018-10-20 PROCEDURE — P9016 RBC LEUKOCYTES REDUCED: HCPCS

## 2018-10-20 PROCEDURE — 80053 COMPREHEN METABOLIC PANEL: CPT

## 2018-10-20 RX ORDER — HYDRALAZINE HYDROCHLORIDE 20 MG/ML
20 INJECTION INTRAMUSCULAR; INTRAVENOUS EVERY 6 HOURS PRN
Status: DISCONTINUED | OUTPATIENT
Start: 2018-10-20 | End: 2018-10-22 | Stop reason: HOSPADM

## 2018-10-20 RX ORDER — LOSARTAN POTASSIUM 50 MG/1
100 TABLET ORAL
Status: DISCONTINUED | OUTPATIENT
Start: 2018-10-20 | End: 2018-10-22 | Stop reason: HOSPADM

## 2018-10-20 RX ORDER — SODIUM CHLORIDE 9 MG/ML
INJECTION, SOLUTION INTRAVENOUS
Status: ACTIVE
Start: 2018-10-20 | End: 2018-10-21

## 2018-10-20 RX ADMIN — ACETAMINOPHEN 650 MG: 325 TABLET, FILM COATED ORAL at 08:37

## 2018-10-20 RX ADMIN — BRIMONIDINE TARTRATE 1 DROP: 2 SOLUTION OPHTHALMIC at 17:41

## 2018-10-20 RX ADMIN — HYDRALAZINE HYDROCHLORIDE 20 MG: 20 INJECTION INTRAMUSCULAR; INTRAVENOUS at 04:21

## 2018-10-20 RX ADMIN — LATANOPROST 1 DROP: 50 SOLUTION OPHTHALMIC at 17:41

## 2018-10-20 RX ADMIN — TIMOLOL MALEATE 1 DROP: 5 SOLUTION/ DROPS OPHTHALMIC at 05:26

## 2018-10-20 RX ADMIN — PREGABALIN 50 MG: 25 CAPSULE ORAL at 05:25

## 2018-10-20 RX ADMIN — LOSARTAN POTASSIUM 100 MG: 25 TABLET, FILM COATED ORAL at 21:39

## 2018-10-20 RX ADMIN — HEPARIN SODIUM 5000 UNITS: 5000 INJECTION, SOLUTION INTRAVENOUS; SUBCUTANEOUS at 05:20

## 2018-10-20 RX ADMIN — HEPARIN SODIUM 5000 UNITS: 5000 INJECTION, SOLUTION INTRAVENOUS; SUBCUTANEOUS at 13:24

## 2018-10-20 RX ADMIN — TIMOLOL MALEATE 1 DROP: 5 SOLUTION/ DROPS OPHTHALMIC at 17:42

## 2018-10-20 RX ADMIN — CARVEDILOL 12.5 MG: 12.5 TABLET, FILM COATED ORAL at 08:33

## 2018-10-20 RX ADMIN — CARVEDILOL 12.5 MG: 12.5 TABLET, FILM COATED ORAL at 17:43

## 2018-10-20 RX ADMIN — HEPARIN SODIUM 1700 UNITS: 1000 INJECTION, SOLUTION INTRAVENOUS; SUBCUTANEOUS at 17:28

## 2018-10-20 RX ADMIN — BRIMONIDINE TARTRATE 1 DROP: 2 SOLUTION OPHTHALMIC at 05:26

## 2018-10-20 RX ADMIN — HEPARIN SODIUM 5000 UNITS: 5000 INJECTION, SOLUTION INTRAVENOUS; SUBCUTANEOUS at 21:41

## 2018-10-20 ASSESSMENT — PAIN SCALES - GENERAL
PAINLEVEL_OUTOF10: 0
PAINLEVEL_OUTOF10: 2
PAINLEVEL_OUTOF10: 0
PAINLEVEL_OUTOF10: 4
PAINLEVEL_OUTOF10: 0

## 2018-10-20 ASSESSMENT — ENCOUNTER SYMPTOMS
VOMITING: 0
WEAKNESS: 1
DIARRHEA: 1
FEVER: 0
PALPITATIONS: 0
NECK PAIN: 0
EYES NEGATIVE: 1
HEARTBURN: 0
POLYDIPSIA: 0
CARDIOVASCULAR NEGATIVE: 1
FOCAL WEAKNESS: 0
BRUISES/BLEEDS EASILY: 0
DEPRESSION: 0
COUGH: 0
CHILLS: 0
RESPIRATORY NEGATIVE: 1
BACK PAIN: 0
NAUSEA: 1
NERVOUS/ANXIOUS: 1
HEADACHES: 0
DIZZINESS: 0
MUSCULOSKELETAL NEGATIVE: 1
ABDOMINAL PAIN: 1

## 2018-10-20 ASSESSMENT — PATIENT HEALTH QUESTIONNAIRE - PHQ9
2. FEELING DOWN, DEPRESSED, IRRITABLE, OR HOPELESS: NOT AT ALL
1. LITTLE INTEREST OR PLEASURE IN DOING THINGS: NOT AT ALL
SUM OF ALL RESPONSES TO PHQ9 QUESTIONS 1 AND 2: 0

## 2018-10-20 NOTE — PROGRESS NOTES
[facility] Hospitalist Progress Note    Date of Service: 10/20/2018    Chief Complaint  63 y.o. female admitted 10/19/2018 with chief complaint of nausea vomiting abdominal pain, found with severely elevated potassium level and missing several episodes of hemodialysis, the patient admitted to the ICU with urgent hemodialysis need  Patient reports abdominal discomfort somewhat diffuse, mid epigastrium also the right upper quadrant with palpation.  She does have significant diarrhea  Interval Problem Update  Patient seen and examined today. ICU Care  Care and plan discussed in IDT/Hot rounds.  Lines and assistive devices reviewed.    Patient tolerating treatment and therapies.  All Data, Medication data reviewed.  Case discussed with nursing as available.  Plan of Care reviewed with patient and notified of changes.  10/20 the patient feels better, seen by nephrology, additional hemodialysis planned, transfusion planned, does not report recent antibiotic use, diarrhea is somewhat soft and recently chronic, does endorse abdominal pain somewhat diffuse  Consultants/Specialty  Nephrology    Disposition  TBD        Review of Systems   Constitutional: Positive for malaise/fatigue. Negative for chills and fever.   HENT: Negative.    Eyes: Negative.    Respiratory: Negative.  Negative for cough.    Cardiovascular: Negative.  Negative for chest pain and palpitations.   Gastrointestinal: Positive for abdominal pain, diarrhea and nausea. Negative for heartburn and vomiting.   Genitourinary: Negative.  Negative for dysuria and frequency.   Musculoskeletal: Negative.  Negative for back pain and neck pain.   Skin: Negative.  Negative for itching and rash.   Neurological: Positive for weakness. Negative for dizziness, focal weakness and headaches.   Endo/Heme/Allergies: Negative.  Negative for polydipsia. Does not bruise/bleed easily.   Psychiatric/Behavioral: Negative for depression. The patient is nervous/anxious.       Physical Exam   Laboratory/Imaging   Hemodynamics  Temp (24hrs), Av.2 °C (97.2 °F), Min:35.5 °C (95.9 °F), Max:37.3 °C (99.2 °F)   Temperature: 36.3 °C (97.4 °F)  Pulse  Av.4  Min: 62  Max: 97 Heart Rate (Monitored): 66  Blood Pressure: (!) 88/40, NIBP: (!) 79/52      Respiratory      Respiration: (!) 7, Pulse Oximetry: 100 %, O2 Daily Delivery Respiratory : Silicone Nasal Cannula     Given By:: Mouthpiece, Work Of Breathing / Effort: Mild  RUL Breath Sounds: Clear, RML Breath Sounds: Clear, RLL Breath Sounds: Clear;Diminished, BLANK Breath Sounds: Clear, LLL Breath Sounds: Clear;Diminished    Fluids    Intake/Output Summary (Last 24 hours) at 10/20/18 0810  Last data filed at 10/20/18 0600   Gross per 24 hour   Intake             1030 ml   Output             2500 ml   Net            -1470 ml       Nutrition  Orders Placed This Encounter   Procedures   • Diet Order Renal     Standing Status:   Standing     Number of Occurrences:   1     Order Specific Question:   Diet:     Answer:   Renal [8]     Physical Exam   Constitutional: She is oriented to person, place, and time. She appears well-developed and well-nourished.   HENT:   Head: Normocephalic and atraumatic.   Nose: Nose normal.   Mouth/Throat: Oropharynx is clear and moist.   Eyes: Pupils are equal, round, and reactive to light. Conjunctivae and EOM are normal.   Neck: Normal range of motion. Neck supple. No JVD present. No thyromegaly present.   Cardiovascular: Normal rate, regular rhythm and normal heart sounds.  Exam reveals no gallop and no friction rub.    Pulses:       Dorsalis pedis pulses are 2+ on the right side, and 2+ on the left side.   Capillary refill <3 secs   Pulmonary/Chest: Effort normal and breath sounds normal. She has no wheezes. She has no rales.   Abdominal: Soft. Bowel sounds are normal. She exhibits no distension and no mass. There is tenderness. There is no rebound and no guarding.   Musculoskeletal: Normal range of motion. She exhibits no  edema or tenderness.   Lymphadenopathy:     She has no cervical adenopathy.   Neurological: She is alert and oriented to person, place, and time. No cranial nerve deficit.   Skin: Skin is warm and dry. She is not diaphoretic. No cyanosis.   Psychiatric: She has a normal mood and affect. Her behavior is normal.   Nursing note and vitals reviewed.      Recent Labs      10/19/18   0934  10/20/18   0358   WBC  6.0  4.9   RBC  2.11*  2.19*   HEMOGLOBIN  7.3*  7.3*   HEMATOCRIT  22.6*  22.7*   MCV  107.1*  103.7*   MCH  34.6*  33.3*   MCHC  32.3*  32.2*   RDW  76.3*  72.1*   PLATELETCT  173  165   MPV  14.0*  13.4*     Recent Labs      10/19/18   0934  10/19/18   2035  10/20/18   0358   SODIUM  134*  138  136   POTASSIUM  9.1*  9.0*  4.2  4.6   CHLORIDE  97  100  98   CO2  22  24  26   GLUCOSE  211*  287*  121*   BUN  119*  36*  44*   CREATININE  12.09*  5.37*  6.63*   CALCIUM  7.2*  7.7*  8.4*                      Assessment/Plan     Hyperkalemia- (present on admission)   Assessment & Plan    K 8  Emergent HD  Hold losartan  Calcium gluconate, insulin, albuterol given  Monitor bmp closely  Hyperkalemic protocol  Monitor on tele closely  nephro consulted  HD per nephro        Anemia of chronic disease- (present on admission)   Assessment & Plan    Related to end-stage renal disease  Follow CBC  Transfuse as needed        Essential hypertension- (present on admission)   Assessment & Plan    Continue amlodipine and carvedilol  Holding losartan due to hyperkalemia  Adjust medication as needed        ESRD (end stage renal disease) (HCC)- (present on admission)   Assessment & Plan    On HD  nephro on  HD today        Elevated lipase- (present on admission)   Assessment & Plan    Normal liver enzyme  Check ultrasound liver        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 (HCC)- (present on admission)   Assessment & Plan    On sliding scale insulin           Quality-Core Measures   Reviewed items::  Radiology images reviewed, EKG reviewed, Labs reviewed and Medications reviewed  Bauer catheter::  No Bauer  DVT prophylaxis pharmacological::  Heparin  Assessed for rehabilitation services:  Patient was assess for and/or received rehabilitation services during this hospitalization      Plan  Hemodialysis as per nephrology  Transfuse 1 unit of red cells  Stool for white cells  HIDA scan  Consider CT abdomen if needed  Follow-up labs closely  Medically stabilized for transfer out of ICU  Overall medically complex

## 2018-10-20 NOTE — RESPIRATORY CARE
COPD EDUCATION by COPD CLINICAL EDUCATOR  10/20/2018 at 12:35 PM by Ada Tamez     Patient reviewed by COPD education team. Patient does not qualify for COPD program.

## 2018-10-20 NOTE — CARE PLAN
Problem: Infection  Goal: Will remain free from infection  Outcome: PROGRESSING AS EXPECTED  No signs and symptoms of infection at this time. No longer having episodes of diarrhea.     Problem: Venous Thromboembolism (VTW)/Deep Vein Thrombosis (DVT) Prevention:  Goal: Patient will participate in Venous Thrombosis (VTE)/Deep Vein Thrombosis (DVT)Prevention Measures  Outcome: PROGRESSING SLOWER THAN EXPECTED  Patient refuses SCD's. Education provided. Patient is mobilizing and performs frequent self adjustments in bed

## 2018-10-20 NOTE — PROGRESS NOTES
Blood transfusion started at 1452. RN at bedside, no transfusion reaction suspected in initial 15 minutes. VSS. Dialysis RN at bedside.

## 2018-10-20 NOTE — PROGRESS NOTES
[facility] -  PROGRESS NOTE               Author: Rakesh Sharp M.D. Date & Time: 10/20/2018  8:56 AM     HPI:  63-year-old female with a medical history significant for end-stage renal disease (on hemodialysis Monday Wednesday and Fridays via left arm AV fistula under the care of [facility] nephrology), hypertension, diabetes mellitus type 2, atrial fibrillation, peripheral vascular disease, COPD, anemia secondary to myelodysplastic syndrome, history of CVA, who was admitted with complaints of weakness and diarrhea, found to have a potassium of 9.     She notes missing dialysis on Wednesday because she felt too weak to go.  Has had ongoing diarrhea for the last week.  No chest pain or shortness of breath.  No abdominal pain.  No fevers chills or sweats.     In the emergency room was found to have a potassium of 9 with EKG changes.  Was given temporizing measures and was transferred to the ICU for further observation.      DAILY NEPHROLOGY SUMMARY:  10/19: consult done, received dialysis  10/20: no major events, feeling significantly improved    PAST FAMILY HISTORY: Reviewed and Unchanged  SOCIAL HISTORY: Reviewed and Unchanged  CURRENT MEDICATIONS: Reviewed  IMAGING STUDIES: Reviewed    ROS  General:  No weakness, malaise  HEENT:  No ocular pain; no nasal discharge  CV:  No chest pain, no palpitations  Lungs:  No cough; no PND  GI:  No N/V/D; No constipation  : no dysuria or gross hematuria  MSK:  No joint pain; No trauma  Skin: No rashes; No lesions  Neuro: No paresthesia; No LOC  Psych: No depression; No anxiety    PHYSICAL EXAM  GENERAL:  WDWN, NAD  HEENT:  NC/AT, no scleral icterus; conjunctiva normal  NECK:  Supple; Non tender  CV:  RRR, no m/r/g  LUNGS:  CTAB, no W/R  ABDOMEN:  Soft, non-tender, +BS  EXTREMETIES:  Trace Edema, No clubbing, cyanosis,   SKIN:  Warm and dry, no rashes  NEURO:  A&O, no focal deficits  PSYCH:  Cooperative, appropriate mood and  affect    Fluids:  In: 1030 [P.O.:530; Dialysis:500]  Out: 2500     LABS:  Recent Results (from the past 24 hour(s))   CBC WITH DIFFERENTIAL    Collection Time: 10/19/18  9:34 AM   Result Value Ref Range    WBC 6.0 4.8 - 10.8 K/uL    RBC 2.11 (L) 4.20 - 5.40 M/uL    Hemoglobin 7.3 (L) 12.0 - 16.0 g/dL    Hematocrit 22.6 (L) 37.0 - 47.0 %    .1 (H) 81.4 - 97.8 fL    MCH 34.6 (H) 27.0 - 33.0 pg    MCHC 32.3 (L) 33.6 - 35.0 g/dL    RDW 76.3 (H) 35.9 - 50.0 fL    Platelet Count 173 164 - 446 K/uL    MPV 14.0 (H) 9.0 - 12.9 fL    Nucleated RBC 0.00 /100 WBC    NRBC (Absolute) 0.00 K/uL    Neutrophils-Polys 80.00 (H) 44.00 - 72.00 %    Lymphocytes 14.70 (L) 22.00 - 41.00 %    Monocytes 1.00 0.00 - 13.40 %    Eosinophils 0.00 0.00 - 6.90 %    Basophils 3.20 (H) 0.00 - 1.80 %    Neutrophils (Absolute) 4.87 2.00 - 7.15 K/uL    Lymphs (Absolute) 0.88 (L) 1.00 - 4.80 K/uL    Monos (Absolute) 0.06 0.00 - 0.85 K/uL    Eos (Absolute) 0.00 0.00 - 0.51 K/uL    Baso (Absolute) 0.19 (H) 0.00 - 0.12 K/uL    Anisocytosis 1+     Macrocytosis 1+     Microcytosis 1+    CMP    Collection Time: 10/19/18  9:34 AM   Result Value Ref Range    Sodium 134 (L) 135 - 145 mmol/L    Potassium 9.0 (HH) 3.6 - 5.5 mmol/L    Chloride 97 96 - 112 mmol/L    Co2 22 20 - 33 mmol/L    Anion Gap 15.0 (H) 0.0 - 11.9    Glucose 211 (H) 65 - 99 mg/dL    Bun 119 (HH) 8 - 22 mg/dL    Creatinine 12.09 (HH) 0.50 - 1.40 mg/dL    Calcium 7.2 (L) 8.5 - 10.5 mg/dL    AST(SGOT) 25 12 - 45 U/L    ALT(SGPT) 19 2 - 50 U/L    Alkaline Phosphatase 51 30 - 99 U/L    Total Bilirubin 0.4 0.1 - 1.5 mg/dL    Albumin 4.2 3.2 - 4.9 g/dL    Total Protein 7.7 6.0 - 8.2 g/dL    Globulin 3.5 1.9 - 3.5 g/dL    A-G Ratio 1.2 g/dL   LIPASE    Collection Time: 10/19/18  9:34 AM   Result Value Ref Range    Lipase 215 (H) 11 - 82 U/L   ESTIMATED GFR    Collection Time: 10/19/18  9:34 AM   Result Value Ref Range    GFR If  4 (A) >60 mL/min/1.73 m 2    GFR If Non African  American 3 (A) >60 mL/min/1.73 m 2   DIFFERENTIAL MANUAL    Collection Time: 10/19/18  9:34 AM   Result Value Ref Range    Bands-Stabs 1.10 0.00 - 10.00 %    Manual Diff Status PERFORMED    PERIPHERAL SMEAR REVIEW    Collection Time: 10/19/18  9:34 AM   Result Value Ref Range    Peripheral Smear Review see below    PLATELET ESTIMATE    Collection Time: 10/19/18  9:34 AM   Result Value Ref Range    Plt Estimation Normal    MORPHOLOGY    Collection Time: 10/19/18  9:34 AM   Result Value Ref Range    RBC Morphology Present     Giant Platelets 3+     Polychromia 1+     Poikilocytosis 1+     Schistocytes 1+     Tear Drop Cells 1+    Potassium Serum (K)    Collection Time: 10/19/18  9:34 AM   Result Value Ref Range    Potassium 9.1 (HH) 3.6 - 5.5 mmol/L   EKG    Collection Time: 10/19/18 10:13 AM   Result Value Ref Range    Report       [facility]    Test Date:  2018-10-19  Pt Name:    JESUS JORDANA              Department: ER  MRN:        [id number]                      Room:       [facility]  Gender:     Female                       Technician: [id number]  :        1955                   Requested By:JOSE LEVINE  Order #:    [id number]                    Reading MD:    Measurements  Intervals                                Axis  Rate:       139                          P:  AL:                                      QRS:        210  QRSD:       222                          T:          -88  QT:         408  QTc:        621    Interpretive Statements  ATRIAL FLUTTER, A-RATE 227  VENTRICULAR BIGEMINY  NONSPECIFIC INTRAVENTRICULAR CONDUCTION DELAY  Compared to ECG 2018 12:29:38  Ventricular premature complex(es) now present  Intraventricular conduction delay now present  Sinus rhythm no longer present     EKG    Collection Time: 10/19/18 10:52 AM   Result Value Ref Range    Report       [facility]    Test Date:  2018-10-19  Pt Name:    JESUS  [facility]              Department: ER  MRN:        [id number]                      Room:       [facility]  Gender:     Female                       Technician: ALEX  :        1955                   Requested By:JOSE LEVINE  Order #:    [id number]                    Reading MD:    Measurements  Intervals                                Axis  Rate:       171                          P:          0  AL:         192                          QRS:        -61  QRSD:       114                          T:          64  QT:         412  QTc:        695    Interpretive Statements  SUPRAVENTRICULAR TACHYCARDIA  VENTRICULAR BIGEMINY  LEFT ANTERIOR FASCICULAR BLOCK  ABNRM R PROG, CONSIDER ASMI OR LEAD PLACEMENT  Compared to ECG 10/19/2018 10:13:41  Left anterior fascicular block now present  Myocardial infarct finding now present  Atrial flutter no longer present  Intraventricular conduction delay no longer  present     BASIC METABOLIC PANEL (In 4 hours. Enter time)    Collection Time: 10/19/18  8:35 PM   Result Value Ref Range    Sodium 138 135 - 145 mmol/L    Potassium 4.2 3.6 - 5.5 mmol/L    Chloride 100 96 - 112 mmol/L    Co2 24 20 - 33 mmol/L    Glucose 287 (H) 65 - 99 mg/dL    Bun 36 (H) 8 - 22 mg/dL    Creatinine 5.37 (HH) 0.50 - 1.40 mg/dL    Calcium 7.7 (L) 8.5 - 10.5 mg/dL    Anion Gap 14.0 (H) 0.0 - 11.9   ESTIMATED GFR    Collection Time: 10/19/18  8:35 PM   Result Value Ref Range    GFR If African American 10 (A) >60 mL/min/1.73 m 2    GFR If Non  8 (A) >60 mL/min/1.73 m 2   CBC without Differential    Collection Time: 10/20/18  3:58 AM   Result Value Ref Range    WBC 4.9 4.8 - 10.8 K/uL    RBC 2.19 (L) 4.20 - 5.40 M/uL    Hemoglobin 7.3 (L) 12.0 - 16.0 g/dL    Hematocrit 22.7 (L) 37.0 - 47.0 %    .7 (H) 81.4 - 97.8 fL    MCH 33.3 (H) 27.0 - 33.0 pg    MCHC 32.2 (L) 33.6 - 35.0 g/dL    RDW 72.1 (H) 35.9 - 50.0 fL    Platelet Count 165 164 - 446 K/uL    MPV 13.4 (H) 9.0 - 12.9 fL   Comp  Metabolic Panel (CMP)    Collection Time: 10/20/18  3:58 AM   Result Value Ref Range    Sodium 136 135 - 145 mmol/L    Potassium 4.6 3.6 - 5.5 mmol/L    Chloride 98 96 - 112 mmol/L    Co2 26 20 - 33 mmol/L    Anion Gap 12.0 (H) 0.0 - 11.9    Glucose 121 (H) 65 - 99 mg/dL    Bun 44 (H) 8 - 22 mg/dL    Creatinine 6.63 (HH) 0.50 - 1.40 mg/dL    Calcium 8.4 (L) 8.5 - 10.5 mg/dL    AST(SGOT) 19 12 - 45 U/L    ALT(SGPT) 20 2 - 50 U/L    Alkaline Phosphatase 51 30 - 99 U/L    Total Bilirubin 0.6 0.1 - 1.5 mg/dL    Albumin 3.8 3.2 - 4.9 g/dL    Total Protein 7.0 6.0 - 8.2 g/dL    Globulin 3.2 1.9 - 3.5 g/dL    A-G Ratio 1.2 g/dL   ESTIMATED GFR    Collection Time: 10/20/18  3:58 AM   Result Value Ref Range    GFR If  8 (A) >60 mL/min/1.73 m 2    GFR If Non African American 6 (A) >60 mL/min/1.73 m 2       (click the triangle to expand results)    IMPRESSION:  # ESRD: MWF schedlule via LUE AVF  # Hyperkalemia with EKG changes. 2/2 missed dialysis sessions  # HTN  # Anemia: Secondary to myelodysplastic syndrome and component from CKD  # Renal osteodystrophy  # Diarrhea  # Atrial fibrillation  # Acidosis  # Myelodysplastic syndrome     PLAN:  -IHD again today given blood transfusion and staffing limitations tomorrow  -PRBC transfusion given recent decline and MDS  -Renal diet, limit IV fluids  -Strict I/Os  -continue home BP meds  -Dose all meds per ESRD     Thank you for this consult, we will continue to follow.     Rakesh Sharp MD

## 2018-10-20 NOTE — PROGRESS NOTES
12 hour chart check    SR: 60-80's  0.16 / 0.10 / 0.44  SBP: 's     Hospitalist paged regarding episodes of hypertension. PRN hydralazine ordered and given. Episode of hypotension post hydralazine dose. Held morning norvasc. Patients home dose of losartan 100 mg ordered and scheduled for PM.     2 RN skin check completed

## 2018-10-21 ENCOUNTER — APPOINTMENT (OUTPATIENT)
Dept: RADIOLOGY | Facility: MEDICAL CENTER | Age: 63
DRG: 640 | End: 2018-10-21
Attending: HOSPITALIST
Payer: MEDICARE

## 2018-10-21 LAB
ALBUMIN SERPL BCP-MCNC: 3.8 G/DL (ref 3.2–4.9)
ALBUMIN/GLOB SERPL: 1.3 G/DL
ALP SERPL-CCNC: 54 U/L (ref 30–99)
ALT SERPL-CCNC: 20 U/L (ref 2–50)
ANION GAP SERPL CALC-SCNC: 13 MMOL/L (ref 0–11.9)
AST SERPL-CCNC: 17 U/L (ref 12–45)
BASOPHILS # BLD AUTO: 0.5 % (ref 0–1.8)
BASOPHILS # BLD: 0.02 K/UL (ref 0–0.12)
BILIRUB SERPL-MCNC: 0.9 MG/DL (ref 0.1–1.5)
BUN SERPL-MCNC: 22 MG/DL (ref 8–22)
CALCIUM SERPL-MCNC: 8.4 MG/DL (ref 8.5–10.5)
CHLORIDE SERPL-SCNC: 96 MMOL/L (ref 96–112)
CO2 SERPL-SCNC: 27 MMOL/L (ref 20–33)
CREAT SERPL-MCNC: 3.81 MG/DL (ref 0.5–1.4)
EOSINOPHIL # BLD AUTO: 0.2 K/UL (ref 0–0.51)
EOSINOPHIL NFR BLD: 5.1 % (ref 0–6.9)
ERYTHROCYTE [DISTWIDTH] IN BLOOD BY AUTOMATED COUNT: 71.7 FL (ref 35.9–50)
GLOBULIN SER CALC-MCNC: 2.9 G/DL (ref 1.9–3.5)
GLUCOSE SERPL-MCNC: 130 MG/DL (ref 65–99)
HBV SURFACE AG SER QL: NEGATIVE
HCT VFR BLD AUTO: 26.3 % (ref 37–47)
HGB BLD-MCNC: 9.4 G/DL (ref 12–16)
IMM GRANULOCYTES # BLD AUTO: 0 K/UL (ref 0–0.11)
IMM GRANULOCYTES NFR BLD AUTO: 0 % (ref 0–0.9)
LIPASE SERPL-CCNC: 72 U/L (ref 11–82)
LYMPHOCYTES # BLD AUTO: 1.11 K/UL (ref 1–4.8)
LYMPHOCYTES NFR BLD: 28.5 % (ref 22–41)
MAGNESIUM SERPL-MCNC: 2 MG/DL (ref 1.5–2.5)
MCH RBC QN AUTO: 34.2 PG (ref 27–33)
MCHC RBC AUTO-ENTMCNC: 35.2 G/DL (ref 33.6–35)
MCV RBC AUTO: 97.1 FL (ref 81.4–97.8)
MONOCYTES # BLD AUTO: 0.56 K/UL (ref 0–0.85)
MONOCYTES NFR BLD AUTO: 14.4 % (ref 0–13.4)
NEUTROPHILS # BLD AUTO: 2.01 K/UL (ref 2–7.15)
NEUTROPHILS NFR BLD: 51.5 % (ref 44–72)
NRBC # BLD AUTO: 0 K/UL
NRBC BLD-RTO: 0 /100 WBC
PHOSPHATE SERPL-MCNC: 5 MG/DL (ref 2.5–4.5)
PLATELET # BLD AUTO: 144 K/UL (ref 164–446)
PMV BLD AUTO: 13.5 FL (ref 9–12.9)
POTASSIUM SERPL-SCNC: 4.3 MMOL/L (ref 3.6–5.5)
PROT SERPL-MCNC: 6.7 G/DL (ref 6–8.2)
RBC # BLD AUTO: 2.72 M/UL (ref 4.2–5.4)
SODIUM SERPL-SCNC: 136 MMOL/L (ref 135–145)
WBC # BLD AUTO: 3.9 K/UL (ref 4.8–10.8)
WBC STL QL MICRO: NORMAL

## 2018-10-21 PROCEDURE — 80053 COMPREHEN METABOLIC PANEL: CPT

## 2018-10-21 PROCEDURE — 87340 HEPATITIS B SURFACE AG IA: CPT

## 2018-10-21 PROCEDURE — 83735 ASSAY OF MAGNESIUM: CPT

## 2018-10-21 PROCEDURE — 83690 ASSAY OF LIPASE: CPT

## 2018-10-21 PROCEDURE — 85025 COMPLETE CBC W/AUTO DIFF WBC: CPT

## 2018-10-21 PROCEDURE — A9270 NON-COVERED ITEM OR SERVICE: HCPCS | Performed by: INTERNAL MEDICINE

## 2018-10-21 PROCEDURE — A9537 TC99M MEBROFENIN: HCPCS

## 2018-10-21 PROCEDURE — 99232 SBSQ HOSP IP/OBS MODERATE 35: CPT | Performed by: HOSPITALIST

## 2018-10-21 PROCEDURE — 84100 ASSAY OF PHOSPHORUS: CPT

## 2018-10-21 PROCEDURE — 89055 LEUKOCYTE ASSESSMENT FECAL: CPT

## 2018-10-21 PROCEDURE — 770006 HCHG ROOM/CARE - MED/SURG/GYN SEMI*

## 2018-10-21 PROCEDURE — 700111 HCHG RX REV CODE 636 W/ 250 OVERRIDE (IP): Performed by: INTERNAL MEDICINE

## 2018-10-21 PROCEDURE — 700101 HCHG RX REV CODE 250: Performed by: INTERNAL MEDICINE

## 2018-10-21 PROCEDURE — 700102 HCHG RX REV CODE 250 W/ 637 OVERRIDE(OP): Performed by: INTERNAL MEDICINE

## 2018-10-21 RX ADMIN — CARVEDILOL 12.5 MG: 12.5 TABLET, FILM COATED ORAL at 08:13

## 2018-10-21 RX ADMIN — HEPARIN SODIUM 5000 UNITS: 5000 INJECTION, SOLUTION INTRAVENOUS; SUBCUTANEOUS at 21:21

## 2018-10-21 RX ADMIN — BRIMONIDINE TARTRATE 1 DROP: 2 SOLUTION OPHTHALMIC at 05:30

## 2018-10-21 RX ADMIN — HEPARIN SODIUM 5000 UNITS: 5000 INJECTION, SOLUTION INTRAVENOUS; SUBCUTANEOUS at 15:08

## 2018-10-21 RX ADMIN — LOSARTAN POTASSIUM 100 MG: 25 TABLET, FILM COATED ORAL at 21:20

## 2018-10-21 RX ADMIN — LATANOPROST 1 DROP: 50 SOLUTION OPHTHALMIC at 21:20

## 2018-10-21 RX ADMIN — TIMOLOL MALEATE 1 DROP: 5 SOLUTION/ DROPS OPHTHALMIC at 05:30

## 2018-10-21 RX ADMIN — HEPARIN SODIUM 5000 UNITS: 5000 INJECTION, SOLUTION INTRAVENOUS; SUBCUTANEOUS at 05:29

## 2018-10-21 RX ADMIN — PREGABALIN 50 MG: 25 CAPSULE ORAL at 05:29

## 2018-10-21 RX ADMIN — CARVEDILOL 12.5 MG: 12.5 TABLET, FILM COATED ORAL at 18:01

## 2018-10-21 RX ADMIN — BRIMONIDINE TARTRATE 1 DROP: 2 SOLUTION OPHTHALMIC at 21:20

## 2018-10-21 RX ADMIN — TIMOLOL MALEATE 1 DROP: 5 SOLUTION/ DROPS OPHTHALMIC at 21:20

## 2018-10-21 RX ADMIN — AMLODIPINE BESYLATE 10 MG: 10 TABLET ORAL at 05:28

## 2018-10-21 ASSESSMENT — ENCOUNTER SYMPTOMS
HEARTBURN: 0
NECK PAIN: 0
BRUISES/BLEEDS EASILY: 0
NAUSEA: 1
FOCAL WEAKNESS: 0
DIARRHEA: 1
BACK PAIN: 0
RESPIRATORY NEGATIVE: 1
NERVOUS/ANXIOUS: 1
DEPRESSION: 0
MUSCULOSKELETAL NEGATIVE: 1
CHILLS: 0
PALPITATIONS: 0
VOMITING: 0
DIZZINESS: 0
POLYDIPSIA: 0
EYES NEGATIVE: 1
HEADACHES: 0
COUGH: 0
ABDOMINAL PAIN: 1
CARDIOVASCULAR NEGATIVE: 1
WEAKNESS: 1
FEVER: 0

## 2018-10-21 ASSESSMENT — PAIN SCALES - GENERAL
PAINLEVEL_OUTOF10: 0

## 2018-10-21 NOTE — PROGRESS NOTES
3.5 hour HD completed as ordered by Dr Sharp.  Net UF 2000 ml.  VSS throughout treatment, systolic hypertension noted.  See dialysis flow sheet for details. 1 unit red blood cells transfused during dialysis treatment.  No adverse reactions noted.  Needles removed, sites held x 10 minutes.  No bleeding noted.  Dressing clean, dry, intact.  Report given to primary rn.

## 2018-10-21 NOTE — CARE PLAN
Problem: Safety  Goal: Will remain free from injury  Outcome: PROGRESSING AS EXPECTED  Bed alarm on, call light and belongings within reach, pt educated on need to call for assistance with mobilizing    Problem: Skin Integrity  Goal: Risk for impaired skin integrity will decrease  Outcome: PROGRESSING AS EXPECTED  2 RN skin check completed. Skin intact, no signs of breakdown at this time. Pt makes frequent adjustments

## 2018-10-21 NOTE — PROGRESS NOTES
Bedside report received. Pt resting comfortably; denies needs at this time. Pt NPO for HIDA scan scheduled for 0830

## 2018-10-21 NOTE — CARE PLAN
Problem: Bowel/Gastric:  Goal: Normal bowel function is maintained or improved  Outcome: PROGRESSING AS EXPECTED  LBM 10/20    Problem: Knowledge Deficit  Goal: Knowledge of disease process/condition, treatment plan, diagnostic tests, and medications will improve  Outcome: PROGRESSING AS EXPECTED  Pt and Family oriented to unit routine. Pt and family educated regarding activity, diet, meds and plan of care; questions answered; verbalized understanding. Pt and Family denies having further needs at this time.

## 2018-10-21 NOTE — PROGRESS NOTES
[facility] Hospitalist Progress Note    Date of Service: 10/21/2018    Chief Complaint  63 y.o. female admitted 10/19/2018 with chief complaint of nausea vomiting abdominal pain, found with severely elevated potassium level and missing several episodes of hemodialysis, the patient admitted to the ICU with urgent hemodialysis need  Patient reports abdominal discomfort somewhat diffuse, mid epigastrium also the right upper quadrant with palpation.  She does have significant diarrhea  Interval Problem Update  Patient seen and examined today. ICU Care  Care and plan discussed in IDT/Hot rounds.  Lines and assistive devices reviewed.    Patient tolerating treatment and therapies.  All Data, Medication data reviewed.  Case discussed with nursing as available.  Plan of Care reviewed with patient and notified of changes.  10/20 the patient feels better, seen by nephrology, additional hemodialysis planned, transfusion planned, does not report recent antibiotic use, diarrhea is somewhat soft and recently chronic, does endorse abdominal pain somewhat diffuse  10/21 the patient is improved, HIDA scan is negative for gallbladder dysfunction, her abdominal discomfort is much better, hemoglobin is improved, tolerated hemodialysis  Consultants/Specialty  Nephrology    Disposition  TBD, likely home Monday        Review of Systems   Constitutional: Positive for malaise/fatigue. Negative for chills and fever.   HENT: Negative.    Eyes: Negative.    Respiratory: Negative.  Negative for cough.    Cardiovascular: Negative.  Negative for chest pain and palpitations.   Gastrointestinal: Positive for abdominal pain, diarrhea and nausea. Negative for heartburn and vomiting.   Genitourinary: Negative.  Negative for dysuria and frequency.   Musculoskeletal: Negative.  Negative for back pain and neck pain.   Skin: Negative.  Negative for itching and rash.   Neurological: Positive for weakness. Negative for dizziness, focal weakness and headaches.    Endo/Heme/Allergies: Negative.  Negative for polydipsia. Does not bruise/bleed easily.   Psychiatric/Behavioral: Negative for depression. The patient is nervous/anxious.       Physical Exam  Laboratory/Imaging   Hemodynamics  Temp (24hrs), Av.7 °C (98.1 °F), Min:36.4 °C (97.6 °F), Max:37.3 °C (99.1 °F)   Temperature: 36.8 °C (98.2 °F)  Pulse  Av.3  Min: 62  Max: 97 Heart Rate (Monitored): 67  Blood Pressure: 146/52, NIBP: 110/40      Respiratory      Respiration: 12, Pulse Oximetry: 100 %        RUL Breath Sounds: (P) Clear, RML Breath Sounds: (P) Clear, RLL Breath Sounds: (P) Clear;Diminished, BLANK Breath Sounds: (P) Clear, LLL Breath Sounds: (P) Clear;Diminished    Fluids    Intake/Output Summary (Last 24 hours) at 10/21/18 0755  Last data filed at 10/21/18 0600   Gross per 24 hour   Intake             2020 ml   Output             2500 ml   Net             -480 ml       Nutrition  Orders Placed This Encounter   Procedures   • Diet Order Renal     Standing Status:   Standing     Number of Occurrences:   1     Order Specific Question:   Diet:     Answer:   Renal [8]     Physical Exam   Constitutional: She is oriented to person, place, and time. She appears well-developed and well-nourished.   HENT:   Head: Normocephalic and atraumatic.   Nose: Nose normal.   Mouth/Throat: Oropharynx is clear and moist.   Eyes: Pupils are equal, round, and reactive to light. Conjunctivae and EOM are normal.   Neck: Normal range of motion. Neck supple. No JVD present. No thyromegaly present.   Cardiovascular: Normal rate, regular rhythm and normal heart sounds.  Exam reveals no gallop and no friction rub.    Pulses:       Dorsalis pedis pulses are 2+ on the right side, and 2+ on the left side.   Capillary refill <3 secs   Pulmonary/Chest: Effort normal and breath sounds normal. She has no wheezes. She has no rales.   Abdominal: Soft. Bowel sounds are normal. She exhibits no distension and no mass. There is tenderness. There  is no rebound and no guarding.   Musculoskeletal: Normal range of motion. She exhibits no edema or tenderness.   Lymphadenopathy:     She has no cervical adenopathy.   Neurological: She is alert and oriented to person, place, and time. No cranial nerve deficit.   Skin: Skin is warm and dry. She is not diaphoretic. No cyanosis.   Psychiatric: She has a normal mood and affect. Her behavior is normal.   Nursing note and vitals reviewed.      Recent Labs      10/19/18   0934  10/20/18   0358  10/21/18   0220   WBC  6.0  4.9  3.9*   RBC  2.11*  2.19*  2.72*   HEMOGLOBIN  7.3*  7.3*  9.4*   HEMATOCRIT  22.6*  22.7*  26.3*   MCV  107.1*  103.7*  97.1   MCH  34.6*  33.3*  34.2*   MCHC  32.3*  32.2*  35.2*   RDW  76.3*  72.1*  71.7*   PLATELETCT  173  165  144*   MPV  14.0*  13.4*  13.5*     Recent Labs      10/19/18   2035  10/20/18   0358  10/21/18   0220   SODIUM  138  136  136   POTASSIUM  4.2  4.6  4.3   CHLORIDE  100  98  96   CO2  24  26  27   GLUCOSE  287*  121*  130*   BUN  36*  44*  22   CREATININE  5.37*  6.63*  3.81*   CALCIUM  7.7*  8.4*  8.4*                      Assessment/Plan     Hyperkalemia- (present on admission)   Assessment & Plan    K 8  Emergent HD  Hold losartan  Calcium gluconate, insulin, albuterol given  Monitor bmp closely  Hyperkalemic protocol  Monitor on tele closely  Nephrology is consulting        Anemia of chronic disease- (present on admission)   Assessment & Plan    Related to end-stage renal disease  Follow CBC  Transfuse as needed        Abdominal pain- (present on admission)   Assessment & Plan    Somewhat diffuse  Abnormal gallbladder ultrasound  Negative HIDA scan        Essential hypertension- (present on admission)   Assessment & Plan    Continue amlodipine and carvedilol  Holding losartan due to hyperkalemia  Adjust medication as needed        ESRD (end stage renal disease) (HCC)- (present on admission)   Assessment & Plan    On HD  nephro on  HD today        Elevated lipase-  (present on admission)   Assessment & Plan    Abdominal discomfort  Abnormal liver ultrasound        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 (HCC)- (present on admission)   Assessment & Plan    On sliding scale insulin          Quality-Core Measures   Reviewed items::  Radiology images reviewed, EKG reviewed, Labs reviewed and Medications reviewed  Bauer catheter::  No Bauer  DVT prophylaxis pharmacological::  Heparin  Assessed for rehabilitation services:  Patient was assess for and/or received rehabilitation services during this hospitalization      Plan  Hemodialysis as per nephrology  Transfuse 1 unit of red cells, hemoglobin improved  Stool for white cells, is pending  HIDA scan is negative  Consider CT abdomen if needed and patient continues to be symptomatic  Follow-up labs closely  Medically stabilized for transfer out of ICU  Overall medically complex

## 2018-10-21 NOTE — PROGRESS NOTES
Report given to ARIEL Patton. Pt to transfer to S199-2 with ARIEL Choudhury. Pt has all belongings. Pt denies family notification of room change.

## 2018-10-21 NOTE — PROGRESS NOTES
Pt arrived approx 1200. Assisted to bed. Aox4. BARBOSA. Denies pain. Denies n/t. Voiding. BM, stool sample sent. Kitchen called to bring patients lunch. Able to make needs known.

## 2018-10-21 NOTE — DISCHARGE PLANNING
Anticipated Discharge Disposition: d/c home w/ dialysis vs TBD    Action: LSw received IPCSS requesting community resources per Dr Caballero.    LSW observed per EPIC note pt did not go to dialysis so needed to be admitted to ER.    LSW is familiar w/ pt and she lives in [place] w/ dialysis in [place] on [address]. She lives alone and uses a electric scooter to mobilize.    LSw is aware pt uses [employer] which works M, W, and F to get to [place] from [place] for dialysis. Also, pt has dilaysis on M, W, and F.     Last time pt spoke to this LSw she indicated what was a concern was that she only had this once transport service and had to pick and choose if she was going to dialysis or other MD appointments each week.     LSw verified pt does not have [place] medical transport services now. She does have Medicaid FFS as secondary INS and status is disabled.    LSw left  w/ dialysis coordinator, Stacia. Lsw requesting any other possible transport choice pt can use as she is still having difficulty getting to dialysis and needs more weekly transport options from [place] to [place] areas.    LSw left  w/ RSVP senior /, Dania (@ [phone]).    Barriers to Discharge: transportation    Plan: f/u w/ pt, dialysis coordinator, MOHAMUD, medical team

## 2018-10-21 NOTE — PROGRESS NOTES
[facility] -  PROGRESS NOTE               Author: Rakesh Sharp M.D. Date & Time: 10/21/2018  1:42 PM     HPI:  63-year-old female with a medical history significant for end-stage renal disease (on hemodialysis Monday Wednesday and Fridays via left arm AV fistula under the care of [facility] nephrology), hypertension, diabetes mellitus type 2, atrial fibrillation, peripheral vascular disease, COPD, anemia secondary to myelodysplastic syndrome, history of CVA, who was admitted with complaints of weakness and diarrhea, found to have a potassium of 9.     She notes missing dialysis on Wednesday because she felt too weak to go.  Has had ongoing diarrhea for the last week.  No chest pain or shortness of breath.  No abdominal pain.  No fevers chills or sweats.     In the emergency room was found to have a potassium of 9 with EKG changes.  Was given temporizing measures and was transferred to the ICU for further observation.      DAILY NEPHROLOGY SUMMARY:  10/19: consult done, received dialysis  10/20: no major events, feeling significantly improved  10/21: s/p HIDA scan, feels sig improved. Transferred out of ICU    PAST FAMILY HISTORY: Reviewed and Unchanged  SOCIAL HISTORY: Reviewed and Unchanged  CURRENT MEDICATIONS: Reviewed  IMAGING STUDIES: Reviewed    ROS  General:  No weakness, malaise  HEENT:  No ocular pain; no nasal discharge  CV:  No chest pain, no palpitations  Lungs:  No cough; no PND  GI:  No N/V/D; No constipation  : no dysuria or gross hematuria  MSK:  No joint pain; No trauma  Skin: No rashes; No lesions  Neuro: No paresthesia; No LOC  Psych: No depression; No anxiety    PHYSICAL EXAM  GENERAL:  WDWN, NAD  HEENT:  NC/AT, no scleral icterus; conjunctiva normal  NECK:  Supple; Non tender  CV:  RRR, no m/r/g  LUNGS:  CTAB, no W/R  ABDOMEN:  Soft, non-tender, +BS  EXTREMETIES:  Trace Edema, No clubbing, cyanosis,   SKIN:  Warm and dry, no rashes  NEURO:  A&O, no focal  deficits  PSYCH:  Cooperative, appropriate mood and affect    Fluids:  In: 2020 [P.O.:1150; Blood:350; Other:20; Dialysis:500]  Out: 2500     LABS:  Recent Results (from the past 24 hour(s))   HEP B SURFACE ANTIGEN    Collection Time: 10/21/18  2:20 AM   Result Value Ref Range    Hepatitis B Surface Antigen Negative Negative   CBC WITH DIFFERENTIAL    Collection Time: 10/21/18  2:20 AM   Result Value Ref Range    WBC 3.9 (L) 4.8 - 10.8 K/uL    RBC 2.72 (L) 4.20 - 5.40 M/uL    Hemoglobin 9.4 (L) 12.0 - 16.0 g/dL    Hematocrit 26.3 (L) 37.0 - 47.0 %    MCV 97.1 81.4 - 97.8 fL    MCH 34.2 (H) 27.0 - 33.0 pg    MCHC 35.2 (H) 33.6 - 35.0 g/dL    RDW 71.7 (H) 35.9 - 50.0 fL    Platelet Count 144 (L) 164 - 446 K/uL    MPV 13.5 (H) 9.0 - 12.9 fL    Neutrophils-Polys 51.50 44.00 - 72.00 %    Lymphocytes 28.50 22.00 - 41.00 %    Monocytes 14.40 (H) 0.00 - 13.40 %    Eosinophils 5.10 0.00 - 6.90 %    Basophils 0.50 0.00 - 1.80 %    Immature Granulocytes 0.00 0.00 - 0.90 %    Nucleated RBC 0.00 /100 WBC    Neutrophils (Absolute) 2.01 2.00 - 7.15 K/uL    Lymphs (Absolute) 1.11 1.00 - 4.80 K/uL    Monos (Absolute) 0.56 0.00 - 0.85 K/uL    Eos (Absolute) 0.20 0.00 - 0.51 K/uL    Baso (Absolute) 0.02 0.00 - 0.12 K/uL    Immature Granulocytes (abs) 0.00 0.00 - 0.11 K/uL    NRBC (Absolute) 0.00 K/uL   COMP METABOLIC PANEL    Collection Time: 10/21/18  2:20 AM   Result Value Ref Range    Sodium 136 135 - 145 mmol/L    Potassium 4.3 3.6 - 5.5 mmol/L    Chloride 96 96 - 112 mmol/L    Co2 27 20 - 33 mmol/L    Anion Gap 13.0 (H) 0.0 - 11.9    Glucose 130 (H) 65 - 99 mg/dL    Bun 22 8 - 22 mg/dL    Creatinine 3.81 (H) 0.50 - 1.40 mg/dL    Calcium 8.4 (L) 8.5 - 10.5 mg/dL    AST(SGOT) 17 12 - 45 U/L    ALT(SGPT) 20 2 - 50 U/L    Alkaline Phosphatase 54 30 - 99 U/L    Total Bilirubin 0.9 0.1 - 1.5 mg/dL    Albumin 3.8 3.2 - 4.9 g/dL    Total Protein 6.7 6.0 - 8.2 g/dL    Globulin 2.9 1.9 - 3.5 g/dL    A-G Ratio 1.3 g/dL   MAGNESIUM     Collection Time: 10/21/18  2:20 AM   Result Value Ref Range    Magnesium 2.0 1.5 - 2.5 mg/dL   PHOSPHORUS    Collection Time: 10/21/18  2:20 AM   Result Value Ref Range    Phosphorus 5.0 (H) 2.5 - 4.5 mg/dL   LIPASE    Collection Time: 10/21/18  2:20 AM   Result Value Ref Range    Lipase 72 11 - 82 U/L   ESTIMATED GFR    Collection Time: 10/21/18  2:20 AM   Result Value Ref Range    GFR If  14 (A) >60 mL/min/1.73 m 2    GFR If Non  12 (A) >60 mL/min/1.73 m 2       (click the triangle to expand results)    IMPRESSION:  # ESRD: MWF schedlule via LUE AVF  # Hyperkalemia with EKG changes. 2/2 missed dialysis sessions  # HTN  # Anemia: Secondary to myelodysplastic syndrome and component from CKD  # Renal osteodystrophy  # Diarrhea  # Atrial fibrillation  # Acidosis  # Myelodysplastic syndrome     PLAN:  -no IHD today, next session tomorrow for MWF schedule  -Renal diet, limit IV fluids  -continue home coreg and losartan  -Strict I/Os  -continue home BP meds  -Dose all meds per ESRD     Thank you for this consult, we will continue to follow.     Rakesh Sharp MD

## 2018-10-22 VITALS
WEIGHT: 137.35 LBS | TEMPERATURE: 98.3 F | HEIGHT: 59 IN | HEART RATE: 61 BPM | SYSTOLIC BLOOD PRESSURE: 144 MMHG | DIASTOLIC BLOOD PRESSURE: 62 MMHG | OXYGEN SATURATION: 100 % | RESPIRATION RATE: 17 BRPM | BODY MASS INDEX: 27.69 KG/M2

## 2018-10-22 LAB
ALBUMIN SERPL BCP-MCNC: 3.5 G/DL (ref 3.2–4.9)
ALBUMIN/GLOB SERPL: 1.2 G/DL
ALP SERPL-CCNC: 46 U/L (ref 30–99)
ALT SERPL-CCNC: 16 U/L (ref 2–50)
ANION GAP SERPL CALC-SCNC: 12 MMOL/L (ref 0–11.9)
AST SERPL-CCNC: 14 U/L (ref 12–45)
BASOPHILS # BLD AUTO: 2.9 % (ref 0–1.8)
BASOPHILS # BLD: 0.13 K/UL (ref 0–0.12)
BILIRUB SERPL-MCNC: 0.5 MG/DL (ref 0.1–1.5)
BUN SERPL-MCNC: 48 MG/DL (ref 8–22)
CALCIUM SERPL-MCNC: 8 MG/DL (ref 8.5–10.5)
CHLORIDE SERPL-SCNC: 92 MMOL/L (ref 96–112)
CO2 SERPL-SCNC: 24 MMOL/L (ref 20–33)
CREAT SERPL-MCNC: 6.37 MG/DL (ref 0.5–1.4)
EOSINOPHIL # BLD AUTO: 0.4 K/UL (ref 0–0.51)
EOSINOPHIL NFR BLD: 8.7 % (ref 0–6.9)
ERYTHROCYTE [DISTWIDTH] IN BLOOD BY AUTOMATED COUNT: 65.1 FL (ref 35.9–50)
GLOBULIN SER CALC-MCNC: 2.9 G/DL (ref 1.9–3.5)
GLUCOSE SERPL-MCNC: 136 MG/DL (ref 65–99)
HCT VFR BLD AUTO: 25.7 % (ref 37–47)
HGB BLD-MCNC: 9 G/DL (ref 12–16)
LYMPHOCYTES # BLD AUTO: 1.38 K/UL (ref 1–4.8)
LYMPHOCYTES NFR BLD: 30.1 % (ref 22–41)
MAGNESIUM SERPL-MCNC: 2 MG/DL (ref 1.5–2.5)
MANUAL DIFF BLD: NORMAL
MCH RBC QN AUTO: 33 PG (ref 27–33)
MCHC RBC AUTO-ENTMCNC: 35 G/DL (ref 33.6–35)
MCV RBC AUTO: 94.1 FL (ref 81.4–97.8)
MONOCYTES # BLD AUTO: 0.36 K/UL (ref 0–0.85)
MONOCYTES NFR BLD AUTO: 7.8 % (ref 0–13.4)
MORPHOLOGY BLD-IMP: NORMAL
NEUTROPHILS # BLD AUTO: 2.32 K/UL (ref 2–7.15)
NEUTROPHILS NFR BLD: 48.6 % (ref 44–72)
NEUTS BAND NFR BLD MANUAL: 1.9 % (ref 0–10)
NRBC # BLD AUTO: 0 K/UL
NRBC BLD-RTO: 0 /100 WBC
PHOSPHATE SERPL-MCNC: 8 MG/DL (ref 2.5–4.5)
PLATELET # BLD AUTO: 144 K/UL (ref 164–446)
PLATELET BLD QL SMEAR: NORMAL
PMV BLD AUTO: 13.4 FL (ref 9–12.9)
POTASSIUM SERPL-SCNC: 5 MMOL/L (ref 3.6–5.5)
PROT SERPL-MCNC: 6.4 G/DL (ref 6–8.2)
RBC # BLD AUTO: 2.73 M/UL (ref 4.2–5.4)
SODIUM SERPL-SCNC: 128 MMOL/L (ref 135–145)
WBC # BLD AUTO: 4.6 K/UL (ref 4.8–10.8)

## 2018-10-22 PROCEDURE — 700111 HCHG RX REV CODE 636 W/ 250 OVERRIDE (IP): Performed by: INTERNAL MEDICINE

## 2018-10-22 PROCEDURE — 36415 COLL VENOUS BLD VENIPUNCTURE: CPT

## 2018-10-22 PROCEDURE — 80053 COMPREHEN METABOLIC PANEL: CPT

## 2018-10-22 PROCEDURE — A9270 NON-COVERED ITEM OR SERVICE: HCPCS | Performed by: INTERNAL MEDICINE

## 2018-10-22 PROCEDURE — 99239 HOSP IP/OBS DSCHRG MGMT >30: CPT | Performed by: FAMILY MEDICINE

## 2018-10-22 PROCEDURE — 83735 ASSAY OF MAGNESIUM: CPT

## 2018-10-22 PROCEDURE — 90935 HEMODIALYSIS ONE EVALUATION: CPT

## 2018-10-22 PROCEDURE — 84100 ASSAY OF PHOSPHORUS: CPT

## 2018-10-22 PROCEDURE — 700102 HCHG RX REV CODE 250 W/ 637 OVERRIDE(OP): Performed by: INTERNAL MEDICINE

## 2018-10-22 PROCEDURE — 700111 HCHG RX REV CODE 636 W/ 250 OVERRIDE (IP)

## 2018-10-22 PROCEDURE — 85027 COMPLETE CBC AUTOMATED: CPT

## 2018-10-22 PROCEDURE — 5A1D70Z PERFORMANCE OF URINARY FILTRATION, INTERMITTENT, LESS THAN 6 HOURS PER DAY: ICD-10-PCS | Performed by: INTERNAL MEDICINE

## 2018-10-22 PROCEDURE — 85007 BL SMEAR W/DIFF WBC COUNT: CPT

## 2018-10-22 RX ORDER — HEPARIN SODIUM 1000 [USP'U]/ML
INJECTION, SOLUTION INTRAVENOUS; SUBCUTANEOUS
Status: COMPLETED
Start: 2018-10-22 | End: 2018-10-22

## 2018-10-22 RX ADMIN — HEPARIN SODIUM 1700 UNITS: 1000 INJECTION, SOLUTION INTRAVENOUS; SUBCUTANEOUS at 12:43

## 2018-10-22 RX ADMIN — TIMOLOL MALEATE 1 DROP: 5 SOLUTION/ DROPS OPHTHALMIC at 05:52

## 2018-10-22 RX ADMIN — HEPARIN SODIUM 5000 UNITS: 5000 INJECTION, SOLUTION INTRAVENOUS; SUBCUTANEOUS at 05:52

## 2018-10-22 RX ADMIN — ACETAMINOPHEN 650 MG: 325 TABLET, FILM COATED ORAL at 01:48

## 2018-10-22 RX ADMIN — CARVEDILOL 12.5 MG: 12.5 TABLET, FILM COATED ORAL at 17:26

## 2018-10-22 RX ADMIN — BRIMONIDINE TARTRATE 1 DROP: 2 SOLUTION OPHTHALMIC at 05:52

## 2018-10-22 RX ADMIN — PREGABALIN 50 MG: 25 CAPSULE ORAL at 05:51

## 2018-10-22 ASSESSMENT — PAIN SCALES - GENERAL
PAINLEVEL_OUTOF10: 0
PAINLEVEL_OUTOF10: 0
PAINLEVEL_OUTOF10: 4
PAINLEVEL_OUTOF10: 0

## 2018-10-22 NOTE — DIETARY
Nutrition Services:    Poor PO intake on Nutrition Admit Screen. Pt is currently on a renal diet and per chart pt PO 50% or greater.  Pt eating well.   Ht: 149.9 cm, Wt: 62.3 kg, BMI 27.74 (Overweight).  Consult RD as needed. RD will re-screen weekly.      RD available prn

## 2018-10-22 NOTE — DISCHARGE SUMMARY
Discharge Summary    CHIEF COMPLAINT ON ADMISSION  Chief Complaint   Patient presents with   • Emesis       Reason for Admission  EMS R=2     Admission Date  10/19/2018    CODE STATUS  Full Code    HPI & HOSPITAL COURSE  This is a 63 y.o. female here with hyperkalemia of 9, she has known history of end-stage renal disease and missed her hemodialysis schedule twice secondary to nausea vomiting diarrhea.  She was emergently dialyzed and her hyperkalemia has resolved.  She was initially admitted to ICU for close monitoring.  Her nausea vomiting diarrhea have also resolved.  Workup proved to be negative.  Other medical problems are stable at this point.       Therefore, she is discharged in good and stable condition to home with close outpatient follow-up.    The patient met 2-midnight criteria for an inpatient stay at the time of discharge.    Discharge Date  10/22/2018    FOLLOW UP ITEMS POST DISCHARGE  None    DISCHARGE DIAGNOSES  Active Problems:    Abdominal pain POA: Yes    Essential hypertension POA: Yes      Overview:           Anemia of chronic disease POA: Yes    Hyperkalemia POA: Yes      Overview: Secondary to end-stage renal disease. Treatment with hemodialysis      Repeat BMP today later      Monitor on telemetry    ESRD (end stage renal disease) (HCC) POA: Yes    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 (HCC) (Chronic) POA: Yes    Elevated lipase POA: Yes  Resolved Problems:    * No resolved hospital problems. *      FOLLOW UP  Future Appointments  Date Time Provider Department Center   11/1/2018 2:45 PM PULMONARY DX 1 [facility] [address]   11/1/2018 3:00 PM PFT-RM1 PULM None   11/1/2018 4:00 PM Rosa Ruiz M.D. [facility] None   3/28/2019 10:40 AM Germania Alberts M.D. [facility] None     No follow-up provider specified.    MEDICATIONS ON DISCHARGE     Medication List      START taking these medications      Instructions   epoetin ivonne 41126  UNIT/ML Soln  Commonly known as:  EPOGEN,PROCRIT   Inject 1 mL as instructed every Monday, Wednesday, and Friday.  Dose:  31294 Units        CONTINUE taking these medications      Instructions   amLODIPine 10 MG Tabs  Commonly known as:  NORVASC   Take 1 Tab by mouth every day.  Dose:  10 mg     carvedilol 12.5 MG Tabs  Commonly known as:  COREG   Take 12.5 mg by mouth 2 times a day, with meals.  Dose:  12.5 mg     COMBIGAN 0.2-0.5 % Soln  Generic drug:  Brimonidine Tartrate-Timolol   Place 1 Drop in both eyes 2 Times a Day.  Dose:  1 Drop     cyclobenzaprine 5 MG tablet  Commonly known as:  FLEXERIL   Take 5 mg by mouth 3 times a day as needed.  Dose:  5 mg     HYDROcodone-acetaminophen 5-325 MG Tabs per tablet  Commonly known as:  NORCO   Take 1 Tab by mouth every 6 hours as needed (pain).  Dose:  1 Tab     IMODIUM PO   Take 1 Tab by mouth 1 time daily as needed (DIARRHEA).  Dose:  1 Tab     losartan 100 MG Tabs  Commonly known as:  COZAAR   Take 100 mg by mouth every day.  Dose:  100 mg     LUMIGAN 0.03 % Soln  Generic drug:  bimatoprost   Place 1 Drop in both eyes every evening.  Dose:  1 Drop     LYRICA 50 MG capsule  Generic drug:  pregabalin   Take 50 mg by mouth 2 times a day.  Dose:  50 mg     nitroglycerin 0.4 MG Subl  Commonly known as:  NITROSTAT   Place 1 Tab under tongue as needed for Chest Pain.  Dose:  0.4 mg            Allergies  Allergies   Allergen Reactions   • Pioglitazone Unspecified     Causes blindness   • Simvastatin Unspecified     Couldn't move   • Darvocet [Propoxyphene N-Apap] Vomiting   • Demerol Vomiting   • Dilaudid [Hydromorphone] Vomiting   • Diphenhydramine Vomiting   • Glucophage [Metformin Hydrochloride] Vomiting   • Iron Vomiting     vomiting   • Lenalidomide Vomiting   • Morphine Vomiting   • Multivitamin Itching     itching   • Naprosyn [Naproxen] Hives   • Other Drug Rash and Vomiting     Any binders that remove phosphorus from the body such as tums   • Oxycodone Vomiting   •  Pcn [Penicillins] Vomiting          • Requip Vomiting   • Sulfa Drugs Rash and Vomiting     Vomiting & rash      • Tramadol Vomiting   • Trazodone Vomiting       DIET  Orders Placed This Encounter   Procedures   • Diet Order Renal     Standing Status:   Standing     Number of Occurrences:   1     Order Specific Question:   Diet:     Answer:   Renal [8]       ACTIVITY  As tolerated.  Weight bearing as tolerated    CONSULTATIONS  Nephrology - [facility]    PROCEDURES  None    LABORATORY  Lab Results   Component Value Date    SODIUM 128 (L) 10/22/2018    POTASSIUM 5.0 10/22/2018    CHLORIDE 92 (L) 10/22/2018    CO2 24 10/22/2018    GLUCOSE 136 (H) 10/22/2018    BUN 48 (H) 10/22/2018    CREATININE 6.37 (HH) 10/22/2018    CREATININE 0.6 07/20/2007        Lab Results   Component Value Date    WBC 4.6 (L) 10/22/2018    HEMOGLOBIN 9.0 (L) 10/22/2018    HEMATOCRIT 25.7 (L) 10/22/2018    PLATELETCT 144 (L) 10/22/2018        Total time of the discharge process exceeds 40 minutes.

## 2018-10-22 NOTE — PROGRESS NOTES
3hr HD started @ 1246 and ended @ 1616,tx well tolerate,net UF =2000ml.LUAAVF +B/T,cannulation sites covered with DD,CDI,report given to Nahomi Agarwal RN.

## 2018-10-22 NOTE — DISCHARGE INSTRUCTIONS
Discharge Instructions    Discharged to home by car with relative. Discharged via wheelchair, hospital escort: Yes.  Special equipment needed: Not Applicable    Be sure to schedule a follow-up appointment with your primary care doctor or any specialists as instructed.     Discharge Plan:   Influenza Vaccine Indication: Not indicated: Previously immunized this influenza season and > 8 years of age    I understand that a diet low in cholesterol, fat, and sodium is recommended for good health. Unless I have been given specific instructions below for another diet, I accept this instruction as my diet prescription.   Other diet: Renal    Special Instructions: None    · Is patient discharged on Warfarin / Coumadin?   No     Depression / Suicide Risk    As you are discharged from this [facility] facility, it is important to learn how to keep safe from harming yourself.    Recognize the warning signs:  · Abrupt changes in personality, positive or negative- including increase in energy   · Giving away possessions  · Change in eating patterns- significant weight changes-  positive or negative  · Change in sleeping patterns- unable to sleep or sleeping all the time   · Unwillingness or inability to communicate  · Depression  · Unusual sadness, discouragement and loneliness  · Talk of wanting to die  · Neglect of personal appearance   · Rebelliousness- reckless behavior  · Withdrawal from people/activities they love  · Confusion- inability to concentrate     If you or a loved one observes any of these behaviors or has concerns about self-harm, here's what you can do:  · Talk about it- your feelings and reasons for harming yourself  · Remove any means that you might use to hurt yourself (examples: pills, rope, extension cords, firearm)  · Get professional help from the community (Mental Health, Substance Abuse, psychological counseling)  · Do not be alone:Call your Safe Contact- someone whom you trust who will be there for  you.  · Call your local CRISIS HOTLINE 197-6744 or 448-604-1999  · Call your local Children's Mobile Crisis Response Team [place] (739) 050-8889 or www.[employer]  · Call the toll free National Suicide Prevention Hotlines   · National Suicide Prevention Lifeline 861-342-MSFA (1586)  · National [employer] Network 800-SUICIDE (173-9299)

## 2018-10-22 NOTE — PROGRESS NOTES
[facility] -  PROGRESS NOTE               Author: Rakesh Sharp M.D. Date & Time: 10/22/2018  11:13 AM     HPI:  63-year-old female with a medical history significant for end-stage renal disease (on hemodialysis Monday Wednesday and Fridays via left arm AV fistula under the care of [facility] nephrology), hypertension, diabetes mellitus type 2, atrial fibrillation, peripheral vascular disease, COPD, anemia secondary to myelodysplastic syndrome, history of CVA, who was admitted with complaints of weakness and diarrhea, found to have a potassium of 9.     She notes missing dialysis on Wednesday because she felt too weak to go.  Has had ongoing diarrhea for the last week.  No chest pain or shortness of breath.  No abdominal pain.  No fevers chills or sweats.     In the emergency room was found to have a potassium of 9 with EKG changes.  Was given temporizing measures and was transferred to the ICU for further observation.      DAILY NEPHROLOGY SUMMARY:  10/19: consult done, received dialysis  10/20: no major events, feeling significantly improved  10/21: s/p HIDA scan, feels sig improved. Transferred out of ICU  10/22: No acute events, feeling very well    PAST FAMILY HISTORY: Reviewed and Unchanged  SOCIAL HISTORY: Reviewed and Unchanged  CURRENT MEDICATIONS: Reviewed  IMAGING STUDIES: Reviewed    ROS  General:  No weakness, malaise  HEENT:  No ocular pain; no nasal discharge  CV:  No chest pain, no palpitations  Lungs:  No cough; no PND  GI:  No N/V/D; No constipation  : no dysuria or gross hematuria  MSK:  No joint pain; No trauma  Skin: No rashes; No lesions  Neuro: No paresthesia; No LOC  Psych: No depression; No anxiety    PHYSICAL EXAM  GENERAL:  WDWN, NAD  HEENT:  NC/AT, no scleral icterus; conjunctiva normal  NECK:  Supple; Non tender  CV:  RRR, no m/r/g  LUNGS:  CTAB, no W/R  ABDOMEN:  Soft, non-tender, +BS  EXTREMETIES: No edema, No clubbing, cyanosis,   SKIN:  Warm and dry,  no rashes  NEURO:  A&O, no focal deficits  PSYCH:  Cooperative, appropriate mood and affect    Fluids:  In: 860 [P.O.:860]  Out: -     LABS:  Recent Results (from the past 24 hour(s))   STOOL WBC'S    Collection Time: 10/21/18 12:55 PM   Result Value Ref Range    Stool WBC's None seen None seen   CBC WITH DIFFERENTIAL    Collection Time: 10/22/18  3:32 AM   Result Value Ref Range    WBC 4.6 (L) 4.8 - 10.8 K/uL    RBC 2.73 (L) 4.20 - 5.40 M/uL    Hemoglobin 9.0 (L) 12.0 - 16.0 g/dL    Hematocrit 25.7 (L) 37.0 - 47.0 %    MCV 94.1 81.4 - 97.8 fL    MCH 33.0 27.0 - 33.0 pg    MCHC 35.0 33.6 - 35.0 g/dL    RDW 65.1 (H) 35.9 - 50.0 fL    Platelet Count 144 (L) 164 - 446 K/uL    MPV 13.4 (H) 9.0 - 12.9 fL    Nucleated RBC 0.00 /100 WBC    NRBC (Absolute) 0.00 K/uL    Neutrophils-Polys 48.60 44.00 - 72.00 %    Lymphocytes 30.10 22.00 - 41.00 %    Monocytes 7.80 0.00 - 13.40 %    Eosinophils 8.70 (H) 0.00 - 6.90 %    Basophils 2.90 (H) 0.00 - 1.80 %    Neutrophils (Absolute) 2.32 2.00 - 7.15 K/uL    Lymphs (Absolute) 1.38 1.00 - 4.80 K/uL    Monos (Absolute) 0.36 0.00 - 0.85 K/uL    Eos (Absolute) 0.40 0.00 - 0.51 K/uL    Baso (Absolute) 0.13 (H) 0.00 - 0.12 K/uL   COMP METABOLIC PANEL    Collection Time: 10/22/18  3:32 AM   Result Value Ref Range    Sodium 128 (L) 135 - 145 mmol/L    Potassium 5.0 3.6 - 5.5 mmol/L    Chloride 92 (L) 96 - 112 mmol/L    Co2 24 20 - 33 mmol/L    Anion Gap 12.0 (H) 0.0 - 11.9    Glucose 136 (H) 65 - 99 mg/dL    Bun 48 (H) 8 - 22 mg/dL    Creatinine 6.37 (HH) 0.50 - 1.40 mg/dL    Calcium 8.0 (L) 8.5 - 10.5 mg/dL    AST(SGOT) 14 12 - 45 U/L    ALT(SGPT) 16 2 - 50 U/L    Alkaline Phosphatase 46 30 - 99 U/L    Total Bilirubin 0.5 0.1 - 1.5 mg/dL    Albumin 3.5 3.2 - 4.9 g/dL    Total Protein 6.4 6.0 - 8.2 g/dL    Globulin 2.9 1.9 - 3.5 g/dL    A-G Ratio 1.2 g/dL   MAGNESIUM    Collection Time: 10/22/18  3:32 AM   Result Value Ref Range    Magnesium 2.0 1.5 - 2.5 mg/dL   PHOSPHORUS    Collection  Time: 10/22/18  3:32 AM   Result Value Ref Range    Phosphorus 8.0 (H) 2.5 - 4.5 mg/dL   ESTIMATED GFR    Collection Time: 10/22/18  3:32 AM   Result Value Ref Range    GFR If  8 (A) >60 mL/min/1.73 m 2    GFR If Non African American 7 (A) >60 mL/min/1.73 m 2   DIFFERENTIAL MANUAL    Collection Time: 10/22/18  3:32 AM   Result Value Ref Range    Bands-Stabs 1.90 0.00 - 10.00 %    Manual Diff Status PERFORMED    PERIPHERAL SMEAR REVIEW    Collection Time: 10/22/18  3:32 AM   Result Value Ref Range    Peripheral Smear Review see below    PLATELET ESTIMATE    Collection Time: 10/22/18  3:32 AM   Result Value Ref Range    Plt Estimation Decreased        (click the triangle to expand results)    IMPRESSION:  # ESRD: MWF schedlule via LUE AVF  # Hyperkalemia with EKG changes. 2/2 missed dialysis sessions  # HTN  # Anemia: Secondary to myelodysplastic syndrome and component from CKD  # Renal osteodystrophy  # Diarrhea  # Atrial fibrillation  # Acidosis  # Myelodysplastic syndrome     PLAN:  -IHD today, MWF schedule  -Renal diet, limit IV fluids  -continue home coreg and losartan  -Strict I/Os  -Epogen 10,000 units with iHD  -continue home BP meds  -Dose all meds per ESRD     Thank you for this consult, we will continue to follow.     Rakesh Sharp MD

## 2018-10-22 NOTE — DISCHARGE PLANNING
Anticipated Discharge Disposition: TBD    Action: Spoke to new unit LSW Nidhi. Updated regarding search for alternative transport for pt. Faxed copy of RSVP application and contact information.    Barriers to Discharge: transportation through week for [place] apts in [place]    Plan: f/u w/ resource, medical team, pt

## 2018-10-23 ENCOUNTER — PATIENT OUTREACH (OUTPATIENT)
Dept: HEALTH INFORMATION MANAGEMENT | Facility: OTHER | Age: 63
End: 2018-10-23

## 2018-10-23 NOTE — DOCUMENTATION QUERY
DOCUMENTATION QUERY    PROVIDERS: Please select “Cosign w/ note”to reply to query.    To better represent the severity of illness of your patient, please review the following information and exercise your independent professional judgment in responding to this query.     Patient is 1 dependent with COPD.  Per ED note dated 10/19/18, she presented with shortness of breath, required rescue BiPAP and ICU admission.     Based upon the clinical findings, risk factors, and treatment, can a diagnosis be provided to support these findings?                                      Please choose all that apply:     • Chronic Respiratory Failure  • Chronic Hypoxemia  • Chronic respiratory failure with hypoxia   • Acute on chronic respiratory failure (please specify with or without hypoxia)   • COPD with acute exacerbation   • Other explanation of clinical findings  • Unable to determine            The medical record reflects the following:   Clinical Findings COPD with 02 dependence at 2L per notes  Required rescue BiPAP   Pulse oximetry 95 - 100% on 1.5 - 2L NC    Treatment 02 therapy   Rescue BiPAP  Albuterol nebulizer   Risk Factors Hyperkalemia   End stage renal disease   Type 2 diabetes   COPD   02 dependence   Acidosis   Dependence on renal dialysis   Diabetic peripheral angiopathy  Diabetic nephropathy  Anemia    Location within medical record History and Physical, Progress Notes and ED provider note     Thank you,   Briseida Mcneill RN  Clinical   [phone]

## 2018-10-30 ENCOUNTER — TELEPHONE (OUTPATIENT)
Dept: RADIOLOGY | Facility: IMAGING CENTER | Age: 63
End: 2018-10-30

## 2018-10-30 DIAGNOSIS — R06.00 DYSPNEA, UNSPECIFIED TYPE: ICD-10-CM

## 2018-10-31 ENCOUNTER — TELEPHONE (OUTPATIENT)
Dept: PULMONOLOGY | Facility: HOSPICE | Age: 63
End: 2018-10-31

## 2018-10-31 DIAGNOSIS — R06.02 SOB (SHORTNESS OF BREATH): ICD-10-CM

## 2018-11-01 ENCOUNTER — APPOINTMENT (OUTPATIENT)
Dept: RADIOLOGY | Facility: IMAGING CENTER | Age: 63
End: 2018-11-01
Payer: MEDICARE

## 2018-11-01 ENCOUNTER — NON-PROVIDER VISIT (OUTPATIENT)
Dept: PULMONOLOGY | Facility: HOSPICE | Age: 63
End: 2018-11-01
Payer: MEDICARE

## 2018-11-01 ENCOUNTER — OFFICE VISIT (OUTPATIENT)
Dept: PULMONOLOGY | Facility: HOSPICE | Age: 63
End: 2018-11-01
Payer: MEDICARE

## 2018-11-01 VITALS
RESPIRATION RATE: 16 BRPM | WEIGHT: 138 LBS | HEIGHT: 58 IN | SYSTOLIC BLOOD PRESSURE: 144 MMHG | OXYGEN SATURATION: 100 % | HEART RATE: 74 BPM | TEMPERATURE: 97.3 F | DIASTOLIC BLOOD PRESSURE: 70 MMHG | BODY MASS INDEX: 28.97 KG/M2

## 2018-11-01 VITALS — HEIGHT: 58 IN | WEIGHT: 138 LBS | BODY MASS INDEX: 28.97 KG/M2

## 2018-11-01 DIAGNOSIS — R09.02 HYPOXEMIA: ICD-10-CM

## 2018-11-01 DIAGNOSIS — J98.4 RESTRICTIVE LUNG DISEASE: ICD-10-CM

## 2018-11-01 DIAGNOSIS — R06.00 DYSPNEA, UNSPECIFIED TYPE: ICD-10-CM

## 2018-11-01 DIAGNOSIS — R06.02 SOB (SHORTNESS OF BREATH): ICD-10-CM

## 2018-11-01 PROCEDURE — 94010 BREATHING CAPACITY TEST: CPT | Performed by: INTERNAL MEDICINE

## 2018-11-01 PROCEDURE — 94729 DIFFUSING CAPACITY: CPT | Performed by: INTERNAL MEDICINE

## 2018-11-01 PROCEDURE — 94726 PLETHYSMOGRAPHY LUNG VOLUMES: CPT | Performed by: INTERNAL MEDICINE

## 2018-11-01 PROCEDURE — 71046 X-RAY EXAM CHEST 2 VIEWS: CPT | Mod: TC,FY | Performed by: INTERNAL MEDICINE

## 2018-11-01 PROCEDURE — 99204 OFFICE O/P NEW MOD 45 MIN: CPT | Mod: 25 | Performed by: INTERNAL MEDICINE

## 2018-11-01 PROCEDURE — 94761 N-INVAS EAR/PLS OXIMETRY MLT: CPT | Performed by: INTERNAL MEDICINE

## 2018-11-01 ASSESSMENT — ENCOUNTER SYMPTOMS
PSYCHIATRIC NEGATIVE: 1
NEUROLOGICAL NEGATIVE: 1
EYES NEGATIVE: 1
CARDIOVASCULAR NEGATIVE: 1
MUSCULOSKELETAL NEGATIVE: 1
GASTROINTESTINAL NEGATIVE: 1

## 2018-11-01 ASSESSMENT — PULMONARY FUNCTION TESTS
FEV1/FVC: 88
FVC_PERCENT_PREDICTED: 54
FEV1: 1.19
FEV1_LLN: 1.58
FEV1/FVC_PERCENT_LLN: 64
FEV1/FVC_PREDICTED: 77
FEV1_PERCENT_PREDICTED: 62
FEV1/FVC_PERCENT_PREDICTED: 114
FVC: 1.35
FEV1_PREDICTED: 1.89
FVC_LLN: 2.07
FEV1/FVC_PERCENT_PREDICTED: 115
FEV1/FVC: 88
FEV1/FVC_PERCENT_PREDICTED: 76
FVC_PREDICTED: 2.48

## 2018-11-01 NOTE — PROCEDURES
Technician Esther Marsh, [facility] Comments:Good patient effort & cooperation. Test was performed on the [employer] Body Plethysmograph-Elite DX system.      The predicted sets used for Spirometry are NHanesIII, for Lung Volumes are ITS, and for DLCO is [facility].    The results of this test meet the ATS standards for acceptability and repeatability.  The DLCO was corrected to a Hb of  9.0  g/dL.  Interpretation:  Acceptable and Reproducible  FEV1 1.19 l (62%), FVC 54%, ratio 88%  Flow volume  TLC 3.06 l (73%)  DLCo corrected for hbg 11.33 ml/min/mmhg (84%)     Impression  Mild restrictive lung disease with amandeep DLCOl

## 2018-11-01 NOTE — PROGRESS NOTES
Pulmonary Consultation    Date of Service: 11/1/2018    Consulting Physician: Nyla Nava M.D.    Reason for consult: COPD    Problem List Items Addressed This Visit     Hypoxemia     Patient needs and benefits from 5l pulsed dose oxygen  In evaluation of her chart and history and data do not think patient has COPD  I do think she has restrictive lung disease with hypoventilation  The restrictive lung disease is from kyphosis and the persistent atelactasis at the bases from hypoventilation and she is quite deconditioned and takes shallow breaths    In clinic a pulse ox was placed and room air and when it dropped below 89% pt asked to take deep breaths and her saturations came up to 97% RA and this happened repeatedly for 30+ minutes supporting hypoventilation.    With exertion she is Sob with minimal exertion and the combination of the above with severe deconditioning she does need oxygen with exertion and also at night as she is more than likley hypoventilating more at night    All explained in detail to the patient and er care taker             Other Visit Diagnoses     Restrictive lung disease        Relevant Orders    Multiple Oximetry            History of Present Illness: Vielka Briscoe is a 63 y.o. female with a past medical history of COPD, ESRD from DM type II on HD, CAD, anaemia of chronic disease with multiple blood transfusions and also myelodysplastic syndrome  Was in 2l/min on oxygen but now needed 4 l/nc  Allergic to multiple inhalers making her vomit and nebulizers make her vomit    Where her port was placed for dialysis 2 years ago she developed chest pain and CT done which was negative    Not using any pulmonary medications      Exercise tolerance:  Walking distance: 20 steps and she is SOB    Night time symptoms: no    Pulmonary History:    Environmental exposures: no hot tub; no woodstove, no mining work, and no asbestos exposure   Pets: no  Occupation History:nurse for 20 years,    Family history of pulmonary disease: none  Second hand smoke exposure: both husbands smoked she is a never smoker    Review of Systems   Constitutional: Positive for malaise/fatigue.   HENT: Negative.    Eyes: Negative.    Respiratory: Positive for shortness of breath.    Cardiovascular: Negative.    Gastrointestinal: Negative.    Genitourinary: Negative.    Musculoskeletal: Negative.    Skin: Negative.    Neurological: Negative.    Endo/Heme/Allergies: Negative.    Psychiatric/Behavioral: Negative.        Current Outpatient Prescriptions on File Prior to Visit   Medication Sig Dispense Refill   • Loperamide HCl (IMODIUM PO) Take 1 Tab by mouth 1 time daily as needed (DIARRHEA).     • losartan (COZAAR) 100 MG Tab Take 100 mg by mouth every day.     • amLODIPine (NORVASC) 10 MG Tab Take 1 Tab by mouth every day. 1 Tab 0   • nitroglycerin (NITROSTAT) 0.4 MG SL Tab Place 1 Tab under tongue as needed for Chest Pain. 25 Tab 11   • cyclobenzaprine (FLEXERIL) 5 MG tablet Take 5 mg by mouth 3 times a day as needed.     • HYDROcodone-acetaminophen (NORCO) 5-325 MG Tab per tablet Take 1 Tab by mouth every 6 hours as needed (pain).     • carvedilol (COREG) 12.5 MG Tab Take 12.5 mg by mouth 2 times a day, with meals.     • Brimonidine Tartrate-Timolol (COMBIGAN) 0.2-0.5 % Solution Place 1 Drop in both eyes 2 Times a Day.     • pregabalin (LYRICA) 50 MG capsule Take 50 mg by mouth 2 times a day.     • bimatoprost (LUMIGAN) 0.03 % Solution Place 1 Drop in both eyes every evening.     • epoetin ivonne (EPOGEN,PROCRIT) 87064 UNIT/ML Solution Inject 1 mL as instructed every Monday, Wednesday, and Friday.  0     No current facility-administered medications on file prior to visit.        Social History   Substance Use Topics   • Smoking status: Never Smoker   • Smokeless tobacco: Never Used   • Alcohol use No        Past Medical History:   Diagnosis Date   • Arthritis     hands    • Atrial fibrillation (HCC)     HX   • Blood  "transfusion without reported diagnosis    • Breath shortness     w/exertion   • Cancer (HCC)     MDS in bones   • Cataract     bilat IOL   • Chronic anemia    • Chronic kidney disease        • Chronic obstructive pulmonary disease (HCC)    • Congestive heart failure (HCC)    • Dental disorder     full dentures   • Diabetes     Diet controlled   • D[facility] patient     [facility] [place]   • Glaucoma    • Heart abnormalities    • Hypertension    • MDS (myelodysplastic syndrome) 10/2016    bone marrow biopsy   • Pain -2017    \"bones\", generalized, 5/10   • Stroke (HCC) 03/2015    No residual weakness/problems   • Supplemental oxygen dependent     2 liters       Past Surgical History:   Procedure Laterality Date   • GASTROSCOPY N/A 1/25/2018    Procedure: GASTROSCOPY;  Surgeon: Blanca Santos M.D.;  [facility];  Service: Gastroenterology   • BONE MARROW BIOPSY, NDL/TROCAR  9/20/2017    Procedure: BONE MARROW BIOPSY, NDL/TROCAR;  Surgeon: Wade Turner M.D.;  Location: ENDOSCOPY [facility];  Service: Orthopedics   • BONE MARROW ASPIRATION  9/20/2017    Procedure: BONE MARROW ASPIRATION;  Surgeon: Wade Turner M.D.;  Location: ENDOSCOPY [facility];  Service: Orthopedics   • VEIN LIGATION Left 11/10/2016    Procedure: VEIN LIGATION FOR DISTAL REVASCULARIZATION AND INTERVAL LIGATION OF LEFT ARM DIALYISIS ACCESS (DRIL PROCEDURE);  Surgeon: Ranulfo Jolly M.D.;  Location: [facility];  Service:    • AV FISTULA CREATION Left 2/8/2016    Procedure: AV FISTULA CREATION UPPER EXTREMITY;  Surgeon: Ranulfo Jolly M.D.;  Location: SURGERY [facility];  Service:    • GASTROSCOPY  12/17/2015    Procedure: ESOPHAGOGASTRODUODENOSCOPY WITH BIOPSY;  Surgeon: Wing Álvarez M.D.;  Location: SURGERY [facility];  Service:    • COLONOSCOPY  12/17/2015    Procedure: COLONOSCOPY;  Surgeon: Wing Álvarez M.D.;  Location: SURGERY [facility];  Service: " "   • GYN SURGERY      hysterectomy   • GYN SURGERY       x 2   • GYN SURGERY      d&C x2   • OTHER      angioplasty/ stents bilat LE   • OTHER ABDOMINAL SURGERY      appendectomy,  x 2, hysterectomy, D & C   • OTHER ABDOMINAL SURGERY      appendectomy   • OTHER CARDIAC SURGERY      Angioplasty  and    • OTHER CARDIAC SURGERY      cardiac angiogram, angioplasty   • RETINAL DETACHMENT REPAIR Right        Allergies: Pioglitazone; Simvastatin; Darvocet [propoxyphene n-apap]; Demerol; Dilaudid [hydromorphone]; Diphenhydramine; Glucophage [metformin hydrochloride]; Iron; Lenalidomide; Morphine; Multivitamin; Naprosyn [naproxen]; Other drug; Oxycodone; Pcn [penicillins]; Requip; Sulfa drugs; Tramadol; and Trazodone    Family History   Problem Relation Age of Onset   • Hypertension Father          at 89   • Hypertension Mother        Vitals:    18 1536 18 1551   Height: 1.473 m (4' 10\")    Weight: 62.6 kg (138 lb)    Weight % change since last entry.: 0 %    BP: 144/70    Pulse: 74    BMI (Calculated): 28.84    Resp: 16    Temp: 36.3 °C (97.3 °F)    O2 Flow Rate (L/min):  4       Physical Examination  Physical Exam   Constitutional: She is oriented to person, place, and time. No distress.   HENT:   Head: Normocephalic and atraumatic.   Right Ear: External ear normal.   Left Ear: External ear normal.   Mouth/Throat: Oropharynx is clear and moist.   Eyes: Pupils are equal, round, and reactive to light. Conjunctivae and EOM are normal.   Neck: Normal range of motion. Neck supple. No JVD present. No tracheal deviation present. No thyromegaly present.   Cardiovascular: Normal rate, regular rhythm and intact distal pulses.  Exam reveals no gallop and no friction rub.    No murmur heard.  Pulmonary/Chest: No stridor. No respiratory distress. She has no wheezes. She has no rales. She exhibits no tenderness.   kyphotic   Abdominal: Soft. Bowel sounds are normal.   Musculoskeletal: She " exhibits no edema, tenderness or deformity.   Lymphadenopathy:     She has no cervical adenopathy.   Neurological: She is alert and oriented to person, place, and time. No cranial nerve deficit. Gait normal. Coordination normal. GCS score is 15.   Skin: Skin is warm and dry. No rash noted. She is not diaphoretic.   Psychiatric: Mood, affect and judgment normal.         Results:    Pulmonary function tests  Acceptable and Reproducible  FEV1 1.19 l (62%), FVC 54%, ratio 88%  Flow volume  TLC 3.06 l (73%)  DLCo corrected for hbg 11.33 ml/min/mmhg (84%)    Impression  Mild restrictive lung disease with amandeep DLCOl  Other pertinent testin18: no I or E and kyphotic    Multi-Ox Readings  Multi Ox #1 Room air   O2 sat % at rest 97   O2 sat % on exertion 87   O2 sat average on exertion     Multi Ox #2 5 LPM (pulsed)   O2 sat % at rest 100   O2 sat % on exertion 93   O2 sat average on exertion       Oxygen Use     Oxygen Frequency                                 Rosa Ruiz MD [facility]

## 2018-11-02 NOTE — PROCEDURES
Multi-Ox Readings  Multi Ox #1 Room air   O2 sat % at rest 97   O2 sat % on exertion 87   O2 sat average on exertion     Multi Ox #2 5 LPM (pulsed)   O2 sat % at rest 100   O2 sat % on exertion 93   O2 sat average on exertion       Oxygen Use     Oxygen Frequency 24/7   Duration of need     Is the patient mobile within the home?     CPAP Use?     BIPAP Use?     Servo Titration

## 2018-11-04 PROBLEM — R09.02 HYPOXEMIA: Status: ACTIVE | Noted: 2018-11-04

## 2018-11-04 ASSESSMENT — ENCOUNTER SYMPTOMS: SHORTNESS OF BREATH: 1

## 2018-11-04 NOTE — ASSESSMENT & PLAN NOTE
Patient needs and benefits from 5l pulsed dose oxygen  In evaluation of her chart and history and data do not think patient has COPD  I do think she has restrictive lung disease with hypoventilation  The restrictive lung disease is from kyphosis and the persistent atelactasis at the bases from hypoventilation and she is quite deconditioned and takes shallow breaths    In clinic a pulse ox was placed and room air and when it dropped below 89% pt asked to take deep breaths and her saturations came up to 97% RA and this happened repeatedly for 30+ minutes supporting hypoventilation.    With exertion she is Sob with minimal exertion and the combination of the above with severe deconditioning she does need oxygen with exertion and also at night as she is more than rossanaley hypoventilating more at night    All explained in detail to the patient and er care taker

## 2018-11-05 ENCOUNTER — TELEPHONE (OUTPATIENT)
Dept: PULMONOLOGY | Facility: HOSPICE | Age: 63
End: 2018-11-05

## 2018-11-05 NOTE — TELEPHONE ENCOUNTER
Patient's friend Kinza (ok to discuss pulm/sleep with)    [phone]    Requesting info on how to get pt smaller tanks    Will verify DME and send order as long as it is not [facility]

## 2018-11-06 NOTE — TELEPHONE ENCOUNTER
Patient uses [facility] for oxygen   I have verified pt is already on pulse dose but was looking for a POC    I have advised that [facility] does not carry them and with her high liter flow - the battery will not last as long as the small tanks she already has    Caregiver understands and will keep what they have

## 2018-11-13 ENCOUNTER — HOSPITAL ENCOUNTER (EMERGENCY)
Facility: MEDICAL CENTER | Age: 63
End: 2018-11-14
Attending: EMERGENCY MEDICINE
Payer: MEDICARE

## 2018-11-13 DIAGNOSIS — D63.1 ANEMIA DUE TO CHRONIC KIDNEY DISEASE, ON CHRONIC DIALYSIS (HCC): ICD-10-CM

## 2018-11-13 DIAGNOSIS — Z99.2 ANEMIA DUE TO CHRONIC KIDNEY DISEASE, ON CHRONIC DIALYSIS (HCC): ICD-10-CM

## 2018-11-13 DIAGNOSIS — D46.9 MYELODYSPLASIA (MYELODYSPLASTIC SYNDROME) (HCC): ICD-10-CM

## 2018-11-13 DIAGNOSIS — N18.6 ANEMIA DUE TO CHRONIC KIDNEY DISEASE, ON CHRONIC DIALYSIS (HCC): ICD-10-CM

## 2018-11-13 LAB
ABO GROUP BLD: NORMAL
ALBUMIN SERPL BCP-MCNC: 3.7 G/DL (ref 3.2–4.9)
ALBUMIN/GLOB SERPL: 1.2 G/DL
ALP SERPL-CCNC: 57 U/L (ref 30–99)
ALT SERPL-CCNC: 14 U/L (ref 2–50)
ANION GAP SERPL CALC-SCNC: 12 MMOL/L (ref 0–11.9)
ANISOCYTOSIS BLD QL SMEAR: ABNORMAL
AST SERPL-CCNC: 13 U/L (ref 12–45)
BARCODED ABORH UBTYP: 8400
BARCODED ABORH UBTYP: 8400
BARCODED PRD CODE UBPRD: NORMAL
BARCODED PRD CODE UBPRD: NORMAL
BARCODED UNIT NUM UBUNT: NORMAL
BARCODED UNIT NUM UBUNT: NORMAL
BASOPHILS # BLD AUTO: 0.4 % (ref 0–1.8)
BASOPHILS # BLD: 0.02 K/UL (ref 0–0.12)
BILIRUB SERPL-MCNC: 0.4 MG/DL (ref 0.1–1.5)
BLD GP AB SCN SERPL QL: NORMAL
BUN SERPL-MCNC: 61 MG/DL (ref 8–22)
CALCIUM SERPL-MCNC: 8.1 MG/DL (ref 8.5–10.5)
CHLORIDE SERPL-SCNC: 92 MMOL/L (ref 96–112)
CO2 SERPL-SCNC: 31 MMOL/L (ref 20–33)
COMMENT 1642: NORMAL
COMPONENT R 8504R: NORMAL
COMPONENT R 8504R: NORMAL
CREAT SERPL-MCNC: 6.91 MG/DL (ref 0.5–1.4)
EOSINOPHIL # BLD AUTO: 0.2 K/UL (ref 0–0.51)
EOSINOPHIL NFR BLD: 4.1 % (ref 0–6.9)
ERYTHROCYTE [DISTWIDTH] IN BLOOD BY AUTOMATED COUNT: 78.3 FL (ref 35.9–50)
GIANT PLATELETS BLD QL SMEAR: NORMAL
GLOBULIN SER CALC-MCNC: 3.1 G/DL (ref 1.9–3.5)
GLUCOSE SERPL-MCNC: 320 MG/DL (ref 65–99)
HCT VFR BLD AUTO: 20 % (ref 37–47)
HGB BLD-MCNC: 6.6 G/DL (ref 12–16)
IMM GRANULOCYTES # BLD AUTO: 0.02 K/UL (ref 0–0.11)
IMM GRANULOCYTES NFR BLD AUTO: 0.4 % (ref 0–0.9)
LYMPHOCYTES # BLD AUTO: 1.2 K/UL (ref 1–4.8)
LYMPHOCYTES NFR BLD: 24.7 % (ref 22–41)
MACROCYTES BLD QL SMEAR: ABNORMAL
MCH RBC QN AUTO: 34.7 PG (ref 27–33)
MCHC RBC AUTO-ENTMCNC: 33 G/DL (ref 33.6–35)
MCV RBC AUTO: 105.3 FL (ref 81.4–97.8)
MICROCYTES BLD QL SMEAR: ABNORMAL
MONOCYTES # BLD AUTO: 0.67 K/UL (ref 0–0.85)
MONOCYTES NFR BLD AUTO: 13.8 % (ref 0–13.4)
MORPHOLOGY BLD-IMP: NORMAL
NEUTROPHILS # BLD AUTO: 2.74 K/UL (ref 2–7.15)
NEUTROPHILS NFR BLD: 56.6 % (ref 44–72)
NRBC # BLD AUTO: 0 K/UL
NRBC BLD-RTO: 0 /100 WBC
PLATELET # BLD AUTO: 218 K/UL (ref 164–446)
PLATELET BLD QL SMEAR: NORMAL
PMV BLD AUTO: 13.3 FL (ref 9–12.9)
POLYCHROMASIA BLD QL SMEAR: NORMAL
POTASSIUM SERPL-SCNC: 4.2 MMOL/L (ref 3.6–5.5)
PRODUCT TYPE UPROD: NORMAL
PRODUCT TYPE UPROD: NORMAL
PROT SERPL-MCNC: 6.8 G/DL (ref 6–8.2)
RBC # BLD AUTO: 1.9 M/UL (ref 4.2–5.4)
RBC BLD AUTO: PRESENT
RH BLD: NORMAL
SODIUM SERPL-SCNC: 135 MMOL/L (ref 135–145)
UNIT STATUS USTAT: NORMAL
UNIT STATUS USTAT: NORMAL
WBC # BLD AUTO: 4.9 K/UL (ref 4.8–10.8)

## 2018-11-13 PROCEDURE — 36415 COLL VENOUS BLD VENIPUNCTURE: CPT

## 2018-11-13 PROCEDURE — 99285 EMERGENCY DEPT VISIT HI MDM: CPT

## 2018-11-13 PROCEDURE — P9016 RBC LEUKOCYTES REDUCED: HCPCS

## 2018-11-13 PROCEDURE — 86900 BLOOD TYPING SEROLOGIC ABO: CPT

## 2018-11-13 PROCEDURE — 36430 TRANSFUSION BLD/BLD COMPNT: CPT

## 2018-11-13 PROCEDURE — 86901 BLOOD TYPING SEROLOGIC RH(D): CPT

## 2018-11-13 PROCEDURE — 80053 COMPREHEN METABOLIC PANEL: CPT

## 2018-11-13 PROCEDURE — 86923 COMPATIBILITY TEST ELECTRIC: CPT | Mod: 91

## 2018-11-13 PROCEDURE — 96374 THER/PROPH/DIAG INJ IV PUSH: CPT

## 2018-11-13 PROCEDURE — 85025 COMPLETE CBC W/AUTO DIFF WBC: CPT

## 2018-11-13 PROCEDURE — 86850 RBC ANTIBODY SCREEN: CPT

## 2018-11-13 PROCEDURE — 700101 HCHG RX REV CODE 250: Performed by: EMERGENCY MEDICINE

## 2018-11-13 RX ORDER — LABETALOL HYDROCHLORIDE 5 MG/ML
20 INJECTION, SOLUTION INTRAVENOUS PRN
Status: DISCONTINUED | OUTPATIENT
Start: 2018-11-13 | End: 2018-11-14 | Stop reason: HOSPADM

## 2018-11-13 RX ADMIN — LABETALOL HYDROCHLORIDE 20 MG: 5 INJECTION, SOLUTION INTRAVENOUS at 20:36

## 2018-11-13 NOTE — DISCHARGE PLANNING
Outpatient Dialysis Note    Confirmed patient is active at:    [facility]  [address] [place] [address]      Schedule: Monday, Wednesday, Friday  Time: 10:30 am    Spoke with Dilcia at facility who confirmed. Last HD yesterday, 11/12/18.    Forwarded records for review.    Dialysis Coordinator, [facility]  Stacia Stephens [phone]

## 2018-11-13 NOTE — ED PROVIDER NOTES
"ED Provider Note    Scribed for Rakesh Reynoso M.D. by Radha Garzon. 11/13/2018  3:51 PM    Primary care provider: Nyla Nava M.D.  Means of arrival: ambulance  History obtained from: patient  History limited by: none    CHIEF COMPLAINT  Chief Complaint   Patient presents with   • Weakness       HPI  Vielka Briscoe is a 63 y.o. female who presents to the Emergency Department for evaluation of generalized weakness onset 5 days ago, worsening since onset. Patient was evaluated at her PCP earlier last week, and was found to be more anemic than her chronic anemic baseline. She was advised to come to the ED for blood transfusion by her Oncologist. Patient's anemia is not secondary to bleeding, but to her kidney dysfunction and bone marrow malignancy. Her kidney failure is secondary to diabetes and hypertension. Patient is a dialysis patient. MWF schedule.  History of myelodysplasia.  NO complaints of fever, cough, hematemesis, melena, hematochezia.    REVIEW OF SYSTEMS  Pertinent positives include: weakness.  Pertinent negatives include: fever, cough, hematemesis, melena, hematochezia.  10+ systems reviewed and negative.      PAST MEDICAL HISTORY  Past Medical History:   Diagnosis Date   • Arthritis     hands    • Atrial fibrillation (HCC)     HX   • Blood transfusion without reported diagnosis    • Breath shortness     w/exertion   • Cancer (HCC)     MDS in bones   • Cataract     bilat IOL   • Chronic anemia    • Chronic kidney disease        • Chronic obstructive pulmonary disease (HCC)    • Congestive heart failure (HCC)    • Dental disorder     full dentures   • Diabetes     Diet controlled   • D[facility] patient     [facility] [place]   • Glaucoma    • Heart abnormalities    • Hypertension    • MDS (myelodysplastic syndrome) 10/2016    bone marrow biopsy   • Pain -2017    \"bones\", generalized, 5/10   • Stroke (HCC) 03/2015    No residual weakness/problems   • Supplemental oxygen " dependent     2 liters       FAMILY HISTORY  Family History   Problem Relation Age of Onset   • Hypertension Father          at 89   • Hypertension Mother        SOCIAL HISTORY  Social History   Substance Use Topics   • Smoking status: Never Smoker   • Smokeless tobacco: Never Used   • Alcohol use No     History   Drug Use No       SURGICAL HISTORY  Past Surgical History:   Procedure Laterality Date   • GASTROSCOPY N/A 2018    Procedure: GASTROSCOPY;  Surgeon: Blanca Santos M.D.;  Location: SURGERY [facility];  Service: Gastroenterology   • BONE MARROW BIOPSY, NDL/TROCAR  2017    Procedure: BONE MARROW BIOPSY, NDL/TROCAR;  Surgeon: Wade Turner M.D.;  Location: ENDOSCOPY [facility];  Service: Orthopedics   • BONE MARROW ASPIRATION  2017    Procedure: BONE MARROW ASPIRATION;  Surgeon: Wade Turner M.D.;  Location: [facility];  Service: Orthopedics   • VEIN LIGATION Left 11/10/2016    Procedure: VEIN LIGATION FOR DISTAL REVASCULARIZATION AND INTERVAL LIGATION OF LEFT ARM DIALYISIS ACCESS (DRIL PROCEDURE);  Surgeon: Ranulfo Jolly M.D.;  Location: [facility];  Service:    • AV FISTULA CREATION Left 2016    Procedure: AV FISTULA CREATION UPPER EXTREMITY;  Surgeon: Ranulfo Jolly M.D.;  Location: [facility];  Service:    • GASTROSCOPY  2015    Procedure: ESOPHAGOGASTRODUODENOSCOPY WITH BIOPSY;  Surgeon: Wing Álvarez M.D.;  Location: [facility];  Service:    • COLONOSCOPY  2015    Procedure: COLONOSCOPY;  Surgeon: Wing Álvarez M.D.;  Location: [facility];  Service:    • GYN SURGERY      hysterectomy   • GYN SURGERY       x 2   • GYN SURGERY      d&C x2   • OTHER      angioplasty/ stents bilat LE   • OTHER ABDOMINAL SURGERY      appendectomy,  x 2, hysterectomy, D & C   • OTHER ABDOMINAL SURGERY      appendectomy   • OTHER CARDIAC SURGERY      Angioplasty  and    •  "OTHER CARDIAC SURGERY      cardiac angiogram, angioplasty   • RETINAL DETACHMENT REPAIR Right        CURRENT MEDICATIONS  Home Medications     Reviewed by Lenka Bazzi R.N. (Registered Nurse) on 11/13/18 at 1330  Med List Status: <None>   Medication Last Dose Status   amLODIPine (NORVASC) 10 MG Tab Taking Active   bimatoprost (LUMIGAN) 0.03 % Solution Taking Active   Brimonidine Tartrate-Timolol (COMBIGAN) 0.2-0.5 % Solution Taking Active   carvedilol (COREG) 12.5 MG Tab Taking Active   cyclobenzaprine (FLEXERIL) 5 MG tablet Taking Active   epoetin ivonne (EPOGEN,PROCRIT) 29380 UNIT/ML Solution  Active   HYDROcodone-acetaminophen (NORCO) 5-325 MG Tab per tablet Taking Active   Loperamide HCl (IMODIUM PO) Taking Active   losartan (COZAAR) 100 MG Tab Taking Active   nitroglycerin (NITROSTAT) 0.4 MG SL Tab Taking Active   pregabalin (LYRICA) 50 MG capsule Taking Active                ALLERGIES  Allergies   Allergen Reactions   • Pioglitazone Unspecified     Causes blindness   • Simvastatin Unspecified     Couldn't move   • Darvocet [Propoxyphene N-Apap] Vomiting   • Demerol Vomiting   • Dilaudid [Hydromorphone] Vomiting   • Diphenhydramine Vomiting   • Glucophage [Metformin Hydrochloride] Vomiting   • Iron Vomiting     vomiting   • Lenalidomide Vomiting   • Morphine Vomiting   • Multivitamin Itching     itching   • Naprosyn [Naproxen] Hives   • Other Drug Rash and Vomiting     Any binders that remove phosphorus from the body such as tums   • Oxycodone Vomiting   • Pcn [Penicillins] Vomiting          • Requip Vomiting   • Sulfa Drugs Rash and Vomiting     Vomiting & rash      • Tramadol Vomiting   • Trazodone Vomiting       PHYSICAL EXAM  VITAL SIGNS: /60   Pulse 85   Temp 36.7 °C (98.1 °F) (Temporal)   Resp 18   Ht 1.473 m (4' 10\")   Wt 63.1 kg (139 lb 1.8 oz)   LMP 01/01/1995   SpO2 98%   BMI 29.07 kg/m²   Reviewed and normal    Constitutional: Well developed, Well nourished,well appearing 63 year old " female.  HENT: Normocephalic, atraumatic, bilateral external ears normal, oropharynx moist, No exudates or erythema.   Eyes: PERRLA, conjunctiva pale, no scleral icterus.   Cardiovascular: Regular rate and rhythm. No murmurs, rubs or gallops.   Respiratory: Lungs clear to auscultation bilaterally. No wheezes, rales, or rhonchi.   Abdominal:  Abdomen soft, non-tender, non distended. No rebound, or guarding.    Skin: No erythema, no rash.   Genitourinary: No costovertebral angle tenderness.   Musculoskeletal: no edema. Patent fistula left arm  Neurologic: Alert & oriented x 3, cranial nerves 2-12 intact by passive exam.  No focal deficit noted.  Psychiatric: Affect normal, Judgment normal, Mood normal.     DIFFERENTIAL DIAGNOSIS:  Anemia, malignancy.    LABORATORY:  Results for orders placed or performed during the hospital encounter of 11/13/18   CBC WITH DIFFERENTIAL   Result Value Ref Range    Neutrophils-Polys 56.60 44.00 - 72.00 %    Lymphocytes 24.70 22.00 - 41.00 %    Monocytes 13.80 (H) 0.00 - 13.40 %    Eosinophils 4.10 0.00 - 6.90 %    Basophils 0.40 0.00 - 1.80 %    Immature Granulocytes 0.40 0.00 - 0.90 %    Nucleated RBC 0.00 /100 WBC    Neutrophils (Absolute) 2.74 2.00 - 7.15 K/uL    Lymphs (Absolute) 1.20 1.00 - 4.80 K/uL    Monos (Absolute) 0.67 0.00 - 0.85 K/uL    Eos (Absolute) 0.20 0.00 - 0.51 K/uL    Baso (Absolute) 0.02 0.00 - 0.12 K/uL    Immature Granulocytes (abs) 0.02 0.00 - 0.11 K/uL    NRBC (Absolute) 0.00 K/uL    WBC 4.9 4.8 - 10.8 K/uL    RBC 1.90 (L) 4.20 - 5.40 M/uL    Hemoglobin 6.6 (L) 12.0 - 16.0 g/dL    Hematocrit 20.0 (L) 37.0 - 47.0 %    .3 (H) 81.4 - 97.8 fL    MCH 34.7 (H) 27.0 - 33.0 pg    MCHC 33.0 (L) 33.6 - 35.0 g/dL    RDW 78.3 (H) 35.9 - 50.0 fL    Platelet Count 218 164 - 446 K/uL    MPV 13.3 (H) 9.0 - 12.9 fL    Anisocytosis 1+     Macrocytosis 1+     Microcytosis 1+    CMP   Result Value Ref Range    Sodium 135 135 - 145 mmol/L    Potassium 4.2 3.6 - 5.5 mmol/L     Chloride 92 (L) 96 - 112 mmol/L    Co2 31 20 - 33 mmol/L    Anion Gap 12.0 (H) 0.0 - 11.9    Glucose 320 (H) 65 - 99 mg/dL    Bun 61 (H) 8 - 22 mg/dL    Creatinine 6.91 (HH) 0.50 - 1.40 mg/dL    Calcium 8.1 (L) 8.5 - 10.5 mg/dL    AST(SGOT) 13 12 - 45 U/L    ALT(SGPT) 14 2 - 50 U/L    Alkaline Phosphatase 57 30 - 99 U/L    Total Bilirubin 0.4 0.1 - 1.5 mg/dL    Albumin 3.7 3.2 - 4.9 g/dL    Total Protein 6.8 6.0 - 8.2 g/dL    Globulin 3.1 1.9 - 3.5 g/dL    A-G Ratio 1.2 g/dL     Lab results reviewed by me.     INTERVENTIONS:  Medications   labetalol (NORMODYNE,TRANDATE) injection 20 mg (not administered)   2 units packed red blood cells.  I have explained to the  the risks and benefits of transfusion of blood products.  This includes, as appropriate, the risk of mild allergic reaction, hemolytic reaction, transfusion-associated lung injury, febrile reactions, circulatory or iron overload, and infection.    We discussed possible alternatives and their risks, including directed donation, autologous transfusion, and no transfusion, including IV or oral iron supplementation, as appropriate.  I believe the  understands the risks and benefits and was able to express understanding.    Response:NA.    ED COURSE:    3:51 PM - Patient seen and examined at bedside. She presents with normal vitals, for evaluation of generalized weakness that has been worsening over the last week. Ordered COD, estimated GFR, peripheral smear review, platelet estimate, morphology, CBC, CMP to evaluate. I informed the patient that I would evaluate with lab work and evaluate for possible need for blood transfusion. She understands and agrees with treatment plan.    5:25 PM I evaluated patient at bedside and informed her of her work up results. We discussed her need for a blood transfusion, explaining that she has received 2 units in the past, without any complications from being a dialysis patient. I explained that it should take several hours,  prior to this infusion completing, then she would be stable for discharge home. Patient understands and agrees with treatment plan.  I have explained to the patient the risks and benefits of transfusion of blood products.  This includes, as appropriate, the risk of mild allergic reaction, hemolytic reaction, transfusion-associated lung injury, febrile reactions, circulatory or iron overload, and infection.    We discussed possible alternatives and their risks, including directed donation, autologous transfusion, and no transfusion, including IV or oral iron supplementation, as appropriate.  I believe the patient understands the risks and benefits and was able to express understanding.    MEDICAL DECISION MAKING:  This patient presents with acute on chronic anemia due to renal failure and myelodysplasia.  She is symptomatic and requires transfusion.  Informed consent obtained.  There is no history of GI bleed.  There is no indication for admission.  She has elevated blood pressure due to underlying hypertension and renal failure.    PLAN:  anemia handout given  Return for syncope, shortness of breath, chest pain    Nyla Nava M.D.  [address] [place] [address] [id number] [phone]    Schedule an appointment as soon as possible for a visit   As needed      CONDITION: fair.     FINAL IMPRESSION  1. Anemia due to chronic kidney disease, on chronic dialysis (HCC)    2. Myelodysplasia (myelodysplastic syndrome) (HCC)          Radha KENNY (Ervin), am scribing for, and in the presence of, Rakesh Reynoso M.D..    Electronically signed by: Radha Garzon (Ervin), 11/13/2018    Rakesh KENNY M.D. personally performed the services described in this documentation, as scribed by Radha Garzon in my presence, and it is both accurate and complete.    The note accurately reflects work and decisions made by me.  Rakesh Reynoso  11/13/2018  8:06 PM

## 2018-11-13 NOTE — ED TRIAGE NOTES
History of chronic anemia, comes in with increasing weakness, told last Thursday her h/h was low.  Vitals stable.  She is alert and oriented.  No noted fevers.

## 2018-11-14 VITALS
WEIGHT: 139.11 LBS | OXYGEN SATURATION: 100 % | DIASTOLIC BLOOD PRESSURE: 66 MMHG | SYSTOLIC BLOOD PRESSURE: 194 MMHG | HEIGHT: 58 IN | RESPIRATION RATE: 16 BRPM | TEMPERATURE: 98.1 F | HEART RATE: 71 BPM | BODY MASS INDEX: 29.2 KG/M2

## 2018-11-14 NOTE — ED NOTES
Cab called by this RN- will arrive shortly to bring patient home. Patient wheeled to front by tech. All safety maintained.

## 2018-11-14 NOTE — DISCHARGE PLANNING
Medical Social Work    Referral: Transportation Assitance    Intervention: MSW received a call from bedside RN that pt is in need of a ride home: [address] [place].  MSW is familiar with pt and pt has no other way to get home.  Pt does not qualify for MTM, has no one to pick her up and cannot pay for a cab.  MSW provided bedside RN with a cab voucher (# [phone]) for pt to return home safely.    Plan: Pt to D/C home via cab once medically cleared.

## 2018-11-14 NOTE — ED NOTES
Assumed care. Patient sleeping, easily arousable. RR even and unlabored, appears in NAD. No needs. All safety maintained.

## 2018-11-15 ENCOUNTER — PATIENT OUTREACH (OUTPATIENT)
Dept: HEALTH INFORMATION MANAGEMENT | Facility: OTHER | Age: 63
End: 2018-11-15

## 2018-11-21 RX ORDER — ACETAMINOPHEN 325 MG/1
650 TABLET ORAL PRN
Status: CANCELLED | OUTPATIENT
Start: 2018-11-21

## 2018-11-21 RX ORDER — LIDOCAINE HYDROCHLORIDE 10 MG/ML
20 INJECTION, SOLUTION INFILTRATION; PERINEURAL
Status: CANCELLED | OUTPATIENT
Start: 2018-11-21

## 2018-11-21 RX ORDER — SODIUM CHLORIDE 9 MG/ML
500 INJECTION, SOLUTION INTRAVENOUS ONCE
Status: CANCELLED | OUTPATIENT
Start: 2018-11-21 | End: 2018-11-21

## 2018-11-21 RX ORDER — DIPHENHYDRAMINE HCL 25 MG
25 TABLET ORAL ONCE
Status: CANCELLED | OUTPATIENT
Start: 2018-11-21 | End: 2018-11-21

## 2018-11-21 RX ORDER — ACETAMINOPHEN 325 MG/1
650 TABLET ORAL ONCE
Status: CANCELLED | OUTPATIENT
Start: 2018-11-21 | End: 2018-11-21

## 2018-12-08 ENCOUNTER — OUTPATIENT INFUSION SERVICES (OUTPATIENT)
Dept: ONCOLOGY | Facility: MEDICAL CENTER | Age: 63
End: 2018-12-08
Attending: INTERNAL MEDICINE
Payer: MEDICARE

## 2018-12-08 VITALS
HEIGHT: 57 IN | TEMPERATURE: 97.6 F | RESPIRATION RATE: 18 BRPM | WEIGHT: 141.98 LBS | HEART RATE: 69 BPM | DIASTOLIC BLOOD PRESSURE: 53 MMHG | BODY MASS INDEX: 30.63 KG/M2 | SYSTOLIC BLOOD PRESSURE: 200 MMHG | OXYGEN SATURATION: 100 %

## 2018-12-08 DIAGNOSIS — D46.9 MYELODYSPLASIA (MYELODYSPLASTIC SYNDROME) (HCC): ICD-10-CM

## 2018-12-08 DIAGNOSIS — D64.9 ANEMIA REQUIRING TRANSFUSIONS: ICD-10-CM

## 2018-12-08 LAB
ABO GROUP BLD: NORMAL
ANISOCYTOSIS BLD QL SMEAR: ABNORMAL
BARCODED ABORH UBTYP: 9500
BARCODED ABORH UBTYP: 9500
BARCODED PRD CODE UBPRD: NORMAL
BARCODED PRD CODE UBPRD: NORMAL
BARCODED UNIT NUM UBUNT: NORMAL
BARCODED UNIT NUM UBUNT: NORMAL
BASOPHILS # BLD AUTO: 0.4 % (ref 0–1.8)
BASOPHILS # BLD: 0.02 K/UL (ref 0–0.12)
BLD GP AB SCN SERPL QL: NORMAL
COMMENT 1642: NORMAL
COMPONENT R 8504R: NORMAL
COMPONENT RCI 8504RCI: NORMAL
EOSINOPHIL # BLD AUTO: 0.17 K/UL (ref 0–0.51)
EOSINOPHIL NFR BLD: 3.5 % (ref 0–6.9)
ERYTHROCYTE [DISTWIDTH] IN BLOOD BY AUTOMATED COUNT: 76.6 FL (ref 35.9–50)
HCT VFR BLD AUTO: 19.9 % (ref 37–47)
HGB BLD-MCNC: 6.5 G/DL (ref 12–16)
IMM GRANULOCYTES # BLD AUTO: 0.03 K/UL (ref 0–0.11)
IMM GRANULOCYTES NFR BLD AUTO: 0.6 % (ref 0–0.9)
LG PLATELETS BLD QL SMEAR: NORMAL
LYMPHOCYTES # BLD AUTO: 0.97 K/UL (ref 1–4.8)
LYMPHOCYTES NFR BLD: 20.2 % (ref 22–41)
MACROCYTES BLD QL SMEAR: ABNORMAL
MCH RBC QN AUTO: 34.4 PG (ref 27–33)
MCHC RBC AUTO-ENTMCNC: 32.7 G/DL (ref 33.6–35)
MCV RBC AUTO: 105.3 FL (ref 81.4–97.8)
MICROCYTES BLD QL SMEAR: ABNORMAL
MONOCYTES # BLD AUTO: 0.51 K/UL (ref 0–0.85)
MONOCYTES NFR BLD AUTO: 10.6 % (ref 0–13.4)
MORPHOLOGY BLD-IMP: NORMAL
NEUTROPHILS # BLD AUTO: 3.11 K/UL (ref 2–7.15)
NEUTROPHILS NFR BLD: 64.7 % (ref 44–72)
NRBC # BLD AUTO: 0 K/UL
NRBC BLD-RTO: 0 /100 WBC
PLATELET # BLD AUTO: 205 K/UL (ref 164–446)
PLATELET BLD QL SMEAR: NORMAL
PMV BLD AUTO: 13.4 FL (ref 9–12.9)
POLYCHROMASIA BLD QL SMEAR: NORMAL
PRODUCT TYPE UPROD: NORMAL
PRODUCT TYPE UPROD: NORMAL
RBC # BLD AUTO: 1.89 M/UL (ref 4.2–5.4)
RBC BLD AUTO: PRESENT
RH BLD: NORMAL
UNIT STATUS USTAT: NORMAL
UNIT STATUS USTAT: NORMAL
WBC # BLD AUTO: 4.8 K/UL (ref 4.8–10.8)

## 2018-12-08 PROCEDURE — A9270 NON-COVERED ITEM OR SERVICE: HCPCS | Performed by: INTERNAL MEDICINE

## 2018-12-08 PROCEDURE — 86644 CMV ANTIBODY: CPT | Mod: 91

## 2018-12-08 PROCEDURE — 85025 COMPLETE CBC W/AUTO DIFF WBC: CPT

## 2018-12-08 PROCEDURE — 86900 BLOOD TYPING SEROLOGIC ABO: CPT

## 2018-12-08 PROCEDURE — 306780 HCHG STAT FOR TRANSFUSION PER CASE

## 2018-12-08 PROCEDURE — 36415 COLL VENOUS BLD VENIPUNCTURE: CPT

## 2018-12-08 PROCEDURE — 86901 BLOOD TYPING SEROLOGIC RH(D): CPT

## 2018-12-08 PROCEDURE — 700102 HCHG RX REV CODE 250 W/ 637 OVERRIDE(OP): Performed by: INTERNAL MEDICINE

## 2018-12-08 PROCEDURE — 86850 RBC ANTIBODY SCREEN: CPT

## 2018-12-08 PROCEDURE — 86923 COMPATIBILITY TEST ELECTRIC: CPT

## 2018-12-08 PROCEDURE — P9016 RBC LEUKOCYTES REDUCED: HCPCS | Mod: 91

## 2018-12-08 PROCEDURE — 36430 TRANSFUSION BLD/BLD COMPNT: CPT

## 2018-12-08 RX ORDER — ACETAMINOPHEN 325 MG/1
650 TABLET ORAL ONCE
Status: CANCELLED | OUTPATIENT
Start: 2018-12-08 | End: 2018-12-08

## 2018-12-08 RX ORDER — LIDOCAINE HYDROCHLORIDE 10 MG/ML
20 INJECTION, SOLUTION INFILTRATION; PERINEURAL
Status: CANCELLED | OUTPATIENT
Start: 2018-12-08

## 2018-12-08 RX ORDER — DIPHENHYDRAMINE HCL 25 MG
25 TABLET ORAL ONCE
Status: DISCONTINUED | OUTPATIENT
Start: 2018-12-08 | End: 2018-12-08 | Stop reason: HOSPADM

## 2018-12-08 RX ORDER — DIPHENHYDRAMINE HCL 25 MG
25 TABLET ORAL ONCE
Status: CANCELLED | OUTPATIENT
Start: 2018-12-08 | End: 2018-12-08

## 2018-12-08 RX ORDER — ACETAMINOPHEN 325 MG/1
650 TABLET ORAL ONCE
Status: COMPLETED | OUTPATIENT
Start: 2018-12-08 | End: 2018-12-08

## 2018-12-08 RX ORDER — ACETAMINOPHEN 325 MG/1
650 TABLET ORAL PRN
Status: CANCELLED | OUTPATIENT
Start: 2018-12-08

## 2018-12-08 RX ORDER — SODIUM CHLORIDE 9 MG/ML
500 INJECTION, SOLUTION INTRAVENOUS ONCE
Status: CANCELLED | OUTPATIENT
Start: 2018-12-08 | End: 2018-12-08

## 2018-12-08 RX ADMIN — ACETAMINOPHEN 650 MG: 325 TABLET ORAL at 11:28

## 2018-12-08 ASSESSMENT — PAIN SCALES - GENERAL: PAINLEVEL_OUTOF10: 6

## 2018-12-09 NOTE — PROGRESS NOTES
Pt presented to infusion center for blood transfusion. PIV started with difficulty, labs drawn as ordered. Pre-med given, pt refuses benadryl d/t allergy. 2 units blood transfused. BP high upon completion, recommended pt go to ER. She refused, said BP is over 200 almost every morning, then goes down when she takes her meds. She also reported this happens often with her blood transfusion. She has due BP meds tonight, said she will take them when she gets home. She was asymptomatic, reinforced s/s of stroke and told pt to call 911 with any of the symptoms, she verbalized understanding.  PIV flushed and removed, gauze drsg placed. Pt to return q 2 weeks, left on foot in good spirits in care of friend, has a ride.

## 2018-12-20 LAB
EKG IMPRESSION: NORMAL
EKG IMPRESSION: NORMAL

## 2018-12-20 RX ORDER — SODIUM CHLORIDE 9 MG/ML
500 INJECTION, SOLUTION INTRAVENOUS ONCE
Status: CANCELLED | OUTPATIENT
Start: 2018-12-22 | End: 2018-12-22

## 2018-12-20 RX ORDER — ACETAMINOPHEN 325 MG/1
650 TABLET ORAL ONCE
Status: CANCELLED | OUTPATIENT
Start: 2018-12-22 | End: 2018-12-22

## 2018-12-20 RX ORDER — ACETAMINOPHEN 325 MG/1
650 TABLET ORAL PRN
Status: CANCELLED | OUTPATIENT
Start: 2018-12-22

## 2018-12-20 RX ORDER — DIPHENHYDRAMINE HCL 25 MG
25 TABLET ORAL ONCE
Status: CANCELLED | OUTPATIENT
Start: 2018-12-22 | End: 2018-12-22

## 2018-12-20 RX ORDER — LIDOCAINE HYDROCHLORIDE 10 MG/ML
20 INJECTION, SOLUTION INFILTRATION; PERINEURAL
Status: CANCELLED | OUTPATIENT
Start: 2018-12-22

## 2018-12-21 NOTE — ED NOTES
Med rec complete per Pt at bedside  Allergies reviewed  No ABX in last 30 days   DC instructions/pt educated by MD

## 2018-12-22 ENCOUNTER — OUTPATIENT INFUSION SERVICES (OUTPATIENT)
Dept: ONCOLOGY | Facility: MEDICAL CENTER | Age: 63
End: 2018-12-22
Attending: INTERNAL MEDICINE
Payer: MEDICARE

## 2018-12-22 VITALS
RESPIRATION RATE: 18 BRPM | BODY MASS INDEX: 30.44 KG/M2 | DIASTOLIC BLOOD PRESSURE: 51 MMHG | WEIGHT: 141.09 LBS | TEMPERATURE: 97.7 F | HEART RATE: 70 BPM | OXYGEN SATURATION: 100 % | HEIGHT: 57 IN | SYSTOLIC BLOOD PRESSURE: 135 MMHG

## 2018-12-22 DIAGNOSIS — D46.9 MYELODYSPLASIA (MYELODYSPLASTIC SYNDROME) (HCC): ICD-10-CM

## 2018-12-22 LAB
ABO GROUP BLD: NORMAL
ANISOCYTOSIS BLD QL SMEAR: ABNORMAL
BASOPHILS # BLD AUTO: 0.5 % (ref 0–1.8)
BASOPHILS # BLD: 0.02 K/UL (ref 0–0.12)
BLD GP AB SCN SERPL QL: NORMAL
COMMENT 1642: NORMAL
EOSINOPHIL # BLD AUTO: 0.19 K/UL (ref 0–0.51)
EOSINOPHIL NFR BLD: 4.4 % (ref 0–6.9)
ERYTHROCYTE [DISTWIDTH] IN BLOOD BY AUTOMATED COUNT: 70.1 FL (ref 35.9–50)
HCT VFR BLD AUTO: 24.6 % (ref 37–47)
HGB BLD-MCNC: 8.3 G/DL (ref 12–16)
IMM GRANULOCYTES # BLD AUTO: 0.02 K/UL (ref 0–0.11)
IMM GRANULOCYTES NFR BLD AUTO: 0.5 % (ref 0–0.9)
LYMPHOCYTES # BLD AUTO: 0.78 K/UL (ref 1–4.8)
LYMPHOCYTES NFR BLD: 17.9 % (ref 22–41)
MACROCYTES BLD QL SMEAR: ABNORMAL
MCH RBC QN AUTO: 32.7 PG (ref 27–33)
MCHC RBC AUTO-ENTMCNC: 33.7 G/DL (ref 33.6–35)
MCV RBC AUTO: 96.9 FL (ref 81.4–97.8)
MONOCYTES # BLD AUTO: 0.53 K/UL (ref 0–0.85)
MONOCYTES NFR BLD AUTO: 12.2 % (ref 0–13.4)
MORPHOLOGY BLD-IMP: NORMAL
NEUTROPHILS # BLD AUTO: 2.82 K/UL (ref 2–7.15)
NEUTROPHILS NFR BLD: 64.5 % (ref 44–72)
NRBC # BLD AUTO: 0 K/UL
NRBC BLD-RTO: 0 /100 WBC
OVALOCYTES BLD QL SMEAR: NORMAL
PLATELET # BLD AUTO: 142 K/UL (ref 164–446)
PLATELET BLD QL SMEAR: NORMAL
PMV BLD AUTO: 13.7 FL (ref 9–12.9)
POIKILOCYTOSIS BLD QL SMEAR: NORMAL
POLYCHROMASIA BLD QL SMEAR: NORMAL
RBC # BLD AUTO: 2.54 M/UL (ref 4.2–5.4)
RBC BLD AUTO: PRESENT
RH BLD: NORMAL
WBC # BLD AUTO: 4.4 K/UL (ref 4.8–10.8)

## 2018-12-22 PROCEDURE — 85025 COMPLETE CBC W/AUTO DIFF WBC: CPT

## 2018-12-22 PROCEDURE — 86901 BLOOD TYPING SEROLOGIC RH(D): CPT

## 2018-12-22 PROCEDURE — 86900 BLOOD TYPING SEROLOGIC ABO: CPT

## 2018-12-22 PROCEDURE — 86850 RBC ANTIBODY SCREEN: CPT

## 2018-12-22 PROCEDURE — 36415 COLL VENOUS BLD VENIPUNCTURE: CPT

## 2018-12-22 RX ORDER — DIPHENHYDRAMINE HCL 25 MG
25 TABLET ORAL ONCE
Status: CANCELLED | OUTPATIENT
Start: 2018-12-22 | End: 2018-12-22

## 2018-12-22 RX ORDER — SODIUM CHLORIDE 9 MG/ML
500 INJECTION, SOLUTION INTRAVENOUS ONCE
Status: CANCELLED | OUTPATIENT
Start: 2018-12-22 | End: 2018-12-22

## 2018-12-22 RX ORDER — ACETAMINOPHEN 325 MG/1
650 TABLET ORAL PRN
Status: CANCELLED | OUTPATIENT
Start: 2018-12-22

## 2018-12-22 RX ORDER — LIDOCAINE HYDROCHLORIDE 10 MG/ML
20 INJECTION, SOLUTION INFILTRATION; PERINEURAL
Status: CANCELLED | OUTPATIENT
Start: 2018-12-22

## 2018-12-22 RX ORDER — ACETAMINOPHEN 325 MG/1
650 TABLET ORAL ONCE
Status: CANCELLED | OUTPATIENT
Start: 2018-12-22 | End: 2018-12-22

## 2018-12-22 ASSESSMENT — PAIN SCALES - GENERAL: PAINLEVEL_OUTOF10: 5

## 2018-12-22 NOTE — PROGRESS NOTES
Pt returns to infusion center for CBC and possible blood transfusion.  PIV established with some difficulty.  CBC results reviewed; no need for transfusion today.  PIV flushed with saline, removed, gauze dressing placed.  Pt left infusion center ambulatory and in good condition.  Returns in two weeks, appointment scheduled.

## 2019-01-05 ENCOUNTER — OUTPATIENT INFUSION SERVICES (OUTPATIENT)
Dept: ONCOLOGY | Facility: MEDICAL CENTER | Age: 64
End: 2019-01-05
Attending: INTERNAL MEDICINE
Payer: MEDICARE

## 2019-01-05 VITALS
DIASTOLIC BLOOD PRESSURE: 57 MMHG | BODY MASS INDEX: 30.06 KG/M2 | HEIGHT: 57 IN | TEMPERATURE: 97.4 F | OXYGEN SATURATION: 100 % | RESPIRATION RATE: 18 BRPM | HEART RATE: 67 BPM | WEIGHT: 139.33 LBS | SYSTOLIC BLOOD PRESSURE: 186 MMHG

## 2019-01-05 DIAGNOSIS — D46.9 MYELODYSPLASIA (MYELODYSPLASTIC SYNDROME) (HCC): ICD-10-CM

## 2019-01-05 LAB
ABO GROUP BLD: NORMAL
ANISOCYTOSIS BLD QL SMEAR: ABNORMAL
BARCODED ABORH UBTYP: 5100
BARCODED ABORH UBTYP: 600
BARCODED PRD CODE UBPRD: NORMAL
BARCODED PRD CODE UBPRD: NORMAL
BARCODED UNIT NUM UBUNT: NORMAL
BARCODED UNIT NUM UBUNT: NORMAL
BASOPHILS # BLD AUTO: 0.5 % (ref 0–1.8)
BASOPHILS # BLD: 0.02 K/UL (ref 0–0.12)
BLD GP AB SCN SERPL QL: NORMAL
COMMENT 1642: NORMAL
COMPONENT R 8504R: NORMAL
COMPONENT R 8504R: NORMAL
EOSINOPHIL # BLD AUTO: 0.15 K/UL (ref 0–0.51)
EOSINOPHIL NFR BLD: 3.4 % (ref 0–6.9)
ERYTHROCYTE [DISTWIDTH] IN BLOOD BY AUTOMATED COUNT: 75.6 FL (ref 35.9–50)
GIANT PLATELETS BLD QL SMEAR: NORMAL
HCT VFR BLD AUTO: 19.7 % (ref 37–47)
HGB BLD-MCNC: 6.8 G/DL (ref 12–16)
IMM GRANULOCYTES # BLD AUTO: 0.04 K/UL (ref 0–0.11)
IMM GRANULOCYTES NFR BLD AUTO: 0.9 % (ref 0–0.9)
LYMPHOCYTES # BLD AUTO: 0.78 K/UL (ref 1–4.8)
LYMPHOCYTES NFR BLD: 17.7 % (ref 22–41)
MACROCYTES BLD QL SMEAR: ABNORMAL
MCH RBC QN AUTO: 34.5 PG (ref 27–33)
MCHC RBC AUTO-ENTMCNC: 34.5 G/DL (ref 33.6–35)
MCV RBC AUTO: 100 FL (ref 81.4–97.8)
MICROCYTES BLD QL SMEAR: ABNORMAL
MONOCYTES # BLD AUTO: 0.53 K/UL (ref 0–0.85)
MONOCYTES NFR BLD AUTO: 12 % (ref 0–13.4)
MORPHOLOGY BLD-IMP: NORMAL
NEUTROPHILS # BLD AUTO: 2.89 K/UL (ref 2–7.15)
NEUTROPHILS NFR BLD: 65.5 % (ref 44–72)
NRBC # BLD AUTO: 0 K/UL
NRBC BLD-RTO: 0 /100 WBC
OVALOCYTES BLD QL SMEAR: NORMAL
PLATELET # BLD AUTO: 122 K/UL (ref 164–446)
PLATELET BLD QL SMEAR: NORMAL
PMV BLD AUTO: 14.2 FL (ref 9–12.9)
PRODUCT TYPE UPROD: NORMAL
PRODUCT TYPE UPROD: NORMAL
RBC # BLD AUTO: 1.97 M/UL (ref 4.2–5.4)
RBC BLD AUTO: PRESENT
RH BLD: NORMAL
SCHISTOCYTES BLD QL SMEAR: NORMAL
UNIT STATUS USTAT: NORMAL
UNIT STATUS USTAT: NORMAL
WBC # BLD AUTO: 4.4 K/UL (ref 4.8–10.8)

## 2019-01-05 PROCEDURE — 86850 RBC ANTIBODY SCREEN: CPT

## 2019-01-05 PROCEDURE — 86644 CMV ANTIBODY: CPT

## 2019-01-05 PROCEDURE — 86923 COMPATIBILITY TEST ELECTRIC: CPT | Mod: 91

## 2019-01-05 PROCEDURE — 86901 BLOOD TYPING SEROLOGIC RH(D): CPT

## 2019-01-05 PROCEDURE — 85025 COMPLETE CBC W/AUTO DIFF WBC: CPT

## 2019-01-05 PROCEDURE — P9016 RBC LEUKOCYTES REDUCED: HCPCS | Mod: 91

## 2019-01-05 PROCEDURE — 36430 TRANSFUSION BLD/BLD COMPNT: CPT

## 2019-01-05 PROCEDURE — 36415 COLL VENOUS BLD VENIPUNCTURE: CPT

## 2019-01-05 PROCEDURE — 306780 HCHG STAT FOR TRANSFUSION PER CASE

## 2019-01-05 PROCEDURE — 86900 BLOOD TYPING SEROLOGIC ABO: CPT

## 2019-01-05 RX ORDER — BUPRENORPHINE 5 UG/H
PATCH TRANSDERMAL
COMMUNITY
End: 2019-02-16

## 2019-01-05 RX ORDER — DIPHENHYDRAMINE HCL 25 MG
25 TABLET ORAL ONCE
Status: DISCONTINUED | OUTPATIENT
Start: 2019-01-05 | End: 2019-01-05 | Stop reason: HOSPADM

## 2019-01-05 RX ORDER — SODIUM CHLORIDE 9 MG/ML
500 INJECTION, SOLUTION INTRAVENOUS ONCE
Status: CANCELLED | OUTPATIENT
Start: 2019-01-05 | End: 2019-01-05

## 2019-01-05 RX ORDER — ACETAMINOPHEN 325 MG/1
650 TABLET ORAL ONCE
Status: CANCELLED | OUTPATIENT
Start: 2019-01-05 | End: 2019-01-05

## 2019-01-05 RX ORDER — ACETAMINOPHEN 325 MG/1
650 TABLET ORAL PRN
Status: CANCELLED | OUTPATIENT
Start: 2019-01-05

## 2019-01-05 RX ORDER — LIDOCAINE HYDROCHLORIDE 10 MG/ML
20 INJECTION, SOLUTION INFILTRATION; PERINEURAL
Status: CANCELLED | OUTPATIENT
Start: 2019-01-05

## 2019-01-05 RX ORDER — ACETAMINOPHEN 325 MG/1
650 TABLET ORAL ONCE
Status: DISCONTINUED | OUTPATIENT
Start: 2019-01-05 | End: 2019-01-05 | Stop reason: HOSPADM

## 2019-01-05 RX ORDER — DIPHENHYDRAMINE HCL 25 MG
25 TABLET ORAL ONCE
Status: CANCELLED | OUTPATIENT
Start: 2019-01-05 | End: 2019-01-05

## 2019-01-05 ASSESSMENT — PAIN SCALES - GENERAL: PAINLEVEL_OUTOF10: 5

## 2019-01-05 NOTE — PROGRESS NOTES
Returns for lab and possible blood.  Reports saw MD end of Dec and Hgb 7.8, reports was told to go to E.D, but declined as prefers to be tx in Infusion.  Lab drawn and sent.  Meets parameters for blood.  Await cod.

## 2019-01-15 NOTE — DISCHARGE INSTRUCTIONS
Discharge Instructions    Discharged to home by car with relative. Discharged via wheelchair, hospital escort: Yes.  Special equipment needed: Not Applicable    Be sure to schedule a follow-up appointment with your primary care doctor or any specialists as instructed.     Discharge Plan:   Diet Plan: Discussed  Activity Level: Discussed  Confirmed Follow up Appointment: Patient to Call and Schedule Appointment  Confirmed Symptoms Management: Discussed  Medication Reconciliation Updated: Yes  Influenza Vaccine Indication: Not indicated: Previously immunized this influenza season and > 8 years of age    I understand that a diet low in cholesterol, fat, and sodium is recommended for good health. Unless I have been given specific instructions below for another diet, I accept this instruction as my diet prescription.   Other diet:     Special Instructions: None    · Is patient discharged on Warfarin / Coumadin?   No     · Is patient Post Blood Transfusion?  No    Depression / Suicide Risk    As you are discharged from this [facility] facility, it is important to learn how to keep safe from harming yourself.    Recognize the warning signs:  · Abrupt changes in personality, positive or negative- including increase in energy   · Giving away possessions  · Change in eating patterns- significant weight changes-  positive or negative  · Change in sleeping patterns- unable to sleep or sleeping all the time   · Unwillingness or inability to communicate  · Depression  · Unusual sadness, discouragement and loneliness  · Talk of wanting to die  · Neglect of personal appearance   · Rebelliousness- reckless behavior  · Withdrawal from people/activities they love  · Confusion- inability to concentrate     If you or a loved one observes any of these behaviors or has concerns about self-harm, here's what you can do:  · Talk about it- your feelings and reasons for harming yourself  · Remove any means that you might use to hurt  yourself (examples: pills, rope, extension cords, firearm)  · Get professional help from the community (Mental Health, Substance Abuse, psychological counseling)  · Do not be alone:Call your Safe Contact- someone whom you trust who will be there for you.  · Call your local CRISIS HOTLINE 854-3716 or 971-501-8084  · Call your local Children's Mobile Crisis Response Team [place] (629) 024-1351 or www.[employer]  · Call the toll free National Suicide Prevention Hotlines   · National Suicide Prevention Lifeline 940-939-NDDQ (4843)  · National Hope Line Network 800-SUICIDE (919-9128)         multifactorial  no overt s/s gib  check coags  serial cbc, transfuse prn  rec ppi  check iron studies  fobt pending  heme/onc eval  will follow

## 2019-01-19 ENCOUNTER — APPOINTMENT (OUTPATIENT)
Dept: ONCOLOGY | Facility: MEDICAL CENTER | Age: 64
End: 2019-01-19
Attending: INTERNAL MEDICINE
Payer: MEDICARE

## 2019-01-26 ENCOUNTER — OUTPATIENT INFUSION SERVICES (OUTPATIENT)
Dept: ONCOLOGY | Facility: MEDICAL CENTER | Age: 64
End: 2019-01-26
Attending: INTERNAL MEDICINE
Payer: MEDICARE

## 2019-01-26 VITALS
HEART RATE: 68 BPM | SYSTOLIC BLOOD PRESSURE: 171 MMHG | RESPIRATION RATE: 16 BRPM | OXYGEN SATURATION: 100 % | HEIGHT: 57 IN | TEMPERATURE: 97.2 F | BODY MASS INDEX: 30.68 KG/M2 | DIASTOLIC BLOOD PRESSURE: 66 MMHG | WEIGHT: 142.2 LBS

## 2019-01-26 DIAGNOSIS — D64.9 ANEMIA REQUIRING TRANSFUSIONS: ICD-10-CM

## 2019-01-26 DIAGNOSIS — D46.9 MYELODYSPLASIA (MYELODYSPLASTIC SYNDROME) (HCC): ICD-10-CM

## 2019-01-26 LAB
ABO GROUP BLD: NORMAL
ANISOCYTOSIS BLD QL SMEAR: ABNORMAL
BARCODED ABORH UBTYP: 5100
BARCODED ABORH UBTYP: 600
BARCODED PRD CODE UBPRD: NORMAL
BARCODED PRD CODE UBPRD: NORMAL
BARCODED UNIT NUM UBUNT: NORMAL
BARCODED UNIT NUM UBUNT: NORMAL
BASOPHILS # BLD AUTO: 0.9 % (ref 0–1.8)
BASOPHILS # BLD: 0.04 K/UL (ref 0–0.12)
BLD GP AB SCN SERPL QL: NORMAL
COMPONENT R 8504R: NORMAL
COMPONENT R 8504R: NORMAL
EOSINOPHIL # BLD AUTO: 0.09 K/UL (ref 0–0.51)
EOSINOPHIL NFR BLD: 1.8 % (ref 0–6.9)
ERYTHROCYTE [DISTWIDTH] IN BLOOD BY AUTOMATED COUNT: 78.8 FL (ref 35.9–50)
HCT VFR BLD AUTO: 18.9 % (ref 37–47)
HGB BLD-MCNC: 6.1 G/DL (ref 12–16)
LG PLATELETS BLD QL SMEAR: NORMAL
LYMPHOCYTES # BLD AUTO: 0.78 K/UL (ref 1–4.8)
LYMPHOCYTES NFR BLD: 15.9 % (ref 22–41)
MACROCYTES BLD QL SMEAR: ABNORMAL
MANUAL DIFF BLD: NORMAL
MCH RBC QN AUTO: 34.1 PG (ref 27–33)
MCHC RBC AUTO-ENTMCNC: 32.3 G/DL (ref 33.6–35)
MCV RBC AUTO: 105.6 FL (ref 81.4–97.8)
MICROCYTES BLD QL SMEAR: ABNORMAL
MONOCYTES # BLD AUTO: 0.61 K/UL (ref 0–0.85)
MONOCYTES NFR BLD AUTO: 12.4 % (ref 0–13.4)
MORPHOLOGY BLD-IMP: NORMAL
NEUTROPHILS # BLD AUTO: 3.38 K/UL (ref 2–7.15)
NEUTROPHILS NFR BLD: 69 % (ref 44–72)
NRBC # BLD AUTO: 0 K/UL
NRBC BLD-RTO: 0 /100 WBC
PLATELET # BLD AUTO: 155 K/UL (ref 164–446)
PLATELET BLD QL SMEAR: NORMAL
PMV BLD AUTO: 13.2 FL (ref 9–12.9)
PRODUCT TYPE UPROD: NORMAL
PRODUCT TYPE UPROD: NORMAL
RBC # BLD AUTO: 1.79 M/UL (ref 4.2–5.4)
RBC BLD AUTO: PRESENT
RH BLD: NORMAL
UNIT STATUS USTAT: NORMAL
UNIT STATUS USTAT: NORMAL
WBC # BLD AUTO: 4.9 K/UL (ref 4.8–10.8)

## 2019-01-26 PROCEDURE — 86900 BLOOD TYPING SEROLOGIC ABO: CPT

## 2019-01-26 PROCEDURE — 700102 HCHG RX REV CODE 250 W/ 637 OVERRIDE(OP): Performed by: INTERNAL MEDICINE

## 2019-01-26 PROCEDURE — 36415 COLL VENOUS BLD VENIPUNCTURE: CPT

## 2019-01-26 PROCEDURE — 700111 HCHG RX REV CODE 636 W/ 250 OVERRIDE (IP): Performed by: INTERNAL MEDICINE

## 2019-01-26 PROCEDURE — 36430 TRANSFUSION BLD/BLD COMPNT: CPT

## 2019-01-26 PROCEDURE — 86923 COMPATIBILITY TEST ELECTRIC: CPT | Mod: 91

## 2019-01-26 PROCEDURE — 86850 RBC ANTIBODY SCREEN: CPT

## 2019-01-26 PROCEDURE — 86901 BLOOD TYPING SEROLOGIC RH(D): CPT

## 2019-01-26 PROCEDURE — P9016 RBC LEUKOCYTES REDUCED: HCPCS | Mod: 91

## 2019-01-26 PROCEDURE — 85007 BL SMEAR W/DIFF WBC COUNT: CPT

## 2019-01-26 PROCEDURE — A9270 NON-COVERED ITEM OR SERVICE: HCPCS | Performed by: INTERNAL MEDICINE

## 2019-01-26 PROCEDURE — 85027 COMPLETE CBC AUTOMATED: CPT

## 2019-01-26 PROCEDURE — 96374 THER/PROPH/DIAG INJ IV PUSH: CPT

## 2019-01-26 PROCEDURE — 86644 CMV ANTIBODY: CPT

## 2019-01-26 PROCEDURE — 306780 HCHG STAT FOR TRANSFUSION PER CASE

## 2019-01-26 PROCEDURE — 96376 TX/PRO/DX INJ SAME DRUG ADON: CPT

## 2019-01-26 PROCEDURE — 96375 TX/PRO/DX INJ NEW DRUG ADDON: CPT

## 2019-01-26 RX ORDER — LIDOCAINE HYDROCHLORIDE 10 MG/ML
20 INJECTION, SOLUTION INFILTRATION; PERINEURAL
Status: CANCELLED | OUTPATIENT
Start: 2019-01-26

## 2019-01-26 RX ORDER — FUROSEMIDE 10 MG/ML
20 INJECTION INTRAMUSCULAR; INTRAVENOUS ONCE
Status: COMPLETED | OUTPATIENT
Start: 2019-01-26 | End: 2019-01-26

## 2019-01-26 RX ORDER — SODIUM CHLORIDE 9 MG/ML
500 INJECTION, SOLUTION INTRAVENOUS ONCE
Status: CANCELLED | OUTPATIENT
Start: 2019-01-26 | End: 2019-01-26

## 2019-01-26 RX ORDER — DIPHENHYDRAMINE HCL 25 MG
25 TABLET ORAL ONCE
Status: DISCONTINUED | OUTPATIENT
Start: 2019-01-26 | End: 2019-01-26 | Stop reason: HOSPADM

## 2019-01-26 RX ORDER — ACETAMINOPHEN 325 MG/1
650 TABLET ORAL ONCE
Status: COMPLETED | OUTPATIENT
Start: 2019-01-26 | End: 2019-01-26

## 2019-01-26 RX ORDER — ACETAMINOPHEN 325 MG/1
650 TABLET ORAL PRN
Status: CANCELLED | OUTPATIENT
Start: 2019-01-26

## 2019-01-26 RX ORDER — FUROSEMIDE 10 MG/ML
20 INJECTION INTRAMUSCULAR; INTRAVENOUS
Status: CANCELLED
Start: 2019-01-26

## 2019-01-26 RX ORDER — ACETAMINOPHEN 325 MG/1
650 TABLET ORAL ONCE
Status: CANCELLED | OUTPATIENT
Start: 2019-01-26 | End: 2019-01-26

## 2019-01-26 RX ORDER — DIPHENHYDRAMINE HCL 25 MG
25 TABLET ORAL ONCE
Status: CANCELLED | OUTPATIENT
Start: 2019-01-26 | End: 2019-01-26

## 2019-01-26 RX ADMIN — FUROSEMIDE 20 MG: 10 INJECTION, SOLUTION INTRAMUSCULAR; INTRAVENOUS at 14:49

## 2019-01-26 RX ADMIN — ACETAMINOPHEN 650 MG: 325 TABLET ORAL at 12:17

## 2019-01-26 RX ADMIN — FUROSEMIDE 20 MG: 10 INJECTION, SOLUTION INTRAMUSCULAR; INTRAVENOUS at 17:18

## 2019-01-27 NOTE — PROGRESS NOTES
"Patient arrived ambulatory to the [facility] for CBC/COD/blood transfusion. Reviewed vital signs, labs, and physician order. Patient denies S&S of infection, or bleeding. Pt receives dialysis treatments, M,W,F with an AV fistula to left upper extremity. Multiple IV attempts made by 4 RN's without success. CBC and COD collected during 1 attempt Hgb 6.1, meets criteria to receive blood products. RN from ED obtained IV access with ultrasound guided access with 1 successful attempt. Tylenol administered, refused Benadryl due to allergy, and consent signed. Unit 1 administered, no adverse reaction. Call placed to Dr Jj WISE on call for Lasix which \"Dr Avila said he would put in the computer due to chest heaviness after previous infusions once pt leaves [facility]\". Per MD, pt is to receive 20mg of Lasix after each unit of blood. Unit 2 administered, no adverse reaction observed. 2nd dose of IV Lasix administered.  IV flushed per protocol, catheter removed with tip intact, gauze and coban dressing placed. Confirmed upcoming appointment date and time with patient. Patient left the [facility] ambulatory in no sign of distress.   "

## 2019-02-14 ENCOUNTER — APPOINTMENT (OUTPATIENT)
Dept: RADIOLOGY | Facility: MEDICAL CENTER | Age: 64
End: 2019-02-14
Attending: INTERNAL MEDICINE
Payer: MEDICARE

## 2019-02-14 ENCOUNTER — HOSPITAL ENCOUNTER (OUTPATIENT)
Facility: MEDICAL CENTER | Age: 64
End: 2019-02-14
Attending: INTERNAL MEDICINE | Admitting: INTERNAL MEDICINE
Payer: MEDICARE

## 2019-02-14 VITALS
HEIGHT: 57 IN | RESPIRATION RATE: 17 BRPM | BODY MASS INDEX: 30.2 KG/M2 | HEART RATE: 61 BPM | SYSTOLIC BLOOD PRESSURE: 132 MMHG | WEIGHT: 140 LBS | OXYGEN SATURATION: 99 % | DIASTOLIC BLOOD PRESSURE: 64 MMHG

## 2019-02-14 DIAGNOSIS — D46.C MYELODYSPLASTIC SYNDROME WITH 5Q DELETION (HCC): ICD-10-CM

## 2019-02-14 PROCEDURE — 700101 HCHG RX REV CODE 250: Performed by: RADIOLOGY

## 2019-02-14 PROCEDURE — 160002 HCHG RECOVERY MINUTES (STAT)

## 2019-02-14 PROCEDURE — 700111 HCHG RX REV CODE 636 W/ 250 OVERRIDE (IP): Performed by: RADIOLOGY

## 2019-02-14 PROCEDURE — 700111 HCHG RX REV CODE 636 W/ 250 OVERRIDE (IP)

## 2019-02-14 PROCEDURE — 99153 MOD SED SAME PHYS/QHP EA: CPT

## 2019-02-14 PROCEDURE — 700101 HCHG RX REV CODE 250

## 2019-02-14 RX ORDER — OXYCODONE HYDROCHLORIDE 5 MG/1
5 TABLET ORAL
Status: DISCONTINUED | OUTPATIENT
Start: 2019-02-14 | End: 2019-02-14 | Stop reason: HOSPADM

## 2019-02-14 RX ORDER — CEFAZOLIN SODIUM 1 G/3ML
INJECTION, POWDER, FOR SOLUTION INTRAMUSCULAR; INTRAVENOUS
Status: COMPLETED
Start: 2019-02-14 | End: 2019-02-14

## 2019-02-14 RX ORDER — CLINDAMYCIN PHOSPHATE 150 MG/ML
INJECTION, SOLUTION INTRAVENOUS
Status: COMPLETED
Start: 2019-02-14 | End: 2019-02-14

## 2019-02-14 RX ORDER — MIDAZOLAM HYDROCHLORIDE 1 MG/ML
.5-2 INJECTION INTRAMUSCULAR; INTRAVENOUS PRN
Status: ACTIVE | OUTPATIENT
Start: 2019-02-14 | End: 2019-02-14

## 2019-02-14 RX ORDER — MIDAZOLAM HYDROCHLORIDE 1 MG/ML
INJECTION INTRAMUSCULAR; INTRAVENOUS
Status: COMPLETED
Start: 2019-02-14 | End: 2019-02-14

## 2019-02-14 RX ORDER — CLINDAMYCIN PHOSPHATE 900 MG/50ML
900 INJECTION, SOLUTION INTRAVENOUS ONCE
Status: COMPLETED | OUTPATIENT
Start: 2019-02-14 | End: 2019-02-14

## 2019-02-14 RX ORDER — OXYCODONE HYDROCHLORIDE 5 MG/1
2.5 TABLET ORAL
Status: DISCONTINUED | OUTPATIENT
Start: 2019-02-14 | End: 2019-02-14 | Stop reason: HOSPADM

## 2019-02-14 RX ORDER — SODIUM CHLORIDE 9 MG/ML
500 INJECTION, SOLUTION INTRAVENOUS
Status: ACTIVE | OUTPATIENT
Start: 2019-02-14 | End: 2019-02-14

## 2019-02-14 RX ORDER — HYDROMORPHONE HYDROCHLORIDE 2 MG/ML
0.25 INJECTION, SOLUTION INTRAMUSCULAR; INTRAVENOUS; SUBCUTANEOUS
Status: DISCONTINUED | OUTPATIENT
Start: 2019-02-14 | End: 2019-02-14 | Stop reason: HOSPADM

## 2019-02-14 RX ORDER — LIDOCAINE HYDROCHLORIDE AND EPINEPHRINE BITARTRATE 20; .01 MG/ML; MG/ML
INJECTION, SOLUTION SUBCUTANEOUS
Status: COMPLETED
Start: 2019-02-14 | End: 2019-02-14

## 2019-02-14 RX ORDER — ONDANSETRON 2 MG/ML
4 INJECTION INTRAMUSCULAR; INTRAVENOUS PRN
Status: ACTIVE | OUTPATIENT
Start: 2019-02-14 | End: 2019-02-14

## 2019-02-14 RX ADMIN — CLINDAMYCIN IN 5 PERCENT DEXTROSE 900 MG: 18 INJECTION, SOLUTION INTRAVENOUS at 10:11

## 2019-02-14 RX ADMIN — HEPARIN 500 UNITS: 100 SYRINGE at 10:49

## 2019-02-14 RX ADMIN — LIDOCAINE HYDROCHLORIDE AND EPINEPHRINE: 20; 10 INJECTION, SOLUTION INFILTRATION; PERINEURAL at 10:30

## 2019-02-14 RX ADMIN — MIDAZOLAM 2 MG: 1 INJECTION INTRAMUSCULAR; INTRAVENOUS at 10:11

## 2019-02-14 RX ADMIN — MIDAZOLAM 2 MG: 1 INJECTION INTRAMUSCULAR; INTRAVENOUS at 10:33

## 2019-02-14 RX ADMIN — FENTANYL CITRATE 50 MCG: 50 INJECTION, SOLUTION INTRAMUSCULAR; INTRAVENOUS at 10:10

## 2019-02-14 NOTE — PROGRESS NOTES
Discharge instructions reviewed with patient and family, all questions answered. IV removed and belongings returned.  Patient ambulated out in a stable condition.       ACTIVITY: Rest and take it easy for the first 24 hours.  A responsible adult is recommended to remain with you during that time.  It is normal to feel sleepy.  We encourage you to not do anything that requires balance, judgment or coordination.    MILD FLU-LIKE SYMPTOMS ARE NORMAL. YOU MAY EXPERIENCE GENERALIZED MUSCLE ACHES, THROAT IRRITATION, HEADACHE AND/OR SOME NAUSEA.    FOR 24 HOURS DO NOT:  Drive, operate machinery or run household appliances.  Drink beer or alcoholic beverages.   Make important decisions or sign legal documents.    SPECIAL INSTRUCTIONS: No shower or bath for 1 week. Sponge Bath only.  Keep surgical site clean dry and covered until fully healed. Do not lift right arm above head for 1 week, Do not lift over 10 lbs for 1 week with right arm.     DIET: To avoid nausea, slowly advance diet as tolerated, avoiding spicy or greasy foods for the first day.  Add more substantial food to your diet according to your physician's instructions.  Babies can be fed formula or breast milk as soon as they are hungry.  INCREASE FLUIDS AND FIBER TO AVOID CONSTIPATION.    SURGICAL DRESSING/BATHING: No shower or bath for 1 week. Sponge Bath only.  Keep surgical site clean dry and covered until fully healed.    FOLLOW-UP APPOINTMENT:  A follow-up appointment should be arranged with your doctor in 1 week; call to schedule.    You should CALL YOUR PHYSICIAN if you develop:  Fever greater than 101 degrees F.  Pain not relieved by medication, or persistent nausea or vomiting.  Excessive bleeding (blood soaking through dressing) or unexpected drainage from the wound.  Extreme redness or swelling around the incision site, drainage of pus or foul smelling drainage.  Inability to urinate or empty your bladder within 8 hours.  Problems with breathing or  chest pain.    You should call 911 if you develop problems with breathing or chest pain.  If you are unable to contact your doctor or surgical center, you should go to the nearest emergency room or urgent care center.     If any questions arise, call your doctor.  If your doctor is not available, please feel free to call the Surgical Center at [phone].  The Center is open Monday through Friday from 7AM to 7PM.  You can also call the HEALTH HOTLINE open 24 hours/day, 7 days/week and speak to a nurse at [phone], or toll free at [phone].    A registered nurse may call you a few days after your surgery to see how you are doing after your procedure.    MEDICATIONS: Resume taking daily medication.  Take prescribed pain medication with food.  If no medication is prescribed, you may take non-aspirin pain medication if needed.  PAIN MEDICATION CAN BE VERY CONSTIPATING.  Take a stool softener or laxative such as senokot, pericolace, or milk of magnesia if needed.      If your physician has prescribed pain medication that includes Acetaminophen (Tylenol), do not take additional Acetaminophen (Tylenol) while taking the prescribed medication.    Depression / Suicide Risk    As you are discharged from this [facility] facility, it is important to learn how to keep safe from harming yourself.    Recognize the warning signs:  · Abrupt changes in personality, positive or negative- including increase in energy   · Giving away possessions  · Change in eating patterns- significant weight changes-  positive or negative  · Change in sleeping patterns- unable to sleep or sleeping all the time   · Unwillingness or inability to communicate  · Depression  · Unusual sadness, discouragement and loneliness  · Talk of wanting to die  · Neglect of personal appearance   · Rebelliousness- reckless behavior  · Withdrawal from people/activities they love  · Confusion- inability to concentrate     If you or a loved one observes  any of these behaviors or has concerns about self-harm, here's what you can do:  · Talk about it- your feelings and reasons for harming yourself  · Remove any means that you might use to hurt yourself (examples: pills, rope, extension cords, firearm)  · Get professional help from the community (Mental Health, Substance Abuse, psychological counseling)  · Do not be alone:Call your Safe Contact- someone whom you trust who will be there for you.  · Call your local CRISIS HOTLINE 005-1844 or 775-747-3861  · Call your local Children's Mobile Crisis Response Team [place] (177) 285-9183 or www.[employer] [facility]  · Call the toll free National Suicide Prevention Hotlines   · National Suicide Prevention Lifeline 349-604-CCQX (1413)  · National Hope Line Network 800-SUICIDE (385-1798)      Implanted Port Home Guide  An implanted port is a type of central line that is placed under the skin. Central lines are used to provide IV access when treatment or nutrition needs to be given through a person's veins. Implanted ports are used for long-term IV access. An implanted port may be placed because:   · You need IV medicine that would be irritating to the small veins in your hands or arms.    · You need long-term IV medicines, such as antibiotics.    · You need IV nutrition for a long period.    · You need frequent blood draws for lab tests.    · You need dialysis.    Implanted ports are usually placed in the chest area, but they can also be placed in the upper arm, the abdomen, or the leg. An implanted port has two main parts:   · [place]. The reservoir is round and will appear as a small, raised area under your skin. The reservoir is the part where a needle is inserted to give medicines or draw blood.    · Catheter. The catheter is a thin, flexible tube that extends from the reservoir. The catheter is placed into a large vein. Medicine that is inserted into the reservoir goes into the catheter and then into the vein.    HOW  "WILL I CARE FOR MY INCISION SITE?  Do not get the incision site wet. Bathe or shower as directed by your health care provider.   HOW IS MY PORT ACCESSED?  Special steps must be taken to access the port:   · Before the port is accessed, a numbing cream can be placed on the skin. This helps numb the skin over the port site.    · Your health care provider uses a sterile technique to access the port.  ¨ Your health care provider must put on a mask and sterile gloves.  ¨ The skin over your port is cleaned carefully with an antiseptic and allowed to dry.  ¨ The port is gently pinched between sterile gloves, and a needle is inserted into the port.  · Only \"non-coring\" port needles should be used to access the port. Once the port is accessed, a blood return should be checked. This helps ensure that the port is in the vein and is not clogged.    · If your port needs to remain accessed for a constant infusion, a clear (transparent) bandage will be placed over the needle site. The bandage and needle will need to be changed every week, or as directed by your health care provider.    · Keep the bandage covering the needle clean and dry. Do not get it wet. Follow your health care provider's instructions on how to take a shower or bath while the port is accessed.    · If your port does not need to stay accessed, no bandage is needed over the port.    WHAT IS FLUSHING?  Flushing helps keep the port from getting clogged. Follow your health care provider's instructions on how and when to flush the port. Ports are usually flushed with saline solution or a medicine called heparin. The need for flushing will depend on how the port is used.   · If the port is used for intermittent medicines or blood draws, the port will need to be flushed:    ¨ After medicines have been given.    ¨ After blood has been drawn.    ¨ As part of routine maintenance.    · If a constant infusion is running, the port may not need to be flushed.    HOW LONG WILL " MY PORT STAY IMPLANTED?  The port can stay in for as long as your health care provider thinks it is needed. When it is time for the port to come out, surgery will be done to remove it. The procedure is similar to the one performed when the port was put in.   WHEN SHOULD I SEEK IMMEDIATE MEDICAL CARE?  When you have an implanted port, you should seek immediate medical care if:   · You notice a bad smell coming from the incision site.    · You have swelling, redness, or drainage at the incision site.    · You have more swelling or pain at the port site or the surrounding area.    · You have a fever that is not controlled with medicine.     This information is not intended to replace advice given to you by your health care provider. Make sure you discuss any questions you have with your health care provider.     Document Released: 12/18/2006 Document Revised: 10/08/2014 Document Reviewed: 08/25/2014  [employer] Interactive Patient Education ©2016 [employer]    Implanted Port Insertion, Care After  Refer to this sheet in the next few weeks. These instructions provide you with information on caring for yourself after your procedure. Your health care provider may also give you more specific instructions. Your treatment has been planned according to current medical practices, but problems sometimes occur. Call your health care provider if you have any problems or questions after your procedure.  WHAT TO EXPECT AFTER THE PROCEDURE  After your procedure, it is typical to have the following:   · Discomfort at the port insertion site. Ice packs to the area will help.  · Bruising on the skin over the port. This will subside in 3-4 days.  HOME CARE INSTRUCTIONS  · After your port is placed, you will get a 's information card. The card has information about your port. Keep this card with you at all times.    · Know what kind of port you have. There are many types of ports available.    · Wear a medical alert bracelet  in case of an emergency. This can help alert health care workers that you have a port.    · The port can stay in for as long as your health care provider believes it is necessary.    · A home health care nurse may give medicines and take care of the port.    · You or a family member can get special training and directions for giving medicine and taking care of the port at home.    SEEK MEDICAL CARE IF:   · Your port does not flush or you are unable to get a blood return.    · You have a fever or chills.  SEEK IMMEDIATE MEDICAL CARE IF:  · You have new fluid or pus coming from your incision.    · You notice a bad smell coming from your incision site.    · You have swelling, pain, or more redness at the incision or port site.    · You have chest pain or shortness of breath.     This information is not intended to replace advice given to you by your health care provider. Make sure you discuss any questions you have with your health care provider.     Document Released: 10/08/2014 Document Revised: 12/23/2014 Document Reviewed: 10/08/2014  [employer] Interactive Patient Education ©2016 [employer]

## 2019-02-14 NOTE — OR SURGEON
Immediate Post- Operative Note        PostOp Diagnosis: need port for the long term venous access      Procedure(s): Right IJ Port placement    Estimated Blood Loss: Less than 5 ml        Complications: None            2/14/2019     10:58 AM     Wallace Taylor

## 2019-02-14 NOTE — DISCHARGE INSTRUCTIONS
ACTIVITY: Rest and take it easy for the first 24 hours.  A responsible adult is recommended to remain with you during that time.  It is normal to feel sleepy.  We encourage you to not do anything that requires balance, judgment or coordination.    MILD FLU-LIKE SYMPTOMS ARE NORMAL. YOU MAY EXPERIENCE GENERALIZED MUSCLE ACHES, THROAT IRRITATION, HEADACHE AND/OR SOME NAUSEA.    FOR 24 HOURS DO NOT:  Drive, operate machinery or run household appliances.  Drink beer or alcoholic beverages.   Make important decisions or sign legal documents.    SPECIAL INSTRUCTIONS: No shower or bath for 1 week. Sponge Bath only.  Keep surgical site clean dry and covered until fully healed. Do not lift right arm above head for 1 week, Do not lift over 10 lbs for 1 week with right arm.     DIET: To avoid nausea, slowly advance diet as tolerated, avoiding spicy or greasy foods for the first day.  Add more substantial food to your diet according to your physician's instructions.  Babies can be fed formula or breast milk as soon as they are hungry.  INCREASE FLUIDS AND FIBER TO AVOID CONSTIPATION.    SURGICAL DRESSING/BATHING: No shower or bath for 1 week. Sponge Bath only.  Keep surgical site clean dry and covered until fully healed.    FOLLOW-UP APPOINTMENT:  A follow-up appointment should be arranged with your doctor in 1 week; call to schedule.    You should CALL YOUR PHYSICIAN if you develop:  Fever greater than 101 degrees F.  Pain not relieved by medication, or persistent nausea or vomiting.  Excessive bleeding (blood soaking through dressing) or unexpected drainage from the wound.  Extreme redness or swelling around the incision site, drainage of pus or foul smelling drainage.  Inability to urinate or empty your bladder within 8 hours.  Problems with breathing or chest pain.    You should call 911 if you develop problems with breathing or chest pain.  If you are unable to contact your doctor or surgical center, you should go to the  nearest emergency room or urgent care center.     If any questions arise, call your doctor.  If your doctor is not available, please feel free to call the Surgical Center at [phone].  The Center is open Monday through Friday from 7AM to 7PM.  You can also call the HEALTH HOTLINE open 24 hours/day, 7 days/week and speak to a nurse at [phone], or toll free at [phone].    A registered nurse may call you a few days after your surgery to see how you are doing after your procedure.    MEDICATIONS: Resume taking daily medication.  Take prescribed pain medication with food.  If no medication is prescribed, you may take non-aspirin pain medication if needed.  PAIN MEDICATION CAN BE VERY CONSTIPATING.  Take a stool softener or laxative such as senokot, pericolace, or milk of magnesia if needed.      If your physician has prescribed pain medication that includes Acetaminophen (Tylenol), do not take additional Acetaminophen (Tylenol) while taking the prescribed medication.    Depression / Suicide Risk    As you are discharged from this [facility] facility, it is important to learn how to keep safe from harming yourself.    Recognize the warning signs:  · Abrupt changes in personality, positive or negative- including increase in energy   · Giving away possessions  · Change in eating patterns- significant weight changes-  positive or negative  · Change in sleeping patterns- unable to sleep or sleeping all the time   · Unwillingness or inability to communicate  · Depression  · Unusual sadness, discouragement and loneliness  · Talk of wanting to die  · Neglect of personal appearance   · Rebelliousness- reckless behavior  · Withdrawal from people/activities they love  · Confusion- inability to concentrate     If you or a loved one observes any of these behaviors or has concerns about self-harm, here's what you can do:  · Talk about it- your feelings and reasons for harming yourself  · Remove any means that you  might use to hurt yourself (examples: pills, rope, extension cords, firearm)  · Get professional help from the community (Mental Health, Substance Abuse, psychological counseling)  · Do not be alone:Call your Safe Contact- someone whom you trust who will be there for you.  · Call your local CRISIS HOTLINE 889-0851 or 382-476-2494  · Call your local Children's Mobile Crisis Response Team [place] (906) 203-4626 or www.magnetic.io  · Call the toll free National Suicide Prevention Hotlines   · National Suicide Prevention Lifeline 532-793-SYSM (1129)  · National Hope Line Network 800-SUICIDE (222-4524)      Implanted Port Home Guide  An implanted port is a type of central line that is placed under the skin. Central lines are used to provide IV access when treatment or nutrition needs to be given through a person's veins. Implanted ports are used for long-term IV access. An implanted port may be placed because:   · You need IV medicine that would be irritating to the small veins in your hands or arms.    · You need long-term IV medicines, such as antibiotics.    · You need IV nutrition for a long period.    · You need frequent blood draws for lab tests.    · You need dialysis.    Implanted ports are usually placed in the chest area, but they can also be placed in the upper arm, the abdomen, or the leg. An implanted port has two main parts:   · [place]. The reservoir is round and will appear as a small, raised area under your skin. The reservoir is the part where a needle is inserted to give medicines or draw blood.    · Catheter. The catheter is a thin, flexible tube that extends from the reservoir. The catheter is placed into a large vein. Medicine that is inserted into the reservoir goes into the catheter and then into the vein.    HOW WILL I CARE FOR MY INCISION SITE?  Do not get the incision site wet. Bathe or shower as directed by your health care provider.   HOW IS MY PORT ACCESSED?  Special steps must  "be taken to access the port:   · Before the port is accessed, a numbing cream can be placed on the skin. This helps numb the skin over the port site.    · Your health care provider uses a sterile technique to access the port.  ¨ Your health care provider must put on a mask and sterile gloves.  ¨ The skin over your port is cleaned carefully with an antiseptic and allowed to dry.  ¨ The port is gently pinched between sterile gloves, and a needle is inserted into the port.  · Only \"non-coring\" port needles should be used to access the port. Once the port is accessed, a blood return should be checked. This helps ensure that the port is in the vein and is not clogged.    · If your port needs to remain accessed for a constant infusion, a clear (transparent) bandage will be placed over the needle site. The bandage and needle will need to be changed every week, or as directed by your health care provider.    · Keep the bandage covering the needle clean and dry. Do not get it wet. Follow your health care provider's instructions on how to take a shower or bath while the port is accessed.    · If your port does not need to stay accessed, no bandage is needed over the port.    WHAT IS FLUSHING?  Flushing helps keep the port from getting clogged. Follow your health care provider's instructions on how and when to flush the port. Ports are usually flushed with saline solution or a medicine called heparin. The need for flushing will depend on how the port is used.   · If the port is used for intermittent medicines or blood draws, the port will need to be flushed:    ¨ After medicines have been given.    ¨ After blood has been drawn.    ¨ As part of routine maintenance.    · If a constant infusion is running, the port may not need to be flushed.    HOW LONG WILL MY PORT STAY IMPLANTED?  The port can stay in for as long as your health care provider thinks it is needed. When it is time for the port to come out, surgery will be done to " remove it. The procedure is similar to the one performed when the port was put in.   WHEN SHOULD I SEEK IMMEDIATE MEDICAL CARE?  When you have an implanted port, you should seek immediate medical care if:   · You notice a bad smell coming from the incision site.    · You have swelling, redness, or drainage at the incision site.    · You have more swelling or pain at the port site or the surrounding area.    · You have a fever that is not controlled with medicine.     This information is not intended to replace advice given to you by your health care provider. Make sure you discuss any questions you have with your health care provider.     Document Released: 12/18/2006 Document Revised: 10/08/2014 Document Reviewed: 08/25/2014  [employer] Interactive Patient Education ©2016 [employer]    Implanted Port Insertion, Care After  Refer to this sheet in the next few weeks. These instructions provide you with information on caring for yourself after your procedure. Your health care provider may also give you more specific instructions. Your treatment has been planned according to current medical practices, but problems sometimes occur. Call your health care provider if you have any problems or questions after your procedure.  WHAT TO EXPECT AFTER THE PROCEDURE  After your procedure, it is typical to have the following:   · Discomfort at the port insertion site. Ice packs to the area will help.  · Bruising on the skin over the port. This will subside in 3-4 days.  HOME CARE INSTRUCTIONS  · After your port is placed, you will get a 's information card. The card has information about your port. Keep this card with you at all times.    · Know what kind of port you have. There are many types of ports available.    · Wear a medical alert bracelet in case of an emergency. This can help alert health care workers that you have a port.    · The port can stay in for as long as your health care provider believes it is  necessary.    · A home health care nurse may give medicines and take care of the port.    · You or a family member can get special training and directions for giving medicine and taking care of the port at home.    SEEK MEDICAL CARE IF:   · Your port does not flush or you are unable to get a blood return.    · You have a fever or chills.  SEEK IMMEDIATE MEDICAL CARE IF:  · You have new fluid or pus coming from your incision.    · You notice a bad smell coming from your incision site.    · You have swelling, pain, or more redness at the incision or port site.    · You have chest pain or shortness of breath.     This information is not intended to replace advice given to you by your health care provider. Make sure you discuss any questions you have with your health care provider.     Document Released: 10/08/2014 Document Revised: 12/23/2014 Document Reviewed: 10/08/2014  [employer] Interactive Patient Education ©2016 [employer]

## 2019-02-14 NOTE — PROGRESS NOTES
OP IR RN note:    Site Marked and Confirmed with MD, patient and RN pre procedure   Tunneled port placement with moderate sedation by MD Taylor assisted by RT max, Right chest and Ij access site;  End tidal CO2 range 35-38 during procedure   Port placed   Bard PowerPort ClearVUE slim implantable port 8 fr 24 cm open ended single lumen REF# [id number] LOT# [id number]   Patient tolerated procedure, hemodynamically stable; pt awake and talking post procedure; see flow sheet for vitals and post op print out    patient transported back to OP IR pod 2 via IR RN monitored      No bleeding or complications noted at this time

## 2019-02-16 ENCOUNTER — OUTPATIENT INFUSION SERVICES (OUTPATIENT)
Dept: ONCOLOGY | Facility: MEDICAL CENTER | Age: 64
End: 2019-02-16
Attending: INTERNAL MEDICINE
Payer: MEDICARE

## 2019-02-16 VITALS
OXYGEN SATURATION: 100 % | HEIGHT: 57 IN | WEIGHT: 143.52 LBS | SYSTOLIC BLOOD PRESSURE: 197 MMHG | HEART RATE: 74 BPM | DIASTOLIC BLOOD PRESSURE: 61 MMHG | TEMPERATURE: 99.2 F | RESPIRATION RATE: 18 BRPM | BODY MASS INDEX: 30.96 KG/M2

## 2019-02-16 DIAGNOSIS — D46.9 MYELODYSPLASIA (MYELODYSPLASTIC SYNDROME) (HCC): ICD-10-CM

## 2019-02-16 LAB
ABO GROUP BLD: NORMAL
ANISOCYTOSIS BLD QL SMEAR: ABNORMAL
BARCODED ABORH UBTYP: 600
BARCODED ABORH UBTYP: 6200
BARCODED PRD CODE UBPRD: NORMAL
BARCODED PRD CODE UBPRD: NORMAL
BARCODED UNIT NUM UBUNT: NORMAL
BARCODED UNIT NUM UBUNT: NORMAL
BASOPHILS # BLD AUTO: 0.9 % (ref 0–1.8)
BASOPHILS # BLD: 0.04 K/UL (ref 0–0.12)
BLD GP AB SCN SERPL QL: NORMAL
COMPONENT R 8504R: NORMAL
COMPONENT R 8504R: NORMAL
EOSINOPHIL # BLD AUTO: 0.58 K/UL (ref 0–0.51)
EOSINOPHIL NFR BLD: 12.9 % (ref 0–6.9)
ERYTHROCYTE [DISTWIDTH] IN BLOOD BY AUTOMATED COUNT: 74.7 FL (ref 35.9–50)
HCT VFR BLD AUTO: 19.1 % (ref 37–47)
HGB BLD-MCNC: 6.2 G/DL (ref 12–16)
LYMPHOCYTES # BLD AUTO: 0.7 K/UL (ref 1–4.8)
LYMPHOCYTES NFR BLD: 15.5 % (ref 22–41)
MACROCYTES BLD QL SMEAR: ABNORMAL
MANUAL DIFF BLD: NORMAL
MCH RBC QN AUTO: 33.9 PG (ref 27–33)
MCHC RBC AUTO-ENTMCNC: 32.5 G/DL (ref 33.6–35)
MCV RBC AUTO: 104.4 FL (ref 81.4–97.8)
METAMYELOCYTES NFR BLD MANUAL: 0.9 %
MICROCYTES BLD QL SMEAR: ABNORMAL
MONOCYTES # BLD AUTO: 0.23 K/UL (ref 0–0.85)
MONOCYTES NFR BLD AUTO: 5.2 % (ref 0–13.4)
MORPHOLOGY BLD-IMP: NORMAL
NEUTROPHILS # BLD AUTO: 2.91 K/UL (ref 2–7.15)
NEUTROPHILS NFR BLD: 62.9 % (ref 44–72)
NEUTS BAND NFR BLD MANUAL: 1.7 % (ref 0–10)
NRBC # BLD AUTO: 0 K/UL
NRBC BLD-RTO: 0 /100 WBC
OVALOCYTES BLD QL SMEAR: NORMAL
PLATELET # BLD AUTO: 115 K/UL (ref 164–446)
PLATELET BLD QL SMEAR: NORMAL
PMV BLD AUTO: 13.7 FL (ref 9–12.9)
POIKILOCYTOSIS BLD QL SMEAR: NORMAL
PRODUCT TYPE UPROD: NORMAL
PRODUCT TYPE UPROD: NORMAL
RBC # BLD AUTO: 1.83 M/UL (ref 4.2–5.4)
RBC BLD AUTO: PRESENT
RH BLD: NORMAL
UNIT STATUS USTAT: NORMAL
UNIT STATUS USTAT: NORMAL
WBC # BLD AUTO: 4.5 K/UL (ref 4.8–10.8)

## 2019-02-16 PROCEDURE — 85027 COMPLETE CBC AUTOMATED: CPT

## 2019-02-16 PROCEDURE — P9016 RBC LEUKOCYTES REDUCED: HCPCS | Mod: 91

## 2019-02-16 PROCEDURE — 36591 DRAW BLOOD OFF VENOUS DEVICE: CPT

## 2019-02-16 PROCEDURE — 700102 HCHG RX REV CODE 250 W/ 637 OVERRIDE(OP): Performed by: INTERNAL MEDICINE

## 2019-02-16 PROCEDURE — 85007 BL SMEAR W/DIFF WBC COUNT: CPT

## 2019-02-16 PROCEDURE — 36430 TRANSFUSION BLD/BLD COMPNT: CPT

## 2019-02-16 PROCEDURE — 86850 RBC ANTIBODY SCREEN: CPT

## 2019-02-16 PROCEDURE — 86923 COMPATIBILITY TEST ELECTRIC: CPT

## 2019-02-16 PROCEDURE — 700111 HCHG RX REV CODE 636 W/ 250 OVERRIDE (IP): Performed by: INTERNAL MEDICINE

## 2019-02-16 PROCEDURE — 700111 HCHG RX REV CODE 636 W/ 250 OVERRIDE (IP)

## 2019-02-16 PROCEDURE — A9270 NON-COVERED ITEM OR SERVICE: HCPCS | Performed by: INTERNAL MEDICINE

## 2019-02-16 PROCEDURE — 86901 BLOOD TYPING SEROLOGIC RH(D): CPT

## 2019-02-16 PROCEDURE — 96374 THER/PROPH/DIAG INJ IV PUSH: CPT

## 2019-02-16 PROCEDURE — 86900 BLOOD TYPING SEROLOGIC ABO: CPT

## 2019-02-16 PROCEDURE — 96376 TX/PRO/DX INJ SAME DRUG ADON: CPT

## 2019-02-16 PROCEDURE — A4212 NON CORING NEEDLE OR STYLET: HCPCS

## 2019-02-16 PROCEDURE — 86644 CMV ANTIBODY: CPT

## 2019-02-16 PROCEDURE — 306780 HCHG STAT FOR TRANSFUSION PER CASE

## 2019-02-16 RX ORDER — SODIUM CHLORIDE 9 MG/ML
500 INJECTION, SOLUTION INTRAVENOUS ONCE
Status: CANCELLED | OUTPATIENT
Start: 2019-02-16 | End: 2019-02-16

## 2019-02-16 RX ORDER — ACETAMINOPHEN 325 MG/1
650 TABLET ORAL ONCE
Status: COMPLETED | OUTPATIENT
Start: 2019-02-16 | End: 2019-02-16

## 2019-02-16 RX ORDER — DIPHENHYDRAMINE HCL 25 MG
25 TABLET ORAL ONCE
Status: CANCELLED | OUTPATIENT
Start: 2019-02-16 | End: 2019-02-16

## 2019-02-16 RX ORDER — LIDOCAINE HYDROCHLORIDE 10 MG/ML
INJECTION, SOLUTION EPIDURAL; INFILTRATION; INTRACAUDAL; PERINEURAL
Status: COMPLETED
Start: 2019-02-16 | End: 2019-02-16

## 2019-02-16 RX ORDER — ACETAMINOPHEN 325 MG/1
650 TABLET ORAL PRN
Status: CANCELLED | OUTPATIENT
Start: 2019-02-16

## 2019-02-16 RX ORDER — SODIUM CHLORIDE 9 MG/ML
500 INJECTION, SOLUTION INTRAVENOUS ONCE
Status: DISCONTINUED | OUTPATIENT
Start: 2019-02-16 | End: 2019-02-16 | Stop reason: HOSPADM

## 2019-02-16 RX ORDER — FUROSEMIDE 10 MG/ML
20 INJECTION INTRAMUSCULAR; INTRAVENOUS ONCE
Status: COMPLETED | OUTPATIENT
Start: 2019-02-16 | End: 2019-02-16

## 2019-02-16 RX ORDER — BUPRENORPHINE 10 UG/H
20 PATCH TRANSDERMAL
COMMUNITY
End: 2019-11-04

## 2019-02-16 RX ORDER — ACETAMINOPHEN 325 MG/1
650 TABLET ORAL ONCE
Status: CANCELLED | OUTPATIENT
Start: 2019-02-16 | End: 2019-02-16

## 2019-02-16 RX ORDER — LIDOCAINE HYDROCHLORIDE 10 MG/ML
20 INJECTION, SOLUTION INFILTRATION; PERINEURAL
Status: DISCONTINUED | OUTPATIENT
Start: 2019-02-16 | End: 2019-02-16 | Stop reason: HOSPADM

## 2019-02-16 RX ORDER — LIDOCAINE HYDROCHLORIDE 10 MG/ML
20 INJECTION, SOLUTION INFILTRATION; PERINEURAL
Status: CANCELLED | OUTPATIENT
Start: 2019-02-16

## 2019-02-16 RX ORDER — DIPHENHYDRAMINE HCL 25 MG
25 TABLET ORAL ONCE
Status: DISCONTINUED | OUTPATIENT
Start: 2019-02-16 | End: 2019-02-16 | Stop reason: HOSPADM

## 2019-02-16 RX ORDER — FUROSEMIDE 10 MG/ML
20 INJECTION INTRAMUSCULAR; INTRAVENOUS
Status: CANCELLED
Start: 2019-02-16

## 2019-02-16 RX ADMIN — LIDOCAINE HYDROCHLORIDE 2 ML: 10 INJECTION, SOLUTION INFILTRATION; PERINEURAL at 09:40

## 2019-02-16 RX ADMIN — HEPARIN 500 UNITS: 100 SYRINGE at 16:00

## 2019-02-16 RX ADMIN — ACETAMINOPHEN 650 MG: 325 TABLET, FILM COATED ORAL at 11:25

## 2019-02-16 RX ADMIN — LIDOCAINE HYDROCHLORIDE 2 ML: 10 INJECTION, SOLUTION EPIDURAL; INFILTRATION; INTRACAUDAL; PERINEURAL at 09:40

## 2019-02-16 RX ADMIN — FUROSEMIDE 20 MG: 10 INJECTION, SOLUTION INTRAMUSCULAR; INTRAVENOUS at 15:53

## 2019-02-16 RX ADMIN — FUROSEMIDE 20 MG: 10 INJECTION, SOLUTION INTRAMUSCULAR; INTRAVENOUS at 13:32

## 2019-02-17 NOTE — PROGRESS NOTES
"Pt returns for CBC/COD, possible blood transfusion, using cane, friend escorting. Pt with new port to R chest, steri-strips and dressings in place from placement date of 2/14. Transparent dressings removed, steri-strips left in place. Long, thin blister observed where bottom edge of removed transparent dressing was. Blister intact. Lidocaine offered to numb port site and pt accepted. Port site numbed and site cleansed. Port accessed in sterile field. Pt yajaira well. Port flushed with brisk blood return observed. CBC drawn as ordered. Results reviewed and pt appropriate for blood transfusion as ordered. Pt requesting to only take the Tylenol as premed. Tylenol given as ordered. Both units of blood transfused, lasix following both units per pt request as she reported, after blood she sometimes feels \"heavy\" in her chest without the lasix. Port flushed at completion of blood and blood return verified. Heparin instilled and needle removed, tip intact. Gauze dressing applied. Instructed pt to review discharge instructions from port placement to determine when she can shower. Pt and friend verbalized understanding. Pt requesting to have an appt in 2 weeks as she feels the 3 weeks is too long and she feels very weak in that last week. Appt made for 3/2 for CBC/COD and appt left for 3/9 in the event that pt changes her mind to return to 3 week intervals. Printout of future appts provided. Pt discharged home under care of friend in no apparent distress.   "

## 2019-03-02 ENCOUNTER — OUTPATIENT INFUSION SERVICES (OUTPATIENT)
Dept: ONCOLOGY | Facility: MEDICAL CENTER | Age: 64
End: 2019-03-02
Attending: INTERNAL MEDICINE
Payer: MEDICARE

## 2019-03-02 VITALS
BODY MASS INDEX: 30.96 KG/M2 | OXYGEN SATURATION: 100 % | DIASTOLIC BLOOD PRESSURE: 52 MMHG | RESPIRATION RATE: 18 BRPM | TEMPERATURE: 97 F | SYSTOLIC BLOOD PRESSURE: 127 MMHG | WEIGHT: 143.52 LBS | HEIGHT: 57 IN | HEART RATE: 69 BPM

## 2019-03-02 DIAGNOSIS — D63.8 ANEMIA OF CHRONIC DISEASE: ICD-10-CM

## 2019-03-02 LAB
ANISOCYTOSIS BLD QL SMEAR: ABNORMAL
BASO STIPL BLD QL SMEAR: NORMAL
BASOPHILS # BLD AUTO: 0.9 % (ref 0–1.8)
BASOPHILS # BLD: 0.04 K/UL (ref 0–0.12)
EOSINOPHIL # BLD AUTO: 0.56 K/UL (ref 0–0.51)
EOSINOPHIL NFR BLD: 11.4 % (ref 0–6.9)
ERYTHROCYTE [DISTWIDTH] IN BLOOD BY AUTOMATED COUNT: 67.1 FL (ref 35.9–50)
GIANT PLATELETS BLD QL SMEAR: NORMAL
HCT VFR BLD AUTO: 25 % (ref 37–47)
HGB BLD-MCNC: 8.4 G/DL (ref 12–16)
LG PLATELETS BLD QL SMEAR: NORMAL
LYMPHOCYTES # BLD AUTO: 1.07 K/UL (ref 1–4.8)
LYMPHOCYTES NFR BLD: 21.9 % (ref 22–41)
MACROCYTES BLD QL SMEAR: ABNORMAL
MANUAL DIFF BLD: NORMAL
MCH RBC QN AUTO: 33.1 PG (ref 27–33)
MCHC RBC AUTO-ENTMCNC: 33.6 G/DL (ref 33.6–35)
MCV RBC AUTO: 98.4 FL (ref 81.4–97.8)
MONOCYTES # BLD AUTO: 0.43 K/UL (ref 0–0.85)
MONOCYTES NFR BLD AUTO: 8.8 % (ref 0–13.4)
MORPHOLOGY BLD-IMP: NORMAL
NEUTROPHILS # BLD AUTO: 2.79 K/UL (ref 2–7.15)
NEUTROPHILS NFR BLD: 53.5 % (ref 44–72)
NEUTS BAND NFR BLD MANUAL: 3.5 % (ref 0–10)
NRBC # BLD AUTO: 0 K/UL
NRBC BLD-RTO: 0 /100 WBC
PLATELET # BLD AUTO: 97 K/UL (ref 164–446)
PLATELET BLD QL SMEAR: NORMAL
PMV BLD AUTO: 14.2 FL (ref 9–12.9)
POIKILOCYTOSIS BLD QL SMEAR: NORMAL
RBC # BLD AUTO: 2.54 M/UL (ref 4.2–5.4)
RBC BLD AUTO: PRESENT
STOMATOCYTES BLD QL SMEAR: NORMAL
WBC # BLD AUTO: 4.9 K/UL (ref 4.8–10.8)

## 2019-03-02 PROCEDURE — A4212 NON CORING NEEDLE OR STYLET: HCPCS

## 2019-03-02 PROCEDURE — 700111 HCHG RX REV CODE 636 W/ 250 OVERRIDE (IP)

## 2019-03-02 PROCEDURE — 306780 HCHG STAT FOR TRANSFUSION PER CASE

## 2019-03-02 PROCEDURE — 85007 BL SMEAR W/DIFF WBC COUNT: CPT

## 2019-03-02 PROCEDURE — 85027 COMPLETE CBC AUTOMATED: CPT

## 2019-03-02 PROCEDURE — 36591 DRAW BLOOD OFF VENOUS DEVICE: CPT

## 2019-03-02 RX ORDER — LIDOCAINE HYDROCHLORIDE 10 MG/ML
INJECTION, SOLUTION EPIDURAL; INFILTRATION; INTRACAUDAL; PERINEURAL
Status: COMPLETED
Start: 2019-03-02 | End: 2019-03-02

## 2019-03-02 RX ADMIN — HEPARIN 500 UNITS: 100 SYRINGE at 10:26

## 2019-03-02 RX ADMIN — LIDOCAINE HYDROCHLORIDE 2 ML: 10 INJECTION, SOLUTION EPIDURAL; INFILTRATION; INTRACAUDAL; PERINEURAL at 09:33

## 2019-03-02 NOTE — PROGRESS NOTES
Patient arrived ambulatory with a cane to the [facility] for labs/possible transfusion. Reviewed vital signs, labs, and physician order. Patient denies S&S of infection, or bleeding. Lidocaine administered per pt request to right chest port, port accessed with sterile technique, visualized brisk blood return, labs collected per MD order. Hgb 8.4, doesn't meet parameters to receive blood transfusion. Pt provided upcoming apt list, confirmed.  Port  flushed per protocol, rivera needle removed with tip intact, gauze and tape dressing placed. Confirmed upcoming appointment date and time with patient. Patient left the [facility] ambulatory with a cane in no sign of distress.

## 2019-03-09 ENCOUNTER — OUTPATIENT INFUSION SERVICES (OUTPATIENT)
Dept: ONCOLOGY | Facility: MEDICAL CENTER | Age: 64
End: 2019-03-09
Attending: INTERNAL MEDICINE
Payer: MEDICARE

## 2019-03-09 VITALS
HEART RATE: 68 BPM | TEMPERATURE: 97.2 F | BODY MASS INDEX: 30.73 KG/M2 | HEIGHT: 57 IN | SYSTOLIC BLOOD PRESSURE: 144 MMHG | WEIGHT: 142.42 LBS | RESPIRATION RATE: 18 BRPM | OXYGEN SATURATION: 100 % | DIASTOLIC BLOOD PRESSURE: 49 MMHG

## 2019-03-09 DIAGNOSIS — D46.9 MDS (MYELODYSPLASTIC SYNDROME) (HCC): ICD-10-CM

## 2019-03-09 LAB
ANISOCYTOSIS BLD QL SMEAR: ABNORMAL
BASO STIPL BLD QL SMEAR: NORMAL
BASOPHILS # BLD AUTO: 1.8 % (ref 0–1.8)
BASOPHILS # BLD: 0.1 K/UL (ref 0–0.12)
EOSINOPHIL # BLD AUTO: 0.46 K/UL (ref 0–0.51)
EOSINOPHIL NFR BLD: 8 % (ref 0–6.9)
ERYTHROCYTE [DISTWIDTH] IN BLOOD BY AUTOMATED COUNT: 73.6 FL (ref 35.9–50)
HCT VFR BLD AUTO: 25.5 % (ref 37–47)
HGB BLD-MCNC: 8.2 G/DL (ref 12–16)
LG PLATELETS BLD QL SMEAR: NORMAL
LYMPHOCYTES # BLD AUTO: 0.76 K/UL (ref 1–4.8)
LYMPHOCYTES NFR BLD: 13.3 % (ref 22–41)
MACROCYTES BLD QL SMEAR: ABNORMAL
MANUAL DIFF BLD: NORMAL
MCH RBC QN AUTO: 33.1 PG (ref 27–33)
MCHC RBC AUTO-ENTMCNC: 32.2 G/DL (ref 33.6–35)
MCV RBC AUTO: 102.8 FL (ref 81.4–97.8)
MONOCYTES # BLD AUTO: 0.5 K/UL (ref 0–0.85)
MONOCYTES NFR BLD AUTO: 8.8 % (ref 0–13.4)
MORPHOLOGY BLD-IMP: NORMAL
NEUTROPHILS # BLD AUTO: 3.88 K/UL (ref 2–7.15)
NEUTROPHILS NFR BLD: 62.8 % (ref 44–72)
NEUTS BAND NFR BLD MANUAL: 5.3 % (ref 0–10)
NRBC # BLD AUTO: 0.02 K/UL
NRBC BLD-RTO: 0.4 /100 WBC
PLATELET # BLD AUTO: 117 K/UL (ref 164–446)
PLATELET BLD QL SMEAR: NORMAL
RBC # BLD AUTO: 2.48 M/UL (ref 4.2–5.4)
RBC BLD AUTO: PRESENT
WBC # BLD AUTO: 5.7 K/UL (ref 4.8–10.8)

## 2019-03-09 PROCEDURE — 85007 BL SMEAR W/DIFF WBC COUNT: CPT

## 2019-03-09 PROCEDURE — A4212 NON CORING NEEDLE OR STYLET: HCPCS

## 2019-03-09 PROCEDURE — 700111 HCHG RX REV CODE 636 W/ 250 OVERRIDE (IP)

## 2019-03-09 PROCEDURE — 36591 DRAW BLOOD OFF VENOUS DEVICE: CPT

## 2019-03-09 PROCEDURE — 85027 COMPLETE CBC AUTOMATED: CPT

## 2019-03-09 RX ORDER — LIDOCAINE AND PRILOCAINE 25; 25 MG/G; MG/G
CREAM TOPICAL PRN
COMMUNITY
End: 2019-07-06

## 2019-03-09 RX ADMIN — HEPARIN 500 UNITS: 100 SYRINGE at 10:04

## 2019-03-09 NOTE — PROGRESS NOTES
Patient arrived ambulatory with a cane to the [facility] for CBC/possible transfusion. Reviewed vital signs, labs, and physician order. Patient denies S&S of infection, or bleeding. Pt arrives with Emla applied to right chest port, port accessed with sterile technique, labs collected per MD order. Hgb 8.2, not within parameters to receive transfusion. Port flushed per protocol, rivera needle removed with tip intact, gauze and tape dressing placed. Confirmed upcoming appointment date and time with patient. Patient left the [facility] ambulatory with cane in no sign of distress.

## 2019-03-28 ENCOUNTER — OFFICE VISIT (OUTPATIENT)
Dept: CARDIOLOGY | Facility: MEDICAL CENTER | Age: 64
End: 2019-03-28
Payer: MEDICARE

## 2019-03-28 VITALS
OXYGEN SATURATION: 100 % | HEART RATE: 76 BPM | DIASTOLIC BLOOD PRESSURE: 78 MMHG | SYSTOLIC BLOOD PRESSURE: 124 MMHG | WEIGHT: 147.71 LBS | HEIGHT: 57 IN | BODY MASS INDEX: 31.87 KG/M2

## 2019-03-28 DIAGNOSIS — K76.0 FATTY LIVER: ICD-10-CM

## 2019-03-28 DIAGNOSIS — I27.20 PULMONARY HYPERTENSION (HCC): ICD-10-CM

## 2019-03-28 DIAGNOSIS — N18.6 ESRD (END STAGE RENAL DISEASE) (HCC): ICD-10-CM

## 2019-03-28 DIAGNOSIS — D46.9 MDS (MYELODYSPLASTIC SYNDROME) (HCC): ICD-10-CM

## 2019-03-28 DIAGNOSIS — I48.0 PAROXYSMAL ATRIAL FIBRILLATION (HCC): ICD-10-CM

## 2019-03-28 DIAGNOSIS — I10 ESSENTIAL HYPERTENSION: ICD-10-CM

## 2019-03-28 DIAGNOSIS — I35.8 AORTIC VALVE SCLEROSIS: ICD-10-CM

## 2019-03-28 DIAGNOSIS — D64.9 SYMPTOMATIC ANEMIA: ICD-10-CM

## 2019-03-28 DIAGNOSIS — E87.5 HYPERKALEMIA: ICD-10-CM

## 2019-03-28 DIAGNOSIS — J96.11 CHRONIC RESPIRATORY FAILURE WITH HYPOXIA (HCC): ICD-10-CM

## 2019-03-28 DIAGNOSIS — D61.818 PANCYTOPENIA (HCC): ICD-10-CM

## 2019-03-28 DIAGNOSIS — R07.89 OTHER CHEST PAIN: ICD-10-CM

## 2019-03-28 PROCEDURE — 99214 OFFICE O/P EST MOD 30 MIN: CPT | Performed by: INTERNAL MEDICINE

## 2019-03-28 RX ORDER — LIDOCAINE 50 MG/G
OINTMENT TOPICAL
Refills: 3 | COMMUNITY
Start: 2019-02-28 | End: 2019-03-28

## 2019-03-28 RX ORDER — LIDOCAINE 50 MG/G
PATCH TOPICAL
Refills: 11 | COMMUNITY
Start: 2019-03-17 | End: 2019-03-28

## 2019-03-28 ASSESSMENT — ENCOUNTER SYMPTOMS
CLAUDICATION: 1
VOMITING: 1
CHILLS: 1
FALLS: 1
POLYDIPSIA: 1
WEAKNESS: 1
NAUSEA: 1
NECK PAIN: 1
MYALGIAS: 1
EYE PAIN: 1
DOUBLE VISION: 1
BLURRED VISION: 1
MEMORY LOSS: 1
HEADACHES: 1
WHEEZING: 1
ORTHOPNEA: 1
SHORTNESS OF BREATH: 1
BACK PAIN: 1
EYE REDNESS: 1
INSOMNIA: 0
PHOTOPHOBIA: 1

## 2019-03-28 NOTE — LETTER
[facility]   [address], [facility] [address]  [place] [address]  Phone: [phone]  Fax: [phone]              Vielka Briscoe  1955    Encounter Date: 3/28/2019    Germania Alberts          PROGRESS NOTE:  Subjective:   Chief Complaint:   Chief Complaint   Patient presents with   • Chest Pain       Vielka Briscoe is a 63 y.o. female who is here for follow-up of chest pain.  She has multiple chronic issues.  She wears oxygen for what is supposed to be COPD but it was never a smoker.  She is going to see a lung specialist.  She has myelodysplastic syndrome and has been intolerant to some of the treatments.  Sees Dr. Ranulfo Avila with hematology, gets transfused PRN and has a port now.  Her hemoglobin has been very low at 8.4, due to myelo dysplastic syndrome.  She required 3 blood transfusions last month and more recently required 2.    She has had episodes of chest pain going on for many years.  It will happen with activity or rest.  It is substernal chest pain and sometimes radiates to the back.  It will last for a few minutes.  Has taken nitro, sometimes helps, sometimes not.  Had a troponin drawn in September 2018 which was normal.  Her EKG looks like an old anterior MI but she had a normal perfusion nuclear stress test in December 2017 and a structurally normal heart by echo at that time.      There is no family history of coronary disease.    Her biggest risk factor is her being a dialysis patient.    Echocardiogram in 2018 showed EF 75%.  She does have mitral annular calcification and aortic valve sclerosis.    She also experiences palpitations with heart will race and this lasts for a few minutes.  She is intermittently dizzy upon standing.  Her blood pressures extremely labile.  Today in the office here it is low.  Sometimes is as high as 218/90.  It is often lower after dialysis.  Her blood pressure medications have been adjusted from  "time to time.    She is not on primary prevention aspirin therapy. I believe because of the pancytopenia and particularly the anemia.    Was evaluated in the emergency room in November 2015 with weakness.    Here with community helper.    DATA REVIEWED by me:  ECG 6/27/2018  Sinus rhythm, rate 61, probable old anterior MI,     Echo 8/27/2018  Hyperdynamic EF greater than 70%, mild concentric LVH, mitral annular calcification, mild aortic stenosis versus sclerosis, mean gradient 14 mmHg    Nuclear stress test 11/16/2017  Normal perfusion, EF 58%    PFTs 11/1/2018  Impression  Mild restrictive lung disease with amandeep DLCOl    Echo November 13, 2017  Normal LV systolic function, no significant Doppler or valve abnormality, EF 65%    Most recent labs:     3/9/2019 hemoglobin 8.2, , platelets 117    11/13/2018 sodium 138, potassium 4.2, creatinine 6.91, LFTs normal    9/26/2018 , troponin normal    August 9, 2018 hemoglobin 7.6, platelets 146, white count 4.5    June 27, 2018 sodium 136, potassium 6, creatinine 6.87, LFTs normal, troponins normal,     January 23, 2018 total cholesterol 172, triglycerides 176, HDL 43, LDL 94    Past Medical History:   Diagnosis Date   • Arthritis     hands    • Atrial fibrillation (HCC)     HX   • Blood transfusion without reported diagnosis    • Breath shortness     w/exertion   • Cancer (HCC)     MDS in bones   • Cataract     bilat IOL   • Chronic anemia    • Chronic kidney disease        • Chronic obstructive pulmonary disease (HCC)    • Congestive heart failure (HCC)    • Dental disorder     full dentures   • Diabetes     Diet controlled   • Diabetes (HCC)    • Dialysis patient     [facility] ,   [facility] in [place]   • Glaucoma    • Heart abnormalities    • Hypertension    • MDS (myelodysplastic syndrome) 10/2016    bone marrow biopsy   • Pain -2017    \"bones\", generalized, 5/10   • Renal disorder    • Stroke (HCC) 03/2015    No residual " weakness/problems   • Supplemental oxygen dependent     2 liters     Past Surgical History:   Procedure Laterality Date   • GASTROSCOPY N/A 2018    Procedure: GASTROSCOPY;  Surgeon: Blanca Santos M.D.;  Location: SURGERY [facility];  Service: Gastroenterology   • BONE MARROW BIOPSY, NDL/TROCAR  2017    Procedure: BONE MARROW BIOPSY, NDL/TROCAR;  Surgeon: Wade Turner M.D.;  Location: ENDOSCOPY [facility]  Service: Orthopedics   • BONE MARROW ASPIRATION  2017    Procedure: BONE MARROW ASPIRATION;  Surgeon: Wade Turner M.D.;  Location: ENDOSCOPY [facility]  Service: Orthopedics   • VEIN LIGATION Left 11/10/2016    Procedure: VEIN LIGATION FOR DISTAL REVASCULARIZATION AND INTERVAL LIGATION OF LEFT ARM DIALYISIS ACCESS (DRIL PROCEDURE);  Surgeon: Ranulfo Jolly M.D.;  Location: SURGERY [facility];  Service:    • AV FISTULA CREATION Left 2016    Procedure: AV FISTULA CREATION UPPER EXTREMITY;  Surgeon: Ranulfo Jolly M.D.;  Location: [facility];  Service:    • GASTROSCOPY  2015    Procedure: ESOPHAGOGASTRODUODENOSCOPY WITH BIOPSY;  Surgeon: Wing Á[place] M.D.;  Location: [facility];  Service:    • COLONOSCOPY  2015    Procedure: COLONOSCOPY;  Surgeon: Wing Ál[place] M.D.;  Location: [facility];  Service:    • GYN SURGERY      hysterectomy   • GYN SURGERY       x 2   • GYN SURGERY      d&C x2   • OTHER      angioplasty/ stents bilat LE   • OTHER ABDOMINAL SURGERY      appendectomy,  x 2, hysterectomy, D & C   • OTHER ABDOMINAL SURGERY      appendectomy   • OTHER CARDIAC SURGERY      Angioplasty  and    • OTHER CARDIAC SURGERY      cardiac angiogram, angioplasty   • RETINAL DETACHMENT REPAIR Right      Family History   Problem Relation Age of Onset   • Hypertension Father          at 89   • Hypertension Mother      Social History     Social History   • Marital status:      Spouse  name: N/A   • Number of children: N/A   • Years of education: N/A     Occupational History   • Not on file.     Social History Main Topics   • Smoking status: Never Smoker   • Smokeless tobacco: Never Used   • Alcohol use No   • Drug use: No   • Sexual activity: Not on file     Other Topics Concern   • Not on file     Social History Narrative    ** Merged History Encounter **         ** Merged History Encounter **        Allergies   Allergen Reactions   • Pioglitazone Unspecified     Causes blindness   • Simvastatin Unspecified     Couldn't move   • Flomax [Tamsulosin]    • Glipizide    • Ondansetron    • Pcn [Penicillins]    • Tramadol    • Darvocet [Propoxyphene N-Apap] Vomiting   • Demerol Vomiting   • Dilaudid [Hydromorphone] Vomiting   • Diphenhydramine Vomiting   • Glucophage [Metformin Hydrochloride] Vomiting   • Iron Vomiting     vomiting   • Lenalidomide Vomiting   • Morphine Vomiting   • Multivitamin Itching     itching   • Naprosyn [Naproxen] Hives   • Other Drug Rash and Vomiting     Any binders that remove phosphorus from the body such as tums   • Oxycodone Vomiting   • Pcn [Penicillins] Vomiting          • Requip Vomiting   • Sulfa Drugs Rash and Vomiting     Vomiting & rash      • Tramadol Vomiting   • Trazodone Vomiting       Current Outpatient Prescriptions   Medication Sig Dispense Refill   • lidocaine-prilocaine (EMLA) 2.5-2.5 % Cream Apply  to affected area(s) as needed.     • Buprenorphine 7.5 MCG/HR PATCH WEEKLY Apply  to skin as directed.     • losartan (COZAAR) 100 MG Tab Take 100 mg by mouth every day.     • amLODIPine (NORVASC) 10 MG Tab Take 2.5 mg by mouth every day. 1 Tab 0   • nitroglycerin (NITROSTAT) 0.4 MG SL Tab Place 1 Tab under tongue as needed for Chest Pain. 25 Tab 11   • bimatoprost (LUMIGAN) 0.01 % Solution Place 1 Drop in both eyes every bedtime.     • brinzolamide (AZOPT) 1 % Suspension Place 1 Drop in both eyes 2 Times a Day.     • cyclobenzaprine (FLEXERIL) 5 MG tablet  "Take 5 mg by mouth 3 times a day as needed.     • HYDROcodone-acetaminophen (NORCO) 5-325 MG Tab per tablet Take 1 Tab by mouth every 6 hours as needed (pain).     • carvedilol (COREG) 12.5 MG Tab Take 12.5 mg by mouth 2 times a day, with meals.     • Brimonidine Tartrate-Timolol (COMBIGAN) 0.2-0.5 % Solution Place 1 Drop in both eyes 2 Times a Day.     • pregabalin (LYRICA) 50 MG capsule Take 150 mg by mouth 2 times a day.       No current facility-administered medications for this visit.        Review of Systems   Constitutional: Positive for chills and malaise/fatigue.   HENT: Positive for hearing loss.    Eyes: Positive for blurred vision, double vision, photophobia, pain and redness.   Respiratory: Positive for shortness of breath and wheezing.    Cardiovascular: Positive for chest pain, orthopnea, claudication and leg swelling.   Gastrointestinal: Positive for nausea and vomiting.   Musculoskeletal: Positive for back pain, falls, joint pain, myalgias and neck pain.   Skin: Positive for itching.   Neurological: Positive for weakness and headaches.   Endo/Heme/Allergies: Positive for polydipsia.   Psychiatric/Behavioral: Positive for memory loss. The patient does not have insomnia.      All others systems reviewed and negative.     Objective:     Blood pressure 124/78, pulse 76, height 1.45 m (4' 9.09\"), weight 67 kg (147 lb 11.3 oz), last menstrual period 01/01/1995, SpO2 100 %, not currently breastfeeding. Body mass index is 31.86 kg/m².    Physical Exam   General: No acute distress. Well nourished. Wearing O2.  HEENT: EOM grossly intact, no scleral icterus, no pharyngeal erythema.   Neck:  No JVD, no bruits, trachea midline  CVS: RRR. Normal S1, S2. No M/R/G. No LE edema.  2+ radial pulses, 2+ DP pulses  Resp: CTAB. No wheezing or crackles/rhonchi. Normal respiratory effort.  Abdomen: Soft, NT, no tim hepatomegaly.  MSK/Ext: No clubbing or cyanosis.  Skin: Warm and dry, no rashes.  Neurological: CN III-XII " grossly intact. No focal deficits.   Psych: A&O x 3, appropriate affect, good judgement      Assessment:     1. Other chest pain     2. Paroxysmal atrial fibrillation (HCC)     3. Symptomatic anemia     4. ESRD (end stage renal disease) (HCC)     5. MDS (myelodysplastic syndrome) (HCC)     6. Essential hypertension     7. Pancytopenia (HCC)     8. Hyperkalemia     9. Pulmonary hypertension (HCC)     10. Fatty liver     11. Chronic respiratory failure with hypoxia, 2L     12. Aortic valve sclerosis         Medical Decision Making:  Today's Assessment / Status / Plan:     -Atypical CP, nitro helps sometimes and not others, if CP happening frequently and nitro helping consistently we could try nitro.  -No stress testing, we cannot take her for cath, would consider only for ACS without active bleeding, would be balloon angioplasty only, or no cath at all.  -No ASA due to bleeding  -There is mention in her medical history of paroxysmal atrial fibrillation but I did not find any details of this and she would not be a candidate for anticoagulation anyway.  -Most of her symptoms are likely related to her anemia.  -Her LDL cholesterol is in a good place so until she has an event such as a heart attack or stroke I would not start cholesterol medication.  -RTC yearly and I am available as needed for changes.      Return in about 1 year (around 3/28/2020).    It is my pleasure to participate in the care of Ms. Briscoe.  Please do not hesitate to contact me with questions or concerns.    Germania Alberts MD, [facility]  Cardiologist [facility]    Please note that this dictation was created using voice recognition software. I have made every reasonable attempt to correct obvious errors, but it is possible there are errors of grammar and possibly content that I did not discover before finalizing the note.      Nyla Nava M.D.  [address] [facility] [address]  VIA Facsimile:  [phone]

## 2019-03-28 NOTE — PROGRESS NOTES
Subjective:   Chief Complaint:   Chief Complaint   Patient presents with   • Chest Pain       Vielka Briscoe is a 63 y.o. female who is here for follow-up of chest pain.  She has multiple chronic issues.  She wears oxygen for what is supposed to be COPD but it was never a smoker.  She is going to see a lung specialist.  She has myelodysplastic syndrome and has been intolerant to some of the treatments.  Sees Dr. Ranulfo Avila with hematology, gets transfused PRN and has a port now.  Her hemoglobin has been very low at 8.4, due to myelo dysplastic syndrome.  She required 3 blood transfusions last month and more recently required 2.    She has had episodes of chest pain going on for many years.  It will happen with activity or rest.  It is substernal chest pain and sometimes radiates to the back.  It will last for a few minutes.  Has taken nitro, sometimes helps, sometimes not.  Had a troponin drawn in September 2018 which was normal.  Her EKG looks like an old anterior MI but she had a normal perfusion nuclear stress test in December 2017 and a structurally normal heart by echo at that time.      There is no family history of coronary disease.    Her biggest risk factor is her being a dialysis patient.    Echocardiogram in 2018 showed EF 75%.  She does have mitral annular calcification and aortic valve sclerosis.    She also experiences palpitations with heart will race and this lasts for a few minutes.  She is intermittently dizzy upon standing.  Her blood pressures extremely labile.  Today in the office here it is low.  Sometimes is as high as 218/90.  It is often lower after dialysis.  Her blood pressure medications have been adjusted from time to time.    She is not on primary prevention aspirin therapy. I believe because of the pancytopenia and particularly the anemia.    Was evaluated in the emergency room in November 2015 with weakness.    Here with community helper.    DATA REVIEWED by me:  ECG  "6/27/2018  Sinus rhythm, rate 61, probable old anterior MI,     Echo 8/27/2018  Hyperdynamic EF greater than 70%, mild concentric LVH, mitral annular calcification, mild aortic stenosis versus sclerosis, mean gradient 14 mmHg    Nuclear stress test 11/16/2017  Normal perfusion, EF 58%    PFTs 11/1/2018  Impression  Mild restrictive lung disease with amandeep DLCOl    Echo November 13, 2017  Normal LV systolic function, no significant Doppler or valve abnormality, EF 65%    Most recent labs:     3/9/2019 hemoglobin 8.2, , platelets 117    11/13/2018 sodium 138, potassium 4.2, creatinine 6.91, LFTs normal    9/26/2018 , troponin normal    August 9, 2018 hemoglobin 7.6, platelets 146, white count 4.5    June 27, 2018 sodium 136, potassium 6, creatinine 6.87, LFTs normal, troponins normal,     January 23, 2018 total cholesterol 172, triglycerides 176, HDL 43, LDL 94    Past Medical History:   Diagnosis Date   • Arthritis     hands    • Atrial fibrillation (HCC)     HX   • Blood transfusion without reported diagnosis    • Breath shortness     w/exertion   • Cancer (HCC)     MDS in bones   • Cataract     bilat IOL   • Chronic anemia    • Chronic kidney disease        • Chronic obstructive pulmonary disease (HCC)    • Congestive heart failure (HCC)    • Dental disorder     full dentures   • Diabetes     Diet controlled   • Diabetes (HCC)    • D[facility] patient     [facility] in [place]   • Glaucoma    • Heart abnormalities    • Hypertension    • MDS (myelodysplastic syndrome) 10/2016    bone marrow biopsy   • Pain -2017    \"bones\", generalized, 5/10   • Renal disorder    • Stroke (HCC) 03/2015    No residual weakness/problems   • Supplemental oxygen dependent     2 liters     Past Surgical History:   Procedure Laterality Date   • GASTROSCOPY N/A 1/25/2018    Procedure: GASTROSCOPY;  Surgeon: Balnca Santos M.D.;  Location: SURGERY [facility];  Service: Gastroenterology "   • BONE MARROW BIOPSY, NDL/TROCAR  2017    Procedure: BONE MARROW BIOPSY, NDL/TROCAR;  Surgeon: Wade Turner M.D.;  Location: ENDOSCOPY [facility];  Service: Orthopedics   • BONE MARROW ASPIRATION  2017    Procedure: BONE MARROW ASPIRATION;  Surgeon: Wade Turner M.D.;  Location: ENDOSCOPY [facility];  Service: Orthopedics   • VEIN LIGATION Left 11/10/2016    Procedure: VEIN LIGATION FOR DISTAL REVASCULARIZATION AND INTERVAL LIGATION OF LEFT ARM DIALYISIS ACCESS (DRIL PROCEDURE);  Surgeon: Ranulfo Jolly M.D.;  Location: SURGERY [facility];  Service:    • AV FISTULA CREATION Left 2016    Procedure: AV FISTULA CREATION UPPER EXTREMITY;  Surgeon: Ranulfo Jolly M.D.;  Location: SURGERY [facility];  Service:    • GASTROSCOPY  2015    Procedure: ESOPHAGOGASTRODUODENOSCOPY WITH BIOPSY;  Surgeon: Wing [place] M.D.;  Location: SURGERY [facility];  Service:    • COLONOSCOPY  2015    Procedure: COLONOSCOPY;  Surgeon: Wing Álvarez M.D.;  Location: SURGERY [facility];  Service:    • GYN SURGERY      hysterectomy   • GYN SURGERY       x 2   • GYN SURGERY      d&C x2   • OTHER      angioplasty/ stents bilat LE   • OTHER ABDOMINAL SURGERY      appendectomy,  x 2, hysterectomy, D & C   • OTHER ABDOMINAL SURGERY      appendectomy   • OTHER CARDIAC SURGERY      Angioplasty  and    • OTHER CARDIAC SURGERY      cardiac angiogram, angioplasty   • RETINAL DETACHMENT REPAIR Right      Family History   Problem Relation Age of Onset   • Hypertension Father          at 89   • Hypertension Mother      Social History     Social History   • Marital status:      Spouse name: N/A   • Number of children: N/A   • Years of education: N/A     Occupational History   • Not on file.     Social History Main Topics   • Smoking status: Never Smoker   • Smokeless tobacco: Never Used   • Alcohol use No   • Drug use: No   • Sexual activity: Not on  file     Other Topics Concern   • Not on file     Social History Narrative    ** Merged History Encounter **         ** Merged History Encounter **        Allergies   Allergen Reactions   • Pioglitazone Unspecified     Causes blindness   • Simvastatin Unspecified     Couldn't move   • Flomax [Tamsulosin]    • Glipizide    • Ondansetron    • Pcn [Penicillins]    • Tramadol    • Darvocet [Propoxyphene N-Apap] Vomiting   • Demerol Vomiting   • Dilaudid [Hydromorphone] Vomiting   • Diphenhydramine Vomiting   • Glucophage [Metformin Hydrochloride] Vomiting   • Iron Vomiting     vomiting   • Lenalidomide Vomiting   • Morphine Vomiting   • Multivitamin Itching     itching   • Naprosyn [Naproxen] Hives   • Other Drug Rash and Vomiting     Any binders that remove phosphorus from the body such as tums   • Oxycodone Vomiting   • Pcn [Penicillins] Vomiting          • Requip Vomiting   • Sulfa Drugs Rash and Vomiting     Vomiting & rash      • Tramadol Vomiting   • Trazodone Vomiting       Current Outpatient Prescriptions   Medication Sig Dispense Refill   • lidocaine-prilocaine (EMLA) 2.5-2.5 % Cream Apply  to affected area(s) as needed.     • Buprenorphine 7.5 MCG/HR PATCH WEEKLY Apply  to skin as directed.     • losartan (COZAAR) 100 MG Tab Take 100 mg by mouth every day.     • amLODIPine (NORVASC) 10 MG Tab Take 2.5 mg by mouth every day. 1 Tab 0   • nitroglycerin (NITROSTAT) 0.4 MG SL Tab Place 1 Tab under tongue as needed for Chest Pain. 25 Tab 11   • bimatoprost (LUMIGAN) 0.01 % Solution Place 1 Drop in both eyes every bedtime.     • brinzolamide (AZOPT) 1 % Suspension Place 1 Drop in both eyes 2 Times a Day.     • cyclobenzaprine (FLEXERIL) 5 MG tablet Take 5 mg by mouth 3 times a day as needed.     • HYDROcodone-acetaminophen (NORCO) 5-325 MG Tab per tablet Take 1 Tab by mouth every 6 hours as needed (pain).     • carvedilol (COREG) 12.5 MG Tab Take 12.5 mg by mouth 2 times a day, with meals.     • Brimonidine  "Tartrate-Timolol (COMBIGAN) 0.2-0.5 % Solution Place 1 Drop in both eyes 2 Times a Day.     • pregabalin (LYRICA) 50 MG capsule Take 150 mg by mouth 2 times a day.       No current facility-administered medications for this visit.        Review of Systems   Constitutional: Positive for chills and malaise/fatigue.   HENT: Positive for hearing loss.    Eyes: Positive for blurred vision, double vision, photophobia, pain and redness.   Respiratory: Positive for shortness of breath and wheezing.    Cardiovascular: Positive for chest pain, orthopnea, claudication and leg swelling.   Gastrointestinal: Positive for nausea and vomiting.   Musculoskeletal: Positive for back pain, falls, joint pain, myalgias and neck pain.   Skin: Positive for itching.   Neurological: Positive for weakness and headaches.   Endo/Heme/Allergies: Positive for polydipsia.   Psychiatric/Behavioral: Positive for memory loss. The patient does not have insomnia.      All others systems reviewed and negative.     Objective:     Blood pressure 124/78, pulse 76, height 1.45 m (4' 9.09\"), weight 67 kg (147 lb 11.3 oz), last menstrual period 01/01/1995, SpO2 100 %, not currently breastfeeding. Body mass index is 31.86 kg/m².    Physical Exam   General: No acute distress. Well nourished. Wearing O2.  HEENT: EOM grossly intact, no scleral icterus, no pharyngeal erythema.   Neck:  No JVD, no bruits, trachea midline  CVS: RRR. Normal S1, S2. No M/R/G. No LE edema.  2+ radial pulses, 2+ DP pulses  Resp: CTAB. No wheezing or crackles/rhonchi. Normal respiratory effort.  Abdomen: Soft, NT, no tim hepatomegaly.  MSK/Ext: No clubbing or cyanosis.  Skin: Warm and dry, no rashes.  Neurological: CN III-XII grossly intact. No focal deficits.   Psych: A&O x 3, appropriate affect, good judgement      Assessment:     1. Other chest pain     2. Paroxysmal atrial fibrillation (HCC)     3. Symptomatic anemia     4. ESRD (end stage renal disease) (HCC)     5. MDS " (myelodysplastic syndrome) (HCC)     6. Essential hypertension     7. Pancytopenia (HCC)     8. Hyperkalemia     9. Pulmonary hypertension (HCC)     10. Fatty liver     11. Chronic respiratory failure with hypoxia, 2L     12. Aortic valve sclerosis         Medical Decision Making:  Today's Assessment / Status / Plan:     -Atypical CP, nitro helps sometimes and not others, if CP happening frequently and nitro helping consistently we could try nitro.  -No stress testing, we cannot take her for cath, would consider only for ACS without active bleeding, would be balloon angioplasty only, or no cath at all.  -No ASA due to bleeding  -There is mention in her medical history of paroxysmal atrial fibrillation but I did not find any details of this and she would not be a candidate for anticoagulation anyway.  -Most of her symptoms are likely related to her anemia.  -Her LDL cholesterol is in a good place so until she has an event such as a heart attack or stroke I would not start cholesterol medication.  -RTC yearly and I am available as needed for changes.      Return in about 1 year (around 3/28/2020).    It is my pleasure to participate in the care of Ms. Briscoe.  Please do not hesitate to contact me with questions or concerns.    Germania Alberts MD, [facility]  Cardiologist [facility]    Please note that this dictation was created using voice recognition software. I have made every reasonable attempt to correct obvious errors, but it is possible there are errors of grammar and possibly content that I did not discover before finalizing the note.

## 2019-03-30 ENCOUNTER — OUTPATIENT INFUSION SERVICES (OUTPATIENT)
Dept: ONCOLOGY | Facility: MEDICAL CENTER | Age: 64
End: 2019-03-30
Attending: INTERNAL MEDICINE
Payer: MEDICARE

## 2019-03-30 VITALS
OXYGEN SATURATION: 100 % | TEMPERATURE: 98 F | RESPIRATION RATE: 16 BRPM | DIASTOLIC BLOOD PRESSURE: 70 MMHG | SYSTOLIC BLOOD PRESSURE: 202 MMHG | HEART RATE: 70 BPM | HEIGHT: 57 IN | WEIGHT: 145.5 LBS | BODY MASS INDEX: 31.39 KG/M2

## 2019-03-30 DIAGNOSIS — D46.9 MYELODYSPLASIA (MYELODYSPLASTIC SYNDROME) (HCC): ICD-10-CM

## 2019-03-30 LAB
ABO GROUP BLD: NORMAL
ANISOCYTOSIS BLD QL SMEAR: ABNORMAL
BARCODED ABORH UBTYP: 6200
BARCODED ABORH UBTYP: 6200
BARCODED PRD CODE UBPRD: NORMAL
BARCODED PRD CODE UBPRD: NORMAL
BARCODED UNIT NUM UBUNT: NORMAL
BARCODED UNIT NUM UBUNT: NORMAL
BASO STIPL BLD QL SMEAR: NORMAL
BASOPHILS # BLD AUTO: 1 % (ref 0–1.8)
BASOPHILS # BLD: 0.08 K/UL (ref 0–0.12)
BLD GP AB SCN SERPL QL: NORMAL
COMPONENT R 8504R: NORMAL
COMPONENT R 8504R: NORMAL
EOSINOPHIL # BLD AUTO: 0.08 K/UL (ref 0–0.51)
EOSINOPHIL NFR BLD: 1 % (ref 0–6.9)
ERYTHROCYTE [DISTWIDTH] IN BLOOD BY AUTOMATED COUNT: 77.9 FL (ref 35.9–50)
HCT VFR BLD AUTO: 21.2 % (ref 37–47)
HGB BLD-MCNC: 7 G/DL (ref 12–16)
LG PLATELETS BLD QL SMEAR: NORMAL
LYMPHOCYTES # BLD AUTO: 0.7 K/UL (ref 1–4.8)
LYMPHOCYTES NFR BLD: 9 % (ref 22–41)
MACROCYTES BLD QL SMEAR: ABNORMAL
MANUAL DIFF BLD: NORMAL
MCH RBC QN AUTO: 35.9 PG (ref 27–33)
MCHC RBC AUTO-ENTMCNC: 33 G/DL (ref 33.6–35)
MCV RBC AUTO: 108.7 FL (ref 81.4–97.8)
METAMYELOCYTES NFR BLD MANUAL: 1 %
MONOCYTES # BLD AUTO: 0.86 K/UL (ref 0–0.85)
MONOCYTES NFR BLD AUTO: 11 % (ref 0–13.4)
MORPHOLOGY BLD-IMP: NORMAL
MYELOCYTES NFR BLD MANUAL: 1 %
NEUTROPHILS # BLD AUTO: 5.93 K/UL (ref 2–7.15)
NEUTROPHILS NFR BLD: 72 % (ref 44–72)
NEUTS BAND NFR BLD MANUAL: 4 % (ref 0–10)
NRBC # BLD AUTO: 0.03 K/UL
NRBC BLD-RTO: 0.4 /100 WBC
PLATELET # BLD AUTO: 117 K/UL (ref 164–446)
PLATELET BLD QL SMEAR: NORMAL
PMV BLD AUTO: 14.4 FL (ref 9–12.9)
POLYCHROMASIA BLD QL SMEAR: NORMAL
PRODUCT TYPE UPROD: NORMAL
PRODUCT TYPE UPROD: NORMAL
RBC # BLD AUTO: 1.95 M/UL (ref 4.2–5.4)
RBC BLD AUTO: PRESENT
RH BLD: NORMAL
UNIT STATUS USTAT: NORMAL
UNIT STATUS USTAT: NORMAL
WBC # BLD AUTO: 7.8 K/UL (ref 4.8–10.8)

## 2019-03-30 PROCEDURE — A9270 NON-COVERED ITEM OR SERVICE: HCPCS | Performed by: INTERNAL MEDICINE

## 2019-03-30 PROCEDURE — 86900 BLOOD TYPING SEROLOGIC ABO: CPT

## 2019-03-30 PROCEDURE — 85027 COMPLETE CBC AUTOMATED: CPT

## 2019-03-30 PROCEDURE — 36430 TRANSFUSION BLD/BLD COMPNT: CPT

## 2019-03-30 PROCEDURE — 86923 COMPATIBILITY TEST ELECTRIC: CPT | Mod: 91

## 2019-03-30 PROCEDURE — 36591 DRAW BLOOD OFF VENOUS DEVICE: CPT

## 2019-03-30 PROCEDURE — 96376 TX/PRO/DX INJ SAME DRUG ADON: CPT

## 2019-03-30 PROCEDURE — 700111 HCHG RX REV CODE 636 W/ 250 OVERRIDE (IP): Performed by: INTERNAL MEDICINE

## 2019-03-30 PROCEDURE — 96374 THER/PROPH/DIAG INJ IV PUSH: CPT

## 2019-03-30 PROCEDURE — A4212 NON CORING NEEDLE OR STYLET: HCPCS

## 2019-03-30 PROCEDURE — 86644 CMV ANTIBODY: CPT

## 2019-03-30 PROCEDURE — 85007 BL SMEAR W/DIFF WBC COUNT: CPT

## 2019-03-30 PROCEDURE — P9016 RBC LEUKOCYTES REDUCED: HCPCS

## 2019-03-30 PROCEDURE — 96375 TX/PRO/DX INJ NEW DRUG ADDON: CPT

## 2019-03-30 PROCEDURE — 86850 RBC ANTIBODY SCREEN: CPT

## 2019-03-30 PROCEDURE — 86901 BLOOD TYPING SEROLOGIC RH(D): CPT

## 2019-03-30 PROCEDURE — 700102 HCHG RX REV CODE 250 W/ 637 OVERRIDE(OP): Performed by: INTERNAL MEDICINE

## 2019-03-30 PROCEDURE — 306780 HCHG STAT FOR TRANSFUSION PER CASE

## 2019-03-30 RX ORDER — ACETAMINOPHEN 325 MG/1
650 TABLET ORAL ONCE
Status: COMPLETED | OUTPATIENT
Start: 2019-03-30 | End: 2019-03-30

## 2019-03-30 RX ORDER — ACETAMINOPHEN 325 MG/1
650 TABLET ORAL PRN
Status: CANCELLED | OUTPATIENT
Start: 2019-03-30

## 2019-03-30 RX ORDER — SODIUM CHLORIDE 9 MG/ML
500 INJECTION, SOLUTION INTRAVENOUS ONCE
Status: CANCELLED | OUTPATIENT
Start: 2019-03-30 | End: 2019-03-30

## 2019-03-30 RX ORDER — FUROSEMIDE 10 MG/ML
20 INJECTION INTRAMUSCULAR; INTRAVENOUS
Status: CANCELLED
Start: 2019-03-30

## 2019-03-30 RX ORDER — FUROSEMIDE 10 MG/ML
20 INJECTION INTRAMUSCULAR; INTRAVENOUS
Status: DISCONTINUED | OUTPATIENT
Start: 2019-03-30 | End: 2019-03-30 | Stop reason: HOSPADM

## 2019-03-30 RX ORDER — LIDOCAINE HYDROCHLORIDE 10 MG/ML
20 INJECTION, SOLUTION INFILTRATION; PERINEURAL
Status: CANCELLED | OUTPATIENT
Start: 2019-03-30

## 2019-03-30 RX ORDER — ACETAMINOPHEN 325 MG/1
650 TABLET ORAL ONCE
Status: CANCELLED | OUTPATIENT
Start: 2019-03-30 | End: 2019-03-30

## 2019-03-30 RX ORDER — FUROSEMIDE 10 MG/ML
20 INJECTION INTRAMUSCULAR; INTRAVENOUS ONCE
Status: DISCONTINUED | OUTPATIENT
Start: 2019-03-30 | End: 2019-03-30 | Stop reason: HOSPADM

## 2019-03-30 RX ORDER — DIPHENHYDRAMINE HCL 25 MG
25 TABLET ORAL ONCE
Status: CANCELLED | OUTPATIENT
Start: 2019-03-30 | End: 2019-03-30

## 2019-03-30 RX ADMIN — ACETAMINOPHEN 650 MG: 325 TABLET ORAL at 11:33

## 2019-03-30 RX ADMIN — HEPARIN 500 UNITS: 100 SYRINGE at 18:10

## 2019-03-30 RX ADMIN — FUROSEMIDE 20 MG: 10 INJECTION, SOLUTION INTRAMUSCULAR; INTRAVENOUS at 14:57

## 2019-03-30 RX ADMIN — FUROSEMIDE 20 MG: 10 INJECTION, SOLUTION INTRAMUSCULAR; INTRAVENOUS at 17:59

## 2019-03-30 NOTE — PROGRESS NOTES
Patient arrives to [facility] for labs and possible blood products.  R chest Port accessed using sterile technique, flushed with NS and labs drawn via Port.  Patient reports some discomfort to R neck and shoulder.  Patient is able to lift arm above head without discomfort.  Port has brisk blood return, no pain reported when port is flushed and small amount swelling to neck noted on right side.  Patient has reported this R neck discomfort before ever since port has been placed in Feb 2019.  Hgb is 7.0 and Hct is 21.2 today.  Patient meets parameters for blood transfusion today.  Blood consents signed by patient. Tylenol given as pre-med.  Patient refuses Benadryl d/t sensitivity.  Two units of PRBC infused without adverse effects.  Lasix 20mg IVP given after each unit.  Patient did not take her BP medications this morning and is hypertensive throughout transfusions.  Patient denies any chest pain or other symptoms.  Port flushed, blood return noted and heparin locked.  Site covered with gauze/paper tape.  Patient dc home with daughter.

## 2019-04-12 ENCOUNTER — APPOINTMENT (OUTPATIENT)
Dept: RADIOLOGY | Facility: MEDICAL CENTER | Age: 64
DRG: 640 | End: 2019-04-12
Attending: EMERGENCY MEDICINE
Payer: MEDICARE

## 2019-04-12 ENCOUNTER — HOSPITAL ENCOUNTER (INPATIENT)
Facility: MEDICAL CENTER | Age: 64
LOS: 3 days | DRG: 640 | End: 2019-04-15
Attending: EMERGENCY MEDICINE | Admitting: HOSPITALIST
Payer: MEDICARE

## 2019-04-12 DIAGNOSIS — H54.8 LEGALLY BLIND: Chronic | ICD-10-CM

## 2019-04-12 DIAGNOSIS — N18.6 ESRD (END STAGE RENAL DISEASE) (HCC): ICD-10-CM
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
## 2019-04-12 LAB
ALBUMIN SERPL BCP-MCNC: 3.5 G/DL (ref 3.2–4.9)
ALBUMIN/GLOB SERPL: 1.3 G/DL
ALP SERPL-CCNC: 59 U/L (ref 30–99)
ALT SERPL-CCNC: 23 U/L (ref 2–50)
ANION GAP SERPL CALC-SCNC: 11 MMOL/L (ref 0–11.9)
ANION GAP SERPL CALC-SCNC: 17 MMOL/L (ref 0–11.9)
ANISOCYTOSIS BLD QL SMEAR: ABNORMAL
APTT PPP: 38.7 SEC (ref 24.7–36)
AST SERPL-CCNC: 21 U/L (ref 12–45)
BASOPHILS # BLD AUTO: 0.3 % (ref 0–1.8)
BASOPHILS # BLD: 0.02 K/UL (ref 0–0.12)
BILIRUB SERPL-MCNC: 0.4 MG/DL (ref 0.1–1.5)
BUN SERPL-MCNC: 145 MG/DL (ref 8–22)
BUN SERPL-MCNC: 49 MG/DL (ref 8–22)
CALCIUM SERPL-MCNC: 6.8 MG/DL (ref 8.5–10.5)
CALCIUM SERPL-MCNC: 7.7 MG/DL (ref 8.5–10.5)
CHLORIDE SERPL-SCNC: 92 MMOL/L (ref 96–112)
CHLORIDE SERPL-SCNC: 96 MMOL/L (ref 96–112)
CO2 SERPL-SCNC: 23 MMOL/L (ref 20–33)
CO2 SERPL-SCNC: 31 MMOL/L (ref 20–33)
COMMENT 1642: NORMAL
CREAT SERPL-MCNC: 4.37 MG/DL (ref 0.5–1.4)
CREAT SERPL-MCNC: 9.69 MG/DL (ref 0.5–1.4)
EKG IMPRESSION: NORMAL
EOSINOPHIL # BLD AUTO: 0.21 K/UL (ref 0–0.51)
EOSINOPHIL NFR BLD: 3 % (ref 0–6.9)
ERYTHROCYTE [DISTWIDTH] IN BLOOD BY AUTOMATED COUNT: 77.3 FL (ref 35.9–50)
GLOBULIN SER CALC-MCNC: 2.8 G/DL (ref 1.9–3.5)
GLUCOSE BLD-MCNC: 100 MG/DL (ref 65–99)
GLUCOSE BLD-MCNC: 107 MG/DL (ref 65–99)
GLUCOSE BLD-MCNC: 139 MG/DL (ref 65–99)
GLUCOSE BLD-MCNC: 153 MG/DL (ref 65–99)
GLUCOSE BLD-MCNC: 178 MG/DL (ref 65–99)
GLUCOSE BLD-MCNC: 62 MG/DL (ref 65–99)
GLUCOSE BLD-MCNC: 81 MG/DL (ref 65–99)
GLUCOSE BLD-MCNC: 84 MG/DL (ref 65–99)
GLUCOSE BLD-MCNC: 89 MG/DL (ref 65–99)
GLUCOSE BLD-MCNC: 97 MG/DL (ref 65–99)
GLUCOSE SERPL-MCNC: 124 MG/DL (ref 65–99)
GLUCOSE SERPL-MCNC: 138 MG/DL (ref 65–99)
HCT VFR BLD AUTO: 22.3 % (ref 37–47)
HGB BLD-MCNC: 7.1 G/DL (ref 12–16)
IMM GRANULOCYTES # BLD AUTO: 0.06 K/UL (ref 0–0.11)
IMM GRANULOCYTES NFR BLD AUTO: 0.9 % (ref 0–0.9)
INR PPP: 1.01 (ref 0.87–1.13)
LYMPHOCYTES # BLD AUTO: 1.12 K/UL (ref 1–4.8)
LYMPHOCYTES NFR BLD: 16 % (ref 22–41)
MACROCYTES BLD QL SMEAR: ABNORMAL
MCH RBC QN AUTO: 35 PG (ref 27–33)
MCHC RBC AUTO-ENTMCNC: 31.8 G/DL (ref 33.6–35)
MCV RBC AUTO: 109.9 FL (ref 81.4–97.8)
MONOCYTES # BLD AUTO: 0.93 K/UL (ref 0–0.85)
MONOCYTES NFR BLD AUTO: 13.3 % (ref 0–13.4)
MORPHOLOGY BLD-IMP: NORMAL
NEUTROPHILS # BLD AUTO: 4.67 K/UL (ref 2–7.15)
NEUTROPHILS NFR BLD: 66.5 % (ref 44–72)
NRBC # BLD AUTO: 0 K/UL
NRBC BLD-RTO: 0 /100 WBC
PLATELET # BLD AUTO: 120 K/UL (ref 164–446)
PLATELET BLD QL SMEAR: NORMAL
PMV BLD AUTO: 14 FL (ref 9–12.9)
POTASSIUM SERPL-SCNC: 3.6 MMOL/L (ref 3.6–5.5)
POTASSIUM SERPL-SCNC: 8.5 MMOL/L (ref 3.6–5.5)
PROT SERPL-MCNC: 6.3 G/DL (ref 6–8.2)
PROTHROMBIN TIME: 13.5 SEC (ref 12–14.6)
RBC # BLD AUTO: 2.03 M/UL (ref 4.2–5.4)
RBC BLD AUTO: PRESENT
SODIUM SERPL-SCNC: 132 MMOL/L (ref 135–145)
SODIUM SERPL-SCNC: 138 MMOL/L (ref 135–145)
WBC # BLD AUTO: 7 K/UL (ref 4.8–10.8)

## 2019-04-12 PROCEDURE — 5A1D70Z PERFORMANCE OF URINARY FILTRATION, INTERMITTENT, LESS THAN 6 HOURS PER DAY: ICD-10-PCS | Performed by: INTERNAL MEDICINE

## 2019-04-12 PROCEDURE — 71045 X-RAY EXAM CHEST 1 VIEW: CPT

## 2019-04-12 PROCEDURE — 700111 HCHG RX REV CODE 636 W/ 250 OVERRIDE (IP): Performed by: HOSPITALIST

## 2019-04-12 PROCEDURE — 97162 PT EVAL MOD COMPLEX 30 MIN: CPT

## 2019-04-12 PROCEDURE — 93005 ELECTROCARDIOGRAM TRACING: CPT | Performed by: EMERGENCY MEDICINE

## 2019-04-12 PROCEDURE — 70450 CT HEAD/BRAIN W/O DYE: CPT

## 2019-04-12 PROCEDURE — 96374 THER/PROPH/DIAG INJ IV PUSH: CPT

## 2019-04-12 PROCEDURE — 36415 COLL VENOUS BLD VENIPUNCTURE: CPT

## 2019-04-12 PROCEDURE — 700102 HCHG RX REV CODE 250 W/ 637 OVERRIDE(OP): Performed by: HOSPITALIST

## 2019-04-12 PROCEDURE — 700102 HCHG RX REV CODE 250 W/ 637 OVERRIDE(OP): Performed by: EMERGENCY MEDICINE

## 2019-04-12 PROCEDURE — 304561 HCHG STAT O2

## 2019-04-12 PROCEDURE — 80048 BASIC METABOLIC PNL TOTAL CA: CPT

## 2019-04-12 PROCEDURE — 700111 HCHG RX REV CODE 636 W/ 250 OVERRIDE (IP): Performed by: EMERGENCY MEDICINE

## 2019-04-12 PROCEDURE — 85730 THROMBOPLASTIN TIME PARTIAL: CPT

## 2019-04-12 PROCEDURE — 97166 OT EVAL MOD COMPLEX 45 MIN: CPT

## 2019-04-12 PROCEDURE — 99291 CRITICAL CARE FIRST HOUR: CPT

## 2019-04-12 PROCEDURE — 99223 1ST HOSP IP/OBS HIGH 75: CPT | Mod: AI | Performed by: HOSPITALIST

## 2019-04-12 PROCEDURE — A9270 NON-COVERED ITEM OR SERVICE: HCPCS | Performed by: EMERGENCY MEDICINE

## 2019-04-12 PROCEDURE — 96375 TX/PRO/DX INJ NEW DRUG ADDON: CPT

## 2019-04-12 PROCEDURE — 96376 TX/PRO/DX INJ SAME DRUG ADON: CPT

## 2019-04-12 PROCEDURE — 80053 COMPREHEN METABOLIC PANEL: CPT

## 2019-04-12 PROCEDURE — 93005 ELECTROCARDIOGRAM TRACING: CPT

## 2019-04-12 PROCEDURE — A9270 NON-COVERED ITEM OR SERVICE: HCPCS | Performed by: HOSPITALIST

## 2019-04-12 PROCEDURE — 700101 HCHG RX REV CODE 250: Performed by: HOSPITALIST

## 2019-04-12 PROCEDURE — 85610 PROTHROMBIN TIME: CPT

## 2019-04-12 PROCEDURE — 700101 HCHG RX REV CODE 250: Performed by: EMERGENCY MEDICINE

## 2019-04-12 PROCEDURE — 85025 COMPLETE CBC W/AUTO DIFF WBC: CPT

## 2019-04-12 PROCEDURE — 90935 HEMODIALYSIS ONE EVALUATION: CPT

## 2019-04-12 PROCEDURE — 770020 HCHG ROOM/CARE - TELE (206)

## 2019-04-12 PROCEDURE — 82962 GLUCOSE BLOOD TEST: CPT

## 2019-04-12 RX ORDER — BISACODYL 10 MG
10 SUPPOSITORY, RECTAL RECTAL
Status: DISCONTINUED | OUTPATIENT
Start: 2019-04-12 | End: 2019-04-15 | Stop reason: HOSPADM

## 2019-04-12 RX ORDER — AMOXICILLIN 250 MG
2 CAPSULE ORAL 2 TIMES DAILY
Status: DISCONTINUED | OUTPATIENT
Start: 2019-04-12 | End: 2019-04-15 | Stop reason: HOSPADM

## 2019-04-12 RX ORDER — ONDANSETRON 4 MG/1
4 TABLET, ORALLY DISINTEGRATING ORAL EVERY 4 HOURS PRN
Status: DISCONTINUED | OUTPATIENT
Start: 2019-04-12 | End: 2019-04-12

## 2019-04-12 RX ORDER — CARVEDILOL 12.5 MG/1
12.5 TABLET ORAL 2 TIMES DAILY WITH MEALS
Status: DISCONTINUED | OUTPATIENT
Start: 2019-04-12 | End: 2019-04-15 | Stop reason: HOSPADM

## 2019-04-12 RX ORDER — DEXTROSE MONOHYDRATE 25 G/50ML
25 INJECTION, SOLUTION INTRAVENOUS
Status: DISCONTINUED | OUTPATIENT
Start: 2019-04-12 | End: 2019-04-12

## 2019-04-12 RX ORDER — CALCIUM CHLORIDE 100 MG/ML
1 INJECTION INTRAVENOUS; INTRAVENTRICULAR ONCE
Status: COMPLETED | OUTPATIENT
Start: 2019-04-12 | End: 2019-04-12

## 2019-04-12 RX ORDER — LIDOCAINE 50 MG/G
1 PATCH TOPICAL
COMMUNITY
End: 2020-02-20

## 2019-04-12 RX ORDER — HEPARIN SODIUM 1000 [USP'U]/ML
1500 INJECTION, SOLUTION INTRAVENOUS; SUBCUTANEOUS ONCE
Status: ACTIVE | OUTPATIENT
Start: 2019-04-12 | End: 2019-04-13

## 2019-04-12 RX ORDER — DEXTROSE MONOHYDRATE 25 G/50ML
25 INJECTION, SOLUTION INTRAVENOUS ONCE
Status: COMPLETED | OUTPATIENT
Start: 2019-04-12 | End: 2019-04-12

## 2019-04-12 RX ORDER — HYDRALAZINE HYDROCHLORIDE 20 MG/ML
10-20 INJECTION INTRAMUSCULAR; INTRAVENOUS EVERY 6 HOURS PRN
Status: DISCONTINUED | OUTPATIENT
Start: 2019-04-12 | End: 2019-04-15 | Stop reason: HOSPADM

## 2019-04-12 RX ORDER — SODIUM POLYSTYRENE SULFONATE 15 G/60ML
30 SUSPENSION ORAL; RECTAL ONCE
Status: COMPLETED | OUTPATIENT
Start: 2019-04-12 | End: 2019-04-12

## 2019-04-12 RX ORDER — ACETAMINOPHEN 325 MG/1
650 TABLET ORAL EVERY 4 HOURS PRN
Status: DISCONTINUED | OUTPATIENT
Start: 2019-04-12 | End: 2019-04-15 | Stop reason: HOSPADM

## 2019-04-12 RX ORDER — PROMETHAZINE HYDROCHLORIDE 25 MG/1
12.5-25 SUPPOSITORY RECTAL EVERY 4 HOURS PRN
Status: DISCONTINUED | OUTPATIENT
Start: 2019-04-12 | End: 2019-04-12

## 2019-04-12 RX ORDER — PROMETHAZINE HYDROCHLORIDE 25 MG/1
12.5-25 TABLET ORAL EVERY 4 HOURS PRN
Status: DISCONTINUED | OUTPATIENT
Start: 2019-04-12 | End: 2019-04-12

## 2019-04-12 RX ORDER — HEPARIN SODIUM 5000 [USP'U]/ML
5000 INJECTION, SOLUTION INTRAVENOUS; SUBCUTANEOUS EVERY 8 HOURS
Status: DISCONTINUED | OUTPATIENT
Start: 2019-04-12 | End: 2019-04-15 | Stop reason: HOSPADM

## 2019-04-12 RX ORDER — POLYETHYLENE GLYCOL 3350 17 G/17G
1 POWDER, FOR SOLUTION ORAL
Status: DISCONTINUED | OUTPATIENT
Start: 2019-04-12 | End: 2019-04-15 | Stop reason: HOSPADM

## 2019-04-12 RX ORDER — DEXTROSE MONOHYDRATE 25 G/50ML
25 INJECTION, SOLUTION INTRAVENOUS
Status: DISCONTINUED | OUTPATIENT
Start: 2019-04-12 | End: 2019-04-15 | Stop reason: HOSPADM

## 2019-04-12 RX ORDER — AMLODIPINE BESYLATE 5 MG/1
2.5 TABLET ORAL DAILY
Status: DISCONTINUED | OUTPATIENT
Start: 2019-04-12 | End: 2019-04-15 | Stop reason: HOSPADM

## 2019-04-12 RX ORDER — PREGABALIN 75 MG/1
150 CAPSULE ORAL 2 TIMES DAILY
Status: DISCONTINUED | OUTPATIENT
Start: 2019-04-12 | End: 2019-04-14

## 2019-04-12 RX ORDER — ONDANSETRON 2 MG/ML
4 INJECTION INTRAMUSCULAR; INTRAVENOUS EVERY 4 HOURS PRN
Status: DISCONTINUED | OUTPATIENT
Start: 2019-04-12 | End: 2019-04-12

## 2019-04-12 RX ADMIN — INSULIN HUMAN 1 UNITS: 100 INJECTION, SOLUTION PARENTERAL at 16:59

## 2019-04-12 RX ADMIN — DEXTROSE MONOHYDRATE 25 ML: 25 INJECTION, SOLUTION INTRAVENOUS at 05:10

## 2019-04-12 RX ADMIN — CARVEDILOL 12.5 MG: 12.5 TABLET, FILM COATED ORAL at 16:48

## 2019-04-12 RX ADMIN — INSULIN HUMAN 10 UNITS: 100 INJECTION, SOLUTION PARENTERAL at 03:18

## 2019-04-12 RX ADMIN — HEPARIN SODIUM 5000 UNITS: 5000 INJECTION, SOLUTION INTRAVENOUS; SUBCUTANEOUS at 14:40

## 2019-04-12 RX ADMIN — DEXTROSE MONOHYDRATE 25 ML: 25 INJECTION, SOLUTION INTRAVENOUS at 06:05

## 2019-04-12 RX ADMIN — PREGABALIN 150 MG: 75 CAPSULE ORAL at 16:48

## 2019-04-12 RX ADMIN — CALCIUM CHLORIDE 1 G: 100 INJECTION, SOLUTION INTRAVENOUS; INTRAVENTRICULAR at 03:18

## 2019-04-12 RX ADMIN — SENNOSIDES, DOCUSATE SODIUM 2 TABLET: 50; 8.6 TABLET, FILM COATED ORAL at 16:48

## 2019-04-12 RX ADMIN — SODIUM BICARBONATE 50 MEQ: 84 INJECTION, SOLUTION INTRAVENOUS at 04:43

## 2019-04-12 RX ADMIN — HYDRALAZINE HYDROCHLORIDE 20 MG: 20 INJECTION INTRAMUSCULAR; INTRAVENOUS at 09:50

## 2019-04-12 RX ADMIN — DEXTROSE MONOHYDRATE 50 ML: 25 INJECTION, SOLUTION INTRAVENOUS at 03:18

## 2019-04-12 RX ADMIN — HYDRALAZINE HYDROCHLORIDE 20 MG: 20 INJECTION INTRAMUSCULAR; INTRAVENOUS at 20:26

## 2019-04-12 RX ADMIN — PREGABALIN 150 MG: 75 CAPSULE ORAL at 06:41

## 2019-04-12 RX ADMIN — AMLODIPINE BESYLATE 2.5 MG: 5 TABLET ORAL at 09:50

## 2019-04-12 RX ADMIN — HEPARIN SODIUM 5000 UNITS: 5000 INJECTION, SOLUTION INTRAVENOUS; SUBCUTANEOUS at 06:41

## 2019-04-12 RX ADMIN — SODIUM POLYSTYRENE SULFONATE 30 G: 15 SUSPENSION ORAL; RECTAL at 03:36

## 2019-04-12 RX ADMIN — CARVEDILOL 12.5 MG: 12.5 TABLET, FILM COATED ORAL at 06:42

## 2019-04-12 RX ADMIN — HEPARIN SODIUM 5000 UNITS: 5000 INJECTION, SOLUTION INTRAVENOUS; SUBCUTANEOUS at 20:26

## 2019-04-12 ASSESSMENT — ACTIVITIES OF DAILY LIVING (ADL): TOILETING: INDEPENDENT

## 2019-04-12 ASSESSMENT — COGNITIVE AND FUNCTIONAL STATUS - GENERAL
SUGGESTED CMS G CODE MODIFIER DAILY ACTIVITY: CJ
HELP NEEDED FOR BATHING: A LITTLE
SUGGESTED CMS G CODE MODIFIER MOBILITY: CK
MOBILITY SCORE: 18
CLIMB 3 TO 5 STEPS WITH RAILING: A LOT
WALKING IN HOSPITAL ROOM: A LOT
DAILY ACTIVITIY SCORE: 20
STANDING UP FROM CHAIR USING ARMS: A LOT
DRESSING REGULAR LOWER BODY CLOTHING: A LITTLE
HELP NEEDED FOR BATHING: A LITTLE
STANDING UP FROM CHAIR USING ARMS: A LITTLE
CLIMB 3 TO 5 STEPS WITH RAILING: TOTAL
WALKING IN HOSPITAL ROOM: A LOT
TURNING FROM BACK TO SIDE WHILE IN FLAT BAD: UNABLE
TOILETING: A LITTLE
SUGGESTED CMS G CODE MODIFIER MOBILITY: CM
DRESSING REGULAR UPPER BODY CLOTHING: A LITTLE
MOVING TO AND FROM BED TO CHAIR: UNABLE
TOILETING: A LITTLE
DAILY ACTIVITIY SCORE: 22
MOVING FROM LYING ON BACK TO SITTING ON SIDE OF FLAT BED: UNABLE
MOBILITY SCORE: 9
SUGGESTED CMS G CODE MODIFIER DAILY ACTIVITY: CJ

## 2019-04-12 ASSESSMENT — ENCOUNTER SYMPTOMS
FOCAL WEAKNESS: 0
FEVER: 0
BLURRED VISION: 0
DIZZINESS: 0
ABDOMINAL PAIN: 0
BRUISES/BLEEDS EASILY: 0
FALLS: 1
HEARTBURN: 0
SENSORY CHANGE: 0
SHORTNESS OF BREATH: 0
FLANK PAIN: 0
DOUBLE VISION: 0
HALLUCINATIONS: 0
PALPITATIONS: 0
VOMITING: 0
COUGH: 0
CHILLS: 0
HEMOPTYSIS: 0
DEPRESSION: 0
SPEECH CHANGE: 0
NAUSEA: 0
MYALGIAS: 0
EYE DISCHARGE: 0
WEAKNESS: 1

## 2019-04-12 ASSESSMENT — PATIENT HEALTH QUESTIONNAIRE - PHQ9
5. POOR APPETITE OR OVEREATING: MORE THAN HALF THE DAYS
8. MOVING OR SPEAKING SO SLOWLY THAT OTHER PEOPLE COULD HAVE NOTICED. OR THE OPPOSITE, BEING SO FIGETY OR RESTLESS THAT YOU HAVE BEEN MOVING AROUND A LOT MORE THAN USUAL: SEVERAL DAYS
9. THOUGHTS THAT YOU WOULD BE BETTER OFF DEAD, OR OF HURTING YOURSELF: NOT AT ALL
SUM OF ALL RESPONSES TO PHQ QUESTIONS 1-9: 11
7. TROUBLE CONCENTRATING ON THINGS, SUCH AS READING THE NEWSPAPER OR WATCHING TELEVISION: MORE THAN HALF THE DAYS
3. TROUBLE FALLING OR STAYING ASLEEP OR SLEEPING TOO MUCH: MORE THAN HALF THE DAYS
4. FEELING TIRED OR HAVING LITTLE ENERGY: MORE THAN HALF THE DAYS
2. FEELING DOWN, DEPRESSED, IRRITABLE, OR HOPELESS: SEVERAL DAYS
SUM OF ALL RESPONSES TO PHQ9 QUESTIONS 1 AND 2: 1
1. LITTLE INTEREST OR PLEASURE IN DOING THINGS: NOT AT ALL
6. FEELING BAD ABOUT YOURSELF - OR THAT YOU ARE A FAILURE OR HAVE LET YOURSELF OR YOUR FAMILY DOWN: SEVERAL DAYS

## 2019-04-12 ASSESSMENT — LIFESTYLE VARIABLES: SUBSTANCE_ABUSE: 0

## 2019-04-12 ASSESSMENT — GAIT ASSESSMENTS
DISTANCE (FEET): 2
GAIT LEVEL OF ASSIST: MINIMAL ASSIST
DEVIATION: DECREASED BASE OF SUPPORT

## 2019-04-12 NOTE — ED NOTES
ICU RN's arrived to take patient upstairs, Rechecked patients BG 81. Gave patient 25 mL of D-50, prior to transport upstairs.

## 2019-04-12 NOTE — PROGRESS NOTES
Stat HD ordered by Dr Bennett for hyperkalemia.Run patient on a 1K bath.LUAAVF accessed  with no issues.Patient hypertensive during treatment,bp medicated-bp dropped in the 90's in the last hour of treatment ,patient symptomatic then went back up in a short while.Net UF removed 2500 ml.Report given to primary Rn.

## 2019-04-12 NOTE — PROGRESS NOTES
Med rec updated and complete per pt at bedside & pt home pharmacy  Allergies have been verified and updated  No oral ABX within the last 30 days  Pt Home Pharmacy:[facility]

## 2019-04-12 NOTE — DISCHARGE PLANNING
Outpatient Dialysis Note    Confirmed patient is active at:    [facility]  [address] [place] [address]      Schedule: Monday, Wednesday, Friday  Time: 10:30 am    Spoke with Meagan at facility who confirmed.    Forwarded records for review.    Dialysis Coordinator, [facility]  Stacia Stephens [phone]

## 2019-04-12 NOTE — PROGRESS NOTES
1230   Called lab - chem department regarding STAT BMP results. Their department is having issues with labs resulting, results read to this RN.    Na 138  K+ 3.6  Cl 96  Ca 7.7   CO2 31   Creatinine 4.37  Glucose 138  AG 11    0964  Dr. Camargo notified of results, transfer orders being placed.

## 2019-04-12 NOTE — CONSULTS
DATE OF SERVICE:  2019    REQUESTING PHYSICIAN:  Jono Alberts MD    CONSULTING PHYSICIAN:  Lion Bennett MD    REASON FOR CONSULTATION:  Patient with end-stage renal disease and   hyperkalemia.    HISTORY OF PRESENT ILLNESS:  A 63-year-old lady with end-stage renal disease,   on chronic hemodialysis Monday, Wednesday, and Friday at [facility].  The   patient presented to dialysis on 04/10, but the fistula was not working and   was sent to the access center.  She did not have dialysis that day.  The   patient  apparently had a fistulogram and angioplasty of the fistula on .    She presented to the emergency room on  after she was having worsening   weakness accompanied by falls.  Evaluation in the emergency room revealed a   potassium of 8.5.  She was admitted to the intensive care unit.    PAST MEDICAL HISTORY:  SURGERIES:  1.   twice.  2.  Hysterectomy.  3.  D and C twice.  4.  Appendectomy.  5.  Left arm AV fistula by Dr. Jolly in 2016, that required revision for   steal.  6.  History of stent placement to the lower extremities.  7.  Hemodialysis catheters.  8.  Retinal detachment.  9.  Bilateral cataracts.  10.  Coronary angiogram.  11.  Upper endoscopy.  12.  Placement of right port for transfusions.    MEDICAL ILLNESSES:  1.  End-stage renal disease, on chronic hemodialysis Monday, Wednesday, and   Friday.  2.  Myelodysplastic syndrome with refractory anemia.  She requires multiple   transfusions.  She was previously followed by Dr. Avila, but she did not   tolerate the therapy.  She is followed by another hematologist now.  3.  History of chest pain that has been recurring, associated with anemia.    She has negative myocardial perfusion study in 2017, but had a small area of   infarct in 2016.  4.  Diabetes mellitus.  5.  Hypertension, on pharmacologic therapy.  6.  Chronic kidney disease -- Metabolic bone disorder.  She has vitamin D   deficiency, on  replacement.  7.  Paroxysmal atrial fibrillation.  8.  Peripheral vascular disease, requiring stent placement.  9.  Peripheral neuropathy.  10.  Chronic obstructive pulmonary disease, on home oxygen.  11.  Diastolic congestive heart failure.  12.  Home oxygen dependency.  13.  History of cerebrovascular accident with no residuals.  14.  Legally blind.  15.  History of steal syndrome secondary to AV fistula.  The fistula was   revised in 02/2016.  16.  Fatty liver.    ALLERGIES:  ACTOS, DARVOCET, DEMEROL, GLUCOPHAGE, MORPHINE, OXYCODONE,   PENICILLIN, REQUIP, SIMVASTATIN, SULFA, TRAMADOL, TRAZODONE, DILAUDID,   BENADRYL, IODINE, MULTIVITAMINS.  SHE DOES NOT TOLERATE ANY PHOSPHORUS   BINDERS.    OUTPATIENT MEDICINES:  Amlodipine 10 mg a day, Lumigan ophthalmic solution,   Combigan ophthalmic solution, Azopt ophthalmic solution, buprenorphine 10 mg   per hour patch weekly, carvedilol 12.5 mg twice a day, Flexeril p.r.n., Norco   p.r.n., Emla cream prior to cannulation of the fistula on dialysis, losartan   100 mg a day, nitroglycerin p.r.n., Lyrica 50 mg a day.      FAMILY HISTORY:  Negative for kidney disease.  Positive for hypertension.    SOCIAL HISTORY:  Patient is a .  She lives with her daughter.  She has 2   children.  She never smoked.  She does not drink alcohol.  She is disabled.    She used to work as a CNA.    REVIEW OF SYSTEMS:  Remarkable for profound weakness and falls as above.  Her   last transfusion was a week ago.    No fever, chills, cough, sputum production, hemoptysis, shortness of breath,   abdominal pain, nausea, vomiting, diarrhea, skin rash or itching, focal   neurologic symptoms.    PHYSICAL EXAMINATION:  GENERAL:  Chronic ill-appearing lady, who is alert and in nonacute distress.    She is seen on dialysis.  VITAL SIGNS:  Blood pressure is 133/48.  SKIN:  With no generalized rash.  LYMPH NODES:  No cervical adenopathies.  HEENT:  Pupils are round.  Oral mucosa with no lesions.  She is  edentulous.    Patient is blind.  NECK:  With no bruit or masses.  CHEST:  Right port for infusions.  LUNGS:  Clear to percussion and auscultation.  No wheezes or crackles.  HEART:  Regular rhythm with no pericardial rub present, no murmurs.  ABDOMEN:  Bowel sounds present, soft, not tender, no palpable   hepatosplenomegaly or masses.  EXTREMITIES:  With full range of motion and no edema.  There is a functioning   left arm AV fistula.    LABORATORY DATA:  White count 7000, hemoglobin 7.1, platelets 120,000.  Sodium   132, potassium 8.5, chloride 92, CO2 of 23, glucose 124, , creatinine   9.69, calcium is 6.8, and albumin 3.5.  CT of the brain revealed no   intracranial abnormalities.  Chest x-ray revealed mild congestive heart   failure with enlarged cardiac silhouette.      IMPRESSION:    1.  End-stage renal disease, on chronic hemodialysis Monday, Wednesday, and   Friday.  2.  Severe hyperkalemia, symptomatic, with EKG changes, due to missing   dialysis secondary to problems with arteriovenous fistula.  3.  History of arteriovenous fistula malfunction, status post angioplasty on   04/11.  4.  Chronic anemia secondary to myelodysplastic syndrome, requiring frequent   transfusions about every 2 weeks.  5.  History of chest pain secondary to anemia.  6.  Coronary artery disease with history of mildly abnormal thallium in 2016.  7.  Diabetes mellitus, on no medicines.  8.  Hypertension, controlled at this point.  Had not been controlled as an   outpatient.  9.  Chronic kidney disease -- metabolic bone disorder with vitamin D   deficiency.  10.  Paroxysmal atrial fibrillation.  11.  Peripheral vascular disease with history of stent placement of the lower   extremities.  12.  Peripheral neuropathy.  13.  Chronic obstructive pulmonary disease, on home oxygen.  14.  Diastolic congestive heart failure.  15.  History of cerebrovascular accident with no residual.  16.  Legally blind.  17.  Functioning left arm  arteriovenous fistula with history of steal syndrome   requiring revision.  18.  History of many surgeries.  19.  Status post placement of a right port for transfusions.  20.  History of fatty liver.    PLAN:    1.  We are proceeding with emergent dialysis.  The patient was dialyzed with a   potassium of 1 in the dialysis bath.  2.  She does not need a transfusion at this point.  3.  She will continue her home medicines.  4.  She should be on no dietary protein restrictions.  5.  Follow her labs.  6.  All of her medicines should be dosed for GFR less than 10.      Thanks for this consultation.  We will follow with you.       ____________________________________     MD LUI COOK / RAMONE    DD:  04/12/2019 12:19:33  DT:  04/12/2019 13:54:28    D#:  [id number]  Job#:  [id number]

## 2019-04-12 NOTE — ED NOTES
RN got a call from Dialysis, she's trying to coordinate/plan getting this patient dialyzed. Call [phone] when patient is ready for them to come to the ICU room.

## 2019-04-12 NOTE — THERAPY
"Occupational Therapy Evaluation completed.   Functional Status:    Pleasant 62 y/o female admitted for increasing weakness and falls at home. PMHx significant for ESRD and legal blindness. Pt reports that when she falls, it is preceded by a feeling of buckling and is usually unexpected. Pt completed bed mobility with SBA. Once at EOB, she donned her socks with Min A (mostly for guidance and balance.) She stood with Min A x2 and pivoted to a chair. She then needed to use the bathroom and pivoted to a BSC with same level of assist. She was able to manage briefs with Min A for steadying assist and she completed pericare with supv. Given that pt lives alone and that she is having increased falls, would recommend post-acute placement prior to D/C home to maximize safety.     Plan of Care: Will benefit from Occupational Therapy 3 times per week  Discharge Recommendations:  Equipment: Will Continue to Assess for Equipment Needs. Post-acute therapy Recommend inpatient transitional care services for continued occupational therapy services.       See \"Rehab Therapy-Acute\" Patient Summary Report for complete documentation.    "

## 2019-04-12 NOTE — DISCHARGE PLANNING
Outpatient Dialysis Note     Confirmed patient is active at:     [facility]  [address] [place] [address]   Phone: [phone] Fax: [phone]     Schedule: Monday, Wednesday, Friday  Time: 10:30am- 2:00pm    Spoke with Zamzam at facility who confirmed. Forwarded records for review.    Dialysis Coordinator- [facility],  Maury Chaney [phone]

## 2019-04-12 NOTE — ED NOTES
Hourly rounding performed. Assessed patient complaints and Bathroom/comfort needs.  Introduced self to patient. Updated on admission plan.

## 2019-04-12 NOTE — PROGRESS NOTES
Pt admitted to [facility] 607 at this time    Placed on monitor and CHG bath given before quickly going on dialysis    Pt has Buprenorphrine patch in place on lower back. Patch left  In place

## 2019-04-12 NOTE — ED NOTES
Noted that patient was starting to get slightly more lethargic. Easily arousable, will recheck BG in 15 min

## 2019-04-12 NOTE — ED TRIAGE NOTES
"Pt transferred for   Chief Complaint   Patient presents with   • Fall     increasing falls at home   • Weakness     missed dialysis   • Shortness of Breath     needing more 02      BP (!) 190/90   Pulse 68   Temp (!) 35.7 °C (96.2 °F) (Temporal)   Ht 1.448 m (4' 9\")   Wt 71 kg (156 lb 8.4 oz)   SpO2 100%       Pt currently c/o weakness.   "

## 2019-04-12 NOTE — ED NOTES
Contacted MD Dobbs about patients Frequent Hypoglycemia after hyperkalemia protocol. MD wants to continue with the Hypoglycemia protocol, notify if patient gets hypoglycemic after this next dose of D-50

## 2019-04-12 NOTE — ASSESSMENT & PLAN NOTE
History of multiple transfusions in the past and hx of myelodysplastic disorder following with oncology   Transfusion this am  following

## 2019-04-12 NOTE — CARE PLAN
Problem: Fluid Volume:  Goal: Will maintain balanced intake and output  Outcome: PROGRESSING AS EXPECTED  Dialysis completed this morning, strict I/O management.     Problem: Infection  Goal: Will remain free from infection  Outcome: PROGRESSING AS EXPECTED  Educated on hand hygiene.

## 2019-04-12 NOTE — H&P
Hospital Medicine History & Physical Note    Date of Service  4/12/2019    Primary Care Physician  Nyla Nava M.D.    Consultants  Nephrology     Code Status  full    Chief Complaint  Weakness     History of Presenting Illness  63 y.o. female who presented 4/12/2019 with past medical history of atrial fibrillation end-stage renal disease on dialysis congestive heart failure diabetes myelodysplastic syndrome requiring multiple blood transfusion who presents with weakness.  This patient states that she had multiple falls throughout the day today with associated weakness.  She does have renal failure and unable to receive dialysis Wednesday due to fistula failure.  She did have a fistula fixity yesterday.  She was supposed to go to dialysis this morning but progressively gotten worse.  In the emergency department she is found to have profound hyperkalemia will need to be admitted to the ICU for close monitoring and nephrology consultation for emergent dialysis.    Review of Systems  Review of Systems   Constitutional: Negative for chills and fever.   HENT: Negative for congestion, hearing loss and tinnitus.    Eyes: Negative for blurred vision, double vision and discharge.   Respiratory: Negative for cough, hemoptysis and shortness of breath.    Cardiovascular: Negative for chest pain, palpitations and leg swelling.   Gastrointestinal: Negative for abdominal pain, heartburn, nausea and vomiting.   Genitourinary: Negative for dysuria and flank pain.   Musculoskeletal: Positive for falls. Negative for joint pain and myalgias.   Skin: Negative for rash.   Neurological: Positive for weakness. Negative for dizziness, sensory change, speech change and focal weakness.   Endo/Heme/Allergies: Negative for environmental allergies. Does not bruise/bleed easily.   Psychiatric/Behavioral: Negative for depression, hallucinations and substance abuse.       Past Medical History   has a past medical history of Arthritis; Atrial  fibrillation (HCC); Blood transfusion without reported diagnosis; Breath shortness; Cancer (HCC); Cataract; Chronic anemia; Chronic kidney disease; Chronic obstructive pulmonary disease (HCC); Congestive heart failure (HCC); Dental disorder; Diabetes; Diabetes (HCC); Dialysis patient; Glaucoma; Heart abnormalities; Hypertension; MDS (myelodysplastic syndrome) (10/2016); Pain (-2017); Renal disorder; Stroke (HCC) (03/2015); and Supplemental oxygen dependent.    Surgical History   has a past surgical history that includes other cardiac surgery; other abdominal surgery; gyn surgery; gyn surgery; gyn surgery; other cardiac surgery; other; retinal detachment repair (Right); av fistula creation (Left, 2/8/2016); other abdominal surgery; gastroscopy (12/17/2015); colonoscopy (12/17/2015); vein ligation (Left, 11/10/2016); bone marrow biopsy, ndl/trocar (9/20/2017); bone marrow aspiration (9/20/2017); and gastroscopy (N/A, 1/25/2018).     Family History  family history includes Hypertension in her father and mother.     Social History   reports that she has never smoked. She has never used smokeless tobacco. She reports that she does not drink alcohol or use drugs.    Allergies  Allergies   Allergen Reactions   • Pioglitazone Unspecified     Causes blindness   • Simvastatin Unspecified     Couldn't move   • Flomax [Tamsulosin]    • Glipizide    • Ondansetron    • Pcn [Penicillins]    • Tramadol    • Darvocet [Propoxyphene N-Apap] Vomiting   • Demerol Vomiting   • Dilaudid [Hydromorphone] Vomiting   • Diphenhydramine Vomiting   • Glucophage [Metformin Hydrochloride] Vomiting   • Iron Vomiting     vomiting   • Lenalidomide Vomiting   • Morphine Vomiting   • Multivitamin Itching     itching   • Naprosyn [Naproxen] Hives   • Other Drug Rash and Vomiting     Any binders that remove phosphorus from the body such as tums   • Oxycodone Vomiting   • Pcn [Penicillins] Vomiting          • Requip Vomiting   • Sulfa Drugs Rash and  Vomiting     Vomiting & rash      • Tramadol Vomiting   • Trazodone Vomiting       Medications  Prior to Admission Medications   Prescriptions Last Dose Informant Patient Reported? Taking?   Brimonidine Tartrate-Timolol (COMBIGAN) 0.2-0.5 % Solution  Patient Yes No   Sig: Place 1 Drop in both eyes 2 Times a Day.   Buprenorphine 10 MCG/HR PATCH WEEKLY   Yes No   Sig: Apply  to skin as directed.   HYDROcodone-acetaminophen (NORCO) 5-325 MG Tab per tablet  Patient Yes No   Sig: Take 1 Tab by mouth every 6 hours as needed (pain).   amLODIPine (NORVASC) 10 MG Tab  Patient Yes No   Sig: Take 2.5 mg by mouth every day.   bimatoprost (LUMIGAN) 0.01 % Solution   Yes No   Sig: Place 1 Drop in both eyes every bedtime.   brinzolamide (AZOPT) 1 % Suspension   Yes No   Sig: Place 1 Drop in both eyes 2 Times a Day.   carvedilol (COREG) 12.5 MG Tab  Patient Yes No   Sig: Take 12.5 mg by mouth 2 times a day, with meals.   cyclobenzaprine (FLEXERIL) 5 MG tablet  Patient Yes No   Sig: Take 5 mg by mouth 3 times a day as needed.   lidocaine-prilocaine (EMLA) 2.5-2.5 % Cream   Yes No   Sig: Apply  to affected area(s) as needed.   losartan (COZAAR) 100 MG Tab  Patient Yes No   Sig: Take 100 mg by mouth every day.   nitroglycerin (NITROSTAT) 0.4 MG SL Tab  Patient No No   Sig: Place 1 Tab under tongue as needed for Chest Pain.   pregabalin (LYRICA) 50 MG capsule  Patient Yes No   Sig: Take 150 mg by mouth 2 times a day.      Facility-Administered Medications: None       Physical Exam  Temp:  [35.7 °C (96.2 °F)] 35.7 °C (96.2 °F)  Pulse:  [58-68] 58  Resp:  [15-16] 15  BP: (190)/(90) 190/90  SpO2:  [100 %] 100 %    Physical Exam   Constitutional: She is oriented to person, place, and time. She appears well-developed and well-nourished. She appears distressed.   HENT:   Head: Normocephalic and atraumatic.   Dry oral mucosa   Eyes: Pupils are equal, round, and reactive to light. Conjunctivae and EOM are normal.   blind   Neck: Normal range  of motion. Neck supple. No JVD present.   Cardiovascular: Normal rate, regular rhythm, normal heart sounds and intact distal pulses.    No murmur heard.  Pulmonary/Chest: Effort normal and breath sounds normal. No respiratory distress. She has no wheezes.   Port right upper chest   Abdominal: Soft. Bowel sounds are normal. She exhibits no distension. There is no tenderness.   Musculoskeletal: Normal range of motion. She exhibits no edema.   Left arm fistula    Neurological: She is alert and oriented to person, place, and time. She exhibits normal muscle tone.   Skin: Skin is warm and dry.   Psychiatric: She has a normal mood and affect. Her behavior is normal. Judgment and thought content normal.   Nursing note and vitals reviewed.      Laboratory:  Recent Labs      04/12/19 0142   WBC  7.0   RBC  2.03*   HEMOGLOBIN  7.1*   HEMATOCRIT  22.3*   MCV  109.9*   MCH  35.0*   MCHC  31.8*   RDW  77.3*   PLATELETCT  120*   MPV  14.0*     Recent Labs      04/12/19 0142   SODIUM  132*   POTASSIUM  8.5*   CHLORIDE  92*   CO2  23   GLUCOSE  124*   BUN  145*   CREATININE  9.69*   CALCIUM  6.8*     Recent Labs      04/12/19 0142   ALTSGPT  23   ASTSGOT  21   ALKPHOSPHAT  59   TBILIRUBIN  0.4   GLUCOSE  124*     Recent Labs      04/12/19 0142   APTT  38.7*   INR  1.01             No results for input(s): TROPONINI in the last 72 hours.    Urinalysis:    No results found     Imaging:  CT-HEAD W/O   Final Result      1.  No acute intracranial abnormality      2.  Brain white matter changes and cerebral volume loss      DX-CHEST-PORTABLE (1 VIEW)   Final Result      1.  Mild pulmonary edema      2.  Enlarged cardiac silhouette      3.  Right internal jugular catheter appears appropriately located            Assessment/Plan:  I anticipate this patient will require at least two midnights for appropriate medical management, necessitating inpatient admission.    * Hyperkalemia- (present on admission)   Assessment & Plan    Admit  to ICU given severity of hyperkalemia   qrs widening on EKG, Calcium IV given  Insulin and dextrose given  Kayxlate given  Sodium bicarb   Repeat BMP 6 am  Nephrology Dr. Romero consulted for emergent dialysis   Hold home arb  Cardiac monitoring      ESRD (end stage renal disease) ([facility])- (present on admission)   Assessment & Plan    Dr. Qureshi has been consulted for emergent dialysis      Falls   Assessment & Plan    Pt/ot eval     MDS (myelodysplastic syndrome) ([facility])- (present on admission)   Assessment & Plan    Follows with oncology Dr. Avila multiple tranfusions in the past     Type 2 diabetes mellitus ([facility])- (present on admission)   Assessment & Plan    ssi ordered  accu checks     Anemia- (present on admission)   Assessment & Plan    History of multiple transfusions in the past and hx of myelodysplastic disorder following with oncology   Monitor hgb closely and transfuse as appropriate     Paroxysmal atrial fibrillation ([facility])- (present on admission)   Assessment & Plan    Resume home bb   Not anticoagulation candidiate due to anemia and falls     COPD (chronic obstructive pulmonary disease) ([facility])- (present on admission)   Assessment & Plan    Not in acute exacerbation   resp care protocol ordered     Legally blind- (present on admission)   Assessment & Plan    Hx of         VTE prophylaxis: heparin

## 2019-04-12 NOTE — ED PROVIDER NOTES
ED Provider Note    CHIEF COMPLAINT  Chief Complaint   Patient presents with   • Fall     increasing falls at home   • Weakness     missed dialysis   • Shortness of Breath     needing more 02        HPI  Vielka Briscoe is a 63 y.o. female who presents after the patient had a fall this morning.  The patient states that she awoke and fell down striking her head.  She does have a headache.  She states over the last month the patient has had multiple recurrent falls.  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  She did go to the [facility] on Wednesday as well as on Thursday and she states they fix the fistula and she is scheduled for dialysis this morning.  The patient states she has had some progressive increased work of breathing over the last month as well but has not had any pain or swelling to her lower extremities.  She has had a stroke in the plastic and does have myelodysplastic syndrome.  She states he does have a mild to moderate headache.  She did not have any loss of consciousness when she fell down.  She does not have any neck pain.  She denies pain throughout her extremities.    REVIEW OF SYSTEMS  See HPI for further details. All other systems are negative.     PAST MEDICAL HISTORY  Past Medical History:   Diagnosis Date   • MDS (myelodysplastic syndrome) 10/2016    bone marrow biopsy   • Stroke (HCC) 03/2015    No residual weakness/problems   • Arthritis     hands    • Atrial fibrillation (HCC)     HX   • Blood transfusion without reported diagnosis    • Breath shortness     w/exertion   • Cancer (HCC)     MDS in bones   • Cataract     bilat IOL   • Chronic anemia    • Chronic kidney disease        • Chronic obstructive pulmonary disease (HCC)    • Congestive heart failure (HCC)    • Dental disorder     full dentures   • Diabetes     Diet controlled   • Diabetes (HCC)    • Dialysis patient     [facility]  "in [place]   • Glaucoma    • Heart abnormalities    • Hypertension    • Pain -2017    \"bones\", generalized, 5/10   • Renal disorder    • Supplemental oxygen dependent     2 liters       FAMILY HISTORY  [unfilled]    SOCIAL HISTORY  Social History     Social History   • Marital status:      Spouse name: N/A   • Number of children: N/A   • Years of education: N/A     Social History Main Topics   • Smoking status: Never Smoker   • Smokeless tobacco: Never Used   • Alcohol use No   • Drug use: No   • Sexual activity: Not on file     Other Topics Concern   • Not on file     Social History Narrative    ** Merged History Encounter **         ** Merged History Encounter **          SURGICAL HISTORY  Past Surgical History:   Procedure Laterality Date   • GASTROSCOPY N/A 1/25/2018    Procedure: GASTROSCOPY;  Surgeon: Blanca Santos M.D.;  Location: [facility];  Service: Gastroenterology   • BONE MARROW BIOPSY, NDL/TROCAR  9/20/2017    Procedure: BONE MARROW BIOPSY, NDL/TROCAR;  Surgeon: Wade Turner M.D.;  Location: [facility];  Service: Orthopedics   • BONE MARROW ASPIRATION  9/20/2017    Procedure: BONE MARROW ASPIRATION;  Surgeon: Wade Turner M.D.;  Location: [facility];  Service: Orthopedics   • VEIN LIGATION Left 11/10/2016    Procedure: VEIN LIGATION FOR DISTAL REVASCULARIZATION AND INTERVAL LIGATION OF LEFT ARM DIALYISIS ACCESS (DRIL PROCEDURE);  Surgeon: Ranulfo Jolly M.D.;  Location: [facility];  Service:    • AV FISTULA CREATION Left 2/8/2016    Procedure: AV FISTULA CREATION UPPER EXTREMITY;  Surgeon: Ranulfo Jolly M.D.;  Location: [facility];  Service:    • GASTROSCOPY  12/17/2015    Procedure: ESOPHAGOGASTRODUODENOSCOPY WITH BIOPSY;  Surgeon: Wing Álvarez M.D.;  Location: [facility];  Service:    • COLONOSCOPY  12/17/2015    Procedure: COLONOSCOPY;  Surgeon: Wing Álvarez M.D.;  Location: [facility];  Service:    • GYN SURGERY      hysterectomy   • GYN SURGERY       x 2   • GYN SURGERY      d&C x2   • OTHER      angioplasty/ stents bilat LE   • OTHER ABDOMINAL SURGERY      appendectomy,  x 2, hysterectomy, D & C   • OTHER ABDOMINAL SURGERY      appendectomy   • OTHER CARDIAC SURGERY      Angioplasty  and    • OTHER CARDIAC SURGERY      cardiac angiogram, angioplasty   • RETINAL DETACHMENT REPAIR Right        CURRENT MEDICATIONS  Home Medications     Reviewed by Jeri Chavez R.N. (Registered Nurse) on 19 at 0122  Med List Status: <None>   Medication Last Dose Status   amLODIPine (NORVASC) 10 MG Tab  Active   bimatoprost (LUMIGAN) 0.01 % Solution  Active   Brimonidine Tartrate-Timolol (COMBIGAN) 0.2-0.5 % Solution  Active   brinzolamide (AZOPT) 1 % Suspension  Active   Buprenorphine 10 MCG/HR PATCH WEEKLY  Active   carvedilol (COREG) 12.5 MG Tab  Active   cyclobenzaprine (FLEXERIL) 5 MG tablet  Active   HYDROcodone-acetaminophen (NORCO) 5-325 MG Tab per tablet  Active   lidocaine-prilocaine (EMLA) 2.5-2.5 % Cream  Active   losartan (COZAAR) 100 MG Tab  Active   nitroglycerin (NITROSTAT) 0.4 MG SL Tab  Active   pregabalin (LYRICA) 50 MG capsule  Active                ALLERGIES  Allergies   Allergen Reactions   • Pioglitazone Unspecified     Causes blindness   • Simvastatin Unspecified     Couldn't move   • Flomax [Tamsulosin]    • Glipizide    • Ondansetron    • Pcn [Penicillins]    • Tramadol    • Darvocet [Propoxyphene N-Apap] Vomiting   • Demerol Vomiting   • Dilaudid [Hydromorphone] Vomiting   • Diphenhydramine Vomiting   • Glucophage [Metformin Hydrochloride] Vomiting   • Iron Vomiting     vomiting   • Lenalidomide Vomiting   • Morphine Vomiting   • Multivitamin Itching     itching   • Naprosyn [Naproxen] Hives   • Other Drug Rash and Vomiting     Any binders that remove phosphorus from the body such as tums   • Oxycodone Vomiting   • Pcn [Penicillins] Vomiting  "         • Requip Vomiting   • Sulfa Drugs Rash and Vomiting     Vomiting & rash      • Tramadol Vomiting   • Trazodone Vomiting       PHYSICAL EXAM  VITAL SIGNS: BP (!) 190/90   Pulse 68   Temp (!) 35.7 °C (96.2 °F) (Temporal)   Ht 1.448 m (4' 9\")   Wt 71 kg (156 lb 8.4 oz)   LMP 01/01/1995   SpO2 100%   BMI 33.87 kg/m²       Constitutional: Chronically ill in appearance  HENT: Normocephalic, Atraumatic, Bilateral external ears normal, Oropharynx moist, No oral exudates, Nose normal.   Eyes: PERRLA, EOMI, Conjunctiva normal, No discharge.   Neck: Normal range of motion, No tenderness, Supple, No stridor.   Lymphatic: No lymphadenopathy noted.   Cardiovascular: Normal heart rate, Normal rhythm, No murmurs, No rubs, No gallops.   Thorax & Lungs: Normal breath sounds, No respiratory distress, No wheezing, No chest tenderness.   Abdomen: Bowel sounds normal, Soft, No tenderness, No masses, No pulsatile masses.   Skin: Warm, Dry, No erythema, No rash.   Back: No tenderness, No CVA tenderness.   Extremities: Intact distal pulses, No edema, No tenderness, No cyanosis, No clubbing.   Musculoskeletal: Good range of motion in all major joints. No tenderness to palpation or major deformities noted.   Neurologic: GCS of 15  Psychiatric: Affect normal, Judgment normal, Mood normal.     Results for orders placed or performed during the hospital encounter of 04/12/19   CBC WITH DIFFERENTIAL   Result Value Ref Range    WBC 7.0 4.8 - 10.8 K/uL    RBC 2.03 (L) 4.20 - 5.40 M/uL    Hemoglobin 7.1 (L) 12.0 - 16.0 g/dL    Hematocrit 22.3 (L) 37.0 - 47.0 %    .9 (H) 81.4 - 97.8 fL    MCH 35.0 (H) 27.0 - 33.0 pg    MCHC 31.8 (L) 33.6 - 35.0 g/dL    RDW 77.3 (H) 35.9 - 50.0 fL    Platelet Count 120 (L) 164 - 446 K/uL    MPV 14.0 (H) 9.0 - 12.9 fL    Neutrophils-Polys 66.50 44.00 - 72.00 %    Lymphocytes 16.00 (L) 22.00 - 41.00 %    Monocytes 13.30 0.00 - 13.40 %    Eosinophils 3.00 0.00 - 6.90 %    Basophils 0.30 0.00 - 1.80 " %    Immature Granulocytes 0.90 0.00 - 0.90 %    Nucleated RBC 0.00 /100 WBC    Neutrophils (Absolute) 4.67 2.00 - 7.15 K/uL    Lymphs (Absolute) 1.12 1.00 - 4.80 K/uL    Monos (Absolute) 0.93 (H) 0.00 - 0.85 K/uL    Eos (Absolute) 0.21 0.00 - 0.51 K/uL    Baso (Absolute) 0.02 0.00 - 0.12 K/uL    Immature Granulocytes (abs) 0.06 0.00 - 0.11 K/uL    NRBC (Absolute) 0.00 K/uL    Anisocytosis 2+     Macrocytosis 2+    COMP METABOLIC PANEL   Result Value Ref Range    Sodium 132 (L) 135 - 145 mmol/L    Potassium 8.5 (HH) 3.6 - 5.5 mmol/L    Chloride 92 (L) 96 - 112 mmol/L    Co2 23 20 - 33 mmol/L    Anion Gap 17.0 (H) 0.0 - 11.9    Glucose 124 (H) 65 - 99 mg/dL    Bun 145 (HH) 8 - 22 mg/dL    Creatinine 9.69 (HH) 0.50 - 1.40 mg/dL    Calcium 6.8 (LL) 8.5 - 10.5 mg/dL    AST(SGOT) 21 12 - 45 U/L    ALT(SGPT) 23 2 - 50 U/L    Alkaline Phosphatase 59 30 - 99 U/L    Total Bilirubin 0.4 0.1 - 1.5 mg/dL    Albumin 3.5 3.2 - 4.9 g/dL    Total Protein 6.3 6.0 - 8.2 g/dL    Globulin 2.8 1.9 - 3.5 g/dL    A-G Ratio 1.3 g/dL   PROTHROMBIN TIME   Result Value Ref Range    PT 13.5 12.0 - 14.6 sec    INR 1.01 0.87 - 1.13   APTT   Result Value Ref Range    APTT 38.7 (H) 24.7 - 36.0 sec   PERIPHERAL SMEAR REVIEW   Result Value Ref Range    Peripheral Smear Review see below    PLATELET ESTIMATE   Result Value Ref Range    Plt Estimation Decreased    MORPHOLOGY   Result Value Ref Range    RBC Morphology Present    DIFFERENTIAL COMMENT   Result Value Ref Range    Comments-Diff see below    ESTIMATED GFR   Result Value Ref Range    GFR If African American 5 (A) >60 mL/min/1.73 m 2    GFR If Non African American 4 (A) >60 mL/min/1.73 m 2   EKG   Result Value Ref Range    Report       [facility]    Test Date:  2019  Pt Name:    JESUS SILVEIRA              Department: ER  MRN:        [id number]                      Room:       RD 01  Gender:     Female                       Technician: [id number]  :         [id number]                   Requested By:ER TRIAGE PROTOCOL  Order #:    [id number]                    Reading MD: JOSH RAHMAN MD    Measurements  Intervals                                Axis  Rate:       64                           P:          3  AR:         275                          QRS:        -66  QRSD:       206                          T:          79  QT:         562  QTc:        580    Interpretive Statements  Twelve-lead EKG shows a sinus rhythm with a ventricular to 64, QRS shows a  left  bundle branch block, prolonged AR interval, of note the QRS does appear to be    widened compared to prior as well as with hyperacute T waves consistent with  the  patient's hyperkalemia  Electronically Signed On 4-  3:11:49 PDT by JOSH RAHMAN MD         RADIOLOGY/PROCEDURES  CT-HEAD W/O   Final Result      1.  No acute intracranial abnormality      2.  Brain white matter changes and cerebral volume loss      DX-CHEST-PORTABLE (1 VIEW)   Final Result      1.  Mild pulmonary edema      2.  Enlarged cardiac silhouette      3.  Right internal jugular catheter appears appropriately located            COURSE & MEDICAL DECISION MAKING  Pertinent Labs & Imaging studies reviewed. (See chart for details)  This a 63-year-old female who presents after multiple recurrent falls.  The patient is chronically ill and she did miss dialysis on Wednesday and I was concern for possible metabolic source as the patient was neurologically intact.  I did perform a CT scan of the head due to the recurrent falls and she also presents with a slight headache.  CT scan does not show any acute abnormalities.  The patient's metabolic panel is very concerning as she does have acute on chronic renal insufficiency as she did miss her dialysis on Wednesday.  Furthermore her potassium was 8.5 and her EKG does show widened QRS and hyperacute T waves consistent with hyperkalemia.  Therefore the patient was treated aggressively with  calcium chloride, insulin, and glucose.  The patient also received Kayexalate orally and Dr. Laguna today the patient's nephrologist will be consulted for emergent dialysis.  The patient has had a stable blood pressure throughout her stay therefore I am not confident the patient had a cardiac arrhythmia that caused her to fall this evening.  She has had recurrent falls and this may be from her prior stroke as she states that she always has some difficulties with balance.  On my exam I do not see any evidence of a traumatic injury from the fall.  Otherwise laboratory analysis does show stable anemia.  The patient will require admission to the ICU due to the hyperkalemia and will contact nephrology for emergent dialysis as mentioned above.    FINAL IMPRESSION  1.  Recurrent falls  2.  Dialysis dependent renal failure  3.  Hyperkalemia  4.  Stable anemia  5. Critical Care time 30 minutes         Electronically signed by: Jono Alberts, 4/12/2019 2:02 AM

## 2019-04-13 LAB
ABO GROUP BLD: NORMAL
ALBUMIN SERPL BCP-MCNC: 3 G/DL (ref 3.2–4.9)
ALBUMIN/GLOB SERPL: 1.3 G/DL
ALP SERPL-CCNC: 50 U/L (ref 30–99)
ALT SERPL-CCNC: 22 U/L (ref 2–50)
ANION GAP SERPL CALC-SCNC: 12 MMOL/L (ref 0–11.9)
AST SERPL-CCNC: 16 U/L (ref 12–45)
BARCODED ABORH UBTYP: 6200
BARCODED PRD CODE UBPRD: NORMAL
BARCODED UNIT NUM UBUNT: NORMAL
BASOPHILS # BLD AUTO: 0.2 % (ref 0–1.8)
BASOPHILS # BLD: 0.01 K/UL (ref 0–0.12)
BILIRUB SERPL-MCNC: 0.5 MG/DL (ref 0.1–1.5)
BLD GP AB SCN SERPL QL: NORMAL
BUN SERPL-MCNC: 69 MG/DL (ref 8–22)
CALCIUM SERPL-MCNC: 6.9 MG/DL (ref 8.5–10.5)
CHLORIDE SERPL-SCNC: 97 MMOL/L (ref 96–112)
CO2 SERPL-SCNC: 28 MMOL/L (ref 20–33)
COMPONENT R 8504R: NORMAL
CREAT SERPL-MCNC: 7.01 MG/DL (ref 0.5–1.4)
EOSINOPHIL # BLD AUTO: 0.13 K/UL (ref 0–0.51)
EOSINOPHIL NFR BLD: 3 % (ref 0–6.9)
ERYTHROCYTE [DISTWIDTH] IN BLOOD BY AUTOMATED COUNT: 77.6 FL (ref 35.9–50)
GLOBULIN SER CALC-MCNC: 2.4 G/DL (ref 1.9–3.5)
GLUCOSE BLD-MCNC: 108 MG/DL (ref 65–99)
GLUCOSE BLD-MCNC: 121 MG/DL (ref 65–99)
GLUCOSE BLD-MCNC: 165 MG/DL (ref 65–99)
GLUCOSE SERPL-MCNC: 107 MG/DL (ref 65–99)
HCT VFR BLD AUTO: 20.8 % (ref 37–47)
HGB BLD-MCNC: 6.6 G/DL (ref 12–16)
IMM GRANULOCYTES # BLD AUTO: 0.02 K/UL (ref 0–0.11)
IMM GRANULOCYTES NFR BLD AUTO: 0.5 % (ref 0–0.9)
LYMPHOCYTES # BLD AUTO: 1.03 K/UL (ref 1–4.8)
LYMPHOCYTES NFR BLD: 23.5 % (ref 22–41)
MCH RBC QN AUTO: 35.1 PG (ref 27–33)
MCHC RBC AUTO-ENTMCNC: 31.7 G/DL (ref 33.6–35)
MCV RBC AUTO: 110.6 FL (ref 81.4–97.8)
MONOCYTES # BLD AUTO: 0.77 K/UL (ref 0–0.85)
MONOCYTES NFR BLD AUTO: 17.5 % (ref 0–13.4)
MORPHOLOGY BLD-IMP: NORMAL
NEUTROPHILS # BLD AUTO: 2.43 K/UL (ref 2–7.15)
NEUTROPHILS NFR BLD: 55.3 % (ref 44–72)
NRBC # BLD AUTO: 0 K/UL
NRBC BLD-RTO: 0 /100 WBC
PLATELET # BLD AUTO: 111 K/UL (ref 164–446)
PMV BLD AUTO: 14.3 FL (ref 9–12.9)
POTASSIUM SERPL-SCNC: 5.5 MMOL/L (ref 3.6–5.5)
PRODUCT TYPE UPROD: NORMAL
PROT SERPL-MCNC: 5.4 G/DL (ref 6–8.2)
RBC # BLD AUTO: 1.88 M/UL (ref 4.2–5.4)
RH BLD: NORMAL
SODIUM SERPL-SCNC: 137 MMOL/L (ref 135–145)
UNIT STATUS USTAT: NORMAL
WBC # BLD AUTO: 4.4 K/UL (ref 4.8–10.8)

## 2019-04-13 PROCEDURE — 5A1D70Z PERFORMANCE OF URINARY FILTRATION, INTERMITTENT, LESS THAN 6 HOURS PER DAY: ICD-10-PCS | Performed by: INTERNAL MEDICINE

## 2019-04-13 PROCEDURE — A9270 NON-COVERED ITEM OR SERVICE: HCPCS | Performed by: HOSPITALIST

## 2019-04-13 PROCEDURE — 36430 TRANSFUSION BLD/BLD COMPNT: CPT

## 2019-04-13 PROCEDURE — P9016 RBC LEUKOCYTES REDUCED: HCPCS

## 2019-04-13 PROCEDURE — 86644 CMV ANTIBODY: CPT

## 2019-04-13 PROCEDURE — 86850 RBC ANTIBODY SCREEN: CPT

## 2019-04-13 PROCEDURE — 700102 HCHG RX REV CODE 250 W/ 637 OVERRIDE(OP): Performed by: HOSPITALIST

## 2019-04-13 PROCEDURE — 86923 COMPATIBILITY TEST ELECTRIC: CPT

## 2019-04-13 PROCEDURE — 85025 COMPLETE CBC W/AUTO DIFF WBC: CPT

## 2019-04-13 PROCEDURE — 90935 HEMODIALYSIS ONE EVALUATION: CPT

## 2019-04-13 PROCEDURE — 700111 HCHG RX REV CODE 636 W/ 250 OVERRIDE (IP): Performed by: HOSPITALIST

## 2019-04-13 PROCEDURE — 86900 BLOOD TYPING SEROLOGIC ABO: CPT

## 2019-04-13 PROCEDURE — 86901 BLOOD TYPING SEROLOGIC RH(D): CPT

## 2019-04-13 PROCEDURE — 80053 COMPREHEN METABOLIC PANEL: CPT

## 2019-04-13 PROCEDURE — 99232 SBSQ HOSP IP/OBS MODERATE 35: CPT | Performed by: HOSPITALIST

## 2019-04-13 PROCEDURE — 82962 GLUCOSE BLOOD TEST: CPT | Mod: 91

## 2019-04-13 PROCEDURE — 770020 HCHG ROOM/CARE - TELE (206)

## 2019-04-13 PROCEDURE — 700105 HCHG RX REV CODE 258

## 2019-04-13 RX ORDER — SODIUM CHLORIDE 9 MG/ML
INJECTION, SOLUTION INTRAVENOUS
Status: COMPLETED
Start: 2019-04-13 | End: 2019-04-13

## 2019-04-13 RX ADMIN — CARVEDILOL 12.5 MG: 12.5 TABLET, FILM COATED ORAL at 05:06

## 2019-04-13 RX ADMIN — HYDRALAZINE HYDROCHLORIDE 20 MG: 20 INJECTION INTRAMUSCULAR; INTRAVENOUS at 16:02

## 2019-04-13 RX ADMIN — PREGABALIN 150 MG: 75 CAPSULE ORAL at 05:06

## 2019-04-13 RX ADMIN — ACETAMINOPHEN 650 MG: 325 TABLET, FILM COATED ORAL at 04:02

## 2019-04-13 RX ADMIN — AMLODIPINE BESYLATE 2.5 MG: 5 TABLET ORAL at 05:07

## 2019-04-13 RX ADMIN — CARVEDILOL 12.5 MG: 12.5 TABLET, FILM COATED ORAL at 17:26

## 2019-04-13 RX ADMIN — INSULIN HUMAN 1 UNITS: 100 INJECTION, SOLUTION PARENTERAL at 11:41

## 2019-04-13 RX ADMIN — HEPARIN SODIUM 5000 UNITS: 5000 INJECTION, SOLUTION INTRAVENOUS; SUBCUTANEOUS at 21:14

## 2019-04-13 RX ADMIN — PREGABALIN 150 MG: 75 CAPSULE ORAL at 17:26

## 2019-04-13 RX ADMIN — SODIUM CHLORIDE 500 ML: 9 INJECTION, SOLUTION INTRAVENOUS at 09:37

## 2019-04-13 RX ADMIN — HEPARIN SODIUM 5000 UNITS: 5000 INJECTION, SOLUTION INTRAVENOUS; SUBCUTANEOUS at 05:06

## 2019-04-13 ASSESSMENT — ENCOUNTER SYMPTOMS
SHORTNESS OF BREATH: 1
PSYCHIATRIC NEGATIVE: 1
CHILLS: 0
CONSTITUTIONAL NEGATIVE: 1
SHORTNESS OF BREATH: 0
DEPRESSION: 0
DIARRHEA: 0
EYES NEGATIVE: 1
MYALGIAS: 0
RESPIRATORY NEGATIVE: 1
COUGH: 0
MUSCULOSKELETAL NEGATIVE: 1
FEVER: 0
VOMITING: 0
SORE THROAT: 0
NAUSEA: 0
FOCAL WEAKNESS: 0
WEIGHT LOSS: 0
DIZZINESS: 1
CONSTIPATION: 0
CARDIOVASCULAR NEGATIVE: 1
BRUISES/BLEEDS EASILY: 0
BLURRED VISION: 0
GASTROINTESTINAL NEGATIVE: 1
WEAKNESS: 0
PALPITATIONS: 0
NAUSEA: 1
HEADACHES: 0
ABDOMINAL PAIN: 0
DIAPHORESIS: 0
NEUROLOGICAL NEGATIVE: 1

## 2019-04-13 ASSESSMENT — LIFESTYLE VARIABLES: SUBSTANCE_ABUSE: 0

## 2019-04-13 NOTE — THERAPY
"Physical Therapy Evaluation completed.   Bed Mobility:  Supine to Sit: Stand by Assist  Transfers: Sit to Stand: Minimal Assist (x2)  Gait: Level Of Assist: Minimal Assist (bilateral HHA) x 3 steps       Plan of Care: Will benefit from Physical Therapy 3 times per week  Discharge Recommendations: Equipment: Will Continue to Assess for Equipment Needs.    Pt presents with impaired activity tolerance, dynamic balance, and gait mechanics associated with c/c of weakness. Pt reporting knee buckling falls starting last sunday, does not notice a correlation with HD days; strong but does have fatigue fasiculations with sustained muscle contractions in quads; in current condition would recommend placement; will follow      See \"Rehab Therapy-Acute\" Patient Summary Report for complete documentation.     "

## 2019-04-13 NOTE — PROGRESS NOTES
Patient confided to RN about issues at her dialysis center. She has trouble getting rides to the center and sometimes finishes dialysis before fully completed otherwise she has no ride home. Also, she reports that some nurses will stop the treatment due to low blood pressure but then forget to resume; ultimately she is not getting the max amount of fluid off at each treatment. She clearly does not trust the center and needs to have this issue addressed.

## 2019-04-13 NOTE — PROGRESS NOTES
PtPt brought [facility] from [facility] at around 2[facility]. Monitor place on pt, monitor room notified. Pt able to ambulate from gurney to bed with handheld assist and steady gait. POC discussed with pt, all questions answered at this time. Pt is on 4 O2. A/O x1.

## 2019-04-13 NOTE — PROGRESS NOTES
Bedside report received from night shift RN. Pt is A&O x 4. Pt. Is in bed. Pt. Updated on plan of care for the day. All questions answered. Pt.s bed is locked, in lowest position, with bed alarms on. Pt. Has no other needs at this time.

## 2019-04-13 NOTE — CARE PLAN
Problem: Communication  Goal: The ability to communicate needs accurately and effectively will improve  Outcome: PROGRESSING AS EXPECTED  Pt. Is oriented to call light system and calls when assistance is needed. Pt. Updated on plan of care. Pt. Encouraged to communicate any needs or concerns. Questions have been addressed.     Problem: Knowledge Deficit  Goal: Knowledge of disease process/condition, treatment plan, diagnostic tests, and medications will improve  Outcome: PROGRESSING AS EXPECTED  Educated pt on plan of care for the day, treatment, medications and diagnosis. Pt verbalizes understanding and is compliant.

## 2019-04-13 NOTE — PROGRESS NOTES
[facility] Medicine Daily Progress Note    Date of Service  4/13/2019    Chief Complaint  63 y.o. female admitted 4/12/2019 with weakness    Hospital Course    Patient is a pleasant 63 y.o. female who presented 4/12/2019 with past medical history of atrial fibrillation end-stage renal disease on dialysis congestive heart failure diabetes myelodysplastic syndrome requiring multiple blood transfusion who presents with weakness. .  She does have renal failure and unable to receive dialysis Wednesday due to fistula failure.  She did have a fistula fixed the day prior to admission.  She was supposed to go to dialysis this morning but progressively gotten worse.  In the emergency department she is found to have profound hyperkalemia.       Interval Problem Update  No melena or hematochezia  She reports some dizziness with standing  Denies cp, no sob  Ros otherwise negative  axox4    Consultants/Specialty  nephrology    Code Status  Full code    Disposition  TBD    Review of Systems  Review of Systems   Constitutional: Positive for malaise/fatigue. Negative for chills, diaphoresis, fever and weight loss.   HENT: Negative.  Negative for sore throat.    Eyes: Negative.  Negative for blurred vision.   Respiratory: Negative.  Negative for cough and shortness of breath.    Cardiovascular: Negative.  Negative for chest pain, palpitations and leg swelling.   Gastrointestinal: Negative.  Negative for abdominal pain, nausea and vomiting.   Genitourinary: Negative.  Negative for dysuria.   Musculoskeletal: Negative.  Negative for myalgias.   Skin: Negative.  Negative for itching and rash.   Neurological: Positive for dizziness. Negative for focal weakness, weakness and headaches.   Endo/Heme/Allergies: Negative.  Does not bruise/bleed easily.   Psychiatric/Behavioral: Negative.  Negative for depression, substance abuse and suicidal ideas.   All other systems reviewed and are negative.       Physical Exam  Temp:  [36.1 °C (97 °F)-38.4  °C (101.1 °F)] 36.7 °C (98.1 °F)  Pulse:  [71-81] 73  Resp:  [12-22] 16  BP: (106-162)/(46-60) 128/60  SpO2:  [97 %-100 %] 97 %    Physical Exam   Constitutional: She is oriented to person, place, and time. She appears well-developed and well-nourished. No distress.   HENT:   Head: Normocephalic and atraumatic.   Mouth/Throat: Oropharynx is clear and moist.   Eyes: Conjunctivae are normal.   Neck: Normal range of motion. Neck supple.   Cardiovascular: Normal rate, normal heart sounds and intact distal pulses.  Exam reveals no gallop and no friction rub.    No murmur heard.  Pulmonary/Chest: Effort normal and breath sounds normal. No respiratory distress. She has no wheezes. She has no rales. She exhibits no tenderness.   Abdominal: Soft. Bowel sounds are normal. She exhibits no distension and no mass. There is no tenderness. There is no rebound and no guarding.   Musculoskeletal: Normal range of motion. She exhibits no edema or tenderness.   Neurological: She is alert and oriented to person, place, and time. She has normal reflexes. No cranial nerve deficit. She exhibits normal muscle tone. Coordination normal.   Skin: Skin is warm and dry. No rash noted. She is not diaphoretic. No erythema. No pallor.   Psychiatric: She has a normal mood and affect. Her behavior is normal. Judgment and thought content normal.   Nursing note and vitals reviewed.      Fluids    Intake/Output Summary (Last 24 hours) at 04/13/19 1114  Last data filed at 04/13/19 0915   Gross per 24 hour   Intake              540 ml   Output              100 ml   Net              440 ml       Laboratory  Recent Labs      04/12/19   0142 04/13/19   0515   WBC  7.0  4.4*   RBC  2.03*  1.88*   HEMOGLOBIN  7.1*  6.6*   HEMATOCRIT  22.3*  20.8*   MCV  109.9*  110.6*   MCH  35.0*  35.1*   MCHC  31.8*  31.7*   RDW  77.3*  77.6*   PLATELETCT  120*  111*   MPV  14.0*  14.3*     Recent Labs      04/12/19   0142  04/12/19   1142  04/13/19   0515   SODIUM  132*   138  137   POTASSIUM  8.5*  3.6  5.5   CHLORIDE  92*  96  97   CO2  23  31  28   GLUCOSE  124*  138*  107*   BUN  145*  49*  69*   CREATININE  9.69*  4.37*  7.01*   CALCIUM  6.8*  7.7*  6.9*     Recent Labs      04/12/19   0142   APTT  38.7*   INR  1.01               Imaging  CT-HEAD W/O   Final Result      1.  No acute intracranial abnormality      2.  Brain white matter changes and cerebral volume loss      DX-CHEST-PORTABLE (1 VIEW)   Final Result      1.  Mild pulmonary edema      2.  Enlarged cardiac silhouette      3.  Right internal jugular catheter appears appropriately located            Assessment/Plan  * Hyperkalemia- (present on admission)   Assessment & Plan    Due to esrd missing HD  Resolved with HD  Continue to follow  Nephrology following      ESRD (end stage renal disease) ([facility])- (present on admission)   Assessment & Plan    Dr. Qureshi following     Falls   Assessment & Plan    Pt/ot to eval     MDS (myelodysplastic syndrome) ([facility])- (present on admission)   Assessment & Plan    Follows with oncology Dr. Avila multiple tranfusions in the past     Type 2 diabetes mellitus ([facility])- (present on admission)   Assessment & Plan    Continue SSI with accu checks and hypoglycemic protocol       Anemia- (present on admission)   Assessment & Plan    History of multiple transfusions in the past and hx of myelodysplastic disorder following with oncology   Transfusion this am  following     Paroxysmal atrial fibrillation (HCC)- (present on admission)   Assessment & Plan    continue bb   Not anticoagulation candidiate due to anemia and falls     COPD (chronic obstructive pulmonary disease) ([facility])- (present on admission)   Assessment & Plan    No sigh of acute exacerbation   Continue respiratory protocol       Legally blind- (present on admission)   Assessment & Plan    Hx of          VTE prophylaxis: heparin

## 2019-04-13 NOTE — PROGRESS NOTES
[facility] Progress Note    Author: Kimmy MARINA  Supervising Physician: Dhruv Wong MD    Date of Service  4/13/2019    History of Present Illness   63 y.o. female with end-stage renal disease, on chronic hemodialysis Monday,   Wednesday, and Friday at [facility].  The patient presented to dialysis on   04/10, but the fistula was not working and was sent to the access center.  She   did not have dialysis that day.  The patient has apparently a fistulogram and   angioplasty of the fistula on 04/11.  She presented to the emergency room on   04/12 after she was having worsening weakness accompanied by falls.    Evaluation in the emergency room revealed a potassium of 8.5.  She was admitted   to the intensive care unit.    Daily Nephrology Summary   4/12--Consult. Dialysis on 1K+.  4/13--1u pRBC. K+ 5.5 this am. HD today. C/O feeling volume overloaded.    Review of Systems  Review of Systems   Constitutional: Negative.    HENT: Negative.    Eyes: Negative.    Respiratory: Positive for shortness of breath.         SOB with exertion   Cardiovascular: Negative.    Gastrointestinal: Positive for nausea. Negative for abdominal pain, constipation, diarrhea and vomiting.   Genitourinary: Negative.    Musculoskeletal: Negative.    Skin: Negative.    Neurological: Negative.    Endo/Heme/Allergies: Negative.         Physical Exam  Temp:  [36.1 °C (97 °F)-38.4 °C (101.1 °F)] 36.2 °C (97.1 °F)  Pulse:  [67-81] 67  Resp:  [12-22] 16  BP: (106-171)/(46-75) 171/75  SpO2:  [97 %-100 %] 100 %    Physical Exam   Constitutional: She is oriented to person, place, and time. She appears well-developed and well-nourished.   HENT:   Head: Normocephalic and atraumatic.   Eyes: Pupils are equal, round, and reactive to light. Conjunctivae and EOM are normal.   Neck: Normal range of motion. Neck supple.   Cardiovascular: Normal rate, regular rhythm and normal heart sounds.    Pulmonary/Chest: Effort normal and  breath sounds normal.   Abdominal: Soft. Bowel sounds are normal.   Musculoskeletal: Normal range of motion. She exhibits edema.   Neurological: She is alert and oriented to person, place, and time.   Skin: Skin is warm and dry.   Psychiatric: She has a normal mood and affect. Her behavior is normal. Thought content normal.       Fluids    Intake/Output Summary (Last 24 hours) at 04/13/19 1150  Last data filed at 04/13/19 1115   Gross per 24 hour   Intake           1007.5 ml   Output              100 ml   Net            907.5 ml       Laboratory  Recent Labs      04/12/19   0142  04/13/19   0515   WBC  7.0  4.4*   RBC  2.03*  1.88*   HEMOGLOBIN  7.1*  6.6*   HEMATOCRIT  22.3*  20.8*   MCV  109.9*  110.6*   MCH  35.0*  35.1*   MCHC  31.8*  31.7*   RDW  77.3*  77.6*   PLATELETCT  120*  111*   MPV  14.0*  14.3*     Recent Labs      04/12/19   0142  04/12/19   1142  04/13/19   0515   SODIUM  132*  138  137   POTASSIUM  8.5*  3.6  5.5   CHLORIDE  92*  96  97   CO2  23  31  28   GLUCOSE  124*  138*  107*   BUN  145*  49*  69*   CREATININE  9.69*  4.37*  7.01*   CALCIUM  6.8*  7.7*  6.9*     Recent Labs      04/12/19 0142   APTT  38.7*   INR  1.01               Imaging    Assessment/Plan  IMPRESSION:    1.  End-stage renal disease, on chronic hemodialysis Monday, Wednesday, and   Friday.  2.  Severe hyperkalemia, symptomatic, with EKG changes, due to missing   dialysis secondary to problems with arteriovenous fistula.  3.  History of arteriovenous fistula malfunction, status post angioplasty on   04/11.  4.  Chronic anemia secondary to myelodysplastic syndrome, requiring frequent   transfusions about every 2 weeks.  5.  History of chest pain secondary to anemia.  6.  Coronary artery disease with history of mildly abnormal thallium in 2016.  7.  Diabetes mellitus, on no medicines.  8.  Hypertension, controlled at this point.  Has not been controlled as an   outpatient.  9.  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  10.  Paroxysmal atrial fibrillation.  11.  Peripheral vascular disease with history of stent placement of the lower   extremities.  12.  Peripheral neuropathy.  13.  Chronic obstructive pulmonary disease, on home oxygen.  14.  Diastolic congestive heart failure.  15.  History of cerebrovascular accident with no residual.  16.  Legally blind.  17.  Functioning left arm arteriovenous fistula with history of steal syndrome   requiring revision.  18.  History of many surgeries.  19.  Status post placement of a right port for transfusions.  20.  History of fatty liver.     PLAN:    1.  Dialysis again today for volume and hyperkalemia  2.  Transfused 1u pRBC this am  3.  Continue home medicines.  4.  No dietary protein restrictions.  5.  Follow her labs.  6.  Dose adjust medications for GFR less than 10.    No new Assessment & Plan notes have been filed under this hospital service since the last note was generated.  Service: Nephrology

## 2019-04-13 NOTE — PROGRESS NOTES
HD treatment was ordered by Dr. Wong, pt was able to tolerate and pull 3.0 liters of fluids. Gave report to Mayra GEORGE, held pt AVF site for 10 min each, Mayra GEORGE verified that pt AVF site are clean dry and intact, no swelling or redness were noted post tx. +B/T pre and post tx. Mayra GEORGE gave pt IV BP meds at bedside due to high BP during last HD of HD treatment. Pt was stable post tx, denies any complaint of pain and SOB, no fever or any distress were noted, denies headache, dizziness. Treatment started at 1257 and ended at 1557, see flow sheets for further details.

## 2019-04-13 NOTE — CARE PLAN
Problem: Communication  Goal: The ability to communicate needs accurately and effectively will improve    Intervention: Educate patient and significant other/support system about the plan of care, procedures, treatments, medications and allow for questions  White board updated with POC and care team information upon arrival to unit      Problem: Safety  Goal: Will remain free from falls  Fall precautions in place. Bed in lowest position. Non-skid socks in place. Personal possessions within reach. Mobility sign on door. Bed-alarm on. Call light within reach. Pt educated regarding fall prevention and states understanding.

## 2019-04-13 NOTE — PROGRESS NOTES
Lab called with critical labs; creat 7.01 and calcium of 6.9. Doctor Jer paged and notified of both these labs as well as a HGB of 6.6. Dr. Randle requested 1 unit PRBC's, no other orders at this time.

## 2019-04-13 NOTE — PROGRESS NOTES
2 RN skin assessment completed with ARIEL Enriquez.    Devices in place: silicone oxygen tubing; port to right upper chest  Skin assessed under device: intact  Interventions in place: NA    Skin dry, moisturizer provided.   Generalized scarring.  Left upper arm fistula.  Bilateral ears red but blanching.   Skin otherwise intact.

## 2019-04-14 LAB
ANION GAP SERPL CALC-SCNC: 9 MMOL/L (ref 0–11.9)
BASOPHILS # BLD AUTO: 0.5 % (ref 0–1.8)
BASOPHILS # BLD: 0.02 K/UL (ref 0–0.12)
BUN SERPL-MCNC: 38 MG/DL (ref 8–22)
CALCIUM SERPL-MCNC: 7.1 MG/DL (ref 8.5–10.5)
CHLORIDE SERPL-SCNC: 97 MMOL/L (ref 96–112)
CO2 SERPL-SCNC: 29 MMOL/L (ref 20–33)
CREAT SERPL-MCNC: 5.07 MG/DL (ref 0.5–1.4)
EOSINOPHIL # BLD AUTO: 0.13 K/UL (ref 0–0.51)
EOSINOPHIL NFR BLD: 3.4 % (ref 0–6.9)
ERYTHROCYTE [DISTWIDTH] IN BLOOD BY AUTOMATED COUNT: 76.3 FL (ref 35.9–50)
GLUCOSE BLD-MCNC: 160 MG/DL (ref 65–99)
GLUCOSE BLD-MCNC: 171 MG/DL (ref 65–99)
GLUCOSE BLD-MCNC: 185 MG/DL (ref 65–99)
GLUCOSE BLD-MCNC: 195 MG/DL (ref 65–99)
GLUCOSE SERPL-MCNC: 192 MG/DL (ref 65–99)
HCT VFR BLD AUTO: 26.3 % (ref 37–47)
HGB BLD-MCNC: 8.4 G/DL (ref 12–16)
IMM GRANULOCYTES # BLD AUTO: 0.01 K/UL (ref 0–0.11)
IMM GRANULOCYTES NFR BLD AUTO: 0.3 % (ref 0–0.9)
LYMPHOCYTES # BLD AUTO: 0.94 K/UL (ref 1–4.8)
LYMPHOCYTES NFR BLD: 24.2 % (ref 22–41)
MCH RBC QN AUTO: 34.4 PG (ref 27–33)
MCHC RBC AUTO-ENTMCNC: 32.3 G/DL (ref 33.6–35)
MCV RBC AUTO: 106.5 FL (ref 81.4–97.8)
MONOCYTES # BLD AUTO: 0.74 K/UL (ref 0–0.85)
MONOCYTES NFR BLD AUTO: 19.1 % (ref 0–13.4)
MORPHOLOGY BLD-IMP: NORMAL
NEUTROPHILS # BLD AUTO: 2.04 K/UL (ref 2–7.15)
NEUTROPHILS NFR BLD: 52.5 % (ref 44–72)
NRBC # BLD AUTO: 0 K/UL
NRBC BLD-RTO: 0 /100 WBC
PLATELET # BLD AUTO: 108 K/UL (ref 164–446)
PMV BLD AUTO: 14.5 FL (ref 9–12.9)
POTASSIUM SERPL-SCNC: 4.6 MMOL/L (ref 3.6–5.5)
RBC # BLD AUTO: 2.47 M/UL (ref 4.2–5.4)
SODIUM SERPL-SCNC: 135 MMOL/L (ref 135–145)
WBC # BLD AUTO: 3.9 K/UL (ref 4.8–10.8)

## 2019-04-14 PROCEDURE — 85025 COMPLETE CBC W/AUTO DIFF WBC: CPT

## 2019-04-14 PROCEDURE — 700102 HCHG RX REV CODE 250 W/ 637 OVERRIDE(OP): Performed by: HOSPITALIST

## 2019-04-14 PROCEDURE — A9270 NON-COVERED ITEM OR SERVICE: HCPCS | Performed by: HOSPITALIST

## 2019-04-14 PROCEDURE — 700111 HCHG RX REV CODE 636 W/ 250 OVERRIDE (IP): Performed by: HOSPITALIST

## 2019-04-14 PROCEDURE — 770020 HCHG ROOM/CARE - TELE (206)

## 2019-04-14 PROCEDURE — 99232 SBSQ HOSP IP/OBS MODERATE 35: CPT | Performed by: HOSPITALIST

## 2019-04-14 PROCEDURE — 82962 GLUCOSE BLOOD TEST: CPT | Mod: 91

## 2019-04-14 PROCEDURE — 80048 BASIC METABOLIC PNL TOTAL CA: CPT

## 2019-04-14 RX ORDER — PREGABALIN 75 MG/1
150 CAPSULE ORAL 3 TIMES DAILY
Status: DISCONTINUED | OUTPATIENT
Start: 2019-04-14 | End: 2019-04-15 | Stop reason: HOSPADM

## 2019-04-14 RX ADMIN — INSULIN HUMAN 1 UNITS: 100 INJECTION, SOLUTION PARENTERAL at 11:55

## 2019-04-14 RX ADMIN — ACETAMINOPHEN 650 MG: 325 TABLET, FILM COATED ORAL at 08:07

## 2019-04-14 RX ADMIN — HEPARIN SODIUM 5000 UNITS: 5000 INJECTION, SOLUTION INTRAVENOUS; SUBCUTANEOUS at 15:13

## 2019-04-14 RX ADMIN — AMLODIPINE BESYLATE 2.5 MG: 5 TABLET ORAL at 05:38

## 2019-04-14 RX ADMIN — ACETAMINOPHEN 650 MG: 325 TABLET, FILM COATED ORAL at 16:59

## 2019-04-14 RX ADMIN — HEPARIN SODIUM 5000 UNITS: 5000 INJECTION, SOLUTION INTRAVENOUS; SUBCUTANEOUS at 21:15

## 2019-04-14 RX ADMIN — CARVEDILOL 12.5 MG: 12.5 TABLET, FILM COATED ORAL at 05:37

## 2019-04-14 RX ADMIN — CARVEDILOL 12.5 MG: 12.5 TABLET, FILM COATED ORAL at 16:50

## 2019-04-14 RX ADMIN — PREGABALIN 150 MG: 75 CAPSULE ORAL at 05:37

## 2019-04-14 RX ADMIN — PREGABALIN 150 MG: 75 CAPSULE ORAL at 16:50

## 2019-04-14 RX ADMIN — HEPARIN SODIUM 5000 UNITS: 5000 INJECTION, SOLUTION INTRAVENOUS; SUBCUTANEOUS at 05:38

## 2019-04-14 RX ADMIN — INSULIN HUMAN 1 UNITS: 100 INJECTION, SOLUTION PARENTERAL at 16:55

## 2019-04-14 RX ADMIN — INSULIN HUMAN 1 UNITS: 100 INJECTION, SOLUTION PARENTERAL at 05:49

## 2019-04-14 RX ADMIN — INSULIN HUMAN 1 UNITS: 100 INJECTION, SOLUTION PARENTERAL at 21:24

## 2019-04-14 ASSESSMENT — ENCOUNTER SYMPTOMS
COUGH: 0
SHORTNESS OF BREATH: 1
DEPRESSION: 0
VOMITING: 0
WEAKNESS: 0
CONSTIPATION: 0
NAUSEA: 1
FOCAL WEAKNESS: 0
CHILLS: 0
CONSTITUTIONAL NEGATIVE: 1
PSYCHIATRIC NEGATIVE: 1
DIARRHEA: 0
DIZZINESS: 0
DIAPHORESIS: 0
ABDOMINAL PAIN: 0
HEADACHES: 0
WEIGHT LOSS: 0
NEUROLOGICAL NEGATIVE: 1
EYES NEGATIVE: 1
MUSCULOSKELETAL NEGATIVE: 1
NAUSEA: 0
PALPITATIONS: 0
CARDIOVASCULAR NEGATIVE: 1
SHORTNESS OF BREATH: 0
FEVER: 0

## 2019-04-14 ASSESSMENT — LIFESTYLE VARIABLES: SUBSTANCE_ABUSE: 0

## 2019-04-14 NOTE — CARE PLAN
Problem: Communication  Goal: The ability to communicate needs accurately and effectively will improve  Pt communicating needs appropriately. Oriented to the call light and to call for assistance.       Problem: Safety  Goal: Will remain free from injury  Safety precautions reviewed with pt, pt verbalized understanding and denies questions.  Pt demonstrated ability to use call light for assistance.

## 2019-04-14 NOTE — PROGRESS NOTES
[facility] Progress Note    Author:  Dhruv Wong MD    Date of Service  4/14/2019    History of Present Illness   63 y.o. female with end-stage renal disease, on chronic hemodialysis Monday,   Wednesday, and Friday at [facility].  The patient presented to dialysis on   04/10, but the fistula was not working and was sent to the access center.  She   did not have dialysis that day.  The patient had a fistulogram and   angioplasty of the fistula on 04/11.  She presented to the emergency room on   04/12 after she was having worsening weakness accompanied by falls.    Evaluation in the emergency room revealed a potassium of 8.5.  She was admitted   to the intensive care unit.    Daily Nephrology Summary   4/12--Consult. Dialysis on 1K+.  4/13--1u pRBC. K+ 5.5 this am. HD today. C/O feeling volume overloaded.  4/14--Feels better.  No SOB.    Review of Systems  Review of Systems   Constitutional: Negative.    HENT: Negative.    Eyes: Negative.    Respiratory: Positive for shortness of breath.         SOB with exertion   Cardiovascular: Negative.    Gastrointestinal: Positive for nausea. Negative for abdominal pain, constipation, diarrhea and vomiting.   Genitourinary: Negative.    Musculoskeletal: Negative.    Skin: Negative.    Neurological: Negative.    Endo/Heme/Allergies: Negative.         Physical Exam  Temp:  [36.2 °C (97.1 °F)-37.6 °C (99.6 °F)] 37.6 °C (99.6 °F)  Pulse:  [67-76] 72  Resp:  [14-16] 16  BP: (104-171)/(46-82) 104/53  SpO2:  [94 %-100 %] 98 %    Physical Exam   Constitutional: She is oriented to person, place, and time. She appears well-developed and well-nourished.   HENT:   Head: Normocephalic and atraumatic.   Eyes: Pupils are equal, round, and reactive to light. Conjunctivae and EOM are normal.   Neck: Normal range of motion. Neck supple.   Cardiovascular: Normal rate, regular rhythm and normal heart sounds.    Pulmonary/Chest: Effort normal and breath sounds normal.    Abdominal: Soft. Bowel sounds are normal.   Musculoskeletal: Normal range of motion. She exhibits edema.   Neurological: She is alert and oriented to person, place, and time.   Skin: Skin is warm and dry.   Psychiatric: She has a normal mood and affect. Her behavior is normal. Thought content normal.       Fluids    Intake/Output Summary (Last 24 hours) at 04/14/19 0645  Last data filed at 04/13/19 1600   Gross per 24 hour   Intake           1587.5 ml   Output             3000 ml   Net          -1412.5 ml       Laboratory  Recent Labs      04/12/19   0142  04/13/19   0515   WBC  7.0  4.4*   RBC  2.03*  1.88*   HEMOGLOBIN  7.1*  6.6*   HEMATOCRIT  22.3*  20.8*   MCV  109.9*  110.6*   MCH  35.0*  35.1*   MCHC  31.8*  31.7*   RDW  77.3*  77.6*   PLATELETCT  120*  111*   MPV  14.0*  14.3*     Recent Labs      04/12/19   1142  04/13/19   0515  04/14/19   0546   SODIUM  138  137  135   POTASSIUM  3.6  5.5  4.6   CHLORIDE  96  97  97   CO2  31  28  29   GLUCOSE  138*  107*  192*   BUN  49*  69*  38*   CREATININE  4.37*  7.01*  5.07*   CALCIUM  7.7*  6.9*  7.1*     Recent Labs      04/12/19   0142   APTT  38.7*   INR  1.01               Imaging    Assessment/Plan  IMPRESSION:    1.  End-stage renal disease, on chronic hemodialysis Monday, Wednesday, and   Friday.  2.  Severe hyperkalemia, symptomatic, with EKG changes, due to missing   dialysis secondary to problems with arteriovenous fistula.  3.  History of arteriovenous fistula malfunction, status post angioplasty on   04/11.  4.  Chronic anemia secondary to myelodysplastic syndrome, requiring frequent   transfusions about every 2 weeks.  5.  History of chest pain secondary to anemia.  6.  Coronary artery disease with history of mildly abnormal thallium in 2016.  7.  Diabetes mellitus, on no medicines.  8.  Hypertension, controlled at this point.  Has not been controlled as an   outpatient.  9.  Chronic kidney disease -- metabolic bone disorder with vitamin D    deficiency.  10.  Paroxysmal atrial fibrillation.  11.  Peripheral vascular disease with history of stent placement of the lower   extremities.  12.  Peripheral neuropathy.  13.  Chronic obstructive pulmonary disease, on home oxygen.  14.  Diastolic congestive heart failure.  15.  History of cerebrovascular accident with no residual.  16.  Legally blind.  17.  Functioning left arm arteriovenous fistula with history of steal syndrome   requiring revision.  18.  History of many surgeries.  19.  Status post placement of a right port for transfusions.  20.  History of fatty liver.     PLAN:    1.  Dialysis tomorrow.  2.  Continue home medicines.  3.  No dietary protein restrictions.  4.  Follow her labs.  5.  Dose adjust medications for GFR less than 10.

## 2019-04-14 NOTE — PROGRESS NOTES
Received report from night staff. Assumed pt care. Pain level 8/10, PRN pain med orders followed. AOX4. POC discussed. Tele monitor in place. Call light within reach, bed in lowest position, and personal items accessible.

## 2019-04-14 NOTE — PROGRESS NOTES
Dr Toscano paged on pt informing bedside RN that she takes Lyrica 150 mg TID; only receiving BID inpatient. New orders to update to TID.   Bedside RN updated home med rec.

## 2019-04-14 NOTE — PROGRESS NOTES
Assumed care of pt, beside report received from ARIEL Ash. Updated on POC, call light within reach all fall precautions within place. Bed locked and lowered. Pt instructed to call for assistance before getting up. All questions answered, no other needs at this time.

## 2019-04-14 NOTE — PROGRESS NOTES
[facility] Medicine Daily Progress Note    Date of Service  4/14/2019    Chief Complaint  63 y.o. female admitted 4/12/2019 with weakness    Hospital Course    Patient is a pleasant 63 y.o. female who presented 4/12/2019 with past medical history of atrial fibrillation end-stage renal disease on dialysis congestive heart failure diabetes myelodysplastic syndrome requiring multiple blood transfusion who presents with weakness. .  She does have renal failure and unable to receive dialysis Wednesday due to fistula failure.  She did have a fistula fixed the day prior to admission.  She was supposed to go to dialysis this morning but progressively gotten worse.  In the emergency department she is found to have profound hyperkalemia.       Interval Problem Update  Feeling well and wants to go home  But she lives alone, I will have PT/OT re-evaluate her as she would prefer not to go to SNF  She has not felt dizzy today       Consultants/Specialty  nephrology    Code Status  Full code    Disposition  SNF v     Review of Systems  Review of Systems   Constitutional: Positive for malaise/fatigue. Negative for chills, diaphoresis, fever and weight loss.   Respiratory: Negative for cough and shortness of breath.    Cardiovascular: Negative for chest pain, palpitations and leg swelling.   Gastrointestinal: Negative for abdominal pain, nausea and vomiting.   Skin: Negative for itching and rash.   Neurological: Negative for dizziness, focal weakness, weakness and headaches.   Psychiatric/Behavioral: Negative.  Negative for depression, substance abuse and suicidal ideas.   All other systems reviewed and are negative.       Physical Exam  Temp:  [36.4 °C (97.6 °F)-37.6 °C (99.6 °F)] 36.7 °C (98 °F)  Pulse:  [62-76] 62  Resp:  [15-16] 16  BP: (103-120)/(47-82) 103/49  SpO2:  [95 %-99 %] 95 %    Physical Exam   Constitutional: She is oriented to person, place, and time. She appears well-developed and well-nourished. No distress.    HENT:   Head: Normocephalic and atraumatic.   Eyes: Conjunctivae are normal.   Cardiovascular: Normal rate and regular rhythm.    No murmur heard.  Pulmonary/Chest: Effort normal and breath sounds normal. No respiratory distress. She has no wheezes.   Abdominal: Soft. Bowel sounds are normal. She exhibits no distension. There is no tenderness. There is no rebound.   Musculoskeletal: Normal range of motion. She exhibits no edema.   Neurological: She is alert and oriented to person, place, and time. She has normal reflexes.   Skin: Skin is warm and dry. No rash noted. She is not diaphoretic. No erythema.   Psychiatric: She has a normal mood and affect. Her behavior is normal. Judgment and thought content normal.   Nursing note and vitals reviewed.      Fluids  No intake or output data in the 24 hours ending 04/14/19 1620    Laboratory  Recent Labs      04/12/19   0142  04/13/19   0515  04/14/19   0546   WBC  7.0  4.4*  3.9*   RBC  2.03*  1.88*  2.47*   HEMOGLOBIN  7.1*  6.6*  8.4*   HEMATOCRIT  22.3*  20.8*  26.3*   MCV  109.9*  110.6*  106.5*   MCH  35.0*  35.1*  34.4*   MCHC  31.8*  31.7*  32.3*   RDW  77.3*  77.6*  76.3*   PLATELETCT  120*  111*  108*   MPV  14.0*  14.3*  14.5*     Recent Labs      04/12/19   1142  04/13/19   0515  04/14/19   0546   SODIUM  138  137  135   POTASSIUM  3.6  5.5  4.6   CHLORIDE  96  97  97   CO2  31  28  29   GLUCOSE  138*  107*  192*   BUN  49*  69*  38*   CREATININE  4.37*  7.01*  5.07*   CALCIUM  7.7*  6.9*  7.1*     Recent Labs      04/12/19   0142   APTT  38.7*   INR  1.01               Imaging  CT-HEAD W/O   Final Result      1.  No acute intracranial abnormality      2.  Brain white matter changes and cerebral volume loss      DX-CHEST-PORTABLE (1 VIEW)   Final Result      1.  Mild pulmonary edema      2.  Enlarged cardiac silhouette      3.  Right internal jugular catheter appears appropriately located            Assessment/Plan  * Hyperkalemia- (present on admission)    Assessment & Plan    Due to esrd missing HD  Resolved with HD  Continue to follow  Nephrology following      ESRD (end stage renal disease) (HCC)- (present on admission)   Assessment & Plan    Dr. Qureshi following     Falls   Assessment & Plan    Pt/ot to eval  Lives alone  SNF vs HH     MDS (myelodysplastic syndrome) (HCC)- (present on admission)   Assessment & Plan    Follows with oncology Dr. Avila multiple tranfusions in the past     Type 2 diabetes mellitus (HCC)- (present on admission)   Assessment & Plan    Continue SSI with accu checks and hypoglycemic protocol       Anemia- (present on admission)   Assessment & Plan    History of multiple transfusions in the past and hx of myelodysplastic disorder following with oncology   Transfusion this am  following     Paroxysmal atrial fibrillation (HCC)- (present on admission)   Assessment & Plan    continue bb   Not anticoagulation candidiate due to anemia and falls     COPD (chronic obstructive pulmonary disease) (HCC)- (present on admission)   Assessment & Plan    No sigh of acute exacerbation   Continue respiratory protocol       Legally blind- (present on admission)   Assessment & Plan    Hx of          VTE prophylaxis: heparin

## 2019-04-15 ENCOUNTER — PATIENT OUTREACH (OUTPATIENT)
Dept: HEALTH INFORMATION MANAGEMENT | Facility: OTHER | Age: 64
End: 2019-04-15

## 2019-04-15 VITALS
HEART RATE: 65 BPM | RESPIRATION RATE: 16 BRPM | WEIGHT: 144.18 LBS | BODY MASS INDEX: 31.11 KG/M2 | DIASTOLIC BLOOD PRESSURE: 85 MMHG | SYSTOLIC BLOOD PRESSURE: 149 MMHG | TEMPERATURE: 99.1 F | OXYGEN SATURATION: 100 % | HEIGHT: 57 IN
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
## 2019-04-15 PROBLEM — E87.5 HYPERKALEMIA: Status: RESOLVED | Noted: 2018-08-27 | Resolved: 2019-04-15

## 2019-04-15 LAB
ANION GAP SERPL CALC-SCNC: 12 MMOL/L (ref 0–11.9)
BASOPHILS # BLD AUTO: 0.5 % (ref 0–1.8)
BASOPHILS # BLD: 0.02 K/UL (ref 0–0.12)
BUN SERPL-MCNC: 63 MG/DL (ref 8–22)
CALCIUM SERPL-MCNC: 6.6 MG/DL (ref 8.5–10.5)
CHLORIDE SERPL-SCNC: 96 MMOL/L (ref 96–112)
CO2 SERPL-SCNC: 24 MMOL/L (ref 20–33)
CREAT SERPL-MCNC: 7.17 MG/DL (ref 0.5–1.4)
EOSINOPHIL # BLD AUTO: 0.2 K/UL (ref 0–0.51)
EOSINOPHIL NFR BLD: 5.4 % (ref 0–6.9)
ERYTHROCYTE [DISTWIDTH] IN BLOOD BY AUTOMATED COUNT: 73.2 FL (ref 35.9–50)
GLUCOSE BLD-MCNC: 142 MG/DL (ref 65–99)
GLUCOSE BLD-MCNC: 177 MG/DL (ref 65–99)
GLUCOSE SERPL-MCNC: 256 MG/DL (ref 65–99)
HCT VFR BLD AUTO: 24.5 % (ref 37–47)
HGB BLD-MCNC: 7.8 G/DL (ref 12–16)
IMM GRANULOCYTES # BLD AUTO: 0.05 K/UL (ref 0–0.11)
IMM GRANULOCYTES NFR BLD AUTO: 1.4 % (ref 0–0.9)
LYMPHOCYTES # BLD AUTO: 1.06 K/UL (ref 1–4.8)
LYMPHOCYTES NFR BLD: 28.8 % (ref 22–41)
MCH RBC QN AUTO: 33.9 PG (ref 27–33)
MCHC RBC AUTO-ENTMCNC: 31.8 G/DL (ref 33.6–35)
MCV RBC AUTO: 106.5 FL (ref 81.4–97.8)
MONOCYTES # BLD AUTO: 0.64 K/UL (ref 0–0.85)
MONOCYTES NFR BLD AUTO: 17.4 % (ref 0–13.4)
MORPHOLOGY BLD-IMP: NORMAL
NEUTROPHILS # BLD AUTO: 1.71 K/UL (ref 2–7.15)
NEUTROPHILS NFR BLD: 46.5 % (ref 44–72)
NRBC # BLD AUTO: 0 K/UL
NRBC BLD-RTO: 0 /100 WBC
PLATELET # BLD AUTO: 95 K/UL (ref 164–446)
POTASSIUM SERPL-SCNC: 5.2 MMOL/L (ref 3.6–5.5)
RBC # BLD AUTO: 2.3 M/UL (ref 4.2–5.4)
SODIUM SERPL-SCNC: 132 MMOL/L (ref 135–145)
WBC # BLD AUTO: 3.7 K/UL (ref 4.8–10.8)

## 2019-04-15 PROCEDURE — 85025 COMPLETE CBC W/AUTO DIFF WBC: CPT

## 2019-04-15 PROCEDURE — 700102 HCHG RX REV CODE 250 W/ 637 OVERRIDE(OP): Performed by: HOSPITALIST

## 2019-04-15 PROCEDURE — 80048 BASIC METABOLIC PNL TOTAL CA: CPT

## 2019-04-15 PROCEDURE — 82962 GLUCOSE BLOOD TEST: CPT

## 2019-04-15 PROCEDURE — 700111 HCHG RX REV CODE 636 W/ 250 OVERRIDE (IP): Performed by: HOSPITALIST

## 2019-04-15 PROCEDURE — 5A1D70Z PERFORMANCE OF URINARY FILTRATION, INTERMITTENT, LESS THAN 6 HOURS PER DAY: ICD-10-PCS | Performed by: INTERNAL MEDICINE

## 2019-04-15 PROCEDURE — A9270 NON-COVERED ITEM OR SERVICE: HCPCS | Performed by: HOSPITALIST

## 2019-04-15 PROCEDURE — 99239 HOSP IP/OBS DSCHRG MGMT >30: CPT | Performed by: HOSPITALIST

## 2019-04-15 PROCEDURE — 90935 HEMODIALYSIS ONE EVALUATION: CPT

## 2019-04-15 PROCEDURE — 97530 THERAPEUTIC ACTIVITIES: CPT

## 2019-04-15 RX ORDER — CARVEDILOL 12.5 MG/1
12.5 TABLET ORAL 2 TIMES DAILY WITH MEALS
Qty: 60 TAB | Refills: 0 | Status: SHIPPED | OUTPATIENT
Start: 2019-04-15

## 2019-04-15 RX ADMIN — AMLODIPINE BESYLATE 2.5 MG: 5 TABLET ORAL at 05:43

## 2019-04-15 RX ADMIN — HEPARIN 500 UNITS: 100 SYRINGE at 16:48

## 2019-04-15 RX ADMIN — CARVEDILOL 12.5 MG: 12.5 TABLET, FILM COATED ORAL at 05:44

## 2019-04-15 RX ADMIN — HEPARIN SODIUM 5000 UNITS: 5000 INJECTION, SOLUTION INTRAVENOUS; SUBCUTANEOUS at 05:42

## 2019-04-15 RX ADMIN — PREGABALIN 150 MG: 75 CAPSULE ORAL at 05:42

## 2019-04-15 RX ADMIN — PREGABALIN 150 MG: 75 CAPSULE ORAL at 15:51

## 2019-04-15 ASSESSMENT — GAIT ASSESSMENTS
ASSISTIVE DEVICE: FRONT WHEEL WALKER
DISTANCE (FEET): 160
DEVIATION: DECREASED BASE OF SUPPORT
GAIT LEVEL OF ASSIST: SUPERVISED

## 2019-04-15 ASSESSMENT — ENCOUNTER SYMPTOMS
DIARRHEA: 0
MUSCULOSKELETAL NEGATIVE: 1
NAUSEA: 1
NEUROLOGICAL NEGATIVE: 1
VOMITING: 0
SHORTNESS OF BREATH: 1
ABDOMINAL PAIN: 0
CONSTITUTIONAL NEGATIVE: 1
CONSTIPATION: 0
CARDIOVASCULAR NEGATIVE: 1
EYES NEGATIVE: 1

## 2019-04-15 ASSESSMENT — COGNITIVE AND FUNCTIONAL STATUS - GENERAL
SUGGESTED CMS G CODE MODIFIER MOBILITY: CI
MOBILITY SCORE: 23
CLIMB 3 TO 5 STEPS WITH RAILING: A LITTLE

## 2019-04-15 NOTE — DISCHARGE PLANNING
Received Choice form at 1503  Agency/Facility Name: [facility]  Referral sent per Choice form at 1512

## 2019-04-15 NOTE — CARE PLAN
Problem: Safety  Goal: Will remain free from injury  Outcome: PROGRESSING AS EXPECTED  Bed locked and in lowest position, Bed alarm on. Treaded socks on. Call light and belongings within reach. Patient educated to call for assistance. Patient verbalized understanding. Hourly rounding in place.     Problem: Knowledge Deficit  Goal: Knowledge of the prescribed therapeutic regimen will improve  Outcome: PROGRESSING AS EXPECTED  Discussed POC and medications with patient, patient verbalized understanding.

## 2019-04-15 NOTE — THERAPY
"Physical Therapy Treatment completed.   Bed Mobility:  Supine to Sit: Modified Independent  Transfers: Sit to Stand: Supervised  Gait: Level Of Assist: Supervised with Front-Wheel Walker       Plan of Care: Patient with no further skilled PT needs in the acute care setting at this time  Discharge Recommendations: Equipment: No Equipment Needed. Post-acute therapy Discharge to home with home health for additional skilled therapy services.     Pt has progressed her mobility and today completed 2x160' of gait with FWW. She required a seated rest break between bouts due to feeling of LE weakness/shakiness. Pt reports having a powerchair at home for longer distances should she require it. At this time, pt does appear functionally capable of return home with HHPT follow up (Pt reports she was previously on HH services but scheduling was difficult due to her HD and infusion schedule).     See \"Rehab Therapy-Acute\" Patient Summary Report for complete documentation.       "

## 2019-04-15 NOTE — CARE PLAN
Problem: Venous Thromboembolism (VTW)/Deep Vein Thrombosis (DVT) Prevention:  Goal: Patient will participate in Venous Thrombosis (VTE)/Deep Vein Thrombosis (DVT)Prevention Measures  Outcome: PROGRESSING AS EXPECTED  Pt understands the use of the Heparin to prevent DVTs in the lower extremities.     Problem: Bowel/Gastric:  Goal: Normal bowel function is maintained or improved  Outcome: PROGRESSING AS EXPECTED  Pt educated on the use of stool softeners to aide in bowel maintenance and the importance of regular bowel movements

## 2019-04-15 NOTE — DISCHARGE PLANNING
Anticipated Discharge Disposition: Home with HH    Action: LSW got choice from pt for HH. Pt was previously on service with [facility] and would like a referral to be sent. LSW sent choice form to [facility].     Barriers to Discharge: None    Plan: Referral to be sent to [facility].    Add at 3:39- LSW spoke to Marlys with [facility] who stated that they can not accept pt due to limited staffing. Pt would like referral to be sent to [facility] . LSW completed choice and faxed over.

## 2019-04-15 NOTE — DISCHARGE INSTRUCTIONS
Discharge Instructions    Discharged to home by car with relative. Discharged via wheelchair, hospital escort: Yes.  Special equipment needed: Oxygen    Be sure to schedule a follow-up appointment with your primary care doctor or any specialists as instructed.     Discharge Plan:   Diet Plan: Discussed  Activity Level: Discussed  Confirmed Follow up Appointment: Appointment Scheduled  Confirmed Symptoms Management: Discussed  Medication Reconciliation Updated: Yes  Influenza Vaccine Indication: Not indicated: Previously immunized this influenza season and > 8 years of age    I understand that a diet low in cholesterol, fat, and sodium is recommended for good health. Unless I have been given specific instructions below for another diet, I accept this instruction as my diet prescription.   Other diet: Renal diet as tolerated     Special Instructions: None    · Is patient discharged on Warfarin / Coumadin?   No     Depression / Suicide Risk    As [facility] are [facility], it is important to learn how to keep safe from harming yourself.    Recognize the warning signs:  · Abrupt changes in personality, positive or negative- including increase in energy   · Giving away possessions  · Change in eating patterns- significant weight changes-  positive or negative  · Change in sleeping patterns- unable to sleep or sleeping all the time   · Unwillingness or inability to communicate  · Depression  · Unusual sadness, discouragement and loneliness  · Talk of wanting to die  · Neglect of personal appearance   · Rebelliousness- reckless behavior  · Withdrawal from people/activities they love  · Confusion- inability to concentrate     If you or a loved one observes any of these behaviors or has concerns about self-harm, here's what you can do:  · Talk about it- your feelings and reasons for harming yourself  · Remove any means that you might use to hurt yourself (examples: pills, rope, extension cords,  firearm)  · Get professional help from the community (Mental Health, Substance Abuse, psychological counseling)  · Do not be alone:Call your Safe Contact- someone whom you trust who will be there for you.  · Call your local CRISIS HOTLINE 497-6277 or 719-415-7042  · Call your local Children's Mobile Crisis Response Team [place] (091) 429-9185 or www.dINK  · Call the toll free National Suicide Prevention Hotlines   · National Suicide Prevention Lifeline 697-668-TQTZ (8110)  · [employer] Line Network 800-SUICIDE (107-4034)    Hyperkalemia  Introduction  Hyperkalemia is when you have too much potassium in your blood. Potassium is normally removed (excreted) from your body by your kidneys. If there is too much potassium in your blood, it can affect your heart’s ability to function.  What are the causes?  Hyperkalemia may be caused by:  · Taking in too much potassium. You can do this by:  ¨ Using salt substitutes. They contain large amounts of potassium.  ¨ Taking potassium supplements.  ¨ Eating foods high in potassium.  · Excreting too little potassium. This can happen if:  ¨ Your kidneys are not working properly. Kidney (renal) disease, including short- or long-term renal failure, is a very common cause of hyperkalemia.  ¨ You are taking medicines that lower your excretion of potassium.  ¨ You have Moore disease.  ¨ You have a urinary tract blockage, such as kidney stones.  ¨ You are on treatment to mechanically clean your blood (dialysis) and you skip a treatment.  · Releasing a high amount of potassium from your cells into your blood. This can happen with:  ¨ Injury to muscles (rhabdomyolysis) or other tissues. Most potassium is stored in your muscles.  ¨ Severe burns or infections.  ¨ Acidic blood plasma (acidosis). Acidosis can result from many diseases, such as uncontrolled diabetes.  What increases the risk?  The most common risk factor of hyperkalemia is kidney disease. Other risk factors  of hyperkalemia include:  · McPherson disease. This is a condition where your glands do not produce enough hormones.  · Alcoholism or heavy drug use.  · Using certain blood pressure medicines, such as angiotensin-converting enzyme (ACE) inhibitors, angiotensin II receptor blockers (ARBs), or potassium-sparing diuretics such as spironolactone.  · Severe injury or burn.  What are the signs or symptoms?  Oftentimes, there are no signs or symptoms of hyperkalemia. However, when your potassium level becomes high enough, you may experience symptoms such as:  · Irregular or very slow heartbeat.  · Nausea.  · Fatigue.  · Tingling of the skin or numbness of the hands or feet.  · Muscle weakness.  · Fatigue.  · Not being able to move (paralysis).  You may not have any symptoms of hyperkalemia.  How is this diagnosed?  Hyperkalemia may be diagnosed by:  · Physical exam.  · Blood tests.  · ECG (electrocardiogram).  · Discussion of prescription and non-prescription drug use.  How is this treated?  Treatment for hyperkalemia is often directed at the underlying cause. In some instances, treatment may include:  · Insulin.  · Glucose (sugar) and water solution given through a vein (intravenous or IV ).  · Dialysis.  · Medicines to remove the potassium from your body.  · Medicines to move calcium from your bloodstream into your tissues.  Follow these instructions at home:  · Take medicines only as directed by your health care provider.  · Do not take any supplements, natural products, herbs, or vitamins without reviewing them with your health care provider. Certain supplements and natural food products can have high amounts of potassium.  · Limit your alcohol intake as directed by your health care provider.  · Stop illegal drug use. If you need help quitting, ask your health care provider.  · Keep all follow-up visits as directed by your health care provider. This is important.  · If you have kidney disease, you may need to follow a  low potassium diet. A dietitian can help educate you on low potassium foods.  Contact a health care provider if:  · You notice an irregular or very slow heartbeat.  · You feel light-headed.  · You feel weak.  · You are nauseous.  · You have tingling or numbness in your hands or feet.  Get help right away if:  · You have shortness of breath.  · You have chest pain or discomfort.  · You pass out.  · You have muscle paralysis.  This information is not intended to replace advice given to you by your health care provider. Make sure you discuss any questions you have with your health care provider.  Document Released: 12/08/2003 Document Revised: 05/25/2017 Document Reviewed: 03/25/2015  © 2017 [employer]vier

## 2019-04-15 NOTE — RESPIRATORY CARE
COPD Education by COPD Clinical Educator  4/15/2019 at 10:30 AM by Dilcia Guajardo    Patient interviewed by COPD education team.  Patient refused full COPD program at this time, but agreed to short intervention.  A comprehensive packet including information about COPD, treatments, and smoking cessation given.

## 2019-04-15 NOTE — DISCHARGE PLANNING
Medical Social Work     LSW attempted to get HH choice, pt is in dialysis. LSW will try again to get choice.

## 2019-04-15 NOTE — PROGRESS NOTES
3hr HD started @ 1010 and completed @ 1312,tolerated 2151 ml net UF with low bp towards the end of tx,asymptomatic.VSS post HD,LUAAVF + B/T,cannulation sites covered with DD,CDI,report given to Sushant Alvarez RN.

## 2019-04-15 NOTE — PROGRESS NOTES
[facility] Progress Note    Author:  Jose A Starks MD    Date of Service  4/15/2019    History of Present Illness   63 y.o. female with end-stage renal disease, on chronic hemodialysis Monday,   Wednesday, and Friday at [facility].  The patient presented to dialysis on   04/10, but the fistula was not working and was sent to the access center.  She   did not have dialysis that day.  The patient had a fistulogram and   angioplasty of the fistula on 04/11.  She presented to the emergency room on   04/12 after she was having worsening weakness accompanied by falls.    Evaluation in the emergency room revealed a potassium of 8.5.  She was admitted   to the intensive care unit.    Daily Nephrology Summary   4/12--Consult. Dialysis on 1K+.  4/13--1u pRBC. K+ 5.5 this am. HD today. C/O feeling volume overloaded.  4/14--Feels better.  No SOB.  4/15--Feels ok. On HD now.    Review of Systems  Review of Systems   Constitutional: Negative.    HENT: Negative.    Eyes: Negative.    Respiratory: Positive for shortness of breath.         SOB with exertion   Cardiovascular: Negative.    Gastrointestinal: Positive for nausea. Negative for abdominal pain, constipation, diarrhea and vomiting.   Genitourinary: Negative.    Musculoskeletal: Negative.    Skin: Negative.    Neurological: Negative.    Endo/Heme/Allergies: Negative.         Physical Exam  Temp:  [36.2 °C (97.2 °F)-36.9 °C (98.4 °F)] 36.5 °C (97.7 °F)  Pulse:  [62-69] 63  Resp:  [16-18] 16  BP: (103-139)/(49-66) 138/54  SpO2:  [90 %-100 %] 93 %    Physical Exam   Constitutional: She is oriented to person, place, and time. She appears well-developed and well-nourished.   HENT:   Head: Normocephalic and atraumatic.   Eyes: Pupils are equal, round, and reactive to light. Conjunctivae and EOM are normal.   Neck: Normal range of motion. Neck supple.   Cardiovascular: Normal rate, regular rhythm and normal heart sounds.    Pulmonary/Chest: Effort  normal and breath sounds normal.   Abdominal: Soft. Bowel sounds are normal.   Musculoskeletal: Normal range of motion. She exhibits edema.   Neurological: She is alert and oriented to person, place, and time.   Skin: Skin is warm and dry.   Psychiatric: She has a normal mood and affect. Her behavior is normal. Thought content normal.       Fluids  No intake or output data in the 24 hours ending 04/15/19 1058    Laboratory  Recent Labs      04/13/19   0515  04/14/19   0546  04/15/19   0150   WBC  4.4*  3.9*  3.7*   RBC  1.88*  2.47*  2.30*   HEMOGLOBIN  6.6*  8.4*  7.8*   HEMATOCRIT  20.8*  26.3*  24.5*   MCV  110.6*  106.5*  106.5*   MCH  35.1*  34.4*  33.9*   MCHC  31.7*  32.3*  31.8*   RDW  77.6*  76.3*  73.2*   PLATELETCT  111*  108*  95*   MPV  14.3*  14.5*   --      Recent Labs      04/12/19   1142  04/13/19   0515  04/14/19   0546   SODIUM  138  137  135   POTASSIUM  3.6  5.5  4.6   CHLORIDE  96  97  97   CO2  31  28  29   GLUCOSE  138*  107*  192*   BUN  49*  69*  38*   CREATININE  4.37*  7.01*  5.07*   CALCIUM  7.7*  6.9*  7.1*                   Imaging    Assessment/Plan  IMPRESSION:    1.  End-stage renal disease, on chronic hemodialysis Monday, Wednesday, and   Friday.  2.  Severe hyperkalemia, symptomatic, with EKG changes, due to missing   dialysis secondary to problems with arteriovenous fistula.  3.  History of arteriovenous fistula malfunction, status post angioplasty on   04/11.  4.  Chronic anemia secondary to myelodysplastic syndrome, requiring frequent   transfusions about every 2 weeks.  5.  History of chest pain secondary to anemia.  6.  Coronary artery disease with history of mildly abnormal thallium in 2016.  7.  Diabetes mellitus, on no medicines.  8.  Hypertension, controlled at this point.  Has not been controlled as an   outpatient.  9.  Chronic kidney disease -- metabolic bone disorder with vitamin D   deficiency.  10.  Paroxysmal atrial fibrillation.  11.  Peripheral vascular disease  with history of stent placement of the lower   extremities.  12.  Peripheral neuropathy.  13.  Chronic obstructive pulmonary disease, on home oxygen.  14.  Diastolic congestive heart failure.  15.  History of cerebrovascular accident with no residual.  16.  Legally blind.  17.  Functioning left arm arteriovenous fistula with history of steal syndrome   requiring revision.  18.  History of many surgeries.  19.  Status post placement of a right port for transfusions.  20.  History of fatty liver.     PLAN:    1.  Dialysis today  2.  Continue home medicines.  3.  No dietary protein restrictions.  4.  Follow her labs.  5.  Dose adjust medications for GFR less than 10.

## 2019-04-15 NOTE — PROGRESS NOTES
Assumed care at 1900, bedside report received from day shift RN. Pt is SR on the telemetry monitor. Patient is AO x 4 and is resting in bed with even respirations. Initial assessment completed and orders reviewed. POC addressed with patient. Call light within reach and hourly rounding in place. No further questions at this time. Fall precautions in place.

## 2019-04-15 NOTE — FACE TO FACE
Face to Face Supporting Documentation - Home Health    The encounter with this patient was in whole or in part the primary reason for home health admission.    Date of encounter:   Patient:                    MRN:                       YOB: 2020  Vielka Briscoe  [id number]  1955     Home health to see patient for:  Physical Therapy evaluation and treatment and Occupational therapy evaluation and treatment    Skilled need for:  Recent Deterioration of Health Status hyperkalemia, ESRD    Skilled nursing interventions to include:  Comment: none    Homebound status evidenced by:  Require the use of special transportation. Leaving home requires a considerable and taxing effort. There is a normal inability to leave the home.    Community Physician to provide follow up care: Nyla Nava M.D.     Optional Interventions? No      I certify the face to face encounter for this home health care referral meets the CMS requirements and the encounter/clinical assessment with the patient was, in whole, or in part, for the medical condition(s) listed above, which is the primary reason for home health care. Based on my clinical findings: the service(s) are medically necessary, support the need for home health care, and the homebound criteria are met.  I certify that this patient has had a face to face encounter by myself.  Esther Toscano M.D. - NPI: [id number]

## 2019-04-15 NOTE — DISCHARGE PLANNING
Agency/Facility Name: [facility]  Spoke To: Marlys  Outcome: Patient declined due to low staffing in patients area.     Received Choice form at 1538  Agency/Facility Name: [facility]  Referral sent per Choice form at [phone]

## 2019-04-15 NOTE — DOCUMENTATION QUERY
[facility]                                                                       Query Response Note      PATIENT:               JESUS SILVEIRA  ACCT #:                  [id number]  MRN:                     [id number]  :                      1955  ADMIT DATE:       2019 1:18 AM  DISCH DATE:          RESPONDING  PROVIDER #:        [id number]           QUERY TEXT:    Diastolic congestive heart failure is documented in the medical record. Please document the acuity (includes probable or suspected).     NOTE:  If an appropriate response is not listed below, please respond with a new note.    The patient's Clinical Indicators include:    Clinical findings: Diastolic heart failure is documented.    Treatment: Coreg, Norvasc    Risk factors: CAD, DM II, anemia r/t myodysplastic syndrome, ESRD, COPD    Query created by: Angie Hess on 4/15/2019 8:57 AM    RESPONSE TEXT:    Patient does not have diastolic heart failure          Electronically signed by:  MOHSEN LLANES MD 4/15/2019 11:43 AM

## 2019-04-16 NOTE — DOCUMENTATION QUERY
[facility]                                                                       Query Response Note      PATIENT:               JESUS SILVEIRA  ACCT #:                  [id number]  MRN:                     [id number]  :                      1955  ADMIT DATE:       2019 1:18 AM  DISCH DATE:        4/15/2019 6:00 PM  RESPONDING  PROVIDER #:        [id number]           QUERY TEXT:    COPD, on home 02 use is documented in the Medical Record. Please further specify the condition this patient has (see below options).    NOTE:  If an appropriate response is not listed below, please respond with a new note.      The patient's Clinical Indicators include:    Clinical findings: COPD, on home oxygen is documented in a  progress note.    Treatment: Oxygen    Risk factors: COPD, heart failure, anemia    Query created by: Angie Hess on 4/15/2019 9:03 AM    RESPONSE TEXT:    Chronic Respiratory Failure          Electronically signed by:  MOHSEN LLANES MD 2019 3:05 PM

## 2019-04-16 NOTE — PROGRESS NOTES
Pt discharged home, leaving the unit in a WC with hospital staff and family friend. Pt verbally acknowledges all discharge instructions, medications, and medications regimen. Pt gathered all personal belongings, Tele box and IV have been removed. All questions and needs have been met at this time.

## 2019-04-16 NOTE — DISCHARGE SUMMARY
Discharge Summary    CHIEF COMPLAINT ON ADMISSION  Chief Complaint   Patient presents with   • Fall     increasing falls at home   • Weakness     missed dialysis   • Shortness of Breath     needing more 02        Reason for Admission  EMS     Admission Date  4/12/2019    CODE STATUS  Full Code    HPI & HOSPITAL COURSE  This is a 63 y.o. female with PMHx ESRD, a fib, CHF, DM, myelodysplastic syndrome with frequent transfusions here with weakness and multiple falls. She had missed dialysis no Wednesday due to fistula failure which was fixed the following day. She was unable to go to dialysis on Friday due to progressive weakness and presented to ER. She was found to have critically elevated potassium of 8.5. She underwent emergent dialysis and was monitored in ICU.  Her potassium levels normalized post dialysis. Her hemoglobin however dropped down to 6.6 due to her MDS and she received 1U PRBCs. She was evaluated by PT/OT and HH was recommended by PT and SNF by OT. Patient refused SNF so HH was arranged for her. She has good support with her friends living near her. She is no longer having symptoms of dizziness or weakness.     Therefore, she is discharged in fair and stable condition to home with organized home healthcare and close outpatient follow-up.    The patient met 2-midnight criteria for an inpatient stay at the time of discharge.    Discharge Date  4/15/2019    FOLLOW UP ITEMS POST DISCHARGE  none    DISCHARGE DIAGNOSES  Principal Problem (Resolved):    Hyperkalemia POA: Yes  Active Problems:    ESRD (end stage renal disease) (HCC) POA: Yes    Legally blind (Chronic) POA: Yes    COPD (chronic obstructive pulmonary disease) (HCC) POA: Yes    Paroxysmal atrial fibrillation (HCC) POA: Yes    Type 2 diabetes mellitus (HCC) POA: Yes    Anemia POA: Yes      Overview: Most likely secondary to ESRD      - Hgb on admission 6.6 -> 6.0      - 2/1/17: hgb 6.3 -> 7.2 s/p 1 unit prbcs      - Hgb goal of 8.5-9 per  nephrology and oncology      - Iron 168, TIBC 231, %sat 73, Ferritin 1236.3          MDS (myelodysplastic syndrome) (HCC) POA: Yes  Resolved Problems:    Falls POA: Unknown      FOLLOW UP  Future Appointments  Date Time Provider Department Center   4/20/2019 9:45 AM [facility] [address]   4/30/2019 10:00 AM [facility] [address]   5/14/2019 10:45 AM [facility] [address]   5/28/2019 11:00 AM [facility] [address]     Nyla Nava M.D.  [address] [facility] [address]  [phone]    Go on 5/24/2019  Please arrive at 9:30 am for your appointment. If you would like you can walk in Mon- Thurs between 7:30 am - 4 pm and ask to be seen for a sooner hospital follow up . Thank you       MEDICATIONS ON DISCHARGE     Medication List      CONTINUE taking these medications      Instructions   amLODIPine 10 MG Tabs  Commonly known as:  NORVASC   Take 2.5 mg by mouth every day.  Dose:  2.5 mg     brinzolamide 1 % Susp  Commonly known as:  AZOPT   Place 1 Drop in both eyes 3 times a day.  Dose:  1 Drop     Buprenorphine 10 MCG/HR Ptwk   Apply 10 mcg to skin as directed every 7 days.  Dose:  10 mcg     carvedilol 12.5 MG Tabs  Commonly known as:  COREG   Take 1 Tab by mouth 2 times a day, with meals.  Dose:  12.5 mg     COMBIGAN 0.2-0.5 % Soln  Generic drug:  Brimonidine Tartrate-Timolol   Place 1 Drop in both eyes 2 Times a Day.  Dose:  1 Drop     lidocaine 5 % Ptch  Commonly known as:  LIDODERM   Apply 1 Patch to skin as directed every 24 hours.  Dose:  1 Patch     lidocaine-prilocaine 2.5-2.5 % Crea  Commonly known as:  EMLA   Apply  to affected area(s) as needed.     losartan 100 MG Tabs  Commonly known as:  COZAAR   Take 100 mg by mouth every evening.  Dose:  100 mg     LUMIGAN 0.01 % Soln  Generic drug:  bimatoprost   Place 1 Drop in both eyes every bedtime.  Dose:  1 Drop     LYRICA 150 MG Caps  Generic drug:  pregabalin   Take 150 mg by mouth 3 times a  day.  Dose:  150 mg     nitroglycerin 0.4 MG Subl  Commonly known as:  NITROSTAT   Place 1 Tab under tongue as needed for Chest Pain.  Dose:  0.4 mg            Allergies  Allergies   Allergen Reactions   • Lenalidomide Unspecified     Beeping Pulse     • Naprosyn [Naproxen] Hives   • Pioglitazone Unspecified     Causes blindness   • Simvastatin Unspecified     Couldn't move   • Demerol Vomiting   • Diphenhydramine Vomiting   • Glipizide Vomiting   • Hydromorphone Vomiting   • Iron Vomiting     vomiting   • Metformin Vomiting   • Morphine Vomiting   • Multivitamin Itching     itching   • Ondansetron Itching   • Other Drug Rash and Vomiting     Any binders that remove phosphorus from the body such as tums   • Oxycodone Vomiting   • Pcn [Penicillins] Vomiting          • Propoxyphene Vomiting   • Requip Vomiting   • Sulfa Drugs Rash and Vomiting     Vomiting & rash      • Tamsulosin Vomiting   • Tramadol Vomiting   • Trazodone Vomiting       DIET  Orders Placed This Encounter   Procedures   • Diet Order Renal     Standing Status:   Standing     Number of Occurrences:   1     Order Specific Question:   Diet:     Answer:   Renal [8]       ACTIVITY  As tolerated.  Weight bearing as tolerated    CONSULTATIONS  none    PROCEDURES  none    LABORATORY  Lab Results   Component Value Date    SODIUM 132 (L) 04/15/2019    POTASSIUM 5.2 04/15/2019    CHLORIDE 96 04/15/2019    CO2 24 04/15/2019    GLUCOSE 256 (H) 04/15/2019    BUN 63 (H) 04/15/2019    CREATININE 7.17 (HH) 04/15/2019    CREATININE 0.6 07/20/2007        Lab Results   Component Value Date    WBC 3.7 (L) 04/15/2019    HEMOGLOBIN 7.8 (L) 04/15/2019    HEMATOCRIT 24.5 (L) 04/15/2019    PLATELETCT 95 (L) 04/15/2019        Total time of the discharge process exceeds 33 minutes.

## 2019-04-20 ENCOUNTER — OUTPATIENT INFUSION SERVICES (OUTPATIENT)
Dept: ONCOLOGY | Facility: MEDICAL CENTER | Age: 64
End: 2019-04-20
Attending: INTERNAL MEDICINE
Payer: MEDICARE

## 2019-04-20 VITALS
WEIGHT: 146.61 LBS | HEIGHT: 57 IN | TEMPERATURE: 98 F | HEART RATE: 76 BPM | DIASTOLIC BLOOD PRESSURE: 45 MMHG | RESPIRATION RATE: 18 BRPM | SYSTOLIC BLOOD PRESSURE: 129 MMHG | BODY MASS INDEX: 31.63 KG/M2 | OXYGEN SATURATION: 100 %

## 2019-04-20 DIAGNOSIS — D64.9 SYMPTOMATIC ANEMIA: ICD-10-CM

## 2019-04-20 LAB
ANISOCYTOSIS BLD QL SMEAR: ABNORMAL
BASOPHILS # BLD AUTO: 0.3 % (ref 0–1.8)
BASOPHILS # BLD: 0.01 K/UL (ref 0–0.12)
COMMENT 1642: NORMAL
EOSINOPHIL # BLD AUTO: 0.2 K/UL (ref 0–0.51)
EOSINOPHIL NFR BLD: 5.1 % (ref 0–6.9)
ERYTHROCYTE [DISTWIDTH] IN BLOOD BY AUTOMATED COUNT: 66 FL (ref 35.9–50)
HCT VFR BLD AUTO: 23.3 % (ref 37–47)
HGB BLD-MCNC: 7.7 G/DL (ref 12–16)
IMM GRANULOCYTES # BLD AUTO: 0.03 K/UL (ref 0–0.11)
IMM GRANULOCYTES NFR BLD AUTO: 0.8 % (ref 0–0.9)
LG PLATELETS BLD QL SMEAR: NORMAL
LYMPHOCYTES # BLD AUTO: 0.98 K/UL (ref 1–4.8)
LYMPHOCYTES NFR BLD: 25.1 % (ref 22–41)
MACROCYTES BLD QL SMEAR: ABNORMAL
MCH RBC QN AUTO: 35.2 PG (ref 27–33)
MCHC RBC AUTO-ENTMCNC: 33 G/DL (ref 33.6–35)
MCV RBC AUTO: 106.4 FL (ref 81.4–97.8)
MONOCYTES # BLD AUTO: 0.57 K/UL (ref 0–0.85)
MONOCYTES NFR BLD AUTO: 14.6 % (ref 0–13.4)
MORPHOLOGY BLD-IMP: NORMAL
NEUTROPHILS # BLD AUTO: 2.11 K/UL (ref 2–7.15)
NEUTROPHILS NFR BLD: 54.1 % (ref 44–72)
NRBC # BLD AUTO: 0 K/UL
NRBC BLD-RTO: 0 /100 WBC
PLATELET # BLD AUTO: 100 K/UL (ref 164–446)
PLATELET BLD QL SMEAR: NORMAL
PMV BLD AUTO: 14.2 FL (ref 9–12.9)
RBC # BLD AUTO: 2.19 M/UL (ref 4.2–5.4)
RBC BLD AUTO: PRESENT
WBC # BLD AUTO: 3.9 K/UL (ref 4.8–10.8)

## 2019-04-20 PROCEDURE — 700111 HCHG RX REV CODE 636 W/ 250 OVERRIDE (IP)

## 2019-04-20 PROCEDURE — 36591 DRAW BLOOD OFF VENOUS DEVICE: CPT

## 2019-04-20 PROCEDURE — A4212 NON CORING NEEDLE OR STYLET: HCPCS

## 2019-04-20 PROCEDURE — 306780 HCHG STAT FOR TRANSFUSION PER CASE

## 2019-04-20 PROCEDURE — 85025 COMPLETE CBC W/AUTO DIFF WBC: CPT

## 2019-04-20 RX ORDER — ACETAMINOPHEN 500 MG
500-1000 TABLET ORAL EVERY 6 HOURS PRN
COMMUNITY
End: 2019-11-04

## 2019-04-20 RX ADMIN — Medication 500 UNITS: at 10:23

## 2019-04-20 NOTE — PROGRESS NOTES
"Patient arrived ambulatory with cane to the [facility] for labs/possible blood with friend. Reviewed vital signs, labs, and physician order. Patient denies S&S of infection, or bleeding. Pt reports recent hospitalization \"high potassium, low blood, and a fall\", reports feeling \"much better now, just tired\". Pt arrives with Emla to right chest port, port accessed with sterile technique, visualized brisk blood return, CBC collected per MD order. Hgb 7.7, not within parameters to receive blood transfusion. Port flushed per protocol, rivera needle removed with tip intact, gauze and tape dressing placed. Confirmed upcoming appointment date and time with patient. Patient left the [facility] ambulatory in no sign of distress.   "

## 2019-04-30 ENCOUNTER — OUTPATIENT INFUSION SERVICES (OUTPATIENT)
Dept: ONCOLOGY | Facility: MEDICAL CENTER | Age: 64
End: 2019-04-30
Attending: INTERNAL MEDICINE
Payer: MEDICARE

## 2019-04-30 VITALS
OXYGEN SATURATION: 100 % | DIASTOLIC BLOOD PRESSURE: 54 MMHG | BODY MASS INDEX: 31.3 KG/M2 | HEIGHT: 57 IN | HEART RATE: 75 BPM | TEMPERATURE: 97.8 F | SYSTOLIC BLOOD PRESSURE: 176 MMHG | RESPIRATION RATE: 16 BRPM | WEIGHT: 145.06 LBS

## 2019-04-30 DIAGNOSIS — D46.9 MYELODYSPLASIA (MYELODYSPLASTIC SYNDROME) (HCC): ICD-10-CM

## 2019-04-30 LAB
ABO GROUP BLD: ABNORMAL
ANISOCYTOSIS BLD QL SMEAR: ABNORMAL
BARCODED ABORH UBTYP: 5100
BARCODED ABORH UBTYP: 9500
BARCODED PRD CODE UBPRD: ABNORMAL
BARCODED PRD CODE UBPRD: ABNORMAL
BARCODED UNIT NUM UBUNT: ABNORMAL
BARCODED UNIT NUM UBUNT: ABNORMAL
BASOPHILS # BLD AUTO: 0.6 % (ref 0–1.8)
BASOPHILS # BLD: 0.03 K/UL (ref 0–0.12)
BLD GP AB SCN SERPL QL: ABNORMAL
COMMENT 1642: NORMAL
COMPONENT R 8504R: ABNORMAL
COMPONENT R 8504R: ABNORMAL
EOSINOPHIL # BLD AUTO: 0.19 K/UL (ref 0–0.51)
EOSINOPHIL NFR BLD: 4 % (ref 0–6.9)
ERYTHROCYTE [DISTWIDTH] IN BLOOD BY AUTOMATED COUNT: 67.2 FL (ref 35.9–50)
HCT VFR BLD AUTO: 22.8 % (ref 37–47)
HGB BLD-MCNC: 7.6 G/DL (ref 12–16)
IMM GRANULOCYTES # BLD AUTO: 0.03 K/UL (ref 0–0.11)
IMM GRANULOCYTES NFR BLD AUTO: 0.6 % (ref 0–0.9)
LG PLATELETS BLD QL SMEAR: NORMAL
LYMPHOCYTES # BLD AUTO: 1.14 K/UL (ref 1–4.8)
LYMPHOCYTES NFR BLD: 23.9 % (ref 22–41)
MACROCYTES BLD QL SMEAR: ABNORMAL
MCH RBC QN AUTO: 35.3 PG (ref 27–33)
MCHC RBC AUTO-ENTMCNC: 33.3 G/DL (ref 33.6–35)
MCV RBC AUTO: 106 FL (ref 81.4–97.8)
MONOCYTES # BLD AUTO: 0.66 K/UL (ref 0–0.85)
MONOCYTES NFR BLD AUTO: 13.8 % (ref 0–13.4)
MORPHOLOGY BLD-IMP: NORMAL
NEUTROPHILS # BLD AUTO: 2.72 K/UL (ref 2–7.15)
NEUTROPHILS NFR BLD: 57.1 % (ref 44–72)
NRBC # BLD AUTO: 0 K/UL
NRBC BLD-RTO: 0 /100 WBC
OVALOCYTES BLD QL SMEAR: NORMAL
PLATELET # BLD AUTO: 111 K/UL (ref 164–446)
PLATELET BLD QL SMEAR: NORMAL
PMV BLD AUTO: 13.3 FL (ref 9–12.9)
POIKILOCYTOSIS BLD QL SMEAR: NORMAL
PRODUCT TYPE UPROD: ABNORMAL
PRODUCT TYPE UPROD: ABNORMAL
RBC # BLD AUTO: 2.15 M/UL (ref 4.2–5.4)
RBC BLD AUTO: PRESENT
RH BLD: ABNORMAL
UNIT STATUS USTAT: ABNORMAL
UNIT STATUS USTAT: ABNORMAL
WBC # BLD AUTO: 4.8 K/UL (ref 4.8–10.8)

## 2019-04-30 PROCEDURE — 86900 BLOOD TYPING SEROLOGIC ABO: CPT

## 2019-04-30 PROCEDURE — A9270 NON-COVERED ITEM OR SERVICE: HCPCS | Performed by: INTERNAL MEDICINE

## 2019-04-30 PROCEDURE — 86901 BLOOD TYPING SEROLOGIC RH(D): CPT

## 2019-04-30 PROCEDURE — 700111 HCHG RX REV CODE 636 W/ 250 OVERRIDE (IP): Performed by: INTERNAL MEDICINE

## 2019-04-30 PROCEDURE — 85025 COMPLETE CBC W/AUTO DIFF WBC: CPT

## 2019-04-30 PROCEDURE — 86920 COMPATIBILITY TEST SPIN: CPT | Mod: 91

## 2019-04-30 PROCEDURE — 96374 THER/PROPH/DIAG INJ IV PUSH: CPT

## 2019-04-30 PROCEDURE — 700111 HCHG RX REV CODE 636 W/ 250 OVERRIDE (IP)

## 2019-04-30 PROCEDURE — 36415 COLL VENOUS BLD VENIPUNCTURE: CPT

## 2019-04-30 PROCEDURE — 36430 TRANSFUSION BLD/BLD COMPNT: CPT

## 2019-04-30 PROCEDURE — 86644 CMV ANTIBODY: CPT | Mod: 91

## 2019-04-30 PROCEDURE — A4212 NON CORING NEEDLE OR STYLET: HCPCS

## 2019-04-30 PROCEDURE — 700102 HCHG RX REV CODE 250 W/ 637 OVERRIDE(OP): Performed by: INTERNAL MEDICINE

## 2019-04-30 PROCEDURE — 86850 RBC ANTIBODY SCREEN: CPT

## 2019-04-30 PROCEDURE — 86945 BLOOD PRODUCT/IRRADIATION: CPT | Mod: 91

## 2019-04-30 PROCEDURE — P9016 RBC LEUKOCYTES REDUCED: HCPCS | Mod: 91

## 2019-04-30 PROCEDURE — 96376 TX/PRO/DX INJ SAME DRUG ADON: CPT

## 2019-04-30 RX ORDER — FUROSEMIDE 10 MG/ML
20 INJECTION INTRAMUSCULAR; INTRAVENOUS
Status: COMPLETED | OUTPATIENT
Start: 2019-04-30 | End: 2019-04-30

## 2019-04-30 RX ORDER — DIPHENHYDRAMINE HCL 25 MG
25 TABLET ORAL ONCE
Status: DISCONTINUED | OUTPATIENT
Start: 2019-04-30 | End: 2019-04-30 | Stop reason: HOSPADM

## 2019-04-30 RX ORDER — FUROSEMIDE 10 MG/ML
20 INJECTION INTRAMUSCULAR; INTRAVENOUS ONCE
Status: COMPLETED | OUTPATIENT
Start: 2019-04-30 | End: 2019-04-30

## 2019-04-30 RX ORDER — SODIUM CHLORIDE 9 MG/ML
500 INJECTION, SOLUTION INTRAVENOUS ONCE
Status: CANCELLED | OUTPATIENT
Start: 2019-04-30 | End: 2019-04-30

## 2019-04-30 RX ORDER — ACETAMINOPHEN 325 MG/1
650 TABLET ORAL ONCE
Status: CANCELLED | OUTPATIENT
Start: 2019-04-30 | End: 2019-04-30

## 2019-04-30 RX ORDER — DIPHENHYDRAMINE HCL 25 MG
25 TABLET ORAL ONCE
Status: CANCELLED | OUTPATIENT
Start: 2019-04-30 | End: 2019-04-30

## 2019-04-30 RX ORDER — FUROSEMIDE 10 MG/ML
20 INJECTION INTRAMUSCULAR; INTRAVENOUS
Status: CANCELLED
Start: 2019-04-30

## 2019-04-30 RX ORDER — LIDOCAINE HYDROCHLORIDE 10 MG/ML
20 INJECTION, SOLUTION INFILTRATION; PERINEURAL
Status: CANCELLED | OUTPATIENT
Start: 2019-04-30

## 2019-04-30 RX ORDER — ACETAMINOPHEN 325 MG/1
650 TABLET ORAL PRN
Status: CANCELLED | OUTPATIENT
Start: 2019-04-30

## 2019-04-30 RX ORDER — ACETAMINOPHEN 325 MG/1
650 TABLET ORAL ONCE
Status: COMPLETED | OUTPATIENT
Start: 2019-04-30 | End: 2019-04-30

## 2019-04-30 RX ADMIN — FUROSEMIDE 20 MG: 10 INJECTION, SOLUTION INTRAMUSCULAR; INTRAVENOUS at 17:28

## 2019-04-30 RX ADMIN — HEPARIN 500 UNITS: 100 SYRINGE at 17:49

## 2019-04-30 RX ADMIN — FUROSEMIDE 20 MG: 10 INJECTION, SOLUTION INTRAMUSCULAR; INTRAVENOUS at 15:23

## 2019-04-30 RX ADMIN — ACETAMINOPHEN 650 MG: 325 TABLET ORAL at 12:24

## 2019-04-30 ASSESSMENT — PAIN SCALES - GENERAL: PAINLEVEL: NO PAIN

## 2019-05-01 NOTE — PROGRESS NOTES
Patient here for CBC/possible blood products. Port accessed using sterile technique; CBC drawn as ordered. Hgb = 7.6. Parameters to transfuse if Hgb < 7.5. Patient reports being symptomatic. Patient reports increasing fatigue and shortness of breath. Dr. Cook notified and orders receive to transfuse 2 units of PRBCs today. Pre-medication given per MAR. Benadryl held due to patient's allergy. 2 units of PRBCs transfused. Lasix given per MAR after each unit. Heparin instilled prior to de-accessing port. Port de-accessed; gauze/coban applied over site. Next appointment scheduled. Discharged to care of friend; no apparent distress noted.

## 2019-05-06 ENCOUNTER — TELEPHONE (OUTPATIENT)
Dept: HEMATOLOGY ONCOLOGY | Facility: MEDICAL CENTER | Age: 64
End: 2019-05-06

## 2019-05-06 NOTE — TELEPHONE ENCOUNTER
1st attempt to contact the patient- Left voicemail for patient requesting a return call to schedule New Oncology appointment. NP/[facility]/myelodyspastic syndrome/Self- referred for 2nd opinion from [facility]- INFORM PT THEY CANNOT BE SCHEDULED UNTIL LOCUM IF THEY NEED TO BE SEEN SOONER RE-ROUTE REFERRAL

## 2019-05-14 ENCOUNTER — OUTPATIENT INFUSION SERVICES (OUTPATIENT)
Dept: ONCOLOGY | Facility: MEDICAL CENTER | Age: 64
End: 2019-05-14
Attending: INTERNAL MEDICINE
Payer: MEDICARE

## 2019-05-14 VITALS
HEART RATE: 80 BPM | SYSTOLIC BLOOD PRESSURE: 174 MMHG | HEIGHT: 57 IN | RESPIRATION RATE: 18 BRPM | TEMPERATURE: 98.5 F | WEIGHT: 146.83 LBS | OXYGEN SATURATION: 99 % | BODY MASS INDEX: 31.68 KG/M2 | DIASTOLIC BLOOD PRESSURE: 48 MMHG

## 2019-05-14 DIAGNOSIS — D46.9 MYELODYSPLASIA (MYELODYSPLASTIC SYNDROME) (HCC): ICD-10-CM

## 2019-05-14 LAB
ANISOCYTOSIS BLD QL SMEAR: ABNORMAL
BASOPHILS # BLD AUTO: 0.5 % (ref 0–1.8)
BASOPHILS # BLD: 0.03 K/UL (ref 0–0.12)
COMMENT 1642: NORMAL
EOSINOPHIL # BLD AUTO: 0.29 K/UL (ref 0–0.51)
EOSINOPHIL NFR BLD: 5.1 % (ref 0–6.9)
ERYTHROCYTE [DISTWIDTH] IN BLOOD BY AUTOMATED COUNT: 67 FL (ref 35.9–50)
HCT VFR BLD AUTO: 30.7 % (ref 37–47)
HGB BLD-MCNC: 10.4 G/DL (ref 12–16)
IMM GRANULOCYTES # BLD AUTO: 0.06 K/UL (ref 0–0.11)
IMM GRANULOCYTES NFR BLD AUTO: 1.1 % (ref 0–0.9)
LYMPHOCYTES # BLD AUTO: 1.23 K/UL (ref 1–4.8)
LYMPHOCYTES NFR BLD: 21.7 % (ref 22–41)
MACROCYTES BLD QL SMEAR: ABNORMAL
MCH RBC QN AUTO: 34.3 PG (ref 27–33)
MCHC RBC AUTO-ENTMCNC: 33.9 G/DL (ref 33.6–35)
MCV RBC AUTO: 101.3 FL (ref 81.4–97.8)
MONOCYTES # BLD AUTO: 0.91 K/UL (ref 0–0.85)
MONOCYTES NFR BLD AUTO: 16 % (ref 0–13.4)
MORPHOLOGY BLD-IMP: NORMAL
NEUTROPHILS # BLD AUTO: 3.16 K/UL (ref 2–7.15)
NEUTROPHILS NFR BLD: 55.6 % (ref 44–72)
NRBC # BLD AUTO: 0 K/UL
NRBC BLD-RTO: 0 /100 WBC
OVALOCYTES BLD QL SMEAR: NORMAL
PLATELET # BLD AUTO: 133 K/UL (ref 164–446)
PLATELET BLD QL SMEAR: NORMAL
PMV BLD AUTO: 13.8 FL (ref 9–12.9)
POIKILOCYTOSIS BLD QL SMEAR: NORMAL
POLYCHROMASIA BLD QL SMEAR: NORMAL
RBC # BLD AUTO: 3.03 M/UL (ref 4.2–5.4)
RBC BLD AUTO: PRESENT
WBC # BLD AUTO: 5.7 K/UL (ref 4.8–10.8)

## 2019-05-14 PROCEDURE — 85025 COMPLETE CBC W/AUTO DIFF WBC: CPT

## 2019-05-14 PROCEDURE — 700111 HCHG RX REV CODE 636 W/ 250 OVERRIDE (IP)

## 2019-05-14 PROCEDURE — 36591 DRAW BLOOD OFF VENOUS DEVICE: CPT

## 2019-05-14 PROCEDURE — A4212 NON CORING NEEDLE OR STYLET: HCPCS

## 2019-05-14 RX ORDER — SODIUM CHLORIDE 9 MG/ML
500 INJECTION, SOLUTION INTRAVENOUS ONCE
Status: CANCELLED | OUTPATIENT
Start: 2019-05-14 | End: 2019-05-14

## 2019-05-14 RX ORDER — LIDOCAINE HYDROCHLORIDE 10 MG/ML
20 INJECTION, SOLUTION INFILTRATION; PERINEURAL
Status: CANCELLED | OUTPATIENT
Start: 2019-05-14

## 2019-05-14 RX ORDER — FUROSEMIDE 10 MG/ML
20 INJECTION INTRAMUSCULAR; INTRAVENOUS
Status: CANCELLED
Start: 2019-05-14

## 2019-05-14 RX ORDER — ACETAMINOPHEN 325 MG/1
650 TABLET ORAL ONCE
Status: CANCELLED | OUTPATIENT
Start: 2019-05-14 | End: 2019-05-14

## 2019-05-14 RX ORDER — ACETAMINOPHEN 325 MG/1
650 TABLET ORAL PRN
Status: CANCELLED | OUTPATIENT
Start: 2019-05-14

## 2019-05-14 RX ORDER — DIPHENHYDRAMINE HCL 25 MG
25 TABLET ORAL ONCE
Status: CANCELLED | OUTPATIENT
Start: 2019-05-14 | End: 2019-05-14

## 2019-05-14 RX ADMIN — HEPARIN 500 UNITS: 100 SYRINGE at 11:31

## 2019-05-14 NOTE — PROGRESS NOTES
Pt to [facility] for labs and possible blood transfusion.  Right chest wall port accessed without difficulty. Flushed easily, brisk blood return.  CBC drawn and sent to lab.  Hgb 10.4.  Pt did not meet parameters for a blood transfusion.  Port flushed with NS and Heparin per protocol then de-accessed.  Pt left [facility] in NAD.  Next appointment time reviewed with patient.

## 2019-05-20 ENCOUNTER — TELEPHONE (OUTPATIENT)
Dept: HEMATOLOGY ONCOLOGY | Facility: MEDICAL CENTER | Age: 64
End: 2019-05-20

## 2019-05-20 NOTE — TELEPHONE ENCOUNTER
Called patient to advise that we are cancelling her New Hematology appointment that was scheduled for 07/02/19 with Dr. Avendaño as we are no longer able to see new patients.     No answer, no ability to leave a voicemail as recording states that it has not been set up.

## 2019-05-28 ENCOUNTER — OUTPATIENT INFUSION SERVICES (OUTPATIENT)
Dept: ONCOLOGY | Facility: MEDICAL CENTER | Age: 64
End: 2019-05-28
Attending: INTERNAL MEDICINE
Payer: MEDICARE

## 2019-05-28 VITALS
SYSTOLIC BLOOD PRESSURE: 149 MMHG | TEMPERATURE: 97 F | OXYGEN SATURATION: 98 % | DIASTOLIC BLOOD PRESSURE: 49 MMHG | RESPIRATION RATE: 18 BRPM | BODY MASS INDEX: 31.11 KG/M2 | WEIGHT: 144.18 LBS | HEART RATE: 77 BPM | HEIGHT: 57 IN

## 2019-05-28 DIAGNOSIS — D46.9 MYELODYSPLASIA (MYELODYSPLASTIC SYNDROME) (HCC): ICD-10-CM

## 2019-05-28 LAB
ABO GROUP BLD: ABNORMAL
ANISOCYTOSIS BLD QL SMEAR: ABNORMAL
BASOPHILS # BLD AUTO: 0 % (ref 0–1.8)
BASOPHILS # BLD: 0 K/UL (ref 0–0.12)
BLD GP AB SCN SERPL QL: ABNORMAL
EOSINOPHIL # BLD AUTO: 0.22 K/UL (ref 0–0.51)
EOSINOPHIL NFR BLD: 2.6 % (ref 0–6.9)
ERYTHROCYTE [DISTWIDTH] IN BLOOD BY AUTOMATED COUNT: 73 FL (ref 35.9–50)
HCT VFR BLD AUTO: 26.9 % (ref 37–47)
HGB BLD-MCNC: 8.7 G/DL (ref 12–16)
LG PLATELETS BLD QL SMEAR: NORMAL
LYMPHOCYTES # BLD AUTO: 0.44 K/UL (ref 1–4.8)
LYMPHOCYTES NFR BLD: 5.2 % (ref 22–41)
MACROCYTES BLD QL SMEAR: ABNORMAL
MANUAL DIFF BLD: NORMAL
MCH RBC QN AUTO: 34.4 PG (ref 27–33)
MCHC RBC AUTO-ENTMCNC: 32.3 G/DL (ref 33.6–35)
MCV RBC AUTO: 106.3 FL (ref 81.4–97.8)
MONOCYTES # BLD AUTO: 0.44 K/UL (ref 0–0.85)
MONOCYTES NFR BLD AUTO: 5.2 % (ref 0–13.4)
MORPHOLOGY BLD-IMP: NORMAL
NEUTROPHILS # BLD AUTO: 7.4 K/UL (ref 2–7.15)
NEUTROPHILS NFR BLD: 87 % (ref 44–72)
NRBC # BLD AUTO: 0 K/UL
NRBC BLD-RTO: 0 /100 WBC
OVALOCYTES BLD QL SMEAR: NORMAL
PLATELET # BLD AUTO: 124 K/UL (ref 164–446)
PLATELET BLD QL SMEAR: NORMAL
POIKILOCYTOSIS BLD QL SMEAR: NORMAL
POLYCHROMASIA BLD QL SMEAR: NORMAL
RBC # BLD AUTO: 2.53 M/UL (ref 4.2–5.4)
RBC BLD AUTO: PRESENT
RH BLD: ABNORMAL
WBC # BLD AUTO: 8.5 K/UL (ref 4.8–10.8)

## 2019-05-28 PROCEDURE — 36591 DRAW BLOOD OFF VENOUS DEVICE: CPT | Performed by: CLINICAL NURSE SPECIALIST

## 2019-05-28 PROCEDURE — 85027 COMPLETE CBC AUTOMATED: CPT

## 2019-05-28 PROCEDURE — 86900 BLOOD TYPING SEROLOGIC ABO: CPT

## 2019-05-28 PROCEDURE — 85007 BL SMEAR W/DIFF WBC COUNT: CPT

## 2019-05-28 PROCEDURE — 700111 HCHG RX REV CODE 636 W/ 250 OVERRIDE (IP)

## 2019-05-28 PROCEDURE — 86850 RBC ANTIBODY SCREEN: CPT

## 2019-05-28 PROCEDURE — 86901 BLOOD TYPING SEROLOGIC RH(D): CPT

## 2019-05-28 PROCEDURE — A4212 NON CORING NEEDLE OR STYLET: HCPCS | Performed by: CLINICAL NURSE SPECIALIST

## 2019-05-28 RX ORDER — LIDOCAINE HYDROCHLORIDE 10 MG/ML
20 INJECTION, SOLUTION INFILTRATION; PERINEURAL
Status: CANCELLED | OUTPATIENT
Start: 2019-05-28

## 2019-05-28 RX ORDER — SODIUM CHLORIDE 9 MG/ML
500 INJECTION, SOLUTION INTRAVENOUS ONCE
Status: CANCELLED | OUTPATIENT
Start: 2019-05-28 | End: 2019-05-28

## 2019-05-28 RX ORDER — FUROSEMIDE 10 MG/ML
20 INJECTION INTRAMUSCULAR; INTRAVENOUS
Status: CANCELLED
Start: 2019-05-28

## 2019-05-28 RX ORDER — ACETAMINOPHEN 325 MG/1
650 TABLET ORAL PRN
Status: CANCELLED | OUTPATIENT
Start: 2019-05-28

## 2019-05-28 RX ORDER — DIPHENHYDRAMINE HCL 25 MG
25 TABLET ORAL ONCE
Status: CANCELLED | OUTPATIENT
Start: 2019-05-28 | End: 2019-05-28

## 2019-05-28 RX ORDER — ACETAMINOPHEN 325 MG/1
650 TABLET ORAL ONCE
Status: CANCELLED | OUTPATIENT
Start: 2019-05-28 | End: 2019-05-28

## 2019-05-28 RX ADMIN — HEPARIN 500 UNITS: 100 SYRINGE at 12:16

## 2019-05-28 NOTE — PROGRESS NOTES
Patient to infusion for lab draw and possible transfusion.  Back pain.  Stated slept poorly.  Took Tylenol at home.  Patient states she has a cold that she has had for 5 days since returning from [place].  Patient congested with cough, dizziness at rest and weakness.  Joey notified.  Patient increased her oxygen to 3L and dizziness resolved.  Port accessed and labs drawn. CBC resulted and Hgb 8.7.  No blood transfusion per parameters.  Patient deaccessed and discharged home using cane with supportive friend.  Next appointment previously scheduled and patient made aware.  Per patient, has appointment with Dr. Cook in 2 days.

## 2019-06-05 NOTE — H&P
Hospital Medicine History and Physical    Date of Service  11/13/2017    Chief Complaint  Chief Complaint   Patient presents with   • Weakness     bilateral leg weakness    • Chest Pain     dull pain in left side of chest, hurts when pt takes a deep breath   • Head Ache     starts with neck and goes to head since 1400 today        History of Presenting Illness  62 y.o. female who presented on 11/12/2017 with Complaints of chest pain associated with weakness and headache. Patient carries a known history of myelodysplastic syndrome with chronic severe anemia requiring scheduled blood transfusions. The patient was recently admitted to our hospital earlier this month for similar symptoms and required 2 unit packed red blood cell transfusion for hemoglobin of 5.3. She had been doing well since then with recovery in her blood levels but had began to feel poorly over the weekend with weakness and chest discomfort. Patient reports a anterior diffuse chest pain which is dull and radiates toward her head and neck. She denies any alleviating or aggravating factors. Upon assessment in the ER, the patient is noted to have a hemoglobin level of 7.2 which is an approximate 2. drop since her last available labs. Additionally, she is noted to have mild peaked T waves although her potassium level is appropriate at 5.4. Patient is on hemodialysis Monday Wednesday and Fridays and reports compliance with all of her sessions. She is due for repeat dialysis today. Prior to this, patient was in her usual state of health with no fevers, chills, nausea, vomiting, or recent URIs. She has had no diarrhea, or dysuria.     Primary Care Physician  Nyla Nava M.D.    Consultants  None    Code Status  Full    Review of Systems  Review of Systems   Constitutional: Positive for malaise/fatigue. Negative for chills and fever.   HENT: Negative for congestion and sore throat.    Eyes: Negative for photophobia.   Respiratory: Negative for cough,  "shortness of breath and wheezing.    Cardiovascular: Positive for chest pain. Negative for palpitations.   Gastrointestinal: Negative for abdominal pain, diarrhea, nausea and vomiting.   Genitourinary: Negative for dysuria.   Musculoskeletal: Negative for myalgias.   Skin: Negative.    Neurological: Negative for dizziness, tingling, focal weakness and headaches.   Psychiatric/Behavioral: Negative for depression and suicidal ideas.        Past Medical History  Past Medical History:   Diagnosis Date   • MDS (myelodysplastic syndrome) 10/2016    bone marrow biopsy   • Stroke (CMS-HCC) 03/2015    No residual weakness/problems   • Arthritis     hands    • Atrial fibrillation (CMS-[facility])     HX   • Blood transfusion without reported diagnosis    • Breath shortness     w/exertion   • Cancer (CMS-[facility])     MDS in bones   • Cataract     bilat IOL   • Chronic anemia    • Chronic kidney disease        • Chronic obstructive pulmonary disease (CMS-[facility])    • Congestive heart failure (CMS-[facility])    • Dental disorder     full dentures   • Diabetes     Diet controlled   • Dialysis patient     [facility] F ,   [facility] in [place]   • Glaucoma    • Heart abnormalities    • Hypertension    • Pain -2017    \"bones\", generalized, 5/10   • Supplemental oxygen dependent     2 liters       Surgical History  Past Surgical History:   Procedure Laterality Date   • BONE MARROW BIOPSY, NDL/TROCAR  9/20/2017    Procedure: BONE MARROW BIOPSY, NDL/TROCAR;  Surgeon: Wade Turner M.D.;  Location: ENDOSCOPY [facility];  Service: Orthopedics   • BONE MARROW ASPIRATION  9/20/2017    Procedure: BONE MARROW ASPIRATION;  Surgeon: Wade Turner M.D.;  Location: ENDOSCOPY [facility];  Service: Orthopedics   • VEIN LIGATION Left 11/10/2016    Procedure: VEIN LIGATION FOR DISTAL REVASCULARIZATION AND INTERVAL LIGATION OF LEFT ARM DIALYISIS ACCESS (DRIL PROCEDURE);  Surgeon: Ranulfo Jolly M.D.;  Location: SURGERY [facility];  Service:  "   • AV FISTULA CREATION Left 2016    Procedure: AV FISTULA CREATION UPPER EXTREMITY;  Surgeon: Ranulfo Jolly M.D.;  Location: SURGERY [facility];  Service:    • GASTROSCOPY  2015    Procedure: ESOPHAGOGASTRODUODENOSCOPY WITH BIOPSY;  Surgeon: Wing Álvarez M.D.;  Location: SURGERY [facility];  Service:    • COLONOSCOPY  2015    Procedure: COLONOSCOPY;  Surgeon: Wing Álvarez M.D.;  Location: SURGERY [facility];  Service:    • GYN SURGERY      hysterectomy   • GYN SURGERY       x 2   • GYN SURGERY      d&C x2   • OTHER      angioplasty/ stents bilat LE   • OTHER ABDOMINAL SURGERY      appendectomy,  x 2, hysterectomy, D & C   • OTHER ABDOMINAL SURGERY      appendectomy   • OTHER CARDIAC SURGERY      Angioplasty  and    • OTHER CARDIAC SURGERY      cardiac angiogram, angioplasty   • RETINAL DETACHMENT REPAIR Right        Medications  No current facility-administered medications on file prior to encounter.      Current Outpatient Prescriptions on File Prior to Encounter   Medication Sig Dispense Refill   • cyclobenzaprine (FLEXERIL) 10 MG Tab Take 1 Tab by mouth 3 times a day as needed for Muscle Spasms. 20 Tab 1   • hydrocodone-acetaminophen (NORCO) 5-325 MG Tab per tablet Take 1 Tab by mouth every four hours as needed.     • Brimonidine Tartrate-Timolol (COMBIGAN) 0.2-0.5 % Solution Place 1 Drop in both eyes 2 Times a Day.     • sitagliptin (JANUVIA) 25 MG Tab Take 1 Tab by mouth every morning. 60 Tab 3   • carvedilol (COREG) 12.5 MG Tab Take 12.5 mg by mouth 2 times a day.     • pregabalin (LYRICA) 50 MG capsule Take 50 mg by mouth 2 times a day.     • amlodipine (NORVASC) 10 MG Tab Take 10 mg by mouth every day.     • bimatoprost (LUMIGAN) 0.03 % Solution Place 1 Drop in both eyes every evening.         Family History  Family History   Problem Relation Age of Onset   • Hypertension Father    • Hypertension Mother        Social History  Social History    Substance Use Topics   • Smoking status: Never Smoker   • Smokeless tobacco: Never Used   • Alcohol use No       Allergies  Allergies   Allergen Reactions   • Actos [Pioglitazone Hydrochloride] Unspecified     Cause blindness   BHF=5092   • Darvocet [Propoxyphene N-Apap] Vomiting     MUA=1984   • Demerol Vomiting     ZVJ=0491   • Glucophage [Metformin Hydrochloride] Vomiting     SFE=2525   • Morphine Vomiting     UGK=7274   • Oxycodone Vomiting     RXN=2016   • Pcn [Penicillins] Vomiting     OAI=9100     • Requip Vomiting     RXN=2015   • Simvastatin Unspecified     Leg cramps  TMT=8462   • Sulfa Drugs Rash     RXN=>10 years   • Tramadol Vomiting     UAG=0276   • Trazodone Vomiting     TCR=3634   • Demerol    • Dilaudid [Hydromorphone] Vomiting     Unknown    • Diphenhydramine Vomiting   • Iron      vomiting   • Lenalidomide Vomiting   • Morphine    • Multivitamin      itching   • Naprosyn [Naproxen]    • Other Drug      Any binders that remove phosphorus from the body such as tums   • Sulfa Drugs         Physical Exam  Laboratory   Hemodynamics  No data recorded.      Pulse  Av.3  Min: 71  Max: 83 Heart Rate (Monitored): 74  NIBP: 136/64      Respiratory      Pulse Oximetry: 100 %             Physical Exam   Constitutional: She is oriented to person, place, and time. No distress.   Chronically ill-appearing   HENT:   Head: Normocephalic and atraumatic.   Right Ear: External ear normal.   Left Ear: External ear normal.   Eyes: EOM are normal. Right eye exhibits no discharge. Left eye exhibits no discharge.   Neck: Neck supple. No JVD present.   Cardiovascular: Normal rate, regular rhythm and normal heart sounds.    Pulmonary/Chest: Effort normal and breath sounds normal. No respiratory distress. She exhibits no tenderness.   Abdominal: Soft. Bowel sounds are normal. She exhibits no distension. There is no tenderness.   Musculoskeletal: Normal range of motion. She exhibits no edema.   Neurological: She is  alert and oriented to person, place, and time. No cranial nerve deficit.   Skin: Skin is warm and dry. She is not diaphoretic. No erythema.   Psychiatric: She has a normal mood and affect. Her behavior is normal.   Nursing note and vitals reviewed.      Recent Labs      11/13/17   0030   WBC  4.6*   RBC  2.32*   HEMOGLOBIN  7.2*   HEMATOCRIT  20.9*   MCV  90.1   MCH  31.0   MCHC  34.4   RDW  47.3   PLATELETCT  99*   MPV  13.1*     Recent Labs      11/13/17   0030   SODIUM  133*   POTASSIUM  5.4   CHLORIDE  99   CO2  20   GLUCOSE  233*   BUN  81*   CREATININE  7.55*   CALCIUM  6.2*     Recent Labs      11/13/17   0030   ALTSGPT  18   ASTSGOT  20   ALKPHOSPHAT  66   TBILIRUBIN  0.3   LIPASE  138*   GLUCOSE  233*     Recent Labs      11/12/17   2312   APTT  <20.0*   INR  0.85*             Lab Results   Component Value Date    TROPONINI <0.01 11/12/2017       Imaging  Dx-chest-portable (1 View)    Result Date: 11/12/2017 11/12/2017 11:25 PM HISTORY/REASON FOR EXAM:  Chest Pain. Weakness TECHNIQUE/EXAM DESCRIPTION AND NUMBER OF VIEWS: Single portable view of the chest. COMPARISON: 1 view chest 11/3/2017 FINDINGS: The lungs are clear. Cardiac silhouette: enlarged. There is aortic atherosclerosis. Pleura: There are no pleural effusion or pneumothoraces. Osseous structures: No significant bony abnormality is present.     No acute cardiopulmonary abnormality identified.    Dx-chest-portable (1 View)    Result Date: 11/3/2017  11/3/2017 9:44 AM HISTORY/REASON FOR EXAM: Chest pain and shortness of breath TECHNIQUE/EXAM DESCRIPTION AND NUMBER OF VIEWS: Single portable view of the chest. COMPARISON: 9/28/2017 FINDINGS: There is no evidence of focal consolidation or evidence of pulmonary edema. There is no pleural effusion. The heart is enlarged.     Cardiomegaly without evidence of pulmonary edema.     Assessment/Plan     Anticipate that patient will needGreater than 2 midnights for management of the discussed medical  issues.    This dictation was created using voice recognition software. The accuracy of the dictation is limited to the abilities of the software. I expect there may be some errors of grammar and possibly content.    * Anemia- (present on admission)   Assessment & Plan    Patient has a history of severe chronic anemia in the setting of mild dysplastic syndrome with previous chemotherapy requiring scheduled transfusions. She has had a 2 point drop in her hemoglobin level since her last available labs. Given her end-stage renal disease, and the fact that her hemoglobin level is still above 7, I will hold off on transfusion tonight but will order a 1 unit packed red blood cell, irradiated, CMV negative transfusion in the morning when the patient receives dialysis. I will continue to trend her hemoglobin levels. There is no evidence of source of bleed at this time. Suspect acute on chronic worsening of disease.        Chest pain- (present on admission)   Assessment & Plan    Secondary to above, patient has had this in the past with her anemia. I will nevertheless monitor her on telemetry and trend serial troponin levels as well as EKGs. She does have mildly peaked T waves on EKG but her potassium is acceptable at 5.4. Given her symptoms however, I will give her a one-time dose of calcium gluconate for cardiac membrane stabilization. Expect improvement in her pain and her potassium after dialysis in the morning.        MDS (myelodysplastic syndrome) (CMS-[facility])- (present on admission)   Assessment & Plan    Outpatient follow-up once medically stable for discharge        ESRD (end stage renal disease) (CMS-[facility])- (present on admission)   Assessment & Plan    Patient is followed by [facility] nephrology, we will plan to consult them in the morning to arrange for inpatient dialysis while the patient is admitted. She is due for her scheduled dialysis session in the morning.        DM  2 complicated by peripheral neuropathy-  (present on admission)   Assessment & Plan    I'm holding the patient's home Januvia for now, we'll monitor her with Accu-Cheks and cover her with insulin sliding scale.          Prophylaxis: Sequential compression devices for DVT prophylaxis, no PPI indicated, stool softeners ordered           Imaging Studies/Labs

## 2019-06-11 NOTE — ED NOTES
1st unit of blood started. VSS.    Alert and oriented, no focal deficits, no motor or sensory deficits.

## 2019-06-18 ENCOUNTER — OUTPATIENT INFUSION SERVICES (OUTPATIENT)
Dept: ONCOLOGY | Facility: MEDICAL CENTER | Age: 64
End: 2019-06-18
Attending: INTERNAL MEDICINE
Payer: MEDICARE

## 2019-06-18 VITALS
SYSTOLIC BLOOD PRESSURE: 218 MMHG | OXYGEN SATURATION: 100 % | RESPIRATION RATE: 18 BRPM | TEMPERATURE: 98.5 F | HEART RATE: 70 BPM | WEIGHT: 138.45 LBS | DIASTOLIC BLOOD PRESSURE: 68 MMHG | BODY MASS INDEX: 29.87 KG/M2 | HEIGHT: 57 IN

## 2019-06-18 DIAGNOSIS — D46.9 MYELODYSPLASIA (MYELODYSPLASTIC SYNDROME) (HCC): ICD-10-CM

## 2019-06-18 LAB
ABO GROUP BLD: ABNORMAL
ANISOCYTOSIS BLD QL SMEAR: ABNORMAL
BARCODED ABORH UBTYP: 5100
BARCODED ABORH UBTYP: 6200
BARCODED PRD CODE UBPRD: ABNORMAL
BARCODED PRD CODE UBPRD: ABNORMAL
BARCODED UNIT NUM UBUNT: ABNORMAL
BARCODED UNIT NUM UBUNT: ABNORMAL
BASOPHILS # BLD AUTO: 0.6 % (ref 0–1.8)
BASOPHILS # BLD: 0.03 K/UL (ref 0–0.12)
BLD GP AB SCN SERPL QL: ABNORMAL
COMMENT 1642: NORMAL
COMPONENT R 8504R: ABNORMAL
COMPONENT R 8504R: ABNORMAL
EOSINOPHIL # BLD AUTO: 0.25 K/UL (ref 0–0.51)
EOSINOPHIL NFR BLD: 5 % (ref 0–6.9)
ERYTHROCYTE [DISTWIDTH] IN BLOOD BY AUTOMATED COUNT: 69.2 FL (ref 35.9–50)
HCT VFR BLD AUTO: 19.2 % (ref 37–47)
HGB BLD-MCNC: 6.2 G/DL (ref 12–16)
HYPOCHROMIA BLD QL SMEAR: ABNORMAL
IMM GRANULOCYTES # BLD AUTO: 0.06 K/UL (ref 0–0.11)
IMM GRANULOCYTES NFR BLD AUTO: 1.2 % (ref 0–0.9)
LG PLATELETS BLD QL SMEAR: NORMAL
LYMPHOCYTES # BLD AUTO: 1 K/UL (ref 1–4.8)
LYMPHOCYTES NFR BLD: 20 % (ref 22–41)
MACROCYTES BLD QL SMEAR: ABNORMAL
MCH RBC QN AUTO: 34.8 PG (ref 27–33)
MCHC RBC AUTO-ENTMCNC: 32.3 G/DL (ref 33.6–35)
MCV RBC AUTO: 107.9 FL (ref 81.4–97.8)
MONOCYTES # BLD AUTO: 0.73 K/UL (ref 0–0.85)
MONOCYTES NFR BLD AUTO: 14.6 % (ref 0–13.4)
MORPHOLOGY BLD-IMP: NORMAL
NEUTROPHILS # BLD AUTO: 2.94 K/UL (ref 2–7.15)
NEUTROPHILS NFR BLD: 58.6 % (ref 44–72)
NRBC # BLD AUTO: 0 K/UL
NRBC BLD-RTO: 0 /100 WBC
PLATELET # BLD AUTO: 114 K/UL (ref 164–446)
PLATELET BLD QL SMEAR: NORMAL
PMV BLD AUTO: 13.2 FL (ref 9–12.9)
POIKILOCYTOSIS BLD QL SMEAR: NORMAL
PRODUCT TYPE UPROD: ABNORMAL
PRODUCT TYPE UPROD: ABNORMAL
RBC # BLD AUTO: 1.78 M/UL (ref 4.2–5.4)
RBC BLD AUTO: PRESENT
RH BLD: ABNORMAL
STOMATOCYTES BLD QL SMEAR: NORMAL
UNIT STATUS USTAT: ABNORMAL
UNIT STATUS USTAT: ABNORMAL
WBC # BLD AUTO: 5 K/UL (ref 4.8–10.8)

## 2019-06-18 PROCEDURE — 86900 BLOOD TYPING SEROLOGIC ABO: CPT

## 2019-06-18 PROCEDURE — 86901 BLOOD TYPING SEROLOGIC RH(D): CPT

## 2019-06-18 PROCEDURE — 96374 THER/PROPH/DIAG INJ IV PUSH: CPT

## 2019-06-18 PROCEDURE — 700111 HCHG RX REV CODE 636 W/ 250 OVERRIDE (IP): Performed by: INTERNAL MEDICINE

## 2019-06-18 PROCEDURE — P9016 RBC LEUKOCYTES REDUCED: HCPCS

## 2019-06-18 PROCEDURE — A4212 NON CORING NEEDLE OR STYLET: HCPCS

## 2019-06-18 PROCEDURE — 85025 COMPLETE CBC W/AUTO DIFF WBC: CPT

## 2019-06-18 PROCEDURE — 86923 COMPATIBILITY TEST ELECTRIC: CPT | Mod: 91

## 2019-06-18 PROCEDURE — 306780 HCHG STAT FOR TRANSFUSION PER CASE

## 2019-06-18 PROCEDURE — 36591 DRAW BLOOD OFF VENOUS DEVICE: CPT

## 2019-06-18 PROCEDURE — 700102 HCHG RX REV CODE 250 W/ 637 OVERRIDE(OP): Performed by: INTERNAL MEDICINE

## 2019-06-18 PROCEDURE — A9270 NON-COVERED ITEM OR SERVICE: HCPCS | Performed by: INTERNAL MEDICINE

## 2019-06-18 PROCEDURE — 86644 CMV ANTIBODY: CPT | Mod: 91

## 2019-06-18 PROCEDURE — 700111 HCHG RX REV CODE 636 W/ 250 OVERRIDE (IP)

## 2019-06-18 PROCEDURE — 86945 BLOOD PRODUCT/IRRADIATION: CPT

## 2019-06-18 PROCEDURE — 36430 TRANSFUSION BLD/BLD COMPNT: CPT

## 2019-06-18 PROCEDURE — 86850 RBC ANTIBODY SCREEN: CPT

## 2019-06-18 PROCEDURE — 96376 TX/PRO/DX INJ SAME DRUG ADON: CPT

## 2019-06-18 RX ORDER — FUROSEMIDE 10 MG/ML
20 INJECTION INTRAMUSCULAR; INTRAVENOUS ONCE
Status: COMPLETED | OUTPATIENT
Start: 2019-06-18 | End: 2019-06-18

## 2019-06-18 RX ORDER — DIPHENHYDRAMINE HCL 25 MG
25 TABLET ORAL ONCE
Status: DISCONTINUED | OUTPATIENT
Start: 2019-06-18 | End: 2019-06-18 | Stop reason: HOSPADM

## 2019-06-18 RX ORDER — DIPHENHYDRAMINE HCL 25 MG
25 TABLET ORAL ONCE
Status: CANCELLED | OUTPATIENT
Start: 2019-06-18 | End: 2019-06-18

## 2019-06-18 RX ORDER — LIDOCAINE HYDROCHLORIDE 10 MG/ML
20 INJECTION, SOLUTION INFILTRATION; PERINEURAL
Status: CANCELLED | OUTPATIENT
Start: 2019-06-18

## 2019-06-18 RX ORDER — FUROSEMIDE 10 MG/ML
20 INJECTION INTRAMUSCULAR; INTRAVENOUS
Status: DISCONTINUED | OUTPATIENT
Start: 2019-06-18 | End: 2019-06-18 | Stop reason: HOSPADM

## 2019-06-18 RX ORDER — FUROSEMIDE 10 MG/ML
20 INJECTION INTRAMUSCULAR; INTRAVENOUS
Status: CANCELLED
Start: 2019-06-18

## 2019-06-18 RX ORDER — ACETAMINOPHEN 325 MG/1
650 TABLET ORAL PRN
Status: CANCELLED | OUTPATIENT
Start: 2019-06-18

## 2019-06-18 RX ORDER — LIDOCAINE HYDROCHLORIDE 10 MG/ML
INJECTION, SOLUTION EPIDURAL; INFILTRATION; INTRACAUDAL; PERINEURAL
Status: COMPLETED
Start: 2019-06-18 | End: 2019-06-18

## 2019-06-18 RX ORDER — ACETAMINOPHEN 325 MG/1
650 TABLET ORAL ONCE
Status: CANCELLED | OUTPATIENT
Start: 2019-06-18 | End: 2019-06-18

## 2019-06-18 RX ORDER — SODIUM CHLORIDE 9 MG/ML
500 INJECTION, SOLUTION INTRAVENOUS ONCE
Status: CANCELLED | OUTPATIENT
Start: 2019-06-18 | End: 2019-06-18

## 2019-06-18 RX ORDER — ACETAMINOPHEN 325 MG/1
650 TABLET ORAL ONCE
Status: COMPLETED | OUTPATIENT
Start: 2019-06-18 | End: 2019-06-18

## 2019-06-18 RX ADMIN — ACETAMINOPHEN 650 MG: 325 TABLET ORAL at 12:38

## 2019-06-18 RX ADMIN — FUROSEMIDE 20 MG: 10 INJECTION, SOLUTION INTRAMUSCULAR; INTRAVENOUS at 14:44

## 2019-06-18 RX ADMIN — FUROSEMIDE 20 MG: 10 INJECTION, SOLUTION INTRAMUSCULAR; INTRAVENOUS at 17:23

## 2019-06-18 RX ADMIN — HEPARIN 500 UNITS: 100 SYRINGE at 17:23

## 2019-06-18 RX ADMIN — LIDOCAINE HYDROCHLORIDE 2 ML: 10 INJECTION, SOLUTION EPIDURAL; INFILTRATION; INTRACAUDAL; PERINEURAL at 10:40

## 2019-06-18 NOTE — PROGRESS NOTES
Pt arrived to [facility] ambulatory, here with friend for planned CBC/COD for possible blood transfusion. Pt with R chest port in place, lidocaine patch/EMLA applied by pt at home. Lidocaine patch removed, site wiped clean. Port accessed in sterile field, port slightly tilted, multiple attempts to access. Lidocaine used to numb site after first failed attempt. Trevor GEORGE able to access port with success and draw CBC. Results reviewed and pt appropriate for transfusion of 2 units. Premeds given, pt declined Benadryl due to allergy. First unit completed and Lasix given after as ordered. Second unit transfusing at this time. Pt resting in chair with friend nearby. Report given to Rena GEORGE to complete second unit, give dose of Lasix, and discharge pt.

## 2019-06-19 NOTE — PROGRESS NOTES
Patient care assumed. Verbal report from Yvrose GEORGE. Patients blood finished. Port flushed with saline. BP taken after blood. /68. Denies any headache or other symptoms. Given lasix as ordered and waited 15 minutes. BP then 218/68. Spoke to Dr. Mcadams whom is on call for [facility]; he requests patient go to ER. Patient notified and declines to go to ER. Demonstrates understanding of signs and symptoms of stroke and when to go to ER. Agrees to take her blood pressure medication when she gets home. Port flushed and heparinized. Removed with tip intact. Site covered with gauze. Discharged home with caregiver at this time in no distress.

## 2019-07-02 ENCOUNTER — OFFICE VISIT (OUTPATIENT)
Dept: HEMATOLOGY ONCOLOGY | Facility: MEDICAL CENTER | Age: 64
End: 2019-07-02
Payer: MEDICARE

## 2019-07-02 VITALS
TEMPERATURE: 98.1 F | HEIGHT: 58 IN | WEIGHT: 144.07 LBS | BODY MASS INDEX: 30.24 KG/M2 | RESPIRATION RATE: 14 BRPM | OXYGEN SATURATION: 100 % | HEART RATE: 76 BPM | DIASTOLIC BLOOD PRESSURE: 72 MMHG | SYSTOLIC BLOOD PRESSURE: 130 MMHG

## 2019-07-02 DIAGNOSIS — Z99.2 ESRD (END STAGE RENAL DISEASE) ON DIALYSIS (HCC): ICD-10-CM

## 2019-07-02 DIAGNOSIS — D61.818 PANCYTOPENIA (HCC): ICD-10-CM

## 2019-07-02 DIAGNOSIS — N18.6 ESRD (END STAGE RENAL DISEASE) ON DIALYSIS (HCC): ICD-10-CM

## 2019-07-02 DIAGNOSIS — D46.9 MDS (MYELODYSPLASTIC SYNDROME) (HCC): ICD-10-CM

## 2019-07-02 DIAGNOSIS — R79.89 ELEVATED FERRITIN LEVEL: ICD-10-CM

## 2019-07-02 PROCEDURE — 99202 OFFICE O/P NEW SF 15 MIN: CPT | Performed by: INTERNAL MEDICINE

## 2019-07-02 RX ORDER — ACETAMINOPHEN 325 MG/1
650 TABLET ORAL ONCE
Status: CANCELLED | OUTPATIENT
Start: 2019-07-02 | End: 2019-07-02

## 2019-07-02 RX ORDER — HYDROCODONE BITARTRATE AND ACETAMINOPHEN 5; 325 MG/1; MG/1
1 TABLET ORAL 2 TIMES DAILY PRN
COMMUNITY
End: 2020-02-20

## 2019-07-02 RX ORDER — DIPHENHYDRAMINE HCL 25 MG
25 TABLET ORAL ONCE
Status: CANCELLED | OUTPATIENT
Start: 2019-07-02 | End: 2019-07-02

## 2019-07-02 RX ORDER — LIDOCAINE HYDROCHLORIDE 10 MG/ML
20 INJECTION, SOLUTION INFILTRATION; PERINEURAL
Status: CANCELLED | OUTPATIENT
Start: 2019-07-02

## 2019-07-02 RX ORDER — CYCLOBENZAPRINE HCL 10 MG
10 TABLET ORAL 3 TIMES DAILY PRN
COMMUNITY
End: 2019-07-06

## 2019-07-02 RX ORDER — ACETAMINOPHEN 325 MG/1
650 TABLET ORAL PRN
Status: CANCELLED | OUTPATIENT
Start: 2019-07-02

## 2019-07-02 RX ORDER — SODIUM CHLORIDE 9 MG/ML
500 INJECTION, SOLUTION INTRAVENOUS ONCE
Status: CANCELLED | OUTPATIENT
Start: 2019-07-02 | End: 2019-07-02

## 2019-07-02 ASSESSMENT — PAIN SCALES - GENERAL: PAINLEVEL: 9=SEVERE PAIN

## 2019-07-02 NOTE — PROGRESS NOTES
"07/02/19    Subjective    Chief Complaint:  New Patient (myelodyspastic syndrome/Self- referred for 2nd opinion from [facility]/)      HPI:  64 y.o. 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 Apparently was treated several years ago with low dose Revlimid without benefit and then in 2016 and 2017 treated with Hsbicf10 mg/M2 s.q. 5 days a week. She has been off systemic therapy for at least 2 years. She is on chronic O2 2L/min. She also has chronic pain and is on a multitude of medications per a pain specialist. She comes for a second opinion. She gives the reason for the second opinion that she saw Dr. Cook for the first time recently and was told to \"prepare a NO CODE advanced directive, that she had 3 months to live\". She feels that she is clinically stable on a q.2.weeks CBC/transfusion schedule.     ROS:    PMH:    Allergies   Allergen Reactions   • Lenalidomide Unspecified     Beeping Pulse     • Naprosyn [Naproxen] Hives   • Pioglitazone Unspecified     Causes blindness   • Simvastatin Unspecified     Couldn't move   • Demerol Vomiting   • Diphenhydramine Vomiting   • Glipizide Vomiting   • Hydromorphone Vomiting   • Iron Vomiting     vomiting   • Metformin Vomiting   • Morphine Vomiting   • Multivitamin Itching     itching   • Ondansetron Itching   • Other Drug Rash and Vomiting     Any binders that remove phosphorus from the body such as tums   • Oxycodone Vomiting   • Pcn [Penicillins] Vomiting          • Propoxyphene Vomiting   • Requip Vomiting   • Sulfa Drugs Rash and Vomiting     Vomiting & rash      • Tamsulosin Vomiting   • Tramadol Vomiting   • Trazodone Vomiting       Medications:    Current Outpatient Prescriptions on File Prior to Visit   Medication Sig Dispense Refill   • carvedilol (COREG) 12.5 MG " "Tab Take 1 Tab by mouth 2 times a day, with meals. 60 Tab 0   • Buprenorphine 10 MCG/HR PATCH WEEKLY Apply 10 mcg to skin as directed every 7 days.     • losartan (COZAAR) 100 MG Tab Take 100 mg by mouth every evening.     • amLODIPine (NORVASC) 10 MG Tab Take 2.5 mg by mouth every day. 1 Tab 0   • nitroglycerin (NITROSTAT) 0.4 MG SL Tab Place 1 Tab under tongue as needed for Chest Pain. 25 Tab 11   • bimatoprost (LUMIGAN) 0.01 % Solution Place 1 Drop in both eyes every bedtime.     • brinzolamide (AZOPT) 1 % Suspension Place 1 Drop in both eyes 3 times a day.     • Brimonidine Tartrate-Timolol (COMBIGAN) 0.2-0.5 % Solution Place 1 Drop in both eyes 2 Times a Day.     • pregabalin (LYRICA) 150 MG Cap Take 150 mg by mouth 3 times a day.     • acetaminophen (TYLENOL) 500 MG Tab Take 500-1,000 mg by mouth every 6 hours as needed.     • lidocaine (LIDODERM) 5 % Patch Apply 1 Patch to skin as directed every 24 hours.     • lidocaine-prilocaine (EMLA) 2.5-2.5 % Cream Apply  to affected area(s) as needed.       No current facility-administered medications on file prior to visit.          Objective    Vitals:    /72 (BP Location: Right arm, Patient Position: Sitting, BP Cuff Size: Adult)   Pulse 76   Temp 36.7 °C (98.1 °F) (Temporal)   Resp 14   Ht 1.473 m (4' 10\")   Wt 65.4 kg (144 lb 1.1 oz)   LMP 01/01/1995   SpO2 100%   BMI 30.11 kg/m²     Physical Exam: Short Greek female on nasal O2 in NAD    HEENT - PERRL  Neck - supple  Node - none palpable  Chest - clear  Breasts - No mass  COR - RSR no murmur  Abd - No palpable liver, spleen. No tender  Ext - No edema  Skin - warm and dry. No rash or jaundice    Labs:  Results for JESUS SILVEIRA (MRN [id number]) as of 7/2/2019 09:14   Ref. Range 5/28/2019 11:13 6/18/2019 10:50   WBC Latest Ref Range: 4.8 - 10.8 K/uL 8.5 5.0   RBC Latest Ref Range: 4.20 - 5.40 M/uL 2.53 (L) 1.78 (L)   Hemoglobin Latest Ref Range: 12.0 - 16.0 g/dL 8.7 (L) 6.2 (L) "   Hematocrit Latest Ref Range: 37.0 - 47.0 % 26.9 (L) 19.2 (L)   MCV Latest Ref Range: 81.4 - 97.8 fL 106.3 (H) 107.9 (H)   MCH Latest Ref Range: 27.0 - 33.0 pg 34.4 (H) 34.8 (H)   MCHC Latest Ref Range: 33.6 - 35.0 g/dL 32.3 (L) 32.3 (L)   RDW Latest Ref Range: 35.9 - 50.0 fL 73.0 (H) 69.2 (H)   Platelet Count Latest Ref Range: 164 - 446 K/uL 124 (L) 114 (L)   MPV Latest Ref Range: 9.0 - 12.9 fL  13.2 (H)   Neutrophils-Polys Latest Ref Range: 44.00 - 72.00 % 87.00 (H) 58.60   Neutrophils (Absolute) Latest Ref Range: 2.00 - 7.15 K/uL 7.40 (H) 2.94   Lymphocytes Latest Ref Range: 22.00 - 41.00 % 5.20 (L) 20.00 (L)   Lymphs (Absolute) Latest Ref Range: 1.00 - 4.80 K/uL 0.44 (L) 1.00   Monocytes Latest Ref Range: 0.00 - 13.40 % 5.20 14.60 (H)   Results for JESUS SILVEIRA (MRN [id number]) as of 7/2/2019 09:14   Ref. Range 4/15/2019 10:10   Sodium Latest Ref Range: 135 - 145 mmol/L 132 (L)   Potassium Latest Ref Range: 3.6 - 5.5 mmol/L 5.2   Chloride Latest Ref Range: 96 - 112 mmol/L 96   Co2 Latest Ref Range: 20 - 33 mmol/L 24   Anion Gap Latest Ref Range: 0.0 - 11.9  12.0 (H)   Glucose Latest Ref Range: 65 - 99 mg/dL 256 (H)   Bun Latest Ref Range: 8 - 22 mg/dL 63 (H)   Creatinine Latest Ref Range: 0.50 - 1.40 mg/dL 7.17 (HH)   GFR If  Latest Ref Range: >60 mL/min/1.73 m 2 7 (A)   GFR If Non  Latest Ref Range: >60 mL/min/1.73 m 2 6 (A)   Calcium Latest Ref Range: 8.5 - 10.5 mg/dL 6.6 (LL)   Results for JESUS SILVEIRA (MRN [id number]) as of 7/2/2019 09:14   Ref. Range 8/27/2018 11:05   Ferritin Latest Ref Range: 10.0 - 291.0 ng/mL 2513.5 (H)       Assessment    Imp:    Visit Diagnosis:    1. MDS (myelodysplastic syndrome) (HCC)     2. Pancytopenia (HCC)     3. ESRD (end stage renal disease) on dialysis (HCC)     4. Elevated ferritin level           Plan:    Discussed situation with patient and care giver. Impossible to predict longevity but given chronic renal failure, O2  dependent lung disease and multilineage dysplasia she is not a candidate for much in the way of therapy. MDS is incurable and could progress to AML or myelofibrosis, neither of which would be very treatable. Her elevated ferritin may ultimately lead to cardiac or liver issues although LFTs currently look ok. Suggested she continue supportive care but an advanced directive is a good idea and she should pursue this as well.     Chato Jones M.D.

## 2019-07-02 NOTE — PROGRESS NOTES
Pt has decided to continue care with Dr. Jones. RN received orders to remove CMV negative and irradiated blood products off of pt's transfusion orders, Cherise with blood bank notified. Updated blood therapy plan orders placed and lasix removed from plan per Dr. Jones orders.

## 2019-07-06 ENCOUNTER — OUTPATIENT INFUSION SERVICES (OUTPATIENT)
Dept: ONCOLOGY | Facility: MEDICAL CENTER | Age: 64
End: 2019-07-06
Attending: INTERNAL MEDICINE
Payer: MEDICARE

## 2019-07-06 VITALS
BODY MASS INDEX: 30.96 KG/M2 | RESPIRATION RATE: 18 BRPM | WEIGHT: 143.52 LBS | HEIGHT: 57 IN | SYSTOLIC BLOOD PRESSURE: 212 MMHG | OXYGEN SATURATION: 100 % | DIASTOLIC BLOOD PRESSURE: 68 MMHG | TEMPERATURE: 97.8 F | HEART RATE: 83 BPM

## 2019-07-06 DIAGNOSIS — D64.9 SYMPTOMATIC ANEMIA: ICD-10-CM

## 2019-07-06 DIAGNOSIS — D46.9 MYELODYSPLASIA (MYELODYSPLASTIC SYNDROME) (HCC): ICD-10-CM

## 2019-07-06 LAB
ABO GROUP BLD: ABNORMAL
ANISOCYTOSIS BLD QL SMEAR: ABNORMAL
BARCODED ABORH UBTYP: 8400
BARCODED ABORH UBTYP: 8400
BARCODED PRD CODE UBPRD: ABNORMAL
BARCODED PRD CODE UBPRD: ABNORMAL
BARCODED UNIT NUM UBUNT: ABNORMAL
BARCODED UNIT NUM UBUNT: ABNORMAL
BASOPHILS # BLD AUTO: 2.6 % (ref 0–1.8)
BASOPHILS # BLD: 0.11 K/UL (ref 0–0.12)
BLD GP AB SCN SERPL QL: ABNORMAL
COMPONENT R 8504R: ABNORMAL
COMPONENT R 8504R: ABNORMAL
EOSINOPHIL # BLD AUTO: 0.19 K/UL (ref 0–0.51)
EOSINOPHIL NFR BLD: 4.3 % (ref 0–6.9)
ERYTHROCYTE [DISTWIDTH] IN BLOOD BY AUTOMATED COUNT: 71.3 FL (ref 35.9–50)
HCT VFR BLD AUTO: 19.1 % (ref 37–47)
HGB BLD-MCNC: 6 G/DL (ref 12–16)
LG PLATELETS BLD QL SMEAR: NORMAL
LYMPHOCYTES # BLD AUTO: 1.24 K/UL (ref 1–4.8)
LYMPHOCYTES NFR BLD: 28.2 % (ref 22–41)
MACROCYTES BLD QL SMEAR: ABNORMAL
MANUAL DIFF BLD: NORMAL
MCH RBC QN AUTO: 33.7 PG (ref 27–33)
MCHC RBC AUTO-ENTMCNC: 31.4 G/DL (ref 33.6–35)
MCV RBC AUTO: 107.3 FL (ref 81.4–97.8)
METAMYELOCYTES NFR BLD MANUAL: 0.8 %
MICROCYTES BLD QL SMEAR: ABNORMAL
MONOCYTES # BLD AUTO: 0.11 K/UL (ref 0–0.85)
MONOCYTES NFR BLD AUTO: 2.6 % (ref 0–13.4)
MORPHOLOGY BLD-IMP: NORMAL
MYELOCYTES NFR BLD MANUAL: 0.8 %
NEUTROPHILS # BLD AUTO: 2.67 K/UL (ref 2–7.15)
NEUTROPHILS NFR BLD: 60.7 % (ref 44–72)
NRBC # BLD AUTO: 0 K/UL
NRBC BLD-RTO: 0 /100 WBC
PLATELET # BLD AUTO: 95 K/UL (ref 164–446)
PLATELET BLD QL SMEAR: NORMAL
PMV BLD AUTO: 13.4 FL (ref 9–12.9)
POIKILOCYTOSIS BLD QL SMEAR: NORMAL
PRODUCT TYPE UPROD: ABNORMAL
PRODUCT TYPE UPROD: ABNORMAL
RBC # BLD AUTO: 1.78 M/UL (ref 4.2–5.4)
RBC BLD AUTO: PRESENT
RH BLD: ABNORMAL
SCHISTOCYTES BLD QL SMEAR: NORMAL
STOMATOCYTES BLD QL SMEAR: NORMAL
UNIT STATUS USTAT: ABNORMAL
UNIT STATUS USTAT: ABNORMAL
WBC # BLD AUTO: 4.4 K/UL (ref 4.8–10.8)

## 2019-07-06 PROCEDURE — 86923 COMPATIBILITY TEST ELECTRIC: CPT

## 2019-07-06 PROCEDURE — 86850 RBC ANTIBODY SCREEN: CPT

## 2019-07-06 PROCEDURE — 85007 BL SMEAR W/DIFF WBC COUNT: CPT

## 2019-07-06 PROCEDURE — 306780 HCHG STAT FOR TRANSFUSION PER CASE

## 2019-07-06 PROCEDURE — 700111 HCHG RX REV CODE 636 W/ 250 OVERRIDE (IP)

## 2019-07-06 PROCEDURE — 36430 TRANSFUSION BLD/BLD COMPNT: CPT

## 2019-07-06 PROCEDURE — A4212 NON CORING NEEDLE OR STYLET: HCPCS

## 2019-07-06 PROCEDURE — 86900 BLOOD TYPING SEROLOGIC ABO: CPT

## 2019-07-06 PROCEDURE — 36591 DRAW BLOOD OFF VENOUS DEVICE: CPT

## 2019-07-06 PROCEDURE — 96374 THER/PROPH/DIAG INJ IV PUSH: CPT

## 2019-07-06 PROCEDURE — 96376 TX/PRO/DX INJ SAME DRUG ADON: CPT

## 2019-07-06 PROCEDURE — 700111 HCHG RX REV CODE 636 W/ 250 OVERRIDE (IP): Performed by: INTERNAL MEDICINE

## 2019-07-06 PROCEDURE — P9016 RBC LEUKOCYTES REDUCED: HCPCS

## 2019-07-06 PROCEDURE — 85027 COMPLETE CBC AUTOMATED: CPT

## 2019-07-06 PROCEDURE — 86901 BLOOD TYPING SEROLOGIC RH(D): CPT

## 2019-07-06 RX ORDER — DIPHENHYDRAMINE HCL 25 MG
25 TABLET ORAL ONCE
Status: CANCELLED | OUTPATIENT
Start: 2019-07-06 | End: 2019-07-06

## 2019-07-06 RX ORDER — ACETAMINOPHEN 325 MG/1
650 TABLET ORAL ONCE
Status: COMPLETED | OUTPATIENT
Start: 2019-07-06 | End: 2019-07-06

## 2019-07-06 RX ORDER — ACETAMINOPHEN 325 MG/1
650 TABLET ORAL PRN
Status: CANCELLED | OUTPATIENT
Start: 2019-07-06

## 2019-07-06 RX ORDER — LIDOCAINE HYDROCHLORIDE 10 MG/ML
20 INJECTION, SOLUTION INFILTRATION; PERINEURAL
Status: CANCELLED | OUTPATIENT
Start: 2019-07-06

## 2019-07-06 RX ORDER — LIDOCAINE HYDROCHLORIDE 10 MG/ML
INJECTION, SOLUTION EPIDURAL; INFILTRATION; INTRACAUDAL; PERINEURAL
Status: COMPLETED
Start: 2019-07-06 | End: 2019-07-06

## 2019-07-06 RX ORDER — FUROSEMIDE 10 MG/ML
20 INJECTION INTRAMUSCULAR; INTRAVENOUS ONCE
Status: COMPLETED | OUTPATIENT
Start: 2019-07-06 | End: 2019-07-06

## 2019-07-06 RX ORDER — ACETAMINOPHEN 325 MG/1
650 TABLET ORAL ONCE
Status: CANCELLED | OUTPATIENT
Start: 2019-07-06 | End: 2019-07-06

## 2019-07-06 RX ORDER — FUROSEMIDE 10 MG/ML
20 INJECTION INTRAMUSCULAR; INTRAVENOUS ONCE
Status: CANCELLED
Start: 2019-07-06 | End: 2019-07-06

## 2019-07-06 RX ORDER — SODIUM CHLORIDE 9 MG/ML
500 INJECTION, SOLUTION INTRAVENOUS ONCE
Status: CANCELLED | OUTPATIENT
Start: 2019-07-06 | End: 2019-07-06

## 2019-07-06 RX ADMIN — FUROSEMIDE 20 MG: 10 INJECTION, SOLUTION INTRAMUSCULAR; INTRAVENOUS at 13:36

## 2019-07-06 RX ADMIN — FUROSEMIDE 20 MG: 10 INJECTION, SOLUTION INTRAMUSCULAR; INTRAVENOUS at 17:01

## 2019-07-06 RX ADMIN — HEPARIN 500 UNITS: 100 SYRINGE at 17:18

## 2019-07-06 RX ADMIN — Medication 2 ML: at 09:41

## 2019-07-06 RX ADMIN — LIDOCAINE HYDROCHLORIDE 2 ML: 10 INJECTION, SOLUTION EPIDURAL; INFILTRATION; INTRACAUDAL; PERINEURAL at 09:41

## 2019-07-07 NOTE — PROGRESS NOTES
Pt presented to infusion center for blood transfusion. Rt chest port accessed in sterile fashion after lidocaine used, brisk blood return observed. CBC and COD drawn. Hemoglobin=6.0, met parameters for blood. Pre-meds given. Per pt's previous routine, rec'd Ok to give lasix between and after both units from Dr. Jones. 2 units PRBC's transfused, pt very hypertensive after second unit, this is per her normal after blood. She declines to go to the ER. Informed Dr. Jones who thinks pt should have transfusions split over 2 days. Adjusted future schedule and Dr. Jones will change treatment plan on Monday. Pt will also take evening dose of Coreg as soon as she gets home. She reports she is always very responsive to this and her BP returns to normal. New printout given to pt by Arabella and she left in care of her friend Afua in [facility].

## 2019-07-09 ENCOUNTER — TELEPHONE (OUTPATIENT)
Dept: HEMATOLOGY ONCOLOGY | Facility: MEDICAL CENTER | Age: 64
End: 2019-07-09

## 2019-07-09 RX ORDER — LIDOCAINE HYDROCHLORIDE 10 MG/ML
20 INJECTION, SOLUTION INFILTRATION; PERINEURAL
Status: CANCELLED | OUTPATIENT
Start: 2019-07-16

## 2019-07-09 RX ORDER — ACETAMINOPHEN 325 MG/1
650 TABLET ORAL PRN
Status: CANCELLED | OUTPATIENT
Start: 2019-07-16

## 2019-07-09 RX ORDER — DIPHENHYDRAMINE HCL 25 MG
25 TABLET ORAL ONCE
Status: CANCELLED | OUTPATIENT
Start: 2019-07-16 | End: 2019-07-16

## 2019-07-09 RX ORDER — ACETAMINOPHEN 325 MG/1
650 TABLET ORAL ONCE
Status: CANCELLED | OUTPATIENT
Start: 2019-07-16 | End: 2019-07-16

## 2019-07-09 RX ORDER — SODIUM CHLORIDE 9 MG/ML
500 INJECTION, SOLUTION INTRAVENOUS ONCE
Status: CANCELLED | OUTPATIENT
Start: 2019-07-16 | End: 2019-07-16

## 2019-07-09 RX ORDER — FUROSEMIDE 10 MG/ML
20 INJECTION INTRAMUSCULAR; INTRAVENOUS ONCE
Status: CANCELLED | OUTPATIENT
Start: 2019-07-16 | End: 2019-07-16

## 2019-07-09 NOTE — TELEPHONE ENCOUNTER
Patient becomes very hypertensive with 2 unit TX despite lasix. Lasix not very helpful given her chronic renal failure. Will try giving 1 unit, two days in a row (or every other day if she has dialysis) rather than 2 at once.   Have removed the irradiated, CMV negative orders as this patient is not a TX candidate.   Discussed with patient.  Transfusion Therapy plan changed with help of Staci Christensen.

## 2019-07-09 NOTE — TELEPHONE ENCOUNTER
Patient's friend called to ensure the lab orders Dr. Jones discussed will be added to the Infusion orders so the patient can have them drawn next time she is at the Infusion center. I informed her I will relay the request.

## 2019-07-18 ENCOUNTER — OUTPATIENT INFUSION SERVICES (OUTPATIENT)
Dept: ONCOLOGY | Facility: MEDICAL CENTER | Age: 64
End: 2019-07-18
Attending: INTERNAL MEDICINE
Payer: MEDICARE

## 2019-07-18 VITALS
SYSTOLIC BLOOD PRESSURE: 142 MMHG | HEIGHT: 57 IN | DIASTOLIC BLOOD PRESSURE: 46 MMHG | OXYGEN SATURATION: 100 % | HEART RATE: 87 BPM | RESPIRATION RATE: 18 BRPM | WEIGHT: 143.3 LBS | TEMPERATURE: 98 F | BODY MASS INDEX: 30.92 KG/M2

## 2019-07-18 DIAGNOSIS — T45.1X5A CHEMOTHERAPY-INDUCED NEUTROPENIA (HCC): ICD-10-CM

## 2019-07-18 DIAGNOSIS — D46.9 MDS (MYELODYSPLASTIC SYNDROME) (HCC): ICD-10-CM

## 2019-07-18 DIAGNOSIS — D70.1 CHEMOTHERAPY-INDUCED NEUTROPENIA (HCC): ICD-10-CM

## 2019-07-18 DIAGNOSIS — D63.8 ANEMIA OF CHRONIC DISEASE: ICD-10-CM

## 2019-07-18 DIAGNOSIS — D64.9 SYMPTOMATIC ANEMIA: ICD-10-CM

## 2019-07-18 LAB
ANISOCYTOSIS BLD QL SMEAR: ABNORMAL
BASOPHILS # BLD AUTO: 0.2 % (ref 0–1.8)
BASOPHILS # BLD: 0.01 K/UL (ref 0–0.12)
COMMENT 1642: NORMAL
EOSINOPHIL # BLD AUTO: 0.18 K/UL (ref 0–0.51)
EOSINOPHIL NFR BLD: 4.3 % (ref 0–6.9)
ERYTHROCYTE [DISTWIDTH] IN BLOOD BY AUTOMATED COUNT: 68.8 FL (ref 35.9–50)
HCT VFR BLD AUTO: 26 % (ref 37–47)
HGB BLD-MCNC: 8.2 G/DL (ref 12–16)
IMM GRANULOCYTES # BLD AUTO: 0.02 K/UL (ref 0–0.11)
IMM GRANULOCYTES NFR BLD AUTO: 0.5 % (ref 0–0.9)
LG PLATELETS BLD QL SMEAR: NORMAL
LYMPHOCYTES # BLD AUTO: 0.77 K/UL (ref 1–4.8)
LYMPHOCYTES NFR BLD: 18.3 % (ref 22–41)
MACROCYTES BLD QL SMEAR: ABNORMAL
MCH RBC QN AUTO: 30.9 PG (ref 27–33)
MCHC RBC AUTO-ENTMCNC: 32.4 G/DL (ref 33.6–35)
MCV RBC AUTO: 95.2 FL (ref 81.4–97.8)
MICROCYTES BLD QL SMEAR: ABNORMAL
MONOCYTES # BLD AUTO: 0.62 K/UL (ref 0–0.85)
MONOCYTES NFR BLD AUTO: 14.8 % (ref 0–13.4)
MORPHOLOGY BLD-IMP: NORMAL
NEUTROPHILS # BLD AUTO: 2.6 K/UL (ref 2–7.15)
NEUTROPHILS NFR BLD: 61.9 % (ref 44–72)
NRBC # BLD AUTO: 0 K/UL
NRBC BLD-RTO: 0 /100 WBC
PLATELET # BLD AUTO: 82 K/UL (ref 164–446)
PLATELET BLD QL SMEAR: NORMAL
RBC # BLD AUTO: 2.69 M/UL (ref 4.2–5.4)
RBC BLD AUTO: PRESENT
WBC # BLD AUTO: 4.2 K/UL (ref 4.8–10.8)

## 2019-07-18 PROCEDURE — A4212 NON CORING NEEDLE OR STYLET: HCPCS

## 2019-07-18 PROCEDURE — 700101 HCHG RX REV CODE 250

## 2019-07-18 PROCEDURE — 85025 COMPLETE CBC W/AUTO DIFF WBC: CPT

## 2019-07-18 PROCEDURE — 700111 HCHG RX REV CODE 636 W/ 250 OVERRIDE (IP)

## 2019-07-18 PROCEDURE — 36591 DRAW BLOOD OFF VENOUS DEVICE: CPT

## 2019-07-18 PROCEDURE — 306780 HCHG STAT FOR TRANSFUSION PER CASE

## 2019-07-18 RX ADMIN — LIDOCAINE HYDROCHLORIDE 5 ML: 10 INJECTION, SOLUTION EPIDURAL; INFILTRATION; INTRACAUDAL at 11:05

## 2019-07-18 RX ADMIN — HEPARIN 500 UNITS: 100 SYRINGE at 12:15

## 2019-07-18 NOTE — PROGRESS NOTES
arrives for CBC and possible blood transfusion today. Vielka c/o fatigue, but denies shortness of breath and chest pain. CBC done shows Hgb 8.2, Hct 26.0. Blood transfusion consents signed on 7/6/2019. Potential side effects reviewed. Port accessed per protocol. No blood needed today. Port flushed per protocol, site d-access, needle intact, compression dressing applied. Vielka DC'd home with care giver Afua in good condition and in NAD. Appointment confirm for next treatment.

## 2019-07-20 ENCOUNTER — APPOINTMENT (OUTPATIENT)
Dept: ONCOLOGY | Facility: MEDICAL CENTER | Age: 64
End: 2019-07-20
Attending: INTERNAL MEDICINE
Payer: MEDICARE

## 2019-08-01 ENCOUNTER — OUTPATIENT INFUSION SERVICES (OUTPATIENT)
Dept: ONCOLOGY | Facility: MEDICAL CENTER | Age: 64
End: 2019-08-01
Attending: INTERNAL MEDICINE
Payer: MEDICARE

## 2019-08-01 VITALS
DIASTOLIC BLOOD PRESSURE: 44 MMHG | HEIGHT: 57 IN | BODY MASS INDEX: 31.39 KG/M2 | SYSTOLIC BLOOD PRESSURE: 174 MMHG | TEMPERATURE: 97.6 F | HEART RATE: 69 BPM | WEIGHT: 145.5 LBS | RESPIRATION RATE: 18 BRPM | OXYGEN SATURATION: 100 %

## 2019-08-01 DIAGNOSIS — D64.9 SYMPTOMATIC ANEMIA: ICD-10-CM

## 2019-08-01 DIAGNOSIS — D46.9 MYELODYSPLASIA (MYELODYSPLASTIC SYNDROME) (HCC): ICD-10-CM

## 2019-08-01 LAB
ABO GROUP BLD: NORMAL
ANISOCYTOSIS BLD QL SMEAR: ABNORMAL
BARCODED ABORH UBTYP: 8400
BARCODED ABORH UBTYP: 8400
BARCODED PRD CODE UBPRD: NORMAL
BARCODED PRD CODE UBPRD: NORMAL
BARCODED UNIT NUM UBUNT: NORMAL
BARCODED UNIT NUM UBUNT: NORMAL
BASOPHILS # BLD AUTO: 0.4 % (ref 0–1.8)
BASOPHILS # BLD: 0.02 K/UL (ref 0–0.12)
BLD GP AB SCN SERPL QL: NORMAL
COMMENT 1642: NORMAL
COMPONENT R 8504R: NORMAL
COMPONENT R 8504R: NORMAL
EOSINOPHIL # BLD AUTO: 0.27 K/UL (ref 0–0.51)
EOSINOPHIL NFR BLD: 4.8 % (ref 0–6.9)
ERYTHROCYTE [DISTWIDTH] IN BLOOD BY AUTOMATED COUNT: 75.7 FL (ref 35.9–50)
HCT VFR BLD AUTO: 18.1 % (ref 37–47)
HGB BLD-MCNC: 5.8 G/DL (ref 12–16)
IMM GRANULOCYTES # BLD AUTO: 0.09 K/UL (ref 0–0.11)
IMM GRANULOCYTES NFR BLD AUTO: 1.6 % (ref 0–0.9)
LG PLATELETS BLD QL SMEAR: NORMAL
LYMPHOCYTES # BLD AUTO: 1.47 K/UL (ref 1–4.8)
LYMPHOCYTES NFR BLD: 26 % (ref 22–41)
MACROCYTES BLD QL SMEAR: ABNORMAL
MCH RBC QN AUTO: 31.7 PG (ref 27–33)
MCHC RBC AUTO-ENTMCNC: 32 G/DL (ref 33.6–35)
MCV RBC AUTO: 98.9 FL (ref 81.4–97.8)
MICROCYTES BLD QL SMEAR: ABNORMAL
MONOCYTES # BLD AUTO: 0.82 K/UL (ref 0–0.85)
MONOCYTES NFR BLD AUTO: 14.5 % (ref 0–13.4)
MORPHOLOGY BLD-IMP: NORMAL
NEUTROPHILS # BLD AUTO: 2.98 K/UL (ref 2–7.15)
NEUTROPHILS NFR BLD: 52.7 % (ref 44–72)
NRBC # BLD AUTO: 0 K/UL
NRBC BLD-RTO: 0 /100 WBC
OVALOCYTES BLD QL SMEAR: NORMAL
PLATELET # BLD AUTO: 113 K/UL (ref 164–446)
PLATELET BLD QL SMEAR: NORMAL
PMV BLD AUTO: 14.7 FL (ref 9–12.9)
POIKILOCYTOSIS BLD QL SMEAR: NORMAL
POLYCHROMASIA BLD QL SMEAR: NORMAL
PRODUCT TYPE UPROD: NORMAL
PRODUCT TYPE UPROD: NORMAL
RBC # BLD AUTO: 1.83 M/UL (ref 4.2–5.4)
RBC BLD AUTO: PRESENT
RH BLD: NORMAL
STOMATOCYTES BLD QL SMEAR: NORMAL
UNIT STATUS USTAT: NORMAL
UNIT STATUS USTAT: NORMAL
WBC # BLD AUTO: 5.7 K/UL (ref 4.8–10.8)

## 2019-08-01 PROCEDURE — 36591 DRAW BLOOD OFF VENOUS DEVICE: CPT

## 2019-08-01 PROCEDURE — A9270 NON-COVERED ITEM OR SERVICE: HCPCS | Performed by: INTERNAL MEDICINE

## 2019-08-01 PROCEDURE — 700102 HCHG RX REV CODE 250 W/ 637 OVERRIDE(OP): Performed by: INTERNAL MEDICINE

## 2019-08-01 PROCEDURE — 86900 BLOOD TYPING SEROLOGIC ABO: CPT

## 2019-08-01 PROCEDURE — 700111 HCHG RX REV CODE 636 W/ 250 OVERRIDE (IP)

## 2019-08-01 PROCEDURE — 86923 COMPATIBILITY TEST ELECTRIC: CPT

## 2019-08-01 PROCEDURE — 36430 TRANSFUSION BLD/BLD COMPNT: CPT

## 2019-08-01 PROCEDURE — A4212 NON CORING NEEDLE OR STYLET: HCPCS

## 2019-08-01 PROCEDURE — 306780 HCHG STAT FOR TRANSFUSION PER CASE

## 2019-08-01 PROCEDURE — 86850 RBC ANTIBODY SCREEN: CPT

## 2019-08-01 PROCEDURE — 85025 COMPLETE CBC W/AUTO DIFF WBC: CPT

## 2019-08-01 PROCEDURE — 86901 BLOOD TYPING SEROLOGIC RH(D): CPT

## 2019-08-01 PROCEDURE — P9016 RBC LEUKOCYTES REDUCED: HCPCS

## 2019-08-01 RX ORDER — DIPHENHYDRAMINE HCL 25 MG
25 TABLET ORAL ONCE
Status: CANCELLED | OUTPATIENT
Start: 2019-08-01

## 2019-08-01 RX ORDER — ACETAMINOPHEN 325 MG/1
650 TABLET ORAL ONCE
Status: COMPLETED | OUTPATIENT
Start: 2019-08-01 | End: 2019-08-01

## 2019-08-01 RX ORDER — SODIUM CHLORIDE 9 MG/ML
500 INJECTION, SOLUTION INTRAVENOUS ONCE
Status: CANCELLED | OUTPATIENT
Start: 2019-08-01

## 2019-08-01 RX ORDER — ACETAMINOPHEN 325 MG/1
650 TABLET ORAL PRN
Status: CANCELLED | OUTPATIENT
Start: 2019-08-01

## 2019-08-01 RX ORDER — ACETAMINOPHEN 325 MG/1
650 TABLET ORAL ONCE
Status: CANCELLED | OUTPATIENT
Start: 2019-08-01

## 2019-08-01 RX ORDER — FUROSEMIDE 10 MG/ML
20 INJECTION INTRAMUSCULAR; INTRAVENOUS ONCE
Status: CANCELLED | OUTPATIENT
Start: 2019-08-01

## 2019-08-01 RX ORDER — LIDOCAINE HYDROCHLORIDE 10 MG/ML
20 INJECTION, SOLUTION INFILTRATION; PERINEURAL
Status: CANCELLED | OUTPATIENT
Start: 2019-08-01

## 2019-08-01 RX ORDER — LIDOCAINE HYDROCHLORIDE 10 MG/ML
INJECTION, SOLUTION EPIDURAL; INFILTRATION; INTRACAUDAL; PERINEURAL
Status: COMPLETED
Start: 2019-08-01 | End: 2019-08-01

## 2019-08-01 RX ORDER — LIDOCAINE HYDROCHLORIDE 10 MG/ML
20 INJECTION, SOLUTION INFILTRATION; PERINEURAL
Status: DISCONTINUED | OUTPATIENT
Start: 2019-08-01 | End: 2019-08-01 | Stop reason: HOSPADM

## 2019-08-01 RX ORDER — FUROSEMIDE 10 MG/ML
20 INJECTION INTRAMUSCULAR; INTRAVENOUS ONCE
Status: DISCONTINUED | OUTPATIENT
Start: 2019-08-01 | End: 2019-08-01 | Stop reason: HOSPADM

## 2019-08-01 RX ADMIN — LIDOCAINE HYDROCHLORIDE 5 ML: 10 INJECTION, SOLUTION EPIDURAL; INFILTRATION; INTRACAUDAL at 11:04

## 2019-08-01 RX ADMIN — LIDOCAINE HYDROCHLORIDE 5 ML: 10 INJECTION, SOLUTION INFILTRATION; PERINEURAL at 11:04

## 2019-08-01 RX ADMIN — HEPARIN 500 UNITS: 100 SYRINGE at 16:02

## 2019-08-01 RX ADMIN — ACETAMINOPHEN 650 MG: 325 TABLET, FILM COATED ORAL at 13:00

## 2019-08-01 ASSESSMENT — PAIN SCALES - GENERAL: PAINLEVEL: NO PAIN

## 2019-08-03 ENCOUNTER — OUTPATIENT INFUSION SERVICES (OUTPATIENT)
Dept: ONCOLOGY | Facility: MEDICAL CENTER | Age: 64
End: 2019-08-03
Attending: INTERNAL MEDICINE
Payer: MEDICARE

## 2019-08-03 VITALS
BODY MASS INDEX: 31.39 KG/M2 | DIASTOLIC BLOOD PRESSURE: 55 MMHG | TEMPERATURE: 98 F | RESPIRATION RATE: 16 BRPM | SYSTOLIC BLOOD PRESSURE: 162 MMHG | OXYGEN SATURATION: 100 % | HEIGHT: 57 IN | WEIGHT: 145.5 LBS | HEART RATE: 65 BPM

## 2019-08-03 DIAGNOSIS — D46.9 MYELODYSPLASIA (MYELODYSPLASTIC SYNDROME) (HCC): ICD-10-CM

## 2019-08-03 DIAGNOSIS — D64.9 SYMPTOMATIC ANEMIA: ICD-10-CM

## 2019-08-03 PROCEDURE — P9016 RBC LEUKOCYTES REDUCED: HCPCS

## 2019-08-03 PROCEDURE — 306780 HCHG STAT FOR TRANSFUSION PER CASE

## 2019-08-03 PROCEDURE — A4212 NON CORING NEEDLE OR STYLET: HCPCS

## 2019-08-03 PROCEDURE — 36430 TRANSFUSION BLD/BLD COMPNT: CPT

## 2019-08-03 PROCEDURE — 700111 HCHG RX REV CODE 636 W/ 250 OVERRIDE (IP)

## 2019-08-03 PROCEDURE — 86923 COMPATIBILITY TEST ELECTRIC: CPT

## 2019-08-03 RX ORDER — LIDOCAINE HYDROCHLORIDE 10 MG/ML
INJECTION, SOLUTION EPIDURAL; INFILTRATION; INTRACAUDAL; PERINEURAL
Status: COMPLETED
Start: 2019-08-03 | End: 2019-08-03

## 2019-08-03 RX ORDER — FUROSEMIDE 10 MG/ML
20 INJECTION INTRAMUSCULAR; INTRAVENOUS ONCE
Status: CANCELLED | OUTPATIENT
Start: 2019-08-03

## 2019-08-03 RX ORDER — ACETAMINOPHEN 325 MG/1
650 TABLET ORAL ONCE
Status: DISCONTINUED | OUTPATIENT
Start: 2019-08-03 | End: 2019-08-03 | Stop reason: HOSPADM

## 2019-08-03 RX ORDER — SODIUM CHLORIDE 9 MG/ML
500 INJECTION, SOLUTION INTRAVENOUS ONCE
Status: CANCELLED | OUTPATIENT
Start: 2019-08-03

## 2019-08-03 RX ORDER — ACETAMINOPHEN 325 MG/1
650 TABLET ORAL ONCE
Status: CANCELLED | OUTPATIENT
Start: 2019-08-03

## 2019-08-03 RX ORDER — ACETAMINOPHEN 325 MG/1
650 TABLET ORAL PRN
Status: CANCELLED | OUTPATIENT
Start: 2019-08-03

## 2019-08-03 RX ORDER — DIPHENHYDRAMINE HCL 25 MG
25 TABLET ORAL ONCE
Status: CANCELLED | OUTPATIENT
Start: 2019-08-03

## 2019-08-03 RX ORDER — LIDOCAINE HYDROCHLORIDE 10 MG/ML
20 INJECTION, SOLUTION INFILTRATION; PERINEURAL
Status: CANCELLED | OUTPATIENT
Start: 2019-08-03

## 2019-08-03 RX ADMIN — HEPARIN 500 UNITS: 100 SYRINGE at 13:46

## 2019-08-03 RX ADMIN — LIDOCAINE HYDROCHLORIDE 5 ML: 10 INJECTION, SOLUTION EPIDURAL; INFILTRATION; INTRACAUDAL at 10:51

## 2019-08-03 NOTE — PROGRESS NOTES
"Patient presents ambulatory for one unit of PRBC.  Patient reports feeling well and denies any s/s of infection at this time.  Port accessed using sterile technique and brisk blood return noted.  Labs from 8/1/19 reviewed and are within parameters to proceed with transfusion.  Patient declines pre medications and states she will \"take her own tylenol\".  One unit PRBC transfused with no complications or adverse reactions.  Patient to return 8/15/19, confirmed with patient.  Port flushed per protocol, brisk blood return noted, de accessed, needle intact, gauze, and tape applied to site.  Patient left ambulatory in no distress.  "

## 2019-08-05 NOTE — PROGRESS NOTES
08/05/19    Subjective    Chief Complaint:  64 [place]ian female f/u MDS    HPI:  See note from 7/05/2019. 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 Has ESRD on dialysis and receives blood TXs almost q.o.w. O2 dependent COPD. C/oing chronic bone pain.  Seeing pain specialist. Wants to change transfusons to q 3 weeks. Gets 1 unit q.o.d when transfused.     ROS: SOB. Bone pain. Abdominal tenderness.     PMH:    Allergies   Allergen Reactions   • Lenalidomide Unspecified     Beeping Pulse     • Naprosyn [Naproxen] Hives   • Pioglitazone Unspecified     Causes blindness   • Simvastatin Unspecified     Couldn't move   • Demerol Vomiting   • Diphenhydramine Vomiting   • Glipizide Vomiting   • Hydromorphone Vomiting   • Iron Vomiting     vomiting   • Metformin Vomiting   • Morphine Vomiting   • Multivitamin Itching     itching   • Ondansetron Itching   • Other Drug Rash and Vomiting     Any binders that remove phosphorus from the body such as tums   • Oxycodone Vomiting   • Pcn [Penicillins] Vomiting          • Propoxyphene Vomiting   • Requip Vomiting   • Sulfa Drugs Rash and Vomiting     Vomiting & rash      • Tamsulosin Vomiting   • Tramadol Vomiting   • Trazodone Vomiting       Medications:    Current Outpatient Medications on File Prior to Visit   Medication Sig Dispense Refill   • HYDROcodone-acetaminophen (NORCO) 5-325 MG Tab per tablet Take 1-2 Tabs by mouth every four hours as needed.     • acetaminophen (TYLENOL) 500 MG Tab Take 500-1,000 mg by mouth every 6 hours as needed.     • carvedilol (COREG) 12.5 MG Tab Take 1 Tab by mouth 2 times a day, with meals. 60 Tab 0   • lidocaine (LIDODERM) 5 % Patch Apply 1 Patch to skin as directed every 24 hours.     • Buprenorphine 10 MCG/HR PATCH WEEKLY Apply 10 mcg to skin as directed every 7 days.     • losartan (COZAAR) 100 MG Tab Take 100 mg by mouth every evening.     • amLODIPine (NORVASC) 10 MG  "Tab Take 2.5 mg by mouth every day. 1 Tab 0   • bimatoprost (LUMIGAN) 0.01 % Solution Place 1 Drop in both eyes every bedtime.     • brinzolamide (AZOPT) 1 % Suspension Place 1 Drop in both eyes 3 times a day.     • Brimonidine Tartrate-Timolol (COMBIGAN) 0.2-0.5 % Solution Place 1 Drop in both eyes 2 Times a Day.     • pregabalin (LYRICA) 150 MG Cap Take 150 mg by mouth 3 times a day.     • nitroglycerin (NITROSTAT) 0.4 MG SL Tab Place 1 Tab under tongue as needed for Chest Pain. (Patient not taking: Reported on 8/6/2019) 25 Tab 11     No current facility-administered medications on file prior to visit.          Objective    Vitals:    /64 (BP Location: Right arm, Patient Position: Sitting, BP Cuff Size: Adult)   Pulse 74   Temp 36.7 °C (98 °F) (Temporal)   Resp 18   Ht 1.44 m (4' 8.69\")   Wt 67.9 kg (149 lb 12.8 oz)   LMP 01/01/1995   SpO2 97%   BMI 32.77 kg/m²     Physical Exam:  On 02    HEENT - PERRL  Neck - supple  Node - none  Chest - clear  Breasts - N/E  COR - RSR  Abd - No palpable liver, spleen. Tender.   Ext - No edema  Skin - No jaundice, rash, petechiae    Labs:  Results for JESUS SILVEIRA (MRN [id number])    Ref. Range 7/18/2019 11:15 8/1/2019 11:15   WBC Latest Ref Range: 4.8 - 10.8 K/uL 4.2 (L) 5.7   RBC Latest Ref Range: 4.20 - 5.40 M/uL 2.69 (L) 1.83 (L)   Hemoglobin Latest Ref Range: 12.0 - 16.0 g/dL 8.2 (L) 5.8 (LL)   Hematocrit Latest Ref Range: 37.0 - 47.0 % 26.0 (L) 18.1 (L)   MCV Latest Ref Range: 81.4 - 97.8 fL 95.2 98.9 (H)   MCH Latest Ref Range: 27.0 - 33.0 pg 30.9 31.7   MCHC Latest Ref Range: 33.6 - 35.0 g/dL 32.4 (L) 32.0 (L)   RDW Latest Ref Range: 35.9 - 50.0 fL 68.8 (H) 75.7 (H)   Platelet Count Latest Ref Range: 164 - 446 K/uL 82 (L) 113 (L)       Assessment    Imp:    Visit Diagnosis:    1. MDS (myelodysplastic syndrome) (HCC)     2. Pancytopenia (HCC)     3. Pulmonary hypertension (HCC)     4. Chronic obstructive pulmonary disease, unspecified COPD type " (HCC)           Plan:    Attempting to change interval to q 3 weeks in the very non-forgiving EpicTransfusion Therapy Plan  RTC 6 weeks    Chato Jones M.D.

## 2019-08-06 ENCOUNTER — OFFICE VISIT (OUTPATIENT)
Dept: HEMATOLOGY ONCOLOGY | Facility: MEDICAL CENTER | Age: 64
End: 2019-08-06
Payer: MEDICARE

## 2019-08-06 VITALS
HEART RATE: 74 BPM | OXYGEN SATURATION: 97 % | HEIGHT: 57 IN | BODY MASS INDEX: 32.32 KG/M2 | TEMPERATURE: 98 F | DIASTOLIC BLOOD PRESSURE: 64 MMHG | SYSTOLIC BLOOD PRESSURE: 142 MMHG | RESPIRATION RATE: 18 BRPM | WEIGHT: 149.8 LBS

## 2019-08-06 DIAGNOSIS — D46.9 MDS (MYELODYSPLASTIC SYNDROME) (HCC): ICD-10-CM

## 2019-08-06 DIAGNOSIS — J44.9 CHRONIC OBSTRUCTIVE PULMONARY DISEASE, UNSPECIFIED COPD TYPE (HCC): ICD-10-CM

## 2019-08-06 DIAGNOSIS — I27.20 PULMONARY HYPERTENSION (HCC): ICD-10-CM

## 2019-08-06 DIAGNOSIS — D61.818 PANCYTOPENIA (HCC): ICD-10-CM

## 2019-08-06 PROCEDURE — 99213 OFFICE O/P EST LOW 20 MIN: CPT | Performed by: INTERNAL MEDICINE

## 2019-08-06 RX ORDER — ACETAMINOPHEN 325 MG/1
650 TABLET ORAL ONCE
Status: CANCELLED | OUTPATIENT
Start: 2019-08-06

## 2019-08-06 RX ORDER — LIDOCAINE HYDROCHLORIDE 10 MG/ML
20 INJECTION, SOLUTION INFILTRATION; PERINEURAL
Status: CANCELLED | OUTPATIENT
Start: 2019-08-06

## 2019-08-06 RX ORDER — FUROSEMIDE 10 MG/ML
20 INJECTION INTRAMUSCULAR; INTRAVENOUS ONCE
Status: CANCELLED | OUTPATIENT
Start: 2019-08-06

## 2019-08-06 RX ORDER — SODIUM CHLORIDE 9 MG/ML
500 INJECTION, SOLUTION INTRAVENOUS ONCE
Status: CANCELLED | OUTPATIENT
Start: 2019-08-06

## 2019-08-06 RX ORDER — DIPHENHYDRAMINE HCL 25 MG
25 TABLET ORAL ONCE
Status: CANCELLED | OUTPATIENT
Start: 2019-08-06

## 2019-08-06 RX ORDER — ACETAMINOPHEN 325 MG/1
650 TABLET ORAL PRN
Status: CANCELLED | OUTPATIENT
Start: 2019-08-06

## 2019-08-06 ASSESSMENT — PAIN SCALES - GENERAL: PAINLEVEL: 7=MODERATE-SEVERE PAIN

## 2019-08-22 ENCOUNTER — OUTPATIENT INFUSION SERVICES (OUTPATIENT)
Dept: ONCOLOGY | Facility: MEDICAL CENTER | Age: 64
End: 2019-08-22
Attending: INTERNAL MEDICINE
Payer: MEDICARE

## 2019-08-22 VITALS
TEMPERATURE: 97.8 F | WEIGHT: 147.93 LBS | HEART RATE: 72 BPM | HEIGHT: 58 IN | RESPIRATION RATE: 18 BRPM | BODY MASS INDEX: 31.05 KG/M2 | DIASTOLIC BLOOD PRESSURE: 58 MMHG | OXYGEN SATURATION: 100 % | SYSTOLIC BLOOD PRESSURE: 143 MMHG

## 2019-08-22 DIAGNOSIS — D46.9 MYELODYSPLASIA (MYELODYSPLASTIC SYNDROME) (HCC): ICD-10-CM

## 2019-08-22 DIAGNOSIS — D64.9 SYMPTOMATIC ANEMIA: ICD-10-CM

## 2019-08-22 LAB
ABO GROUP BLD: NORMAL
ANISOCYTOSIS BLD QL SMEAR: ABNORMAL
BARCODED ABORH UBTYP: 8400
BARCODED PRD CODE UBPRD: NORMAL
BARCODED UNIT NUM UBUNT: NORMAL
BASO STIPL BLD QL SMEAR: NORMAL
BASOPHILS # BLD AUTO: 0.6 % (ref 0–1.8)
BASOPHILS # BLD: 0.03 K/UL (ref 0–0.12)
BLD GP AB SCN SERPL QL: NORMAL
COMMENT 1642: NORMAL
COMPONENT R 8504R: NORMAL
EOSINOPHIL # BLD AUTO: 0.21 K/UL (ref 0–0.51)
EOSINOPHIL NFR BLD: 4.2 % (ref 0–6.9)
ERYTHROCYTE [DISTWIDTH] IN BLOOD BY AUTOMATED COUNT: 76.3 FL (ref 35.9–50)
HCT VFR BLD AUTO: 20 % (ref 37–47)
HGB BLD-MCNC: 6.6 G/DL (ref 12–16)
IMM GRANULOCYTES # BLD AUTO: 0.08 K/UL (ref 0–0.11)
IMM GRANULOCYTES NFR BLD AUTO: 1.6 % (ref 0–0.9)
LG PLATELETS BLD QL SMEAR: NORMAL
LYMPHOCYTES # BLD AUTO: 0.99 K/UL (ref 1–4.8)
LYMPHOCYTES NFR BLD: 19.7 % (ref 22–41)
MACROCYTES BLD QL SMEAR: ABNORMAL
MCH RBC QN AUTO: 33.5 PG (ref 27–33)
MCHC RBC AUTO-ENTMCNC: 33 G/DL (ref 33.6–35)
MCV RBC AUTO: 101.5 FL (ref 81.4–97.8)
MONOCYTES # BLD AUTO: 0.69 K/UL (ref 0–0.85)
MONOCYTES NFR BLD AUTO: 13.7 % (ref 0–13.4)
MORPHOLOGY BLD-IMP: NORMAL
NEUTROPHILS # BLD AUTO: 3.03 K/UL (ref 2–7.15)
NEUTROPHILS NFR BLD: 60.2 % (ref 44–72)
NRBC # BLD AUTO: 0.02 K/UL
NRBC BLD-RTO: 0.4 /100 WBC
PLATELET # BLD AUTO: 129 K/UL (ref 164–446)
PLATELET BLD QL SMEAR: NORMAL
PMV BLD AUTO: 13.6 FL (ref 9–12.9)
POLYCHROMASIA BLD QL SMEAR: NORMAL
PRODUCT TYPE UPROD: NORMAL
RBC # BLD AUTO: 1.97 M/UL (ref 4.2–5.4)
RBC BLD AUTO: PRESENT
RH BLD: NORMAL
UNIT STATUS USTAT: NORMAL
WBC # BLD AUTO: 5 K/UL (ref 4.8–10.8)

## 2019-08-22 PROCEDURE — P9016 RBC LEUKOCYTES REDUCED: HCPCS

## 2019-08-22 PROCEDURE — 36430 TRANSFUSION BLD/BLD COMPNT: CPT

## 2019-08-22 PROCEDURE — 306780 HCHG STAT FOR TRANSFUSION PER CASE

## 2019-08-22 PROCEDURE — 700111 HCHG RX REV CODE 636 W/ 250 OVERRIDE (IP)

## 2019-08-22 PROCEDURE — 86901 BLOOD TYPING SEROLOGIC RH(D): CPT

## 2019-08-22 PROCEDURE — 86850 RBC ANTIBODY SCREEN: CPT

## 2019-08-22 PROCEDURE — 86923 COMPATIBILITY TEST ELECTRIC: CPT

## 2019-08-22 PROCEDURE — 86900 BLOOD TYPING SEROLOGIC ABO: CPT

## 2019-08-22 PROCEDURE — A4212 NON CORING NEEDLE OR STYLET: HCPCS

## 2019-08-22 PROCEDURE — 96374 THER/PROPH/DIAG INJ IV PUSH: CPT

## 2019-08-22 PROCEDURE — 85025 COMPLETE CBC W/AUTO DIFF WBC: CPT

## 2019-08-22 PROCEDURE — 700111 HCHG RX REV CODE 636 W/ 250 OVERRIDE (IP): Performed by: INTERNAL MEDICINE

## 2019-08-22 PROCEDURE — 36591 DRAW BLOOD OFF VENOUS DEVICE: CPT

## 2019-08-22 RX ORDER — FUROSEMIDE 10 MG/ML
20 INJECTION INTRAMUSCULAR; INTRAVENOUS ONCE
Status: CANCELLED | OUTPATIENT
Start: 2019-08-22

## 2019-08-22 RX ORDER — DIPHENHYDRAMINE HCL 25 MG
25 TABLET ORAL ONCE
Status: CANCELLED | OUTPATIENT
Start: 2019-08-22

## 2019-08-22 RX ORDER — SODIUM CHLORIDE 9 MG/ML
500 INJECTION, SOLUTION INTRAVENOUS ONCE
Status: CANCELLED | OUTPATIENT
Start: 2019-08-22

## 2019-08-22 RX ORDER — ACETAMINOPHEN 325 MG/1
650 TABLET ORAL PRN
Status: CANCELLED | OUTPATIENT
Start: 2019-08-22

## 2019-08-22 RX ORDER — ACETAMINOPHEN 325 MG/1
650 TABLET ORAL ONCE
Status: CANCELLED | OUTPATIENT
Start: 2019-08-22

## 2019-08-22 RX ORDER — DIPHENHYDRAMINE HCL 25 MG
25 TABLET ORAL ONCE
Status: DISCONTINUED | OUTPATIENT
Start: 2019-08-22 | End: 2019-08-22 | Stop reason: HOSPADM

## 2019-08-22 RX ORDER — LIDOCAINE HYDROCHLORIDE 10 MG/ML
INJECTION, SOLUTION EPIDURAL; INFILTRATION; INTRACAUDAL; PERINEURAL
Status: COMPLETED
Start: 2019-08-22 | End: 2019-08-22

## 2019-08-22 RX ORDER — LIDOCAINE HYDROCHLORIDE 10 MG/ML
20 INJECTION, SOLUTION INFILTRATION; PERINEURAL
Status: CANCELLED | OUTPATIENT
Start: 2019-08-22

## 2019-08-22 RX ORDER — ACETAMINOPHEN 325 MG/1
650 TABLET ORAL ONCE
Status: DISCONTINUED | OUTPATIENT
Start: 2019-08-22 | End: 2019-08-22 | Stop reason: HOSPADM

## 2019-08-22 RX ORDER — FUROSEMIDE 10 MG/ML
20 INJECTION INTRAMUSCULAR; INTRAVENOUS ONCE
Status: COMPLETED | OUTPATIENT
Start: 2019-08-22 | End: 2019-08-22

## 2019-08-22 RX ADMIN — FUROSEMIDE 20 MG: 10 INJECTION, SOLUTION INTRAMUSCULAR; INTRAVENOUS at 15:02

## 2019-08-22 RX ADMIN — LIDOCAINE HYDROCHLORIDE 5 ML: 10 INJECTION, SOLUTION EPIDURAL; INFILTRATION; INTRACAUDAL at 09:58

## 2019-08-22 RX ADMIN — HEPARIN 500 UNITS: 100 SYRINGE at 15:12

## 2019-08-22 NOTE — PROGRESS NOTES
Ambulatory to [facility] for cbc and possible blood products today. Denies any complaints. Port accessed using sterile technique. Brisk blood return obtained. Labs sent. CBC meets parameters for 1 unit of blood today. Declines pre meds. One unit of PRBC infused with no complications. Furosemide 20mg given post transfusion as ordered. Port flushed and heparin locked per protocol. De-accessed and covered with gauze and tape. Discharged home to self care in no distress. Next appointment in 2 days.

## 2019-08-23 RX ORDER — FUROSEMIDE 10 MG/ML
20 INJECTION INTRAMUSCULAR; INTRAVENOUS ONCE
Status: CANCELLED | OUTPATIENT
Start: 2019-08-23

## 2019-08-24 ENCOUNTER — OUTPATIENT INFUSION SERVICES (OUTPATIENT)
Dept: ONCOLOGY | Facility: MEDICAL CENTER | Age: 64
End: 2019-08-24
Attending: INTERNAL MEDICINE
Payer: MEDICARE

## 2019-08-24 VITALS
WEIGHT: 146.16 LBS | RESPIRATION RATE: 18 BRPM | HEART RATE: 72 BPM | TEMPERATURE: 97.6 F | DIASTOLIC BLOOD PRESSURE: 47 MMHG | OXYGEN SATURATION: 96 % | SYSTOLIC BLOOD PRESSURE: 147 MMHG | BODY MASS INDEX: 31.53 KG/M2 | HEIGHT: 57 IN

## 2019-08-24 DIAGNOSIS — D46.9 MYELODYSPLASIA (MYELODYSPLASTIC SYNDROME) (HCC): ICD-10-CM

## 2019-08-24 DIAGNOSIS — D64.9 SYMPTOMATIC ANEMIA: ICD-10-CM

## 2019-08-24 LAB
BARCODED ABORH UBTYP: 8400
BARCODED PRD CODE UBPRD: NORMAL
BARCODED UNIT NUM UBUNT: NORMAL
COMPONENT R 8504R: NORMAL
PRODUCT TYPE UPROD: NORMAL
UNIT STATUS USTAT: NORMAL

## 2019-08-24 PROCEDURE — 700111 HCHG RX REV CODE 636 W/ 250 OVERRIDE (IP): Performed by: INTERNAL MEDICINE

## 2019-08-24 PROCEDURE — 86923 COMPATIBILITY TEST ELECTRIC: CPT

## 2019-08-24 PROCEDURE — 36430 TRANSFUSION BLD/BLD COMPNT: CPT

## 2019-08-24 PROCEDURE — 306780 HCHG STAT FOR TRANSFUSION PER CASE

## 2019-08-24 PROCEDURE — 700111 HCHG RX REV CODE 636 W/ 250 OVERRIDE (IP)

## 2019-08-24 PROCEDURE — 700105 HCHG RX REV CODE 258: Performed by: INTERNAL MEDICINE

## 2019-08-24 PROCEDURE — 96374 THER/PROPH/DIAG INJ IV PUSH: CPT

## 2019-08-24 PROCEDURE — P9016 RBC LEUKOCYTES REDUCED: HCPCS

## 2019-08-24 PROCEDURE — A4212 NON CORING NEEDLE OR STYLET: HCPCS

## 2019-08-24 PROCEDURE — 96375 TX/PRO/DX INJ NEW DRUG ADDON: CPT

## 2019-08-24 RX ORDER — SODIUM CHLORIDE 9 MG/ML
500 INJECTION, SOLUTION INTRAVENOUS ONCE
Status: CANCELLED | OUTPATIENT
Start: 2019-08-24

## 2019-08-24 RX ORDER — SODIUM CHLORIDE 9 MG/ML
500 INJECTION, SOLUTION INTRAVENOUS ONCE
Status: COMPLETED | OUTPATIENT
Start: 2019-08-24 | End: 2019-08-24

## 2019-08-24 RX ORDER — ACETAMINOPHEN 325 MG/1
650 TABLET ORAL PRN
Status: CANCELLED | OUTPATIENT
Start: 2019-08-24

## 2019-08-24 RX ORDER — LIDOCAINE HYDROCHLORIDE 10 MG/ML
INJECTION, SOLUTION EPIDURAL; INFILTRATION; INTRACAUDAL; PERINEURAL
Status: COMPLETED
Start: 2019-08-24 | End: 2019-08-24

## 2019-08-24 RX ORDER — LIDOCAINE HYDROCHLORIDE 10 MG/ML
20 INJECTION, SOLUTION INFILTRATION; PERINEURAL
Status: CANCELLED | OUTPATIENT
Start: 2019-08-24

## 2019-08-24 RX ORDER — FUROSEMIDE 10 MG/ML
20 INJECTION INTRAMUSCULAR; INTRAVENOUS ONCE
Status: CANCELLED | OUTPATIENT
Start: 2019-08-24

## 2019-08-24 RX ORDER — ACETAMINOPHEN 325 MG/1
650 TABLET ORAL ONCE
Status: DISCONTINUED | OUTPATIENT
Start: 2019-08-24 | End: 2019-08-24 | Stop reason: HOSPADM

## 2019-08-24 RX ORDER — DIPHENHYDRAMINE HCL 25 MG
25 TABLET ORAL ONCE
Status: CANCELLED | OUTPATIENT
Start: 2019-08-24

## 2019-08-24 RX ORDER — ACETAMINOPHEN 325 MG/1
650 TABLET ORAL ONCE
Status: CANCELLED | OUTPATIENT
Start: 2019-08-24

## 2019-08-24 RX ORDER — FUROSEMIDE 10 MG/ML
20 INJECTION INTRAMUSCULAR; INTRAVENOUS ONCE
Status: COMPLETED | OUTPATIENT
Start: 2019-08-24 | End: 2019-08-24

## 2019-08-24 RX ADMIN — LIDOCAINE HYDROCHLORIDE 5 ML: 10 INJECTION, SOLUTION EPIDURAL; INFILTRATION; INTRACAUDAL at 09:50

## 2019-08-24 RX ADMIN — SODIUM CHLORIDE 250 ML: 9 INJECTION, SOLUTION INTRAVENOUS at 10:15

## 2019-08-24 RX ADMIN — FUROSEMIDE 20 MG: 10 INJECTION, SOLUTION INTRAMUSCULAR; INTRAVENOUS at 12:52

## 2019-08-24 RX ADMIN — HEPARIN 500 UNITS: 100 SYRINGE at 12:55

## 2019-08-24 NOTE — PROGRESS NOTES
Patient arrived ambulatory with a cane, and friend to the [facility] for 1 unit of PRBC. Reviewed vital signs, labs, and physician order. Patient denies S&S of infection, or bleeding. Right chest port numbed with Lidocaine per pt request. Right chest port accessed with sterile technique, visualized brisk blood return. Confirmed blood consents are signed, Tylenol administered, pt refused Benadryl. 1 Unit PRBC's transfused, no adverse reaction observed. Lasix administered per MD order. Port flushed per protocol, rivera needle removed with tip intact, gauze and tape dressing placed. Confirmed upcoming appointment date and time with patient. Patient left the [facility] ambulatory in no sign of distress.

## 2019-09-12 ENCOUNTER — OUTPATIENT INFUSION SERVICES (OUTPATIENT)
Dept: ONCOLOGY | Facility: MEDICAL CENTER | Age: 64
End: 2019-09-12
Attending: INTERNAL MEDICINE
Payer: MEDICARE

## 2019-09-12 VITALS
WEIGHT: 151.9 LBS | TEMPERATURE: 97.2 F | RESPIRATION RATE: 20 BRPM | HEART RATE: 68 BPM | OXYGEN SATURATION: 100 % | BODY MASS INDEX: 32.77 KG/M2 | HEIGHT: 57 IN | DIASTOLIC BLOOD PRESSURE: 52 MMHG | SYSTOLIC BLOOD PRESSURE: 127 MMHG

## 2019-09-12 DIAGNOSIS — D64.9 SYMPTOMATIC ANEMIA: ICD-10-CM

## 2019-09-12 LAB
ANISOCYTOSIS BLD QL SMEAR: ABNORMAL
BASOPHILS # BLD AUTO: 0.4 % (ref 0–1.8)
BASOPHILS # BLD: 0.02 K/UL (ref 0–0.12)
COMMENT 1642: NORMAL
EOSINOPHIL # BLD AUTO: 0.33 K/UL (ref 0–0.51)
EOSINOPHIL NFR BLD: 6.2 % (ref 0–6.9)
ERYTHROCYTE [DISTWIDTH] IN BLOOD BY AUTOMATED COUNT: 84 FL (ref 35.9–50)
HCT VFR BLD AUTO: 25.2 % (ref 37–47)
HGB BLD-MCNC: 8 G/DL (ref 12–16)
IMM GRANULOCYTES # BLD AUTO: 0.05 K/UL (ref 0–0.11)
IMM GRANULOCYTES NFR BLD AUTO: 0.9 % (ref 0–0.9)
LG PLATELETS BLD QL SMEAR: NORMAL
LYMPHOCYTES # BLD AUTO: 0.88 K/UL (ref 1–4.8)
LYMPHOCYTES NFR BLD: 16.5 % (ref 22–41)
MACROCYTES BLD QL SMEAR: ABNORMAL
MCH RBC QN AUTO: 31.7 PG (ref 27–33)
MCHC RBC AUTO-ENTMCNC: 31.7 G/DL (ref 33.6–35)
MCV RBC AUTO: 100 FL (ref 81.4–97.8)
MICROCYTES BLD QL SMEAR: ABNORMAL
MONOCYTES # BLD AUTO: 0.84 K/UL (ref 0–0.85)
MONOCYTES NFR BLD AUTO: 15.8 % (ref 0–13.4)
MORPHOLOGY BLD-IMP: NORMAL
NEUTROPHILS # BLD AUTO: 3.2 K/UL (ref 2–7.15)
NEUTROPHILS NFR BLD: 60.2 % (ref 44–72)
NRBC # BLD AUTO: 0.02 K/UL
NRBC BLD-RTO: 0.4 /100 WBC
PLATELET # BLD AUTO: 116 K/UL (ref 164–446)
PLATELET BLD QL SMEAR: NORMAL
PMV BLD AUTO: 13.9 FL (ref 9–12.9)
POIKILOCYTOSIS BLD QL SMEAR: NORMAL
POLYCHROMASIA BLD QL SMEAR: NORMAL
RBC # BLD AUTO: 2.52 M/UL (ref 4.2–5.4)
RBC BLD AUTO: PRESENT
STOMATOCYTES BLD QL SMEAR: NORMAL
WBC # BLD AUTO: 5.3 K/UL (ref 4.8–10.8)

## 2019-09-12 PROCEDURE — A4212 NON CORING NEEDLE OR STYLET: HCPCS

## 2019-09-12 PROCEDURE — 700111 HCHG RX REV CODE 636 W/ 250 OVERRIDE (IP)

## 2019-09-12 PROCEDURE — 85025 COMPLETE CBC W/AUTO DIFF WBC: CPT

## 2019-09-12 PROCEDURE — 36591 DRAW BLOOD OFF VENOUS DEVICE: CPT

## 2019-09-12 RX ORDER — LIDOCAINE HYDROCHLORIDE 10 MG/ML
INJECTION, SOLUTION EPIDURAL; INFILTRATION; INTRACAUDAL; PERINEURAL
Status: COMPLETED
Start: 2019-09-12 | End: 2019-09-12

## 2019-09-12 RX ADMIN — HEPARIN 500 UNITS: 100 SYRINGE at 11:05

## 2019-09-12 RX ADMIN — LIDOCAINE HYDROCHLORIDE 0.5 ML: 10 INJECTION, SOLUTION EPIDURAL; INFILTRATION; INTRACAUDAL at 10:19

## 2019-09-12 NOTE — PROGRESS NOTES
Pt arrives to [facility] for CBC/possible blood products.  R chest port injected with Lidocaine 1% bleb prior to port access.  Port accessed in sterile fashion.  Port flushed with NS and CBC drawn as ordered.  CBC reviewed, Hemoglobin is 8.0 and platelets are 116,000 today.  Pt does not meet MD parameters for blood products.  Pt has scheduled appt on Saturday 9/14/19.  Port flushed with NS and Heparin per protocol.  Tadeo needle removed intact.  Port site covered with gauze.  Pt dc home with friend.

## 2019-09-14 ENCOUNTER — OUTPATIENT INFUSION SERVICES (OUTPATIENT)
Dept: ONCOLOGY | Facility: MEDICAL CENTER | Age: 64
End: 2019-09-14
Attending: INTERNAL MEDICINE
Payer: MEDICARE

## 2019-09-16 ENCOUNTER — TELEPHONE (OUTPATIENT)
Dept: HEMATOLOGY ONCOLOGY | Facility: MEDICAL CENTER | Age: 64
End: 2019-09-16

## 2019-09-16 NOTE — TELEPHONE ENCOUNTER
[facility] called to make sure that lab orders are put in for Vielka as they will be making a lab appointment for the 19th. When they went in last week to draw blood there was not an order placed. Patient is being seen by Dr. Jones on Tuesday the 17th.

## 2019-09-17 ENCOUNTER — OFFICE VISIT (OUTPATIENT)
Dept: HEMATOLOGY ONCOLOGY | Facility: MEDICAL CENTER | Age: 64
End: 2019-09-17
Payer: MEDICARE

## 2019-09-17 VITALS
BODY MASS INDEX: 30.82 KG/M2 | WEIGHT: 146.83 LBS | RESPIRATION RATE: 14 BRPM | OXYGEN SATURATION: 98 % | HEIGHT: 58 IN | SYSTOLIC BLOOD PRESSURE: 100 MMHG | TEMPERATURE: 98.7 F | DIASTOLIC BLOOD PRESSURE: 80 MMHG | HEART RATE: 74 BPM

## 2019-09-17 DIAGNOSIS — Z99.2 ESRD (END STAGE RENAL DISEASE) ON DIALYSIS (HCC): ICD-10-CM

## 2019-09-17 DIAGNOSIS — E11.42 TYPE 2 DIABETES MELLITUS WITH DIABETIC POLYNEUROPATHY, WITHOUT LONG-TERM CURRENT USE OF INSULIN (HCC): ICD-10-CM

## 2019-09-17 DIAGNOSIS — D46.9 MDS (MYELODYSPLASTIC SYNDROME) (HCC): ICD-10-CM

## 2019-09-17 DIAGNOSIS — J44.1 ACUTE EXACERBATION OF CHRONIC OBSTRUCTIVE PULMONARY DISEASE (COPD) (HCC): ICD-10-CM

## 2019-09-17 DIAGNOSIS — D61.818 PANCYTOPENIA (HCC): ICD-10-CM

## 2019-09-17 DIAGNOSIS — N18.6 ESRD (END STAGE RENAL DISEASE) ON DIALYSIS (HCC): ICD-10-CM

## 2019-09-17 PROCEDURE — 99212 OFFICE O/P EST SF 10 MIN: CPT | Performed by: INTERNAL MEDICINE

## 2019-09-17 ASSESSMENT — PAIN SCALES - GENERAL: PAINLEVEL: 7=MODERATE-SEVERE PAIN

## 2019-09-19 ENCOUNTER — OUTPATIENT INFUSION SERVICES (OUTPATIENT)
Dept: ONCOLOGY | Facility: MEDICAL CENTER | Age: 64
End: 2019-09-19
Attending: INTERNAL MEDICINE
Payer: MEDICARE

## 2019-09-19 VITALS
WEIGHT: 150.57 LBS | RESPIRATION RATE: 18 BRPM | OXYGEN SATURATION: 98 % | DIASTOLIC BLOOD PRESSURE: 45 MMHG | TEMPERATURE: 97.1 F | HEIGHT: 57 IN | HEART RATE: 65 BPM | SYSTOLIC BLOOD PRESSURE: 101 MMHG | BODY MASS INDEX: 32.49 KG/M2

## 2019-09-19 DIAGNOSIS — D46.9 MYELODYSPLASIA (MYELODYSPLASTIC SYNDROME) (HCC): ICD-10-CM

## 2019-09-19 DIAGNOSIS — D64.9 SYMPTOMATIC ANEMIA: ICD-10-CM

## 2019-09-19 LAB
BASOPHILS # BLD AUTO: 0.6 % (ref 0–1.8)
BASOPHILS # BLD: 0.03 K/UL (ref 0–0.12)
EOSINOPHIL # BLD AUTO: 0.27 K/UL (ref 0–0.51)
EOSINOPHIL NFR BLD: 5.6 % (ref 0–6.9)
ERYTHROCYTE [DISTWIDTH] IN BLOOD BY AUTOMATED COUNT: 82.9 FL (ref 35.9–50)
HCT VFR BLD AUTO: 23.6 % (ref 37–47)
HGB BLD-MCNC: 7.8 G/DL (ref 12–16)
IMM GRANULOCYTES # BLD AUTO: 0.06 K/UL (ref 0–0.11)
IMM GRANULOCYTES NFR BLD AUTO: 1.3 % (ref 0–0.9)
LYMPHOCYTES # BLD AUTO: 0.78 K/UL (ref 1–4.8)
LYMPHOCYTES NFR BLD: 16.3 % (ref 22–41)
MCH RBC QN AUTO: 33.2 PG (ref 27–33)
MCHC RBC AUTO-ENTMCNC: 33.1 G/DL (ref 33.6–35)
MCV RBC AUTO: 100.4 FL (ref 81.4–97.8)
MONOCYTES # BLD AUTO: 0.77 K/UL (ref 0–0.85)
MONOCYTES NFR BLD AUTO: 16.1 % (ref 0–13.4)
NEUTROPHILS # BLD AUTO: 2.87 K/UL (ref 2–7.15)
NEUTROPHILS NFR BLD: 60.1 % (ref 44–72)
NRBC # BLD AUTO: 0.02 K/UL
NRBC BLD-RTO: 0.4 /100 WBC
PLATELET # BLD AUTO: 110 K/UL (ref 164–446)
PMV BLD AUTO: 12.7 FL (ref 9–12.9)
RBC # BLD AUTO: 2.35 M/UL (ref 4.2–5.4)
WBC # BLD AUTO: 4.8 K/UL (ref 4.8–10.8)

## 2019-09-19 PROCEDURE — 36591 DRAW BLOOD OFF VENOUS DEVICE: CPT

## 2019-09-19 PROCEDURE — 700111 HCHG RX REV CODE 636 W/ 250 OVERRIDE (IP): Performed by: INTERNAL MEDICINE

## 2019-09-19 PROCEDURE — 700111 HCHG RX REV CODE 636 W/ 250 OVERRIDE (IP)

## 2019-09-19 PROCEDURE — 85025 COMPLETE CBC W/AUTO DIFF WBC: CPT

## 2019-09-19 PROCEDURE — A4212 NON CORING NEEDLE OR STYLET: HCPCS

## 2019-09-19 RX ORDER — ACETAMINOPHEN 325 MG/1
650 TABLET ORAL PRN
Status: CANCELLED | OUTPATIENT
Start: 2019-09-19

## 2019-09-19 RX ORDER — LIDOCAINE HYDROCHLORIDE 10 MG/ML
20 INJECTION, SOLUTION INFILTRATION; PERINEURAL
Status: CANCELLED | OUTPATIENT
Start: 2019-09-19

## 2019-09-19 RX ORDER — DIPHENHYDRAMINE HCL 25 MG
25 TABLET ORAL ONCE
Status: CANCELLED | OUTPATIENT
Start: 2019-09-19

## 2019-09-19 RX ORDER — ACETAMINOPHEN 325 MG/1
650 TABLET ORAL ONCE
Status: CANCELLED | OUTPATIENT
Start: 2019-09-19

## 2019-09-19 RX ORDER — FUROSEMIDE 10 MG/ML
20 INJECTION INTRAMUSCULAR; INTRAVENOUS ONCE
Status: CANCELLED | OUTPATIENT
Start: 2019-09-19

## 2019-09-19 RX ORDER — SODIUM CHLORIDE 9 MG/ML
500 INJECTION, SOLUTION INTRAVENOUS ONCE
Status: CANCELLED | OUTPATIENT
Start: 2019-09-19

## 2019-09-19 RX ORDER — LIDOCAINE HYDROCHLORIDE 10 MG/ML
INJECTION, SOLUTION EPIDURAL; INFILTRATION; INTRACAUDAL; PERINEURAL
Status: COMPLETED
Start: 2019-09-19 | End: 2019-09-19

## 2019-09-19 RX ADMIN — Medication 5 ML: at 09:45

## 2019-09-19 RX ADMIN — HEPARIN 500 UNITS: 100 SYRINGE at 10:35

## 2019-09-19 RX ADMIN — LIDOCAINE HYDROCHLORIDE 5 ML: 10 INJECTION, SOLUTION EPIDURAL; INFILTRATION; INTRACAUDAL at 09:45

## 2019-09-19 ASSESSMENT — PAIN SCALES - GENERAL: PAINLEVEL: 8=MODERATE-SEVERE PAIN

## 2019-09-20 NOTE — PROGRESS NOTES
Patient here for CBC/possible blood products. Port accessed using sterile technique; CBC drawn as ordered. Hgb = 7.8. Parameters to transfuse if Hgb < 7.5. Patient requesting to come back next week. Discussed that orders to check CBC is every 3 weeks.  Heparin instilled prior to de-accessing port. Port de-accessed; gauze/coban applied over site. Next appointment scheduled. Discharged to care of friend; no apparent distress noted.

## 2019-09-25 ENCOUNTER — APPOINTMENT (OUTPATIENT)
Dept: ONCOLOGY | Facility: MEDICAL CENTER | Age: 64
End: 2019-09-25
Attending: INTERNAL MEDICINE
Payer: MEDICARE

## 2019-10-03 ENCOUNTER — OUTPATIENT INFUSION SERVICES (OUTPATIENT)
Dept: ONCOLOGY | Facility: MEDICAL CENTER | Age: 64
End: 2019-10-03
Attending: INTERNAL MEDICINE
Payer: MEDICARE

## 2019-10-03 VITALS
RESPIRATION RATE: 18 BRPM | HEART RATE: 70 BPM | BODY MASS INDEX: 33.53 KG/M2 | SYSTOLIC BLOOD PRESSURE: 196 MMHG | OXYGEN SATURATION: 100 % | DIASTOLIC BLOOD PRESSURE: 58 MMHG | TEMPERATURE: 97.8 F | WEIGHT: 149.03 LBS | HEIGHT: 56 IN

## 2019-10-03 DIAGNOSIS — D63.8 ANEMIA OF CHRONIC DISEASE: ICD-10-CM

## 2019-10-03 DIAGNOSIS — D46.9 MYELODYSPLASIA (MYELODYSPLASTIC SYNDROME) (HCC): ICD-10-CM

## 2019-10-03 DIAGNOSIS — I27.20 PULMONARY HYPERTENSION (HCC): ICD-10-CM

## 2019-10-03 DIAGNOSIS — D64.9 SYMPTOMATIC ANEMIA: ICD-10-CM

## 2019-10-03 LAB
ABO GROUP BLD: NORMAL
ANISOCYTOSIS BLD QL SMEAR: ABNORMAL
BARCODED ABORH UBTYP: 8400
BARCODED ABORH UBTYP: 8400
BARCODED PRD CODE UBPRD: NORMAL
BARCODED PRD CODE UBPRD: NORMAL
BARCODED UNIT NUM UBUNT: NORMAL
BARCODED UNIT NUM UBUNT: NORMAL
BASOPHILS # BLD AUTO: 0.9 % (ref 0–1.8)
BASOPHILS # BLD: 0.04 K/UL (ref 0–0.12)
BLD GP AB SCN SERPL QL: NORMAL
COMPONENT R 8504R: NORMAL
COMPONENT R 8504R: NORMAL
EOSINOPHIL # BLD AUTO: 0.24 K/UL (ref 0–0.51)
EOSINOPHIL NFR BLD: 5.2 % (ref 0–6.9)
ERYTHROCYTE [DISTWIDTH] IN BLOOD BY AUTOMATED COUNT: 81 FL (ref 35.9–50)
HCT VFR BLD AUTO: 19.6 % (ref 37–47)
HGB BLD-MCNC: 6.2 G/DL (ref 12–16)
LG PLATELETS BLD QL SMEAR: NORMAL
LYMPHOCYTES # BLD AUTO: 0.92 K/UL (ref 1–4.8)
LYMPHOCYTES NFR BLD: 20 % (ref 22–41)
MACROCYTES BLD QL SMEAR: ABNORMAL
MANUAL DIFF BLD: NORMAL
MCH RBC QN AUTO: 33 PG (ref 27–33)
MCHC RBC AUTO-ENTMCNC: 31.6 G/DL (ref 33.6–35)
MCV RBC AUTO: 104.3 FL (ref 81.4–97.8)
MONOCYTES # BLD AUTO: 0.6 K/UL (ref 0–0.85)
MONOCYTES NFR BLD AUTO: 13 % (ref 0–13.4)
MORPHOLOGY BLD-IMP: NORMAL
NEUTROPHILS # BLD AUTO: 2.8 K/UL (ref 2–7.15)
NEUTROPHILS NFR BLD: 60 % (ref 44–72)
NEUTS BAND NFR BLD MANUAL: 0.9 % (ref 0–10)
NRBC # BLD AUTO: 0.02 K/UL
NRBC BLD-RTO: 0.4 /100 WBC
PLATELET # BLD AUTO: 150 K/UL (ref 164–446)
PLATELET BLD QL SMEAR: NORMAL
PMV BLD AUTO: 13.7 FL (ref 9–12.9)
POIKILOCYTOSIS BLD QL SMEAR: NORMAL
POLYCHROMASIA BLD QL SMEAR: NORMAL
PRODUCT TYPE UPROD: NORMAL
PRODUCT TYPE UPROD: NORMAL
RBC # BLD AUTO: 1.88 M/UL (ref 4.2–5.4)
RBC BLD AUTO: PRESENT
RH BLD: NORMAL
STOMATOCYTES BLD QL SMEAR: NORMAL
UNIT STATUS USTAT: NORMAL
UNIT STATUS USTAT: NORMAL
WBC # BLD AUTO: 4.6 K/UL (ref 4.8–10.8)

## 2019-10-03 PROCEDURE — 86901 BLOOD TYPING SEROLOGIC RH(D): CPT

## 2019-10-03 PROCEDURE — 36415 COLL VENOUS BLD VENIPUNCTURE: CPT

## 2019-10-03 PROCEDURE — 96375 TX/PRO/DX INJ NEW DRUG ADDON: CPT

## 2019-10-03 PROCEDURE — 36430 TRANSFUSION BLD/BLD COMPNT: CPT

## 2019-10-03 PROCEDURE — 86850 RBC ANTIBODY SCREEN: CPT

## 2019-10-03 PROCEDURE — 86900 BLOOD TYPING SEROLOGIC ABO: CPT

## 2019-10-03 PROCEDURE — P9016 RBC LEUKOCYTES REDUCED: HCPCS | Mod: 91

## 2019-10-03 PROCEDURE — 700111 HCHG RX REV CODE 636 W/ 250 OVERRIDE (IP)

## 2019-10-03 PROCEDURE — 85027 COMPLETE CBC AUTOMATED: CPT

## 2019-10-03 PROCEDURE — 85007 BL SMEAR W/DIFF WBC COUNT: CPT

## 2019-10-03 PROCEDURE — 700111 HCHG RX REV CODE 636 W/ 250 OVERRIDE (IP): Performed by: INTERNAL MEDICINE

## 2019-10-03 PROCEDURE — 306780 HCHG STAT FOR TRANSFUSION PER CASE

## 2019-10-03 PROCEDURE — A4212 NON CORING NEEDLE OR STYLET: HCPCS

## 2019-10-03 PROCEDURE — 86923 COMPATIBILITY TEST ELECTRIC: CPT | Mod: 91

## 2019-10-03 PROCEDURE — 96374 THER/PROPH/DIAG INJ IV PUSH: CPT

## 2019-10-03 RX ORDER — SODIUM CHLORIDE 9 MG/ML
500 INJECTION, SOLUTION INTRAVENOUS ONCE
Status: CANCELLED | OUTPATIENT
Start: 2019-10-03

## 2019-10-03 RX ORDER — DIPHENHYDRAMINE HCL 25 MG
25 TABLET ORAL ONCE
Status: CANCELLED | OUTPATIENT
Start: 2019-10-03

## 2019-10-03 RX ORDER — LIDOCAINE HYDROCHLORIDE 10 MG/ML
INJECTION, SOLUTION EPIDURAL; INFILTRATION; INTRACAUDAL; PERINEURAL
Status: COMPLETED
Start: 2019-10-03 | End: 2019-10-03

## 2019-10-03 RX ORDER — FUROSEMIDE 10 MG/ML
20 INJECTION INTRAMUSCULAR; INTRAVENOUS ONCE
Status: COMPLETED | OUTPATIENT
Start: 2019-10-03 | End: 2019-10-03

## 2019-10-03 RX ORDER — ACETAMINOPHEN 325 MG/1
650 TABLET ORAL PRN
Status: CANCELLED | OUTPATIENT
Start: 2019-10-03

## 2019-10-03 RX ORDER — DIPHENHYDRAMINE HCL 25 MG
25 TABLET ORAL ONCE
Status: DISCONTINUED | OUTPATIENT
Start: 2019-10-03 | End: 2019-10-03 | Stop reason: HOSPADM

## 2019-10-03 RX ORDER — LIDOCAINE HYDROCHLORIDE 10 MG/ML
20 INJECTION, SOLUTION INFILTRATION; PERINEURAL
Status: CANCELLED | OUTPATIENT
Start: 2019-10-03

## 2019-10-03 RX ORDER — ACETAMINOPHEN 325 MG/1
650 TABLET ORAL ONCE
Status: CANCELLED | OUTPATIENT
Start: 2019-10-03

## 2019-10-03 RX ORDER — ACETAMINOPHEN 325 MG/1
650 TABLET ORAL ONCE
Status: DISCONTINUED | OUTPATIENT
Start: 2019-10-03 | End: 2019-10-03 | Stop reason: HOSPADM

## 2019-10-03 RX ORDER — FUROSEMIDE 10 MG/ML
20 INJECTION INTRAMUSCULAR; INTRAVENOUS ONCE
Status: CANCELLED | OUTPATIENT
Start: 2019-10-03

## 2019-10-03 RX ADMIN — HEPARIN 500 UNITS: 100 SYRINGE at 16:28

## 2019-10-03 RX ADMIN — FUROSEMIDE 20 MG: 10 INJECTION, SOLUTION INTRAMUSCULAR; INTRAVENOUS at 13:54

## 2019-10-03 RX ADMIN — FUROSEMIDE 20 MG: 10 INJECTION, SOLUTION INTRAMUSCULAR; INTRAVENOUS at 16:22

## 2019-10-03 RX ADMIN — LIDOCAINE HYDROCHLORIDE 5 ML: 10 INJECTION, SOLUTION EPIDURAL; INFILTRATION; INTRACAUDAL at 09:25

## 2019-10-03 NOTE — PROGRESS NOTES
systolic at 15 minute check with second unit. Patients blood pressure has slowely been going up during transfusion. Patient asymptomatic. Other vitals remain the same. Yanely Barba NP notified and is aware. Infusion will continue.  Continue to monitor and support.

## 2019-10-03 NOTE — PROGRESS NOTES
Tolerated first unit with no reactions. Second unit called for. Lasix given in between units per orders. Vitals stable. Continue to monitor and support.

## 2019-10-03 NOTE — PROGRESS NOTES
Patient tolerated both units with no reactions. Lasix given per orders. BP remains high, MD is aware. Port flushed and heparin locked per protocol. Site de-accessed and covered with gauze and tape. Discharged home to self care in no distress at this time. Next appointment in place.

## 2019-10-03 NOTE — PROGRESS NOTES
"Tolerated blood transfusion with no reactions. Asking for lasix to \"prevent swelling and heaviness later.\" Will give PRN per orders. Olive GEORGE getting post transfusion vitals for this RN.    "

## 2019-10-03 NOTE — PROGRESS NOTES
Meets parameters for blood. Hemoglobin 6.2. Dr. Jones notified and would like us to do both units today since patient is having ride issues for Saturday. Took her own tylenol at this time, declines other premeds. Awaiting COD from blood bank.

## 2019-10-03 NOTE — PROGRESS NOTES
Patient here for cbc possible blood today. C/o feeling fatigued and weak. Port accessed using sterile technique. CBC drawn per orders. Awaiting results. Continue to monitor and support per orders and needs.

## 2019-10-03 NOTE — PROGRESS NOTES
Assist RN with pt acre while at lunch. PRBC completed, pt rinsing IV with NS TKO. Pt vital signs re-checked and charted. To has no c/o at this time. Report to Rena GEORGE.

## 2019-10-03 NOTE — PROGRESS NOTES
First unit of PRBCs started. Patient acknowledges the symptoms to report to RN. Continue to monitor and support.

## 2019-10-05 ENCOUNTER — APPOINTMENT (OUTPATIENT)
Dept: ONCOLOGY | Facility: MEDICAL CENTER | Age: 64
End: 2019-10-05
Attending: INTERNAL MEDICINE
Payer: MEDICARE

## 2019-10-17 NOTE — PROGRESS NOTES
10/22/19    Subjective    Chief Complaint:  64 female f/u MDS    HPI:  Severe MDS with multi-lineage dysplasia s/p Vidaza and Revlimid now on supportive thereapy with periodic transfusions. ESRD on dialysis. COPD on chronic O2. Complaining of nausea. Primary MD has referred her for an EGD. Gets dialysis [facility]W but no lab in [facility] since 4/19.     ROS:    Constitutional:No new sx's  HENT: Poor vision  Respiratory: On O2  GI: Nausa but no pain    PMH:    Allergies   Allergen Reactions   • Lenalidomide Unspecified     Beeping Pulse     • Naprosyn [Naproxen] Hives   • Pioglitazone Unspecified     Causes blindness   • Simvastatin Unspecified     Couldn't move   • Demerol Vomiting   • Diphenhydramine Vomiting   • Glipizide Vomiting   • Hydromorphone Vomiting   • Iron Vomiting     vomiting   • Metformin Vomiting   • Morphine Vomiting   • Multivitamin Itching     itching   • Ondansetron Itching   • Other Drug Rash and Vomiting     Any binders that remove phosphorus from the body such as tums   • Oxycodone Vomiting   • Pcn [Penicillins] Vomiting          • Propoxyphene Vomiting   • Requip Vomiting   • Sulfa Drugs Rash and Vomiting     Vomiting & rash      • Tamsulosin Vomiting   • Tramadol Vomiting   • Trazodone Vomiting       Medications:    Current Outpatient Medications on File Prior to Visit   Medication Sig Dispense Refill   • HYDROcodone-acetaminophen (NORCO) 5-325 MG Tab per tablet Take 1-2 Tabs by mouth every four hours as needed.     • acetaminophen (TYLENOL) 500 MG Tab Take 500-1,000 mg by mouth every 6 hours as needed.     • carvedilol (COREG) 12.5 MG Tab Take 1 Tab by mouth 2 times a day, with meals. 60 Tab 0   • lidocaine (LIDODERM) 5 % Patch Apply 1 Patch to skin as directed every 24 hours.     • Buprenorphine 10 MCG/HR PATCH WEEKLY Apply 20 mcg to skin as directed every 7 days.     • losartan (COZAAR) 100 MG Tab Take 100 mg by mouth every evening.     • amLODIPine (NORVASC) 10 MG Tab Take 2.5 mg by mouth every day.  "1 Tab 0   • nitroglycerin (NITROSTAT) 0.4 MG SL Tab Place 1 Tab under tongue as needed for Chest Pain. 25 Tab 11   • bimatoprost (LUMIGAN) 0.01 % Solution Place 1 Drop in both eyes every bedtime.     • brinzolamide (AZOPT) 1 % Suspension Place 1 Drop in both eyes 3 times a day.     • Brimonidine Tartrate-Timolol (COMBIGAN) 0.2-0.5 % Solution Place 1 Drop in both eyes 2 Times a Day.     • pregabalin (LYRICA) 150 MG Cap Take 150 mg by mouth 3 times a day.       No current facility-administered medications on file prior to visit.          Objective    Vitals:    /72 (BP Location: Right arm, Patient Position: Sitting, BP Cuff Size: Adult)   Pulse 70   Temp 36.3 °C (97.4 °F) (Temporal)   Resp 14   Ht 1.473 m (4' 10\")   Wt 67.5 kg (148 lb 13 oz)   LMP 01/01/1995   SpO2 100% Comment: on 3L of o2  BMI 31.10 kg/m²     Physical Exam:    Appears chronically ill. On O2   Head -  Normocephalic .   Eyes - Pupils are equal, round, and reactive to light. Conjunctivae and EOM are normal. No scleral icterus.   Ears - normal hearing  Mouth - Throat: Oropharynx is clear and moist. No oropharyngeal exudate  Neck - Normal range of motion. Neck supple. No thyromegaly  Cardiovascular - Normal rate, regular rhythm, normal heart sounds and intact distal pulses. No  gallop, murmur or rub  Pulmonary - Rales bilat.  Breast - symmetrical. No mass on indentation.  Abdominal -Soft. No distension, tenderness, organomegaly or mass  Extremities-  No edema or tenderness.    Nodes - No submental, submandibular, preauricular, cervical, axillary or inguinal adenopathy.    Neurological -   Alert and oriented to person, place, and time. No focal findings  Skin - Skin is warm and dry. No rash noted. Not diaphoretic. No erythema. No pallor. No jaundice   Psychiatric -  Normal mood and affect.    Labs:    Results for JESUS SILVEIRA (MRN [id number])    Ref. Range 10/3/2019 09:35   WBC Latest Ref Range: 4.8 - 10.8 K/uL 4.6 (L)   RBC Latest " Ref Range: 4.20 - 5.40 M/uL 1.88 (L)   Hemoglobin Latest Ref Range: 12.0 - 16.0 g/dL 6.2 (L)   Hematocrit Latest Ref Range: 37.0 - 47.0 % 19.6 (L)   MCV Latest Ref Range: 81.4 - 97.8 fL 104.3 (H)   MCH Latest Ref Range: 27.0 - 33.0 pg 33.0   MCHC Latest Ref Range: 33.6 - 35.0 g/dL 31.6 (L)   RDW Latest Ref Range: 35.9 - 50.0 fL 81.0 (H)   Platelet Count Latest Ref Range: 164 - 446 K/uL 150 (L)     Assessment    Imp:    Visit Diagnosis:    1. MDS (myelodysplastic syndrome) ([facility])  CBC WITH DIFFERENTIAL   2. Chronic obstructive pulmonary disease, unspecified COPD type (HCC)     3. Type 2 diabetes mellitus with diabetic polyneuropathy, without long-term current use of insulin (HCC)     4. ESRD (end stage renal disease) on dialysis ([facility])     5. Legally blind     6. Pancytopenia (HCC)           Plan:    Transfuse this week if Hgb <7.8  Check CBC weekly and call if need TX scheduled (lives in [place])    Chato Jones M.D.

## 2019-10-21 ENCOUNTER — TELEPHONE (OUTPATIENT)
Dept: HEMATOLOGY ONCOLOGY | Facility: MEDICAL CENTER | Age: 64
End: 2019-10-21

## 2019-10-22 ENCOUNTER — OFFICE VISIT (OUTPATIENT)
Dept: HEMATOLOGY ONCOLOGY | Facility: MEDICAL CENTER | Age: 64
End: 2019-10-22
Payer: MEDICARE

## 2019-10-22 VITALS
OXYGEN SATURATION: 100 % | RESPIRATION RATE: 14 BRPM | HEIGHT: 58 IN | SYSTOLIC BLOOD PRESSURE: 122 MMHG | WEIGHT: 148.81 LBS | DIASTOLIC BLOOD PRESSURE: 72 MMHG | BODY MASS INDEX: 31.24 KG/M2 | HEART RATE: 70 BPM | TEMPERATURE: 97.4 F

## 2019-10-22 DIAGNOSIS — H54.8 LEGALLY BLIND: Chronic | ICD-10-CM

## 2019-10-22 DIAGNOSIS — E11.42 TYPE 2 DIABETES MELLITUS WITH DIABETIC POLYNEUROPATHY, WITHOUT LONG-TERM CURRENT USE OF INSULIN (HCC): ICD-10-CM

## 2019-10-22 DIAGNOSIS — N18.6 ESRD (END STAGE RENAL DISEASE) ON DIALYSIS (HCC): ICD-10-CM

## 2019-10-22 DIAGNOSIS — D61.818 PANCYTOPENIA (HCC): ICD-10-CM

## 2019-10-22 DIAGNOSIS — D46.9 MDS (MYELODYSPLASTIC SYNDROME) (HCC): ICD-10-CM

## 2019-10-22 DIAGNOSIS — Z99.2 ESRD (END STAGE RENAL DISEASE) ON DIALYSIS (HCC): ICD-10-CM

## 2019-10-22 DIAGNOSIS — J44.9 CHRONIC OBSTRUCTIVE PULMONARY DISEASE, UNSPECIFIED COPD TYPE (HCC): ICD-10-CM

## 2019-10-22 PROCEDURE — 99213 OFFICE O/P EST LOW 20 MIN: CPT | Performed by: INTERNAL MEDICINE

## 2019-10-24 ENCOUNTER — OUTPATIENT INFUSION SERVICES (OUTPATIENT)
Dept: ONCOLOGY | Facility: MEDICAL CENTER | Age: 64
End: 2019-10-24
Attending: INTERNAL MEDICINE
Payer: MEDICARE

## 2019-10-24 VITALS
BODY MASS INDEX: 33.57 KG/M2 | OXYGEN SATURATION: 100 % | SYSTOLIC BLOOD PRESSURE: 133 MMHG | HEIGHT: 56 IN | TEMPERATURE: 98 F | HEART RATE: 65 BPM | RESPIRATION RATE: 18 BRPM | WEIGHT: 149.25 LBS | DIASTOLIC BLOOD PRESSURE: 84 MMHG

## 2019-10-24 DIAGNOSIS — D64.9 SYMPTOMATIC ANEMIA: ICD-10-CM

## 2019-10-24 DIAGNOSIS — D46.9 MYELODYSPLASIA (MYELODYSPLASTIC SYNDROME) (HCC): ICD-10-CM

## 2019-10-24 LAB
ABO GROUP BLD: NORMAL
BASOPHILS # BLD AUTO: 0.5 % (ref 0–1.8)
BASOPHILS # BLD: 0.02 K/UL (ref 0–0.12)
BLD GP AB SCN SERPL QL: NORMAL
EOSINOPHIL # BLD AUTO: 0.22 K/UL (ref 0–0.51)
EOSINOPHIL NFR BLD: 5.7 % (ref 0–6.9)
ERYTHROCYTE [DISTWIDTH] IN BLOOD BY AUTOMATED COUNT: 70.8 FL (ref 35.9–50)
HCT VFR BLD AUTO: 26.9 % (ref 37–47)
HCT VFR BLD CALC: 27 % (ref 37–47)
HGB BLD-MCNC: 8.8 G/DL (ref 12–16)
HGB BLD-MCNC: 9.2 G/DL (ref 12–16)
IMM GRANULOCYTES # BLD AUTO: 0.04 K/UL (ref 0–0.11)
IMM GRANULOCYTES NFR BLD AUTO: 1 % (ref 0–0.9)
LYMPHOCYTES # BLD AUTO: 0.94 K/UL (ref 1–4.8)
LYMPHOCYTES NFR BLD: 24.5 % (ref 22–41)
MCH RBC QN AUTO: 33.5 PG (ref 27–33)
MCHC RBC AUTO-ENTMCNC: 32.7 G/DL (ref 33.6–35)
MCV RBC AUTO: 102.3 FL (ref 81.4–97.8)
MONOCYTES # BLD AUTO: 0.59 K/UL (ref 0–0.85)
MONOCYTES NFR BLD AUTO: 15.4 % (ref 0–13.4)
NEUTROPHILS # BLD AUTO: 2.03 K/UL (ref 2–7.15)
NEUTROPHILS NFR BLD: 52.9 % (ref 44–72)
NRBC # BLD AUTO: 0 K/UL
NRBC BLD-RTO: 0 /100 WBC
PLATELET # BLD AUTO: 118 K/UL (ref 164–446)
PMV BLD AUTO: 14.2 FL (ref 9–12.9)
RBC # BLD AUTO: 2.63 M/UL (ref 4.2–5.4)
RH BLD: NORMAL
WBC # BLD AUTO: 3.8 K/UL (ref 4.8–10.8)

## 2019-10-24 PROCEDURE — 85014 HEMATOCRIT: CPT | Mod: XU

## 2019-10-24 PROCEDURE — 86900 BLOOD TYPING SEROLOGIC ABO: CPT

## 2019-10-24 PROCEDURE — 86850 RBC ANTIBODY SCREEN: CPT

## 2019-10-24 PROCEDURE — 85025 COMPLETE CBC W/AUTO DIFF WBC: CPT

## 2019-10-24 PROCEDURE — A4212 NON CORING NEEDLE OR STYLET: HCPCS

## 2019-10-24 PROCEDURE — 86901 BLOOD TYPING SEROLOGIC RH(D): CPT

## 2019-10-24 PROCEDURE — 700111 HCHG RX REV CODE 636 W/ 250 OVERRIDE (IP)

## 2019-10-24 PROCEDURE — 36591 DRAW BLOOD OFF VENOUS DEVICE: CPT

## 2019-10-24 RX ORDER — ACETAMINOPHEN 325 MG/1
650 TABLET ORAL PRN
Status: CANCELLED | OUTPATIENT
Start: 2019-10-24

## 2019-10-24 RX ORDER — SODIUM CHLORIDE 9 MG/ML
500 INJECTION, SOLUTION INTRAVENOUS ONCE
Status: CANCELLED | OUTPATIENT
Start: 2019-10-24

## 2019-10-24 RX ORDER — ACETAMINOPHEN 325 MG/1
650 TABLET ORAL ONCE
Status: CANCELLED | OUTPATIENT
Start: 2019-10-24

## 2019-10-24 RX ORDER — LIDOCAINE HYDROCHLORIDE 10 MG/ML
20 INJECTION, SOLUTION INFILTRATION; PERINEURAL
Status: CANCELLED | OUTPATIENT
Start: 2019-10-24

## 2019-10-24 RX ORDER — DIPHENHYDRAMINE HCL 25 MG
25 TABLET ORAL ONCE
Status: CANCELLED | OUTPATIENT
Start: 2019-10-24

## 2019-10-24 RX ORDER — LIDOCAINE HYDROCHLORIDE 10 MG/ML
INJECTION, SOLUTION EPIDURAL; INFILTRATION; INTRACAUDAL; PERINEURAL
Status: COMPLETED
Start: 2019-10-24 | End: 2019-10-24

## 2019-10-24 RX ORDER — FUROSEMIDE 10 MG/ML
20 INJECTION INTRAMUSCULAR; INTRAVENOUS ONCE
Status: CANCELLED | OUTPATIENT
Start: 2019-10-24

## 2019-10-24 RX ADMIN — HEPARIN 500 UNITS: 100 SYRINGE at 12:20

## 2019-10-24 RX ADMIN — LIDOCAINE HYDROCHLORIDE 5 ML: 10 INJECTION, SOLUTION EPIDURAL; INFILTRATION; INTRACAUDAL at 10:00

## 2019-10-24 NOTE — PROGRESS NOTES
Pt ambulatory w/ caregiver/friend to [facility] for CBC w/ poss transfusion.  Pt w/ no s/s of infection, pt has no complaints at this time.  Pt PORT numbed w/ lidocaine per pt request, accessed using sterile technique, brisk blood return noted, labs drawn per orders, flushed per protocol.  Due to delays in laboratory processing of CBC, Dr Jones contacted and approval given to test pt's Hgb level via [facility].  Pt's Hgb level of 9.2 does not meet parameters for transfusion today.  Pt's results from main lab came back at 8.8 which is still above transfusion parameters.  Pt PORT heparinized and de-accessed, gauze and tape bandage applied.  Pt left on foot in care of friend in NAD.  Pt's appt for Sat cancelled.  Pt's next appt made and printout given upon d/c.

## 2019-10-26 ENCOUNTER — APPOINTMENT (OUTPATIENT)
Dept: ONCOLOGY | Facility: MEDICAL CENTER | Age: 64
End: 2019-10-26
Attending: INTERNAL MEDICINE
Payer: MEDICARE

## 2019-11-04 ENCOUNTER — APPOINTMENT (OUTPATIENT)
Dept: RADIOLOGY | Facility: MEDICAL CENTER | Age: 64
End: 2019-11-04
Attending: EMERGENCY MEDICINE
Payer: MEDICARE

## 2019-11-04 ENCOUNTER — HOSPITAL ENCOUNTER (OUTPATIENT)
Facility: MEDICAL CENTER | Age: 64
End: 2019-11-05
Attending: EMERGENCY MEDICINE | Admitting: INTERNAL MEDICINE
Payer: MEDICARE

## 2019-11-04 DIAGNOSIS — R53.1 GENERALIZED WEAKNESS: ICD-10-CM

## 2019-11-04 DIAGNOSIS — R05.9 COUGH: ICD-10-CM

## 2019-11-04 DIAGNOSIS — R42 DIZZINESS: ICD-10-CM

## 2019-11-04 PROBLEM — J20.9 ACUTE BRONCHITIS: Status: ACTIVE | Noted: 2019-11-04

## 2019-11-04 PROBLEM — E87.6 HYPOKALEMIA: Status: ACTIVE | Noted: 2019-11-04

## 2019-11-04 PROBLEM — D69.6 THROMBOCYTOPENIA (HCC): Status: ACTIVE | Noted: 2019-11-04

## 2019-11-04 PROBLEM — R79.89 ELEVATED BRAIN NATRIURETIC PEPTIDE (BNP) LEVEL: Status: ACTIVE | Noted: 2019-11-04

## 2019-11-04 LAB
ANION GAP SERPL CALC-SCNC: 12 MMOL/L (ref 0–11.9)
ANISOCYTOSIS BLD QL SMEAR: ABNORMAL
BASOPHILS # BLD AUTO: 0.3 % (ref 0–1.8)
BASOPHILS # BLD: 0.02 K/UL (ref 0–0.12)
BUN SERPL-MCNC: 24 MG/DL (ref 8–22)
CALCIUM SERPL-MCNC: 8.3 MG/DL (ref 8.5–10.5)
CHLORIDE SERPL-SCNC: 92 MMOL/L (ref 96–112)
CO2 SERPL-SCNC: 30 MMOL/L (ref 20–33)
COMMENT 1642: NORMAL
CREAT SERPL-MCNC: 4.45 MG/DL (ref 0.5–1.4)
EOSINOPHIL # BLD AUTO: 0.36 K/UL (ref 0–0.51)
EOSINOPHIL NFR BLD: 5.4 % (ref 0–6.9)
ERYTHROCYTE [DISTWIDTH] IN BLOOD BY AUTOMATED COUNT: 72.3 FL (ref 35.9–50)
FLUAV RNA SPEC QL NAA+PROBE: NEGATIVE
FLUBV RNA SPEC QL NAA+PROBE: NEGATIVE
GLUCOSE SERPL-MCNC: 185 MG/DL (ref 65–99)
HCT VFR BLD AUTO: 25 % (ref 37–47)
HGB BLD-MCNC: 8.3 G/DL (ref 12–16)
IMM GRANULOCYTES # BLD AUTO: 0.05 K/UL (ref 0–0.11)
IMM GRANULOCYTES NFR BLD AUTO: 0.7 % (ref 0–0.9)
LACTATE BLD-SCNC: 1.1 MMOL/L (ref 0.5–2)
LYMPHOCYTES # BLD AUTO: 0.89 K/UL (ref 1–4.8)
LYMPHOCYTES NFR BLD: 13.3 % (ref 22–41)
MACROCYTES BLD QL SMEAR: ABNORMAL
MCH RBC QN AUTO: 34.2 PG (ref 27–33)
MCHC RBC AUTO-ENTMCNC: 33.2 G/DL (ref 33.6–35)
MCV RBC AUTO: 102.9 FL (ref 81.4–97.8)
MONOCYTES # BLD AUTO: 0.87 K/UL (ref 0–0.85)
MONOCYTES NFR BLD AUTO: 13 % (ref 0–13.4)
MORPHOLOGY BLD-IMP: NORMAL
NEUTROPHILS # BLD AUTO: 4.52 K/UL (ref 2–7.15)
NEUTROPHILS NFR BLD: 67.3 % (ref 44–72)
NRBC # BLD AUTO: 0 K/UL
NRBC BLD-RTO: 0 /100 WBC
NT-PROBNP SERPL IA-MCNC: 5609 PG/ML (ref 0–125)
OVALOCYTES BLD QL SMEAR: NORMAL
PLATELET # BLD AUTO: 127 K/UL (ref 164–446)
PLATELET BLD QL SMEAR: NORMAL
PMV BLD AUTO: 13.8 FL (ref 9–12.9)
POIKILOCYTOSIS BLD QL SMEAR: NORMAL
POLYCHROMASIA BLD QL SMEAR: NORMAL
POTASSIUM SERPL-SCNC: 3.1 MMOL/L (ref 3.6–5.5)
RBC # BLD AUTO: 2.43 M/UL (ref 4.2–5.4)
RBC BLD AUTO: PRESENT
SCHISTOCYTES BLD QL SMEAR: NORMAL
SODIUM SERPL-SCNC: 134 MMOL/L (ref 135–145)
WBC # BLD AUTO: 6.7 K/UL (ref 4.8–10.8)

## 2019-11-04 PROCEDURE — 99285 EMERGENCY DEPT VISIT HI MDM: CPT

## 2019-11-04 PROCEDURE — 83605 ASSAY OF LACTIC ACID: CPT

## 2019-11-04 PROCEDURE — 99220 PR INITIAL OBSERVATION CARE,LEVL III: CPT | Performed by: INTERNAL MEDICINE

## 2019-11-04 PROCEDURE — 94640 AIRWAY INHALATION TREATMENT: CPT

## 2019-11-04 PROCEDURE — G0378 HOSPITAL OBSERVATION PER HR: HCPCS

## 2019-11-04 PROCEDURE — 700105 HCHG RX REV CODE 258: Performed by: INTERNAL MEDICINE

## 2019-11-04 PROCEDURE — 87502 INFLUENZA DNA AMP PROBE: CPT

## 2019-11-04 PROCEDURE — 700101 HCHG RX REV CODE 250: Performed by: EMERGENCY MEDICINE

## 2019-11-04 PROCEDURE — 70450 CT HEAD/BRAIN W/O DYE: CPT

## 2019-11-04 PROCEDURE — 85025 COMPLETE CBC W/AUTO DIFF WBC: CPT

## 2019-11-04 PROCEDURE — A9270 NON-COVERED ITEM OR SERVICE: HCPCS | Performed by: INTERNAL MEDICINE

## 2019-11-04 PROCEDURE — 700102 HCHG RX REV CODE 250 W/ 637 OVERRIDE(OP): Performed by: INTERNAL MEDICINE

## 2019-11-04 PROCEDURE — 83880 ASSAY OF NATRIURETIC PEPTIDE: CPT

## 2019-11-04 PROCEDURE — 700101 HCHG RX REV CODE 250: Performed by: INTERNAL MEDICINE

## 2019-11-04 PROCEDURE — 71045 X-RAY EXAM CHEST 1 VIEW: CPT

## 2019-11-04 PROCEDURE — 96365 THER/PROPH/DIAG IV INF INIT: CPT

## 2019-11-04 PROCEDURE — 87040 BLOOD CULTURE FOR BACTERIA: CPT | Mod: 91

## 2019-11-04 PROCEDURE — 80048 BASIC METABOLIC PNL TOTAL CA: CPT

## 2019-11-04 PROCEDURE — 96366 THER/PROPH/DIAG IV INF ADDON: CPT

## 2019-11-04 PROCEDURE — 700111 HCHG RX REV CODE 636 W/ 250 OVERRIDE (IP): Performed by: INTERNAL MEDICINE

## 2019-11-04 RX ORDER — TIMOLOL MALEATE 5 MG/ML
1 SOLUTION/ DROPS OPHTHALMIC 2 TIMES DAILY
Status: DISCONTINUED | OUTPATIENT
Start: 2019-11-04 | End: 2019-11-05 | Stop reason: HOSPADM

## 2019-11-04 RX ORDER — NITROGLYCERIN 0.4 MG/1
0.4 TABLET SUBLINGUAL PRN
Status: DISCONTINUED | OUTPATIENT
Start: 2019-11-04 | End: 2019-11-04

## 2019-11-04 RX ORDER — LOSARTAN POTASSIUM 50 MG/1
100 TABLET ORAL EVERY EVENING
Status: DISCONTINUED | OUTPATIENT
Start: 2019-11-04 | End: 2019-11-05 | Stop reason: HOSPADM

## 2019-11-04 RX ORDER — LIDOCAINE 50 MG/G
1 PATCH TOPICAL
Status: DISCONTINUED | OUTPATIENT
Start: 2019-11-04 | End: 2019-11-05 | Stop reason: HOSPADM

## 2019-11-04 RX ORDER — CARVEDILOL 12.5 MG/1
12.5 TABLET ORAL 2 TIMES DAILY WITH MEALS
Status: DISCONTINUED | OUTPATIENT
Start: 2019-11-04 | End: 2019-11-05 | Stop reason: HOSPADM

## 2019-11-04 RX ORDER — HYDROCODONE BITARTRATE AND ACETAMINOPHEN 5; 325 MG/1; MG/1
1-2 TABLET ORAL EVERY 4 HOURS PRN
Status: DISCONTINUED | OUTPATIENT
Start: 2019-11-04 | End: 2019-11-05 | Stop reason: HOSPADM

## 2019-11-04 RX ORDER — BRIMONIDINE TARTRATE AND TIMOLOL MALEATE 2; 5 MG/ML; MG/ML
1 SOLUTION OPHTHALMIC 2 TIMES DAILY
Status: DISCONTINUED | OUTPATIENT
Start: 2019-11-04 | End: 2019-11-04

## 2019-11-04 RX ORDER — LABETALOL HYDROCHLORIDE 5 MG/ML
10 INJECTION, SOLUTION INTRAVENOUS EVERY 4 HOURS PRN
Status: DISCONTINUED | OUTPATIENT
Start: 2019-11-04 | End: 2019-11-05 | Stop reason: HOSPADM

## 2019-11-04 RX ORDER — BRIMONIDINE TARTRATE 2 MG/ML
1 SOLUTION/ DROPS OPHTHALMIC 2 TIMES DAILY
Status: DISCONTINUED | OUTPATIENT
Start: 2019-11-04 | End: 2019-11-05 | Stop reason: HOSPADM

## 2019-11-04 RX ORDER — PREGABALIN 75 MG/1
150 CAPSULE ORAL 3 TIMES DAILY
Status: DISCONTINUED | OUTPATIENT
Start: 2019-11-04 | End: 2019-11-05 | Stop reason: HOSPADM

## 2019-11-04 RX ORDER — BUPRENORPHINE 20 UG/H
20 PATCH TRANSDERMAL
Refills: 0 | COMMUNITY
Start: 2019-10-16

## 2019-11-04 RX ORDER — BRINZOLAMIDE 10 MG/ML
1 SUSPENSION/ DROPS OPHTHALMIC 3 TIMES DAILY
Status: DISCONTINUED | OUTPATIENT
Start: 2019-11-04 | End: 2019-11-05

## 2019-11-04 RX ORDER — ACETAMINOPHEN 325 MG/1
650 TABLET ORAL EVERY 6 HOURS PRN
Status: DISCONTINUED | OUTPATIENT
Start: 2019-11-04 | End: 2019-11-05 | Stop reason: HOSPADM

## 2019-11-04 RX ORDER — AMLODIPINE BESYLATE 5 MG/1
2.5 TABLET ORAL DAILY
Status: DISCONTINUED | OUTPATIENT
Start: 2019-11-05 | End: 2019-11-04

## 2019-11-04 RX ORDER — ONDANSETRON 2 MG/ML
4 INJECTION INTRAMUSCULAR; INTRAVENOUS EVERY 4 HOURS PRN
Status: DISCONTINUED | OUTPATIENT
Start: 2019-11-04 | End: 2019-11-04

## 2019-11-04 RX ORDER — POLYETHYLENE GLYCOL 3350 17 G/17G
1 POWDER, FOR SOLUTION ORAL
Status: DISCONTINUED | OUTPATIENT
Start: 2019-11-04 | End: 2019-11-05 | Stop reason: HOSPADM

## 2019-11-04 RX ORDER — AZITHROMYCIN 250 MG/1
500 TABLET, FILM COATED ORAL DAILY
Status: DISCONTINUED | OUTPATIENT
Start: 2019-11-04 | End: 2019-11-05 | Stop reason: HOSPADM

## 2019-11-04 RX ORDER — AMOXICILLIN 250 MG
2 CAPSULE ORAL 2 TIMES DAILY
Status: DISCONTINUED | OUTPATIENT
Start: 2019-11-05 | End: 2019-11-05 | Stop reason: HOSPADM

## 2019-11-04 RX ORDER — POTASSIUM CHLORIDE 20 MEQ/1
40 TABLET, EXTENDED RELEASE ORAL ONCE
Status: COMPLETED | OUTPATIENT
Start: 2019-11-04 | End: 2019-11-04

## 2019-11-04 RX ORDER — LATANOPROST 50 UG/ML
1 SOLUTION/ DROPS OPHTHALMIC
Status: DISCONTINUED | OUTPATIENT
Start: 2019-11-04 | End: 2019-11-05 | Stop reason: HOSPADM

## 2019-11-04 RX ORDER — ONDANSETRON 4 MG/1
4 TABLET, ORALLY DISINTEGRATING ORAL EVERY 4 HOURS PRN
Status: DISCONTINUED | OUTPATIENT
Start: 2019-11-04 | End: 2019-11-04

## 2019-11-04 RX ORDER — HEPARIN SODIUM 5000 [USP'U]/ML
5000 INJECTION, SOLUTION INTRAVENOUS; SUBCUTANEOUS EVERY 8 HOURS
Status: DISCONTINUED | OUTPATIENT
Start: 2019-11-04 | End: 2019-11-05 | Stop reason: HOSPADM

## 2019-11-04 RX ORDER — BISACODYL 10 MG
10 SUPPOSITORY, RECTAL RECTAL
Status: DISCONTINUED | OUTPATIENT
Start: 2019-11-04 | End: 2019-11-05 | Stop reason: HOSPADM

## 2019-11-04 RX ADMIN — AZITHROMYCIN 500 MG: 250 TABLET, FILM COATED ORAL at 22:06

## 2019-11-04 RX ADMIN — TIMOLOL MALEATE 1 DROP: 5 SOLUTION OPHTHALMIC at 22:25

## 2019-11-04 RX ADMIN — POTASSIUM CHLORIDE 40 MEQ: 1500 TABLET, EXTENDED RELEASE ORAL at 22:07

## 2019-11-04 RX ADMIN — LOSARTAN POTASSIUM 100 MG: 50 TABLET, FILM COATED ORAL at 22:08

## 2019-11-04 RX ADMIN — CEFTRIAXONE SODIUM 2 G: 2 INJECTION, POWDER, FOR SOLUTION INTRAMUSCULAR; INTRAVENOUS at 23:00

## 2019-11-04 RX ADMIN — LATANOPROST 1 DROP: 50 SOLUTION OPHTHALMIC at 22:12

## 2019-11-04 RX ADMIN — ALBUTEROL SULFATE 2.5 MG: 5 SOLUTION RESPIRATORY (INHALATION) at 18:13

## 2019-11-04 RX ADMIN — CARVEDILOL 12.5 MG: 12.5 TABLET, FILM COATED ORAL at 22:08

## 2019-11-04 RX ADMIN — PREGABALIN 150 MG: 150 CAPSULE ORAL at 22:05

## 2019-11-04 RX ADMIN — BRIMONIDINE TARTRATE 1 DROP: 2 SOLUTION OPHTHALMIC at 22:21

## 2019-11-04 ASSESSMENT — ENCOUNTER SYMPTOMS
PALPITATIONS: 0
SPUTUM PRODUCTION: 1
STRIDOR: 0
INSOMNIA: 0
FEVER: 0
DIZZINESS: 0
SHORTNESS OF BREATH: 1
WEIGHT LOSS: 0
CHILLS: 0
SEIZURES: 0
DEPRESSION: 0
ABDOMINAL PAIN: 0
NERVOUS/ANXIOUS: 0
NECK PAIN: 0
FOCAL WEAKNESS: 0
EYE REDNESS: 0
ORTHOPNEA: 0
EYE DISCHARGE: 0
EYE PAIN: 0
BACK PAIN: 0
HEADACHES: 0
VOMITING: 0
NAUSEA: 0
MYALGIAS: 0
DIARRHEA: 0
BLURRED VISION: 0
HEARTBURN: 0
COUGH: 1

## 2019-11-04 ASSESSMENT — LIFESTYLE VARIABLES
HOW MANY TIMES IN THE PAST YEAR HAVE YOU HAD 5 OR MORE DRINKS IN A DAY: 0
EVER_SMOKED: NEVER
AVERAGE NUMBER OF DAYS PER WEEK YOU HAVE A DRINK CONTAINING ALCOHOL: 0
TOTAL SCORE: 0
ALCOHOL_USE: NO
EVER FELT BAD OR GUILTY ABOUT YOUR DRINKING: NO
HAVE YOU EVER FELT YOU SHOULD CUT DOWN ON YOUR DRINKING: NO
DOES PATIENT WANT TO STOP DRINKING: NO
CONSUMPTION TOTAL: NEGATIVE
ON A TYPICAL DAY WHEN YOU DRINK ALCOHOL HOW MANY DRINKS DO YOU HAVE: 0
HAVE PEOPLE ANNOYED YOU BY CRITICIZING YOUR DRINKING: NO
EVER HAD A DRINK FIRST THING IN THE MORNING TO STEADY YOUR NERVES TO GET RID OF A HANGOVER: NO
TOTAL SCORE: 0
TOTAL SCORE: 0
EVER_SMOKED: NEVER

## 2019-11-04 ASSESSMENT — COPD QUESTIONNAIRES
DURING THE PAST 4 WEEKS HOW MUCH DID YOU FEEL SHORT OF BREATH: SOME OF THE TIME
HAVE YOU SMOKED AT LEAST 100 CIGARETTES IN YOUR ENTIRE LIFE: NO/DON'T KNOW
DO YOU EVER COUGH UP ANY MUCUS OR PHLEGM?: NO/ONLY WITH OCCASIONAL COLDS OR INFECTIONS
COPD SCREENING SCORE: 5

## 2019-11-04 NOTE — DISCHARGE PLANNING
Medical Social Work    SW was notified that pt was transported from [facility] ([phone]) and pt's motorized scooter was left at the facility. SW asked to assist w/ getting pt's motorized scooter to the hospital.    SW called [facility] and spoke w/ Yoli who reports that the pt had called and arranged for the [place] transport to  her scooter and bring it to her home in [place]. Yoli states that the [place] transport has already come and gone and pt's scooter is no longer at their facility.

## 2019-11-04 NOTE — ED TRIAGE NOTES
Pt BIBA from dialysis w/c/o weakness, productive green cough x3 days. Pt reporting subjective fever, wheezing. Pt received dialysis m w f. Pt arrives on 3L baseline O2 speaking clear sentences. Placed on monitor.

## 2019-11-04 NOTE — DISCHARGE PLANNING
Outpatient Dialysis Note    Confirmed patient is active at:    [facility]  [address] [place] [address]      Schedule: Monday, Wednesday, Friday  Time: 10:00 am    Spoke with Zamzam at facility who confirmed.    Patient had 3 hours of HD treatment today, 11/4/19.      Stacia Stephens- Dialysis Coordinator  [facility] # [phone]

## 2019-11-04 NOTE — CONSULTS
[facility] -  CONSULTATION NOTE               Author: Rakesh Sharp M.D. Date & Time: 11/4/2019  3:07 PM       REASON FOR CONSULTATION:   Inpatient hemodialysis management.    CHIEF COMPLAINT:   Shortness of breath    HISTORY OF PRESENT ILLNESS:    64-year-old female with a history of ESRD on dialysis Monday, Wednesday, Friday at [facility] who presented on request of dialysis unit for shortness of breath and hypertension.    Notes worsening shortness of breath over the last 24 hours.  Presented to dialysis today was noted to be 4 kg over her dry weight.  Underwent full dialysis treatment, 3L removed.  At the end of her treatment she was still hypertensive and because of her persistent shortness [facility] was called to transport the patient to the ED.  Mild a productive cough over the weekend, clear to yellowish in nature.  Some chills but noted fevers.  Has had pneumonia before denies similar feeling.  Denies chest pain.  No leg swelling.  Recent fistulogram, left upper extremity AV fistula working well recently.  Chronic transfusion dependent anemia last transfusion several ago,    In the ED initial /73. Infectious and CVA work-up initiated    REVIEW OF SYSTEMS:    General: No weakness, no malaise  HEENT: Chronic vision changes, no hearing changes  CV: No chest pain, no palpitations  Lungs: +cough; +shortness of breath  GI: No Nausea; no vomiting  : No dysuria or gross hematuria  MSK: +joint pain; no trauma  Skin: No rashes; no lesions  Neuro: No tremors; no LOC  Psych: No depression; no anxiety    PAST MEDICAL HISTORY:   Past Medical History:   Diagnosis Date   • Arthritis     hands    • Atrial fibrillation (HCC)     HX   • Blood transfusion without reported diagnosis    • Breath shortness     w/exertion   • Cancer (HCC)     MDS in bones   • Cataract     bilat IOL   • Chronic anemia    • Chronic kidney disease        • Chronic obstructive pulmonary disease (HCC)    •  "Congestive heart failure (HCC)    • Dental disorder     full dentures   • Diabetes     Diet controlled   • Diabetes (HCC)    • Dialysis patient     [facility] ,   [facility] [place]   • Glaucoma    • Heart abnormalities    • Hypertension    • MDS (myelodysplastic syndrome) 10/2016    bone marrow biopsy   • Pain     \"bones\", generalized, 5/10   • Renal disorder    • Stroke (HCC) 2015    No residual weakness/problems   • Supplemental oxygen dependent     2 liters         PAST SURGICAL HISTORY:   Past Surgical History:   Procedure Laterality Date   • GASTROSCOPY N/A 2018    Procedure: GASTROSCOPY;  Surgeon: Blanca Santos M.D.;  Location: [facility];  Service: Gastroenterology   • BONE MARROW BIOPSY, NDL/TROCAR  2017    Procedure: BONE MARROW BIOPSY, NDL/TROCAR;  Surgeon: Wade Turner M.D.;  Location: [facility];  Service: Orthopedics   • BONE MARROW ASPIRATION  2017    Procedure: BONE MARROW ASPIRATION;  Surgeon: Wade Turner M.D.;  Location: [facility];  Service: Orthopedics   • VEIN LIGATION Left 11/10/2016    Procedure: VEIN LIGATION FOR DISTAL REVASCULARIZATION AND INTERVAL LIGATION OF LEFT ARM DIALYISIS ACCESS (DRIL PROCEDURE);  Surgeon: Ranulfo Jolly M.D.;  Location: [facility];  Service:    • AV FISTULA CREATION Left 2016    Procedure: AV FISTULA CREATION UPPER EXTREMITY;  Surgeon: Ranulfo Jolly M.D.;  Location: [facility];  Service:    • GASTROSCOPY  2015    Procedure: ESOPHAGOGASTRODUODENOSCOPY WITH BIOPSY;  Surgeon: Wing Álvarez M.D.;  Location: [facility];  Service:    • COLONOSCOPY  2015    Procedure: COLONOSCOPY;  Surgeon: Wing Álvarez M.D.;  Location: [facility];  Service:    • GYN SURGERY      hysterectomy   • GYN SURGERY       x 2   • GYN SURGERY      d&C x2   • OTHER      angioplasty/ stents bilat LE   • OTHER ABDOMINAL SURGERY      " "appendectomy,  x 2, hysterectomy, D & C   • OTHER ABDOMINAL SURGERY      appendectomy   • OTHER CARDIAC SURGERY      Angioplasty  and    • OTHER CARDIAC SURGERY      cardiac angiogram, angioplasty   • RETINAL DETACHMENT REPAIR Right        FAMILY HISTORY:   Reviewed and non contributory to current illness    SOCIAL HISTORY:   No smoking, no etoh, no illicit drugs.    HOME MEDICATIONS:   Reviewed and documented in chart    ALLERGIES:  Lenalidomide; Naprosyn [naproxen]; Pioglitazone; Simvastatin; Demerol; Diphenhydramine; Glipizide; Hydromorphone; Iron; Metformin; Morphine; Multivitamin; Ondansetron; Other drug; Oxycodone; Pcn [penicillins]; Propoxyphene; Requip; Sulfa drugs; Tamsulosin; Tramadol; and Trazodone    PHYSICAL EXAM:  VS:  BP (!) 192/73   Temp 36.5 °C (97.7 °F) (Temporal)   Ht 1.422 m (4' 8\")   Wt 66.5 kg (146 lb 9.7 oz)   LMP 1995   BMI 32.87 kg/m²   GENERAL: Lying in bed, no acute distress  HEAD: Normocephalic, atraumatic  EYES: no scleral icterus; normal conjunctiva  NECK: Supple; trachea midline  CV: Iirregular, S1 and S2 present  LUNGS: Decreased bilateral bases No rhales or wheezes  ABDOMEN: Soft, non-tender  EXTREMETIES: trace lower extremity edema, no clubbing or cyanosis  SKIN: Warm and dry, no rashes  NEURO: A&O, no focal deficits  PSYCH: Cooperative, appropriate mood and affect  ACCESS: Left upper extremity AV fistula without surrounding erythema or edema    LABS:  No results found for this or any previous visit (from the past 24 hour(s)).    (click the triangle to expand results)    IMAGING:  DX-CHEST-PORTABLE (1 VIEW)    (Results Pending)       ASSESSMENT:  # ESRD: normally M/W/F via. AVF.   # HTN: Difficult to control, volume driven  # Hypervolemia  # Anemia: Today factorial secondary to myelodysplastic syndrome and CKD.  transfusion dependent  # CKD-MBD  # Coronary artery disease  # Atrial fibrillation  # Diabetes  # Peripheral vascular disease  # COPD  # " Congestive heart failure    PLAN:  -No iHD today, plan for HD tomorrow and then to continue M/W/F during stay  -consider nicardipine drip if unable to control BP with oral meds  -Dose all meds for ESRD  -Renal Diet  -Continue home phos binders  -Epogen 10,000 units with dialysis    Thank you for this consult, we will continue to follow.    Rakesh Sharp MD

## 2019-11-05 ENCOUNTER — PATIENT OUTREACH (OUTPATIENT)
Dept: HEALTH INFORMATION MANAGEMENT | Facility: OTHER | Age: 64
End: 2019-11-05

## 2019-11-05 VITALS
RESPIRATION RATE: 18 BRPM | SYSTOLIC BLOOD PRESSURE: 115 MMHG | BODY MASS INDEX: 30.64 KG/M2 | OXYGEN SATURATION: 100 % | DIASTOLIC BLOOD PRESSURE: 51 MMHG | HEART RATE: 62 BPM | HEIGHT: 58 IN | WEIGHT: 145.94 LBS | TEMPERATURE: 97.1 F

## 2019-11-05 PROBLEM — E87.6 HYPOKALEMIA: Status: RESOLVED | Noted: 2019-11-04 | Resolved: 2019-11-05

## 2019-11-05 LAB
ALBUMIN SERPL BCP-MCNC: 3.5 G/DL (ref 3.2–4.9)
ALBUMIN/GLOB SERPL: 1 G/DL
ALP SERPL-CCNC: 90 U/L (ref 30–99)
ALT SERPL-CCNC: 32 U/L (ref 2–50)
ANION GAP SERPL CALC-SCNC: 13 MMOL/L (ref 0–11.9)
APPEARANCE UR: ABNORMAL
AST SERPL-CCNC: 31 U/L (ref 12–45)
BACTERIA #/AREA URNS HPF: ABNORMAL /HPF
BILIRUB SERPL-MCNC: 0.4 MG/DL (ref 0.1–1.5)
BILIRUB UR QL STRIP.AUTO: NEGATIVE
BUN SERPL-MCNC: 36 MG/DL (ref 8–22)
CALCIUM SERPL-MCNC: 8 MG/DL (ref 8.5–10.5)
CHLORIDE SERPL-SCNC: 94 MMOL/L (ref 96–112)
CO2 SERPL-SCNC: 27 MMOL/L (ref 20–33)
COLOR UR: YELLOW
CREAT SERPL-MCNC: 5.87 MG/DL (ref 0.5–1.4)
EPI CELLS #/AREA URNS HPF: ABNORMAL /HPF
ERYTHROCYTE [DISTWIDTH] IN BLOOD BY AUTOMATED COUNT: 74.9 FL (ref 35.9–50)
GLOBULIN SER CALC-MCNC: 3.6 G/DL (ref 1.9–3.5)
GLUCOSE SERPL-MCNC: 258 MG/DL (ref 65–99)
GLUCOSE UR STRIP.AUTO-MCNC: 500 MG/DL
HAV IGM SERPL QL IA: NEGATIVE
HBV CORE IGM SER QL: NEGATIVE
HBV SURFACE AB SERPL IA-ACNC: 58.64 MIU/ML (ref 0–10)
HBV SURFACE AG SER QL: NEGATIVE
HCT VFR BLD AUTO: 24.9 % (ref 37–47)
HCV AB SER QL: NEGATIVE
HGB BLD-MCNC: 7.9 G/DL (ref 12–16)
HYALINE CASTS #/AREA URNS LPF: ABNORMAL /LPF
KETONES UR STRIP.AUTO-MCNC: NEGATIVE MG/DL
LEUKOCYTE ESTERASE UR QL STRIP.AUTO: NEGATIVE
MCH RBC QN AUTO: 33.9 PG (ref 27–33)
MCHC RBC AUTO-ENTMCNC: 31.7 G/DL (ref 33.6–35)
MCV RBC AUTO: 106.9 FL (ref 81.4–97.8)
MICRO URNS: ABNORMAL
NITRITE UR QL STRIP.AUTO: NEGATIVE
PH UR STRIP.AUTO: 7 [PH] (ref 5–8)
PHOSPHATE SERPL-MCNC: 5.7 MG/DL (ref 2.5–4.5)
PLATELET # BLD AUTO: 120 K/UL (ref 164–446)
PMV BLD AUTO: 13.7 FL (ref 9–12.9)
POTASSIUM SERPL-SCNC: 4 MMOL/L (ref 3.6–5.5)
PROT SERPL-MCNC: 7.1 G/DL (ref 6–8.2)
PROT UR QL STRIP: >=1000 MG/DL
RBC # BLD AUTO: 2.33 M/UL (ref 4.2–5.4)
RBC # URNS HPF: ABNORMAL /HPF
RBC UR QL AUTO: ABNORMAL
SODIUM SERPL-SCNC: 134 MMOL/L (ref 135–145)
SP GR UR STRIP.AUTO: 1.01
UROBILINOGEN UR STRIP.AUTO-MCNC: 0.2 MG/DL
WBC # BLD AUTO: 6 K/UL (ref 4.8–10.8)
WBC #/AREA URNS HPF: ABNORMAL /HPF

## 2019-11-05 PROCEDURE — 81001 URINALYSIS AUTO W/SCOPE: CPT

## 2019-11-05 PROCEDURE — G0378 HOSPITAL OBSERVATION PER HR: HCPCS

## 2019-11-05 PROCEDURE — 700105 HCHG RX REV CODE 258: Performed by: INTERNAL MEDICINE

## 2019-11-05 PROCEDURE — 84100 ASSAY OF PHOSPHORUS: CPT

## 2019-11-05 PROCEDURE — A9270 NON-COVERED ITEM OR SERVICE: HCPCS | Performed by: INTERNAL MEDICINE

## 2019-11-05 PROCEDURE — 700102 HCHG RX REV CODE 250 W/ 637 OVERRIDE(OP): Performed by: INTERNAL MEDICINE

## 2019-11-05 PROCEDURE — 85027 COMPLETE CBC AUTOMATED: CPT

## 2019-11-05 PROCEDURE — 700111 HCHG RX REV CODE 636 W/ 250 OVERRIDE (IP): Performed by: INTERNAL MEDICINE

## 2019-11-05 PROCEDURE — 80053 COMPREHEN METABOLIC PANEL: CPT

## 2019-11-05 PROCEDURE — 90935 HEMODIALYSIS ONE EVALUATION: CPT

## 2019-11-05 PROCEDURE — 86706 HEP B SURFACE ANTIBODY: CPT

## 2019-11-05 PROCEDURE — 80074 ACUTE HEPATITIS PANEL: CPT

## 2019-11-05 PROCEDURE — 99217 PR OBSERVATION CARE DISCHARGE: CPT | Performed by: INTERNAL MEDICINE

## 2019-11-05 RX ORDER — DOXYCYCLINE 100 MG/1
100 CAPSULE ORAL 2 TIMES DAILY
Qty: 10 CAP | Refills: 0 | Status: SHIPPED | OUTPATIENT
Start: 2019-11-05 | End: 2019-11-10

## 2019-11-05 RX ADMIN — PREGABALIN 150 MG: 150 CAPSULE ORAL at 06:20

## 2019-11-05 RX ADMIN — TIMOLOL MALEATE 1 DROP: 5 SOLUTION OPHTHALMIC at 06:20

## 2019-11-05 RX ADMIN — PREGABALIN 150 MG: 150 CAPSULE ORAL at 13:24

## 2019-11-05 RX ADMIN — BRIMONIDINE TARTRATE 1 DROP: 2 SOLUTION OPHTHALMIC at 06:25

## 2019-11-05 RX ADMIN — CEFTRIAXONE SODIUM 2 G: 2 INJECTION, POWDER, FOR SOLUTION INTRAMUSCULAR; INTRAVENOUS at 13:26

## 2019-11-05 NOTE — CARE PLAN
Problem: HEMODYNAMIC STATUS  Goal: Stable Vital Signs and Fluid Balance  Outcome: MET     Problem: Respiratory:  Goal: Respiratory status will improve  Outcome: MET     Problem: Infection  Goal: Will remain free from infection  Outcome: MET

## 2019-11-05 NOTE — DISCHARGE PLANNING
Care Transition Team Assessment    Spoke with patient at bedside. Lives in [facility]. Uses Motorized scooter, has W/C and walker. Does dialysis MWF @ [facility] on [address]. Uses [employer] [employer] in [place]. Friend should be ride @ D/C, Anticipate no needs @ present time.     Information Source  Orientation : Oriented x 4  Information Given By: Patient    Readmission Evaluation  Is this a readmission?: No    Interdisciplinary Discharge Planning  Primary Care Physician: Nyla Nava  Lives with - Patient's Self Care Capacity: Alone and Able to Care For Self  Patient or legal guardian wants to designate a caregiver (see row info): No  Support Systems: Friends / Neighbors  Housing / Facility: 1 Story Apartment / Condo  Do You Take your Prescribed Medications Regularly: Yes  Able to Return to Previous ADL's: Yes  Mobility Issues: Yes  Prior Services: Home-Independent  Patient Expects to be Discharged to:: Home  Assistance Needed: No  Durable Medical Equipment: Home Oxygen, Walker, Other - Specify(Scooter, W/C.)    Discharge Preparedness  What are your discharge supports?: Other (comment)(Friends)  Prior Functional Level: Uses Wheelchair, Other (Comments)(Motorized Scooter)    Functional Assesment  Prior Functional Level: Uses Wheelchair, Other (Comments)(Motorized Scooter)    Finances  Prescription Coverage: Yes    Discharge Risks or Barriers  Patient risk factors: Lack of outside supports    Anticipated Discharge Information  Anticipated discharge disposition: Home  [facility]  Discharge Contact Phone Number: [phone]

## 2019-11-05 NOTE — ASSESSMENT & PLAN NOTE
Blood pressures quite high in ER  Continue amlodipine, carvedilol, losartan  IV PRN medication with parameters

## 2019-11-05 NOTE — DISCHARGE INSTRUCTIONS
Discharge Instructions per GENEVIEVE Hickey    Please take all medications as prescribed and in completion    FOLLOW-UP: Please follow-up with your PCP in 1 week. Please go to your dialysis appointment tomorrow as scheduled.    DIET: Renal diet.     ACTIVITY: As tolerated.     DIAGNOSIS: Acute Bronchitis.     Return to ER if symptoms recur or you experience chest pain, confusion, difficulty breathing or loss of consciousness.    Discharge Instructions    Discharged to home by car with friend. Discharged via walking, hospital escort: Yes.  Special equipment needed: Not Applicable    Be sure to schedule a follow-up appointment with your primary care doctor or any specialists as instructed.     Discharge Plan:   Diet Plan: Discussed  Activity Level: Discussed  Confirmed Follow up Appointment: Patient to Call and Schedule Appointment  Confirmed Symptoms Management: Discussed  Medication Reconciliation Updated: Yes  Influenza Vaccine Indication: Not indicated: Previously immunized this influenza season and > 8 years of age    I understand that a diet low in cholesterol, fat, and sodium is recommended for good health. Unless I have been given specific instructions below for another diet, I accept this instruction as my diet prescription.   Other diet: Renal Diet    Special Instructions: None    · Is patient discharged on Warfarin / Coumadin?   No     Depression / Suicide Risk    As you are discharged from this [facility] facility, it is important to learn how to keep safe from harming yourself.    Recognize the warning signs:  · Abrupt changes in personality, positive or negative- including increase in energy   · Giving away possessions  · Change in eating patterns- significant weight changes-  positive or negative  · Change in sleeping patterns- unable to sleep or sleeping all the time   · Unwillingness or inability to communicate  · Depression  · Unusual sadness, discouragement and loneliness  · Talk of  wanting to die  · Neglect of personal appearance   · Rebelliousness- reckless behavior  · Withdrawal from people/activities they love  · Confusion- inability to concentrate     If you or a loved one observes any of these behaviors or has concerns about self-harm, here's what you can do:  · Talk about it- your feelings and reasons for harming yourself  · Remove any means that you might use to hurt yourself (examples: pills, rope, extension cords, firearm)  · Get professional help from the community (Mental Health, Substance Abuse, psychological counseling)  · Do not be alone:Call your Safe Contact- someone whom you trust who will be there for you.  · Call your local CRISIS HOTLINE 705-8216 or 406-679-3626  · Call your local Children's Mobile Crisis Response Team [place] (617) 857-3391 or www.[employer]  · Call the toll free National Suicide Prevention Hotlines   · National Suicide Prevention Lifeline 063-319-XAAI (5642)  · [employer] Line Network 800-SUICIDE (696-1858)            Cough, Adult  Introduction  A cough helps to clear your throat and lungs. A cough may last only 2-3 weeks (acute), or it may last longer than 8 weeks (chronic). Many different things can cause a cough. A cough may be a sign of an illness or another medical condition.  Follow these instructions at home:  · Pay attention to any changes in your cough.  · Take medicines only as told by your doctor.  ¨ If you were prescribed an antibiotic medicine, take it as told by your doctor. Do not stop taking it even if you start to feel better.  ¨ Talk with your doctor before you try using a cough medicine.  · Drink enough fluid to keep your pee (urine) clear or pale yellow.  · If the air is dry, use a cold steam vaporizer or humidifier in your home.  · Stay away from things that make you cough at work or at home.  · If your cough is worse at night, try using extra pillows to raise your head up higher while you sleep.  · Do not smoke, and  try not to be around smoke. If you need help quitting, ask your doctor.  · Do not have caffeine.  · Do not drink alcohol.  · Rest as needed.  Contact a doctor if:  · You have new problems (symptoms).  · You cough up yellow fluid (pus).  · Your cough does not get better after 2-3 weeks, or your cough gets worse.  · Medicine does not help your cough and you are not sleeping well.  · You have pain that gets worse or pain that is not helped with medicine.  · You have a fever.  · You are losing weight and you do not know why.  · You have night sweats.  Get help right away if:  · You cough up blood.  · You have trouble breathing.  · Your heartbeat is very fast.  This information is not intended to replace advice given to you by your health care provider. Make sure you discuss any questions you have with your health care provider.  Document Released: 08/30/2012 Document Revised: 05/25/2017 Document Reviewed: 02/24/2016  © 2017 Elsevier  .

## 2019-11-05 NOTE — PROGRESS NOTES
Pt into unit from ED via gurney transported by this RN. Pt is AOx4, ambulatory w/ semi unsteady gait, use of assistive device, front wheel walker utilized, pt normally uses single point cane, productive cough noted, right chest port intact. Patient oriented to unit, room and BR location. Weight and VS taken. Admit profile and initial assessment done. Plan of care discussed/reviewed w/ patient, verbalizes understanding. Safety and comfort measures provided. Fall precautions in place. Patient questions answered at this time. Encouraged to verbalize feelings and needs. Call light within reach. Will continue to assess and monitor.

## 2019-11-05 NOTE — ED PROVIDER NOTES
"ED Provider Note    CHIEF COMPLAINT  Chief Complaint   Patient presents with   • Weakness   • Cough       HPI  Vielka Khadijah Briscoe is a 64 y.o. female with end-stage renal disease on hemodialysis who had dialysis earlier today who presents complaining of generalized weakness and cough.  Dialysis session was terminated 45 minutes early secondary to patient's complaints of not feeling well.    Patient states \"I have a cold.\"  Patient reports generalized weakness, cough, and shortness of breath since Saturday.  She states she is felt dizzy and is having to hold onto things while walking.  She reports shortness of breath.  She denies chest pain, syncope, rash, sick contacts.      ALLERGIES  Allergies   Allergen Reactions   • Lenalidomide Unspecified     Beeping Pulse     • Naprosyn [Naproxen] Hives   • Pioglitazone Unspecified     Causes blindness   • Simvastatin Unspecified     Couldn't move   • Demerol Vomiting   • Diphenhydramine Vomiting   • Glipizide Vomiting   • Hydromorphone Vomiting   • Iron Vomiting     vomiting   • Metformin Vomiting   • Morphine Vomiting   • Multivitamin Itching     itching   • Ondansetron Itching   • Other Drug Rash and Vomiting     Any binders that remove phosphorus from the body such as tums   • Oxycodone Vomiting   • Pcn [Penicillins] Vomiting          • Propoxyphene Vomiting   • Requip Vomiting   • Sulfa Drugs Rash and Vomiting     Vomiting & rash      • Tamsulosin Vomiting   • Tramadol Vomiting   • Trazodone Vomiting       CURRENT MEDICATIONS  Home Medications    **Home medications have not yet been reviewed for this encounter**         PAST MEDICAL HISTORY   has a past medical history of Arthritis, Atrial fibrillation (HCC), Blood transfusion without reported diagnosis, Breath shortness, Cancer (HCC), Cataract, Chronic anemia, Chronic kidney disease, Chronic obstructive pulmonary disease (HCC), Congestive heart failure (HCC), Dental disorder, Diabetes, Diabetes (HCC), Dialysis " "patient, Glaucoma, Heart abnormalities, Hypertension, MDS (myelodysplastic syndrome) (10/2016), Pain (-2017), Renal disorder, Stroke (HCC) (03/2015), and Supplemental oxygen dependent.    SURGICAL HISTORY   has a past surgical history that includes other cardiac surgery; other abdominal surgery; gyn surgery; gyn surgery; gyn surgery; other cardiac surgery; other; retinal detachment repair (Right); av fistula creation (Left, 2/8/2016); other abdominal surgery; gastroscopy (12/17/2015); colonoscopy (12/17/2015); vein ligation (Left, 11/10/2016); bone marrow biopsy, ndl/trocar (9/20/2017); bone marrow aspiration (9/20/2017); and gastroscopy (N/A, 1/25/2018).    SOCIAL HISTORY  Social History     Tobacco Use   • Smoking status: Never Smoker   • Smokeless tobacco: Never Used   Substance and Sexual Activity   • Alcohol use: No   • Drug use: No   • Sexual activity: Not on file       REVIEW OF SYSTEMS  See HPI for further details.  All other systems are negative except as above in HPI.      PHYSICAL EXAM  VITAL SIGNS: BP (!) 177/66   Pulse 68   Temp 36.5 °C (97.7 °F) (Temporal)   Resp 16   Ht 1.422 m (4' 8\")   Wt 66.5 kg (146 lb 9.7 oz)   LMP 01/01/1995   SpO2 100%   BMI 32.87 kg/m²     General:  WDWN, nontoxic but chronically ill appearing in NAD; A+Ox3; V/S as above; afebrile, hypertensive  Skin: warm and dry; good color; no rash  HEENT: NCAT; EOMs intact; no scleral icterus   Neck: FROM; no meningismus; no JVD  Cardiovascular: Regular heart rate and rhythm.  No murmurs, rubs, or gallops; pulses 2+ bilaterally radially  Lungs: Clear to auscultation with good air movement bilaterally.  No wheezes, rhonchi, or rales.   Abdomen: BS present; soft; NTND; no rebound, guarding, or rigidity.  No organomegaly or pulsatile mass  Extremities: BARBOSA x 4; no e/o trauma; no pedal edema; neg Kg's  Neurologic: CNs III-XII grossly intact; speech clear; distal sensation intact; strength 5/5 UE/LEs  Psychiatric: Appropriate " affect, normal mood    LABS  Results for orders placed or performed during the hospital encounter of 11/04/19   CBC w/ Differential   Result Value Ref Range    WBC 6.7 4.8 - 10.8 K/uL    RBC 2.43 (L) 4.20 - 5.40 M/uL    Hemoglobin 8.3 (L) 12.0 - 16.0 g/dL    Hematocrit 25.0 (L) 37.0 - 47.0 %    .9 (H) 81.4 - 97.8 fL    MCH 34.2 (H) 27.0 - 33.0 pg    MCHC 33.2 (L) 33.6 - 35.0 g/dL    RDW 72.3 (H) 35.9 - 50.0 fL    Platelet Count 127 (L) 164 - 446 K/uL    MPV 13.8 (H) 9.0 - 12.9 fL    Neutrophils-Polys 67.30 44.00 - 72.00 %    Lymphocytes 13.30 (L) 22.00 - 41.00 %    Monocytes 13.00 0.00 - 13.40 %    Eosinophils 5.40 0.00 - 6.90 %    Basophils 0.30 0.00 - 1.80 %    Immature Granulocytes 0.70 0.00 - 0.90 %    Nucleated RBC 0.00 /100 WBC    Neutrophils (Absolute) 4.52 2.00 - 7.15 K/uL    Lymphs (Absolute) 0.89 (L) 1.00 - 4.80 K/uL    Monos (Absolute) 0.87 (H) 0.00 - 0.85 K/uL    Eos (Absolute) 0.36 0.00 - 0.51 K/uL    Baso (Absolute) 0.02 0.00 - 0.12 K/uL    Immature Granulocytes (abs) 0.05 0.00 - 0.11 K/uL    NRBC (Absolute) 0.00 K/uL    Anisocytosis 1+     Macrocytosis 1+    proBrain Natriuretic Peptide, NT   Result Value Ref Range    NT-proBNP 5609 (H) 0 - 125 pg/mL   Influenza A/B By PCR (Adult - Flu Only)   Result Value Ref Range    Influenza virus A RNA Negative Negative    Influenza virus B, PCR Negative Negative   LACTIC ACID   Result Value Ref Range    Lactic Acid 1.1 0.5 - 2.0 mmol/L   Basic Metabolic Panel   Result Value Ref Range    Sodium 134 (L) 135 - 145 mmol/L    Potassium 3.1 (L) 3.6 - 5.5 mmol/L    Chloride 92 (L) 96 - 112 mmol/L    Co2 30 20 - 33 mmol/L    Glucose 185 (H) 65 - 99 mg/dL    Bun 24 (H) 8 - 22 mg/dL    Creatinine 4.45 (H) 0.50 - 1.40 mg/dL    Calcium 8.3 (L) 8.5 - 10.5 mg/dL    Anion Gap 12.0 (H) 0.0 - 11.9   PERIPHERAL SMEAR REVIEW   Result Value Ref Range    Peripheral Smear Review see below    PLATELET ESTIMATE   Result Value Ref Range    Plt Estimation Decreased    MORPHOLOGY    Result Value Ref Range    RBC Morphology Present     Polychromia 1+     Poikilocytosis 1+     Ovalocytes 1+     Schistocytes 1+    DIFFERENTIAL COMMENT   Result Value Ref Range    Comments-Diff see below    ESTIMATED GFR   Result Value Ref Range    GFR If  12 (A) >60 mL/min/1.73 m 2    GFR If Non African American 10 (A) >60 mL/min/1.73 m 2         IMAGING  CT-HEAD W/O   Final Result      1.  White matter lucencies most consistent with small vessel ischemic change versus demyelination or gliosis.      2.  Otherwise, Head CT without contrast with no acute findings. No evidence of acute cerebral  hemorrhage or mass lesion.      DX-CHEST-PORTABLE (1 VIEW)   Final Result      No acute cardiac or pulmonary abnormality is noted.          MEDICAL RECORD  I have reviewed patient's medical record and pertinent results are listed below.      COURSE & MEDICAL DECISION MAKING  I have reviewed any medical record information, laboratory studies and radiographic results as noted.    Vielka Briscoe is a 64 y.o. female with end-stage renal disease on dialysis who presents complaining of generalized weakness, cough, dizziness, and shortness of breath.  She did have dialysis today and is nontoxic-appearing and afebrile.  She was hypertensive at triage.    Differential diagnosis includes but is not limited to viral illness, bacteremia, sepsis, pneumonia, anemia, orthostatic hypotension, dehydration, intracranial hemorrhage.      Chest x-ray, blood work, blood cultures, lactic acid, and urinalysis were ordered.    Chest x-ray and CT head demonstrate no acute pathology.      Lactic acid is 1.1.  White blood cell count is normal, hemoglobin low at 8.3 but consistent with chronic kidney disease, BNP elevated to 5600, potassium low at 3.1 which could be contributing to the patient's generalized weakness.    Nebulizer treatment ordered for wheezing auscultated on exam.  This may be COPD exacerbation versus  CHF/cardiac wheeze but no pulmonary edema was noted on the chest x-ray.    Dr. Bedolla spoke with the unit clerk who relayed the message that he had seen the patient and did not feel she needed urgent dialysis and was not volume overloaded.    6:19 PM  I discussed the case with Dr. Mace who agrees to admit the patient.       FINAL IMPRESSION  1. Cough     2. Generalized weakness     3. Dizziness           Electronically signed by: Nat [place], 11/4/2019 4:04 PM

## 2019-11-05 NOTE — ASSESSMENT & PLAN NOTE
Likely related to and staying in disease  Echo in 2018 showed LVEF 75%  Monitor closely for volume status  Patient is on 3 L of oxygen which is her baseline

## 2019-11-05 NOTE — DISCHARGE SUMMARY
Discharge Summary    CHIEF COMPLAINT ON ADMISSION  Chief Complaint   Patient presents with   • Weakness   • Cough       Reason for Admission  EMS 59     Admission Date  11/4/2019    CODE STATUS  Full Code    HPI & HOSPITAL COURSE  Vielka Briscoe is a 64 y.o. female with past medical history of end-stage renal disease on hemodialysis Monday Wednesday and Friday, essential hypertension, chronic pain syndrome, who presented 11/4/2019 with worsening shortness of breath since this past Friday.  Patient went to dialysis today and only finished about 45 minutes of dialysis and she was transferred here to ER due to her ongoing shortness of breath.  The patient use 3 L of oxygen at home.  In addition she complained about cough with yellowish sputum production associated with chills.  She also complained about worsening weakness over the past few days.  In the ER nephrology was consulted and recommended that she does not need any urgent hemodialysis. She was found to have acute bronchitis in the ER. She was admitted to the CDU for hemodialysis, RT protocol and close monitoring. On the morning of discharge, patient reported no dyspnea, chest pain, or SOB. She was given azithromycin for acute bronchitis and tolerated well. A Hgb of 7.8 was noted on CBC, but no transfusion was indicated as patient had h/o chronic anemia and showed no signs of bleeding. She was hemodynamically stable and on baseline O2 requirements. For this reason, she was discharged after hemodialysis with instructions to follow-up with PCP in 1 week and present to her HD appointment tomorrow as scheduled.       Therefore, she is discharged in good and stable condition to home with close outpatient follow-up.      Discharge Date  11/05/2019    FOLLOW UP ITEMS POST DISCHARGE  Follow-up with PCP in 1 week    DISCHARGE DIAGNOSES  Active Problems:    Essential hypertension POA: Yes      Overview:           Normocytic anemia POA: Yes    ESRD (end stage  renal disease) on dialysis (HCC) POA: Yes    Elevated brain natriuretic peptide (BNP) level POA: Yes    Acute bronchitis POA: Yes    Thrombocytopenia (HCC) POA: Yes  Resolved Problems:    Hypokalemia POA: Yes      FOLLOW UP  Future Appointments   Date Time Provider Department Center   11/12/2019 10:30 AM GENEVIEVE [facility] None   11/14/2019  9:45 AM [facility] [address]   11/16/2019  9:30 AM [facility] [address]   12/5/2019  9:00 AM [facility] [address]   12/7/2019  9:30 AM [facility] [address]   12/26/2019  9:45 AM [facility] [address]   12/28/2019  9:30 AM [facility] [address]     Nyla Nava M.D.  [id number] [id number]  [phone]    Go on 11/13/2019  Please arrive at 1:30Pm for your hospital follow up appointment. Thank you.      MEDICATIONS ON DISCHARGE     Medication List      START taking these medications      Instructions   doxycycline 100 MG capsule  Commonly known as:  MONODOX   Take 1 Cap by mouth 2 times a day for 5 days.  Dose:  100 mg        CONTINUE taking these medications      Instructions   brinzolamide 1 % Susp  Commonly known as:  AZOPT   Place 1 Drop in both eyes 2 Times a Day.  Dose:  1 Drop     Buprenorphine 20 MCG/HR Ptwk   Apply 20 mcg to affected area(s) every Monday.  Dose:  20 mcg     carvedilol 12.5 MG Tabs  Commonly known as:  COREG   Take 1 Tab by mouth 2 times a day, with meals.  Dose:  12.5 mg     COMBIGAN 0.2-0.5 % Soln  Generic drug:  Brimonidine Tartrate-Timolol   Place 1 Drop in both eyes 2 Times a Day.  Dose:  1 Drop     lidocaine 5 % Ptch  Commonly known as:  LIDODERM   Apply 1 Patch to skin as directed every bedtime.  Dose:  1 Patch     losartan 100 MG Tabs  Commonly known as:  COZAAR   Take 100 mg by mouth every evening.  Dose:  100 mg     LUMIGAN 0.01 % Soln  Generic drug:  bimatoprost   Place 1 Drop in both eyes every bedtime.  Dose:  1 Drop      LYRICA 150 MG Caps  Generic drug:  pregabalin   Take 150 mg by mouth 3 times a day.  Dose:  150 mg     NORCO 5-325 MG Tabs per tablet  Generic drug:  HYDROcodone-acetaminophen   Take 1 Tab by mouth 2 times a day as needed. Indications: Pain  Dose:  1 Tab     VOLTAREN 1 % Gel  Generic drug:  Diclofenac Sodium   Apply  to skin as directed every morning.            Allergies  Allergies   Allergen Reactions   • Lenalidomide Unspecified     Beeping Pulse     • Naprosyn [Naproxen] Hives   • Pioglitazone Unspecified     Causes blindness   • Simvastatin Unspecified     Couldn't move   • Demerol Vomiting   • Diphenhydramine Vomiting   • Glipizide Vomiting   • Hydromorphone Vomiting   • Iron Vomiting     vomiting   • Metformin Vomiting   • Morphine Vomiting   • Multivitamin Itching     itching   • Ondansetron Itching   • Other Drug Rash and Vomiting     Any binders that remove phosphorus from the body such as tums   • Oxycodone Vomiting   • Pcn [Penicillins] Vomiting          • Propoxyphene Vomiting   • Requip Vomiting   • Sulfa Drugs Rash and Vomiting     Vomiting & rash      • Tamsulosin Vomiting   • Tramadol Vomiting   • Trazodone Vomiting       DIET  Orders Placed This Encounter   Procedures   • Diet Order Renal     Standing Status:   Standing     Number of Occurrences:   1     Order Specific Question:   Diet:     Answer:   Renal [8]       ACTIVITY  As tolerated.  Weight bearing as tolerated    CONSULTATIONS  Nephrology    PROCEDURES  None    LABORATORY  Lab Results   Component Value Date    SODIUM 134 (L) 11/05/2019    POTASSIUM 4.0 11/05/2019    CHLORIDE 94 (L) 11/05/2019    CO2 27 11/05/2019    GLUCOSE 258 (H) 11/05/2019    BUN 36 (H) 11/05/2019    CREATININE 5.87 (HH) 11/05/2019    CREATININE 0.6 07/20/2007        Lab Results   Component Value Date    WBC 6.0 11/05/2019    HEMOGLOBIN 7.9 (L) 11/05/2019    HEMATOCRIT 24.9 (L) 11/05/2019    PLATELETCT 120 (L) 11/05/2019        Total time of the discharge process  exceeds 35 minutes.

## 2019-11-05 NOTE — PROGRESS NOTES
Hemodialysis (PU[facility]) ordered by Dr. Sharp. Treatment started at 0935 and ended at 1205 d/t pt c/o cramping AKIKO LE. Dr. Sharp notified and ok to stop tx. 32 mins early. Net UF 2.38 L. Reported to ENMA Mota RN. Lt ua avf dressing clean, dry, intact.

## 2019-11-05 NOTE — PROGRESS NOTES
Dr. Denise notified of pt's Hgb level from 8.3 to 7.9. No new orders at this time. Per Dr. Denise to pass on to day shift hospitalist.

## 2019-11-05 NOTE — PROGRESS NOTES
"[facility] -  PROGRESS NOTE               Author: Rakesh Sharp M.D. Date & Time: 11/5/2019  7:52 AM     HPI:  64-year-old female with a history of ESRD on dialysis Monday, Wednesday, Friday at [facility] who presented on request of dialysis unit for shortness of breath and hypertension.     Notes worsening shortness of breath over the last 24 hours.  Presented to dialysis today was noted to be 4 kg over her dry weight.  Underwent full dialysis treatment, 3L removed.  At the end of her treatment she was still hypertensive and because of her persistent shortness [facility] was called to transport the patient to the ED.  Mild a productive cough over the weekend, clear to yellowish in nature.  Some chills but noted fevers.  Has had pneumonia before denies similar feeling.  Denies chest pain.  No leg swelling.  Recent fistulogram, left upper extremity AV fistula working well recently.  Chronic transfusion dependent anemia last transfusion several ago,     In the ED initial /73. Infectious and CVA work-up initiated    DAILY NEPHROLOGY SUMMARY:  11/4: consult done  11/5: Feeling much better, felt to have bronchitis, on antibiotics    PAST FAMILY HISTORY: Reviewed and Unchanged  SOCIAL HISTORY: Reviewed and Unchanged  CURRENT MEDICATIONS: Reviewed  IMAGING STUDIES: Reviewed    ROS  General: No weakness, no malaise  HEENT: Chronic vision changes, no hearing changes  CV: No chest pain, no palpitations  Lungs: +cough; +shortness of breath  GI: No Nausea; no vomiting  : No dysuria or gross hematuria  MSK: +joint pain; no trauma  Skin: No rashes; no lesions  Neuro: No tremors; no LOC  Psych: No depression; no anxiety    PHYSICAL EXAM  VS:  /60   Pulse 60   Temp 36.2 °C (97.1 °F) (Temporal)   Resp 19   Ht 1.473 m (4' 10\")   Wt 66.2 kg (145 lb 15.1 oz)   LMP 01/01/1995   SpO2 95%   BMI 30.50 kg/m²   GENERAL: Lying in bed, no acute distress  HEAD: Normocephalic, atraumatic  EYES: no " scleral icterus; normal conjunctiva  NECK: Supple; trachea midline  CV: Iirregular, S1 and S2 present  LUNGS: Decreased bilateral bases, No wheezes  ABDOMEN: Soft, non-tender  EXTREMETIES: trace lower extremity edema, no clubbing or cyanosis  SKIN: Warm and dry, no rashes  NEURO: A&O, no focal deficits  PSYCH: Cooperative, appropriate mood and affect  ACCESS: Left upper extremity AV fistula without surrounding erythema or edema    Fluids:  In: 300 [P.O.:300]  Out: 1100     LABS:  Recent Results (from the past 24 hour(s))   CBC w/ Differential    Collection Time: 11/04/19  3:54 PM   Result Value Ref Range    WBC 6.7 4.8 - 10.8 K/uL    RBC 2.43 (L) 4.20 - 5.40 M/uL    Hemoglobin 8.3 (L) 12.0 - 16.0 g/dL    Hematocrit 25.0 (L) 37.0 - 47.0 %    .9 (H) 81.4 - 97.8 fL    MCH 34.2 (H) 27.0 - 33.0 pg    MCHC 33.2 (L) 33.6 - 35.0 g/dL    RDW 72.3 (H) 35.9 - 50.0 fL    Platelet Count 127 (L) 164 - 446 K/uL    MPV 13.8 (H) 9.0 - 12.9 fL    Neutrophils-Polys 67.30 44.00 - 72.00 %    Lymphocytes 13.30 (L) 22.00 - 41.00 %    Monocytes 13.00 0.00 - 13.40 %    Eosinophils 5.40 0.00 - 6.90 %    Basophils 0.30 0.00 - 1.80 %    Immature Granulocytes 0.70 0.00 - 0.90 %    Nucleated RBC 0.00 /100 WBC    Neutrophils (Absolute) 4.52 2.00 - 7.15 K/uL    Lymphs (Absolute) 0.89 (L) 1.00 - 4.80 K/uL    Monos (Absolute) 0.87 (H) 0.00 - 0.85 K/uL    Eos (Absolute) 0.36 0.00 - 0.51 K/uL    Baso (Absolute) 0.02 0.00 - 0.12 K/uL    Immature Granulocytes (abs) 0.05 0.00 - 0.11 K/uL    NRBC (Absolute) 0.00 K/uL    Anisocytosis 1+     Macrocytosis 1+    proBrain Natriuretic Peptide, NT    Collection Time: 11/04/19  3:54 PM   Result Value Ref Range    NT-proBNP 5609 (H) 0 - 125 pg/mL   BLOOD CULTURE x2    Collection Time: 11/04/19  3:54 PM   Result Value Ref Range    Significant Indicator NEG     Source BLD     Site Right Port     Culture Result       No Growth  Note: Blood cultures are incubated for 5 days and  are monitored  continuously.Positive blood cultures  are called to the RN and reported as soon as  they are identified.     LACTIC ACID    Collection Time: 11/04/19  3:54 PM   Result Value Ref Range    Lactic Acid 1.1 0.5 - 2.0 mmol/L   Basic Metabolic Panel    Collection Time: 11/04/19  3:54 PM   Result Value Ref Range    Sodium 134 (L) 135 - 145 mmol/L    Potassium 3.1 (L) 3.6 - 5.5 mmol/L    Chloride 92 (L) 96 - 112 mmol/L    Co2 30 20 - 33 mmol/L    Glucose 185 (H) 65 - 99 mg/dL    Bun 24 (H) 8 - 22 mg/dL    Creatinine 4.45 (H) 0.50 - 1.40 mg/dL    Calcium 8.3 (L) 8.5 - 10.5 mg/dL    Anion Gap 12.0 (H) 0.0 - 11.9   PERIPHERAL SMEAR REVIEW    Collection Time: 11/04/19  3:54 PM   Result Value Ref Range    Peripheral Smear Review see below    PLATELET ESTIMATE    Collection Time: 11/04/19  3:54 PM   Result Value Ref Range    Plt Estimation Decreased    MORPHOLOGY    Collection Time: 11/04/19  3:54 PM   Result Value Ref Range    RBC Morphology Present     Polychromia 1+     Poikilocytosis 1+     Ovalocytes 1+     Schistocytes 1+    DIFFERENTIAL COMMENT    Collection Time: 11/04/19  3:54 PM   Result Value Ref Range    Comments-Diff see below    ESTIMATED GFR    Collection Time: 11/04/19  3:54 PM   Result Value Ref Range    GFR If  12 (A) >60 mL/min/1.73 m 2    GFR If Non African American 10 (A) >60 mL/min/1.73 m 2   Influenza A/B By PCR (Adult - Flu Only)    Collection Time: 11/04/19  3:58 PM   Result Value Ref Range    Influenza virus A RNA Negative Negative    Influenza virus B, PCR Negative Negative   BLOOD CULTURE x2    Collection Time: 11/04/19  4:20 PM   Result Value Ref Range    Significant Indicator NEG     Source BLD     Site PERIPHERAL     Culture Result       No Growth  Note: Blood cultures are incubated for 5 days and  are monitored continuously.Positive blood cultures  are called to the RN and reported as soon as  they are identified.     URINALYSIS    Collection Time: 11/05/19  3:20 AM   Result Value  Ref Range    Color Yellow     Character Cloudy (A)     Specific Gravity 1.014 <1.035    Ph 7.0 5.0 - 8.0    Glucose 500 (A) Negative mg/dL    Ketones Negative Negative mg/dL    Protein >=1000 (A) Negative mg/dL    Bilirubin Negative Negative    Urobilinogen, Urine 0.2 Negative    Nitrite Negative Negative    Leukocyte Esterase Negative Negative    Occult Blood Trace (A) Negative    Micro Urine Req Microscopic    URINE MICROSCOPIC (W/UA)    Collection Time: 11/05/19  3:20 AM   Result Value Ref Range    WBC 10-20 (A) /hpf    RBC  (A) /hpf    Bacteria Moderate (A) None /hpf    Epithelial Cells Moderate (A) /hpf    Hyaline Cast 3-5 (A) /lpf   Renal Function Panel    Collection Time: 11/05/19  3:35 AM   Result Value Ref Range    Sodium 134 (L) 135 - 145 mmol/L    Potassium 4.0 3.6 - 5.5 mmol/L    Chloride 94 (L) 96 - 112 mmol/L    Co2 27 20 - 33 mmol/L    Glucose 258 (H) 65 - 99 mg/dL    Creatinine 5.87 (HH) 0.50 - 1.40 mg/dL    Bun 36 (H) 8 - 22 mg/dL    Calcium 8.0 (L) 8.5 - 10.5 mg/dL    Phosphorus 5.7 (H) 2.5 - 4.5 mg/dL    Albumin 3.5 3.2 - 4.9 g/dL   CBC without Differential    Collection Time: 11/05/19  3:35 AM   Result Value Ref Range    WBC 6.0 4.8 - 10.8 K/uL    RBC 2.33 (L) 4.20 - 5.40 M/uL    Hemoglobin 7.9 (L) 12.0 - 16.0 g/dL    Hematocrit 24.9 (L) 37.0 - 47.0 %    .9 (H) 81.4 - 97.8 fL    MCH 33.9 (H) 27.0 - 33.0 pg    MCHC 31.7 (L) 33.6 - 35.0 g/dL    RDW 74.9 (H) 35.9 - 50.0 fL    Platelet Count 120 (L) 164 - 446 K/uL    MPV 13.7 (H) 9.0 - 12.9 fL   Comp Metabolic Panel (CMP)    Collection Time: 11/05/19  3:35 AM   Result Value Ref Range    Anion Gap 13.0 (H) 0.0 - 11.9    AST(SGOT) 31 12 - 45 U/L    ALT(SGPT) 32 2 - 50 U/L    Alkaline Phosphatase 90 30 - 99 U/L    Total Bilirubin 0.4 0.1 - 1.5 mg/dL    Total Protein 7.1 6.0 - 8.2 g/dL    Globulin 3.6 (H) 1.9 - 3.5 g/dL    A-G Ratio 1.0 g/dL   ESTIMATED GFR    Collection Time: 11/05/19  3:35 AM   Result Value Ref Range    GFR If   American 9 (A) >60 mL/min/1.73 m 2    GFR If Non African American 7 (A) >60 mL/min/1.73 m 2       (click the triangle to expand results)      ASSESSMENT:  # ESRD: normally M/W/F via. AVF.   # HTN: Difficult to control, volume driven: Improved  # Hypervolemia  # Anemia: Today factorial secondary to myelodysplastic syndrome and CKD.  transfusion dependent  # CKD-MBD  # Coronary artery disease  # Atrial fibrillation  # Diabetes  # Peripheral vascular disease  # COPD  # Congestive heart failure     PLAN:  -Ultrafiltration session today, then to continue M/W/F schedule during stays  -Okay for discharge from renal standpoint  -Dose all meds for ESRD  -Renal Diet  -Continue home phos binders  -Epogen 10,000 units with dialysis     Thank you     Rakesh Sharp MD

## 2019-11-05 NOTE — PROGRESS NOTES
0315 Patient OOB, ambulated to bathroom w continuous O@ @ 2lpm/NC, handheld assist, tolerated well. (+) urine output 900 cc, clear yellow and (+) BM, formed medium brown. Urine specimen collected and sent to lab. Assisted patient safely back to bed, pt reports some dizziness post ambulation. Safety and fall precautions in place.    0335 Blood specimen drawn and sent to lab.

## 2019-11-05 NOTE — H&P
Hospital Medicine History & Physical Note    Date of Service  11/4/2019    Primary Care Physician  Nyla Nava M.D.    Consultants  nephrology    Code Status  full    Chief Complaint  SOB    History of Presenting Illness  64 y.o. female with past medical history of end-stage renal disease on hemodialysis Monday Wednesday and Friday, essential hypertension, chronic pain syndrome, who presented 11/4/2019 with worsening shortness of breath since this past Friday.  Patient went to dialysis today and only finished about 45 minutes of dialysis and she was transferred here to ER due to her ongoing shortness of breath.  The patient use 3 L of oxygen at home.  In addition she complained about cough with yellowish sputum production associated with chills.  She also complained about worsening weakness over the past few days.  In the ER nephrology was consulted and recommended that she does not need any urgent hemodialysis.  She was found to have acute bronchitis in the ER.  She will be admitted for further evaluation and management.    Review of Systems  Review of Systems   Constitutional: Negative for chills, fever and weight loss.   HENT: Negative for congestion and nosebleeds.    Eyes: Negative for blurred vision, pain, discharge and redness.   Respiratory: Positive for cough, sputum production and shortness of breath. Negative for stridor.    Cardiovascular: Negative for chest pain, palpitations and orthopnea.   Gastrointestinal: Negative for abdominal pain, diarrhea, heartburn, nausea and vomiting.   Genitourinary: Negative for dysuria, frequency and urgency.   Musculoskeletal: Negative for back pain, myalgias and neck pain.   Skin: Negative for itching and rash.   Neurological: Negative for dizziness, focal weakness, seizures and headaches.   Psychiatric/Behavioral: Negative for depression. The patient is not nervous/anxious and does not have insomnia.        Past Medical History   has a past medical history of Arthritis,  Atrial fibrillation (HCC), Blood transfusion without reported diagnosis, Breath shortness, Cancer (HCC), Cataract, Chronic anemia, Chronic kidney disease, Chronic obstructive pulmonary disease (HCC), Congestive heart failure (HCC), Dental disorder, Diabetes, Diabetes (HCC), Dialysis patient, Glaucoma, Heart abnormalities, Hypertension, MDS (myelodysplastic syndrome) (10/2016), Pain (-2017), Renal disorder, Stroke (HCC) (03/2015), and Supplemental oxygen dependent.    Surgical History   has a past surgical history that includes other cardiac surgery; other abdominal surgery; gyn surgery; gyn surgery; gyn surgery; other cardiac surgery; other; retinal detachment repair (Right); av fistula creation (Left, 2/8/2016); other abdominal surgery; gastroscopy (12/17/2015); colonoscopy (12/17/2015); vein ligation (Left, 11/10/2016); bone marrow biopsy, ndl/trocar (9/20/2017); bone marrow aspiration (9/20/2017); and gastroscopy (N/A, 1/25/2018).     Family History  family history includes Hypertension in her father and mother.     Social History   reports that she has never smoked. She has never used smokeless tobacco. She reports that she does not drink alcohol or use drugs.    Allergies  Allergies   Allergen Reactions   • Lenalidomide Unspecified     Beeping Pulse     • Naprosyn [Naproxen] Hives   • Pioglitazone Unspecified     Causes blindness   • Simvastatin Unspecified     Couldn't move   • Demerol Vomiting   • Diphenhydramine Vomiting   • Glipizide Vomiting   • Hydromorphone Vomiting   • Iron Vomiting     vomiting   • Metformin Vomiting   • Morphine Vomiting   • Multivitamin Itching     itching   • Ondansetron Itching   • Other Drug Rash and Vomiting     Any binders that remove phosphorus from the body such as tums   • Oxycodone Vomiting   • Pcn [Penicillins] Vomiting          • Propoxyphene Vomiting   • Requip Vomiting   • Sulfa Drugs Rash and Vomiting     Vomiting & rash      • Tamsulosin Vomiting   • Tramadol  Vomiting   • Trazodone Vomiting       Medications  Prior to Admission Medications   Prescriptions Last Dose Informant Patient Reported? Taking?   Brimonidine Tartrate-Timolol (COMBIGAN) 0.2-0.5 % Solution 11/3/2019 at Unknown time Patient's Home Pharmacy Yes No   Sig: Place 1 Drop in both eyes 2 Times a Day.   Buprenorphine 20 MCG/HR PATCH WEEKLY 11/4/2019 at Unknown time  Yes No   Sig: Apply 20 mcg to affected area(s) every Monday.   Diclofenac Sodium (VOLTAREN) 1 % Gel 11/3/2019 at Unknown time  Yes Yes   Sig: Apply  to skin as directed every morning.   HYDROcodone-acetaminophen (NORCO) 5-325 MG Tab per tablet unknown at Unknown time  Yes No   Sig: Take 1 Tab by mouth 2 times a day as needed. Indications: Pain   bimatoprost (LUMIGAN) 0.01 % Solution 11/3/2019 at Unknown time Patient's Home Pharmacy Yes No   Sig: Place 1 Drop in both eyes every bedtime.   brinzolamide (AZOPT) 1 % Suspension 11/3/2019 at Unknown time Patient's Home Pharmacy Yes No   Sig: Place 1 Drop in both eyes 2 Times a Day.   carvedilol (COREG) 12.5 MG Tab 11/3/2019 at Unknown time  No No   Sig: Take 1 Tab by mouth 2 times a day, with meals.   lidocaine (LIDODERM) 5 % Patch 11/3/2019 at Unknown time Patient's Home Pharmacy Yes No   Sig: Apply 1 Patch to skin as directed every bedtime.   losartan (COZAAR) 100 MG Tab 11/3/2019 at Unknown time Patient's Home Pharmacy Yes No   Sig: Take 100 mg by mouth every evening.   pregabalin (LYRICA) 150 MG Cap 11/3/2019 at Unknown time Patient's Home Pharmacy Yes No   Sig: Take 150 mg by mouth 3 times a day.      Facility-Administered Medications: None       Physical Exam  Temp:  [36.5 °C (97.7 °F)] 36.5 °C (97.7 °F)  Pulse:  [68-74] 71  Resp:  [15-25] 17  BP: (109-222)/(56-75) 189/69  SpO2:  [100 %] 100 %    Physical Exam  Constitutional:       General: She is not in acute distress.  HENT:      Head: Normocephalic and atraumatic.      Nose: No congestion.   Eyes:      Extraocular Movements: Extraocular  movements intact.      Pupils: Pupils are equal, round, and reactive to light.   Neck:      Musculoskeletal: Normal range of motion and neck supple.   Cardiovascular:      Rate and Rhythm: Normal rate and regular rhythm.      Pulses: Normal pulses.   Pulmonary:      Effort: Pulmonary effort is normal. No respiratory distress.      Breath sounds: Wheezing and rales present.   Abdominal:      General: Bowel sounds are normal. There is no distension.      Palpations: Abdomen is soft.      Tenderness: There is no tenderness.   Musculoskeletal:         General: No swelling or tenderness.   Skin:     General: Skin is warm.      Findings: No erythema.   Neurological:      General: No focal deficit present.      Mental Status: She is alert and oriented to person, place, and time.         Laboratory:  Recent Labs     11/04/19  1554   WBC 6.7   RBC 2.43*   HEMOGLOBIN 8.3*   HEMATOCRIT 25.0*   .9*   MCH 34.2*   MCHC 33.2*   RDW 72.3*   PLATELETCT 127*   MPV 13.8*     Recent Labs     11/04/19  1554   SODIUM 134*   POTASSIUM 3.1*   CHLORIDE 92*   CO2 30   GLUCOSE 185*   BUN 24*   CREATININE 4.45*   CALCIUM 8.3*     Recent Labs     11/04/19  1554   GLUCOSE 185*         Recent Labs     11/04/19  1554   NTPROBNP 5609*         No results for input(s): TROPONINT in the last 72 hours.    Urinalysis:    No results found     Imaging:  CT-HEAD W/O   Final Result      1.  White matter lucencies most consistent with small vessel ischemic change versus demyelination or gliosis.      2.  Otherwise, Head CT without contrast with no acute findings. No evidence of acute cerebral  hemorrhage or mass lesion.      DX-CHEST-PORTABLE (1 VIEW)   Final Result      No acute cardiac or pulmonary abnormality is noted.            Assessment/Plan:  I anticipate this patient is appropriate for observation status at this time.    Essential hypertension- (present on admission)  Assessment & Plan  Blood pressures quite high in ER  Continue amlodipine,  carvedilol, losartan  IV PRN medication with parameters    Normocytic anemia- (present on admission)  Assessment & Plan  Related to end-stage renal disease    Hypokalemia- (present on admission)  Assessment & Plan  Replete as needed    Thrombocytopenia (HCC)- (present on admission)  Assessment & Plan  Follow CBC in the morning    Acute bronchitis- (present on admission)  Assessment & Plan  I started her on IV ceftriaxone and azithromycin    Elevated brain natriuretic peptide (BNP) level- (present on admission)  Assessment & Plan  Likely related to and staying in disease  Echo in 2018 showed LVEF 75%  Monitor closely for volume status  Patient is on 3 L of oxygen which is her baseline    ESRD (end stage renal disease) on dialysis (HCC)- (present on admission)  Assessment & Plan  On Monday Wednesday Friday schedule  Nephrology saw  Hemodialysis per nephrology      VTE prophylaxis: heparin

## 2019-11-09 LAB
BACTERIA BLD CULT: NORMAL
BACTERIA BLD CULT: NORMAL
SIGNIFICANT IND 70042: NORMAL
SIGNIFICANT IND 70042: NORMAL
SITE SITE: NORMAL
SITE SITE: NORMAL
SOURCE SOURCE: NORMAL
SOURCE SOURCE: NORMAL

## 2019-11-12 ENCOUNTER — OFFICE VISIT (OUTPATIENT)
Dept: HEMATOLOGY ONCOLOGY | Facility: MEDICAL CENTER | Age: 64
End: 2019-11-12
Payer: MEDICARE

## 2019-11-12 VITALS
SYSTOLIC BLOOD PRESSURE: 128 MMHG | TEMPERATURE: 97.9 F | DIASTOLIC BLOOD PRESSURE: 64 MMHG | RESPIRATION RATE: 18 BRPM | HEIGHT: 56 IN | BODY MASS INDEX: 33.23 KG/M2 | OXYGEN SATURATION: 99 % | HEART RATE: 74 BPM | WEIGHT: 147.71 LBS

## 2019-11-12 DIAGNOSIS — J44.9 CHRONIC OBSTRUCTIVE PULMONARY DISEASE, UNSPECIFIED COPD TYPE (HCC): ICD-10-CM

## 2019-11-12 DIAGNOSIS — N18.6 ESRD (END STAGE RENAL DISEASE) (HCC): ICD-10-CM

## 2019-11-12 DIAGNOSIS — Z09 ENCOUNTER FOR HEMATOLOGY FOLLOW-UP: ICD-10-CM

## 2019-11-12 DIAGNOSIS — D46.9 MDS (MYELODYSPLASTIC SYNDROME) (HCC): ICD-10-CM

## 2019-11-12 PROCEDURE — 99214 OFFICE O/P EST MOD 30 MIN: CPT | Performed by: NURSE PRACTITIONER

## 2019-11-12 RX ORDER — ONDANSETRON 4 MG/1
TABLET, FILM COATED ORAL
Refills: 3 | Status: ON HOLD | COMMUNITY
Start: 2019-10-10 | End: 2020-01-20

## 2019-11-12 ASSESSMENT — ENCOUNTER SYMPTOMS
COUGH: 0
TINGLING: 1
MYALGIAS: 0
FEVER: 0
SHORTNESS OF BREATH: 0
WHEEZING: 0
NAUSEA: 1
SINUS PAIN: 1
INSOMNIA: 1
PALPITATIONS: 0
WEIGHT LOSS: 0
DIZZINESS: 0
DIAPHORESIS: 1
CONSTIPATION: 0
HEADACHES: 0
CHILLS: 1
DIARRHEA: 0
VOMITING: 0

## 2019-11-12 ASSESSMENT — PAIN SCALES - GENERAL: PAINLEVEL: 3=SLIGHT PAIN

## 2019-11-12 ASSESSMENT — PATIENT HEALTH QUESTIONNAIRE - PHQ9: CLINICAL INTERPRETATION OF PHQ2 SCORE: 0

## 2019-11-12 NOTE — PROGRESS NOTES
Subjective:      Vielka Briscoe is a 64 y.o. female who presents for Follow-Up (3 week) surveillance evaluation of MDS      HPI   Ms. 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  She is accompanied by her caregiver for today's visit.    Patient has previously been treated with low-dose Revlimid in 2016 and Vidaza 75 mg/m² subcu 5 days a week in 2017, per Dr. Avila.  Since his nursing home she established with our office per second opinion evaluation, in July 2019, and is presently undergoing supportive therapy with periodic transfusions.    Patient continues on chronic O2.  She continues with dialysis 3 times weekly for end-stage renal disease.  Chronic pain is managed per pain management.  Patient was hospitalized on 11/4/2019 for shortness of breath and hypertension.  Symptoms improved overnight she was able to be discharged following day.  She continues with sinus congestion and facial swelling.  Patient reports continued bowel movements, approximately 3-4 formed stools daily and reports that she continues to make urine, voiding 3-4 times per day.  She continues to be followed by PCP, cardiology, and pulmonology.  She reports mild fatigue and is somewhat forgetful, which her caregiver states is consistent for when she is in need of a blood transfusion.        Allergies   Allergen Reactions   • Lenalidomide Unspecified     Beeping Pulse     • Naprosyn [Naproxen] Hives   • Pioglitazone Unspecified     Causes blindness   • Simvastatin Unspecified     Couldn't move   • Demerol Vomiting   • Diphenhydramine Vomiting   • Glipizide Vomiting   • Hydromorphone Vomiting   • Iron Vomiting     vomiting   • Metformin Vomiting   • Morphine Vomiting   • Multivitamin Itching     itching   • Ondansetron Itching   • Other Drug Rash and Vomiting     Any binders that remove phosphorus from the body such as  tums   • Oxycodone Vomiting   • Pcn [Penicillins] Vomiting          • Propoxyphene Vomiting   • Requip Vomiting   • Sulfa Drugs Rash and Vomiting     Vomiting & rash      • Tamsulosin Vomiting   • Tramadol Vomiting   • Trazodone Vomiting           Current Outpatient Medications on File Prior to Visit   Medication Sig Dispense Refill   • ondansetron (ZOFRAN) 4 MG Tab tablet TK 1 T PO Q 6 H FOR 20 DAYS  PRN NAUSEA  3   • Buprenorphine 20 MCG/HR PATCH WEEKLY Apply 20 mcg to affected area(s) every Monday.  0   • Diclofenac Sodium (VOLTAREN) 1 % Gel Apply  to skin as directed every morning.     • HYDROcodone-acetaminophen (NORCO) 5-325 MG Tab per tablet Take 1 Tab by mouth 2 times a day as needed. Indications: Pain     • carvedilol (COREG) 12.5 MG Tab Take 1 Tab by mouth 2 times a day, with meals. 60 Tab 0   • lidocaine (LIDODERM) 5 % Patch Apply 1 Patch to skin as directed every bedtime.     • losartan (COZAAR) 100 MG Tab Take 100 mg by mouth every evening.     • bimatoprost (LUMIGAN) 0.01 % Solution Place 1 Drop in both eyes every bedtime.     • brinzolamide (AZOPT) 1 % Suspension Place 1 Drop in both eyes 2 Times a Day.     • Brimonidine Tartrate-Timolol (COMBIGAN) 0.2-0.5 % Solution Place 1 Drop in both eyes 2 Times a Day.     • pregabalin (LYRICA) 150 MG Cap Take 150 mg by mouth 3 times a day.       No current facility-administered medications on file prior to visit.            Review of Systems   Constitutional: Positive for chills and diaphoresis. Negative for fever, malaise/fatigue and weight loss.   HENT: Positive for congestion and sinus pain.    Respiratory: Negative for cough, shortness of breath and wheezing.         O2 at 3L   Cardiovascular: Positive for chest pain (intermittent with recetn cold - nitroglycerin once ). Negative for palpitations and leg swelling.   Gastrointestinal: Positive for nausea (intermittent and relievewd with Pepsi). Negative for constipation (Last BM this am (2-3 times per night  "is usual)), diarrhea and vomiting.        Due for upper and lower GI per PCP   Genitourinary: Negative for dysuria.        Makes urine - voids 3-4 times per day   Musculoskeletal: Negative for myalgias.        BLE 3/10 now - better with pain mgmt (patch)   Neurological: Positive for tingling (bilateral feet - relieved with lyrica). Negative for dizziness and headaches.   Psychiatric/Behavioral: The patient has insomnia (better with pain better managed).         A bit forgetful when needs blood          Objective:     /64 (BP Location: Right arm, Patient Position: Sitting, BP Cuff Size: Adult)   Pulse 74   Temp 36.6 °C (97.9 °F) (Temporal)   Resp 18   Ht 1.435 m (4' 8.5\")   Wt 67 kg (147 lb 11.3 oz)   LMP 01/01/1995   SpO2 99%   BMI 32.54 kg/m²        Physical Exam  Vitals signs reviewed.   Constitutional:       General: She is not in acute distress.     Appearance: She is well-developed. She is not diaphoretic.   HENT:      Head: Normocephalic and atraumatic.      Nose: Congestion (completed abx yesterday) present.      Mouth/Throat:      Pharynx: No oropharyngeal exudate.   Eyes:      General: No scleral icterus.        Right eye: No discharge.         Left eye: No discharge.      Conjunctiva/sclera: Conjunctivae normal.      Pupils: Pupils are equal, round, and reactive to light.      Comments: Mild periorbital edema   Neck:      Musculoskeletal: Normal range of motion and neck supple.   Cardiovascular:      Rate and Rhythm: Normal rate and regular rhythm.      Heart sounds: Normal heart sounds. No murmur. No friction rub. No gallop.    Pulmonary:      Effort: Pulmonary effort is normal. No respiratory distress.      Breath sounds: Normal breath sounds. No wheezing.      Comments: O2 @ 2  Abdominal:      General: Bowel sounds are normal. There is no distension.      Palpations: Abdomen is soft.      Tenderness: There is no tenderness.   Musculoskeletal: Normal range of motion.   Skin:     General: " Skin is warm and dry.      Coloration: Skin is not pale.      Findings: No erythema or rash.   Neurological:      Mental Status: She is alert and oriented to person, place, and time.   Psychiatric:         Behavior: Behavior normal.      Comments: A bit forgetful but easily redirected              Results are post dialysis, per patient              No visits with results within 1 Day(s) from this visit.   Latest known visit with results is:   Admission on 11/04/2019, Discharged on 11/05/2019   Component Date Value Ref Range Status   • WBC 11/04/2019 6.7  4.8 - 10.8 K/uL Final   • RBC 11/04/2019 2.43* 4.20 - 5.40 M/uL Final   • Hemoglobin 11/04/2019 8.3* 12.0 - 16.0 g/dL Final   • Hematocrit 11/04/2019 25.0* 37.0 - 47.0 % Final   • MCV 11/04/2019 102.9* 81.4 - 97.8 fL Final   • MCH 11/04/2019 34.2* 27.0 - 33.0 pg Final   • MCHC 11/04/2019 33.2* 33.6 - 35.0 g/dL Final   • RDW 11/04/2019 72.3* 35.9 - 50.0 fL Final   • Platelet Count 11/04/2019 127* 164 - 446 K/uL Final   • MPV 11/04/2019 13.8* 9.0 - 12.9 fL Final   • Neutrophils-Polys 11/04/2019 67.30  44.00 - 72.00 % Final   • Lymphocytes 11/04/2019 13.30* 22.00 - 41.00 % Final   • Monocytes 11/04/2019 13.00  0.00 - 13.40 % Final   • Eosinophils 11/04/2019 5.40  0.00 - 6.90 % Final   • Basophils 11/04/2019 0.30  0.00 - 1.80 % Final   • Immature Granulocytes 11/04/2019 0.70  0.00 - 0.90 % Final   • Nucleated RBC 11/04/2019 0.00  /100 WBC Final   • Neutrophils (Absolute) 11/04/2019 4.52  2.00 - 7.15 K/uL Final    Includes immature neutrophils, if present.   • Lymphs (Absolute) 11/04/2019 0.89* 1.00 - 4.80 K/uL Final   • Monos (Absolute) 11/04/2019 0.87* 0.00 - 0.85 K/uL Final   • Eos (Absolute) 11/04/2019 0.36  0.00 - 0.51 K/uL Final   • Baso (Absolute) 11/04/2019 0.02  0.00 - 0.12 K/uL Final   • Immature Granulocytes (abs) 11/04/2019 0.05  0.00 - 0.11 K/uL Final   • NRBC (Absolute) 11/04/2019 0.00  K/uL Final   • Anisocytosis 11/04/2019 1+   Final   • Macrocytosis  11/04/2019 1+   Final   • NT-proBNP 11/04/2019 5609* 0 - 125 pg/mL Final    Comment: As of July 9th 2019 at 0900, the BNP test has been replaced with the  NT-proBNP test.  Please note new analyte, methodology, and reference range.     • Influenza virus A RNA 11/04/2019 Negative  Negative Final   • Influenza virus B, PCR 11/04/2019 Negative  Negative Final   • Significant Indicator 11/04/2019 NEG   Final   • Source 11/04/2019 BLD   Final   • Site 11/04/2019 Right Port   Final   • Culture Result 11/04/2019 No growth after 5 days of incubation.   Final   • Significant Indicator 11/04/2019 NEG   Final   • Source 11/04/2019 BLD   Final   • Site 11/04/2019 PERIPHERAL   Final   • Culture Result 11/04/2019 No growth after 5 days of incubation.   Final   • Color 11/05/2019 Yellow   Final   • Character 11/05/2019 Cloudy*  Final   • Specific Gravity 11/05/2019 1.014  <1.035 Final   • Ph 11/05/2019 7.0  5.0 - 8.0 Final   • Glucose 11/05/2019 500* Negative mg/dL Final   • Ketones 11/05/2019 Negative  Negative mg/dL Final   • Protein 11/05/2019 >=1000* Negative mg/dL Final   • Bilirubin 11/05/2019 Negative  Negative Final   • Urobilinogen, Urine 11/05/2019 0.2  Negative Final   • Nitrite 11/05/2019 Negative  Negative Final   • Leukocyte Esterase 11/05/2019 Negative  Negative Final   • Occult Blood 11/05/2019 Trace* Negative Final   • Micro Urine Req 11/05/2019 Microscopic   Final   • Lactic Acid 11/04/2019 1.1  0.5 - 2.0 mmol/L Final   • Sodium 11/04/2019 134* 135 - 145 mmol/L Final   • Potassium 11/04/2019 3.1* 3.6 - 5.5 mmol/L Final   • Chloride 11/04/2019 92* 96 - 112 mmol/L Final   • Co2 11/04/2019 30  20 - 33 mmol/L Final   • Glucose 11/04/2019 185* 65 - 99 mg/dL Final   • Bun 11/04/2019 24* 8 - 22 mg/dL Final   • Creatinine 11/04/2019 4.45* 0.50 - 1.40 mg/dL Final   • Calcium 11/04/2019 8.3* 8.5 - 10.5 mg/dL Final   • Anion Gap 11/04/2019 12.0* 0.0 - 11.9 Final   • Peripheral Smear Review 11/04/2019 see below   Final     Comment: Due to instrument suspect flags, further review of peripheral smear is  indicated on this patient sample. This review may or may not result in  abnormal findings.     • Plt Estimation 11/04/2019 Decreased   Final   • RBC Morphology 11/04/2019 Present   Final   • Polychromia 11/04/2019 1+   Final   • Poikilocytosis 11/04/2019 1+   Final   • Ovalocytes 11/04/2019 1+   Final   • Schistocytes 11/04/2019 1+   Final   • Comments-Diff 11/04/2019 see below   Final    Results have been verified by peripheral blood smear review.   • GFR If  11/04/2019 12* >60 mL/min/1.73 m 2 Final   • GFR If Non  11/04/2019 10* >60 mL/min/1.73 m 2 Final   • Sodium 11/05/2019 134* 135 - 145 mmol/L Final   • Potassium 11/05/2019 4.0  3.6 - 5.5 mmol/L Final   • Chloride 11/05/2019 94* 96 - 112 mmol/L Final   • Co2 11/05/2019 27  20 - 33 mmol/L Final   • Glucose 11/05/2019 258* 65 - 99 mg/dL Final   • Creatinine 11/05/2019 5.87* 0.50 - 1.40 mg/dL Final   • Bun 11/05/2019 36* 8 - 22 mg/dL Final   • Calcium 11/05/2019 8.0* 8.5 - 10.5 mg/dL Final   • Phosphorus 11/05/2019 5.7* 2.5 - 4.5 mg/dL Final   • Albumin 11/05/2019 3.5  3.2 - 4.9 g/dL Final   • WBC 11/05/2019 6.0  4.8 - 10.8 K/uL Final   • RBC 11/05/2019 2.33* 4.20 - 5.40 M/uL Final   • Hemoglobin 11/05/2019 7.9* 12.0 - 16.0 g/dL Final   • Hematocrit 11/05/2019 24.9* 37.0 - 47.0 % Final   • MCV 11/05/2019 106.9* 81.4 - 97.8 fL Final   • MCH 11/05/2019 33.9* 27.0 - 33.0 pg Final   • MCHC 11/05/2019 31.7* 33.6 - 35.0 g/dL Final   • RDW 11/05/2019 74.9* 35.9 - 50.0 fL Final   • Platelet Count 11/05/2019 120* 164 - 446 K/uL Final   • MPV 11/05/2019 13.7* 9.0 - 12.9 fL Final   • Anion Gap 11/05/2019 13.0* 0.0 - 11.9 Final   • AST(SGOT) 11/05/2019 31  12 - 45 U/L Final   • ALT(SGPT) 11/05/2019 32  2 - 50 U/L Final   • Alkaline Phosphatase 11/05/2019 90  30 - 99 U/L Final   • Total Bilirubin 11/05/2019 0.4  0.1 - 1.5 mg/dL Final   • Total Protein 11/05/2019  7.1  6.0 - 8.2 g/dL Final   • Globulin 11/05/2019 3.6* 1.9 - 3.5 g/dL Final   • A-G Ratio 11/05/2019 1.0  g/dL Final   • WBC 11/05/2019 10-20* /hpf Final    Comment: Female  <12 Yr 0-2  >12 Yr 0-5  Male   None     • RBC 11/05/2019 * /hpf Final    Comment: Female  >12 Yr 0-2  Male   None     • Bacteria 11/05/2019 Moderate* None /hpf Final   • Epithelial Cells 11/05/2019 Moderate* /hpf Final   • Hyaline Cast 11/05/2019 3-5* /lpf Final   • GFR If  11/05/2019 9* >60 mL/min/1.73 m 2 Final   • GFR If Non  11/05/2019 7* >60 mL/min/1.73 m 2 Final   • Hepatitis B Surface Antigen 11/05/2019 Negative  Negative Final    Comment: It has been reported that certain assays will not detect all HBV  mutants.  If acute or chronic HBV infection is suspected and the  HBsAg result is negative, it is recommended that other serological  markers be tested to confirm the HBsAg nonreactivity.  HBsAg test  results should always be assessed in conjunction with the patient's  medical history, clinical examination, and other findings.     • Hepatitis B Cors Ab,IgM 11/05/2019 Negative  Negative Final    Comment: The [employer] Cent[employer] HBc IgM assay is a diagnostic test for the  qualitative determination of IgM response to hepatitis B virus  core antigen in human serum.  The results from this or any  other diagnostic kit should be used and interpreted only in  the context of the overall clinical picture.  A negative test  result does not exclude the possibility of exposure to hepatitis  B virus.     • Hepatitis A Virus Ab, IgM 11/05/2019 Negative  Negative Final    Comment: The [employer] HAV IgM assay is a diagnostic immunoassay for  the qualitative determination of IgM response to the hepatitis A  virus in human serum.  The results from this or any other  diagnostic kit should be used and interpreted only in the context  of the overall clinical picture.  A negative test result does not  exclude the possibility  of exposure to hepatitis A virus.     • Hepatitis C Antibody 11/05/2019 Negative  Negative Final    Comment: The [employer] HCV assay is limited to the detection of IgG  antibodies to hepatitis C virus in human serum.  The results  from this or any other diagnostic kit should be used and interpreted  only in the context of the overall clinical picture.  A negative  test result does not exclude the possibility of exposure to  hepatitis C virus.     • Hep B Surface Antibody Quant 11/05/2019 58.64* 0.00 - 10.00 mIU/mL Final    Comment: Reference Interval: anti-HBs  9.99 mIU/mL or less..........Negative  10.00 mIU/mL or greater......Positive  The anti-HBs is greater than or equal to 10 mIU/mL.  This  patient has either had an antibody response to HBV vaccination,  received a transfusion, or has recovered from HBV infection.  This patient should be considered immune to Hepatitis B.  An  anti-HBs result greater than or equal to 10 mIU/mL implies  immunity. For post-vaccination antibody testing guidelines for  the general public, refer to MMWR December 23, 2005/Vol. 54  (No.16);1-23, and for healthcare workers, refer to MMWR December 20, 2013/Vol.62(No. 10);1-19.                          Assessment/Plan:       1. MDS (myelodysplastic syndrome) (HCC)     2. Encounter for hematology follow-up     3. Chronic obstructive pulmonary disease, unspecified COPD type (HCC)     4. ESRD (end stage renal disease) (HCC)         1.  ESRD: Stable, continues with dialysis 3 times weekly.    2.  O2 dependence/COPD: Patient hospitalized overnight on 11/4 for shortness of breath and hypertension.  Symptoms resolved, she continues on O2 at 2 L/min.  She continues to follow-up with pulmonology and cardiology is anticipated to undergo further evaluation for possible CHF, per hospital discharge on 11/5.    3.  MDS: Patient is previously undergone treatment with Vidaza and Revlimid.  She has not undergone after treatment since 2017.  She  establish care with our office in July 2019 and continues with supportive transfusions approximately every 3 weeks.  CBC completed at hospital discharge on 11/5 as well as at dialysis on 11/11 indicate hemoglobin >8, not indicated for transfusion.  She will present to the infusion center on Thursday 11/14 for reevaluation transfusion needs.  She generally receives 1 unit every other day to prevent fluid overload.  Her caregiver is unable to bring her on Saturday and so, if indicated, patient will receive 2 units, very slowly, on Thursday 11/14.  Patient will return for reevaluation in 3 weeks, sooner as needed.          The patient verbalized agreement and understanding of current plan. All questions and concerns were addressed at time of visit.    Please note that this dictation was created using voice recognition software. I have made every reasonable attempt to correct obvious errors, but I expect that there are errors of grammar and possibly content that I did not discover before finalizing the note.

## 2019-11-14 ENCOUNTER — OUTPATIENT INFUSION SERVICES (OUTPATIENT)
Dept: ONCOLOGY | Facility: MEDICAL CENTER | Age: 64
End: 2019-11-14
Attending: INTERNAL MEDICINE
Payer: MEDICARE

## 2019-11-14 VITALS
HEIGHT: 56 IN | BODY MASS INDEX: 33.48 KG/M2 | SYSTOLIC BLOOD PRESSURE: 204 MMHG | DIASTOLIC BLOOD PRESSURE: 67 MMHG | RESPIRATION RATE: 18 BRPM | OXYGEN SATURATION: 100 % | TEMPERATURE: 97.6 F | WEIGHT: 148.81 LBS | HEART RATE: 66 BPM

## 2019-11-14 DIAGNOSIS — D64.9 SYMPTOMATIC ANEMIA: ICD-10-CM

## 2019-11-14 DIAGNOSIS — D46.9 MYELODYSPLASIA (MYELODYSPLASTIC SYNDROME) (HCC): ICD-10-CM

## 2019-11-14 LAB
ABO GROUP BLD: NORMAL
ANISOCYTOSIS BLD QL SMEAR: ABNORMAL
BARCODED ABORH UBTYP: 8400
BARCODED PRD CODE UBPRD: NORMAL
BARCODED UNIT NUM UBUNT: NORMAL
BASOPHILS # BLD AUTO: 0.5 % (ref 0–1.8)
BASOPHILS # BLD: 0.02 K/UL (ref 0–0.12)
BLD GP AB SCN SERPL QL: NORMAL
COMMENT 1642: NORMAL
COMPONENT R 8504R: NORMAL
EOSINOPHIL # BLD AUTO: 0.34 K/UL (ref 0–0.51)
EOSINOPHIL NFR BLD: 9.3 % (ref 0–6.9)
ERYTHROCYTE [DISTWIDTH] IN BLOOD BY AUTOMATED COUNT: 70.2 FL (ref 35.9–50)
HCT VFR BLD AUTO: 20.4 % (ref 37–47)
HGB BLD-MCNC: 6.5 G/DL (ref 12–16)
IMM GRANULOCYTES # BLD AUTO: 0.02 K/UL (ref 0–0.11)
IMM GRANULOCYTES NFR BLD AUTO: 0.5 % (ref 0–0.9)
LYMPHOCYTES # BLD AUTO: 0.79 K/UL (ref 1–4.8)
LYMPHOCYTES NFR BLD: 21.5 % (ref 22–41)
MACROCYTES BLD QL SMEAR: ABNORMAL
MCH RBC QN AUTO: 33.5 PG (ref 27–33)
MCHC RBC AUTO-ENTMCNC: 31.9 G/DL (ref 33.6–35)
MCV RBC AUTO: 105.2 FL (ref 81.4–97.8)
MONOCYTES # BLD AUTO: 0.5 K/UL (ref 0–0.85)
MONOCYTES NFR BLD AUTO: 13.6 % (ref 0–13.4)
MORPHOLOGY BLD-IMP: NORMAL
NEUTROPHILS # BLD AUTO: 2 K/UL (ref 2–7.15)
NEUTROPHILS NFR BLD: 54.6 % (ref 44–72)
NRBC # BLD AUTO: 0 K/UL
NRBC BLD-RTO: 0 /100 WBC
PLATELET # BLD AUTO: 85 K/UL (ref 164–446)
PLATELET BLD QL SMEAR: NORMAL
PRODUCT TYPE UPROD: NORMAL
RBC # BLD AUTO: 1.94 M/UL (ref 4.2–5.4)
RBC BLD AUTO: PRESENT
RH BLD: NORMAL
UNIT STATUS USTAT: NORMAL
WBC # BLD AUTO: 3.7 K/UL (ref 4.8–10.8)

## 2019-11-14 PROCEDURE — 86850 RBC ANTIBODY SCREEN: CPT

## 2019-11-14 PROCEDURE — 96374 THER/PROPH/DIAG INJ IV PUSH: CPT

## 2019-11-14 PROCEDURE — 36591 DRAW BLOOD OFF VENOUS DEVICE: CPT

## 2019-11-14 PROCEDURE — 36430 TRANSFUSION BLD/BLD COMPNT: CPT

## 2019-11-14 PROCEDURE — 306780 HCHG STAT FOR TRANSFUSION PER CASE

## 2019-11-14 PROCEDURE — 700111 HCHG RX REV CODE 636 W/ 250 OVERRIDE (IP)

## 2019-11-14 PROCEDURE — 85025 COMPLETE CBC W/AUTO DIFF WBC: CPT

## 2019-11-14 PROCEDURE — P9016 RBC LEUKOCYTES REDUCED: HCPCS

## 2019-11-14 PROCEDURE — A4212 NON CORING NEEDLE OR STYLET: HCPCS

## 2019-11-14 PROCEDURE — 700111 HCHG RX REV CODE 636 W/ 250 OVERRIDE (IP): Performed by: INTERNAL MEDICINE

## 2019-11-14 PROCEDURE — 86900 BLOOD TYPING SEROLOGIC ABO: CPT

## 2019-11-14 PROCEDURE — 86923 COMPATIBILITY TEST ELECTRIC: CPT

## 2019-11-14 PROCEDURE — 86901 BLOOD TYPING SEROLOGIC RH(D): CPT

## 2019-11-14 RX ORDER — FUROSEMIDE 10 MG/ML
20 INJECTION INTRAMUSCULAR; INTRAVENOUS ONCE
Status: COMPLETED | OUTPATIENT
Start: 2019-11-14 | End: 2019-11-14

## 2019-11-14 RX ORDER — LIDOCAINE HYDROCHLORIDE 10 MG/ML
20 INJECTION, SOLUTION INFILTRATION; PERINEURAL
Status: CANCELLED | OUTPATIENT
Start: 2019-11-14

## 2019-11-14 RX ORDER — ACETAMINOPHEN 325 MG/1
650 TABLET ORAL ONCE
Status: CANCELLED | OUTPATIENT
Start: 2019-11-14

## 2019-11-14 RX ORDER — DIPHENHYDRAMINE HCL 25 MG
25 TABLET ORAL ONCE
Status: CANCELLED | OUTPATIENT
Start: 2019-11-14

## 2019-11-14 RX ORDER — ACETAMINOPHEN 325 MG/1
650 TABLET ORAL PRN
Status: CANCELLED | OUTPATIENT
Start: 2019-11-14

## 2019-11-14 RX ORDER — SODIUM CHLORIDE 9 MG/ML
500 INJECTION, SOLUTION INTRAVENOUS ONCE
Status: CANCELLED | OUTPATIENT
Start: 2019-11-14

## 2019-11-14 RX ORDER — ACETAMINOPHEN 325 MG/1
650 TABLET ORAL ONCE
Status: DISCONTINUED | OUTPATIENT
Start: 2019-11-14 | End: 2019-11-14 | Stop reason: HOSPADM

## 2019-11-14 RX ORDER — DIPHENHYDRAMINE HCL 25 MG
25 TABLET ORAL ONCE
Status: DISCONTINUED | OUTPATIENT
Start: 2019-11-14 | End: 2019-11-14 | Stop reason: HOSPADM

## 2019-11-14 RX ORDER — LIDOCAINE HYDROCHLORIDE 10 MG/ML
INJECTION, SOLUTION EPIDURAL; INFILTRATION; INTRACAUDAL; PERINEURAL
Status: COMPLETED
Start: 2019-11-14 | End: 2019-11-14

## 2019-11-14 RX ORDER — FUROSEMIDE 10 MG/ML
20 INJECTION INTRAMUSCULAR; INTRAVENOUS ONCE
Status: CANCELLED | OUTPATIENT
Start: 2019-11-14

## 2019-11-14 RX ADMIN — FUROSEMIDE 20 MG: 10 INJECTION, SOLUTION INTRAMUSCULAR; INTRAVENOUS at 15:30

## 2019-11-14 RX ADMIN — LIDOCAINE HYDROCHLORIDE 5 ML: 10 INJECTION, SOLUTION EPIDURAL; INFILTRATION; INTRACAUDAL; PERINEURAL at 10:15

## 2019-11-15 NOTE — PROGRESS NOTES
Patient presents to clinic for CBC/possible blood products. Pt denies any recent infections, bruising or bleeding. Port accessed per protocol, brisk blood return noted. Labs drawn as ordered. Hgb = 6.5. Pt meets parameters to receive red blood cells. 1 unit of PRBC transfused. Pt's BP remained high throughout transfusion, MD aware. Pt tolerated transfusion well with no s/s of adverse reaction. Lasix given after transfusion. Port flushed per protocol, needle removed intact. Pt to return on Saturday for 1 unit of PRBC. Pt discharged in stable, ambulatory condition.

## 2019-11-16 ENCOUNTER — OUTPATIENT INFUSION SERVICES (OUTPATIENT)
Dept: ONCOLOGY | Facility: MEDICAL CENTER | Age: 64
End: 2019-11-16
Attending: INTERNAL MEDICINE
Payer: MEDICARE

## 2019-11-16 VITALS
HEIGHT: 57 IN | WEIGHT: 147.27 LBS | RESPIRATION RATE: 18 BRPM | HEART RATE: 67 BPM | SYSTOLIC BLOOD PRESSURE: 159 MMHG | TEMPERATURE: 98.9 F | DIASTOLIC BLOOD PRESSURE: 43 MMHG | OXYGEN SATURATION: 100 % | BODY MASS INDEX: 31.77 KG/M2

## 2019-11-16 DIAGNOSIS — D46.9 MYELODYSPLASIA (MYELODYSPLASTIC SYNDROME) (HCC): ICD-10-CM

## 2019-11-16 DIAGNOSIS — D64.9 SYMPTOMATIC ANEMIA: ICD-10-CM

## 2019-11-16 PROCEDURE — 96374 THER/PROPH/DIAG INJ IV PUSH: CPT

## 2019-11-16 PROCEDURE — P9016 RBC LEUKOCYTES REDUCED: HCPCS

## 2019-11-16 PROCEDURE — 700101 HCHG RX REV CODE 250: Performed by: INTERNAL MEDICINE

## 2019-11-16 PROCEDURE — A4212 NON CORING NEEDLE OR STYLET: HCPCS

## 2019-11-16 PROCEDURE — 36430 TRANSFUSION BLD/BLD COMPNT: CPT

## 2019-11-16 PROCEDURE — 306780 HCHG STAT FOR TRANSFUSION PER CASE

## 2019-11-16 PROCEDURE — 86923 COMPATIBILITY TEST ELECTRIC: CPT

## 2019-11-16 PROCEDURE — 700111 HCHG RX REV CODE 636 W/ 250 OVERRIDE (IP): Performed by: INTERNAL MEDICINE

## 2019-11-16 PROCEDURE — 700111 HCHG RX REV CODE 636 W/ 250 OVERRIDE (IP)

## 2019-11-16 RX ORDER — FUROSEMIDE 10 MG/ML
20 INJECTION INTRAMUSCULAR; INTRAVENOUS ONCE
Status: COMPLETED | OUTPATIENT
Start: 2019-11-16 | End: 2019-11-16

## 2019-11-16 RX ORDER — LIDOCAINE HYDROCHLORIDE 10 MG/ML
20 INJECTION, SOLUTION INFILTRATION; PERINEURAL
Status: CANCELLED | OUTPATIENT
Start: 2019-11-16

## 2019-11-16 RX ORDER — ACETAMINOPHEN 325 MG/1
650 TABLET ORAL PRN
Status: CANCELLED | OUTPATIENT
Start: 2019-11-16

## 2019-11-16 RX ORDER — SODIUM CHLORIDE 9 MG/ML
500 INJECTION, SOLUTION INTRAVENOUS ONCE
Status: CANCELLED | OUTPATIENT
Start: 2019-11-16

## 2019-11-16 RX ORDER — FUROSEMIDE 10 MG/ML
20 INJECTION INTRAMUSCULAR; INTRAVENOUS ONCE
Status: CANCELLED | OUTPATIENT
Start: 2019-11-16

## 2019-11-16 RX ORDER — DIPHENHYDRAMINE HCL 25 MG
25 TABLET ORAL ONCE
Status: CANCELLED | OUTPATIENT
Start: 2019-11-16

## 2019-11-16 RX ORDER — ACETAMINOPHEN 325 MG/1
650 TABLET ORAL ONCE
Status: CANCELLED | OUTPATIENT
Start: 2019-11-16

## 2019-11-16 RX ADMIN — LIDOCAINE HYDROCHLORIDE 5 ML: 10 INJECTION, SOLUTION EPIDURAL; INFILTRATION; INTRACAUDAL; PERINEURAL at 09:41

## 2019-11-16 RX ADMIN — FUROSEMIDE 20 MG: 10 INJECTION, SOLUTION INTRAMUSCULAR; INTRAVENOUS at 12:15

## 2019-11-16 RX ADMIN — HEPARIN 500 UNITS: 100 SYRINGE at 12:31

## 2019-11-16 NOTE — PROGRESS NOTES
Patient seen today to receive one unit of PRBC's. Plan of care reviewed. Patient was here on 11/14 and received one unit of PRBC's for hgb of 6.5.  Port accessed using sterile technique with positive blood return observed. Patient took own tylenol 500mg po and no other premedications taken. PRBC's currently infusing. Will monitor. Call light in reach and patient educated on use.

## 2019-11-16 NOTE — PROGRESS NOTES
Transfusion completed. No s/s of adverse reactions observed or expressed. Lasix IV given post infusion, VSS. Port flushed per protocol, heparin instilled, and needle removed. Gauze dressing to skin, site wnl. Patient discharged in good condition, ambulatory, with family. Return appointments provided.

## 2019-11-24 DIAGNOSIS — R07.89 OTHER CHEST PAIN: ICD-10-CM

## 2019-11-26 RX ORDER — NITROGLYCERIN 0.4 MG/1
0.4 TABLET SUBLINGUAL PRN
Qty: 30 TAB | Refills: 2 | Status: SHIPPED | OUTPATIENT
Start: 2019-11-26 | End: 2020-02-20

## 2019-12-03 ENCOUNTER — OFFICE VISIT (OUTPATIENT)
Dept: HEMATOLOGY ONCOLOGY | Facility: MEDICAL CENTER | Age: 64
End: 2019-12-03
Payer: MEDICARE

## 2019-12-03 ENCOUNTER — TELEPHONE (OUTPATIENT)
Dept: HEMATOLOGY ONCOLOGY | Facility: MEDICAL CENTER | Age: 64
End: 2019-12-03

## 2019-12-03 VITALS
BODY MASS INDEX: 33.55 KG/M2 | RESPIRATION RATE: 20 BRPM | SYSTOLIC BLOOD PRESSURE: 126 MMHG | HEIGHT: 56 IN | DIASTOLIC BLOOD PRESSURE: 58 MMHG | HEART RATE: 65 BPM | WEIGHT: 149.14 LBS | OXYGEN SATURATION: 92 % | TEMPERATURE: 98.1 F

## 2019-12-03 DIAGNOSIS — Z09 ENCOUNTER FOR HEMATOLOGY FOLLOW-UP: ICD-10-CM

## 2019-12-03 DIAGNOSIS — D46.9 MDS (MYELODYSPLASTIC SYNDROME) (HCC): ICD-10-CM

## 2019-12-03 DIAGNOSIS — N18.6 ESRD (END STAGE RENAL DISEASE) (HCC): ICD-10-CM

## 2019-12-03 PROCEDURE — 99213 OFFICE O/P EST LOW 20 MIN: CPT | Performed by: NURSE PRACTITIONER

## 2019-12-03 ASSESSMENT — ENCOUNTER SYMPTOMS
DIZZINESS: 0
DIARRHEA: 0
HEADACHES: 1
NAUSEA: 1
VOMITING: 0
WEIGHT LOSS: 0
PALPITATIONS: 1
CONSTIPATION: 0
TINGLING: 0
CHILLS: 1
FEVER: 0
WHEEZING: 0
COUGH: 0
MYALGIAS: 0
SHORTNESS OF BREATH: 0

## 2019-12-03 ASSESSMENT — PAIN SCALES - GENERAL: PAINLEVEL: 5=MODERATE PAIN

## 2019-12-03 NOTE — PROGRESS NOTES
Subjective:      Vielka Briscoe is a 64 y.o. female who presents for Other (3 week follow up for MDS) surveillance evaluation of MDS      HPI   Ms. 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  She is accompanied by her caregiver, Afua, for today's visit.    Patient has previously been treated with low-dose Revlimid in 2016 and Vidaza 75 mg/m² subcu 5 days a week in 2017, per Dr. Avila. Since Dr. Roberts's half-way the patient established with our office per second opinion evaluation, in July 2019, and continues with supportive therapy in the form of periodic transfusions.    Patient continues to experience intermittent palpitations that are self-limiting.  She continues with dialysis 3 times weekly with variations in blood pressure.  She notes intermittent headaches that are self-limiting.  Patient is not exceptionally fatigued at this visit as she received transfusion approximately 2 weeks ago.        Allergies   Allergen Reactions   • Lenalidomide Unspecified     Beeping Pulse     • Naprosyn [Naproxen] Hives   • Pioglitazone Unspecified     Causes blindness   • Simvastatin Unspecified     Couldn't move   • Demerol Vomiting   • Diphenhydramine Vomiting   • Glipizide Vomiting   • Hydromorphone Vomiting   • Iron Vomiting     vomiting   • Metformin Vomiting   • Morphine Vomiting   • Multivitamin Itching     itching   • Ondansetron Itching   • Other Drug Rash and Vomiting     Any binders that remove phosphorus from the body such as tums   • Oxycodone Vomiting   • Pcn [Penicillins] Vomiting          • Propoxyphene Vomiting   • Requip Vomiting   • Sulfa Drugs Rash and Vomiting     Vomiting & rash      • Tamsulosin Vomiting   • Tramadol Vomiting   • Trazodone Vomiting           Current Outpatient Medications on File Prior to Visit   Medication Sig Dispense Refill   • nitroglycerin  "(NITROSTAT) 0.4 MG SL Tab Place 1 Tab under tongue as needed for Chest Pain. 1 tab every 5 min,3 doses is max/day if 4th dose needed go to ER. May cause low BP 30 Tab 2   • ondansetron (ZOFRAN) 4 MG Tab tablet TK 1 T PO Q 6 H FOR 20 DAYS  PRN NAUSEA  3   • Buprenorphine 20 MCG/HR PATCH WEEKLY Apply 20 mcg to affected area(s) every Monday.  0   • Diclofenac Sodium (VOLTAREN) 1 % Gel Apply  to skin as directed every morning.     • HYDROcodone-acetaminophen (NORCO) 5-325 MG Tab per tablet Take 1 Tab by mouth 2 times a day as needed. Indications: Pain     • carvedilol (COREG) 12.5 MG Tab Take 1 Tab by mouth 2 times a day, with meals. 60 Tab 0   • lidocaine (LIDODERM) 5 % Patch Apply 1 Patch to skin as directed every bedtime.     • losartan (COZAAR) 100 MG Tab Take 100 mg by mouth every evening.     • bimatoprost (LUMIGAN) 0.01 % Solution Place 1 Drop in both eyes every bedtime.     • brinzolamide (AZOPT) 1 % Suspension Place 1 Drop in both eyes 2 Times a Day.     • Brimonidine Tartrate-Timolol (COMBIGAN) 0.2-0.5 % Solution Place 1 Drop in both eyes 2 Times a Day.     • pregabalin (LYRICA) 150 MG Cap Take 150 mg by mouth 4 times a day.       No current facility-administered medications on file prior to visit.             Review of Systems   Constitutional: Positive for chills (\"hard to get warm\"). Negative for fever, malaise/fatigue (not great rest) and weight loss.   Respiratory: Negative for cough, shortness of breath and wheezing.         O2 cont 3L   Cardiovascular: Positive for palpitations (intermittent and self limiting) and leg swelling (trace). Negative for chest pain.   Gastrointestinal: Positive for nausea (intermittent - resolves with pepsi). Negative for constipation, diarrhea and vomiting.   Genitourinary: Negative for dysuria.   Musculoskeletal: Positive for joint pain (chronic pain managed per pain mgmt). Negative for myalgias.   Neurological: Positive for headaches (more often recently - \"back of my " "head\"). Negative for dizziness and tingling.          Objective:     /58 (BP Location: Right arm, Patient Position: Sitting, BP Cuff Size: Adult)   Pulse 65   Temp 36.7 °C (98.1 °F) (Temporal)   Resp 20   Ht 1.435 m (4' 8.5\")   Wt 67.7 kg (149 lb 2.3 oz)   LMP 01/01/1995   SpO2 92%   BMI 32.85 kg/m²      Physical Exam  Vitals signs reviewed.   Constitutional:       General: She is not in acute distress.     Appearance: She is well-developed. She is not diaphoretic.   HENT:      Head: Normocephalic and atraumatic.      Mouth/Throat:      Pharynx: No oropharyngeal exudate.   Eyes:      General: No scleral icterus.        Right eye: No discharge.         Left eye: No discharge.      Conjunctiva/sclera: Conjunctivae normal.      Pupils: Pupils are equal, round, and reactive to light.   Neck:      Musculoskeletal: Normal range of motion and neck supple.   Cardiovascular:      Rate and Rhythm: Normal rate and regular rhythm.      Heart sounds: Normal heart sounds. No murmur. No friction rub. No gallop.    Pulmonary:      Effort: Pulmonary effort is normal. No respiratory distress.      Breath sounds: Normal breath sounds. No wheezing.      Comments: O2  Abdominal:      General: Bowel sounds are normal. There is no distension.      Palpations: Abdomen is soft.      Tenderness: There is no tenderness.   Musculoskeletal: Normal range of motion.      Comments: LFA AV fistula   Skin:     General: Skin is warm and dry.      Coloration: Skin is not pale.      Findings: No erythema or rash.   Neurological:      Mental Status: She is alert and oriented to person, place, and time.   Psychiatric:         Behavior: Behavior normal.         Standing labs next due Thurs.                     Assessment/Plan:       1. MDS (myelodysplastic syndrome) (HCC)     2. Encounter for hematology follow-up     3. ESRD (end stage renal disease) (HCC)       1.  ESRD: Stable, continues with dialysis 3 times weekly.    2.  MDS:  Patient " has previously undergone treatment with Vidaza and Revlimid.  She has not undergone further treatment since 2017.  She established care with our office in July 2019 and continues with supportive transfusions approximately every 3-5 weeks. Patient would like to transition from every 3-week standing lab orders to every 5 weeks checks.  We will transition to every 5 rechecks following her 12/26 visit and monitor closely.  Patient will present to the infusion center this coming Thursday for reevaluation of transfusion needs.  She will return in 3 weeks for recheck, sooner as needed, then we will transition to every 5-week checks unless otherwise indicated.            The patient verbalized agreement and understanding of current plan. All questions and concerns were addressed at time of visit.    Please note that this dictation was created using voice recognition software. I have made every reasonable attempt to correct obvious errors, but I expect that there are errors of grammar and possibly content that I did not discover before finalizing the note.

## 2019-12-03 NOTE — TELEPHONE ENCOUNTER
Phone Number Called: [phone]    Call outcome: Left message for patients EC to return call    Message:   Patients Infusion appointments changed to every 5 weeks. Patient will go to infusion on 01/30/2020 and 02/01/2020.

## 2019-12-05 ENCOUNTER — OUTPATIENT INFUSION SERVICES (OUTPATIENT)
Dept: ONCOLOGY | Facility: MEDICAL CENTER | Age: 64
End: 2019-12-05
Attending: INTERNAL MEDICINE
Payer: MEDICARE

## 2019-12-05 VITALS
WEIGHT: 147.49 LBS | SYSTOLIC BLOOD PRESSURE: 180 MMHG | DIASTOLIC BLOOD PRESSURE: 37 MMHG | HEART RATE: 65 BPM | TEMPERATURE: 98 F | RESPIRATION RATE: 16 BRPM | OXYGEN SATURATION: 99 % | HEIGHT: 57 IN | BODY MASS INDEX: 31.82 KG/M2

## 2019-12-05 DIAGNOSIS — D46.9 MYELODYSPLASIA (MYELODYSPLASTIC SYNDROME) (HCC): ICD-10-CM

## 2019-12-05 DIAGNOSIS — D64.9 SYMPTOMATIC ANEMIA: ICD-10-CM

## 2019-12-05 LAB
ABO GROUP BLD: NORMAL
ANISOCYTOSIS BLD QL SMEAR: ABNORMAL
BARCODED ABORH UBTYP: 7300
BARCODED PRD CODE UBPRD: NORMAL
BARCODED UNIT NUM UBUNT: NORMAL
BASOPHILS # BLD AUTO: 0.8 % (ref 0–1.8)
BASOPHILS # BLD: 0.03 K/UL (ref 0–0.12)
BLD GP AB SCN SERPL QL: NORMAL
COMMENT 1642: NORMAL
COMPONENT R 8504R: NORMAL
EOSINOPHIL # BLD AUTO: 0.35 K/UL (ref 0–0.51)
EOSINOPHIL NFR BLD: 9.4 % (ref 0–6.9)
ERYTHROCYTE [DISTWIDTH] IN BLOOD BY AUTOMATED COUNT: 68.6 FL (ref 35.9–50)
HCT VFR BLD AUTO: 23.9 % (ref 37–47)
HGB BLD-MCNC: 7.7 G/DL (ref 12–16)
IMM GRANULOCYTES # BLD AUTO: 0.02 K/UL (ref 0–0.11)
IMM GRANULOCYTES NFR BLD AUTO: 0.5 % (ref 0–0.9)
LYMPHOCYTES # BLD AUTO: 0.84 K/UL (ref 1–4.8)
LYMPHOCYTES NFR BLD: 22.5 % (ref 22–41)
MACROCYTES BLD QL SMEAR: ABNORMAL
MCH RBC QN AUTO: 31.7 PG (ref 27–33)
MCHC RBC AUTO-ENTMCNC: 32.2 G/DL (ref 33.6–35)
MCV RBC AUTO: 98.4 FL (ref 81.4–97.8)
MONOCYTES # BLD AUTO: 0.6 K/UL (ref 0–0.85)
MONOCYTES NFR BLD AUTO: 16 % (ref 0–13.4)
MORPHOLOGY BLD-IMP: NORMAL
NEUTROPHILS # BLD AUTO: 1.9 K/UL (ref 2–7.15)
NEUTROPHILS NFR BLD: 50.8 % (ref 44–72)
NRBC # BLD AUTO: 0 K/UL
NRBC BLD-RTO: 0 /100 WBC
PLATELET # BLD AUTO: 91 K/UL (ref 164–446)
PLATELET BLD QL SMEAR: NORMAL
POLYCHROMASIA BLD QL SMEAR: NORMAL
PRODUCT TYPE UPROD: NORMAL
RBC # BLD AUTO: 2.43 M/UL (ref 4.2–5.4)
RBC BLD AUTO: PRESENT
RH BLD: NORMAL
UNIT STATUS USTAT: NORMAL
WBC # BLD AUTO: 3.7 K/UL (ref 4.8–10.8)

## 2019-12-05 PROCEDURE — 96374 THER/PROPH/DIAG INJ IV PUSH: CPT

## 2019-12-05 PROCEDURE — 700111 HCHG RX REV CODE 636 W/ 250 OVERRIDE (IP): Performed by: INTERNAL MEDICINE

## 2019-12-05 PROCEDURE — 36591 DRAW BLOOD OFF VENOUS DEVICE: CPT

## 2019-12-05 PROCEDURE — 86900 BLOOD TYPING SEROLOGIC ABO: CPT

## 2019-12-05 PROCEDURE — 86850 RBC ANTIBODY SCREEN: CPT

## 2019-12-05 PROCEDURE — 36430 TRANSFUSION BLD/BLD COMPNT: CPT

## 2019-12-05 PROCEDURE — 86923 COMPATIBILITY TEST ELECTRIC: CPT

## 2019-12-05 PROCEDURE — 85025 COMPLETE CBC W/AUTO DIFF WBC: CPT

## 2019-12-05 PROCEDURE — P9016 RBC LEUKOCYTES REDUCED: HCPCS

## 2019-12-05 PROCEDURE — A4212 NON CORING NEEDLE OR STYLET: HCPCS

## 2019-12-05 PROCEDURE — 86901 BLOOD TYPING SEROLOGIC RH(D): CPT

## 2019-12-05 PROCEDURE — 306780 HCHG STAT FOR TRANSFUSION PER CASE

## 2019-12-05 PROCEDURE — 700111 HCHG RX REV CODE 636 W/ 250 OVERRIDE (IP)

## 2019-12-05 RX ORDER — LIDOCAINE HYDROCHLORIDE 10 MG/ML
20 INJECTION, SOLUTION INFILTRATION; PERINEURAL
Status: CANCELLED | OUTPATIENT
Start: 2019-12-05

## 2019-12-05 RX ORDER — ACETAMINOPHEN 325 MG/1
650 TABLET ORAL ONCE
Status: CANCELLED | OUTPATIENT
Start: 2019-12-05

## 2019-12-05 RX ORDER — ACETAMINOPHEN 325 MG/1
650 TABLET ORAL PRN
Status: CANCELLED | OUTPATIENT
Start: 2019-12-05

## 2019-12-05 RX ORDER — DIPHENHYDRAMINE HCL 25 MG
25 TABLET ORAL ONCE
Status: CANCELLED | OUTPATIENT
Start: 2019-12-05

## 2019-12-05 RX ORDER — SODIUM CHLORIDE 9 MG/ML
500 INJECTION, SOLUTION INTRAVENOUS ONCE
Status: CANCELLED | OUTPATIENT
Start: 2019-12-05

## 2019-12-05 RX ORDER — FUROSEMIDE 10 MG/ML
20 INJECTION INTRAMUSCULAR; INTRAVENOUS ONCE
Status: CANCELLED | OUTPATIENT
Start: 2019-12-05

## 2019-12-05 RX ORDER — LIDOCAINE HYDROCHLORIDE 10 MG/ML
INJECTION, SOLUTION EPIDURAL; INFILTRATION; INTRACAUDAL; PERINEURAL
Status: COMPLETED
Start: 2019-12-05 | End: 2019-12-05

## 2019-12-05 RX ORDER — FUROSEMIDE 10 MG/ML
20 INJECTION INTRAMUSCULAR; INTRAVENOUS ONCE
Status: COMPLETED | OUTPATIENT
Start: 2019-12-05 | End: 2019-12-05

## 2019-12-05 RX ORDER — LIDOCAINE HYDROCHLORIDE 10 MG/ML
20 INJECTION, SOLUTION INFILTRATION; PERINEURAL
Status: DISCONTINUED | OUTPATIENT
Start: 2019-12-05 | End: 2019-12-05 | Stop reason: HOSPADM

## 2019-12-05 RX ADMIN — LIDOCAINE HYDROCHLORIDE 5 ML: 10 INJECTION, SOLUTION EPIDURAL; INFILTRATION; INTRACAUDAL at 09:20

## 2019-12-05 RX ADMIN — LIDOCAINE HYDROCHLORIDE 5 ML: 10 INJECTION, SOLUTION INFILTRATION; PERINEURAL at 09:20

## 2019-12-05 RX ADMIN — FUROSEMIDE 20 MG: 10 INJECTION, SOLUTION INTRAMUSCULAR; INTRAVENOUS at 13:08

## 2019-12-05 RX ADMIN — HEPARIN 500 UNITS: 100 SYRINGE at 13:13

## 2019-12-05 NOTE — PROGRESS NOTES
Pt ambulatory w/ frien to [facility] for CBC w/ poss PRBC transfusion.  Pt w/ no s/s of infection, pt has no complaints at this time.  PORT site numbed w/ lidocaine per pt request, accessed using sterile technique, brisk blood return noted, labs drawn per orders, flushed per protocol.  Pt Hgb resulted at 7.7 which does not meet the parameters set in blood therapy plan.  Pt notified who states that Dr Jones was going to change the parameters to 7.7.  Phone call placed to MDs office, spoke w/ Yanely SOTELO for Dr Jones.  Per Yanely isbell to transfuse pt 1unit PRBCs today, pt does not need to get 2nd unit on Saturday and pt to keep her next appt in 3 weeks for another CBC check.  Pt updated on POC, pt in agreement.  Pt takes her own Tylenol as pre-med, refuses all other pre-meds ordered.  1 unit PRBCs transfused w/ no adverse reactions.  PORT flushed and heparinized per protocol, de-accessed, guaze and tape dressing applied.  Pt left on foot in care of friend in [place].  Confirmed pt's next appt in 3 weeks.

## 2019-12-07 ENCOUNTER — APPOINTMENT (OUTPATIENT)
Dept: ONCOLOGY | Facility: MEDICAL CENTER | Age: 64
End: 2019-12-07
Attending: INTERNAL MEDICINE
Payer: MEDICARE

## 2019-12-12 ENCOUNTER — OFFICE VISIT (OUTPATIENT)
Dept: CARDIOLOGY | Facility: MEDICAL CENTER | Age: 64
End: 2019-12-12
Payer: MEDICARE

## 2019-12-12 VITALS
SYSTOLIC BLOOD PRESSURE: 152 MMHG | OXYGEN SATURATION: 92 % | HEIGHT: 55 IN | WEIGHT: 149.8 LBS | HEART RATE: 78 BPM | DIASTOLIC BLOOD PRESSURE: 78 MMHG | BODY MASS INDEX: 34.67 KG/M2

## 2019-12-12 DIAGNOSIS — I10 ESSENTIAL HYPERTENSION: Primary | ICD-10-CM

## 2019-12-12 DIAGNOSIS — I48.0 PAROXYSMAL ATRIAL FIBRILLATION (HCC): ICD-10-CM

## 2019-12-12 DIAGNOSIS — I51.89 DIASTOLIC DYSFUNCTION: ICD-10-CM

## 2019-12-12 DIAGNOSIS — E78.49 OTHER HYPERLIPIDEMIA: ICD-10-CM

## 2019-12-12 DIAGNOSIS — I10 ESSENTIAL HYPERTENSION: ICD-10-CM

## 2019-12-12 PROCEDURE — 99214 OFFICE O/P EST MOD 30 MIN: CPT | Performed by: NURSE PRACTITIONER

## 2019-12-12 RX ORDER — ISOSORBIDE MONONITRATE 30 MG/1
30 TABLET, EXTENDED RELEASE ORAL EVERY MORNING
Qty: 30 TAB | Refills: 11 | Status: SHIPPED | OUTPATIENT
Start: 2019-12-12 | End: 2019-12-12 | Stop reason: SDUPTHER

## 2019-12-12 RX ORDER — LOSARTAN POTASSIUM 50 MG/1
100 TABLET ORAL
Status: ON HOLD | COMMUNITY
Start: 2019-11-04 | End: 2020-01-20

## 2019-12-12 RX ORDER — AZITHROMYCIN 250 MG/1
TABLET, FILM COATED ORAL
Status: ON HOLD | COMMUNITY
Start: 2019-11-04 | End: 2020-01-20

## 2019-12-12 RX ORDER — BRIMONIDINE TARTRATE 2 MG/ML
SOLUTION/ DROPS OPHTHALMIC
Status: ON HOLD | COMMUNITY
Start: 2019-11-04 | End: 2020-01-20

## 2019-12-12 RX ORDER — SODIUM CHLORIDE 9 MG/ML
INJECTION, SOLUTION INTRAVENOUS
COMMUNITY
Start: 2019-11-05 | End: 2020-02-20

## 2019-12-12 RX ORDER — FUROSEMIDE 10 MG/ML
INJECTION INTRAMUSCULAR; INTRAVENOUS
COMMUNITY
Start: 2019-10-03 | End: 2020-02-20

## 2019-12-12 RX ORDER — CEFTRIAXONE 2 G/1
INJECTION, POWDER, FOR SOLUTION INTRAMUSCULAR; INTRAVENOUS
Status: ON HOLD | COMMUNITY
Start: 2019-11-05 | End: 2020-01-20

## 2019-12-12 RX ORDER — LATANOPROST 50 UG/ML
SOLUTION/ DROPS OPHTHALMIC
COMMUNITY
Start: 2019-11-04 | End: 2020-01-30

## 2019-12-12 RX ORDER — AMLODIPINE BESYLATE 2.5 MG/1
2.5 TABLET ORAL DAILY
COMMUNITY

## 2019-12-12 RX ORDER — PREGABALIN 75 MG/1
CAPSULE ORAL
COMMUNITY
Start: 2019-11-05 | End: 2020-02-20

## 2019-12-12 RX ORDER — POTASSIUM CHLORIDE 1500 MG/1
TABLET, EXTENDED RELEASE ORAL
Status: ON HOLD | COMMUNITY
Start: 2019-11-04 | End: 2020-01-20

## 2019-12-12 RX ORDER — ISOSORBIDE MONONITRATE 30 MG/1
30 TABLET, EXTENDED RELEASE ORAL EVERY MORNING
Qty: 90 TAB | Refills: 3 | Status: SHIPPED | OUTPATIENT
Start: 2019-12-12 | End: 2020-02-20

## 2019-12-12 ASSESSMENT — ENCOUNTER SYMPTOMS
CLAUDICATION: 0
SPUTUM PRODUCTION: 0
HEMOPTYSIS: 0
ORTHOPNEA: 0
HEADACHES: 0
WHEEZING: 0
PALPITATIONS: 0
ABDOMINAL PAIN: 1
SHORTNESS OF BREATH: 1
CHILLS: 0
DIZZINESS: 0
PND: 0
COUGH: 0
FEVER: 0
VOMITING: 0
NAUSEA: 0

## 2019-12-12 NOTE — PROGRESS NOTES
"Chief Complaint   Patient presents with   • Chest Pain       Subjective:   Vielka Briscoe is a 64 y.o. female who presents today for chest pain and HTN.  She was last seen 3/28/2018 by Dr. Alberts.  She has many other chronic issues including COPD, oxygen dependent (never smoked), myelodysplastic syndrome intolerant of some treatments.  Of note there were reports of PAF as a diagnosis but cannot find anything indicating she has PAF.    The patient was recently hospitalized 11/4-5/2019 for shortness of breath and cough.  She was treated for acute bronchitis with antibiotics.  During hospitalization a CT of the head was performed on 11/4/2019 indicating white matter lucencies most consistent with small vessel ischemic change versus demyelination or gliosis.  The patient is here for follow-up of these findings.    Unfortunately because of the MDS she is not a candidate for aspirin, Plavix, or blood thinner.    She continues to have her chest pain with and without exertion and some chest tightness.  Nitroglycerin sometimes helps and sometimes does not.    Past Medical History:   Diagnosis Date   • Arthritis     hands    • Atrial fibrillation (HCC)     HX   • Blood transfusion without reported diagnosis    • Breath shortness     w/exertion   • Cancer (HCC)     MDS in bones   • Cataract     bilat IOL   • Chronic anemia    • Chronic kidney disease        • Chronic obstructive pulmonary disease (HCC)    • Congestive heart failure (HCC)    • Dental disorder     full dentures   • Diabetes     Diet controlled   • Diabetes (HCC)    • Dialysis patient     [facility] [place]   • Glaucoma    • Heart abnormalities    • Hypertension    • MDS (myelodysplastic syndrome) 10/2016    bone marrow biopsy   • Other hyperlipidemia 12/12/2019   • Pain -2017    \"bones\", generalized, 5/10   • Renal disorder    • Stroke (HCC) 03/2015    No residual weakness/problems   • Supplemental oxygen dependent     2 " liters     Past Surgical History:   Procedure Laterality Date   • GASTROSCOPY N/A 2018    Procedure: GASTROSCOPY;  Surgeon: Blanca Santos M.D.;  Location: SURGERY [facility];  Service: Gastroenterology   • BONE MARROW BIOPSY, NDL/TROCAR  2017    Procedure: BONE MARROW BIOPSY, NDL/TROCAR;  Surgeon: Wade Turner M.D.;  Location: ENDOSCOPY [facility];  Service: Orthopedics   • BONE MARROW ASPIRATION  2017    Procedure: BONE MARROW ASPIRATION;  Surgeon: Wade Turner M.D.;  Location: ENDOSCOPY [facility];  Service: Orthopedics   • VEIN LIGATION Left 11/10/2016    Procedure: VEIN LIGATION FOR DISTAL REVASCULARIZATION AND INTERVAL LIGATION OF LEFT ARM DIALYISIS ACCESS (DRIL PROCEDURE);  Surgeon: Ranulfo Jolly M.D.;  Location: SURGERY [facility];  Service:    • AV FISTULA CREATION Left 2016    Procedure: AV FISTULA CREATION UPPER EXTREMITY;  Surgeon: Ranulfo Jolly M.D.;  Location: SURGERY [facility];  Service:    • GASTROSCOPY  2015    Procedure: ESOPHAGOGASTRODUODENOSCOPY WITH BIOPSY;  Surgeon: Wing Álvarez M.D.;  Location: SURGERY [facility];  Service:    • COLONOSCOPY  2015    Procedure: COLONOSCOPY;  Surgeon: Wing Álvarez M.D.;  Location: SURGERY [facility];  Service:    • GYN SURGERY      hysterectomy   • GYN SURGERY       x 2   • GYN SURGERY      d&C x2   • OTHER      angioplasty/ stents bilat LE   • OTHER ABDOMINAL SURGERY      appendectomy,  x 2, hysterectomy, D & C   • OTHER ABDOMINAL SURGERY      appendectomy   • OTHER CARDIAC SURGERY      Angioplasty  and    • OTHER CARDIAC SURGERY      cardiac angiogram, angioplasty   • RETINAL DETACHMENT REPAIR Right      Family History   Problem Relation Age of Onset   • Hypertension Father          at 89   • Hypertension Mother      Social History     Socioeconomic History   • Marital status:      Spouse name: Not on file   • Number of children: Not on  file   • Years of education: Not on file   • Highest education level: Not on file   Occupational History   • Not on file   Social Needs   • Financial resource strain: Not on file   • Food insecurity:     Worry: Not on file     Inability: Not on file   • Transportation needs:     Medical: Not on file     Non-medical: Not on file   Tobacco Use   • Smoking status: Never Smoker   • Smokeless tobacco: Never Used   Substance and Sexual Activity   • Alcohol use: No   • Drug use: No   • Sexual activity: Not on file   Lifestyle   • Physical activity:     Days per week: Not on file     Minutes per session: Not on file   • Stress: Not on file   Relationships   • Social connections:     Talks on phone: Not on file     Gets together: Not on file     Attends Anabaptism service: Not on file     Active member of club or organization: Not on file     Attends meetings of clubs or organizations: Not on file     Relationship status: Not on file   • Intimate partner violence:     Fear of current or ex partner: Not on file     Emotionally abused: Not on file     Physically abused: Not on file     Forced sexual activity: Not on file   Other Topics Concern   • Not on file   Social History Narrative    ** Merged History Encounter **         ** Merged History Encounter **        Allergies   Allergen Reactions   • Lenalidomide Unspecified     Beeping Pulse     • Naprosyn [Naproxen] Hives   • Pioglitazone Unspecified     Causes blindness   • Simvastatin Unspecified     Couldn't move   • Demerol Vomiting   • Diphenhydramine Vomiting   • Glipizide Vomiting   • Hydromorphone Vomiting   • Iron Vomiting     vomiting   • Metformin Vomiting   • Morphine Vomiting   • Multivitamin Itching     itching   • Ondansetron Itching   • Other Drug Rash and Vomiting     Any binders that remove phosphorus from the body such as tums   • Oxycodone Vomiting   • Pcn [Penicillins] Vomiting          • Propoxyphene Vomiting   • Requip Vomiting   • Sulfa Drugs Rash and  Vomiting     Vomiting & rash      • Tamsulosin Vomiting   • Tramadol Vomiting   • Trazodone Vomiting     Outpatient Encounter Medications as of 12/12/2019   Medication Sig Dispense Refill   • amLODIPine (NORVASC) 2.5 MG Tab TK 1 T PO QD     • azithromycin (ZITHROMAX) 250 MG Tab      • furosemide (LASIX) 10 MG/ML Solution      • latanoprost (XALATAN) 0.005 % Solution      • isosorbide mononitrate SR (IMDUR) 30 MG TABLET SR 24 HR Take 1 Tab by mouth every morning. 30 Tab 11   • nitroglycerin (NITROSTAT) 0.4 MG SL Tab Place 1 Tab under tongue as needed for Chest Pain. 1 tab every 5 min,3 doses is max/day if 4th dose needed go to ER. May cause low BP 30 Tab 2   • ondansetron (ZOFRAN) 4 MG Tab tablet TK 1 T PO Q 6 H FOR 20 DAYS  PRN NAUSEA  3   • Buprenorphine 20 MCG/HR PATCH WEEKLY Apply 20 mcg to affected area(s) every Monday.  0   • Diclofenac Sodium (VOLTAREN) 1 % Gel Apply  to skin as directed every morning.     • HYDROcodone-acetaminophen (NORCO) 5-325 MG Tab per tablet Take 1 Tab by mouth 2 times a day as needed. Indications: Pain     • carvedilol (COREG) 12.5 MG Tab Take 1 Tab by mouth 2 times a day, with meals. 60 Tab 0   • lidocaine (LIDODERM) 5 % Patch Apply 1 Patch to skin as directed every bedtime.     • losartan (COZAAR) 100 MG Tab Take 100 mg by mouth every evening.     • bimatoprost (LUMIGAN) 0.01 % Solution Place 1 Drop in both eyes every bedtime.     • brinzolamide (AZOPT) 1 % Suspension Place 1 Drop in both eyes 2 Times a Day.     • pregabalin (LYRICA) 150 MG Cap Take 150 mg by mouth 4 times a day.     • brimonidine (ALPHAGAN) 0.2 % Solution      • cefTRIAXone (ROCEPHIN) 2 GM Recon Soln      • Heparin Lock Flush (HEPARIN PF) 100 UNIT/ML Solution injection      • KLOR-CON M20 20 MEQ Tab CR      • pregabalin (LYRICA) 75 MG Cap      • Sodium Chloride (NS) Solution      • losartan (COZAAR) 50 MG Tab      • Brimonidine Tartrate-Timolol (COMBIGAN) 0.2-0.5 % Solution Place 1 Drop in both eyes 2 Times a Day.    "    No facility-administered encounter medications on file as of 12/12/2019.      Review of Systems   Constitutional: Negative for chills and fever.   Respiratory: Positive for shortness of breath. Negative for cough, hemoptysis, sputum production and wheezing.    Cardiovascular: Positive for chest pain and leg swelling. Negative for palpitations, orthopnea, claudication and PND.   Gastrointestinal: Positive for abdominal pain. Negative for nausea and vomiting.   Neurological: Negative for dizziness and headaches.   All other systems reviewed and are negative.       Objective:   /78 (BP Location: Left arm, Patient Position: Sitting, BP Cuff Size: Adult)   Pulse 78   Ht 1.323 m (4' 4.09\")   Wt 67.9 kg (149 lb 12.8 oz)   LMP 01/01/1995   SpO2 92%   BMI 38.82 kg/m²     Physical Exam   Constitutional: She appears well-developed and well-nourished.   Eyes: EOM are normal.   Neck: Neck supple. No JVD present.   Cardiovascular: Normal rate and regular rhythm.   Murmur heard.   Systolic murmur is present with a grade of 2/6.  Pulses:       Carotid pulses are 2+ on the right side with bruit and 2+ on the left side with bruit.       Radial pulses are 2+ on the right side and 2+ on the left side.   Pulmonary/Chest: Effort normal and breath sounds normal.   Abdominal: Soft. She exhibits distension.   Neurological:   Cranial nerves II-XII WNL   Skin: Skin is warm and dry.   Psychiatric: She has a normal mood and affect. Her behavior is normal. Judgment and thought content normal.   Nursing note and vitals reviewed.      Assessment:     1. Essential hypertension     2. Diastolic dysfunction     3. Paroxysmal atrial fibrillation (HCC)     4. Other hyperlipidemia  LIPID PANEL       Medical Decision Making:  Today's Assessment / Status / Plan:   1.  HTN  - [place] Imdur 30 mg daily  -Monitor blood pressure    2.  Diastolic dysfunction  -Minimal lower extremity edema    3.  HLD  -Obtain lipid panel  -Consider starting " statin    4.  MDS  - Unable to start ASA, clopidogrel, or blood thinner due to this    Collaborating MD is Dr. Alberts.  Follow-up visit in 3 months with LS.    Please note that this dictation was created using voice recognition software.  I have made every reasonable attempt to correct obvious errors, but it is possible there are errors of grammar or possibly content that I did not discover before finalizing the note.

## 2019-12-26 ENCOUNTER — OUTPATIENT INFUSION SERVICES (OUTPATIENT)
Dept: ONCOLOGY | Facility: MEDICAL CENTER | Age: 64
End: 2019-12-26
Attending: INTERNAL MEDICINE
Payer: MEDICARE

## 2019-12-26 ENCOUNTER — OFFICE VISIT (OUTPATIENT)
Dept: HEMATOLOGY ONCOLOGY | Facility: MEDICAL CENTER | Age: 64
End: 2019-12-26
Payer: MEDICARE

## 2019-12-26 VITALS
BODY MASS INDEX: 31.91 KG/M2 | HEART RATE: 65 BPM | HEIGHT: 57 IN | OXYGEN SATURATION: 100 % | TEMPERATURE: 97.7 F | WEIGHT: 147.93 LBS | SYSTOLIC BLOOD PRESSURE: 133 MMHG | RESPIRATION RATE: 16 BRPM | DIASTOLIC BLOOD PRESSURE: 59 MMHG

## 2019-12-26 VITALS
BODY MASS INDEX: 33.23 KG/M2 | TEMPERATURE: 98.7 F | OXYGEN SATURATION: 100 % | RESPIRATION RATE: 16 BRPM | WEIGHT: 147.71 LBS | HEART RATE: 86 BPM | HEIGHT: 56 IN | DIASTOLIC BLOOD PRESSURE: 68 MMHG | SYSTOLIC BLOOD PRESSURE: 152 MMHG

## 2019-12-26 DIAGNOSIS — Z99.2 ESRD (END STAGE RENAL DISEASE) ON DIALYSIS (HCC): ICD-10-CM

## 2019-12-26 DIAGNOSIS — N18.6 ESRD (END STAGE RENAL DISEASE) ON DIALYSIS (HCC): ICD-10-CM

## 2019-12-26 DIAGNOSIS — Z09 ENCOUNTER FOR HEMATOLOGY FOLLOW-UP: ICD-10-CM

## 2019-12-26 DIAGNOSIS — D46.9 MYELODYSPLASIA (MYELODYSPLASTIC SYNDROME) (HCC): ICD-10-CM

## 2019-12-26 DIAGNOSIS — D46.9 MDS (MYELODYSPLASTIC SYNDROME) (HCC): ICD-10-CM

## 2019-12-26 DIAGNOSIS — D64.9 SYMPTOMATIC ANEMIA: ICD-10-CM

## 2019-12-26 LAB
ABO GROUP BLD: NORMAL
ANISOCYTOSIS BLD QL SMEAR: ABNORMAL
BARCODED ABORH UBTYP: 8400
BARCODED PRD CODE UBPRD: NORMAL
BARCODED UNIT NUM UBUNT: NORMAL
BASOPHILS # BLD AUTO: 0.6 % (ref 0–1.8)
BASOPHILS # BLD: 0.03 K/UL (ref 0–0.12)
BLD GP AB SCN SERPL QL: NORMAL
CHOLEST SERPL-MCNC: 129 MG/DL (ref 100–199)
COMMENT 1642: NORMAL
COMPONENT R 8504R: NORMAL
EOSINOPHIL # BLD AUTO: 0.28 K/UL (ref 0–0.51)
EOSINOPHIL NFR BLD: 5.4 % (ref 0–6.9)
ERYTHROCYTE [DISTWIDTH] IN BLOOD BY AUTOMATED COUNT: 69.9 FL (ref 35.9–50)
HCT VFR BLD AUTO: 19.5 % (ref 37–47)
HDLC SERPL-MCNC: 32 MG/DL
HGB BLD-MCNC: 6.4 G/DL (ref 12–16)
IMM GRANULOCYTES # BLD AUTO: 0.03 K/UL (ref 0–0.11)
IMM GRANULOCYTES NFR BLD AUTO: 0.6 % (ref 0–0.9)
LDLC SERPL CALC-MCNC: 65 MG/DL
LYMPHOCYTES # BLD AUTO: 1 K/UL (ref 1–4.8)
LYMPHOCYTES NFR BLD: 19.2 % (ref 22–41)
MACROCYTES BLD QL SMEAR: ABNORMAL
MCH RBC QN AUTO: 33 PG (ref 27–33)
MCHC RBC AUTO-ENTMCNC: 32.8 G/DL (ref 33.6–35)
MCV RBC AUTO: 100.5 FL (ref 81.4–97.8)
MONOCYTES # BLD AUTO: 0.67 K/UL (ref 0–0.85)
MONOCYTES NFR BLD AUTO: 12.9 % (ref 0–13.4)
MORPHOLOGY BLD-IMP: NORMAL
NEUTROPHILS # BLD AUTO: 3.19 K/UL (ref 2–7.15)
NEUTROPHILS NFR BLD: 61.3 % (ref 44–72)
NRBC # BLD AUTO: 0 K/UL
NRBC BLD-RTO: 0 /100 WBC
PLATELET # BLD AUTO: 103 K/UL (ref 164–446)
PLATELET BLD QL SMEAR: NORMAL
POLYCHROMASIA BLD QL SMEAR: NORMAL
PRODUCT TYPE UPROD: NORMAL
RBC # BLD AUTO: 1.94 M/UL (ref 4.2–5.4)
RBC BLD AUTO: PRESENT
RH BLD: NORMAL
TRIGL SERPL-MCNC: 161 MG/DL (ref 0–149)
UNIT STATUS USTAT: NORMAL
WBC # BLD AUTO: 5.2 K/UL (ref 4.8–10.8)

## 2019-12-26 PROCEDURE — 99213 OFFICE O/P EST LOW 20 MIN: CPT | Performed by: NURSE PRACTITIONER

## 2019-12-26 PROCEDURE — 86850 RBC ANTIBODY SCREEN: CPT

## 2019-12-26 PROCEDURE — 85025 COMPLETE CBC W/AUTO DIFF WBC: CPT

## 2019-12-26 PROCEDURE — 306780 HCHG STAT FOR TRANSFUSION PER CASE

## 2019-12-26 PROCEDURE — 80061 LIPID PANEL: CPT

## 2019-12-26 PROCEDURE — 36591 DRAW BLOOD OFF VENOUS DEVICE: CPT

## 2019-12-26 PROCEDURE — 700111 HCHG RX REV CODE 636 W/ 250 OVERRIDE (IP)

## 2019-12-26 PROCEDURE — 86901 BLOOD TYPING SEROLOGIC RH(D): CPT

## 2019-12-26 PROCEDURE — A4212 NON CORING NEEDLE OR STYLET: HCPCS

## 2019-12-26 PROCEDURE — 86923 COMPATIBILITY TEST ELECTRIC: CPT

## 2019-12-26 PROCEDURE — 86900 BLOOD TYPING SEROLOGIC ABO: CPT

## 2019-12-26 PROCEDURE — P9016 RBC LEUKOCYTES REDUCED: HCPCS

## 2019-12-26 PROCEDURE — 700111 HCHG RX REV CODE 636 W/ 250 OVERRIDE (IP): Performed by: INTERNAL MEDICINE

## 2019-12-26 PROCEDURE — 36430 TRANSFUSION BLD/BLD COMPNT: CPT

## 2019-12-26 PROCEDURE — 96374 THER/PROPH/DIAG INJ IV PUSH: CPT

## 2019-12-26 RX ORDER — LIDOCAINE HYDROCHLORIDE 10 MG/ML
20 INJECTION, SOLUTION INFILTRATION; PERINEURAL
Status: CANCELLED | OUTPATIENT
Start: 2019-12-26

## 2019-12-26 RX ORDER — LIDOCAINE HYDROCHLORIDE 10 MG/ML
INJECTION, SOLUTION EPIDURAL; INFILTRATION; INTRACAUDAL; PERINEURAL
Status: COMPLETED
Start: 2019-12-26 | End: 2019-12-26

## 2019-12-26 RX ORDER — ACETAMINOPHEN 325 MG/1
650 TABLET ORAL ONCE
Status: CANCELLED | OUTPATIENT
Start: 2019-12-26

## 2019-12-26 RX ORDER — FUROSEMIDE 10 MG/ML
20 INJECTION INTRAMUSCULAR; INTRAVENOUS ONCE
Status: CANCELLED | OUTPATIENT
Start: 2019-12-26

## 2019-12-26 RX ORDER — DIPHENHYDRAMINE HCL 25 MG
25 TABLET ORAL ONCE
Status: CANCELLED | OUTPATIENT
Start: 2019-12-26

## 2019-12-26 RX ORDER — ACETAMINOPHEN 325 MG/1
650 TABLET ORAL PRN
Status: CANCELLED | OUTPATIENT
Start: 2019-12-26

## 2019-12-26 RX ORDER — FUROSEMIDE 10 MG/ML
20 INJECTION INTRAMUSCULAR; INTRAVENOUS ONCE
Status: COMPLETED | OUTPATIENT
Start: 2019-12-26 | End: 2019-12-26

## 2019-12-26 RX ORDER — SODIUM CHLORIDE 9 MG/ML
500 INJECTION, SOLUTION INTRAVENOUS ONCE
Status: CANCELLED | OUTPATIENT
Start: 2019-12-26

## 2019-12-26 RX ADMIN — FUROSEMIDE 20 MG: 10 INJECTION, SOLUTION INTRAMUSCULAR; INTRAVENOUS at 14:14

## 2019-12-26 RX ADMIN — LIDOCAINE HYDROCHLORIDE 5 ML: 10 INJECTION, SOLUTION EPIDURAL; INFILTRATION; INTRACAUDAL at 09:54

## 2019-12-26 RX ADMIN — HEPARIN 500 UNITS: 100 SYRINGE at 14:20

## 2019-12-26 ASSESSMENT — ENCOUNTER SYMPTOMS
BACK PAIN: 1
HEADACHES: 1
WEIGHT LOSS: 0
FEVER: 0
INSOMNIA: 1
DIZZINESS: 1
DIARRHEA: 0
CHILLS: 1
TINGLING: 0
VOMITING: 0
PALPITATIONS: 1
WHEEZING: 0
SHORTNESS OF BREATH: 1
MYALGIAS: 0
NAUSEA: 1
CONSTIPATION: 0
COUGH: 0

## 2019-12-26 ASSESSMENT — PAIN SCALES - GENERAL: PAINLEVEL: 6=MODERATE PAIN

## 2019-12-26 NOTE — PROGRESS NOTES
Subjective:      Vielka Briscoe is a 64 y.o. female who presents for Other (3 week fv MDS) surveillance evaluation of MDS      HPI   Ms. 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  She is accompanied by her caregiver, Afua, for today's visit.     Patient has previously been treated with low-dose Revlimid in 2016 and Vidaza 75 mg/m² subcu 5 days a week in 2017, per Dr. Avila. Since Dr. [facility]'s shelter the patient established with our office in 7/2019, per second opinion evaluation, and continues with supportive therapy in the form of periodic transfusions, standing orders in place.    Patient continues with dialysis 3 times a week.  She continues with intermittent palpitations, dizziness, headaches that are self-limiting and consistent with her baseline.  Patient is more fatigued and received 1 unit of RBCs at her most recent transfusion, 3 weeks ago, as opposed to 2 units due to scheduling limitations of patient.  Continues on O2 at 3-4 L/min. patient is otherwise asymptomatic.        Allergies   Allergen Reactions   • Lenalidomide Unspecified     Beeping Pulse     • Naprosyn [Naproxen] Hives   • Pioglitazone Unspecified     Causes blindness   • Simvastatin Unspecified     Couldn't move   • Demerol Vomiting   • Diphenhydramine Vomiting   • Glipizide Vomiting   • Hydromorphone Vomiting   • Iron Vomiting     vomiting   • Metformin Vomiting   • Morphine Vomiting   • Multivitamin Itching     itching   • Ondansetron Itching   • Other Drug Rash and Vomiting     Any binders that remove phosphorus from the body such as tums   • Oxycodone Vomiting   • Pcn [Penicillins] Vomiting          • Propoxyphene Vomiting   • Requip Vomiting   • Sulfa Drugs Rash and Vomiting     Vomiting & rash      • Tamsulosin Vomiting   • Tramadol Vomiting   • Trazodone Vomiting           Current Outpatient  Medications on File Prior to Visit   Medication Sig Dispense Refill   • amLODIPine (NORVASC) 2.5 MG Tab TK 1 T PO QD     • brimonidine (ALPHAGAN) 0.2 % Solution      • furosemide (LASIX) 10 MG/ML Solution      • Heparin Lock Flush (HEPARIN PF) 100 UNIT/ML Solution injection      • losartan (COZAAR) 50 MG Tab 100 mg.     • nitroglycerin (NITROSTAT) 0.4 MG SL Tab Place 1 Tab under tongue as needed for Chest Pain. 1 tab every 5 min,3 doses is max/day if 4th dose needed go to ER. May cause low BP 30 Tab 2   • ondansetron (ZOFRAN) 4 MG Tab tablet TK 1 T PO Q 6 H FOR 20 DAYS  PRN NAUSEA  3   • Buprenorphine 20 MCG/HR PATCH WEEKLY Apply 20 mcg to affected area(s) every Monday.  0   • Diclofenac Sodium (VOLTAREN) 1 % Gel Apply  to skin as directed every morning.     • HYDROcodone-acetaminophen (NORCO) 5-325 MG Tab per tablet Take 1 Tab by mouth 2 times a day as needed. Indications: Pain     • carvedilol (COREG) 12.5 MG Tab Take 1 Tab by mouth 2 times a day, with meals. 60 Tab 0   • lidocaine (LIDODERM) 5 % Patch Apply 1 Patch to skin as directed every bedtime.     • losartan (COZAAR) 100 MG Tab Take 100 mg by mouth every evening.     • bimatoprost (LUMIGAN) 0.01 % Solution Place 1 Drop in both eyes every bedtime.     • brinzolamide (AZOPT) 1 % Suspension Place 1 Drop in both eyes 2 Times a Day.     • Brimonidine Tartrate-Timolol (COMBIGAN) 0.2-0.5 % Solution Place 1 Drop in both eyes 2 Times a Day.     • pregabalin (LYRICA) 150 MG Cap Take 150 mg by mouth 4 times a day.     • azithromycin (ZITHROMAX) 250 MG Tab      • cefTRIAXone (ROCEPHIN) 2 GM Recon Soln      • latanoprost (XALATAN) 0.005 % Solution      • KLOR-CON M20 20 MEQ Tab CR      • pregabalin (LYRICA) 75 MG Cap      • Sodium Chloride (NS) Solution      • isosorbide mononitrate SR (IMDUR) 30 MG TABLET SR 24 HR Take 1 Tab by mouth every morning. (Patient not taking: Reported on 12/26/2019) 90 Tab 3     No current facility-administered medications on file prior to  "visit.            Review of Systems   Constitutional: Positive for chills (\"hard to get warm\") and malaise/fatigue (\"always tired but can't sleep\"). Negative for fever and weight loss.   Respiratory: Positive for shortness of breath (with exertion). Negative for cough and wheezing.         O2 3L   Cardiovascular: Positive for palpitations (intermittent and self limiting - consistent with baseline). Negative for chest pain and leg swelling.   Gastrointestinal: Positive for nausea (less pepsi more apple cider - helping with gas). Negative for constipation, diarrhea and vomiting.   Genitourinary: Negative for dysuria.   Musculoskeletal: Positive for back pain and joint pain (hips and legs). Negative for myalgias.   Neurological: Positive for dizziness (fairly often) and headaches. Negative for tingling.   Psychiatric/Behavioral: The patient has insomnia (consistent with baseline).           Objective:     /68 (BP Location: Right arm, Patient Position: Sitting, BP Cuff Size: Adult)   Pulse 86   Temp 37.1 °C (98.7 °F) (Temporal)   Resp 16   Ht 1.42 m (4' 7.91\")   Wt 67 kg (147 lb 11.3 oz)   LMP 01/01/1995   SpO2 100% Comment: on 3 litters of 02  BMI 33.23 kg/m²      Physical Exam  Vitals signs reviewed.   Constitutional:       General: She is not in acute distress.     Appearance: She is well-developed. She is not diaphoretic.      Comments: A bit drowsy but \"takes a long time to wake up\"   HENT:      Head: Normocephalic and atraumatic.      Mouth/Throat:      Pharynx: No oropharyngeal exudate.   Eyes:      General: No scleral icterus.        Right eye: No discharge.         Left eye: No discharge.      Conjunctiva/sclera: Conjunctivae normal.      Pupils: Pupils are equal, round, and reactive to light.   Neck:      Musculoskeletal: Normal range of motion and neck supple.   Cardiovascular:      Rate and Rhythm: Normal rate and regular rhythm.      Heart sounds: Normal heart sounds. No murmur. No friction " rub. No gallop.    Pulmonary:      Effort: Pulmonary effort is normal. No respiratory distress.      Breath sounds: Normal breath sounds. No wheezing.      Comments: O2 @ 3  Abdominal:      General: Bowel sounds are normal. There is no distension.      Palpations: Abdomen is soft.      Tenderness: There is no tenderness.   Musculoskeletal: Normal range of motion.      Comments: LUE - AV fistula   Skin:     General: Skin is warm and dry.      Coloration: Skin is not pale.      Findings: No erythema or rash.   Neurological:      Mental Status: She is alert and oriented to person, place, and time.   Psychiatric:         Behavior: Behavior normal.           Labs pending                                   Assessment/Plan:       1. MDS (myelodysplastic syndrome) ([facility])     2. Encounter for hematology follow-up     3. ESRD (end stage renal disease) on dialysis ([facility])           1.  ESRD: Stable, continues with dialysis 3 times weekly.     2.  MDS:  Patient has previously undergone treatment with Vidaza and Revlimid.  She has not undergone further treatment since 2017.  She established care with our office in July 2019 and continues with supportive transfusions approximately every 3-5 weeks. Per her request, patient will be transitioning from every 3-week standing lab orders to every 5 weeks checks.  Patient will present to the infusion center today for reevaluation of transfusion needs, and again on Saturday for unit #2 of 2 (split transfusion to prevent fluid overload). She will return in 5 weeks for recheck, sooner as needed.            The patient verbalized agreement and understanding of current plan. All questions and concerns were addressed at time of visit.    Please note that this dictation was created using voice recognition software. I have made every reasonable attempt to correct obvious errors, but I expect that there are errors of grammar and possibly content that I did not discover before finalizing the  note.

## 2019-12-26 NOTE — PROGRESS NOTES
Pt arrived to [facility], ambulatory, for CBC/possible blood. Pt voices no complaints. Port accessed in sterile manner, positive blood return noted. Labs drawn and reviewed, hgb 6.4 today. COD sent. Pt refusing pre-medications at this time, takes home tylenol. 1 unit PRBCs transfused with no s/sx of adverse reaction. Lasix administered. Port flushed and heparin locked per policy, port de-accessed. Pt left [facility] with no s/sx of distress. Follow up appointment confirmed for second unit on Saturday.

## 2019-12-28 ENCOUNTER — OUTPATIENT INFUSION SERVICES (OUTPATIENT)
Dept: ONCOLOGY | Facility: MEDICAL CENTER | Age: 64
End: 2019-12-28
Attending: INTERNAL MEDICINE
Payer: MEDICARE

## 2019-12-28 VITALS
DIASTOLIC BLOOD PRESSURE: 52 MMHG | BODY MASS INDEX: 32.39 KG/M2 | WEIGHT: 150.13 LBS | RESPIRATION RATE: 16 BRPM | TEMPERATURE: 97.9 F | HEIGHT: 57 IN | SYSTOLIC BLOOD PRESSURE: 149 MMHG | HEART RATE: 65 BPM | OXYGEN SATURATION: 100 %

## 2019-12-28 DIAGNOSIS — D46.9 MYELODYSPLASIA (MYELODYSPLASTIC SYNDROME) (HCC): ICD-10-CM

## 2019-12-28 DIAGNOSIS — D64.9 SYMPTOMATIC ANEMIA: ICD-10-CM

## 2019-12-28 PROCEDURE — 96374 THER/PROPH/DIAG INJ IV PUSH: CPT

## 2019-12-28 PROCEDURE — 700105 HCHG RX REV CODE 258: Performed by: INTERNAL MEDICINE

## 2019-12-28 PROCEDURE — P9016 RBC LEUKOCYTES REDUCED: HCPCS

## 2019-12-28 PROCEDURE — 700111 HCHG RX REV CODE 636 W/ 250 OVERRIDE (IP): Performed by: INTERNAL MEDICINE

## 2019-12-28 PROCEDURE — 700111 HCHG RX REV CODE 636 W/ 250 OVERRIDE (IP)

## 2019-12-28 PROCEDURE — A4212 NON CORING NEEDLE OR STYLET: HCPCS

## 2019-12-28 PROCEDURE — 36430 TRANSFUSION BLD/BLD COMPNT: CPT

## 2019-12-28 PROCEDURE — 86923 COMPATIBILITY TEST ELECTRIC: CPT

## 2019-12-28 PROCEDURE — 306780 HCHG STAT FOR TRANSFUSION PER CASE

## 2019-12-28 PROCEDURE — 96375 TX/PRO/DX INJ NEW DRUG ADDON: CPT

## 2019-12-28 RX ORDER — ACETAMINOPHEN 325 MG/1
650 TABLET ORAL PRN
Status: CANCELLED | OUTPATIENT
Start: 2019-12-28

## 2019-12-28 RX ORDER — SODIUM CHLORIDE 9 MG/ML
500 INJECTION, SOLUTION INTRAVENOUS ONCE
Status: CANCELLED | OUTPATIENT
Start: 2019-12-28

## 2019-12-28 RX ORDER — FUROSEMIDE 10 MG/ML
20 INJECTION INTRAMUSCULAR; INTRAVENOUS ONCE
Status: COMPLETED | OUTPATIENT
Start: 2019-12-28 | End: 2019-12-28

## 2019-12-28 RX ORDER — ACETAMINOPHEN 325 MG/1
650 TABLET ORAL ONCE
Status: CANCELLED | OUTPATIENT
Start: 2019-12-28

## 2019-12-28 RX ORDER — DIPHENHYDRAMINE HCL 25 MG
25 TABLET ORAL ONCE
Status: DISCONTINUED | OUTPATIENT
Start: 2019-12-28 | End: 2019-12-28 | Stop reason: HOSPADM

## 2019-12-28 RX ORDER — ACETAMINOPHEN 325 MG/1
650 TABLET ORAL ONCE
Status: DISCONTINUED | OUTPATIENT
Start: 2019-12-28 | End: 2019-12-28 | Stop reason: HOSPADM

## 2019-12-28 RX ORDER — LIDOCAINE HYDROCHLORIDE 10 MG/ML
INJECTION, SOLUTION EPIDURAL; INFILTRATION; INTRACAUDAL; PERINEURAL
Status: COMPLETED
Start: 2019-12-28 | End: 2019-12-28

## 2019-12-28 RX ORDER — LIDOCAINE HYDROCHLORIDE 10 MG/ML
20 INJECTION, SOLUTION INFILTRATION; PERINEURAL
Status: CANCELLED | OUTPATIENT
Start: 2019-12-28

## 2019-12-28 RX ORDER — FUROSEMIDE 10 MG/ML
20 INJECTION INTRAMUSCULAR; INTRAVENOUS ONCE
Status: CANCELLED | OUTPATIENT
Start: 2019-12-28

## 2019-12-28 RX ORDER — DIPHENHYDRAMINE HCL 25 MG
25 TABLET ORAL ONCE
Status: CANCELLED | OUTPATIENT
Start: 2019-12-28

## 2019-12-28 RX ORDER — SODIUM CHLORIDE 9 MG/ML
500 INJECTION, SOLUTION INTRAVENOUS ONCE
Status: COMPLETED | OUTPATIENT
Start: 2019-12-28 | End: 2019-12-28

## 2019-12-28 RX ADMIN — LIDOCAINE HYDROCHLORIDE 5 ML: 10 INJECTION, SOLUTION EPIDURAL; INFILTRATION; INTRACAUDAL at 09:43

## 2019-12-28 RX ADMIN — SODIUM CHLORIDE 250 ML: 9 INJECTION, SOLUTION INTRAVENOUS at 09:44

## 2019-12-28 RX ADMIN — FUROSEMIDE 20 MG: 10 INJECTION, SOLUTION INTRAMUSCULAR; INTRAVENOUS at 12:40

## 2019-12-28 RX ADMIN — HEPARIN 500 UNITS: 100 SYRINGE at 12:46

## 2019-12-28 NOTE — PROGRESS NOTES
Patient arrived ambulatory to the [facility] for unit 2 of 2 of PRBC's. Reviewed vital signs, labs, and physician order. Patient denies S&S of infection, or bleeding. Right chest port numbed with Lidocaine per pt request. Port accessed with sterile technique, visualized brisk blood return. Pt took own Tylenol upon arrival, refused other pre-med's. 1 unit of blood transfused per MD order, no adverse reaction observed. Lasix administered per MD order upon completion of blood transfusion. Port flushed per protocol, rivera needle removed with tip intact, gauze and tape dressing placed. Confirmed upcoming appointment date and time with patient. Patient left the [facility] ambulatory in no sign of distress.

## 2020-01-09 NOTE — PROGRESS NOTES
Patient here for CBC/possible blood products. Port accessed using sterile technique; CBC drawn as ordered. Hgb = 5.8. Critical lab reported to Yanely SOTELO. Pre-medication given per MAR. 1 unit of PRBCs transfused. Heparin instilled prior to de-accessing port. Port de-accessed; gauze/coban applied over site. Elevated blood pressure throughout transfusion. Declined lasix. Next appointment scheduled for Saturday for another unit of PRBCs due to fluid overload restrictions. Discharged to care of friend; no apparent distress noted.   
no

## 2020-01-20 ENCOUNTER — HOSPITAL ENCOUNTER (INPATIENT)
Facility: MEDICAL CENTER | Age: 65
LOS: 1 days | DRG: 682 | End: 2020-01-21
Attending: EMERGENCY MEDICINE | Admitting: HOSPITALIST
Payer: MEDICARE

## 2020-01-20 ENCOUNTER — TELEPHONE (OUTPATIENT)
Dept: HEMATOLOGY ONCOLOGY | Facility: MEDICAL CENTER | Age: 65
End: 2020-01-20

## 2020-01-20 ENCOUNTER — APPOINTMENT (OUTPATIENT)
Dept: RADIOLOGY | Facility: MEDICAL CENTER | Age: 65
DRG: 682 | End: 2020-01-20
Attending: EMERGENCY MEDICINE
Payer: MEDICARE

## 2020-01-20 PROBLEM — E87.6 HYPOKALEMIA: Status: ACTIVE | Noted: 2020-01-20

## 2020-01-20 LAB
ABO GROUP BLD: NORMAL
ALBUMIN SERPL BCP-MCNC: 3.9 G/DL (ref 3.2–4.9)
ALBUMIN/GLOB SERPL: 1.2 G/DL
ALP SERPL-CCNC: 83 U/L (ref 30–99)
ALT SERPL-CCNC: 32 U/L (ref 2–50)
ANION GAP SERPL CALC-SCNC: 14 MMOL/L (ref 0–11.9)
AST SERPL-CCNC: 29 U/L (ref 12–45)
BARCODED ABORH UBTYP: 8400
BARCODED ABORH UBTYP: 8400
BARCODED PRD CODE UBPRD: NORMAL
BARCODED PRD CODE UBPRD: NORMAL
BARCODED UNIT NUM UBUNT: NORMAL
BARCODED UNIT NUM UBUNT: NORMAL
BASOPHILS # BLD AUTO: 0.3 % (ref 0–1.8)
BASOPHILS # BLD: 0.02 K/UL (ref 0–0.12)
BILIRUB SERPL-MCNC: 0.3 MG/DL (ref 0.1–1.5)
BLD GP AB SCN SERPL QL: NORMAL
BUN SERPL-MCNC: 31 MG/DL (ref 8–22)
CALCIUM SERPL-MCNC: 8.9 MG/DL (ref 8.4–10.2)
CHLORIDE SERPL-SCNC: 89 MMOL/L (ref 96–112)
CO2 SERPL-SCNC: 27 MMOL/L (ref 20–33)
COMPONENT R 8504R: NORMAL
COMPONENT R 8504R: NORMAL
CREAT SERPL-MCNC: 3.52 MG/DL (ref 0.5–1.4)
EOSINOPHIL # BLD AUTO: 0.43 K/UL (ref 0–0.51)
EOSINOPHIL NFR BLD: 6.9 % (ref 0–6.9)
ERYTHROCYTE [DISTWIDTH] IN BLOOD BY AUTOMATED COUNT: 62.5 FL (ref 35.9–50)
GLOBULIN SER CALC-MCNC: 3.3 G/DL (ref 1.9–3.5)
GLUCOSE SERPL-MCNC: 322 MG/DL (ref 65–99)
HCT VFR BLD AUTO: 17.6 % (ref 37–47)
HGB BLD-MCNC: 5.9 G/DL (ref 12–16)
IMM GRANULOCYTES # BLD AUTO: 0.07 K/UL (ref 0–0.11)
IMM GRANULOCYTES NFR BLD AUTO: 1.1 % (ref 0–0.9)
LYMPHOCYTES # BLD AUTO: 0.85 K/UL (ref 1–4.8)
LYMPHOCYTES NFR BLD: 13.6 % (ref 22–41)
MCH RBC QN AUTO: 31.4 PG (ref 27–33)
MCHC RBC AUTO-ENTMCNC: 33.5 G/DL (ref 33.6–35)
MCV RBC AUTO: 93.6 FL (ref 81.4–97.8)
MONOCYTES # BLD AUTO: 0.76 K/UL (ref 0–0.85)
MONOCYTES NFR BLD AUTO: 12.1 % (ref 0–13.4)
NEUTROPHILS # BLD AUTO: 4.14 K/UL (ref 2–7.15)
NEUTROPHILS NFR BLD: 66 % (ref 44–72)
NRBC # BLD AUTO: 0 K/UL
NRBC BLD-RTO: 0 /100 WBC
PLATELET # BLD AUTO: 100 K/UL (ref 164–446)
POTASSIUM SERPL-SCNC: 3.4 MMOL/L (ref 3.6–5.5)
PRODUCT TYPE UPROD: NORMAL
PRODUCT TYPE UPROD: NORMAL
PROT SERPL-MCNC: 7.2 G/DL (ref 6–8.2)
RBC # BLD AUTO: 1.88 M/UL (ref 4.2–5.4)
RH BLD: NORMAL
SODIUM SERPL-SCNC: 130 MMOL/L (ref 135–145)
UNIT STATUS USTAT: NORMAL
UNIT STATUS USTAT: NORMAL
WBC # BLD AUTO: 6.3 K/UL (ref 4.8–10.8)

## 2020-01-20 PROCEDURE — 36415 COLL VENOUS BLD VENIPUNCTURE: CPT

## 2020-01-20 PROCEDURE — 80053 COMPREHEN METABOLIC PANEL: CPT

## 2020-01-20 PROCEDURE — 70450 CT HEAD/BRAIN W/O DYE: CPT

## 2020-01-20 PROCEDURE — 700102 HCHG RX REV CODE 250 W/ 637 OVERRIDE(OP): Performed by: HOSPITALIST

## 2020-01-20 PROCEDURE — 36430 TRANSFUSION BLD/BLD COMPNT: CPT

## 2020-01-20 PROCEDURE — 85025 COMPLETE CBC W/AUTO DIFF WBC: CPT

## 2020-01-20 PROCEDURE — A9270 NON-COVERED ITEM OR SERVICE: HCPCS | Performed by: HOSPITALIST

## 2020-01-20 PROCEDURE — 72170 X-RAY EXAM OF PELVIS: CPT

## 2020-01-20 PROCEDURE — P9016 RBC LEUKOCYTES REDUCED: HCPCS | Mod: 91

## 2020-01-20 PROCEDURE — 86901 BLOOD TYPING SEROLOGIC RH(D): CPT

## 2020-01-20 PROCEDURE — 30233N1 TRANSFUSION OF NONAUTOLOGOUS RED BLOOD CELLS INTO PERIPHERAL VEIN, PERCUTANEOUS APPROACH: ICD-10-PCS | Performed by: HOSPITALIST

## 2020-01-20 PROCEDURE — A9270 NON-COVERED ITEM OR SERVICE: HCPCS

## 2020-01-20 PROCEDURE — 99223 1ST HOSP IP/OBS HIGH 75: CPT | Mod: AI | Performed by: HOSPITALIST

## 2020-01-20 PROCEDURE — 304561 HCHG STAT O2

## 2020-01-20 PROCEDURE — 770020 HCHG ROOM/CARE - TELE (206)

## 2020-01-20 PROCEDURE — 700102 HCHG RX REV CODE 250 W/ 637 OVERRIDE(OP)

## 2020-01-20 PROCEDURE — 99285 EMERGENCY DEPT VISIT HI MDM: CPT

## 2020-01-20 PROCEDURE — 700102 HCHG RX REV CODE 250 W/ 637 OVERRIDE(OP): Performed by: EMERGENCY MEDICINE

## 2020-01-20 PROCEDURE — A9270 NON-COVERED ITEM OR SERVICE: HCPCS | Performed by: EMERGENCY MEDICINE

## 2020-01-20 PROCEDURE — 86850 RBC ANTIBODY SCREEN: CPT

## 2020-01-20 PROCEDURE — 86900 BLOOD TYPING SEROLOGIC ABO: CPT

## 2020-01-20 PROCEDURE — 86923 COMPATIBILITY TEST ELECTRIC: CPT

## 2020-01-20 RX ORDER — ACETAMINOPHEN 500 MG
1000 TABLET ORAL EVERY 6 HOURS PRN
Status: DISCONTINUED | OUTPATIENT
Start: 2020-01-20 | End: 2020-01-21 | Stop reason: HOSPADM

## 2020-01-20 RX ORDER — BRIMONIDINE TARTRATE 2 MG/ML
1 SOLUTION/ DROPS OPHTHALMIC EVERY 8 HOURS
Status: DISCONTINUED | OUTPATIENT
Start: 2020-01-20 | End: 2020-01-21 | Stop reason: HOSPADM

## 2020-01-20 RX ORDER — AMLODIPINE BESYLATE 5 MG/1
2.5 TABLET ORAL ONCE
Status: COMPLETED | OUTPATIENT
Start: 2020-01-20 | End: 2020-01-20

## 2020-01-20 RX ORDER — LATANOPROST 50 UG/ML
1 SOLUTION/ DROPS OPHTHALMIC EVERY EVENING
Status: DISCONTINUED | OUTPATIENT
Start: 2020-01-20 | End: 2020-01-21 | Stop reason: HOSPADM

## 2020-01-20 RX ORDER — CARVEDILOL 6.25 MG/1
12.5 TABLET ORAL 2 TIMES DAILY WITH MEALS
Status: DISCONTINUED | OUTPATIENT
Start: 2020-01-20 | End: 2020-01-21 | Stop reason: HOSPADM

## 2020-01-20 RX ORDER — FUROSEMIDE 10 MG/ML
40 INJECTION INTRAMUSCULAR; INTRAVENOUS ONCE
Status: COMPLETED | OUTPATIENT
Start: 2020-01-20 | End: 2020-01-21

## 2020-01-20 RX ORDER — CARVEDILOL 6.25 MG/1
3.12 TABLET ORAL ONCE
Status: DISCONTINUED | OUTPATIENT
Start: 2020-01-20 | End: 2020-01-20

## 2020-01-20 RX ORDER — LOSARTAN POTASSIUM 25 MG/1
100 TABLET ORAL EVERY EVENING
Status: DISCONTINUED | OUTPATIENT
Start: 2020-01-20 | End: 2020-01-21 | Stop reason: HOSPADM

## 2020-01-20 RX ORDER — LATANOPROST 50 UG/ML
SOLUTION/ DROPS OPHTHALMIC
Status: COMPLETED
Start: 2020-01-20 | End: 2020-01-20

## 2020-01-20 RX ORDER — AMLODIPINE BESYLATE 5 MG/1
2.5 TABLET ORAL DAILY
Status: DISCONTINUED | OUTPATIENT
Start: 2020-01-21 | End: 2020-01-21

## 2020-01-20 RX ORDER — ISOSORBIDE MONONITRATE 30 MG/1
30 TABLET, EXTENDED RELEASE ORAL EVERY MORNING
Status: DISCONTINUED | OUTPATIENT
Start: 2020-01-21 | End: 2020-01-21 | Stop reason: HOSPADM

## 2020-01-20 RX ORDER — BRIMONIDINE TARTRATE 2 MG/ML
SOLUTION/ DROPS OPHTHALMIC
Status: COMPLETED
Start: 2020-01-20 | End: 2020-01-20

## 2020-01-20 RX ORDER — CARVEDILOL 6.25 MG/1
12.5 TABLET ORAL ONCE
Status: COMPLETED | OUTPATIENT
Start: 2020-01-20 | End: 2020-01-20

## 2020-01-20 RX ADMIN — BRIMONIDINE TARTRATE 1 DROP: 2 SOLUTION OPHTHALMIC at 21:52

## 2020-01-20 RX ADMIN — PREGABALIN 150 MG: 100 CAPSULE ORAL at 21:47

## 2020-01-20 RX ADMIN — PREGABALIN 150 MG: 25 CAPSULE ORAL at 18:09

## 2020-01-20 RX ADMIN — LATANOPROST 1 DROP: 50 SOLUTION OPHTHALMIC at 21:52

## 2020-01-20 RX ADMIN — ACETAMINOPHEN 1000 MG: 500 TABLET, FILM COATED ORAL at 23:54

## 2020-01-20 RX ADMIN — AMLODIPINE BESYLATE 2.5 MG: 5 TABLET ORAL at 18:12

## 2020-01-20 RX ADMIN — BRIMONIDINE TARTRATE 1 DROP: 2 SOLUTION/ DROPS OPHTHALMIC at 21:52

## 2020-01-20 RX ADMIN — CARVEDILOL 12.5 MG: 6.25 TABLET, FILM COATED ORAL at 18:11

## 2020-01-20 RX ADMIN — LOSARTAN POTASSIUM 100 MG: 25 TABLET ORAL at 21:50

## 2020-01-20 RX ADMIN — LATANOPROST 1 DROP: 50 SOLUTION/ DROPS OPHTHALMIC at 21:52

## 2020-01-20 ASSESSMENT — COPD QUESTIONNAIRES
HAVE YOU SMOKED AT LEAST 100 CIGARETTES IN YOUR ENTIRE LIFE: NO/DON'T KNOW
COPD SCREENING SCORE: 5
IN THE PAST 12 MONTHS DO YOU DO LESS THAN YOU USED TO BECAUSE OF YOUR BREATHING PROBLEMS: AGREE
DURING THE PAST 4 WEEKS HOW MUCH DID YOU FEEL SHORT OF BREATH: MOST  OR ALL OF THE TIME
DO YOU EVER COUGH UP ANY MUCUS OR PHLEGM?: NO/ONLY WITH OCCASIONAL COLDS OR INFECTIONS

## 2020-01-20 ASSESSMENT — LIFESTYLE VARIABLES
EVER FELT BAD OR GUILTY ABOUT YOUR DRINKING: NO
AVERAGE NUMBER OF DAYS PER WEEK YOU HAVE A DRINK CONTAINING ALCOHOL: 0
EVER HAD A DRINK FIRST THING IN THE MORNING TO STEADY YOUR NERVES TO GET RID OF A HANGOVER: NO
ON A TYPICAL DAY WHEN YOU DRINK ALCOHOL HOW MANY DRINKS DO YOU HAVE: 0
TOTAL SCORE: 0
EVER FELT BAD OR GUILTY ABOUT YOUR DRINKING: NO
AVERAGE NUMBER OF DAYS PER WEEK YOU HAVE A DRINK CONTAINING ALCOHOL: 0
ALCOHOL_USE: NO
TOTAL SCORE: 0
CONSUMPTION TOTAL: NEGATIVE
TOTAL SCORE: 0
HAVE YOU EVER FELT YOU SHOULD CUT DOWN ON YOUR DRINKING: NO
ON A TYPICAL DAY WHEN YOU DRINK ALCOHOL HOW MANY DRINKS DO YOU HAVE: 0
CONSUMPTION TOTAL: NEGATIVE
HAVE YOU EVER FELT YOU SHOULD CUT DOWN ON YOUR DRINKING: NO
HOW MANY TIMES IN THE PAST YEAR HAVE YOU HAD 5 OR MORE DRINKS IN A DAY: 0
EVER HAD A DRINK FIRST THING IN THE MORNING TO STEADY YOUR NERVES TO GET RID OF A HANGOVER: NO
HAVE PEOPLE ANNOYED YOU BY CRITICIZING YOUR DRINKING: NO
DOES PATIENT WANT TO STOP DRINKING: NO
TOTAL SCORE: 0
EVER_SMOKED: NEVER
HAVE PEOPLE ANNOYED YOU BY CRITICIZING YOUR DRINKING: NO
HOW MANY TIMES IN THE PAST YEAR HAVE YOU HAD 5 OR MORE DRINKS IN A DAY: 0
TOTAL SCORE: 0
TOTAL SCORE: 0
DO YOU DRINK ALCOHOL: NO

## 2020-01-20 ASSESSMENT — ENCOUNTER SYMPTOMS
HEADACHES: 0
PND: 0
CHILLS: 0
PALPITATIONS: 0
BLURRED VISION: 0
SENSORY CHANGE: 0
SORE THROAT: 0
VOMITING: 0
HEMOPTYSIS: 0
NECK PAIN: 0
BLOOD IN STOOL: 0
BACK PAIN: 0
TREMORS: 0
MEMORY LOSS: 0
WEAKNESS: 1
EYE PAIN: 0
CONSTIPATION: 0
MYALGIAS: 0
NERVOUS/ANXIOUS: 0
CLAUDICATION: 0
SPUTUM PRODUCTION: 0
PHOTOPHOBIA: 0
DIZZINESS: 1
HEARTBURN: 0
SPEECH CHANGE: 0
ORTHOPNEA: 0
DEPRESSION: 0
STRIDOR: 0
COUGH: 0
DOUBLE VISION: 0
TINGLING: 0
NAUSEA: 0
SHORTNESS OF BREATH: 0
FEVER: 0

## 2020-01-20 ASSESSMENT — COGNITIVE AND FUNCTIONAL STATUS - GENERAL
SUGGESTED CMS G CODE MODIFIER MOBILITY: CJ
WALKING IN HOSPITAL ROOM: A LITTLE
STANDING UP FROM CHAIR USING ARMS: A LITTLE
SUGGESTED CMS G CODE MODIFIER DAILY ACTIVITY: CJ
DAILY ACTIVITIY SCORE: 22
MOBILITY SCORE: 21
HELP NEEDED FOR BATHING: A LITTLE
CLIMB 3 TO 5 STEPS WITH RAILING: A LITTLE
EATING MEALS: A LITTLE

## 2020-01-20 NOTE — ED PROVIDER NOTES
ED Provider Note    CHIEF COMPLAINT  Chief Complaint   Patient presents with   • Dizziness   • GLF       HPI  Vielka Khadijah Briscoe is a 64 y.o. female who presents with a chief complaint of dizziness and evaluation for ground-level fall.    Evaluation ground-level fall is started by her home health aide it is described as her falling down hitting her bottom and hitting her head.  This happened around 2:00 in the morning Saturday this was while she was waking up and putting pain medication cream on her leg.  Patient is of a longstanding neuropathy and some visual sleep 1 to 2 hours because of the pain.  As she was lifting up putting her foot up she fell on her bottom she split her legs and then landed on her head.  This was Saturday morning.  There is no loss of consciousness no vomiting.  She did get seen by her healthcare aide on Sunday she refused going to the ER and subsequently had continued pain.  She does take Tylenol for pain is helping a little bit.  She declines any narcotics.  She did have dialysis today and at dialysis they called the oncology nurse you can see the note in the chart apparently they want her to get evaluated    As per the home health aide she is a history of anemia she is a standing order by her oncologist to get transfused with blood if she has a hemoglobin less than 7.5 a week ago urinalysis hemoglobin was 7.3.    Patient planes of buttock pain right near the sacrum and the coccyx.  Torsions puts weight on it but when she stands stands up no numbness or tingling no bowel or bladder incontinence    REVIEW OF SYSTEMS  General: No fever or chills.  Eyes: No eye discharge. No eye pain.  Ear nose throat: No sore throat or  trouble swallowing.  Pulmonary: No shortness of breath or cough.  Cardiovascular: No chest pain or chest pressure.  GI: No abdominal pain nausea or vomiting.  : No dysuria or hematuria  Dermatologic: No rashes. No abrasions.  Neurologic: No weakness no numbness no  "bowel bladder incontinence she does have chronic dizziness.  No left-sided right-sided weakness  All other systems are negative      PAST MEDICAL HISTORY  Past Medical History:   Diagnosis Date   • Other hyperlipidemia 2019   • MDS (myelodysplastic syndrome) 10/2016    bone marrow biopsy   • Stroke (HCC) 2015    No residual weakness/problems   • Arthritis     hands    • Atrial fibrillation (HCC)     HX   • Blood transfusion without reported diagnosis    • Breath shortness     w/exertion   • Cancer (HCC)     MDS in bones   • Cataract     bilat IOL   • Chronic anemia    • Chronic kidney disease        • Chronic obstructive pulmonary disease (HCC)    • Congestive heart failure (HCC)    • Dental disorder     full dentures   • Diabetes     Diet controlled   • Diabetes (HCC)    • Dialysis patient     [facility] [place]   • Glaucoma    • Heart abnormalities    • Hypertension    • Pain     \"bones\", generalized, 5/10   • Renal disorder    • Supplemental oxygen dependent     2 liters       FAMILY HISTORY  Family History   Problem Relation Age of Onset   • Hypertension Father          at 89   • Hypertension Mother        SOCIAL HISTORY  Social History     Socioeconomic History   • Marital status:      Spouse name: Not on file   • Number of children: Not on file   • Years of education: Not on file   • Highest education level: Not on file   Occupational History   • Not on file   Social Needs   • Financial resource strain: Not on file   • Food insecurity:     Worry: Not on file     Inability: Not on file   • Transportation needs:     Medical: Not on file     Non-medical: Not on file   Tobacco Use   • Smoking status: Never Smoker   • Smokeless tobacco: Never Used   Substance and Sexual Activity   • Alcohol use: No   • Drug use: No   • Sexual activity: Not on file   Lifestyle   • Physical activity:     Days per week: Not on file     Minutes per session: Not on file   • Stress: Not " on file   Relationships   • Social connections:     Talks on phone: Not on file     Gets together: Not on file     Attends Anabaptist service: Not on file     Active member of club or organization: Not on file     Attends meetings of clubs or organizations: Not on file     Relationship status: Not on file   • Intimate partner violence:     Fear of current or ex partner: Not on file     Emotionally abused: Not on file     Physically abused: Not on file     Forced sexual activity: Not on file   Other Topics Concern   • Not on file   Social History Narrative    ** Merged History Encounter **         ** Merged History Encounter **          SURGICAL HISTORY  Past Surgical History:   Procedure Laterality Date   • GASTROSCOPY N/A 1/25/2018    Procedure: GASTROSCOPY;  Surgeon: Blanca Santos M.D.;  Location: SURGERY [facility];  Service: Gastroenterology   • BONE MARROW BIOPSY, NDL/TROCAR  9/20/2017    Procedure: BONE MARROW BIOPSY, NDL/TROCAR;  Surgeon: Wade Turner M.D.;  Location: ENDOSCOPY [facility];  Service: Orthopedics   • BONE MARROW ASPIRATION  9/20/2017    Procedure: BONE MARROW ASPIRATION;  Surgeon: Wade Turner M.D.;  Location: ENDOSCOPY [facility];  Service: Orthopedics   • VEIN LIGATION Left 11/10/2016    Procedure: VEIN LIGATION FOR DISTAL REVASCULARIZATION AND INTERVAL LIGATION OF LEFT ARM DIALYISIS ACCESS (DRIL PROCEDURE);  Surgeon: Ranulfo Jolly M.D.;  Location: [facility];  Service:    • AV FISTULA CREATION Left 2/8/2016    Procedure: AV FISTULA CREATION UPPER EXTREMITY;  Surgeon: Ranulfo Jolly M.D.;  Location: SURGERY [facility];  Service:    • GASTROSCOPY  12/17/2015    Procedure: ESOPHAGOGASTRODUODENOSCOPY WITH BIOPSY;  Surgeon: Wing Álvarez M.D.;  Location: SURGERY [facility];  Service:    • COLONOSCOPY  12/17/2015    Procedure: COLONOSCOPY;  Surgeon: Wing Álvarez M.D.;  Location: [facility];  Service:    • GYN SURGERY       hysterectomy   • GYN SURGERY       x 2   • GYN SURGERY      d&C x2   • OTHER      angioplasty/ stents bilat LE   • OTHER ABDOMINAL SURGERY      appendectomy,  x 2, hysterectomy, D & C   • OTHER ABDOMINAL SURGERY      appendectomy   • OTHER CARDIAC SURGERY      Angioplasty  and    • OTHER CARDIAC SURGERY      cardiac angiogram, angioplasty   • RETINAL DETACHMENT REPAIR Right        CURRENT MEDICATIONS  No current facility-administered medications on file prior to encounter.      Current Outpatient Medications on File Prior to Encounter   Medication Sig Dispense Refill   • amLODIPine (NORVASC) 2.5 MG Tab TK 1 T PO QD     • azithromycin (ZITHROMAX) 250 MG Tab      • brimonidine (ALPHAGAN) 0.2 % Solution      • cefTRIAXone (ROCEPHIN) 2 GM Recon Soln      • furosemide (LASIX) 10 MG/ML Solution      • Heparin Lock Flush (HEPARIN PF) 100 UNIT/ML Solution injection      • latanoprost (XALATAN) 0.005 % Solution      • KLOR-CON M20 20 MEQ Tab CR      • pregabalin (LYRICA) 75 MG Cap      • Sodium Chloride (NS) Solution      • losartan (COZAAR) 50 MG Tab 100 mg.     • isosorbide mononitrate SR (IMDUR) 30 MG TABLET SR 24 HR Take 1 Tab by mouth every morning. (Patient not taking: Reported on 2019) 90 Tab 3   • nitroglycerin (NITROSTAT) 0.4 MG SL Tab Place 1 Tab under tongue as needed for Chest Pain. 1 tab every 5 min,3 doses is max/day if 4th dose needed go to ER. May cause low BP 30 Tab 2   • ondansetron (ZOFRAN) 4 MG Tab tablet TK 1 T PO Q 6 H FOR 20 DAYS  PRN NAUSEA  3   • Buprenorphine 20 MCG/HR PATCH WEEKLY Apply 20 mcg to affected area(s) every Monday.  0   • Diclofenac Sodium (VOLTAREN) 1 % Gel Apply  to skin as directed every morning.     • HYDROcodone-acetaminophen (NORCO) 5-325 MG Tab per tablet Take 1 Tab by mouth 2 times a day as needed. Indications: Pain     • carvedilol (COREG) 12.5 MG Tab Take 1 Tab by mouth 2 times a day, with meals. 60 Tab 0   • lidocaine (LIDODERM) 5 % Patch Apply  "1 Patch to skin as directed every bedtime.     • losartan (COZAAR) 100 MG Tab Take 100 mg by mouth every evening.     • bimatoprost (LUMIGAN) 0.01 % Solution Place 1 Drop in both eyes every bedtime.     • brinzolamide (AZOPT) 1 % Suspension Place 1 Drop in both eyes 2 Times a Day.     • Brimonidine Tartrate-Timolol (COMBIGAN) 0.2-0.5 % Solution Place 1 Drop in both eyes 2 Times a Day.     • pregabalin (LYRICA) 150 MG Cap Take 150 mg by mouth 4 times a day.         ALLERGIES  Allergies   Allergen Reactions   • Lenalidomide Unspecified     Beeping Pulse     • Naprosyn [Naproxen] Hives   • Pioglitazone Unspecified     Causes blindness   • Simvastatin Unspecified     Couldn't move   • Demerol Vomiting   • Diphenhydramine Vomiting   • Glipizide Vomiting   • Hydromorphone Vomiting   • Iron Vomiting     vomiting   • Metformin Vomiting   • Morphine Vomiting   • Multivitamin Itching     itching   • Ondansetron Itching   • Other Drug Rash and Vomiting     Any binders that remove phosphorus from the body such as tums   • Oxycodone Vomiting   • Pcn [Penicillins] Vomiting          • Propoxyphene Vomiting   • Requip Vomiting   • Sulfa Drugs Rash and Vomiting     Vomiting & rash      • Tamsulosin Vomiting   • Tramadol Vomiting   • Trazodone Vomiting       PHYSICAL EXAM  VITAL SIGNS: BP (!) 197/60   Pulse 79   Temp 36.7 °C (98 °F) (Temporal)   Resp (!) 22   Ht 1.448 m (4' 9\")   Wt 67.9 kg (149 lb 11.1 oz)   LMP 01/01/1995   SpO2 100%   BMI 32.39 kg/m²       Constitutional: Well developed, Well nourished, No acute distress, Non-toxic appearance.   HENT: Normocephalic atraumatic negative atkins signs no raccoon eyes.  Eyes: Pupils equal round react light anicteric sclera  Neck: Normal range of motion, No tenderness, Supple, No stridor.   Cardiovascular: Normal heart rate, Normal rhythm, No murmurs, No rubs, No gallops.   Thorax & Lungs: Normal breath sounds, No respiratory distress, No wheezing, No chest tenderness. "   Abdomen: Bowel sounds normal, Soft, No tenderness, No masses, No pulsatile masses.   Skin: Warm, Dry, No erythema, No rash.   Back: No tenderness, No CVA tenderness.  She does have tenderness over the low sacrum area as well as the coccyx but no significant point tenderness or extreme tenderness to palpation.  Extremities: Intact distal pulses, No edema, No tenderness, No cyanosis, No clubbing.   Neurologic: Alert oriented moves all extremities equally GCS of 15.  Psychiatric: Affect normal, Judgment normal, Mood normal.         RADIOLOGY/PROCEDURES  Results for orders placed or performed during the hospital encounter of 01/20/20   CBC WITH DIFFERENTIAL   Result Value Ref Range    WBC 6.3 4.8 - 10.8 K/uL    RBC 1.88 (L) 4.20 - 5.40 M/uL    Hemoglobin 5.9 (LL) 12.0 - 16.0 g/dL    Hematocrit 17.6 (LL) 37.0 - 47.0 %    MCV 93.6 81.4 - 97.8 fL    MCH 31.4 27.0 - 33.0 pg    MCHC 33.5 (L) 33.6 - 35.0 g/dL    RDW 62.5 (H) 35.9 - 50.0 fL    Platelet Count 100 (L) 164 - 446 K/uL    Neutrophils-Polys 66.00 44.00 - 72.00 %    Lymphocytes 13.60 (L) 22.00 - 41.00 %    Monocytes 12.10 0.00 - 13.40 %    Eosinophils 6.90 0.00 - 6.90 %    Basophils 0.30 0.00 - 1.80 %    Immature Granulocytes 1.10 (H) 0.00 - 0.90 %    Nucleated RBC 0.00 /100 WBC    Neutrophils (Absolute) 4.14 2.00 - 7.15 K/uL    Lymphs (Absolute) 0.85 (L) 1.00 - 4.80 K/uL    Monos (Absolute) 0.76 0.00 - 0.85 K/uL    Eos (Absolute) 0.43 0.00 - 0.51 K/uL    Baso (Absolute) 0.02 0.00 - 0.12 K/uL    Immature Granulocytes (abs) 0.07 0.00 - 0.11 K/uL    NRBC (Absolute) 0.00 K/uL   COMP METABOLIC PANEL   Result Value Ref Range    Sodium 130 (L) 135 - 145 mmol/L    Potassium 3.4 (L) 3.6 - 5.5 mmol/L    Chloride 89 (L) 96 - 112 mmol/L    Co2 27 20 - 33 mmol/L    Anion Gap 14.0 (H) 0.0 - 11.9    Glucose 322 (H) 65 - 99 mg/dL    Bun 31 (H) 8 - 22 mg/dL    Creatinine 3.52 (H) 0.50 - 1.40 mg/dL    Calcium 8.9 8.4 - 10.2 mg/dL    AST(SGOT) 29 12 - 45 U/L    ALT(SGPT) 32 2 - 50  U/L    Alkaline Phosphatase 83 30 - 99 U/L    Total Bilirubin 0.3 0.1 - 1.5 mg/dL    Albumin 3.9 3.2 - 4.9 g/dL    Total Protein 7.2 6.0 - 8.2 g/dL    Globulin 3.3 1.9 - 3.5 g/dL    A-G Ratio 1.2 g/dL   ESTIMATED GFR   Result Value Ref Range    GFR If  16 (A) >60 mL/min/1.73 m 2    GFR If Non  13 (A) >60 mL/min/1.73 m 2      DX-PELVIS-1 OR 2 VIEWS   Final Result      Negative AP view of the pelvis.      CT-HEAD W/O   Final Result      1.  No acute intracranial process.      2.  Atrophy and small vessel ischemic change.            COURSE & MEDICAL DECISION MAKING  Pertinent Labs & Imaging studies reviewed. (See chart for details)  That is post fall history of anemia requiring transfusions when hemoglobin less than 7.5 end-stage renal disease with buttock pain and head trauma.  We will go do a head CT make sure there is no intracranial hemorrhage as the patient continues have some slight dizziness and additional guide make sure there is no coccyx fracture.    Patient otherwise will also get a CBC to make sure and evaluate for anemia.  This patient did have some 0.3 hemoglobin a week ago.  Right now she is in stable she elevated blood pressures noted she does not take her blood pressure medication this morning we will give her a dose of her medications.  She does not usually take them before dialysis.    5:25 PM  The patient is known to have a hemoglobin of 5.9.  This point patient get transfused admit to the hospital.  CT scan of the head and pelvis is pending.  I informed the caregiver.    I have explained to the patient the risks and benefits of transfusion of blood products.  This includes, as appropriate, the risk of mild allergic reaction, hemolytic reaction, transfusion-associated lung injury, febrile reactions, circulatory or iron overload, and infection.    We discussed possible alternatives and their risks, including directed donation, autologous transfusion, and no  transfusion, including IV or oral iron supplementation, as appropriate.  I believe the patient understands the risks and benefits and was able to express understanding.        FINAL IMPRESSION  1.  Anemia  2.  Fall  3.      Electronically signed by: Shola Hernandez M.D., 1/20/2020 3:41 PM

## 2020-01-20 NOTE — TELEPHONE ENCOUNTER
Pt's friend, Kinza, called and stated that pt is currently at [facility], but had a fall on Saturday (1/18/20) and has had a headache ever since.  Pt was refusing to go to ER, per Kinza, but wanted to know what the office recommendations were to her.  Told Kinza that pt DOES need to go to the ER ASAP r/t headache and fall.  Kinza also stated that Hgb was 7 today, so  may need a transfusion.  Explained to Kinza that the ER would also evaluate that and other symptoms.  Kinza also asked if these things could be taken care of at the [facility] ER.  RN explained that the [facility] would be able to evaluate and provide most treatments, but do not have oncology services available.  Kinza stated that it was much closer and pt would prefer to go there.  RN reaffirmed importance of going to ER for evaluation. Kinza verbalized understanding and indicated no additional questions at that time.

## 2020-01-20 NOTE — ED NOTES
Pt fell on Saturday, and hit the back of her head. Pt c/o headache with dizziness. Pt caregiver also notes that on patient's labs, Hgb was low. Pt had dialysis this morning.

## 2020-01-21 ENCOUNTER — PATIENT OUTREACH (OUTPATIENT)
Dept: HEALTH INFORMATION MANAGEMENT | Facility: OTHER | Age: 65
End: 2020-01-21

## 2020-01-21 VITALS
HEART RATE: 62 BPM | WEIGHT: 147.71 LBS | OXYGEN SATURATION: 100 % | RESPIRATION RATE: 20 BRPM | BODY MASS INDEX: 31.01 KG/M2 | HEIGHT: 58 IN | SYSTOLIC BLOOD PRESSURE: 170 MMHG | DIASTOLIC BLOOD PRESSURE: 83 MMHG | TEMPERATURE: 97.3 F

## 2020-01-21 PROBLEM — E87.6 HYPOKALEMIA: Status: RESOLVED | Noted: 2020-01-20 | Resolved: 2020-01-21

## 2020-01-21 LAB
ANION GAP SERPL CALC-SCNC: 16 MMOL/L (ref 0–11.9)
BASOPHILS # BLD AUTO: 0.7 % (ref 0–1.8)
BASOPHILS # BLD: 0.03 K/UL (ref 0–0.12)
BUN SERPL-MCNC: 40 MG/DL (ref 8–22)
CALCIUM SERPL-MCNC: 8.7 MG/DL (ref 8.4–10.2)
CHLORIDE SERPL-SCNC: 89 MMOL/L (ref 96–112)
CO2 SERPL-SCNC: 26 MMOL/L (ref 20–33)
CREAT SERPL-MCNC: 4.74 MG/DL (ref 0.5–1.4)
EOSINOPHIL # BLD AUTO: 0.3 K/UL (ref 0–0.51)
EOSINOPHIL NFR BLD: 6.9 % (ref 0–6.9)
ERYTHROCYTE [DISTWIDTH] IN BLOOD BY AUTOMATED COUNT: 60.3 FL (ref 35.9–50)
GLUCOSE SERPL-MCNC: 211 MG/DL (ref 65–99)
HCT VFR BLD AUTO: 25 % (ref 37–47)
HGB BLD-MCNC: 8.4 G/DL (ref 12–16)
IMM GRANULOCYTES # BLD AUTO: 0.03 K/UL (ref 0–0.11)
IMM GRANULOCYTES NFR BLD AUTO: 0.7 % (ref 0–0.9)
LYMPHOCYTES # BLD AUTO: 1.01 K/UL (ref 1–4.8)
LYMPHOCYTES NFR BLD: 23.3 % (ref 22–41)
MCH RBC QN AUTO: 30.7 PG (ref 27–33)
MCHC RBC AUTO-ENTMCNC: 33.6 G/DL (ref 33.6–35)
MCV RBC AUTO: 91.2 FL (ref 81.4–97.8)
MONOCYTES # BLD AUTO: 0.59 K/UL (ref 0–0.85)
MONOCYTES NFR BLD AUTO: 13.6 % (ref 0–13.4)
NEUTROPHILS # BLD AUTO: 2.38 K/UL (ref 2–7.15)
NEUTROPHILS NFR BLD: 54.8 % (ref 44–72)
NRBC # BLD AUTO: 0 K/UL
NRBC BLD-RTO: 0 /100 WBC
PLATELET # BLD AUTO: 82 K/UL (ref 164–446)
POTASSIUM SERPL-SCNC: 3.7 MMOL/L (ref 3.6–5.5)
RBC # BLD AUTO: 2.74 M/UL (ref 4.2–5.4)
SODIUM SERPL-SCNC: 131 MMOL/L (ref 135–145)
WBC # BLD AUTO: 4.3 K/UL (ref 4.8–10.8)

## 2020-01-21 PROCEDURE — A9270 NON-COVERED ITEM OR SERVICE: HCPCS | Performed by: HOSPITALIST

## 2020-01-21 PROCEDURE — 700102 HCHG RX REV CODE 250 W/ 637 OVERRIDE(OP): Performed by: HOSPITALIST

## 2020-01-21 PROCEDURE — 80048 BASIC METABOLIC PNL TOTAL CA: CPT

## 2020-01-21 PROCEDURE — 700111 HCHG RX REV CODE 636 W/ 250 OVERRIDE (IP): Performed by: INTERNAL MEDICINE

## 2020-01-21 PROCEDURE — 99239 HOSP IP/OBS DSCHRG MGMT >30: CPT | Performed by: INTERNAL MEDICINE

## 2020-01-21 PROCEDURE — 85025 COMPLETE CBC W/AUTO DIFF WBC: CPT

## 2020-01-21 PROCEDURE — 700111 HCHG RX REV CODE 636 W/ 250 OVERRIDE (IP): Performed by: HOSPITALIST

## 2020-01-21 RX ORDER — AMLODIPINE BESYLATE 5 MG/1
2.5 TABLET ORAL EVERY EVENING
Status: DISCONTINUED | OUTPATIENT
Start: 2020-01-21 | End: 2020-01-21 | Stop reason: HOSPADM

## 2020-01-21 RX ADMIN — ACETAMINOPHEN 1000 MG: 500 TABLET, FILM COATED ORAL at 06:06

## 2020-01-21 RX ADMIN — CARVEDILOL 12.5 MG: 6.25 TABLET, FILM COATED ORAL at 08:16

## 2020-01-21 RX ADMIN — FUROSEMIDE 40 MG: 10 INJECTION, SOLUTION INTRAMUSCULAR; INTRAVENOUS at 02:11

## 2020-01-21 RX ADMIN — HEPARIN 500 UNITS: 100 SYRINGE at 12:06

## 2020-01-21 RX ADMIN — BRIMONIDINE TARTRATE 1 DROP: 2 SOLUTION/ DROPS OPHTHALMIC at 06:01

## 2020-01-21 RX ADMIN — ISOSORBIDE MONONITRATE 30 MG: 30 TABLET, EXTENDED RELEASE ORAL at 06:06

## 2020-01-21 RX ADMIN — PREGABALIN 150 MG: 100 CAPSULE ORAL at 08:16

## 2020-01-21 NOTE — PROGRESS NOTES
Telemetry Shift Summary    Rhythm SB/SR  HR Range 57-64   Ectopy RPVC  Measurements 0.20/0.08/0.46        Normal Values  Rhythm SR  HR Range    Measurements 0.12-0.20 / 0.06-0.10  / 0.30-0.52

## 2020-01-21 NOTE — DISCHARGE SUMMARY
Discharge Summary    CHIEF COMPLAINT ON ADMISSION  Chief Complaint   Patient presents with   • Dizziness   • GLF       Reason for Admission  Fall,Headache     Admission Date  1/20/2020    CODE STATUS  Full Code    HPI & HOSPITAL COURSE  This is a 64 y.o. female here with fall and headache.  Patient was noticed to have severe anemia.  No gross bleeding noticed.  Likely due to end-stage renal disease.  Patient was transfused and patient's hemoglobin has been stable after transfusion.  Patient clinically improved significantly now and wants to be discharged home.  Patient will follow-up with dialysis and nephrology.         Therefore, she is discharged in fair and stable condition to home with close outpatient follow-up.    The patient recovered much more quickly than anticipated on admission.    Discharge Date  1/21/2020    FOLLOW UP ITEMS POST DISCHARGE  none    DISCHARGE DIAGNOSES  Principal Problem:    Anemia POA: Yes  Active Problems:    Essential hypertension POA: Yes    ESRD (end stage renal disease) (HCC) POA: Yes    MDS (myelodysplastic syndrome) (HCC) POA: Yes    Myelodysplasia (myelodysplastic syndrome) (HCC) POA: Yes    Pulmonary hypertension (HCC) POA: Yes    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 (HCC) (Chronic) POA: Yes    Chronic respiratory failure with hypoxia, 2L POA: Yes    End stage renal disease (HCC) POA: Yes  Resolved Problems:    Hypokalemia POA: Yes      FOLLOW UP  Future Appointments   Date Time Provider Department Center   1/30/2020  9:00 AM [facility] None   1/30/2020  9:45 AM [facility]   2/1/2020  9:30 AM [facility]   3/5/2020  9:30 AM [facility]   3/7/2020  9:45 AM [facility]   4/7/2020 10:20 AM Germania Alberts M.D. [facility] None   4/9/2020  9:45 AM [facility]   4/11/2020  9:30 AM  [facility]     Nyla Nava M.D.  [address] [facility] [id number] [phone]    Go on 2/13/2020  Please arrive at 4:15 pm for your appointment. Thank you       MEDICATIONS ON DISCHARGE     Medication List      CONTINUE taking these medications      Instructions   amLODIPine 2.5 MG Tabs  Commonly known as:  NORVASC   TK 1 T PO QD     brinzolamide 1 % Susp  Commonly known as:  AZOPT   Place 1 Drop in both eyes 2 Times a Day.  Dose:  1 Drop     Buprenorphine 20 MCG/HR Ptwk   Apply 20 mcg to affected area(s) every Monday.  Dose:  20 mcg     carvedilol 12.5 MG Tabs  Commonly known as:  COREG   Take 1 Tab by mouth 2 times a day, with meals.  Dose:  12.5 mg     COMBIGAN 0.2-0.5 % Soln  Generic drug:  Brimonidine Tartrate-Timolol   Place 1 Drop in both eyes 2 Times a Day.  Dose:  1 Drop     furosemide 10 MG/ML Soln  Commonly known as:  LASIX      heparin pf 100 UNIT/ML Soln injection      isosorbide mononitrate SR 30 MG Tb24  Commonly known as:  IMDUR   Take 1 Tab by mouth every morning.  Dose:  30 mg     latanoprost 0.005 % Soln  Commonly known as:  XALATAN      lidocaine 5 % Ptch  Commonly known as:  LIDODERM   Apply 1 Patch to skin as directed every bedtime.  Dose:  1 Patch     losartan 100 MG Tabs  Commonly known as:  COZAAR   Take 100 mg by mouth every evening.  Dose:  100 mg     LUMIGAN 0.01 % Soln  Generic drug:  bimatoprost   Place 1 Drop in both eyes every bedtime.  Dose:  1 Drop     * LYRICA 150 MG Caps  Generic drug:  pregabalin   Take 150 mg by mouth 4 times a day.  Dose:  150 mg     * pregabalin 75 MG Caps  Commonly known as:  LYRICA      nitroglycerin 0.4 MG Subl  Commonly known as:  NITROSTAT   Place 1 Tab under tongue as needed for Chest Pain. 1 tab every 5 min,3 doses is max/day if 4th dose needed go to ER. May cause low BP  Dose:  0.4 mg     NORCO 5-325 MG Tabs per tablet  Generic drug:  HYDROcodone-acetaminophen   Take 1 Tab by mouth 2 times a day as needed.  Indications: Pain  Dose:  1 Tab     NS Soln      VOLTAREN 1 % Gel  Generic drug:  Diclofenac Sodium   Apply  to skin as directed every morning.         * This list has 2 medication(s) that are the same as other medications prescribed for you. Read the directions carefully, and ask your doctor or other care provider to review them with you.                Allergies  Allergies   Allergen Reactions   • Lenalidomide Unspecified     Beeping Pulse     • Naprosyn [Naproxen] Hives   • Pioglitazone Unspecified     Causes blindness   • Simvastatin Unspecified     Couldn't move   • Demerol Vomiting   • Diphenhydramine Vomiting   • Glipizide Vomiting   • Hydromorphone Vomiting   • Iron Vomiting     vomiting   • Metformin Vomiting   • Morphine Vomiting   • Multivitamin Itching     itching   • Ondansetron Itching   • Other Drug Rash and Vomiting     Any binders that remove phosphorus from the body such as tums   • Oxycodone Vomiting   • Pcn [Penicillins] Vomiting          • Propoxyphene Vomiting   • Requip Vomiting   • Sulfa Drugs Rash and Vomiting     Vomiting & rash      • Tamsulosin Vomiting   • Tramadol Vomiting   • Trazodone Vomiting       DIET  Orders Placed This Encounter   Procedures   • Diet Order Renal     Standing Status:   Standing     Number of Occurrences:   1     Order Specific Question:   Diet:     Answer:   Renal [8]   • Discontinue Diet Tray     Standing Status:   Standing     Number of Occurrences:   1       ACTIVITY  As tolerated.  Weight bearing as tolerated    CONSULTATIONS  none    PROCEDURES  None    DX-PELVIS-1 OR 2 VIEWS   Final Result      Negative AP view of the pelvis.      CT-HEAD W/O   Final Result      1.  No acute intracranial process.      2.  Atrophy and small vessel ischemic change.          PE:  Gen: AAOx3, NAD  Eyes: PELLA  Neck: no JVD, no lymphadenopathy  Cardia: RRR, no mrg  Lungs: CTAB, no rales, rhonci or wheezing  Abd: NABS, soft, non extended, no mass  EXT: No C/C/E, peripheral pulse  2+ b/l  Neuro: CN II-XII intact, non focal, reflex 2+ symmetrical  Skin: Intact, no lesion, warm  Psych: Appropriate.      LABORATORY  Lab Results   Component Value Date    SODIUM 131 (L) 01/21/2020    POTASSIUM 3.7 01/21/2020    CHLORIDE 89 (L) 01/21/2020    CO2 26 01/21/2020    GLUCOSE 211 (H) 01/21/2020    BUN 40 (H) 01/21/2020    CREATININE 4.74 (H) 01/21/2020    CREATININE 0.6 07/20/2007        Lab Results   Component Value Date    WBC 4.3 (L) 01/21/2020    HEMOGLOBIN 8.4 (L) 01/21/2020    HEMATOCRIT 25.0 (L) 01/21/2020    PLATELETCT 82 (L) 01/21/2020        Total time of the discharge process exceeds 38 minutes.

## 2020-01-21 NOTE — H&P
Hospital Medicine History & Physical Note    Date of Service  1/20/2020    Primary Care Physician  Nyla Nava M.D.    Consultants  None    Code Status  Full Code    Chief Complaint  Chief Complaint   Patient presents with   • Dizziness   • GLF       History of Presenting Illness  Ms. Briscoe is a very pleasant 64 y.o. female with a past medical history of end-stage renal disease on hemodialysis Monday Wednesday Friday, follows with [facility] [place] [facility], history of MDS in the past follows with oncology Dr. Avila, history of atrial fibrillation, dyslipidemia presented to the emergency room on 1/20/2020 for evaluation of dizziness as well as her having a ground-level fall today.  Patient apparently had home health aide next to her and then they were noticing that she was falling down and hitting her head.  Apparently around 2 AM in the morning of saturday she was up trying to put cream on her leg she unfortunately fell while performing the leg splits and landed on her head.  With continued dizziness, weakness and falls at dialysis they noticed that her hemoglobin was low as a result is transferred her to the emergency room for further evaluation.    Review of Systems  Review of Systems   Constitutional: Positive for malaise/fatigue. Negative for chills and fever.   HENT: Negative for congestion, hearing loss, sore throat and tinnitus.    Eyes: Negative for blurred vision, double vision, photophobia and pain.   Respiratory: Negative for cough, hemoptysis, sputum production, shortness of breath and stridor.    Cardiovascular: Negative for chest pain, palpitations, orthopnea, claudication and PND.   Gastrointestinal: Negative for blood in stool, constipation, heartburn, melena, nausea and vomiting.   Genitourinary: Negative for dysuria, frequency and urgency.   Musculoskeletal: Negative for back pain, myalgias and neck pain.   Neurological: Positive for dizziness and weakness. Negative for tingling, tremors,  "sensory change, speech change and headaches.   Psychiatric/Behavioral: Negative for depression, memory loss and suicidal ideas. The patient is not nervous/anxious.    All other systems reviewed and are negative.      Past Medical History  Past Medical History:   Diagnosis Date   • Other hyperlipidemia 12/12/2019   • MDS (myelodysplastic syndrome) 10/2016    bone marrow biopsy   • Stroke (HCC) 03/2015    No residual weakness/problems   • Arthritis     hands    • Atrial fibrillation (HCC)     HX   • Blood transfusion without reported diagnosis    • Breath shortness     w/exertion   • Cancer (HCC)     MDS in bones   • Cataract     bilat IOL   • Chronic anemia    • Chronic kidney disease        • Chronic obstructive pulmonary disease (HCC)    • Congestive heart failure (HCC)    • Dental disorder     full dentures   • Diabetes     Diet controlled   • Diabetes (HCC)    • Dialysis patient     M [facility] F ,   [facility] [place]   • Glaucoma    • Heart abnormalities    • Hypertension    • Pain -2017    \"bones\", generalized, 5/10   • Renal disorder    • Supplemental oxygen dependent     2 liters       Surgical History   has a past surgical history that includes other cardiac surgery; other abdominal surgery; gyn surgery; gyn surgery; gyn surgery; other cardiac surgery; other; retinal detachment repair (Right); av fistula creation (Left, 2/8/2016); other abdominal surgery; gastroscopy (12/17/2015); colonoscopy (12/17/2015); vein ligation (Left, 11/10/2016); bone marrow biopsy, ndl/trocar (9/20/2017); bone marrow aspiration (9/20/2017); and gastroscopy (N/A, 1/25/2018).    Family History  family history includes Hypertension in her father and mother.  Reviewed with patient and is non contributory    Social History   reports that she has never smoked. She has never used smokeless tobacco. She reports that she does not drink alcohol or use drugs.    Allergies  Allergies   Allergen Reactions   • Lenalidomide Unspecified "     Beeping Pulse     • Naprosyn [Naproxen] Hives   • Pioglitazone Unspecified     Causes blindness   • Simvastatin Unspecified     Couldn't move   • Demerol Vomiting   • Diphenhydramine Vomiting   • Glipizide Vomiting   • Hydromorphone Vomiting   • Iron Vomiting     vomiting   • Metformin Vomiting   • Morphine Vomiting   • Multivitamin Itching     itching   • Ondansetron Itching   • Other Drug Rash and Vomiting     Any binders that remove phosphorus from the body such as tums   • Oxycodone Vomiting   • Pcn [Penicillins] Vomiting          • Propoxyphene Vomiting   • Requip Vomiting   • Sulfa Drugs Rash and Vomiting     Vomiting & rash      • Tamsulosin Vomiting   • Tramadol Vomiting   • Trazodone Vomiting       Medications  Prior to Admission medications    Medication Sig Start Date End Date Taking? Authorizing Provider   amLODIPine (NORVASC) 2.5 MG Tab TK 1 T PO QD 12/4/19   Physician Outpatient   azithromycin (ZITHROMAX) 250 MG Tab  11/4/19   Physician Outpatient   brimonidine (ALPHAGAN) 0.2 % Solution  11/4/19   Physician Outpatient   cefTRIAXone (ROCEPHIN) 2 GM Recon Soln  11/5/19   Physician Outpatient   furosemide (LASIX) 10 MG/ML Solution  10/3/19   Physician Outpatient   Heparin Lock Flush (HEPARIN PF) 100 UNIT/ML Solution injection  10/24/19   Physician Outpatient   latanoprost (XALATAN) 0.005 % Solution  11/4/19   Physician Outpatient   KLOR-CON M20 20 MEQ Tab CR  11/4/19   Physician Outpatient   pregabalin (LYRICA) 75 MG Cap  11/5/19   Physician Outpatient   Sodium Chloride (NS) Solution  11/5/19   Physician Outpatient   losartan (COZAAR) 50 MG Tab 100 mg. 11/4/19   Physician Outpatient   isosorbide mononitrate SR (IMDUR) 30 MG TABLET SR 24 HR Take 1 Tab by mouth every morning.  Patient not taking: Reported on 12/26/2019 12/12/19   Noelle Pulliam, A.P.N.   nitroglycerin (NITROSTAT) 0.4 MG SL Tab Place 1 Tab under tongue as needed for Chest Pain. 1 tab every 5 min,3 doses is max/day if 4th dose needed go  to ER. May cause low BP 11/26/19   Germania Alberts M.D.   ondansetron (ZOFRAN) 4 MG Tab tablet TK 1 T PO Q 6 H FOR 20 DAYS  PRN NAUSEA 10/10/19   Physician Outpatient   Buprenorphine 20 MCG/HR PATCH WEEKLY Apply 20 mcg to affected area(s) every Monday. 10/16/19   Physician Outpatient   Diclofenac Sodium (VOLTAREN) 1 % Gel Apply  to skin as directed every morning.    Physician Outpatient   HYDROcodone-acetaminophen (NORCO) 5-325 MG Tab per tablet Take 1 Tab by mouth 2 times a day as needed. Indications: Pain    Physician Outpatient   carvedilol (COREG) 12.5 MG Tab Take 1 Tab by mouth 2 times a day, with meals. 4/15/19   Esther Toscano M.D.   lidocaine (LIDODERM) 5 % Patch Apply 1 Patch to skin as directed every bedtime.    Physician Outpatient   losartan (COZAAR) 100 MG Tab Take 100 mg by mouth every evening.    Physician Outpatient   bimatoprost (LUMIGAN) 0.01 % Solution Place 1 Drop in both eyes every bedtime.    Physician Outpatient   brinzolamide (AZOPT) 1 % Suspension Place 1 Drop in both eyes 2 Times a Day.    Physician Outpatient   Brimonidine Tartrate-Timolol (COMBIGAN) 0.2-0.5 % Solution Place 1 Drop in both eyes 2 Times a Day.    Physician Outpatient   pregabalin (LYRICA) 150 MG Cap Take 150 mg by mouth 4 times a day.    Physician Outpatient       Physical Exam  Temp:  [36.7 °C (98 °F)] 36.7 °C (98 °F)  Pulse:  [71-88] 75  Resp:  [17-22] 19  BP: (154-211)/() 176/102  SpO2:  [98 %-100 %] 100 %  Physical Exam   Constitutional: She is oriented to person, place, and time. She appears well-developed and well-nourished. No distress.   HENT:   Head: Normocephalic and atraumatic.   Mouth/Throat: No oropharyngeal exudate.   Eyes: Pupils are equal, round, and reactive to light. Conjunctivae are normal. Right eye exhibits no discharge. No scleral icterus.   Neck: Neck supple. No JVD present. No thyromegaly present.   Cardiovascular: Normal rate and intact distal pulses.   No murmur heard.  Pulmonary/Chest:  Effort normal and breath sounds normal. No stridor. No respiratory distress. She has no wheezes. She has no rales.   Abdominal: Soft. Bowel sounds are normal. She exhibits no distension. There is no tenderness. There is no rebound.   Musculoskeletal: Normal range of motion.         General: No edema.      Comments: Good palpable thrill of her left forearm A-V fistula   Neurological: She is alert and oriented to person, place, and time. No cranial nerve deficit.   Skin: Skin is warm. She is not diaphoretic. No erythema.   Psychiatric: She has a normal mood and affect. Her behavior is normal. Thought content normal.   Nursing note and vitals reviewed.      Laboratory:  Recent Labs     01/20/20  1616   WBC 6.3   RBC 1.88*   HEMOGLOBIN 5.9*   HEMATOCRIT 17.6*   MCV 93.6   MCH 31.4   MCHC 33.5*   RDW 62.5*   PLATELETCT 100*     Recent Labs     01/20/20  1616   SODIUM 130*   POTASSIUM 3.4*   CHLORIDE 89*   CO2 27   GLUCOSE 322*   BUN 31*   CREATININE 3.52*   CALCIUM 8.9     Recent Labs     01/20/20  1616   ALTSGPT 32   ASTSGOT 29   ALKPHOSPHAT 83   TBILIRUBIN 0.3   GLUCOSE 322*               Urinalysis:          Imaging:  DX-PELVIS-1 OR 2 VIEWS   Final Result      Negative AP view of the pelvis.      CT-HEAD W/O   Final Result      1.  No acute intracranial process.      2.  Atrophy and small vessel ischemic change.          Assessment/Plan:  I anticipate this patient will require at least two midnights for appropriate medical management, necessitating inpatient admission.    * Anemia- (present on admission)  Assessment & Plan  Hgb 5.9 on admission, was sent from dialysis. No signs of gross bleeding  Check stool occult  Suspect this is anemia of chronic diease but will check  2U PRBCs ordered   Repeat cbc after 2U transfusions    Essential hypertension- (present on admission)  Assessment & Plan  Controlled  Resume home dose of Coreg, Norvasc, Imdur and losartan with holding parameters    Myelodysplasia (myelodysplastic  syndrome) (HCC)- (present on admission)  Assessment & Plan  Follows with Oncology as outpatient  Has needed transfusions in the past for this reason.     ESRD (end stage renal disease) (HCC)- (present on admission)  Assessment & Plan  Undergoes HD M/W/F   Follows with [facility] nephrology.     Hypokalemia  Assessment & Plan  Potassium supplementation ordered  Expect increase of 0.1 mEq/L per each 10 mEq given  Will recheck after supplementation  Lab Results   Component Value Date/Time    POTASSIUM 3.4 (L) 01/20/2020 04:16 PM    Recheck bmp in the am for changes      Pulmonary hypertension (HCC)- (present on admission)  Assessment & Plan  Stable, follow with outpatient cardiology.    Chronic respiratory failure with hypoxia, 2L- (present on admission)  Assessment & Plan  Not in acute exacerbation  Continue RT protocol, duo nebs, Pep therapy if warranted, and incentive spirometry.       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 (HCC)- (present on admission)  Assessment & Plan  Has been diet controlled  Her last a1c was 9.5  Continue Insulin-sliding scale, accu-checks and hypoglycemia protocol.  Continue with Consistent Carbohydrate diet         VTE prophylaxis: Prophylaxis: SCDs

## 2020-01-21 NOTE — CARE PLAN
Problem: Safety  Goal: Will remain free from injury  Outcome: PROGRESSING AS EXPECTED  Note:   Remind patient to use call light and provide assistance. Bed in low position, bed locked, and appropriate alarms set. Patient wearing non-slip socks. Call light and personal belongings are within reach.     Problem: Knowledge Deficit  Goal: Knowledge of disease process/condition, treatment plan, diagnostic tests, and medications will improve  Outcome: PROGRESSING AS EXPECTED  Note:   Encourage patient to ask questions and be involved in plan of care. Provide education on treatment plan, diagnostic testing, and medications; have patient verbalize understanding.

## 2020-01-21 NOTE — ASSESSMENT & PLAN NOTE
Has been diet controlled  Her last a1c was 9.5  Continue Insulin-sliding scale, accu-checks and hypoglycemia protocol.  Continue with Consistent Carbohydrate diet

## 2020-01-21 NOTE — PROGRESS NOTES
Telemetry Shift Summary    Rhythm SR/SB  HR Range 58-72  Ectopy rPVC  Measurements 0.20/0.10/0.42        Normal Values  Rhythm SR  HR Range    Measurements 0.12-0.20 / 0.06-0.10  / 0.30-0.52

## 2020-01-21 NOTE — ASSESSMENT & PLAN NOTE
Not in acute exacerbation  Continue RT protocol, duo nebs, Pep therapy if warranted, and incentive spirometry.

## 2020-01-21 NOTE — RESPIRATORY CARE
COPD EDUCATION by COPD CLINICAL EDUCATOR  1/21/2020 at 8:12 AM by Kimber Shi RRT     Patient reviewed by COPD education team. Patient does not have a history or diagnosis of COPD and is a non-smoker, therefore does not qualify for the COPD program.

## 2020-01-21 NOTE — DISCHARGE INSTRUCTIONS
Discharge Instructions    Discharged to home by car with relative. Discharged via wheelchair, hospital escort: Yes.  Special equipment needed: Not Applicable and Oxygen    Be sure to schedule a follow-up appointment with your primary care doctor or any specialists as instructed.     Discharge Plan:   Diet Plan: Discussed  Activity Level: Discussed  Confirmed Follow up Appointment: Appointment Scheduled  Confirmed Symptoms Management: Discussed  Medication Reconciliation Updated: Yes  Influenza Vaccine Indication: Not indicated: Previously immunized this influenza season and > 8 years of age    I understand that a diet low in cholesterol, fat, and sodium is recommended for good health. Unless I have been given specific instructions below for another diet, I accept this instruction as my diet prescription.   Other diet: Renal    Special Instructions: None    · Is patient discharged on Warfarin / Coumadin?   No     Depression / Suicide Risk    As [facility] are [facility], it is important to learn how to keep safe from harming yourself.    Recognize the warning signs:  · Abrupt changes in personality, positive or negative- including increase in energy   · Giving away possessions  · Change in eating patterns- significant weight changes-  positive or negative  · Change in sleeping patterns- unable to sleep or sleeping all the time   · Unwillingness or inability to communicate  · Depression  · Unusual sadness, discouragement and loneliness  · Talk of wanting to die  · Neglect of personal appearance   · Rebelliousness- reckless behavior  · Withdrawal from people/activities they love  · Confusion- inability to concentrate     If you or a loved one observes any of these behaviors or has concerns about self-harm, here's what you can do:  · Talk about it- your feelings and reasons for harming yourself  · Remove any means that you might use to hurt yourself (examples: pills, rope, extension cords,  firearm)  · Get professional help from the community (Mental Health, Substance Abuse, psychological counseling)  · Do not be alone:Call your Safe Contact- someone whom you trust who will be there for you.  · Call your local CRISIS HOTLINE 345-2039 or 030-595-5393  · Call your local Children's Mobile Crisis Response Team [place] (728) 484-9337 or www.[employer]  · Call the toll free National Suicide Prevention Hotlines   · National Suicide Prevention Lifeline 907-425-LEUG (2300)  · [employer] 800-SUICIDE (142-6141)    Anemia, Nonspecific  Anemia is a condition in which the concentration of red blood cells or hemoglobin in the blood is below normal. Hemoglobin is a substance in red blood cells that carries oxygen to the tissues of the body. Anemia results in not enough oxygen reaching these tissues.  What are the causes?  Common causes of anemia include:  · Excessive bleeding. Bleeding may be internal or external. This includes excessive bleeding from periods (in women) or from the intestine.  · Poor nutrition.  · Chronic kidney, thyroid, and liver disease.  · Bone marrow disorders that decrease red blood cell production.  · Cancer and treatments for cancer.  · HIV, AIDS, and their treatments.  · Spleen problems that increase red blood cell destruction.  · Blood disorders.  · Excess destruction of red blood cells due to infection, medicines, and autoimmune disorders.  What are the signs or symptoms?  · Minor weakness.  · Dizziness.  · Headache.  · Palpitations.  · Shortness of breath, especially with exercise.  · Paleness.  · Cold sensitivity.  · Indigestion.  · Nausea.  · Difficulty sleeping.  · Difficulty concentrating.  Symptoms may occur suddenly or they may develop slowly.  How is this diagnosed?  Additional blood tests are often needed. These help your health care provider determine the best treatment. Your health care provider will check your stool for blood and look for other causes  of blood loss.  How is this treated?  Treatment varies depending on the cause of the anemia. Treatment can include:  · Supplements of iron, vitamin B12, or folic acid.  · Hormone medicines.  · A blood transfusion. This may be needed if blood loss is severe.  · Hospitalization. This may be needed if there is significant continual blood loss.  · Dietary changes.  · Spleen removal.  Follow these instructions at home:  Keep all follow-up appointments. It often takes many weeks to correct anemia, and having your health care provider check on your condition and your response to treatment is very important.  Get help right away if:  · You develop extreme weakness, shortness of breath, or chest pain.  · You become dizzy or have trouble concentrating.  · You develop heavy vaginal bleeding.  · You develop a rash.  · You have bloody or black, tarry stools.  · You faint.  · You vomit up blood.  · You vomit repeatedly.  · You have abdominal pain.  · You have a fever or persistent symptoms for more than 2-3 days.  · You have a fever and your symptoms suddenly get worse.  · You are dehydrated.  This information is not intended to replace advice given to you by your health care provider. Make sure you discuss any questions you have with your health care provider.  Document Released: 01/25/2006 Document Revised: 05/31/2017 Document Reviewed: 06/13/2014  Elsevier Interactive Patient Education © 2017 Elsevier Inc.      Fall Prevention in the Home  Introduction  Falls can cause injuries. They can happen to people of all ages. There are many things you can do to make your home safe and to help prevent falls.  What can I do on the outside of my home?  · Regularly fix the edges of walkways and driveways and fix any cracks.  · Remove anything that might make you trip as you walk through a door, such as a raised step or threshold.  · Trim any bushes or trees on the path to your home.  · Use bright outdoor lighting.  · Clear any walking  paths of anything that might make someone trip, such as rocks or tools.  · Regularly check to see if handrails are loose or broken. Make sure that both sides of any steps have handrails.  · Any raised decks and porches should have guardrails on the edges.  · Have any leaves, snow, or ice cleared regularly.  · Use sand or salt on walking paths during winter.  · Clean up any spills in your garage right away. This includes oil or grease spills.  What can I do in the bathroom?  · Use night lights.  · Install grab bars by the toilet and in the tub and shower. Do not use towel bars as grab bars.  · Use non-skid mats or decals in the tub or shower.  · If you need to sit down in the shower, use a plastic, non-slip stool.  · Keep the floor dry. Clean up any water that spills on the floor as soon as it happens.  · Remove soap buildup in the tub or shower regularly.  · Attach bath mats securely with double-sided non-slip rug tape.  · Do not have throw rugs and other things on the floor that can make you trip.  What can I do in the bedroom?  · Use night lights.  · Make sure that you have a light by your bed that is easy to reach.  · Do not use any sheets or blankets that are too big for your bed. They should not hang down onto the floor.  · Have a firm chair that has side arms. You can use this for support while you get dressed.  · Do not have throw rugs and other things on the floor that can make you trip.  What can I do in the kitchen?  · Clean up any spills right away.  · Avoid walking on wet floors.  · Keep items that you use a lot in easy-to-reach places.  · If you need to reach something above you, use a strong step stool that has a grab bar.  · Keep electrical cords out of the way.  · Do not use floor polish or wax that makes floors slippery. If you must use wax, use non-skid floor wax.  · Do not have throw rugs and other things on the floor that can make you trip.  What can I do with my stairs?  · Do not leave any items  on the stairs.  · Make sure that there are handrails on both sides of the stairs and use them. Fix handrails that are broken or loose. Make sure that handrails are as long as the stairways.  · Check any carpeting to make sure that it is firmly attached to the stairs. Fix any carpet that is loose or worn.  · Avoid having throw rugs at the top or bottom of the stairs. If you do have throw rugs, attach them to the floor with carpet tape.  · Make sure that you have a light switch at the top of the stairs and the bottom of the stairs. If you do not have them, ask someone to add them for you.  What else can I do to help prevent falls?  · Wear shoes that:  ¨ Do not have high heels.  ¨ Have rubber bottoms.  ¨ Are comfortable and fit you well.  ¨ Are closed at the toe. Do not wear sandals.  · If you use a stepladder:  ¨ Make sure that it is fully opened. Do not climb a closed stepladder.  ¨ Make sure that both sides of the stepladder are locked into place.  ¨ Ask someone to hold it for you, if possible.  · Clearly dionicio and make sure that you can see:  ¨ Any grab bars or handrails.  ¨ First and last steps.  ¨ Where the edge of each step is.  · Use tools that help you move around (mobility aids) if they are needed. These include:  ¨ Canes.  ¨ Walkers.  ¨ Scooters.  ¨ Crutches.  · Turn on the lights when you go into a dark area. Replace any light bulbs as soon as they burn out.  · Set up your furniture so you have a clear path. Avoid moving your furniture around.  · If any of your floors are uneven, fix them.  · If there are any pets around you, be aware of where they are.  · Review your medicines with your doctor. Some medicines can make you feel dizzy. This can increase your chance of falling.  Ask your doctor what other things that you can do to help prevent falls.  This information is not intended to replace advice given to you by your health care provider. Make sure you discuss any questions you have with your health care  provider.  Document Released: 10/14/2010 Document Revised: 05/25/2017 Document Reviewed: 01/22/2016  © 2017 Elsevier

## 2020-01-21 NOTE — PROGRESS NOTES
Pt discharged to home. Discharge instructions provided to pt. Pt verbalizes understanding. Pt states all questions have been answered. Signed copy in chart. No new prescriptions. Pt states that all personal belongings are in possession. Pt off unit via wheelchair, escorted by CNAs.  Right Port deaccessed.  All belongings brought down with patient and friend in wheelchair.  Pt hooked up to home o2 that was brought in from home.

## 2020-01-21 NOTE — DISCHARGE PLANNING
Outpatient Dialysis Note    Confirmed patient is active at:    [facility]  [address] [place] [id number]      Schedule: Monday, Wednesday, Friday  Time: 10:00 am    Spoke with Zamzam at facility who confirmed.    Forwarded records for review.      Stacia Stephens- Dialysis Coordinator  [facility] # [phone]

## 2020-01-21 NOTE — ASSESSMENT & PLAN NOTE
Potassium supplementation ordered  Expect increase of 0.1 mEq/L per each 10 mEq given  Will recheck after supplementation  Lab Results   Component Value Date/Time    POTASSIUM 3.4 (L) 01/20/2020 04:16 PM    Recheck bmp in the am for changes

## 2020-01-21 NOTE — PROGRESS NOTES
Completed admit profile, assessment, and administered scheduled medications. Bed in low position, locked, and appropriate alarms set. Personal belongings and call light are within reach. Patient has no additional needs at this time. Blood still transfusing.

## 2020-01-21 NOTE — PROGRESS NOTES
2 RN Skin Check    2 RN skin check complete.   Devices in place: N/A.  Skin assessed under devices: N\A.  Confirmed pressure ulcers found on: N/A.  New potential pressure ulcers noted on N/A. Wound consult placed N/A.  The following interventions in place remind patient to make frequent positional change and turns.

## 2020-01-21 NOTE — PROGRESS NOTES
Report received from ARIEL Garcia. Pt denies needs at this time. Safety precautions in place. Call light within reach.

## 2020-01-21 NOTE — PROGRESS NOTES
Received telephone report from ARIEL Gonsalves. Plan of care discussed. Patient is currently transfusing blood. Waiting for patient to arrive to room 301-2.

## 2020-01-21 NOTE — ASSESSMENT & PLAN NOTE
Hgb 5.9 on admission, was sent from dialysis. No signs of gross bleeding  Check stool occult  Suspect this is anemia of chronic diease but will check  2U PRBCs ordered   Repeat cbc after 2U transfusions

## 2020-01-29 ENCOUNTER — TELEPHONE (OUTPATIENT)
Dept: HEMATOLOGY ONCOLOGY | Facility: MEDICAL CENTER | Age: 65
End: 2020-01-29

## 2020-01-29 NOTE — TELEPHONE ENCOUNTER
Dakota called from [facility] stating that he is unabel to collect the CBC that was taken today because it was not labled.   Looked in the chart and did not see anything ordered in our system for sometime today, explained  to Dakota that we did not know who collected nor who ordered it so don't know what to do about it    Called back to [facility] and spoke to Corrie she stated that in her system that she could only see a test req from stating that some labs need to be done on Sept. But do not have anything else.     Called pt to see where she got her labs done, she did not answer, I just want to make sure who claudia them knows that this happened.

## 2020-01-30 ENCOUNTER — OFFICE VISIT (OUTPATIENT)
Dept: HEMATOLOGY ONCOLOGY | Facility: MEDICAL CENTER | Age: 65
End: 2020-01-30
Payer: MEDICARE

## 2020-01-30 ENCOUNTER — OUTPATIENT INFUSION SERVICES (OUTPATIENT)
Dept: ONCOLOGY | Facility: MEDICAL CENTER | Age: 65
End: 2020-01-30
Attending: INTERNAL MEDICINE
Payer: MEDICARE

## 2020-01-30 VITALS
DIASTOLIC BLOOD PRESSURE: 66 MMHG | BODY MASS INDEX: 32.63 KG/M2 | WEIGHT: 151.24 LBS | HEART RATE: 71 BPM | RESPIRATION RATE: 16 BRPM | OXYGEN SATURATION: 100 % | SYSTOLIC BLOOD PRESSURE: 216 MMHG | HEIGHT: 57 IN | TEMPERATURE: 98 F

## 2020-01-30 VITALS
WEIGHT: 151.79 LBS | TEMPERATURE: 98.3 F | RESPIRATION RATE: 18 BRPM | BODY MASS INDEX: 32.75 KG/M2 | HEIGHT: 57 IN | HEART RATE: 82 BPM | OXYGEN SATURATION: 99 % | DIASTOLIC BLOOD PRESSURE: 80 MMHG | SYSTOLIC BLOOD PRESSURE: 148 MMHG

## 2020-01-30 DIAGNOSIS — D46.9 MYELODYSPLASIA (MYELODYSPLASTIC SYNDROME) (HCC): ICD-10-CM

## 2020-01-30 DIAGNOSIS — Z09 ENCOUNTER FOR HEMATOLOGY FOLLOW-UP: ICD-10-CM

## 2020-01-30 DIAGNOSIS — D64.9 SYMPTOMATIC ANEMIA: ICD-10-CM

## 2020-01-30 DIAGNOSIS — Z99.2 ESRD (END STAGE RENAL DISEASE) ON DIALYSIS (HCC): ICD-10-CM

## 2020-01-30 DIAGNOSIS — N18.6 ESRD (END STAGE RENAL DISEASE) ON DIALYSIS (HCC): ICD-10-CM

## 2020-01-30 PROBLEM — T45.1X5A CHEMOTHERAPY-INDUCED NEUTROPENIA (HCC): Status: RESOLVED | Noted: 2017-08-21 | Resolved: 2020-01-30

## 2020-01-30 PROBLEM — D70.1 CHEMOTHERAPY-INDUCED NEUTROPENIA (HCC): Status: RESOLVED | Noted: 2017-08-21 | Resolved: 2020-01-30

## 2020-01-30 LAB
ABO GROUP BLD: NORMAL
BARCODED ABORH UBTYP: 8400
BARCODED PRD CODE UBPRD: NORMAL
BARCODED UNIT NUM UBUNT: NORMAL
BASOPHILS # BLD AUTO: 0.7 % (ref 0–1.8)
BASOPHILS # BLD: 0.03 K/UL (ref 0–0.12)
BLD GP AB SCN SERPL QL: NORMAL
COMPONENT R 8504R: NORMAL
EOSINOPHIL # BLD AUTO: 0.29 K/UL (ref 0–0.51)
EOSINOPHIL NFR BLD: 6.6 % (ref 0–6.9)
ERYTHROCYTE [DISTWIDTH] IN BLOOD BY AUTOMATED COUNT: 57.5 FL (ref 35.9–50)
HCT VFR BLD AUTO: 24.1 % (ref 37–47)
HGB BLD-MCNC: 7.9 G/DL (ref 12–16)
IMM GRANULOCYTES # BLD AUTO: 0.03 K/UL (ref 0–0.11)
IMM GRANULOCYTES NFR BLD AUTO: 0.7 % (ref 0–0.9)
LYMPHOCYTES # BLD AUTO: 0.92 K/UL (ref 1–4.8)
LYMPHOCYTES NFR BLD: 21 % (ref 22–41)
MCH RBC QN AUTO: 30.9 PG (ref 27–33)
MCHC RBC AUTO-ENTMCNC: 32.8 G/DL (ref 33.6–35)
MCV RBC AUTO: 94.1 FL (ref 81.4–97.8)
MONOCYTES # BLD AUTO: 0.5 K/UL (ref 0–0.85)
MONOCYTES NFR BLD AUTO: 11.4 % (ref 0–13.4)
NEUTROPHILS # BLD AUTO: 2.61 K/UL (ref 2–7.15)
NEUTROPHILS NFR BLD: 59.6 % (ref 44–72)
NRBC # BLD AUTO: 0 K/UL
NRBC BLD-RTO: 0 /100 WBC
PLATELET # BLD AUTO: 79 K/UL (ref 164–446)
PLEASE NOTE   199999: NORMAL
PRODUCT TYPE UPROD: NORMAL
RBC # BLD AUTO: 2.56 M/UL (ref 4.2–5.4)
RH BLD: NORMAL
UNIT STATUS USTAT: NORMAL
WBC # BLD AUTO: 4.4 K/UL (ref 4.8–10.8)

## 2020-01-30 PROCEDURE — 85025 COMPLETE CBC W/AUTO DIFF WBC: CPT

## 2020-01-30 PROCEDURE — 86900 BLOOD TYPING SEROLOGIC ABO: CPT

## 2020-01-30 PROCEDURE — 86850 RBC ANTIBODY SCREEN: CPT

## 2020-01-30 PROCEDURE — P9016 RBC LEUKOCYTES REDUCED: HCPCS

## 2020-01-30 PROCEDURE — 99213 OFFICE O/P EST LOW 20 MIN: CPT | Performed by: NURSE PRACTITIONER

## 2020-01-30 PROCEDURE — 36430 TRANSFUSION BLD/BLD COMPNT: CPT

## 2020-01-30 PROCEDURE — 86923 COMPATIBILITY TEST ELECTRIC: CPT

## 2020-01-30 PROCEDURE — 36591 DRAW BLOOD OFF VENOUS DEVICE: CPT

## 2020-01-30 PROCEDURE — 306780 HCHG STAT FOR TRANSFUSION PER CASE

## 2020-01-30 PROCEDURE — 96374 THER/PROPH/DIAG INJ IV PUSH: CPT

## 2020-01-30 PROCEDURE — 86901 BLOOD TYPING SEROLOGIC RH(D): CPT

## 2020-01-30 PROCEDURE — A4212 NON CORING NEEDLE OR STYLET: HCPCS

## 2020-01-30 PROCEDURE — 700111 HCHG RX REV CODE 636 W/ 250 OVERRIDE (IP): Performed by: INTERNAL MEDICINE

## 2020-01-30 RX ORDER — FUROSEMIDE 10 MG/ML
20 INJECTION INTRAMUSCULAR; INTRAVENOUS ONCE
Status: COMPLETED | OUTPATIENT
Start: 2020-01-30 | End: 2020-01-30

## 2020-01-30 RX ORDER — ACETAMINOPHEN 325 MG/1
650 TABLET ORAL PRN
Status: CANCELLED | OUTPATIENT
Start: 2020-01-30

## 2020-01-30 RX ORDER — SODIUM CHLORIDE 9 MG/ML
500 INJECTION, SOLUTION INTRAVENOUS ONCE
Status: CANCELLED | OUTPATIENT
Start: 2020-01-30

## 2020-01-30 RX ORDER — ACETAMINOPHEN 325 MG/1
650 TABLET ORAL ONCE
Status: CANCELLED | OUTPATIENT
Start: 2020-01-30

## 2020-01-30 RX ORDER — LIDOCAINE HYDROCHLORIDE 10 MG/ML
20 INJECTION, SOLUTION INFILTRATION; PERINEURAL
Status: CANCELLED | OUTPATIENT
Start: 2020-01-30

## 2020-01-30 RX ORDER — FUROSEMIDE 10 MG/ML
20 INJECTION INTRAMUSCULAR; INTRAVENOUS ONCE
Status: CANCELLED | OUTPATIENT
Start: 2020-01-30

## 2020-01-30 RX ORDER — DIPHENHYDRAMINE HCL 25 MG
25 TABLET ORAL ONCE
Status: CANCELLED | OUTPATIENT
Start: 2020-01-30

## 2020-01-30 RX ADMIN — FUROSEMIDE 20 MG: 10 INJECTION, SOLUTION INTRAMUSCULAR; INTRAVENOUS at 14:00

## 2020-01-30 RX ADMIN — HEPARIN 500 UNITS: 100 SYRINGE at 14:15

## 2020-01-30 ASSESSMENT — ENCOUNTER SYMPTOMS
FEVER: 0
SHORTNESS OF BREATH: 0
WHEEZING: 0
CONSTIPATION: 1
INSOMNIA: 1
FALLS: 1
HEADACHES: 0
VOMITING: 0
DIARRHEA: 0
BLOOD IN STOOL: 0
TINGLING: 0
CHILLS: 0
DIZZINESS: 1
NAUSEA: 1
WEIGHT LOSS: 0
PALPITATIONS: 0
MYALGIAS: 0
COUGH: 0

## 2020-01-30 NOTE — PROGRESS NOTES
PT arrives with friend for lab draw and possible PRBC blood transfusion today. Port accessed with 20g, 1inch rivera needle utilizing sterile technique.  Biopatch in place.  Port flushes easily with brisk blood return.  Labs drawn and sent.  CBC results shows Hgb 7.9, Hct 24.1.  Pt does not meet order parameters but Yanely MARINA contacted and agrees to  proceed with PRBC transfusion at this time.   Blood transfusion consents signed. Potential side effects reviewed.       1 units PRBC's transfused, pt BP elevated post transfusion.  BP level addressed with Yanely MARINA per Lizy GEORGE, see progress note.  Port flushed per protocol and de accessed.  PT tolerated well.  Pt home with friend.  Will return on 02/27/2020 for next appointment.

## 2020-01-30 NOTE — PROGRESS NOTES
Updated Yanely Barba re: pt's post transfusion hypertension. Pt rec'd IV lasix per orders. This is her baseline post transfusion and she is asymptomatic. She reports she will take evening dose of losartan and coreg as soon as she gets home. ASHLEIGH Barba OK to d/c pt. Pt declines to be evaluated in the ER. Pt aware of s/s of stroke and to report immediately to ER/call 911 if these symptoms present.

## 2020-01-30 NOTE — PROGRESS NOTES
Subjective:      Vielka Briscoe is a 64 y.o. female who presents for Other (MDS (myelodysplastic syndrome), 5 week fv)        HPI   Ms. 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  She is accompanied by her caregiver, Afua, for today's visit.     Patient has previously been treated with low-dose Revlimid in 2016 and Vidaza 75 mg/m² subcu 5 days a week in 2017, per Dr. Avila. Since Dr. Roberts's long-term the patient established with our office (7/2019), per second opinion evaluation, and continues with supportive therapy in the form of periodic transfusions, standing orders in place.    Patient continues with dialysis every Monday, Wednesday, Friday.  She was admitted to hospital overnight on 1/20/2020 secondary to dizziness following ground-level fall.  She received blood transfusion for hemoglobin of 5.9 and was discharged following day.  She continues with oxygen at 2 L/min.  She continues with poor sleep and does better when sleeping upright in chair but sometimes falls forward, desires hospital bed for home to help with safety concerns.  Patient is otherwise asymptomatic.        Allergies   Allergen Reactions   • Lenalidomide Unspecified     Beeping Pulse     • Naprosyn [Naproxen] Hives   • Pioglitazone Unspecified     Causes blindness   • Simvastatin Unspecified     Couldn't move   • Demerol Vomiting   • Diphenhydramine Vomiting   • Glipizide Vomiting   • Hydromorphone Vomiting   • Iron Vomiting     vomiting   • Metformin Vomiting   • Morphine Vomiting   • Multivitamin Itching     itching   • Ondansetron Itching   • Other Drug Rash and Vomiting     Any binders that remove phosphorus from the body such as tums   • Oxycodone Vomiting   • Pcn [Penicillins] Vomiting          • Propoxyphene Vomiting   • Requip Vomiting   • Sulfa Drugs Rash and Vomiting     Vomiting & rash      • Tamsulosin  Vomiting   • Tramadol Vomiting   • Trazodone Vomiting         Current Outpatient Medications on File Prior to Visit   Medication Sig Dispense Refill   • amLODIPine (NORVASC) 2.5 MG Tab TK 1 T PO QD     • furosemide (LASIX) 10 MG/ML Solution      • Heparin Lock Flush (HEPARIN PF) 100 UNIT/ML Solution injection      • Sodium Chloride (NS) Solution      • nitroglycerin (NITROSTAT) 0.4 MG SL Tab Place 1 Tab under tongue as needed for Chest Pain. 1 tab every 5 min,3 doses is max/day if 4th dose needed go to ER. May cause low BP 30 Tab 2   • Buprenorphine 20 MCG/HR PATCH WEEKLY Apply 20 mcg to affected area(s) every Monday.  0   • Diclofenac Sodium (VOLTAREN) 1 % Gel Apply  to skin as directed every morning.     • HYDROcodone-acetaminophen (NORCO) 5-325 MG Tab per tablet Take 1 Tab by mouth 2 times a day as needed. Indications: Pain     • carvedilol (COREG) 12.5 MG Tab Take 1 Tab by mouth 2 times a day, with meals. 60 Tab 0   • lidocaine (LIDODERM) 5 % Patch Apply 1 Patch to skin as directed every bedtime.     • losartan (COZAAR) 100 MG Tab Take 100 mg by mouth every evening.     • bimatoprost (LUMIGAN) 0.01 % Solution Place 1 Drop in both eyes every bedtime.     • brinzolamide (AZOPT) 1 % Suspension Place 1 Drop in both eyes 2 Times a Day.     • Brimonidine Tartrate-Timolol (COMBIGAN) 0.2-0.5 % Solution Place 1 Drop in both eyes 2 Times a Day.     • pregabalin (LYRICA) 150 MG Cap Take 150 mg by mouth 4 times a day.     • pregabalin (LYRICA) 75 MG Cap      • isosorbide mononitrate SR (IMDUR) 30 MG TABLET SR 24 HR Take 1 Tab by mouth every morning. (Patient not taking: Reported on 12/26/2019) 90 Tab 3     Current Facility-Administered Medications on File Prior to Visit   Medication Dose Route Frequency Provider Last Rate Last Dose   • heparin pf injection 500 Units  500 Units Intracatheter PRN Chato Jones M.D.   500 Units at 01/30/20 1415         Review of Systems   Constitutional: Positive for malaise/fatigue  "(consistent with baseline). Negative for chills, fever and weight loss.   Respiratory: Negative for cough, shortness of breath and wheezing.         O2 @ 2   Cardiovascular: Negative for chest pain, palpitations and leg swelling.        S/p angioplasty LUE for AV fistula care - frequency change from every 3 months to every 2 months   Gastrointestinal: Positive for constipation and nausea. Negative for blood in stool, diarrhea and vomiting.   Genitourinary: Negative for dysuria.   Musculoskeletal: Positive for falls (1/20/20 - hit head - overnight stay in). Negative for myalgias.   Neurological: Positive for dizziness. Negative for tingling and headaches.   Psychiatric/Behavioral: The patient has insomnia (she does not sleep well).           Objective:     /80 (BP Location: Right arm, Patient Position: Sitting, BP Cuff Size: Adult)   Pulse 82   Temp 36.8 °C (98.3 °F) (Temporal)   Resp 18   Ht 1.45 m (4' 9.09\")   Wt 68.9 kg (151 lb 12.6 oz)   LMP 01/01/1995   SpO2 99%   BMI 32.74 kg/m²        Physical Exam  Vitals signs reviewed.   Constitutional:       General: She is not in acute distress.     Appearance: She is well-developed. She is not diaphoretic.   HENT:      Head: Normocephalic and atraumatic.      Mouth/Throat:      Pharynx: No oropharyngeal exudate.   Eyes:      General: No scleral icterus.        Right eye: No discharge.         Left eye: No discharge.      Conjunctiva/sclera: Conjunctivae normal.      Pupils: Pupils are equal, round, and reactive to light.   Neck:      Musculoskeletal: Normal range of motion and neck supple.   Cardiovascular:      Rate and Rhythm: Normal rate and regular rhythm.      Heart sounds: Normal heart sounds. No murmur. No friction rub. No gallop.    Pulmonary:      Effort: Pulmonary effort is normal. No respiratory distress.      Breath sounds: Normal breath sounds. No wheezing.      Comments: O2  Abdominal:      General: Bowel sounds are normal. There is no " distension.      Palpations: Abdomen is soft.      Tenderness: There is no tenderness.   Musculoskeletal: Normal range of motion.         General: Swelling (LUE - AV fistula with bruising - s/p angiogram) present.   Skin:     General: Skin is warm and dry.      Coloration: Skin is not pale.      Findings: No erythema or rash.   Neurological:      Mental Status: She is alert and oriented to person, place, and time.   Psychiatric:         Behavior: Behavior normal.         Outpatient Infusion Services on 01/30/2020   Component Date Value Ref Range Status   • WBC 01/30/2020 4.4* 4.8 - 10.8 K/uL Final   • RBC 01/30/2020 2.56* 4.20 - 5.40 M/uL Final   • Hemoglobin 01/30/2020 7.9* 12.0 - 16.0 g/dL Final   • Hematocrit 01/30/2020 24.1* 37.0 - 47.0 % Final   • MCV 01/30/2020 94.1  81.4 - 97.8 fL Final   • MCH 01/30/2020 30.9  27.0 - 33.0 pg Final   • MCHC 01/30/2020 32.8* 33.6 - 35.0 g/dL Final   • RDW 01/30/2020 57.5* 35.9 - 50.0 fL Final   • Platelet Count 01/30/2020 79* 164 - 446 K/uL Final   • Neutrophils-Polys 01/30/2020 59.60  44.00 - 72.00 % Final   • Lymphocytes 01/30/2020 21.00* 22.00 - 41.00 % Final   • Monocytes 01/30/2020 11.40  0.00 - 13.40 % Final   • Eosinophils 01/30/2020 6.60  0.00 - 6.90 % Final   • Basophils 01/30/2020 0.70  0.00 - 1.80 % Final   • Immature Granulocytes 01/30/2020 0.70  0.00 - 0.90 % Final   • Nucleated RBC 01/30/2020 0.00  /100 WBC Final   • Neutrophils (Absolute) 01/30/2020 2.61  2.00 - 7.15 K/uL Final    Includes immature neutrophils, if present.   • Lymphs (Absolute) 01/30/2020 0.92* 1.00 - 4.80 K/uL Final   • Monos (Absolute) 01/30/2020 0.50  0.00 - 0.85 K/uL Final   • Eos (Absolute) 01/30/2020 0.29  0.00 - 0.51 K/uL Final   • Baso (Absolute) 01/30/2020 0.03  0.00 - 0.12 K/uL Final   • Immature Granulocytes (abs) 01/30/2020 0.03  0.00 - 0.11 K/uL Final   • NRBC (Absolute) 01/30/2020 0.00  K/uL Final   • ABO Grouping Only 01/30/2020 AB   Final   • Rh Grouping Only 01/30/2020 POS    Final   • Antibody Screen-Cod 01/30/2020 NEG   Final   • Component R 01/30/2020    Preliminary                    Value:R3                  Red Blood Cells3    [id number]   issued       01/30/20   11:41     • Product Type 01/30/2020 Red Blood Cells LR Pheresis   Preliminary   • Dispense Status 01/30/2020 issued   Preliminary   • Unit Number (Barcoded) 01/30/2020 [id number]   Preliminary   • Product Code (Barcoded) 01/30/2020 [id number]   Preliminary   • Blood Type (Barcoded) 01/30/2020 [id number]   Preliminary          Assessment/Plan:       1. Myelodysplasia (myelodysplastic syndrome) (HCC)     2. Encounter for hematology follow-up     3. ESRD (end stage renal disease) on dialysis (HCC)         1.  ESRD: Stable, continues with dialysis 3 times weekly.     2.  MDS:  Patient has previously undergone treatment with Vidaza and Revlimid.  She has not undergone further treatment since 2017.  She established care with our office in July 2019 and continues with supportive transfusions approximately every 3-5 weeks. Per her request, patient will be transitioning from every 3-week standing lab orders to every 5 weeks checks but 5 weeks is too long between checks. She will return in 4 weeks for re-evaluation, sooner as needed.            The patient verbalized agreement and understanding of current plan. All questions and concerns were addressed at time of visit.    Please note that this dictation was created using voice recognition software. I have made every reasonable attempt to correct obvious errors, but I expect that there are errors of grammar and possibly content that I did not discover before finalizing the note.

## 2020-02-01 ENCOUNTER — APPOINTMENT (OUTPATIENT)
Dept: ONCOLOGY | Facility: MEDICAL CENTER | Age: 65
End: 2020-02-01
Attending: INTERNAL MEDICINE
Payer: MEDICARE

## 2020-02-07 LAB
BASOPHILS # BLD AUTO: 0 X10E3/UL (ref 0–0.2)
BASOPHILS NFR BLD AUTO: 0 %
EOSINOPHIL # BLD AUTO: 0.3 X10E3/UL (ref 0–0.4)
EOSINOPHIL NFR BLD AUTO: 5 %
ERYTHROCYTE [DISTWIDTH] IN BLOOD BY AUTOMATED COUNT: 18.1 % (ref 11.7–15.4)
HCT VFR BLD AUTO: 27.6 % (ref 34–46.6)
HGB BLD-MCNC: 9.6 G/DL (ref 11.1–15.9)
IMM GRANULOCYTES # BLD AUTO: 0 X10E3/UL (ref 0–0.1)
IMM GRANULOCYTES NFR BLD AUTO: 0 %
IMMATURE CELLS  115398: ABNORMAL
LYMPHOCYTES # BLD AUTO: 0.9 X10E3/UL (ref 0.7–3.1)
LYMPHOCYTES NFR BLD AUTO: 15 %
MCH RBC QN AUTO: 30.5 PG (ref 26.6–33)
MCHC RBC AUTO-ENTMCNC: 34.8 G/DL (ref 31.5–35.7)
MCV RBC AUTO: 88 FL (ref 79–97)
MONOCYTES # BLD AUTO: 0.9 X10E3/UL (ref 0.1–0.9)
MONOCYTES NFR BLD AUTO: 14 %
MORPHOLOGY BLD-IMP: ABNORMAL
NEUTROPHILS # BLD AUTO: 4 X10E3/UL (ref 1.4–7)
NEUTROPHILS NFR BLD AUTO: 66 %
NRBC BLD AUTO-RTO: ABNORMAL %
ONE SPECIMEN IDENTIFIER 977740: NORMAL
PLEASE NOTE   199999: NORMAL
RBC # BLD AUTO: 3.15 X10E6/UL (ref 3.77–5.28)
WBC # BLD AUTO: 6.1 X10E3/UL (ref 3.4–10.8)

## 2020-02-11 ENCOUNTER — HOSPITAL ENCOUNTER (EMERGENCY)
Facility: MEDICAL CENTER | Age: 65
End: 2020-02-11
Attending: EMERGENCY MEDICINE
Payer: MEDICARE

## 2020-02-11 VITALS
TEMPERATURE: 97.2 F | WEIGHT: 143.3 LBS | HEIGHT: 58 IN | SYSTOLIC BLOOD PRESSURE: 137 MMHG | BODY MASS INDEX: 30.08 KG/M2 | HEART RATE: 60 BPM | OXYGEN SATURATION: 99 % | DIASTOLIC BLOOD PRESSURE: 56 MMHG | RESPIRATION RATE: 15 BRPM

## 2020-02-11 DIAGNOSIS — G89.29 CHRONIC PAIN OF BOTH LOWER EXTREMITIES: ICD-10-CM

## 2020-02-11 DIAGNOSIS — M79.604 CHRONIC PAIN OF BOTH LOWER EXTREMITIES: ICD-10-CM

## 2020-02-11 DIAGNOSIS — G25.81 RESTLESS LEG SYNDROME: ICD-10-CM

## 2020-02-11 DIAGNOSIS — M79.605 CHRONIC PAIN OF BOTH LOWER EXTREMITIES: ICD-10-CM

## 2020-02-11 PROCEDURE — 99285 EMERGENCY DEPT VISIT HI MDM: CPT

## 2020-02-11 PROCEDURE — 700111 HCHG RX REV CODE 636 W/ 250 OVERRIDE (IP): Performed by: EMERGENCY MEDICINE

## 2020-02-11 PROCEDURE — 700102 HCHG RX REV CODE 250 W/ 637 OVERRIDE(OP): Performed by: EMERGENCY MEDICINE

## 2020-02-11 PROCEDURE — 96372 THER/PROPH/DIAG INJ SC/IM: CPT

## 2020-02-11 RX ORDER — PROCHLORPERAZINE EDISYLATE 5 MG/ML
10 INJECTION INTRAMUSCULAR; INTRAVENOUS ONCE
Status: COMPLETED | OUTPATIENT
Start: 2020-02-11 | End: 2020-02-11

## 2020-02-11 RX ORDER — BUPRENORPHINE AND NALOXONE 4; 1 MG/1; MG/1
1 FILM, SOLUBLE BUCCAL; SUBLINGUAL DAILY
Status: DISCONTINUED | OUTPATIENT
Start: 2020-02-11 | End: 2020-02-11 | Stop reason: HOSPADM

## 2020-02-11 RX ORDER — BUPRENORPHINE HYDROCHLORIDE AND NALOXONE HYDROCHLORIDE DIHYDRATE 2; .5 MG/1; MG/1
1 TABLET SUBLINGUAL DAILY
Status: DISCONTINUED | OUTPATIENT
Start: 2020-02-11 | End: 2020-02-11

## 2020-02-11 RX ADMIN — BUPRENORPHINE HYDROCHLORIDE, NALOXONE HYDROCHLORIDE 1 FILM: 4; 1 FILM, SOLUBLE BUCCAL; SUBLINGUAL at 06:38

## 2020-02-11 RX ADMIN — PROCHLORPERAZINE EDISYLATE 10 MG: 5 INJECTION INTRAMUSCULAR; INTRAVENOUS at 09:46

## 2020-02-11 NOTE — DISCHARGE INSTRUCTIONS
Follow-up with primary care this week for reevaluation, medication management and close blood pressure monitoring.    Continue home medications as previously indicated, refill medications at pharmacy and continue to use as directed so as to control your chronic pain and restless leg syndrome.    Continue dialysis per routine, Monday Wednesday Friday.    Return to the emergency department for intractable pain, altered mental status, shortness of breath, syncope, fever or other new concerns.

## 2020-02-11 NOTE — DISCHARGE PLANNING
MARYELLEN notified by RN Pt will be discharged soon and needs transportation back to [place].    MARYELLEN contacted [facility] and spoke to Daxa who stated Pt does not have transportation benefits.  RN is attempting to have Pt call daughter for a ride home.    SW will continue to assist in helping Pt get home to [place] once she is discharged.

## 2020-02-11 NOTE — ED TRIAGE NOTES
Pt presents to the ED via EMS for complaints of bilateral leg pain/restless legs. Pt denies any type of trauma to legs, pt states she has restless leg syndrome and she usally takes norco and lyrica for the pain but she is currently out of her norco prescription. Pt is a dialysis pt with [facility] she was able to complete her dialysis yesterday. Pt denies any other complaints at this time.   Vitals:    02/11/20 0454   BP: (!) 168/73   Pulse: 78   Resp: 20   Temp: 36.2 °C (97.2 °F)   SpO2: 100%

## 2020-02-11 NOTE — ED NOTES
Pt given discharge instructions, all questions answered, taken to lobby where daughter is waiting to take her home. Pt care transferred to patient and family at this time.

## 2020-02-11 NOTE — ED PROVIDER NOTES
ED Provider Note    CHIEF COMPLAINT  Chief Complaint   Patient presents with   • Leg Pain     Restless Legs       HPI  Vielka Briscoe is a 64 y.o. female who presents to the emergency department for bilateral leg pain.  Patient with history of restless leg syndrome, she is out of her home medications, she is requesting Norco.  Records indicate prescriptions for bupreorphine and Lyrica.  Patient states she ran out of medications on Friday.  Dialysis yesterday, compliant.  Takes other home medications.  Denies chest pain, shortness of breath or syncope.  Patient states she is been unable to sleep due to this restless leg.  Denies any new trauma, injury.  Denies swelling or discoloration.  Denies paresthesia.    REVIEW OF SYSTEMS  See HPI for further details. All other systems are negative.     PAST MEDICAL HISTORY   has a past medical history of Arthritis, Atrial fibrillation (HCC), Blood transfusion without reported diagnosis, Breath shortness, Cancer (HCC), Cataract, Chronic anemia, Chronic kidney disease, Chronic obstructive pulmonary disease (HCC), Congestive heart failure (HCC), Dental disorder, Diabetes, Diabetes (HCC), Dialysis patient, Glaucoma, Heart abnormalities, Hypertension, MDS (myelodysplastic syndrome) (10/2016), Other hyperlipidemia (12/12/2019), Pain (-2017), Renal disorder, Stroke (HCC) (03/2015), and Supplemental oxygen dependent.    SOCIAL HISTORY  Social History     Tobacco Use   • Smoking status: Never Smoker   • Smokeless tobacco: Never Used   Substance and Sexual Activity   • Alcohol use: No   • Drug use: No   • Sexual activity: Not on file       SURGICAL HISTORY   has a past surgical history that includes other cardiac surgery; other abdominal surgery; gyn surgery; gyn surgery; gyn surgery; other cardiac surgery; other; retinal detachment repair (Right); av fistula creation (Left, 2/8/2016); other abdominal surgery; gastroscopy (12/17/2015); colonoscopy (12/17/2015); vein  "ligation (Left, 11/10/2016); bone marrow biopsy, ndl/trocar (9/20/2017); bone marrow aspiration (9/20/2017); and gastroscopy (N/A, 1/25/2018).    CURRENT MEDICATIONS  Home Medications    **Home medications have not yet been reviewed for this encounter**         ALLERGIES  Allergies   Allergen Reactions   • Lenalidomide Unspecified     Beeping Pulse     • Naprosyn [Naproxen] Hives   • Pioglitazone Unspecified     Causes blindness   • Simvastatin Unspecified     Couldn't move   • Demerol Vomiting   • Diphenhydramine Vomiting   • Glipizide Vomiting   • Hydromorphone Vomiting   • Iron Vomiting     vomiting   • Metformin Vomiting   • Morphine Vomiting   • Multivitamin Itching     itching   • Ondansetron Itching   • Other Drug Rash and Vomiting     Any binders that remove phosphorus from the body such as tums   • Oxycodone Vomiting   • Pcn [Penicillins] Vomiting          • Propoxyphene Vomiting   • Requip Vomiting   • Sulfa Drugs Rash and Vomiting     Vomiting & rash      • Tamsulosin Vomiting   • Tramadol Vomiting   • Trazodone Vomiting       PHYSICAL EXAM  VITAL SIGNS: /64   Pulse 68   Temp 36.2 °C (97.2 °F) (Tympanic)   Resp 15   Ht 1.473 m (4' 10\")   Wt 65 kg (143 lb 4.8 oz)   LMP 01/01/1995   SpO2 97%   BMI 29.95 kg/m²   Pulse ox interpretation: I interpret this pulse ox as normal.  Constitutional: Alert in no apparent distress.  Disheveled.  HENT: Normocephalic, atraumatic. Bilateral external ears normal, Nose normal. Moist mucous membranes.    Eyes: Conjunctiva normal.   Neck: Normal range of motion, Supple  Cardiovascular: Regular rate and rhythm, no murmurs. Distal pulses intact.  No peripheral edema.  Thorax & Lungs: Normal breath sounds.  No wheezing/rales/ronchi. No increased work of breathing  Abdomen: Soft, non-distended, non-tender to palpation.   Skin: Warm, Dry, No erythema, No rash.   Musculoskeletal: Bilateral lower extremities are unremarkable, chronic old scar on the proximal right leg.  " No swelling, discoloration or erythema.  No crepitus, induration or fluctuance.  Full range of motion without pain or resistance.  Good range of motion in all major joints.   Neurologic: Alert and oriented x4.  5 out of 5 strength in bilateral lower extremities with proximal distal muscle groups.  Sensation intact light touch.  2+ patellar DTR.  Psychiatric: Flat affect.      COURSE & MEDICAL DECISION MAKING  Evaluation was consistent with acute exacerbation of chronic leg pain, likely secondary to known restless leg syndrome.  Patient is requesting Norco, although after  review, she has not had prescription for this since 2018.  She does admit to being out of her Buprenorphine as well.  She was given a dose of this in the emergency department.  Symptoms have improved.  She has a prescription of this waiting for at the pharmacy which she has not been able to get to.  She ambulates independently.  Hemodynamically stable.  No recent trauma or clinical evidence for fracture or sprain.  No cutaneous changes, cellulitis.  CMS intact distally.  No indication for further work-up.    Patient is stable for discharge at this time, anticipatory guidance provided, recommend compliance with home medications, close follow-up is encouraged, and strict ED return instructions have been detailed. Patient is agreeable to the disposition and plan.    Patient's blood pressure was elevated in the emergency department, and has been referred to primary care for close monitoring.    FINAL IMPRESSION  (M79.604,  M79.605,  G89.29) Chronic pain of both lower extremities  (G25.81) Restless leg syndrome      Electronically signed by: Eneida White D.O., 2/11/2020 6:44 AM      This dictation was created using voice recognition software. The accuracy of the dictation is limited to the abilities of the software. I expect there may be some errors of grammar and possibly content. The nursing notes were reviewed and certain aspects of this  information were incorporated into this note.

## 2020-02-11 NOTE — ED NOTES
Pt prompted to get dressed because she is no longer vomiting or dry heaving, Pt is getting discharged to home with daughter coming to pick pt up.

## 2020-02-11 NOTE — ED NOTES
Pt dry heaving, declining zofran, provider aware, awaiting new orders. Having pt call daughter to see if she can get a ride once discharged.

## 2020-02-11 NOTE — ED NOTES
Daughter called to check on pt. RN informed her that Pt is here but unable to give information Name is Shannon phone number is [phone]

## 2020-02-20 ENCOUNTER — HOSPITAL ENCOUNTER (OUTPATIENT)
Facility: MEDICAL CENTER | Age: 65
End: 2020-02-21
Attending: EMERGENCY MEDICINE | Admitting: INTERNAL MEDICINE
Payer: MEDICARE

## 2020-02-20 ENCOUNTER — TELEPHONE (OUTPATIENT)
Dept: HEMATOLOGY ONCOLOGY | Facility: MEDICAL CENTER | Age: 65
End: 2020-02-20

## 2020-02-20 ENCOUNTER — APPOINTMENT (OUTPATIENT)
Dept: RADIOLOGY | Facility: MEDICAL CENTER | Age: 65
End: 2020-02-20
Payer: MEDICARE

## 2020-02-20 ENCOUNTER — APPOINTMENT (OUTPATIENT)
Dept: RADIOLOGY | Facility: MEDICAL CENTER | Age: 65
End: 2020-02-20
Attending: EMERGENCY MEDICINE
Payer: MEDICARE

## 2020-02-20 PROBLEM — F11.20 OPIATE DEPENDENCE (HCC): Status: ACTIVE | Noted: 2020-02-20

## 2020-02-20 LAB
ABO GROUP BLD: NORMAL
ALBUMIN SERPL BCP-MCNC: 3.6 G/DL (ref 3.2–4.9)
ALBUMIN/GLOB SERPL: 1.2 G/DL
ALP SERPL-CCNC: 71 U/L (ref 30–99)
ALT SERPL-CCNC: 23 U/L (ref 2–50)
ANION GAP SERPL CALC-SCNC: 16 MMOL/L (ref 7–16)
APTT PPP: 34 SEC (ref 24.7–36)
AST SERPL-CCNC: 19 U/L (ref 12–45)
BARCODED ABORH UBTYP: 8400
BARCODED PRD CODE UBPRD: NORMAL
BARCODED UNIT NUM UBUNT: NORMAL
BASOPHILS # BLD AUTO: 0.6 % (ref 0–1.8)
BASOPHILS # BLD: 0.03 K/UL (ref 0–0.12)
BILIRUB SERPL-MCNC: 0.3 MG/DL (ref 0.1–1.5)
BLD GP AB SCN SERPL QL: NORMAL
BUN SERPL-MCNC: 68 MG/DL (ref 8–22)
CALCIUM SERPL-MCNC: 8.9 MG/DL (ref 8.4–10.2)
CHLORIDE SERPL-SCNC: 89 MMOL/L (ref 96–112)
CO2 SERPL-SCNC: 25 MMOL/L (ref 20–33)
COMPONENT R 8504R: NORMAL
CREAT SERPL-MCNC: 6.13 MG/DL (ref 0.5–1.4)
EKG IMPRESSION: NORMAL
EKG IMPRESSION: NORMAL
EOSINOPHIL # BLD AUTO: 0.28 K/UL (ref 0–0.51)
EOSINOPHIL NFR BLD: 5.9 % (ref 0–6.9)
ERYTHROCYTE [DISTWIDTH] IN BLOOD BY AUTOMATED COUNT: 55.1 FL (ref 35.9–50)
GLOBULIN SER CALC-MCNC: 3.1 G/DL (ref 1.9–3.5)
GLUCOSE SERPL-MCNC: 278 MG/DL (ref 65–99)
HCT VFR BLD AUTO: 19.4 % (ref 37–47)
HGB BLD-MCNC: 6.5 G/DL (ref 12–16)
IMM GRANULOCYTES # BLD AUTO: 0.03 K/UL (ref 0–0.11)
IMM GRANULOCYTES NFR BLD AUTO: 0.6 % (ref 0–0.9)
INR PPP: 0.93 (ref 0.87–1.13)
LYMPHOCYTES # BLD AUTO: 1.23 K/UL (ref 1–4.8)
LYMPHOCYTES NFR BLD: 26.1 % (ref 22–41)
MCH RBC QN AUTO: 31.4 PG (ref 27–33)
MCHC RBC AUTO-ENTMCNC: 33.5 G/DL (ref 33.6–35)
MCV RBC AUTO: 93.7 FL (ref 81.4–97.8)
MONOCYTES # BLD AUTO: 0.71 K/UL (ref 0–0.85)
MONOCYTES NFR BLD AUTO: 15.1 % (ref 0–13.4)
NEUTROPHILS # BLD AUTO: 2.43 K/UL (ref 2–7.15)
NEUTROPHILS NFR BLD: 51.7 % (ref 44–72)
NRBC # BLD AUTO: 0 K/UL
NRBC BLD-RTO: 0 /100 WBC
PLATELET # BLD AUTO: 93 K/UL (ref 164–446)
POTASSIUM SERPL-SCNC: 4.2 MMOL/L (ref 3.6–5.5)
PRODUCT TYPE UPROD: NORMAL
PROT SERPL-MCNC: 6.7 G/DL (ref 6–8.2)
PROTHROMBIN TIME: 12.6 SEC (ref 12–14.6)
RBC # BLD AUTO: 2.07 M/UL (ref 4.2–5.4)
RH BLD: NORMAL
SODIUM SERPL-SCNC: 130 MMOL/L (ref 135–145)
TROPONIN T SERPL-MCNC: 68 NG/L (ref 6–19)
TROPONIN T SERPL-MCNC: 70 NG/L (ref 6–19)
UNIT STATUS USTAT: NORMAL
WBC # BLD AUTO: 4.7 K/UL (ref 4.8–10.8)

## 2020-02-20 PROCEDURE — 700111 HCHG RX REV CODE 636 W/ 250 OVERRIDE (IP): Performed by: EMERGENCY MEDICINE

## 2020-02-20 PROCEDURE — 86901 BLOOD TYPING SEROLOGIC RH(D): CPT

## 2020-02-20 PROCEDURE — 700101 HCHG RX REV CODE 250: Performed by: INTERNAL MEDICINE

## 2020-02-20 PROCEDURE — 700102 HCHG RX REV CODE 250 W/ 637 OVERRIDE(OP): Performed by: INTERNAL MEDICINE

## 2020-02-20 PROCEDURE — 84484 ASSAY OF TROPONIN QUANT: CPT

## 2020-02-20 PROCEDURE — 94760 N-INVAS EAR/PLS OXIMETRY 1: CPT

## 2020-02-20 PROCEDURE — G0378 HOSPITAL OBSERVATION PER HR: HCPCS

## 2020-02-20 PROCEDURE — 85730 THROMBOPLASTIN TIME PARTIAL: CPT

## 2020-02-20 PROCEDURE — 71045 X-RAY EXAM CHEST 1 VIEW: CPT

## 2020-02-20 PROCEDURE — 99285 EMERGENCY DEPT VISIT HI MDM: CPT

## 2020-02-20 PROCEDURE — A9270 NON-COVERED ITEM OR SERVICE: HCPCS | Performed by: INTERNAL MEDICINE

## 2020-02-20 PROCEDURE — 80053 COMPREHEN METABOLIC PANEL: CPT

## 2020-02-20 PROCEDURE — 93005 ELECTROCARDIOGRAM TRACING: CPT

## 2020-02-20 PROCEDURE — 36415 COLL VENOUS BLD VENIPUNCTURE: CPT

## 2020-02-20 PROCEDURE — 86923 COMPATIBILITY TEST ELECTRIC: CPT

## 2020-02-20 PROCEDURE — 85610 PROTHROMBIN TIME: CPT

## 2020-02-20 PROCEDURE — P9016 RBC LEUKOCYTES REDUCED: HCPCS

## 2020-02-20 PROCEDURE — 86900 BLOOD TYPING SEROLOGIC ABO: CPT

## 2020-02-20 PROCEDURE — 93005 ELECTROCARDIOGRAM TRACING: CPT | Performed by: EMERGENCY MEDICINE

## 2020-02-20 PROCEDURE — 36430 TRANSFUSION BLD/BLD COMPNT: CPT

## 2020-02-20 PROCEDURE — 700112 HCHG RX REV CODE 229: Performed by: INTERNAL MEDICINE

## 2020-02-20 PROCEDURE — 85025 COMPLETE CBC W/AUTO DIFF WBC: CPT

## 2020-02-20 PROCEDURE — 86850 RBC ANTIBODY SCREEN: CPT

## 2020-02-20 PROCEDURE — 99220 PR INITIAL OBSERVATION CARE,LEVL III: CPT | Performed by: INTERNAL MEDICINE

## 2020-02-20 RX ORDER — LOSARTAN POTASSIUM 25 MG/1
100 TABLET ORAL EVERY EVENING
Status: DISCONTINUED | OUTPATIENT
Start: 2020-02-20 | End: 2020-02-21 | Stop reason: HOSPADM

## 2020-02-20 RX ORDER — BRINZOLAMIDE 10 MG/ML
1 SUSPENSION/ DROPS OPHTHALMIC 2 TIMES DAILY
Status: DISCONTINUED | OUTPATIENT
Start: 2020-02-20 | End: 2020-02-21 | Stop reason: HOSPADM

## 2020-02-20 RX ORDER — ACETAMINOPHEN 325 MG/1
650 TABLET ORAL EVERY 6 HOURS PRN
Status: DISCONTINUED | OUTPATIENT
Start: 2020-02-20 | End: 2020-02-21 | Stop reason: HOSPADM

## 2020-02-20 RX ORDER — TIMOLOL MALEATE 5 MG/ML
1 SOLUTION/ DROPS OPHTHALMIC 2 TIMES DAILY
Status: DISCONTINUED | OUTPATIENT
Start: 2020-02-20 | End: 2020-02-21 | Stop reason: HOSPADM

## 2020-02-20 RX ORDER — LATANOPROST 50 UG/ML
1 SOLUTION/ DROPS OPHTHALMIC
Status: DISCONTINUED | OUTPATIENT
Start: 2020-02-20 | End: 2020-02-20

## 2020-02-20 RX ORDER — BRIMONIDINE TARTRATE AND TIMOLOL MALEATE 2; 5 MG/ML; MG/ML
1 SOLUTION OPHTHALMIC 2 TIMES DAILY
Status: DISCONTINUED | OUTPATIENT
Start: 2020-02-20 | End: 2020-02-20

## 2020-02-20 RX ORDER — CARVEDILOL 6.25 MG/1
12.5 TABLET ORAL 2 TIMES DAILY WITH MEALS
Status: DISCONTINUED | OUTPATIENT
Start: 2020-02-20 | End: 2020-02-21 | Stop reason: HOSPADM

## 2020-02-20 RX ORDER — BRIMONIDINE TARTRATE 2 MG/ML
1 SOLUTION/ DROPS OPHTHALMIC 2 TIMES DAILY
Status: DISCONTINUED | OUTPATIENT
Start: 2020-02-20 | End: 2020-02-21 | Stop reason: HOSPADM

## 2020-02-20 RX ORDER — AMLODIPINE BESYLATE 5 MG/1
2.5 TABLET ORAL DAILY
Status: DISCONTINUED | OUTPATIENT
Start: 2020-02-21 | End: 2020-02-21 | Stop reason: HOSPADM

## 2020-02-20 RX ORDER — BUPRENORPHINE 20 UG/H
20 PATCH TRANSDERMAL
Status: DISCONTINUED | OUTPATIENT
Start: 2020-02-26 | End: 2020-02-21 | Stop reason: HOSPADM

## 2020-02-20 RX ORDER — DOCUSATE SODIUM 100 MG/1
100 CAPSULE, LIQUID FILLED ORAL 2 TIMES DAILY
COMMUNITY

## 2020-02-20 RX ORDER — DOCUSATE SODIUM 100 MG/1
100 CAPSULE, LIQUID FILLED ORAL 2 TIMES DAILY
Status: DISCONTINUED | OUTPATIENT
Start: 2020-02-20 | End: 2020-02-21 | Stop reason: HOSPADM

## 2020-02-20 RX ADMIN — DOCUSATE SODIUM 100 MG: 100 CAPSULE, LIQUID FILLED ORAL at 22:27

## 2020-02-20 RX ADMIN — LOSARTAN POTASSIUM 100 MG: 25 TABLET ORAL at 22:26

## 2020-02-20 RX ADMIN — BRINZOLAMIDE 1 DROP: 10 SUSPENSION/ DROPS OPHTHALMIC at 20:00

## 2020-02-20 RX ADMIN — PREGABALIN 150 MG: 100 CAPSULE ORAL at 22:26

## 2020-02-20 RX ADMIN — CARVEDILOL 12.5 MG: 6.25 TABLET, FILM COATED ORAL at 22:27

## 2020-02-20 RX ADMIN — BRIMONIDINE TARTRATE 1 DROP: 2 SOLUTION/ DROPS OPHTHALMIC at 21:45

## 2020-02-20 RX ADMIN — HEPARIN 300 UNITS: 100 SYRINGE at 18:03

## 2020-02-20 RX ADMIN — TIMOLOL MALEATE 1 DROP: 5 SOLUTION OPHTHALMIC at 21:45

## 2020-02-20 ASSESSMENT — ENCOUNTER SYMPTOMS
SHORTNESS OF BREATH: 1
CONSTIPATION: 0
HEADACHES: 1
NAUSEA: 1
NERVOUS/ANXIOUS: 0
CHILLS: 0
COUGH: 0
DIARRHEA: 0
SORE THROAT: 0
BLOOD IN STOOL: 0
DIZZINESS: 1
VOMITING: 1
SPUTUM PRODUCTION: 0
FEVER: 0

## 2020-02-20 ASSESSMENT — LIFESTYLE VARIABLES
HOW MANY TIMES IN THE PAST YEAR HAVE YOU HAD 5 OR MORE DRINKS IN A DAY: 0
ON A TYPICAL DAY WHEN YOU DRINK ALCOHOL HOW MANY DRINKS DO YOU HAVE: 0
CONSUMPTION TOTAL: NEGATIVE
HAVE YOU EVER FELT YOU SHOULD CUT DOWN ON YOUR DRINKING: NO
TOTAL SCORE: 0
TOTAL SCORE: 0
EVER_SMOKED: NEVER
HAVE PEOPLE ANNOYED YOU BY CRITICIZING YOUR DRINKING: NO
AVERAGE NUMBER OF DAYS PER WEEK YOU HAVE A DRINK CONTAINING ALCOHOL: 0
EVER_SMOKED: NEVER
ALCOHOL_USE: NO
EVER HAD A DRINK FIRST THING IN THE MORNING TO STEADY YOUR NERVES TO GET RID OF A HANGOVER: NO
EVER FELT BAD OR GUILTY ABOUT YOUR DRINKING: NO
TOTAL SCORE: 0

## 2020-02-20 ASSESSMENT — COGNITIVE AND FUNCTIONAL STATUS - GENERAL
CLIMB 3 TO 5 STEPS WITH RAILING: A LITTLE
DAILY ACTIVITIY SCORE: 24
SUGGESTED CMS G CODE MODIFIER DAILY ACTIVITY: CH
MOBILITY SCORE: 23
SUGGESTED CMS G CODE MODIFIER MOBILITY: CI

## 2020-02-20 NOTE — TELEPHONE ENCOUNTER
Received call from Kinza, pt's friend stating that they received a call from Keira telling her that her hgb is 7.1.  Kinza then stated that pt is symptomatic and cannot wait until her appointment next week because she is dizzy, lightheaded, stumbling, and her legs are white.    Spoke with ASHLEIGH Ny, and obtained orders to add infusion appointment in the next 1-2 days.  Next available was scheduled with Angela for 2/22/20 at 1430.  Called Kinza back, and she stated that they will not be able to do Saturday, so she will probably take pt to [facility] today or tomorrow. Called [facility] to make them aware of the the above.

## 2020-02-20 NOTE — ED TRIAGE NOTES
"Pt seen at [facility]. Was told hemoglobin 7.1 and to come to ER for blood transfusion, pt pale, dizzy, \" always tired\". Denies chest pain, constant sob on 3lnc at this time. NAd noted.   "

## 2020-02-21 ENCOUNTER — TELEPHONE (OUTPATIENT)
Dept: HEMATOLOGY ONCOLOGY | Facility: MEDICAL CENTER | Age: 65
End: 2020-02-21

## 2020-02-21 VITALS
BODY MASS INDEX: 30.5 KG/M2 | DIASTOLIC BLOOD PRESSURE: 63 MMHG | RESPIRATION RATE: 16 BRPM | TEMPERATURE: 97.5 F | WEIGHT: 145.28 LBS | SYSTOLIC BLOOD PRESSURE: 166 MMHG | HEIGHT: 58 IN | OXYGEN SATURATION: 100 % | HEART RATE: 65 BPM

## 2020-02-21 LAB
ANION GAP SERPL CALC-SCNC: 18 MMOL/L (ref 7–16)
BASOPHILS # BLD AUTO: 0.5 % (ref 0–1.8)
BASOPHILS # BLD: 0.02 K/UL (ref 0–0.12)
BUN SERPL-MCNC: 72 MG/DL (ref 8–22)
CALCIUM SERPL-MCNC: 8.5 MG/DL (ref 8.4–10.2)
CHLORIDE SERPL-SCNC: 88 MMOL/L (ref 96–112)
CO2 SERPL-SCNC: 24 MMOL/L (ref 20–33)
CREAT SERPL-MCNC: 6.76 MG/DL (ref 0.5–1.4)
EOSINOPHIL # BLD AUTO: 0.25 K/UL (ref 0–0.51)
EOSINOPHIL NFR BLD: 5.7 % (ref 0–6.9)
ERYTHROCYTE [DISTWIDTH] IN BLOOD BY AUTOMATED COUNT: 51.3 FL (ref 35.9–50)
GLUCOSE SERPL-MCNC: 296 MG/DL (ref 65–99)
HCT VFR BLD AUTO: 22 % (ref 37–47)
HGB BLD-MCNC: 7.5 G/DL (ref 12–16)
IMM GRANULOCYTES # BLD AUTO: 0.04 K/UL (ref 0–0.11)
IMM GRANULOCYTES NFR BLD AUTO: 0.9 % (ref 0–0.9)
LYMPHOCYTES # BLD AUTO: 1.06 K/UL (ref 1–4.8)
LYMPHOCYTES NFR BLD: 24.3 % (ref 22–41)
MCH RBC QN AUTO: 31.3 PG (ref 27–33)
MCHC RBC AUTO-ENTMCNC: 34.1 G/DL (ref 33.6–35)
MCV RBC AUTO: 91.7 FL (ref 81.4–97.8)
MONOCYTES # BLD AUTO: 0.65 K/UL (ref 0–0.85)
MONOCYTES NFR BLD AUTO: 14.9 % (ref 0–13.4)
NEUTROPHILS # BLD AUTO: 2.34 K/UL (ref 2–7.15)
NEUTROPHILS NFR BLD: 53.7 % (ref 44–72)
NRBC # BLD AUTO: 0 K/UL
NRBC BLD-RTO: 0 /100 WBC
PLATELET # BLD AUTO: 83 K/UL (ref 164–446)
POTASSIUM SERPL-SCNC: 4.3 MMOL/L (ref 3.6–5.5)
RBC # BLD AUTO: 2.4 M/UL (ref 4.2–5.4)
SODIUM SERPL-SCNC: 130 MMOL/L (ref 135–145)
WBC # BLD AUTO: 4.4 K/UL (ref 4.8–10.8)

## 2020-02-21 PROCEDURE — G0378 HOSPITAL OBSERVATION PER HR: HCPCS

## 2020-02-21 PROCEDURE — 700102 HCHG RX REV CODE 250 W/ 637 OVERRIDE(OP): Performed by: INTERNAL MEDICINE

## 2020-02-21 PROCEDURE — 80048 BASIC METABOLIC PNL TOTAL CA: CPT

## 2020-02-21 PROCEDURE — A9270 NON-COVERED ITEM OR SERVICE: HCPCS | Performed by: INTERNAL MEDICINE

## 2020-02-21 PROCEDURE — 700111 HCHG RX REV CODE 636 W/ 250 OVERRIDE (IP): Performed by: INTERNAL MEDICINE

## 2020-02-21 PROCEDURE — 700111 HCHG RX REV CODE 636 W/ 250 OVERRIDE (IP): Performed by: EMERGENCY MEDICINE

## 2020-02-21 PROCEDURE — 99217 PR OBSERVATION CARE DISCHARGE: CPT | Performed by: INTERNAL MEDICINE

## 2020-02-21 PROCEDURE — 85025 COMPLETE CBC W/AUTO DIFF WBC: CPT

## 2020-02-21 PROCEDURE — 96374 THER/PROPH/DIAG INJ IV PUSH: CPT

## 2020-02-21 RX ORDER — PROCHLORPERAZINE EDISYLATE 5 MG/ML
10 INJECTION INTRAMUSCULAR; INTRAVENOUS EVERY 6 HOURS PRN
Status: DISCONTINUED | OUTPATIENT
Start: 2020-02-21 | End: 2020-02-21 | Stop reason: HOSPADM

## 2020-02-21 RX ADMIN — AMLODIPINE BESYLATE 2.5 MG: 5 TABLET ORAL at 05:34

## 2020-02-21 RX ADMIN — HEPARIN 500 UNITS: 100 SYRINGE at 08:42

## 2020-02-21 RX ADMIN — BRIMONIDINE TARTRATE 1 DROP: 2 SOLUTION/ DROPS OPHTHALMIC at 05:37

## 2020-02-21 RX ADMIN — PROCHLORPERAZINE EDISYLATE 10 MG: 5 INJECTION INTRAMUSCULAR; INTRAVENOUS at 03:40

## 2020-02-21 RX ADMIN — CARVEDILOL 12.5 MG: 6.25 TABLET, FILM COATED ORAL at 07:30

## 2020-02-21 RX ADMIN — BRINZOLAMIDE 1 DROP: 10 SUSPENSION/ DROPS OPHTHALMIC at 05:37

## 2020-02-21 NOTE — DISCHARGE SUMMARY
Discharge Summary    CHIEF COMPLAINT ON ADMISSION  Chief Complaint   Patient presents with   • Abnormal Labs       Reason for Admission  abnormal labs     Admission Date  2/20/2020    CODE STATUS  Full Code    HPI & HOSPITAL COURSE  This is a 64 y.o. female with known end stage renal disease on dialysis MWF who presents here with anemia with hemoglobin 6.5. She was asymptomatic but was called by her dialysis center and told to come to the hospital for a blood transfusion. She was transfused packed red blood cells and her hemoglobin improved to 7.5. I called nephrology Dr. Berger who will attempt to arrange dialysis later today at her dialysis center or she will have dialysis here prior to discharge.       Therefore, she is discharged in good and stable condition to home with close outpatient follow-up.    The patient recovered much more quickly than anticipated on admission.    Discharge Date  2/21/2020    FOLLOW UP ITEMS POST DISCHARGE  Nephrology for dialysis MWF and anemia follow up    DISCHARGE DIAGNOSES  Active Problems:    Essential hypertension POA: Yes      Overview:           MDS (myelodysplastic syndrome) ([facility]) POA: Yes    Type 2 diabetes mellitus ([facility]) POA: Yes    ESRD (end stage renal disease) on dialysis ([facility]) POA: Yes    Opiate dependence ([facility]) POA: Yes    Diabetic neuropathy ([facility]) POA: Yes    COPD (chronic obstructive pulmonary disease) ([facility]) POA: Yes    Glaucoma POA: Yes  Resolved Problems:    * No resolved hospital problems. *      FOLLOW UP  Future Appointments   Date Time Provider Department Center   2/22/2020  2:30 PM [facility]   2/27/2020  9:45 AM [facility]   2/29/2020 11:00 AM [facility]   3/3/2020 11:30 AM [facility] None   3/26/2020  9:45 AM [facility]   3/28/2020  9:45 AM [facility]   4/7/2020 10:20 AM Germania Alberts M.D. [facility] None   4/23/2020   9:45 AM [facility] [address]   4/25/2020  9:45 AM [facility]     Nyla Nava M.D.  [address] [facility] [address]  [phone]          Shola Berger M.D.  [address] [place] [address]  [phone]            MEDICATIONS ON DISCHARGE     Medication List      CONTINUE taking these medications      Instructions   amLODIPine 2.5 MG Tabs  Commonly known as:  NORVASC   TK 1 T PO QD     brinzolamide 1 % Susp  Commonly known as:  AZOPT   Place 1 Drop in both eyes 2 Times a Day.  Dose:  1 Drop     Buprenorphine 20 MCG/HR Ptwk   Apply 20 mcg to affected area(s) every 7 days. THURSDAY  Dose:  20 mcg     carvedilol 12.5 MG Tabs  Commonly known as:  COREG   Take 1 Tab by mouth 2 times a day, with meals.  Dose:  12.5 mg     Combigan 0.2-0.5 % Soln  Generic drug:  Brimonidine Tartrate-Timolol   Place 1 Drop in both eyes 2 Times a Day.  Dose:  1 Drop     docusate sodium 100 MG Caps  Commonly known as:  COLACE   Take 100 mg by mouth 2 times a day.  Dose:  100 mg     losartan 100 MG Tabs  Commonly known as:  COZAAR   Take 100 mg by mouth every evening.  Dose:  100 mg     Lumigan 0.01 % Soln  Generic drug:  bimatoprost   Place 1 Drop in both eyes every bedtime.  Dose:  1 Drop     Lyrica 150 MG Caps  Generic drug:  pregabalin   Take 150 mg by mouth 4 times a day.  Dose:  150 mg     Voltaren 1 % Gel  Generic drug:  Diclofenac Sodium   Apply 4 g to skin as directed 4 times a day as needed.  Dose:  4 g            Allergies  Allergies   Allergen Reactions   • Lenalidomide Unspecified     Beeping Pulse     • Naprosyn [Naproxen] Hives   • Pioglitazone Unspecified     Causes blindness   • Simvastatin Unspecified     Couldn't move   • Demerol Vomiting   • Diphenhydramine Vomiting   • Glipizide Vomiting   • Hydromorphone Vomiting   • Iron Vomiting     vomiting   • Metformin Vomiting   • Morphine Vomiting   • Multivitamin Itching     itching   • Ondansetron Itching   • Other  Drug Rash and Vomiting     Any binders that remove phosphorus from the body such as tums   • Oxycodone Vomiting   • Pcn [Penicillins] Vomiting          • Propoxyphene Vomiting   • Requip Vomiting   • Sulfa Drugs Rash and Vomiting     Vomiting & rash      • Tamsulosin Vomiting   • Tramadol Vomiting   • Trazodone Vomiting       DIET  Orders Placed This Encounter   Procedures   • Diet Order Consistent Carbohydrate, Renal     Standing Status:   Standing     Number of Occurrences:   1     Order Specific Question:   Diet:     Answer:   Consistent Carbohydrate [4]     Order Specific Question:   Diet:     Answer:   Renal [8]       ACTIVITY  As tolerated.  Weight bearing as tolerated    CONSULTATIONS  Nephrology Dr. Berger    PROCEDURES  none    LABORATORY  Lab Results   Component Value Date    SODIUM 130 (L) 02/21/2020    POTASSIUM 4.3 02/21/2020    CHLORIDE 88 (L) 02/21/2020    CO2 24 02/21/2020    GLUCOSE 296 (H) 02/21/2020    BUN 72 (HH) 02/21/2020    CREATININE 6.76 (HH) 02/21/2020    CREATININE 0.6 07/20/2007        Lab Results   Component Value Date    WBC 4.4 (L) 02/21/2020    HEMOGLOBIN 7.5 (L) 02/21/2020    HEMATOCRIT 22.0 (L) 02/21/2020    PLATELETCT 83 (L) 02/21/2020        Total time of the discharge process exceeds 22 minutes.

## 2020-02-21 NOTE — PROGRESS NOTES
Patient uses [facility] for home O2.  Spoke with  Pauline we will be unable to get tank for transport.  Patients friend is bringing home O2 from [place].  RN spoke with Meagan at [facility], will be able to accomodate dialysis later today, patient to go to [facility] upon discharge

## 2020-02-21 NOTE — TELEPHONE ENCOUNTER
Kinza called today, recounted the past day's events, and asked if the Saturday appointment for transfusion should be cancelled.  Spoke with ASHLEIGH Ny, who ordered that the Saturday appointment be cancelled, the appointment for next week kept, and the subsequent appointments be moved up to every 3 weeks instead of every 4 weeks.  Kinza was called and made aware.  She was agreeable and grateful for the assistance. RN contacted Ema in [facility] scheduling to move the remaining appointments to [id number].

## 2020-02-21 NOTE — DISCHARGE PLANNING
MARIHAW provided pt with a  Cab voucher to dialysis. MARIAHW confirmed pt has a ride from dialysis back home to [place] via the [facility].

## 2020-02-21 NOTE — ED PROVIDER NOTES
ED Provider Note    CHIEF COMPLAINT  Chief Complaint   Patient presents with   • Abnormal Labs       HPI  Vielka Briscoe is a 64 y.o. female with a history of end-stage renal disease on hemodialysis Monday, Wednesday, Friday, mild dysplastic syndrome, status post multiple transfusions in the past, atrial fibrillation, COPD, CHF, type 2 diabetes mellitus who presents with complaints of increased fatigue, lightheadedness, shortness of breath, and a low hemoglobin.  The patient went to dialysis yesterday and was feeling tired, weak and lightheaded.  She had blood work which showed a hemoglobin of 7.1.  She was called by [facility] with her low hemoglobin and that she was symptomatic she was told to come to the ER.  The patient denies having any vomiting of blood or bloody stools.  She says she last received a transfusion about a month ago.  She says that she gets these symptoms when her hemoglobin gets low.  She denies any active bleeding.  She has had no significant bruising.  She has had no fever, shaking chills, sore throat, cough, congestion, but has had some shortness of breath.  She denies any current chest pain, back pain, or abdominal pain.    REVIEW OF SYSTEMS  See HPI for further details. All other systems are negative.     PAST MEDICAL HISTORY  Past Medical History:   Diagnosis Date   • Arthritis     hands    • Atrial fibrillation (HCC)     HX   • Blood transfusion without reported diagnosis    • Breath shortness     w/exertion   • Cancer (HCC)     MDS in bones   • Cataract     bilat IOL   • Chronic anemia    • Chronic kidney disease        • Chronic obstructive pulmonary disease (HCC)    • Congestive heart failure (HCC)    • Dental disorder     full dentures   • Diabetes     Diet controlled   • Diabetes (HCC)    • Dialysis patient     [facility] ,   [facility] [place]   • Glaucoma    • Heart abnormalities    • Hypertension    • MDS (myelodysplastic syndrome) 10/2016    bone marrow  "biopsy   • Other hyperlipidemia 2019   • Pain     \"bones\", generalized, 5/10   • Renal disorder    • Stroke (HCC) 2015    No residual weakness/problems   • Supplemental oxygen dependent     2 liters       FAMILY HISTORY  Family History   Problem Relation Age of Onset   • Hypertension Father          at 89   • Hypertension Mother        SOCIAL HISTORY  Social History     Socioeconomic History   • Marital status:      Spouse name: Not on file   • Number of children: Not on file   • Years of education: Not on file   • Highest education level: Not on file   Occupational History   • Not on file   Social Needs   • Financial resource strain: Not on file   • Food insecurity     Worry: Not on file     Inability: Not on file   • Transportation needs     Medical: Not on file     Non-medical: Not on file   Tobacco Use   • Smoking status: Never Smoker   • Smokeless tobacco: Never Used   Substance and Sexual Activity   • Alcohol use: No   • Drug use: No   • Sexual activity: Not on file   Lifestyle   • Physical activity     Days per week: Not on file     Minutes per session: Not on file   • Stress: Not on file   Relationships   • Social connections     Talks on phone: Not on file     Gets together: Not on file     Attends Zoroastrianism service: Not on file     Active member of club or organization: Not on file     Attends meetings of clubs or organizations: Not on file     Relationship status: Not on file   • Intimate partner violence     Fear of current or ex partner: Not on file     Emotionally abused: Not on file     Physically abused: Not on file     Forced sexual activity: Not on file   Other Topics Concern   • Not on file   Social History Narrative    ** Merged History Encounter **         ** Merged History Encounter **          SURGICAL HISTORY  Past Surgical History:   Procedure Laterality Date   • GASTROSCOPY N/A 2018    Procedure: GASTROSCOPY;  Surgeon: Blanca Santos M.D.;  Location: " SURGERY [facility];  Service: Gastroenterology   • BONE MARROW BIOPSY, NDL/TROCAR  2017    Procedure: BONE MARROW BIOPSY, NDL/TROCAR;  Surgeon: Wade Turner M.D.;  Location: ENDOSCOPY [facility];  Service: Orthopedics   • BONE MARROW ASPIRATION  2017    Procedure: BONE MARROW ASPIRATION;  Surgeon: Wade Turner M.D.;  Location: ENDOSCOPY [facility];  Service: Orthopedics   • VEIN LIGATION Left 11/10/2016    Procedure: VEIN LIGATION FOR DISTAL REVASCULARIZATION AND INTERVAL LIGATION OF LEFT ARM DIALYISIS ACCESS (DRIL PROCEDURE);  Surgeon: Ranulfo Jolly M.D.;  Location: SURGERY [facility];  Service:    • AV FISTULA CREATION Left 2016    Procedure: AV FISTULA CREATION UPPER EXTREMITY;  Surgeon: Ranulfo Jolly M.D.;  Location: SURGERY [facility];  Service:    • GASTROSCOPY  2015    Procedure: ESOPHAGOGASTRODUODENOSCOPY WITH BIOPSY;  Surgeon: Wing Álvarez M.D.;  Location: SURGERY [facility];  Service:    • COLONOSCOPY  2015    Procedure: COLONOSCOPY;  Surgeon: Wing Álvarez M.D.;  Location: SURGERY [facility];  Service:    • GYN SURGERY      hysterectomy   • GYN SURGERY       x 2   • GYN SURGERY      d&C x2   • OTHER      angioplasty/ stents bilat LE   • OTHER ABDOMINAL SURGERY      appendectomy,  x 2, hysterectomy, D & C   • OTHER ABDOMINAL SURGERY      appendectomy   • OTHER CARDIAC SURGERY      Angioplasty  and    • OTHER CARDIAC SURGERY      cardiac angiogram, angioplasty   • RETINAL DETACHMENT REPAIR Right        CURRENT MEDICATIONS  Home Medications     Reviewed by Sun Miller (Pharmacy Tech) on 20 at 1723  Med List Status: Complete    Medication Last Dose Status    amLODIPine (NORVASC) 2.5 MG Tab 2020 Active    bimatoprost (LUMIGAN) 0.01 % Solution 2020 Active    Brimonidine Tartrate-Timolol (COMBIGAN) 0.2-0.5 % Solution 2020 Active    brinzolamide (AZOPT) 1 % Suspension 2020  "Active    Buprenorphine 20 MCG/HR PATCH WEEKLY 2/20/2020 Active    carvedilol (COREG) 12.5 MG Tab 2/20/2020 Active    Diclofenac Sodium (VOLTAREN) 1 % Gel 2/20/2020 Active    docusate sodium (COLACE) 100 MG Cap 2/20/2020 Active    losartan (COZAAR) 100 MG Tab 2/20/2020 Active    pregabalin (LYRICA) 150 MG Cap 2/20/2020 Active                ALLERGIES  Allergies   Allergen Reactions   • Lenalidomide Unspecified     Beeping Pulse     • Naprosyn [Naproxen] Hives   • Pioglitazone Unspecified     Causes blindness   • Simvastatin Unspecified     Couldn't move   • Demerol Vomiting   • Diphenhydramine Vomiting   • Glipizide Vomiting   • Hydromorphone Vomiting   • Iron Vomiting     vomiting   • Metformin Vomiting   • Morphine Vomiting   • Multivitamin Itching     itching   • Ondansetron Itching   • Other Drug Rash and Vomiting     Any binders that remove phosphorus from the body such as tums   • Oxycodone Vomiting   • Pcn [Penicillins] Vomiting          • Propoxyphene Vomiting   • Requip Vomiting   • Sulfa Drugs Rash and Vomiting     Vomiting & rash      • Tamsulosin Vomiting   • Tramadol Vomiting   • Trazodone Vomiting       PHYSICAL EXAM  VITAL SIGNS: Blood Pressure (Abnormal) 169/70   Pulse 74   Temperature 36.4 °C (97.5 °F)   Respiration 14   Height 1.473 m (4' 10\")   Weight 65.9 kg (145 lb 4.5 oz)   Last Menstrual Period 01/01/1995   Oxygen Saturation 100%   Body Mass Index 30.36 kg/m²   Constitutional: Awake, alert, in no acute distress, Non-toxic appearance.   HENT: Atraumatic. Bilateral external ears normal, mucous membranes moist, throat nonerythematous without exudates, nose is normal.  Eyes: PERRL, EOMI, conjunctiva moist, noninjected.  Neck: Nontender, Normal range of motion, No nuchal rigidity, No stridor.   Lymphatic: No lymphadenopathy noted.   Cardiovascular: Regular rate and rhythm, no murmurs, rubs, gallops.  Thorax & Lungs:  Good breath sounds bilaterally, no wheezes, rales, or retractions.  No " chest tenderness.  Abdomen: Bowel sounds normal, Soft, nontender, nondistended, no rebound, guarding, masses.  Back: No CVA or spinal tenderness.  Extremities: Intact distal pulses, No edema, there is a dialysis AV fistula in the left upper extremity which shows a good thrill.  No erythema, induration, or tenderness.  Skin: Warm, Dry, No rashes.   Musculoskeletal: No joint swelling or tenderness.  Neurologic: Alert & oriented x 3, cranial nerves II through XII intact, sensory and motor function normal. No focal deficits.   Psychiatric: Affect normal, Judgment normal, Mood normal.         RADIOLOGY/PROCEDURES  DX-CHEST-PORTABLE (1 VIEW)   Final Result      Cardiomegaly with interstitial prominence.            COURSE & MEDICAL DECISION MAKING  Pertinent Labs & Imaging studies reviewed. (See chart for details)  The patient presents with the above complaints.  She has a history of mild dysplastic syndrome, and gets frequent transfusions.  Laboratory work and crossmatched were obtained.  CBC showed a white count of 4700, hemoglobin 6.5, hematocrit 19.4, platelets 93,000, normal differential, Chem-8 shows a sodium 130, potassium 4.2, BUN 68, creatinine 6.13, glucose 278, liver function test normal, troponin 68, INR 0.93.  The patient will be transfused 1 unit packed red blood cells when available.  Case discussed with Dr. Palencia for admission.  She has contacted nephrology as the patient scheduled for dialysis tomorrow morning.    FINAL IMPRESSION  1. Anemia  2. Transfusion of packed red blood cells  3.  End-stage renal disease on hemodialysis         Electronically signed by: Elezaar Comer M.D., 2/20/2020 7:40 PM

## 2020-02-21 NOTE — DISCHARGE PLANNING
Outpatient Dialysis Note    Confirmed patient is active at:    [facility]  [address] [place] [address]      Schedule: Monday, Wednesday, Friday  Time: 9:45 am    Spoke with Meagan at facility who confirmed.    Forwarded records for review.    Per Dr. Berger, patient can discharge from Nephrology standpoint and needs to be at the HD clinic this morning, 2/21/2020, by 9:30 am.      Stacia Stephens- Dialysis Coordinator  [facility] # [phone]

## 2020-02-21 NOTE — CARE PLAN
Problem: Communication  Goal: The ability to communicate needs accurately and effectively will improve  Outcome: PROGRESSING AS EXPECTED     Problem: Safety  Goal: Will remain free from injury  Outcome: PROGRESSING AS EXPECTED     Problem: Venous Thromboembolism (VTW)/Deep Vein Thrombosis (DVT) Prevention:  Goal: Patient will participate in Venous Thrombosis (VTE)/Deep Vein Thrombosis (DVT)Prevention Measures  Outcome: PROGRESSING AS EXPECTED     Problem: Respiratory:  Goal: Respiratory status will improve  Outcome: PROGRESSING AS EXPECTED

## 2020-02-21 NOTE — PROGRESS NOTES
Patient ready for discharge, uses home oxygen, patient states she sent O2 home with friend whom lives in [place].  Call placed to Dr. Sethi to update.

## 2020-02-21 NOTE — ED NOTES
Message left with [facility] @ [phone] that pt is being admitted and will not be present for her dialysis appointment on 2/21/20 but will need to be dialyzed while in hospital.

## 2020-02-21 NOTE — DISCHARGE INSTRUCTIONS
Discharge Instructions    Discharged to home by car with relative. Discharged via wheelchair, hospital escort: Yes.  Special equipment needed: Not Applicable    Be sure to schedule a follow-up appointment with your primary care doctor or any specialists as instructed.     Discharge Plan:   Diet Plan: Discussed  Activity Level: Discussed  Confirmed Follow up Appointment: Patient to Call and Schedule Appointment  Confirmed Symptoms Management: Discussed  Medication Reconciliation Updated: No (Comments)  Influenza Vaccine Indication: Not indicated: Previously immunized this influenza season and > 8 years of age    I understand that a diet low in cholesterol, fat, and sodium is recommended for good health. Unless I have been given specific instructions below for another diet, I accept this instruction as my diet prescription.   Other diet: renal/ consistent carb    Special Instructions:     Discharge Instructions per Dana Sethi M.D.    Follow up with nephrology [facility] as scheduled for dialysis    DIET: renal    ACTIVITY: as tolerated    DIAGNOSIS: anemia    Return to ER if symptoms worsen    · Is patient discharged on Warfarin / Coumadin?   No     Depression / Suicide Risk    As you are discharged from this [facility] facility, it is important to learn how to keep safe from harming yourself.    Recognize the warning signs:  · Abrupt changes in personality, positive or negative- including increase in energy   · Giving away possessions  · Change in eating patterns- significant weight changes-  positive or negative  · Change in sleeping patterns- unable to sleep or sleeping all the time   · Unwillingness or inability to communicate  · Depression  · Unusual sadness, discouragement and loneliness  · Talk of wanting to die  · Neglect of personal appearance   · Rebelliousness- reckless behavior  · Withdrawal from people/activities they love  · Confusion- inability to concentrate     If you or a loved one observes any of  these behaviors or has concerns about self-harm, here's what you can do:  · Talk about it- your feelings and reasons for harming yourself  · Remove any means that you might use to hurt yourself (examples: pills, rope, extension cords, firearm)  · Get professional help from the community (Mental Health, Substance Abuse, psychological counseling)  · Do not be alone:Call your Safe Contact- someone whom you trust who will be there for you.  · Call your local CRISIS HOTLINE 847-4938 or 497-348-1924  · Call your local Children's Mobile Crisis Response Team [place] (244) 734-1603 or www.Picturelife  · Call the toll free National Suicide Prevention Hotlines   · National Suicide Prevention Lifeline 739-407-YNHS (4515)  · National Hope Line Network 800-SUICIDE (307-9952)      Blood Transfusion, Care After  These instructions give you information about caring for yourself after your procedure. Your doctor may also give you more specific instructions. Call your doctor if you have any problems or questions after your procedure.   HOME CARE  · Take medicines only as told by your doctor. Ask your doctor if you can take an over-the-counter pain reliever if you have a fever or headache a day or two after your procedure.  · Return to your normal activities as told by your doctor.  GET HELP IF:   · You develop redness or irritation at your IV site.  · You have a fever, chills, or a headache that does not go away.  · Your pee (urine) is darker than normal.  · Your urine turns:  ¨ Pink.  ¨ Red.  ¨ Brown.  · The white part of your eye turns yellow (jaundice).  · You feel weak after doing your normal activities.  GET HELP RIGHT AWAY IF:   · You have trouble breathing.  · You have fever and chills and you also have:  ¨ Anxiety.  ¨ Chest or back pain.  ¨ Flushed or pink skin.  ¨ Clammy or sweaty skin.  ¨ A fast heartbeat.  ¨ A sick feeling in your stomach (nausea).     This information is not intended to replace advice given to  you by your health care provider. Make sure you discuss any questions you have with your health care provider.     Document Released: 01/08/2016 Document Reviewed: 01/08/2016  Elsevier Interactive Patient Education ©2016 [employer]

## 2020-02-21 NOTE — PROGRESS NOTES
Discharging patient home per physician order.  Discharged with friend.  Demonstrated understanding of discharge instructions, follow up appointments, home medications. Ambulating without assistance, voiding without difficulty, pain well controlled, tolerating oral medications, oxygen saturation greater than 90% on 3LNC with home O2 , tolerating diet.   Educational handouts for blood transfusion given and discussed.  Reviewed late blood transfusion reaction symptoms. Verbalized understanding of discharge instructions and educational handouts.  All questions answered.  Belongings with patient at time of discharge.  Port de-accessed prior to DC.

## 2020-02-21 NOTE — H&P
"Hospital Medicine History & Physical Note    Date of Service  2/20/2020    Primary Care Physician  Nyla Nava M.D.    Consultants  [facility]  [facility] (Session OP is 8:30 AM Friday)    Code Status  Patient confirms she wishes to be \"full code\"    Chief Complaint  Anemia and fatigue    History of Presenting Illness  64 y.o. female who presented 2/20/2020 with fatigue, she had her blood checked at dialysis yesterday and had a low hemoglobin of 7.1, today in the ER is 6.5.  She has a long history of myelodysplasia and has received blood transfusions previous.  She has pancytopenia with mild leukopenia, moderate thrombocytopenia.  She has had no signs of bleeding.    She has COPD and chronically is short of breath, she also has intermittent atypical chest pain which is relieved by nitroglycerin.  She took a nitroglycerin yesterday but does not have chest pain today.    She has a history of glaucoma and is legally blind due to this, she is chronically debilitated due to diabetic neuropathy.     She usually receives hemodialysis at 8:30 AM on Monday Wednesday and Friday.    Review of Systems  Review of Systems   Constitutional: Negative for chills and fever.   HENT: Negative for congestion and sore throat.    Eyes:        Poor vision   Respiratory: Positive for shortness of breath (chronic, on 3L). Negative for cough and sputum production.    Cardiovascular: Positive for chest pain. Palpitations: chronic and intermittent.   Gastrointestinal: Positive for nausea (yesterday) and vomiting (yesterday). Negative for blood in stool, constipation and diarrhea.   Genitourinary: Negative for dysuria.        Not anuric   Musculoskeletal:        Chronic pain   Skin: Negative for itching and rash.   Neurological: Positive for dizziness and headaches.   Psychiatric/Behavioral: The patient is not nervous/anxious.        Past Medical History   has a past medical history of Arthritis, Atrial fibrillation (HCC), Blood " transfusion without reported diagnosis, Breath shortness, Cancer (HCC), Cataract, Chronic anemia, Chronic kidney disease, Chronic obstructive pulmonary disease (HCC), Congestive heart failure (HCC), Dental disorder, Diabetes, Diabetes (HCC), Dialysis patient, Glaucoma, Heart abnormalities, Hypertension, MDS (myelodysplastic syndrome) (10/2016), Other hyperlipidemia (12/12/2019), Pain (-2017), Renal disorder, Stroke (HCC) (03/2015), and Supplemental oxygen dependent.    Surgical History   has a past surgical history that includes other cardiac surgery; other abdominal surgery; gyn surgery; gyn surgery; gyn surgery; other cardiac surgery; other; retinal detachment repair (Right); av fistula creation (Left, 2/8/2016); other abdominal surgery; gastroscopy (12/17/2015); colonoscopy (12/17/2015); vein ligation (Left, 11/10/2016); bone marrow biopsy, ndl/trocar (9/20/2017); bone marrow aspiration (9/20/2017); and gastroscopy (N/A, 1/25/2018).  Port placement, hysterectomy, 2 C-sections    Family History  family history includes Hypertension in her father and mother.  Denies family history of diabetes cancer or heart disease    Social History   reports that she has never smoked. She has never used smokeless tobacco. She reports that she does not drink alcohol or use drugs.  Lives alone, has 1 child in [place], one in [place].  Receives home health services and this will need to be renewed upon discharge.    Allergies  Allergies   Allergen Reactions   • Lenalidomide Unspecified     Beeping Pulse     • Naprosyn [Naproxen] Hives   • Pioglitazone Unspecified     Causes blindness   • Simvastatin Unspecified     Couldn't move   • Demerol Vomiting   • Diphenhydramine Vomiting   • Glipizide Vomiting   • Hydromorphone Vomiting   • Iron Vomiting     vomiting   • Metformin Vomiting   • Morphine Vomiting   • Multivitamin Itching     itching   • Ondansetron Itching   • Other Drug Rash and Vomiting     Any binders that remove  phosphorus from the body such as tums   • Oxycodone Vomiting   • Pcn [Penicillins] Vomiting          • Propoxyphene Vomiting   • Requip Vomiting   • Sulfa Drugs Rash and Vomiting     Vomiting & rash      • Tamsulosin Vomiting   • Tramadol Vomiting   • Trazodone Vomiting       Medications  Prior to Admission Medications   Prescriptions Last Dose Informant Patient Reported? Taking?   Brimonidine Tartrate-Timolol (COMBIGAN) 0.2-0.5 % Solution 2/20/2020 at 0800 Patient Yes No   Sig: Place 1 Drop in both eyes 2 Times a Day.   Buprenorphine 20 MCG/HR PATCH WEEKLY 2/20/2020 at ON Patient Yes No   Sig: Apply 20 mcg to affected area(s) every 7 days. THURSDAY   Diclofenac Sodium (VOLTAREN) 1 % Gel 2/20/2020 at 1700 Patient Yes No   Sig: Apply 4 g to skin as directed 4 times a day as needed.   amLODIPine (NORVASC) 2.5 MG Tab 2/20/2020 at 0800 Patient Yes No   Sig: TK 1 T PO QD   bimatoprost (LUMIGAN) 0.01 % Solution 2/19/2020 at 2000 Patient Yes No   Sig: Place 1 Drop in both eyes every bedtime.   brinzolamide (AZOPT) 1 % Suspension 2/20/2020 at 0800 Patient Yes No   Sig: Place 1 Drop in both eyes 2 Times a Day.   carvedilol (COREG) 12.5 MG Tab 2/20/2020 at 0800 Patient No No   Sig: Take 1 Tab by mouth 2 times a day, with meals.   docusate sodium (COLACE) 100 MG Cap 2/20/2020 at 0800 Patient Yes Yes   Sig: Take 100 mg by mouth 2 times a day.   losartan (COZAAR) 100 MG Tab 2/20/2020 at 0800 Patient Yes No   Sig: Take 100 mg by mouth every evening.   pregabalin (LYRICA) 150 MG Cap 2/20/2020 at 1600 Patient Yes No   Sig: Take 150 mg by mouth 4 times a day.      Facility-Administered Medications: None       Physical Exam  Temp:  [36.4 °C (97.5 °F)] 36.4 °C (97.5 °F)  Pulse:  [74-77] 74  Resp:  [14-22] 14  BP: (132-200)/() 169/70  SpO2:  [95 %-100 %] 100 %    Physical Exam  Vitals signs and nursing note reviewed.   Constitutional:       General: She is not in acute distress.     Appearance: She is obese. She is not  ill-appearing, toxic-appearing or diaphoretic.      Comments: Stout/petite   HENT:      Head: Normocephalic and atraumatic.      Right Ear: External ear normal.      Left Ear: External ear normal.      Nose: Nose normal.      Mouth/Throat:      Mouth: Mucous membranes are moist.      Pharynx: Oropharynx is clear.   Eyes:      Extraocular Movements: Extraocular movements intact.      Conjunctiva/sclera: Conjunctivae normal.      Pupils: Pupils are equal, round, and reactive to light.   Cardiovascular:      Rate and Rhythm: Normal rate and regular rhythm.   Pulmonary:      Effort: Pulmonary effort is normal. No respiratory distress.      Breath sounds: No stridor. No wheezing, rhonchi or rales.      Comments: Markedly diminished but no wheezes rhonchi or rales  Chest:      Chest wall: No tenderness.   Abdominal:      General: There is distension (Due to obese).      Tenderness: There is no abdominal tenderness.   Musculoskeletal:      Right lower leg: No edema.      Left lower leg: No edema.   Skin:     General: Skin is warm and dry.   Neurological:      General: No focal deficit present.      Mental Status: She is alert and oriented to person, place, and time. Mental status is at baseline.      Cranial Nerves: No cranial nerve deficit.      Motor: No weakness.   Psychiatric:         Mood and Affect: Mood normal.         Laboratory:  Recent Labs     02/20/20  1710   WBC 4.7*   RBC 2.07*   HEMOGLOBIN 6.5*   HEMATOCRIT 19.4*   MCV 93.7   MCH 31.4   MCHC 33.5*   RDW 55.1*   PLATELETCT 93*     Recent Labs     02/20/20  1710   SODIUM 130*   POTASSIUM 4.2   CHLORIDE 89*   CO2 25   GLUCOSE 278*   BUN 68*   CREATININE 6.13*   CALCIUM 8.9     Recent Labs     02/20/20  1710   ALTSGPT 23   ASTSGOT 19   ALKPHOSPHAT 71   TBILIRUBIN 0.3   GLUCOSE 278*     Recent Labs     02/20/20  1710   APTT 34.0   INR 0.93     No results for input(s): NTPROBNP in the last 72 hours.      Recent Labs     02/20/20  1710   TROPONINT 68*        Urinalysis:    No results found     Imaging:  DX-CHEST-PORTABLE (1 VIEW)   Final Result      Cardiomegaly with interstitial prominence.            Assessment/Plan:  I anticipate this patient is appropriate for observation status at this time.    Essential hypertension- (present on admission)  Assessment & Plan  Continue home medications with parameters    MDS (myelodysplastic syndrome) ([facility])- (present on admission)  Assessment & Plan  With transfusion dep anemia  Admitting for Xfusion  CBC in AM, may need 2nd unit    Opiate dependence ([facility])  Assessment & Plan  Continue suboxone patch    ESRD (end stage renal disease) on dialysis ([facility])- (present on admission)  Assessment & Plan  [facility] nephrology    Type 2 diabetes mellitus ([facility])- (present on admission)  Assessment & Plan  CC diet    Glaucoma- (present on admission)  Assessment & Plan  Continue ophthalmic drops    COPD (chronic obstructive pulmonary disease) ([facility])- (present on admission)  Assessment & Plan   W/ chronic hypoxic respiratory failure on 2 L  No exac  RT consult    Diabetic neuropathy ([facility])- (present on admission)  Assessment & Plan  On lyrica      VTE prophylaxis: SCDs

## 2020-02-22 ENCOUNTER — APPOINTMENT (OUTPATIENT)
Dept: ONCOLOGY | Facility: MEDICAL CENTER | Age: 65
End: 2020-02-22
Attending: NURSE PRACTITIONER
Payer: MEDICARE

## 2020-02-25 LAB
BASOPHILS # BLD AUTO: 0.1 X10E3/UL (ref 0–0.2)
BASOPHILS NFR BLD AUTO: 2 %
EOSINOPHIL # BLD AUTO: 0.3 X10E3/UL (ref 0–0.4)
EOSINOPHIL NFR BLD AUTO: 6 %
ERYTHROCYTE [DISTWIDTH] IN BLOOD BY AUTOMATED COUNT: 18.1 % (ref 11.7–15.4)
HCT VFR BLD AUTO: 23.2 % (ref 34–46.6)
HGB BLD-MCNC: 7.8 G/DL (ref 11.1–15.9)
IMM GRANULOCYTES # BLD AUTO: ABNORMAL 10*3/UL
IMM GRANULOCYTES NFR BLD AUTO: ABNORMAL %
IMMATURE CELLS  115398: ABNORMAL
LYMPHOCYTES # BLD AUTO: 1.2 X10E3/UL (ref 0.7–3.1)
LYMPHOCYTES NFR BLD AUTO: 22 %
MCH RBC QN AUTO: 30.8 PG (ref 26.6–33)
MCHC RBC AUTO-ENTMCNC: 33.6 G/DL (ref 31.5–35.7)
MCV RBC AUTO: 92 FL (ref 79–97)
MONOCYTES # BLD AUTO: 0.4 X10E3/UL (ref 0.1–0.9)
MONOCYTES NFR BLD AUTO: 7 %
MORPHOLOGY BLD-IMP: ABNORMAL
NEUTROPHILS # BLD AUTO: 3.5 X10E3/UL (ref 1.4–7)
NEUTROPHILS NFR BLD AUTO: 63 %
NRBC BLD AUTO-RTO: ABNORMAL %
PLATELET # BLD AUTO: 96 X10E3/UL (ref 150–450)
RBC # BLD AUTO: 2.53 X10E6/UL (ref 3.77–5.28)
WBC # BLD AUTO: 5.6 X10E3/UL (ref 3.4–10.8)

## 2020-02-27 ENCOUNTER — APPOINTMENT (OUTPATIENT)
Dept: ONCOLOGY | Facility: MEDICAL CENTER | Age: 65
End: 2020-02-27
Attending: INTERNAL MEDICINE
Payer: MEDICARE

## 2020-02-27 VITALS
SYSTOLIC BLOOD PRESSURE: 210 MMHG | HEIGHT: 57 IN | DIASTOLIC BLOOD PRESSURE: 65 MMHG | TEMPERATURE: 97.5 F | WEIGHT: 145.28 LBS | BODY MASS INDEX: 31.34 KG/M2 | OXYGEN SATURATION: 100 % | RESPIRATION RATE: 18 BRPM | HEART RATE: 66 BPM

## 2020-02-27 DIAGNOSIS — D64.9 SYMPTOMATIC ANEMIA: ICD-10-CM

## 2020-02-27 DIAGNOSIS — D46.9 MYELODYSPLASIA (MYELODYSPLASTIC SYNDROME) (HCC): ICD-10-CM

## 2020-02-27 LAB
ABO GROUP BLD: NORMAL
BARCODED ABORH UBTYP: 8400
BARCODED PRD CODE UBPRD: NORMAL
BARCODED UNIT NUM UBUNT: NORMAL
BASOPHILS # BLD AUTO: 0.2 % (ref 0–1.8)
BASOPHILS # BLD: 0.01 K/UL (ref 0–0.12)
BLD GP AB SCN SERPL QL: NORMAL
COMPONENT R 8504R: NORMAL
EOSINOPHIL # BLD AUTO: 0.18 K/UL (ref 0–0.51)
EOSINOPHIL NFR BLD: 4.1 % (ref 0–6.9)
ERYTHROCYTE [DISTWIDTH] IN BLOOD BY AUTOMATED COUNT: 54.1 FL (ref 35.9–50)
HCT VFR BLD AUTO: 19.5 % (ref 37–47)
HGB BLD-MCNC: 6.7 G/DL (ref 12–16)
IMM GRANULOCYTES # BLD AUTO: 0.04 K/UL (ref 0–0.11)
IMM GRANULOCYTES NFR BLD AUTO: 0.9 % (ref 0–0.9)
LYMPHOCYTES # BLD AUTO: 0.88 K/UL (ref 1–4.8)
LYMPHOCYTES NFR BLD: 19.9 % (ref 22–41)
MCH RBC QN AUTO: 32.4 PG (ref 27–33)
MCHC RBC AUTO-ENTMCNC: 34.4 G/DL (ref 33.6–35)
MCV RBC AUTO: 94.2 FL (ref 81.4–97.8)
MONOCYTES # BLD AUTO: 0.49 K/UL (ref 0–0.85)
MONOCYTES NFR BLD AUTO: 11.1 % (ref 0–13.4)
NEUTROPHILS # BLD AUTO: 2.82 K/UL (ref 2–7.15)
NEUTROPHILS NFR BLD: 63.8 % (ref 44–72)
NRBC # BLD AUTO: 0 K/UL
NRBC BLD-RTO: 0 /100 WBC
PLATELET # BLD AUTO: 79 K/UL (ref 164–446)
PRODUCT TYPE UPROD: NORMAL
RBC # BLD AUTO: 2.07 M/UL (ref 4.2–5.4)
RH BLD: NORMAL
UNIT STATUS USTAT: NORMAL
WBC # BLD AUTO: 4.4 K/UL (ref 4.8–10.8)

## 2020-02-27 PROCEDURE — A4212 NON CORING NEEDLE OR STYLET: HCPCS

## 2020-02-27 PROCEDURE — P9016 RBC LEUKOCYTES REDUCED: HCPCS

## 2020-02-27 PROCEDURE — 86900 BLOOD TYPING SEROLOGIC ABO: CPT

## 2020-02-27 PROCEDURE — 96374 THER/PROPH/DIAG INJ IV PUSH: CPT

## 2020-02-27 PROCEDURE — 700111 HCHG RX REV CODE 636 W/ 250 OVERRIDE (IP)

## 2020-02-27 PROCEDURE — 85025 COMPLETE CBC W/AUTO DIFF WBC: CPT

## 2020-02-27 PROCEDURE — 36591 DRAW BLOOD OFF VENOUS DEVICE: CPT

## 2020-02-27 PROCEDURE — 86850 RBC ANTIBODY SCREEN: CPT

## 2020-02-27 PROCEDURE — 306780 HCHG STAT FOR TRANSFUSION PER CASE

## 2020-02-27 PROCEDURE — 700111 HCHG RX REV CODE 636 W/ 250 OVERRIDE (IP): Performed by: INTERNAL MEDICINE

## 2020-02-27 PROCEDURE — 86901 BLOOD TYPING SEROLOGIC RH(D): CPT

## 2020-02-27 PROCEDURE — 86923 COMPATIBILITY TEST ELECTRIC: CPT

## 2020-02-27 PROCEDURE — 36430 TRANSFUSION BLD/BLD COMPNT: CPT

## 2020-02-27 RX ORDER — ACETAMINOPHEN 325 MG/1
650 TABLET ORAL PRN
Status: CANCELLED | OUTPATIENT
Start: 2020-02-27

## 2020-02-27 RX ORDER — LIDOCAINE HYDROCHLORIDE 10 MG/ML
20 INJECTION, SOLUTION EPIDURAL; INFILTRATION; INTRACAUDAL; PERINEURAL
Status: DISCONTINUED | OUTPATIENT
Start: 2020-02-27 | End: 2020-02-27 | Stop reason: HOSPADM

## 2020-02-27 RX ORDER — FUROSEMIDE 10 MG/ML
20 INJECTION INTRAMUSCULAR; INTRAVENOUS ONCE
Status: CANCELLED | OUTPATIENT
Start: 2020-02-27

## 2020-02-27 RX ORDER — SODIUM CHLORIDE 9 MG/ML
500 INJECTION, SOLUTION INTRAVENOUS ONCE
Status: CANCELLED | OUTPATIENT
Start: 2020-02-27

## 2020-02-27 RX ORDER — ACETAMINOPHEN 325 MG/1
650 TABLET ORAL ONCE
Status: CANCELLED | OUTPATIENT
Start: 2020-02-27

## 2020-02-27 RX ORDER — LIDOCAINE HYDROCHLORIDE 10 MG/ML
20 INJECTION, SOLUTION INFILTRATION; PERINEURAL
Status: DISCONTINUED | OUTPATIENT
Start: 2020-02-27 | End: 2020-02-27

## 2020-02-27 RX ORDER — LIDOCAINE HYDROCHLORIDE 10 MG/ML
20 INJECTION, SOLUTION INFILTRATION; PERINEURAL
Status: CANCELLED | OUTPATIENT
Start: 2020-02-27

## 2020-02-27 RX ORDER — LIDOCAINE HYDROCHLORIDE 10 MG/ML
INJECTION, SOLUTION EPIDURAL; INFILTRATION; INTRACAUDAL; PERINEURAL
Status: COMPLETED
Start: 2020-02-27 | End: 2020-02-27

## 2020-02-27 RX ORDER — FUROSEMIDE 10 MG/ML
20 INJECTION INTRAMUSCULAR; INTRAVENOUS ONCE
Status: COMPLETED | OUTPATIENT
Start: 2020-02-27 | End: 2020-02-27

## 2020-02-27 RX ORDER — DIPHENHYDRAMINE HCL 25 MG
25 TABLET ORAL ONCE
Status: CANCELLED | OUTPATIENT
Start: 2020-02-27

## 2020-02-27 RX ADMIN — LIDOCAINE HYDROCHLORIDE: 10 INJECTION, SOLUTION EPIDURAL; INFILTRATION; INTRACAUDAL; PERINEURAL at 09:45

## 2020-02-27 RX ADMIN — HEPARIN 500 UNITS: 100 SYRINGE at 14:10

## 2020-02-27 RX ADMIN — FUROSEMIDE 20 MG: 10 INJECTION, SOLUTION INTRAMUSCULAR; INTRAVENOUS at 13:49

## 2020-02-27 RX ADMIN — LIDOCAINE HYDROCHLORIDE: 10 INJECTION, SOLUTION INFILTRATION; PERINEURAL at 09:45

## 2020-02-27 NOTE — PROGRESS NOTES
"Patient arrived ambulatory to [facility] for CBC/possible blood transfusion.  Reports she was in ED last week for blood transfusion and she currently is having fatigue.  She has HD tomorrow.  Port accessed using sterile technique with brisk blood return noted.  Labs drawn per order, Hgb 6.7.  Per orders, pt to receive 1 unit PRBC today and 1 unit PRBC on Saturday.  Pt declined pre medications.  1 unit PRBC transfused per order.  IV Lasix given post.  Pt tolerated well.      Post transfusion BP elevated, 204/51.  Rechecked 210/65.  Pt asymptomatic, \"that's normal for me.\"  States she can take her BP meds when she gets home.  Called MD office and spoke with ASHLEIGH Ny.  Provider acknowledged BP and ok given to discharge patient from [facility].  Pt aware to go to ED if any worrisome symptoms arise.    Port flushed, heparin instilled, and de-accessed per protocol.  Gauze/tape dressing applied.  Confirmed appointment for Saturday and pt ambulated out of clinic in no apparent distress.  "

## 2020-02-29 ENCOUNTER — OUTPATIENT INFUSION SERVICES (OUTPATIENT)
Dept: ONCOLOGY | Facility: MEDICAL CENTER | Age: 65
End: 2020-02-29
Attending: INTERNAL MEDICINE
Payer: MEDICARE

## 2020-02-29 VITALS
HEART RATE: 59 BPM | WEIGHT: 150.13 LBS | TEMPERATURE: 97.7 F | OXYGEN SATURATION: 100 % | DIASTOLIC BLOOD PRESSURE: 67 MMHG | SYSTOLIC BLOOD PRESSURE: 167 MMHG | RESPIRATION RATE: 18 BRPM | BODY MASS INDEX: 32.39 KG/M2 | HEIGHT: 57 IN

## 2020-02-29 DIAGNOSIS — D64.9 SYMPTOMATIC ANEMIA: ICD-10-CM

## 2020-02-29 DIAGNOSIS — D46.9 MYELODYSPLASIA (MYELODYSPLASTIC SYNDROME) (HCC): ICD-10-CM

## 2020-02-29 LAB
BARCODED ABORH UBTYP: 7300
BARCODED PRD CODE UBPRD: NORMAL
BARCODED UNIT NUM UBUNT: NORMAL
COMPONENT R 8504R: NORMAL
PRODUCT TYPE UPROD: NORMAL
UNIT STATUS USTAT: NORMAL

## 2020-02-29 PROCEDURE — 86923 COMPATIBILITY TEST ELECTRIC: CPT

## 2020-02-29 PROCEDURE — 700111 HCHG RX REV CODE 636 W/ 250 OVERRIDE (IP): Performed by: INTERNAL MEDICINE

## 2020-02-29 PROCEDURE — 96374 THER/PROPH/DIAG INJ IV PUSH: CPT

## 2020-02-29 PROCEDURE — P9016 RBC LEUKOCYTES REDUCED: HCPCS

## 2020-02-29 PROCEDURE — 700111 HCHG RX REV CODE 636 W/ 250 OVERRIDE (IP)

## 2020-02-29 PROCEDURE — 36430 TRANSFUSION BLD/BLD COMPNT: CPT

## 2020-02-29 PROCEDURE — A4212 NON CORING NEEDLE OR STYLET: HCPCS

## 2020-02-29 PROCEDURE — 306780 HCHG STAT FOR TRANSFUSION PER CASE

## 2020-02-29 RX ORDER — DIPHENHYDRAMINE HCL 25 MG
25 TABLET ORAL ONCE
Status: DISCONTINUED | OUTPATIENT
Start: 2020-02-29 | End: 2020-02-29 | Stop reason: HOSPADM

## 2020-02-29 RX ORDER — DIPHENHYDRAMINE HCL 25 MG
25 TABLET ORAL ONCE
Status: CANCELLED | OUTPATIENT
Start: 2020-02-29

## 2020-02-29 RX ORDER — FUROSEMIDE 10 MG/ML
20 INJECTION INTRAMUSCULAR; INTRAVENOUS ONCE
Status: COMPLETED | OUTPATIENT
Start: 2020-02-29 | End: 2020-02-29

## 2020-02-29 RX ORDER — ACETAMINOPHEN 325 MG/1
650 TABLET ORAL PRN
Status: CANCELLED | OUTPATIENT
Start: 2020-02-29

## 2020-02-29 RX ORDER — FUROSEMIDE 10 MG/ML
20 INJECTION INTRAMUSCULAR; INTRAVENOUS ONCE
Status: CANCELLED | OUTPATIENT
Start: 2020-02-29

## 2020-02-29 RX ORDER — LIDOCAINE HYDROCHLORIDE 10 MG/ML
20 INJECTION, SOLUTION INFILTRATION; PERINEURAL
Status: CANCELLED | OUTPATIENT
Start: 2020-02-29

## 2020-02-29 RX ORDER — SODIUM CHLORIDE 9 MG/ML
500 INJECTION, SOLUTION INTRAVENOUS ONCE
Status: CANCELLED | OUTPATIENT
Start: 2020-02-29

## 2020-02-29 RX ORDER — LIDOCAINE HYDROCHLORIDE 10 MG/ML
INJECTION, SOLUTION EPIDURAL; INFILTRATION; INTRACAUDAL; PERINEURAL
Status: COMPLETED
Start: 2020-02-29 | End: 2020-02-29

## 2020-02-29 RX ORDER — ACETAMINOPHEN 325 MG/1
650 TABLET ORAL ONCE
Status: CANCELLED | OUTPATIENT
Start: 2020-02-29

## 2020-02-29 RX ORDER — ACETAMINOPHEN 325 MG/1
650 TABLET ORAL ONCE
Status: DISCONTINUED | OUTPATIENT
Start: 2020-02-29 | End: 2020-02-29 | Stop reason: HOSPADM

## 2020-02-29 RX ORDER — LIDOCAINE HYDROCHLORIDE 10 MG/ML
20 INJECTION, SOLUTION INFILTRATION; PERINEURAL
Status: DISCONTINUED | OUTPATIENT
Start: 2020-02-29 | End: 2020-02-29 | Stop reason: HOSPADM

## 2020-02-29 RX ADMIN — LIDOCAINE HYDROCHLORIDE 0.5 ML: 10 INJECTION, SOLUTION EPIDURAL; INFILTRATION; INTRACAUDAL; PERINEURAL at 11:10

## 2020-02-29 RX ADMIN — FUROSEMIDE 20 MG: 10 INJECTION, SOLUTION INTRAMUSCULAR; INTRAVENOUS at 13:45

## 2020-02-29 RX ADMIN — LIDOCAINE HYDROCHLORIDE 0.5 ML: 10 INJECTION, SOLUTION INFILTRATION; PERINEURAL at 11:10

## 2020-02-29 RX ADMIN — HEPARIN 500 UNITS: 100 SYRINGE at 13:50

## 2020-02-29 ASSESSMENT — FIBROSIS 4 INDEX: FIB4 SCORE: 3.26

## 2020-03-01 NOTE — PROGRESS NOTES
Pt presented to infusion with friend for second unit of blood. Port accessed in sterile fashion after lidocaine used, brisk blood return observed. Pt took own pre-med. 1 unit PRBC's transfused without issue. Lasix given after per orders. Port flushed, heparinized and de-accessed with needle intact, gauze drsg placed. Left in care of friend, has next appts.

## 2020-03-03 ENCOUNTER — OFFICE VISIT (OUTPATIENT)
Dept: HEMATOLOGY ONCOLOGY | Facility: MEDICAL CENTER | Age: 65
End: 2020-03-03
Payer: MEDICARE

## 2020-03-03 VITALS
HEIGHT: 57 IN | RESPIRATION RATE: 18 BRPM | WEIGHT: 153.44 LBS | OXYGEN SATURATION: 100 % | SYSTOLIC BLOOD PRESSURE: 148 MMHG | DIASTOLIC BLOOD PRESSURE: 78 MMHG | BODY MASS INDEX: 33.1 KG/M2 | TEMPERATURE: 97.6 F | HEART RATE: 72 BPM

## 2020-03-03 DIAGNOSIS — R14.0 ABDOMINAL DISTENSION: ICD-10-CM

## 2020-03-03 DIAGNOSIS — I20.89 STABLE ANGINA PECTORIS (HCC): ICD-10-CM

## 2020-03-03 DIAGNOSIS — F32.89 OTHER DEPRESSION: ICD-10-CM

## 2020-03-03 DIAGNOSIS — Z99.2 ESRD (END STAGE RENAL DISEASE) ON DIALYSIS (HCC): ICD-10-CM

## 2020-03-03 DIAGNOSIS — R07.89 OTHER CHEST PAIN: ICD-10-CM

## 2020-03-03 DIAGNOSIS — R68.81 EARLY SATIETY: ICD-10-CM

## 2020-03-03 DIAGNOSIS — I48.0 PAROXYSMAL ATRIAL FIBRILLATION (HCC): ICD-10-CM

## 2020-03-03 DIAGNOSIS — G89.4 CHRONIC PAIN SYNDROME: ICD-10-CM

## 2020-03-03 DIAGNOSIS — D46.9 MYELODYSPLASIA (MYELODYSPLASTIC SYNDROME) (HCC): ICD-10-CM

## 2020-03-03 DIAGNOSIS — Z09 ENCOUNTER FOR HEMATOLOGY FOLLOW-UP: ICD-10-CM

## 2020-03-03 DIAGNOSIS — R68.89 FORGETFULNESS: ICD-10-CM

## 2020-03-03 DIAGNOSIS — N18.6 ESRD (END STAGE RENAL DISEASE) ON DIALYSIS (HCC): ICD-10-CM

## 2020-03-03 PROCEDURE — 99215 OFFICE O/P EST HI 40 MIN: CPT | Performed by: NURSE PRACTITIONER

## 2020-03-03 RX ORDER — ISOPROPYL ALCOHOL 0.7 ML/1
SWAB TOPICAL
COMMUNITY
Start: 2020-02-13 | End: 2020-06-16

## 2020-03-03 ASSESSMENT — ENCOUNTER SYMPTOMS
HEADACHES: 1
FEVER: 0
VOMITING: 0
COUGH: 0
PALPITATIONS: 1
CHILLS: 0
DIZZINESS: 1
DEPRESSION: 1
WEIGHT LOSS: 0
NAUSEA: 1
INSOMNIA: 1
DIARRHEA: 0
WHEEZING: 0
CONSTIPATION: 0
TINGLING: 0

## 2020-03-03 ASSESSMENT — PATIENT HEALTH QUESTIONNAIRE - PHQ9
5. POOR APPETITE OR OVEREATING: 2 - MORE THAN HALF THE DAYS
SUM OF ALL RESPONSES TO PHQ QUESTIONS 1-9: 19
CLINICAL INTERPRETATION OF PHQ2 SCORE: 2

## 2020-03-03 ASSESSMENT — FIBROSIS 4 INDEX: FIB4 SCORE: 3.26

## 2020-03-03 ASSESSMENT — PAIN SCALES - GENERAL: PAINLEVEL: 6=MODERATE PAIN

## 2020-03-03 NOTE — LETTER
"     [facility]   [address] [place] [address], [address]  [place] [address]  Phone: [phone]  Fax: [phone]              Vielka Briscoe  1955    Encounter Date: 3/3/2020    GENEVIEVE Cadena          PROGRESS NOTE:  Subjective:      Vielka Briscoe is a 64 y.o. female who presents for Other (myelodysplastic syndrome)        HPI   Ms. 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  She is accompanied by her caregiver, Afua, for today's visit.     Patient has been treated with low-dose Revlimid in 2016 and Vidaza 75 mg/m² subcu 5 days a week in 2017, per Dr. Avila. Since Dr. Roberts's long-term the patient established with our office (7/2019), per second opinion evaluation, and continues with supportive therapy in the form of periodic transfusions, standing orders in place.    Patient was admitted overnight for anemia on 2/20/20. She was admitted to hospital overnight on 1/20/2020 secondary to dizziness following ground-level fall. She continues with dialysis every Monday, Wednesday, Friday but reports that she is not completing the entire regimen as she develops muscle cramping and spasms that require dialysis to stop early.  She is noting early satiety, increased abdominal distention, and her clothes are not fitting properly which she suspects is due to not enough fluid being removed during dialysis.  She feels more \"fuzzy\" and loses focus easily during conversation.  Patient reports requiring nitroglycerin twice yesterday, once in the morning and once in the evening, for chest pain that has ceased.  She is not as steady on her feet.      Allergies   Allergen Reactions   • Lenalidomide Unspecified     Beeping Pulse     • Naprosyn [Naproxen] Hives   • Pioglitazone Unspecified     Causes blindness   • Simvastatin Unspecified     Couldn't move   • Demerol Vomiting   • " "Diphenhydramine Vomiting   • Glipizide Vomiting   • Hydromorphone Vomiting   • Iron Vomiting     vomiting   • Metformin Vomiting   • Morphine Vomiting   • Multivitamin Itching     itching   • Ondansetron Itching   • Other Drug Rash and Vomiting     Any binders that remove phosphorus from the body such as tums   • Oxycodone Vomiting   • Pcn [Penicillins] Vomiting          • Propoxyphene Vomiting   • Requip Vomiting   • Sulfa Drugs Rash and Vomiting     Vomiting & rash      • Tamsulosin Vomiting   • Tramadol Vomiting   • Trazodone Vomiting           Current Outpatient Medications on File Prior to Visit   Medication Sig Dispense Refill   • Alcohol Swabs ( STERILE ALCOHOL PREP) Pads WIPE SKIN BEFORE INSULIN     • docusate sodium (COLACE) 100 MG Cap Take 100 mg by mouth 2 times a day.     • amLODIPine (NORVASC) 2.5 MG Tab TK 1 T PO QD     • Buprenorphine 20 MCG/HR PATCH WEEKLY Apply 20 mcg to affected area(s) every 7 days. THURSDAY  0   • Diclofenac Sodium (VOLTAREN) 1 % Gel Apply 4 g to skin as directed 4 times a day as needed.     • carvedilol (COREG) 12.5 MG Tab Take 1 Tab by mouth 2 times a day, with meals. 60 Tab 0   • losartan (COZAAR) 100 MG Tab Take 100 mg by mouth every evening.     • bimatoprost (LUMIGAN) 0.01 % Solution Place 1 Drop in both eyes every bedtime.     • brinzolamide (AZOPT) 1 % Suspension Place 1 Drop in both eyes 2 Times a Day.     • Brimonidine Tartrate-Timolol (COMBIGAN) 0.2-0.5 % Solution Place 1 Drop in both eyes 2 Times a Day.     • pregabalin (LYRICA) 150 MG Cap Take 150 mg by mouth 4 times a day.       No current facility-administered medications on file prior to visit.          Review of Systems   Constitutional: Positive for malaise/fatigue (\"feels worse even though my hgb isn't as low\"). Negative for chills (\"always cold\"), fever and weight loss.   Respiratory: Positive for shortness of breath (with activity). Negative for cough and wheezing.         O2 at 3   Cardiovascular: Positive " "for chest pain (took nitro yesterday am and pm), palpitations and leg swelling (trace BLE).        Recent EKG showed enlarged heart   Gastrointestinal: Positive for nausea (ginger ale & pepsi works best). Negative for blood in stool, constipation, diarrhea and vomiting.        Early satiety - pants are tighter, blouses are tighter   Genitourinary: Negative for dysuria.        Dialysis MWF - does not consistently get 100% of procedure due to muscles spasms and cramping   Musculoskeletal: Positive for myalgias (muscle cramping with dialysis). Negative for falls (no falls but not as steady either).   Neurological: Positive for dizziness (more so recently - even when Hgb is higher), sensory change (restless legs - especially with dialysis) and headaches. Negative for tingling.   Psychiatric/Behavioral: Positive for depression (mild) and memory loss (more fuzzy, loses focus, zones out during conversation). The patient has insomnia (30 minutes at a time).           Objective:     /78 (BP Location: Right arm, Patient Position: Sitting, BP Cuff Size: Adult)   Pulse 72   Temp 36.4 °C (97.6 °F) (Temporal)   Resp 18   Ht 1.46 m (4' 9.48\")   Wt 69.6 kg (153 lb 7 oz)   LMP 01/01/1995   SpO2 100%   BMI 32.65 kg/m²       Physical Exam  Vitals signs reviewed.   Constitutional:       General: She is not in acute distress.     Appearance: She is well-developed. She is obese. She is not diaphoretic.   HENT:      Head: Normocephalic and atraumatic.      Mouth/Throat:      Pharynx: No oropharyngeal exudate.   Eyes:      General: No scleral icterus.        Right eye: No discharge.         Left eye: No discharge.      Conjunctiva/sclera: Conjunctivae normal.      Pupils: Pupils are equal, round, and reactive to light.   Neck:      Musculoskeletal: Normal range of motion and neck supple.   Cardiovascular:      Rate and Rhythm: Normal rate and regular rhythm.      Heart sounds: Normal heart sounds. No murmur. No friction rub. " No gallop.       Comments: Elevated BP - vacillates per pt norm; asymptomatic  Pulmonary:      Effort: Pulmonary effort is normal. No respiratory distress.      Breath sounds: Examination of the right-upper field reveals decreased breath sounds. Examination of the left-upper field reveals decreased breath sounds. Examination of the right-middle field reveals decreased breath sounds. Examination of the right-lower field reveals decreased breath sounds. Examination of the left-lower field reveals decreased breath sounds. Decreased breath sounds present. No wheezing.      Comments: O2 @ 3  Abdominal:      General: Bowel sounds are normal. There is distension (very mild).      Palpations: Abdomen is soft. There is no fluid wave, hepatomegaly or splenomegaly.      Tenderness: There is no abdominal tenderness.   Musculoskeletal: Normal range of motion.      Right lower leg: Edema (trace) present.      Left lower leg: Edema (trace) present.   Skin:     General: Skin is warm and dry.      Coloration: Skin is not pale.      Findings: No erythema or rash.   Neurological:      Mental Status: She is alert and oriented to person, place, and time.   Psychiatric:         Behavior: Behavior normal.      Comments: Patient a bit discouraged, amenable to anti-depressant that may help with pain                           Transfusions 2019 to present               Assessment/Plan:     1. Myelodysplasia (myelodysplastic syndrome) (HCC)     2. Encounter for hematology follow-up     3. ESRD (end stage renal disease) on dialysis (HCC)     4. Stable angina pectoris (HCC)     5. Paroxysmal atrial fibrillation (HCC)     6. Chronic pain syndrome           Add check up next week         Pain - Pa, consider   Consider duloxteine or amytriptyline for adjuvant pain                Paolo Beltran M.D.  [address] [place] [address]   [address] [address]  VIA Facsimile: [phone]

## 2020-03-03 NOTE — PROGRESS NOTES
"Subjective:      Vielka Briscoe is a 64 y.o. female who presents for Other (myelodysplastic syndrome)        HPI   Ms. 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  She is accompanied by her caregiver, Afua, for today's visit.     Patient has been treated with low-dose Revlimid in 2016 and Vidaza 75 mg/m² subcu 5 days a week in 2017, per Dr. Avila. Since Dr. Roberts's prison the patient established with our office (7/2019), per second opinion evaluation, and continues with supportive therapy in the form of periodic transfusions, standing orders in place.    Patient was admitted overnight for anemia on 2/20/20. She was admitted to hospital overnight on 1/20/2020 secondary to dizziness following ground-level fall. She continues with dialysis every Monday, Wednesday, Friday but reports that she is not completing the entire regimen as she develops muscle cramping and spasms that require dialysis to stop early.  She is noting early satiety, increased abdominal distention, and her clothes are not fitting properly which she suspects is due to not enough fluid being removed during dialysis.  She feels more \"fuzzy\" and loses focus easily during conversation.  Patient reports requiring nitroglycerin twice yesterday, once in the morning and once in the evening, for chest pain that has ceased.  She is not as steady on her feet.      Allergies   Allergen Reactions   • Lenalidomide Unspecified     Beeping Pulse     • Naprosyn [Naproxen] Hives   • Pioglitazone Unspecified     Causes blindness   • Simvastatin Unspecified     Couldn't move   • Demerol Vomiting   • Diphenhydramine Vomiting   • Glipizide Vomiting   • Hydromorphone Vomiting   • Iron Vomiting     vomiting   • Metformin Vomiting   • Morphine Vomiting   • Multivitamin Itching     itching   • Ondansetron Itching   • Other Drug Rash and Vomiting   " "Any binders that remove phosphorus from the body such as tums   • Oxycodone Vomiting   • Pcn [Penicillins] Vomiting          • Propoxyphene Vomiting   • Requip Vomiting   • Sulfa Drugs Rash and Vomiting     Vomiting & rash      • Tamsulosin Vomiting   • Tramadol Vomiting   • Trazodone Vomiting           Current Outpatient Medications on File Prior to Visit   Medication Sig Dispense Refill   • Alcohol Swabs ( STERILE ALCOHOL PREP) Pads WIPE SKIN BEFORE INSULIN     • docusate sodium (COLACE) 100 MG Cap Take 100 mg by mouth 2 times a day.     • amLODIPine (NORVASC) 2.5 MG Tab TK 1 T PO QD     • Buprenorphine 20 MCG/HR PATCH WEEKLY Apply 20 mcg to affected area(s) every 7 days. THURSDAY  0   • Diclofenac Sodium (VOLTAREN) 1 % Gel Apply 4 g to skin as directed 4 times a day as needed.     • carvedilol (COREG) 12.5 MG Tab Take 1 Tab by mouth 2 times a day, with meals. 60 Tab 0   • losartan (COZAAR) 100 MG Tab Take 100 mg by mouth every evening.     • bimatoprost (LUMIGAN) 0.01 % Solution Place 1 Drop in both eyes every bedtime.     • brinzolamide (AZOPT) 1 % Suspension Place 1 Drop in both eyes 2 Times a Day.     • Brimonidine Tartrate-Timolol (COMBIGAN) 0.2-0.5 % Solution Place 1 Drop in both eyes 2 Times a Day.     • pregabalin (LYRICA) 150 MG Cap Take 150 mg by mouth 4 times a day.       No current facility-administered medications on file prior to visit.          Review of Systems   Constitutional: Positive for malaise/fatigue (\"feels worse even though my hgb isn't as low\"). Negative for chills (\"always cold\"), fever and weight loss.   Respiratory: Positive for shortness of breath (with activity). Negative for cough and wheezing.         O2 at 3   Cardiovascular: Positive for chest pain (took nitro yesterday am and pm), palpitations and leg swelling (trace BLE).        Recent EKG showed enlarged heart   Gastrointestinal: Positive for nausea (ginger ale & pepsi works best). Negative for blood in stool, constipation, " "diarrhea and vomiting.        Early satiety - pants are tighter, blouses are tighter   Genitourinary: Negative for dysuria.        Dialysis MWF - does not consistently get 100% of procedure due to muscles spasms and cramping   Musculoskeletal: Positive for myalgias (muscle cramping with dialysis). Negative for falls (no falls but not as steady either).   Neurological: Positive for dizziness (more so recently - even when Hgb is higher), sensory change (restless legs - especially with dialysis) and headaches. Negative for tingling.   Psychiatric/Behavioral: Positive for depression (mild) and memory loss (more fuzzy, loses focus, zones out during conversation). The patient has insomnia (30 minutes at a time).           Objective:     /78 (BP Location: Right arm, Patient Position: Sitting, BP Cuff Size: Adult)   Pulse 72   Temp 36.4 °C (97.6 °F) (Temporal)   Resp 18   Ht 1.46 m (4' 9.48\")   Wt 69.6 kg (153 lb 7 oz)   LMP 01/01/1995   SpO2 100%   BMI 32.65 kg/m²      Physical Exam  Vitals signs reviewed.   Constitutional:       General: She is not in acute distress.     Appearance: She is well-developed. She is obese. She is not diaphoretic.   HENT:      Head: Normocephalic and atraumatic.      Mouth/Throat:      Pharynx: No oropharyngeal exudate.   Eyes:      General: No scleral icterus.        Right eye: No discharge.         Left eye: No discharge.      Conjunctiva/sclera: Conjunctivae normal.      Pupils: Pupils are equal, round, and reactive to light.   Neck:      Musculoskeletal: Normal range of motion and neck supple.   Cardiovascular:      Rate and Rhythm: Normal rate and regular rhythm.      Heart sounds: Normal heart sounds. No murmur. No friction rub. No gallop.       Comments: Elevated BP - vacillates per pt norm; asymptomatic  Pulmonary:      Effort: Pulmonary effort is normal. No respiratory distress.      Breath sounds: Examination of the right-upper field reveals decreased breath sounds. " Examination of the left-upper field reveals decreased breath sounds. Examination of the right-middle field reveals decreased breath sounds. Examination of the right-lower field reveals decreased breath sounds. Examination of the left-lower field reveals decreased breath sounds. Decreased breath sounds present. No wheezing.      Comments: O2 @ 3  Abdominal:      General: Bowel sounds are normal. There is distension (very mild).      Palpations: Abdomen is soft. There is no fluid wave, hepatomegaly or splenomegaly.      Tenderness: There is no abdominal tenderness.   Musculoskeletal: Normal range of motion.      Right lower leg: Edema (trace) present.      Left lower leg: Edema (trace) present.   Skin:     General: Skin is warm and dry.      Coloration: Skin is not pale.      Findings: No erythema or rash.   Neurological:      Mental Status: She is alert and oriented to person, place, and time.   Psychiatric:         Behavior: Behavior normal.      Comments: Patient a bit discouraged, amenable to anti-depressant that may help with pain                           Transfusions 2019 to present               Assessment/Plan:     1. Myelodysplasia (myelodysplastic syndrome) (HCC)     2. Encounter for hematology follow-up     3. ESRD (end stage renal disease) on dialysis (HCC)     4. Stable angina pectoris (HCC)     5. Other chest pain     6. Paroxysmal atrial fibrillation (HCC)     7. Chronic pain syndrome     8. Other depression     9. Abdominal distension     10. Early satiety     11. Forgetfulness         1.  Pain: Patient continues to follow-up with Dr. Pa.  She reports increase in generalized pain, muscles and bones, but that pain management is hesitant to increase medications.  Patient's depression screening showed increased score today and patient is amenable to starting an antidepressant as there are some that can help with pain management as well such as duloxetine or amitriptyline.  Will defer to pain  "management if they would like to include this in patient's regimen, we are not opposed.    2.  Chest pain/angina: Patient reports requiring nitroglycerin twice yesterday, last dose was greater than 2 weeks ago.  She did feel better after medications although it does make her sleep.  Patient will continue to monitor and advised caretaker if it is not relieving her symptoms.    3.  Early satiety/abdominal distention/ESRD: Patient continues with dialysis every Monday, Wednesday, Friday.  Patient is noting that she feels \"puffier\" over time as she is not completing 100% of dialysis procedure due to intolerance as she develops muscle cramping and restless legs.  She is noting that she is getting full faster and her clothing is getting tighter although this is not reflected in her weight at dialysis -consequently they are hesitant to remove more fluid.  No fluid wave is noted abdominally and patient is noted to have trace BLE edema. She reports that the doctor at dialysis told her that she may need to have her \"belly drained\" but there were no additional orders as result of the conversation.  She will discuss concerns further with dialysis staff.    4.  Forgetfulness: Patient is noting that she feels \"fuzzy\" and gets distracted easily.  Patient with history of multiple transfusions will evaluate possible iron overload, ferritin ordered to be completed at next infusion center visit.    5.  MDS: Patient has previously undergone treatment with Vidaza and Revlimid.  She has not undergone further treatment since 2017.  She established care with our office in July 2019 and continues with supportive transfusions, she is requiring them every 2-3 weeks. She was admitted overnight 2/20/19 and received 1 unit RBC.  Patient is reporting increased symptoms even with higher hgb.  She is due for every 3-week CBC check in 2 weeks, we will add CBC check next week as well.  Patient will return for reevaluation in 3 weeks, sooner as " needed.            Patient was seen for 45 minutes face to face of which > 50% of appointment time was spent on counseling and coordination of care regarding the above.      The patient verbalized agreement and understanding of current plan. All questions and concerns were addressed at time of visit.    Please note that this dictation was created using voice recognition software. I have made every reasonable attempt to correct obvious errors, but I expect that there are errors of grammar and possibly content that I did not discover before finalizing the note.

## 2020-03-04 ASSESSMENT — ENCOUNTER SYMPTOMS
MYALGIAS: 1
FALLS: 0
SENSORY CHANGE: 1
BLOOD IN STOOL: 0
MEMORY LOSS: 1
SHORTNESS OF BREATH: 1

## 2020-03-05 ENCOUNTER — APPOINTMENT (OUTPATIENT)
Dept: ONCOLOGY | Facility: MEDICAL CENTER | Age: 65
End: 2020-03-05
Attending: INTERNAL MEDICINE
Payer: MEDICARE

## 2020-03-07 ENCOUNTER — APPOINTMENT (OUTPATIENT)
Dept: ONCOLOGY | Facility: MEDICAL CENTER | Age: 65
End: 2020-03-07
Attending: INTERNAL MEDICINE
Payer: MEDICARE

## 2020-03-11 ENCOUNTER — TELEPHONE (OUTPATIENT)
Dept: ADMISSIONS | Facility: MEDICAL CENTER | Age: 65
End: 2020-03-11

## 2020-03-12 ENCOUNTER — OUTPATIENT INFUSION SERVICES (OUTPATIENT)
Dept: ONCOLOGY | Facility: MEDICAL CENTER | Age: 65
End: 2020-03-12
Attending: INTERNAL MEDICINE
Payer: MEDICARE

## 2020-03-12 VITALS
WEIGHT: 152.12 LBS | BODY MASS INDEX: 32.82 KG/M2 | DIASTOLIC BLOOD PRESSURE: 46 MMHG | HEART RATE: 70 BPM | TEMPERATURE: 97.5 F | RESPIRATION RATE: 18 BRPM | OXYGEN SATURATION: 100 % | HEIGHT: 57 IN | SYSTOLIC BLOOD PRESSURE: 146 MMHG

## 2020-03-12 DIAGNOSIS — D46.9 MYELODYSPLASIA (MYELODYSPLASTIC SYNDROME) (HCC): ICD-10-CM

## 2020-03-12 DIAGNOSIS — D64.9 SYMPTOMATIC ANEMIA: ICD-10-CM

## 2020-03-12 LAB
ABO GROUP BLD: NORMAL
BARCODED ABORH UBTYP: 8400
BARCODED PRD CODE UBPRD: NORMAL
BARCODED UNIT NUM UBUNT: NORMAL
BASOPHILS # BLD AUTO: 0.5 % (ref 0–1.8)
BASOPHILS # BLD: 0.02 K/UL (ref 0–0.12)
BLD GP AB SCN SERPL QL: NORMAL
COMPONENT R 8504R: NORMAL
EOSINOPHIL # BLD AUTO: 0.27 K/UL (ref 0–0.51)
EOSINOPHIL NFR BLD: 6.5 % (ref 0–6.9)
ERYTHROCYTE [DISTWIDTH] IN BLOOD BY AUTOMATED COUNT: 47 FL (ref 35.9–50)
FERRITIN SERPL-MCNC: 2762.4 NG/ML (ref 10–291)
HCT VFR BLD AUTO: 21 % (ref 37–47)
HGB BLD-MCNC: 7.2 G/DL (ref 12–16)
IMM GRANULOCYTES # BLD AUTO: 0.02 K/UL (ref 0–0.11)
IMM GRANULOCYTES NFR BLD AUTO: 0.5 % (ref 0–0.9)
LYMPHOCYTES # BLD AUTO: 0.7 K/UL (ref 1–4.8)
LYMPHOCYTES NFR BLD: 16.8 % (ref 22–41)
MCH RBC QN AUTO: 31.3 PG (ref 27–33)
MCHC RBC AUTO-ENTMCNC: 34.3 G/DL (ref 33.6–35)
MCV RBC AUTO: 91.3 FL (ref 81.4–97.8)
MONOCYTES # BLD AUTO: 0.64 K/UL (ref 0–0.85)
MONOCYTES NFR BLD AUTO: 15.3 % (ref 0–13.4)
NEUTROPHILS # BLD AUTO: 2.52 K/UL (ref 2–7.15)
NEUTROPHILS NFR BLD: 60.4 % (ref 44–72)
NRBC # BLD AUTO: 0 K/UL
NRBC BLD-RTO: 0 /100 WBC
PLATELET # BLD AUTO: 73 K/UL (ref 164–446)
PRODUCT TYPE UPROD: NORMAL
RBC # BLD AUTO: 2.3 M/UL (ref 4.2–5.4)
RH BLD: NORMAL
UNIT STATUS USTAT: NORMAL
WBC # BLD AUTO: 4.2 K/UL (ref 4.8–10.8)

## 2020-03-12 PROCEDURE — P9016 RBC LEUKOCYTES REDUCED: HCPCS

## 2020-03-12 PROCEDURE — 36430 TRANSFUSION BLD/BLD COMPNT: CPT

## 2020-03-12 PROCEDURE — 86923 COMPATIBILITY TEST ELECTRIC: CPT

## 2020-03-12 PROCEDURE — 82728 ASSAY OF FERRITIN: CPT

## 2020-03-12 PROCEDURE — 86900 BLOOD TYPING SEROLOGIC ABO: CPT

## 2020-03-12 PROCEDURE — 36591 DRAW BLOOD OFF VENOUS DEVICE: CPT

## 2020-03-12 PROCEDURE — 86901 BLOOD TYPING SEROLOGIC RH(D): CPT

## 2020-03-12 PROCEDURE — 306780 HCHG STAT FOR TRANSFUSION PER CASE

## 2020-03-12 PROCEDURE — 86850 RBC ANTIBODY SCREEN: CPT

## 2020-03-12 PROCEDURE — A4212 NON CORING NEEDLE OR STYLET: HCPCS

## 2020-03-12 PROCEDURE — 85025 COMPLETE CBC W/AUTO DIFF WBC: CPT

## 2020-03-12 PROCEDURE — 700111 HCHG RX REV CODE 636 W/ 250 OVERRIDE (IP)

## 2020-03-12 PROCEDURE — 700111 HCHG RX REV CODE 636 W/ 250 OVERRIDE (IP): Performed by: INTERNAL MEDICINE

## 2020-03-12 RX ORDER — FUROSEMIDE 10 MG/ML
20 INJECTION INTRAMUSCULAR; INTRAVENOUS ONCE
Status: DISCONTINUED | OUTPATIENT
Start: 2020-03-12 | End: 2020-03-12 | Stop reason: HOSPADM

## 2020-03-12 RX ORDER — LIDOCAINE 50 MG/G
1 PATCH TOPICAL EVERY 12 HOURS
COMMUNITY
Start: 2020-03-09 | End: 2020-04-07 | Stop reason: SDUPTHER

## 2020-03-12 RX ORDER — LIDOCAINE HYDROCHLORIDE 10 MG/ML
INJECTION, SOLUTION EPIDURAL; INFILTRATION; INTRACAUDAL; PERINEURAL
Status: COMPLETED
Start: 2020-03-12 | End: 2020-03-12

## 2020-03-12 RX ORDER — FUROSEMIDE 10 MG/ML
20 INJECTION INTRAMUSCULAR; INTRAVENOUS ONCE
Status: CANCELLED | OUTPATIENT
Start: 2020-03-12

## 2020-03-12 RX ORDER — ACETAMINOPHEN 325 MG/1
650 TABLET ORAL PRN
Status: CANCELLED | OUTPATIENT
Start: 2020-03-12

## 2020-03-12 RX ORDER — LIDOCAINE HYDROCHLORIDE 10 MG/ML
20 INJECTION, SOLUTION INFILTRATION; PERINEURAL
Status: DISCONTINUED | OUTPATIENT
Start: 2020-03-12 | End: 2020-03-12 | Stop reason: HOSPADM

## 2020-03-12 RX ORDER — DIPHENHYDRAMINE HCL 25 MG
25 TABLET ORAL ONCE
Status: CANCELLED | OUTPATIENT
Start: 2020-03-12

## 2020-03-12 RX ORDER — LIDOCAINE HYDROCHLORIDE 10 MG/ML
20 INJECTION, SOLUTION INFILTRATION; PERINEURAL
Status: CANCELLED | OUTPATIENT
Start: 2020-03-12

## 2020-03-12 RX ORDER — ACETAMINOPHEN 325 MG/1
650 TABLET ORAL ONCE
Status: DISCONTINUED | OUTPATIENT
Start: 2020-03-12 | End: 2020-03-12 | Stop reason: HOSPADM

## 2020-03-12 RX ORDER — ACETAMINOPHEN 325 MG/1
650 TABLET ORAL ONCE
Status: CANCELLED | OUTPATIENT
Start: 2020-03-12

## 2020-03-12 RX ORDER — SODIUM CHLORIDE 9 MG/ML
500 INJECTION, SOLUTION INTRAVENOUS ONCE
Status: CANCELLED | OUTPATIENT
Start: 2020-03-12

## 2020-03-12 RX ORDER — DIPHENHYDRAMINE HCL 25 MG
25 TABLET ORAL ONCE
Status: DISCONTINUED | OUTPATIENT
Start: 2020-03-12 | End: 2020-03-12 | Stop reason: HOSPADM

## 2020-03-12 RX ADMIN — LIDOCAINE HYDROCHLORIDE 0.5 ML: 10 INJECTION, SOLUTION EPIDURAL; INFILTRATION; INTRACAUDAL; PERINEURAL at 10:18

## 2020-03-12 RX ADMIN — LIDOCAINE HYDROCHLORIDE 0.5 ML: 10 INJECTION, SOLUTION INFILTRATION; PERINEURAL at 10:18

## 2020-03-12 RX ADMIN — HEPARIN 500 UNITS: 100 SYRINGE at 14:49

## 2020-03-12 ASSESSMENT — FIBROSIS 4 INDEX: FIB4 SCORE: 3.26

## 2020-03-12 NOTE — PROGRESS NOTES
Pt presented to infusion with friend Afua for labs and possible blood transfusion. She is 1 week early, spoke with Dr. oJnes who confirmed pt could receive blood today/Sat then resume q 3 week schedule as she was symptomatic at recent appt with Yanely SOTELO. Rt chest port accessed in sterile fashion after lidocaine used, brisk blood return observed. Labs drawn as ordered. Pt met parameters for blood. Pt took own tylenol, refused benadryl. 1 unit PRBC's transfused without issue. PRN lasix not given as pt's post transfusion /46, is usually in 180's-200's. Pt also takes scheduled BP meds at night at home and was hypotensive on arrival. Port flushed, heparinized and de-accessed with needle intact, gauze drsg placed. Returns Sat for second unit. Has next set of appts, printout given. Left on foot in care of friend.

## 2020-03-14 ENCOUNTER — OUTPATIENT INFUSION SERVICES (OUTPATIENT)
Dept: ONCOLOGY | Facility: MEDICAL CENTER | Age: 65
End: 2020-03-14
Attending: INTERNAL MEDICINE
Payer: MEDICARE

## 2020-03-14 VITALS
TEMPERATURE: 97.4 F | SYSTOLIC BLOOD PRESSURE: 169 MMHG | HEART RATE: 66 BPM | WEIGHT: 147.71 LBS | OXYGEN SATURATION: 100 % | HEIGHT: 57 IN | DIASTOLIC BLOOD PRESSURE: 43 MMHG | RESPIRATION RATE: 16 BRPM | BODY MASS INDEX: 31.87 KG/M2

## 2020-03-14 DIAGNOSIS — D46.9 MYELODYSPLASIA (MYELODYSPLASTIC SYNDROME) (HCC): ICD-10-CM

## 2020-03-14 DIAGNOSIS — D64.9 SYMPTOMATIC ANEMIA: ICD-10-CM

## 2020-03-14 PROCEDURE — A4212 NON CORING NEEDLE OR STYLET: HCPCS

## 2020-03-14 PROCEDURE — 700111 HCHG RX REV CODE 636 W/ 250 OVERRIDE (IP)

## 2020-03-14 PROCEDURE — 306780 HCHG STAT FOR TRANSFUSION PER CASE

## 2020-03-14 PROCEDURE — 86923 COMPATIBILITY TEST ELECTRIC: CPT

## 2020-03-14 PROCEDURE — 36430 TRANSFUSION BLD/BLD COMPNT: CPT

## 2020-03-14 PROCEDURE — 700111 HCHG RX REV CODE 636 W/ 250 OVERRIDE (IP): Performed by: INTERNAL MEDICINE

## 2020-03-14 PROCEDURE — 96374 THER/PROPH/DIAG INJ IV PUSH: CPT

## 2020-03-14 PROCEDURE — P9016 RBC LEUKOCYTES REDUCED: HCPCS

## 2020-03-14 RX ORDER — LIDOCAINE HYDROCHLORIDE 10 MG/ML
20 INJECTION, SOLUTION INFILTRATION; PERINEURAL
Status: CANCELLED | OUTPATIENT
Start: 2020-03-14

## 2020-03-14 RX ORDER — SODIUM CHLORIDE 9 MG/ML
500 INJECTION, SOLUTION INTRAVENOUS ONCE
Status: CANCELLED | OUTPATIENT
Start: 2020-03-14

## 2020-03-14 RX ORDER — ACETAMINOPHEN 325 MG/1
650 TABLET ORAL ONCE
Status: CANCELLED | OUTPATIENT
Start: 2020-03-14

## 2020-03-14 RX ORDER — LIDOCAINE HYDROCHLORIDE 10 MG/ML
INJECTION, SOLUTION EPIDURAL; INFILTRATION; INTRACAUDAL; PERINEURAL
Status: COMPLETED
Start: 2020-03-14 | End: 2020-03-14

## 2020-03-14 RX ORDER — FUROSEMIDE 10 MG/ML
20 INJECTION INTRAMUSCULAR; INTRAVENOUS ONCE
Status: CANCELLED | OUTPATIENT
Start: 2020-03-14

## 2020-03-14 RX ORDER — DIPHENHYDRAMINE HCL 25 MG
25 TABLET ORAL ONCE
Status: CANCELLED | OUTPATIENT
Start: 2020-03-14

## 2020-03-14 RX ORDER — LIDOCAINE HYDROCHLORIDE 10 MG/ML
20 INJECTION, SOLUTION INFILTRATION; PERINEURAL
Status: DISCONTINUED | OUTPATIENT
Start: 2020-03-14 | End: 2020-03-14 | Stop reason: HOSPADM

## 2020-03-14 RX ORDER — ACETAMINOPHEN 325 MG/1
650 TABLET ORAL ONCE
Status: DISCONTINUED | OUTPATIENT
Start: 2020-03-14 | End: 2020-03-14 | Stop reason: HOSPADM

## 2020-03-14 RX ORDER — DIPHENHYDRAMINE HCL 25 MG
25 TABLET ORAL ONCE
Status: DISCONTINUED | OUTPATIENT
Start: 2020-03-14 | End: 2020-03-14 | Stop reason: HOSPADM

## 2020-03-14 RX ORDER — FUROSEMIDE 10 MG/ML
20 INJECTION INTRAMUSCULAR; INTRAVENOUS ONCE
Status: COMPLETED | OUTPATIENT
Start: 2020-03-14 | End: 2020-03-14

## 2020-03-14 RX ORDER — ACETAMINOPHEN 325 MG/1
650 TABLET ORAL PRN
Status: CANCELLED | OUTPATIENT
Start: 2020-03-14

## 2020-03-14 RX ADMIN — HEPARIN 500 UNITS: 100 SYRINGE at 14:06

## 2020-03-14 RX ADMIN — LIDOCAINE HYDROCHLORIDE 0.5 ML: 10 INJECTION, SOLUTION INFILTRATION; PERINEURAL at 11:12

## 2020-03-14 RX ADMIN — LIDOCAINE HYDROCHLORIDE 0.5 ML: 10 INJECTION, SOLUTION EPIDURAL; INFILTRATION; INTRACAUDAL; PERINEURAL at 11:12

## 2020-03-14 RX ADMIN — FUROSEMIDE 20 MG: 10 INJECTION, SOLUTION INTRAMUSCULAR; INTRAVENOUS at 14:02

## 2020-03-14 ASSESSMENT — FIBROSIS 4 INDEX: FIB4 SCORE: 3.53

## 2020-03-15 NOTE — PROGRESS NOTES
Pt presented to infusion for second unit of blood after first unit on Thursday. No new acute issues. Right chest port accessed in sterile fashion after lidocaine used, brisk blood return observed. Pt took own tylenol. 1 unit PRBC's transfused without issue. Lasix given after per orders. Port flushed, heparinized and de-accessed with needle intact, gauze drsg placed. Has next appt, left on foot in care of friend Afua.

## 2020-03-17 LAB
BASOPHILS # BLD AUTO: 0 X10E3/UL (ref 0–0.2)
BASOPHILS NFR BLD AUTO: 0 %
EOSINOPHIL # BLD AUTO: 0.3 X10E3/UL (ref 0–0.4)
EOSINOPHIL NFR BLD AUTO: 8 %
ERYTHROCYTE [DISTWIDTH] IN BLOOD BY AUTOMATED COUNT: 16.2 % (ref 11.7–15.4)
HCT VFR BLD AUTO: 28.5 % (ref 34–46.6)
HGB BLD-MCNC: 9.7 G/DL (ref 11.1–15.9)
IMM GRANULOCYTES # BLD AUTO: 0 X10E3/UL (ref 0–0.1)
IMM GRANULOCYTES NFR BLD AUTO: 1 %
IMMATURE CELLS  115398: ABNORMAL
LYMPHOCYTES # BLD AUTO: 0.9 X10E3/UL (ref 0.7–3.1)
LYMPHOCYTES NFR BLD AUTO: 21 %
MCH RBC QN AUTO: 29.8 PG (ref 26.6–33)
MCHC RBC AUTO-ENTMCNC: 34 G/DL (ref 31.5–35.7)
MCV RBC AUTO: 87 FL (ref 79–97)
MONOCYTES # BLD AUTO: 0.5 X10E3/UL (ref 0.1–0.9)
MONOCYTES NFR BLD AUTO: 12 %
MORPHOLOGY BLD-IMP: ABNORMAL
NEUTROPHILS # BLD AUTO: 2.5 X10E3/UL (ref 1.4–7)
NEUTROPHILS NFR BLD AUTO: 58 %
NRBC BLD AUTO-RTO: ABNORMAL %
RBC # BLD AUTO: 3.26 X10E6/UL (ref 3.77–5.28)
WBC # BLD AUTO: 4.3 X10E3/UL (ref 3.4–10.8)

## 2020-03-18 ENCOUNTER — TELEPHONE (OUTPATIENT)
Dept: HEMATOLOGY ONCOLOGY | Facility: MEDICAL CENTER | Age: 65
End: 2020-03-18

## 2020-03-18 NOTE — TELEPHONE ENCOUNTER
Phone Number Called: [phone] (home)     Call outcome: Left detailed message for patient. Informed to call back with any additional questions.    Message: Lvm stating no visitors and to call back if showing any symptoms of the COVID -19

## 2020-03-19 ENCOUNTER — OFFICE VISIT (OUTPATIENT)
Dept: HEMATOLOGY ONCOLOGY | Facility: MEDICAL CENTER | Age: 65
End: 2020-03-19
Payer: MEDICARE

## 2020-03-19 ENCOUNTER — APPOINTMENT (OUTPATIENT)
Dept: ONCOLOGY | Facility: MEDICAL CENTER | Age: 65
End: 2020-03-19
Attending: INTERNAL MEDICINE
Payer: MEDICARE

## 2020-03-19 VITALS
SYSTOLIC BLOOD PRESSURE: 132 MMHG | DIASTOLIC BLOOD PRESSURE: 78 MMHG | HEIGHT: 57 IN | TEMPERATURE: 98.3 F | HEART RATE: 62 BPM | RESPIRATION RATE: 16 BRPM | OXYGEN SATURATION: 100 % | WEIGHT: 151.24 LBS | BODY MASS INDEX: 32.63 KG/M2

## 2020-03-19 DIAGNOSIS — N18.6 END STAGE RENAL DISEASE (HCC): ICD-10-CM

## 2020-03-19 DIAGNOSIS — Z09 ENCOUNTER FOR HEMATOLOGY FOLLOW-UP: ICD-10-CM

## 2020-03-19 DIAGNOSIS — R11.2 NON-INTRACTABLE VOMITING WITH NAUSEA, UNSPECIFIED VOMITING TYPE: ICD-10-CM

## 2020-03-19 DIAGNOSIS — D46.9 MYELODYSPLASIA (MYELODYSPLASTIC SYNDROME) (HCC): ICD-10-CM

## 2020-03-19 PROCEDURE — 99214 OFFICE O/P EST MOD 30 MIN: CPT | Performed by: NURSE PRACTITIONER

## 2020-03-19 ASSESSMENT — ENCOUNTER SYMPTOMS
WHEEZING: 0
NAUSEA: 0
CHILLS: 0
FEVER: 0
DIARRHEA: 0
TINGLING: 0
WEIGHT LOSS: 0
DIZZINESS: 0
VOMITING: 1
MYALGIAS: 0
COUGH: 0
SHORTNESS OF BREATH: 1
SENSORY CHANGE: 1
CONSTIPATION: 0
HEADACHES: 1
PALPITATIONS: 0
INSOMNIA: 1

## 2020-03-19 ASSESSMENT — FIBROSIS 4 INDEX: FIB4 SCORE: 3.53

## 2020-03-19 ASSESSMENT — PAIN SCALES - GENERAL: PAINLEVEL: 7=MODERATE-SEVERE PAIN

## 2020-03-19 NOTE — Clinical Note
On 4/2 she has an AV fistula procedure, ok to cancel that transfusion day - she can get 2 units on 4/4 if needed

## 2020-03-19 NOTE — PROGRESS NOTES
"Subjective:      Vielka Briscoe is a 64 y.o. female who presents for Other (myelodysplastic syndrome )      HPI   Ms. 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  She is accompanied by her caregiver, Afua, for today's visit.     Patient has been treated with low-dose Revlimid in 2016 and Vidaza 75 mg/m² subcu 5 days a week in 2017, per Dr. Avila. Since Dr. Roberts's [facility] the patient established with our office (7/2019), per second opinion evaluation, and continues with supportive therapy in the form of periodic transfusions, standing orders in place.    Patient continues with persistent fatigue and \"fuzziness\".  She notes mild improvement in restless legs with tonic water at night.  She continues with early satiety and reports that she has been experiencing vomiting on days of dialysis and has been able to correlate this with move to new dialysis facility.  She is more tired the day following dialysis due to being depleted.  She is due to follow-up with GI next week.  She has not needed nitroglycerin since her last visit.  She feels little better today as she got a couple of hours of solid rest and is otherwise asymptomatic.      Allergies   Allergen Reactions   • Lenalidomide Unspecified     Beeping Pulse     • Naprosyn [Naproxen] Hives   • Pioglitazone Unspecified     Causes blindness   • Simvastatin Unspecified     Couldn't move   • Demerol Vomiting   • Diphenhydramine Vomiting   • Glipizide Vomiting   • Hydromorphone Vomiting   • Iron Vomiting     vomiting   • Metformin Vomiting   • Morphine Vomiting   • Multivitamin Itching     itching   • Ondansetron Itching   • Other Drug Rash and Vomiting     Any binders that remove phosphorus from the body such as tums   • Oxycodone Vomiting   • Pcn [Penicillins] Vomiting          • Propoxyphene Vomiting   • Requip Vomiting   • Sulfa Drugs Rash and " Vomiting     Vomiting & rash      • Tamsulosin Vomiting   • Tramadol Vomiting   • Trazodone Vomiting           Current Outpatient Medications on File Prior to Visit   Medication Sig Dispense Refill   • lidocaine (LIDODERM) 5 % Patch Apply 1 Patch to skin as directed every 12 hours.     • Alcohol Swabs ( STERILE ALCOHOL PREP) Pads WIPE SKIN BEFORE INSULIN     • docusate sodium (COLACE) 100 MG Cap Take 100 mg by mouth 2 times a day.     • amLODIPine (NORVASC) 2.5 MG Tab TK 1 T PO QD     • Buprenorphine 20 MCG/HR PATCH WEEKLY Apply 20 mcg to affected area(s) every 7 days. THURSDAY  0   • Diclofenac Sodium (VOLTAREN) 1 % Gel Apply 4 g to skin as directed 4 times a day as needed.     • carvedilol (COREG) 12.5 MG Tab Take 1 Tab by mouth 2 times a day, with meals. 60 Tab 0   • losartan (COZAAR) 100 MG Tab Take 100 mg by mouth every evening.     • bimatoprost (LUMIGAN) 0.01 % Solution Place 1 Drop in both eyes every bedtime.     • brinzolamide (AZOPT) 1 % Suspension Place 1 Drop in both eyes 2 Times a Day.     • Brimonidine Tartrate-Timolol (COMBIGAN) 0.2-0.5 % Solution Place 1 Drop in both eyes 2 Times a Day.     • pregabalin (LYRICA) 150 MG Cap Take 150 mg by mouth 4 times a day.       No current facility-administered medications on file prior to visit.          Review of Systems   Constitutional: Positive for malaise/fatigue. Negative for chills, fever and weight loss.   Respiratory: Positive for shortness of breath (with exertion). Negative for cough and wheezing.         O2 at 3L   Cardiovascular: Negative for chest pain, palpitations and leg swelling.   Gastrointestinal: Positive for vomiting (consistently on days of dialysis - noticied this started with change to new site (more fumes etc); f/up with GI on Tues). Negative for constipation (BM last night (sample sent today per GI)), diarrhea and nausea.   Genitourinary: Negative for dysuria.   Musculoskeletal: Negative for myalgias.   Neurological: Positive for  "sensory change (restless legs are mildly improved at night with tonic water at night) and headaches (had an episode with claustrophobia with respiratory mask & subsequent headache). Negative for dizziness and tingling.   Psychiatric/Behavioral: The patient has insomnia (very poorly).           Objective:     /78 (BP Location: Right arm, Patient Position: Sitting, BP Cuff Size: Adult)   Pulse 62   Temp 36.8 °C (98.3 °F) (Temporal)   Resp 16   Ht 1.45 m (4' 9.09\")   Wt 68.6 kg (151 lb 3.8 oz)   LMP 01/01/1995   SpO2 100%   BMI 32.63 kg/m²      Physical Exam  Vitals signs reviewed.   Constitutional:       General: She is not in acute distress.     Appearance: She is well-developed. She is not diaphoretic.      Comments: fatigued   HENT:      Head: Normocephalic and atraumatic.      Mouth/Throat:      Pharynx: No oropharyngeal exudate.   Eyes:      General: No scleral icterus.        Right eye: No discharge.         Left eye: No discharge.      Conjunctiva/sclera: Conjunctivae normal.      Pupils: Pupils are equal, round, and reactive to light.   Neck:      Musculoskeletal: Normal range of motion and neck supple.   Cardiovascular:      Rate and Rhythm: Normal rate and regular rhythm.      Heart sounds: Normal heart sounds. No murmur. No friction rub. No gallop.    Pulmonary:      Effort: Pulmonary effort is normal. No respiratory distress.      Breath sounds: Normal breath sounds. No wheezing.      Comments: O2 @ 3  Abdominal:      General: Bowel sounds are normal. There is no distension.      Palpations: Abdomen is soft.      Tenderness: There is no abdominal tenderness.   Musculoskeletal: Normal range of motion.   Skin:     General: Skin is warm and dry.      Coloration: Skin is not pale.      Findings: No erythema or rash.   Neurological:      Mental Status: She is alert and oriented to person, place, and time.   Psychiatric:         Behavior: Behavior normal.                          Assessment/Plan:   "       1. Myelodysplasia (myelodysplastic syndrome) (HCC)     2. Encounter for hematology follow-up     3. Non-intractable vomiting with nausea, unspecified vomiting type     4. End stage renal disease (HCC)         1.  Emesis/ESRD: Patient continues with early satiety and notes emesis that is correlating with days of dialysis.  She has noticed this has been ongoing since dialysis moved to a new facility.  She is following up with GI next week for further evaluation.  She otherwise continues with dialysis every Monday, Wednesday, Friday.  She continues to follow-up with Lion Bennett MD - ([facility])    2.  MDS: Patient has previously undergone treatment with Vidaza and Revlimid.  She has not undergone further treatment since 2017.  She established care with our office in July 2019 and continues with supportive transfusions, she is requiring them every 2-3 weeks.   She most recently received 2 units RBCs on 3/12 with hemoglobin noted to be 9.7 on 3/15.  Ferritin continues to be elevated, will discuss with Dr. Jones any recommendations. She is due for every 3-week CBC check on 4/2 and has multiple appointments that conflicts with follow-up in 3 weeks.  Patient has AV fistula procedure on 4/2 and so patient will be evaluated on 4/4, okay to give 2 units on same day if needed. She will return in 3-6 weeks for reevaluation, sooner as needed.          Patient was seen for 40 minutes face to face of which > 50% of appointment time was spent on counseling and coordination of care regarding the above.      The patient verbalized agreement and understanding of current plan. All questions and concerns were addressed at time of visit.    Please note that this dictation was created using voice recognition software. I have made every reasonable attempt to correct obvious errors, but I expect that there are errors of grammar and possibly content that I did not discover before finalizing the note.

## 2020-03-21 ENCOUNTER — APPOINTMENT (OUTPATIENT)
Dept: ONCOLOGY | Facility: MEDICAL CENTER | Age: 65
End: 2020-03-21
Attending: INTERNAL MEDICINE
Payer: MEDICARE

## 2020-03-27 LAB
BASOPHILS # BLD AUTO: 0 X10E3/UL (ref 0–0.2)
BASOPHILS NFR BLD AUTO: 1 %
EOSINOPHIL # BLD AUTO: 0.2 X10E3/UL (ref 0–0.4)
EOSINOPHIL NFR BLD AUTO: 4 %
ERYTHROCYTE [DISTWIDTH] IN BLOOD BY AUTOMATED COUNT: 15.4 % (ref 11.7–15.4)
HCT VFR BLD AUTO: 23.5 % (ref 34–46.6)
HGB BLD-MCNC: 8.2 G/DL (ref 11.1–15.9)
IMM GRANULOCYTES # BLD AUTO: ABNORMAL 10*3/UL
IMM GRANULOCYTES NFR BLD AUTO: ABNORMAL %
IMMATURE CELLS  115398: ABNORMAL
LYMPHOCYTES # BLD AUTO: 1.8 X10E3/UL (ref 0.7–3.1)
LYMPHOCYTES NFR BLD AUTO: 36 %
MCH RBC QN AUTO: 30.6 PG (ref 26.6–33)
MCHC RBC AUTO-ENTMCNC: 34.9 G/DL (ref 31.5–35.7)
MCV RBC AUTO: 88 FL (ref 79–97)
MONOCYTES # BLD AUTO: 0.3 X10E3/UL (ref 0.1–0.9)
MONOCYTES NFR BLD AUTO: 7 %
MORPHOLOGY BLD-IMP: ABNORMAL
NEUTROPHILS # BLD AUTO: 2.5 X10E3/UL (ref 1.4–7)
NEUTROPHILS NFR BLD AUTO: 52 %
NRBC BLD AUTO-RTO: ABNORMAL %
PLATELET # BLD AUTO: 91 X10E3/UL (ref 150–450)
RBC # BLD AUTO: 2.68 X10E6/UL (ref 3.77–5.28)
WBC # BLD AUTO: 4.9 X10E3/UL (ref 3.4–10.8)

## 2020-04-02 ENCOUNTER — APPOINTMENT (OUTPATIENT)
Dept: ONCOLOGY | Facility: MEDICAL CENTER | Age: 65
End: 2020-04-02
Attending: INTERNAL MEDICINE
Payer: MEDICARE

## 2020-04-04 ENCOUNTER — OUTPATIENT INFUSION SERVICES (OUTPATIENT)
Dept: ONCOLOGY | Facility: MEDICAL CENTER | Age: 65
End: 2020-04-04
Attending: INTERNAL MEDICINE
Payer: MEDICARE

## 2020-04-04 VITALS
DIASTOLIC BLOOD PRESSURE: 58 MMHG | HEIGHT: 57 IN | BODY MASS INDEX: 31.87 KG/M2 | WEIGHT: 147.71 LBS | RESPIRATION RATE: 16 BRPM | TEMPERATURE: 96.6 F | SYSTOLIC BLOOD PRESSURE: 191 MMHG | OXYGEN SATURATION: 100 % | HEART RATE: 70 BPM

## 2020-04-04 DIAGNOSIS — D46.9 MYELODYSPLASIA (MYELODYSPLASTIC SYNDROME) (HCC): ICD-10-CM

## 2020-04-04 DIAGNOSIS — D64.9 SYMPTOMATIC ANEMIA: ICD-10-CM

## 2020-04-04 LAB
ABO GROUP BLD: NORMAL
BARCODED ABORH UBTYP: 8400
BARCODED ABORH UBTYP: 8400
BARCODED PRD CODE UBPRD: NORMAL
BARCODED PRD CODE UBPRD: NORMAL
BARCODED UNIT NUM UBUNT: NORMAL
BARCODED UNIT NUM UBUNT: NORMAL
BASOPHILS # BLD AUTO: 0.6 % (ref 0–1.8)
BASOPHILS # BLD: 0.02 K/UL (ref 0–0.12)
BLD GP AB SCN SERPL QL: NORMAL
COMPONENT R 8504R: NORMAL
COMPONENT R 8504R: NORMAL
EOSINOPHIL # BLD AUTO: 0.21 K/UL (ref 0–0.51)
EOSINOPHIL NFR BLD: 6.1 % (ref 0–6.9)
ERYTHROCYTE [DISTWIDTH] IN BLOOD BY AUTOMATED COUNT: 43.2 FL (ref 35.9–50)
HCT VFR BLD AUTO: 19.9 % (ref 37–47)
HGB BLD-MCNC: 6.9 G/DL (ref 12–16)
IMM GRANULOCYTES # BLD AUTO: 0.02 K/UL (ref 0–0.11)
IMM GRANULOCYTES NFR BLD AUTO: 0.6 % (ref 0–0.9)
LYMPHOCYTES # BLD AUTO: 0.71 K/UL (ref 1–4.8)
LYMPHOCYTES NFR BLD: 20.5 % (ref 22–41)
MCH RBC QN AUTO: 30.9 PG (ref 27–33)
MCHC RBC AUTO-ENTMCNC: 34.7 G/DL (ref 33.6–35)
MCV RBC AUTO: 89.2 FL (ref 81.4–97.8)
MONOCYTES # BLD AUTO: 0.59 K/UL (ref 0–0.85)
MONOCYTES NFR BLD AUTO: 17.1 % (ref 0–13.4)
NEUTROPHILS # BLD AUTO: 1.91 K/UL (ref 2–7.15)
NEUTROPHILS NFR BLD: 55.1 % (ref 44–72)
NRBC # BLD AUTO: 0 K/UL
NRBC BLD-RTO: 0 /100 WBC
PLATELET # BLD AUTO: 85 K/UL (ref 164–446)
PMV BLD AUTO: 13.2 FL (ref 9–12.9)
PRODUCT TYPE UPROD: NORMAL
PRODUCT TYPE UPROD: NORMAL
RBC # BLD AUTO: 2.23 M/UL (ref 4.2–5.4)
RH BLD: NORMAL
UNIT STATUS USTAT: NORMAL
UNIT STATUS USTAT: NORMAL
WBC # BLD AUTO: 3.5 K/UL (ref 4.8–10.8)

## 2020-04-04 PROCEDURE — 85025 COMPLETE CBC W/AUTO DIFF WBC: CPT

## 2020-04-04 PROCEDURE — P9016 RBC LEUKOCYTES REDUCED: HCPCS

## 2020-04-04 PROCEDURE — 86900 BLOOD TYPING SEROLOGIC ABO: CPT

## 2020-04-04 PROCEDURE — 86901 BLOOD TYPING SEROLOGIC RH(D): CPT

## 2020-04-04 PROCEDURE — 700111 HCHG RX REV CODE 636 W/ 250 OVERRIDE (IP)

## 2020-04-04 PROCEDURE — 36591 DRAW BLOOD OFF VENOUS DEVICE: CPT

## 2020-04-04 PROCEDURE — 86923 COMPATIBILITY TEST ELECTRIC: CPT

## 2020-04-04 PROCEDURE — 86850 RBC ANTIBODY SCREEN: CPT

## 2020-04-04 PROCEDURE — 306780 HCHG STAT FOR TRANSFUSION PER CASE

## 2020-04-04 PROCEDURE — 700111 HCHG RX REV CODE 636 W/ 250 OVERRIDE (IP): Performed by: INTERNAL MEDICINE

## 2020-04-04 PROCEDURE — A4212 NON CORING NEEDLE OR STYLET: HCPCS

## 2020-04-04 PROCEDURE — 36430 TRANSFUSION BLD/BLD COMPNT: CPT

## 2020-04-04 PROCEDURE — 96374 THER/PROPH/DIAG INJ IV PUSH: CPT

## 2020-04-04 RX ORDER — ACETAMINOPHEN 325 MG/1
650 TABLET ORAL PRN
Status: CANCELLED | OUTPATIENT
Start: 2020-04-04

## 2020-04-04 RX ORDER — DIPHENHYDRAMINE HCL 25 MG
25 TABLET ORAL ONCE
Status: DISCONTINUED | OUTPATIENT
Start: 2020-04-04 | End: 2020-04-04 | Stop reason: HOSPADM

## 2020-04-04 RX ORDER — LIDOCAINE HYDROCHLORIDE 10 MG/ML
20 INJECTION, SOLUTION INFILTRATION; PERINEURAL
Status: CANCELLED | OUTPATIENT
Start: 2020-04-04

## 2020-04-04 RX ORDER — FUROSEMIDE 10 MG/ML
20 INJECTION INTRAMUSCULAR; INTRAVENOUS ONCE
Status: CANCELLED | OUTPATIENT
Start: 2020-04-04

## 2020-04-04 RX ORDER — DIPHENHYDRAMINE HCL 25 MG
25 TABLET ORAL ONCE
Status: CANCELLED | OUTPATIENT
Start: 2020-04-04

## 2020-04-04 RX ORDER — ACETAMINOPHEN 325 MG/1
650 TABLET ORAL ONCE
Status: CANCELLED | OUTPATIENT
Start: 2020-04-04

## 2020-04-04 RX ORDER — SODIUM CHLORIDE 9 MG/ML
500 INJECTION, SOLUTION INTRAVENOUS ONCE
Status: CANCELLED | OUTPATIENT
Start: 2020-04-04

## 2020-04-04 RX ORDER — FUROSEMIDE 10 MG/ML
20 INJECTION INTRAMUSCULAR; INTRAVENOUS ONCE
Status: COMPLETED | OUTPATIENT
Start: 2020-04-04 | End: 2020-04-04

## 2020-04-04 RX ORDER — ACETAMINOPHEN 325 MG/1
650 TABLET ORAL ONCE
Status: DISCONTINUED | OUTPATIENT
Start: 2020-04-04 | End: 2020-04-04 | Stop reason: HOSPADM

## 2020-04-04 RX ORDER — LIDOCAINE HYDROCHLORIDE 10 MG/ML
INJECTION, SOLUTION EPIDURAL; INFILTRATION; INTRACAUDAL; PERINEURAL
Status: COMPLETED
Start: 2020-04-04 | End: 2020-04-04

## 2020-04-04 RX ADMIN — HEPARIN 500 UNITS: 100 SYRINGE at 17:10

## 2020-04-04 RX ADMIN — LIDOCAINE HYDROCHLORIDE 5 ML: 10 INJECTION, SOLUTION EPIDURAL; INFILTRATION; INTRACAUDAL at 10:54

## 2020-04-04 RX ADMIN — FUROSEMIDE 20 MG: 10 INJECTION, SOLUTION INTRAMUSCULAR; INTRAVENOUS at 17:07

## 2020-04-04 ASSESSMENT — FIBROSIS 4 INDEX: FIB4 SCORE: 2.83

## 2020-04-04 NOTE — PROGRESS NOTES
Pt ambulatory to [facility] for CBC w/ poss blood transfusion.  Pt w/ no s/s of infection, pt has no complaints at this time.  Pt PORT site numbed w/ lidocaine bleb per pt request.  PORT accessed using sterile technique, brisk blood return noted, lab drawn per orders, flushed per protocol.  Pt Hgb is 6.9.  Pt meets parameters for 2 units PRBCs.  Pt usually receives 1 unit on Thursday and 1 unit on Saturday, but pt was unable to come Thursday.  Barby SOTELO contacted re: ok to give pt 2 units today although pt's BP can trend high.  Per Barby SOTELO , ok for pt to receive 2 units PRBCs today, please ck BP between units and administer Lasix IV for SBP>200.  Please ck BP after 2nd unit and give Lasix PRN prior to d/c--TORB.  Pt taks her own Tylenol prior to transfusions but refuses Benadryl and Solu-Cortef.  1 unit PRBCs transfused w/ no adverse reactions.  Post-transfusion BP doesn't meet parameters for Lasix.  2nd unit PRBCs transfused w/no adverse reactions.  Post-transfusion /58.  PRN Lasix administered per orders.  PORT flushed and heparinized, de-accessed, gauze and tape bandage applied.  Pt left on fot in care of self in NAD.  Confirmed pt's next appt.

## 2020-04-07 ENCOUNTER — OFFICE VISIT (OUTPATIENT)
Dept: CARDIOLOGY | Facility: MEDICAL CENTER | Age: 65
End: 2020-04-07
Payer: MEDICARE

## 2020-04-07 VITALS
SYSTOLIC BLOOD PRESSURE: 178 MMHG | BODY MASS INDEX: 34.02 KG/M2 | OXYGEN SATURATION: 100 % | DIASTOLIC BLOOD PRESSURE: 80 MMHG | WEIGHT: 147 LBS | HEART RATE: 74 BPM | HEIGHT: 55 IN

## 2020-04-07 DIAGNOSIS — E87.5 HYPERKALEMIA: ICD-10-CM

## 2020-04-07 DIAGNOSIS — I10 ESSENTIAL HYPERTENSION: ICD-10-CM

## 2020-04-07 DIAGNOSIS — D46.9 MDS (MYELODYSPLASTIC SYNDROME) (HCC): ICD-10-CM

## 2020-04-07 DIAGNOSIS — R07.89 OTHER CHEST PAIN: ICD-10-CM

## 2020-04-07 DIAGNOSIS — E78.49 OTHER HYPERLIPIDEMIA: ICD-10-CM

## 2020-04-07 DIAGNOSIS — I20.89 CHRONIC STABLE ANGINA (HCC): ICD-10-CM

## 2020-04-07 DIAGNOSIS — N18.6 ESRD (END STAGE RENAL DISEASE) (HCC): ICD-10-CM

## 2020-04-07 DIAGNOSIS — I48.0 PAROXYSMAL ATRIAL FIBRILLATION (HCC): ICD-10-CM

## 2020-04-07 DIAGNOSIS — D61.818 PANCYTOPENIA (HCC): ICD-10-CM

## 2020-04-07 DIAGNOSIS — I51.89 DIASTOLIC DYSFUNCTION: ICD-10-CM

## 2020-04-07 DIAGNOSIS — D64.9 SYMPTOMATIC ANEMIA: ICD-10-CM

## 2020-04-07 DIAGNOSIS — I35.8 AORTIC VALVE SCLEROSIS: ICD-10-CM

## 2020-04-07 DIAGNOSIS — J96.11 CHRONIC RESPIRATORY FAILURE WITH HYPOXIA (HCC): ICD-10-CM

## 2020-04-07 DIAGNOSIS — K76.0 FATTY LIVER: ICD-10-CM

## 2020-04-07 DIAGNOSIS — I27.20 PULMONARY HYPERTENSION (HCC): ICD-10-CM

## 2020-04-07 PROCEDURE — 99214 OFFICE O/P EST MOD 30 MIN: CPT | Performed by: INTERNAL MEDICINE

## 2020-04-07 RX ORDER — LIDOCAINE 50 MG/G
1 PATCH TOPICAL EVERY 12 HOURS
Qty: 30 PATCH | Refills: 0 | Status: SHIPPED | OUTPATIENT
Start: 2020-04-07 | End: 2020-07-07

## 2020-04-07 ASSESSMENT — FIBROSIS 4 INDEX: FIB4 SCORE: 3.03

## 2020-04-07 NOTE — LETTER
[facility]   [address] [facility] [address]  [place] [address]  Phone: [phone]  Fax: [phone]              Vielka Briscoe  1955    Encounter Date: 4/7/2020    Germania Alberts M.D.          PROGRESS NOTE:    Subjective:   Chief Complaint:   Chief Complaint   Patient presents with   • Chest Pain   • Hypertension       Vielka Briscoe is a 64 y.o. female who is here for follow-up of chest pain.      She has multiple chronic issues.      She wears oxygen for what is supposed to be COPD but it was never a smoker.  She is going to see a lung specialist.      She has myelodysplastic syndrome and has been intolerant to some of the treatments.  Sees Dr. Ranulfo Avila with hematology, gets transfused PRN and has a port now.  Her hemoglobin has been very low at 8.4, due to myelo dysplastic syndrome.  She gets PRBCs every 2 weeks know, Hbg 6.9 3 days ago.  Has chronic VELARDE which is probably related to anemia and lungs.  Her iron level is apparently getting high, could consider repeat echo but this would not change mgmt, it is very a difficult situation, and her most imminent threat is low Hbg.    She has had episodes of chest pain going on for many years.  It will happen with activity or rest.  It is substernal chest pain and sometimes radiates to the back.  It will last for a few minutes.  Has taken nitro, sometimes helps, sometimes not. Does not take it often enough to justify Imdur.   Also used lidoderm patch and that helped, helped with sleep also, also uses this over her port. If she puts this center chest it helped everything.  Had a troponin drawn in September 2018 which was normal.    Her EKG looks like an old anterior MI but she had a normal perfusion nuclear stress test in December 2017 and a structurally normal heart by echo at that time.      There is no family history of coronary disease.    Her biggest risk factor is her being a dialysis  patient.    Echocardiogram in 2018 showed EF 75%.  She does have mitral annular calcification and aortic valve sclerosis.    She also experiences palpitations with heart will race and this lasts for a few minutes.  She is intermittently dizzy upon standing.  Her blood pressures extremely labile.  Today in the office here it is low.  Sometimes is as high as 218/90.  It is often lower after dialysis.  Her blood pressure medications have been adjusted from time to time.    She is not on primary prevention aspirin therapy. I believe because of the pancytopenia and particularly the anemia.    Was evaluated in the emergency room in November 2015 with weakness.  Tonic water helped with leg pain.    Sees Dr. Haro who will retire.  Sees [facility] for Onc with Dr. Chato Jones.    Here with community helper.    DATA REVIEWED by me:  ECG 2-  Sinus, 79, normal    ECG 6/27/2018  Sinus rhythm, rate 61, probable old anterior MI,     Echo 8/27/2018  Hyperdynamic EF greater than 70%, mild concentric LVH, mitral annular calcification, mild aortic stenosis versus sclerosis, mean gradient 14 mmHg    Nuclear stress test 11/16/2017  Normal perfusion, EF 58%    PFTs 11/1/2018  Impression  Mild restrictive lung disease with amandeep DLCOl    Echo November 13, 2017  Normal LV systolic function, no significant Doppler or valve abnormality, EF 65%    Cxray 2-  Cardiomegaly with interstitial prominence.    Most recent labs:     Lab Results   Component Value Date/Time    HEMOGLOBIN 6.9 (L) 04/04/2020 11:00 AM    HEMATOCRIT 19.9 (L) 04/04/2020 11:00 AM    MCV 89.2 04/04/2020 11:00 AM    INR 0.93 02/20/2020 05:10 PM      Lab Results   Component Value Date/Time    SODIUM 130 (L) 02/21/2020 12:32 AM    POTASSIUM 4.3 02/21/2020 12:32 AM    CHLORIDE 88 (L) 02/21/2020 12:32 AM    CO2 24 02/21/2020 12:32 AM    GLUCOSE 296 (H) 02/21/2020 12:32 AM    BUN 72 (HH) 02/21/2020 12:32 AM    CREATININE 6.76 (HH) 02/21/2020 12:32 AM   "   CREATININE 0.6 07/20/2007 04:00 AM      Lab Results   Component Value Date/Time    ASTSGOT 19 02/20/2020 05:10 PM    ALTSGPT 23 02/20/2020 05:10 PM    ALBUMIN 3.6 02/20/2020 05:10 PM    ALBUMIN 4.29 09/08/2016 12:50 PM      Lab Results   Component Value Date/Time    CHOLSTRLTOT 129 12/26/2019 10:00 AM    LDL 65 12/26/2019 10:00 AM    HDL 32 (A) 12/26/2019 10:00 AM    TRIGLYCERIDE 161 (H) 12/26/2019 10:00 AM           3/9/2019 hemoglobin 8.2, , platelets 117    11/13/2018 sodium 138, potassium 4.2, creatinine 6.91, LFTs normal    9/26/2018 , troponin normal    August 9, 2018 hemoglobin 7.6, platelets 146, white count 4.5    June 27, 2018 sodium 136, potassium 6, creatinine 6.87, LFTs normal, troponins normal,     January 23, 2018 total cholesterol 172, triglycerides 176, HDL 43, LDL 94    Past Medical History:   Diagnosis Date   • Arthritis     hands    • Atrial fibrillation (HCC)     HX   • Blood transfusion without reported diagnosis    • Breath shortness     w/exertion   • Cancer (HCC)     MDS in bones   • Cataract     bilat IOL   • Chronic anemia    • Chronic kidney disease        • Chronic obstructive pulmonary disease (HCC)    • Congestive heart failure (HCC)    • Dental disorder     full dentures   • Diabetes     Diet controlled   • Diabetes (HCC)    • Dialysis patient     [facility] ,   [facility] [place]   • Glaucoma    • Heart abnormalities    • Hypertension    • MDS (myelodysplastic syndrome) 10/2016    bone marrow biopsy   • Other hyperlipidemia 12/12/2019   • Pain -2017    \"bones\", generalized, 5/10   • Renal disorder    • Stroke (HCC) 03/2015    No residual weakness/problems   • Supplemental oxygen dependent     2 liters     Past Surgical History:   Procedure Laterality Date   • GASTROSCOPY N/A 1/25/2018    Procedure: GASTROSCOPY;  Surgeon: Blanca Santos M.D.;  Location: SURGERY [facility];  Service: Gastroenterology   • BONE MARROW BIOPSY, NDL/TROCAR  9/20/2017 "    Procedure: BONE MARROW BIOPSY, NDL/TROCAR;  Surgeon: Waed Turner M.D.;  Location: ENDOSCOPY [facility];  Service: Orthopedics   • BONE MARROW ASPIRATION  2017    Procedure: BONE MARROW ASPIRATION;  Surgeon: Wade Turner M.D.;  Location: ENDOSCOPY [facility];  Service: Orthopedics   • VEIN LIGATION Left 11/10/2016    Procedure: VEIN LIGATION FOR DISTAL REVASCULARIZATION AND INTERVAL LIGATION OF LEFT ARM DIALYISIS ACCESS (DRIL PROCEDURE);  Surgeon: Ranulfo Jolly M.D.;  Location: SURGERY [facility];  Service:    • AV FISTULA CREATION Left 2016    Procedure: AV FISTULA CREATION UPPER EXTREMITY;  Surgeon: Ranulfo Jolly M.D.;  Location: SURGERY [facility];  Service:    • GASTROSCOPY  2015    Procedure: ESOPHAGOGASTRODUODENOSCOPY WITH BIOPSY;  Surgeon: Wing Álvarez M.D.;  Location: SURGERY [facility];  Service:    • COLONOSCOPY  2015    Procedure: COLONOSCOPY;  Surgeon: Wing Álvarez M.D.;  Location: SURGERY [facility];  Service:    • GYN SURGERY      hysterectomy   • GYN SURGERY       x 2   • GYN SURGERY      d&C x2   • OTHER      angioplasty/ stents bilat LE   • OTHER ABDOMINAL SURGERY      appendectomy,  x 2, hysterectomy, D & C   • OTHER ABDOMINAL SURGERY      appendectomy   • OTHER CARDIAC SURGERY      Angioplasty  and    • OTHER CARDIAC SURGERY      cardiac angiogram, angioplasty   • RETINAL DETACHMENT REPAIR Right      Family History   Problem Relation Age of Onset   • Hypertension Father          at 89   • Hypertension Mother      Social History     Socioeconomic History   • Marital status:      Spouse name: Not on file   • Number of children: Not on file   • Years of education: Not on file   • Highest education level: Not on file   Occupational History   • Not on file   Social Needs   • Financial resource strain: Not on file   • Food insecurity     Worry: Not on file     Inability: Not on file   •  Transportation needs     Medical: Not on file     Non-medical: Not on file   Tobacco Use   • Smoking status: Never Smoker   • Smokeless tobacco: Never Used   Substance and Sexual Activity   • Alcohol use: No   • Drug use: No   • Sexual activity: Not on file   Lifestyle   • Physical activity     Days per week: Not on file     Minutes per session: Not on file   • Stress: Not on file   Relationships   • Social connections     Talks on phone: Not on file     Gets together: Not on file     Attends Alevism service: Not on file     Active member of club or organization: Not on file     Attends meetings of clubs or organizations: Not on file     Relationship status: Not on file   • Intimate partner violence     Fear of current or ex partner: Not on file     Emotionally abused: Not on file     Physically abused: Not on file     Forced sexual activity: Not on file   Other Topics Concern   • Not on file   Social History Narrative    ** Merged History Encounter **         ** Merged History Encounter **        Allergies   Allergen Reactions   • Lenalidomide Unspecified     Beeping Pulse     • Naprosyn [Naproxen] Hives   • Pioglitazone Unspecified     Causes blindness   • Simvastatin Unspecified     Couldn't move   • Demerol Vomiting   • Diphenhydramine Vomiting   • Glipizide Vomiting   • Hydromorphone Vomiting   • Iron Vomiting     vomiting   • Metformin Vomiting   • Morphine Vomiting   • Multivitamin Itching     itching   • Ondansetron Itching   • Other Drug Rash and Vomiting     Any binders that remove phosphorus from the body such as tums   • Oxycodone Vomiting   • Pcn [Penicillins] Vomiting          • Propoxyphene Vomiting   • Requip Vomiting   • Sulfa Drugs Rash and Vomiting     Vomiting & rash      • Tamsulosin Vomiting   • Tramadol Vomiting   • Trazodone Vomiting       Current Outpatient Medications   Medication Sig Dispense Refill   • lidocaine (LIDODERM) 5 % Patch Apply 1 Patch to skin as directed every 12 hours. 30  "Patch 0   • docusate sodium (COLACE) 100 MG Cap Take 100 mg by mouth 2 times a day.     • amLODIPine (NORVASC) 2.5 MG Tab TK 1 T PO QD     • Buprenorphine 20 MCG/HR PATCH WEEKLY Apply 20 mcg to affected area(s) every 7 days. THURSDAY  0   • Diclofenac Sodium (VOLTAREN) 1 % Gel Apply 4 g to skin as directed 4 times a day as needed.     • carvedilol (COREG) 12.5 MG Tab Take 1 Tab by mouth 2 times a day, with meals. 60 Tab 0   • losartan (COZAAR) 100 MG Tab Take 100 mg by mouth every evening.     • bimatoprost (LUMIGAN) 0.01 % Solution Place 1 Drop in both eyes every bedtime.     • brinzolamide (AZOPT) 1 % Suspension Place 1 Drop in both eyes 2 Times a Day.     • Brimonidine Tartrate-Timolol (COMBIGAN) 0.2-0.5 % Solution Place 1 Drop in both eyes 2 Times a Day.     • pregabalin (LYRICA) 150 MG Cap Take 150 mg by mouth 4 times a day.     • Alcohol Swabs ( STERILE ALCOHOL PREP) Pads WIPE SKIN BEFORE INSULIN       No current facility-administered medications for this visit.        Review of Systems     All others systems reviewed and negative.     Objective:     BP (!) 178/80 (BP Location: Right arm, Patient Position: Sitting, BP Cuff Size: Adult)   Pulse 74   Ht 1.323 m (4' 4.09\")   Wt 66.7 kg (147 lb)   SpO2 100%  Body mass index is 38.09 kg/m².    Physical Exam   General: No acute distress. Well nourished. Wearing O2.  HEENT: EOM grossly intact, no scleral icterus, no pharyngeal erythema.   Neck:  No JVD, no bruits, trachea midline  CVS: RRR. Normal S1, S2. No M/R/G. No LE edema.  2+ radial pulses, 2+ DP pulses  Resp: CTAB. No wheezing or crackles/rhonchi. Normal respiratory effort.  Abdomen: Soft, NT, no tim hepatomegaly.  MSK/Ext: No clubbing or cyanosis.  Skin: Warm and dry, no rashes.  Neurological: CN III-XII grossly intact. No focal deficits.   Psych: A&O x 3, appropriate affect, good judgement    Physical exam performed today and unchanged, except what is noted, compared to 3-28-19.    Assessment:     1. " Essential hypertension     2. Chronic stable angina (HCC)     3. Diastolic dysfunction     4. Paroxysmal atrial fibrillation (HCC)     5. Other hyperlipidemia     6. Other chest pain     7. Symptomatic anemia     8. ESRD (end stage renal disease) (HCC)     9. MDS (myelodysplastic syndrome) (HCC)     10. Pancytopenia (HCC)     11. Hyperkalemia     12. Pulmonary hypertension (HCC)     13. Fatty liver     14. Chronic respiratory failure with hypoxia, 2L     15. Aortic valve sclerosis         Medical Decision Making:  Today's Assessment / Status / Plan:     -Atypical CP, nitro helps sometimes and not others, if CP happening frequently and nitro helping consistently we could try Imdur but lidoderm patch also helps.  -No stress testing, we cannot take her for cath, would consider only for ACS without active bleeding, would be balloon angioplasty only, or no cath at all.  -No ASA due to bleeding and anemia  -VELARDE from lungs and severe anemia.  -There is mention in her medical history of paroxysmal atrial fibrillation but I did not find any details of this and she would not be a candidate for anticoagulation anyway.  -Her LDL cholesterol is in a good place so until she has an event such as a heart attack or stroke I would not start cholesterol medication.  -Will cont on HD.  -If her iron level is high, could consider repeat echo but this would not change mgmt, it is very a difficult situation, and her most imminent threat is low Hbg.  -RTC 6 months for close monitoring.      Return in about 6 months (around 10/7/2020).    It is my pleasure to participate in the care of Ms. Briscoe.  Please do not hesitate to contact me with questions or concerns.    Germania Alberts MD, [facility]  Cardiologist [facility]    Please note that this dictation was created using voice recognition software. I have made every reasonable attempt to correct obvious errors, but it is possible there are errors of grammar  and possibly content that I did not discover before finalizing the note.      Yanely Barba A.P.R.NRobbie  [address] [place] [address]  VIA In Basket     Nyla Nava M.D.  [address] [facility] [address]  VIA Facsimile: [phone]

## 2020-04-07 NOTE — LETTER
[facility]   [address]  [place] [address]  Phone: [phone]  Fax: [phone]              Vielka Briscoe  1955    Encounter Date: 4/7/2020    Germania Alberts M.D.          PROGRESS NOTE:    Subjective:   Chief Complaint:   Chief Complaint   Patient presents with   • Chest Pain   • Hypertension       Vielka Briscoe is a 64 y.o. female who is here for follow-up of chest pain.      She has multiple chronic issues.      She wears oxygen for what is supposed to be COPD but it was never a smoker.  She is going to see a lung specialist.      She has myelodysplastic syndrome and has been intolerant to some of the treatments.  Sees Dr. Ranulfo Avila with hematology, gets transfused PRN and has a port now.  Her hemoglobin has been very low at 8.4, due to myelo dysplastic syndrome.  She gets PRBCs every 2 weeks know, Hbg 6.9 3 days ago.  Has chronic VELARDE which is probably related to anemia and lungs.    She has had episodes of chest pain going on for many years.  It will happen with activity or rest.  It is substernal chest pain and sometimes radiates to the back.  It will last for a few minutes.  Has taken nitro, sometimes helps, sometimes not. Does not take it often enough to justify Imdur.   Also used lidoderm patch and that helped, helped with sleep also, also uses this over her port. If she puts this center chest it helped everything.  Had a troponin drawn in September 2018 which was normal.    Her EKG looks like an old anterior MI but she had a normal perfusion nuclear stress test in December 2017 and a structurally normal heart by echo at that time.      There is no family history of coronary disease.    Her biggest risk factor is her being a dialysis patient.    Echocardiogram in 2018 showed EF 75%.  She does have mitral annular calcification and aortic valve sclerosis.    She also experiences palpitations with heart will race  and this lasts for a few minutes.  She is intermittently dizzy upon standing.  Her blood pressures extremely labile.  Today in the office here it is low.  Sometimes is as high as 218/90.  It is often lower after dialysis.  Her blood pressure medications have been adjusted from time to time.    She is not on primary prevention aspirin therapy. I believe because of the pancytopenia and particularly the anemia.    Was evaluated in the emergency room in November 2015 with weakness.  Tonic water helped with leg pain.    Sees Dr. Haro who will retire.  Sees Yan[facility] MARINA for Onc with Dr. Chato Jones.    Here with community helper.    DATA REVIEWED by me:  ECG 2-  Sinus, 79, normal    ECG 6/27/2018  Sinus rhythm, rate 61, probable old anterior MI,     Echo 8/27/2018  Hyperdynamic EF greater than 70%, mild concentric LVH, mitral annular calcification, mild aortic stenosis versus sclerosis, mean gradient 14 mmHg    Nuclear stress test 11/16/2017  Normal perfusion, EF 58%    PFTs 11/1/2018  Impression  Mild restrictive lung disease with amandeep DLCOl    Echo November 13, 2017  Normal LV systolic function, no significant Doppler or valve abnormality, EF 65%    Cxray 2-  Cardiomegaly with interstitial prominence.    Most recent labs:     Lab Results   Component Value Date/Time    HEMOGLOBIN 6.9 (L) 04/04/2020 11:00 AM    HEMATOCRIT 19.9 (L) 04/04/2020 11:00 AM    MCV 89.2 04/04/2020 11:00 AM    INR 0.93 02/20/2020 05:10 PM      Lab Results   Component Value Date/Time    SODIUM 130 (L) 02/21/2020 12:32 AM    POTASSIUM 4.3 02/21/2020 12:32 AM    CHLORIDE 88 (L) 02/21/2020 12:32 AM    CO2 24 02/21/2020 12:32 AM    GLUCOSE 296 (H) 02/21/2020 12:32 AM    BUN 72 (HH) 02/21/2020 12:32 AM    CREATININE 6.76 (HH) 02/21/2020 12:32 AM    CREATININE 0.6 07/20/2007 04:00 AM      Lab Results   Component Value Date/Time    ASTSGOT 19 02/20/2020 05:10 PM    ALTSGPT 23 02/20/2020 05:10 PM    ALBUMIN 3.6 02/20/2020  "05:10 PM    ALBUMIN 4.29 09/08/2016 12:50 PM      Lab Results   Component Value Date/Time    CHOLSTRLTOT 129 12/26/2019 10:00 AM    LDL 65 12/26/2019 10:00 AM    HDL 32 (A) 12/26/2019 10:00 AM    TRIGLYCERIDE 161 (H) 12/26/2019 10:00 AM           3/9/2019 hemoglobin 8.2, , platelets 117    11/13/2018 sodium 138, potassium 4.2, creatinine 6.91, LFTs normal    9/26/2018 , troponin normal    August 9, 2018 hemoglobin 7.6, platelets 146, white count 4.5    June 27, 2018 sodium 136, potassium 6, creatinine 6.87, LFTs normal, troponins normal,     January 23, 2018 total cholesterol 172, triglycerides 176, HDL 43, LDL 94    Past Medical History:   Diagnosis Date   • Arthritis     hands    • Atrial fibrillation (HCC)     HX   • Blood transfusion without reported diagnosis    • Breath shortness     w/exertion   • Cancer (HCC)     MDS in bones   • Cataract     bilat IOL   • Chronic anemia    • Chronic kidney disease        • Chronic obstructive pulmonary disease (HCC)    • Congestive heart failure (HCC)    • Dental disorder     full dentures   • Diabetes     Diet controlled   • Diabetes (HCC)    • Dialysis patient     [facility] ,   [facility] [place]   • Glaucoma    • Heart abnormalities    • Hypertension    • MDS (myelodysplastic syndrome) 10/2016    bone marrow biopsy   • Other hyperlipidemia 12/12/2019   • Pain -2017    \"bones\", generalized, 5/10   • Renal disorder    • Stroke (HCC) 03/2015    No residual weakness/problems   • Supplemental oxygen dependent     2 liters     Past Surgical History:   Procedure Laterality Date   • GASTROSCOPY N/A 1/25/2018    Procedure: GASTROSCOPY;  Surgeon: Blanca Santos M.D.;  Location: SURGERY [facility];  Service: Gastroenterology   • BONE MARROW BIOPSY, NDL/TROCAR  9/20/2017    Procedure: BONE MARROW BIOPSY, NDL/TROCAR;  Surgeon: Wade Turner M.D.;  Location: ENDOSCOPY [facility];  Service: Orthopedics   • BONE MARROW ASPIRATION  9/20/2017 "    Procedure: BONE MARROW ASPIRATION;  Surgeon: Wade Turner M.D.;  Location: ENDOSCOPY [facility];  Service: Orthopedics   • VEIN LIGATION Left 11/10/2016    Procedure: VEIN LIGATION FOR DISTAL REVASCULARIZATION AND INTERVAL LIGATION OF LEFT ARM DIALYISIS ACCESS (DRIL PROCEDURE);  Surgeon: Ranulfo Jolly M.D.;  Location: SURGERY [facility];  Service:    • AV FISTULA CREATION Left 2016    Procedure: AV FISTULA CREATION UPPER EXTREMITY;  Surgeon: Ranulfo Jolly M.D.;  Location: SURGERY [facility];  Service:    • GASTROSCOPY  2015    Procedure: ESOPHAGOGASTRODUODENOSCOPY WITH BIOPSY;  Surgeon: Wing Álvarez M.D.;  Location: SURGERY [facility];  Service:    • COLONOSCOPY  2015    Procedure: COLONOSCOPY;  Surgeon: Wing Álvarez M.D.;  Location: SURGERY [facility];  Service:    • GYN SURGERY      hysterectomy   • GYN SURGERY       x 2   • GYN SURGERY      d&C x2   • OTHER      angioplasty/ stents bilat LE   • OTHER ABDOMINAL SURGERY      appendectomy,  x 2, hysterectomy, D & C   • OTHER ABDOMINAL SURGERY      appendectomy   • OTHER CARDIAC SURGERY      Angioplasty  and    • OTHER CARDIAC SURGERY      cardiac angiogram, angioplasty   • RETINAL DETACHMENT REPAIR Right      Family History   Problem Relation Age of Onset   • Hypertension Father          at 89   • Hypertension Mother      Social History     Socioeconomic History   • Marital status:      Spouse name: Not on file   • Number of children: Not on file   • Years of education: Not on file   • Highest education level: Not on file   Occupational History   • Not on file   Social Needs   • Financial resource strain: Not on file   • Food insecurity     Worry: Not on file     Inability: Not on file   • Transportation needs     Medical: Not on file     Non-medical: Not on file   Tobacco Use   • Smoking status: Never Smoker   • Smokeless tobacco: Never Used   Substance and Sexual Activity    • Alcohol use: No   • Drug use: No   • Sexual activity: Not on file   Lifestyle   • Physical activity     Days per week: Not on file     Minutes per session: Not on file   • Stress: Not on file   Relationships   • Social connections     Talks on phone: Not on file     Gets together: Not on file     Attends Cheondoism service: Not on file     Active member of club or organization: Not on file     Attends meetings of clubs or organizations: Not on file     Relationship status: Not on file   • Intimate partner violence     Fear of current or ex partner: Not on file     Emotionally abused: Not on file     Physically abused: Not on file     Forced sexual activity: Not on file   Other Topics Concern   • Not on file   Social History Narrative    ** Merged History Encounter **         ** Merged History Encounter **        Allergies   Allergen Reactions   • Lenalidomide Unspecified     Beeping Pulse     • Naprosyn [Naproxen] Hives   • Pioglitazone Unspecified     Causes blindness   • Simvastatin Unspecified     Couldn't move   • Demerol Vomiting   • Diphenhydramine Vomiting   • Glipizide Vomiting   • Hydromorphone Vomiting   • Iron Vomiting     vomiting   • Metformin Vomiting   • Morphine Vomiting   • Multivitamin Itching     itching   • Ondansetron Itching   • Other Drug Rash and Vomiting     Any binders that remove phosphorus from the body such as tums   • Oxycodone Vomiting   • Pcn [Penicillins] Vomiting          • Propoxyphene Vomiting   • Requip Vomiting   • Sulfa Drugs Rash and Vomiting     Vomiting & rash      • Tamsulosin Vomiting   • Tramadol Vomiting   • Trazodone Vomiting       Current Outpatient Medications   Medication Sig Dispense Refill   • lidocaine (LIDODERM) 5 % Patch Apply 1 Patch to skin as directed every 12 hours. 30 Patch 0   • docusate sodium (COLACE) 100 MG Cap Take 100 mg by mouth 2 times a day.     • amLODIPine (NORVASC) 2.5 MG Tab TK 1 T PO QD     • Buprenorphine 20 MCG/HR PATCH WEEKLY Apply 20  "mcg to affected area(s) every 7 days. THURSDAY  0   • Diclofenac Sodium (VOLTAREN) 1 % Gel Apply 4 g to skin as directed 4 times a day as needed.     • carvedilol (COREG) 12.5 MG Tab Take 1 Tab by mouth 2 times a day, with meals. 60 Tab 0   • losartan (COZAAR) 100 MG Tab Take 100 mg by mouth every evening.     • bimatoprost (LUMIGAN) 0.01 % Solution Place 1 Drop in both eyes every bedtime.     • brinzolamide (AZOPT) 1 % Suspension Place 1 Drop in both eyes 2 Times a Day.     • Brimonidine Tartrate-Timolol (COMBIGAN) 0.2-0.5 % Solution Place 1 Drop in both eyes 2 Times a Day.     • pregabalin (LYRICA) 150 MG Cap Take 150 mg by mouth 4 times a day.     • Alcohol Swabs ( STERILE ALCOHOL PREP) Pads WIPE SKIN BEFORE INSULIN       No current facility-administered medications for this visit.        Review of Systems     All others systems reviewed and negative.     Objective:     BP (!) 178/80 (BP Location: Right arm, Patient Position: Sitting, BP Cuff Size: Adult)   Pulse 74   Ht 1.323 m (4' 4.09\")   Wt 66.7 kg (147 lb)   SpO2 100%  Body mass index is 38.09 kg/m².    Physical Exam   General: No acute distress. Well nourished. Wearing O2.  HEENT: EOM grossly intact, no scleral icterus, no pharyngeal erythema.   Neck:  No JVD, no bruits, trachea midline  CVS: RRR. Normal S1, S2. No M/R/G. No LE edema.  2+ radial pulses, 2+ DP pulses  Resp: CTAB. No wheezing or crackles/rhonchi. Normal respiratory effort.  Abdomen: Soft, NT, no tim hepatomegaly.  MSK/Ext: No clubbing or cyanosis.  Skin: Warm and dry, no rashes.  Neurological: CN III-XII grossly intact. No focal deficits.   Psych: A&O x 3, appropriate affect, good judgement    Physical exam performed today and unchanged, except what is noted, compared to 3-28-19.    Assessment:     1. Essential hypertension     2. Chronic stable angina (HCC)     3. Diastolic dysfunction     4. Paroxysmal atrial fibrillation (HCC)     5. Other hyperlipidemia     6. Other chest pain     "   7. Symptomatic anemia     8. ESRD (end stage renal disease) (HCC)     9. MDS (myelodysplastic syndrome) (HCC)     10. Pancytopenia (HCC)     11. Hyperkalemia     12. Pulmonary hypertension (HCC)     13. Fatty liver     14. Chronic respiratory failure with hypoxia, 2L     15. Aortic valve sclerosis         Medical Decision Making:  Today's Assessment / Status / Plan:     -Atypical CP, nitro helps sometimes and not others, if CP happening frequently and nitro helping consistently we could try Imdur but lidoderm patch also helps.  -No stress testing, we cannot take her for cath, would consider only for ACS without active bleeding, would be balloon angioplasty only, or no cath at all.  -No ASA due to bleeding and anemia  -VELARDE from lungs and severe anemia.  -There is mention in her medical history of paroxysmal atrial fibrillation but I did not find any details of this and she would not be a candidate for anticoagulation anyway.  -Her LDL cholesterol is in a good place so until she has an event such as a heart attack or stroke I would not start cholesterol medication.  -Will cont on HD.  -RTC yearly and I am available as needed for changes.      Return in about 1 year (around 4/7/2021).    It is my pleasure to participate in the care of Ms. Briscoe.  Please do not hesitate to contact me with questions or concerns.    Germania Alberts MD, [facility]  Cardiologist [facility]    Please note that this dictation was created using voice recognition software. I have made every reasonable attempt to correct obvious errors, but it is possible there are errors of grammar and possibly content that I did not discover before finalizing the note.      Anthony Knapp  VIA In Basket     Nyla Nava M.D.  [address] [place] [address]  VIA Facsimile: [phone]     Yanely Barba A.P.R.NRobbie  [address]  [address] [address]  VIA In Basket

## 2020-04-07 NOTE — PROGRESS NOTES
Subjective:   Chief Complaint:   Chief Complaint   Patient presents with   • Chest Pain   • Hypertension       Vielka Briscoe is a 64 y.o. female who is here for follow-up of chest pain.      She has multiple chronic issues.      She wears oxygen for what is supposed to be COPD but it was never a smoker.  She is going to see a lung specialist.      She has myelodysplastic syndrome and has been intolerant to some of the treatments.  Sees Dr. Ranulfo Avila with hematology, gets transfused PRN and has a port now.  Her hemoglobin has been very low at 8.4, due to myelo dysplastic syndrome.  She gets PRBCs every 2 weeks know, Hbg 6.9 3 days ago.  Has chronic VELARDE which is probably related to anemia and lungs.  Her iron level is apparently getting high, could consider repeat echo but this would not change mgmt, it is very a difficult situation, and her most imminent threat is low Hbg.    She has had episodes of chest pain going on for many years.  It will happen with activity or rest.  It is substernal chest pain and sometimes radiates to the back.  It will last for a few minutes.  Has taken nitro, sometimes helps, sometimes not. Does not take it often enough to justify Imdur.   Also used lidoderm patch and that helped, helped with sleep also, also uses this over her port. If she puts this center chest it helped everything.  Had a troponin drawn in September 2018 which was normal.    Her EKG looks like an old anterior MI but she had a normal perfusion nuclear stress test in December 2017 and a structurally normal heart by echo at that time.      There is no family history of coronary disease.    Her biggest risk factor is her being a dialysis patient.    Echocardiogram in 2018 showed EF 75%.  She does have mitral annular calcification and aortic valve sclerosis.    She also experiences palpitations with heart will race and this lasts for a few minutes.  She is intermittently dizzy upon standing.  Her blood  pressures extremely labile.  Today in the office here it is low.  Sometimes is as high as 218/90.  It is often lower after dialysis.  Her blood pressure medications have been adjusted from time to time.    She is not on primary prevention aspirin therapy. I believe because of the pancytopenia and particularly the anemia.    Was evaluated in the emergency room in November 2015 with weakness.  Tonic water helped with leg pain.    Sees Dr. Haro who will retire.  Sees Yanely MARINA for Onc with Dr. Chato Jones.    Here with community helper.    DATA REVIEWED by me:  ECG 2-  Sinus, 79, normal    ECG 6/27/2018  Sinus rhythm, rate 61, probable old anterior MI,     Echo 8/27/2018  Hyperdynamic EF greater than 70%, mild concentric LVH, mitral annular calcification, mild aortic stenosis versus sclerosis, mean gradient 14 mmHg    Nuclear stress test 11/16/2017  Normal perfusion, EF 58%    PFTs 11/1/2018  Impression  Mild restrictive lung disease with amandeep DLCOl    Echo November 13, 2017  Normal LV systolic function, no significant Doppler or valve abnormality, EF 65%    Cxray 2-  Cardiomegaly with interstitial prominence.    Most recent labs:     Lab Results   Component Value Date/Time    HEMOGLOBIN 6.9 (L) 04/04/2020 11:00 AM    HEMATOCRIT 19.9 (L) 04/04/2020 11:00 AM    MCV 89.2 04/04/2020 11:00 AM    INR 0.93 02/20/2020 05:10 PM      Lab Results   Component Value Date/Time    SODIUM 130 (L) 02/21/2020 12:32 AM    POTASSIUM 4.3 02/21/2020 12:32 AM    CHLORIDE 88 (L) 02/21/2020 12:32 AM    CO2 24 02/21/2020 12:32 AM    GLUCOSE 296 (H) 02/21/2020 12:32 AM    BUN 72 (HH) 02/21/2020 12:32 AM    CREATININE 6.76 (HH) 02/21/2020 12:32 AM    CREATININE 0.6 07/20/2007 04:00 AM      Lab Results   Component Value Date/Time    ASTSGOT 19 02/20/2020 05:10 PM    ALTSGPT 23 02/20/2020 05:10 PM    ALBUMIN 3.6 02/20/2020 05:10 PM    ALBUMIN 4.29 09/08/2016 12:50 PM      Lab Results   Component Value Date/Time     "CHOLSTRLTOT 129 12/26/2019 10:00 AM    LDL 65 12/26/2019 10:00 AM    HDL 32 (A) 12/26/2019 10:00 AM    TRIGLYCERIDE 161 (H) 12/26/2019 10:00 AM           3/9/2019 hemoglobin 8.2, , platelets 117    11/13/2018 sodium 138, potassium 4.2, creatinine 6.91, LFTs normal    9/26/2018 , troponin normal    August 9, 2018 hemoglobin 7.6, platelets 146, white count 4.5    June 27, 2018 sodium 136, potassium 6, creatinine 6.87, LFTs normal, troponins normal,     January 23, 2018 total cholesterol 172, triglycerides 176, HDL 43, LDL 94    Past Medical History:   Diagnosis Date   • Arthritis     hands    • Atrial fibrillation (HCC)     HX   • Blood transfusion without reported diagnosis    • Breath shortness     w/exertion   • Cancer (HCC)     MDS in bones   • Cataract     bilat IOL   • Chronic anemia    • Chronic kidney disease        • Chronic obstructive pulmonary disease (HCC)    • Congestive heart failure (HCC)    • Dental disorder     full dentures   • Diabetes     Diet controlled   • Diabetes (HCC)    • Dialysis patient     [facility] F ,   [facility] in [place]   • Glaucoma    • Heart abnormalities    • Hypertension    • MDS (myelodysplastic syndrome) 10/2016    bone marrow biopsy   • Other hyperlipidemia 12/12/2019   • Pain -2017    \"bones\", generalized, 5/10   • Renal disorder    • Stroke (HCC) 03/2015    No residual weakness/problems   • Supplemental oxygen dependent     2 liters     Past Surgical History:   Procedure Laterality Date   • GASTROSCOPY N/A 1/25/2018    Procedure: GASTROSCOPY;  Surgeon: Blanca Santos M.D.;  Location: SURGERY [facility];  Service: Gastroenterology   • BONE MARROW BIOPSY, NDL/TROCAR  9/20/2017    Procedure: BONE MARROW BIOPSY, NDL/TROCAR;  Surgeon: Wade Turner M.D.;  Location: ENDOSCOPY [facility];  Service: Orthopedics   • BONE MARROW ASPIRATION  9/20/2017    Procedure: BONE MARROW ASPIRATION;  Surgeon: Wade Turner M.D.;  Location: ENDOSCOPY " [facility];  Service: Orthopedics   • VEIN LIGATION Left 11/10/2016    Procedure: VEIN LIGATION FOR DISTAL REVASCULARIZATION AND INTERVAL LIGATION OF LEFT ARM DIALYISIS ACCESS (DRIL PROCEDURE);  Surgeon: Ranulfo Jolly M.D.;  Location: SURGERY [facility];  Service:    • AV FISTULA CREATION Left 2016    Procedure: AV FISTULA CREATION UPPER EXTREMITY;  Surgeon: Ranulfo Jolly M.D.;  Location: SURGERY [facility];  Service:    • GASTROSCOPY  2015    Procedure: ESOPHAGOGASTRODUODENOSCOPY WITH BIOPSY;  Surgeon: Wing Álvarez M.D.;  Location: SURGERY [facility];  Service:    • COLONOSCOPY  2015    Procedure: COLONOSCOPY;  Surgeon: Wing Álvarez M.D.;  Location: [facility];  Service:    • GYN SURGERY      hysterectomy   • GYN SURGERY       x 2   • GYN SURGERY      d&C x2   • OTHER      angioplasty/ stents bilat LE   • OTHER ABDOMINAL SURGERY      appendectomy,  x 2, hysterectomy, D & C   • OTHER ABDOMINAL SURGERY      appendectomy   • OTHER CARDIAC SURGERY      Angioplasty  and    • OTHER CARDIAC SURGERY      cardiac angiogram, angioplasty   • RETINAL DETACHMENT REPAIR Right      Family History   Problem Relation Age of Onset   • Hypertension Father          at 89   • Hypertension Mother      Social History     Socioeconomic History   • Marital status:      Spouse name: Not on file   • Number of children: Not on file   • Years of education: Not on file   • Highest education level: Not on file   Occupational History   • Not on file   Social Needs   • Financial resource strain: Not on file   • Food insecurity     Worry: Not on file     Inability: Not on file   • Transportation needs     Medical: Not on file     Non-medical: Not on file   Tobacco Use   • Smoking status: Never Smoker   • Smokeless tobacco: Never Used   Substance and Sexual Activity   • Alcohol use: No   • Drug use: No   • Sexual activity: Not on file   Lifestyle   •  Physical activity     Days per week: Not on file     Minutes per session: Not on file   • Stress: Not on file   Relationships   • Social connections     Talks on phone: Not on file     Gets together: Not on file     Attends Restoration service: Not on file     Active member of club or organization: Not on file     Attends meetings of clubs or organizations: Not on file     Relationship status: Not on file   • Intimate partner violence     Fear of current or ex partner: Not on file     Emotionally abused: Not on file     Physically abused: Not on file     Forced sexual activity: Not on file   Other Topics Concern   • Not on file   Social History Narrative    ** Merged History Encounter **         ** Merged History Encounter **        Allergies   Allergen Reactions   • Lenalidomide Unspecified     Beeping Pulse     • Naprosyn [Naproxen] Hives   • Pioglitazone Unspecified     Causes blindness   • Simvastatin Unspecified     Couldn't move   • Demerol Vomiting   • Diphenhydramine Vomiting   • Glipizide Vomiting   • Hydromorphone Vomiting   • Iron Vomiting     vomiting   • Metformin Vomiting   • Morphine Vomiting   • Multivitamin Itching     itching   • Ondansetron Itching   • Other Drug Rash and Vomiting     Any binders that remove phosphorus from the body such as tums   • Oxycodone Vomiting   • Pcn [Penicillins] Vomiting          • Propoxyphene Vomiting   • Requip Vomiting   • Sulfa Drugs Rash and Vomiting     Vomiting & rash      • Tamsulosin Vomiting   • Tramadol Vomiting   • Trazodone Vomiting       Current Outpatient Medications   Medication Sig Dispense Refill   • lidocaine (LIDODERM) 5 % Patch Apply 1 Patch to skin as directed every 12 hours. 30 Patch 0   • docusate sodium (COLACE) 100 MG Cap Take 100 mg by mouth 2 times a day.     • amLODIPine (NORVASC) 2.5 MG Tab TK 1 T PO QD     • Buprenorphine 20 MCG/HR PATCH WEEKLY Apply 20 mcg to affected area(s) every 7 days. THURSDAY  0   • Diclofenac Sodium (VOLTAREN) 1 %  "Gel Apply 4 g to skin as directed 4 times a day as needed.     • carvedilol (COREG) 12.5 MG Tab Take 1 Tab by mouth 2 times a day, with meals. 60 Tab 0   • losartan (COZAAR) 100 MG Tab Take 100 mg by mouth every evening.     • bimatoprost (LUMIGAN) 0.01 % Solution Place 1 Drop in both eyes every bedtime.     • brinzolamide (AZOPT) 1 % Suspension Place 1 Drop in both eyes 2 Times a Day.     • Brimonidine Tartrate-Timolol (COMBIGAN) 0.2-0.5 % Solution Place 1 Drop in both eyes 2 Times a Day.     • pregabalin (LYRICA) 150 MG Cap Take 150 mg by mouth 4 times a day.     • Alcohol Swabs ( STERILE ALCOHOL PREP) Pads WIPE SKIN BEFORE INSULIN       No current facility-administered medications for this visit.        Review of Systems     All others systems reviewed and negative.     Objective:     BP (!) 178/80 (BP Location: Right arm, Patient Position: Sitting, BP Cuff Size: Adult)   Pulse 74   Ht 1.323 m (4' 4.09\")   Wt 66.7 kg (147 lb)   SpO2 100%  Body mass index is 38.09 kg/m².    Physical Exam   General: No acute distress. Well nourished. Wearing O2.  HEENT: EOM grossly intact, no scleral icterus, no pharyngeal erythema.   Neck:  No JVD, no bruits, trachea midline  CVS: RRR. Normal S1, S2. No M/R/G. No LE edema.  2+ radial pulses, 2+ DP pulses  Resp: CTAB. No wheezing or crackles/rhonchi. Normal respiratory effort.  Abdomen: Soft, NT, no tim hepatomegaly.  MSK/Ext: No clubbing or cyanosis.  Skin: Warm and dry, no rashes.  Neurological: CN III-XII grossly intact. No focal deficits.   Psych: A&O x 3, appropriate affect, good judgement    Physical exam performed today and unchanged, except what is noted, compared to 3-28-19.    Assessment:     1. Essential hypertension     2. Chronic stable angina (HCC)     3. Diastolic dysfunction     4. Paroxysmal atrial fibrillation (HCC)     5. Other hyperlipidemia     6. Other chest pain     7. Symptomatic anemia     8. ESRD (end stage renal disease) (HCC)     9. MDS " (myelodysplastic syndrome) (HCC)     10. Pancytopenia (HCC)     11. Hyperkalemia     12. Pulmonary hypertension (HCC)     13. Fatty liver     14. Chronic respiratory failure with hypoxia, 2L     15. Aortic valve sclerosis         Medical Decision Making:  Today's Assessment / Status / Plan:     -Atypical CP, nitro helps sometimes and not others, if CP happening frequently and nitro helping consistently we could try Imdur but lidoderm patch also helps.  -No stress testing, we cannot take her for cath, would consider only for ACS without active bleeding, would be balloon angioplasty only, or no cath at all.  -No ASA due to bleeding and anemia  -VELARDE from lungs and severe anemia.  -There is mention in her medical history of paroxysmal atrial fibrillation but I did not find any details of this and she would not be a candidate for anticoagulation anyway.  -Her LDL cholesterol is in a good place so until she has an event such as a heart attack or stroke I would not start cholesterol medication.  -Will cont on HD.  -If her iron level is high, could consider repeat echo but this would not change mgmt, it is very a difficult situation, and her most imminent threat is low Hbg.  -RTC 6 months for close monitoring.      Return in about 6 months (around 10/7/2020).    It is my pleasure to participate in the care of Ms. Briscoe.  Please do not hesitate to contact me with questions or concerns.    Germania Alberts MD, [facility]  Cardiologist [facility]    Please note that this dictation was created using voice recognition software. I have made every reasonable attempt to correct obvious errors, but it is possible there are errors of grammar and possibly content that I did not discover before finalizing the note.

## 2020-04-16 ENCOUNTER — OUTPATIENT INFUSION SERVICES (OUTPATIENT)
Dept: ONCOLOGY | Facility: MEDICAL CENTER | Age: 65
End: 2020-04-16
Attending: INTERNAL MEDICINE
Payer: MEDICARE

## 2020-04-16 ENCOUNTER — OFFICE VISIT (OUTPATIENT)
Dept: HEMATOLOGY ONCOLOGY | Facility: MEDICAL CENTER | Age: 65
End: 2020-04-16
Payer: MEDICARE

## 2020-04-16 VITALS
WEIGHT: 146.83 LBS | OXYGEN SATURATION: 100 % | RESPIRATION RATE: 18 BRPM | SYSTOLIC BLOOD PRESSURE: 158 MMHG | HEIGHT: 57 IN | BODY MASS INDEX: 31.68 KG/M2 | HEART RATE: 70 BPM | DIASTOLIC BLOOD PRESSURE: 43 MMHG | TEMPERATURE: 97.8 F

## 2020-04-16 VITALS
SYSTOLIC BLOOD PRESSURE: 128 MMHG | HEART RATE: 68 BPM | OXYGEN SATURATION: 100 % | TEMPERATURE: 98.1 F | HEIGHT: 55 IN | RESPIRATION RATE: 18 BRPM | DIASTOLIC BLOOD PRESSURE: 58 MMHG | BODY MASS INDEX: 33.98 KG/M2 | WEIGHT: 146.83 LBS

## 2020-04-16 DIAGNOSIS — Z99.2 ESRD (END STAGE RENAL DISEASE) ON DIALYSIS (HCC): ICD-10-CM

## 2020-04-16 DIAGNOSIS — D46.9 MYELODYSPLASIA (MYELODYSPLASTIC SYNDROME) (HCC): ICD-10-CM

## 2020-04-16 DIAGNOSIS — D46.9 MDS (MYELODYSPLASTIC SYNDROME) (HCC): ICD-10-CM

## 2020-04-16 DIAGNOSIS — D64.9 SYMPTOMATIC ANEMIA: ICD-10-CM

## 2020-04-16 DIAGNOSIS — N18.6 ESRD (END STAGE RENAL DISEASE) ON DIALYSIS (HCC): ICD-10-CM

## 2020-04-16 DIAGNOSIS — Z09 ENCOUNTER FOR HEMATOLOGY FOLLOW-UP: ICD-10-CM

## 2020-04-16 LAB
BASOPHILS # BLD AUTO: 0 X10E3/UL (ref 0–0.2)
BASOPHILS # BLD AUTO: 0.7 % (ref 0–1.8)
BASOPHILS # BLD: 0.03 K/UL (ref 0–0.12)
BASOPHILS NFR BLD AUTO: 0 %
EOSINOPHIL # BLD AUTO: 0 X10E3/UL (ref 0–0.4)
EOSINOPHIL # BLD AUTO: 0.27 K/UL (ref 0–0.51)
EOSINOPHIL NFR BLD AUTO: 1 %
EOSINOPHIL NFR BLD: 6.2 % (ref 0–6.9)
ERYTHROCYTE [DISTWIDTH] IN BLOOD BY AUTOMATED COUNT: 14.9 % (ref 11.7–15.4)
ERYTHROCYTE [DISTWIDTH] IN BLOOD BY AUTOMATED COUNT: 44.7 FL (ref 35.9–50)
HCT VFR BLD AUTO: 25.2 % (ref 34–46.6)
HCT VFR BLD AUTO: 25.2 % (ref 37–47)
HGB BLD-MCNC: 8.1 G/DL (ref 11.1–15.9)
HGB BLD-MCNC: 8.4 G/DL (ref 12–16)
IMM GRANULOCYTES # BLD AUTO: 0.03 K/UL (ref 0–0.11)
IMM GRANULOCYTES # BLD AUTO: ABNORMAL 10*3/UL
IMM GRANULOCYTES NFR BLD AUTO: 0.7 % (ref 0–0.9)
IMM GRANULOCYTES NFR BLD AUTO: ABNORMAL %
IMMATURE CELLS  115398: ABNORMAL
LYMPHOCYTES # BLD AUTO: 0.91 K/UL (ref 1–4.8)
LYMPHOCYTES # BLD AUTO: 1.2 X10E3/UL (ref 0.7–3.1)
LYMPHOCYTES NFR BLD AUTO: 28 %
LYMPHOCYTES NFR BLD: 20.7 % (ref 22–41)
MCH RBC QN AUTO: 29.2 PG (ref 26.6–33)
MCH RBC QN AUTO: 29.6 PG (ref 27–33)
MCHC RBC AUTO-ENTMCNC: 32.1 G/DL (ref 31.5–35.7)
MCHC RBC AUTO-ENTMCNC: 33.3 G/DL (ref 33.6–35)
MCV RBC AUTO: 88.7 FL (ref 81.4–97.8)
MCV RBC AUTO: 91 FL (ref 79–97)
MONOCYTES # BLD AUTO: 0.2 X10E3/UL (ref 0.1–0.9)
MONOCYTES # BLD AUTO: 0.64 K/UL (ref 0–0.85)
MONOCYTES NFR BLD AUTO: 14.6 % (ref 0–13.4)
MONOCYTES NFR BLD AUTO: 5 %
MORPHOLOGY BLD-IMP: ABNORMAL
NEUTROPHILS # BLD AUTO: 2.51 K/UL (ref 2–7.15)
NEUTROPHILS # BLD AUTO: 2.9 X10E3/UL (ref 1.4–7)
NEUTROPHILS NFR BLD AUTO: 66 %
NEUTROPHILS NFR BLD: 57.1 % (ref 44–72)
NRBC # BLD AUTO: 0 K/UL
NRBC BLD AUTO-RTO: ABNORMAL %
NRBC BLD-RTO: 0 /100 WBC
PLATELET # BLD AUTO: 79 X10E3/UL (ref 150–450)
PLATELET # BLD AUTO: 80 K/UL (ref 164–446)
PMV BLD AUTO: 14.6 FL (ref 9–12.9)
RBC # BLD AUTO: 2.77 X10E6/UL (ref 3.77–5.28)
RBC # BLD AUTO: 2.84 M/UL (ref 4.2–5.4)
WBC # BLD AUTO: 4.4 K/UL (ref 4.8–10.8)
WBC # BLD AUTO: 4.4 X10E3/UL (ref 3.4–10.8)

## 2020-04-16 PROCEDURE — 99213 OFFICE O/P EST LOW 20 MIN: CPT | Performed by: NURSE PRACTITIONER

## 2020-04-16 PROCEDURE — A4212 NON CORING NEEDLE OR STYLET: HCPCS

## 2020-04-16 PROCEDURE — 700111 HCHG RX REV CODE 636 W/ 250 OVERRIDE (IP): Performed by: INTERNAL MEDICINE

## 2020-04-16 PROCEDURE — 36591 DRAW BLOOD OFF VENOUS DEVICE: CPT

## 2020-04-16 PROCEDURE — 85025 COMPLETE CBC W/AUTO DIFF WBC: CPT

## 2020-04-16 PROCEDURE — 700111 HCHG RX REV CODE 636 W/ 250 OVERRIDE (IP)

## 2020-04-16 RX ORDER — ACETAMINOPHEN 325 MG/1
650 TABLET ORAL PRN
Status: CANCELLED | OUTPATIENT
Start: 2020-04-16

## 2020-04-16 RX ORDER — SODIUM CHLORIDE 9 MG/ML
500 INJECTION, SOLUTION INTRAVENOUS ONCE
Status: CANCELLED | OUTPATIENT
Start: 2020-04-16

## 2020-04-16 RX ORDER — DIPHENHYDRAMINE HCL 25 MG
25 TABLET ORAL ONCE
Status: CANCELLED | OUTPATIENT
Start: 2020-04-16

## 2020-04-16 RX ORDER — FUROSEMIDE 10 MG/ML
20 INJECTION INTRAMUSCULAR; INTRAVENOUS ONCE
Status: CANCELLED | OUTPATIENT
Start: 2020-04-16

## 2020-04-16 RX ORDER — LIDOCAINE HYDROCHLORIDE 10 MG/ML
20 INJECTION, SOLUTION INFILTRATION; PERINEURAL
Status: CANCELLED | OUTPATIENT
Start: 2020-04-16

## 2020-04-16 RX ORDER — LIDOCAINE HYDROCHLORIDE 10 MG/ML
20 INJECTION, SOLUTION INFILTRATION; PERINEURAL
Status: DISCONTINUED | OUTPATIENT
Start: 2020-04-16 | End: 2020-04-16

## 2020-04-16 RX ORDER — LIDOCAINE HYDROCHLORIDE 10 MG/ML
INJECTION, SOLUTION EPIDURAL; INFILTRATION; INTRACAUDAL; PERINEURAL
Status: COMPLETED
Start: 2020-04-16 | End: 2020-04-16

## 2020-04-16 RX ORDER — ACETAMINOPHEN 325 MG/1
650 TABLET ORAL ONCE
Status: CANCELLED | OUTPATIENT
Start: 2020-04-16

## 2020-04-16 RX ADMIN — LIDOCAINE HYDROCHLORIDE 5 ML: 10 INJECTION, SOLUTION INFILTRATION; PERINEURAL at 10:39

## 2020-04-16 RX ADMIN — LIDOCAINE HYDROCHLORIDE 5 ML: 10 INJECTION, SOLUTION EPIDURAL; INFILTRATION; INTRACAUDAL at 10:39

## 2020-04-16 RX ADMIN — HEPARIN 500 UNITS: 100 SYRINGE at 11:27

## 2020-04-16 ASSESSMENT — ENCOUNTER SYMPTOMS
MYALGIAS: 1
PALPITATIONS: 0
CONSTIPATION: 0
WHEEZING: 0
DIARRHEA: 0
INSOMNIA: 1
DIZZINESS: 1
FEVER: 0
VOMITING: 0
CHILLS: 0
COUGH: 0
NAUSEA: 1
HEADACHES: 0
WEIGHT LOSS: 0
SHORTNESS OF BREATH: 1
TINGLING: 1
BLOOD IN STOOL: 1

## 2020-04-16 ASSESSMENT — PAIN SCALES - GENERAL
PAINLEVEL: 5=MODERATE PAIN
PAINLEVEL: 5=MODERATE PAIN

## 2020-04-16 ASSESSMENT — FIBROSIS 4 INDEX
FIB4 SCORE: 3.26
FIB4 SCORE: 3.26

## 2020-04-16 NOTE — PROGRESS NOTES
Patient here for CBC/possible blood products. Lidocaine used per MAR prior to accessing port. Port accessed using sterile technique; labs drawn as ordered. Clarified with Yanely SOTELO with the plan of care. Patient last received blood products on 4/4/20. Labs reviewed. Hgb =  8.4. Patient does not meet parameters for blood products today. Per Yanely SOTELO, patient to return next week for CBC/possible blood products as this will be 3 weeks from her last blood transfusion on 4/4/20. Updated patient on the plan of care. Heparin instilled prior to de-accessing port. Port de-accessed; gauze/tape applied to site. Next appointment scheduled. Discharged to self care; no apparent distress noted.

## 2020-04-16 NOTE — PROGRESS NOTES
Subjective:      Vielka Briscoe is a 64 y.o. female who presents for Other (2 week follow up for Myelodysplasia (myelodysplastic syndrome))          HPI   Ms. 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  She is accompanied by her caregiver, Afua, for today's visit.     Patient has been treated with low-dose Revlimid in 2016 and Vidaza 75 mg/m² subcu 5 days a week in 2017, per Dr. Avila. Since Dr. Roberts's custodial the patient established with our office (7/2019), per second opinion evaluation, and continues with supportive therapy in the form of periodic transfusions, standing orders in place.    Patient is feeling fairly well today. She was evaluated by GI for early satiety, intermittent emesis, further evaluation of anemia.  She reportedly had positive guaiac results and was recommended to undergo EGD, which is delayed as it is an elective procedure. Restless legs have improved with tonic water nightly.  She continues to sleep poorly and does not feel well rested but is otherwise at her baseline.      Allergies   Allergen Reactions   • Lenalidomide Unspecified     Beeping Pulse     • Naprosyn [Naproxen] Hives   • Pioglitazone Unspecified     Causes blindness   • Simvastatin Unspecified     Couldn't move   • Demerol Vomiting   • Diphenhydramine Vomiting   • Glipizide Vomiting   • Hydromorphone Vomiting   • Iron Vomiting     vomiting   • Metformin Vomiting   • Morphine Vomiting   • Multivitamin Itching     itching   • Ondansetron Itching   • Other Drug Rash and Vomiting     Any binders that remove phosphorus from the body such as tums   • Oxycodone Vomiting   • Pcn [Penicillins] Vomiting          • Propoxyphene Vomiting   • Requip Vomiting   • Sulfa Drugs Rash and Vomiting     Vomiting & rash      • Tamsulosin Vomiting   • Tramadol Vomiting   • Trazodone Vomiting           Current Outpatient  Medications on File Prior to Visit   Medication Sig Dispense Refill   • lidocaine (LIDODERM) 5 % Patch Apply 1 Patch to skin as directed every 12 hours. 30 Patch 0   • Alcohol Swabs ( STERILE ALCOHOL PREP) Pads WIPE SKIN BEFORE INSULIN     • docusate sodium (COLACE) 100 MG Cap Take 100 mg by mouth 2 times a day.     • amLODIPine (NORVASC) 2.5 MG Tab TK 1 T PO QD     • Buprenorphine 20 MCG/HR PATCH WEEKLY Apply 20 mcg to affected area(s) every 7 days. THURSDAY  0   • Diclofenac Sodium (VOLTAREN) 1 % Gel Apply 4 g to skin as directed 4 times a day as needed.     • carvedilol (COREG) 12.5 MG Tab Take 1 Tab by mouth 2 times a day, with meals. 60 Tab 0   • losartan (COZAAR) 100 MG Tab Take 100 mg by mouth every evening.     • bimatoprost (LUMIGAN) 0.01 % Solution Place 1 Drop in both eyes every bedtime.     • brinzolamide (AZOPT) 1 % Suspension Place 1 Drop in both eyes 2 Times a Day.     • Brimonidine Tartrate-Timolol (COMBIGAN) 0.2-0.5 % Solution Place 1 Drop in both eyes 2 Times a Day.     • pregabalin (LYRICA) 150 MG Cap Take 150 mg by mouth 4 times a day.       No current facility-administered medications on file prior to visit.             Review of Systems   Constitutional: Positive for malaise/fatigue (same). Negative for chills, fever and weight loss.   Respiratory: Positive for shortness of breath (with exertion). Negative for cough and wheezing.         O2 @ 3L   Cardiovascular: Negative for chest pain, palpitations and leg swelling.   Gastrointestinal: Positive for blood in stool ( guaic per GI, EGD on hold as it is elective) and nausea (variable - self limiting, PO meds don't really help). Negative for constipation, diarrhea and vomiting.   Genitourinary: Negative for dysuria.   Musculoskeletal: Positive for myalgias (BLE mostly; less restless with tonic water at night).   Neurological: Positive for dizziness (with low O2) and tingling (fingers and toes). Negative for headaches.   Psychiatric/Behavioral:  "The patient has insomnia.           Objective:     /58 (BP Location: Right arm, Patient Position: Sitting, BP Cuff Size: Adult)   Pulse 68   Temp 36.7 °C (98.1 °F) (Temporal)   Resp 18   Ht 1.323 m (4' 4.09\")   Wt 66.6 kg (146 lb 13.2 oz)   LMP 01/01/1995   SpO2 100%   BMI 38.05 kg/m²      Physical Exam  Vitals signs reviewed.   Constitutional:       General: She is not in acute distress.     Appearance: She is well-developed. She is not diaphoretic.      Comments: Less fatigued/more alert  this visit   HENT:      Head: Normocephalic and atraumatic.      Mouth/Throat:      Pharynx: No oropharyngeal exudate.   Eyes:      General: No scleral icterus.        Right eye: No discharge.         Left eye: No discharge.      Conjunctiva/sclera: Conjunctivae normal.      Pupils: Pupils are equal, round, and reactive to light.   Neck:      Musculoskeletal: Normal range of motion and neck supple.   Cardiovascular:      Rate and Rhythm: Normal rate and regular rhythm.      Heart sounds: Normal heart sounds. No murmur. No friction rub. No gallop.    Pulmonary:      Effort: Pulmonary effort is normal. No respiratory distress.      Breath sounds: Examination of the right-upper field reveals decreased breath sounds. Examination of the left-upper field reveals decreased breath sounds. Examination of the right-middle field reveals decreased breath sounds. Examination of the right-lower field reveals decreased breath sounds. Examination of the left-lower field reveals decreased breath sounds. Decreased breath sounds present. No wheezing.      Comments: O2  Abdominal:      General: Bowel sounds are normal. There is no distension.      Palpations: Abdomen is soft.      Tenderness: There is no abdominal tenderness.   Musculoskeletal: Normal range of motion.   Skin:     General: Skin is warm and dry.      Coloration: Skin is not pale.      Findings: No erythema or rash.   Neurological:      Mental Status: She is alert and " oriented to person, place, and time.   Psychiatric:         Behavior: Behavior normal.         [facility] on 04/16/2020   Component Date Value Ref Range Status   • WBC 04/16/2020 4.4* 4.8 - 10.8 K/uL Final   • RBC 04/16/2020 2.84* 4.20 - 5.40 M/uL Final   • Hemoglobin 04/16/2020 8.4* 12.0 - 16.0 g/dL Final    Results confirmed by repeat testing.   • Hematocrit 04/16/2020 25.2* 37.0 - 47.0 % Final   • MCV 04/16/2020 88.7  81.4 - 97.8 fL Final   • MCH 04/16/2020 29.6  27.0 - 33.0 pg Final   • MCHC 04/16/2020 33.3* 33.6 - 35.0 g/dL Final   • RDW 04/16/2020 44.7  35.9 - 50.0 fL Final   • Platelet Count 04/16/2020 80* 164 - 446 K/uL Final   • MPV 04/16/2020 14.6* 9.0 - 12.9 fL Final   • Neutrophils-Polys 04/16/2020 57.10  44.00 - 72.00 % Final   • Lymphocytes 04/16/2020 20.70* 22.00 - 41.00 % Final   • Monocytes 04/16/2020 14.60* 0.00 - 13.40 % Final   • Eosinophils 04/16/2020 6.20  0.00 - 6.90 % Final   • Basophils 04/16/2020 0.70  0.00 - 1.80 % Final   • Immature Granulocytes 04/16/2020 0.70  0.00 - 0.90 % Final   • Nucleated RBC 04/16/2020 0.00  /100 WBC Final   • Neutrophils (Absolute) 04/16/2020 2.51  2.00 - 7.15 K/uL Final    Includes immature neutrophils, if present.   • Lymphs (Absolute) 04/16/2020 0.91* 1.00 - 4.80 K/uL Final   • Monos (Absolute) 04/16/2020 0.64  0.00 - 0.85 K/uL Final   • Eos (Absolute) 04/16/2020 0.27  0.00 - 0.51 K/uL Final   • Baso (Absolute) 04/16/2020 0.03  0.00 - 0.12 K/uL Final   • Immature Granulocytes (abs) 04/16/2020 0.03  0.00 - 0.11 K/uL Final   • NRBC (Absolute) 04/16/2020 0.00  K/uL Final        Assessment/Plan:       1. MDS (myelodysplastic syndrome) (HCC)     2. Encounter for hematology follow-up     3. ESRD (end stage renal disease) on dialysis (HCC)         1.  ESRD: Patient continues with dialysis every Monday, Wednesday, Friday.  She continues to follow-up with Lion Bennett MD - ([facility])     2.  MDS: Patient has previously undergone treatment  with Vidaza and Revlimid.  She has not undergone further treatment since 2017.  She established care with our office in July 2019 and continues with supportive transfusions, requiring them approximately every 2-3 weeks.  She most recently received 2 units RBCs on 4/4.  CBC was completed today with hemoglobin noted to be within acceptable limits.  She will repeat CBC next week to resume her usual q 3 weeks monitoring and return in 4 weeks for re-evaluation, sooner as needed.        The patient verbalized agreement and understanding of current plan. All questions and concerns were addressed at time of visit.    Please note that this dictation was created using voice recognition software. I have made every reasonable attempt to correct obvious errors, but I expect that there are errors of grammar and possibly content that I did not discover before finalizing the note.

## 2020-04-18 ENCOUNTER — APPOINTMENT (OUTPATIENT)
Dept: ONCOLOGY | Facility: MEDICAL CENTER | Age: 65
End: 2020-04-18
Attending: INTERNAL MEDICINE
Payer: MEDICARE

## 2020-04-20 ENCOUNTER — TELEPHONE (OUTPATIENT)
Dept: HEMATOLOGY ONCOLOGY | Facility: MEDICAL CENTER | Age: 65
End: 2020-04-20

## 2020-04-20 NOTE — TELEPHONE ENCOUNTER
"Lisandra the dietician at [facility] called regarding the note sent over on the patient's behalf allowing her to have a meal during dialysis. Lisandra is concerned that the patient is taking advantage of the exception being given to her during this time where [facility] is asking patients to be masked at all times. Lisandra stated that the patient is bringing in a \"full spread-a hamburger with all the toppings and fixings,\" which is causing other patients to complain. Lisandra understands the patient's need to eat due to her diabetes but would prefer that we reword the note to say that the patient is allowed to have a small snack during dialysis, not a full spread of a meal. Lisandra said that she would also relay this information to Vielka but would appreciate a note specifying that both parties are on the same page in regards to what will be allowed.    "

## 2020-04-23 ENCOUNTER — OUTPATIENT INFUSION SERVICES (OUTPATIENT)
Dept: ONCOLOGY | Facility: MEDICAL CENTER | Age: 65
End: 2020-04-23
Attending: INTERNAL MEDICINE
Payer: MEDICARE

## 2020-04-23 VITALS
BODY MASS INDEX: 31.44 KG/M2 | WEIGHT: 145.72 LBS | DIASTOLIC BLOOD PRESSURE: 64 MMHG | HEIGHT: 57 IN | SYSTOLIC BLOOD PRESSURE: 211 MMHG | TEMPERATURE: 97.9 F | HEART RATE: 74 BPM | OXYGEN SATURATION: 100 % | RESPIRATION RATE: 16 BRPM

## 2020-04-23 DIAGNOSIS — D64.9 SYMPTOMATIC ANEMIA: ICD-10-CM

## 2020-04-23 DIAGNOSIS — D46.9 MYELODYSPLASIA (MYELODYSPLASTIC SYNDROME) (HCC): ICD-10-CM

## 2020-04-23 LAB
ABO GROUP BLD: NORMAL
BARCODED ABORH UBTYP: 8400
BARCODED PRD CODE UBPRD: NORMAL
BARCODED UNIT NUM UBUNT: NORMAL
BASOPHILS # BLD AUTO: 0 X10E3/UL (ref 0–0.2)
BASOPHILS NFR BLD AUTO: 1 %
BLD GP AB SCN SERPL QL: NORMAL
COMPONENT R 8504R: NORMAL
EOSINOPHIL # BLD AUTO: 0.2 X10E3/UL (ref 0–0.4)
EOSINOPHIL NFR BLD AUTO: 5 %
ERYTHROCYTE [DISTWIDTH] IN BLOOD BY AUTOMATED COUNT: 14.9 % (ref 11.7–15.4)
HCT VFR BLD AUTO: 20.2 % (ref 34–46.6)
HGB BLD-MCNC: 6.8 G/DL (ref 11.1–15.9)
IMM GRANULOCYTES # BLD AUTO: 0 X10E3/UL (ref 0–0.1)
IMM GRANULOCYTES NFR BLD AUTO: 0 %
IMMATURE CELLS  115398: ABNORMAL
LYMPHOCYTES # BLD AUTO: 0.9 X10E3/UL (ref 0.7–3.1)
LYMPHOCYTES NFR BLD AUTO: 21 %
MCH RBC QN AUTO: 29.6 PG (ref 26.6–33)
MCHC RBC AUTO-ENTMCNC: 33.7 G/DL (ref 31.5–35.7)
MCV RBC AUTO: 88 FL (ref 79–97)
MONOCYTES # BLD AUTO: 0.7 X10E3/UL (ref 0.1–0.9)
MONOCYTES NFR BLD AUTO: 15 %
MORPHOLOGY BLD-IMP: ABNORMAL
NEUTROPHILS # BLD AUTO: 2.6 X10E3/UL (ref 1.4–7)
NEUTROPHILS NFR BLD AUTO: 58 %
NRBC BLD AUTO-RTO: ABNORMAL %
PLATELET # BLD AUTO: 94 X10E3/UL (ref 150–450)
PRODUCT TYPE UPROD: NORMAL
RBC # BLD AUTO: 2.3 X10E6/UL (ref 3.77–5.28)
RH BLD: NORMAL
UNIT STATUS USTAT: NORMAL
WBC # BLD AUTO: 4.5 X10E3/UL (ref 3.4–10.8)

## 2020-04-23 PROCEDURE — 306780 HCHG STAT FOR TRANSFUSION PER CASE

## 2020-04-23 PROCEDURE — 700101 HCHG RX REV CODE 250: Performed by: INTERNAL MEDICINE

## 2020-04-23 PROCEDURE — 36430 TRANSFUSION BLD/BLD COMPNT: CPT

## 2020-04-23 PROCEDURE — 86900 BLOOD TYPING SEROLOGIC ABO: CPT

## 2020-04-23 PROCEDURE — 86850 RBC ANTIBODY SCREEN: CPT

## 2020-04-23 PROCEDURE — 36591 DRAW BLOOD OFF VENOUS DEVICE: CPT

## 2020-04-23 PROCEDURE — A4212 NON CORING NEEDLE OR STYLET: HCPCS

## 2020-04-23 PROCEDURE — 86923 COMPATIBILITY TEST ELECTRIC: CPT

## 2020-04-23 PROCEDURE — 86901 BLOOD TYPING SEROLOGIC RH(D): CPT

## 2020-04-23 PROCEDURE — P9016 RBC LEUKOCYTES REDUCED: HCPCS

## 2020-04-23 PROCEDURE — 96374 THER/PROPH/DIAG INJ IV PUSH: CPT

## 2020-04-23 PROCEDURE — 700111 HCHG RX REV CODE 636 W/ 250 OVERRIDE (IP): Performed by: INTERNAL MEDICINE

## 2020-04-23 RX ORDER — FUROSEMIDE 10 MG/ML
20 INJECTION INTRAMUSCULAR; INTRAVENOUS ONCE
Status: COMPLETED | OUTPATIENT
Start: 2020-04-23 | End: 2020-04-23

## 2020-04-23 RX ORDER — SODIUM CHLORIDE 9 MG/ML
500 INJECTION, SOLUTION INTRAVENOUS ONCE
Status: CANCELLED | OUTPATIENT
Start: 2020-04-23

## 2020-04-23 RX ORDER — LIDOCAINE HYDROCHLORIDE 10 MG/ML
20 INJECTION, SOLUTION INFILTRATION; PERINEURAL
Status: CANCELLED | OUTPATIENT
Start: 2020-04-23

## 2020-04-23 RX ORDER — FUROSEMIDE 10 MG/ML
20 INJECTION INTRAMUSCULAR; INTRAVENOUS ONCE
Status: CANCELLED | OUTPATIENT
Start: 2020-04-23

## 2020-04-23 RX ORDER — DIPHENHYDRAMINE HCL 25 MG
25 TABLET ORAL ONCE
Status: CANCELLED | OUTPATIENT
Start: 2020-04-23

## 2020-04-23 RX ORDER — ACETAMINOPHEN 325 MG/1
650 TABLET ORAL PRN
Status: CANCELLED | OUTPATIENT
Start: 2020-04-23

## 2020-04-23 RX ORDER — DIPHENHYDRAMINE HCL 25 MG
25 TABLET ORAL ONCE
Status: DISCONTINUED | OUTPATIENT
Start: 2020-04-23 | End: 2020-04-23 | Stop reason: HOSPADM

## 2020-04-23 RX ORDER — ACETAMINOPHEN 325 MG/1
650 TABLET ORAL ONCE
Status: CANCELLED | OUTPATIENT
Start: 2020-04-23

## 2020-04-23 RX ORDER — ACETAMINOPHEN 325 MG/1
650 TABLET ORAL ONCE
Status: DISCONTINUED | OUTPATIENT
Start: 2020-04-23 | End: 2020-04-23 | Stop reason: HOSPADM

## 2020-04-23 RX ADMIN — FUROSEMIDE 20 MG: 10 INJECTION, SOLUTION INTRAMUSCULAR; INTRAVENOUS at 12:56

## 2020-04-23 RX ADMIN — HEPARIN 500 UNITS: 100 SYRINGE at 13:02

## 2020-04-23 RX ADMIN — LIDOCAINE HYDROCHLORIDE 0.5 ML: 10 INJECTION, SOLUTION EPIDURAL; INFILTRATION; INTRACAUDAL at 09:15

## 2020-04-23 ASSESSMENT — FIBROSIS 4 INDEX: FIB4 SCORE: 2.74

## 2020-04-24 NOTE — PROGRESS NOTES
Pt presented for blood transfusion. She had labs drawn at dialysis on 4/21, met parameters for blood. Port accessed in sterile fashion after lidocaine used, brisk blood return observed. COD drawn and sent. Pt took own pre-med. 1 unit PRBC's transfused, Yanely SOTELO notified by telephone of pt's post transfusion BP, this is very typical for pt, she is asymptomatic, lasix given per standing orders. Port flushed, heparinized and de-accessed with needle intact, gauze drsg placed. Returns Sat for 2nd unit, left on foot to self care with ride from friend Afua.

## 2020-04-25 ENCOUNTER — OUTPATIENT INFUSION SERVICES (OUTPATIENT)
Dept: ONCOLOGY | Facility: MEDICAL CENTER | Age: 65
End: 2020-04-25
Attending: INTERNAL MEDICINE
Payer: MEDICARE

## 2020-04-25 VITALS
RESPIRATION RATE: 16 BRPM | HEART RATE: 74 BPM | DIASTOLIC BLOOD PRESSURE: 50 MMHG | WEIGHT: 143.3 LBS | BODY MASS INDEX: 30.92 KG/M2 | SYSTOLIC BLOOD PRESSURE: 188 MMHG | HEIGHT: 57 IN | TEMPERATURE: 98 F | OXYGEN SATURATION: 100 %

## 2020-04-25 DIAGNOSIS — D46.9 MYELODYSPLASIA (MYELODYSPLASTIC SYNDROME) (HCC): ICD-10-CM

## 2020-04-25 DIAGNOSIS — D64.9 SYMPTOMATIC ANEMIA: ICD-10-CM

## 2020-04-25 PROCEDURE — 86923 COMPATIBILITY TEST ELECTRIC: CPT

## 2020-04-25 PROCEDURE — A4212 NON CORING NEEDLE OR STYLET: HCPCS

## 2020-04-25 PROCEDURE — 96374 THER/PROPH/DIAG INJ IV PUSH: CPT

## 2020-04-25 PROCEDURE — 36430 TRANSFUSION BLD/BLD COMPNT: CPT

## 2020-04-25 PROCEDURE — 700111 HCHG RX REV CODE 636 W/ 250 OVERRIDE (IP)

## 2020-04-25 PROCEDURE — P9016 RBC LEUKOCYTES REDUCED: HCPCS

## 2020-04-25 PROCEDURE — 306780 HCHG STAT FOR TRANSFUSION PER CASE

## 2020-04-25 PROCEDURE — 700111 HCHG RX REV CODE 636 W/ 250 OVERRIDE (IP): Performed by: INTERNAL MEDICINE

## 2020-04-25 RX ORDER — ACETAMINOPHEN 325 MG/1
650 TABLET ORAL ONCE
Status: DISCONTINUED | OUTPATIENT
Start: 2020-04-25 | End: 2020-04-25 | Stop reason: HOSPADM

## 2020-04-25 RX ORDER — DIPHENHYDRAMINE HCL 25 MG
25 TABLET ORAL ONCE
Status: DISCONTINUED | OUTPATIENT
Start: 2020-04-25 | End: 2020-04-25 | Stop reason: HOSPADM

## 2020-04-25 RX ORDER — FUROSEMIDE 10 MG/ML
20 INJECTION INTRAMUSCULAR; INTRAVENOUS ONCE
Status: CANCELLED | OUTPATIENT
Start: 2020-04-25

## 2020-04-25 RX ORDER — LIDOCAINE HYDROCHLORIDE 10 MG/ML
20 INJECTION, SOLUTION INFILTRATION; PERINEURAL
Status: CANCELLED | OUTPATIENT
Start: 2020-04-25

## 2020-04-25 RX ORDER — LIDOCAINE HYDROCHLORIDE 10 MG/ML
INJECTION, SOLUTION EPIDURAL; INFILTRATION; INTRACAUDAL; PERINEURAL
Status: COMPLETED
Start: 2020-04-25 | End: 2020-04-25

## 2020-04-25 RX ORDER — ACETAMINOPHEN 325 MG/1
650 TABLET ORAL PRN
Status: CANCELLED | OUTPATIENT
Start: 2020-04-25

## 2020-04-25 RX ORDER — ACETAMINOPHEN 325 MG/1
650 TABLET ORAL ONCE
Status: CANCELLED | OUTPATIENT
Start: 2020-04-25

## 2020-04-25 RX ORDER — FUROSEMIDE 10 MG/ML
20 INJECTION INTRAMUSCULAR; INTRAVENOUS ONCE
Status: COMPLETED | OUTPATIENT
Start: 2020-04-25 | End: 2020-04-25

## 2020-04-25 RX ORDER — SODIUM CHLORIDE 9 MG/ML
500 INJECTION, SOLUTION INTRAVENOUS ONCE
Status: CANCELLED | OUTPATIENT
Start: 2020-04-25

## 2020-04-25 RX ORDER — DIPHENHYDRAMINE HCL 25 MG
25 TABLET ORAL ONCE
Status: CANCELLED | OUTPATIENT
Start: 2020-04-25

## 2020-04-25 RX ADMIN — HEPARIN: 100 SYRINGE at 13:30

## 2020-04-25 RX ADMIN — LIDOCAINE HYDROCHLORIDE: 10 INJECTION, SOLUTION EPIDURAL; INFILTRATION; INTRACAUDAL; PERINEURAL at 11:27

## 2020-04-25 RX ADMIN — FUROSEMIDE 20 MG: 10 INJECTION, SOLUTION INTRAMUSCULAR; INTRAVENOUS at 13:39

## 2020-04-25 ASSESSMENT — FIBROSIS 4 INDEX: FIB4 SCORE: 2.74

## 2020-04-26 NOTE — PROGRESS NOTES
Patient to [facility] for 1 unit of RBC's. PORT accessed with sterile field. Lidocaine injection used for numbing. PORT flushed with + blood return. Patient refused pre-meds. 1 unit RBC infused with no s/s of infusion reaction. Vitals taken per protocol. Patient has future appointment. PORT flushed with + blood return. [facility] [place] performed and deacessed. Patient to home in care of self.

## 2020-04-28 LAB
BASOPHILS # BLD AUTO: 0.2 X10E3/UL (ref 0–0.2)
BASOPHILS NFR BLD AUTO: 3 %
EOSINOPHIL # BLD AUTO: 0.5 X10E3/UL (ref 0–0.4)
EOSINOPHIL NFR BLD AUTO: 9 %
ERYTHROCYTE [DISTWIDTH] IN BLOOD BY AUTOMATED COUNT: 14.9 % (ref 11.7–15.4)
HCT VFR BLD AUTO: 26.3 % (ref 34–46.6)
HGB BLD-MCNC: 9 G/DL (ref 11.1–15.9)
IMM GRANULOCYTES # BLD AUTO: ABNORMAL 10*3/UL
IMM GRANULOCYTES NFR BLD AUTO: ABNORMAL %
IMMATURE CELLS  115398: ABNORMAL
LYMPHOCYTES # BLD AUTO: 1 X10E3/UL (ref 0.7–3.1)
LYMPHOCYTES NFR BLD AUTO: 18 %
MCH RBC QN AUTO: 30.1 PG (ref 26.6–33)
MCHC RBC AUTO-ENTMCNC: 34.2 G/DL (ref 31.5–35.7)
MCV RBC AUTO: 88 FL (ref 79–97)
MONOCYTES # BLD AUTO: 0.5 X10E3/UL (ref 0.1–0.9)
MONOCYTES NFR BLD AUTO: 9 %
MORPHOLOGY BLD-IMP: ABNORMAL
NEUTROPHILS # BLD AUTO: 3.2 X10E3/UL (ref 1.4–7)
NEUTROPHILS NFR BLD AUTO: 61 %
NRBC BLD AUTO-RTO: ABNORMAL %
PLATELET # BLD AUTO: 97 X10E3/UL (ref 150–450)
RBC # BLD AUTO: 2.99 X10E6/UL (ref 3.77–5.28)
WBC # BLD AUTO: 5.3 X10E3/UL (ref 3.4–10.8)

## 2020-05-13 RX ORDER — LIDOCAINE HYDROCHLORIDE 10 MG/ML
20 INJECTION, SOLUTION INFILTRATION; PERINEURAL
Status: CANCELLED | OUTPATIENT
Start: 2020-05-13

## 2020-05-13 RX ORDER — ACETAMINOPHEN 325 MG/1
650 TABLET ORAL ONCE
Status: CANCELLED | OUTPATIENT
Start: 2020-05-13

## 2020-05-13 RX ORDER — SODIUM CHLORIDE 9 MG/ML
500 INJECTION, SOLUTION INTRAVENOUS ONCE
Status: CANCELLED | OUTPATIENT
Start: 2020-05-13

## 2020-05-13 RX ORDER — ACETAMINOPHEN 325 MG/1
650 TABLET ORAL PRN
Status: CANCELLED | OUTPATIENT
Start: 2020-05-13

## 2020-05-13 RX ORDER — FUROSEMIDE 10 MG/ML
20 INJECTION INTRAMUSCULAR; INTRAVENOUS ONCE
Status: CANCELLED | OUTPATIENT
Start: 2020-05-13

## 2020-05-13 RX ORDER — DIPHENHYDRAMINE HCL 25 MG
25 TABLET ORAL ONCE
Status: CANCELLED | OUTPATIENT
Start: 2020-05-13

## 2020-05-14 ENCOUNTER — OUTPATIENT INFUSION SERVICES (OUTPATIENT)
Dept: ONCOLOGY | Facility: MEDICAL CENTER | Age: 65
End: 2020-05-14
Attending: INTERNAL MEDICINE
Payer: MEDICARE

## 2020-05-14 ENCOUNTER — OFFICE VISIT (OUTPATIENT)
Dept: HEMATOLOGY ONCOLOGY | Facility: MEDICAL CENTER | Age: 65
End: 2020-05-14
Payer: MEDICARE

## 2020-05-14 VITALS
SYSTOLIC BLOOD PRESSURE: 184 MMHG | HEART RATE: 65 BPM | OXYGEN SATURATION: 100 % | BODY MASS INDEX: 32.29 KG/M2 | WEIGHT: 149.69 LBS | HEIGHT: 57 IN | DIASTOLIC BLOOD PRESSURE: 64 MMHG | RESPIRATION RATE: 16 BRPM | TEMPERATURE: 98.3 F

## 2020-05-14 VITALS
WEIGHT: 149.25 LBS | OXYGEN SATURATION: 100 % | HEART RATE: 69 BPM | TEMPERATURE: 97.1 F | SYSTOLIC BLOOD PRESSURE: 171 MMHG | RESPIRATION RATE: 18 BRPM | BODY MASS INDEX: 32.2 KG/M2 | HEIGHT: 57 IN | DIASTOLIC BLOOD PRESSURE: 69 MMHG

## 2020-05-14 DIAGNOSIS — D46.9 MYELODYSPLASIA (MYELODYSPLASTIC SYNDROME) (HCC): ICD-10-CM

## 2020-05-14 DIAGNOSIS — D64.9 SYMPTOMATIC ANEMIA: ICD-10-CM

## 2020-05-14 DIAGNOSIS — Z09 ENCOUNTER FOR HEMATOLOGY FOLLOW-UP: ICD-10-CM

## 2020-05-14 DIAGNOSIS — D46.9 MDS (MYELODYSPLASTIC SYNDROME) (HCC): ICD-10-CM

## 2020-05-14 DIAGNOSIS — N18.6 ESRD (END STAGE RENAL DISEASE) (HCC): ICD-10-CM

## 2020-05-14 LAB
ABO GROUP BLD: NORMAL
BARCODED ABORH UBTYP: 8400
BARCODED PRD CODE UBPRD: NORMAL
BARCODED UNIT NUM UBUNT: NORMAL
BASOPHILS # BLD AUTO: 0.2 % (ref 0–1.8)
BASOPHILS # BLD: 0.01 K/UL (ref 0–0.12)
BLD GP AB SCN SERPL QL: NORMAL
COMPONENT R 8504R: NORMAL
EOSINOPHIL # BLD AUTO: 0.24 K/UL (ref 0–0.51)
EOSINOPHIL NFR BLD: 5.1 % (ref 0–6.9)
ERYTHROCYTE [DISTWIDTH] IN BLOOD BY AUTOMATED COUNT: 45.2 FL (ref 35.9–50)
HCT VFR BLD AUTO: 18.3 % (ref 37–47)
HGB BLD-MCNC: 6.1 G/DL (ref 12–16)
IMM GRANULOCYTES # BLD AUTO: 0.06 K/UL (ref 0–0.11)
IMM GRANULOCYTES NFR BLD AUTO: 1.3 % (ref 0–0.9)
LYMPHOCYTES # BLD AUTO: 0.73 K/UL (ref 1–4.8)
LYMPHOCYTES NFR BLD: 15.5 % (ref 22–41)
MCH RBC QN AUTO: 31.3 PG (ref 27–33)
MCHC RBC AUTO-ENTMCNC: 34.1 G/DL (ref 33.6–35)
MCV RBC AUTO: 91.8 FL (ref 81.4–97.8)
MONOCYTES # BLD AUTO: 0.87 K/UL (ref 0–0.85)
MONOCYTES NFR BLD AUTO: 18.4 % (ref 0–13.4)
NEUTROPHILS # BLD AUTO: 2.81 K/UL (ref 2–7.15)
NEUTROPHILS NFR BLD: 59.5 % (ref 44–72)
NRBC # BLD AUTO: 0 K/UL
NRBC BLD-RTO: 0 /100 WBC
PLATELET # BLD AUTO: 104 K/UL (ref 164–446)
PMV BLD AUTO: 14.4 FL (ref 9–12.9)
PRODUCT TYPE UPROD: NORMAL
RBC # BLD AUTO: 1.95 M/UL (ref 4.2–5.4)
RH BLD: NORMAL
UNIT STATUS USTAT: NORMAL
WBC # BLD AUTO: 4.7 K/UL (ref 4.8–10.8)

## 2020-05-14 PROCEDURE — P9016 RBC LEUKOCYTES REDUCED: HCPCS

## 2020-05-14 PROCEDURE — 36430 TRANSFUSION BLD/BLD COMPNT: CPT

## 2020-05-14 PROCEDURE — 85025 COMPLETE CBC W/AUTO DIFF WBC: CPT

## 2020-05-14 PROCEDURE — 700111 HCHG RX REV CODE 636 W/ 250 OVERRIDE (IP)

## 2020-05-14 PROCEDURE — 86923 COMPATIBILITY TEST ELECTRIC: CPT

## 2020-05-14 PROCEDURE — 700111 HCHG RX REV CODE 636 W/ 250 OVERRIDE (IP): Performed by: NURSE PRACTITIONER

## 2020-05-14 PROCEDURE — 86850 RBC ANTIBODY SCREEN: CPT

## 2020-05-14 PROCEDURE — 306780 HCHG STAT FOR TRANSFUSION PER CASE

## 2020-05-14 PROCEDURE — 96375 TX/PRO/DX INJ NEW DRUG ADDON: CPT

## 2020-05-14 PROCEDURE — 86901 BLOOD TYPING SEROLOGIC RH(D): CPT

## 2020-05-14 PROCEDURE — 96374 THER/PROPH/DIAG INJ IV PUSH: CPT

## 2020-05-14 PROCEDURE — 86900 BLOOD TYPING SEROLOGIC ABO: CPT

## 2020-05-14 PROCEDURE — 99214 OFFICE O/P EST MOD 30 MIN: CPT | Performed by: NURSE PRACTITIONER

## 2020-05-14 PROCEDURE — 36591 DRAW BLOOD OFF VENOUS DEVICE: CPT

## 2020-05-14 PROCEDURE — A4212 NON CORING NEEDLE OR STYLET: HCPCS

## 2020-05-14 RX ORDER — DULOXETIN HYDROCHLORIDE 30 MG/1
CAPSULE, DELAYED RELEASE ORAL
COMMUNITY
Start: 2020-05-06 | End: 2020-06-16

## 2020-05-14 RX ORDER — ACETAMINOPHEN 325 MG/1
650 TABLET ORAL ONCE
Status: CANCELLED | OUTPATIENT
Start: 2020-05-14

## 2020-05-14 RX ORDER — ONDANSETRON 2 MG/ML
4 INJECTION INTRAMUSCULAR; INTRAVENOUS ONCE
Status: COMPLETED | OUTPATIENT
Start: 2020-05-14 | End: 2020-05-14

## 2020-05-14 RX ORDER — DIPHENHYDRAMINE HCL 25 MG
25 TABLET ORAL ONCE
Status: DISCONTINUED | OUTPATIENT
Start: 2020-05-14 | End: 2020-05-14 | Stop reason: HOSPADM

## 2020-05-14 RX ORDER — DIPHENHYDRAMINE HCL 25 MG
25 TABLET ORAL ONCE
Status: CANCELLED | OUTPATIENT
Start: 2020-05-14

## 2020-05-14 RX ORDER — SODIUM CHLORIDE 9 MG/ML
500 INJECTION, SOLUTION INTRAVENOUS ONCE
Status: CANCELLED | OUTPATIENT
Start: 2020-05-14

## 2020-05-14 RX ORDER — ACETAMINOPHEN 325 MG/1
650 TABLET ORAL PRN
Status: CANCELLED | OUTPATIENT
Start: 2020-05-14

## 2020-05-14 RX ORDER — LIDOCAINE HYDROCHLORIDE 10 MG/ML
INJECTION, SOLUTION EPIDURAL; INFILTRATION; INTRACAUDAL; PERINEURAL
Status: COMPLETED
Start: 2020-05-14 | End: 2020-05-14

## 2020-05-14 RX ORDER — FUROSEMIDE 10 MG/ML
INJECTION INTRAMUSCULAR; INTRAVENOUS
COMMUNITY
Start: 2020-03-14 | End: 2020-06-16

## 2020-05-14 RX ORDER — ACETAMINOPHEN 325 MG/1
650 TABLET ORAL ONCE
Status: DISCONTINUED | OUTPATIENT
Start: 2020-05-14 | End: 2020-05-14 | Stop reason: HOSPADM

## 2020-05-14 RX ORDER — FUROSEMIDE 10 MG/ML
20 INJECTION INTRAMUSCULAR; INTRAVENOUS ONCE
Status: COMPLETED | OUTPATIENT
Start: 2020-05-14 | End: 2020-05-14

## 2020-05-14 RX ORDER — FUROSEMIDE 10 MG/ML
20 INJECTION INTRAMUSCULAR; INTRAVENOUS ONCE
Status: CANCELLED | OUTPATIENT
Start: 2020-05-14

## 2020-05-14 RX ORDER — LIDOCAINE HYDROCHLORIDE 10 MG/ML
20 INJECTION, SOLUTION INFILTRATION; PERINEURAL
Status: CANCELLED | OUTPATIENT
Start: 2020-05-14

## 2020-05-14 RX ADMIN — FUROSEMIDE 20 MG: 10 INJECTION, SOLUTION INTRAMUSCULAR; INTRAVENOUS at 14:10

## 2020-05-14 RX ADMIN — HEPARIN 500 UNITS: 100 SYRINGE at 14:15

## 2020-05-14 RX ADMIN — ONDANSETRON 4 MG: 2 INJECTION INTRAMUSCULAR; INTRAVENOUS at 10:59

## 2020-05-14 RX ADMIN — LIDOCAINE HYDROCHLORIDE 5 ML: 10 INJECTION, SOLUTION EPIDURAL; INFILTRATION; INTRACAUDAL at 10:27

## 2020-05-14 ASSESSMENT — ENCOUNTER SYMPTOMS
DIZZINESS: 0
SHORTNESS OF BREATH: 0
NAUSEA: 1
DIARRHEA: 0
INSOMNIA: 1
VOMITING: 1
CONSTIPATION: 0
CHILLS: 1
FEVER: 0
COUGH: 0
WEIGHT LOSS: 0
HEADACHES: 0
MYALGIAS: 1
TINGLING: 0
WHEEZING: 0
PALPITATIONS: 0
BLOOD IN STOOL: 0

## 2020-05-14 ASSESSMENT — FIBROSIS 4 INDEX
FIB4 SCORE: 2.65
FIB4 SCORE: 2.65

## 2020-05-14 ASSESSMENT — PAIN SCALES - GENERAL: PAINLEVEL: 5=MODERATE PAIN

## 2020-05-14 NOTE — PROGRESS NOTES
"Subjective:      Vielka Briscoe is a 64 y.o. female who presents with Other (MDS 4w Fv)      HPI   Ms. 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  She is accompanied by her caregiver, Afua, for today's visit.     Patient has been treated with low-dose Revlimid in 2016 and Vidaza 75 mg/m² subcu 5 days a week in 2017, per Dr. Avila. Since Dr. Roberts's FCI the patient established with our office (7/2019), per second opinion evaluation, and continues with supportive therapy in the form of periodic transfusions, standing orders in place.    Patient is alert and communicative today but she reports feeling fairly fatigued and \"feel like I need a transfusion\".  Reports and experience with hypoglycemia earlier this week due to some confusion with insulin orders, for which she will follow-up with home health.  She experienced significant nausea, vomiting, malaise in relation to the event which have since subsided.  She continues with intermittent restless legs that are well relieved with tonic water.  Did have some significant pain issues over the past couple of weeks as her caregiver had gone out of town and her family did not know to fill her pain patch prescription.  She reports a visit to the ER but was not admitted and feels much better today.        Allergies   Allergen Reactions   • Lenalidomide Unspecified     Beeping Pulse     • Naprosyn [Naproxen] Hives   • Pioglitazone Unspecified     Causes blindness   • Simvastatin Unspecified     Couldn't move   • Demerol Vomiting   • Diphenhydramine Vomiting   • Glipizide Vomiting   • Hydromorphone Vomiting   • Iron Vomiting     vomiting   • Metformin Vomiting   • Morphine Vomiting   • Multivitamin Itching     itching   • Ondansetron Itching   • Other Drug Rash and Vomiting     Any binders that remove phosphorus from the body such as tums   • Oxycodone " Vomiting   • Pcn [Penicillins] Vomiting          • Propoxyphene Vomiting   • Requip Vomiting   • Sulfa Drugs Rash and Vomiting     Vomiting & rash      • Tamsulosin Vomiting   • Tramadol Vomiting   • Trazodone Vomiting           Current Outpatient Medications on File Prior to Visit   Medication Sig Dispense Refill   • Heparin Lock Flush (HEPARIN PF) 100 UNIT/ML Solution injection      • furosemide (LASIX) 10 MG/ML Solution      • DULoxetine (CYMBALTA) 30 MG Cap DR Particles      • lidocaine (LIDODERM) 5 % Patch Apply 1 Patch to skin as directed every 12 hours. 30 Patch 0   • Alcohol Swabs ( STERILE ALCOHOL PREP) Pads WIPE SKIN BEFORE INSULIN     • docusate sodium (COLACE) 100 MG Cap Take 100 mg by mouth 2 times a day.     • amLODIPine (NORVASC) 2.5 MG Tab TK 1 T PO QD     • Buprenorphine 20 MCG/HR PATCH WEEKLY Apply 20 mcg to affected area(s) every 7 days. THURSDAY  0   • Diclofenac Sodium (VOLTAREN) 1 % Gel Apply 4 g to skin as directed 4 times a day as needed.     • carvedilol (COREG) 12.5 MG Tab Take 1 Tab by mouth 2 times a day, with meals. 60 Tab 0   • losartan (COZAAR) 100 MG Tab Take 100 mg by mouth every evening.     • bimatoprost (LUMIGAN) 0.01 % Solution Place 1 Drop in both eyes every bedtime.     • brinzolamide (AZOPT) 1 % Suspension Place 1 Drop in both eyes 2 Times a Day.     • Brimonidine Tartrate-Timolol (COMBIGAN) 0.2-0.5 % Solution Place 1 Drop in both eyes 2 Times a Day.     • pregabalin (LYRICA) 150 MG Cap Take 150 mg by mouth 4 times a day.       No current facility-administered medications on file prior to visit.          Review of Systems   Constitutional: Positive for chills (with 10 units of insulin by mistake (hypoglycemia)- EMT called, no hospitalization) and malaise/fatigue (tired). Negative for fever and weight loss.   Respiratory: Negative for cough, shortness of breath and wheezing.         O2   Cardiovascular: Negative for chest pain, palpitations and leg swelling.        Elevated  "BP today - asymptomatic & consistent with baseline   Gastrointestinal: Positive for nausea (with hypoglycemia episode and w/d from late patch) and vomiting (with hypoglycemia episode and w/d from late patch). Negative for blood in stool, constipation, diarrhea and melena.        GI eval pending release of elective procedures   Genitourinary: Negative for dysuria.   Musculoskeletal: Positive for myalgias (BLE - consistent with baseline).        Pain patch came off on the shower, caregiver had taken a break and patient without pain patch x 5 days w/ associated w/drawls   Neurological: Negative for dizziness, tingling and headaches.   Psychiatric/Behavioral: The patient has insomnia (consistent with baseline).           Objective:     BP (!) 184/64 (BP Location: Right arm, Patient Position: Sitting, BP Cuff Size: Adult)   Pulse 65   Temp 36.8 °C (98.3 °F) (Temporal)   Resp 16   Ht 1.46 m (4' 9.48\")   Wt 67.9 kg (149 lb 11.1 oz)   LMP 01/01/1995   SpO2 100%   BMI 31.85 kg/m²      Physical Exam  Vitals signs reviewed.   Constitutional:       General: She is not in acute distress.     Appearance: She is well-developed. She is not diaphoretic.   HENT:      Head: Normocephalic and atraumatic.      Mouth/Throat:      Pharynx: No oropharyngeal exudate.   Eyes:      General: No scleral icterus.        Right eye: No discharge.         Left eye: No discharge.      Conjunctiva/sclera: Conjunctivae normal.      Pupils: Pupils are equal, round, and reactive to light.   Neck:      Musculoskeletal: Normal range of motion and neck supple.   Cardiovascular:      Rate and Rhythm: Normal rate and regular rhythm.      Heart sounds: Normal heart sounds. No murmur. No friction rub. No gallop.       Comments: Elevated BP, variable and consistent with baseline - asymptomatic  Pulmonary:      Effort: Pulmonary effort is normal. No respiratory distress.      Breath sounds: Normal breath sounds. No wheezing.      Comments: O2  Abdominal: "      General: Bowel sounds are normal. There is no distension.      Palpations: Abdomen is soft.      Tenderness: There is no abdominal tenderness.   Musculoskeletal: Normal range of motion.   Skin:     General: Skin is warm and dry.      Coloration: Skin is not pale.      Findings: No erythema or rash.   Neurological:      Mental Status: She is alert and oriented to person, place, and time.   Psychiatric:         Behavior: Behavior normal.                                Assessment/Plan:       1. MDS (myelodysplastic syndrome) ([facility])     2. Encounter for hematology follow-up     3. ESRD (end stage renal disease) ([facility])         1.  ESRD: Patient continues with dialysis every Monday, Wednesday, Friday.  She continues to follow-up with Lion Bennett MD - ([facility] Neph[facility])     2.  MDS: Patient has previously undergone treatment with Vidaza and Revlimid.  She has not undergone further treatment since 2017.  She established care with our office in July 2019 and continues with supportive transfusions, requiring them approximately every 2-3 weeks. CBC pleated today shows hemoglobin 6.1, she will proceed with transfusions according to standing orders.  Return for reevaluation in 3 weeks, sooner as needed.    She has been evaluated by GI with + blood noted.  She has been advised to undergo EGD and colonoscopy and awaits this for elective procedure for further evaluation and other evaluation of chronic blood loss -which can possibly improve baseline MDS.          Patient was seen for 40 minutes face to face of which > 50% of appointment time was spent on counseling and coordination of care regarding the above.      The patient verbalized agreement and understanding of current plan. All questions and concerns were addressed at time of visit.    Please note that this dictation was created using voice recognition software. I have made every reasonable attempt to correct obvious errors, but I expect that there are errors of  grammar and possibly content that I did not discover before finalizing the note.

## 2020-05-14 NOTE — PROGRESS NOTES
Pt arrived via wheelchair to [facility] for CBC/possible blood products.  POC discussed with patient.  Upon arrival, pt c/o of nausea.  Call placed to [facility] PRN Zofran order received.  Pt states IV Zofran tolerated, it's oral Zofran that upset her stomach, hence why on allergy list.  R chest port in place, lidocaine given per pt request.  Port accessed in sterile field, blood return noted and labs drawn.  Zofran given.  Pt reports relief of nausea.  Hgb 6.1, pt meets parameters for two unit of blood, one of which is to be given today, the second to be given on Saturday.  Pt took own Tylenol, declines benadryl prior to transfusion.  One unit PRBCs transfused without issue, no adverse reaction noted.  Lasix given post transfusion.  Port flushed per policy, de-accessed, and site covered with gauze and tape.  Next appointment confirmed.  Pt discharged from IS with caregiver.

## 2020-05-16 ENCOUNTER — OUTPATIENT INFUSION SERVICES (OUTPATIENT)
Dept: ONCOLOGY | Facility: MEDICAL CENTER | Age: 65
End: 2020-05-16
Attending: INTERNAL MEDICINE
Payer: MEDICARE

## 2020-05-16 VITALS
TEMPERATURE: 97.2 F | HEART RATE: 72 BPM | WEIGHT: 151.68 LBS | DIASTOLIC BLOOD PRESSURE: 89 MMHG | RESPIRATION RATE: 18 BRPM | HEIGHT: 57 IN | OXYGEN SATURATION: 100 % | BODY MASS INDEX: 32.72 KG/M2 | SYSTOLIC BLOOD PRESSURE: 171 MMHG

## 2020-05-16 DIAGNOSIS — D46.9 MYELODYSPLASIA (MYELODYSPLASTIC SYNDROME) (HCC): ICD-10-CM

## 2020-05-16 DIAGNOSIS — D64.9 SYMPTOMATIC ANEMIA: ICD-10-CM

## 2020-05-16 PROCEDURE — 96375 TX/PRO/DX INJ NEW DRUG ADDON: CPT

## 2020-05-16 PROCEDURE — A4212 NON CORING NEEDLE OR STYLET: HCPCS

## 2020-05-16 PROCEDURE — 700111 HCHG RX REV CODE 636 W/ 250 OVERRIDE (IP)

## 2020-05-16 PROCEDURE — 36430 TRANSFUSION BLD/BLD COMPNT: CPT

## 2020-05-16 PROCEDURE — 96374 THER/PROPH/DIAG INJ IV PUSH: CPT

## 2020-05-16 PROCEDURE — 306780 HCHG STAT FOR TRANSFUSION PER CASE

## 2020-05-16 PROCEDURE — 700105 HCHG RX REV CODE 258: Performed by: NURSE PRACTITIONER

## 2020-05-16 PROCEDURE — P9016 RBC LEUKOCYTES REDUCED: HCPCS

## 2020-05-16 PROCEDURE — 700111 HCHG RX REV CODE 636 W/ 250 OVERRIDE (IP): Performed by: NURSE PRACTITIONER

## 2020-05-16 PROCEDURE — 86923 COMPATIBILITY TEST ELECTRIC: CPT

## 2020-05-16 RX ORDER — DIPHENHYDRAMINE HCL 25 MG
25 TABLET ORAL ONCE
Status: CANCELLED | OUTPATIENT
Start: 2020-05-16

## 2020-05-16 RX ORDER — LIDOCAINE HYDROCHLORIDE 10 MG/ML
INJECTION, SOLUTION EPIDURAL; INFILTRATION; INTRACAUDAL; PERINEURAL
Status: COMPLETED
Start: 2020-05-16 | End: 2020-05-16

## 2020-05-16 RX ORDER — DIPHENHYDRAMINE HCL 25 MG
25 TABLET ORAL ONCE
Status: DISCONTINUED | OUTPATIENT
Start: 2020-05-16 | End: 2020-05-16 | Stop reason: HOSPADM

## 2020-05-16 RX ORDER — FUROSEMIDE 10 MG/ML
20 INJECTION INTRAMUSCULAR; INTRAVENOUS ONCE
Status: COMPLETED | OUTPATIENT
Start: 2020-05-16 | End: 2020-05-16

## 2020-05-16 RX ORDER — SODIUM CHLORIDE 9 MG/ML
500 INJECTION, SOLUTION INTRAVENOUS ONCE
Status: CANCELLED | OUTPATIENT
Start: 2020-05-16

## 2020-05-16 RX ORDER — LIDOCAINE HYDROCHLORIDE 10 MG/ML
20 INJECTION, SOLUTION INFILTRATION; PERINEURAL
Status: CANCELLED | OUTPATIENT
Start: 2020-05-16

## 2020-05-16 RX ORDER — FUROSEMIDE 10 MG/ML
20 INJECTION INTRAMUSCULAR; INTRAVENOUS ONCE
Status: CANCELLED | OUTPATIENT
Start: 2020-05-16

## 2020-05-16 RX ORDER — ACETAMINOPHEN 325 MG/1
650 TABLET ORAL PRN
Status: CANCELLED | OUTPATIENT
Start: 2020-05-16

## 2020-05-16 RX ORDER — SODIUM CHLORIDE 9 MG/ML
500 INJECTION, SOLUTION INTRAVENOUS ONCE
Status: COMPLETED | OUTPATIENT
Start: 2020-05-16 | End: 2020-05-16

## 2020-05-16 RX ORDER — ACETAMINOPHEN 325 MG/1
650 TABLET ORAL ONCE
Status: DISCONTINUED | OUTPATIENT
Start: 2020-05-16 | End: 2020-05-16 | Stop reason: HOSPADM

## 2020-05-16 RX ORDER — ACETAMINOPHEN 325 MG/1
650 TABLET ORAL ONCE
Status: CANCELLED | OUTPATIENT
Start: 2020-05-16

## 2020-05-16 RX ADMIN — LIDOCAINE HYDROCHLORIDE: 10 INJECTION, SOLUTION EPIDURAL; INFILTRATION; INTRACAUDAL; PERINEURAL at 11:30

## 2020-05-16 RX ADMIN — HEPARIN 500 UNITS: 100 SYRINGE at 14:40

## 2020-05-16 RX ADMIN — SODIUM CHLORIDE 250 ML: 9 INJECTION, SOLUTION INTRAVENOUS at 11:30

## 2020-05-16 RX ADMIN — FUROSEMIDE 20 MG: 10 INJECTION, SOLUTION INTRAMUSCULAR; INTRAVENOUS at 14:35

## 2020-05-16 ASSESSMENT — FIBROSIS 4 INDEX: FIB4 SCORE: 2.48

## 2020-05-16 NOTE — PROGRESS NOTES
Patient arrived ambulatory to the [facility] for unit #2 of PRBC's. Reviewed vital signs, labs, and physician order. Labs of 6.1 on 5/14/20, received 1 unit on 5/14/20, second unit to be transfused on 5/16/20 r/t HTN. Patient denies S&S of infection. Right chest port numbed with Lidocaine per pt request. Port accessed with sterile technique, visualized brisk blood return. Pt declines need for Benadryl, took own Tylenol upon arrival, confirmed blood consents are signed.  I unit of PRBC's transfused as directed, no adverse reaction observed. IV lasix administered as ordered, no adverse reaction observed. Port flushed per protocol, rivera needle removed with tip intact, gauze and tape dressing placed. Confirmed upcoming appointment date and time with patient. Patient left the [facility] ambulatory in no sign of distress.

## 2020-05-19 ENCOUNTER — TELEPHONE (OUTPATIENT)
Dept: HEMATOLOGY ONCOLOGY | Facility: MEDICAL CENTER | Age: 65
End: 2020-05-19

## 2020-05-19 NOTE — TELEPHONE ENCOUNTER
Afua called this morning and was wondering if we could have [facility] draw her hemoglobin when she is there on the first of June. Spoke to Yanely and she stated it was okay just need to call [facility] and see if that is okay.  Called [facility] in [place] and spoke to Padmini one of the nurses there and she stated that they are able to do that all they need is an order to be sent over to them.  There fax number is [phone] to fax the orders over.  Lil do you mind putting those orders in for me so I can fax them over.

## 2020-05-29 DIAGNOSIS — D46.9 MDS (MYELODYSPLASTIC SYNDROME) (HCC): ICD-10-CM

## 2020-06-03 ENCOUNTER — TELEPHONE (OUTPATIENT)
Dept: ONCOLOGY | Facility: MEDICAL CENTER | Age: 65
End: 2020-06-03

## 2020-06-03 LAB
BASOPHILS # BLD AUTO: 0.1 X10E3/UL (ref 0–0.2)
BASOPHILS NFR BLD AUTO: 2 %
EOSINOPHIL # BLD AUTO: 0.2 X10E3/UL (ref 0–0.4)
EOSINOPHIL NFR BLD AUTO: 4 %
ERYTHROCYTE [DISTWIDTH] IN BLOOD BY AUTOMATED COUNT: 14.6 % (ref 11.7–15.4)
HCT VFR BLD AUTO: 19.6 % (ref 34–46.6)
HGB BLD-MCNC: 6.6 G/DL (ref 11.1–15.9)
IMM GRANULOCYTES # BLD AUTO: ABNORMAL 10*3/UL
IMM GRANULOCYTES NFR BLD AUTO: ABNORMAL %
IMMATURE CELLS  115398: ABNORMAL
LYMPHOCYTES # BLD AUTO: 1.6 X10E3/UL (ref 0.7–3.1)
LYMPHOCYTES NFR BLD AUTO: 35 %
MCH RBC QN AUTO: 29.7 PG (ref 26.6–33)
MCHC RBC AUTO-ENTMCNC: 33.7 G/DL (ref 31.5–35.7)
MCV RBC AUTO: 88 FL (ref 79–97)
MONOCYTES # BLD AUTO: 0.4 X10E3/UL (ref 0.1–0.9)
MONOCYTES NFR BLD AUTO: 8 %
MORPHOLOGY BLD-IMP: ABNORMAL
NEUTROPHILS # BLD AUTO: 2.3 X10E3/UL (ref 1.4–7)
NEUTROPHILS NFR BLD AUTO: 51 %
NRBC BLD AUTO-RTO: ABNORMAL %
PLATELET # BLD AUTO: 79 X10E3/UL (ref 150–450)
RBC # BLD AUTO: 2.22 X10E6/UL (ref 3.77–5.28)
WBC # BLD AUTO: 4.5 X10E3/UL (ref 3.4–10.8)

## 2020-06-03 ASSESSMENT — ENCOUNTER SYMPTOMS
WHEEZING: 0
MYALGIAS: 0
PALPITATIONS: 0
VOMITING: 0
TINGLING: 0
SHORTNESS OF BREATH: 0
COUGH: 0
DIARRHEA: 0
CHILLS: 0
FEVER: 0
WEIGHT LOSS: 0

## 2020-06-03 NOTE — PROGRESS NOTES
Subjective:      Vielka Briscoe is a 64 y.o. female who presents for Other (MDS 3 Week FV)      HPI   Ms. 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  She is accompanied by her caregiver, Afua, for today's visit.     Patient has been treated with low-dose Revlimid in 2016 and Vidaza 75 mg/m² subcu 5 days a week in 2017, per Dr. Avila. Since Dr. Roberts's skilled nursing the patient established with our office (7/2019), per second opinion evaluation, and continues with supportive therapy in the form of periodic transfusions, standing orders in place.    She is a bit tired and dizzy today and feels a transfusion is due. She continues with intermittent restless legs and sleeplessness. She has not yet been able to schedule follow up with GI for EGD/colonoscopy. She is otherwise at her baseline.        Allergies   Allergen Reactions   • Lenalidomide Unspecified     Beeping Pulse     • Naprosyn [Naproxen] Hives   • Pioglitazone Unspecified     Causes blindness   • Simvastatin Unspecified     Couldn't move   • Demerol Vomiting   • Diphenhydramine Vomiting   • Glipizide Vomiting   • Hydromorphone Vomiting   • Iron Vomiting     vomiting   • Metformin Vomiting   • Morphine Vomiting   • Multivitamin Itching     itching   • Ondansetron Itching   • Other Drug Rash and Vomiting     Any binders that remove phosphorus from the body such as tums   • Oxycodone Vomiting   • Pcn [Penicillins] Vomiting          • Propoxyphene Vomiting   • Requip Vomiting   • Sulfa Drugs Rash and Vomiting     Vomiting & rash      • Tamsulosin Vomiting   • Tramadol Vomiting   • Trazodone Vomiting           Current Outpatient Medications on File Prior to Visit   Medication Sig Dispense Refill   • Heparin Lock Flush (HEPARIN PF) 100 UNIT/ML Solution injection      • furosemide (LASIX) 10 MG/ML Solution      • DULoxetine (CYMBALTA) 30 MG Cap   Particles      • lidocaine (LIDODERM) 5 % Patch Apply 1 Patch to skin as directed every 12 hours. 30 Patch 0   • Alcohol Swabs ( STERILE ALCOHOL PREP) Pads WIPE SKIN BEFORE INSULIN     • docusate sodium (COLACE) 100 MG Cap Take 100 mg by mouth 2 times a day.     • amLODIPine (NORVASC) 2.5 MG Tab TK 1 T PO QD     • Buprenorphine 20 MCG/HR PATCH WEEKLY Apply 20 mcg to affected area(s) every 7 days. THURSDAY  0   • Diclofenac Sodium (VOLTAREN) 1 % Gel Apply 4 g to skin as directed 4 times a day as needed.     • carvedilol (COREG) 12.5 MG Tab Take 1 Tab by mouth 2 times a day, with meals. 60 Tab 0   • losartan (COZAAR) 100 MG Tab Take 100 mg by mouth every evening.     • bimatoprost (LUMIGAN) 0.01 % Solution Place 1 Drop in both eyes every bedtime.     • brinzolamide (AZOPT) 1 % Suspension Place 1 Drop in both eyes 2 Times a Day.     • Brimonidine Tartrate-Timolol (COMBIGAN) 0.2-0.5 % Solution Place 1 Drop in both eyes 2 Times a Day.     • pregabalin (LYRICA) 150 MG Cap Take 150 mg by mouth 4 times a day.       Current Facility-Administered Medications on File Prior to Visit   Medication Dose Route Frequency Provider Last Rate Last Dose   • acetaminophen (TYLENOL) tablet 650 mg  650 mg Oral Once Yanely Barba, A.P.R.N.       • diphenhydrAMINE (BENADRYL) tablet/capsule 25 mg  25 mg Oral Once Yanely Barba, A.P.R.N.       • diphenhydrAMINE (BENADRYL) IVPB premix 25 mg  25 mg Intravenous Once PRN Yanely Barba, A.P.R.N.       • hydrocortisone sodium succinate PF (SOLU-CORTEF) 100 MG injection 100 mg  100 mg Intravenous Once Yanely Barba, A.P.R.N.       • furosemide (LASIX) injection 20 mg  20 mg Intravenous Once Yanely Barba, A.P.R.N.       • LIDOCAINE HCL 1 % INJ SOLN  20 mL Intradermal Once PRN Yanely Barba, A.P.R.N.   0.5 mL at 06/04/20 1026         Review of Systems   Constitutional: Negative for chills, fever, malaise/fatigue and weight loss.   Respiratory: Negative for cough, shortness of  "breath and wheezing.         O2   Cardiovascular: Negative for chest pain, palpitations and leg swelling.        HTN - symptoms stable   Gastrointestinal: Positive for constipation (releived with dulcolax prn) and nausea (variable and consistent with baseline). Negative for diarrhea and vomiting.   Genitourinary: Negative for dysuria.   Musculoskeletal: Negative for myalgias.   Neurological: Positive for dizziness (more right now) and headaches. Negative for tingling.   Psychiatric/Behavioral: The patient has insomnia (restless legs).           Objective:     BP (!) 172/89 (BP Location: Right arm, Patient Position: Sitting, BP Cuff Size: Adult)   Pulse 74   Temp 37.2 °C (98.9 °F) (Temporal)   Resp 16   Ht 1.46 m (4' 9.48\")   Wt 66.4 kg (146 lb 7.9 oz)   LMP 01/01/1995   SpO2 100%   BMI 31.17 kg/m²        Physical Exam  Vitals signs reviewed.   Constitutional:       General: She is not in acute distress.     Appearance: She is well-developed. She is not diaphoretic.   HENT:      Head: Normocephalic and atraumatic.      Mouth/Throat:      Pharynx: No oropharyngeal exudate.   Eyes:      General: No scleral icterus.        Right eye: No discharge.         Left eye: No discharge.      Conjunctiva/sclera: Conjunctivae normal.      Pupils: Pupils are equal, round, and reactive to light.   Neck:      Musculoskeletal: Normal range of motion and neck supple.   Cardiovascular:      Rate and Rhythm: Normal rate and regular rhythm.      Heart sounds: Normal heart sounds. No murmur. No friction rub. No gallop.    Pulmonary:      Effort: Pulmonary effort is normal. No respiratory distress.      Breath sounds: Normal breath sounds. No wheezing.      Comments: O2  Abdominal:      General: Bowel sounds are normal. There is no distension.      Palpations: Abdomen is soft.      Tenderness: There is no abdominal tenderness.   Musculoskeletal: Normal range of motion.   Skin:     General: Skin is warm and dry.      Coloration: " Skin is pale.      Findings: No erythema or rash.   Neurological:      Mental Status: She is alert and oriented to person, place, and time.   Psychiatric:         Behavior: Behavior normal.                      Assessment/Plan:       1. Myelodysplasia (myelodysplastic syndrome) ([facility])     2. Encounter for hematology follow-up     3. ESRD (end stage renal disease) ([facility])         1.  ESRD: Patient continues with dialysis every Monday, Wednesday, Friday.  She continues to follow-up with Lion Bennett MD ([facility] Nephrology)     2.  MDS: Patient has previously undergone treatment with Vidaza and Revlimid with no further treatment since 2017.  She established care with our office in July 2019 and continues with supportive transfusions, requiring them approximately every 2-3 weeks. CBC shows hgb 6.6, she will proceed with transfusions according to standing orders. She will return for reevaluation in 3 weeks, sooner as needed.     She has been evaluated by GI with + occult blood noted.  She has been advised to undergo EGD and colonoscopy and awaits resumption of elective procedures for further evaluation and other evaluation of chronic blood loss. Follow up remains pending.        The patient verbalized agreement and understanding of current plan. All questions and concerns were addressed at time of visit.    Please note that this dictation was created using voice recognition software. I have made every reasonable attempt to correct obvious errors, but I expect that there are errors of grammar and possibly content that I did not discover before finalizing the note.

## 2020-06-04 ENCOUNTER — OFFICE VISIT (OUTPATIENT)
Dept: HEMATOLOGY ONCOLOGY | Facility: MEDICAL CENTER | Age: 65
End: 2020-06-04
Payer: MEDICARE

## 2020-06-04 ENCOUNTER — OUTPATIENT INFUSION SERVICES (OUTPATIENT)
Dept: ONCOLOGY | Facility: MEDICAL CENTER | Age: 65
End: 2020-06-04
Attending: INTERNAL MEDICINE
Payer: MEDICARE

## 2020-06-04 VITALS
SYSTOLIC BLOOD PRESSURE: 172 MMHG | DIASTOLIC BLOOD PRESSURE: 89 MMHG | WEIGHT: 146.5 LBS | HEIGHT: 57 IN | RESPIRATION RATE: 16 BRPM | HEART RATE: 74 BPM | TEMPERATURE: 98.9 F | BODY MASS INDEX: 31.61 KG/M2 | OXYGEN SATURATION: 100 %

## 2020-06-04 VITALS
DIASTOLIC BLOOD PRESSURE: 58 MMHG | OXYGEN SATURATION: 100 % | RESPIRATION RATE: 18 BRPM | BODY MASS INDEX: 30.49 KG/M2 | HEIGHT: 57 IN | TEMPERATURE: 97.7 F | WEIGHT: 141.31 LBS | HEART RATE: 80 BPM | SYSTOLIC BLOOD PRESSURE: 172 MMHG

## 2020-06-04 DIAGNOSIS — D64.9 SYMPTOMATIC ANEMIA: ICD-10-CM

## 2020-06-04 DIAGNOSIS — N18.6 ESRD (END STAGE RENAL DISEASE) (HCC): ICD-10-CM

## 2020-06-04 DIAGNOSIS — D46.9 MYELODYSPLASIA (MYELODYSPLASTIC SYNDROME) (HCC): ICD-10-CM

## 2020-06-04 DIAGNOSIS — Z09 ENCOUNTER FOR HEMATOLOGY FOLLOW-UP: ICD-10-CM

## 2020-06-04 LAB
ABO GROUP BLD: NORMAL
BARCODED ABORH UBTYP: 8400
BARCODED PRD CODE UBPRD: NORMAL
BARCODED UNIT NUM UBUNT: NORMAL
BLD GP AB SCN SERPL QL: NORMAL
COMPONENT R 8504R: NORMAL
PRODUCT TYPE UPROD: NORMAL
RH BLD: NORMAL
UNIT STATUS USTAT: NORMAL

## 2020-06-04 PROCEDURE — 99213 OFFICE O/P EST LOW 20 MIN: CPT | Performed by: NURSE PRACTITIONER

## 2020-06-04 PROCEDURE — 306780 HCHG STAT FOR TRANSFUSION PER CASE

## 2020-06-04 PROCEDURE — 86900 BLOOD TYPING SEROLOGIC ABO: CPT

## 2020-06-04 PROCEDURE — 36430 TRANSFUSION BLD/BLD COMPNT: CPT

## 2020-06-04 PROCEDURE — P9016 RBC LEUKOCYTES REDUCED: HCPCS

## 2020-06-04 PROCEDURE — 700111 HCHG RX REV CODE 636 W/ 250 OVERRIDE (IP)

## 2020-06-04 PROCEDURE — 86901 BLOOD TYPING SEROLOGIC RH(D): CPT

## 2020-06-04 PROCEDURE — 96374 THER/PROPH/DIAG INJ IV PUSH: CPT

## 2020-06-04 PROCEDURE — A4212 NON CORING NEEDLE OR STYLET: HCPCS

## 2020-06-04 PROCEDURE — 700111 HCHG RX REV CODE 636 W/ 250 OVERRIDE (IP): Performed by: NURSE PRACTITIONER

## 2020-06-04 PROCEDURE — 86850 RBC ANTIBODY SCREEN: CPT

## 2020-06-04 PROCEDURE — 86923 COMPATIBILITY TEST ELECTRIC: CPT

## 2020-06-04 RX ORDER — LIDOCAINE HYDROCHLORIDE 10 MG/ML
20 INJECTION, SOLUTION INFILTRATION; PERINEURAL
Status: CANCELLED | OUTPATIENT
Start: 2020-06-04

## 2020-06-04 RX ORDER — DIPHENHYDRAMINE HCL 25 MG
25 TABLET ORAL ONCE
Status: CANCELLED | OUTPATIENT
Start: 2020-06-04

## 2020-06-04 RX ORDER — ACETAMINOPHEN 325 MG/1
650 TABLET ORAL ONCE
Status: CANCELLED | OUTPATIENT
Start: 2020-06-04

## 2020-06-04 RX ORDER — DIPHENHYDRAMINE HYDROCHLORIDE 50 MG/ML
25 INJECTION INTRAMUSCULAR; INTRAVENOUS PRN
Status: CANCELLED | OUTPATIENT
Start: 2020-06-04

## 2020-06-04 RX ORDER — ACETAMINOPHEN 325 MG/1
650 TABLET ORAL ONCE
Status: DISCONTINUED | OUTPATIENT
Start: 2020-06-04 | End: 2020-06-04

## 2020-06-04 RX ORDER — LIDOCAINE HYDROCHLORIDE 10 MG/ML
20 INJECTION, SOLUTION INFILTRATION; PERINEURAL
Status: DISCONTINUED | OUTPATIENT
Start: 2020-06-04 | End: 2020-06-04

## 2020-06-04 RX ORDER — SODIUM CHLORIDE 9 MG/ML
500 INJECTION, SOLUTION INTRAVENOUS ONCE
Status: CANCELLED | OUTPATIENT
Start: 2020-06-04

## 2020-06-04 RX ORDER — FUROSEMIDE 10 MG/ML
20 INJECTION INTRAMUSCULAR; INTRAVENOUS ONCE
Status: CANCELLED | OUTPATIENT
Start: 2020-06-04

## 2020-06-04 RX ORDER — FUROSEMIDE 10 MG/ML
20 INJECTION INTRAMUSCULAR; INTRAVENOUS ONCE
Status: COMPLETED | OUTPATIENT
Start: 2020-06-04 | End: 2020-06-04

## 2020-06-04 RX ORDER — ACETAMINOPHEN 325 MG/1
650 TABLET ORAL PRN
Status: CANCELLED | OUTPATIENT
Start: 2020-06-04

## 2020-06-04 RX ORDER — DIPHENHYDRAMINE HCL 25 MG
25 TABLET ORAL ONCE
Status: DISCONTINUED | OUTPATIENT
Start: 2020-06-04 | End: 2020-06-04

## 2020-06-04 RX ORDER — LIDOCAINE HYDROCHLORIDE 10 MG/ML
INJECTION, SOLUTION EPIDURAL; INFILTRATION; INTRACAUDAL; PERINEURAL
Status: COMPLETED
Start: 2020-06-04 | End: 2020-06-04

## 2020-06-04 RX ORDER — SODIUM CHLORIDE 9 MG/ML
10 INJECTION, SOLUTION INTRAMUSCULAR; INTRAVENOUS; SUBCUTANEOUS PRN
Status: CANCELLED | OUTPATIENT
Start: 2020-06-04

## 2020-06-04 RX ADMIN — LIDOCAINE HYDROCHLORIDE 0.5 ML: 10 INJECTION, SOLUTION INFILTRATION; PERINEURAL at 10:26

## 2020-06-04 RX ADMIN — LIDOCAINE HYDROCHLORIDE 0.5 ML: 10 INJECTION, SOLUTION EPIDURAL; INFILTRATION; INTRACAUDAL at 10:26

## 2020-06-04 RX ADMIN — HEPARIN: 100 SYRINGE at 14:29

## 2020-06-04 RX ADMIN — FUROSEMIDE 20 MG: 10 INJECTION, SOLUTION INTRAMUSCULAR; INTRAVENOUS at 14:28

## 2020-06-04 ASSESSMENT — ENCOUNTER SYMPTOMS
INSOMNIA: 1
NAUSEA: 1
CONSTIPATION: 1
DIZZINESS: 1
HEADACHES: 1

## 2020-06-04 ASSESSMENT — FIBROSIS 4 INDEX
FIB4 SCORE: 3.26
FIB4 SCORE: 3.26

## 2020-06-04 ASSESSMENT — PAIN SCALES - GENERAL: PAINLEVEL: NO PAIN

## 2020-06-04 NOTE — PROGRESS NOTES
[facility]. Pt denied fever, signs or symptoms of infection or acute illness today. POC discussed and pt verbalized understanding.     Port accessed without difficulty and COD drawn at this time; pt tolerated well. Outside labs reviewed (see ). Pt refused premedications today; One unit of PRBCs administered per treatment plan and pt tolerated well. VS taken per protocol; No signs or symptoms of reaction or complications noted; Lasix administered after PRBCs. Port flushed per policy and de-accessed with needle intact; pt tolerated well. Gauze and paper tape applied at this time.     Follow-up care and next appointment printed for pt and pt verbalized understanding. Pt discharged home to self care with caregiver in no apparent distress at this time.

## 2020-06-05 ENCOUNTER — TELEPHONE (OUTPATIENT)
Dept: ONCOLOGY | Facility: MEDICAL CENTER | Age: 65
End: 2020-06-05

## 2020-06-06 ENCOUNTER — OUTPATIENT INFUSION SERVICES (OUTPATIENT)
Dept: ONCOLOGY | Facility: MEDICAL CENTER | Age: 65
End: 2020-06-06
Attending: INTERNAL MEDICINE
Payer: MEDICARE

## 2020-06-06 VITALS
OXYGEN SATURATION: 100 % | HEART RATE: 70 BPM | DIASTOLIC BLOOD PRESSURE: 88 MMHG | SYSTOLIC BLOOD PRESSURE: 158 MMHG | RESPIRATION RATE: 18 BRPM | WEIGHT: 145.94 LBS | TEMPERATURE: 97.2 F | HEIGHT: 57 IN | BODY MASS INDEX: 31.49 KG/M2

## 2020-06-06 DIAGNOSIS — D64.9 SYMPTOMATIC ANEMIA: ICD-10-CM

## 2020-06-06 DIAGNOSIS — D46.9 MYELODYSPLASIA (MYELODYSPLASTIC SYNDROME) (HCC): ICD-10-CM

## 2020-06-06 PROCEDURE — 306780 HCHG STAT FOR TRANSFUSION PER CASE

## 2020-06-06 PROCEDURE — 700111 HCHG RX REV CODE 636 W/ 250 OVERRIDE (IP)

## 2020-06-06 PROCEDURE — 36430 TRANSFUSION BLD/BLD COMPNT: CPT

## 2020-06-06 PROCEDURE — P9016 RBC LEUKOCYTES REDUCED: HCPCS

## 2020-06-06 PROCEDURE — 96374 THER/PROPH/DIAG INJ IV PUSH: CPT

## 2020-06-06 PROCEDURE — A4212 NON CORING NEEDLE OR STYLET: HCPCS

## 2020-06-06 PROCEDURE — 86923 COMPATIBILITY TEST ELECTRIC: CPT

## 2020-06-06 PROCEDURE — 700111 HCHG RX REV CODE 636 W/ 250 OVERRIDE (IP): Performed by: NURSE PRACTITIONER

## 2020-06-06 RX ORDER — DIPHENHYDRAMINE HYDROCHLORIDE 50 MG/ML
25 INJECTION INTRAMUSCULAR; INTRAVENOUS PRN
Status: CANCELLED | OUTPATIENT
Start: 2020-06-06

## 2020-06-06 RX ORDER — ACETAMINOPHEN 325 MG/1
650 TABLET ORAL ONCE
Status: DISCONTINUED | OUTPATIENT
Start: 2020-06-06 | End: 2020-06-06 | Stop reason: HOSPADM

## 2020-06-06 RX ORDER — ACETAMINOPHEN 325 MG/1
650 TABLET ORAL PRN
Status: CANCELLED | OUTPATIENT
Start: 2020-06-06

## 2020-06-06 RX ORDER — LIDOCAINE HYDROCHLORIDE 10 MG/ML
20 INJECTION, SOLUTION INFILTRATION; PERINEURAL
Status: CANCELLED | OUTPATIENT
Start: 2020-06-06

## 2020-06-06 RX ORDER — FUROSEMIDE 10 MG/ML
20 INJECTION INTRAMUSCULAR; INTRAVENOUS ONCE
Status: CANCELLED | OUTPATIENT
Start: 2020-06-06

## 2020-06-06 RX ORDER — LIDOCAINE HYDROCHLORIDE 10 MG/ML
20 INJECTION, SOLUTION INFILTRATION; PERINEURAL
Status: DISCONTINUED | OUTPATIENT
Start: 2020-06-06 | End: 2020-06-06 | Stop reason: HOSPADM

## 2020-06-06 RX ORDER — SODIUM CHLORIDE 9 MG/ML
10 INJECTION, SOLUTION INTRAMUSCULAR; INTRAVENOUS; SUBCUTANEOUS PRN
Status: CANCELLED | OUTPATIENT
Start: 2020-06-06

## 2020-06-06 RX ORDER — DIPHENHYDRAMINE HCL 25 MG
25 TABLET ORAL ONCE
Status: DISCONTINUED | OUTPATIENT
Start: 2020-06-06 | End: 2020-06-06 | Stop reason: HOSPADM

## 2020-06-06 RX ORDER — HEPARIN SODIUM (PORCINE) LOCK FLUSH IV SOLN 100 UNIT/ML 100 UNIT/ML
500 SOLUTION INTRAVENOUS PRN
Status: CANCELLED | OUTPATIENT
Start: 2020-06-06

## 2020-06-06 RX ORDER — SODIUM CHLORIDE 9 MG/ML
500 INJECTION, SOLUTION INTRAVENOUS ONCE
Status: CANCELLED | OUTPATIENT
Start: 2020-06-06

## 2020-06-06 RX ORDER — ACETAMINOPHEN 325 MG/1
650 TABLET ORAL ONCE
Status: CANCELLED | OUTPATIENT
Start: 2020-06-06

## 2020-06-06 RX ORDER — DIPHENHYDRAMINE HCL 25 MG
25 TABLET ORAL ONCE
Status: CANCELLED | OUTPATIENT
Start: 2020-06-06

## 2020-06-06 RX ORDER — LIDOCAINE HYDROCHLORIDE 10 MG/ML
INJECTION, SOLUTION EPIDURAL; INFILTRATION; INTRACAUDAL; PERINEURAL
Status: COMPLETED
Start: 2020-06-06 | End: 2020-06-06

## 2020-06-06 RX ORDER — FUROSEMIDE 10 MG/ML
20 INJECTION INTRAMUSCULAR; INTRAVENOUS ONCE
Status: COMPLETED | OUTPATIENT
Start: 2020-06-06 | End: 2020-06-06

## 2020-06-06 RX ADMIN — LIDOCAINE HYDROCHLORIDE 20 ML: 10 INJECTION, SOLUTION EPIDURAL; INFILTRATION; INTRACAUDAL at 10:02

## 2020-06-06 RX ADMIN — LIDOCAINE HYDROCHLORIDE 20 ML: 10 INJECTION, SOLUTION INFILTRATION; PERINEURAL at 10:02

## 2020-06-06 RX ADMIN — FUROSEMIDE 20 MG: 10 INJECTION, SOLUTION INTRAMUSCULAR; INTRAVENOUS at 12:12

## 2020-06-06 RX ADMIN — HEPARIN 500 UNITS: 100 SYRINGE at 12:16

## 2020-06-06 ASSESSMENT — FIBROSIS 4 INDEX: FIB4 SCORE: 3.26

## 2020-06-06 NOTE — PROGRESS NOTES
Pt arrived to [facility] ambulatory, here escorted by friend for blood transfusion. Pt with R chest port, requesting lidocaine to numb site. Port site numbed and accessed in sterile field. Port flushed, brisk blood return observed. Pt had one unit of blood on Thursday, second unit ordered for today due to fluid overload issues. Pt declined premeds as she prefers to take her own quick release Tylenol and does not want the benadryl. Blood transfused as ordered. Pt yajaira well. Line flushed clear. Lasix given per pt request. Port flushed, heparin instilled, needle removed, tip intact. Gauze dressing applied. Pt knows when to return. Escorted to car for discharged with friend, cane in use. Pt discharged home in no apparent distress.

## 2020-06-16 ENCOUNTER — HOSPITAL ENCOUNTER (INPATIENT)
Facility: MEDICAL CENTER | Age: 65
LOS: 4 days | DRG: 377 | End: 2020-06-20
Attending: EMERGENCY MEDICINE | Admitting: HOSPITALIST
Payer: MEDICARE

## 2020-06-16 ENCOUNTER — APPOINTMENT (OUTPATIENT)
Dept: RADIOLOGY | Facility: MEDICAL CENTER | Age: 65
DRG: 377 | End: 2020-06-16
Attending: HOSPITALIST
Payer: MEDICARE

## 2020-06-16 DIAGNOSIS — R06.02 SHORTNESS OF BREATH: ICD-10-CM

## 2020-06-16 DIAGNOSIS — K92.1 BLOODY STOOLS: ICD-10-CM

## 2020-06-16 DIAGNOSIS — D64.9 ANEMIA, UNSPECIFIED TYPE: ICD-10-CM

## 2020-06-16 DIAGNOSIS — R55 NEAR SYNCOPE: ICD-10-CM

## 2020-06-16 DIAGNOSIS — D69.6 THROMBOCYTOPENIA (HCC): ICD-10-CM

## 2020-06-16 DIAGNOSIS — R73.9 HYPERGLYCEMIA: ICD-10-CM

## 2020-06-16 DIAGNOSIS — I10 HYPERTENSION, UNSPECIFIED TYPE: ICD-10-CM

## 2020-06-16 PROBLEM — J43.9 PULMONARY EMPHYSEMA (HCC): Status: ACTIVE | Noted: 2018-08-27

## 2020-06-16 LAB
ABO GROUP BLD: NORMAL
ALBUMIN SERPL BCP-MCNC: 3.3 G/DL (ref 3.2–4.9)
ALBUMIN/GLOB SERPL: 1.1 G/DL
ALP SERPL-CCNC: 88 U/L (ref 30–99)
ALT SERPL-CCNC: 48 U/L (ref 2–50)
ANION GAP SERPL CALC-SCNC: 12 MMOL/L (ref 7–16)
APTT PPP: 59.6 SEC (ref 24.7–36)
AST SERPL-CCNC: 28 U/L (ref 12–45)
BARCODED ABORH UBTYP: 8400
BARCODED PRD CODE UBPRD: NORMAL
BARCODED UNIT NUM UBUNT: NORMAL
BASOPHILS # BLD AUTO: 0.3 % (ref 0–1.8)
BASOPHILS # BLD: 0.01 K/UL (ref 0–0.12)
BILIRUB SERPL-MCNC: 0.4 MG/DL (ref 0.1–1.5)
BLD GP AB SCN SERPL QL: NORMAL
BUN SERPL-MCNC: 57 MG/DL (ref 8–22)
CALCIUM SERPL-MCNC: 8.7 MG/DL (ref 8.5–10.5)
CHLORIDE SERPL-SCNC: 87 MMOL/L (ref 96–112)
CO2 SERPL-SCNC: 29 MMOL/L (ref 20–33)
COMPONENT R 8504R: NORMAL
CREAT SERPL-MCNC: 5.25 MG/DL (ref 0.5–1.4)
EKG IMPRESSION: NORMAL
EOSINOPHIL # BLD AUTO: 0.2 K/UL (ref 0–0.51)
EOSINOPHIL NFR BLD: 5.1 % (ref 0–6.9)
ERYTHROCYTE [DISTWIDTH] IN BLOOD BY AUTOMATED COUNT: 42.5 FL (ref 35.9–50)
GLOBULIN SER CALC-MCNC: 3.1 G/DL (ref 1.9–3.5)
GLUCOSE BLD-MCNC: 245 MG/DL (ref 65–99)
GLUCOSE SERPL-MCNC: 410 MG/DL (ref 65–99)
HCT VFR BLD AUTO: 21 % (ref 37–47)
HGB BLD-MCNC: 7.1 G/DL (ref 12–16)
IMM GRANULOCYTES # BLD AUTO: 0.03 K/UL (ref 0–0.11)
IMM GRANULOCYTES NFR BLD AUTO: 0.8 % (ref 0–0.9)
INR PPP: 1.06 (ref 0.87–1.13)
LIPASE SERPL-CCNC: 60 U/L (ref 11–82)
LYMPHOCYTES # BLD AUTO: 0.75 K/UL (ref 1–4.8)
LYMPHOCYTES NFR BLD: 19.3 % (ref 22–41)
MAGNESIUM SERPL-MCNC: 2 MG/DL (ref 1.5–2.5)
MCH RBC QN AUTO: 29.8 PG (ref 27–33)
MCHC RBC AUTO-ENTMCNC: 33.8 G/DL (ref 33.6–35)
MCV RBC AUTO: 88.2 FL (ref 81.4–97.8)
MONOCYTES # BLD AUTO: 0.66 K/UL (ref 0–0.85)
MONOCYTES NFR BLD AUTO: 17 % (ref 0–13.4)
NEUTROPHILS # BLD AUTO: 2.24 K/UL (ref 2–7.15)
NEUTROPHILS NFR BLD: 57.5 % (ref 44–72)
NRBC # BLD AUTO: 0 K/UL
NRBC BLD-RTO: 0 /100 WBC
PLATELET # BLD AUTO: 59 K/UL (ref 164–446)
POTASSIUM SERPL-SCNC: 4.2 MMOL/L (ref 3.6–5.5)
PRODUCT TYPE UPROD: NORMAL
PROT SERPL-MCNC: 6.4 G/DL (ref 6–8.2)
PROTHROMBIN TIME: 14.1 SEC (ref 12–14.6)
RBC # BLD AUTO: 2.38 M/UL (ref 4.2–5.4)
RH BLD: NORMAL
SODIUM SERPL-SCNC: 128 MMOL/L (ref 135–145)
UNIT STATUS USTAT: NORMAL
WBC # BLD AUTO: 3.9 K/UL (ref 4.8–10.8)

## 2020-06-16 PROCEDURE — C9113 INJ PANTOPRAZOLE SODIUM, VIA: HCPCS | Performed by: HOSPITALIST

## 2020-06-16 PROCEDURE — 82962 GLUCOSE BLOOD TEST: CPT

## 2020-06-16 PROCEDURE — 86900 BLOOD TYPING SEROLOGIC ABO: CPT

## 2020-06-16 PROCEDURE — 71045 X-RAY EXAM CHEST 1 VIEW: CPT

## 2020-06-16 PROCEDURE — 770020 HCHG ROOM/CARE - TELE (206)

## 2020-06-16 PROCEDURE — 85730 THROMBOPLASTIN TIME PARTIAL: CPT

## 2020-06-16 PROCEDURE — 700101 HCHG RX REV CODE 250: Performed by: HOSPITALIST

## 2020-06-16 PROCEDURE — 93005 ELECTROCARDIOGRAM TRACING: CPT | Performed by: EMERGENCY MEDICINE

## 2020-06-16 PROCEDURE — 86923 COMPATIBILITY TEST ELECTRIC: CPT

## 2020-06-16 PROCEDURE — 83690 ASSAY OF LIPASE: CPT

## 2020-06-16 PROCEDURE — 99223 1ST HOSP IP/OBS HIGH 75: CPT | Mod: AI | Performed by: HOSPITALIST

## 2020-06-16 PROCEDURE — 80053 COMPREHEN METABOLIC PANEL: CPT

## 2020-06-16 PROCEDURE — 86850 RBC ANTIBODY SCREEN: CPT

## 2020-06-16 PROCEDURE — A9270 NON-COVERED ITEM OR SERVICE: HCPCS | Performed by: HOSPITALIST

## 2020-06-16 PROCEDURE — 85610 PROTHROMBIN TIME: CPT

## 2020-06-16 PROCEDURE — 36415 COLL VENOUS BLD VENIPUNCTURE: CPT

## 2020-06-16 PROCEDURE — 700102 HCHG RX REV CODE 250 W/ 637 OVERRIDE(OP): Performed by: HOSPITALIST

## 2020-06-16 PROCEDURE — 700101 HCHG RX REV CODE 250: Performed by: EMERGENCY MEDICINE

## 2020-06-16 PROCEDURE — 85025 COMPLETE CBC W/AUTO DIFF WBC: CPT

## 2020-06-16 PROCEDURE — P9016 RBC LEUKOCYTES REDUCED: HCPCS

## 2020-06-16 PROCEDURE — 83735 ASSAY OF MAGNESIUM: CPT

## 2020-06-16 PROCEDURE — 86901 BLOOD TYPING SEROLOGIC RH(D): CPT

## 2020-06-16 PROCEDURE — 36430 TRANSFUSION BLD/BLD COMPNT: CPT

## 2020-06-16 PROCEDURE — 93005 ELECTROCARDIOGRAM TRACING: CPT

## 2020-06-16 PROCEDURE — 96374 THER/PROPH/DIAG INJ IV PUSH: CPT

## 2020-06-16 PROCEDURE — 700111 HCHG RX REV CODE 636 W/ 250 OVERRIDE (IP): Performed by: HOSPITALIST

## 2020-06-16 PROCEDURE — 99285 EMERGENCY DEPT VISIT HI MDM: CPT

## 2020-06-16 RX ORDER — PREGABALIN 150 MG/1
150 CAPSULE ORAL 4 TIMES DAILY
Status: DISCONTINUED | OUTPATIENT
Start: 2020-06-16 | End: 2020-06-17

## 2020-06-16 RX ORDER — BISACODYL 10 MG
10 SUPPOSITORY, RECTAL RECTAL
Status: DISCONTINUED | OUTPATIENT
Start: 2020-06-16 | End: 2020-06-20 | Stop reason: HOSPADM

## 2020-06-16 RX ORDER — NITROGLYCERIN 0.4 MG/1
0.4 TABLET SUBLINGUAL
COMMUNITY
End: 2020-07-07

## 2020-06-16 RX ORDER — LOSARTAN POTASSIUM 50 MG/1
100 TABLET ORAL EVERY EVENING
Status: DISCONTINUED | OUTPATIENT
Start: 2020-06-16 | End: 2020-06-20 | Stop reason: HOSPADM

## 2020-06-16 RX ORDER — BUPRENORPHINE 20 UG/H
1 PATCH TRANSDERMAL
Status: DISCONTINUED | OUTPATIENT
Start: 2020-06-15 | End: 2020-06-20 | Stop reason: HOSPADM

## 2020-06-16 RX ORDER — SODIUM CHLORIDE 9 MG/ML
INJECTION, SOLUTION INTRAVENOUS CONTINUOUS
Status: DISCONTINUED | OUTPATIENT
Start: 2020-06-16 | End: 2020-06-16

## 2020-06-16 RX ORDER — PANTOPRAZOLE SODIUM 40 MG/10ML
40 INJECTION, POWDER, LYOPHILIZED, FOR SOLUTION INTRAVENOUS 2 TIMES DAILY
Status: DISCONTINUED | OUTPATIENT
Start: 2020-06-16 | End: 2020-06-20 | Stop reason: HOSPADM

## 2020-06-16 RX ORDER — CARVEDILOL 12.5 MG/1
12.5 TABLET ORAL 2 TIMES DAILY WITH MEALS
Status: DISCONTINUED | OUTPATIENT
Start: 2020-06-16 | End: 2020-06-20 | Stop reason: HOSPADM

## 2020-06-16 RX ORDER — ACETAMINOPHEN 325 MG/1
650 TABLET ORAL EVERY 6 HOURS PRN
Status: DISCONTINUED | OUTPATIENT
Start: 2020-06-16 | End: 2020-06-20 | Stop reason: HOSPADM

## 2020-06-16 RX ORDER — BIMATOPROST 0.3 MG/ML
1 SOLUTION/ DROPS OPHTHALMIC 2 TIMES DAILY
COMMUNITY

## 2020-06-16 RX ORDER — ALUMINUM ZIRCONIUM TRICHLOROHYDREX GLY 0.19 G/G
20 STICK TOPICAL DAILY
Qty: 30 TAB | Refills: 0 | Status: SHIPPED | OUTPATIENT
Start: 2020-06-16 | End: 2020-07-07

## 2020-06-16 RX ORDER — LABETALOL HYDROCHLORIDE 5 MG/ML
10 INJECTION, SOLUTION INTRAVENOUS ONCE
Status: COMPLETED | OUTPATIENT
Start: 2020-06-16 | End: 2020-06-16

## 2020-06-16 RX ORDER — AMOXICILLIN 250 MG
2 CAPSULE ORAL 2 TIMES DAILY
Status: DISCONTINUED | OUTPATIENT
Start: 2020-06-16 | End: 2020-06-18

## 2020-06-16 RX ORDER — POLYETHYLENE GLYCOL 3350 17 G/17G
1 POWDER, FOR SOLUTION ORAL
Status: DISCONTINUED | OUTPATIENT
Start: 2020-06-16 | End: 2020-06-20 | Stop reason: HOSPADM

## 2020-06-16 RX ORDER — LABETALOL HYDROCHLORIDE 5 MG/ML
10 INJECTION, SOLUTION INTRAVENOUS EVERY 4 HOURS PRN
Status: DISCONTINUED | OUTPATIENT
Start: 2020-06-16 | End: 2020-06-20 | Stop reason: HOSPADM

## 2020-06-16 RX ORDER — DEXTROSE MONOHYDRATE 25 G/50ML
50 INJECTION, SOLUTION INTRAVENOUS
Status: DISCONTINUED | OUTPATIENT
Start: 2020-06-16 | End: 2020-06-20 | Stop reason: HOSPADM

## 2020-06-16 RX ORDER — AMLODIPINE BESYLATE 5 MG/1
5 TABLET ORAL
Status: DISCONTINUED | OUTPATIENT
Start: 2020-06-17 | End: 2020-06-20 | Stop reason: HOSPADM

## 2020-06-16 RX ADMIN — LABETALOL HYDROCHLORIDE 10 MG: 5 INJECTION, SOLUTION INTRAVENOUS at 16:08

## 2020-06-16 RX ADMIN — CARVEDILOL 12.5 MG: 12.5 TABLET, FILM COATED ORAL at 18:03

## 2020-06-16 RX ADMIN — PREGABALIN 150 MG: 150 CAPSULE ORAL at 18:02

## 2020-06-16 RX ADMIN — LOSARTAN POTASSIUM 100 MG: 50 TABLET, FILM COATED ORAL at 18:04

## 2020-06-16 RX ADMIN — PREGABALIN 150 MG: 150 CAPSULE ORAL at 23:43

## 2020-06-16 RX ADMIN — PANTOPRAZOLE SODIUM 40 MG: 40 INJECTION, POWDER, LYOPHILIZED, FOR SOLUTION INTRAVENOUS at 20:55

## 2020-06-16 RX ADMIN — INSULIN HUMAN 2 UNITS: 100 INJECTION, SOLUTION PARENTERAL at 23:46

## 2020-06-16 RX ADMIN — LABETALOL HYDROCHLORIDE 10 MG: 5 INJECTION, SOLUTION INTRAVENOUS at 13:39

## 2020-06-16 RX ADMIN — DOCUSATE SODIUM 50 MG AND SENNOSIDES 8.6 MG 2 TABLET: 8.6; 5 TABLET, FILM COATED ORAL at 18:04

## 2020-06-16 ASSESSMENT — ENCOUNTER SYMPTOMS
HEARTBURN: 0
NECK PAIN: 0
ORTHOPNEA: 0
CLAUDICATION: 0
SPUTUM PRODUCTION: 0
BRUISES/BLEEDS EASILY: 0
VOMITING: 0
POLYDIPSIA: 0
BACK PAIN: 0
SINUS PAIN: 0
DIAPHORESIS: 0
HEMOPTYSIS: 0
DEPRESSION: 0
FLANK PAIN: 0
WHEEZING: 0
CONSTIPATION: 0
PND: 0
BLURRED VISION: 0
SORE THROAT: 0
HALLUCINATIONS: 0
DIARRHEA: 0
WEAKNESS: 0
FEVER: 0
DOUBLE VISION: 0
TREMORS: 0
ABDOMINAL PAIN: 0
PALPITATIONS: 0
TINGLING: 0
EYE PAIN: 0
BLOOD IN STOOL: 1
MYALGIAS: 0
SHORTNESS OF BREATH: 1
STRIDOR: 0
PHOTOPHOBIA: 0
NAUSEA: 1
DIZZINESS: 1
COUGH: 0
HEADACHES: 0
CHILLS: 0
FALLS: 0

## 2020-06-16 ASSESSMENT — LIFESTYLE VARIABLES: SUBSTANCE_ABUSE: 0

## 2020-06-16 ASSESSMENT — FIBROSIS 4 INDEX
FIB4 SCORE: 3.26
FIB4 SCORE: 4.45

## 2020-06-16 NOTE — PROGRESS NOTES
2 RN skin check complete with Lyla GEORGE.   Devices in place none.  Skin assessed under devices N/A.  Confirmed pressure ulcers found on N/A.  New potential pressure ulcers noted on none.     Rt chest port, accessed.   Skin intact, no redness noted.

## 2020-06-16 NOTE — ED TRIAGE NOTES
WC to triage w/ c/o SOB, dizziness & nausea x 2 wks, getting progressively worse.  Reports she noted blood in her stool today. Reports hx of chronic anemia, getting transfusion every 3 weeks.      Pt Pt masked at check in. Pt denies exposure to anyone known to be Covid +.  Pt denies any cough/fevers.

## 2020-06-16 NOTE — DISCHARGE INSTRUCTIONS
Discharge Instructions    Discharged to home by car with friend. Discharged via wheelchair, hospital escort: Yes.  Special equipment needed: Not Applicable    Be sure to schedule a follow-up appointment with your primary care doctor or any specialists as instructed.     Discharge Plan:   Diet Plan: (P) Discussed  Activity Level: (P) Discussed  Confirmed Follow up Appointment: (P) Appointment Scheduled  Confirmed Symptoms Management: (P) Discussed  Medication Reconciliation Updated: (P) Yes    I understand that a diet low in cholesterol, fat, and sodium is recommended for good health. Unless I have been given specific instructions below for another diet, I accept this instruction as my diet prescription.   Other diet: Renal Diabetic Diet    Special Instructions: None    · Is patient discharged on Warfarin / Coumadin?   No     Depression / Suicide Risk    As you are discharged from this [facility] facility, it is important to learn how to keep safe from harming yourself.    Recognize the warning signs:  · Abrupt changes in personality, positive or negative- including increase in energy   · Giving away possessions  · Change in eating patterns- significant weight changes-  positive or negative  · Change in sleeping patterns- unable to sleep or sleeping all the time   · Unwillingness or inability to communicate  · Depression  · Unusual sadness, discouragement and loneliness  · Talk of wanting to die  · Neglect of personal appearance   · Rebelliousness- reckless behavior  · Withdrawal from people/activities they love  · Confusion- inability to concentrate     If you or a loved one observes any of these behaviors or has concerns about self-harm, here's what you can do:  · Talk about it- your feelings and reasons for harming yourself  · Remove any means that you might use to hurt yourself (examples: pills, rope, extension cords, firearm)  · Get professional help from the community (Mental Health, Substance Abuse,  psychological counseling)  · Do not be alone:Call your Safe Contact- someone whom you trust who will be there for you.  · Call your local CRISIS HOTLINE 909-3507 or 741-291-6757  · Call your local Children's Mobile Crisis Response Team [place] (721) 586-2372 or www.[employer]  · Call the toll free National Suicide Prevention Hotlines   · National Suicide Prevention Lifeline 586-402-AMBL (5767)  · way[facility]um Line Network 800-SUICIDE (646-8735)    Omeprazole capsules (sprinkle caps) - Rx  What is this medicine?  OMEPRAZOLE (oh ME pray zol) prevents the production of acid in the stomach. It is used to treat gastroesophageal reflux disease (GERD), ulcers, certain bacteria in the stomach, inflammation of the esophagus, and Zollinger-Barney Syndrome. It is also used to treat other conditions that cause too much stomach acid.  This medicine may be used for other purposes; ask your health care provider or pharmacist if you have questions.  COMMON BRAND NAME(S): Prilosec  What should I tell my health care provider before I take this medicine?  They need to know if you have any of these conditions:  -liver disease  -low levels of magnesium in the blood  -lupus  -an unusual or allergic reaction to omeprazole, other medicines, foods, dyes, or preservatives  -pregnant or trying to get pregnant  -breast-feeding  How should I use this medicine?  Take this medicine by mouth with a glass of water. Follow the directions on the prescription label. Do not crush, break or chew the capsules. They can be opened and the contents sprinkled on a small amount of applesauce or yogurt, given with fruit juices, or swallowed immediately with water. This medicine works best if taken on an empty stomach 30 to 60 minutes before breakfast. Take your doses at regular intervals. Do not take your medicine more often than directed.  Talk to your pediatrician regarding the use of this medicine in children. Special care may be  needed.  Overdosage: If you think you have taken too much of this medicine contact a poison control center or emergency room at once.  NOTE: This medicine is only for you. Do not share this medicine with others.  What if I miss a dose?  If you miss a dose, take it as soon as you can. If it is almost time for your next dose, take only that dose. Do not take double or extra doses.  What may interact with this medicine?  Do not take this medicine with any of the following medications:  -atazanavir  -clopidogrel  -nelfinavir  This medicine may also interact with the following medications:  -ampicillin  -certain medicines for anxiety or sleep  -certain medicines that treat or prevent blood clots like warfarin  -cyclosporine  -diazepam  -digoxin  -disulfiram  -diuretics  -iron salts  -methotrexate  -mycophenolate mofetil  -phenytoin  -prescription medicine for fungal or yeast infection like itraconazole, ketoconazole, voriconazole  -saquinavir  -tacrolimus  This list may not describe all possible interactions. Give your health care provider a list of all the medicines, herbs, non-prescription drugs, or dietary supplements you use. Also tell them if you smoke, drink alcohol, or use illegal drugs. Some items may interact with your medicine.  What should I watch for while using this medicine?  It can take several days before your stomach pain gets better. Check with your doctor or health care professional if your condition does not start to get better, or if it gets worse.  You may need blood work done while you are taking this medicine.  What side effects may I notice from receiving this medicine?  Side effects that you should report to your doctor or health care professional as soon as possible:  -allergic reactions like skin rash, itching or hives, swelling of the face, lips, or tongue  -bone, muscle or joint pain  -breathing problems  -chest pain or chest tightness  -dark yellow or brown urine  -dizziness  -fast,  irregular heartbeat  -feeling faint or lightheaded  -fever or sore throat  -muscle spasm  -palpitations  -rash on cheeks or arms that gets worse in the sun  -redness, blistering, peeling or loosening of the skin, including inside the mouth  -seizures  -tremors  -unusual bleeding or bruising  -unusually weak or tired  -yellowing of the eyes or skin  Side effects that usually do not require medical attention (report to your doctor or health care professional if they continue or are bothersome):  -constipation  -diarrhea  -dry mouth  -headache  -nausea  This list may not describe all possible side effects. Call your doctor for medical advice about side effects. You may report side effects to FDA at 7-567-FDA-4692.  Where should I keep my medicine?  Keep out of the reach of children.  Store at room temperature between 15 and 30 degrees C (59 and 86 degrees F). Protect from light and moisture. Throw away any unused medicine after the expiration date.  NOTE: This sheet is a summary. It may not cover all possible information. If you have questions about this medicine, talk to your doctor, pharmacist, or health care provider.  © 2018 [employer] (2017-01-19 12:18:47)    Gastrointestinal Bleeding  Introduction  Gastrointestinal bleeding is bleeding somewhere along the path food travels through the body (digestive tract). This path is anywhere between the mouth and the opening of the butt (anus). You may have blood in your poop (stools) or have black poop. If you throw up (vomit), there may be blood in it.  This condition can be mild, serious, or even life-threatening. If you have a lot of bleeding, you may need to stay in the hospital.  Follow these instructions at home:  · Take over-the-counter and prescription medicines only as told by your doctor.  · Eat foods that have a lot of fiber in them. These foods include whole grains, fruits, and vegetables. You can also try eating 1-3 prunes each day.  · Drink enough  fluid to keep your pee (urine) clear or pale yellow.  · Keep all follow-up visits as told by your doctor. This is important.  Contact a doctor if:  · Your symptoms do not get better.  Get help right away if:  · Your bleeding gets worse.  · You feel dizzy or you pass out (faint).  · You feel weak.  · You have very bad cramps in your back or belly (abdomen).  · You pass large clumps of blood (clots) in your poop.  · Your symptoms are getting worse.  This information is not intended to replace advice given to you by your health care provider. Make sure you discuss any questions you have with your health care provider.  Document Released: 09/26/2009 Document Revised: 05/25/2017 Document Reviewed: 06/06/2016  © 2017 Elsevier      Diabetic Neuropathy  Diabetic neuropathy is a nerve disease or nerve damage that is caused by diabetes mellitus. About half of all people with diabetes mellitus have some form of nerve damage. Nerve damage is more common in those who have had diabetes mellitus for many years and who generally have not had good control of their blood sugar (glucose) level. Diabetic neuropathy is a common complication of diabetes mellitus. There are three common types of diabetic neuropathy and a fourth type that is less common and less understood:  · Peripheral neuropathy--This is the most common type of diabetic neuropathy. It causes damage to the nerves of the feet and legs first and then eventually the hands and arms. The damage affects the ability to sense touch.  · Autonomic neuropathy--This type causes damage to the autonomic nervous system, which controls the following functions:  ¨ Heartbeat.  ¨ Body temperature.  ¨ Blood pressure.  ¨ Urination.  ¨ Digestion.  ¨ Sweating.  ¨ Sexual function.  · Focal neuropathy--Focal neuropathy can be painful and unpredictable and occurs most often in older adults with diabetes mellitus. It involves a specific nerve or one area and often comes on suddenly. It usually  does not cause long-term problems.  · Radiculoplexus neuropathy-- Sometimes called lumbosacral radiculoplexus neuropathy, radiculoplexus neuropathy affects the nerves of the thighs, hips, buttocks, or legs. It is more common in people with type 2 diabetes mellitus and in older men. It is characterized by debilitating pain, weakness, and atrophy, usually in the thigh muscles.  What are the causes?  The cause of peripheral, autonomic, and focal neuropathies is diabetes mellitus that is uncontrolled and high glucose levels. The cause of radiculoplexus neuropathy is unknown. However, it is thought to be caused by inflammation related to uncontrolled glucose levels.  What are the signs or symptoms?  Peripheral Neuropathy   Peripheral neuropathy develops slowly over time. When the nerves of the feet and legs no longer work there may be:  · Burning, stabbing, or aching pain in the legs or feet.  · Inability to feel pressure or pain in your feet. This can lead to:  ¨ Thick calluses over pressure areas.  ¨ Pressure sores.  ¨ Ulcers.  · Foot deformities.  · Reduced ability to feel temperature changes.  · Muscle weakness.  Autonomic Neuropathy   The symptoms of autonomic neuropathy vary depending on which nerves are affected. Symptoms may include:  · Problems with digestion, such as:  ¨ Feeling sick to your stomach (nausea).  ¨ Vomiting.  ¨ Bloating.  ¨ Constipation.  ¨ Diarrhea.  ¨ Abdominal pain.  · Difficulty with urination. This occurs if you lose your ability to sense when your bladder is full. Problems include:  ¨ Urine leakage (incontinence).  ¨ Inability to empty your bladder completely (retention).  · Rapid or irregular heartbeat (palpitations).  · Blood pressure drops when you stand up (orthostatic hypotension). When you stand up you may feel:  ¨ Dizzy.  ¨ Weak.  ¨ Faint.  · In men, inability to attain and maintain an erection.  · In women, vaginal dryness and problems with decreased sexual desire and  arousal.  · Problems with body temperature regulation.  · Increased or decreased sweating.  Focal Neuropathy  · Abnormal eye movements or abnormal alignment of both eyes.  · Weakness in the wrist.  · Foot drop. This results in an inability to lift the foot properly and abnormal walking or foot movement.  · Paralysis on one side of your face (Bell palsy).  · Chest or abdominal pain.  Radiculoplexus Neuropathy  · Sudden, severe pain in your hip, thigh, or buttocks.  · Weakness and wasting of thigh muscles.  · Difficulty rising from a seated position.  · Abdominal swelling.  · Unexplained weight loss (usually more than 10 lb [4.5 kg]).  How is this diagnosed?  Peripheral Neuropathy   Your senses may be tested. Sensory function testing can be done with:  · A light touch using a monofilament.  · A vibration with tuning fork.  · A sharp sensation with a pin prick.  Other tests that can help diagnose neuropathy are:  · Nerve conduction velocity. This test checks the transmission of an electrical current through a nerve.  · Electromyography. This shows how muscles respond to electrical signals transmitted by nearby nerves.  · Quantitative sensory testing. This is used to assess how your nerves respond to vibrations and changes in temperature.  Autonomic Neuropathy   Diagnosis is often based on reported symptoms. Tell your health care provider if you experience:  · Dizziness.  · Constipation.  · Diarrhea.  · Inappropriate urination or inability to urinate.  · Inability to get or maintain an erection.  Tests that may be done include:  · Electrocardiography or Holter monitor. These are tests that can help show problems with the heart rate or heart rhythm.  · An X-ray exam may be done.  Focal Neuropathy   Diagnosis is made based on your symptoms and what your health care provider finds during your exam. Other tests may be done. They may include:  · Nerve conduction velocities. This checks the transmission of electrical current  through a nerve.  · Electromyography. This shows how muscles respond to electrical signals transmitted by nearby nerves.  · Quantitative sensory testing. This test is used to assess how your nerves respond to vibration and changes in temperature.  Radiculoplexus Neuropathy  · Often the first thing is to eliminate any other issue or problems that might be the cause, as there is no standard test for diagnosis.  · X-ray exam of your spine and lumbar region.  · Spinal tap to rule out cancer.  · MRI to rule out other lesions.  How is this treated?  Once nerve damage occurs, it cannot be reversed. The goal of treatment is to keep the disease or nerve damage from getting worse and affecting more nerve fibers. Controlling your blood glucose level is the key. Most people with radiculoplexus neuropathy see at least a partial improvement over time. You will need to keep your blood glucose and HbA1c levels in the target range determined by your health care provider. Things that help control blood glucose levels include:  · Blood glucose monitoring.  · Meal planning.  · Physical activity.  · Diabetes medicine.  Over time, maintaining lower blood glucose levels helps lessen symptoms. Sometimes, prescription pain medicine is needed.  Follow these instructions at home:  · Do not smoke.  · Keep your blood glucose level in the range that you and your health care provider have determined acceptable for you.  · Keep your blood pressure level in the range that you and your health care provider have determined acceptable for you.  · Eat a well-balanced diet.  · Be physically active every day. Include strength training and balance exercises.  · Protect your feet.  ¨ Check your feet every day for sores, cuts, blisters, or signs of infection.  ¨ Wear padded socks and supportive shoes. Use orthotic inserts, if necessary.  ¨ Regularly check the insides of your shoes for worn spots. Make sure there are no rocks or other items inside your shoes  before you put them on.  Contact a health care provider if:  · You have burning, stabbing, or aching pain in the legs or feet.  · You are unable to feel pressure or pain in your feet.  · You develop problems with digestion such as:  ¨ Nausea.  ¨ Vomiting.  ¨ Bloating.  ¨ Constipation.  ¨ Diarrhea.  ¨ Abdominal pain.  · You have difficulty with urination, such as:  ¨ Incontinence.  ¨ Retention.  · You have palpitations.  · You develop orthostatic hypotension. When you stand up you may feel:  ¨ Dizzy.  ¨ Weak.  ¨ Faint.  · You cannot attain and maintain an erection (in men).  · You have vaginal dryness and problems with decreased sexual desire and arousal (in women).  · You have severe pain in your thighs, legs, or buttocks.  · You have unexplained weight loss.  This information is not intended to replace advice given to you by your health care provider. Make sure you discuss any questions you have with your health care provider.  Document Released: 02/26/2003 Document Revised: 05/25/2017 Document Reviewed: 05/29/2014  [employer] Interactive Patient Education © 2017 [employer]

## 2020-06-16 NOTE — ED NOTES
Med rec complete per patient  Allergies reviewed   No PO antibiotics in last 14 days    Verified through [employer]

## 2020-06-16 NOTE — DISCHARGE PLANNING
Outpatient Dialysis Note    Confirmed patient is active at:    [facility]  [address] [place] [id number]      Schedule: Monday, Wednesday, Friday  Time: 10:15 am    Spoke with Zamzam at facility who confirmed.    Patient had full HD treatment Monday, 6/15/20.      Stacia Stephens- Dialysis Coordinator  [facility] Ph# [phone]

## 2020-06-16 NOTE — CONSULTS
"                                           Gastroenterology Consult Note     Date of Consult: 6/16/20     Reason for consult: Anemia and blood in the stool     HPI:   64-year-old female with past medical history significant for chronic renal failure on hemodialysis who also has recurrent transfusion dependent anemia secondary to myelodysplastic syndrome.  She presented to the emergency room with weakness and fatigue due to recurrent anemia with a hemoglobin of 7.1.  Patient complains of bright red blood in the toilet tissue and mixed with stool.  Last colonoscopy was done in 2015 and was significant for small polyps.  Patient also had upper endoscopy at the same time.  She is complaining of some mild intermittent epigastric discomfort without nausea vomiting anorexia or unintentional weight loss.  There is no family history of colon or stomach cancer.     PMHX:  Past Medical History:   Diagnosis Date   • Arthritis     hands    • Atrial fibrillation (HCC)     HX   • Blood transfusion without reported diagnosis    • Breath shortness     w/exertion   • Cancer (HCC)     MDS in bones   • Cataract     bilat IOL   • Chronic anemia    • Chronic kidney disease        • Chronic obstructive pulmonary disease (HCC)    • Congestive heart failure (HCC)    • Dental disorder     full dentures   • Diabetes     Diet controlled   • Diabetes (HCC)    • Dialysis patient     [facility] ,   [facility] in [place]   • Glaucoma    • Heart abnormalities    • Hypertension    • MDS (myelodysplastic syndrome) 10/2016    bone marrow biopsy   • Other hyperlipidemia 12/12/2019   • Pain -2017    \"bones\", generalized, 5/10   • Renal disorder    • Stroke (HCC) 03/2015    No residual weakness/problems   • Supplemental oxygen dependent     2 liters          PSurgHx:   Past Surgical History:   Procedure Laterality Date   • GASTROSCOPY N/A 1/25/2018    Procedure: GASTROSCOPY;  Surgeon: Blanca Santos M.D.;  Location: SURGERY [facility];  " Service: Gastroenterology   • BONE MARROW BIOPSY, NDL/TROCAR  2017    Procedure: BONE MARROW BIOPSY, NDL/TROCAR;  Surgeon: Wade Turner M.D.;  Location: ENDOSCOPY [facility];  Service: Orthopedics   • BONE MARROW ASPIRATION  2017    Procedure: BONE MARROW ASPIRATION;  Surgeon: Wade Turner M.D.;  Location: ENDOSCOPY [facility];  Service: Orthopedics   • VEIN LIGATION Left 11/10/2016    Procedure: VEIN LIGATION FOR DISTAL REVASCULARIZATION AND INTERVAL LIGATION OF LEFT ARM DIALYISIS ACCESS (DRIL PROCEDURE);  Surgeon: Ranulfo Jolly M.D.;  Location: SURGERY [facility];  Service:    • AV FISTULA CREATION Left 2016    Procedure: AV FISTULA CREATION UPPER EXTREMITY;  Surgeon: Ranulfo Jolly M.D.;  Location: SURGERY [facility];  Service:    • GASTROSCOPY  2015    Procedure: ESOPHAGOGASTRODUODENOSCOPY WITH BIOPSY;  Surgeon: Wing Álvarez M.D.;  Location: SURGERY [facility];  Service:    • COLONOSCOPY  2015    Procedure: COLONOSCOPY;  Surgeon: Wing Álvarez M.D.;  Location: SURGERY [facility];  Service:    • GYN SURGERY      hysterectomy   • GYN SURGERY       x 2   • GYN SURGERY      d&C x2   • OTHER      angioplasty/ stents bilat LE   • OTHER ABDOMINAL SURGERY      appendectomy,  x 2, hysterectomy, D & C   • OTHER ABDOMINAL SURGERY      appendectomy   • OTHER CARDIAC SURGERY      Angioplasty  and    • OTHER CARDIAC SURGERY      cardiac angiogram, angioplasty   • RETINAL DETACHMENT REPAIR Right         ALLERGIES:Lenalidomide; Naprosyn [naproxen]; Pioglitazone; Simvastatin; Demerol; Diphenhydramine; Glipizide; Hydromorphone; Iron; Metformin; Morphine; Multivitamin; Ondansetron; Other drug; Oxycodone; Pcn [penicillins]; Propoxyphene; Requip; Sulfa drugs; Tamsulosin; Tramadol; and Trazodone    No current facility-administered medications on file prior to encounter.      Current Outpatient Medications on File Prior to Encounter    Medication Sig Dispense Refill   • nitroglycerin (NITROSTAT) 0.4 MG SL Tab Place 0.4 mg under tongue every 5 minutes as needed for Chest Pain.     • bimatoprost (LUMIGAN) 0.03 % Solution Place 1 Drop in both eyes 2 Times a Day.     • lidocaine (LIDODERM) 5 % Patch Apply 1 Patch to skin as directed every 12 hours. 30 Patch 0   • docusate sodium (COLACE) 100 MG Cap Take 100 mg by mouth 2 times a day.     • amLODIPine (NORVASC) 2.5 MG Tab TK 1 T PO QD     • Buprenorphine 20 MCG/HR PATCH WEEKLY Apply 20 mcg to affected area(s) every 7 days. MONDAY  0   • Diclofenac Sodium (VOLTAREN) 1 % Gel Apply 2-4 g to skin as directed 4 times a day as needed. Both legs     • carvedilol (COREG) 12.5 MG Tab Take 1 Tab by mouth 2 times a day, with meals. 60 Tab 0   • losartan (COZAAR) 100 MG Tab Take 100 mg by mouth every evening.     • brinzolamide (AZOPT) 1 % Suspension Place 1 Drop in both eyes 2 Times a Day.     • Brimonidine Tartrate-Timolol (COMBIGAN) 0.2-0.5 % Solution Place 1 Drop in both eyes 2 Times a Day.     • pregabalin (LYRICA) 150 MG Cap Take 150 mg by mouth 4 times a day.          SocHx:   Social History     Socioeconomic History   • Marital status:      Spouse name: Not on file   • Number of children: Not on file   • Years of education: Not on file   • Highest education level: Not on file   Occupational History   • Not on file   Social Needs   • Financial resource strain: Not on file   • Food insecurity     Worry: Not on file     Inability: Not on file   • Transportation needs     Medical: Not on file     Non-medical: Not on file   Tobacco Use   • Smoking status: Never Smoker   • Smokeless tobacco: Never Used   Substance and Sexual Activity   • Alcohol use: No   • Drug use: No   • Sexual activity: Not on file   Lifestyle   • Physical activity     Days per week: Not on file     Minutes per session: Not on file   • Stress: Not on file   Relationships   • Social connections     Talks on phone: Not on file      Gets together: Not on file     Attends Christianity service: Not on file     Active member of club or organization: Not on file     Attends meetings of clubs or organizations: Not on file     Relationship status: Not on file   • Intimate partner violence     Fear of current or ex partner: Not on file     Emotionally abused: Not on file     Physically abused: Not on file     Forced sexual activity: Not on file   Other Topics Concern   • Not on file   Social History Narrative    ** Merged History Encounter **         ** Merged History Encounter **           FAMHx:   Family History   Problem Relation Age of Onset   • Hypertension Father          at 89   • Hypertension Mother         ROS:  Constitutional: No fevers, chills, no night sweats, no weight changes, positive for weakness  HEENT: no vision or hearing changes, no dry mouth, no change in smell  CARDIO: no palpitations, no orthopnea, no chest pain  PULM: no cough, positive for exertional dyspnea  NEURO: no Seizures, no memory impairment, no change in sensation  GI: as above  : no dysuria, no hematuria   HEME: Positive for anemia  MUSCULOSKELETAL: no muscle aches, no back pain, no arthritis  PSYCH: no anxiety or depression  SKIN: no rashes     PE:  Vitals:    20 1500 20 1502 20 1514 20 1530   BP:   (!) 221/82 (!) 199/85   Pulse: 68 68 72 69   Resp: 14 13 (!) 24 18   Temp:       TempSrc:       SpO2: 100% 100% 100% 100%   Weight:       Height:         Gen: AAOx3, NAD, lying in bed  HEENT: PERRL, EOMI, nares patent, Mucous membranes moist, pale conjunctiva  Neck: supple, no cervical or supraclavicular adenopathy  CVS: regular rhythm, normal rate, no MRG  Pulm: CTAB, no crackles  Abd: soft, Nd, NT, no guarding or rebound  Ext: no edema  NEURO: grossly normal, no weakness  Skin: warm, no rash  Psych: normal Affect, no anxiety     LABS:  Lab Results   Component Value Date/Time    SODIUM 128 (L) 2020 12:28 PM    POTASSIUM 4.2 2020  12:28 PM    CHLORIDE 87 (L) 06/16/2020 12:28 PM    CO2 29 06/16/2020 12:28 PM    GLUCOSE 410 (H) 06/16/2020 12:28 PM    BUN 57 (H) 06/16/2020 12:28 PM    CREATININE 5.25 (HH) 06/16/2020 12:28 PM    CREATININE 0.6 07/20/2007 04:00 AM      Lab Results   Component Value Date/Time    WBC 3.9 (L) 06/16/2020 12:28 PM    RBC 2.38 (L) 06/16/2020 12:28 PM    HEMOGLOBIN 7.1 (L) 06/16/2020 12:28 PM    HEMATOCRIT 21.0 (L) 06/16/2020 12:28 PM    MCV 88.2 06/16/2020 12:28 PM    MCH 29.8 06/16/2020 12:28 PM    MCHC 33.8 06/16/2020 12:28 PM    MPV 14.4 (H) 05/14/2020 10:30 AM    NEUTSPOLYS 57.50 06/16/2020 12:28 PM    LYMPHOCYTES 19.30 (L) 06/16/2020 12:28 PM    MONOCYTES 17.00 (H) 06/16/2020 12:28 PM    EOSINOPHILS 5.10 06/16/2020 12:28 PM    BASOPHILS 0.30 06/16/2020 12:28 PM    HYPOCHROMIA 1+ 06/18/2019 10:50 AM    ANISOCYTOSIS 2+ 12/26/2019 10:00 AM        Lab Results   Component Value Date/Time    PROTHROMBTM 14.1 06/16/2020 12:28 PM    INR 1.06 06/16/2020 12:28 PM      Recent Labs     06/16/20  1228   ASTSGOT 28   ALTSGPT 48   TBILIRUBIN 0.4   GLOBULIN 3.1   INR 1.06       IMAGING: Reviewed personally and summarized in HPI         ASSESSMENT:   64-year-old female with recurrent anemia secondary to myelodysplastic syndrome presenting with recent onset hematochezia and epigastric discomfort.  Therefore lower GI bleeding may be contributing to recurrent anemia.  Patient has dyspeptic symptoms which could be secondary to gastritis or peptic ulcer disease but clinical presentation is not consistent with an upper GI bleed.      PROBLEMS:  1.  Hematochezia  2.  Dyspepsia  3.  Myelodysplastic syndrome  4.  Recurrent symptomatic anemia  5.  Thrombocytopenia  6.  Chronic renal failure     PLAN:   1.  Clear liquid diet after midnight  2.  EGD and colonoscopy after bowel prep on Thursday        Thank you for this consult.

## 2020-06-16 NOTE — PROGRESS NOTES
Triage officer note     D/W Dr Comer    64F    MDS, ESRD on HD, chronic anemia requiring blood transfusions     Comes w SOB & increased weakness     Also has bloody stools    Hb 7.1, PLT 59, Glucose 410     Seen GI consultants before, EDP will consult GI    Dr ALIDA Denise will evaluate for admission

## 2020-06-16 NOTE — ED PROVIDER NOTES
ED Provider Note    CHIEF COMPLAINT  Chief Complaint   Patient presents with   • Shortness of Breath   • Dizziness   • Nausea       HPI  Vielka Briscoe is a 64 y.o. female with a history of mild dysplastic syndrome, requiring blood transfusions every 3 weeks, chronic anemia, stage renal disease on hemodialysis Tuesday, Thursday, Saturday, atrial fibrillation, COPD, hypertension, hyperlipidemia who presents plaints of lightness, dizziness, shortness of breath and increased weakness for the past several days.  The patient says the symptoms are similar to when she requires a blood transfusion.  She last had a blood transfusion of 1 unit packed red blood cells on Friday and a second unit on Sunday 2 days ago.  Today the patient was feeling lightheaded, dizzy, weak, short of breath and initially went to the urgent care.  She was noted to be hypotensive and was sent to the emergency department for further evaluation.  Patient denies fever, chills, sore throat, cough, vomiting.  Has been having bloody stools with bowel movements for the past 3 weeks.  She has had similar problems in the past, but they were intermittent.  She notes that beginning about 3 weeks ago every time she has a bowel movement she sees blood present.  She was scheduled to have an endoscopy and colonoscopy prior to the COVID pandemic, and this was canceled.  Patient continues to feel lightheaded, dizzy, weak, short of breath with exertional activity.  She also notes that she had a bowel movement after leaving the urgent care and again had some bloody stools.    REVIEW OF SYSTEMS  See HPI for further details. All other systems are negative.     PAST MEDICAL HISTORY  Past Medical History:   Diagnosis Date   • Arthritis     hands    • Atrial fibrillation (HCC)     HX   • Blood transfusion without reported diagnosis    • Breath shortness     w/exertion   • Cancer (HCC)     MDS in bones   • Cataract     bilat IOL   • Chronic anemia    • Chronic  "kidney disease        • Chronic obstructive pulmonary disease (HCC)    • Congestive heart failure (HCC)    • Dental disorder     full dentures   • Diabetes     Diet controlled   • Diabetes (HCC)    • Dialysis patient     [facility] in [place]   • Glaucoma    • Heart abnormalities    • Hypertension    • MDS (myelodysplastic syndrome) 10/2016    bone marrow biopsy   • Other hyperlipidemia 2019   • Pain     \"bones\", generalized, 5/10   • Renal disorder    • Stroke (HCC) 2015    No residual weakness/problems   • Supplemental oxygen dependent     2 liters       FAMILY HISTORY  Family History   Problem Relation Age of Onset   • Hypertension Father          at 89   • Hypertension Mother        SOCIAL HISTORY  Social History     Tobacco Use   • Smoking status: Never Smoker   • Smokeless tobacco: Never Used   Substance Use Topics   • Alcohol use: No   • Drug use: No      Social History     Substance and Sexual Activity   Drug Use No       SURGICAL HISTORY  Past Surgical History:   Procedure Laterality Date   • GASTROSCOPY N/A 2018    Procedure: GASTROSCOPY;  Surgeon: Blanca Santos M.D.;  Location: [facility];  Service: Gastroenterology   • BONE MARROW BIOPSY, NDL/TROCAR  2017    Procedure: BONE MARROW BIOPSY, NDL/TROCAR;  Surgeon: Wade Turner M.D.;  Location: ENDOSCOPY [facility];  Service: Orthopedics   • BONE MARROW ASPIRATION  2017    Procedure: BONE MARROW ASPIRATION;  Surgeon: Wade Turner M.D.;  Location: ENDOSCOPY [facility];  Service: Orthopedics   • VEIN LIGATION Left 11/10/2016    Procedure: VEIN LIGATION FOR DISTAL REVASCULARIZATION AND INTERVAL LIGATION OF LEFT ARM DIALYISIS ACCESS (DRIL PROCEDURE);  Surgeon: Ranulfo Jolly M.D.;  Location: [facility];  Service:    • AV FISTULA CREATION Left 2016    Procedure: AV FISTULA CREATION UPPER EXTREMITY;  Surgeon: Ranulfo Jolly M.D.;  Location: [facility]" ORS;  Service:    • GASTROSCOPY  2015    Procedure: ESOPHAGOGASTRODUODENOSCOPY WITH BIOPSY;  Surgeon: Wing Álvarez M.D.;  Location: SURGERY [facility];  Service:    • COLONOSCOPY  2015    Procedure: COLONOSCOPY;  Surgeon: Wing Álvarez M.D.;  Location: SURGERY [facility];  Service:    • GYN SURGERY      hysterectomy   • GYN SURGERY       x 2   • GYN SURGERY      d&C x2   • OTHER      angioplasty/ stents bilat LE   • OTHER ABDOMINAL SURGERY      appendectomy,  x 2, hysterectomy, D & C   • OTHER ABDOMINAL SURGERY      appendectomy   • OTHER CARDIAC SURGERY      Angioplasty  and    • OTHER CARDIAC SURGERY      cardiac angiogram, angioplasty   • RETINAL DETACHMENT REPAIR Right        CURRENT MEDICATIONS  Home Medications     Reviewed by Sharon Tran PhT (Pharmacy Tech) on 20 at 1427  Med List Status: Complete    Medication Last Dose Status    amLODIPine (NORVASC) 2.5 MG Tab 2020 Active    bimatoprost (LUMIGAN) 0.03 % Solution 2020 Active    Brimonidine Tartrate-Timolol (COMBIGAN) 0.2-0.5 % Solution 2020 Active    brinzolamide (AZOPT) 1 % Suspension 2020 Active    Buprenorphine 20 MCG/HR PATCH WEEKLY 6/15/2020 Active    carvedilol (COREG) 12.5 MG Tab 2020 Active    Diclofenac Sodium (VOLTAREN) 1 % Gel 2020 Active    docusate sodium (COLACE) 100 MG Cap 2020 Active    lidocaine (LIDODERM) 5 % Patch 6/15/2020 Active    losartan (COZAAR) 100 MG Tab 6/15/2020 Active    nitroglycerin (NITROSTAT) 0.4 MG SL Tab 2020 Active    pregabalin (LYRICA) 150 MG Cap 2020 Active                ALLERGIES  Allergies   Allergen Reactions   • Lenalidomide Unspecified     Beeping Pulse     • Naprosyn [Naproxen] Hives   • Pioglitazone Unspecified     Causes blindness   • Simvastatin Unspecified     Couldn't move   • Demerol Vomiting   • Diphenhydramine Vomiting   • Glipizide Vomiting   • Hydromorphone Vomiting   • Iron Vomiting      "vomiting   • Metformin Vomiting   • Morphine Vomiting   • Multivitamin Itching     itching   • Ondansetron Itching   • Other Drug Rash and Vomiting     Any binders that remove phosphorus from the body such as tums   • Oxycodone Vomiting   • Pcn [Penicillins] Vomiting          • Propoxyphene Vomiting   • Requip Vomiting   • Sulfa Drugs Rash and Vomiting     Vomiting & rash      • Tamsulosin Vomiting   • Tramadol Vomiting   • Trazodone Vomiting       PHYSICAL EXAM0  VITAL SIGNS: Blood Pressure (Abnormal) 197/83 Comment: RN notified  Pulse 80   Temperature 36.1 °C (97 °F) (Temporal)   Respiration 17   Height 1.473 m (4' 9.99\")   Weight 64.8 kg (142 lb 13.7 oz)   Last Menstrual Period 01/01/1995   Oxygen Saturation 100%   Body Mass Index 29.87 kg/m²   Constitutional: Awake, alert, in no acute distress, Non-toxic appearance.   HENT: Atraumatic. Bilateral external ears normal, mucous membranes moist, throat nonerythematous without exudates, nose is normal.  Eyes: PERRL, EOMI, conjunctiva moist, noninjected.  Neck: Nontender, Normal range of motion, No nuchal rigidity, No stridor.   Lymphatic: No lymphadenopathy noted.   Cardiovascular: Regular rate and rhythm, no murmurs, rubs, gallops.  Thorax & Lungs:  Good breath sounds bilaterally, no wheezes, rales, or retractions.  No chest tenderness.  Abdomen: Bowel sounds normal, Soft, nontender, nondistended, no rebound, guarding, masses.  Back: No CVA or spinal tenderness.  Extremities: Intact distal pulses, No edema, No tenderness.   Skin: Warm, Dry, No rashes.   Musculoskeletal: No joint swelling or tenderness.  Neurologic: Alert & oriented x 3, sensory and motor function normal. No focal deficits.   Psychiatric: Affect normal, Judgment normal, Mood normal.     EKG  EKG Interpretation:  Interpreted by me    Rhythm:  Normal sinus rhythm   Rate: 80  Intervals: ER interval normal, QTC slightly elongated 490 ms  Axis: normal  Ectopy: none  Conduction: normal  ST Segments: " no evidence of elevation or depression  T Waves: no acute change  Q Waves: none  Clinical Impression: No acute injury or ischemic pattern.   Comparison to previous EKG from February 2020 shows no acute changes.      LABS  Labs Reviewed   CBC WITH DIFFERENTIAL - Abnormal; Notable for the following components:       Result Value    WBC 3.9 (*)     RBC 2.38 (*)     Hemoglobin 7.1 (*)     Hematocrit 21.0 (*)     Platelet Count 59 (*)     Lymphocytes 19.30 (*)     Monocytes 17.00 (*)     Lymphs (Absolute) 0.75 (*)     All other components within normal limits    Narrative:     Indicate which anticoagulants the patient is on:->UNKNOWN   COMP METABOLIC PANEL - Abnormal; Notable for the following components:    Sodium 128 (*)     Chloride 87 (*)     Glucose 410 (*)     Bun 57 (*)     Creatinine 5.25 (*)     All other components within normal limits    Narrative:     Indicate which anticoagulants the patient is on:->UNKNOWN   APTT - Abnormal; Notable for the following components:    APTT 59.6 (*)     All other components within normal limits    Narrative:     Indicate which anticoagulants the patient is on:->UNKNOWN   ESTIMATED GFR - Abnormal; Notable for the following components:    GFR If  10 (*)     GFR If Non  8 (*)     All other components within normal limits    Narrative:     Indicate which anticoagulants the patient is on:->UNKNOWN   PROTHROMBIN TIME    Narrative:     Indicate which anticoagulants the patient is on:->UNKNOWN   LIPASE    Narrative:     Indicate which anticoagulants the patient is on:->UNKNOWN   COD (ADULT)   MAGNESIUM   HGB   RELEASE RED BLOOD CELLS   TRANSFUSE RED BLOOD CELLS-NURSING COMMUNICATION (UNITS)       All labs reviewed by me.      RADIOLOGY/PROCEDURES  DX-CHEST-PORTABLE (1 VIEW)    (Results Pending)       The radiologist's interpretations of all radiological studies have been reviewed by me.        COURSE & MEDICAL DECISION MAKING  Pertinent Labs & Imaging  studies reviewed. (See chart for details)  Presents with the above complaints.  She feels like she needs another transfusion despite getting 1 about 10 days ago.  She has a port in her right chest which was accessed by nursing.  EKG shows a normal sinus rhythm.  Blood work and crossmatched were obtained.  CBC showed a white count 3900, hemoglobin 7.1, platelets 59, normal differential, Chemistry  shows a sodium of 128, potassium 4.2, CO2 29, anion gap 12, BUN 57, creatinine 5.25, glucose 410, liver function test normal, lipase normal, INR 1.06.      Patient noted be hypertensive and was given labetalol 10 mg IV.  The patient's hemoglobin was low at 7.1, and given that she is symptomatic, she will be transfused with 1 unit of packed red blood cells.  As she has been having bloody stools for the past 3 weeks, Dr. Nayak on-call for gastroenterology was consulted and will be in to see the patient.  The patient does have a history of mild thrombocytopenia, but her platelet count has dropped further from 79 on May 31 to the current 59.  The patient was dialyzed yesterday, does not require any acute dialysis at this time, but will need to have her usual dialysis tomorrow.  Findings were discussed with the patient and she is agreeable to admission.  Case discussed with Dr. Kumar.    FINAL IMPRESSION  1. Near syncope    2. Shortness of breath    3. Hypertension, unspecified type    4. Bloody stools    5. Anemia, unspecified type    6. Thrombocytopenia (HCC)    7. Hyperglycemia          Electronically signed by: Eleazar Comer M.D., 6/16/2020 12:33 PM

## 2020-06-16 NOTE — H&P
Hospital Medicine History & Physical Note    Date of Service  6/16/2020    Primary Care Physician  Nyla Nava M.D.    Code Status  Full Code    Chief Complaint  Chief Complaint   Patient presents with   • Shortness of Breath   • Dizziness   • Nausea       History of Presenting Illness  64 y.o. female who presented 6/16/2020 with past medical history of atrial fibrillation, diastolic heart failure, myelodysplastic syndrome, end-stage renal disease, hypertension who presents to the emergency room for generalized weakness.  She states that for the past 10 days she has been having bloody bowel stools.  She describes them dark-colored stools.  Associated with lightheadedness, dizziness and shortness of breath that been progressing.  Her lightheadedness and dizziness would occur when she stands up.  She initially went to urgent care and was found to be hypotensive.  Patient denies any fever, cough, chills, vomiting.  Patient denies any ibuprofen use, diet denies smoking or alcohol use.    Patient came to the hospital hypertensive, tachypneic  EKG interpreted by me found normal sinus rhythm, poor R wave progression  Chest x-ray interpreted by me found increased intravascular congestion, peribronchial cuffing indicating pulmonary edema  Review of Systems  Review of Systems   Constitutional: Positive for malaise/fatigue. Negative for chills, diaphoresis and fever.   HENT: Negative for congestion, ear discharge, ear pain, hearing loss, nosebleeds, sinus pain, sore throat and tinnitus.    Eyes: Negative for blurred vision, double vision, photophobia and pain.   Respiratory: Positive for shortness of breath. Negative for cough, hemoptysis, sputum production, wheezing and stridor.    Cardiovascular: Negative for chest pain, palpitations, orthopnea, claudication, leg swelling and PND.   Gastrointestinal: Positive for blood in stool and nausea. Negative for abdominal pain, constipation, diarrhea, heartburn, melena and vomiting.    Genitourinary: Negative for dysuria, flank pain, frequency, hematuria and urgency.   Musculoskeletal: Negative for back pain, falls, joint pain, myalgias and neck pain.   Skin: Negative for itching and rash.   Neurological: Positive for dizziness. Negative for tingling, tremors, weakness and headaches.   Endo/Heme/Allergies: Negative for environmental allergies and polydipsia. Does not bruise/bleed easily.   Psychiatric/Behavioral: Negative for depression, hallucinations, substance abuse and suicidal ideas.       Past Medical History   has a past medical history of Arthritis, Atrial fibrillation ([facility]), Blood transfusion without reported diagnosis, Breath shortness, Cancer ([facility]), Cataract, Chronic anemia, Chronic kidney disease, Chronic obstructive pulmonary disease ([facility]), Congestive heart failure ([facility]), Dental disorder, Diabetes, Diabetes ([facility]), Dialysis patient, Glaucoma, Heart abnormalities, Hypertension, MDS (myelodysplastic syndrome) (10/2016), Other hyperlipidemia (12/12/2019), Pain (-2017), Renal disorder, Stroke ([facility]) (03/2015), and Supplemental oxygen dependent.    Surgical History   has a past surgical history that includes other cardiac surgery; other abdominal surgery; gyn surgery; gyn surgery; gyn surgery; other cardiac surgery; other; retinal detachment repair (Right); av fistula creation (Left, 2/8/2016); other abdominal surgery; gastroscopy (12/17/2015); colonoscopy (12/17/2015); vein ligation (Left, 11/10/2016); bone marrow biopsy, ndl/trocar (9/20/2017); bone marrow aspiration (9/20/2017); and gastroscopy (N/A, 1/25/2018).     Family History  family history includes Hypertension in her father and mother.     Social History   reports that she has never smoked. She has never used smokeless tobacco. She reports that she does not drink alcohol or use drugs.    Allergies  Allergies   Allergen Reactions   • Lenalidomide Unspecified     Beeping Pulse     • Naprosyn [Naproxen] Hives   • Pioglitazone  Unspecified     Causes blindness   • Simvastatin Unspecified     Couldn't move   • Demerol Vomiting   • Diphenhydramine Vomiting   • Glipizide Vomiting   • Hydromorphone Vomiting   • Iron Vomiting     vomiting   • Metformin Vomiting   • Morphine Vomiting   • Multivitamin Itching     itching   • Ondansetron Itching   • Other Drug Rash and Vomiting     Any binders that remove phosphorus from the body such as tums   • Oxycodone Vomiting   • Pcn [Penicillins] Vomiting          • Propoxyphene Vomiting   • Requip Vomiting   • Sulfa Drugs Rash and Vomiting     Vomiting & rash      • Tamsulosin Vomiting   • Tramadol Vomiting   • Trazodone Vomiting       Medications  Prior to Admission Medications   Prescriptions Last Dose Informant Patient Reported? Taking?   Brimonidine Tartrate-Timolol (COMBIGAN) 0.2-0.5 % Solution 6/16/2020 at 0800 Patient Yes No   Sig: Place 1 Drop in both eyes 2 Times a Day.   Buprenorphine 20 MCG/HR PATCH WEEKLY 6/15/2020 at AM Patient Yes No   Sig: Apply 20 mcg to affected area(s) every 7 days. MONDAY   Diclofenac Sodium (VOLTAREN) 1 % Gel 6/16/2020 at 0700 Patient Yes No   Sig: Apply 2-4 g to skin as directed 4 times a day as needed. Both legs   amLODIPine (NORVASC) 2.5 MG Tab 6/16/2020 at 0800 Patient Yes No   Sig: TK 1 T PO QD   bimatoprost (LUMIGAN) 0.03 % Solution 6/16/2020 at 0800 Patient Yes Yes   Sig: Place 1 Drop in both eyes 2 Times a Day.   brinzolamide (AZOPT) 1 % Suspension 6/16/2020 at 0800 Patient Yes No   Sig: Place 1 Drop in both eyes 2 Times a Day.   carvedilol (COREG) 12.5 MG Tab 6/16/2020 at 0800 Patient No No   Sig: Take 1 Tab by mouth 2 times a day, with meals.   docusate sodium (COLACE) 100 MG Cap 6/16/2020 at 0800 Patient Yes No   Sig: Take 100 mg by mouth 2 times a day.   lidocaine (LIDODERM) 5 % Patch 6/15/2020 at PM Patient No No   Sig: Apply 1 Patch to skin as directed every 12 hours.   losartan (COZAAR) 100 MG Tab 6/15/2020 at PM Patient Yes No   Sig: Take 100 mg by mouth  every evening.   nitroglycerin (NITROSTAT) 0.4 MG SL Tab 6/14/2020 Patient Yes Yes   Sig: Place 0.4 mg under tongue every 5 minutes as needed for Chest Pain.   pregabalin (LYRICA) 150 MG Cap 6/16/2020 at 1200 Patient Yes No   Sig: Take 150 mg by mouth 4 times a day.      Facility-Administered Medications: None       Physical Exam  Temp:  [35.8 °C (96.4 °F)-36.2 °C (97.2 °F)] 35.8 °C (96.4 °F)  Pulse:  [67-80] 77  Resp:  [13-24] 20  BP: (116-221)/(63-99) 162/75  SpO2:  [97 %-100 %] 97 %    Physical Exam  Vitals signs and nursing note reviewed.   Constitutional:       General: She is in acute distress.      Appearance: Normal appearance. She is ill-appearing. She is not toxic-appearing or diaphoretic.   HENT:      Head: Normocephalic and atraumatic.      Nose: No congestion or rhinorrhea.      Mouth/Throat:      Pharynx: No oropharyngeal exudate or posterior oropharyngeal erythema.   Eyes:      General: No scleral icterus.  Neck:      Musculoskeletal: No neck rigidity or muscular tenderness.      Vascular: No carotid bruit.   Cardiovascular:      Rate and Rhythm: Normal rate and regular rhythm.      Pulses: Normal pulses.      Heart sounds: Murmur present. No friction rub. No gallop.    Pulmonary:      Effort: Pulmonary effort is normal. No respiratory distress.      Breath sounds: Normal breath sounds. No stridor. No wheezing or rhonchi.   Abdominal:      General: Abdomen is flat. There is no distension.      Palpations: There is no mass.      Tenderness: There is no abdominal tenderness. There is no left CVA tenderness, guarding or rebound.      Hernia: No hernia is present.   Musculoskeletal: Normal range of motion.         General: No swelling.      Right lower leg: No edema.      Left lower leg: No edema.   Lymphadenopathy:      Cervical: No cervical adenopathy.   Skin:     General: Skin is warm and dry.      Capillary Refill: Capillary refill takes more than 3 seconds.      Coloration: Skin is not jaundiced or  pale.      Findings: No bruising or erythema.   Neurological:      Mental Status: She is alert.         Laboratory:  Recent Labs     06/16/20  1228   WBC 3.9*   RBC 2.38*   HEMOGLOBIN 7.1*   HEMATOCRIT 21.0*   MCV 88.2   MCH 29.8   MCHC 33.8   RDW 42.5   PLATELETCT 59*     Recent Labs     06/16/20  1228   SODIUM 128*   POTASSIUM 4.2   CHLORIDE 87*   CO2 29   GLUCOSE 410*   BUN 57*   CREATININE 5.25*   CALCIUM 8.7     Recent Labs     06/16/20  1228   ALTSGPT 48   ASTSGOT 28   ALKPHOSPHAT 88   TBILIRUBIN 0.4   LIPASE 60   GLUCOSE 410*     Recent Labs     06/16/20  1228   APTT 59.6*   INR 1.06     No results for input(s): NTPROBNP in the last 72 hours.      No results for input(s): TROPONINT in the last 72 hours.    Imaging:  DX-CHEST-PORTABLE (1 VIEW)   Final Result      Cardiomegaly and mild interstitial edema. No focal consolidation or pleural effusions.            Assessment/Plan:    * GI bleed  Assessment & Plan  With hematochezia, likely lower GI source  Continuous cardiac monitoring  Patient is started on IV Protonix  Monitor H&H every 8 hours, transfuse for hemoglobin less than 7  Coagulation studies within normal limits  GI consulted for colonoscopy and EGD  Status post 1 unit of transfusion    Essential hypertension- (present on admission)  Assessment & Plan  Uncontrolled  Continue current home medications  IV as needed medications have been ordered      MDS (myelodysplastic syndrome) (HCC)- (present on admission)  Assessment & Plan   multilineage dysplasia: 5q deletion; chromosome 6 rearrangements; 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  treated with low-dose Revlimid in 2016 and Vidaza 75 mg/m² subcu 5 days a week in 2017  Receives frequent transfusions    Opiate dependence (HCC)- (present on admission)  Assessment & Plan  Due to chronic back pain    Pulmonary emphysema (HCC)- (present on admission)  Assessment & Plan  Not in exacerbation    ESRD (end stage renal disease) on dialysis (HCC)- (present on  admission)  Assessment & Plan  Monitor BMP and assess response  Avoid IV contrast/nephrotoxins/NSAIDs  Dose adjust meds for decreased GFR        Paroxysmal atrial fibrillation (HCC)- (present on admission)  Assessment & Plan  Rate controlled  On Coreg  Not on anticoagulation    Chronic respiratory failure with hypoxia, 2L- (present on admission)  Assessment & Plan  At baseline oxygen    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

## 2020-06-16 NOTE — ED NOTES
Pt brought back to rm RD 6 from triage in . Pt able to transfer self to bed, pt in gown, on monitor, friend at bedside, call light in reach. Chart up for ERP.

## 2020-06-16 NOTE — PROGRESS NOTES
Assumed care of patient, report received from ER RN. Pt transported to floor on [place], tele box on and monitor room notified. Transferred to bed from [facility]. VSS, assessment complete. Oriented to room and call light, educated on the importance of calling before getting out of bed. Verbalized understanding and no additional questions. Bed locked and in lowest position, treaded socks on. Bed alarm plugged in and on. Reviewed orders and labs.  Blood infusing.

## 2020-06-17 LAB
ALBUMIN SERPL BCP-MCNC: 3 G/DL (ref 3.2–4.9)
ALBUMIN/GLOB SERPL: 1 G/DL
ALP SERPL-CCNC: 81 U/L (ref 30–99)
ALT SERPL-CCNC: 53 U/L (ref 2–50)
ANION GAP SERPL CALC-SCNC: 11 MMOL/L (ref 7–16)
ANISOCYTOSIS BLD QL SMEAR: ABNORMAL
ANISOCYTOSIS BLD QL SMEAR: ABNORMAL
AST SERPL-CCNC: 38 U/L (ref 12–45)
BASO STIPL BLD QL SMEAR: NORMAL
BASOPHILS # BLD AUTO: 0 % (ref 0–1.8)
BASOPHILS # BLD AUTO: 0 % (ref 0–1.8)
BASOPHILS # BLD: 0 K/UL (ref 0–0.12)
BASOPHILS # BLD: 0 K/UL (ref 0–0.12)
BILIRUB SERPL-MCNC: 0.5 MG/DL (ref 0.1–1.5)
BUN SERPL-MCNC: 67 MG/DL (ref 8–22)
CALCIUM SERPL-MCNC: 8.2 MG/DL (ref 8.5–10.5)
CHLORIDE SERPL-SCNC: 89 MMOL/L (ref 96–112)
CHOLEST SERPL-MCNC: 96 MG/DL (ref 100–199)
CO2 SERPL-SCNC: 27 MMOL/L (ref 20–33)
COVID ORDER STATUS COVID19: NORMAL
CREAT SERPL-MCNC: 6.61 MG/DL (ref 0.5–1.4)
EOSINOPHIL # BLD AUTO: 0.16 K/UL (ref 0–0.51)
EOSINOPHIL # BLD AUTO: 0.28 K/UL (ref 0–0.51)
EOSINOPHIL NFR BLD: 3.5 % (ref 0–6.9)
EOSINOPHIL NFR BLD: 5.2 % (ref 0–6.9)
ERYTHROCYTE [DISTWIDTH] IN BLOOD BY AUTOMATED COUNT: 41.5 FL (ref 35.9–50)
ERYTHROCYTE [DISTWIDTH] IN BLOOD BY AUTOMATED COUNT: 42.5 FL (ref 35.9–50)
GLOBULIN SER CALC-MCNC: 2.9 G/DL (ref 1.9–3.5)
GLUCOSE BLD-MCNC: 143 MG/DL (ref 65–99)
GLUCOSE BLD-MCNC: 171 MG/DL (ref 65–99)
GLUCOSE BLD-MCNC: 186 MG/DL (ref 65–99)
GLUCOSE BLD-MCNC: 195 MG/DL (ref 65–99)
GLUCOSE BLD-MCNC: 87 MG/DL (ref 65–99)
GLUCOSE SERPL-MCNC: 184 MG/DL (ref 65–99)
HCT VFR BLD AUTO: 22.5 % (ref 37–47)
HCT VFR BLD AUTO: 22.7 % (ref 37–47)
HDLC SERPL-MCNC: 32 MG/DL
HGB BLD-MCNC: 7.6 G/DL (ref 12–16)
HGB BLD-MCNC: 7.9 G/DL (ref 12–16)
HGB BLD-MCNC: 8 G/DL (ref 12–16)
HGB BLD-MCNC: 8.3 G/DL (ref 12–16)
HYPOCHROMIA BLD QL SMEAR: ABNORMAL
HYPOCHROMIA BLD QL SMEAR: ABNORMAL
LDLC SERPL CALC-MCNC: 44 MG/DL
LYMPHOCYTES # BLD AUTO: 0.7 K/UL (ref 1–4.8)
LYMPHOCYTES # BLD AUTO: 1.11 K/UL (ref 1–4.8)
LYMPHOCYTES NFR BLD: 14.8 % (ref 22–41)
LYMPHOCYTES NFR BLD: 20.9 % (ref 22–41)
MACROCYTES BLD QL SMEAR: ABNORMAL
MANUAL DIFF BLD: NORMAL
MANUAL DIFF BLD: NORMAL
MCH RBC QN AUTO: 29.7 PG (ref 27–33)
MCH RBC QN AUTO: 30.3 PG (ref 27–33)
MCHC RBC AUTO-ENTMCNC: 33.8 G/DL (ref 33.6–35)
MCHC RBC AUTO-ENTMCNC: 34.8 G/DL (ref 33.6–35)
MCV RBC AUTO: 87 FL (ref 81.4–97.8)
MCV RBC AUTO: 87.9 FL (ref 81.4–97.8)
METAMYELOCYTES NFR BLD MANUAL: 0.9 %
METAMYELOCYTES NFR BLD MANUAL: 2.6 %
MICROCYTES BLD QL SMEAR: ABNORMAL
MONOCYTES # BLD AUTO: 0.24 K/UL (ref 0–0.85)
MONOCYTES # BLD AUTO: 0.55 K/UL (ref 0–0.85)
MONOCYTES NFR BLD AUTO: 10.4 % (ref 0–13.4)
MONOCYTES NFR BLD AUTO: 5.2 % (ref 0–13.4)
MORPHOLOGY BLD-IMP: NORMAL
MORPHOLOGY BLD-IMP: NORMAL
MYELOCYTES NFR BLD MANUAL: 0.9 %
MYELOCYTES NFR BLD MANUAL: 2.6 %
NEUTROPHILS # BLD AUTO: 3.27 K/UL (ref 2–7.15)
NEUTROPHILS # BLD AUTO: 3.35 K/UL (ref 2–7.15)
NEUTROPHILS NFR BLD: 61.7 % (ref 44–72)
NEUTROPHILS NFR BLD: 71.3 % (ref 44–72)
NRBC # BLD AUTO: 0 K/UL
NRBC # BLD AUTO: 0 K/UL
NRBC BLD-RTO: 0 /100 WBC
NRBC BLD-RTO: 0 /100 WBC
OVALOCYTES BLD QL SMEAR: NORMAL
OVALOCYTES BLD QL SMEAR: NORMAL
PLATELET # BLD AUTO: 58 K/UL (ref 164–446)
PLATELET # BLD AUTO: 58 K/UL (ref 164–446)
PLATELET BLD QL SMEAR: NORMAL
PLATELET BLD QL SMEAR: NORMAL
POIKILOCYTOSIS BLD QL SMEAR: NORMAL
POIKILOCYTOSIS BLD QL SMEAR: NORMAL
POLYCHROMASIA BLD QL SMEAR: NORMAL
POTASSIUM SERPL-SCNC: 4 MMOL/L (ref 3.6–5.5)
PROT SERPL-MCNC: 5.9 G/DL (ref 6–8.2)
RBC # BLD AUTO: 2.56 M/UL (ref 4.2–5.4)
RBC # BLD AUTO: 2.61 M/UL (ref 4.2–5.4)
RBC BLD AUTO: PRESENT
RBC BLD AUTO: PRESENT
ROULEAUX BLD QL SMEAR: SLIGHT
SARS-COV-2 RNA RESP QL NAA+PROBE: NOTDETECTED
SODIUM SERPL-SCNC: 127 MMOL/L (ref 135–145)
SPECIMEN SOURCE: NORMAL
TRIGL SERPL-MCNC: 102 MG/DL (ref 0–149)
WBC # BLD AUTO: 4.7 K/UL (ref 4.8–10.8)
WBC # BLD AUTO: 5.3 K/UL (ref 4.8–10.8)

## 2020-06-17 PROCEDURE — 700102 HCHG RX REV CODE 250 W/ 637 OVERRIDE(OP): Performed by: HOSPITALIST

## 2020-06-17 PROCEDURE — U0004 COV-19 TEST NON-CDC HGH THRU: HCPCS

## 2020-06-17 PROCEDURE — 700111 HCHG RX REV CODE 636 W/ 250 OVERRIDE (IP): Mod: TB

## 2020-06-17 PROCEDURE — 80061 LIPID PANEL: CPT

## 2020-06-17 PROCEDURE — 99233 SBSQ HOSP IP/OBS HIGH 50: CPT | Performed by: INTERNAL MEDICINE

## 2020-06-17 PROCEDURE — 97161 PT EVAL LOW COMPLEX 20 MIN: CPT

## 2020-06-17 PROCEDURE — 85007 BL SMEAR W/DIFF WBC COUNT: CPT

## 2020-06-17 PROCEDURE — 85027 COMPLETE CBC AUTOMATED: CPT

## 2020-06-17 PROCEDURE — A9270 NON-COVERED ITEM OR SERVICE: HCPCS | Performed by: HOSPITALIST

## 2020-06-17 PROCEDURE — 83036 HEMOGLOBIN GLYCOSYLATED A1C: CPT

## 2020-06-17 PROCEDURE — 97165 OT EVAL LOW COMPLEX 30 MIN: CPT

## 2020-06-17 PROCEDURE — 85018 HEMOGLOBIN: CPT | Mod: 91

## 2020-06-17 PROCEDURE — C9113 INJ PANTOPRAZOLE SODIUM, VIA: HCPCS | Performed by: HOSPITALIST

## 2020-06-17 PROCEDURE — 82962 GLUCOSE BLOOD TEST: CPT | Mod: 91

## 2020-06-17 PROCEDURE — C9803 HOPD COVID-19 SPEC COLLECT: HCPCS | Performed by: INTERNAL MEDICINE

## 2020-06-17 PROCEDURE — 770020 HCHG ROOM/CARE - TELE (206)

## 2020-06-17 PROCEDURE — 80053 COMPREHEN METABOLIC PANEL: CPT

## 2020-06-17 PROCEDURE — 700111 HCHG RX REV CODE 636 W/ 250 OVERRIDE (IP): Performed by: HOSPITALIST

## 2020-06-17 PROCEDURE — 90935 HEMODIALYSIS ONE EVALUATION: CPT

## 2020-06-17 PROCEDURE — 700101 HCHG RX REV CODE 250: Performed by: INTERNAL MEDICINE

## 2020-06-17 PROCEDURE — 94760 N-INVAS EAR/PLS OXIMETRY 1: CPT

## 2020-06-17 RX ORDER — PREGABALIN 25 MG/1
25 CAPSULE ORAL 3 TIMES DAILY
Status: DISCONTINUED | OUTPATIENT
Start: 2020-06-18 | End: 2020-06-20 | Stop reason: HOSPADM

## 2020-06-17 RX ADMIN — LOSARTAN POTASSIUM 100 MG: 50 TABLET, FILM COATED ORAL at 17:11

## 2020-06-17 RX ADMIN — POLYETHYLENE GLYCOL 3350, SODIUM SULFATE ANHYDROUS, SODIUM BICARBONATE, SODIUM CHLORIDE, POTASSIUM CHLORIDE 4 L: 236; 22.74; 6.74; 5.86; 2.97 POWDER, FOR SOLUTION ORAL at 17:15

## 2020-06-17 RX ADMIN — EPOETIN ALFA-EPBX 10000 UNITS: 10000 INJECTION, SOLUTION INTRAVENOUS; SUBCUTANEOUS at 14:44

## 2020-06-17 RX ADMIN — PANTOPRAZOLE SODIUM 40 MG: 40 INJECTION, POWDER, LYOPHILIZED, FOR SOLUTION INTRAVENOUS at 17:13

## 2020-06-17 RX ADMIN — INSULIN HUMAN 2 UNITS: 100 INJECTION, SOLUTION PARENTERAL at 08:06

## 2020-06-17 RX ADMIN — CARVEDILOL 12.5 MG: 12.5 TABLET, FILM COATED ORAL at 07:55

## 2020-06-17 RX ADMIN — PANTOPRAZOLE SODIUM 40 MG: 40 INJECTION, POWDER, LYOPHILIZED, FOR SOLUTION INTRAVENOUS at 05:57

## 2020-06-17 RX ADMIN — CARVEDILOL 12.5 MG: 12.5 TABLET, FILM COATED ORAL at 17:11

## 2020-06-17 RX ADMIN — INSULIN HUMAN 2 UNITS: 100 INJECTION, SOLUTION PARENTERAL at 11:25

## 2020-06-17 RX ADMIN — LABETALOL HYDROCHLORIDE 10 MG: 5 INJECTION, SOLUTION INTRAVENOUS at 20:46

## 2020-06-17 RX ADMIN — AMLODIPINE BESYLATE 5 MG: 5 TABLET ORAL at 05:57

## 2020-06-17 RX ADMIN — PREGABALIN 150 MG: 150 CAPSULE ORAL at 07:55

## 2020-06-17 ASSESSMENT — COGNITIVE AND FUNCTIONAL STATUS - GENERAL
CLIMB 3 TO 5 STEPS WITH RAILING: A LITTLE
DAILY ACTIVITIY SCORE: 22
SUGGESTED CMS G CODE MODIFIER MOBILITY: CJ
HELP NEEDED FOR BATHING: A LITTLE
MOBILITY SCORE: 20
MOBILITY SCORE: 23
DRESSING REGULAR LOWER BODY CLOTHING: A LITTLE
CLIMB 3 TO 5 STEPS WITH RAILING: A LITTLE
SUGGESTED CMS G CODE MODIFIER MOBILITY: CI
SUGGESTED CMS G CODE MODIFIER DAILY ACTIVITY: CI
MOVING TO AND FROM BED TO CHAIR: A LITTLE
HELP NEEDED FOR BATHING: A LITTLE
DAILY ACTIVITIY SCORE: 23
STANDING UP FROM CHAIR USING ARMS: A LITTLE
WALKING IN HOSPITAL ROOM: A LITTLE
SUGGESTED CMS G CODE MODIFIER DAILY ACTIVITY: CJ

## 2020-06-17 ASSESSMENT — ENCOUNTER SYMPTOMS
CHILLS: 0
SHORTNESS OF BREATH: 0
COUGH: 0
BLOOD IN STOOL: 0
HEADACHES: 0
VOMITING: 0
ABDOMINAL PAIN: 0
MYALGIAS: 0
DIZZINESS: 0
NAUSEA: 0
FEVER: 0

## 2020-06-17 ASSESSMENT — LIFESTYLE VARIABLES
EVER FELT BAD OR GUILTY ABOUT YOUR DRINKING: NO
TOTAL SCORE: 0
HOW MANY TIMES IN THE PAST YEAR HAVE YOU HAD 5 OR MORE DRINKS IN A DAY: 0
ON A TYPICAL DAY WHEN YOU DRINK ALCOHOL HOW MANY DRINKS DO YOU HAVE: 0
AVERAGE NUMBER OF DAYS PER WEEK YOU HAVE A DRINK CONTAINING ALCOHOL: 0
HAVE PEOPLE ANNOYED YOU BY CRITICIZING YOUR DRINKING: NO
EVER HAD A DRINK FIRST THING IN THE MORNING TO STEADY YOUR NERVES TO GET RID OF A HANGOVER: NO
TOTAL SCORE: 0
ALCOHOL_USE: NO
DOES PATIENT WANT TO STOP DRINKING: NO
HAVE YOU EVER FELT YOU SHOULD CUT DOWN ON YOUR DRINKING: NO
EVER_SMOKED: NEVER
TOTAL SCORE: 0
CONSUMPTION TOTAL: NEGATIVE

## 2020-06-17 ASSESSMENT — GAIT ASSESSMENTS
ASSISTIVE DEVICE: FRONT WHEEL WALKER
GAIT LEVEL OF ASSIST: SUPERVISED
DISTANCE (FEET): 75

## 2020-06-17 ASSESSMENT — FIBROSIS 4 INDEX: FIB4 SCORE: 5.85

## 2020-06-17 ASSESSMENT — ACTIVITIES OF DAILY LIVING (ADL): TOILETING: INDEPENDENT

## 2020-06-17 NOTE — PROGRESS NOTES
Report received at bedside from NOC RN, pt care assumed, tele box on. Pt aaox4, no signs of distress noted at this time. Patient resting comfortably in bed. POC discussed with pt and verbalizes no questions. Pt denies any additional needs at this time. Bed in lowest position, pt educated on fall risk and verbalized understanding, call light within reach, bed alarm plugged in and on.

## 2020-06-17 NOTE — DISCHARGE PLANNING
Care Transition Team Assessment    RN RAMIRO spoke with patient at the bedside to complete transition assessment.  Patient verified that information on face sheet is correct.  She states that her daughter lives in [place] but is no assistance to her.  Her friend, Kinza Burkett, assists her as need with ADLs/IADLs.  She stated that she uses her [employer] to cook most of her meals.  Uses the [employer] for transportation for dialysis and her friend Kinza takes her to other MD appointments and to the store as needed.  She gets her prescriptions filled at [facility] in [place].      DC Plan:   Home w/ no needs    Information Source  Orientation : Oriented x 4  Information Given By: Patient  Who is responsible for making decisions for patient? : Patient    Readmission Evaluation  Is this a readmission?: No    Elopement Risk  Legal Hold: No  Ambulatory or Self Mobile in Wheelchair: Yes  Disoriented: No  Psychiatric Symptoms: None  History of Wandering: No  Elopement this Admit: No  Vocalizing Wanting to Leave: No  Displays Behaviors, Body Language Wanting to Leave: No-Not at Risk for Elopement  Elopement Risk: Not at Risk for Elopement    Interdisciplinary Discharge Planning  Primary Care Physician: Dr. Nyla Nava  Lives with - Patient's Self Care Capacity: Alone and Able to Care For Self  Patient or legal guardian wants to designate a caregiver (see row info): No  Support Systems: Friends / Neighbors  Housing / Facility: 1 Story House  Do You Take your Prescribed Medications Regularly: Yes  Able to Return to Previous ADL's: Future Time w/Therapy  Mobility Issues: Yes  Prior Services: None  Patient Expects to be Discharged to:: home  Assistance Needed: Yes  Durable Medical Equipment: Walker    Discharge Preparedness  What is your plan after discharge?: Home with help  What are your discharge supports?: Other (comment)(Friends)  Prior Functional Level: Ambulatory, Independent with Activities of Daily Living, Independent with  Medication Management  Difficulity with ADLs: Walking  Difficulty with ADLs Comment: uses a walker and has a power chair if she is out in public at a store  Difficulity with IADLs: Driving, Shopping  Difficulity with IADL Comments: Friend, Kinza, assists her with IADLs and takes her shopping weekly.    Functional Assesment  Prior Functional Level: Ambulatory, Independent with Activities of Daily Living, Independent with Medication Management    Finances  Financial Barriers to Discharge: No  Prescription Coverage: Yes    Vision / Hearing Impairment  Vision Impairment : Yes  Right Eye Vision: Impaired, Wears Glasses  Left Eye Vision: Impaired, Wears Glasses  Hearing Impairment : No         Advance Directive  Advance Directive?: None    Domestic Abuse  Have you ever been the victim of abuse or violence?: No  Physical Abuse or Sexual Abuse: No  Verbal Abuse or Emotional Abuse: No  Possible Abuse Reported to:: Not Applicable    Psychological Assessment  History of Substance Abuse: None  History of Psychiatric Problems: Yes  Non-compliant with Treatment: No    Discharge Risks or Barriers  Discharge risks or barriers?: No    Anticipated Discharge Information  Anticipated discharge disposition: Home  Discharge Address: [facility] [address]  [id number]  Discharge Contact Phone Number: [phone]

## 2020-06-17 NOTE — RESPIRATORY CARE
Oxygen Rounds      Patient found on    O2 L/m:  ___3______    Oxygen device:  ___nc_____   Spo2: _____99____%     Respiratory device skin site inspection completed.

## 2020-06-17 NOTE — PROGRESS NOTES
Patients blood transfusion complete. Patient denies any pain or additional needs at this time. VSS, patient medicated PRN for BP.

## 2020-06-17 NOTE — ASSESSMENT & PLAN NOTE
With hematochezia, likely lower GI source  Patient is started on IV Protonix  Status post 1 unit of transfusion  GI consulted, scope today  Trend Hgb

## 2020-06-17 NOTE — RESPIRATORY CARE
COPD EDUCATION by COPD CLINICAL EDUCATOR  6/17/2020 at 7:39 AM by Ada Tamez, RRT     Patient reviewed by COPD education team. Patient does not have a history or diagnosis of COPD and has never smoked. Per [facility] she has restrictive diease due to kyphosis, therefore does not qualify for the COPD program.

## 2020-06-17 NOTE — THERAPY
Physical Therapy   Initial Evaluation     Patient Name: Vielka Briscoe  Age:  64 y.o., Sex:  female  Medical Record #: [id number]  Today's Date: 6/17/2020          Assessment    Pt w/ hx of a fib, myelodysplastic syndrome and ESRD.  She was admitted w/ weakness and GI bleed, pending colonoscopy.  She lives w/ her daughter in a single story house w/ ramp access.  She was mobile w/ a fww.  Today, she was able to mobilize essentially at her PLOF.  Recommend oob/amb prn w/ nsg and fww.  No acute PT needs.  PT will be available for d/c needs only.    Plan    Recommend Physical Therapy for Evaluation only       Discharge recommendations:  Anticipate that the patient will have no further physical therapy needs after discharge from the hospital.         06/17/20 0737   Prior Living Situation   Housing / Facility 1 Story House   Steps Into Home 1   Steps In Home 0   Equipment Owned Front-Wheel Walker;Ramp   Lives with - Patient's Self Care Capacity Adult Children   Prior Level of Functional Mobility   Bed Mobility Independent   Transfer Status Independent   Ambulation Independent   Assistive Devices Used Front-Wheel Walker   Gait Analysis   Gait Level Of Assist Supervised   Assistive Device Front Wheel Walker   Distance (Feet) 75   Bed Mobility    Supine to Sit Supervised   Sit to Supine Supervised   Scooting Supervised   Functional Mobility   Sit to Stand Supervised   Toilet Transfers Supervised   Anticipated Discharge Equipment   DC Equipment None

## 2020-06-17 NOTE — PROGRESS NOTES
Spiritual Care Note      Spiritual Care Provider Information:  Name of Spiritual Care Provider: Florinda Gomes  Title of Spiritual Care Provider: Associate   Phone Number: [phone]  E-mail: [email or web address]        Spiritual Screen Results:    Gen Nursing  Spiritual Screen  Is your spiritual health or inner well-being important to you as you cope with your medical condition?: No  Would you like to receive a visit from our Spiritual Care team or your own Anabaptism or spiritual leader?: Yes  Was spiritual care education provided to the patient?: Declined     Palliative Care  [facility]/Spiritual Screening  Was spiritual care education provided to the patient?: Declined        Spiritual Assessment   Spiritual Care Encounters    Interventions:  checked in with pt's Nurse Lizette. Pt was not available in room - she was in dialysis.  will follow up .

## 2020-06-17 NOTE — PROGRESS NOTES
[facility] Medicine Daily Progress Note    Date of Service  6/17/2020    Chief Complaint  64 y.o. female admitted 6/16/2020 with weakness.    Hospital Course    PMH afib, COPD, CHF, DM, CKD, HTN, HLD who presented with bloody stools. GI was consulted.       Interval Problem Update  She had a BM this morning, said it looked brown  Denies abd pain or nausea    Consultants/Specialty  GI  nephrology    Code Status  Full Code    Disposition  TBA    Review of Systems  Review of Systems   Constitutional: Negative for chills and fever.   HENT: Negative for congestion.    Respiratory: Negative for cough and shortness of breath.    Cardiovascular: Negative for chest pain.   Gastrointestinal: Negative for abdominal pain, blood in stool, melena, nausea and vomiting.   Genitourinary: Negative for dysuria.   Musculoskeletal: Negative for myalgias.   Skin: Negative for itching.   Neurological: Negative for dizziness and headaches.        Physical Exam  Temp:  [35.8 °C (96.4 °F)-37.1 °C (98.7 °F)] 37.1 °C (98.7 °F)  Pulse:  [67-80] 71  Resp:  [13-24] 16  BP: (116-221)/(62-99) 159/68  SpO2:  [93 %-100 %] 100 %    Physical Exam  Vitals signs and nursing note reviewed.   Constitutional:       General: She is not in acute distress.     Appearance: She is not ill-appearing.   HENT:      Head: Normocephalic.      Mouth/Throat:      Mouth: Mucous membranes are moist.   Eyes:      General:         Right eye: No discharge.         Left eye: No discharge.   Neck:      Musculoskeletal: Neck supple.   Cardiovascular:      Rate and Rhythm: Normal rate and regular rhythm.   Pulmonary:      Effort: Pulmonary effort is normal.      Breath sounds: No wheezing or rales.   Abdominal:      Palpations: Abdomen is soft.      Tenderness: There is no abdominal tenderness. There is no guarding or rebound.   Musculoskeletal:      Right lower leg: No edema.      Left lower leg: No edema.      Comments: Fistula left arm, palpable thrill   Skin:     General:  Skin is warm and dry.   Neurological:      Mental Status: She is alert.      Comments: Oriented to self, place, situation         Fluids    Intake/Output Summary (Last 24 hours) at 6/17/2020 1039  Last data filed at 6/16/2020 1555  Gross per 24 hour   Intake 385 ml   Output --   Net 385 ml       Laboratory  Recent Labs     06/16/20  1228   WBC 3.9*   RBC 2.38*   HEMOGLOBIN 7.1*   HEMATOCRIT 21.0*   MCV 88.2   MCH 29.8   MCHC 33.8   RDW 42.5   PLATELETCT 59*     Recent Labs     06/16/20  1228 06/17/20  0750   SODIUM 128* 127*   POTASSIUM 4.2 4.0   CHLORIDE 87* 89*   CO2 29 27   GLUCOSE 410* 184*   BUN 57* 67*   CREATININE 5.25* 6.61*   CALCIUM 8.7 8.2*     Recent Labs     06/16/20  1228   APTT 59.6*   INR 1.06         Recent Labs     06/17/20  0750   TRIGLYCERIDE 102   HDL 32*   LDL 44       Imaging  DX-CHEST-PORTABLE (1 VIEW)   Final Result      Cardiomegaly and mild interstitial edema. No focal consolidation or pleural effusions.           Assessment/Plan  * GI bleed  Assessment & Plan  With hematochezia, likely lower GI source  Patient is started on IV Protonix  Monitor H&H every 8 hours, transfuse for hemoglobin less than 7  Hgb for today pending  GI consulted for colonoscopy and EGD  Status post 1 unit of transfusion  Clear liquid diet    Essential hypertension- (present on admission)  Assessment & Plan  Uncontrolled, improving  Continue home norvasc, coreg, losartan      MDS (myelodysplastic syndrome) (HCC)- (present on admission)  Assessment & Plan   multilineage dysplasia: 5q deletion; chromosome 6 rearrangements; 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  treated with low-dose Revlimid in 2016 and Vidaza 75 mg/m² subcu 5 days a week in 2017  Receives frequent transfusions    Opiate dependence (HCC)- (present on admission)  Assessment & Plan  Due to chronic back pain  Cont buprenorphine     Pulmonary emphysema (HCC)- (present on admission)  Assessment & Plan  Not in exacerbation    ESRD (end stage renal disease) on dialysis  (HCC)- (present on admission)  Assessment & Plan  Has dialysis MWF  Nephrology consulted        Paroxysmal atrial fibrillation (HCC)- (present on admission)  Assessment & Plan  Rate controlled  On Coreg  Not on anticoagulation    Chronic respiratory failure with hypoxia, 2L- (present on admission)  Assessment & Plan  At baseline oxygen    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

## 2020-06-17 NOTE — THERAPY
Occupational Therapy   Initial Evaluation     Patient Name: Vielka Briscoe  Age:  64 y.o., Sex:  female  Medical Record #: [id number]  Today's Date: 6/17/2020     Precautions  Precautions: Fall Risk  Comments: legally blind per pt, able to see outline     Assessment  Patient is 64 y.o. female admitted to [facility] following weakness, GI bleed pending colonoscopy. Pt was able to demonstrate basic self care tasks, functional t/f's and mobility with supervision. Reports have assist from friend and dtr with IADLs and transportation as needed, reports no concerns d/c home once medically cleared. Does not demonstrate need for acute OT but will remain available for d/c needs as needed.       Plan    Recommend Occupational Therapy for Evaluation only     Discharge recommendations:  Anticipate that the patient will have no further occupational therapy needs after discharge from the hospital.     *Nursing please continue to mobilize pt to BR, oob meals and hallway ambulation as tolerated while in house to maintain current level of functioning.*         Objective       06/17/20 1021   Prior Living Situation   Prior Services None   Housing / Facility 1 Story House   Steps Into Home 1   Bathroom Set up Bathtub / Shower Combination;Shower Chair   Equipment Owned Front-Wheel Walker;Tub / Shower Seat;Grab Bar(s) In Tub / Shower;Grab Bar(s) By Toilet;Ramp   Lives with - Patient's Self Care Capacity Alone and Able to Care For Self   Comments Reports I w/ ADLs and medication management. Dtr and friend assists with IADLs including picking up medication and groceries   Prior Level of ADL Function   Self Feeding Independent   Grooming / Hygiene Independent   Bathing Independent   Dressing Independent   Toileting Independent   Prior Level of IADL Function   Medication Management Independent  (reports identifies pills with shape)   Laundry Independent   Kitchen Mobility Independent   Finances Requires Assist   Home Management Requires  Assist   Shopping Requires Assist   Prior Level Of Mobility Independent With Device in Home;Independent Without Device in Home   Driving / Transportation Relatives / Others Provide Transportation  (dtr and friend takes her to appointments )   Occupation (Pre-Hospital Vocational) Retired Due To Age   Bed Mobility    Supine to Sit Supervised   Sit to Supine Supervised   Scooting Supervised   Rolling Supervised   Comments flat hob and w/o bed rail   ADL Assessment   Eating Modified Independent   Grooming Supervision;Standing   Bathing   (discussed home s/u, ae and modifications)   Upper Body Dressing Supervision   Lower Body Dressing Supervision   Toileting Supervision   Functional Mobility   Sit to Stand Supervised   Bed, Chair, Wheelchair Transfer Supervised   Toilet Transfers Supervised   Comments w/o AD, manages O2 line w/o difficulty   Visual Perception   Visual Perception  X   Comments reports baseline legally blind, able to see outlines to navigate around the enviroment   Anticipated Discharge Equipment   DC Equipment None

## 2020-06-17 NOTE — PROGRESS NOTES
3hr HD started @ 1151 and completed @ 1452,tx well tolerated,VSS,bet UF = 3100ml.LUAAVF + B/T,cannulation sites covered with DD,CDI,report given to Lizette Pierre RN.

## 2020-06-17 NOTE — ASSESSMENT & PLAN NOTE
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

## 2020-06-17 NOTE — CONSULTS
DATE OF SERVICE:  06/17/2020    NEPHROLOGY CONSULTATION    REASON FOR CONSULTATION:  This is nephrology consultation for evaluation and   management of end-stage renal disease.    REQUESTING PHYSICIAN:  From [facility].    HISTORY OF PRESENT ILLNESS:  The patient is a pleasant 64-year-old female with   history of end-stage renal disease, on hemodialysis Monday, Wednesday, and   Friday, who presented to [facility] with complaints of generalized weakness and   bright red blood per rectum/melenic stools.  She is admitted for GI workup.    She is due for dialysis today.  Hence, nephrology was asked to see her for her   ESRD and dialysis management.  She has a left upper extremity AV fistula,   which is functioning.    REVIEW OF SYSTEMS:  GENERAL:  No weakness.  No malaise.  EAR, NOSE, THROAT:  She has chronic vision changes, but no hearing changes.  CARDIOVASCULAR:  No chest pain.  No palpitations.  LUNGS:  No cough.  No shortness of breath.  GASTROINTESTINAL:  No nausea, vomiting, diarrhea.  She does have melenic   stools.  GENITOURINARY:  No dysuria or gross hematuria.  MUSCULOSKELETAL:  No arthralgias or myalgias.  No trauma.  SKIN:  With no evidence of rash, pruritus, wounds.  NEUROLOGIC:  No tremors.  No loss of consciousness.  PSYCHIATRIC:  No depression or anxiety.  All other systems reviewed and negative.    CURRENT MEDICATIONS:  Her outpatient medications include:  1.  Combigan.  2.  Voltaren.  3.  Norvasc.  4.  Lumigan.  5.  Azopt.  6.  Coreg.  7.  Colace.  8.  Lidoderm.  9.  Cozaar.  10.  Nitrostat.  11.  Lyrica.    ALLERGIES:  INCLUDE:  1.  NAPROSYN.  2.  PIOGLITAZONE.  3.  SIMVASTATIN.  4.  DEMEROL.  5.  GLIPIZIDE.  6.  HYDROMORPHONE.  7.  MORPHINE.  8.  METFORMIN.  9.  ONDANSETRON.  10.  REQUIP.  11.  SULFA DRUGS.  12.  TAMSULOSIN.  13.  TRAMADOL.  14.  TRAZODONE.    SOCIAL HISTORY:  She does not smoke, drink alcohol, or use recreational drugs.    FAMILY HISTORY:  Only significant for  hypertension.    PAST SURGICAL HISTORY:  Includes:  1.  Cardiac surgery.  2.  Abdominal surgery.  3.  GYN surgery.  4.  Surgery for retinal detachment on the right.  5.  AV fistula creation on the left.  6.  Bone marrow aspiration.    PAST MEDICAL HISTORY:  Significant for:  1.  Atrial fibrillation.  2.  ESRD on hemodialysis.  3.  Myelodysplastic syndrome.  4.  Chronic anemia.  5.  COPD.  6.  CHF.  7.  Diabetes.  8.  Hypertension.  9.  CVA.    PHYSICAL EXAMINATION:  VITAL SIGNS:  On examination, vitals revealing blood pressure ____ with a   pulse of 71, temperature 37.1.  GENERAL:  She is lying in bed.  No major hemodynamic distress.  HEENT:  Normocephalic, atraumatic.  NECK:  Supple.  No JVD.  No thyromegaly.  EYES:  No scleral icterus.  Normal conjunctivae.  Pupils are equal and   reactive to light.  CARDIOVASCULAR:  S1, S2 heard.  ABDOMEN:  Soft, nontender, nondistended.  Bowel sounds are present.  LUNGS:  With decreased breath sounds at the bases bilaterally.  No wheezing.  SKIN:  With no evidence of rash, pruritus, or wounds.  EXTREMITIES:  With trace lower extremity edema.  No clubbing or cyanosis.  PSYCHIATRIC:  Cooperative.  Appropriate mood and affect.  NEUROLOGIC:  Alert, awake, oriented x3.  No focal deficits noted.  ACCESS:  Left upper extremity AV fistula without any erythema or edema.    LABORATORY DATA:  White count 3.9, hemoglobin 7.1, platelet count of 59.    Chemistry:  Sodium 127, potassium 4, chloride 89, bicarbonate 27, glucose 184,   BUN and creatinine is 67 and 6.6, calcium 8.2.    ASSESSMENT:  1.  End-stage renal disease, on hemodialysis Monday, Wednesday, and Friday.  2.  Hypertension.  3.  Renal osteodystrophy.  4.  Gastrointestinal bleed.  5.  Anemia of chronic kidney disease.  6.  Coronary artery disease.  7.  History of atrial fibrillation.  8.  Diabetes mellitus.  9.  Chronic obstructive pulmonary disease.  10.  History of peripheral vascular disease.  11.  History of congestive heart  failure.    PLAN:  We will arrange for dialysis today per her regular Monday, Wednesday,   Friday schedule.  Avoid nephrotoxins.  Renal dose medications as necessary.    Place her on a renal diet.  We will give her Epogen with dialysis.  GI workup   in progress and is scheduled for a scope tomorrow.    Thank you for allowing us to care for the patient.  We will follow her closely   with you.       ____________________________________     MD ALINA ORTIZ / RAMONE    DD:  06/17/2020 09:55:41  DT:  06/17/2020 12:44:01    D#:  [id number]  Job#:  [id number]

## 2020-06-18 LAB
ANION GAP SERPL CALC-SCNC: 10 MMOL/L (ref 7–16)
BASOPHILS # BLD AUTO: 0.2 % (ref 0–1.8)
BASOPHILS # BLD: 0.01 K/UL (ref 0–0.12)
BUN SERPL-MCNC: 31 MG/DL (ref 8–22)
CALCIUM SERPL-MCNC: 8.4 MG/DL (ref 8.5–10.5)
CHLORIDE SERPL-SCNC: 91 MMOL/L (ref 96–112)
CO2 SERPL-SCNC: 28 MMOL/L (ref 20–33)
CREAT SERPL-MCNC: 4.14 MG/DL (ref 0.5–1.4)
EOSINOPHIL # BLD AUTO: 0.29 K/UL (ref 0–0.51)
EOSINOPHIL NFR BLD: 6 % (ref 0–6.9)
ERYTHROCYTE [DISTWIDTH] IN BLOOD BY AUTOMATED COUNT: 43 FL (ref 35.9–50)
EST. AVERAGE GLUCOSE BLD GHB EST-MCNC: 197 MG/DL
GLUCOSE BLD-MCNC: 101 MG/DL (ref 65–99)
GLUCOSE BLD-MCNC: 113 MG/DL (ref 65–99)
GLUCOSE BLD-MCNC: 152 MG/DL (ref 65–99)
GLUCOSE BLD-MCNC: 94 MG/DL (ref 65–99)
GLUCOSE SERPL-MCNC: 117 MG/DL (ref 65–99)
HBA1C MFR BLD: 8.5 % (ref 0–5.6)
HCT VFR BLD AUTO: 25 % (ref 37–47)
HGB BLD-MCNC: 8.3 G/DL (ref 12–16)
HGB BLD-MCNC: 8.4 G/DL (ref 12–16)
IMM GRANULOCYTES # BLD AUTO: 0.03 K/UL (ref 0–0.11)
IMM GRANULOCYTES NFR BLD AUTO: 0.6 % (ref 0–0.9)
LYMPHOCYTES # BLD AUTO: 1.14 K/UL (ref 1–4.8)
LYMPHOCYTES NFR BLD: 23.8 % (ref 22–41)
MCH RBC QN AUTO: 29.6 PG (ref 27–33)
MCHC RBC AUTO-ENTMCNC: 33.2 G/DL (ref 33.6–35)
MCV RBC AUTO: 89.3 FL (ref 81.4–97.8)
MONOCYTES # BLD AUTO: 0.81 K/UL (ref 0–0.85)
MONOCYTES NFR BLD AUTO: 16.9 % (ref 0–13.4)
NEUTROPHILS # BLD AUTO: 2.52 K/UL (ref 2–7.15)
NEUTROPHILS NFR BLD: 52.5 % (ref 44–72)
NRBC # BLD AUTO: 0 K/UL
NRBC BLD-RTO: 0 /100 WBC
PLATELET # BLD AUTO: 65 K/UL (ref 164–446)
POTASSIUM SERPL-SCNC: 4.3 MMOL/L (ref 3.6–5.5)
RBC # BLD AUTO: 2.8 M/UL (ref 4.2–5.4)
SODIUM SERPL-SCNC: 129 MMOL/L (ref 135–145)
WBC # BLD AUTO: 4.8 K/UL (ref 4.8–10.8)

## 2020-06-18 PROCEDURE — A9270 NON-COVERED ITEM OR SERVICE: HCPCS | Performed by: HOSPITALIST

## 2020-06-18 PROCEDURE — 85018 HEMOGLOBIN: CPT

## 2020-06-18 PROCEDURE — 770020 HCHG ROOM/CARE - TELE (206)

## 2020-06-18 PROCEDURE — 99232 SBSQ HOSP IP/OBS MODERATE 35: CPT | Performed by: INTERNAL MEDICINE

## 2020-06-18 PROCEDURE — 82962 GLUCOSE BLOOD TEST: CPT | Mod: 91

## 2020-06-18 PROCEDURE — 80048 BASIC METABOLIC PNL TOTAL CA: CPT

## 2020-06-18 PROCEDURE — 700102 HCHG RX REV CODE 250 W/ 637 OVERRIDE(OP): Performed by: INTERNAL MEDICINE

## 2020-06-18 PROCEDURE — A9270 NON-COVERED ITEM OR SERVICE: HCPCS | Performed by: INTERNAL MEDICINE

## 2020-06-18 PROCEDURE — 700101 HCHG RX REV CODE 250: Performed by: INTERNAL MEDICINE

## 2020-06-18 PROCEDURE — C9113 INJ PANTOPRAZOLE SODIUM, VIA: HCPCS | Performed by: HOSPITALIST

## 2020-06-18 PROCEDURE — 700102 HCHG RX REV CODE 250 W/ 637 OVERRIDE(OP): Performed by: HOSPITALIST

## 2020-06-18 PROCEDURE — 85025 COMPLETE CBC W/AUTO DIFF WBC: CPT

## 2020-06-18 PROCEDURE — 700111 HCHG RX REV CODE 636 W/ 250 OVERRIDE (IP): Performed by: HOSPITALIST

## 2020-06-18 RX ORDER — BISACODYL 5 MG
10 TABLET, DELAYED RELEASE (ENTERIC COATED) ORAL ONCE
Status: COMPLETED | OUTPATIENT
Start: 2020-06-18 | End: 2020-06-18

## 2020-06-18 RX ADMIN — AMLODIPINE BESYLATE 5 MG: 5 TABLET ORAL at 05:40

## 2020-06-18 RX ADMIN — PANTOPRAZOLE SODIUM 40 MG: 40 INJECTION, POWDER, LYOPHILIZED, FOR SOLUTION INTRAVENOUS at 05:40

## 2020-06-18 RX ADMIN — CARVEDILOL 12.5 MG: 12.5 TABLET, FILM COATED ORAL at 16:45

## 2020-06-18 RX ADMIN — PREGABALIN 25 MG: 25 CAPSULE ORAL at 16:45

## 2020-06-18 RX ADMIN — PANTOPRAZOLE SODIUM 40 MG: 40 INJECTION, POWDER, LYOPHILIZED, FOR SOLUTION INTRAVENOUS at 16:46

## 2020-06-18 RX ADMIN — PREGABALIN 25 MG: 25 CAPSULE ORAL at 11:23

## 2020-06-18 RX ADMIN — BISACODYL 10 MG: 5 TABLET, COATED ORAL at 13:04

## 2020-06-18 RX ADMIN — POLYETHYLENE GLYCOL 3350, SODIUM SULFATE ANHYDROUS, SODIUM BICARBONATE, SODIUM CHLORIDE, POTASSIUM CHLORIDE 2 L: 236; 22.74; 6.74; 5.86; 2.97 POWDER, FOR SOLUTION ORAL at 16:55

## 2020-06-18 RX ADMIN — CARVEDILOL 12.5 MG: 12.5 TABLET, FILM COATED ORAL at 08:28

## 2020-06-18 RX ADMIN — LOSARTAN POTASSIUM 100 MG: 50 TABLET, FILM COATED ORAL at 16:45

## 2020-06-18 RX ADMIN — PREGABALIN 25 MG: 25 CAPSULE ORAL at 05:41

## 2020-06-18 RX ADMIN — INSULIN HUMAN 2 UNITS: 100 INJECTION, SOLUTION PARENTERAL at 11:23

## 2020-06-18 ASSESSMENT — ENCOUNTER SYMPTOMS
ABDOMINAL PAIN: 0
NAUSEA: 0
MYALGIAS: 0
HEMOPTYSIS: 0
DIZZINESS: 0
BLOOD IN STOOL: 0
NEUROLOGICAL NEGATIVE: 1
CARDIOVASCULAR NEGATIVE: 1
HEARTBURN: 0
MUSCULOSKELETAL NEGATIVE: 1
CHILLS: 0
PALPITATIONS: 0
PSYCHIATRIC NEGATIVE: 1
COUGH: 0
EYES NEGATIVE: 1
RESPIRATORY NEGATIVE: 1
SHORTNESS OF BREATH: 0
FEVER: 0

## 2020-06-18 ASSESSMENT — FIBROSIS 4 INDEX: FIB4 SCORE: 5.22

## 2020-06-18 NOTE — PROGRESS NOTES
Nephrology Daily Progress Note    Date of Service  6/18/2020    Chief Complaint  The patient is a pleasant 64-year-old female with   history of end-stage renal disease, on hemodialysis Monday, Wednesday, and   Friday, who presented to [facility] with complaints of generalized weakness and   bright red blood per rectum/melenic stools.  She is admitted for GI workup.    She is due for dialysis today.  Hence, nephrology was asked to see her for her  ESRD and dialysis management.  She has a left upper extremity AV fistula,   which is functioning.       Interval Problem Update  6/17 - Consult done  6/18 - Pt sitting up EOB, says she did not finish her GoLytely yet so her endoscopy may be tomorrow, HD yesterday with 3.1L net UF, feels well now, denies dyspnea, BP stable, denies bloody stools     Review of Systems  Review of Systems   Constitutional: Negative for chills, fever and malaise/fatigue.   HENT: Negative.    Eyes: Negative.    Respiratory: Negative.    Cardiovascular: Negative.    Gastrointestinal: Positive for melena (None today ).   Genitourinary: Negative.    Musculoskeletal: Negative.    Skin: Negative.    Neurological: Negative.    Endo/Heme/Allergies: Negative.    Psychiatric/Behavioral: Negative.         Physical Exam  Temp:  [35.8 °C (96.5 °F)-37 °C (98.6 °F)] 35.8 °C (96.5 °F)  Pulse:  [65-69] 67  Resp:  [16-18] 18  BP: (118-172)/(63-74) 155/64  SpO2:  [99 %-100 %] 100 %    Physical Exam  Constitutional:       Appearance: Normal appearance.   HENT:      Head: Normocephalic and atraumatic.      Nose: Nose normal.      Mouth/Throat:      Mouth: Mucous membranes are moist.      Pharynx: Oropharynx is clear.   Eyes:      Extraocular Movements: Extraocular movements intact.      Pupils: Pupils are equal, round, and reactive to light.   Neck:      Musculoskeletal: Normal range of motion and neck supple.   Cardiovascular:      Rate and Rhythm: Normal rate and regular rhythm.      Pulses: Normal pulses.      Heart  sounds: Normal heart sounds.      Comments: L AVF + T/B  Pulmonary:      Effort: Pulmonary effort is normal.      Breath sounds: Normal breath sounds.   Abdominal:      General: Bowel sounds are normal.      Palpations: Abdomen is soft.   Musculoskeletal: Normal range of motion.      Right lower leg: Edema (Trace) present.      Left lower leg: Edema (Trace) present.   Skin:     General: Skin is warm and dry.   Neurological:      General: No focal deficit present.      Mental Status: She is alert and oriented to person, place, and time.   Psychiatric:         Mood and Affect: Mood normal.         Behavior: Behavior normal.         Fluids    Intake/Output Summary (Last 24 hours) at 6/18/2020 1040  Last data filed at 6/17/2020 1818  Gross per 24 hour   Intake 960 ml   Output 3700 ml   Net -2740 ml       Laboratory  Recent Labs     06/17/20  0750 06/17/20  1000 06/17/20  1545 06/17/20  2321 06/18/20  0240   WBC 5.3 4.7*  --   --  4.8   RBC 2.61* 2.56*  --   --  2.80*   HEMOGLOBIN 7.9* 7.6* 8.3* 8.0* 8.3*   HEMATOCRIT 22.7* 22.5*  --   --  25.0*   MCV 87.0 87.9  --   --  89.3   MCH 30.3 29.7  --   --  29.6   MCHC 34.8 33.8  --   --  33.2*   RDW 41.5 42.5  --   --  43.0   PLATELETCT 58* 58*  --   --  65*     Recent Labs     06/16/20  1228 06/17/20  0750 06/18/20  0240   SODIUM 128* 127* 129*   POTASSIUM 4.2 4.0 4.3   CHLORIDE 87* 89* 91*   CO2 29 27 28   GLUCOSE 410* 184* 117*   BUN 57* 67* 31*   CREATININE 5.25* 6.61* 4.14*   CALCIUM 8.7 8.2* 8.4*     Recent Labs     06/16/20  1228   APTT 59.6*   INR 1.06     No results for input(s): NTPROBNP in the last 72 hours.  Recent Labs     06/17/20  0750   TRIGLYCERIDE 102   HDL 32*   LDL 44       Imaging  Available labs, imaging and clinical documentation reviewed.     Assessment/Plan  1.  End-stage renal disease, on hemodialysis Monday, Wednesday, and Friday.  - via L AVF   2.  Hypertension. Fairly well controlled on pharmacologic therapy.   3.  Renal osteodystrophy.  - Managed  at OP HD unit   4.  Gastrointestinal bleed.  - GI following, plan for EGD/colonoscopy   - On PPI   - Clear liquid diet   5.  Anemia of chronic kidney disease. Below target.  - Acute blood loss component  - On VANCE   - Serial Hgb checks   6.  Coronary artery disease.  7.  History of atrial fibrillation. Rate controlled, on carvedilol.   8.  Diabetes mellitus II.   9.  Chronic obstructive pulmonary disease.  10.  History of peripheral vascular disease.  11.  History of congestive heart failure.  12. SARS-CoV-2 negative x 1  13. Myelodysplastic syndrome  14. Chronic back pain, on opioid therapy.        PLAN:    - Continue iHD qMWF, next treatment tomorrow (FRI)  - UF with HD  - Continue home medications  - Transfuse PRN Hgb <7   - Continue VANCE, goal Hgb 10-11   - Bowel prep in effect with plan for EGD/colonoscopy   - Renal diet when taking PO   - Dose medications for ESRD   - Follow labs    Thank you,

## 2020-06-18 NOTE — CARE PLAN
Problem: Safety  Goal: Will remain free from injury  Outcome: PROGRESSING AS EXPECTED  Note: Fall precautions in place. Frequent reinforcement needed for call light use.   Goal: Will remain free from falls  Outcome: PROGRESSING AS EXPECTED     Problem: Bowel/Gastric:  Goal: Normal bowel function is maintained or improved  Outcome: PROGRESSING SLOWER THAN EXPECTED  Note: Colonoscopy scheduled, Golytely in place  Goal: Will not experience complications related to bowel motility  Outcome: PROGRESSING SLOWER THAN EXPECTED     Problem: Communication  Goal: The ability to communicate needs accurately and effectively will improve  Outcome: MET  Note: Pt communicates effectively.

## 2020-06-18 NOTE — PROGRESS NOTES
Dr. manzanares present. Updated on pt not finishing golInkling overnight. Pt having difficulty drinking all the fluids. Also, noted of Deterioration index score is > 50. Tele d/c due to medical status and transfer orders.

## 2020-06-18 NOTE — PROGRESS NOTES
Updated GI on pt's bowel movements not clear, pt has not finished golytley. New orders: resume clear liquid, possibly try again tomorrow.

## 2020-06-18 NOTE — PROGRESS NOTES
Received Bedside report. Assumed care at 1915. This pt is AOx4, Patient and RN discussed plan of care Labs noted, Tele rhythm and monitor verified. Bed in lowest lock postion, 2 side rails up.Call light in place, fall precautions in place, patient educated on importance of calling for assistance. No additional needs at this time. VSS

## 2020-06-18 NOTE — PROGRESS NOTES
[facility] Medicine Daily Progress Note    Date of Service  6/18/2020    Chief Complaint  64 y.o. female admitted 6/16/2020 with weakness.    Hospital Course    PMH afib, COPD, CHF, DM, CKD, HTN, HLD who presented with bloody stools. GI was consulted.       Interval Problem Update  Patient was not able to drink prep. Stools are brown  hgb stable    Consultants/Specialty  GI  nephrology    Code Status  Full Code    Disposition  TBA    Review of Systems  Review of Systems   Constitutional: Negative for fever.   HENT: Negative for congestion.    Respiratory: Negative for cough and shortness of breath.    Cardiovascular: Negative for chest pain.   Gastrointestinal: Negative for abdominal pain, blood in stool, melena and nausea.   Genitourinary: Negative for dysuria.   Musculoskeletal: Negative for myalgias.   Skin: Negative for itching.   Neurological: Negative for dizziness.        Physical Exam  Temp:  [35.8 °C (96.5 °F)-37 °C (98.6 °F)] 35.8 °C (96.5 °F)  Pulse:  [66-69] 67  Resp:  [16-18] 18  BP: (118-172)/(63-74) 155/64  SpO2:  [99 %-100 %] 100 %    Physical Exam  Vitals signs and nursing note reviewed.   Constitutional:       General: She is not in acute distress.     Appearance: She is not ill-appearing.   HENT:      Head: Normocephalic.      Mouth/Throat:      Mouth: Mucous membranes are moist.   Eyes:      General:         Right eye: No discharge.         Left eye: No discharge.   Neck:      Musculoskeletal: Neck supple.   Cardiovascular:      Rate and Rhythm: Normal rate and regular rhythm.   Pulmonary:      Effort: Pulmonary effort is normal.      Breath sounds: No wheezing or rales.      Comments: Right upper chest port  Abdominal:      Palpations: Abdomen is soft.      Tenderness: There is no abdominal tenderness. There is no guarding or rebound.   Musculoskeletal:      Right lower leg: No edema.      Left lower leg: No edema.      Comments: Fistula left arm, palpable thrill   Skin:     General: Skin is warm  and dry.   Neurological:      Mental Status: She is alert.      Comments: Oriented to self, place, situation         Fluids    Intake/Output Summary (Last 24 hours) at 6/18/2020 1431  Last data filed at 6/17/2020 1818  Gross per 24 hour   Intake 840 ml   Output 3700 ml   Net -2860 ml       Laboratory  Recent Labs     06/17/20  0750 06/17/20  1000 06/17/20  1545 06/17/20  2321 06/18/20  0240   WBC 5.3 4.7*  --   --  4.8   RBC 2.61* 2.56*  --   --  2.80*   HEMOGLOBIN 7.9* 7.6* 8.3* 8.0* 8.3*   HEMATOCRIT 22.7* 22.5*  --   --  25.0*   MCV 87.0 87.9  --   --  89.3   MCH 30.3 29.7  --   --  29.6   MCHC 34.8 33.8  --   --  33.2*   RDW 41.5 42.5  --   --  43.0   PLATELETCT 58* 58*  --   --  65*     Recent Labs     06/16/20  1228 06/17/20  0750 06/18/20  0240   SODIUM 128* 127* 129*   POTASSIUM 4.2 4.0 4.3   CHLORIDE 87* 89* 91*   CO2 29 27 28   GLUCOSE 410* 184* 117*   BUN 57* 67* 31*   CREATININE 5.25* 6.61* 4.14*   CALCIUM 8.7 8.2* 8.4*     Recent Labs     06/16/20  1228   APTT 59.6*   INR 1.06         Recent Labs     06/17/20  0750   TRIGLYCERIDE 102   HDL 32*   LDL 44       Imaging  DX-CHEST-PORTABLE (1 VIEW)   Final Result      Cardiomegaly and mild interstitial edema. No focal consolidation or pleural effusions.           Assessment/Plan  * GI bleed  Assessment & Plan  With hematochezia, likely lower GI source  Patient is started on IV Protonix  Status post 1 unit of transfusion  GI consulted but she did not drink golytely. 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  treated with low-dose Revlimid in 2016 and Vidaza 75 mg/m² subcu 5 days a week in 2017  Receives frequent transfusions    Opiate dependence (HCC)-  (present on admission)  Assessment & Plan  Due to chronic back pain  Cont buprenorphine     Pulmonary emphysema (HCC)- (present on admission)  Assessment & Plan  Not in exacerbation    ESRD (end stage renal disease) on dialysis (HCC)- (present on admission)  Assessment & Plan  Has dialysis [facility]  Nephrology consulted        Paroxysmal atrial fibrillation (HCC)- (present on admission)  Assessment & Plan  Rate controlled  On Coreg  Not on anticoagulation    Chronic respiratory failure with hypoxia, 2L- (present on admission)  Assessment & Plan  At baseline oxygen    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

## 2020-06-18 NOTE — PROGRESS NOTES
Hem/Onc Visit     Date of Admit:  6/16/2020  HD#: 2      Chief Complaint/Admit dx:  Shortness of Breath; Dizziness; and Nausea  GI Bleed      Patient is established with our office for continued monitoring of MDS which is managed with supportive therapy in the form of periodic transfusions.    Visited patient with Dr. Jones. EGD/colonoscopy remain pending completion of bowel prep. She has received 1 unit RBCs in ER and has not required further transfusion.    She is scheduled for routine (every 3 week) follow up in office next week, usually followed by transfusion. We will continue to monitor patient from periphery and adjust follow up schedule based on hospital course.        ASHLEIGH Paulino Hematology/Medical Oncology  Office of Dr. Jones  Phone: [phone]  Fax: [phone]

## 2020-06-18 NOTE — CARE PLAN
Problem: Communication  Goal: The ability to communicate needs accurately and effectively will improve  Outcome: PROGRESSING AS EXPECTED  Intervention: Educate patient and significant other/support system about the plan of care, procedures, treatments, medications and allow for questions  Note: Pt encouraged to ask questions regarding plan of care     Problem: Safety  Goal: Will remain free from falls  Outcome: PROGRESSING AS EXPECTED  Intervention: Implement fall precautions  Flowsheets (Taken 6/17/2020 2000)  Environmental Precautions:   Treaded Slipper Socks on Patient   Personal Belongings, Wastebasket, Call Bell etc. in Easy Reach   Transferred to Stronger Side   Report Given to Other Health Care Providers Regarding Fall Risk   Bed in Low Position   Communication Sign for Patients & Families   Mobility Assessed & Appropriate Sign Placed     Problem: Knowledge Deficit  Goal: Knowledge of disease process/condition, treatment plan, diagnostic tests, and medications will improve  Outcome: PROGRESSING AS EXPECTED  Intervention: Explain information regarding disease process/condition, treatment plan, diagnostic tests, and medications and document in education  Note: Pt educated on reasoning for medications/procedures/ and diagnostic treatments. All pts questions answered and pt verbalized understanding.

## 2020-06-19 ENCOUNTER — ANESTHESIA (OUTPATIENT)
Dept: SURGERY | Facility: MEDICAL CENTER | Age: 65
DRG: 377 | End: 2020-06-19
Payer: MEDICARE

## 2020-06-19 ENCOUNTER — ANESTHESIA EVENT (OUTPATIENT)
Dept: SURGERY | Facility: MEDICAL CENTER | Age: 65
DRG: 377 | End: 2020-06-19
Payer: MEDICARE

## 2020-06-19 LAB
ANION GAP SERPL CALC-SCNC: 17 MMOL/L (ref 7–16)
BASOPHILS # BLD AUTO: 0.5 % (ref 0–1.8)
BASOPHILS # BLD: 0.02 K/UL (ref 0–0.12)
BUN SERPL-MCNC: 44 MG/DL (ref 8–22)
CALCIUM SERPL-MCNC: 8.3 MG/DL (ref 8.5–10.5)
CHLORIDE SERPL-SCNC: 92 MMOL/L (ref 96–112)
CO2 SERPL-SCNC: 25 MMOL/L (ref 20–33)
CREAT SERPL-MCNC: 5.77 MG/DL (ref 0.5–1.4)
EOSINOPHIL # BLD AUTO: 0.18 K/UL (ref 0–0.51)
EOSINOPHIL NFR BLD: 4.8 % (ref 0–6.9)
ERYTHROCYTE [DISTWIDTH] IN BLOOD BY AUTOMATED COUNT: 43.4 FL (ref 35.9–50)
GLUCOSE BLD-MCNC: 185 MG/DL (ref 65–99)
GLUCOSE BLD-MCNC: 73 MG/DL (ref 65–99)
GLUCOSE BLD-MCNC: 74 MG/DL (ref 65–99)
GLUCOSE BLD-MCNC: 76 MG/DL (ref 65–99)
GLUCOSE SERPL-MCNC: 87 MG/DL (ref 65–99)
HCT VFR BLD AUTO: 21.8 % (ref 37–47)
HGB BLD-MCNC: 7.3 G/DL (ref 12–16)
HGB BLD-MCNC: 7.8 G/DL (ref 12–16)
IMM GRANULOCYTES # BLD AUTO: 0.01 K/UL (ref 0–0.11)
IMM GRANULOCYTES NFR BLD AUTO: 0.3 % (ref 0–0.9)
LYMPHOCYTES # BLD AUTO: 0.98 K/UL (ref 1–4.8)
LYMPHOCYTES NFR BLD: 26 % (ref 22–41)
MCH RBC QN AUTO: 29.9 PG (ref 27–33)
MCHC RBC AUTO-ENTMCNC: 33.5 G/DL (ref 33.6–35)
MCV RBC AUTO: 89.3 FL (ref 81.4–97.8)
MONOCYTES # BLD AUTO: 0.68 K/UL (ref 0–0.85)
MONOCYTES NFR BLD AUTO: 18 % (ref 0–13.4)
MORPHOLOGY BLD-IMP: NORMAL
NEUTROPHILS # BLD AUTO: 1.9 K/UL (ref 2–7.15)
NEUTROPHILS NFR BLD: 50.4 % (ref 44–72)
NRBC # BLD AUTO: 0 K/UL
NRBC BLD-RTO: 0 /100 WBC
PLATELET # BLD AUTO: 55 K/UL (ref 164–446)
POTASSIUM SERPL-SCNC: 4.6 MMOL/L (ref 3.6–5.5)
RBC # BLD AUTO: 2.44 M/UL (ref 4.2–5.4)
SODIUM SERPL-SCNC: 134 MMOL/L (ref 135–145)
WBC # BLD AUTO: 3.8 K/UL (ref 4.8–10.8)

## 2020-06-19 PROCEDURE — 85025 COMPLETE CBC W/AUTO DIFF WBC: CPT

## 2020-06-19 PROCEDURE — 5A1D70Z PERFORMANCE OF URINARY FILTRATION, INTERMITTENT, LESS THAN 6 HOURS PER DAY: ICD-10-PCS | Performed by: INTERNAL MEDICINE

## 2020-06-19 PROCEDURE — 160002 HCHG RECOVERY MINUTES (STAT): Performed by: INTERNAL MEDICINE

## 2020-06-19 PROCEDURE — 700102 HCHG RX REV CODE 250 W/ 637 OVERRIDE(OP): Performed by: HOSPITALIST

## 2020-06-19 PROCEDURE — 700105 HCHG RX REV CODE 258: Performed by: INTERNAL MEDICINE

## 2020-06-19 PROCEDURE — 85014 HEMATOCRIT: CPT

## 2020-06-19 PROCEDURE — 160035 HCHG PACU - 1ST 60 MINS PHASE I: Performed by: INTERNAL MEDICINE

## 2020-06-19 PROCEDURE — 700111 HCHG RX REV CODE 636 W/ 250 OVERRIDE (IP): Mod: TB | Performed by: INTERNAL MEDICINE

## 2020-06-19 PROCEDURE — C9113 INJ PANTOPRAZOLE SODIUM, VIA: HCPCS | Performed by: HOSPITALIST

## 2020-06-19 PROCEDURE — 0DJ08ZZ INSPECTION OF UPPER INTESTINAL TRACT, VIA NATURAL OR ARTIFICIAL OPENING ENDOSCOPIC: ICD-10-PCS | Performed by: INTERNAL MEDICINE

## 2020-06-19 PROCEDURE — 82330 ASSAY OF CALCIUM: CPT

## 2020-06-19 PROCEDURE — 82565 ASSAY OF CREATININE: CPT

## 2020-06-19 PROCEDURE — 160028 HCHG SURGERY MINUTES - 1ST 30 MINS LEVEL 3: Performed by: INTERNAL MEDICINE

## 2020-06-19 PROCEDURE — 700102 HCHG RX REV CODE 250 W/ 637 OVERRIDE(OP)

## 2020-06-19 PROCEDURE — 82962 GLUCOSE BLOOD TEST: CPT

## 2020-06-19 PROCEDURE — 99232 SBSQ HOSP IP/OBS MODERATE 35: CPT | Performed by: INTERNAL MEDICINE

## 2020-06-19 PROCEDURE — 82947 ASSAY GLUCOSE BLOOD QUANT: CPT

## 2020-06-19 PROCEDURE — 700102 HCHG RX REV CODE 250 W/ 637 OVERRIDE(OP): Performed by: INTERNAL MEDICINE

## 2020-06-19 PROCEDURE — 700111 HCHG RX REV CODE 636 W/ 250 OVERRIDE (IP): Performed by: HOSPITALIST

## 2020-06-19 PROCEDURE — 82435 ASSAY OF BLOOD CHLORIDE: CPT

## 2020-06-19 PROCEDURE — 85018 HEMOGLOBIN: CPT

## 2020-06-19 PROCEDURE — 160009 HCHG ANES TIME/MIN: Performed by: INTERNAL MEDICINE

## 2020-06-19 PROCEDURE — 700111 HCHG RX REV CODE 636 W/ 250 OVERRIDE (IP): Performed by: ANESTHESIOLOGY

## 2020-06-19 PROCEDURE — 84132 ASSAY OF SERUM POTASSIUM: CPT

## 2020-06-19 PROCEDURE — 500066 HCHG BITE BLOCK, ECT: Performed by: INTERNAL MEDICINE

## 2020-06-19 PROCEDURE — 84520 ASSAY OF UREA NITROGEN: CPT

## 2020-06-19 PROCEDURE — 90935 HEMODIALYSIS ONE EVALUATION: CPT

## 2020-06-19 PROCEDURE — A9270 NON-COVERED ITEM OR SERVICE: HCPCS | Performed by: HOSPITALIST

## 2020-06-19 PROCEDURE — 700105 HCHG RX REV CODE 258: Performed by: ANESTHESIOLOGY

## 2020-06-19 PROCEDURE — 0DJD8ZZ INSPECTION OF LOWER INTESTINAL TRACT, VIA NATURAL OR ARTIFICIAL OPENING ENDOSCOPIC: ICD-10-PCS | Performed by: INTERNAL MEDICINE

## 2020-06-19 PROCEDURE — 80048 BASIC METABOLIC PNL TOTAL CA: CPT

## 2020-06-19 PROCEDURE — A9270 NON-COVERED ITEM OR SERVICE: HCPCS | Performed by: INTERNAL MEDICINE

## 2020-06-19 PROCEDURE — A9270 NON-COVERED ITEM OR SERVICE: HCPCS

## 2020-06-19 PROCEDURE — 84295 ASSAY OF SERUM SODIUM: CPT

## 2020-06-19 PROCEDURE — 770020 HCHG ROOM/CARE - TELE (206)

## 2020-06-19 PROCEDURE — 160048 HCHG OR STATISTICAL LEVEL 1-5: Performed by: INTERNAL MEDICINE

## 2020-06-19 RX ORDER — SODIUM CHLORIDE 9 MG/ML
INJECTION, SOLUTION INTRAVENOUS
Status: DISCONTINUED | OUTPATIENT
Start: 2020-06-19 | End: 2020-06-19 | Stop reason: SURG

## 2020-06-19 RX ORDER — OXYCODONE HCL 5 MG/5 ML
10 SOLUTION, ORAL ORAL
Status: DISCONTINUED | OUTPATIENT
Start: 2020-06-19 | End: 2020-06-19 | Stop reason: HOSPADM

## 2020-06-19 RX ORDER — HALOPERIDOL 5 MG/ML
1 INJECTION INTRAMUSCULAR
Status: DISCONTINUED | OUTPATIENT
Start: 2020-06-19 | End: 2020-06-19 | Stop reason: HOSPADM

## 2020-06-19 RX ORDER — SODIUM CHLORIDE 9 MG/ML
INJECTION, SOLUTION INTRAVENOUS ONCE
Status: COMPLETED | OUTPATIENT
Start: 2020-06-19 | End: 2020-06-19

## 2020-06-19 RX ORDER — ONDANSETRON 2 MG/ML
4 INJECTION INTRAMUSCULAR; INTRAVENOUS
Status: DISCONTINUED | OUTPATIENT
Start: 2020-06-19 | End: 2020-06-19 | Stop reason: HOSPADM

## 2020-06-19 RX ORDER — SODIUM CHLORIDE 9 MG/ML
INJECTION, SOLUTION INTRAVENOUS CONTINUOUS
Status: DISCONTINUED | OUTPATIENT
Start: 2020-06-19 | End: 2020-06-19 | Stop reason: HOSPADM

## 2020-06-19 RX ORDER — DIPHENHYDRAMINE HYDROCHLORIDE 50 MG/ML
12.5 INJECTION INTRAMUSCULAR; INTRAVENOUS
Status: DISCONTINUED | OUTPATIENT
Start: 2020-06-19 | End: 2020-06-19 | Stop reason: HOSPADM

## 2020-06-19 RX ORDER — OXYCODONE HCL 5 MG/5 ML
5 SOLUTION, ORAL ORAL
Status: DISCONTINUED | OUTPATIENT
Start: 2020-06-19 | End: 2020-06-19 | Stop reason: HOSPADM

## 2020-06-19 RX ADMIN — CARVEDILOL 12.5 MG: 12.5 TABLET, FILM COATED ORAL at 16:49

## 2020-06-19 RX ADMIN — PANTOPRAZOLE SODIUM 40 MG: 40 INJECTION, POWDER, LYOPHILIZED, FOR SOLUTION INTRAVENOUS at 17:12

## 2020-06-19 RX ADMIN — SODIUM CHLORIDE: 9 INJECTION, SOLUTION INTRAVENOUS at 13:05

## 2020-06-19 RX ADMIN — POVIDONE-IODINE 15 ML: 10 SOLUTION TOPICAL at 13:05

## 2020-06-19 RX ADMIN — PREGABALIN 25 MG: 25 CAPSULE ORAL at 17:09

## 2020-06-19 RX ADMIN — SODIUM CHLORIDE: 900 INJECTION INTRAVENOUS at 14:35

## 2020-06-19 RX ADMIN — EPOETIN ALFA-EPBX 10000 UNITS: 10000 INJECTION, SOLUTION INTRAVENOUS; SUBCUTANEOUS at 11:13

## 2020-06-19 RX ADMIN — INSULIN HUMAN 2 UNITS: 100 INJECTION, SOLUTION PARENTERAL at 20:41

## 2020-06-19 RX ADMIN — PANTOPRAZOLE SODIUM 40 MG: 40 INJECTION, POWDER, LYOPHILIZED, FOR SOLUTION INTRAVENOUS at 04:47

## 2020-06-19 RX ADMIN — LOSARTAN POTASSIUM 100 MG: 50 TABLET, FILM COATED ORAL at 17:08

## 2020-06-19 RX ADMIN — PROPOFOL 150 MG: 10 INJECTION, EMULSION INTRAVENOUS at 14:52

## 2020-06-19 RX ADMIN — PREGABALIN 25 MG: 25 CAPSULE ORAL at 04:47

## 2020-06-19 ASSESSMENT — ENCOUNTER SYMPTOMS
SHORTNESS OF BREATH: 0
NAUSEA: 1
MUSCULOSKELETAL NEGATIVE: 1
DIZZINESS: 0
NEUROLOGICAL NEGATIVE: 1
FEVER: 0
MYALGIAS: 0
CHILLS: 0
RESPIRATORY NEGATIVE: 1
BLOOD IN STOOL: 0
ABDOMINAL PAIN: 0
COUGH: 0
PSYCHIATRIC NEGATIVE: 1
EYES NEGATIVE: 1
CARDIOVASCULAR NEGATIVE: 1

## 2020-06-19 ASSESSMENT — FIBROSIS 4 INDEX
FIB4 SCORE: 5.22
FIB4 SCORE: 6.17

## 2020-06-19 NOTE — OR SURGEON
Immediate Post OP Note    PreOp Diagnosis: Anemia    PostOp Diagnosis: 1. Normal EGD 2. Normal colonoscopy    Procedure(s):  GASTROSCOPY - Wound Class: Clean Contaminated  COLONOSCOPY - Wound Class: Clean Contaminated    Surgeon(s):  Kwadwo Espana D.O.    Anesthesiologist/Type of Anesthesia:  Anesthesiologist: Rakesh Caballero M.D./General    Surgical Staff:  Endoscopy Technician: Anastacia Westbrook  Endoscopy Nurse: Sage Frost R.N.; Minerva Byrd R.N.    Specimens removed if any:  * No specimens in log *    Estimated Blood Loss: none    Findings: above    Complications: none        Will sign off.         6/19/2020 2:56 PM Kwadwo Espana D.O.

## 2020-06-19 NOTE — OP REPORT
DATE OF SERVICE:  06/19/2020    PREOPERATIVE DIAGNOSIS:  Anemia.    POSTOPERATIVE DIAGNOSIS:  Normal upper endoscopy.    PROCEDURE:  Esophagogastroduodenoscopy.    HISTORY:  This is a 64-year-old female with myelodysplastic syndrome, chronic   renal failure and anemia.  Informed consent was obtained after explanation of   risks, benefits, and alternatives.    DESCRIPTION OF PROCEDURE:  The procedure was performed in the OR at [facility].  Patient was placed under deep sedation and monitored by   anesthesiology throughout the procedure.  After adequate sedation was   achieved, the Olympus  endoscope was passed via the oropharynx into the   esophagus without difficulty.  It was then advanced while visualizing the   lumen.  Esophagus was normal.  The endoscope passed easily through the GE   junction to the stomach.  Retroflexion of the endoscope revealed a normal   gastric angularis, cardia, and fundus.  Gastric body and antrum were normal.    The pylorus was normal.  Endoscope was then passed through the pylorus into   the duodenum.  Third portion of duodenum was reached and was normal.  The   endoscope was then withdrawn.  The patient tolerated the procedure well.    There were no apparent complications.    IMPRESSION:  A 64-year-old female with anemia.  Upper endoscopy is normal.  We   will proceed with colonoscopy.       ____________________________________     YANICK DOUGLAS DO    PIS / NTS    DD:  06/19/2020 14:59:22  DT:  06/19/2020 15:03:43    D#:  [id number]  Job#:  [id number]

## 2020-06-19 NOTE — OP REPORT
DATE OF SERVICE:  06/19/2020    PREOPERATIVE DIAGNOSIS:  Anemia.    POSTOPERATIVE DIAGNOSIS:  Normal colonoscopy.    PROCEDURE:  Colonoscopy.    HISTORY:  This is a 64-year-old female with myelodysplastic syndrome and   anemia.  Informed consent was obtained after explanation of risks, benefits,   and alternatives.    DESCRIPTION OF PROCEDURE:  The procedure was performed in the OR at [facility].  Patient remained sedated after upper endoscopy.  The Olympus    colonoscope was advanced via the anus into the rectum.  It was then   advanced via standard procedure to the cecum.  Cecum was identified by the   appendiceal orifice and the ileocecal valve.  Cecum was normal.  The   colonoscope was then withdrawn, obtaining circumferential views of the lumen.    The ascending, transverse, descending, and sigmoid colon were normal.  The   rectum was normal.  Retroflexion of colonoscope in the rectum revealed no   abnormalities.  The colonoscope was straightened and withdrawn.  The patient   tolerated the procedure well.  There were no apparent complications.    IMPRESSION:  A 64-year-old female with anemia.  Colonoscopy is normal.       ____________________________________     YANICK DOUGLAS DO    PIS / NTS    DD:  06/19/2020 15:01:17  DT:  06/19/2020 16:09:13    D#:  [id number]  Job#:  [id number]

## 2020-06-19 NOTE — PROGRESS NOTES
Gastroenterology Progress Note     Author: Omar Nayak M.D.   Date & Time Created: 6/18/2020 5:53 PM    Chief Complaint:  Acute on chronic anemia  Blood in the stool  Upper abdominal pain    Interval History:  Could not complete the bowel prep.  Stool still brown.  Hemoglobin stable.    Review of Systems:  Review of Systems   Constitutional: Negative for chills, fever and malaise/fatigue.   Respiratory: Negative for cough and hemoptysis.    Cardiovascular: Negative for chest pain and palpitations.   Gastrointestinal: Negative for heartburn, melena and nausea.   .      Current Facility-Administered Medications:   •  Diclofenac Sodium 1 % GEL 2-4 g, 2-4 g, Transdermal, 4X/DAY PRN, Devyn Fisher M.D., 4 g at 06/17/20 2247  •  pregabalin (LYRICA) capsule 25 mg, 25 mg, Oral, TID, Devyn Fisher M.D., 25 mg at 06/18/20 1645  •  epoetin (Retacrit) injection (Dialysis use only) 10,000 Units, 10,000 Units, Intravenous, MO, WE + FR, Juan Goodson M.D., 10,000 Units at 06/17/20 1444  •  [DISCONTINUED] senna-docusate (PERICOLACE or SENOKOT S) 8.6-50 MG per tablet 2 Tab, 2 Tab, Oral, BID, 2 Tab at 06/16/20 1804 **AND** polyethylene glycol/lytes (MIRALAX) PACKET 1 Packet, 1 Packet, Oral, QDAY PRN **AND** magnesium hydroxide (MILK OF MAGNESIA) suspension 30 mL, 30 mL, Oral, QDAY PRN **AND** bisacodyl (DULCOLAX) suppository 10 mg, 10 mg, Rectal, QDAY PRN, Demian Denise M.D.  •  acetaminophen (TYLENOL) tablet 650 mg, 650 mg, Oral, Q6HRS PRN, Demian Denise M.D.  •  pantoprazole (PROTONIX) injection 40 mg, 40 mg, Intravenous, BID, Demian Denise M.D., 40 mg at 06/18/20 1646  •  labetalol (NORMODYNE/TRANDATE) injection 10 mg, 10 mg, Intravenous, Q4HRS PRN, Demian Denise M.D., 10 mg at 06/17/20 2046  •  amLODIPine (NORVASC) tablet 5 mg, 5 mg, Oral, Q DAY, Demian Denise M.D., 5 mg at 06/18/20 0540  •  Buprenorphine 20 mcg/hr weekly patch, 1 Patch, Topical, Q7 DAYS, Demian Denise M.D.  •  carvedilol (COREG) tablet 12.5 mg, 12.5 mg,  Oral, BID WITH MEALS, Demian Denise M.D., 12.5 mg at 06/18/20 1645  •  losartan (COZAAR) tablet 100 mg, 100 mg, Oral, Q EVENING, Demian Denise M.D., 100 mg at 06/18/20 1645  •  insulin regular (HUMULIN R) injection 2-9 Units, 2-9 Units, Subcutaneous, 4X/DAY ACHS, Stopped at 06/18/20 1700 **AND** POC Blood Glucose, , , Q AC AND BEDTIME(S) **AND** NOTIFY MD and PharmD, , , Once **AND** glucose 4 g chewable tablet 16 g, 16 g, Oral, Q15 MIN PRN **AND** dextrose 50% (D50W) injection 50 mL, 50 mL, Intravenous, Q15 MIN PRN, Demian Denise M.D.  Physical Exam:  Physical Exam   Constitutional: She is oriented to person, place, and time. No distress.   HENT:   Head: Normocephalic.   Eyes: No scleral icterus.   Neck: No thyromegaly present.   Cardiovascular: Normal rate and regular rhythm.   Pulmonary/Chest: Effort normal. No respiratory distress. She has no wheezes.   Abdominal: Soft. Bowel sounds are normal. She exhibits no distension. There is no abdominal tenderness.   Musculoskeletal:         General: No edema.   Neurological: She is alert and oriented to person, place, and time.       Labs:          Recent Labs     06/16/20 1228 06/17/20  0750 06/18/20  0240   SODIUM 128* 127* 129*   POTASSIUM 4.2 4.0 4.3   CHLORIDE 87* 89* 91*   CO2 29 27 28   BUN 57* 67* 31*   CREATININE 5.25* 6.61* 4.14*   MAGNESIUM 2.0  --   --    CALCIUM 8.7 8.2* 8.4*     Recent Labs     06/16/20  1228 06/17/20  0750 06/18/20  0240   ALTSGPT 48 53*  --    ASTSGOT 28 38  --    ALKPHOSPHAT 88 81  --    TBILIRUBIN 0.4 0.5  --    LIPASE 60  --   --    GLUCOSE 410* 184* 117*     Recent Labs     06/16/20  1228 06/17/20  0750 06/17/20  1000  06/17/20  2321 06/18/20  0240 06/18/20  1118   RBC 2.38* 2.61* 2.56*  --   --  2.80*  --    HEMOGLOBIN 7.1* 7.9* 7.6*   < > 8.0* 8.3* 8.4*   HEMATOCRIT 21.0* 22.7* 22.5*  --   --  25.0*  --    PLATELETCT 59* 58* 58*  --   --  65*  --    PROTHROMBTM 14.1  --   --   --   --   --   --    APTT 59.6*  --   --   --   --   --    --    INR 1.06  --   --   --   --   --   --     < > = values in this interval not displayed.     Recent Labs     06/16/20  1228 06/17/20  0750 06/17/20  1000 06/18/20  0240   WBC 3.9* 5.3 4.7* 4.8   NEUTSPOLYS 57.50 61.70 71.30 52.50   LYMPHOCYTES 19.30* 20.90* 14.80* 23.80   MONOCYTES 17.00* 10.40 5.20 16.90*   EOSINOPHILS 5.10 5.20 3.50 6.00   BASOPHILS 0.30 0.00 0.00 0.20   ASTSGOT 28 38  --   --    ALTSGPT 48 53*  --   --    ALKPHOSPHAT 88 81  --   --    TBILIRUBIN 0.4 0.5  --   --        Assessment:  63-year-old female with transfusion dependent anemia secondary to myelodysplastic syndrome presenting with acute on chronic anemia, blood in the stool and upper abdominal pain.  Patient will be scheduled to undergo EGD and colonoscopy if she is able to complete the bowel prep tomorrow.      Plan:  1.  Dulcolax and 2 L GoLYTELY prep tonight  2.  EGD and colonoscopy rescheduled for tomorrow  3.  Monitor for bleeding.    Quality-Core Measures

## 2020-06-19 NOTE — ANESTHESIA PREPROCEDURE EVALUATION
Relevant Problems   PULMONARY   (+) COPD (chronic obstructive pulmonary disease) (HCC)   (+) Pulmonary emphysema (HCC)      CARDIAC   (+) Essential hypertension   (+) Essential hypertension, malignant   (+) Hypertensive emergency   (+) Internal jugular vein thrombosis (HCC)   (+) Paroxysmal atrial fibrillation (HCC)   (+) Pulmonary hypertension (HCC)   (+) Steal syndrome dialysis vascular access (HCC)         (+) ESRD (end stage renal disease) (HCC)   (+) ESRD (end stage renal disease) on dialysis (HCC)   (+) End stage renal disease (HCC)   (+) Fatty liver      ENDO   (+) Type 2 diabetes mellitus (HCC)   (+) Type 2 diabetes mellitus with diabetic polyneuropathy, without long-term current use of insulin (HCC)      Other   (+) MDS (myelodysplastic syndrome) (HCC)   (+) Myelodysplasia (myelodysplastic syndrome) (HCC)       Physical Exam    Airway   Mallampati: II  TM distance: >3 FB  Neck ROM: full       Cardiovascular - normal exam  Rhythm: regular  Rate: normal  (-) murmur     Dental - normal exam           Pulmonary - normal exam  Breath sounds clear to auscultation     Abdominal    Neurological - normal exam                 Anesthesia Plan    ASA 4   ASA physical status 4 criteria: ESRD not undergoing regularly scheduled dialysis    Plan - MAC             Induction: intravenous    Postoperative Plan: Postoperative administration of opioids is intended.    Pertinent diagnostic labs and testing reviewed    Informed Consent:    Anesthetic plan and risks discussed with patient.    Use of blood products discussed with: patient whom consented to blood products.

## 2020-06-19 NOTE — PROGRESS NOTES
Received bedside report from RN, pt care assumed, VSS, pt assessment complete. Pt AAOx4, 0/10 pain at this time. No signs of acute distress noted at this time. POC discussed with pt and verbalizes no questions. Pt denies any additional needs at this time. Bed in lowest position, pt educated on fall risk and verbalized understanding, call light within reach, hourly rounding initiated.

## 2020-06-19 NOTE — PROGRESS NOTES
[facility] Progress Note     Hemodialysis treatment ordered today per Dr. Starks x 3 hours.   Treatment initiated at 0854, ended at 1155.       Patient tolerated tx; see paper flow sheet for details.      Net UF 2,500 mL.      Post tx, CVC flushed with saline then locked with heparin 1000 units/mL per designated amount in each wing then clamped and capped.   Aspirate heparin prior to next CVC use.     Report given to Primary RN.

## 2020-06-19 NOTE — ANESTHESIA POSTPROCEDURE EVALUATION
Patient: Vielka Briscoe    Procedure Summary     Date:  06/19/20 Room / Location:  [facility] ROOM 26 / SURGERY SAME DAY [facility] ORS    Anesthesia Start:  1435 Anesthesia Stop:  1502    Procedures:       GASTROSCOPY (N/A Mouth)      COLONOSCOPY (N/A Anus) Diagnosis:  (Normal EGD; Normal Colon)    Surgeon:  Kwadwo Espana D.O. Responsible Provider:  Rakesh Caballero M.D.    Anesthesia Type:  MAC ASA Status:  4          Final Anesthesia Type: MAC  Last vitals  BP   Blood Pressure : 136/46    Temp   36.4 °C (97.6 °F)    Pulse   Pulse: 73   Resp   12    SpO2   100 %      Anesthesia Post Evaluation    Patient location during evaluation: PACU  Patient participation: complete - patient participated  Level of consciousness: awake and alert    Airway patency: patent  Anesthetic complications: no  Cardiovascular status: hemodynamically stable  Respiratory status: acceptable  Hydration status: euvolemic    PONV: none           Nurse Pain Score: 0 (NPRS)

## 2020-06-19 NOTE — PROGRESS NOTES
Nephrology Daily Progress Note    Date of Service  6/19/2020    Chief Complaint  The patient is a pleasant 64-year-old female with   history of end-stage renal disease, on hemodialysis Monday, Wednesday, and   Friday, who presented to [facility] with complaints of generalized weakness and   bright red blood per rectum/melenic stools.  She is admitted for GI workup.    She is due for dialysis today.  Hence, nephrology was asked to see her for her  ESRD and dialysis management.  She has a left upper extremity AV fistula,   which is functioning.       Interval Problem Update  6/17 - Consult done  6/18 - Pt sitting up EOB, says she did not finish her GoLytely yet so her endoscopy may be tomorrow, HD yesterday with 3.1L net UF, feels well now, denies dyspnea, BP stable, denies bloody stools   6/19 - Pt seen on HD, BP stable, Hgb 7.3, plan for possible endoscopy today    Review of Systems  Review of Systems   Constitutional: Negative for chills, fever and malaise/fatigue.   HENT: Negative.    Eyes: Negative.    Respiratory: Negative.    Cardiovascular: Negative.    Gastrointestinal: Negative for melena.   Genitourinary: Negative.    Musculoskeletal: Negative.    Skin: Negative.    Neurological: Negative.    Endo/Heme/Allergies: Negative.    Psychiatric/Behavioral: Negative.         Physical Exam  Temp:  [35.9 °C (96.7 °F)-36.5 °C (97.7 °F)] 36.1 °C (96.9 °F)  Pulse:  [67-76] 69  Resp:  [15-18] 16  BP: (100-150)/(56-94) 145/59  SpO2:  [100 %] 100 %    Physical Exam  Constitutional:       Appearance: Normal appearance.   HENT:      Head: Normocephalic and atraumatic.      Nose: Nose normal.      Mouth/Throat:      Mouth: Mucous membranes are moist.      Pharynx: Oropharynx is clear.   Eyes:      Extraocular Movements: Extraocular movements intact.      Pupils: Pupils are equal, round, and reactive to light.   Neck:      Musculoskeletal: Normal range of motion and neck supple.   Cardiovascular:      Rate and Rhythm: Normal rate  and regular rhythm.      Pulses: Normal pulses.      Heart sounds: Normal heart sounds.      Comments: L AVF + T/B  Pulmonary:      Effort: Pulmonary effort is normal.      Breath sounds: Normal breath sounds.   Abdominal:      General: Bowel sounds are normal.      Palpations: Abdomen is soft.   Musculoskeletal: Normal range of motion.      Right lower leg: Edema (Trace) present.      Left lower leg: Edema (Trace) present.   Skin:     General: Skin is warm and dry.   Neurological:      General: No focal deficit present.      Mental Status: She is alert and oriented to person, place, and time.   Psychiatric:         Mood and Affect: Mood normal.         Behavior: Behavior normal.         Fluids    Intake/Output Summary (Last 24 hours) at 6/19/2020 1000  Last data filed at 6/18/2020 1947  Gross per 24 hour   Intake --   Output 400 ml   Net -400 ml       Laboratory  Recent Labs     06/17/20  1000  06/18/20  0240 06/18/20  1118 06/19/20  0440   WBC 4.7*  --  4.8  --  3.8*   RBC 2.56*  --  2.80*  --  2.44*   HEMOGLOBIN 7.6*   < > 8.3* 8.4* 7.3*   HEMATOCRIT 22.5*  --  25.0*  --  21.8*   MCV 87.9  --  89.3  --  89.3   MCH 29.7  --  29.6  --  29.9   MCHC 33.8  --  33.2*  --  33.5*   RDW 42.5  --  43.0  --  43.4   PLATELETCT 58*  --  65*  --  55*    < > = values in this interval not displayed.     Recent Labs     06/17/20  0750 06/18/20  0240 06/19/20  0440   SODIUM 127* 129* 134*   POTASSIUM 4.0 4.3 4.6   CHLORIDE 89* 91* 92*   CO2 27 28 25   GLUCOSE 184* 117* 87   BUN 67* 31* 44*   CREATININE 6.61* 4.14* 5.77*   CALCIUM 8.2* 8.4* 8.3*     Recent Labs     06/16/20  1228   APTT 59.6*   INR 1.06     No results for input(s): NTPROBNP in the last 72 hours.  Recent Labs     06/17/20  0750   TRIGLYCERIDE 102   HDL 32*   LDL 44       Imaging  Available labs, imaging and clinical documentation reviewed.     Assessment/Plan  1.  End-stage renal disease, on hemodialysis Monday, Wednesday, and Friday.  - via L AVF   2.   Hypertension. Fairly well controlled on pharmacologic therapy.   3.  Renal osteodystrophy.  - Managed at OP HD unit   4.  Gastrointestinal bleed.  - GI following, plan for EGD/colonoscopy   - On PPI   - Clear liquid diet/NPO  5.  Anemia of chronic kidney disease. Below target.  - Acute blood loss component  - On VANCE   - Serial Hgb checks   6.  Coronary artery disease.  7.  History of atrial fibrillation. Rate controlled, on carvedilol.   8.  Diabetes mellitus II.   9.  Chronic obstructive pulmonary disease.  10.  History of peripheral vascular disease.  11.  History of congestive heart failure.  12. SARS-CoV-2 negative x 1  13. Myelodysplastic syndrome, receives frequent transfusions  - Follows with Heme/Onc every 3 weeks   14. Chronic back pain, on opioid therapy.        PLAN:    - Continue iHD qMWF, next treatment today (FRI)  - UF with HD  - Continue home medications  - Transfuse PRN Hgb <7   - Continue VANCE, goal Hgb 10-11   - Plan for EGD/colonoscopy   - Renal diet when taking PO   - Dose medications for ESRD   - Follow labs    Thank you,

## 2020-06-19 NOTE — PROGRESS NOTES
Notified that pt will be transported straight to EGD from dialysis once she is finished at dialysis.

## 2020-06-19 NOTE — PROGRESS NOTES
[facility] Medicine Daily Progress Note    Date of Service  6/19/2020    Chief Complaint  64 y.o. female admitted 6/16/2020 with weakness.    Hospital Course    PMH MDS, afib, COPD, CHF, DM, CKD, HTN, HLD who presented with bloody stools. GI was consulted.       Interval Problem Update  Hgb decreased. She drank most of golytely. Scope planned for today    Consultants/Specialty  GI  nephrology    Code Status  Full Code    Disposition  TBA    Review of Systems  Review of Systems   Constitutional: Negative for fever.   HENT: Negative for congestion.    Respiratory: Negative for cough and shortness of breath.    Cardiovascular: Negative for chest pain.   Gastrointestinal: Positive for nausea. Negative for abdominal pain, blood in stool and melena.   Genitourinary: Negative for dysuria.   Musculoskeletal: Negative for myalgias.   Skin: Negative for itching.   Neurological: Negative for dizziness.        Physical Exam  Temp:  [35.8 °C (96.4 °F)-36.5 °C (97.7 °F)] 36.4 °C (97.6 °F)  Pulse:  [67-76] 75  Resp:  [15-18] 16  BP: (100-181)/(56-94) 133/64  SpO2:  [99 %-100 %] 99 %    Physical Exam  Vitals signs and nursing note reviewed.   Constitutional:       General: She is not in acute distress.     Appearance: She is not ill-appearing.   HENT:      Head: Normocephalic.      Mouth/Throat:      Mouth: Mucous membranes are moist.   Eyes:      General:         Right eye: No discharge.         Left eye: No discharge.   Neck:      Musculoskeletal: Neck supple.   Cardiovascular:      Rate and Rhythm: Normal rate and regular rhythm.   Pulmonary:      Effort: Pulmonary effort is normal.      Breath sounds: No wheezing or rales.      Comments: Right upper chest port  Abdominal:      Palpations: Abdomen is soft.      Tenderness: There is no abdominal tenderness. There is no guarding or rebound.   Musculoskeletal:      Right lower leg: No edema.      Left lower leg: No edema.      Comments: Fistula left arm, palpable thrill   Skin:      General: Skin is warm and dry.   Neurological:      Mental Status: She is alert.      Comments: Oriented to self, place, situation         Fluids    Intake/Output Summary (Last 24 hours) at 6/19/2020 1345  Last data filed at 6/18/2020 1947  Gross per 24 hour   Intake --   Output 400 ml   Net -400 ml       Laboratory  Recent Labs     06/17/20  1000  06/18/20  0240 06/18/20  1118 06/19/20  0440   WBC 4.7*  --  4.8  --  3.8*   RBC 2.56*  --  2.80*  --  2.44*   HEMOGLOBIN 7.6*   < > 8.3* 8.4* 7.3*   HEMATOCRIT 22.5*  --  25.0*  --  21.8*   MCV 87.9  --  89.3  --  89.3   MCH 29.7  --  29.6  --  29.9   MCHC 33.8  --  33.2*  --  33.5*   RDW 42.5  --  43.0  --  43.4   PLATELETCT 58*  --  65*  --  55*    < > = values in this interval not displayed.     Recent Labs     06/17/20  0750 06/18/20  0240 06/19/20  0440   SODIUM 127* 129* 134*   POTASSIUM 4.0 4.3 4.6   CHLORIDE 89* 91* 92*   CO2 27 28 25   GLUCOSE 184* 117* 87   BUN 67* 31* 44*   CREATININE 6.61* 4.14* 5.77*   CALCIUM 8.2* 8.4* 8.3*             Recent Labs     06/17/20  0750   TRIGLYCERIDE 102   HDL 32*   LDL 44       Imaging  DX-CHEST-PORTABLE (1 VIEW)   Final Result      Cardiomegaly and mild interstitial edema. No focal consolidation or pleural effusions.           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  treated with low-dose Revlimid in 2016 and Vidaza 75 mg/m² subcu 5 days a week in 2017  Receives frequent transfusions    Opiate dependence (HCC)- (present on admission)  Assessment & Plan  Due to  chronic back pain  Cont buprenorphine     Pulmonary emphysema (HCC)- (present on admission)  Assessment & Plan  Not in exacerbation    ESRD (end stage renal disease) on dialysis (HCC)- (present on admission)  Assessment & Plan  Has dialysis [facility]  Nephrology consulted        Paroxysmal atrial fibrillation (HCC)- (present on admission)  Assessment & Plan  Rate controlled  On Coreg  Not on anticoagulation    Chronic respiratory failure with hypoxia, 2L- (present on admission)  Assessment & Plan  At baseline oxygen    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

## 2020-06-19 NOTE — OR NURSING
1455  RECEIVED PATIENT FROM OR.  REPORT FROM DR. ROY.  NO AIRWAY IN PLACE.  RESPIRATIONS ARE EVEN AND UNLABORED.  NO BLEEDING NOTED.      1525  AWAKE.  TAKING ICE CHIPS WITHOUT DIFFICULTY.    1545  TRANSFERED TO [place] [id number] BED 1, VIA GURNEY, ON 02 5 LITERS PER MASK, ON MONITOR, WITH RN.  REPORT TO JESSICA GEORGE.  PATIENT STATES SHE IS READY TO GO TO HER ROOM.

## 2020-06-19 NOTE — PROGRESS NOTES
Patient back from EGD, report received. Post op vitals in place. Patient states she is hungry but denies any additional needs at this time.

## 2020-06-19 NOTE — PROGRESS NOTES
Pt had completed 95% of the Golytely and refused to drink the remainder.  Stool still not clear.  Will continue to monitor     2300: Pt resting.  Pt still having loose stools, not clear at this time.

## 2020-06-19 NOTE — CARE PLAN
Problem: Safety  Goal: Will remain free from injury  Outcome: PROGRESSING AS EXPECTED     Pt educated on safety precautions and precautions in place. Treaded socks on, bed locked and in lowest position, belongings and call light within reach. Bed alarm in place and on.     Problem: Knowledge Deficit  Goal: Knowledge of disease process/condition, treatment plan, diagnostic tests, and medications will improve  Outcome: PROGRESSING AS EXPECTED     Patient educated on disease process, treatment plan, medications, and plan of care for today. Patient verbalized understanding and no additional questions at this time.

## 2020-06-19 NOTE — ANESTHESIA QCDR
2019 [facility] Clinical Data Registry (for Quality Improvement)     Postoperative nausea/vomiting risk protocol (Adult = 18 yrs and Pediatric 3-17 yrs)- (430 and 463)  General inhalation anesthetic (NOT TIVA) with PONV risk factors: No  Provision of anti-emetic therapy with at least 2 different classes of agents: N/A  Patient DID NOT receive anti-emetic therapy and reason is documented in Medical Record: N/A    Multimodal Pain Management- (477)  Non-emergent surgery AND patient age >= 18: No  Use of Multimodal Pain Management, two or more drugs and/or interventions, NOT including systemic opioids:   Exception: Documented allergy to multiple classes of analgesics:     Smoking Abstinence (404)  Patient is current smoker (cigarette, pipe, e-cig, marijuanna): No  Elective Surgery:   Abstinence instructions provided prior to day of surgery:   Patient abstained from smoking on day of surgery:     Pre-Op Beta-Blocker in Isolated CABG (44)  Isolated CABG AND patient age >= 18: No  Beta-blocker admin within 24 hours of surgical incision:   Exception:of medical reason(s) for not administering beta blocker within 24 hours prior to surgical incision (e.g., not  indicated,other medical reason):     PACU assessment of acute postoperative pain prior to Anesthesia Care End- Applies to Patients Age = 18- (ABG7)  Initial PACU pain score is which of the following: < 7/10  Patient unable to report pain score: N/A    Post-anesthetic transfer of care checklist/protocol to PACU/ICU- (426 and 427)  Upon conclusion of case, patient transferred to which of the following locations: PACU/Non-ICU  Use of transfer checklist/protocol: Yes  Exclusion: Service Performed in Patient Hospital Room (and thus did not require transfer): N/A  Unplanned admission to ICU related to anesthesia service up through end of PACU care- (MD51)  Unplanned admission to ICU (not initially anticipated at anesthesia start time): No

## 2020-06-19 NOTE — ANESTHESIA TIME REPORT
Anesthesia Start and Stop Event Times     Date Time Event    6/19/2020 1435 Ready for Procedure     1435 Anesthesia Start     1504 Anesthesia Stop        Responsible Staff  06/19/20    Name Role Begin End    Rakesh Caballero M.D. Anesth 1435 1504        Preop Diagnosis (Free Text):  Pre-op Diagnosis     Acute on chronic anemia, blood in the stool, upper abdominal pain        Preop Diagnosis (Codes):    Post op Diagnosis  Anemia      Premium Reason  A. 3PM - 7AM    Comments:

## 2020-06-20 VITALS
DIASTOLIC BLOOD PRESSURE: 62 MMHG | HEIGHT: 58 IN | SYSTOLIC BLOOD PRESSURE: 153 MMHG | TEMPERATURE: 97 F | RESPIRATION RATE: 18 BRPM | BODY MASS INDEX: 31.1 KG/M2 | OXYGEN SATURATION: 98 % | HEART RATE: 71 BPM | WEIGHT: 148.15 LBS

## 2020-06-20 PROBLEM — K92.2 GI BLEED: Status: RESOLVED | Noted: 2018-01-22 | Resolved: 2020-06-20

## 2020-06-20 LAB
ANION GAP SERPL CALC-SCNC: 12 MMOL/L (ref 7–16)
BASOPHILS # BLD AUTO: 0.6 % (ref 0–1.8)
BASOPHILS # BLD: 0.02 K/UL (ref 0–0.12)
BUN SERPL-MCNC: 28 MG/DL (ref 8–22)
CALCIUM SERPL-MCNC: 8.4 MG/DL (ref 8.5–10.5)
CHLORIDE SERPL-SCNC: 101 MMOL/L (ref 96–112)
CO2 SERPL-SCNC: 22 MMOL/L (ref 20–33)
CREAT SERPL-MCNC: 4.41 MG/DL (ref 0.5–1.4)
EOSINOPHIL # BLD AUTO: 0.14 K/UL (ref 0–0.51)
EOSINOPHIL NFR BLD: 3.9 % (ref 0–6.9)
ERYTHROCYTE [DISTWIDTH] IN BLOOD BY AUTOMATED COUNT: 43.6 FL (ref 35.9–50)
GLUCOSE BLD-MCNC: 117 MG/DL (ref 65–99)
GLUCOSE BLD-MCNC: 279 MG/DL (ref 65–99)
GLUCOSE SERPL-MCNC: 127 MG/DL (ref 65–99)
HCT VFR BLD AUTO: 23 % (ref 37–47)
HGB BLD-MCNC: 7.7 G/DL (ref 12–16)
IMM GRANULOCYTES # BLD AUTO: 0.02 K/UL (ref 0–0.11)
IMM GRANULOCYTES NFR BLD AUTO: 0.6 % (ref 0–0.9)
LYMPHOCYTES # BLD AUTO: 0.74 K/UL (ref 1–4.8)
LYMPHOCYTES NFR BLD: 20.6 % (ref 22–41)
MCH RBC QN AUTO: 30.2 PG (ref 27–33)
MCHC RBC AUTO-ENTMCNC: 33.5 G/DL (ref 33.6–35)
MCV RBC AUTO: 90.2 FL (ref 81.4–97.8)
MONOCYTES # BLD AUTO: 0.67 K/UL (ref 0–0.85)
MONOCYTES NFR BLD AUTO: 18.6 % (ref 0–13.4)
NEUTROPHILS # BLD AUTO: 2.01 K/UL (ref 2–7.15)
NEUTROPHILS NFR BLD: 55.7 % (ref 44–72)
NRBC # BLD AUTO: 0 K/UL
NRBC BLD-RTO: 0 /100 WBC
PLATELET # BLD AUTO: 61 K/UL (ref 164–446)
POTASSIUM SERPL-SCNC: 4.6 MMOL/L (ref 3.6–5.5)
RBC # BLD AUTO: 2.55 M/UL (ref 4.2–5.4)
SODIUM SERPL-SCNC: 135 MMOL/L (ref 135–145)
WBC # BLD AUTO: 3.6 K/UL (ref 4.8–10.8)

## 2020-06-20 PROCEDURE — A9270 NON-COVERED ITEM OR SERVICE: HCPCS | Performed by: INTERNAL MEDICINE

## 2020-06-20 PROCEDURE — 700102 HCHG RX REV CODE 250 W/ 637 OVERRIDE(OP): Performed by: INTERNAL MEDICINE

## 2020-06-20 PROCEDURE — 700111 HCHG RX REV CODE 636 W/ 250 OVERRIDE (IP): Performed by: INTERNAL MEDICINE

## 2020-06-20 PROCEDURE — 94760 N-INVAS EAR/PLS OXIMETRY 1: CPT

## 2020-06-20 PROCEDURE — 99239 HOSP IP/OBS DSCHRG MGMT >30: CPT | Performed by: INTERNAL MEDICINE

## 2020-06-20 PROCEDURE — 80048 BASIC METABOLIC PNL TOTAL CA: CPT

## 2020-06-20 PROCEDURE — 700111 HCHG RX REV CODE 636 W/ 250 OVERRIDE (IP): Performed by: HOSPITALIST

## 2020-06-20 PROCEDURE — 82962 GLUCOSE BLOOD TEST: CPT | Mod: 91

## 2020-06-20 PROCEDURE — 85025 COMPLETE CBC W/AUTO DIFF WBC: CPT

## 2020-06-20 PROCEDURE — A9270 NON-COVERED ITEM OR SERVICE: HCPCS | Performed by: HOSPITALIST

## 2020-06-20 PROCEDURE — 700102 HCHG RX REV CODE 250 W/ 637 OVERRIDE(OP): Performed by: HOSPITALIST

## 2020-06-20 PROCEDURE — C9113 INJ PANTOPRAZOLE SODIUM, VIA: HCPCS | Performed by: HOSPITALIST

## 2020-06-20 RX ADMIN — HEPARIN 500 UNITS: 100 SYRINGE at 13:56

## 2020-06-20 RX ADMIN — PANTOPRAZOLE SODIUM 40 MG: 40 INJECTION, POWDER, LYOPHILIZED, FOR SOLUTION INTRAVENOUS at 04:43

## 2020-06-20 RX ADMIN — AMLODIPINE BESYLATE 5 MG: 5 TABLET ORAL at 04:43

## 2020-06-20 RX ADMIN — CARVEDILOL 12.5 MG: 12.5 TABLET, FILM COATED ORAL at 08:05

## 2020-06-20 RX ADMIN — PREGABALIN 25 MG: 25 CAPSULE ORAL at 11:50

## 2020-06-20 RX ADMIN — PREGABALIN 25 MG: 25 CAPSULE ORAL at 08:05

## 2020-06-20 RX ADMIN — INSULIN HUMAN 5 UNITS: 100 INJECTION, SOLUTION PARENTERAL at 11:46

## 2020-06-20 ASSESSMENT — ENCOUNTER SYMPTOMS
CARDIOVASCULAR NEGATIVE: 1
MUSCULOSKELETAL NEGATIVE: 1
NEUROLOGICAL NEGATIVE: 1
RESPIRATORY NEGATIVE: 1
PSYCHIATRIC NEGATIVE: 1
FEVER: 0
EYES NEGATIVE: 1
CHILLS: 0

## 2020-06-20 NOTE — PROGRESS NOTES
Received bedside report from RN, pt care assumed, VSS, pt assessment complete. Pt AAOx4, 5/10 pain at this time. No signs of acute distress noted at this time. POC discussed with pt and verbalizes no questions. Pt denies any additional needs at this time. Bed in lowest position, bed alarm on, pt educated on fall risk and verbalized understanding, call light within reach, hourly rounding initiated.

## 2020-06-20 NOTE — CARE PLAN
Problem: Safety  Goal: Will remain free from injury  Outcome: PROGRESSING AS EXPECTED  Goal: Will remain free from falls  Outcome: PROGRESSING AS EXPECTED     Problem: Infection  Goal: Will remain free from infection  Outcome: PROGRESSING AS EXPECTED     Problem: Venous Thromboembolism (VTW)/Deep Vein Thrombosis (DVT) Prevention:  Goal: Patient will participate in Venous Thrombosis (VTE)/Deep Vein Thrombosis (DVT)Prevention Measures  Outcome: PROGRESSING AS EXPECTED     Problem: Bowel/Gastric:  Goal: Normal bowel function is maintained or improved  Outcome: PROGRESSING AS EXPECTED  Goal: Will not experience complications related to bowel motility  Outcome: PROGRESSING AS EXPECTED     Problem: Knowledge Deficit  Goal: Knowledge of disease process/condition, treatment plan, diagnostic tests, and medications will improve  Outcome: PROGRESSING AS EXPECTED  Goal: Knowledge of the prescribed therapeutic regimen will improve  Outcome: PROGRESSING AS EXPECTED     Problem: Discharge Barriers/Planning  Goal: Patient's continuum of care needs will be met  Outcome: PROGRESSING AS EXPECTED     Problem: Respiratory:  Goal: Respiratory status will improve  Outcome: PROGRESSING AS EXPECTED     Problem: Fluid Volume:  Goal: Will maintain balanced intake and output  Outcome: PROGRESSING AS EXPECTED     Problem: Pain Management  Goal: Pain level will decrease to patient's comfort goal  Outcome: PROGRESSING AS EXPECTED     Problem: Mobility  Goal: Risk for activity intolerance will decrease  Outcome: PROGRESSING AS EXPECTED

## 2020-06-20 NOTE — PROGRESS NOTES
Patient escorted via wheelchair to car to go home with friend. IV and tele box removed, all belongings gathered and sent home with patient. Patient a&ox44, educated on medications and where to pick them up. Verbalized understanding and denied any questions or additional needs at this time.

## 2020-06-20 NOTE — RESPIRATORY CARE
Oxygen Rounds      Patient found on    O2 L/m:  2  Oxygen device:  nc  Spo2: 99%        Respiratory device skin site inspection completed.

## 2020-06-20 NOTE — DISCHARGE SUMMARY
Discharge Summary    CHIEF COMPLAINT ON ADMISSION  Chief Complaint   Patient presents with   • Shortness of Breath   • Dizziness   • Nausea       Reason for Admission  Sent by MD     Admission Date  6/16/2020    CODE STATUS  Full Code    HPI & HOSPITAL COURSE  This is a 64 y.o. female here with PMH MDS needing intermittent transfusions, afib, COPD, CHF, DM, CKD, HTN, HLD who presented with bloody stools. She received 1U pRBCs. GI was consulted., she had EGD and colonoscopy that were normal. Her Hgb remained normal as her diet was advanced prior to discharge. Her stools were also brown.    Therefore, she is discharged in good and stable condition to home with close outpatient follow-up.    The patient met 2-midnight criteria for an inpatient stay at the time of discharge.    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 [facility] None     Nyla Nava M.D.  [address] [facility] [address] [phone]    Call in 1 day        MEDICATIONS ON DISCHARGE     Medication List      START taking these medications      Instructions    PriLOSEC OTC 20 MG tablet  Generic drug:  omeprazole   Take 1 Tab by mouth every day.  Dose:  20 mg        CONTINUE taking these medications      Instructions   amLODIPine 2.5 MG Tabs  Commonly known as:  NORVASC   TK 1 T PO QD     bimatoprost 0.03 % Soln  Commonly known as:  LUMIGAN   Place 1 Drop in both eyes 2 Times a Day.  Dose:  1 Drop     brinzolamide 1 % Susp  Commonly known as:  AZOPT   Place 1 Drop in both eyes 2 Times a Day.  Dose:  1 Drop     Buprenorphine 20 MCG/HR Ptwk   Apply 20 mcg to affected area(s) every 7 days. MONDAY  Dose:  20 mcg     carvedilol 12.5 MG Tabs  Commonly known as:  COREG   Take 1 Tab by mouth 2 times a day, with meals.  Dose:  12.5 mg     Combigan 0.2-0.5 % Soln  Generic drug:  Brimonidine Tartrate-Timolol   Place 1 Drop in both eyes 2 Times a Day.  Dose:  1 Drop     docusate sodium 100 MG Caps  Commonly known as:  COLACE   Take 100 mg by mouth 2 times a day.  Dose:  100 mg     lidocaine 5 % Ptch  Commonly known as:  LIDODERM   Doctor's comments:  30 patches OR one box  Apply 1 Patch to skin as directed every 12 hours.  Dose:  1 Patch     losartan 100 MG Tabs  Commonly known as:  COZAAR   Take 100 mg by mouth every evening.  Dose:  100 mg     Lyrica 150 MG Caps  Generic drug:  pregabalin   Take 150 mg by mouth 4 times a day.  Dose:  150 mg     nitroglycerin 0.4 MG Subl  Commonly known as:  NITROSTAT   Place 0.4 mg under tongue every 5 minutes as needed for Chest Pain.  Dose:  0.4 mg     Voltaren 1 % Gel  Generic drug:  Diclofenac Sodium   Apply 2-4 g to skin as directed 4 times a day as needed. Both legs  Dose:  2-4 g            Allergies  Allergies   Allergen Reactions   • Lenalidomide Unspecified     Beeping Pulse     • Naprosyn [Naproxen] Hives   • Pioglitazone Unspecified     Causes blindness   • Simvastatin Unspecified     Couldn't move   • Demerol Vomiting   • Diphenhydramine Vomiting   • Glipizide Vomiting   • Hydromorphone Vomiting   • Iron Vomiting     vomiting   •  Metformin Vomiting   • Morphine Vomiting   • Multivitamin Itching     itching   • Ondansetron Itching   • Other Drug Rash and Vomiting     Any binders that remove phosphorus from the body such as tums   • Oxycodone Vomiting   • Pcn [Penicillins] Vomiting          • Propoxyphene Vomiting   • Requip Vomiting   • Sulfa Drugs Rash and Vomiting     Vomiting & rash      • Tamsulosin Vomiting   • Tramadol Vomiting   • Trazodone Vomiting       DIET  Orders Placed This Encounter   Procedures   • Diet Order Renal     Standing Status:   Standing     Number of Occurrences:   1     Order Specific Question:   Diet:     Answer:   Renal [8]       ACTIVITY  As tolerated.  Weight bearing as tolerated    CONSULTATIONS  GI  Oncology    PROCEDURES  POSTOPERATIVE DIAGNOSIS:  Normal colonoscopy.  POSTOPERATIVE DIAGNOSIS:  Normal upper endoscopy.  DX-CHEST-PORTABLE (1 VIEW)   Final Result      Cardiomegaly and mild interstitial edema. No focal consolidation or pleural effusions.            LABORATORY  Lab Results   Component Value Date    SODIUM 135 06/20/2020    POTASSIUM 4.6 06/20/2020    CHLORIDE 101 06/20/2020    CO2 22 06/20/2020    GLUCOSE 127 (H) 06/20/2020    BUN 28 (H) 06/20/2020    CREATININE 4.41 (H) 06/20/2020    CREATININE 0.6 07/20/2007        Lab Results   Component Value Date    WBC 3.6 (L) 06/20/2020    HEMOGLOBIN 7.7 (L) 06/20/2020    HEMATOCRIT 23.0 (L) 06/20/2020    PLATELETCT 61 (L) 06/20/2020        Total time of the discharge process exceeds 32 minutes.

## 2020-06-20 NOTE — PROGRESS NOTES
[facility] Nephrology Daily Progress Note    Author: ASHLEIGH Salomon  Collaborating Physician: Dr Jose A Starks     Date of Service  6/20/2020    Chief Complaint  The patient is a pleasant 64-year-old female with   history of end-stage renal disease, on hemodialysis Monday, Wednesday, and   Friday, who presented to [facility] with complaints of generalized weakness and   bright red blood per rectum/melenic stools.  She is admitted for GI workup.    She is due for dialysis today.  Hence, nephrology was asked to see her for her  ESRD and dialysis management.  She has a left upper extremity AV fistula,   which is functioning.       Interval Problem Update  6/17 - Consult done  6/18 - Pt sitting up EOB, says she did not finish her GoLytely yet so her endoscopy may be tomorrow, HD yesterday with 3.1L net UF, feels well now, denies dyspnea, BP stable, denies bloody stools   6/19 - Pt seen on HD, BP stable, Hgb 7.3, plan for possible endoscopy today  6/20 - S/P colonoscopy and gastroscopy yesterday.  She is feeling well, no overnight events.      Review of Systems  Review of Systems   Constitutional: Negative for chills, fever and malaise/fatigue.   HENT: Negative.    Eyes: Negative.    Respiratory: Negative.    Cardiovascular: Negative.    Gastrointestinal: Negative for melena.   Genitourinary: Negative.    Musculoskeletal: Negative.    Skin: Negative.    Neurological: Negative.    Endo/Heme/Allergies: Negative.    Psychiatric/Behavioral: Negative.         Physical Exam  Temp:  [35.9 °C (96.7 °F)-36.6 °C (97.9 °F)] 36.1 °C (97 °F)  Pulse:  [69-81] 71  Resp:  [11-18] 18  BP: (121-188)/(46-85) 153/62  SpO2:  [98 %-100 %] 98 %    Physical Exam  Constitutional:       Appearance: Normal appearance.   HENT:      Head: Normocephalic and atraumatic.      Nose: Nose normal.      Mouth/Throat:      Mouth: Mucous membranes are moist.      Pharynx: Oropharynx is clear.   Eyes:      Extraocular Movements: Extraocular movements  intact.      Pupils: Pupils are equal, round, and reactive to light.   Neck:      Musculoskeletal: Normal range of motion and neck supple.   Cardiovascular:      Rate and Rhythm: Normal rate and regular rhythm.      Pulses: Normal pulses.      Heart sounds: Normal heart sounds.      Comments: L AVF + T/B  Pulmonary:      Effort: Pulmonary effort is normal.      Breath sounds: Normal breath sounds.   Abdominal:      General: Bowel sounds are normal.      Palpations: Abdomen is soft.   Musculoskeletal: Normal range of motion.      Right lower leg: Edema (Trace) present.      Left lower leg: Edema (Trace) present.   Skin:     General: Skin is warm and dry.   Neurological:      General: No focal deficit present.      Mental Status: She is alert and oriented to person, place, and time.   Psychiatric:         Mood and Affect: Mood normal.         Behavior: Behavior normal.         Fluids    Intake/Output Summary (Last 24 hours) at 6/20/2020 0907  Last data filed at 6/19/2020 1501  Gross per 24 hour   Intake 1400 ml   Output 3000 ml   Net -1600 ml       Laboratory  Recent Labs     06/18/20  0240  06/19/20  0440 06/19/20  1413 06/20/20  0100   WBC 4.8  --  3.8*  --  3.6*   RBC 2.80*  --  2.44*  --  2.55*   HEMOGLOBIN 8.3*   < > 7.3* 7.8* 7.7*   HEMATOCRIT 25.0*  --  21.8*  --  23.0*   MCV 89.3  --  89.3  --  90.2   MCH 29.6  --  29.9  --  30.2   MCHC 33.2*  --  33.5*  --  33.5*   RDW 43.0  --  43.4  --  43.6   PLATELETCT 65*  --  55*  --  61*    < > = values in this interval not displayed.     Recent Labs     06/18/20  0240 06/19/20  0440 06/20/20  0100   SODIUM 129* 134* 135   POTASSIUM 4.3 4.6 4.6   CHLORIDE 91* 92* 101   CO2 28 25 22   GLUCOSE 117* 87 127*   BUN 31* 44* 28*   CREATININE 4.14* 5.77* 4.41*   CALCIUM 8.4* 8.3* 8.4*         No results for input(s): NTPROBNP in the last 72 hours.        Imaging  Available labs, imaging and clinical documentation reviewed.     Assessment/Plan  1.  End-stage renal disease, on  hemodialysis Monday, Wednesday, and Friday.  - via L AVF   2.  Hypertension. Fairly well controlled on pharmacologic therapy.   3.  Renal osteodystrophy.  - Managed at OP HD unit   4.  Gastrointestinal bleed.  - GI following, plan for EGD/colonoscopy   - On PPI   - Clear liquid diet/NPO  5.  Anemia of chronic kidney disease. Below target.  - Acute blood loss component  - On VANCE   - Serial Hgb checks   6.  Coronary artery disease.  7.  History of atrial fibrillation. Rate controlled, on carvedilol.   8.  Diabetes mellitus II.   9.  Chronic obstructive pulmonary disease.  10.  History of peripheral vascular disease.  11.  History of congestive heart failure.  12. SARS-CoV-2 negative x 1  13. Myelodysplastic syndrome, receives frequent transfusions  - Follows with Heme/Onc every 3 weeks   14. Chronic back pain, on opioid therapy.        PLAN:    - Continue iHD qMWF, next treatment today (Mon)  - UF with HD  - Continue home medications  - Transfuse PRN Hgb <7   - Continue VANCE, goal Hgb 10-11   - Renal diet when taking PO   - Dose medications for ESRD   - Follow labs  - Ok to discharge home from renal standpoint with confirmed hemodialysis chair     Thank you,

## 2020-06-23 ENCOUNTER — OFFICE VISIT (OUTPATIENT)
Dept: HEMATOLOGY ONCOLOGY | Facility: MEDICAL CENTER | Age: 65
End: 2020-06-23

## 2020-06-23 ENCOUNTER — APPOINTMENT (OUTPATIENT)
Dept: HEMATOLOGY ONCOLOGY | Facility: MEDICAL CENTER | Age: 65
End: 2020-06-23
Payer: MEDICARE

## 2020-06-23 VITALS
SYSTOLIC BLOOD PRESSURE: 158 MMHG | BODY MASS INDEX: 31.58 KG/M2 | HEART RATE: 82 BPM | HEIGHT: 57 IN | TEMPERATURE: 98.3 F | DIASTOLIC BLOOD PRESSURE: 84 MMHG | WEIGHT: 146.39 LBS | RESPIRATION RATE: 16 BRPM | OXYGEN SATURATION: 100 %

## 2020-06-23 DIAGNOSIS — Z87.19 HISTORY OF BLOODY STOOLS: ICD-10-CM

## 2020-06-23 DIAGNOSIS — Z99.2 ESRD (END STAGE RENAL DISEASE) ON DIALYSIS (HCC): ICD-10-CM

## 2020-06-23 DIAGNOSIS — D46.9 MYELODYSPLASIA (MYELODYSPLASTIC SYNDROME) (HCC): ICD-10-CM

## 2020-06-23 DIAGNOSIS — N18.6 ESRD (END STAGE RENAL DISEASE) ON DIALYSIS (HCC): ICD-10-CM

## 2020-06-23 LAB
BUN BLD-MCNC: 15 MG/DL (ref 8–22)
CA-I BLD ISE-SCNC: 1.14 MMOL/L (ref 1.1–1.3)
CHLORIDE BLD-SCNC: 102 MMOL/L (ref 96–112)
CREAT BLD-MCNC: 2.9 MG/DL (ref 0.5–1.4)
GLUCOSE BLD-MCNC: 88 MG/DL (ref 65–99)
HCT VFR BLD CALC: 22 % (ref 37–47)
HGB BLD-MCNC: 7.5 G/DL (ref 12–16)
POTASSIUM BLD-SCNC: 4.3 MMOL/L (ref 3.6–5.5)
SODIUM BLD-SCNC: 136 MMOL/L (ref 135–145)

## 2020-06-23 PROCEDURE — 99214 OFFICE O/P EST MOD 30 MIN: CPT | Performed by: NURSE PRACTITIONER

## 2020-06-23 ASSESSMENT — ENCOUNTER SYMPTOMS
FEVER: 0
WEIGHT LOSS: 0
INSOMNIA: 0
MYALGIAS: 1
DIZZINESS: 1
HEADACHES: 0
PALPITATIONS: 0
BLOOD IN STOOL: 1
CONSTIPATION: 0
CHILLS: 0
TINGLING: 0
WHEEZING: 0
NAUSEA: 0
BACK PAIN: 1
COUGH: 0
VOMITING: 0
SHORTNESS OF BREATH: 1

## 2020-06-23 ASSESSMENT — FIBROSIS 4 INDEX: FIB4 SCORE: 5.56

## 2020-06-23 ASSESSMENT — PAIN SCALES - GENERAL: PAINLEVEL: 7=MODERATE-SEVERE PAIN

## 2020-06-23 NOTE — PROGRESS NOTES
"Subjective:      Vielka Briscoe is a 64 y.o. female who presents with Other (MDS [facility])    HPI   Ms. 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 She is accompanied by her caregiver, Afua, for today's visit.     Patient has been treated with low-dose Revlimid in 2016 and Vidaza 75 mg/m² subcu 5 days a week in 2017, per Dr. Avila. Since Dr. Roberts's shelter the patient established with our office (7/2019), per second opinion evaluation, and continues with supportive therapy in the form of periodic transfusions, standing orders in place.    Patient hospitalized from 6/16/20-6/20/20 for shortness of breath, dizziness, and bloody stools. EGD and colonoscopy were completed indicating no concerns for active GI bleed. After further discussion patient volunteered that, \"for several months\", she has experienced a minimum of 2 days of bloody stools following each transfusion and does not feel the benefit of transfusion until at least day 3-4 following treatment. Premedication is Tylenol only as she does not tolerate diphenhydramine. She notes feeling better overall with more fluid removed via dialysis while inpatient. Patient is at her baseline with persistent fatigue a bit increased today. She is otherwise asymptomatic.          Allergies   Allergen Reactions   • Lenalidomide Unspecified     Beeping Pulse     • Naprosyn [Naproxen] Hives   • Pioglitazone Unspecified     Causes blindness   • Simvastatin Unspecified     Couldn't move   • Demerol Vomiting   • Diphenhydramine Vomiting   • Glipizide Vomiting   • Hydromorphone Vomiting   • Iron Vomiting     vomiting   • Metformin Vomiting   • Morphine Vomiting   • Multivitamin Itching     itching   • Ondansetron Itching   • Other Drug Rash and Vomiting     Any binders that remove phosphorus from the body such as tums   • Oxycodone " Vomiting   • Pcn [Penicillins] Vomiting          • Propoxyphene Vomiting   • Requip Vomiting   • Sulfa Drugs Rash and Vomiting     Vomiting & rash      • Tamsulosin Vomiting   • Tramadol Vomiting   • Trazodone Vomiting           Current Outpatient Medications on File Prior to Visit   Medication Sig Dispense Refill   • omeprazole (PRILOSEC OTC) 20 MG tablet Take 1 Tab by mouth every day. 30 Tab 0   • nitroglycerin (NITROSTAT) 0.4 MG SL Tab Place 0.4 mg under tongue every 5 minutes as needed for Chest Pain.     • bimatoprost (LUMIGAN) 0.03 % Solution Place 1 Drop in both eyes 2 Times a Day.     • lidocaine (LIDODERM) 5 % Patch Apply 1 Patch to skin as directed every 12 hours. 30 Patch 0   • docusate sodium (COLACE) 100 MG Cap Take 100 mg by mouth 2 times a day.     • amLODIPine (NORVASC) 2.5 MG Tab TK 1 T PO QD     • Buprenorphine 20 MCG/HR PATCH WEEKLY Apply 20 mcg to affected area(s) every 7 days. MONDAY  0   • Diclofenac Sodium (VOLTAREN) 1 % Gel Apply 2-4 g to skin as directed 4 times a day as needed. Both legs     • carvedilol (COREG) 12.5 MG Tab Take 1 Tab by mouth 2 times a day, with meals. 60 Tab 0   • losartan (COZAAR) 100 MG Tab Take 100 mg by mouth every evening.     • brinzolamide (AZOPT) 1 % Suspension Place 1 Drop in both eyes 2 Times a Day.     • Brimonidine Tartrate-Timolol (COMBIGAN) 0.2-0.5 % Solution Place 1 Drop in both eyes 2 Times a Day.     • pregabalin (LYRICA) 150 MG Cap Take 150 mg by mouth 4 times a day.       No current facility-administered medications on file prior to visit.          Review of Systems   Constitutional: Negative for chills, fever, malaise/fatigue and weight loss.        Feeling better with more fluid off per dialysis; feels better 3 days after transfusion but not right after   Respiratory: Positive for shortness of breath. Negative for cough and wheezing.         O2   Cardiovascular: Positive for leg swelling (trace BLE). Negative for chest pain and palpitations.    "  Gastrointestinal: Positive for blood in stool (following transfusion) and diarrhea (apparently following each transfusion for 1-2 days, smells bloody foul). Negative for constipation (Last BM this am), melena, nausea and vomiting.   Genitourinary: Negative for dysuria.   Musculoskeletal: Positive for back pain (chronic) and myalgias (BLE - 7/10 - consistent).   Neurological: Positive for dizziness (with quick movement). Negative for tingling and headaches.   Psychiatric/Behavioral: The patient does not have insomnia.           Objective:     /84 (BP Location: Right arm, Patient Position: Sitting, BP Cuff Size: Adult)   Pulse 82   Temp 36.8 °C (98.3 °F) (Temporal)   Resp 16   Ht 1.445 m (4' 8.89\")   Wt 66.4 kg (146 lb 6.2 oz)   LMP 01/01/1995   SpO2 100%   BMI 31.80 kg/m²      Physical Exam  Vitals signs reviewed.   Constitutional:       General: She is not in acute distress.     Appearance: She is well-developed. She is not diaphoretic.   HENT:      Head: Normocephalic and atraumatic.      Mouth/Throat:      Pharynx: No oropharyngeal exudate.   Eyes:      General: No scleral icterus.        Right eye: No discharge.         Left eye: No discharge.      Conjunctiva/sclera: Conjunctivae normal.      Pupils: Pupils are equal, round, and reactive to light.   Neck:      Musculoskeletal: Normal range of motion and neck supple.   Cardiovascular:      Rate and Rhythm: Normal rate and regular rhythm.      Heart sounds: Murmur present. No friction rub. No gallop.    Pulmonary:      Effort: Pulmonary effort is normal. No respiratory distress.      Breath sounds: Examination of the right-lower field reveals decreased breath sounds. Examination of the left-lower field reveals decreased breath sounds. Decreased breath sounds present. No wheezing.      Comments: O2  Abdominal:      General: Bowel sounds are normal. There is no distension.      Palpations: Abdomen is soft.      Tenderness: There is no abdominal " tenderness.   Musculoskeletal: Normal range of motion.      Comments: LUE - AV fistula   Skin:     General: Skin is warm and dry.      Coloration: Skin is not pale.      Findings: No erythema or rash.   Neurological:      Mental Status: She is alert and oriented to person, place, and time.   Psychiatric:         Behavior: Behavior normal.                                                    Assessment/Plan:       1. Myelodysplasia (myelodysplastic syndrome) (HCC)     2. ESRD (end stage renal disease) on dialysis (HCC)     3. History of bloody stools           1.  Bloody stool: Patient recently admitted to hospital for bloody stools and associated dizziness and neausea. She underwent EGD/colonoscopy with no findings of GI bleed. After further discussion patient volunteers that she experiences 1-2 days of significant bloody stools following EACH transfusion. She receives Tylenol as premedication but is unable to tolerate diphenhydramine. Review of records indicates negative antibody screen despite significant transfusion history. Suspect sensitivity to blood preservative versus low-grade effusion related GVHD reaction. Symptoms were discussed with Dr. Jones and we will request irradiated units for upcoming transfusions and evaluate symptoms.    2.  ESRD: Patient continues with dialysis every Monday, Wednesday, Friday.     3.  MDS: Patient has previously undergone treatment with Vidaza and Revlimid with no further treatment since 2017. She established care with our office in July 2019 and continues with supportive transfusions, approximately every 3 weeks. She is scheduled for CBC and possible transfusion this coming Thursday and Saturday and will return for reevaluation in 3 weeks, sooner as needed.            The patient verbalized agreement and understanding of current plan. All questions and concerns were addressed at time of visit.    Please note that this dictation was created using voice recognition software. I  have made every reasonable attempt to correct obvious errors, but I expect that there are errors of grammar and possibly content that I did not discover before finalizing the note.

## 2020-06-24 ASSESSMENT — ENCOUNTER SYMPTOMS: DIARRHEA: 1

## 2020-06-25 ENCOUNTER — OUTPATIENT INFUSION SERVICES (OUTPATIENT)
Dept: ONCOLOGY | Facility: MEDICAL CENTER | Age: 65
End: 2020-06-25
Attending: INTERNAL MEDICINE
Payer: MEDICARE

## 2020-06-25 VITALS
SYSTOLIC BLOOD PRESSURE: 186 MMHG | WEIGHT: 143.3 LBS | OXYGEN SATURATION: 100 % | BODY MASS INDEX: 30.92 KG/M2 | DIASTOLIC BLOOD PRESSURE: 44 MMHG | TEMPERATURE: 97.2 F | RESPIRATION RATE: 16 BRPM | HEART RATE: 73 BPM | HEIGHT: 57 IN

## 2020-06-25 DIAGNOSIS — D64.9 SYMPTOMATIC ANEMIA: ICD-10-CM

## 2020-06-25 DIAGNOSIS — D46.9 MYELODYSPLASIA (MYELODYSPLASTIC SYNDROME) (HCC): ICD-10-CM

## 2020-06-25 LAB
ABO GROUP BLD: NORMAL
ANISOCYTOSIS BLD QL SMEAR: ABNORMAL
BARCODED ABORH UBTYP: 6200
BARCODED PRD CODE UBPRD: NORMAL
BARCODED UNIT NUM UBUNT: NORMAL
BASO STIPL BLD QL SMEAR: NORMAL
BASOPHILS # BLD AUTO: 1.8 % (ref 0–1.8)
BASOPHILS # BLD: 0.06 K/UL (ref 0–0.12)
BLD GP AB SCN SERPL QL: NORMAL
COMPONENT R 8504R: NORMAL
EOSINOPHIL # BLD AUTO: 0.25 K/UL (ref 0–0.51)
EOSINOPHIL NFR BLD: 7 % (ref 0–6.9)
ERYTHROCYTE [DISTWIDTH] IN BLOOD BY AUTOMATED COUNT: 43.6 FL (ref 35.9–50)
HCT VFR BLD AUTO: 21.2 % (ref 37–47)
HGB BLD-MCNC: 7 G/DL (ref 12–16)
HYPOCHROMIA BLD QL SMEAR: ABNORMAL
LYMPHOCYTES # BLD AUTO: 0.79 K/UL (ref 1–4.8)
LYMPHOCYTES NFR BLD: 21.9 % (ref 22–41)
MANUAL DIFF BLD: NORMAL
MCH RBC QN AUTO: 29.5 PG (ref 27–33)
MCHC RBC AUTO-ENTMCNC: 33 G/DL (ref 33.6–35)
MCV RBC AUTO: 89.5 FL (ref 81.4–97.8)
METAMYELOCYTES NFR BLD MANUAL: 2.6 %
MICROCYTES BLD QL SMEAR: ABNORMAL
MONOCYTES # BLD AUTO: 0.19 K/UL (ref 0–0.85)
MONOCYTES NFR BLD AUTO: 5.3 % (ref 0–13.4)
MORPHOLOGY BLD-IMP: NORMAL
MYELOCYTES NFR BLD MANUAL: 0.9 %
NEUTROPHILS # BLD AUTO: 2.18 K/UL (ref 2–7.15)
NEUTROPHILS NFR BLD: 60.5 % (ref 44–72)
NRBC # BLD AUTO: 0 K/UL
NRBC BLD-RTO: 0 /100 WBC
OVALOCYTES BLD QL SMEAR: NORMAL
PLATELET # BLD AUTO: 69 K/UL (ref 164–446)
PLATELET BLD QL SMEAR: NORMAL
POIKILOCYTOSIS BLD QL SMEAR: NORMAL
POLYCHROMASIA BLD QL SMEAR: NORMAL
PRODUCT TYPE UPROD: NORMAL
RBC # BLD AUTO: 2.37 M/UL (ref 4.2–5.4)
RBC BLD AUTO: PRESENT
RH BLD: NORMAL
UNIT STATUS USTAT: NORMAL
WBC # BLD AUTO: 3.6 K/UL (ref 4.8–10.8)

## 2020-06-25 PROCEDURE — 85007 BL SMEAR W/DIFF WBC COUNT: CPT

## 2020-06-25 PROCEDURE — 96374 THER/PROPH/DIAG INJ IV PUSH: CPT

## 2020-06-25 PROCEDURE — 306780 HCHG STAT FOR TRANSFUSION PER CASE

## 2020-06-25 PROCEDURE — 700111 HCHG RX REV CODE 636 W/ 250 OVERRIDE (IP)

## 2020-06-25 PROCEDURE — 86945 BLOOD PRODUCT/IRRADIATION: CPT

## 2020-06-25 PROCEDURE — 86901 BLOOD TYPING SEROLOGIC RH(D): CPT

## 2020-06-25 PROCEDURE — A4212 NON CORING NEEDLE OR STYLET: HCPCS

## 2020-06-25 PROCEDURE — 86900 BLOOD TYPING SEROLOGIC ABO: CPT

## 2020-06-25 PROCEDURE — 86923 COMPATIBILITY TEST ELECTRIC: CPT

## 2020-06-25 PROCEDURE — 86850 RBC ANTIBODY SCREEN: CPT

## 2020-06-25 PROCEDURE — 700111 HCHG RX REV CODE 636 W/ 250 OVERRIDE (IP): Performed by: NURSE PRACTITIONER

## 2020-06-25 PROCEDURE — P9016 RBC LEUKOCYTES REDUCED: HCPCS

## 2020-06-25 PROCEDURE — 36430 TRANSFUSION BLD/BLD COMPNT: CPT

## 2020-06-25 PROCEDURE — 85027 COMPLETE CBC AUTOMATED: CPT

## 2020-06-25 PROCEDURE — 36591 DRAW BLOOD OFF VENOUS DEVICE: CPT

## 2020-06-25 RX ORDER — DIPHENHYDRAMINE HCL 25 MG
25 TABLET ORAL ONCE
Status: DISCONTINUED | OUTPATIENT
Start: 2020-06-25 | End: 2020-06-25 | Stop reason: HOSPADM

## 2020-06-25 RX ORDER — DIPHENHYDRAMINE HCL 25 MG
25 TABLET ORAL ONCE
Status: CANCELLED | OUTPATIENT
Start: 2020-06-25

## 2020-06-25 RX ORDER — SODIUM CHLORIDE 9 MG/ML
500 INJECTION, SOLUTION INTRAVENOUS ONCE
Status: CANCELLED | OUTPATIENT
Start: 2020-06-25

## 2020-06-25 RX ORDER — SODIUM CHLORIDE 9 MG/ML
10 INJECTION, SOLUTION INTRAMUSCULAR; INTRAVENOUS; SUBCUTANEOUS PRN
Status: CANCELLED | OUTPATIENT
Start: 2020-06-25

## 2020-06-25 RX ORDER — ACETAMINOPHEN 325 MG/1
650 TABLET ORAL PRN
Status: CANCELLED | OUTPATIENT
Start: 2020-06-25

## 2020-06-25 RX ORDER — LIDOCAINE HYDROCHLORIDE 10 MG/ML
INJECTION, SOLUTION EPIDURAL; INFILTRATION; INTRACAUDAL; PERINEURAL
Status: COMPLETED
Start: 2020-06-25 | End: 2020-06-25

## 2020-06-25 RX ORDER — DIPHENHYDRAMINE HYDROCHLORIDE 50 MG/ML
25 INJECTION INTRAMUSCULAR; INTRAVENOUS PRN
Status: CANCELLED | OUTPATIENT
Start: 2020-06-25

## 2020-06-25 RX ORDER — FUROSEMIDE 10 MG/ML
20 INJECTION INTRAMUSCULAR; INTRAVENOUS ONCE
Status: CANCELLED | OUTPATIENT
Start: 2020-06-25

## 2020-06-25 RX ORDER — ACETAMINOPHEN 325 MG/1
650 TABLET ORAL ONCE
Status: CANCELLED | OUTPATIENT
Start: 2020-06-25

## 2020-06-25 RX ORDER — HEPARIN SODIUM (PORCINE) LOCK FLUSH IV SOLN 100 UNIT/ML 100 UNIT/ML
SOLUTION INTRAVENOUS
Status: COMPLETED
Start: 2020-06-25 | End: 2020-06-25

## 2020-06-25 RX ORDER — ACETAMINOPHEN 325 MG/1
650 TABLET ORAL ONCE
Status: DISCONTINUED | OUTPATIENT
Start: 2020-06-25 | End: 2020-06-25 | Stop reason: HOSPADM

## 2020-06-25 RX ORDER — HEPARIN SODIUM (PORCINE) LOCK FLUSH IV SOLN 100 UNIT/ML 100 UNIT/ML
500 SOLUTION INTRAVENOUS PRN
Status: CANCELLED | OUTPATIENT
Start: 2020-06-25

## 2020-06-25 RX ORDER — LIDOCAINE HYDROCHLORIDE 10 MG/ML
20 INJECTION, SOLUTION INFILTRATION; PERINEURAL
Status: CANCELLED | OUTPATIENT
Start: 2020-06-25

## 2020-06-25 RX ORDER — FUROSEMIDE 10 MG/ML
20 INJECTION INTRAMUSCULAR; INTRAVENOUS ONCE
Status: COMPLETED | OUTPATIENT
Start: 2020-06-25 | End: 2020-06-25

## 2020-06-25 RX ADMIN — HEPARIN 500 UNITS: 100 SYRINGE at 14:01

## 2020-06-25 RX ADMIN — FUROSEMIDE 20 MG: 10 INJECTION, SOLUTION INTRAMUSCULAR; INTRAVENOUS at 13:54

## 2020-06-25 RX ADMIN — LIDOCAINE HYDROCHLORIDE 5 ML: 10 INJECTION, SOLUTION EPIDURAL; INFILTRATION; INTRACAUDAL at 09:45

## 2020-06-25 ASSESSMENT — FIBROSIS 4 INDEX: FIB4 SCORE: 5.56

## 2020-06-25 NOTE — PROGRESS NOTES
Pt ambulatory  Using her walker w/ her caregiver for CBC poss PRBC transfusion.  Pt w/ no s/s of infection, pt has no complaints at this time.  PORT site numbed w/ lidocaine per pt request, PORT accessed using sterile technique, brisk blood return noted, labs drawn per orders, flushed per protocol.  Pt Hgb 7.0 meets parameters for 1 unit irr PRBCs .  Pt took 500mg of her own tylenol, pt refuses benadryl pre-med.  1 unit PRBCs transfused w/ no adverse reactions.  IV Lasix administered to pt w/ no adverse effects.  PORT flushed and heparinized per protocol, de-accessed, gauze and steri-strip bandage applied.  Pt left on foot in care of her caretaker in [facility].  Confirmed pt's appt for Saturday for her 2nd unit of blood.

## 2020-06-27 ENCOUNTER — OUTPATIENT INFUSION SERVICES (OUTPATIENT)
Dept: ONCOLOGY | Facility: MEDICAL CENTER | Age: 65
End: 2020-06-27
Attending: INTERNAL MEDICINE
Payer: MEDICARE

## 2020-06-27 VITALS
RESPIRATION RATE: 18 BRPM | WEIGHT: 142.2 LBS | OXYGEN SATURATION: 100 % | DIASTOLIC BLOOD PRESSURE: 52 MMHG | SYSTOLIC BLOOD PRESSURE: 186 MMHG | BODY MASS INDEX: 30.68 KG/M2 | TEMPERATURE: 98.1 F | HEART RATE: 76 BPM | HEIGHT: 57 IN

## 2020-06-27 DIAGNOSIS — D64.9 SYMPTOMATIC ANEMIA: ICD-10-CM

## 2020-06-27 DIAGNOSIS — D46.9 MYELODYSPLASIA (MYELODYSPLASTIC SYNDROME) (HCC): ICD-10-CM

## 2020-06-27 LAB
BASOPHILS # BLD AUTO: 0.3 % (ref 0–1.8)
BASOPHILS # BLD: 0.01 K/UL (ref 0–0.12)
EOSINOPHIL # BLD AUTO: 0.15 K/UL (ref 0–0.51)
EOSINOPHIL NFR BLD: 4.7 % (ref 0–6.9)
ERYTHROCYTE [DISTWIDTH] IN BLOOD BY AUTOMATED COUNT: 43.7 FL (ref 35.9–50)
HCT VFR BLD AUTO: 25.4 % (ref 37–47)
HGB BLD-MCNC: 8.5 G/DL (ref 12–16)
IMM GRANULOCYTES # BLD AUTO: 0.02 K/UL (ref 0–0.11)
IMM GRANULOCYTES NFR BLD AUTO: 0.6 % (ref 0–0.9)
LYMPHOCYTES # BLD AUTO: 0.8 K/UL (ref 1–4.8)
LYMPHOCYTES NFR BLD: 24.9 % (ref 22–41)
MCH RBC QN AUTO: 30 PG (ref 27–33)
MCHC RBC AUTO-ENTMCNC: 33.5 G/DL (ref 33.6–35)
MCV RBC AUTO: 89.8 FL (ref 81.4–97.8)
MONOCYTES # BLD AUTO: 0.57 K/UL (ref 0–0.85)
MONOCYTES NFR BLD AUTO: 17.8 % (ref 0–13.4)
NEUTROPHILS # BLD AUTO: 1.66 K/UL (ref 2–7.15)
NEUTROPHILS NFR BLD: 51.7 % (ref 44–72)
NRBC # BLD AUTO: 0 K/UL
NRBC BLD-RTO: 0 /100 WBC
PLATELET # BLD AUTO: 64 K/UL (ref 164–446)
PMV BLD AUTO: 14.8 FL (ref 9–12.9)
RBC # BLD AUTO: 2.83 M/UL (ref 4.2–5.4)
WBC # BLD AUTO: 3.2 K/UL (ref 4.8–10.8)

## 2020-06-27 PROCEDURE — 36591 DRAW BLOOD OFF VENOUS DEVICE: CPT

## 2020-06-27 PROCEDURE — A4212 NON CORING NEEDLE OR STYLET: HCPCS

## 2020-06-27 PROCEDURE — 85025 COMPLETE CBC W/AUTO DIFF WBC: CPT

## 2020-06-27 PROCEDURE — 700111 HCHG RX REV CODE 636 W/ 250 OVERRIDE (IP)

## 2020-06-27 RX ORDER — DIPHENHYDRAMINE HCL 25 MG
25 TABLET ORAL ONCE
Status: CANCELLED | OUTPATIENT
Start: 2020-06-27

## 2020-06-27 RX ORDER — ACETAMINOPHEN 325 MG/1
650 TABLET ORAL PRN
Status: CANCELLED | OUTPATIENT
Start: 2020-06-27

## 2020-06-27 RX ORDER — SODIUM CHLORIDE 9 MG/ML
10 INJECTION, SOLUTION INTRAMUSCULAR; INTRAVENOUS; SUBCUTANEOUS PRN
Status: CANCELLED | OUTPATIENT
Start: 2020-06-27

## 2020-06-27 RX ORDER — HEPARIN SODIUM (PORCINE) LOCK FLUSH IV SOLN 100 UNIT/ML 100 UNIT/ML
SOLUTION INTRAVENOUS
Status: COMPLETED
Start: 2020-06-27 | End: 2020-06-27

## 2020-06-27 RX ORDER — LIDOCAINE HYDROCHLORIDE 10 MG/ML
20 INJECTION, SOLUTION INFILTRATION; PERINEURAL
Status: CANCELLED | OUTPATIENT
Start: 2020-06-27

## 2020-06-27 RX ORDER — FUROSEMIDE 10 MG/ML
20 INJECTION INTRAMUSCULAR; INTRAVENOUS ONCE
Status: CANCELLED | OUTPATIENT
Start: 2020-06-27

## 2020-06-27 RX ORDER — DIPHENHYDRAMINE HYDROCHLORIDE 50 MG/ML
25 INJECTION INTRAMUSCULAR; INTRAVENOUS PRN
Status: CANCELLED | OUTPATIENT
Start: 2020-06-27

## 2020-06-27 RX ORDER — LIDOCAINE HYDROCHLORIDE 10 MG/ML
20 INJECTION, SOLUTION INFILTRATION; PERINEURAL
Status: DISCONTINUED | OUTPATIENT
Start: 2020-06-27 | End: 2020-06-27 | Stop reason: HOSPADM

## 2020-06-27 RX ORDER — SODIUM CHLORIDE 9 MG/ML
500 INJECTION, SOLUTION INTRAVENOUS ONCE
Status: CANCELLED | OUTPATIENT
Start: 2020-06-27

## 2020-06-27 RX ORDER — LIDOCAINE HYDROCHLORIDE 10 MG/ML
INJECTION, SOLUTION EPIDURAL; INFILTRATION; INTRACAUDAL; PERINEURAL
Status: COMPLETED
Start: 2020-06-27 | End: 2020-06-27

## 2020-06-27 RX ORDER — ACETAMINOPHEN 325 MG/1
650 TABLET ORAL ONCE
Status: CANCELLED | OUTPATIENT
Start: 2020-06-27

## 2020-06-27 RX ORDER — HEPARIN SODIUM (PORCINE) LOCK FLUSH IV SOLN 100 UNIT/ML 100 UNIT/ML
500 SOLUTION INTRAVENOUS PRN
Status: CANCELLED | OUTPATIENT
Start: 2020-06-27

## 2020-06-27 RX ADMIN — HEPARIN 500 UNITS: 100 SYRINGE at 12:27

## 2020-06-27 RX ADMIN — LIDOCAINE HYDROCHLORIDE 0.5 ML: 10 INJECTION, SOLUTION INFILTRATION; PERINEURAL at 11:34

## 2020-06-27 RX ADMIN — LIDOCAINE HYDROCHLORIDE 0.5 ML: 10 INJECTION, SOLUTION EPIDURAL; INFILTRATION; INTRACAUDAL at 11:34

## 2020-06-27 ASSESSMENT — FIBROSIS 4 INDEX: FIB4 SCORE: 4.92

## 2020-06-27 NOTE — PROGRESS NOTES
Pt to infusion services ambulatory per self with walker, and accompanied by caregiver, Afua  Pt here for scheduled blood products.  Pt with elevated BP upon arrival.  Pt tells me she took one BP medication approximately one hour prior to arrival, and another BP medication approximately 10 minutes prior to arrival.  Plan care reviewed.  Pt and caregiver verbalize understanding.  Telephone call to on call, Barby Flowers placed.  Return call received.  APRN notified of elevated BP upon arrival.  Orders received to re-check CBC today and recheck BP; call with results.  Pt updated to plan and verbalizes understanding.  Pt with R chest port.  1% lidocaine used to numb port site prior to port access.  Port site cleansed and port accessed in sterile fashion.  Port flushes easily; +brisk blood return verified.  Lab drawn per MD orders.  Blood sample sent to lab.    12:00:  CBC results available and reviewed.  Hgb = 8.5 today.  BP rechecked, 186/52, pulse = 76.  Telephone call to Barby Flowers, ASHLEIGH, successful.  APRN notified of Hgb = 8.5 and continued elevated BP.  Blood products not indicated today per transfusion plan parameters.  Per APRN, pt okay to d/c to home, and MD office to f/u with patient early next week.  Pt updated to plan and verbalizes understanding.  Pt encouraged to take BP medications as directed.  Port flushed with normal saline and heparin per policy.  Port de-accessed; needle intact.  Pressure dressing applied.  Pt left infusion services in no apparent distress after completion of treatment, ambulatory with walker.  Next appointments scheduled.

## 2020-06-29 ENCOUNTER — TELEPHONE (OUTPATIENT)
Dept: HEMATOLOGY ONCOLOGY | Facility: MEDICAL CENTER | Age: 65
End: 2020-06-29

## 2020-06-29 NOTE — TELEPHONE ENCOUNTER
Phone Number Called: [phone]    Call outcome: Left detailed message for patient. Informed to call back with any additional questions.    Message: No labs needed this week, call if any concerns come up, and follow up as scheduled in July with ASHLEIGH Paulino.

## 2020-06-29 NOTE — TELEPHONE ENCOUNTER
----- Message from KAREEM Arteaga sent at 6/29/2020  9:17 AM PDT -----  Can you let patient know that no need for labs this week - call us if needed or we will see her in July with Yanely

## 2020-07-07 ENCOUNTER — HOSPITAL ENCOUNTER (OUTPATIENT)
Dept: LAB | Facility: MEDICAL CENTER | Age: 65
End: 2020-07-07
Attending: INTERNAL MEDICINE
Payer: MEDICARE

## 2020-07-07 ENCOUNTER — TELEPHONE (OUTPATIENT)
Dept: HEMATOLOGY ONCOLOGY | Facility: MEDICAL CENTER | Age: 65
End: 2020-07-07

## 2020-07-07 ENCOUNTER — HOSPITAL ENCOUNTER (EMERGENCY)
Facility: MEDICAL CENTER | Age: 65
End: 2020-07-07
Attending: EMERGENCY MEDICINE
Payer: MEDICARE

## 2020-07-07 VITALS
HEIGHT: 58 IN | BODY MASS INDEX: 30.54 KG/M2 | OXYGEN SATURATION: 100 % | RESPIRATION RATE: 19 BRPM | DIASTOLIC BLOOD PRESSURE: 67 MMHG | WEIGHT: 145.5 LBS | SYSTOLIC BLOOD PRESSURE: 150 MMHG | TEMPERATURE: 97.8 F | HEART RATE: 79 BPM

## 2020-07-07 DIAGNOSIS — D64.89 ANEMIA DUE TO OTHER CAUSE, NOT CLASSIFIED: ICD-10-CM

## 2020-07-07 DIAGNOSIS — D46.9 MDS (MYELODYSPLASTIC SYNDROME) (HCC): ICD-10-CM

## 2020-07-07 LAB
ABO GROUP BLD: ABNORMAL
BARCODED ABORH UBTYP: 5100
BARCODED PRD CODE UBPRD: ABNORMAL
BARCODED UNIT NUM UBUNT: ABNORMAL
BASOPHILS # BLD AUTO: 0.3 % (ref 0–1.8)
BASOPHILS # BLD: 0.01 K/UL (ref 0–0.12)
BLD GP AB SCN SERPL QL: ABNORMAL
COMPONENT R 8504R: ABNORMAL
EOSINOPHIL # BLD AUTO: 0.13 K/UL (ref 0–0.51)
EOSINOPHIL NFR BLD: 3.8 % (ref 0–6.9)
ERYTHROCYTE [DISTWIDTH] IN BLOOD BY AUTOMATED COUNT: 44.5 FL (ref 35.9–50)
HCT VFR BLD AUTO: 19.2 % (ref 37–47)
HGB BLD-MCNC: 6.4 G/DL (ref 12–16)
IMM GRANULOCYTES # BLD AUTO: 0.02 K/UL (ref 0–0.11)
IMM GRANULOCYTES NFR BLD AUTO: 0.6 % (ref 0–0.9)
LYMPHOCYTES # BLD AUTO: 0.85 K/UL (ref 1–4.8)
LYMPHOCYTES NFR BLD: 25 % (ref 22–41)
MCH RBC QN AUTO: 30.5 PG (ref 27–33)
MCHC RBC AUTO-ENTMCNC: 33.3 G/DL (ref 33.6–35)
MCV RBC AUTO: 91.4 FL (ref 81.4–97.8)
MONOCYTES # BLD AUTO: 0.67 K/UL (ref 0–0.85)
MONOCYTES NFR BLD AUTO: 19.7 % (ref 0–13.4)
NEUTROPHILS # BLD AUTO: 1.72 K/UL (ref 2–7.15)
NEUTROPHILS NFR BLD: 50.6 % (ref 44–72)
NRBC # BLD AUTO: 0 K/UL
NRBC BLD-RTO: 0 /100 WBC
PLATELET # BLD AUTO: 48 K/UL (ref 164–446)
PRODUCT TYPE UPROD: ABNORMAL
RBC # BLD AUTO: 2.1 M/UL (ref 4.2–5.4)
RH BLD: ABNORMAL
UNIT STATUS USTAT: ABNORMAL
WBC # BLD AUTO: 3.4 K/UL (ref 4.8–10.8)

## 2020-07-07 PROCEDURE — 86923 COMPATIBILITY TEST ELECTRIC: CPT

## 2020-07-07 PROCEDURE — 99285 EMERGENCY DEPT VISIT HI MDM: CPT

## 2020-07-07 PROCEDURE — 96375 TX/PRO/DX INJ NEW DRUG ADDON: CPT

## 2020-07-07 PROCEDURE — 36430 TRANSFUSION BLD/BLD COMPNT: CPT

## 2020-07-07 PROCEDURE — 700105 HCHG RX REV CODE 258: Performed by: EMERGENCY MEDICINE

## 2020-07-07 PROCEDURE — P9016 RBC LEUKOCYTES REDUCED: HCPCS

## 2020-07-07 PROCEDURE — 86945 BLOOD PRODUCT/IRRADIATION: CPT

## 2020-07-07 PROCEDURE — 96374 THER/PROPH/DIAG INJ IV PUSH: CPT

## 2020-07-07 PROCEDURE — 36415 COLL VENOUS BLD VENIPUNCTURE: CPT | Mod: XU

## 2020-07-07 PROCEDURE — 36415 COLL VENOUS BLD VENIPUNCTURE: CPT

## 2020-07-07 PROCEDURE — 85025 COMPLETE CBC W/AUTO DIFF WBC: CPT

## 2020-07-07 PROCEDURE — 86850 RBC ANTIBODY SCREEN: CPT

## 2020-07-07 PROCEDURE — 86901 BLOOD TYPING SEROLOGIC RH(D): CPT

## 2020-07-07 PROCEDURE — 700101 HCHG RX REV CODE 250: Performed by: EMERGENCY MEDICINE

## 2020-07-07 PROCEDURE — 700111 HCHG RX REV CODE 636 W/ 250 OVERRIDE (IP): Performed by: EMERGENCY MEDICINE

## 2020-07-07 PROCEDURE — 86900 BLOOD TYPING SEROLOGIC ABO: CPT

## 2020-07-07 RX ORDER — HEPARIN SODIUM (PORCINE) LOCK FLUSH IV SOLN 100 UNIT/ML 100 UNIT/ML
300-500 SOLUTION INTRAVENOUS PRN
Status: DISCONTINUED | OUTPATIENT
Start: 2020-07-07 | End: 2020-07-07 | Stop reason: HOSPADM

## 2020-07-07 RX ORDER — LIDOCAINE 50 MG/G
1 PATCH TOPICAL EVERY 12 HOURS
COMMUNITY

## 2020-07-07 RX ORDER — FUROSEMIDE 10 MG/ML
40 INJECTION INTRAMUSCULAR; INTRAVENOUS ONCE
Status: COMPLETED | OUTPATIENT
Start: 2020-07-07 | End: 2020-07-07

## 2020-07-07 RX ORDER — SODIUM CHLORIDE 9 MG/ML
INJECTION, SOLUTION INTRAVENOUS CONTINUOUS
Status: DISCONTINUED | OUTPATIENT
Start: 2020-07-07 | End: 2020-07-07 | Stop reason: HOSPADM

## 2020-07-07 RX ADMIN — HEPARIN 500 UNITS: 100 SYRINGE at 21:07

## 2020-07-07 RX ADMIN — TETRACAINE HCL 3 ML: 10 INJECTION SUBARACHNOID at 17:06

## 2020-07-07 RX ADMIN — FUROSEMIDE 40 MG: 10 INJECTION, SOLUTION INTRAMUSCULAR; INTRAVENOUS at 21:07

## 2020-07-07 RX ADMIN — SODIUM CHLORIDE: 9 INJECTION, SOLUTION INTRAVENOUS at 19:12

## 2020-07-07 ASSESSMENT — FIBROSIS 4 INDEX: FIB4 SCORE: 7.18

## 2020-07-07 ASSESSMENT — LIFESTYLE VARIABLES: DO YOU DRINK ALCOHOL: NO

## 2020-07-07 NOTE — TELEPHONE ENCOUNTER
"Pt's caregiver, Afua, called stating she & pt were currently in the lab at [place] location where pt just had lab drawn r/t symptoms of dizziness, lightheadedness, & several falls over the last few days.  Afua stated that she & pt had just attempted to have labs drawn for possible blood transfusion in [facility], but stated that [facility] staff told them to get labs drawn at the lab & to check in with outpt provider re: current symptoms.  Afua & pt verbalized frustration with plan of care & stated that they were told by the previous infusion nurse at pt's last infusion appt on 6/27 that pt could return to \"infusion center to have a quick re-check of blood levels & then blood transfusion.\"      Critical labs rec'vd from lab, to Gwen HOROWITZ, reporting Hgb 6.4 and platelets 48.  Dr. Jones made aware of above scenario & critical lab results with verbal order to have pt scheduled in [facility] for 1 unit of blood ASAP.  Multiple calls made to infusion schedulers & infusion charge RN (Olive Oconnor) for possible available appt today for infusion, but no appts available until tomorrow 7/8 or Thurs 7/9.  Pt is scheduled for dialysis tomorrow 7/8 & not able to receive blood transfusion at that time.  Spoke with Dr. Jones again re: unavailability of infusion center appt & verbal order rec'vd to allow pt to make choice of whether to go to ED today for blood transfusion or to wait until Thurs.  Called Afua with recommendations from MD & Afua stated she would take pt to ED now.  Afua also stated she is very upset with the misinformation that was given to her & pt at last infusion center appt in re: to having the option of coming back for a \"quick re-check & blood transfusion\" whenever pt is feeling symptomatic & stated that she wants to report that nurse for giving out false information.  This RN acknowledged Afua & pt's frustration & explained that the [facility] does not take walk-in appts & apologized if told otherwise.   "

## 2020-07-07 NOTE — ED TRIAGE NOTES
Pt to triage in  wearing a mask. With a friend.  Chief Complaint   Patient presents with   • Bloody Stools   • Abnormal Labs     Low H/H   • Sent by MD     for blood transfusion     Pt reports she gets regular transfusions for chronic anemia, only had 1 unit last time but needed 2.     Pt denies cough, fever, sob or any known contact with a person that has tested positive for covid.

## 2020-07-07 NOTE — TELEPHONE ENCOUNTER
Received a call from Naz in [facility] lab stating patient's platelets are critically low at 48.     Informed Dr. Jones and Brittany RN

## 2020-07-08 ENCOUNTER — APPOINTMENT (OUTPATIENT)
Dept: ONCOLOGY | Facility: MEDICAL CENTER | Age: 65
End: 2020-07-08
Attending: INTERNAL MEDICINE
Payer: MEDICARE

## 2020-07-08 NOTE — ED NOTES
Patient verbalizes understanding of follow up and when to return to the ED.  Patient discharged with friend.  All questions answered.

## 2020-07-08 NOTE — ED NOTES
Med rec updated and complete allergies reviewed. Pt denies antibiotic use in last 14 days.      Home pharmacy [facility]

## 2020-07-08 NOTE — ED PROVIDER NOTES
ED Provider Note  CHIEF COMPLAINT(1/4)  Chief Complaint   Patient presents with   • Bloody Stools   • Abnormal Labs     Low H/H   • Sent by MD     for blood transfusion       HPI  Vielka Briscoe is a 65 y.o. 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 She has standing orders for CBC every 3 weeks, two times that week for 1 unit and 1 unit, her last appointment 6/27 her BP was elevated and hgb 8.5- infusion not indicated at that time. She last received 1 unit 6/25.  She was unable to receive a blood transfusion at her infusion center today due to no appointmentments available, she has dialysis tomorrow and would not be able to receive blood at that time and therefore patient was sent to the ER.  The cause of her chronic anemia is due to MDS s/p low-dose Revlimid 2016 and VidaZA 2017 with previous oncologist whom has since retired, currently managed with supportive care and transfusions PRN. She has history of bloody stools 2 days after transfusions and was recently hospitalized evaluated for upper/lower GI bleed (6/19) endoscopy and colonoscopy negative  and started on prilosec.    She denies hematochezia, melena, hemoptysis or hematuria. She will occasionally have BRB on toilet paper but none in stool. Denies pain        REVIEW OF SYSTEMS(1/10)  Pertinent positives include: lightheadedness, difficulty concentrating.  Pertinent negatives include: weakness, numbness, changes in vision, difficulty breathing, chest pain.   All other systems are negative.     PAST MEDICAL HISTORY(PFS1,2)  Past Medical History:   Diagnosis Date   • Arthritis     hands    • Atrial fibrillation (HCC)     HX   • Blood transfusion without reported diagnosis    • Breath shortness     w/exertion   • Cancer (HCC)     MDS in bones   • Cataract     bilat IOL  "  • Chronic anemia    • Chronic kidney disease        • Chronic obstructive pulmonary disease (HCC)    • Congestive heart failure (HCC)    • Dental disorder     full dentures   • Diabetes     Diet controlled   • Diabetes (HCC)    • Dialysis patient     [facility] in [place]   • Glaucoma    • Heart abnormalities    • Hypertension    • MDS (myelodysplastic syndrome) 10/2016    bone marrow biopsy   • Other hyperlipidemia 2019   • Pain     \"bones\", generalized, 5/10   • Renal disorder    • Stroke (HCC) 2015    No residual weakness/problems   • Supplemental oxygen dependent     2 liters       FAMILY HISTORY  Family History   Problem Relation Age of Onset   • Hypertension Father          at 89   • Hypertension Mother        SOCIAL HISTORY  Social History     Tobacco Use   • Smoking status: Never Smoker   • Smokeless tobacco: Never Used   Substance Use Topics   • Alcohol use: No   • Drug use: No     Social History     Substance and Sexual Activity   Drug Use No       SURGICAL HISTORY  Past Surgical History:   Procedure Laterality Date   • PB COLONOSCOPY,DIAGNOSTIC N/A 2020    Procedure: COLONOSCOPY;  Surgeon: Kwadwo Espana D.O.;  Location: SURGERY SAME DAY [facility];  Service: Gastroenterology   • GASTROSCOPY N/A 2020    Procedure: GASTROSCOPY;  Surgeon: Kwadwo Espana D.O.;  Location: SURGERY SAME DAY [facility];  Service: Gastroenterology   • GASTROSCOPY N/A 2018    Procedure: GASTROSCOPY;  Surgeon: Blanca Santos M.D.;  Location: SURGERY [facility];  Service: Gastroenterology   • BONE MARROW BIOPSY, NDL/TROCAR  2017    Procedure: BONE MARROW BIOPSY, NDL/TROCAR;  Surgeon: Wade Turner M.D.;  Location: ENDOSCOPY [facility];  Service: Orthopedics   • BONE MARROW ASPIRATION  2017    Procedure: BONE MARROW ASPIRATION;  Surgeon: Wade Turner M.D.;  Location: ENDOSCOPY [facility];  Service: Orthopedics   • VEIN LIGATION " Left 11/10/2016    Procedure: VEIN LIGATION FOR DISTAL REVASCULARIZATION AND INTERVAL LIGATION OF LEFT ARM DIALYISIS ACCESS (DRIL PROCEDURE);  Surgeon: Ranulfo Jolly M.D.;  Location: SURGERY [facility];  Service:    • AV FISTULA CREATION Left 2016    Procedure: AV FISTULA CREATION UPPER EXTREMITY;  Surgeon: Ranulfo Jolly M.D.;  Location: SURGERY [facility];  Service:    • GASTROSCOPY  2015    Procedure: ESOPHAGOGASTRODUODENOSCOPY WITH BIOPSY;  Surgeon: Wing Álvarez M.D.;  Location: SURGERY [facility];  Service:    • COLONOSCOPY  2015    Procedure: COLONOSCOPY;  Surgeon: Wing Álvarez M.D.;  Location: SURGERY [facility];  Service:    • GYN SURGERY      hysterectomy   • GYN SURGERY       x 2   • GYN SURGERY      d&C x2   • OTHER      angioplasty/ stents bilat LE   • OTHER ABDOMINAL SURGERY      appendectomy,  x 2, hysterectomy, D & C   • OTHER ABDOMINAL SURGERY      appendectomy   • OTHER CARDIAC SURGERY      Angioplasty  and    • OTHER CARDIAC SURGERY      cardiac angiogram, angioplasty   • RETINAL DETACHMENT REPAIR Right        CURRENT MEDICATIONS    Current Facility-Administered Medications:   •  NS infusion, , Intravenous, Continuous, Dawit Leroy M.D.    Current Outpatient Medications:   •  omeprazole (PRILOSEC OTC) 20 MG tablet, Take 1 Tab by mouth every day., Disp: 30 Tab, Rfl: 0  •  nitroglycerin (NITROSTAT) 0.4 MG SL Tab, Place 0.4 mg under tongue every 5 minutes as needed for Chest Pain., Disp: , Rfl:   •  bimatoprost (LUMIGAN) 0.03 % Solution, Place 1 Drop in both eyes 2 Times a Day., Disp: , Rfl:   •  lidocaine (LIDODERM) 5 % Patch, Apply 1 Patch to skin as directed every 12 hours., Disp: 30 Patch, Rfl: 0  •  docusate sodium (COLACE) 100 MG Cap, Take 100 mg by mouth 2 times a day., Disp: , Rfl:   •  amLODIPine (NORVASC) 2.5 MG Tab, TK 1 T PO QD, Disp: , Rfl:   •  Buprenorphine 20 MCG/HR PATCH WEEKLY, Apply 20 mcg to affected area(s)  "every 7 days. MONDAY, Disp: , Rfl: 0  •  Diclofenac Sodium (VOLTAREN) 1 % Gel, Apply 2-4 g to skin as directed 4 times a day as needed. Both legs, Disp: , Rfl:   •  carvedilol (COREG) 12.5 MG Tab, Take 1 Tab by mouth 2 times a day, with meals., Disp: 60 Tab, Rfl: 0  •  losartan (COZAAR) 100 MG Tab, Take 100 mg by mouth every evening., Disp: , Rfl:   •  brinzolamide (AZOPT) 1 % Suspension, Place 1 Drop in both eyes 2 Times a Day., Disp: , Rfl:   •  Brimonidine Tartrate-Timolol (COMBIGAN) 0.2-0.5 % Solution, Place 1 Drop in both eyes 2 Times a Day., Disp: , Rfl:   •  pregabalin (LYRICA) 150 MG Cap, Take 150 mg by mouth 4 times a day., Disp: , Rfl:         ALLERGIES  Allergies   Allergen Reactions   • Lenalidomide Unspecified     Beeping Pulse     • Naprosyn [Naproxen] Hives   • Pioglitazone Unspecified     Causes blindness   • Simvastatin Unspecified     Couldn't move   • Demerol Vomiting   • Diphenhydramine Vomiting   • Glipizide Vomiting   • Hydromorphone Vomiting   • Iron Vomiting     vomiting   • Metformin Vomiting   • Morphine Vomiting   • Multivitamin Itching     itching   • Ondansetron Itching   • Other Drug Rash and Vomiting     Any binders that remove phosphorus from the body such as tums   • Oxycodone Vomiting   • Pcn [Penicillins] Vomiting          • Propoxyphene Vomiting   • Requip Vomiting   • Sulfa Drugs Rash and Vomiting     Vomiting & rash      • Tamsulosin Vomiting   • Tramadol Vomiting   • Trazodone Vomiting       PHYSICAL EXAM  VITAL SIGNS: BP (!) 174/59   Pulse 72   Temp 36.7 °C (98 °F) (Temporal)   Resp 16   Ht 1.473 m (4' 10\")   Wt 66 kg (145 lb 8.1 oz)   LMP 01/01/1995   SpO2 100%   BMI 30.41 kg/m²  Reviewed and noted  Constitutional: Well developed, Well nourished,in no acute distress.  HENT: Normocephalic, atraumatic, bilateral external ears normal, oropharynx moist, No exudates or erythema.  NC in place  Eyes: PERRLA, conjunctiva pale, no scleral icterus.   Cardiovascular: s1/s2, RRR, "  II/IV systolic murmur, peripheral pulses 2+, 1+ edema to mid shin b/l  Respiratory: CTABL, no wheezing or adventitious noises appreciated.  Gastrointestinal: soft, NT/ND, BS+.  Skin: No erythema, no rash.   Genitourinary:  No costovertebral angle tenderness.   Neurologic: Alert & oriented x 3, cranial nerves 2-12 intact by passive exam.  No focal deficit noted.  Psychiatric: Affect normal, Judgment normal, Mood normal.     DIFFERENTIAL DIAGNOSIS:  Acute on chronic anemia, due to upper/lower GI bleed not visualized by recent scope   MDS, ESRD low hematopoietin  Anemia of chronic disease, Iron deificent anemia, acute bleed    EKG  N/A    RADIOLOGY/PROCEDURES  No orders to display       LABORATORY: Reviewed as below.  Results for orders placed or performed during the hospital encounter of 07/07/20   CBC WITH DIFFERENTIAL   Result Value Ref Range    WBC 3.4 (L) 4.8 - 10.8 K/uL    RBC 2.10 (L) 4.20 - 5.40 M/uL    Hemoglobin 6.4 (L) 12.0 - 16.0 g/dL    Hematocrit 19.2 (L) 37.0 - 47.0 %    MCV 91.4 81.4 - 97.8 fL    MCH 30.5 27.0 - 33.0 pg    MCHC 33.3 (L) 33.6 - 35.0 g/dL    RDW 44.5 35.9 - 50.0 fL    Platelet Count 48 (LL) 164 - 446 K/uL    Neutrophils-Polys 50.60 44.00 - 72.00 %    Lymphocytes 25.00 22.00 - 41.00 %    Monocytes 19.70 (H) 0.00 - 13.40 %    Eosinophils 3.80 0.00 - 6.90 %    Basophils 0.30 0.00 - 1.80 %    Immature Granulocytes 0.60 0.00 - 0.90 %    Nucleated RBC 0.00 /100 WBC    Neutrophils (Absolute) 1.72 (L) 2.00 - 7.15 K/uL    Lymphs (Absolute) 0.85 (L) 1.00 - 4.80 K/uL    Monos (Absolute) 0.67 0.00 - 0.85 K/uL    Eos (Absolute) 0.13 0.00 - 0.51 K/uL    Baso (Absolute) 0.01 0.00 - 0.12 K/uL    Immature Granulocytes (abs) 0.02 0.00 - 0.11 K/uL    NRBC (Absolute) 0.00 K/uL       INTERVENTIONS:  Medications   NS infusion (has no administration in time range)   lidocaine-epinephrine-tetracaine (LET) topical soln 3 mL (3 mL Topical Given 7/7/20 5592)   1 unit irradiated PRBC      COURSE & MEDICAL DECISION  MAKING  Discussed with Dr. Garza.    Patient with q3week CBC with x2 infusions of 1 unit 2 days apart, did not receive second unit 2 weeks ago due to hgb of 8.5, she is now presenting with symptomatic anemia described as weakness and difficulty concentrating. She has no focal neurological deficits, denies falls. She was recently admitted 6/16-6/20 and GI evaluation for both upper and GI bleed without abnormality. Consideration for continued GI bleed however very unlikely in the setting of normal scope denies dark or bloody stools, no hematuria, no new pain in abdomen, recent falls/trauma and abdomen is soft, not indicative of  internal bleeding. Her caregiver is at bedside and states the difficulty concentrating is common for her when her hgb drops which prompted them to check CBC. She denies worsening heart failure symptoms, does have mild AS seen on echo 2018, murmur auscultated as similar severity I/VI.     Patient hgb 6.4, transfused with 1 unit PRBC which improved her clinical status    Review nursing notes and vital signs a final time\    PLAN:  D/C home, to follow up with oncologist    CONDITION:  Stable.    FINAL IMPRESSION  Acute on chronic anemia  MDS  ESRD    Electronically signed by: Beverley Francois M.D., 7/7/2020 5:10 PM

## 2020-07-08 NOTE — ED NOTES
Pt resting comfortably. VSS Stable appears in no acute distress. Denies any additional needs.Call light within reach. Awaiting type and screen and transfusion.

## 2020-07-08 NOTE — ED NOTES
Denies needs at this time.  No complaints.  Reports that she gets lasix after infusions, will notify ERP

## 2020-07-13 ENCOUNTER — TELEPHONE (OUTPATIENT)
Dept: HEMATOLOGY ONCOLOGY | Facility: MEDICAL CENTER | Age: 65
End: 2020-07-13

## 2020-07-13 NOTE — TELEPHONE ENCOUNTER
Kinza Burkett, pts caretaker called to report that Vielka passed away this weekend. Kinza stated that the last person to see Vielka on Friday at 3pm was the [employer]  and that Vielka's son found her Sunday night at 10pm. Cancelled patient's future appts in our office.

## 2020-07-14 ENCOUNTER — TELEPHONE (OUTPATIENT)
Dept: HEMATOLOGY ONCOLOGY | Facility: MEDICAL CENTER | Age: 65
End: 2020-07-14

## 2020-07-14 NOTE — TELEPHONE ENCOUNTER
Spoke with caregiver, Afua, confirmed patient's passing on Sunday 7/12/20 and expressed condolences from our office.

## 2020-07-16 ENCOUNTER — APPOINTMENT (OUTPATIENT)
Dept: ONCOLOGY | Facility: MEDICAL CENTER | Age: 65
End: 2020-07-16
Attending: INTERNAL MEDICINE
Payer: MEDICARE

## 2020-07-16 ENCOUNTER — APPOINTMENT (OUTPATIENT)
Dept: HEMATOLOGY ONCOLOGY | Facility: MEDICAL CENTER | Age: 65
End: 2020-07-16
Payer: MEDICARE

## 2020-07-18 ENCOUNTER — APPOINTMENT (OUTPATIENT)
Dept: ONCOLOGY | Facility: MEDICAL CENTER | Age: 65
End: 2020-07-18
Attending: INTERNAL MEDICINE
Payer: MEDICARE

## 2021-03-20 NOTE — DISCHARGE PLANNING
[facility]  [phone]  Rashi Gutierrez MD    Subjective:  LOS: 1    Awake and alert. No complaints of pain. Not out of bed yet.     Objective   Vital Signs past 24hrs    Temp range: Temp (24hrs), Av.8 °F (36.6 °C), Min:97 °F (36.1 °C), Max:98.2 °F (36.8 °C)    BP range: BP: (112-145)/(65-88) 126/73  Pulse range: Heart Rate:  [69-94] 83  Resp rate range:      Device (Oxygen Therapy): room air   Oxygen range:SpO2:  [86 %-100 %] 99 %      71.9 kg (158 lb 8.2 oz); Body mass index is 22.11 kg/m².    Intake/Output Summary (Last 24 hours) at 3/20/2021 1115  Last data filed at 3/20/2021 0800  Gross per 24 hour   Intake 869.77 ml   Output 1750 ml   Net -880.23 ml       Physical Exam  HENT:      Head: Normocephalic.   Eyes:      Pupils: Pupils are equal, round, and reactive to light.   Cardiovascular:      Rate and Rhythm: Normal rate and regular rhythm.      Heart sounds: Normal heart sounds. No murmur heard.     Pulmonary:      Effort: Pulmonary effort is normal.      Breath sounds: Normal breath sounds.   Abdominal:      General: Bowel sounds are normal.      Palpations: Abdomen is soft. There is no mass.      Tenderness: There is no abdominal tenderness.   Musculoskeletal:      Left lower leg: No edema.   Neurological:      Mental Status: She is alert.       Results Review:    I have reviewed the laboratory and imaging data since the last note by [facility] physician.  My annotations are noted in assessment and plan.    Medication Review:  I have reviewed the current MAR.  My annotations are noted in assessment and plan.    lactated ringers, 9 mL/hr, Last Rate: Stopped (21 0152)      Plan   PCCM Problems  1. Sepsis   2. Hypokalemia  3. Urolithiasis  4. Acute kidney injury  5. Urinary tract infection, probable pyelonephritis.  6. Non-ST elevation MI type II    THESE ARE NEW MEDICAL PROBLEMS TO ME.    Plan of Treatment  Sepsis resolving. On abx. Urine E.coli pending sensitivity.    Still with posey  Pt has OP hemodialysis at [facility], MWF 1000.  Clinic has been notified of hospitalization.   catheter. Per urology will do voiding trial after ambulation.    Transfer floors.    Rashi Gutierrez MD  03/20/21  11:15 EDT    While in the room and during my examination of the patient I wore gloves, gown, mask, eye protection and followed enhanced droplet/contact isolation protocol and precautions.  I washed my hands before and after this patient encounter.    Part of this note may be an electronic transcription/translation of spoken language to printed text using the Dragon Dictation System.

## 2021-07-20 NOTE — CARE PLAN
Patient presents to the ED for a R shoulder dislocation that happened when she was blow drying her hair. She was here a few months ago for the same thing.    Problem: Infection  Goal: Will remain free from infection  Standard precautions will be used

## 2022-03-30 NOTE — PROGRESS NOTES
Hemodialysis ordered by Dr. Le. Treatment started at 1526 and ended at 1859 d/t clotting dialyzer and lines, new setting placed. Tx restarted. Transfused 1 units PRBC and Epogen 10,000 units IVP given with this tx. Pt stable, vss, no c/o post tx. See flow sheets for details. Net UF 3.016 liters. Report to DEVORAH Bello RN. Lt ua avf dressing clean, dry, intact.    Patient tolerated PO challenge well: no vomiting, No diarrhea.

## 2022-05-11 NOTE — ED NOTES
Pt signed consent for blood. Blood bank notified rn ready for blood   RT Inhaler-Nebulizer Bronchodilator Protocol Note    There is a bronchodilator order in the chart from a provider indicating to follow the RT Bronchodilator Protocol and there is an Initiate RT Inhaler-Nebulizer Bronchodilator Protocol order as well (see protocol at bottom of note). CXR Findings:  XR CHEST PORTABLE    Result Date: 5/11/2022  1. Interval placement of endotracheal and orogastric tubes, in proper positions. 2. Other support apparatus is stable and unchanged. 3. Stable small bilateral pleural effusions. 4. Progressive multifocal airspace opacities within the bilateral mid and lower lung zones, likely a combination of progressive pulmonary edema and multifocal pneumonia. XR CHEST PORTABLE    Result Date: 5/10/2022  Hypoinflated lungs with residual basilar opacities likely representing atelectasis. The technique and hypoinflation will exaggerate the pulmonary vasculature. The findings from the last RT Protocol Assessment were as follows:   History Pulmonary Disease: Chronic pulmonary disease  Respiratory Pattern: Dyspnea on exertion or RR 21-25 bpm  Breath Sounds: Inspiratory and expiratory or bilateral wheezing and/or rhonchi  Cough: Weak, productive  Indication for Bronchodilator Therapy: On home bronchodilators  Bronchodilator Assessment Score: 12    Aerosolized bronchodilator medication orders have been revised according to the RT Inhaler-Nebulizer Bronchodilator Protocol below. Respiratory Therapist to perform RT Therapy Protocol Assessment initially then follow the protocol. Repeat RT Therapy Protocol Assessment PRN for score 0-3 or on second treatment, BID, and PRN for scores above 3. No Indications - adjust the frequency to every 6 hours PRN wheezing or bronchospasm, if no treatments needed after 48 hours then discontinue using Per Protocol order mode.      If indication present, adjust the RT bronchodilator orders based on the Bronchodilator Assessment Score as indicated below.  Use Inhaler orders unless patient has one or more of the following: on home nebulizer, not able to hold breath for 10 seconds, is not alert and oriented, cannot activate and use MDI correctly, or respiratory rate 25 breaths per minute or more, then use the equivalent nebulizer order(s) with same Frequency and PRN reasons based on the score. If a patient is on this medication at home then do not decrease Frequency below that used at home. 0-3 - enter or revise RT bronchodilator order(s) to equivalent RT Bronchodilator order with Frequency of every 4 hours PRN for wheezing or increased work of breathing using Per Protocol order mode. 4-6 - enter or revise RT Bronchodilator order(s) to two equivalent RT bronchodilator orders with one order with BID Frequency and one order with Frequency of every 4 hours PRN wheezing or increased work of breathing using Per Protocol order mode. 7-10 - enter or revise RT Bronchodilator order(s) to two equivalent RT bronchodilator orders with one order with TID Frequency and one order with Frequency of every 4 hours PRN wheezing or increased work of breathing using Per Protocol order mode. 11-13 - enter or revise RT Bronchodilator order(s) to one equivalent RT bronchodilator order with QID Frequency and an Albuterol order with Frequency of every 4 hours PRN wheezing or increased work of breathing using Per Protocol order mode. Greater than 13 - enter or revise RT Bronchodilator order(s) to one equivalent RT bronchodilator order with every 4 hours Frequency and an Albuterol order with Frequency of every 2 hours PRN wheezing or increased work of breathing using Per Protocol order mode.        Electronically signed by Sumeet Miller RCP on 5/11/2022 at 7:38 PM

## 2022-10-09 NOTE — ADDENDUM NOTE
Encounter addended by: Chepe Camargo M.D. on: 10/9/2022 7:41 AM   Actions taken: Delete clinical note

## 2022-11-06 NOTE — PROGRESS NOTES
12/2/2022-Request for records and Images faxed to [facility]-MR @ 10am     12/5/2022-2nd request for images faxed to [facility]. Spoke with [facility] to have images pushed [facility]-MR @828am    12/9/2022-Images now in PACS-MR @ 117pm    FUTURE VISIT INFORMATION      FUTURE VISIT INFORMATION:    Date: 12/12/2022    Time: 3pm    Location:   REFERRAL INFORMATION:    Referring provider:      Referring providers clinic:  [facility]     Reason for visit/diagnosis  Facial Weakness     RECORDS REQUESTED FROM:       Clinic name Comments Records Status Imaging Status   [facility]  Requested  Requested          [facility]   Requested  Requested                             Patient back in room from dialysis, monitor room notified. Lab collected and sent.

## 2022-11-08 NOTE — ASSESSMENT & PLAN NOTE
General Surgery Progress Note    Admission Date: 11/4/2022  Today's Date: 11/8/2022         Assessment:      Bryant Mckenzie is a 79 year old male with severe aortic stenosis admitted with hypotension and plans for TAVR. Developed some abdominal distention and pain. CT early yesterday morning showed dilated loops of bowel.     Abdominal findings likely secondary to cardiogenic shock, poor blood flow to gut. CT not convincing for true mechanical obstruction.     Patient now with fevers, went into afib with RVR. Lactate 1.5 this morning.         Plan:   - Patient's abdomen is worse on my exam today. This was discussed with the intensivist and bedside nurse who are in agreement. Could consider repeat CT today vs close monitoring with serial abdominal exams. It has only been 32 hours since last CT, but patient is febrile now with worsening abdominal distention and pain on exam. Intensivist is reaching out to nephrology for clearance for contrast, creatinine is improved today.    - Continue NG to LIS, OK for a few ice chips for comfort    - I will discuss with Dr. Og, further recommendations to follow. We will follow along closely, please call with any significant changes        Interval History:   Patient developed fevers overnight, Tmax 101.3, Went into afib with RVR. TAVR deferred for now. Not much out of NG tube, 300cc x 24 hours. No BMs. Patient states his abdominal pain is slightly better today at rest, but is much more tender to palpation. He is unsure if his abdomen is bloated.          Physical Exam:   BP (!) 89/78   Pulse (!) 126   Temp (!) 100.8  F (38.2  C) (Pulmonary Artery)   Resp 24   Ht 1.829 m (6')   Wt 96.3 kg (212 lb 4.9 oz)   SpO2 96%   BMI 28.79 kg/m    I/O last 3 completed shifts:  In: 2718.7 [P.O.:230; I.V.:2238.7]  Out: 3035 [Urine:2735; Emesis/NG output:300]  General: NAD, awake and alert, NG tube in place, laying in bed  Abdomen: fairly firm throughout, significant tenderness to  This has been diet-controlled and she is not on any medications for diabetes.   Her blood sugar was 290 in the ER and last glycohemoglobin was 8.3 9 months ago.  Sliding scale insulin will be initiated and Ms. Briscoe may be a good candidate for Lantus nightly upon discharge.    palpation in all quadrants, some guarding. Mild to moderate distention    LABS:  Recent Labs   Lab Test 11/08/22  0834 11/08/22  0342 11/07/22  1249 11/07/22  0404   WBC 11.4* 11.1*  --  13.0*   HGB 9.3* 9.0* 9.1* 8.7*   MCV 83 83  --  82    191  --  172      Recent Labs   Lab Test 11/08/22  0834 11/08/22  0342 11/07/22  1249   POTASSIUM 4.3 4.5 4.4   CHLORIDE 113* 111* 105   CO2 20 20 21   BUN 63* 69* 75*   CR 1.32* 1.43* 2.01*   ANIONGAP 8 10 10      Recent Labs   Lab Test 11/07/22  1249 11/06/22  1433 11/06/22  1232   ALBUMIN 2.5* 2.8* 2.9*   BILITOTAL  --  4.0* 3.8*   ALT  --  21 24   AST  --  20 18   ALKPHOS  --  59 59            -------------------------------    Theresa Acosta PA-C  [facility]  [phone]

## 2022-12-23 NOTE — PROGRESS NOTES
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 ESRD on dialysis. COPD on O2. Caregiver going out of town and wants two units given in the same day if Hgb < 7.5 (normally gets 2 units with a day in between because of dialysis.     ROS:  No new c/o's.     PMH:    Allergies   Allergen Reactions   • Lenalidomide Unspecified     Beeping Pulse     • Naprosyn [Naproxen] Hives   • Pioglitazone Unspecified     Causes blindness   • Simvastatin Unspecified     Couldn't move   • Demerol Vomiting   • Diphenhydramine Vomiting   • Glipizide Vomiting   • Hydromorphone Vomiting   • Iron Vomiting     vomiting   • Metformin Vomiting   • Morphine Vomiting   • Multivitamin Itching     itching   • Ondansetron Itching   • Other Drug Rash and Vomiting     Any binders that remove phosphorus from the body such as tums   • Oxycodone Vomiting   • Pcn [Penicillins] Vomiting          • Propoxyphene Vomiting   • Requip Vomiting   • Sulfa Drugs Rash and Vomiting     Vomiting & rash      • Tamsulosin Vomiting   • Tramadol Vomiting   • Trazodone Vomiting       Medications:    Current Outpatient Medications on File Prior to Visit   Medication Sig Dispense Refill   • HYDROcodone-acetaminophen (NORCO) 5-325 MG Tab per tablet Take 1-2 Tabs by mouth every four hours as needed.     • acetaminophen (TYLENOL) 500 MG Tab Take 500-1,000 mg by mouth every 6 hours as needed.     • carvedilol (COREG) 12.5 MG Tab Take 1 Tab by mouth 2 times a day, with meals. 60 Tab 0   • lidocaine (LIDODERM) 5 % Patch Apply 1 Patch to skin as directed every 24 hours.     • Buprenorphine 10 MCG/HR PATCH WEEKLY Apply 10 mcg to skin as directed every 7 days.     • losartan (COZAAR) 100 MG Tab Take 100 mg by mouth every evening.     • amLODIPine (NORVASC) 10 MG Tab Take 2.5 mg by mouth every day. 1 Tab 0   •  "nitroglycerin (NITROSTAT) 0.4 MG SL Tab Place 1 Tab under tongue as needed for Chest Pain. 25 Tab 11   • bimatoprost (LUMIGAN) 0.01 % Solution Place 1 Drop in both eyes every bedtime.     • brinzolamide (AZOPT) 1 % Suspension Place 1 Drop in both eyes 3 times a day.     • Brimonidine Tartrate-Timolol (COMBIGAN) 0.2-0.5 % Solution Place 1 Drop in both eyes 2 Times a Day.     • pregabalin (LYRICA) 150 MG Cap Take 150 mg by mouth 3 times a day.       No current facility-administered medications on file prior to visit.          Objective    Vitals:    /80 (BP Location: Right arm, Patient Position: Sitting, BP Cuff Size: Adult)   Pulse 74   Temp 37.1 °C (98.7 °F) (Temporal)   Resp 14   Ht 1.473 m (4' 10\")   Wt 66.6 kg (146 lb 13.2 oz)   LMP 01/01/1995   SpO2 98%   BMI 30.69 kg/m²     Physical Exam:  Not examined this visit.       Labs:    Results for JESUS SILVEIRA (MRN [id number])    Ref. Range 9/12/2019 10:20   WBC Latest Ref Range: 4.8 - 10.8 K/uL 5.3   RBC Latest Ref Range: 4.20 - 5.40 M/uL 2.52 (L)   Hemoglobin Latest Ref Range: 12.0 - 16.0 g/dL 8.0 (L)   Hematocrit Latest Ref Range: 37.0 - 47.0 % 25.2 (L)   MCV Latest Ref Range: 81.4 - 97.8 fL 100.0 (H)   MCH Latest Ref Range: 27.0 - 33.0 pg 31.7   MCHC Latest Ref Range: 33.6 - 35.0 g/dL 31.7 (L)   RDW Latest Ref Range: 35.9 - 50.0 fL 84.0 (H)   Platelet Count Latest Ref Range: 164 - 446 K/uL 116 (L)   MPV Latest Ref Range: 9.0 - 12.9 fL 13.9 (H)   Neutrophils-Polys Latest Ref Range: 44.00 - 72.00 % 60.20   Neutrophils (Absolute) Latest Ref Range: 2.00 - 7.15 K/uL 3.20   Lymphocytes Latest Ref Range: 22.00 - 41.00 % 16.50 (L)   Lymphs (Absolute) Latest Ref Range: 1.00 - 4.80 K/uL 0.88 (L)   Monocytes Latest Ref Range: 0.00 - 13.40 % 15.80 (H)     Assessment    Imp:    Visit Diagnosis:    1. MDS (myelodysplastic syndrome) (HCC)  CBC WITH DIFFERENTIAL   2. Type 2 diabetes mellitus with diabetic polyneuropathy, without long-term current use of " insulin (HCC)     3. ESRD (end stage renal disease) on dialysis (HCC)     4. Pancytopenia (HCC)     5. Acute exacerbation of chronic obstructive pulmonary disease (COPD) (HCC)           Plan:    Just this once will change to 2 units because care giver going out of town, then return to q.o.d q 3 weeks.    Chato Jones M.D.         No Detail Level: Detailed Medical Necessity Information: It is in your best interest to select a reason for this procedure from the list below. All of these items fulfill various CMS LCD requirements except the new and changing color options. Post-Care Instructions: I reviewed with the patient in detail post-care instructions. Patient is to wear sunprotection, and avoid picking at any of the treated lesions. Pt may apply Vaseline to crusted or scabbing areas. Render Post-Care Instructions In Note?: no Medical Necessity Clause: This procedure was medically necessary because the lesions that were treated were: Consent: The patient's consent was obtained including but not limited to risks of crusting, scabbing, blistering, scarring, darker or lighter pigmentary change, recurrence, incomplete removal and infection. Anesthesia Volume In Cc: 0.5 Treatment Number (Will Not Render If 0): 0

## 2023-02-21 NOTE — DISCHARGE PLANNING
Outpatient Dialysis Note    Confirmed patient is active at:    [facility]  [address] [place] [address]      Schedule: Monday, Wednesday, Friday  Time: 10:30 am    Spoke with Lisandra at facility who confirmed.    Forwarded records for review.    Dialysis Coordinator, [facility]  Stacia Stephens [phone]   Unknown if ever smoked

## 2023-05-23 NOTE — PROGRESS NOTES
11/17/2017 0951 - Discharge Outreach attempt - LM   Date of Admission: 23    Interval History: Patient resting in bed, drowsy. Daughter present at bedside. No acute overnight events reported. Patient reports improvement in breathing but still doesn't feel back to his baseline yet. Stated he has yet to be OOB to chair as well. On Bumex IVP, 2150cc 24hr UOP, net negative 1.4L. Kidney function continuing to improve.     CHIEF COMPLAINT: Patient is a 64y old  Male who presents with a chief complaint of Mechanical fall (22 May 2023 12:56)    HISTORY OF PRESENT ILLNESS: 64-year-old male w/ HFrEF 15-20% s/p ICD, decreased RV function, mild MR/TR, DM type I w/ neuropathy and hx hypoglycemia , DL, HTN, CAD, hx MI, s/p CABG, s/p stent (2018), hx in-stent thrombosis while on Plavix, PAD s/p 3 LLE stents, TIAGO (needs CPAP auto-regulating pressure 10-16, patient doesn't use it due to claustrophobia), Psoriasis, R-AKA presenting to ED s/p fall. He states he was lying in bed at home when he fell asleep and fell off the bed, and complains of generalized headache neck pain, non-radiating, no alleviating or aggravating factors, associated with right anterior chest wall pain. Denies LOC,  fevers, chills, nausea, vomiting, dizziness, abdominal pain, shortness of breath. States he had TDAP recently. Pt was not down for long as wife was upstairs when he fell off the bed and called EMS right away.   Labs significant for cr 1.8 (baseline ~ 1.2)  trop 0.10 (18 May 2023 14:16)    Patient reports having a known history of low EF, follows w/ Dr. Foote (primary cardiologist) and Dr. Mejia (EP). Stated he takes Lasix 20mg ~ daily at home, recently increased to 40mg daily as he has been experiencing progressively worsening SOB x2 weeks. Admits to eating a moderate Na diet at home. Endorses compliance with home GDMT. States he has been mostly wheelchair bound x1 year 2/2 to musculoskeletal issues.      PAST MEDICAL & SURGICAL HISTORY:  Status post percutaneous transluminal coronary angioplasty  2 stents  Atherosclerosis of coronary artery  CAD (coronary artery disease)  Osteoarthritis  HLD (hyperlipidemia)  Diabetes  on insulin pump  CHF (congestive heart failure)  EF ~ 25%  History of celiac disease  Diverticulitis  STEMI (ST elevation myocardial infarction)  Diabetic neuropathy  Hands and Feet  Anxiety and depression  Other postprocedural status  Fixation hardware in foot LEFT  Stented coronary artery  10/18 heart attack  INFERIOR WALL MI  S/P CABG x 1  2018  S/P TKR (total knee replacement), right  with infection Mrsa  per pt he was cleared from MRSA infection  Surgery, elective  right knee wound debridement  History of open reduction and internal fixation (ORIF) procedure  right hip  H/O shoulder surgery  right  AICD (automatic cardioverter/defibrillator) present  [facility]  Cholecystostomy care  drain inserted  & removed 4 months ago  History of tonsillectomy  History of hip replacement, total, right  Elective surgery  plastic surgery Left shin      FAMILY HISTORY: Patient was adopted, family history unknown to him  SOCIAL HISTORY:  Denies smoking, alcohol, or drug use      Allergies  ACE inhibitors (Rash)  Entresto (Swelling)  Atorvastatin Calcium (Unknown)  Bactrim (Unknown)  Plavix (Unknown)    Intolerances      PHYSICAL EXAM:  T(C): 36 (23 May 2023 13:28), Max: 36 (23 May 2023 13:28)  T(F): 96.8 (23 May 2023 13:28), Max: 96.8 (23 May 2023 13:28)  HR: 80 (23 May 2023 13:28) (75 - 82)  BP: 116/74 (23 May 2023 13:28) (104/66 - 116/74)  BP(mean): --  RR: 18 (23 May 2023 13:28) (18 - 18)  SpO2: 97% (23 May 2023 06:55) (90% - 97%)    O2 Parameters below as of 23 May 2023 06:55  Patient On (Oxygen Delivery Method): room air      General Appearance: normal for age and gender. 	  Neck: JVP [id number]   Eyes: Extra Ocular muscles intact.   Cardiovascular: regular rate and rhythm S1 S2, No edema  Respiratory: decreased B/L  Psychiatry: Drowsy, oriented x 2-3, poor historian  Gastrointestinal:  Soft, Non-tender  Skin/Integumentary: No rashes, No ecchymoses, No cyanosis	  Neurologic: Non-focal  Musculoskeletal/ extremities: R AKA, No clubbing, cyanosis or edema  Vascular: Peripheral pulses palpable 2+ B/L UE         LABS:	 	    CBC Full  -  ( 23 May 2023 04:30 )  WBC Count : 7.26 K/uL  RBC Count : 5.16 M/uL  Hemoglobin : 10.3 g/dL  Hematocrit : 36.3 %  Platelet Count - Automated : 296 K/uL  Mean Cell Volume : 70.3 fL  Mean Cell Hemoglobin : 20.0 pg  Mean Cell Hemoglobin Concentration : 28.4 g/dL  Auto Neutrophil # : 5.38 K/uL  Auto Lymphocyte # : 0.79 K/uL  Auto Monocyte # : 0.83 K/uL  Auto Eosinophil # : 0.19 K/uL  Auto Basophil # : 0.06 K/uL  Auto Neutrophil % : 74.2 %  Auto Lymphocyte % : 10.9 %  Auto Monocyte % : 11.4 %  Auto Eosinophil % : 2.6 %  Auto Basophil % : 0.8 %      Comprehensive Metabolic Panel (23 @ 04:30)    Sodium, Serum: 138 mmol/L   Potassium, Serum: 3.9 mmol/L   Chloride, Serum: 96 mmol/L   Carbon Dioxide, Serum: 32 mmol/L   Anion Gap, Serum: 10 mmol/L   Blood Urea Nitrogen, Serum: 49 mg/dL   Creatinine, Serum: 1.8 mg/dL   Glucose, Serum: 179 mg/dL   Calcium, Total Serum: 9.0 mg/dL   Protein Total, Serum: 7.0 g/dL   Albumin, Serum: 3.6 g/dL   Bilirubin Total, Serum: 1.3 mg/dL   Alkaline Phosphatase, Serum: 128 U/L   Aspartate Aminotransferase (AST/SGOT): 32 U/L   Alanine Aminotransferase (ALT/SGPT): 45 U/L   eGFR: 41: The estimated glomerular filtration rate (eGFR) is calculated using the   CKD-EPI creatinine equation, which does not have a coefficient for  race and is validated in individuals 18 years of age and older (N Engl J  Med ; [id number]). Creatinine-based eGFR may be inaccurate in  various situations including but not limited to extremes of muscle mass,  altered dietary protein intake, or medications that affect renal tubular  creatinine secretion. mL/min/1.73m2      CARDIAC MARKERS:  Pro-Brain Natriuretic Peptide: 9132 pg/mL (23 @ 07:01)        TELEMETRY EVENTS: 	    EC23    Ventricular Rate 79 BPM  Atrial Rate 79 BPM  P-R Interval 170 ms  QRS Duration 158 ms  Q-T Interval 480 ms  QTC Calculation(Bazett) 550 ms  P Axis 51 degrees  R Axis -35 degrees  T Axis 65 degrees    Diagnosis Line Atrial-sensed ventricular-paced rhythm  Abnormal ECG    Confirmed by London Santos ([id number]) on 2023 4:12:01 PM      PREVIOUS DIAGNOSTIC TESTING:    TTE 23    Summary:   1. Left ventricular ejection fraction, by visual estimation, is <20%.   2. Severely decreased global left ventricular systolic function.   3. Mildly increased LV wall thickness.   4. Severe concentric left ventricular hypertrophy.   5. Spectral Doppler shows restrictive pattern of left ventricular   myocardial filling (Grade III diastolic dysfunction).   6. Moderately enlarged right ventricle.   7. Moderately reduced RV systolic function.   8. Elevated mean left atrial pressure.   9. Moderately enlarged left atrium.  10.Moderate mitral valve regurgitation.  11. The mitral valve leaflets are tethered which is due to reduced   systolic function and elevated LVDP.  12. Moderate-severe tricuspid regurgitation.  13. Estimated pulmonary artery systolic pressure is 48.4 mmHg assuming a   right atrial pressure of 8 mmHg, which is consistent with mild pulmonary   hypertension.  14. Patient is in normal sinus rhythm.  15. Compared to the prior TTE dated 21, the patient's cardiomyopathy   has worsened.      Left Heart Catheterization: 18    IMPRESSIONS: There is significant single vessel native coronary artery  disease. Patent LIMA to LAD. No significant restenosis in RCA/OM1.    	    Home Medications:  aspirin 81 mg oral delayed release tablet: 1 tab(s) orally once a day (2023 02:35)  diazePAM 2 mg oral tablet: 0.5 tab(s) orally once a day (at bedtime) (2023 02:35)  DULoxetine 30 mg oral delayed release capsule: 1 cap(s) orally 3 times a day (2023 02:35)  insulin glargine 100 units/mL subcutaneous solution: 25 unit(s) subcutaneous once a day (at bedtime) (2023 02:35)  insulin glargine 100 units/mL subcutaneous solution: 40 microcurie subcutaneous once a day (2023 02:35)  insulin lispro 100 units/mL injectable solution: if your finger stick is 150-199 please inject 2 units  if your finger stick is 200-250 please inject 4 units  if your finger stick is 251-300 please inject 6 units  if your finger stick is 301-350 please inject 8 units  if your finger stick is more than 350 please call your physician    (2023 02:35)  levothyroxine 25 mcg (0.025 mg) oral tablet: 1 tab(s) orally 6 times a week (2023 02:35)  levothyroxine 50 mcg (0.05 mg) oral tablet: 1 tab(s) orally once a week (2023 02:35)  metoprolol succinate 25 mg oral tablet, extended release: 1 tab(s) orally once a day (2023 02:35)  Multiple Vitamins oral tablet: 1 tab(s) orally once a day (2023 02:35)  pantoprazole 40 mg oral delayed release tablet: 1 tab(s) orally once a day (before a meal) (2023 02:35)  prasugrel 10 mg oral tablet: 1 tab(s) orally once a day (2023 02:35)  rosuvastatin 40 mg oral tablet: 1 tab(s) orally once a day (2023 02:35)  spironolactone 25 mg oral tablet: 1 tab(s) orally once a day (2023 02:35)    MEDICATIONS  (STANDING):  aspirin enteric coated 81 milliGRAM(s) Oral daily  dextrose 5% + sodium chloride 0.9%. 1000 milliLiter(s) (50 mL/Hr) IV Continuous <Continuous>  dextrose 5%. 1000 milliLiter(s) (100 mL/Hr) IV Continuous <Continuous>  dextrose 5%. 1000 milliLiter(s) (50 mL/Hr) IV Continuous <Continuous>  dextrose 50% Injectable 25 Gram(s) IV Push once  dextrose 50% Injectable 12.5 Gram(s) IV Push once  dextrose 50% Injectable 25 Gram(s) IV Push once  diazepam    Tablet 1 milliGRAM(s) Oral at bedtime  DULoxetine 30 milliGRAM(s) Oral <User Schedule>  furosemide   Injectable 20 milliGRAM(s) IV Push two times a day  glucagon  Injectable 1 milliGRAM(s) IntraMuscular once  heparin   Injectable 5000 Unit(s) SubCutaneous every 12 hours  hydrocortisone 2.5% Cream 1 Application(s) Topical two times a day  insulin glargine Injectable (LANTUS) 12 Unit(s) SubCutaneous at bedtime  insulin lispro (ADMELOG) corrective regimen sliding scale   SubCutaneous three times a day before meals  lactulose Syrup 10 Gram(s) Oral at bedtime  levothyroxine 25 MICROGram(s) Oral <User Schedule>  levothyroxine 50 MICROGram(s) Oral <User Schedule>  metoprolol succinate ER 25 milliGRAM(s) Oral daily  midodrine. 10 milliGRAM(s) Oral three times a day  pantoprazole    Tablet 40 milliGRAM(s) Oral before breakfast  polyethylene glycol 3350 17 Gram(s) Oral two times a day  prasugrel 10 milliGRAM(s) Oral daily  senna 2 Tablet(s) Oral at bedtime    MEDICATIONS  (PRN):  dextrose Oral Gel 15 Gram(s) Oral once PRN Blood Glucose LESS THAN 70 milliGRAM(s)/deciliter

## 2023-07-05 NOTE — ASSESSMENT & PLAN NOTE
Continue Norvasc 10 mg, Coreg 12.5 mg BID, and cozaar 100 mg daily    Consent (Ear)/Introductory Paragraph: The rationale for Mohs was explained to the patient and consent was obtained. The risks, benefits and alternatives to therapy were discussed in detail. Specifically, the risks of ear deformity, infection, scarring, bleeding, prolonged wound healing, incomplete removal, allergy to anesthesia, nerve injury and recurrence were addressed. Prior to the procedure, the treatment site was clearly identified and confirmed by the patient. All components of Universal Protocol/PAUSE Rule completed.

## 2023-07-27 NOTE — PROGRESS NOTES
[facility] Hospitalist Progress Note    Date of Service: 8/19/2017    Chief Complaint  62 y.o. female admitted 8/12/2017 with intractable nausea and vomiting status post chemotherapy for myelodysplastic syndrome.    Interval Problem Update  8/15. Patient states that her nausea and vomiting has improved in her energy level has improved however she continues to have dizziness. MRI with contrast ordered for further evaluation of any intercerebral pathology. This will be coordinated tomorrow in conjunction with her hemodialysis. Otherwise patient without any other complaints.  8/16. Patient states she feels about the same today. MRI went well and discuss the results showing no intracranial pathology. Possible light up of one of the cervical vertebrae consistent with myelodysplastic syndrome. However this finding would not explain her dizziness. Will have physical and occupational therapy continue to work with the patient.  Patient also had hemodialysis today without issue.  8/17 patient states she feels about the same today. She did get up with physical and occupational therapy and had increase in dizziness but even with resting had persistent dizziness. There is question as to whether orthostasis could be playing a role in dizziness however if this were the sole cause of it should relieve with resting. Unfortunately he does not at this time. No evidence of source on MRI. She is having worsening vision and worsening glaucoma and her site could certainly be contributing but she states she feels dizzy when the room is dark and her eyes are closed. At this time it is not obvious as to what the source of her dizziness is. She is not significantly anemic for her, she does not have sign of infection, there is no intercerebral pathology identified on MRI.  If this were related to her most recent chemotherapy, likely should have resolved by now.  8/18. Patient states she feels complaints today. She had a normal hemodialysis today  without complication. Patient still has persistent dizziness no matter what her blood pressure or heart rate is. I'll discuss further with oncology as possible bone marrow biopsy is needed to see if there is any progression of the mild dysplastic syndrome while patients are in the hospital. Patient has no new acute complaints.   patient states that the dizziness is back again today. We will trial the schedule Antivert to see if this helps treat the symptom: however the cause of it I've yet to determine.  She is not orthostatic, no intracranial pathology, blood counts are stable.    Consultants/Specialty  Dzkhjjyoco-Fegy-Uxsnzx    Disposition  Home when appropriate        Review of Systems   Constitutional: Positive for malaise/fatigue (Improving). Negative for fever and chills.   HENT: Negative for congestion.    Eyes: Positive for blurred vision. Negative for photophobia.   Respiratory: Negative for cough and shortness of breath.    Cardiovascular: Negative for chest pain, claudication and leg swelling.   Gastrointestinal: Negative for heartburn, nausea, vomiting, abdominal pain, diarrhea and constipation.   Genitourinary: Negative for dysuria and hematuria.   Musculoskeletal: Negative for myalgias and joint pain.   Skin: Negative for itching and rash.   Neurological: Positive for dizziness and weakness. Negative for sensory change, speech change and headaches.   Psychiatric/Behavioral: Negative for depression. The patient is not nervous/anxious and does not have insomnia.       Physical Exam  Laboratory/Imaging   Hemodynamics  Temp (24hrs), Av.4 °C (97.6 °F), Min:36.2 °C (97.2 °F), Max:36.7 °C (98.1 °F)   Temperature: 36.4 °C (97.6 °F)  Pulse  Av.1  Min: 63  Max: 100    Blood Pressure: 120/61 mmHg      Respiratory      Respiration: 18, Pulse Oximetry: 100 %             Fluids    Intake/Output Summary (Last 24 hours) at 17 1552  Last data filed at 17 0900   Gross per 24 hour   Intake      0  ml   Output    400 ml   Net   -400 ml       Nutrition  Orders Placed This Encounter   Procedures   • Diet Order     Standing Status: Standing      Number of Occurrences: 1      Standing Expiration Date:      Order Specific Question:  Diet:     Answer:  Renal [8]     Order Specific Question:  Diet:     Answer:  Diabetic [3]     Physical Exam   Constitutional: She is oriented to person, place, and time. She appears well-developed and well-nourished. No distress.   HENT:   Head: Normocephalic and atraumatic.   Eyes: Conjunctivae are normal. No scleral icterus.   Patient squinting as I enter the room, states her vision is blurry at baseline.   Neck: Neck supple. No JVD present.   Cardiovascular: Normal rate, regular rhythm and normal heart sounds.  Exam reveals no gallop and no friction rub.    No murmur heard.  Pulmonary/Chest: Effort normal and breath sounds normal. No respiratory distress. She exhibits no tenderness.   Abdominal: Soft. Bowel sounds are normal. She exhibits no distension. There is no guarding.   Musculoskeletal: She exhibits no edema or tenderness.   Neurological: She is alert and oriented to person, place, and time. No cranial nerve deficit.   Skin: Skin is warm and dry. She is not diaphoretic. No erythema. No pallor.   Psychiatric: She has a normal mood and affect. Her behavior is normal.   Nursing note and vitals reviewed.      Recent Labs      08/16/17 2347 08/18/17   0008  08/19/17   0059   WBC  3.2*  2.5*  2.4*   RBC  2.88*  2.75*  2.69*   HEMOGLOBIN  9.2*  8.8*  8.6*   HEMATOCRIT  26.1*  25.6*  24.5*   MCV  90.6  93.1  91.1   MCH  31.9  32.0  32.0   MCHC  35.2*  34.4  35.1*   RDW  48.4  49.5  48.3   PLATELETCT  137*  111*  96*   MPV  12.3  12.7  13.3*     Recent Labs      08/16/17 2347 08/18/17   0008   SODIUM  133*  134*   POTASSIUM  4.6  4.9   CHLORIDE  97  99   CO2  27  28   GLUCOSE  244*  312*   BUN  36*  27*   CREATININE  3.99*  3.51*   CALCIUM  7.8*  7.5*                       Assessment/Plan     ESRD (end stage renal disease) on dialysis (CMS-[facility]) (present on admission)  Assessment & Plan  Pt dialysis days are MWF.   Nephrology consulted.   Additional hemodialysis today secondary to contrast exposure with MRI and resume regular dialysis tomorrow.    Myelodysplasia (myelodysplastic syndrome) (CMS-HCC) (present on admission)  Assessment & Plan  Pt follows Dr. Fish.   Continue to follow as outpt.     Intractable nausea and vomiting (present on admission)  Assessment & Plan  Improving.  Continue supportive care with Antiemetics.   Likely secondary to recent chemotherapy  Has improved and patient able to tolerate by mouth without issue    Anemia of chronic disease (present on admission)  Assessment & Plan  Pt hgb generally at 8.0  Hemoglobin 8.6  Low hemoglobin compounded with chemotherapy for myelodysplastic syndrome       Paroxysmal atrial fibrillation (CMS-HCC) (present on admission)  Assessment & Plan  Continue Coreg, not on anticoagulation secondary to MDS    Diastolic dysfunction (present on admission)  Assessment & Plan  Stable. No exacerbation.     Glaucoma  Assessment & Plan  Patient states she normally has poor blurred vision secondary to her glaucoma  This could be contributing to her symptom of dizziness.  Ordered her combigan gtts that she has at bedside      Dizziness  Assessment & Plan  Patient states that this is a normal symptom for her when her hemoglobin is low  She is continuing to have the symptoms however even after transfusion and without activity.   MRI of the brain with contrast without intracranial abnormality seen.  Patient does state that her blurred vision is getting worse and she saw her ophthalmologist last week and he adjusted her glaucoma medicine. She hadn't received her combigan since admission, it is at bedside, I've placed nursing communication that she should receive this medication.  Trial of scheduled Antivert every 8 hours.    HTN (hypertension)  [FreeTextEntry1] : DENNYS RIVAS is a 81 year PMF   here for annual and f/u right ov cyst. Frst found out about cyst in  after CT scan for back apin. was followed up again after heturia and seen to have grown from 1.6 to 2 cm in . has MRI scheduled for beginning of august\par Denies vaginal bleeding, discharge or pain.\par has h/o TVH BS for prolapse at age 43 yo/\par \par Gynhx: Reg menses, No h/o STIs/fibroids/abn paps; h/o cysts; h/o tvh bs, ap repair for prolaspe\par Obhx:nsvdx4, one stllbirth\par \par Last mammo:2022 - with sono (has cysts)\par Last Colonoscopy: - was told normal\par Last Pap smear:14 years ago\par Last dexa: - nl (h/o of foasmax)\par  (present on admission)  Assessment & Plan  Stable. Continue home medications.     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 Continue oxygen support.     Diabetic neuropathy (CMS-HCC) (present on admission)  Assessment & Plan  Pain under control. Continue home medications.     Vitamin D deficiency (present on admission)  Assessment & Plan  Stable. Continue replacement.       Medications reviewed, Radiology images reviewed and Labs reviewed  Bauer catheter: No Bauer      DVT Prophylaxis: Contraindicated - High bleeding risk  DVT prophylaxis - mechanical: SCDs

## 2023-08-09 NOTE — ASSESSMENT & PLAN NOTE
Patient had a CPE with Dr Means today. Patient has a form for school that needs to be signed by Dr Means after patient's TB skin test results come back on Friday. TB skin test was placed in [place] today. Patient will have this test read at the [facility] on Friday. Once results are available, form for school will need to be faxed to [employer]. ([facility] will fax form) WILTON signed today. Form placed in unresolved folder.    Replete as needed

## 2025-01-25 NOTE — IP AVS SNAPSHOT
" <p align=\"LEFT\"><IMG SRC=\"//[email or web address]$/[email or web address]\" alt=\"Image\" WIDTH=\"50%\" HEIGHT=\"200\" BORDER=\"\"></p>                   Name:Vielka Briscoe  Medical Record Number:[id number]  CSN: [id number]    YOB: 1955   Age: 61 y.o.  Sex: female  HT:1.473 m (4' 10\") WT: 61.8 kg (136 lb 3.9 oz)          Admit Date: 1/6/2017     Discharge Date:   Today's Date: 1/8/2017  Attending Doctor:  uJstin Willis M.D.                  Allergies:  Actos; Darvocet; Demerol; Glucophage; Morphine; Oxycodone; Pcn; Requip; Simvastatin; Sulfa drugs; Tramadol; Trazodone; Dilaudid; Diphenhydramine; Iron; Multivitamin; and Other drug             Medication List      Take these Medications        Instructions    acetaminophen 325 MG Tabs   Commonly known as:  TYLENOL    Take 2 Tabs by mouth every 6 hours as needed for Fever or Moderate Pain (Temp >101.5F).   Dose:  650 mg       amlodipine 10 MG Tabs   Commonly known as:  NORVASC    Take 10 mg by mouth every day.   Dose:  10 mg       carvedilol 25 MG Tabs   Commonly known as:  COREG    Take 25 mg by mouth 2 times a day, with meals.   Dose:  25 mg       famotidine 20 MG Tabs   Commonly known as:  PEPCID    Take 1 Tab by mouth 2 times a day.   Dose:  20 mg       hydrALAZINE 25 MG Tabs   Commonly known as:  APRESOLINE    Take 1.5 Tabs by mouth every 8 hours.   Dose:  37.5 mg       hydrocodone-acetaminophen 5-325 MG Tabs per tablet   What changed:    - when to take this  - reasons to take this   Commonly known as:  NORCO    Take 1 Tab by mouth every 8 hours as needed (severe pain).   Dose:  1 Tab       insulin lispro 100 UNIT/ML Soln   Commonly known as:  HUMALOG    Inject 1-6 Units as instructed 4 Times a Day,Before Meals and at Bedtime.   Dose:  1-6 Units       isosorbide dinitrate 20 MG Tabs   Commonly known as:  ISORDIL    Take 1 Tab by mouth 3 times a day.   Dose:  20 mg       LUMIGAN 0.03 % Soln   Generic drug:  bimatoprost    Place 1 Drop in both eyes every evening.   Dose: "  1 Drop       pregabalin 50 MG capsule   Commonly known as:  LYRICA    Take 1 Cap by mouth 2 times a day.   Dose:  50 mg       VOLTAREN 1 % Gel   Generic drug:  Diclofenac Sodium    Apply 1 Application to skin as directed 2 times a day as needed. Legs and back   Dose:  1 Application          irritable bowel syndrome

## 2025-03-21 NOTE — ASSESSMENT & PLAN NOTE
Left vm for patient   Symptomatic with weakness, acute on chronic debility  Related to anemia of renal disease with underlying myelodysplastic syndrome  She is transfusion dependent  We will plan transfusion with 2 units of PRBC today  Discussed above with nephrology team, will provide with dialysis to avoid overload  Dr. Le aware of above from nephrology team, I personally discussed the case with them  Patient will maintain outpatient follow-up with hematology/oncology-Dr. Avila

## 2025-07-09 NOTE — CARE PLAN
Problem: Mobility  Goal: Risk for activity intolerance will decrease  Outcome: PROGRESSING AS EXPECTED  Reminded to call Nurse prior to getting OOB for safety         15

## (undated) DEVICE — TUBE CONNECTING SUCTION - CLEAR PLASTIC STERILE 72 IN (50EA/CA)

## (undated) DEVICE — CON SEDATION/>5 YR 1ST 15 MIN

## (undated) DEVICE — GOWN SURGEONS X-LARGE - DISP. (30/CA)

## (undated) DEVICE — BITEBLOCK ENDOSCOPIC PEDI. - (25/BX)

## (undated) DEVICE — CATHETER IV 20 GA X 1-1/4 ---SURG.& SDS ONLY--- (50EA/BX)

## (undated) DEVICE — KIT CUSTOM PROCEDURE SINGLE FOR ENDO  (15/CA)

## (undated) DEVICE — TUBING O2 7FT TIP SMTH BORE - (50/CA)

## (undated) DEVICE — SYRINGE 6 CC 20 GA X 1 1/2 - NDL SAFETY  (50/BX)

## (undated) DEVICE — BITE BLOCK ADULT 60FR (100EA/CA)

## (undated) DEVICE — CANNULA W/ SUPPLY TUBING O2 - (50/CA)

## (undated) DEVICE — CANISTER SUCTION 3000ML MECHANICAL FILTER AUTO SHUTOFF MEDI-VAC NONSTERILE LF DISP  (40EA/CA)

## (undated) DEVICE — MASK WITH FACE SHIELD (25/BX 4BX/CA)

## (undated) DEVICE — SENSOR SPO2 NEO LNCS ADHESIVE (20/BX) SEE USER NOTES

## (undated) DEVICE — FILM CASSETTE ENDO

## (undated) DEVICE — SYRINGE 3 CC 22 GA X 1-1/2 - NDL SAFETY (50/BX 8BX/CA)

## (undated) DEVICE — TUBE NG SALEM SUMP 14FR (50EA/BX)

## (undated) DEVICE — CANISTER SUCTION RIGID RED 1500CC (40EA/CA)

## (undated) DEVICE — NEPTUNE 4 PORT MANIFOLD - (20/PK)

## (undated) DEVICE — TUBING CLEARLINK DUO-VENT - C-FLO (48EA/CA)

## (undated) DEVICE — MANIFOLD NEPTUNE 1 PORT (20/PK)

## (undated) DEVICE — ELECTRODE 850 FOAM ADHESIVE - HYDROGEL RADIOTRNSPRNT (50/PK)

## (undated) DEVICE — SET EXTENSION WITH 2 PORTS (48EA/CA) ***PART #2C8610 IS A SUBSTITUTE*****

## (undated) DEVICE — CONTAINER, SPECIMEN, STERILE